# Patient Record
Sex: FEMALE | Race: WHITE | Employment: FULL TIME | URBAN - METROPOLITAN AREA
[De-identification: names, ages, dates, MRNs, and addresses within clinical notes are randomized per-mention and may not be internally consistent; named-entity substitution may affect disease eponyms.]

---

## 2018-04-09 ENCOUNTER — OP HISTORICAL/CONVERTED ENCOUNTER (OUTPATIENT)
Dept: OTHER | Age: 53
End: 2018-04-09

## 2018-06-06 ENCOUNTER — OP HISTORICAL/CONVERTED ENCOUNTER (OUTPATIENT)
Dept: OTHER | Age: 53
End: 2018-06-06

## 2018-06-08 ENCOUNTER — OP HISTORICAL/CONVERTED ENCOUNTER (OUTPATIENT)
Dept: OTHER | Age: 53
End: 2018-06-08

## 2018-07-30 ENCOUNTER — OP HISTORICAL/CONVERTED ENCOUNTER (OUTPATIENT)
Dept: OTHER | Age: 53
End: 2018-07-30

## 2018-07-31 ENCOUNTER — OP HISTORICAL/CONVERTED ENCOUNTER (OUTPATIENT)
Dept: OTHER | Age: 53
End: 2018-07-31

## 2018-10-02 ENCOUNTER — OP HISTORICAL/CONVERTED ENCOUNTER (OUTPATIENT)
Dept: OTHER | Age: 53
End: 2018-10-02

## 2019-05-06 ENCOUNTER — OP HISTORICAL/CONVERTED ENCOUNTER (OUTPATIENT)
Dept: OTHER | Age: 54
End: 2019-05-06

## 2019-05-30 ENCOUNTER — OP HISTORICAL/CONVERTED ENCOUNTER (OUTPATIENT)
Dept: OTHER | Age: 54
End: 2019-05-30

## 2019-08-29 ENCOUNTER — OP HISTORICAL/CONVERTED ENCOUNTER (OUTPATIENT)
Dept: OTHER | Age: 54
End: 2019-08-29

## 2020-07-24 RX ORDER — LEVOTHYROXINE SODIUM 100 UG/1
TABLET ORAL
Qty: 30 TAB | Refills: 0 | Status: SHIPPED | OUTPATIENT
Start: 2020-07-24 | End: 2020-08-19

## 2020-07-28 DIAGNOSIS — N95.1 MENOPAUSAL AND FEMALE CLIMACTERIC STATES: Primary | ICD-10-CM

## 2020-07-28 RX ORDER — TESTOSTERONE 10 MG/.5G
GEL, METERED TOPICAL
Qty: 60 G | Refills: 0 | Status: SHIPPED | OUTPATIENT
Start: 2020-07-28 | End: 2020-12-07

## 2020-09-12 RX ORDER — PROGESTERONE 200 MG/1
CAPSULE ORAL
Qty: 90 CAP | Refills: 2 | Status: SHIPPED | OUTPATIENT
Start: 2020-09-12 | End: 2021-03-11 | Stop reason: ALTCHOICE

## 2020-10-14 ENCOUNTER — TELEPHONE (OUTPATIENT)
Dept: ENT CLINIC | Age: 55
End: 2020-10-14

## 2020-10-15 NOTE — TELEPHONE ENCOUNTER
Called patient to send in allergy recipe through fax in the office. Patient says she might not get to it today but will call when she faxes it over.

## 2020-10-20 VITALS
HEART RATE: 95 BPM | OXYGEN SATURATION: 98 % | BODY MASS INDEX: 31.58 KG/M2 | DIASTOLIC BLOOD PRESSURE: 80 MMHG | HEIGHT: 64 IN | SYSTOLIC BLOOD PRESSURE: 122 MMHG | RESPIRATION RATE: 18 BRPM | WEIGHT: 185 LBS

## 2020-10-20 PROBLEM — J30.9 ALLERGIC RHINITIS: Status: ACTIVE | Noted: 2019-07-29

## 2020-10-20 PROBLEM — J30.1 ALLERGIC RHINITIS DUE TO POLLEN: Status: ACTIVE | Noted: 2019-07-29

## 2020-11-06 ENCOUNTER — TELEPHONE (OUTPATIENT)
Dept: ENT CLINIC | Age: 55
End: 2020-11-06

## 2020-11-06 DIAGNOSIS — N95.1 SYMPTOMATIC MENOPAUSAL OR FEMALE CLIMACTERIC STATES: Primary | ICD-10-CM

## 2020-11-06 RX ORDER — ESTRADIOL 2 MG/1
2 TABLET ORAL DAILY
Qty: 90 TAB | Refills: 0 | OUTPATIENT
Start: 2020-11-06 | End: 2021-03-11 | Stop reason: ALTCHOICE

## 2020-11-06 RX ORDER — ESTRADIOL 1 MG/1
1 TABLET ORAL DAILY
Qty: 90 TAB | Refills: 0 | Status: SHIPPED | OUTPATIENT
Start: 2020-11-06 | End: 2020-11-06 | Stop reason: CLARIF

## 2020-11-17 ENCOUNTER — OFFICE VISIT (OUTPATIENT)
Dept: ENT CLINIC | Age: 55
End: 2020-11-17
Payer: COMMERCIAL

## 2020-11-17 VITALS — TEMPERATURE: 97.3 F | SYSTOLIC BLOOD PRESSURE: 130 MMHG | DIASTOLIC BLOOD PRESSURE: 76 MMHG

## 2020-11-17 VITALS — SYSTOLIC BLOOD PRESSURE: 130 MMHG | TEMPERATURE: 97.3 F | DIASTOLIC BLOOD PRESSURE: 76 MMHG

## 2020-11-17 DIAGNOSIS — J30.1 NON-SEASONAL ALLERGIC RHINITIS DUE TO POLLEN: Primary | ICD-10-CM

## 2020-11-17 DIAGNOSIS — R09.82 PND (POST-NASAL DRIP): ICD-10-CM

## 2020-11-17 DIAGNOSIS — J32.0 CHRONIC MAXILLARY SINUSITIS: ICD-10-CM

## 2020-11-17 DIAGNOSIS — J30.1 ALLERGIC RHINITIS DUE TO POLLEN, UNSPECIFIED SEASONALITY: Primary | ICD-10-CM

## 2020-11-17 PROCEDURE — 95117 IMMUNOTHERAPY INJECTIONS: CPT | Performed by: OTOLARYNGOLOGY

## 2020-11-17 PROCEDURE — 31231 NASAL ENDOSCOPY DX: CPT | Performed by: OTOLARYNGOLOGY

## 2020-11-17 RX ORDER — BUDESONIDE 1 MG/2ML
1000 INHALANT ORAL DAILY
Qty: 60 ML | Refills: 1 | Status: SHIPPED | OUTPATIENT
Start: 2020-11-17 | End: 2022-08-24 | Stop reason: ALTCHOICE

## 2020-11-17 NOTE — LETTER
11/17/20    Patient: Zenia Rashid   YOB: 1965   Date of Visit: 11/17/2020     Choco Gonzales MD  [address] [place] [address]  VIA In Basket    Dear Choco Gonzales MD,      Thank you for referring Ms. Zenia Rashid to [facility] for evaluation. My notes for this consultation are attached. If you have questions, please do not hesitate to call me. I look forward to following your patient along with you.       Sincerely,    Katie Kelly MD

## 2020-11-17 NOTE — PROGRESS NOTES
Subjective:    Thuan Tan   60 y.o.   1965       Location - nose, throat    Quality - PND, allergic rhinitis    Severity -  Moderate/severe    Duration - years    Timing - chronic    Context - followup today, pt is s/p ESS/septo in 2018; has been on/off with her allergy IT, mostly reactive to dust - is back on for past 2-3 mos; today c/o mostly PND, seems more right sided; using flonase and decongestants which only help some; no production of sputum no abn discoloration of drainage    Modifying Features - worse at night    Associated symptoms/signs - nasal congestion      Review of Systems  Review of Systems   Constitutional: Negative for chills and fever. HENT: Positive for congestion. Negative for ear pain, hearing loss, nosebleeds and tinnitus. Eyes: Negative for blurred vision and double vision. Respiratory: Negative for cough, sputum production and shortness of breath. Cardiovascular: Negative for chest pain and palpitations. Gastrointestinal: Negative for heartburn, nausea and vomiting. Musculoskeletal: Negative for joint pain and neck pain. Skin: Negative. Neurological: Negative for dizziness, speech change, weakness and headaches. Endo/Heme/Allergies: Positive for environmental allergies. Does not bruise/bleed easily. Psychiatric/Behavioral: Negative for memory loss. The patient does not have insomnia. Past Medical History:   Diagnosis Date    Allergic rhinitis 7/29/2019    Depression     History of multiple allergies     Muscle ache     Obesity 11/5/2012    Sinus pressure     Sinus problem      Prior to Admission medications    Medication Sig Start Date End Date Taking? Authorizing Provider   budesonide (PULMICORT) 1 mg/2 mL nbsp 2 mL by Nebulization route daily. 11/17/20  Yes Fernando Eaton MD   estradioL (ESTRACE) 2 mg tablet Take 1 Tab by mouth daily.  11/6/20   Kelsey Nunes MD   progesterone (PROMETRIUM) 200 mg capsule TAKE 1 CAPSULE BY MOUTH EVERY DAY 9/12/20   Bela Farmer MD   levothyroxine (SYNTHROID) 100 mcg tablet TAKE 1 TABLET BY MOUTH EVERY DAY IN THE MORNING 8/19/20   Mike Carrillo MD   testosterone 10 mg/0.5 gram /actuation glpm APPLY 2 PUMP(S) EVERY DAY BY TRANSDERMAL ROUTE. 7/28/20   Bela Farmer MD   escitalopram oxalate (LEXAPRO) 20 mg tablet TAKE 1 TABLET BY MOUTH EVERY DAY 8/25/14   Colleen Fournier MD   buPROPion XL (WELLBUTRIN XL) 150 mg tablet TAKE 1 TABLET BY MOUTH DAILY. 8/25/14   Colleen Fournier MD   furosemide (LASIX) 40 mg tablet TAKE 1 TABLET BY MOUTH EVERY DAY 8/13/14   Ewa Méndez MD   desloratadine-pseudoephedrine (CLARINEX-D 24 HOUR) 5-240 mg ER tablet Take 1 Tab by mouth daily. Provider, Historical   acetaminophen (TYLENOL) 325 mg tablet Take  by mouth every four (4) hours as needed. Provider, Historical   LORATADINE (CLARITIN PO) Take  by mouth. Provider, Historical   MULTIVITAMIN PO Take  by mouth. Provider, Historical            Objective:     Visit Vitals  /76 (BP 1 Location: Right arm, BP Patient Position: Sitting)   Temp 97.3 °F (36.3 °C) (Temporal)        Physical Exam  Vitals signs reviewed. Constitutional:       General: She is awake. She is not in acute distress. Appearance: Normal appearance. She is well-groomed. She is obese. HENT:      Head: Normocephalic and atraumatic. Jaw: There is normal jaw occlusion. No trismus, tenderness or malocclusion. Salivary Glands: Right salivary gland is not diffusely enlarged or tender. Left salivary gland is not diffusely enlarged or tender. Right Ear: Hearing, tympanic membrane, ear canal and external ear normal.      Left Ear: Hearing, tympanic membrane, ear canal and external ear normal.      Ears:      Rondon exam findings: does not lateralize. Right Rinne: AC > BC. Left Rinne: AC > BC. Nose: Mucosal edema and rhinorrhea (clear) present. No nasal deformity or septal deviation. Right Nostril: No epistaxis.       Left Nostril: No epistaxis. Right Turbinates: Not enlarged, swollen or pale. Left Turbinates: Not enlarged, swollen or pale. Right Sinus: No maxillary sinus tenderness or frontal sinus tenderness. Left Sinus: No maxillary sinus tenderness or frontal sinus tenderness. Mouth/Throat:      Lips: Pink. No lesions. Mouth: Mucous membranes are moist. No oral lesions. Dentition: Normal dentition. No dental caries. Tongue: No lesions. Palate: No mass and lesions. Pharynx: Oropharynx is clear. Uvula midline. No oropharyngeal exudate or posterior oropharyngeal erythema. Tonsils: No tonsillar exudate. 0 on the right. 0 on the left. Eyes:      General: Vision grossly intact. Extraocular Movements: Extraocular movements intact. Right eye: No nystagmus. Left eye: No nystagmus. Conjunctiva/sclera: Conjunctivae normal.      Pupils: Pupils are equal, round, and reactive to light. Neck:      Musculoskeletal: No edema or erythema. Thyroid: No thyroid mass, thyromegaly or thyroid tenderness. Trachea: Trachea and phonation normal. No tracheal tenderness or tracheal deviation. Cardiovascular:      Rate and Rhythm: Normal rate and regular rhythm. Pulmonary:      Effort: Pulmonary effort is normal. No respiratory distress. Breath sounds: No stridor. Musculoskeletal: Normal range of motion. General: No swelling or tenderness. Lymphadenopathy:      Cervical: No cervical adenopathy. Skin:     General: Skin is warm and dry. Findings: No lesion or rash. Neurological:      General: No focal deficit present. Mental Status: She is alert and oriented to person, place, and time. Mental status is at baseline. Cranial Nerves: Cranial nerves are intact. Coordination: Romberg sign negative. Gait: Gait is intact.       Comments: Negative Hallpike   Psychiatric:         Mood and Affect: Mood normal.         Behavior: Behavior normal. Behavior is cooperative. Procedure: Nasal endoscopy - Nasal passage is anesthetized with a lidocaine/oxymetazoline soaked cottonball. After several minutes the cottonball is removed and flexible scope is used to examine the nasal cavity. Left side - normal inferior turbinate, middle meatus; no polyp, purulence, lesion. Widely patent maxillary antrostomy and no pooling of secretions within sinus  Right side  - normal inferior turbinate, middle meatus; no polyp, purulence, lesion. Widely patent maxillary antrostomy and no pooling of secretions within sinus    There is clear mucoid drainage posterior nasal cavity more on right side. Septum normal.        Assessment/Plan:     Encounter Diagnoses   Name Primary?  Non-seasonal allergic rhinitis due to pollen Yes    PND (post-nasal drip)     Chronic maxillary sinusitis      Scope today showing expected surgical changes and patent ostia, pt reassured  Drainage is mostly allergy/rhinitis related  Will cont to advance on IT and add steroid irrigations  Fu 3 mos    Orders Placed This Encounter    budesonide (PULMICORT) 1 mg/2 mL nbsp     Follow-up and Dispositions    · Return in about 3 months (around 2/17/2021).

## 2020-12-01 DIAGNOSIS — N95.1 MENOPAUSAL AND FEMALE CLIMACTERIC STATES: ICD-10-CM

## 2020-12-07 RX ORDER — TESTOSTERONE 10 MG/.5G
GEL, METERED TOPICAL
Qty: 60 G | Refills: 0 | Status: SHIPPED | OUTPATIENT
Start: 2020-12-07 | End: 2021-06-29 | Stop reason: ALTCHOICE

## 2020-12-15 RX ORDER — ESCITALOPRAM OXALATE 20 MG/1
TABLET ORAL
Qty: 90 TAB | Refills: 2 | Status: SHIPPED | OUTPATIENT
Start: 2020-12-15 | End: 2021-09-14

## 2020-12-15 RX ORDER — FUROSEMIDE 20 MG/1
TABLET ORAL
Qty: 90 TAB | Refills: 1 | Status: SHIPPED | OUTPATIENT
Start: 2020-12-15 | End: 2021-05-25

## 2020-12-18 RX ORDER — TRAZODONE HYDROCHLORIDE 50 MG/1
TABLET ORAL
Qty: 180 TAB | Refills: 1 | Status: SHIPPED | OUTPATIENT
Start: 2020-12-18 | End: 2021-05-21

## 2020-12-21 ENCOUNTER — TELEPHONE (OUTPATIENT)
Dept: ENT CLINIC | Age: 55
End: 2020-12-21

## 2020-12-28 ENCOUNTER — OFFICE VISIT (OUTPATIENT)
Dept: ENT CLINIC | Age: 55
End: 2020-12-28
Payer: COMMERCIAL

## 2020-12-28 VITALS — TEMPERATURE: 97.1 F

## 2020-12-28 DIAGNOSIS — J30.1 ALLERGIC RHINITIS DUE TO POLLEN, UNSPECIFIED SEASONALITY: Primary | ICD-10-CM

## 2020-12-28 PROCEDURE — 95117 IMMUNOTHERAPY INJECTIONS: CPT | Performed by: OTOLARYNGOLOGY

## 2020-12-28 RX ORDER — LEVONORGESTREL AND ETHINYL ESTRADIOL AND ETHINYL ESTRADIOL 0.15MG(84)
KIT ORAL
COMMUNITY
End: 2021-03-11 | Stop reason: ALTCHOICE

## 2020-12-28 RX ORDER — TESTOSTERONE 10 MG/.5G
GEL, METERED TOPICAL
COMMUNITY
End: 2021-06-29 | Stop reason: ALTCHOICE

## 2020-12-28 RX ORDER — LEVOTHYROXINE AND LIOTHYRONINE 38; 9 UG/1; UG/1
TABLET ORAL
COMMUNITY
End: 2021-06-29 | Stop reason: ALTCHOICE

## 2020-12-28 RX ORDER — ESCITALOPRAM OXALATE 10 MG/1
TABLET ORAL
COMMUNITY
End: 2021-06-29 | Stop reason: ALTCHOICE

## 2020-12-28 RX ORDER — LEVOTHYROXINE SODIUM 100 UG/1
TABLET ORAL
COMMUNITY
End: 2021-06-29 | Stop reason: ALTCHOICE

## 2020-12-28 RX ORDER — DOXYCYCLINE HYCLATE 100 MG
TABLET ORAL
COMMUNITY
End: 2021-06-29 | Stop reason: ALTCHOICE

## 2020-12-28 RX ORDER — TRAZODONE HYDROCHLORIDE 50 MG/1
TABLET ORAL
COMMUNITY
End: 2021-05-21 | Stop reason: SDUPTHER

## 2020-12-28 RX ORDER — LORATADINE AND PSEUDOEPHEDRINE SULFATE 5; 120 MG/1; MG/1
TABLET, EXTENDED RELEASE ORAL
Status: ON HOLD | COMMUNITY

## 2020-12-28 RX ORDER — ESTRADIOL 0.5 MG/1
TABLET ORAL
COMMUNITY
End: 2021-03-11 | Stop reason: ALTCHOICE

## 2020-12-28 RX ORDER — FUROSEMIDE 20 MG/1
TABLET ORAL
COMMUNITY
End: 2021-05-25 | Stop reason: SDUPTHER

## 2020-12-28 RX ORDER — TRETINOIN 0.5 MG/G
CREAM TOPICAL
Status: ON HOLD | COMMUNITY

## 2021-01-06 ENCOUNTER — OFFICE VISIT (OUTPATIENT)
Dept: PRIMARY CARE CLINIC | Age: 56
End: 2021-01-06

## 2021-01-06 VITALS — HEART RATE: 100 BPM | OXYGEN SATURATION: 97 % | TEMPERATURE: 98.2 F

## 2021-01-06 DIAGNOSIS — Z13.83 SCREENING FOR CARDIOVASCULAR, RESPIRATORY, AND GENITOURINARY DISEASES: Primary | ICD-10-CM

## 2021-01-06 DIAGNOSIS — J02.8 SORE THROAT (VIRAL): ICD-10-CM

## 2021-01-06 DIAGNOSIS — Z13.6 SCREENING FOR CARDIOVASCULAR, RESPIRATORY, AND GENITOURINARY DISEASES: Primary | ICD-10-CM

## 2021-01-06 DIAGNOSIS — Z13.89 SCREENING FOR CARDIOVASCULAR, RESPIRATORY, AND GENITOURINARY DISEASES: Primary | ICD-10-CM

## 2021-01-06 DIAGNOSIS — B97.89 SORE THROAT (VIRAL): ICD-10-CM

## 2021-01-06 DIAGNOSIS — Z91.89 AT INCREASED RISK OF EXPOSURE TO COVID-19 VIRUS: ICD-10-CM

## 2021-01-06 PROCEDURE — 99213 OFFICE O/P EST LOW 20 MIN: CPT | Performed by: NURSE PRACTITIONER

## 2021-01-06 NOTE — PROGRESS NOTES
Adriano Arriaza is a 60 y.o. female who was seen in clinic today (1/6/2021) for an acute visit. Assessment/Plan:            * COVID-19 sample collected and submitted       * Patient given detailed CDC instructions contained within After Visit Summary    Diagnoses and all orders for this visit:    1. Screening for cardiovascular, respiratory, and genitourinary diseases  -     NOVEL CORONAVIRUS (COVID-19)    2. At increased risk of exposure to COVID-19 virus    3. Sore throat (viral)       known covid exposure. Discussed expected course/resolution/complications of diagnosis in detail with patient. Advised pt on CDC guidance, OTC medications for symptom management, red flags reviewed and should develop to seek emergency medical attn. Reviewed with her that COVID-19 pandemic is an evolving situation with rapidly changing recommendations & guidelines, continue to practice hand hygiene, social distancing, wearing of facial coverings. Regardless of testing results, should still follow CDC guidelines as there is a chance of a false negative, such as a poor sample collection or being too soon to test after exposure. Medical decisions are made based on the the best information available at the time. Recommended she stay tuned for updates published by trusted sources and to advise your PCP of any unexpected changes in clinical condition     Subjective:   Jimbo Tello was seen today for Concern For COVID-19 (Coronavirus) [facility] employee reports + COVID exposure, patient had - COVID result on rapid test on Sunday. ), Fatigue, and Sore Throat  Her  tested positive for covid 19, symptoms started last Thursday. She denies a recent history/current: loss of smell/taste, cough, fever, wheezing, SOB, and PARKINSON. Non user of tob. Travel Screening     Question   Response    In the last month, have you been in contact with someone who was confirmed or suspected to have Coronavirus / COVID-19?   Yes    Have you had a COVID-19 viral test in the last 14 days? Yes - Negative result    Do you have any of the following new or worsening symptoms? Cough; Fatigue; Sore throat    Have you traveled internationally in the last month? No      Travel History   Travel since 12/06/20     No documented travel since 12/06/20          ROS - Pertinent items are noted in HPI    Patient Active Problem List   Diagnosis Code    Depression F32.9    Obesity E66.9    Allergic rhinitis due to pollen J30.1    PND (post-nasal drip) R09.82    Chronic maxillary sinusitis J32.0     Home Medications    Medication Sig Start Date End Date Taking? Authorizing Provider   loratadine-pseudoephedrine (Claritin-D 12 Hour) 5-120 mg per tablet Claritin-D    Provider, Historical   doxycycline (VIBRA-TABS) 100 mg tablet doxycycline hyclate 100 mg tabs    Provider, Historical   escitalopram oxalate (LEXAPRO) 10 mg tablet escitalopram oxalate 20 mg tabs    Provider, Historical   estradioL (ESTRACE) 0.5 mg tablet estradiol 0.5 mg tabs    Provider, Historical   L norgest/e.estradioL-e.estrad 0.15 mg-20 mcg/ 0.15 mg-25 mcg 3MPk estradiol 1 mg tabs    Provider, Historical   furosemide (LASIX) 20 mg tablet furosemide 20 mg tabs    Provider, Historical   levothyroxine (SYNTHROID) 100 mcg tablet levothyroxine sodium 100 mcg tabs    Provider, Historical   thyroid, Pork, (NP Thyroid) 60 mg tablet np thyroid 60 60 mg tabs    Provider, Historical   testosterone 10 mg/0.5 gram /actuation glpm testosterone 10 mg/act (2%) gel    Provider, Historical   traZODone (DESYREL) 50 mg tablet trazodone hydrochloride 50 mg tabs    Provider, Historical   dexAMETHasone sodium phosphate 10 mg/mL kit dexamethasone sodium phosphate 10 mg/mL injection solution   ORAL - Administer 0.6 mg/kg as a one time dose. Not to Exceed 10mg. Take the IV form and give PO.     Provider, Historical   tretinoin (RETIN-A) 0.05 % topical cream tretinoin 0.05 % topical cream    Provider, Historical   traZODone (DESYREL) 50 mg tablet TAKE 1-2 TABLET BY MOUTH AT BEDTIME AS NEEDED FOR SLEEP 12/18/20   Armin Self MD   furosemide (LASIX) 20 mg tablet TAKE 1 TABLET BY MOUTH DAILY AS NEEDED FOR SWELLING 12/15/20   Armin Self MD   escitalopram oxalate (LEXAPRO) 20 mg tablet TAKE 1 TABLET BY MOUTH EVERY DAY 12/15/20   Armin Self MD   testosterone 10 mg/0.5 gram /actuation glpm APPLY 2 PUMP(S) EVERY DAY BY TRANSDERMAL ROUTE. 12/7/20   Dextre Barbosa MD   budesonide (PULMICORT) 1 mg/2 mL nbsp 2 mL by Nebulization route daily. 11/17/20   Cecily Jensen MD   estradioL (ESTRACE) 2 mg tablet Take 1 Tab by mouth daily. 11/6/20   Dexter Barbosa MD   progesterone (PROMETRIUM) 200 mg capsule TAKE 1 CAPSULE BY MOUTH EVERY DAY 9/12/20   Dexter Barbosa MD   levothyroxine (SYNTHROID) 100 mcg tablet TAKE 1 TABLET BY MOUTH EVERY DAY IN THE MORNING 8/19/20   Armin Self MD   desloratadine-pseudoephedrine (CLARINEX-D 24 HOUR) 5-240 mg ER tablet Take 1 Tab by mouth daily. Provider, Historical   acetaminophen (TYLENOL) 325 mg tablet Take  by mouth every four (4) hours as needed. Provider, Historical   LORATADINE (CLARITIN PO) Take  by mouth. Provider, Historical   MULTIVITAMIN PO Take  by mouth. Provider, Historical      Allergies   Allergen Reactions    Droperidol Itching          Objective:   Physical Exam  General:  alert, cooperative, no distress   Ears: Normal external ear canals AU   Sinuses: Normal paranasal sinuses without tenderness   Neck: supple, symmetrical, trachea midline. Heart: normal rate, regular rhythm   Lungs: No dyspneic or audible respiratory distress. Visit Vitals  Pulse 100   Temp 98.2 °F (36.8 °C)   SpO2 97%         Disclaimer:        Medication risks/benefits/costs/interactions/alternatives discussed with patient. She was given an after visit summary which includes diagnoses, current medications, & vitals. She expressed understanding with the diagnosis and plan.       Aspects of this note may have been generated using voice recognition software. Despite editing, there may be some syntax errors.        Maryse Crandall NP

## 2021-01-08 ENCOUNTER — TELEPHONE (OUTPATIENT)
Dept: PRIMARY CARE CLINIC | Age: 56
End: 2021-01-08

## 2021-01-08 LAB — SARS-COV-2, NAA: DETECTED

## 2021-01-08 NOTE — TELEPHONE ENCOUNTER
The patient was called for notification of a POSITIVE test result for COVID-19. The following information was given to the patient:     The COVID-19 test result was positive   Mild and stable symptoms are managed at home     Treatment of coronavirus does not require an antibiotic   Remain isolated for 10 days since symptoms first appeared AND at least 3 days have passed after recovery    o Recovery is defined as resolution of fever without the use of fever-reducing medications with progressive improvement or resolution of other symptoms     Wash hands often with soap and water for at least 20 seconds or alternatively use hand  with at least 60% alcohol content   Cover coughs and sneezes   Wear a mask when around others if possible   Clean all high-touch surfaces every day, such as doorknobs and cellphones   Continually monitor symptoms.  Contact your medical provider if symptoms are worsening, such as difficulty breathing   For more information visit the CDC website: DotProtection.gl

## 2021-01-12 ENCOUNTER — OFFICE VISIT (OUTPATIENT)
Dept: PRIMARY CARE CLINIC | Age: 56
End: 2021-01-12

## 2021-01-12 VITALS — TEMPERATURE: 97.7 F | HEART RATE: 100 BPM | OXYGEN SATURATION: 97 %

## 2021-01-12 DIAGNOSIS — Z76.89 RETURN TO WORK EVALUATION: Primary | ICD-10-CM

## 2021-01-12 PROCEDURE — 99212 OFFICE O/P EST SF 10 MIN: CPT | Performed by: NURSE PRACTITIONER

## 2021-01-12 NOTE — PROGRESS NOTES
Tera Buenrostro is a 60 y.o. female who was seen in clinic today (1/12/2021) for an acute visit. Assessment/Plan:            * Patient given detailed CDC instructions contained within After Visit Summary    Diagnoses and all orders for this visit:    1. Return to work evaluation       known covid-19. Patient has met Centers for Disease Control symptom/time based criteria for no longer being contagious and ending quarantine. Additionally, she feels able to return to work. Based on my exam, I believe patient may return to work safely. Reviewed with her that COVID-19 pandemic is an evolving situation with rapidly changing recommendations & guidelines, continue to practice hand hygiene, social distancing, wearing of facial coverings; re-infections with covid-19 have been reported although risk remains low. Medical decisions are made based on the the best information available at the time. Recommended she stay tuned for updates published by trusted sources such as the CDC/[facility] websites and to advise your PCP of any unexpected changes in clinical condition     Subjective:   Candice Vick was seen today for return to work following lower resp tract infection due to covid-19. Was initially diagnosed with covid 19,  She denies a recent history/current: cough, fever, wheezing, SOB, and PARKINSON. Feeling 99% of baseline normal noting some ongoing fatigue. Travel Screening     Question   Response    In the last month, have you been in contact with someone who was confirmed or suspected to have Coronavirus / COVID-19? No / Unsure    Have you had a COVID-19 viral test in the last 14 days? Do you have any of the following new or worsening symptoms? None of these    Have you traveled internationally in the last month?   No      Travel History   Travel since 12/12/20     No documented travel since 12/12/20          ROS - Pertinent items are noted in HPI    Patient Active Problem List   Diagnosis Code    Depression F32.9    Obesity E66.9    Allergic rhinitis due to pollen J30.1    PND (post-nasal drip) R09.82    Chronic maxillary sinusitis J32.0     Home Medications    Medication Sig Start Date End Date Taking? Authorizing Provider   loratadine-pseudoephedrine (Claritin-D 12 Hour) 5-120 mg per tablet Claritin-D    Provider, Historical   doxycycline (VIBRA-TABS) 100 mg tablet doxycycline hyclate 100 mg tabs    Provider, Historical   escitalopram oxalate (LEXAPRO) 10 mg tablet escitalopram oxalate 20 mg tabs    Provider, Historical   estradioL (ESTRACE) 0.5 mg tablet estradiol 0.5 mg tabs    Provider, Historical   L norgest/e.estradioL-e.estrad 0.15 mg-20 mcg/ 0.15 mg-25 mcg 3MPk estradiol 1 mg tabs    Provider, Historical   furosemide (LASIX) 20 mg tablet furosemide 20 mg tabs    Provider, Historical   levothyroxine (SYNTHROID) 100 mcg tablet levothyroxine sodium 100 mcg tabs    Provider, Historical   thyroid, Pork, (NP Thyroid) 60 mg tablet np thyroid 60 60 mg tabs    Provider, Historical   testosterone 10 mg/0.5 gram /actuation glpm testosterone 10 mg/act (2%) gel    Provider, Historical   traZODone (DESYREL) 50 mg tablet trazodone hydrochloride 50 mg tabs    Provider, Historical   dexAMETHasone sodium phosphate 10 mg/mL kit dexamethasone sodium phosphate 10 mg/mL injection solution   ORAL - Administer 0.6 mg/kg as a one time dose. Not to Exceed 10mg. Take the IV form and give PO.     Provider, Historical   tretinoin (RETIN-A) 0.05 % topical cream tretinoin 0.05 % topical cream    Provider, Historical   traZODone (DESYREL) 50 mg tablet TAKE 1-2 TABLET BY MOUTH AT BEDTIME AS NEEDED FOR SLEEP 12/18/20   Dima Santiago MD   furosemide (LASIX) 20 mg tablet TAKE 1 TABLET BY MOUTH DAILY AS NEEDED FOR SWELLING 12/15/20   Dima Santiago MD   escitalopram oxalate (LEXAPRO) 20 mg tablet TAKE 1 TABLET BY MOUTH EVERY DAY 12/15/20   Dima Santiago MD   testosterone 10 mg/0.5 gram /actuation glpm APPLY 2 PUMP(S) EVERY DAY BY TRANSDERMAL ROUTE. 12/7/20   Gilles Siddiqui MD   budesonide (PULMICORT) 1 mg/2 mL nbsp 2 mL by Nebulization route daily. 11/17/20   Jonn Lawrence MD   estradioL (ESTRACE) 2 mg tablet Take 1 Tab by mouth daily. 11/6/20   Gilles Siddiqui MD   progesterone (PROMETRIUM) 200 mg capsule TAKE 1 CAPSULE BY MOUTH EVERY DAY 9/12/20   Gilles Siddiqui MD   levothyroxine (SYNTHROID) 100 mcg tablet TAKE 1 TABLET BY MOUTH EVERY DAY IN THE MORNING 8/19/20   Marlon Veliz MD   desloratadine-pseudoephedrine (CLARINEX-D 24 HOUR) 5-240 mg ER tablet Take 1 Tab by mouth daily. Provider, Historical   acetaminophen (TYLENOL) 325 mg tablet Take  by mouth every four (4) hours as needed. Provider, Historical   LORATADINE (CLARITIN PO) Take  by mouth. Provider, Historical   MULTIVITAMIN PO Take  by mouth. Provider, Historical      Allergies   Allergen Reactions    Droperidol Itching          Objective:   Physical Exam  General:   alert, cooperative, no distress   Ears: Normal external ear canals AU   Neck: supple, symmetrical, trachea midline. Heart: normal rate, regular rhythm   Lungs: clear to auscultation bilaterally       Visit Vitals  Pulse 100   Temp 97.7 °F (36.5 °C)   SpO2 97%         Disclaimer:        Medication risks/benefits/costs/interactions/alternatives discussed with patient. She was given an after visit summary which includes diagnoses, current medications, & vitals. She expressed understanding with the diagnosis and plan. Aspects of this note may have been generated using voice recognition software. Despite editing, there may be some syntax errors.        Conrado Gagnon NP

## 2021-01-25 ENCOUNTER — TELEPHONE (OUTPATIENT)
Dept: OBGYN CLINIC | Age: 56
End: 2021-01-25

## 2021-01-25 NOTE — TELEPHONE ENCOUNTER
Returned patient's call and she is c/o vaginal bleeding on HRT. States it's not heavy bleeding but it is every few weeks. Patient is wondering if she needs to have her hormone levels checked. Will speak with Dr Addie Thomas and contact her tomorrow.

## 2021-02-01 RX ORDER — ESTRADIOL 1 MG/1
TABLET ORAL
Qty: 180 TAB | Refills: 0 | Status: SHIPPED | OUTPATIENT
Start: 2021-02-01 | End: 2021-03-11 | Stop reason: ALTCHOICE

## 2021-02-04 ENCOUNTER — OFFICE VISIT (OUTPATIENT)
Dept: OBGYN CLINIC | Age: 56
End: 2021-02-04
Payer: COMMERCIAL

## 2021-02-04 VITALS
WEIGHT: 208 LBS | DIASTOLIC BLOOD PRESSURE: 78 MMHG | HEIGHT: 65 IN | SYSTOLIC BLOOD PRESSURE: 120 MMHG | BODY MASS INDEX: 34.66 KG/M2

## 2021-02-04 DIAGNOSIS — Z12.4 ENCOUNTER FOR SCREENING FOR MALIGNANT NEOPLASM OF CERVIX: ICD-10-CM

## 2021-02-04 DIAGNOSIS — N95.1 MENOPAUSAL SYNDROME: ICD-10-CM

## 2021-02-04 DIAGNOSIS — N95.0 POSTMENOPAUSAL BLEEDING: ICD-10-CM

## 2021-02-04 DIAGNOSIS — Z01.419 GYNECOLOGIC EXAM NORMAL: Primary | ICD-10-CM

## 2021-02-04 PROCEDURE — 99396 PREV VISIT EST AGE 40-64: CPT | Performed by: OBSTETRICS & GYNECOLOGY

## 2021-02-04 PROCEDURE — 58100 BIOPSY OF UTERUS LINING: CPT | Performed by: OBSTETRICS & GYNECOLOGY

## 2021-02-04 PROCEDURE — 99214 OFFICE O/P EST MOD 30 MIN: CPT | Performed by: OBSTETRICS & GYNECOLOGY

## 2021-02-04 NOTE — PROGRESS NOTES
Zenon Prince is a 60 y.o. female, , No LMP recorded. (Menstrual status: Menopause). , who presents today for the following:  Chief Complaint   Patient presents with    Annual Exam        Allergies   Allergen Reactions    Droperidol Itching       Current Outpatient Medications   Medication Sig    estradioL (ESTRACE) 1 mg tablet TAKE 2 TABLETS BY MOUTH DAILY    traZODone (DESYREL) 50 mg tablet TAKE 1-2 TABLET BY MOUTH AT BEDTIME AS NEEDED FOR SLEEP    furosemide (LASIX) 20 mg tablet TAKE 1 TABLET BY MOUTH DAILY AS NEEDED FOR SWELLING    escitalopram oxalate (LEXAPRO) 20 mg tablet TAKE 1 TABLET BY MOUTH EVERY DAY    testosterone 10 mg/0.5 gram /actuation glpm APPLY 2 PUMP(S) EVERY DAY BY TRANSDERMAL ROUTE.  budesonide (PULMICORT) 1 mg/2 mL nbsp 2 mL by Nebulization route daily.  progesterone (PROMETRIUM) 200 mg capsule TAKE 1 CAPSULE BY MOUTH EVERY DAY    levothyroxine (SYNTHROID) 100 mcg tablet TAKE 1 TABLET BY MOUTH EVERY DAY IN THE MORNING    desloratadine-pseudoephedrine (CLARINEX-D 24 HOUR) 5-240 mg ER tablet Take 1 Tab by mouth daily.  acetaminophen (TYLENOL) 325 mg tablet Take  by mouth every four (4) hours as needed.  LORATADINE (CLARITIN PO) Take  by mouth.  MULTIVITAMIN PO Take  by mouth.     loratadine-pseudoephedrine (Claritin-D 12 Hour) 5-120 mg per tablet Claritin-D    doxycycline (VIBRA-TABS) 100 mg tablet doxycycline hyclate 100 mg tabs    escitalopram oxalate (LEXAPRO) 10 mg tablet escitalopram oxalate 20 mg tabs    estradioL (ESTRACE) 0.5 mg tablet estradiol 0.5 mg tabs    L norgest/e.estradioL-e.estrad 0.15 mg-20 mcg/ 0.15 mg-25 mcg 3MPk estradiol 1 mg tabs    furosemide (LASIX) 20 mg tablet furosemide 20 mg tabs    levothyroxine (SYNTHROID) 100 mcg tablet levothyroxine sodium 100 mcg tabs    thyroid, Pork, (NP Thyroid) 60 mg tablet np thyroid 60 60 mg tabs    testosterone 10 mg/0.5 gram /actuation glpm testosterone 10 mg/act (2%) gel    traZODone (DESYREL) 50 mg tablet trazodone hydrochloride 50 mg tabs    dexAMETHasone sodium phosphate 10 mg/mL kit dexamethasone sodium phosphate 10 mg/mL injection solution   ORAL - Administer 0.6 mg/kg as a one time dose. Not to Exceed 10mg. Take the IV form and give PO.  tretinoin (RETIN-A) 0.05 % topical cream tretinoin 0.05 % topical cream    estradioL (ESTRACE) 2 mg tablet Take 1 Tab by mouth daily. No current facility-administered medications for this visit.         Past Medical History:   Diagnosis Date    Allergic rhinitis 7/29/2019    Depression     History of multiple allergies     Muscle ache     Obesity 11/5/2012    Sinus pressure     Sinus problem        Past Surgical History:   Procedure Laterality Date    HX ACL RECONSTRUCTION      left     HX CHOLECYSTECTOMY  1998       Family History   Problem Relation Age of Onset    Diabetes Paternal Grandfather        Social History     Socioeconomic History    Marital status:      Spouse name: Not on file    Number of children: Not on file    Years of education: Not on file    Highest education level: Not on file   Occupational History    Not on file   Social Needs    Financial resource strain: Not on file    Food insecurity     Worry: Not on file     Inability: Not on file    Transportation needs     Medical: Not on file     Non-medical: Not on file   Tobacco Use    Smoking status: Never Smoker    Smokeless tobacco: Never Used   Substance and Sexual Activity    Alcohol use: No    Drug use: No    Sexual activity: Yes     Partners: Male     Birth control/protection: None   Lifestyle    Physical activity     Days per week: Not on file     Minutes per session: Not on file    Stress: Not on file   Relationships    Social connections     Talks on phone: Not on file     Gets together: Not on file     Attends Mormonism service: Not on file     Active member of club or organization: Not on file     Attends meetings of clubs or organizations: Not on file     Relationship status: Not on file    Intimate partner violence     Fear of current or ex partner: Not on file     Emotionally abused: Not on file     Physically abused: Not on file     Forced sexual activity: Not on file   Other Topics Concern    Not on file   Social History Narrative    Not on file         HPI  Annual exam  Reports vaginal spotting x 6 months  Denies pain  Bleeding described as light spotting - brownish/bright red  Menopausal    Review of Systems   Constitutional: Negative. Respiratory: Negative. Cardiovascular: Negative. Gastrointestinal: Negative. Genitourinary: Negative. Musculoskeletal: Negative. Skin: Negative. Neurological: Negative. Endo/Heme/Allergies: Negative. Psychiatric/Behavioral: Negative. All other systems reviewed and are negative. /78 (BP 1 Location: Right upper arm, BP Patient Position: Sitting)   Ht 5' 5\" (1.651 m)   Wt 208 lb (94.3 kg)   BMI 34.61 kg/m²    OBGyn Exam   Constitutional:     General Appearance: healthy-appearing, well-nourished, and well-developed; Level of Distress: NAD. Ambulation: ambulating normally. Psychiatric:   Insight: good judgement. Mental Status: normal mood and affect and active and alert. Orientation: to time, place, and person. Memory: recent memory normal and remote memory normal.     Head: Head: normocephalic and atraumatic. Neck:   Neck: supple, FROM, trachea midline, and no masses. Lymph Nodes: no cervical LAD, supraclavicular LAD, axillary LAD, or inguinal LAD. Thyroid: no enlargement or nodules and non-tender. Lungs:   Respiratory effort: no dyspnea. Cardiovascular:     Pulses including femoral / pedal: normal throughout. Breast: Breast: no masses or abnormal secretions and normal appearance. Abdomen: Bowel Sounds: normal. Inspection and Palpation: no tenderness, guarding, masses, rebound tenderness, or CVA tenderness and non-distended.        Female :   External genitalia: no lesions or rash and normal.   Vagina: moist mucosa; .   Cervix: no discharge, inflammation, or cervical motion tenderness   Uterus: midline, smooth, and non-tender; normal size   Adnexae: no adnexal mass or tenderness and size WNL. Bladder and Urethra: normal bladder and urethra (except where noted). Musculoskeletal[de-identified]   Motor Strength and Tone: normal tone and motor strength. Joints, Bones, and Muscles: no contractures, malalignment, tenderness, or bony abnormalities and normal movement of all extremities. Extremities: no cyanosis, edema, varicosities, or palpable cord. Skin:   Inspection and palpation: no rash, lesions, ulcer, induration, nodules, jaundice, or abnormal nevi and good turgor. Nails: normal.         Endometrial Biopsy:     Risks, benefits and indications for endometrial biopsy procedure fully reviewed. Patient questions answered. Informed consent obtained. Speculum placed into vagina. Cervix was prepped with Betadine x 3. Tenaculum applied to anterior lip of cervix. Endometrial pipelle introduced. Suction initiated. A total of 4 passes performed. Endometrial tissue obtained. Tenaculum site hemostatic after application of pressure. Patient tolerated procedure well. 1. Gynecologic exam normal    - PAP IG, APTIMA HPV AND RFX 16/18,45 (100119)    2. Encounter for screening for malignant neoplasm of cervix      3.  Menopausal syndrome    - FSH AND LH  - PROGESTERONE  - ESTRADIOL, SERUM    4. Postmenopausal bleeding    - SURGICAL PATHOLOGY  - BIOPSY OF UTERUS LINING

## 2021-02-08 LAB
CPT CODES, 490044: NORMAL
CPT DISCLAIMER: NORMAL
CYTOLOGIST CVX/VAG CYTO: NORMAL
CYTOLOGY CVX/VAG DOC CYTO: NORMAL
CYTOLOGY CVX/VAG DOC THIN PREP: NORMAL
CYTOLOGY SPEC DOC CYTO: NORMAL
DIAGNOSIS SYNOPSIS:: NORMAL
DX ICD CODE: NORMAL
DX ICD CODE: NORMAL
HPV I/H RISK 4 DNA CVX QL PROBE+SIG AMP: NEGATIVE
Lab: NORMAL
OTHER STN SPEC: NORMAL
PATH REPORT.GROSS SPEC: NORMAL
PATH REPORT.RELEVANT HX SPEC: NORMAL
PATHOLOGIST NAME: NORMAL
PDF IMAGE, 807507: NORMAL
SPECIMEN SOURCE: NORMAL
STAT OF ADQ CVX/VAG CYTO-IMP: NORMAL

## 2021-02-10 ENCOUNTER — TELEPHONE (OUTPATIENT)
Dept: OBGYN CLINIC | Age: 56
End: 2021-02-10

## 2021-02-10 DIAGNOSIS — N95.0 POSTMENOPAUSAL BLEEDING: Primary | ICD-10-CM

## 2021-02-10 LAB
ESTRADIOL SERPL HS-MCNC: 58 PG/ML
FSH SERPL-ACNC: 8.1 MIU/ML
LH SERPL-ACNC: 9.4 MIU/ML
PROGEST SERPL-MCNC: 2 NG/ML

## 2021-02-10 NOTE — TELEPHONE ENCOUNTER
Returned patient's call regarding her lab results and still having some postmenopausal spotting. Patient states her spotting is red in color. She is questioning her progesterone level. Per Dr Rick Ortiz, patient scheduled for an ultrasound and based on those results she will determine the course of treatment. Patient is aware.

## 2021-02-15 ENCOUNTER — TELEPHONE (OUTPATIENT)
Dept: ENT CLINIC | Age: 56
End: 2021-02-15

## 2021-02-23 ENCOUNTER — TELEPHONE (OUTPATIENT)
Dept: OBGYN CLINIC | Age: 56
End: 2021-02-23

## 2021-02-23 NOTE — TELEPHONE ENCOUNTER
Returned patient's call regarding her still spotting and her ultrasound. The patient states it will cost her $2300 out of pocket to have the ultrasound at [facility]. Advised her I would contact [facility] for a price for her. Per Shaggy Carlin in [facility] billing department at [facility], the patient's cash pay price would be $137.01 per each order and that includes the radiologist's fee. Patient made aware and orders faxed to [facility] and patient will schedule her own appt. A copy of her lab results were also faxed to [facility] for her HRT and she is aware.

## 2021-03-11 ENCOUNTER — TELEPHONE (OUTPATIENT)
Dept: OBGYN CLINIC | Age: 56
End: 2021-03-11

## 2021-03-11 NOTE — TELEPHONE ENCOUNTER
Spoke with patient regarding a fax we received from [facility] for HRT. Patient states she discussed with Dr Pau Bernstein at her last visit restarting the compounded HRT as she didn't have any bleeding issues when taking it. Patient will stop the Estradiol and Prometrium previously prescribed. Prescriptions faxed to [phone].

## 2021-04-29 RX ORDER — ESTRADIOL 1 MG/1
TABLET ORAL
Qty: 180 TAB | Refills: 0 | Status: SHIPPED | OUTPATIENT
Start: 2021-04-29 | End: 2021-06-29 | Stop reason: ALTCHOICE

## 2021-05-25 RX ORDER — FUROSEMIDE 20 MG/1
TABLET ORAL
Qty: 90 TABLET | Refills: 1 | Status: SHIPPED | OUTPATIENT
Start: 2021-05-25 | End: 2021-11-22

## 2021-06-24 LAB
CHOLEST SERPL-MCNC: 281 MG/DL
GLUCOSE SERPL-MCNC: 85 MG/DL (ref 65–100)
HDLC SERPL-MCNC: 45 MG/DL
LDLC SERPL CALC-MCNC: 168.6 MG/DL (ref 0–100)
TRIGL SERPL-MCNC: 337 MG/DL (ref ?–150)

## 2021-06-29 ENCOUNTER — OFFICE VISIT (OUTPATIENT)
Dept: FAMILY MEDICINE CLINIC | Age: 56
End: 2021-06-29
Payer: COMMERCIAL

## 2021-06-29 VITALS
BODY MASS INDEX: 36.5 KG/M2 | HEIGHT: 63 IN | SYSTOLIC BLOOD PRESSURE: 122 MMHG | HEART RATE: 75 BPM | DIASTOLIC BLOOD PRESSURE: 78 MMHG | TEMPERATURE: 97.3 F | OXYGEN SATURATION: 94 % | WEIGHT: 206 LBS

## 2021-06-29 DIAGNOSIS — Z12.11 COLON CANCER SCREENING: ICD-10-CM

## 2021-06-29 DIAGNOSIS — Z13.220 SCREENING FOR LIPOID DISORDERS: ICD-10-CM

## 2021-06-29 DIAGNOSIS — Z00.00 WELLNESS EXAMINATION: Primary | ICD-10-CM

## 2021-06-29 DIAGNOSIS — J30.1 SEASONAL ALLERGIC RHINITIS DUE TO POLLEN: ICD-10-CM

## 2021-06-29 DIAGNOSIS — E66.01 CLASS 2 SEVERE OBESITY DUE TO EXCESS CALORIES WITH SERIOUS COMORBIDITY AND BODY MASS INDEX (BMI) OF 36.0 TO 36.9 IN ADULT (HCC): ICD-10-CM

## 2021-06-29 DIAGNOSIS — Z23 ENCOUNTER FOR IMMUNIZATION: ICD-10-CM

## 2021-06-29 DIAGNOSIS — R73.01 IMPAIRED FASTING GLUCOSE: ICD-10-CM

## 2021-06-29 DIAGNOSIS — Z12.31 BREAST CANCER SCREENING BY MAMMOGRAM: ICD-10-CM

## 2021-06-29 DIAGNOSIS — E78.2 MIXED HYPERLIPIDEMIA: ICD-10-CM

## 2021-06-29 DIAGNOSIS — E03.9 ACQUIRED HYPOTHYROIDISM: ICD-10-CM

## 2021-06-29 PROBLEM — F41.1 GENERALIZED ANXIETY DISORDER: Status: ACTIVE | Noted: 2021-06-29

## 2021-06-29 PROBLEM — G47.00 INSOMNIA: Status: ACTIVE | Noted: 2021-06-29

## 2021-06-29 PROBLEM — R60.0 LOWER EXTREMITY EDEMA: Status: ACTIVE | Noted: 2021-06-29

## 2021-06-29 PROBLEM — R00.0 TACHYCARDIA: Status: ACTIVE | Noted: 2021-06-29

## 2021-06-29 PROCEDURE — 99214 OFFICE O/P EST MOD 30 MIN: CPT | Performed by: FAMILY MEDICINE

## 2021-06-29 PROCEDURE — 99396 PREV VISIT EST AGE 40-64: CPT | Performed by: FAMILY MEDICINE

## 2021-06-29 RX ORDER — MONTELUKAST SODIUM 10 MG/1
10 TABLET ORAL
Qty: 30 TABLET | Refills: 2 | Status: SHIPPED | OUTPATIENT
Start: 2021-06-29 | End: 2022-06-03 | Stop reason: ALTCHOICE

## 2021-06-29 RX ORDER — ZOSTER VACCINE RECOMBINANT, ADJUVANTED 50 MCG/0.5
0.5 KIT INTRAMUSCULAR ONCE
Qty: 0.5 ML | Refills: 1 | Status: SHIPPED | OUTPATIENT
Start: 2021-06-29 | End: 2021-06-29

## 2021-06-29 NOTE — PROGRESS NOTES
Chief Complaint   Patient presents with   [address] Other     patient states that her right ear feels full and would like it looked at today    1. Have you been to the ER, urgent care clinic since your last visit? Hospitalized since your last visit? No    2. Have you seen or consulted any other health care providers outside of the [address] [place] [address] since your last visit? Include any pap smears or colon screening.  NO    3 most recent PHQ Screens 6/29/2021   Little interest or pleasure in doing things Not at all   Feeling down, depressed, irritable, or hopeless Not at all   Total Score PHQ 2 0       Patient states that she works for [employer] and just had her bewell done and well send us the labwork that she had done

## 2021-06-29 NOTE — PROGRESS NOTES
Subjective  Chief Complaint   Patient presents with    Annual Wellness Visit    Other     patient states that her right ear feels full and would like it looked at today      HPI:  Isauro Stewart is a 60 y.o. female. Isauro Stewart is on the schedule today for annual wellness exam and is also due for follow-up of chronic issues. she is willing to do both appointments today and realizes that there may be a co-pay for the follow-up portion of the visit. For the wellness:   Flu vaccine- Recommended every fall  COVID vaccine series- complete  Tetanus- Tdap 2014  Shingrix- series not completed  Pneumovax 23-  N/A  Prevnar 13- at age 70  HCV screening- complete  Pap- 2/4/21  Mammo- 2018  Dexa- at age 70  Colon cancer screening- colonoscopy 07/31/18  Smoking status- never  Low dose CT scan- N/A  PHQ2 Score = 0  Exercise- walking 3-4 times per wk    For the acute/chronic issues:  She is also following up on history of hyperlipidemia, prediabetes, and hypothyroidism. She reports taking her thyroid medication daily as directed. She is walking for exercise but notes that she seems to be gaining weight over time in spite of this. While she is here today she would like to have her right ear checked. Right ear has felt full intermittently for the last 6 wks. No pain, drainage, or difficulty hearing. Sinuses always draining but a bit more pressure under eyes for the last few days. Taking claritin D and just restarted flonase about a month ago.      Past Medical History:   Diagnosis Date    Allergic rhinitis 7/29/2019    Depression     History of multiple allergies     Muscle ache     Obesity 11/5/2012    Sinus pressure     Sinus problem      Family History   Problem Relation Age of Onset    Diabetes Paternal Grandfather      Social History     Socioeconomic History    Marital status:      Spouse name: Not on file    Number of children: Not on file    Years of education: Not on file    Highest education level: Not on file   Occupational History    Not on file   Tobacco Use    Smoking status: Never Smoker    Smokeless tobacco: Never Used   Vaping Use    Vaping Use: Never used   Substance and Sexual Activity    Alcohol use: No    Drug use: No    Sexual activity: Yes     Partners: Male     Birth control/protection: None   Other Topics Concern    Not on file   Social History Narrative    Not on file     Social Determinants of Health     Financial Resource Strain:     Difficulty of Paying Living Expenses:    Food Insecurity:     Worried About Running Out of Food in the Last Year:     Ran Out of Food in the Last Year:    Transportation Needs:     Lack of Transportation (Medical):      Lack of Transportation (Non-Medical):    Physical Activity:     Days of Exercise per Week:     Minutes of Exercise per Session:    Stress:     Feeling of Stress :    Social Connections:     Frequency of Communication with Friends and Family:     Frequency of Social Gatherings with Friends and Family:     Attends Taoism Services:     Active Member of Clubs or Organizations:     Attends Club or Organization Meetings:     Marital Status:    Intimate Partner Violence:     Fear of Current or Ex-Partner:     Emotionally Abused:     Physically Abused:     Sexually Abused:      Current Outpatient Medications on File Prior to Visit   Medication Sig Dispense Refill    OTHER,NON-FORMULARY, ESTROGEN(5050)/TESTOSTERONE (HRT) 1.5mg/10mg/ml Cream      furosemide (LASIX) 20 mg tablet TAKE 1 TABLET BY MOUTH DAILY AS NEEDED FOR SWELLING 90 Tablet 1    traZODone (DESYREL) 50 mg tablet TAKE 1-2 TABLET BY MOUTH AT BEDTIME AS NEEDED FOR SLEEP 180 Tablet 0    loratadine-pseudoephedrine (Claritin-D 12 Hour) 5-120 mg per tablet Claritin-D      tretinoin (RETIN-A) 0.05 % topical cream tretinoin 0.05 % topical cream      escitalopram oxalate (LEXAPRO) 20 mg tablet TAKE 1 TABLET BY MOUTH EVERY DAY 90 Tab 2    budesonide (PULMICORT) 1 mg/2 mL nbsp 2 mL by Nebulization route daily. 60 mL 1    levothyroxine (SYNTHROID) 100 mcg tablet TAKE 1 TABLET BY MOUTH EVERY DAY IN THE MORNING 90 Tab 2    acetaminophen (TYLENOL) 325 mg tablet Take  by mouth every four (4) hours as needed.  MULTIVITAMIN PO Take  by mouth.  [DISCONTINUED] estradioL (ESTRACE) 1 mg tablet TAKE 2 TABLETS BY MOUTH EVERY DAY (Patient not taking: Reported on 6/29/2021) 180 Tab 0    [DISCONTINUED] doxycycline (VIBRA-TABS) 100 mg tablet doxycycline hyclate 100 mg tabs      [DISCONTINUED] escitalopram oxalate (LEXAPRO) 10 mg tablet escitalopram oxalate 20 mg tabs (Patient not taking: Reported on 6/29/2021)      [DISCONTINUED] levothyroxine (SYNTHROID) 100 mcg tablet levothyroxine sodium 100 mcg tabs (Patient not taking: Reported on 6/29/2021)      [DISCONTINUED] thyroid, Pork, (NP Thyroid) 60 mg tablet np thyroid 60 60 mg tabs (Patient not taking: Reported on 6/29/2021)      [DISCONTINUED] testosterone 10 mg/0.5 gram /actuation glpm testosterone 10 mg/act (2%) gel (Patient not taking: Reported on 6/29/2021)      [DISCONTINUED] dexAMETHasone sodium phosphate 10 mg/mL kit dexamethasone sodium phosphate 10 mg/mL injection solution   ORAL - Administer 0.6 mg/kg as a one time dose. Not to Exceed 10mg. Take the IV form and give PO. (Patient not taking: Reported on 6/29/2021)      [DISCONTINUED] testosterone 10 mg/0.5 gram /actuation glpm APPLY 2 PUMP(S) EVERY DAY BY TRANSDERMAL ROUTE. (Patient not taking: Reported on 6/29/2021) 60 g 0    [DISCONTINUED] desloratadine-pseudoephedrine (CLARINEX-D 24 HOUR) 5-240 mg ER tablet Take 1 Tab by mouth daily. (Patient not taking: Reported on 6/29/2021)      [DISCONTINUED] LORATADINE (CLARITIN PO) Take  by mouth. (Patient not taking: Reported on 6/29/2021)       No current facility-administered medications on file prior to visit.      Allergies   Allergen Reactions    Droperidol Itching     Review of Systems   Constitutional: Negative for chills, fever and malaise/fatigue. Respiratory: Negative for cough, shortness of breath and wheezing. Cardiovascular: Negative for chest pain, palpitations and leg swelling. Gastrointestinal: Negative for diarrhea and vomiting. Genitourinary: Negative for dysuria. Neurological: Negative for dizziness, tingling and weakness. Psychiatric/Behavioral: Negative for depression. The patient is not nervous/anxious. Objective  Visit Vitals  /78 (BP 1 Location: Right arm, BP Patient Position: At rest, BP Cuff Size: Adult)   Pulse 75   Temp 97.3 °F (36.3 °C) (Temporal)   Ht 5' 3\" (1.6 m)   Wt 206 lb (93.4 kg)   SpO2 94%   BMI 36.49 kg/m²     Physical Exam  Constitutional:       General: She is not in acute distress. Appearance: Normal appearance. She is obese. HENT:      Head: Normocephalic and atraumatic. Right Ear: Tympanic membrane, ear canal and external ear normal. There is no impacted cerumen. Left Ear: Tympanic membrane, ear canal and external ear normal. There is impacted cerumen. Ears:      Comments: Slight amount of increased middle ear fluid bilaterally  Neck:      Thyroid: No thyroid mass or thyromegaly. Cardiovascular:      Rate and Rhythm: Normal rate and regular rhythm. Heart sounds: No murmur heard. Pulmonary:      Effort: Pulmonary effort is normal. No respiratory distress. Breath sounds: Normal breath sounds. No wheezing. Musculoskeletal:      Cervical back: Neck supple. No muscular tenderness. Right lower leg: No edema. Left lower leg: No edema. Lymphadenopathy:      Cervical: No cervical adenopathy. Skin:     General: Skin is warm and dry. Neurological:      Mental Status: She is alert and oriented to person, place, and time. Mental status is at baseline.    Psychiatric:         Attention and Perception: Attention and perception normal.         Mood and Affect: Mood and affect normal.         Speech: Speech normal. Behavior: Behavior normal.          Assessment & Plan    ICD-10-CM ICD-9-CM    1. Wellness examination  Z00.00 V70.0    2. Screening for lipoid disorders  Z13.220 V77.91 LIPID PANEL      METABOLIC PANEL, COMPREHENSIVE      CBC WITH AUTOMATED DIFF   3. Encounter for immunization  Z23 V03.89 varicella-zoster recombinant, PF, (Shingrix, PF,) 50 mcg/0.5 mL susr injection   4. Breast cancer screening by mammogram  Z12.31 V76.12 WHITNEY MAMMO BI SCREENING INCL CAD   5. Colon cancer screening  Z12.11 V76.51    6. Mixed hyperlipidemia  E78.2 272.2 LIPID PANEL      METABOLIC PANEL, COMPREHENSIVE      CBC WITH AUTOMATED DIFF   7. Impaired fasting glucose  R73.01 790.21 HEMOGLOBIN A1C WITH EAG   8. Class 2 severe obesity due to excess calories with serious comorbidity and body mass index (BMI) of 36.0 to 36.9 in adult (HCC)  E66.01 278.01     Z68.36 V85.36    9. Acquired hypothyroidism  E03.9 244.9 THYROID CASCADE PROFILE   10. Seasonal allergic rhinitis due to pollen  J30.1 477.0 montelukast (SINGULAIR) 10 mg tablet     Diagnoses and all orders for this visit:    1. Wellness examination  We are getting patient up to date on recommended preventative measures as noted. 2. Screening for lipoid disorders  -     LIPID PANEL  -     METABOLIC PANEL, COMPREHENSIVE  -     CBC WITH AUTOMATED DIFF    3. Encounter for immunization  -     varicella-zoster recombinant, PF, (Shingrix, PF,) 50 mcg/0.5 mL susr injection; 0.5 mL by IntraMUSCular route once for 1 dose. 0.5ml IM. Repeat in -6 mos. Patient instructed to go to pharmacy for Shingrix series. 4. Breast cancer screening by mammogram  -     WHITNEY MAMMO BI SCREENING INCL CAD; Future    5. Colon cancer screening  Last screening was in July 2018 but patient believes that she was told to repeat in 2 to 3 years. She is going to contact the office that performed her last colonoscopy to inquire about this.     6. Mixed hyperlipidemia  -     LIPID PANEL  -     METABOLIC PANEL, COMPREHENSIVE  -     CBC WITH AUTOMATED DIFF  Checking levels based on history. She is currently lifestyle controlled. 7. Impaired fasting glucose  -     HEMOGLOBIN A1C WITH EAG  Checking A1c with labs due to history of impaired fasting glycemia. Encourage low-carb diet and regular exercise. 8. Class 2 severe obesity due to excess calories with serious comorbidity and body mass index (BMI) of 36.0 to 36.9 in adult [facility])  I encourage increasing activity and striving for a diet rich in vegetables and lean proteins. 9. Acquired hypothyroidism  -     THYROID CASCADE PROFILE  Patient reports taking medication daily as directed. We are checking annual level with labs. 10. Seasonal allergic rhinitis due to pollen  -     montelukast (SINGULAIR) 10 mg tablet; Take 1 Tablet by mouth nightly. I am adding Singulair to see if this helps with some of the fullness sensation in the left ear. Follow-up and Dispositions    · Return in about 1 year (around 6/29/2022) for wellness, fasting f/u.        James Sarmiento MD

## 2021-06-30 LAB
ALBUMIN SERPL-MCNC: 4.4 G/DL (ref 3.8–4.9)
ALBUMIN/GLOB SERPL: 1.8 {RATIO} (ref 1.2–2.2)
ALP SERPL-CCNC: 60 IU/L (ref 48–121)
ALT SERPL-CCNC: 25 IU/L (ref 0–32)
AST SERPL-CCNC: 19 IU/L (ref 0–40)
BASOPHILS # BLD AUTO: 0.1 X10E3/UL (ref 0–0.2)
BASOPHILS NFR BLD AUTO: 1 %
BILIRUB SERPL-MCNC: 0.3 MG/DL (ref 0–1.2)
BUN SERPL-MCNC: 12 MG/DL (ref 6–24)
BUN/CREAT SERPL: 12 (ref 9–23)
CALCIUM SERPL-MCNC: 9.8 MG/DL (ref 8.7–10.2)
CHLORIDE SERPL-SCNC: 102 MMOL/L (ref 96–106)
CHOLEST SERPL-MCNC: 273 MG/DL (ref 100–199)
CO2 SERPL-SCNC: 25 MMOL/L (ref 20–29)
CREAT SERPL-MCNC: 0.99 MG/DL (ref 0.57–1)
EOSINOPHIL # BLD AUTO: 0.3 X10E3/UL (ref 0–0.4)
EOSINOPHIL NFR BLD AUTO: 4 %
ERYTHROCYTE [DISTWIDTH] IN BLOOD BY AUTOMATED COUNT: 13.5 % (ref 11.7–15.4)
EST. AVERAGE GLUCOSE BLD GHB EST-MCNC: 128 MG/DL
GLOBULIN SER CALC-MCNC: 2.5 G/DL (ref 1.5–4.5)
GLUCOSE SERPL-MCNC: 94 MG/DL (ref 65–99)
HBA1C MFR BLD: 6.1 % (ref 4.8–5.6)
HCT VFR BLD AUTO: 42.8 % (ref 34–46.6)
HDLC SERPL-MCNC: 47 MG/DL
HGB BLD-MCNC: 14.3 G/DL (ref 11.1–15.9)
IMM GRANULOCYTES # BLD AUTO: 0 X10E3/UL (ref 0–0.1)
IMM GRANULOCYTES NFR BLD AUTO: 0 %
LDLC SERPL CALC-MCNC: 190 MG/DL (ref 0–99)
LYMPHOCYTES # BLD AUTO: 2.5 X10E3/UL (ref 0.7–3.1)
LYMPHOCYTES NFR BLD AUTO: 36 %
MCH RBC QN AUTO: 30.2 PG (ref 26.6–33)
MCHC RBC AUTO-ENTMCNC: 33.4 G/DL (ref 31.5–35.7)
MCV RBC AUTO: 90 FL (ref 79–97)
MONOCYTES # BLD AUTO: 0.6 X10E3/UL (ref 0.1–0.9)
MONOCYTES NFR BLD AUTO: 8 %
NEUTROPHILS # BLD AUTO: 3.6 X10E3/UL (ref 1.4–7)
NEUTROPHILS NFR BLD AUTO: 51 %
PLATELET # BLD AUTO: 267 X10E3/UL (ref 150–450)
POTASSIUM SERPL-SCNC: 4.6 MMOL/L (ref 3.5–5.2)
PROT SERPL-MCNC: 6.9 G/DL (ref 6–8.5)
RBC # BLD AUTO: 4.74 X10E6/UL (ref 3.77–5.28)
SODIUM SERPL-SCNC: 140 MMOL/L (ref 134–144)
TRIGL SERPL-MCNC: 191 MG/DL (ref 0–149)
TSH SERPL DL<=0.005 MIU/L-ACNC: 0.84 UIU/ML (ref 0.45–4.5)
VLDLC SERPL CALC-MCNC: 36 MG/DL (ref 5–40)
WBC # BLD AUTO: 7 X10E3/UL (ref 3.4–10.8)

## 2021-06-30 RX ORDER — ATORVASTATIN CALCIUM 10 MG/1
10 TABLET, FILM COATED ORAL
Qty: 30 TABLET | Refills: 3 | Status: SHIPPED | OUTPATIENT
Start: 2021-06-30 | End: 2022-08-24

## 2021-07-26 ENCOUNTER — HOSPITAL ENCOUNTER (OUTPATIENT)
Dept: MAMMOGRAPHY | Age: 56
Discharge: HOME | End: 2021-07-26
Payer: COMMERCIAL

## 2021-07-26 DIAGNOSIS — Z12.31 BREAST CANCER SCREENING BY MAMMOGRAM: ICD-10-CM

## 2021-07-26 PROCEDURE — 77063 BREAST TOMOSYNTHESIS BI: CPT

## 2021-08-06 DIAGNOSIS — R92.8 ABNORMAL MAMMOGRAM: Primary | ICD-10-CM

## 2021-08-09 ENCOUNTER — TELEPHONE (OUTPATIENT)
Dept: FAMILY MEDICINE CLINIC | Age: 56
End: 2021-08-09

## 2021-08-09 NOTE — TELEPHONE ENCOUNTER
Patient called to that she would like to have the order for the breast ultrasound faxed over to [facility] so that she can make her appointment.  Fax number: [phone]

## 2021-09-14 RX ORDER — LEVOTHYROXINE SODIUM 100 UG/1
TABLET ORAL
Qty: 90 TABLET | Refills: 2 | Status: SHIPPED | OUTPATIENT
Start: 2021-09-14 | End: 2022-06-09

## 2021-09-14 RX ORDER — ESCITALOPRAM OXALATE 20 MG/1
TABLET ORAL
Qty: 90 TABLET | Refills: 2 | Status: SHIPPED | OUTPATIENT
Start: 2021-09-14 | End: 2022-06-09

## 2021-10-01 DIAGNOSIS — R92.8 ABNORMAL MAMMOGRAM: ICD-10-CM

## 2021-11-22 RX ORDER — FUROSEMIDE 20 MG/1
TABLET ORAL
Qty: 90 TABLET | Refills: 1 | Status: SHIPPED | OUTPATIENT
Start: 2021-11-22 | End: 2022-05-09

## 2021-12-22 RX ORDER — TRAZODONE HYDROCHLORIDE 50 MG/1
TABLET ORAL
Qty: 180 TABLET | Refills: 1 | Status: SHIPPED | OUTPATIENT
Start: 2021-12-22 | End: 2022-05-09

## 2022-02-07 ENCOUNTER — PATIENT MESSAGE (OUTPATIENT)
Dept: FAMILY MEDICINE CLINIC | Age: 57
End: 2022-02-07

## 2022-03-04 ENCOUNTER — OFFICE VISIT (OUTPATIENT)
Dept: OBGYN CLINIC | Age: 57
End: 2022-03-04
Payer: COMMERCIAL

## 2022-03-04 VITALS
OXYGEN SATURATION: 97 % | RESPIRATION RATE: 16 BRPM | DIASTOLIC BLOOD PRESSURE: 72 MMHG | HEIGHT: 63 IN | BODY MASS INDEX: 35.61 KG/M2 | HEART RATE: 101 BPM | WEIGHT: 201 LBS | SYSTOLIC BLOOD PRESSURE: 131 MMHG

## 2022-03-04 DIAGNOSIS — L73.9 FOLLICULITIS: Primary | ICD-10-CM

## 2022-03-04 PROCEDURE — 99213 OFFICE O/P EST LOW 20 MIN: CPT | Performed by: OBSTETRICS & GYNECOLOGY

## 2022-03-13 NOTE — PROGRESS NOTES
Sudha Morris is a 59 y.o. female, , No LMP recorded. (Menstrual status: Menopause). , who presents today for the following:  Chief Complaint   Patient presents with    Check Up     Pt c/o vaginal bumps. Allergies   Allergen Reactions    Droperidol Itching       Current Outpatient Medications   Medication Sig    traZODone (DESYREL) 50 mg tablet TAKE 1-2 TABLET BY MOUTH AT BEDTIME AS NEEDED FOR SLEEP    furosemide (LASIX) 20 mg tablet TAKE 1 TABLET BY MOUTH DAILY AS NEEDED FOR SWELLING    escitalopram oxalate (LEXAPRO) 20 mg tablet TAKE ONE TABLET BY MOUTH DAILY    levothyroxine (SYNTHROID) 100 mcg tablet TAKE ONE TABLET BY MOUTH EVERY MORNING    atorvastatin (LIPITOR) 10 mg tablet Take 1 Tablet by mouth nightly.  OTHER,NON-FORMULARY, ESTROGEN(5050)/TESTOSTERONE (HRT) 1.5mg/10mg/ml Cream    montelukast (SINGULAIR) 10 mg tablet Take 1 Tablet by mouth nightly.  loratadine-pseudoephedrine (Claritin-D 12 Hour) 5-120 mg per tablet Claritin-D    tretinoin (RETIN-A) 0.05 % topical cream tretinoin 0.05 % topical cream    budesonide (PULMICORT) 1 mg/2 mL nbsp 2 mL by Nebulization route daily.  acetaminophen (TYLENOL) 325 mg tablet Take  by mouth every four (4) hours as needed.  MULTIVITAMIN PO Take  by mouth. No current facility-administered medications for this visit.        Past Medical History:   Diagnosis Date    Allergic rhinitis 2019    Depression     History of multiple allergies     Muscle ache     Obesity 2012    Sinus pressure     Sinus problem        Past Surgical History:   Procedure Laterality Date    HX ACL RECONSTRUCTION      left     HX CHOLECYSTECTOMY         Family History   Problem Relation Age of Onset    Diabetes Paternal Grandfather        Social History     Socioeconomic History    Marital status:      Spouse name: Not on file    Number of children: Not on file    Years of education: Not on file    Highest education level: Not on file   Occupational History    Not on file   Tobacco Use    Smoking status: Never Smoker    Smokeless tobacco: Never Used   Vaping Use    Vaping Use: Never used   Substance and Sexual Activity    Alcohol use: No    Drug use: No    Sexual activity: Yes     Partners: Male     Birth control/protection: None   Other Topics Concern    Not on file   Social History Narrative    Not on file     Social Determinants of Health     Financial Resource Strain:     Difficulty of Paying Living Expenses: Not on file   Food Insecurity:     Worried About Running Out of Food in the Last Year: Not on file    Claire of Food in the Last Year: Not on file   Transportation Needs:     Lack of Transportation (Medical): Not on file    Lack of Transportation (Non-Medical): Not on file   Physical Activity:     Days of Exercise per Week: Not on file    Minutes of Exercise per Session: Not on file   Stress:     Feeling of Stress : Not on file   Social Connections:     Frequency of Communication with Friends and Family: Not on file    Frequency of Social Gatherings with Friends and Family: Not on file    Attends Gnosticism Services: Not on file    Active Member of [address] [place] [address] or Organizations: Not on file    Attends Club or Organization Meetings: Not on file    Marital Status: Not on file   Intimate Partner Violence:     Fear of Current or Ex-Partner: Not on file    Emotionally Abused: Not on file    Physically Abused: Not on file    Sexually Abused: Not on file   Housing Stability:     Unable to Pay for Housing in the Last Year: Not on file    Number of [place] in the Last Year: Not on file    Unstable Housing in the Last Year: Not on file         HPI  Patient presents for evaluation  Noticed \"bump\" on vulvar area  Noticed lesion following shaving  No pain no drainage  No abnormal vaginal discharge  Sexually active    Review of Systems   Constitutional: Negative. Respiratory: Negative. Cardiovascular: Negative. Gastrointestinal: Negative. Genitourinary: Negative. Musculoskeletal: Negative. Skin: Negative. Neurological: Negative. Endo/Heme/Allergies: Negative. Psychiatric/Behavioral: Negative. All other systems reviewed and are negative. /72 (BP 1 Location: Right arm, BP Patient Position: Sitting)   Pulse (!) 101   Resp 16   Ht 5' 3\" (1.6 m)   Wt 201 lb (91.2 kg)   SpO2 97%   BMI 35.61 kg/m²    OBGyn Exam   PE:  Constitutional: General Appearance: healthy-appearing, well-nourished, well-developed, and well groomed. Psychiatric: Orientation: to time, place, and person. Mood and Affect: normal mood and affect and appropriate and active and alert. Abdomen: Auscultation/Inspection/Palpation: tenderness and mass and non-distended. Hernia: none palpated. Female Genitalia: Vulva: no masses, atrophy, or lesions. muktiple ingrown hairs    Bladder/Urethra: no urethral discharge or mass and normal meatus and bladder non distended. Vagina no tenderness, erythema, cystocele, rectocele, abnormal vaginal discharge, or vesicle(s) or ulcers. .     1.  Folliculitis  reassurance  Warm compresses to area

## 2022-03-18 PROBLEM — E03.9 ACQUIRED HYPOTHYROIDISM: Status: ACTIVE | Noted: 2021-06-29

## 2022-03-18 PROBLEM — F41.1 GENERALIZED ANXIETY DISORDER: Status: ACTIVE | Noted: 2021-06-29

## 2022-03-19 PROBLEM — R60.0 LOWER EXTREMITY EDEMA: Status: ACTIVE | Noted: 2021-06-29

## 2022-03-19 PROBLEM — R09.82 PND (POST-NASAL DRIP): Status: ACTIVE | Noted: 2020-11-17

## 2022-03-19 PROBLEM — G47.00 INSOMNIA: Status: ACTIVE | Noted: 2021-06-29

## 2022-03-19 PROBLEM — R00.0 TACHYCARDIA: Status: ACTIVE | Noted: 2021-06-29

## 2022-03-19 PROBLEM — J32.0 CHRONIC MAXILLARY SINUSITIS: Status: ACTIVE | Noted: 2020-11-17

## 2022-03-19 PROBLEM — R73.01 IMPAIRED FASTING GLUCOSE: Status: ACTIVE | Noted: 2021-06-29

## 2022-03-20 PROBLEM — J30.1 SEASONAL ALLERGIC RHINITIS DUE TO POLLEN: Status: ACTIVE | Noted: 2019-07-29

## 2022-03-20 PROBLEM — E78.2 MIXED HYPERLIPIDEMIA: Status: ACTIVE | Noted: 2021-06-29

## 2022-06-03 ENCOUNTER — OFFICE VISIT (OUTPATIENT)
Dept: OBGYN CLINIC | Age: 57
End: 2022-06-03
Payer: COMMERCIAL

## 2022-06-03 VITALS
DIASTOLIC BLOOD PRESSURE: 65 MMHG | SYSTOLIC BLOOD PRESSURE: 121 MMHG | HEIGHT: 63 IN | WEIGHT: 206 LBS | BODY MASS INDEX: 36.5 KG/M2 | OXYGEN SATURATION: 98 % | HEART RATE: 73 BPM | RESPIRATION RATE: 16 BRPM

## 2022-06-03 DIAGNOSIS — N95.1 MENOPAUSAL SYNDROME: ICD-10-CM

## 2022-06-03 DIAGNOSIS — Z12.4 ENCOUNTER FOR SCREENING FOR MALIGNANT NEOPLASM OF CERVIX: ICD-10-CM

## 2022-06-03 DIAGNOSIS — Z01.419 GYNECOLOGIC EXAM NORMAL: Primary | ICD-10-CM

## 2022-06-03 DIAGNOSIS — Z12.39 BREAST SCREENING: ICD-10-CM

## 2022-06-03 PROCEDURE — 99396 PREV VISIT EST AGE 40-64: CPT | Performed by: OBSTETRICS & GYNECOLOGY

## 2022-06-03 NOTE — PROGRESS NOTES
Zhang Hernandez is a 59 y.o. female, , No LMP recorded. (Menstrual status: Menopause). , who presents today for the following:  Chief Complaint   Patient presents with    Annual Exam        Allergies   Allergen Reactions    Droperidol Itching       Current Outpatient Medications   Medication Sig    OTHER,NON-FORMULARY, ESTROGEN(5050)/TESTOSTERONE (HRT) 1.5mg/10mg/ml Cream APPLY 1 ML TO SKIN (INNER WRIST/ABDOMEN/THIGHS EVERY DAY. ROTATE SITES    furosemide (LASIX) 20 mg tablet TAKE 1 TABLET BY MOUTH EVERY DAY AS NEEDED FOR SWELLING    traZODone (DESYREL) 50 mg tablet TAKE 1-2 TABLETS BY MOUTH AT BEDTIME AS NEEDED FOR SLEEP    escitalopram oxalate (LEXAPRO) 20 mg tablet TAKE ONE TABLET BY MOUTH DAILY    levothyroxine (SYNTHROID) 100 mcg tablet TAKE ONE TABLET BY MOUTH EVERY MORNING    atorvastatin (LIPITOR) 10 mg tablet Take 1 Tablet by mouth nightly.  loratadine-pseudoephedrine (Claritin-D 12 Hour) 5-120 mg per tablet Claritin-D    tretinoin (RETIN-A) 0.05 % topical cream tretinoin 0.05 % topical cream    budesonide (PULMICORT) 1 mg/2 mL nbsp 2 mL by Nebulization route daily.  acetaminophen (TYLENOL) 325 mg tablet Take  by mouth every four (4) hours as needed.  MULTIVITAMIN PO Take  by mouth. No current facility-administered medications for this visit.        Past Medical History:   Diagnosis Date    Allergic rhinitis 2019    Depression     History of multiple allergies     Muscle ache     Obesity 2012    Sinus pressure     Sinus problem        Past Surgical History:   Procedure Laterality Date    HX ACL RECONSTRUCTION      left     HX CHOLECYSTECTOMY         Family History   Problem Relation Age of Onset    Diabetes Paternal Grandfather        Social History     Socioeconomic History    Marital status:      Spouse name: Not on file    Number of children: Not on file    Years of education: Not on file    Highest education level: Not on file   Occupational History    Not on file   Tobacco Use    Smoking status: Never Smoker    Smokeless tobacco: Never Used   Vaping Use    Vaping Use: Never used   Substance and Sexual Activity    Alcohol use: No    Drug use: No    Sexual activity: Yes     Partners: Male     Birth control/protection: None   Other Topics Concern    Not on file   Social History Narrative    Not on file     Social Determinants of Health     Financial Resource Strain:     Difficulty of Paying Living Expenses: Not on file   Food Insecurity:     Worried About Running Out of Food in the Last Year: Not on file    Claire of Food in the Last Year: Not on file   Transportation Needs:     Lack of Transportation (Medical): Not on file    Lack of Transportation (Non-Medical): Not on file   Physical Activity:     Days of Exercise per Week: Not on file    Minutes of Exercise per Session: Not on file   Stress:     Feeling of Stress : Not on file   Social Connections:     Frequency of Communication with Friends and Family: Not on file    Frequency of Social Gatherings with Friends and Family: Not on file    Attends Shinto Services: Not on file    Active Member of [address] [place] [address] or Organizations: Not on file    Attends Club or Organization Meetings: Not on file    Marital Status: Not on file   Intimate Partner Violence:     Fear of Current or Ex-Partner: Not on file    Emotionally Abused: Not on file    Physically Abused: Not on file    Sexually Abused: Not on file   Housing Stability:     Unable to Pay for Housing in the Last Year: Not on file    Number of [place] in the Last Year: Not on file    Unstable Housing in the Last Year: Not on file         HPI  Annual     Review of Systems   Constitutional: Negative. Respiratory: Negative. Cardiovascular: Negative. Gastrointestinal: Negative. Genitourinary: Negative. Musculoskeletal: Negative. Skin: Negative. Neurological: Negative. Endo/Heme/Allergies: Negative. Psychiatric/Behavioral: Negative. All other systems reviewed and are negative. /65 (BP 1 Location: Right arm, BP Patient Position: Sitting)   Pulse 73   Resp 16   Ht 5' 3\" (1.6 m)   Wt 206 lb (93.4 kg)   SpO2 98%   BMI 36.49 kg/m²    OBGyn Exam   Constitutional:     General Appearance: healthy-appearing, well-nourished, and well-developed; Level of Distress: NAD. Ambulation: ambulating normally. Psychiatric:   Insight: good judgement. Mental Status: normal mood and affect and active and alert. Orientation: to time, place, and person. Memory: recent memory normal and remote memory normal.     Head: Head: normocephalic and atraumatic. Neck:   Neck: supple, FROM, trachea midline, and no masses. Lymph Nodes: no cervical LAD, supraclavicular LAD, axillary LAD, or inguinal LAD. Thyroid: no enlargement or nodules and non-tender. Lungs:   Respiratory effort: no dyspnea. Cardiovascular:     Pulses including femoral / pedal: normal throughout. Breast: Breast: no masses or abnormal secretions and normal appearance. Abdomen:    no tenderness, guarding, masses, rebound tenderness, or CVA tenderness and non-distended. Female :   External genitalia: no lesions or rash and normal.   Vagina: moist mucosa;    Cervix: no discharge, inflammation, or cervical motion tenderness   Uterus: midline, smooth, and non-tender;    Adnexae: no adnexal mass or tenderness   Bladder and Urethra: normal bladder and urethra (except where noted). 1. Gynecologic exam normal    - PAP IG, RFX APTIMA HPV ASCUS ([id number])    2. Encounter for screening for malignant neoplasm of cervix      3. Menopausal syndrome  Rx refill    4. Breast screening    - [facility] MAMMO BI SCREENING INCL CAD;  Future        Follow-up and Dispositions    · Return in about 1 year (around 6/3/2023) for annual.

## 2022-06-03 NOTE — PATIENT INSTRUCTIONS

## 2022-06-08 LAB
CYTOLOGIST CVX/VAG CYTO: NORMAL
CYTOLOGY CVX/VAG DOC CYTO: NORMAL
CYTOLOGY CVX/VAG DOC THIN PREP: NORMAL
DX ICD CODE: NORMAL
LABCORP, 190119: NORMAL
Lab: NORMAL
OTHER STN SPEC: NORMAL
STAT OF ADQ CVX/VAG CYTO-IMP: NORMAL

## 2022-07-13 RX ORDER — TRAZODONE HYDROCHLORIDE 50 MG/1
TABLET ORAL
Qty: 180 TABLET | Refills: 0 | Status: SHIPPED | OUTPATIENT
Start: 2022-07-13 | End: 2022-07-21 | Stop reason: SDUPTHER

## 2022-08-19 ENCOUNTER — TELEPHONE (OUTPATIENT)
Dept: FAMILY MEDICINE CLINIC | Age: 57
End: 2022-08-19

## 2022-08-19 NOTE — TELEPHONE ENCOUNTER
Pharmacy states that Furosemide not covered by insurance but they will cover: Bumetanide or Torsemide

## 2022-08-21 RX ORDER — BUMETANIDE 0.5 MG/1
0.5 TABLET ORAL DAILY
Qty: 60 TABLET | Refills: 1 | Status: SHIPPED | OUTPATIENT
Start: 2022-08-21 | End: 2022-08-24 | Stop reason: ALTCHOICE

## 2022-08-24 ENCOUNTER — OFFICE VISIT (OUTPATIENT)
Dept: FAMILY MEDICINE CLINIC | Age: 57
End: 2022-08-24
Payer: COMMERCIAL

## 2022-08-24 VITALS
DIASTOLIC BLOOD PRESSURE: 62 MMHG | WEIGHT: 206 LBS | RESPIRATION RATE: 16 BRPM | HEART RATE: 81 BPM | SYSTOLIC BLOOD PRESSURE: 114 MMHG | TEMPERATURE: 97.6 F | OXYGEN SATURATION: 97 % | HEIGHT: 63 IN | BODY MASS INDEX: 36.5 KG/M2

## 2022-08-24 DIAGNOSIS — Z00.00 WELLNESS EXAMINATION: Primary | ICD-10-CM

## 2022-08-24 DIAGNOSIS — Z13.31 DEPRESSION SCREENING: ICD-10-CM

## 2022-08-24 DIAGNOSIS — R73.01 IMPAIRED FASTING GLUCOSE: ICD-10-CM

## 2022-08-24 DIAGNOSIS — F51.05 INSOMNIA DUE TO OTHER MENTAL DISORDER: ICD-10-CM

## 2022-08-24 DIAGNOSIS — F99 INSOMNIA DUE TO OTHER MENTAL DISORDER: ICD-10-CM

## 2022-08-24 DIAGNOSIS — Z12.11 COLON CANCER SCREENING: ICD-10-CM

## 2022-08-24 DIAGNOSIS — Z13.220 SCREENING FOR LIPOID DISORDERS: ICD-10-CM

## 2022-08-24 DIAGNOSIS — E03.9 ACQUIRED HYPOTHYROIDISM: ICD-10-CM

## 2022-08-24 DIAGNOSIS — Z23 ENCOUNTER FOR IMMUNIZATION: ICD-10-CM

## 2022-08-24 DIAGNOSIS — E66.01 CLASS 2 SEVERE OBESITY DUE TO EXCESS CALORIES WITH SERIOUS COMORBIDITY AND BODY MASS INDEX (BMI) OF 36.0 TO 36.9 IN ADULT (HCC): ICD-10-CM

## 2022-08-24 DIAGNOSIS — E78.2 MIXED HYPERLIPIDEMIA: ICD-10-CM

## 2022-08-24 DIAGNOSIS — F41.1 GENERALIZED ANXIETY DISORDER: ICD-10-CM

## 2022-08-24 PROCEDURE — 99214 OFFICE O/P EST MOD 30 MIN: CPT | Performed by: FAMILY MEDICINE

## 2022-08-24 PROCEDURE — 99396 PREV VISIT EST AGE 40-64: CPT | Performed by: FAMILY MEDICINE

## 2022-08-24 RX ORDER — TRAZODONE HYDROCHLORIDE 50 MG/1
125 TABLET ORAL
Qty: 225 TABLET | Refills: 1 | Status: ON HOLD | OUTPATIENT
Start: 2022-08-24

## 2022-08-24 RX ORDER — LEVOTHYROXINE SODIUM 100 UG/1
100 TABLET ORAL DAILY
Qty: 90 TABLET | Refills: 3 | Status: SHIPPED
Start: 2022-08-24 | End: 2022-09-02 | Stop reason: DRUGHIGH

## 2022-08-24 NOTE — PATIENT INSTRUCTIONS
Hyperlipidemia: After Your Visit  Your Care Instructions  Hyperlipidemia is too much fat in your blood. The body has several kinds of fat, including cholesterol and triglycerides. Your body needs fat for many things, such as making new cells. But too much fat in your blood increases your chances of having a heart attack or stroke. You may be able to lower your cholesterol and triglycerides with a heart-healthy diet, exercise, and if needed, medicine. Your doctor may want you to try lifestyle changes first to see whether they lower the fat in your blood. You may need to take medicine if lifestyle changes do not lower the fat in your blood enough. Follow-up care is a key part of your treatment and safety. Be sure to make and go to all appointments, and call your doctor if you are having problems. Its also a good idea to know your test results and keep a list of the medicines you take. How can you care for yourself at home? Take your medicines  Take your medicines exactly as prescribed. Call your doctor if you think you are having a problem with your medicine. If you take medicine to lower your cholesterol, go to follow-up visits. You will need to have blood tests. Do not take large doses of niacin, which is a B vitamin, while taking medicine called statins. It may increase the chance of muscle pain and liver problems. Talk to your doctor about avoiding grapefruit juice if you are taking statins. Grapefruit juice can raise the level of this medicine in your blood. This could increase side effects. Eat more fruits, vegetables, and fiber  Fruits and vegetables have lots of nutrients that help protect against heart disease, and they have little--if any--fat. Try to eat at least five servings a day. Dark green, deep orange, or yellow fruits and vegetables are healthy choices. Keep carrots, celery, and other veggies handy for snacks.  Buy fruit that is in season and store it where you can see it so that you will be tempted to eat it. Cook dishes that have a lot of veggies in them, such as stir-fries and soups. Foods high in fiber may reduce your cholesterol and provide important vitamins and minerals. High-fiber foods include whole-grain cereals and breads, oatmeal, beans, brown rice, citrus fruits, and apples. Buy whole-grain breads and cereals instead of white bread and pastries. Limit saturated fat  Read food labels and try to avoid saturated fat and trans fat. They increase your risk of heart disease. Use olive or canola oil when you cook. Try cholesterol-lowering spreads, such as Benecol or Take Control. Bake, broil, grill, or steam foods instead of frying them. Limit the amount of high-fat meats you eat, including hot dogs and sausages. Cut out all visible fat when you prepare meat. Eat fish, skinless poultry, and soy products such as tofu instead of high-fat meats. Soybeans may be especially good for your heart. Eat at least two servings of fish a week. Certain fish, such as salmon, contain omega-3 fatty acids, which may help reduce your risk of heart attack. Choose low-fat or fat-free milk and dairy products. Get exercise, limit alcohol, and quit smoking  Get more exercise. Work with your doctor to set up an exercise program. Even if you can do only a small amount, exercise will help you get stronger, have more energy, and manage your weight and your stress. Walking is an easy way to get exercise. Gradually increase the amount you walk every day. Aim for at least 30 minutes on most days of the week. You also may want to swim, bike, or do other activities. Limit alcohol to no more than 2 drinks a day for men and 1 drink a day for women. Do not smoke. If you need help quitting, talk to your doctor about stop-smoking programs and medicines. These can increase your chances of quitting for good. When should you call for help? Call 911 anytime you think you may need emergency care.  For example, call if:  You have symptoms of a heart attack. These may include:  Chest pain or pressure, or a strange feeling in the chest.  Sweating. Shortness of breath. Nausea or vomiting. Pain, pressure, or a strange feeling in the back, neck, jaw, or upper belly or in one or both shoulders or arms. Lightheadedness or sudden weakness. A fast or irregular heartbeat. After you call 911, the  may tell you to chew 1 adult-strength or 2 to 4 low-dose aspirin. Wait for an ambulance. Do not try to drive yourself. You have signs of a stroke. These may include:  Sudden numbness, paralysis, or weakness in your face, arm, or leg, especially on only one side of your body. New problems with walking or balance. Sudden vision changes. Drooling or slurred speech. New problems speaking or understanding simple statements, or feeling confused. A sudden, severe headache that is different from past headaches. You passed out (lost consciousness). Call your doctor now or seek immediate medical care if:  You have muscle pain or weakness. Watch closely for changes in your health, and be sure to contact your doctor if:  You are very tired. You have an upset stomach, gas, constipation, or belly pain or cramps. Where can you learn more? Go to [employer]  Enter C406 in the search box to learn more about \"Hyperlipidemia: After Your Visit. \"   © 8476-8237 Healthwise, Incorporated. Care instructions adapted under license by [facility] [employer] (which disclaims liability or warranty for this information). This care instruction is for use with your licensed healthcare professional. If you have questions about a medical condition or this instruction, always ask your healthcare professional. [place] [address] any warranty or liability for your use of this information.   Content Version: 6.2.738404; Last Revised: October 13, 2011 Wartpeel Counseling:  I discussed with the patient the risks of Wartpeel including but not limited to erythema, scaling, itching, weeping, crusting, and pain.

## 2022-08-25 ENCOUNTER — TELEPHONE (OUTPATIENT)
Dept: FAMILY MEDICINE CLINIC | Age: 57
End: 2022-08-25

## 2022-08-25 NOTE — TELEPHONE ENCOUNTER
Per pharmacy Bumetanide 0.5 mg tablets is not covered they recommend trying Furosemide 20 mg tab or Torsemide 10 mg .

## 2022-08-25 NOTE — TELEPHONE ENCOUNTER
8/19/22 pt case stated that furosemide was not covered but that bumetanide was. It seems that there is another problem here. These are typically inexpensive medications. Furosemide is even on [employer] $4 list.  Wondering if she wants to switch pharmacies. It may be that it needs to go to [address]. I'm thinking we go back to furosemide which she has been on in the past and either use a different pharmacy with goodrx price or try [facility]. She what pt would like to do. Thanks!

## 2022-08-25 NOTE — TELEPHONE ENCOUNTER
Patient states that she is okay to go back to the furosemide and to let them know she will pay out of pocket with good rx so they will stop sending it to use she states that is what she usually does

## 2022-08-26 RX ORDER — FUROSEMIDE 20 MG/1
20 TABLET ORAL
Qty: 90 TABLET | Refills: 1 | Status: ON HOLD | OUTPATIENT
Start: 2022-08-26

## 2022-08-31 LAB
ALBUMIN SERPL-MCNC: 4.5 G/DL (ref 3.8–4.9)
ALBUMIN/GLOB SERPL: 2 {RATIO} (ref 1.2–2.2)
ALP SERPL-CCNC: 65 IU/L (ref 44–121)
ALT SERPL-CCNC: 24 IU/L (ref 0–32)
AST SERPL-CCNC: 21 IU/L (ref 0–40)
BASOPHILS # BLD AUTO: 0.1 X10E3/UL (ref 0–0.2)
BASOPHILS NFR BLD AUTO: 1 %
BILIRUB SERPL-MCNC: 0.2 MG/DL (ref 0–1.2)
BUN SERPL-MCNC: 13 MG/DL (ref 6–24)
BUN/CREAT SERPL: 15 (ref 9–23)
CALCIUM SERPL-MCNC: 9.6 MG/DL (ref 8.7–10.2)
CHLORIDE SERPL-SCNC: 102 MMOL/L (ref 96–106)
CHOLEST SERPL-MCNC: 203 MG/DL (ref 100–199)
CO2 SERPL-SCNC: 25 MMOL/L (ref 20–29)
CREAT SERPL-MCNC: 0.88 MG/DL (ref 0.57–1)
EGFR: 76 ML/MIN/1.73
EOSINOPHIL # BLD AUTO: 0.2 X10E3/UL (ref 0–0.4)
EOSINOPHIL NFR BLD AUTO: 3 %
ERYTHROCYTE [DISTWIDTH] IN BLOOD BY AUTOMATED COUNT: 13.5 % (ref 11.7–15.4)
EST. AVERAGE GLUCOSE BLD GHB EST-MCNC: 126 MG/DL
GLOBULIN SER CALC-MCNC: 2.2 G/DL (ref 1.5–4.5)
GLUCOSE SERPL-MCNC: 100 MG/DL (ref 65–99)
HBA1C MFR BLD: 6 % (ref 4.8–5.6)
HCT VFR BLD AUTO: 41.8 % (ref 34–46.6)
HDLC SERPL-MCNC: 42 MG/DL
HGB BLD-MCNC: 14.1 G/DL (ref 11.1–15.9)
IMM GRANULOCYTES # BLD AUTO: 0 X10E3/UL (ref 0–0.1)
IMM GRANULOCYTES NFR BLD AUTO: 0 %
INTERPRETIVE COMMENT, 010391: NORMAL
LDLC SERPL CALC-MCNC: 113 MG/DL (ref 0–99)
LYMPHOCYTES # BLD AUTO: 2.1 X10E3/UL (ref 0.7–3.1)
LYMPHOCYTES NFR BLD AUTO: 28 %
MCH RBC QN AUTO: 29.6 PG (ref 26.6–33)
MCHC RBC AUTO-ENTMCNC: 33.7 G/DL (ref 31.5–35.7)
MCV RBC AUTO: 88 FL (ref 79–97)
MONOCYTES # BLD AUTO: 0.5 X10E3/UL (ref 0.1–0.9)
MONOCYTES NFR BLD AUTO: 7 %
NEUTROPHILS # BLD AUTO: 4.6 X10E3/UL (ref 1.4–7)
NEUTROPHILS NFR BLD AUTO: 61 %
PLATELET # BLD AUTO: 279 X10E3/UL (ref 150–450)
POTASSIUM SERPL-SCNC: 4.1 MMOL/L (ref 3.5–5.2)
PROT SERPL-MCNC: 6.7 G/DL (ref 6–8.5)
RBC # BLD AUTO: 4.77 X10E6/UL (ref 3.77–5.28)
SODIUM SERPL-SCNC: 140 MMOL/L (ref 134–144)
T3FREE SERPL-MCNC: 2.7 PG/ML (ref 2–4.4)
T4 FREE SERPL-MCNC: 1.02 NG/DL (ref 0.82–1.77)
TRIGL SERPL-MCNC: 278 MG/DL (ref 0–149)
TSH SERPL DL<=0.005 MIU/L-ACNC: 0.3 UIU/ML (ref 0.45–4.5)
VLDLC SERPL CALC-MCNC: 48 MG/DL (ref 5–40)
WBC # BLD AUTO: 7.5 X10E3/UL (ref 3.4–10.8)

## 2022-09-02 DIAGNOSIS — E03.9 ACQUIRED HYPOTHYROIDISM: Primary | ICD-10-CM

## 2022-09-02 RX ORDER — LEVOTHYROXINE SODIUM 88 UG/1
88 TABLET ORAL
Qty: 30 TABLET | Refills: 1 | Status: SHIPPED | OUTPATIENT
Start: 2022-09-02 | End: 2022-09-29

## 2022-09-27 ENCOUNTER — APPOINTMENT (OUTPATIENT)
Dept: GENERAL RADIOLOGY | Age: 57
DRG: 064 | End: 2022-09-27
Attending: STUDENT IN AN ORGANIZED HEALTH CARE EDUCATION/TRAINING PROGRAM
Payer: COMMERCIAL

## 2022-09-27 ENCOUNTER — APPOINTMENT (OUTPATIENT)
Dept: GENERAL RADIOLOGY | Age: 57
DRG: 064 | End: 2022-09-27
Attending: NURSE PRACTITIONER
Payer: COMMERCIAL

## 2022-09-27 ENCOUNTER — APPOINTMENT (OUTPATIENT)
Dept: VASCULAR SURGERY | Age: 57
DRG: 064 | End: 2022-09-27
Attending: NURSE PRACTITIONER
Payer: COMMERCIAL

## 2022-09-27 ENCOUNTER — APPOINTMENT (OUTPATIENT)
Dept: CT IMAGING | Age: 57
DRG: 064 | End: 2022-09-27
Attending: STUDENT IN AN ORGANIZED HEALTH CARE EDUCATION/TRAINING PROGRAM
Payer: COMMERCIAL

## 2022-09-27 ENCOUNTER — HOSPITAL ENCOUNTER (INPATIENT)
Age: 57
LOS: 23 days | Discharge: NURSING, REHAB OR LONG-TERM CARE | DRG: 064 | End: 2022-10-20
Attending: STUDENT IN AN ORGANIZED HEALTH CARE EDUCATION/TRAINING PROGRAM | Admitting: INTERNAL MEDICINE
Payer: COMMERCIAL

## 2022-09-27 ENCOUNTER — APPOINTMENT (OUTPATIENT)
Dept: INTERVENTIONAL RADIOLOGY/VASCULAR | Age: 57
DRG: 064 | End: 2022-09-27
Attending: INTERNAL MEDICINE
Payer: COMMERCIAL

## 2022-09-27 ENCOUNTER — APPOINTMENT (OUTPATIENT)
Dept: NON INVASIVE DIAGNOSTICS | Age: 57
DRG: 064 | End: 2022-09-27
Attending: NURSE PRACTITIONER
Payer: COMMERCIAL

## 2022-09-27 DIAGNOSIS — R56.9 SEIZURE (HCC): ICD-10-CM

## 2022-09-27 DIAGNOSIS — I42.8 OTHER CARDIOMYOPATHY (HCC): ICD-10-CM

## 2022-09-27 DIAGNOSIS — E78.5 DYSLIPIDEMIA: ICD-10-CM

## 2022-09-27 DIAGNOSIS — G40.901 STATUS EPILEPTICUS (HCC): Primary | ICD-10-CM

## 2022-09-27 DIAGNOSIS — N28.9 ACUTE RENAL INSUFFICIENCY: ICD-10-CM

## 2022-09-27 DIAGNOSIS — I51.81 TAKOTSUBO CARDIOMYOPATHY: ICD-10-CM

## 2022-09-27 DIAGNOSIS — J96.01 ACUTE RESPIRATORY FAILURE WITH HYPOXIA (HCC): ICD-10-CM

## 2022-09-27 DIAGNOSIS — G93.40 ENCEPHALOPATHY: ICD-10-CM

## 2022-09-27 PROBLEM — E87.5 HYPERKALEMIA: Status: ACTIVE | Noted: 2022-09-27

## 2022-09-27 PROBLEM — G93.41 ACUTE METABOLIC ENCEPHALOPATHY: Status: ACTIVE | Noted: 2022-09-27

## 2022-09-27 PROBLEM — N17.9 AKI (ACUTE KIDNEY INJURY) (HCC): Status: ACTIVE | Noted: 2022-09-27

## 2022-09-27 PROBLEM — R65.10 SIRS (SYSTEMIC INFLAMMATORY RESPONSE SYNDROME) (HCC): Status: ACTIVE | Noted: 2022-09-27

## 2022-09-27 PROBLEM — R57.9 SHOCK (HCC): Status: ACTIVE | Noted: 2022-09-27

## 2022-09-27 PROBLEM — E87.20 LACTIC ACIDOSIS: Status: ACTIVE | Noted: 2022-09-27

## 2022-09-27 LAB
ALBUMIN SERPL-MCNC: 3 G/DL (ref 3.5–5)
ALBUMIN SERPL-MCNC: 3.9 G/DL (ref 3.5–5)
ALBUMIN/GLOB SERPL: 1 {RATIO} (ref 1.1–2.2)
ALBUMIN/GLOB SERPL: 1.2 {RATIO} (ref 1.1–2.2)
ALP SERPL-CCNC: 58 U/L (ref 45–117)
ALP SERPL-CCNC: 77 U/L (ref 45–117)
ALT SERPL-CCNC: 41 U/L (ref 12–78)
ALT SERPL-CCNC: 43 U/L (ref 12–78)
AMPHET UR QL SCN: NEGATIVE
ANION GAP SERPL CALC-SCNC: 11 MMOL/L (ref 5–15)
ANION GAP SERPL CALC-SCNC: 11 MMOL/L (ref 5–15)
ANION GAP SERPL CALC-SCNC: 9 MMOL/L (ref 5–15)
APAP SERPL-MCNC: 2 UG/ML (ref 10–30)
APPEARANCE UR: CLEAR
APTT PPP: 22.6 SEC (ref 22.1–31)
APTT PPP: <20 SEC (ref 22.1–31)
ARTERIAL PATENCY WRIST A: POSITIVE
AST SERPL-CCNC: 56 U/L (ref 15–37)
AST SERPL-CCNC: 71 U/L (ref 15–37)
B PERT DNA SPEC QL NAA+PROBE: NOT DETECTED
BACTERIA URNS QL MICRO: ABNORMAL /HPF
BARBITURATES UR QL SCN: NEGATIVE
BASE DEFICIT BLD-SCNC: 2.7 MMOL/L
BASE DEFICIT BLD-SCNC: 3.8 MMOL/L
BASE DEFICIT BLD-SCNC: 4.1 MMOL/L
BASOPHILS # BLD: 0 K/UL (ref 0–0.1)
BASOPHILS # BLD: 0.1 K/UL (ref 0–0.1)
BASOPHILS NFR BLD: 0 % (ref 0–1)
BASOPHILS NFR BLD: 0 % (ref 0–1)
BDY SITE: ABNORMAL
BENZODIAZ UR QL: POSITIVE
BILIRUB SERPL-MCNC: 0.2 MG/DL (ref 0.2–1)
BILIRUB SERPL-MCNC: 0.5 MG/DL (ref 0.2–1)
BILIRUB UR QL: NEGATIVE
BNP SERPL-MCNC: 1288 PG/ML
BODY TEMPERATURE: 98.3
BORDETELLA PARAPERTUSSIS PCR, BORPAR: NOT DETECTED
BUN SERPL-MCNC: 15 MG/DL (ref 6–20)
BUN SERPL-MCNC: 16 MG/DL (ref 6–20)
BUN SERPL-MCNC: 18 MG/DL (ref 6–20)
BUN/CREAT SERPL: 10 (ref 12–20)
BUN/CREAT SERPL: 6 (ref 12–20)
BUN/CREAT SERPL: 9 (ref 12–20)
C PNEUM DNA SPEC QL NAA+PROBE: NOT DETECTED
CA-I BLD-MCNC: 1.15 MMOL/L (ref 1.12–1.32)
CA-I BLD-MCNC: 1.16 MMOL/L (ref 1.12–1.32)
CALCIUM SERPL-MCNC: 8.6 MG/DL (ref 8.5–10.1)
CALCIUM SERPL-MCNC: 8.8 MG/DL (ref 8.5–10.1)
CALCIUM SERPL-MCNC: 9.4 MG/DL (ref 8.5–10.1)
CANNABINOIDS UR QL SCN: NEGATIVE
CHLORIDE BLD-SCNC: 107 MMOL/L (ref 100–108)
CHLORIDE BLD-SCNC: 110 MMOL/L (ref 100–108)
CHLORIDE SERPL-SCNC: 106 MMOL/L (ref 97–108)
CHLORIDE SERPL-SCNC: 108 MMOL/L (ref 97–108)
CHLORIDE SERPL-SCNC: 109 MMOL/L (ref 97–108)
CK SERPL-CCNC: 2730 U/L (ref 26–192)
CO2 BLD-SCNC: 22 MMOL/L (ref 19–24)
CO2 BLD-SCNC: 27 MMOL/L (ref 19–24)
CO2 SERPL-SCNC: 23 MMOL/L (ref 21–32)
CO2 SERPL-SCNC: 24 MMOL/L (ref 21–32)
CO2 SERPL-SCNC: 24 MMOL/L (ref 21–32)
COCAINE UR QL SCN: NEGATIVE
COLOR UR: ABNORMAL
COMMENT, HOLDF: NORMAL
COMMENT, HOLDF: NORMAL
CREAT SERPL-MCNC: 1.55 MG/DL (ref 0.55–1.02)
CREAT SERPL-MCNC: 2.11 MG/DL (ref 0.55–1.02)
CREAT SERPL-MCNC: 2.57 MG/DL (ref 0.55–1.02)
CREAT UR-MCNC: 1.6 MG/DL (ref 0.6–1.3)
CREAT UR-MCNC: 2.4 MG/DL (ref 0.6–1.3)
DATE LAST DOSE: ABNORMAL
DIFFERENTIAL METHOD BLD: ABNORMAL
DIFFERENTIAL METHOD BLD: ABNORMAL
DRUG SCRN COMMENT,DRGCM: ABNORMAL
ECHO AO ASC DIAM: 2.8 CM
ECHO AO ASCENDING AORTA INDEX: 1.32 CM/M2
ECHO AV AREA PEAK VELOCITY: 3.4 CM2
ECHO AV AREA VTI: 2.3 CM2
ECHO AV AREA/BSA PEAK VELOCITY: 1.6 CM2/M2
ECHO AV AREA/BSA VTI: 1.1 CM2/M2
ECHO AV MEAN GRADIENT: 1 MMHG
ECHO AV MEAN VELOCITY: 0.5 M/S
ECHO AV PEAK GRADIENT: 2 MMHG
ECHO AV PEAK VELOCITY: 0.6 M/S
ECHO AV VELOCITY RATIO: 1
ECHO AV VTI: 11.9 CM
ECHO LA DIAMETER INDEX: 1.6 CM/M2
ECHO LA DIAMETER: 3.4 CM
ECHO LA VOL 2C: 30 ML (ref 22–52)
ECHO LA VOL 4C: 36 ML (ref 22–52)
ECHO LA VOL BP: 35 ML (ref 22–52)
ECHO LA VOL/BSA BIPLANE: 17 ML/M2 (ref 16–34)
ECHO LA VOLUME AREA LENGTH: 39 ML
ECHO LA VOLUME INDEX A2C: 14 ML/M2 (ref 16–34)
ECHO LA VOLUME INDEX A4C: 17 ML/M2 (ref 16–34)
ECHO LA VOLUME INDEX AREA LENGTH: 18 ML/M2 (ref 16–34)
ECHO LV E' LATERAL VELOCITY: 4 CM/S
ECHO LV E' SEPTAL VELOCITY: 3 CM/S
ECHO LV EDV A2C: 70 ML
ECHO LV EDV A4C: 80 ML
ECHO LV EDV BP: 79 ML (ref 56–104)
ECHO LV EDV INDEX A4C: 38 ML/M2
ECHO LV EDV INDEX BP: 37 ML/M2
ECHO LV EDV NDEX A2C: 33 ML/M2
ECHO LV EJECTION FRACTION A2C: 28 %
ECHO LV EJECTION FRACTION A4C: 19 %
ECHO LV EJECTION FRACTION BIPLANE: 24 % (ref 55–100)
ECHO LV ESV A2C: 50 ML
ECHO LV ESV A4C: 65 ML
ECHO LV ESV BP: 61 ML (ref 19–49)
ECHO LV ESV INDEX A2C: 24 ML/M2
ECHO LV ESV INDEX A4C: 31 ML/M2
ECHO LV ESV INDEX BP: 29 ML/M2
ECHO LV FRACTIONAL SHORTENING: 13 % (ref 28–44)
ECHO LV INTERNAL DIMENSION DIASTOLE INDEX: 2.12 CM/M2
ECHO LV INTERNAL DIMENSION DIASTOLIC: 4.5 CM (ref 3.9–5.3)
ECHO LV INTERNAL DIMENSION SYSTOLIC INDEX: 1.84 CM/M2
ECHO LV INTERNAL DIMENSION SYSTOLIC: 3.9 CM
ECHO LV IVSD: 1 CM (ref 0.6–0.9)
ECHO LV MASS 2D: 164 G (ref 67–162)
ECHO LV MASS INDEX 2D: 77.3 G/M2 (ref 43–95)
ECHO LV POSTERIOR WALL DIASTOLIC: 1.1 CM (ref 0.6–0.9)
ECHO LV RELATIVE WALL THICKNESS RATIO: 0.49
ECHO LVOT AREA: 3.8 CM2
ECHO LVOT AV VTI INDEX: 0.64
ECHO LVOT DIAM: 2.2 CM
ECHO LVOT MEAN GRADIENT: 1 MMHG
ECHO LVOT PEAK GRADIENT: 1 MMHG
ECHO LVOT PEAK VELOCITY: 0.6 M/S
ECHO LVOT STROKE VOLUME INDEX: 13.6 ML/M2
ECHO LVOT SV: 28.9 ML
ECHO LVOT VTI: 7.6 CM
ECHO MV A VELOCITY: 0.48 M/S
ECHO MV E DECELERATION TIME (DT): 140.8 MS
ECHO MV E VELOCITY: 0.27 M/S
ECHO MV E/A RATIO: 0.56
ECHO MV E/E' LATERAL: 6.75
ECHO MV E/E' RATIO (AVERAGED): 7.88
ECHO MV E/E' SEPTAL: 9
ECHO PV MAX VELOCITY: 0.7 M/S
ECHO PV PEAK GRADIENT: 2 MMHG
ECHO RV INTERNAL DIMENSION: 4 CM
ECHO RV TAPSE: 1.4 CM (ref 1.7–?)
ECHO RVOT PEAK GRADIENT: 1 MMHG
ECHO RVOT PEAK VELOCITY: 0.4 M/S
ECHO TV REGURGITANT MAX VELOCITY: 1.82 M/S
ECHO TV REGURGITANT PEAK GRADIENT: 13 MMHG
EOSINOPHIL # BLD: 0 K/UL (ref 0–0.4)
EOSINOPHIL # BLD: 0 K/UL (ref 0–0.4)
EOSINOPHIL NFR BLD: 0 % (ref 0–7)
EOSINOPHIL NFR BLD: 0 % (ref 0–7)
EPITH CASTS URNS QL MICRO: ABNORMAL /LPF
ERYTHROCYTE [DISTWIDTH] IN BLOOD BY AUTOMATED COUNT: 14 % (ref 11.5–14.5)
ERYTHROCYTE [DISTWIDTH] IN BLOOD BY AUTOMATED COUNT: 14.3 % (ref 11.5–14.5)
ETHANOL SERPL-MCNC: <10 MG/DL
FIBRINOGEN PPP-MCNC: 338 MG/DL (ref 200–475)
FLUAV SUBTYP SPEC NAA+PROBE: NOT DETECTED
FLUBV RNA SPEC QL NAA+PROBE: NOT DETECTED
GAS FLOW.O2 O2 DELIVERY SYS: ABNORMAL L/MIN
GAS FLOW.O2 SETTING OXYMISER: 16 BPM
GLOBULIN SER CALC-MCNC: 2.6 G/DL (ref 2–4)
GLOBULIN SER CALC-MCNC: 3.9 G/DL (ref 2–4)
GLUCOSE BLD STRIP.AUTO-MCNC: 135 MG/DL (ref 74–106)
GLUCOSE BLD STRIP.AUTO-MCNC: 188 MG/DL (ref 74–106)
GLUCOSE SERPL-MCNC: 141 MG/DL (ref 65–100)
GLUCOSE SERPL-MCNC: 194 MG/DL (ref 65–100)
GLUCOSE SERPL-MCNC: 250 MG/DL (ref 65–100)
GLUCOSE UR STRIP.AUTO-MCNC: NEGATIVE MG/DL
HADV DNA SPEC QL NAA+PROBE: NOT DETECTED
HCO3 BLD-SCNC: 23.3 MMOL/L (ref 22–26)
HCO3 BLDA-SCNC: 22 MMOL/L
HCO3 BLDA-SCNC: 26 MMOL/L
HCOV 229E RNA SPEC QL NAA+PROBE: NOT DETECTED
HCOV HKU1 RNA SPEC QL NAA+PROBE: NOT DETECTED
HCOV NL63 RNA SPEC QL NAA+PROBE: NOT DETECTED
HCOV OC43 RNA SPEC QL NAA+PROBE: NOT DETECTED
HCT VFR BLD AUTO: 36.1 % (ref 35–47)
HCT VFR BLD AUTO: 45.5 % (ref 35–47)
HGB BLD-MCNC: 11.5 G/DL (ref 11.5–16)
HGB BLD-MCNC: 14.2 G/DL (ref 11.5–16)
HGB UR QL STRIP: ABNORMAL
HMPV RNA SPEC QL NAA+PROBE: NOT DETECTED
HPIV1 RNA SPEC QL NAA+PROBE: NOT DETECTED
HPIV2 RNA SPEC QL NAA+PROBE: NOT DETECTED
HPIV3 RNA SPEC QL NAA+PROBE: NOT DETECTED
HPIV4 RNA SPEC QL NAA+PROBE: NOT DETECTED
HYALINE CASTS URNS QL MICRO: ABNORMAL /LPF (ref 0–2)
IMM GRANULOCYTES # BLD AUTO: 0 K/UL (ref 0–0.04)
IMM GRANULOCYTES # BLD AUTO: 0.1 K/UL (ref 0–0.04)
IMM GRANULOCYTES NFR BLD AUTO: 0 % (ref 0–0.5)
IMM GRANULOCYTES NFR BLD AUTO: 1 % (ref 0–0.5)
INR PPP: 1.2 (ref 0.9–1.1)
KETONES UR QL STRIP.AUTO: NEGATIVE MG/DL
LACTATE BLD-SCNC: 3.11 MMOL/L (ref 0.4–2)
LACTATE BLD-SCNC: 6.46 MMOL/L (ref 0.4–2)
LACTATE SERPL-SCNC: 1.7 MMOL/L (ref 0.4–2)
LACTATE SERPL-SCNC: 3 MMOL/L (ref 0.4–2)
LEUKOCYTE ESTERASE UR QL STRIP.AUTO: NEGATIVE
LYMPHOCYTES # BLD: 1.5 K/UL (ref 0.8–3.5)
LYMPHOCYTES # BLD: 1.8 K/UL (ref 0.8–3.5)
LYMPHOCYTES NFR BLD: 10 % (ref 12–49)
LYMPHOCYTES NFR BLD: 14 % (ref 12–49)
M PNEUMO DNA SPEC QL NAA+PROBE: NOT DETECTED
MAGNESIUM SERPL-MCNC: 1.8 MG/DL (ref 1.6–2.4)
MCH RBC QN AUTO: 29.2 PG (ref 26–34)
MCH RBC QN AUTO: 29.3 PG (ref 26–34)
MCHC RBC AUTO-ENTMCNC: 31.2 G/DL (ref 30–36.5)
MCHC RBC AUTO-ENTMCNC: 31.9 G/DL (ref 30–36.5)
MCV RBC AUTO: 91.6 FL (ref 80–99)
MCV RBC AUTO: 94 FL (ref 80–99)
METHADONE UR QL: NEGATIVE
MONOCYTES # BLD: 1 K/UL (ref 0–1)
MONOCYTES # BLD: 1.1 K/UL (ref 0–1)
MONOCYTES NFR BLD: 7 % (ref 5–13)
MONOCYTES NFR BLD: 7 % (ref 5–13)
NEUTS SEG # BLD: 10.4 K/UL (ref 1.8–8)
NEUTS SEG # BLD: 13.3 K/UL (ref 1.8–8)
NEUTS SEG NFR BLD: 79 % (ref 32–75)
NEUTS SEG NFR BLD: 82 % (ref 32–75)
NITRITE UR QL STRIP.AUTO: POSITIVE
NRBC # BLD: 0 K/UL (ref 0–0.01)
NRBC # BLD: 0 K/UL (ref 0–0.01)
NRBC BLD-RTO: 0 PER 100 WBC
NRBC BLD-RTO: 0 PER 100 WBC
O2/TOTAL GAS SETTING VFR VENT: 100 %
OPIATES UR QL: NEGATIVE
PCO2 BLD: 44.4 MMHG (ref 35–45)
PCO2 BLDV: 43.5 MMHG (ref 41–51)
PCO2 BLDV: 65.8 MMHG (ref 41–51)
PCP UR QL: NEGATIVE
PEEP RESPIRATORY: 8 CMH2O
PH BLD: 7.33 [PH] (ref 7.35–7.45)
PH BLDV: 7.21 [PH] (ref 7.32–7.42)
PH BLDV: 7.31 [PH] (ref 7.32–7.42)
PH UR STRIP: 5.5 [PH] (ref 5–8)
PHOSPHATE SERPL-MCNC: 2 MG/DL (ref 2.6–4.7)
PLATELET # BLD AUTO: 259 K/UL (ref 150–400)
PLATELET # BLD AUTO: 306 K/UL (ref 150–400)
PMV BLD AUTO: 10.8 FL (ref 8.9–12.9)
PMV BLD AUTO: 11.2 FL (ref 8.9–12.9)
PO2 BLD: 73 MMHG (ref 80–100)
PO2 BLDV: 35 MMHG (ref 25–40)
PO2 BLDV: 49 MMHG (ref 25–40)
POTASSIUM BLD-SCNC: 4 MMOL/L (ref 3.5–5.5)
POTASSIUM BLD-SCNC: 5.7 MMOL/L (ref 3.5–5.5)
POTASSIUM SERPL-SCNC: 4 MMOL/L (ref 3.5–5.1)
POTASSIUM SERPL-SCNC: 4 MMOL/L (ref 3.5–5.1)
POTASSIUM SERPL-SCNC: 5.3 MMOL/L (ref 3.5–5.1)
PROT SERPL-MCNC: 5.6 G/DL (ref 6.4–8.2)
PROT SERPL-MCNC: 7.8 G/DL (ref 6.4–8.2)
PROT UR STRIP-MCNC: NEGATIVE MG/DL
PROTHROMBIN TIME: 12 SEC (ref 9–11.1)
RBC # BLD AUTO: 3.94 M/UL (ref 3.8–5.2)
RBC # BLD AUTO: 4.84 M/UL (ref 3.8–5.2)
RBC #/AREA URNS HPF: ABNORMAL /HPF (ref 0–5)
REPORTED DOSE,DOSE: ABNORMAL UNITS
REPORTED DOSE/TIME,TMG: ABNORMAL
RSV RNA SPEC QL NAA+PROBE: NOT DETECTED
RV+EV RNA SPEC QL NAA+PROBE: NOT DETECTED
SALICYLATES SERPL-MCNC: <1.7 MG/DL (ref 2.8–20)
SAMPLES BEING HELD,HOLD: NORMAL
SAMPLES BEING HELD,HOLD: NORMAL
SAO2 % BLD: 93.2 % (ref 92–97)
SARS-COV-2 PCR, COVPCR: NOT DETECTED
SERVICE CMNT-IMP: ABNORMAL
SERVICE CMNT-IMP: ABNORMAL
SODIUM BLD-SCNC: 142 MMOL/L (ref 136–145)
SODIUM BLD-SCNC: 144 MMOL/L (ref 136–145)
SODIUM SERPL-SCNC: 141 MMOL/L (ref 136–145)
SODIUM SERPL-SCNC: 142 MMOL/L (ref 136–145)
SODIUM SERPL-SCNC: 142 MMOL/L (ref 136–145)
SP GR UR REFRACTOMETRY: 1.01 (ref 1–1.03)
SPECIMEN SITE: ABNORMAL
SPECIMEN SITE: ABNORMAL
SPECIMEN TYPE: ABNORMAL
THERAPEUTIC RANGE,PTTT: ABNORMAL SECS (ref 58–77)
THERAPEUTIC RANGE,PTTT: NORMAL SECS (ref 58–77)
TROPONIN-HIGH SENSITIVITY: 1804 NG/L (ref 0–51)
TROPONIN-HIGH SENSITIVITY: 2998 NG/L (ref 0–51)
UR CULT HOLD, URHOLD: NORMAL
UROBILINOGEN UR QL STRIP.AUTO: 0.2 EU/DL (ref 0.2–1)
VANCOMYCIN TROUGH SERPL-MCNC: <0.8 UG/ML (ref 5–10)
VENTILATION MODE VENT: ABNORMAL
VT SETTING VENT: 400 ML
WBC # BLD AUTO: 13.2 K/UL (ref 3.6–11)
WBC # BLD AUTO: 16.1 K/UL (ref 3.6–11)
WBC URNS QL MICRO: ABNORMAL /HPF (ref 0–4)

## 2022-09-27 PROCEDURE — 93306 TTE W/DOPPLER COMPLETE: CPT

## 2022-09-27 PROCEDURE — 74011000250 HC RX REV CODE- 250: Performed by: INTERNAL MEDICINE

## 2022-09-27 PROCEDURE — 83880 ASSAY OF NATRIURETIC PEPTIDE: CPT

## 2022-09-27 PROCEDURE — 87070 CULTURE OTHR SPECIMN AEROBIC: CPT

## 2022-09-27 PROCEDURE — 94002 VENT MGMT INPAT INIT DAY: CPT

## 2022-09-27 PROCEDURE — 74176 CT ABD & PELVIS W/O CONTRAST: CPT

## 2022-09-27 PROCEDURE — 81001 URINALYSIS AUTO W/SCOPE: CPT

## 2022-09-27 PROCEDURE — 87186 SC STD MICRODIL/AGAR DIL: CPT

## 2022-09-27 PROCEDURE — 65610000006 HC RM INTENSIVE CARE

## 2022-09-27 PROCEDURE — 74011250636 HC RX REV CODE- 250/636

## 2022-09-27 PROCEDURE — 0202U NFCT DS 22 TRGT SARS-COV-2: CPT

## 2022-09-27 PROCEDURE — 71045 X-RAY EXAM CHEST 1 VIEW: CPT

## 2022-09-27 PROCEDURE — 94640 AIRWAY INHALATION TREATMENT: CPT

## 2022-09-27 PROCEDURE — 36600 WITHDRAWAL OF ARTERIAL BLOOD: CPT

## 2022-09-27 PROCEDURE — 80053 COMPREHEN METABOLIC PANEL: CPT

## 2022-09-27 PROCEDURE — 93306 TTE W/DOPPLER COMPLETE: CPT | Performed by: INTERNAL MEDICINE

## 2022-09-27 PROCEDURE — 80202 ASSAY OF VANCOMYCIN: CPT

## 2022-09-27 PROCEDURE — C1752 CATH,HEMODIALYSIS,SHORT-TERM: HCPCS

## 2022-09-27 PROCEDURE — 84100 ASSAY OF PHOSPHORUS: CPT

## 2022-09-27 PROCEDURE — 93005 ELECTROCARDIOGRAM TRACING: CPT

## 2022-09-27 PROCEDURE — 84484 ASSAY OF TROPONIN QUANT: CPT

## 2022-09-27 PROCEDURE — 5A1955Z RESPIRATORY VENTILATION, GREATER THAN 96 CONSECUTIVE HOURS: ICD-10-PCS | Performed by: INTERNAL MEDICINE

## 2022-09-27 PROCEDURE — 0BH17EZ INSERTION OF ENDOTRACHEAL AIRWAY INTO TRACHEA, VIA NATURAL OR ARTIFICIAL OPENING: ICD-10-PCS | Performed by: INTERNAL MEDICINE

## 2022-09-27 PROCEDURE — 4A10X4Z MONITORING OF CENTRAL NERVOUS ELECTRICAL ACTIVITY, EXTERNAL APPROACH: ICD-10-PCS | Performed by: PSYCHIATRY & NEUROLOGY

## 2022-09-27 PROCEDURE — 36556 INSERT NON-TUNNEL CV CATH: CPT

## 2022-09-27 PROCEDURE — 82077 ASSAY SPEC XCP UR&BREATH IA: CPT

## 2022-09-27 PROCEDURE — 82947 ASSAY GLUCOSE BLOOD QUANT: CPT

## 2022-09-27 PROCEDURE — 85610 PROTHROMBIN TIME: CPT

## 2022-09-27 PROCEDURE — 74011000250 HC RX REV CODE- 250: Performed by: STUDENT IN AN ORGANIZED HEALTH CARE EDUCATION/TRAINING PROGRAM

## 2022-09-27 PROCEDURE — 82550 ASSAY OF CK (CPK): CPT

## 2022-09-27 PROCEDURE — 83605 ASSAY OF LACTIC ACID: CPT

## 2022-09-27 PROCEDURE — 74011000250 HC RX REV CODE- 250: Performed by: NURSE PRACTITIONER

## 2022-09-27 PROCEDURE — 74011250636 HC RX REV CODE- 250/636: Performed by: NURSE PRACTITIONER

## 2022-09-27 PROCEDURE — 80179 DRUG ASSAY SALICYLATE: CPT

## 2022-09-27 PROCEDURE — 74011000258 HC RX REV CODE- 258: Performed by: STUDENT IN AN ORGANIZED HEALTH CARE EDUCATION/TRAINING PROGRAM

## 2022-09-27 PROCEDURE — C1751 CATH, INF, PER/CENT/MIDLINE: HCPCS

## 2022-09-27 PROCEDURE — 96374 THER/PROPH/DIAG INJ IV PUSH: CPT

## 2022-09-27 PROCEDURE — C1894 INTRO/SHEATH, NON-LASER: HCPCS

## 2022-09-27 PROCEDURE — 36415 COLL VENOUS BLD VENIPUNCTURE: CPT

## 2022-09-27 PROCEDURE — 80307 DRUG TEST PRSMV CHEM ANLYZR: CPT

## 2022-09-27 PROCEDURE — 83735 ASSAY OF MAGNESIUM: CPT

## 2022-09-27 PROCEDURE — 85730 THROMBOPLASTIN TIME PARTIAL: CPT

## 2022-09-27 PROCEDURE — 74011000258 HC RX REV CODE- 258: Performed by: NURSE PRACTITIONER

## 2022-09-27 PROCEDURE — 87077 CULTURE AEROBIC IDENTIFY: CPT

## 2022-09-27 PROCEDURE — 93970 EXTREMITY STUDY: CPT

## 2022-09-27 PROCEDURE — 96375 TX/PRO/DX INJ NEW DRUG ADDON: CPT

## 2022-09-27 PROCEDURE — 74011636637 HC RX REV CODE- 636/637: Performed by: NURSE PRACTITIONER

## 2022-09-27 PROCEDURE — 74011250636 HC RX REV CODE- 250/636: Performed by: STUDENT IN AN ORGANIZED HEALTH CARE EDUCATION/TRAINING PROGRAM

## 2022-09-27 PROCEDURE — 70450 CT HEAD/BRAIN W/O DYE: CPT

## 2022-09-27 PROCEDURE — 80143 DRUG ASSAY ACETAMINOPHEN: CPT

## 2022-09-27 PROCEDURE — 85025 COMPLETE CBC W/AUTO DIFF WBC: CPT

## 2022-09-27 PROCEDURE — 87086 URINE CULTURE/COLONY COUNT: CPT

## 2022-09-27 PROCEDURE — 74011250636 HC RX REV CODE- 250/636: Performed by: INTERNAL MEDICINE

## 2022-09-27 PROCEDURE — 87899 AGENT NOS ASSAY W/OPTIC: CPT

## 2022-09-27 PROCEDURE — 87040 BLOOD CULTURE FOR BACTERIA: CPT

## 2022-09-27 PROCEDURE — 02HV33Z INSERTION OF INFUSION DEVICE INTO SUPERIOR VENA CAVA, PERCUTANEOUS APPROACH: ICD-10-PCS | Performed by: STUDENT IN AN ORGANIZED HEALTH CARE EDUCATION/TRAINING PROGRAM

## 2022-09-27 PROCEDURE — 31500 INSERT EMERGENCY AIRWAY: CPT

## 2022-09-27 PROCEDURE — 85384 FIBRINOGEN ACTIVITY: CPT

## 2022-09-27 PROCEDURE — 71250 CT THORAX DX C-: CPT

## 2022-09-27 PROCEDURE — 95720 EEG PHY/QHP EA INCR W/VEEG: CPT | Performed by: PSYCHIATRY & NEUROLOGY

## 2022-09-27 PROCEDURE — 99285 EMERGENCY DEPT VISIT HI MDM: CPT

## 2022-09-27 PROCEDURE — 95714 VEEG EA 12-26 HR UNMNTR: CPT | Performed by: NURSE PRACTITIONER

## 2022-09-27 PROCEDURE — 87449 NOS EACH ORGANISM AG IA: CPT

## 2022-09-27 PROCEDURE — 82803 BLOOD GASES ANY COMBINATION: CPT

## 2022-09-27 RX ORDER — METRONIDAZOLE 500 MG/100ML
500 INJECTION, SOLUTION INTRAVENOUS EVERY 12 HOURS
Status: DISCONTINUED | OUTPATIENT
Start: 2022-09-27 | End: 2022-09-30

## 2022-09-27 RX ORDER — ONDANSETRON 2 MG/ML
4 INJECTION INTRAMUSCULAR; INTRAVENOUS
Status: DISCONTINUED | OUTPATIENT
Start: 2022-09-27 | End: 2022-10-20 | Stop reason: HOSPADM

## 2022-09-27 RX ORDER — HEPARIN SODIUM 1000 [USP'U]/ML
1000-5000 INJECTION, SOLUTION INTRAVENOUS; SUBCUTANEOUS ONCE
Status: COMPLETED | OUTPATIENT
Start: 2022-09-27 | End: 2022-09-27

## 2022-09-27 RX ORDER — IPRATROPIUM BROMIDE AND ALBUTEROL SULFATE 2.5; .5 MG/3ML; MG/3ML
3 SOLUTION RESPIRATORY (INHALATION)
Status: COMPLETED | OUTPATIENT
Start: 2022-09-27 | End: 2022-09-27

## 2022-09-27 RX ORDER — HEPARIN SODIUM 10000 [USP'U]/100ML
10-25 INJECTION, SOLUTION INTRAVENOUS
Status: DISCONTINUED | OUTPATIENT
Start: 2022-09-27 | End: 2022-09-30

## 2022-09-27 RX ORDER — SODIUM CHLORIDE 0.9 % (FLUSH) 0.9 %
5-40 SYRINGE (ML) INJECTION AS NEEDED
Status: DISCONTINUED | OUTPATIENT
Start: 2022-09-27 | End: 2022-10-20 | Stop reason: HOSPADM

## 2022-09-27 RX ORDER — SODIUM CHLORIDE 0.9 % (FLUSH) 0.9 %
5-40 SYRINGE (ML) INJECTION EVERY 8 HOURS
Status: DISCONTINUED | OUTPATIENT
Start: 2022-09-27 | End: 2022-10-20 | Stop reason: HOSPADM

## 2022-09-27 RX ORDER — MIDAZOLAM HYDROCHLORIDE 1 MG/ML
INJECTION, SOLUTION INTRAMUSCULAR; INTRAVENOUS
Status: DISCONTINUED
Start: 2022-09-27 | End: 2022-09-27 | Stop reason: WASHOUT

## 2022-09-27 RX ORDER — ACETAMINOPHEN 650 MG/1
650 SUPPOSITORY RECTAL
Status: DISCONTINUED | OUTPATIENT
Start: 2022-09-27 | End: 2022-10-11

## 2022-09-27 RX ORDER — CHLORHEXIDINE GLUCONATE 1.2 MG/ML
15 RINSE ORAL EVERY 12 HOURS
Status: DISCONTINUED | OUTPATIENT
Start: 2022-09-27 | End: 2022-10-13

## 2022-09-27 RX ORDER — LEVETIRACETAM 500 MG/5ML
1000 INJECTION, SOLUTION, CONCENTRATE INTRAVENOUS ONCE
Status: COMPLETED | OUTPATIENT
Start: 2022-09-27 | End: 2022-09-27

## 2022-09-27 RX ORDER — NOREPINEPHRINE BITARTRATE/D5W 8 MG/250ML
.5-3 PLASTIC BAG, INJECTION (ML) INTRAVENOUS
Status: DISCONTINUED | OUTPATIENT
Start: 2022-09-27 | End: 2022-10-07

## 2022-09-27 RX ORDER — LEVETIRACETAM 500 MG/5ML
500 INJECTION, SOLUTION, CONCENTRATE INTRAVENOUS EVERY 12 HOURS
Status: DISCONTINUED | OUTPATIENT
Start: 2022-09-28 | End: 2022-09-29

## 2022-09-27 RX ORDER — SODIUM CHLORIDE 9 MG/ML
100 INJECTION, SOLUTION INTRAVENOUS CONTINUOUS
Status: DISCONTINUED | OUTPATIENT
Start: 2022-09-27 | End: 2022-09-29

## 2022-09-27 RX ORDER — MIDAZOLAM HYDROCHLORIDE 1 MG/ML
2 INJECTION, SOLUTION INTRAMUSCULAR; INTRAVENOUS ONCE
Status: ACTIVE | OUTPATIENT
Start: 2022-09-27 | End: 2022-09-28

## 2022-09-27 RX ORDER — POLYETHYLENE GLYCOL 3350 17 G/17G
17 POWDER, FOR SOLUTION ORAL DAILY PRN
Status: DISCONTINUED | OUTPATIENT
Start: 2022-09-27 | End: 2022-10-20 | Stop reason: HOSPADM

## 2022-09-27 RX ORDER — FENTANYL CITRATE 50 UG/ML
50 INJECTION, SOLUTION INTRAMUSCULAR; INTRAVENOUS
Status: COMPLETED | OUTPATIENT
Start: 2022-09-27 | End: 2022-09-27

## 2022-09-27 RX ORDER — SENNOSIDES 8.6 MG/1
1 TABLET ORAL DAILY PRN
Status: DISCONTINUED | OUTPATIENT
Start: 2022-09-27 | End: 2022-10-03

## 2022-09-27 RX ORDER — HEPARIN SODIUM 1000 [USP'U]/ML
4000 INJECTION, SOLUTION INTRAVENOUS; SUBCUTANEOUS ONCE
Status: COMPLETED | OUTPATIENT
Start: 2022-09-27 | End: 2022-09-27

## 2022-09-27 RX ORDER — LIDOCAINE HYDROCHLORIDE 10 MG/ML
10-30 INJECTION INFILTRATION; PERINEURAL
Status: DISCONTINUED | OUTPATIENT
Start: 2022-09-27 | End: 2022-09-28

## 2022-09-27 RX ORDER — MIDAZOLAM HYDROCHLORIDE 1 MG/ML
2 INJECTION, SOLUTION INTRAMUSCULAR; INTRAVENOUS
Status: COMPLETED | OUTPATIENT
Start: 2022-09-27 | End: 2022-09-27

## 2022-09-27 RX ORDER — HYDROCORTISONE SODIUM SUCCINATE 100 MG/2ML
50 INJECTION, POWDER, FOR SOLUTION INTRAMUSCULAR; INTRAVENOUS EVERY 6 HOURS
Status: DISCONTINUED | OUTPATIENT
Start: 2022-09-27 | End: 2022-09-30

## 2022-09-27 RX ORDER — ACETAMINOPHEN 325 MG/1
650 TABLET ORAL
Status: DISCONTINUED | OUTPATIENT
Start: 2022-09-27 | End: 2022-10-20 | Stop reason: HOSPADM

## 2022-09-27 RX ORDER — CALCIUM GLUCONATE 20 MG/ML
1 INJECTION, SOLUTION INTRAVENOUS ONCE
Status: COMPLETED | OUTPATIENT
Start: 2022-09-27 | End: 2022-09-27

## 2022-09-27 RX ORDER — PROMETHAZINE HYDROCHLORIDE 25 MG/1
12.5 TABLET ORAL
Status: DISCONTINUED | OUTPATIENT
Start: 2022-09-27 | End: 2022-10-05

## 2022-09-27 RX ORDER — PROPOFOL 10 MG/ML
INJECTION, EMULSION INTRAVENOUS
Status: COMPLETED
Start: 2022-09-27 | End: 2022-09-27

## 2022-09-27 RX ORDER — PROPOFOL 10 MG/ML
0-50 VIAL (ML) INTRAVENOUS
Status: DISCONTINUED | OUTPATIENT
Start: 2022-09-27 | End: 2022-10-08

## 2022-09-27 RX ADMIN — DEXTROSE MONOHYDRATE 250 ML: 10 INJECTION, SOLUTION INTRAVENOUS at 13:50

## 2022-09-27 RX ADMIN — VANCOMYCIN HYDROCHLORIDE 2500 MG: 10 INJECTION, POWDER, LYOPHILIZED, FOR SOLUTION INTRAVENOUS at 15:58

## 2022-09-27 RX ADMIN — METRONIDAZOLE 500 MG: 500 INJECTION, SOLUTION INTRAVENOUS at 17:46

## 2022-09-27 RX ADMIN — SODIUM CHLORIDE 1000 ML: 9 INJECTION, SOLUTION INTRAVENOUS at 13:04

## 2022-09-27 RX ADMIN — PROPOFOL 10 MCG/KG/MIN: 10 INJECTION, EMULSION INTRAVENOUS at 11:52

## 2022-09-27 RX ADMIN — PROPOFOL 5 MCG/KG/MIN: 10 INJECTION, EMULSION INTRAVENOUS at 11:42

## 2022-09-27 RX ADMIN — CEFEPIME 2 G: 2 INJECTION, POWDER, FOR SOLUTION INTRAVENOUS at 19:42

## 2022-09-27 RX ADMIN — CHLORHEXIDINE GLUCONATE 15 ML: 1.2 RINSE ORAL at 21:42

## 2022-09-27 RX ADMIN — Medication 5 ML: at 14:00

## 2022-09-27 RX ADMIN — SODIUM CHLORIDE 1000 ML: 9 INJECTION, SOLUTION INTRAVENOUS at 12:26

## 2022-09-27 RX ADMIN — NOREPINEPHRINE BITARTRATE 20 MCG/MIN: 1 SOLUTION INTRAVENOUS at 13:10

## 2022-09-27 RX ADMIN — PROPOFOL 25 MCG/KG/MIN: 10 INJECTION, EMULSION INTRAVENOUS at 18:47

## 2022-09-27 RX ADMIN — SODIUM CHLORIDE 100 ML/HR: 9 INJECTION, SOLUTION INTRAVENOUS at 20:58

## 2022-09-27 RX ADMIN — SODIUM CHLORIDE 100 ML/HR: 9 INJECTION, SOLUTION INTRAVENOUS at 14:20

## 2022-09-27 RX ADMIN — HEPARIN SODIUM 4000 UNITS: 1000 INJECTION INTRAVENOUS; SUBCUTANEOUS at 19:42

## 2022-09-27 RX ADMIN — FENTANYL CITRATE 100 MCG/HR: 50 INJECTION, SOLUTION INTRAMUSCULAR; INTRAVENOUS at 12:49

## 2022-09-27 RX ADMIN — FENTANYL CITRATE 50 MCG: 50 INJECTION INTRAMUSCULAR; INTRAVENOUS at 13:00

## 2022-09-27 RX ADMIN — PROPOFOL 50 MCG/KG/MIN: 10 INJECTION, EMULSION INTRAVENOUS at 12:50

## 2022-09-27 RX ADMIN — LEVETIRACETAM 1000 MG: 100 INJECTION, SOLUTION INTRAVENOUS at 12:25

## 2022-09-27 RX ADMIN — MIDAZOLAM HYDROCHLORIDE 2 MG: 1 INJECTION, SOLUTION INTRAMUSCULAR; INTRAVENOUS at 14:13

## 2022-09-27 RX ADMIN — PROPOFOL 50 MCG/KG/MIN: 10 INJECTION, EMULSION INTRAVENOUS at 15:02

## 2022-09-27 RX ADMIN — SODIUM CHLORIDE, POTASSIUM CHLORIDE, SODIUM LACTATE AND CALCIUM CHLORIDE 1000 ML: 600; 310; 30; 20 INJECTION, SOLUTION INTRAVENOUS at 14:03

## 2022-09-27 RX ADMIN — CALCIUM GLUCONATE 1000 MG: 20 INJECTION, SOLUTION INTRAVENOUS at 13:54

## 2022-09-27 RX ADMIN — HYDROCORTISONE SODIUM SUCCINATE 50 MG: 100 INJECTION, POWDER, FOR SOLUTION INTRAMUSCULAR; INTRAVENOUS at 13:53

## 2022-09-27 RX ADMIN — HYDROCORTISONE SODIUM SUCCINATE 50 MG: 100 INJECTION, POWDER, FOR SOLUTION INTRAMUSCULAR; INTRAVENOUS at 17:46

## 2022-09-27 RX ADMIN — Medication 10 ML: at 22:28

## 2022-09-27 RX ADMIN — NOREPINEPHRINE BITARTRATE 18 MCG/MIN: 1 SOLUTION INTRAVENOUS at 18:50

## 2022-09-27 RX ADMIN — NOREPINEPHRINE BITARTRATE 30 MCG/MIN: 1 SOLUTION INTRAVENOUS at 12:48

## 2022-09-27 RX ADMIN — Medication 10 UNITS: at 13:50

## 2022-09-27 RX ADMIN — IPRATROPIUM BROMIDE AND ALBUTEROL SULFATE 3 ML: .5; 3 SOLUTION RESPIRATORY (INHALATION) at 12:32

## 2022-09-27 RX ADMIN — LIDOCAINE HYDROCHLORIDE 10 ML: 10 INJECTION, SOLUTION INFILTRATION; PERINEURAL at 17:00

## 2022-09-27 RX ADMIN — FENTANYL CITRATE 100 MCG/HR: 50 INJECTION, SOLUTION INTRAMUSCULAR; INTRAVENOUS at 14:21

## 2022-09-27 RX ADMIN — SODIUM CHLORIDE, POTASSIUM CHLORIDE, SODIUM LACTATE AND CALCIUM CHLORIDE 1000 ML: 600; 310; 30; 20 INJECTION, SOLUTION INTRAVENOUS at 17:47

## 2022-09-27 RX ADMIN — MIDAZOLAM HYDROCHLORIDE 2 MG: 2 INJECTION, SOLUTION INTRAMUSCULAR; INTRAVENOUS at 12:29

## 2022-09-27 RX ADMIN — HEPARIN SODIUM 10 UNITS/KG/HR: 10000 INJECTION, SOLUTION INTRAVENOUS at 19:42

## 2022-09-27 RX ADMIN — HEPARIN SODIUM 2600 UNITS: 1000 INJECTION INTRAVENOUS; SUBCUTANEOUS at 17:01

## 2022-09-27 RX ADMIN — PIPERACILLIN AND TAZOBACTAM 4.5 G: 4; .5 INJECTION, POWDER, FOR SOLUTION INTRAVENOUS at 13:03

## 2022-09-27 NOTE — PROGRESS NOTES
TRANSFER - IN REPORT:    Verbal report received from [facility], [address] [place] [address] on Marshal Canton  being received from ICU for ordered procedure      Report consisted of patients Situation, Background, Assessment and   Recommendations(SBAR). Information from the following report(s) SBAR was reviewed with the receiving nurse. Opportunity for questions and clarification was provided. Assessment completed upon patients arrival to unit and care assumed.

## 2022-09-27 NOTE — PROGRESS NOTES
Follow up family care and support for patient, Ms. Aponte Alert now in ICU.  ran into family who needed assistance with members in and out of ICU.  offered assistance and support. Attending RN and other providers are in the room working with Pt as [facility] [address] assists family. They expressed their gratitude as  assures them of continued care and availability, and that all the commotion will settle once staff has her fully situated in room.      Chaplain Seng Macais M.Div.  Homero Vogel ([id number])

## 2022-09-27 NOTE — ED TRIAGE NOTES
Son called pt with no answer notified neighbor who found pt unconscious in bed [facility] sometime yesterday evening. Pupils pinpoint per EMS 4mg total of narcan given with minimal response. Seizure witnessed by EMS 4mg of versed given.       Pt was being bagged by EMS on arrival.

## 2022-09-27 NOTE — PROGRESS NOTES
TRANSFER - OUT REPORT:    Verbal report given to Flex Granados RN on Millie Ward being transferred to ICU for routine progression of care       Report consisted of patient's Situation, Background, Assessment and   Recommendations(SBAR). Information from the following report(s) Procedure Summary was reviewed with the receiving nurse. Opportunity for questions and clarification was provided.       Patient transported with:   Registered Nurse

## 2022-09-27 NOTE — PROCEDURES
[facility]      Procedure Note - Central Venous Access:   Performed by Karlie Cuello NP    Obtained emergent Consent. Immediately prior to the procedure, the patient was reevaluated and found suitable for the planned procedure and any planned medications. Immediately prior to the procedure a time out was called to verify the correct patient, procedure, equipment, staff, and marking as appropriate. Septic shock    Central line Bundle:  Full sterile barrier precautions used. 7-Step Sterility Protocol followed. (cap, mask sterile gown, sterile gloves, large sterile sheet, hand hygiene, 2% chlorhexidine for cutaneous antisepsis)  5 mL 1% Lidocaine placed at insertion site. Patient positioned in Trendelenburg?yes   The site was prepped with ChloraPrep. Using Seldinger technique a Triple Lumen CVC was placed in the Right, Internal Jugular Vein via direct cannulation with 1 number of attempts for Blood Drawing and IV Access. Ultrasound Guidance was utilized. There was good dark, non-pulsatile blood return in all ports. Femoral Site? no. If Yes, reason femoral site was chosen: na  Catheter secured. Biopatch in place? yes. Sterile Bio-occlusive dressing placed. The following complications were encountered: None. A follow-up chest x-ray was ordered post procedure. The procedure was tolerated well.       Karlie Cuello NP  Critical Care Medicine  [facility]

## 2022-09-27 NOTE — PROGRESS NOTES
- Bedside shift change report given to [id number] Shae Falcon (oncoming nurse) by Kacy Keane RN (offgoing nurse). Report included the following information SBAR, ED Summary, Recent Results, and Cardiac Rhythm NSR .       - The patient's mother informed me that the fentanyl patch that was found on the patient most likely belong to the patient's  who had cancer 6 years ago and has since been . The patient's mother also made me aware that the patient has a boyfriend who just returned from [place] about 2 weeks ago. 18 - Son Bandar Rivas) asked Nurse what the expiration date was on a fentanyl patch. The nurse responded that she was not sure - the son Henrietta Calderón replied Leola Huge does anyone know for that reason? \". I asked the son if he had an opportunity to ask questions with the admitting doctor earlier when his mom first arrived and he replied that he and the family got here late. I tried to answer the Son Bandar Rivas) and family's questions accordingly to the best of my knowledge with what I knew about the patient. There's been different pieces of information given by the family with no real confirmation of what may have happened. For now, mom (the patient) is stable and I'm carrying out orders in her MAR.

## 2022-09-27 NOTE — CONSULTS
[facility]  YOB: 1965     Assessment & Plan:     CHRIS  - cr 2.6 mg, no base ce  - likely due to shock  - CT: No hydro  -UA: No protein,trace blood  Hyperkalemia  - due to CHRIS  Lactic acidosis  - Due to shock  Resp failure  - vented, COVID rule out  Shock  PLAN  No urgent HD needed  Volume  ABx  Medical rx for High k  CRRT if worse: TREVON Peterson Epley: SON: HD line, CRRT etc.     [facility]                   Subjective:   CHIEF COMPLAIN:ARF  HPI:  Ms Brian Newman is a 60 yo female whom we are seeing for CHRIS and Hyperkalemia  She is intubated,  She can not provide any history. She has unknown PMH. She was LKW the evening of 9/26. She did not report to work the am of 9/27 and she was found unresponsive at her home on wellness check. Bystander CPR was started, but she did have a pulse. EMS was called. Upon their arrival, SBP was 70s. Pupils were pinpoint, narcan was administered without improvement. She experienced seizure like activity and received 4mg IV versed. Upon arrival to the ED, she exhibited seizure like activity and L sided gaze preference. She required intubation for hypoxic respiratory failure. She was sedated on propofol. She became hypotensive and was started on levophed. LA 6.46. She was admitted to the ICU. She was started on empiric vanc/zosyn. Blood/sputum cultures ordered. CT head unrevealing. CT chest notable for only bibasilar atelectasis, cannot exclude small amount aspiration in lower lobes. CTAP unrevealing. Central line placed at bedside in ICU. She was noted to exhibit profound hypoxia. 7.33/44/73 on 100% FiO2 - not consistent with ARDS due to absence of bilateral infiltrates. Review of Systems  Review of systems not obtained due to patient factors. No past medical history on file. No past surgical history on file.     Social History     Socioeconomic History    Marital status:      Spouse name: Not on file    Number of children: Not on file    Years of education: Not on file    Highest education level: Not on file   Occupational History    Not on file   Tobacco Use    Smoking status: Not on file    Smokeless tobacco: Not on file   Substance and Sexual Activity    Alcohol use: Not on file    Drug use: Not on file    Sexual activity: Not on file   Other Topics Concern    Not on file   Social History Narrative    Not on file     Social Determinants of Health     Financial Resource Strain: Not on file   Food Insecurity: Not on file   Transportation Needs: Not on file   Physical Activity: Not on file   Stress: Not on file   Social Connections: Not on file   Intimate Partner Violence: Not on file   Housing Stability: Not on file      No family history on file. Prior to Admission medications    Not on File     Not on File    Objective:     Vitals:  Blood pressure (!) 115/59, pulse 99, temperature (!) 101.5 °F (38.6 °C), resp. rate 16, height 5' 6\" (1.676 m), weight 104.3 kg (230 lb), SpO2 99 %. Temp (24hrs), Av.4 °F (38.6 °C), Min:101.3 °F (38.5 °C), Max:101.5 °F (38.6 °C)      Intake and Output:   0701 -  1900  In: -   Out: 75 [Urine:75]  No intake/output data recorded. Physical Exam:                Patient is intubated:  yes    Physical Examination:   GENERAL ASSESSMENT: obese        CHEST: vented  HEART: sinus arrhythmia     NEURO: altered      ECG/rhythm[de-identified] Rev:yes  Xray/CT/US/MRI REV:yes  Data Review   Recent Results (from the past 72 hour(s))   SAMPLES BEING HELD    Collection Time: 22 11:27 AM   Result Value Ref Range    SAMPLES BEING HELD 1RED,1BLUE     COMMENT        Add-on orders for these samples will be processed based on acceptable specimen integrity and analyte stability, which may vary by analyte.    CBC WITH AUTOMATED DIFF    Collection Time: 22 11:27 AM   Result Value Ref Range    WBC 16.1 (H) 3.6 - 11.0 K/uL    RBC 4.84 3.80 - 5.20 M/uL    HGB 14.2 11.5 - 16.0 g/dL HCT 45.5 35.0 - 47.0 %    MCV 94.0 80.0 - 99.0 FL    MCH 29.3 26.0 - 34.0 PG    MCHC 31.2 30.0 - 36.5 g/dL    RDW 14.3 11.5 - 14.5 %    PLATELET 091 047 - 491 K/uL    MPV 10.8 8.9 - 12.9 FL    NRBC 0.0 0  WBC    ABSOLUTE NRBC 0.00 0.00 - 0.01 K/uL    NEUTROPHILS 82 (H) 32 - 75 %    LYMPHOCYTES 10 (L) 12 - 49 %    MONOCYTES 7 5 - 13 %    EOSINOPHILS 0 0 - 7 %    BASOPHILS 0 0 - 1 %    IMMATURE GRANULOCYTES 1 (H) 0.0 - 0.5 %    ABS. NEUTROPHILS 13.3 (H) 1.8 - 8.0 K/UL    ABS. LYMPHOCYTES 1.5 0.8 - 3.5 K/UL    ABS. MONOCYTES 1.1 (H) 0.0 - 1.0 K/UL    ABS. EOSINOPHILS 0.0 0.0 - 0.4 K/UL    ABS. BASOPHILS 0.1 0.0 - 0.1 K/UL    ABS. IMM. GRANS. 0.1 (H) 0.00 - 0.04 K/UL    DF AUTOMATED     METABOLIC PANEL, COMPREHENSIVE    Collection Time: 09/27/22 11:27 AM   Result Value Ref Range    Sodium 141 136 - 145 mmol/L    Potassium 5.3 (H) 3.5 - 5.1 mmol/L    Chloride 106 97 - 108 mmol/L    CO2 24 21 - 32 mmol/L    Anion gap 11 5 - 15 mmol/L    Glucose 141 (H) 65 - 100 mg/dL    BUN 16 6 - 20 MG/DL    Creatinine 2.57 (H) 0.55 - 1.02 MG/DL    BUN/Creatinine ratio 6 (L) 12 - 20      GFR est AA 23 (L) >60 ml/min/1.73m2    GFR est non-AA 19 (L) >60 ml/min/1.73m2    Calcium 9.4 8.5 - 10.1 MG/DL    Bilirubin, total 0.2 0.2 - 1.0 MG/DL    ALT (SGPT) 43 12 - 78 U/L    AST (SGOT) 56 (H) 15 - 37 U/L    Alk.  phosphatase 77 45 - 117 U/L    Protein, total 7.8 6.4 - 8.2 g/dL    Albumin 3.9 3.5 - 5.0 g/dL    Globulin 3.9 2.0 - 4.0 g/dL    A-G Ratio 1.0 (L) 1.1 - 2.2     BLOOD GAS,CHEM8,LACTIC ACID POC    Collection Time: 09/27/22 11:32 AM   Result Value Ref Range    Calcium, ionized (POC) 1.15 1.12 - 1.32 mmol/L    BICARBONATE 26 mmol/L    Base deficit (POC) 3.8 mmol/L    Sample source VENOUS BLOOD      CO2, POC 27 (H) 19 - 24 MMOL/L    Sodium,  136 - 145 MMOL/L    Potassium, POC 5.7 (H) 3.5 - 5.5 MMOL/L    Chloride,  100 - 108 MMOL/L    Glucose,  (H) 74 - 106 MG/DL    Creatinine, POC 2.4 (H) 0.6 - 1.3 MG/DL Lactic Acid (POC) 6.46 (HH) 0.40 - 2.00 mmol/L    Critical value read back [id number] [facility]     pH, venous (POC) 7.21 (L) 7.32 - 7.42      pCO2, venous (POC) 65.8 (H) 41 - 51 MMHG    pO2, venous (POC) 49 (H) 25 - 40 mmHg   POC G3 - PUL    Collection Time: 09/27/22 12:43 PM   Result Value Ref Range    FIO2 (POC) 100 %    pH (POC) 7.33 (L) 7.35 - 7.45      pCO2 (POC) 44.4 35.0 - 45.0 MMHG    pO2 (POC) 73 (L) 80 - 100 MMHG    HCO3 (POC) 23.3 22 - 26 MMOL/L    sO2 (POC) 93.2 92 - 97 %    Base deficit (POC) 2.7 mmol/L    Site RIGHT RADIAL      Device: ADULT VENT      Mode ASSIST CONTROL      Tidal volume 400 ml    Set Rate 16 bpm    PEEP/CPAP (POC) 8 cmH2O    Allens test (POC) Positive      Specimen type (POC) ARTERIAL     URINALYSIS W/MICROSCOPIC    Collection Time: 09/27/22 12:51 PM   Result Value Ref Range    Color YELLOW/STRAW      Appearance CLEAR CLEAR      Specific gravity 1.011 1.003 - 1.030      pH (UA) 5.5 5.0 - 8.0      Protein Negative NEG mg/dL    Glucose Negative NEG mg/dL    Ketone Negative NEG mg/dL    Bilirubin Negative NEG      Blood TRACE (A) NEG      Urobilinogen 0.2 0.2 - 1.0 EU/dL    Nitrites Positive (A) NEG      Leukocyte Esterase Negative NEG      WBC 0-4 0 - 4 /hpf    RBC 0-5 0 - 5 /hpf    Epithelial cells FEW FEW /lpf    Bacteria TOO NUMEROUS TO COUNT (A) NEG /hpf    Hyaline cast 0-2 0 - 2 /lpf   URINE CULTURE HOLD SAMPLE    Collection Time: 09/27/22 12:51 PM    Specimen: Serum; Urine   Result Value Ref Range    Urine culture hold        Urine on hold in Microbiology dept for 2 days. If unpreserved urine is submitted, it cannot be used for addtional testing after 24 hours, recollection will be required. LACTIC ACID    Collection Time: 09/27/22  2:31 PM   Result Value Ref Range    Lactic acid 3.0 (HH) 0.4 - 2.0 MMOL/L       Discussed with:    Nurse and Attending/Consulting  Thank you so much to allow us to participate in this patient's care. We will follow.  : Humberto Salgado, MD  9/27/2022      [facility]:  [email or web address]  Thuan Tsai office:  [address], [address] [place] [address],[address]  [address]  Phone: [phone]  Fax :     [phone]    Jassi office:  [address] [facility]  Jassi [facility]  Phone - [phone]  Fax - [phone]

## 2022-09-27 NOTE — PROGRESS NOTES
100mcg fentanyl patch noted on patient's right shoulder (see progress note). I removed this patch and disposed of it in the sharps box. This was witnessed by Jason Diamond RN.     Shanell Reilly NP

## 2022-09-27 NOTE — PROGRESS NOTES
CVEEG reviewed through 1944 hours  No ictus  Infrequent right periodic discharges    Joanna Gonsales MD

## 2022-09-27 NOTE — PROGRESS NOTES
Called by ED Nursing staff to assist with monitoring for suspected ICH. Upon my arrival pt intubated and sedated with tonic clonic movement in all 4 ext. Eyes straight ahead. Amaryllis Coats started after pt started on propofol gtt and given several boluses. However pt still having some tonic/clonic movements at time of application. Will order AMS work up and cEEG. While Amaryllis Coats currently does not demonstrate a seizure burden, I am concerned that seizures may come back once propofol and boluses wear off. Pt will be formally seen by Dr. Davina Bowden in am.        Rapid EEG Monitoring Nursing Documentation    Headband applied. Time headband placed / removed: 1220     Skin check: intact. Highest Seizure Point Comfort in the last hour: Seizure Point Comfort 0-10% - Will continue to monitor and complete 2-hour study.     Agatha Kang NP

## 2022-09-27 NOTE — H&P
[facility] [address]  [phone]    Hospital Medicine History and Physical      NAME:       Funmi Carballo   :       1965   MRN:      [id number]     PCP:      Gage Steele MD     Date of service:   2022     Chief  Complaint:  Found unresponsive for an unknown duration of time     History Of Presenting Illness:       Ms. Jorge Thompson is a 59 y.o. female who is being admitted for AMS with suspected status epilepticus. Ms. Jorge Thompson is currently intubated and sedated and she is not able to give any hx. I have discussed with the ED team and reviewed her chart for collaborative Hx. She was apparently last known well yesterday. She did not report to work today and after her son called and had no response he asked a neighbor to have a wellness check at which point the patient was found unresponsive for an unknown duration. EMS was called and on arrival, they noted pin point pupils and given concern for an overdose, they administered intra-nasal narcan with no response. CPR was started. She was noted to be hypotensive. She had a witnessed seizure and was given 4 mg of Iv Versed and transferred to our Emergency Department where she was intubated for airway protection. She was noted to have a left sided gaze. In the ED, a CT code neuro was neg for acute changes. CT chest showed moderate bibasilar atelectasis but no infiltrates. She was started on sedation and has still been hypotension. Her lactic acid was noted to be 6.46. She was admitted to ICU for further management. Not on File    Prior to Admission medications    Not on File       No past medical history on file. No past surgical history on file.     Social History     Tobacco Use    Smoking status: Not on file    Smokeless tobacco: Not on file   Substance Use Topics Alcohol use: Not on file      Review of Systems: unobtainable from the patient given she is sedated     Examination:    Constitutional:  Visit Vitals  BP (!) 115/59   Pulse (!) 112   Temp (!) 101.5 °F (38.6 °C)   Resp 18   Ht 5' 6\" (1.676 m)   Wt 104.3 kg (230 lb)   SpO2 96%   BMI 37.12 kg/m²         General:  Critically ill patient, currently intubated and sedated   Eyes: Pink conjunctivae with pin point pupils. No discharges   Ear, Nose, Mouth & Throat: No ottorrhea, rhinorrhea, non tender sinuses, dry mucous membranes, orally intubated  Respiratory:  No accessory muscle use, decreased but clear breath sounds without crackles or wheezes  Cardiovascular:  No JVD or murmurs, sinus tachycardia, without thrills, bruits or peripheral edema. Capillary refil+, good distal pulses  GI & :  Soft abdomen, distended, decreased bowel sounds with no palpable organomegaly  Heme:  No cervical or axillary adenopathy. Musculoskeletal:  No cyanosis, clubbing, atrophy or deformities  Skin:  No rashes, bruising or ulcers   Neurological: sedated and intubated. No overt facial droop. Limited exam   ________________________________________________________________________    Data Review:    Labs:    Recent Labs     09/27/22  1127   WBC 16.1*   HGB 14.2   HCT 45.5        Recent Labs     09/27/22  1127      K 5.3*      CO2 24   *   BUN 16   CREA 2.57*   CA 9.4   ALB 3.9   ALT 43     No components found for: [place]  Recent Labs     09/27/22  1132   HCO3 26     No results for input(s): INR, INREXT in the last 72 hours. Imaging Studies:  all reviewed     I have also reviewed available old medical records.      Assessment & Impression:     Ms. Smitha Rothman is a 59 y.o. female being evaluated for:     Principal Problem:    Status epilepticus ([address] (9/27/2022)    Active Problems:    CHRIS (acute kidney injury) ([facility] [address] (9/27/2022)      Hyperkalemia (9/27/2022)      Lactic acidosis (9/27/2022)      Acute respiratory failure with hypoxia ([facility] [address] (9/27/2022)      Shock ([address] (9/27/2022)      Acute metabolic encephalopathy (5/22/7315)         Plan of management:    Acute metabolic encephalopathy (9/14/5754) POA: unclear etiology but very concerning for possible anoxic brain injury given unknown downtime and her seizures vs substance intoxication vs other. Admit to ICU. CT scan brain was neg for any acute changes. Will obtain a toxicology screen. TSH, an EEG and an MRi brain. Consult neurology and follow clinical progress    Status epilepticus ([address] (9/27/2022) POA: suspected given recurrent witnessed seizure activity. No prior hx of seizures. Work up as noted above. Continue sedation as per intensivist. Empiric IV Keppra. Follow cEEG    Shock ([address] (9/27/2022) POA: suspected sepsis although no known focus. DDx. Hypovolemia. Blood cultures have been drawn. Initial lactic acid was high. CT chest, abdomen and pelvis neg for any obvious focus. She may have aspirated. Check troponin. Echocardiogram. Continue IV fluids, IV Levophed and empiric IV Cefepime, Metronidazole and Vancomycin. Monitor clinical progress and cultures. CHRIS (acute kidney injury) ([facility] [address] (9/27/2022) / Hyperkalemia (9/27/2022) POA: likely has ATN from persistent hypotension vs volume loss. IV fluids. Monitor urine output and renal function. Consult nephrology    Lactic acidosis (9/27/2022) POA: severe. I suspect this could be from the seizure activity vs volume depletion vs sepsis of unclear etiology. Continue IV fluids, empiric IV antibiotics as above, IV Vasopressors and monitor lactic acid    Acute respiratory failure with hypoxia ([address] (9/27/2022) POA: intubated for airway protection given her encephalopathy.  Consult intensivist for vent management    Code Status:  Full    Surrogate decision maker: Family    Risk of deterioration: high      Total time spent for the care of the patient: [address] [facility] discussed with: Nursing Staff, ED physician and intensivist    Discussed:  Care Plan and D/C Planning    Prophylaxis:  SCD's    Probable Disposition:   TBD           ___________________________________________________    Attending Physician: Bonny Stevenson MD

## 2022-09-27 NOTE — ED PROVIDER NOTES
Chief Complaint   Patient presents with    Unresponsive     History and review of systems limited as the patient was unresponsive on arrival.    This is a 52-year-old female presenting by EMS after being found unresponsive at home. She works at a [employer] and had not shown up for work this morning. A neighbor was called to check in on her and she was found lying in bed unresponsive and CPR was initiated. On EMS arrival she was hypotensive to the 57V systolic and in respiratory arrest, they administered 4 mg intranasal Narcan without any sustained improvement in her mental status. Shortly afterwards she had reported seizure-like activity and so IV access was established and medics administered 4 mg IV Versed in the field, again without any improvement. Accu-Chek in the field was normal.  On my arrival she had a left sided gaze preference, was being manually ventilated by BVM, and hypertensive to the 987U systolic. Review of Systems   Unable to perform ROS: Mental status change       No past medical history on file. CORRECTION:  Obtained by me, elicited from family members, includes seasonal allergies    No past surgical history on file. CORRECTION:  Obtained by me, elicited from family members, includes previous cholecystectomy      No family history on file.  CORRECTION:  Obtained by me, elicited from family members, social history negative for tobacco or alcohol use    Social History     Socioeconomic History    Marital status:      Spouse name: Not on file    Number of children: Not on file    Years of education: Not on file    Highest education level: Not on file   Occupational History    Not on file   Tobacco Use    Smoking status: Not on file    Smokeless tobacco: Not on file   Substance and Sexual Activity    Alcohol use: Not on file    Drug use: Not on file    Sexual activity: Not on file   Other Topics Concern    Not on file   Social History Narrative    Not on file     Social Determinants of Health     Financial Resource Strain: Not on file   Food Insecurity: Not on file   Transportation Needs: Not on file   Physical Activity: Not on file   Stress: Not on file   Social Connections: Not on file   Intimate Partner Violence: Not on file   Housing Stability: Not on file         ALLERGIES: Patient has no allergy information on record. Vitals:    09/27/22 1119 09/27/22 1158 09/27/22 1233   BP: (!) 158/125     Pulse: (!) 154 (!) 142    Resp: 8 22    SpO2: 100% 98% 94%   Weight: 104.3 kg (230 lb)     Height: 5' 6\" (1.676 m)         Physical exam  General:  Obtunded with poor respiratory effort and sonorous respirations with BVM in place  HEENT:  NC/AT, equal pupils 3 mm and reactive with left-sided gaze deviation, mucous membranes are dry  Neck:   Normal inspection  Cardiac:  +Tachycardic, regular rhythm, no murmurs  Respiratory:  +Scattered rhonchi bilaterally with good aeration, poor effort  Abdomen:  Soft and obese  Extremities: No peripheral edema, there is a 20 gauge PIV in the left [facility]  Neuro:  +Myoclonic jerking in the upper and lower extremities suspicious for ongoing seizure, no purposeful movements prior to this event  Skin:   +There is a small patch of erythema well-circumscribed across the left medial leg      Recent Results (from the past 12 hour(s))   SAMPLES BEING HELD    Collection Time: 09/27/22 11:27 AM   Result Value Ref Range    SAMPLES BEING HELD 1RED,1BLUE     COMMENT        Add-on orders for these samples will be processed based on acceptable specimen integrity and analyte stability, which may vary by analyte.    CBC WITH AUTOMATED DIFF    Collection Time: 09/27/22 11:27 AM   Result Value Ref Range    WBC 16.1 (H) 3.6 - 11.0 K/uL    RBC 4.84 3.80 - 5.20 M/uL    HGB 14.2 11.5 - 16.0 g/dL    HCT 45.5 35.0 - 47.0 %    MCV 94.0 80.0 - 99.0 FL    MCH 29.3 26.0 - 34.0 PG    MCHC 31.2 30.0 - 36.5 g/dL    RDW 14.3 11.5 - 14.5 %    PLATELET 688 315 - 843 K/uL    MPV 10.8 8.9 - 12.9 FL    NRBC 0.0 0  WBC    ABSOLUTE NRBC 0.00 0.00 - 0.01 K/uL    NEUTROPHILS 82 (H) 32 - 75 %    LYMPHOCYTES 10 (L) 12 - 49 %    MONOCYTES 7 5 - 13 %    EOSINOPHILS 0 0 - 7 %    BASOPHILS 0 0 - 1 %    IMMATURE GRANULOCYTES 1 (H) 0.0 - 0.5 %    ABS. NEUTROPHILS 13.3 (H) 1.8 - 8.0 K/UL    ABS. LYMPHOCYTES 1.5 0.8 - 3.5 K/UL    ABS. MONOCYTES 1.1 (H) 0.0 - 1.0 K/UL    ABS. EOSINOPHILS 0.0 0.0 - 0.4 K/UL    ABS. BASOPHILS 0.1 0.0 - 0.1 K/UL    ABS. IMM. GRANS. 0.1 (H) 0.00 - 0.04 K/UL    DF AUTOMATED     METABOLIC PANEL, COMPREHENSIVE    Collection Time: 09/27/22 11:27 AM   Result Value Ref Range    Sodium 141 136 - 145 mmol/L    Potassium 5.3 (H) 3.5 - 5.1 mmol/L    Chloride 106 97 - 108 mmol/L    CO2 24 21 - 32 mmol/L    Anion gap 11 5 - 15 mmol/L    Glucose 141 (H) 65 - 100 mg/dL    BUN 16 6 - 20 MG/DL    Creatinine 2.57 (H) 0.55 - 1.02 MG/DL    BUN/Creatinine ratio 6 (L) 12 - 20      GFR est AA 23 (L) >60 ml/min/1.73m2    GFR est non-AA 19 (L) >60 ml/min/1.73m2    Calcium 9.4 8.5 - 10.1 MG/DL    Bilirubin, total 0.2 0.2 - 1.0 MG/DL    ALT (SGPT) 43 12 - 78 U/L    AST (SGOT) 56 (H) 15 - 37 U/L    Alk.  phosphatase 77 45 - 117 U/L    Protein, total 7.8 6.4 - 8.2 g/dL    Albumin 3.9 3.5 - 5.0 g/dL    Globulin 3.9 2.0 - 4.0 g/dL    A-G Ratio 1.0 (L) 1.1 - 2.2     BLOOD GAS,CHEM8,LACTIC ACID POC    Collection Time: 09/27/22 11:32 AM   Result Value Ref Range    Calcium, ionized (POC) 1.15 1.12 - 1.32 mmol/L    BICARBONATE 26 mmol/L    Base deficit (POC) 3.8 mmol/L    Sample source VENOUS BLOOD      CO2, POC 27 (H) 19 - 24 MMOL/L    Sodium,  136 - 145 MMOL/L    Potassium, POC 5.7 (H) 3.5 - 5.5 MMOL/L    Chloride,  100 - 108 MMOL/L    Glucose,  (H) 74 - 106 MG/DL    Creatinine, POC 2.4 (H) 0.6 - 1.3 MG/DL    Lactic Acid (POC) 6.46 (HH) 0.40 - 2.00 mmol/L    Critical value read back HORN     pH, venous (POC) 7.21 (L) 7.32 - 7.42      pCO2, venous (POC) 65.8 (H) 41 - 51 MMHG    pO2, venous (POC) 49 (H) 25 - 40 mmHg      XR CHEST PORT    Result Date: 9/27/2022  Borderline high position of endotracheal tube High position of the NG tube. Distended stomach. CT CODE NEURO HEAD WO CONTRAST    Result Date: 9/27/2022  No acute intracranial process seen      Critical Care  Performed by: Chao Boyce MD  Authorized by: Chao Boyce MD     Critical care provider statement:     Critical care time (minutes):  45    Critical care time was exclusive of:  Separately billable procedures and treating other patients    Critical care was necessary to treat or prevent imminent or life-threatening deterioration of the following conditions:  Respiratory failure (status epilepticus)    Critical care was time spent personally by me on the following activities:  Discussions with consultants, evaluation of patient's response to treatment, examination of patient, ordering and performing treatments and interventions, ordering and review of laboratory studies, ordering and review of radiographic studies, pulse oximetry, re-evaluation of patient's condition and obtaining history from patient or surrogate  Intubation    Date/Time: 9/27/2022 12:35 PM  Performed by: Chao Boyce MD  Authorized by: Chao Boyce MD     Consent:     Consent obtained:  Emergent situation  Pre-procedure details:     Indication: failure to protect airway      Patient status:  Unresponsive    Pharmacologic strategy: RSI      Induction agents:  Etomidate (20 mg IV)    Paralytics:  Succinylcholine (140 mg IV)  Procedure details:     Preoxygenation:  Bag valve mask    Intubation technique: video assisted      Laryngoscope blade:   Mac 3    Bougie used: yes      Tube size (mm):  7.5    Tube type:  Cuffed    Number of attempts:  2    Ventilation between attempts: yes with mask      Tube visualized through cords: yes    Placement assessment:     ETT at teeth/gumline (cm):  22    Tube secured with:  ETT burgos    Breath sounds:  Equal    Placement verification: chest rise, CXR verification and equal breath sounds      CXR findings:  ETT in proper place  Post-procedure details:     Procedure completion:  Tolerated well, no immediate complications     EKG as interpreted by me: Sinus tachycardia at a rate of 151, normal axis, normal QRS interval, grossly no ST or T wave changes suggesting acute ischemia. ED course: Labs, EKG and imaging reviewed. She was found unresponsive today at home, manually ventilated on arrival.  She had a generalized seizure per EMS which did not resolve after Versed was administered in the field, again had a generalized seizure and left sided gaze preference on arrival necessitating intubation for airway protection. Her arterial gas indicated significant hypercarbia as well. Propofol infusion started with fentanyl for analgesia. I loaded her with 1 g Keppra as well. CT head without evidence of hemorrhage. Given concern for possible aspiration I've ordered IV Zosyn to cover for aspiration pneumonia. I've ordered a CT chest/abdomen/pelvis and consulted the ICU. The ET tube and OG tube were both advanced after I reviewed her films. I spoke with family at the bedside (her sister, mother via phone, and partner) and aside from seasonal allergies they did not know of any significant past medical history. They informed me that she does not take any medications on a daily basis, and yesterday had been in her normal state of health, she had dinner with her boyfriend at home, and had a normal evening. Based on the available information provided and diagnostics so far, it seems that she had a primary respiratory arrest but the etiology is equivocal.  Unclear if there may have been a pharmacologic component. For now she will remain intubated and sedated, would appreciate additional input from our ICU team and our neurology team regarding continued management. I've consulted the hospitalist for admission as well.       Hospitalist Perfect Serve for Admission  12:30 PM    ED Room Number:   ER03/03  Patient Name and age:  Lavern Swift 59 y.o.  female  Working Diagnosis:     1. Status epilepticus ([address]    2. Acute respiratory failure with hypoxia (HCC)    3. Acute renal insufficiency      COVID suspicion:   no  Code Status:    Full Code  Readmission:    no  Isolation Requirements:  no  Recommended Level of Care: ICU  Department:    [address] [place] [address] ED - [phone]  Other:     Found unresponsive today at home, manually ventilated on arrival.  Generalized seizure per EMS which did not resolve after Versed was administered in the field, again had a generalized seizure and left sided gaze preference on arrival necessitating intubation for airway protection. Propofol infusion started with fentanyl for analgesia. I loaded her with 1 g Keppra as well. CT head without evidence of hemorrhage. Given concern for possible aspiration I've ordered IV Zosyn to cover for aspiration pneumonia. I've ordered a CT chest/abdomen/pelvis and consulted the ICU.

## 2022-09-27 NOTE — PROGRESS NOTES
[address] [address] [facility] Pharmacy Progress Note: Antimicrobial Stewardship    Consult for antibiotic dosing of vancomycin by Dr. Sumeet Cardozo, NP  Indication: VAP, possible aspiration PNA  Day of Therapy: 1    Plan:  Vancomycin therapy:  Start with loading dose of vancomycin 2500 mg IV (25 mg/kg, max 2.5 gm) x 1 now  Pharmacy to dose by level d/t SCr 2.57, unknown baseline   Dose calculated to approximate a   Target AUC/GARTH of 400-600  Trough of 15-20 mcg/mL. Plan:  SCr ordered every other day per protocol. Consider level in 24-48 hours (not yet ordered). Consider scheduled dosing if renal function improves. Patient has been entered into [facility]. Of note, unable to obtain patient's allergies at this time as patient is unresponsive and there is no family present currently. Pharmacy to follow daily and will make changes to dose and/or frequency based on clinical status. Other Antimicrobial  (not dosed by pharmacist)   Zosyn 4.5 gm IV x 1 given 9/27 @ 1303 in ED   Cultures     pending   Serum Creatinine     Lab Results   Component Value Date/Time    Creatinine 2.57 (H) 09/27/2022 11:27 AM    Creatinine, POC 2.4 (H) 09/27/2022 11:32 AM       Creatinine Clearance Estimated Creatinine Clearance: 28.8 mL/min (A) (based on SCr of 2.57 mg/dL (H)). Procalcitonin  No results found for: PCT     Temp   (!) 101.5 °F (38.6 °C)    WBC   Lab Results   Component Value Date/Time    WBC 16.1 (H) 09/27/2022 11:27 AM       For Antifungals, Metronidazole and Nafcillin: Lab Results   Component Value Date/Time    ALT (SGPT) 43 09/27/2022 11:27 AM    AST (SGOT) 56 (H) 09/27/2022 11:27 AM    Alk.  phosphatase 77 09/27/2022 11:27 AM    Bilirubin, total 0.2 09/27/2022 11:27 AM         Pharmacist: Signed [address] Hollis Pun

## 2022-09-27 NOTE — PROGRESS NOTES
Follow up visit for ER 3 for support and initial family meeting. Carol's sister Cata Rollins [phone] was present. Cata Rollins shared that Peri Gomez is a  with 3 adult son: [facility]); Luisa [facility]); Klever Prince ([place]) and 2 grandchildren. Both of Carol's parents are living and they are presently driving back from [place]. Family member should be arriving to the hospital by this evening. BENJI Buckley met with Karissa(sister); Lona Ramos (co-worker); and Carol's mom who was on the phone. BENJI Buckley provided a current medical status given. All questions and concerns were addressed. Family informed that Peri Gomez would be taken to ICU. Carol's youngest son Klever Prince and brother Pk Bailey arrived shortly after the meeting. Visited by: Andrew Magana.  Wyatt Barlow, [address] [place] [address] Road paging Service [phone] ([phone])

## 2022-09-27 NOTE — CONSULTS
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 She was LKW the evening of . She did not report to work the am of  and she was found unresponsive at her home on wellness check. Bystander CPR was started, but she did have a pulse. EMS was called. Upon their arrival, SBP was 70s. Pupils were pinpoint, narcan was administered without improvement. She experienced seizure like activity and received 4mg IV versed. Upon arrival to the ED, she exhibited seizure like activity and L sided gaze preference. She required intubation for hypoxic respiratory failure. She was sedated on propofol. She became hypotensive and was started on levophed. LA 6.46. She was admitted to the ICU. She was started on empiric vanc/zosyn. Blood/sputum cultures ordered. CT head unrevealing. CT chest notable for only bibasilar atelectasis, cannot exclude small amount aspiration in lower lobes. CTAP unrevealing. Central line placed at bedside in ICU. She was noted to exhibit profound hypoxia. 7.33/44/73 on 100% FiO2 - not consistent with ARDS due to absence of bilateral infiltrates. Active Problem List:     Problem List  Date Reviewed: 2022            Codes Class    * (Principal) Status epilepticus ([facility] ICD-10-CM: G40.901  ICD-9-CM: 388. 3         CHRIS (acute kidney injury) ([facility] ICD-10-CM: N17.9  ICD-9-CM: 584.9         Hyperkalemia ICD-10-CM: E87.5  ICD-9-CM: 276.7         Lactic acidosis ICD-10-CM: E87.2  ICD-9-CM: 276.2         Acute respiratory failure with hypoxia (HCC) ICD-10-CM: J96.01  ICD-9-CM: 518.81         Shock ([facility] ICD-10-CM: R57.9  ICD-9-CM: 785.50         Acute metabolic encephalopathy DWW-59-LM: G93.41  ICD-9-CM: 348.31            Past Medical History:      has no past medical history on file.    Past Surgical History:      has no past surgical history on file. Home Medications:     Prior to Admission medications    Not on File       Allergies/Social/Family History:     Not on File   Social History     Tobacco Use    Smoking status: Not on file    Smokeless tobacco: Not on file   Substance Use Topics    Alcohol use: Not on file      No family history on file. Review of Systems:     Unable to obtain    Objective:   Vital Signs:  Visit Vitals  BP (!) 115/59   Pulse (!) 112   Temp (!) 101.5 °F (38.6 °C)   Resp 18   Ht 5' 6\" (1.676 m)   Wt 104.3 kg (230 lb)   SpO2 96%   BMI 37.12 kg/m²    O2 Flow Rate (L/min): 60 l/min O2 Device: Ventilator Temp (24hrs), Av.4 °F (38.6 °C), Min:101.3 °F (38.5 °C), Max:101.5 °F (38.6 °C)           Intake/Output:   No intake or output data in the 24 hours ending 22 1507    Physical Exam:    General:  Sedated/intubated  Eye:  PERRLA; eyes midline at the time of exam; intermittent L sided gaze preference noted  Neurologic:  Sedated; intubated; nonfocal exam; withdraws to noxious stimuli; grimaces to noxious stimuli; CEDILLO equally; does not follow commands; + gag, + cough; + blink to threat  Neck: Supple, no JVD  Lungs:  CTAB  Heart:  RRR, no MRG, 2+ pulses, no edema  Abdomen:  Distended, mildly firm, no tenderness, no rebound, no guarding  Skin:  c/d/i    LABS AND  DATA: Personally reviewed  Recent Labs     22  1127   WBC 16.1*   HGB 14.2   HCT 45.5        Recent Labs     22  1127      K 5.3*      CO2 24   BUN 16   CREA 2.57*   *   CA 9.4     Recent Labs     22  1127   AP 77   TP 7.8   ALB 3.9   GLOB 3.9     No results for input(s): INR, PTP, APTT, INREXT in the last 72 hours. Recent Labs     22  1243   PHI 7.33*   PCO2I 44.4   PO2I 73*   FIO2I 100     No results for input(s): CPK, CKMB, TROIQ, BNPP in the last 72 hours.     H  Ventilator Settings:  Mode Rate Tidal Volume Pressure FiO2 PEEP   Assist control 400 ml    100 % 8 cm H20     Peak airway pressure: 22 cm H2O    Minute ventilation: 9.3 l/min      ABG on above 7.33/44/33    MEDS: Reviewed    Chest X-Ray: personally reviewed and report checked    TTE: Pending    Assessment and Plan     Shock c/b lactic acidosis: Febrile, elevated WBC. U/A notable for bacteria and nitrites. Would not be useful to send procal given renal injury. CT chest and abdomen unrevealing. Hypoxia out of proportion to other findings. R heart does not appear enlarged on prelim TTE. LA improved to 3. Hepatic panel unrevealing.  - Vanc/cefepime/flagyl   - Urine/sputum/blood cultures  - Urine strep/legionella pending  - Levo/vaso for MAP >65    Acute hypoxic respiratory failure: Out of proportion to clinical findings. CT chest notable for only small bibasilar atelectasis w possible aspiration.    - RVP pending  - Sputum culture pending  - Broad spectrum antibiotics as above  - TTE ordered to exclude cardiac component  - Trop/BNP ordered    CHRIS c/b hyperkalemia: Likely prerenal +/- ATN  - IVF resuscitation  - HyperK protocol  - Repeat BMP ordered    Acute toxic metabolic encephalopathy: Broad ddx. Concern for seizure activity, though Cerebell unrevealing for seizure. - Continue empiric scheduled keppra  - Continuous EEG  - Neurology following, appreciate recs  - MRI ordered, spoke with neurology and prioritized EEG due to concerned for possible SE  - UDS  - ETOH, tylenol, salicylates  - Fent patch found on patient; this was removed, will ask pharmacy to query  in am; family unaware if this was prescribed    Multidisciplinary Rounds Completed:  N, admitted in afternoon    ABCDEF Bundle/Checklist  Pain Medications: Y, fent  Target RASS: -5  Sedation Medications: Fent/prop  CAM-ICU:  FAVIOLA  Mobility: Poor  PT/OT: Unable  Restraints: Y  Discussed Plan of Care (goals of care):  F  Addressed Code Status: Y    CARDIOVASCULAR  Cardiac Gtts: Levo/vaso  SBP Goal of: > 90 mmHg  MAP Goal of: > 65 mmHg  Transfusion Trigger (Hgb): <7 g/dL    RESPIRATORY  Vent Goals:   Chlorhexidine   DVT Prophylaxis (if no, list reason): Heparin   SPO2 Goal: > 92%  Pulmonary toilet: Duo-Nebs     GI/  Sal Catheter Present: Yes  GI Prophylaxis: Protonix (pantoprazole)   Nutrition: Pending NST consult  IVFs: Y  Bowel Movement: N  Bowel Regimen: Y  Insulin: Y    ANTIBIOTICS  Antibiotics:  Y    T/L/D  Tubes: Y  Lines: Y  Drains: Y    SPECIAL EQUIPMENT  N    DISPOSITION  ICU    CRITICAL CARE CONSULTANT NOTE  I had a face to face encounter with the patient, reviewed and interpreted patient data including clinical events, labs, images, vital signs, I/O's, and examined patient. I have discussed the case and the plan and management of the patient's care with the consulting services, the bedside nurses and the respiratory therapist.      NOTE OF PERSONAL INVOLVEMENT IN CARE   This patient has a high probability of imminent, clinically significant deterioration, which requires the highest level of preparedness to intervene urgently. I participated in the decision-making and personally managed or directed the management of the following life and organ supporting interventions that required my frequent assessment to treat or prevent imminent deterioration. I have spent approximately 55 minutes updating family, discussing plan with family and taking history from family. I personally spent 120 minutes of critical care time. This is time spent at this critically ill patient's bedside actively involved in patient care as well as the coordination of care and discussions with the patient's family. This does not include any procedural time which has been billed separately. Roula Mcwilliams NP  [facility]  9/27/2022        ICU Attending Note:  A 58 yo female admitted with shock, acute resp failure, CHRIS and Encephalopathy  Echo results noted  Plan:Pressors. Check ScVO2,Consider Inotropes. Trend lactic acid. Abx.  Follow Neurology recs. Cardiology and Nephrology consult. Heparin gtt for possible NSTEMI. Tox screen. Prognosis is very guarded    Case Discussed with Jessica PERALTA)

## 2022-09-27 NOTE — PROGRESS NOTES
Patient intubated ETT burgos and suction dated 10/01/2022 per protocol will change once that date approaches. 09/27/22 1158   Patient Observations   Pulse (Heart Rate) (!) 142   Resp Rate 22   O2 Sat (%) 98 %   [REMOVED] Airway - Endotracheal Tube 09/27/22 Oral   Removal Date/Time: 09/27/22 1126  Placement Date/Time: 09/27/22 1124   Number of Attempts: 1  Inserted By: Sarah Lopez MD  Present on Admission/Arrival: No  Location: Oral  Placement Verified: Auscultation;EtCO2  Airway Types: Endotracheal, cuffed  Airway . .. Insertion Depth (cm) 21 cm   Line Yordy Teeth   Side Secured Centered;Device   Cuff Pressure 35 cmH20   Site Assessment Clean, dry, & intact   Airway - Endotracheal Tube 09/27/22 Oral   Placement Date/Time: 09/27/22 1131   Number of Attempts: 2  Inserted By: lesa teran  Present on Admission/Arrival: No  Location: Oral  Airway Types: Endotracheal, cuffed  Airway Tube Size: 7.5 mm   Insertion Depth (cm) 21 cm   Line Yordy Teeth   Side Secured Device; Centered   Cuff Pressure 35 cmH20   Site Assessment Clean, dry, & intact   Respiratory   Respiratory (WDL) X   Patient on Vent Yes - If patient is on vent, add Doc Flowsheet Ventilator ().    Respiratory Pattern Regular   Upper Airway Sounds Other (comment)  (No abnormalites)   Chest/Tracheal Assessment Chest expansion, symmetrical   Breath Sounds Bilateral Clear;Diminished   Breath Sounds Left Clear;Diminished   Breath Sounds Right Clear;Diminished   Cough Cough with suction   Airway Clearance   Suction ET Tube   Sputum Method Obtained Endotracheal   Ventilator Initiate/Discontinue   Ventilator Initiate Yes   Bio-Med ID # [id number]   Vent Settings   FIO2 (%) 100 %   SpO2/FIO2 Ratio 98   CMV Rate Set 16   Back-Up Rate 16   Vt Set (ml) 400 ml   PEEP/VENT (cm H2O) 8 cm H20   I:E Ratio 1:2.4   Insp Flow (l/min) 60 l/min   Flow Trigger 3   Ventilator Measurements   Resp Rate Observed 22   Vt Exhaled (Machine Breath) (ml) 394 ml   Ve Observed (l/min) 9.3 l/min PIP Observed (cm H2O) 22 cm H2O   Plateau Pressure (cm H2O) 19 cm H2O   Driving Pressure 11 WJC8H   MAP (cm H2O) 11.2   I:E Ratio Actual 1:3.1   Auto PEEP Observed (cm H2O) 0 cm H2O   Dynamic Compliance (ml/cm H20) 39 ml/cm H20   Static Compliance (ml/cm H20) 0 ml/cm H20   Raw (cmH2O/L/s) 15 (cmH2O/L/s)   Safety & Alarms   Circuit Temperature 98.4 °F (36.9 °C)   Backup Mode Checked/Apnea Yes   Pressure Max 40 cm H2O   Ve Min 2   Ve Max 22   Vt Min 200 ml   Vt Max 900 ml   RR Min 16   RR Max 40   Ambu Bag Yes   Ambu Mask Yes   Oxygen Therapy   Skin Assessment Clean, dry, & intact   Skin Protection for O2 Device Yes   Orientation Bilateral   Location Cheek   Interventions Skin Barrier   Airway Procedures   $$ Airway Procedures Intubation   Vent Method/Mode   Ventilation Method Conventional   Ventilator Mode Assist control   Ventilator Mode ID [id number]   $$ Ventilator Initial Initial Vent Day

## 2022-09-27 NOTE — PROGRESS NOTES
Spiritual Care Assessment/Progress Note  [address]      NAME: Tayler Ortiz      MRN: [id number]  AGE: 59 y.o. SEX: female  Sabianism Affiliation: Presybeterian   Language: English     9/27/2022     Total Time (in minutes): 24     Spiritual Assessment begun in [facility] EMERGENCY DEPT through conversation with:         []Patient        [x] Family    [] Friend(s)        Reason for Consult: Emergency Department visit, Crisis     Spiritual beliefs: (Please include comment if needed)     [] Identifies with a grecia tradition:         [] Supported by a grecia community:            [] Claims no spiritual orientation:           [] Seeking spiritual identity:                [] Adheres to an individual form of spirituality:           [x] Not able to assess:                           Identified resources for coping:      [] Prayer                               [] Music                  [] Guided Imagery     [x] Family/friends                 [] Pet visits     [] Devotional reading                         [x] Unknown     [] Other:                                               Interventions offered during this visit: (See comments for more details)          Family/Friend(s):  Affirmation of grecia, Affirmation of emotions/emotional suffering, Coping skills reviewed/reinforced, Initial Assessment     Plan of Care:     [x] Support spiritual and/or cultural needs    [] Support AMD and/or advance care planning process      [x] Support grieving process   [] Coordinate Rites and/or Rituals    [] Coordination with community clergy   [] No spiritual needs identified at this time   [] Detailed Plan of Care below (See Comments)  [] Make referral to Music Therapy  [] Make referral to Pet Therapy     [] Make referral to Addiction services  [] Make referral to [facility]  [] Make referral to Spiritual Care Partner  [] No future visits requested        [] Contact Spiritual Care for further referrals     Comments: Pager response for support. Collaborated with nursing supervisor who requested support for Jake Patricia co-worker. Bryce Solomon came to be with Refugio Baker until her family members arrived from numerous location. Explored spiritual, relational, and emotional needs;   facilitated anxiety containment through conversation; provided ministry of presence, empathic listening, hospitality, and cultivated a relationship of compassion, care and support. Visited by: Lars Callejas.  Simon Knapp, [address] [place] [address] paging Service [phone]

## 2022-09-28 ENCOUNTER — APPOINTMENT (OUTPATIENT)
Dept: MRI IMAGING | Age: 57
DRG: 064 | End: 2022-09-28
Attending: NURSE PRACTITIONER
Payer: COMMERCIAL

## 2022-09-28 PROBLEM — E03.9 HYPOTHYROIDISM: Status: ACTIVE | Noted: 2022-09-28

## 2022-09-28 PROBLEM — I21.4 NSTEMI (NON-ST ELEVATED MYOCARDIAL INFARCTION) (HCC): Status: ACTIVE | Noted: 2022-09-28

## 2022-09-28 PROBLEM — I42.9 CARDIOMYOPATHY (HCC): Status: ACTIVE | Noted: 2022-09-28

## 2022-09-28 PROBLEM — F41.1 GAD (GENERALIZED ANXIETY DISORDER): Status: ACTIVE | Noted: 2022-09-28

## 2022-09-28 PROBLEM — E87.5 HYPERKALEMIA: Status: RESOLVED | Noted: 2022-09-27 | Resolved: 2022-09-28

## 2022-09-28 PROBLEM — E78.5 DYSLIPIDEMIA: Status: ACTIVE | Noted: 2022-09-28

## 2022-09-28 LAB
ALBUMIN SERPL-MCNC: 2.9 G/DL (ref 3.5–5)
ALBUMIN/GLOB SERPL: 1 {RATIO} (ref 1.1–2.2)
ALP SERPL-CCNC: 58 U/L (ref 45–117)
ALT SERPL-CCNC: 59 U/L (ref 12–78)
AMMONIA PLAS-SCNC: 19 UMOL/L
ANION GAP SERPL CALC-SCNC: 4 MMOL/L (ref 5–15)
ANION GAP SERPL CALC-SCNC: 9 MMOL/L (ref 5–15)
APTT PPP: 37.6 SEC (ref 22.1–31)
APTT PPP: 41.3 SEC (ref 22.1–31)
APTT PPP: 49.7 SEC (ref 22.1–31)
APTT PPP: 52.2 SEC (ref 22.1–31)
AST SERPL-CCNC: 99 U/L (ref 15–37)
BASE DEFICIT BLD-SCNC: 1 MMOL/L
BASOPHILS # BLD: 0 K/UL (ref 0–0.1)
BASOPHILS NFR BLD: 0 % (ref 0–1)
BILIRUB SERPL-MCNC: 0.4 MG/DL (ref 0.2–1)
BUN SERPL-MCNC: 11 MG/DL (ref 6–20)
BUN SERPL-MCNC: 15 MG/DL (ref 6–20)
BUN/CREAT SERPL: 11 (ref 12–20)
BUN/CREAT SERPL: 11 (ref 12–20)
CALCIUM SERPL-MCNC: 8.1 MG/DL (ref 8.5–10.1)
CALCIUM SERPL-MCNC: 8.7 MG/DL (ref 8.5–10.1)
CHLORIDE SERPL-SCNC: 111 MMOL/L (ref 97–108)
CHLORIDE SERPL-SCNC: 117 MMOL/L (ref 97–108)
CO2 SERPL-SCNC: 23 MMOL/L (ref 21–32)
CO2 SERPL-SCNC: 25 MMOL/L (ref 21–32)
CREAT SERPL-MCNC: 0.98 MG/DL (ref 0.55–1.02)
CREAT SERPL-MCNC: 1.31 MG/DL (ref 0.55–1.02)
DIFFERENTIAL METHOD BLD: ABNORMAL
EOSINOPHIL # BLD: 0 K/UL (ref 0–0.4)
EOSINOPHIL NFR BLD: 0 % (ref 0–7)
ERYTHROCYTE [DISTWIDTH] IN BLOOD BY AUTOMATED COUNT: 14.2 % (ref 11.5–14.5)
FLUID CULTURE, SPNG2: NORMAL
FOLATE SERPL-MCNC: 14.1 NG/ML (ref 5–21)
GAS FLOW.O2 O2 DELIVERY SYS: ABNORMAL L/MIN
GAS FLOW.O2 SETTING OXYMISER: 16 BPM
GLOBULIN SER CALC-MCNC: 2.8 G/DL (ref 2–4)
GLUCOSE BLD STRIP.AUTO-MCNC: 123 MG/DL (ref 65–117)
GLUCOSE BLD STRIP.AUTO-MCNC: 137 MG/DL (ref 65–117)
GLUCOSE SERPL-MCNC: 131 MG/DL (ref 65–100)
GLUCOSE SERPL-MCNC: 182 MG/DL (ref 65–100)
HCO3 BLD-SCNC: 23.1 MMOL/L (ref 22–26)
HCT VFR BLD AUTO: 37.3 % (ref 35–47)
HGB BLD-MCNC: 12 G/DL (ref 11.5–16)
IMM GRANULOCYTES # BLD AUTO: 0 K/UL (ref 0–0.04)
IMM GRANULOCYTES NFR BLD AUTO: 0 % (ref 0–0.5)
L PNEUMO1 AG UR QL IA: NEGATIVE
LACTATE SERPL-SCNC: 1.4 MMOL/L (ref 0.4–2)
LYMPHOCYTES # BLD: 1.9 K/UL (ref 0.8–3.5)
LYMPHOCYTES NFR BLD: 15 % (ref 12–49)
MAGNESIUM SERPL-MCNC: 2.1 MG/DL (ref 1.6–2.4)
MCH RBC QN AUTO: 28.9 PG (ref 26–34)
MCHC RBC AUTO-ENTMCNC: 32.2 G/DL (ref 30–36.5)
MCV RBC AUTO: 89.9 FL (ref 80–99)
MONOCYTES # BLD: 1 K/UL (ref 0–1)
MONOCYTES NFR BLD: 8 % (ref 5–13)
NEUTS SEG # BLD: 9.6 K/UL (ref 1.8–8)
NEUTS SEG NFR BLD: 77 % (ref 32–75)
NRBC # BLD: 0 K/UL (ref 0–0.01)
NRBC BLD-RTO: 0 PER 100 WBC
O2/TOTAL GAS SETTING VFR VENT: 40 %
ORGANISM ID, SPNG3: NORMAL
PCO2 BLD: 35.8 MMHG (ref 35–45)
PEEP RESPIRATORY: 8 CMH2O
PH BLD: 7.42 [PH] (ref 7.35–7.45)
PHOSPHATE SERPL-MCNC: 2.3 MG/DL (ref 2.6–4.7)
PLATELET # BLD AUTO: 280 K/UL (ref 150–400)
PLEASE NOTE, SPNG4: NORMAL
PMV BLD AUTO: 11 FL (ref 8.9–12.9)
PO2 BLD: 96 MMHG (ref 80–100)
POTASSIUM SERPL-SCNC: 4 MMOL/L (ref 3.5–5.1)
POTASSIUM SERPL-SCNC: 4.1 MMOL/L (ref 3.5–5.1)
PROT SERPL-MCNC: 5.7 G/DL (ref 6.4–8.2)
RBC # BLD AUTO: 4.15 M/UL (ref 3.8–5.2)
S PNEUM AG SPEC QL LA: NEGATIVE
SAO2 % BLD: 97.7 % (ref 92–97)
SERVICE CMNT-IMP: ABNORMAL
SERVICE CMNT-IMP: ABNORMAL
SODIUM SERPL-SCNC: 143 MMOL/L (ref 136–145)
SODIUM SERPL-SCNC: 146 MMOL/L (ref 136–145)
SPECIMEN SOURCE: NORMAL
SPECIMEN SOURCE: NORMAL
SPECIMEN TYPE: ABNORMAL
SPECIMEN, SPNG1: NORMAL
T3FREE SERPL-MCNC: 2 PG/ML (ref 2.2–4)
T4 FREE SERPL-MCNC: 1 NG/DL (ref 0.8–1.5)
THERAPEUTIC RANGE,PTTT: ABNORMAL SECS (ref 58–77)
TROPONIN-HIGH SENSITIVITY: 4370 NG/L (ref 0–51)
TROPONIN-HIGH SENSITIVITY: 6747 NG/L (ref 0–51)
TROPONIN-HIGH SENSITIVITY: 7423 NG/L (ref 0–51)
TSH SERPL DL<=0.05 MIU/L-ACNC: 0.23 UIU/ML (ref 0.36–3.74)
VANCOMYCIN SERPL-MCNC: 9.6 UG/ML
VENTILATION MODE VENT: ABNORMAL
VIT B12 SERPL-MCNC: 280 PG/ML (ref 193–986)
VT SETTING VENT: 400 ML
WBC # BLD AUTO: 12.5 K/UL (ref 3.6–11)

## 2022-09-28 PROCEDURE — 65610000006 HC RM INTENSIVE CARE

## 2022-09-28 PROCEDURE — 77030029065 HC DRSG HEMO QCLOT ZMED -B

## 2022-09-28 PROCEDURE — 95717 EEG PHYS/QHP 2-12 HR W/O VID: CPT | Performed by: PSYCHIATRY & NEUROLOGY

## 2022-09-28 PROCEDURE — 85730 THROMBOPLASTIN TIME PARTIAL: CPT

## 2022-09-28 PROCEDURE — 84439 ASSAY OF FREE THYROXINE: CPT

## 2022-09-28 PROCEDURE — 84100 ASSAY OF PHOSPHORUS: CPT

## 2022-09-28 PROCEDURE — 74011250636 HC RX REV CODE- 250/636: Performed by: STUDENT IN AN ORGANIZED HEALTH CARE EDUCATION/TRAINING PROGRAM

## 2022-09-28 PROCEDURE — 84443 ASSAY THYROID STIM HORMONE: CPT

## 2022-09-28 PROCEDURE — 77010033678 HC OXYGEN DAILY

## 2022-09-28 PROCEDURE — 70551 MRI BRAIN STEM W/O DYE: CPT

## 2022-09-28 PROCEDURE — 82962 GLUCOSE BLOOD TEST: CPT

## 2022-09-28 PROCEDURE — 74011250637 HC RX REV CODE- 250/637: Performed by: NURSE PRACTITIONER

## 2022-09-28 PROCEDURE — 94003 VENT MGMT INPAT SUBQ DAY: CPT

## 2022-09-28 PROCEDURE — 36415 COLL VENOUS BLD VENIPUNCTURE: CPT

## 2022-09-28 PROCEDURE — 85025 COMPLETE CBC W/AUTO DIFF WBC: CPT

## 2022-09-28 PROCEDURE — 74011250636 HC RX REV CODE- 250/636: Performed by: NURSE PRACTITIONER

## 2022-09-28 PROCEDURE — 83735 ASSAY OF MAGNESIUM: CPT

## 2022-09-28 PROCEDURE — 84481 FREE ASSAY (FT-3): CPT

## 2022-09-28 PROCEDURE — 82803 BLOOD GASES ANY COMBINATION: CPT

## 2022-09-28 PROCEDURE — 84484 ASSAY OF TROPONIN QUANT: CPT

## 2022-09-28 PROCEDURE — 93005 ELECTROCARDIOGRAM TRACING: CPT

## 2022-09-28 PROCEDURE — 74011000250 HC RX REV CODE- 250: Performed by: NURSE PRACTITIONER

## 2022-09-28 PROCEDURE — 80202 ASSAY OF VANCOMYCIN: CPT

## 2022-09-28 PROCEDURE — 2709999900 HC NON-CHARGEABLE SUPPLY

## 2022-09-28 PROCEDURE — 82607 VITAMIN B-12: CPT

## 2022-09-28 PROCEDURE — 82140 ASSAY OF AMMONIA: CPT

## 2022-09-28 PROCEDURE — 83605 ASSAY OF LACTIC ACID: CPT

## 2022-09-28 PROCEDURE — 99223 1ST HOSP IP/OBS HIGH 75: CPT | Performed by: PSYCHIATRY & NEUROLOGY

## 2022-09-28 PROCEDURE — 74011000250 HC RX REV CODE- 250: Performed by: INTERNAL MEDICINE

## 2022-09-28 PROCEDURE — 80053 COMPREHEN METABOLIC PANEL: CPT

## 2022-09-28 PROCEDURE — 74011000258 HC RX REV CODE- 258: Performed by: NURSE PRACTITIONER

## 2022-09-28 PROCEDURE — 74011250636 HC RX REV CODE- 250/636: Performed by: INTERNAL MEDICINE

## 2022-09-28 PROCEDURE — 82746 ASSAY OF FOLIC ACID SERUM: CPT

## 2022-09-28 RX ORDER — HEPARIN SODIUM 1000 [USP'U]/ML
2000 INJECTION, SOLUTION INTRAVENOUS; SUBCUTANEOUS ONCE
Status: COMPLETED | OUTPATIENT
Start: 2022-09-28 | End: 2022-09-28

## 2022-09-28 RX ORDER — MAGNESIUM SULFATE 100 %
4 CRYSTALS MISCELLANEOUS AS NEEDED
Status: DISCONTINUED | OUTPATIENT
Start: 2022-09-28 | End: 2022-10-05

## 2022-09-28 RX ORDER — GUAIFENESIN 100 MG/5ML
81 LIQUID (ML) ORAL DAILY
Status: DISCONTINUED | OUTPATIENT
Start: 2022-09-28 | End: 2022-09-29

## 2022-09-28 RX ORDER — DEXTROSE MONOHYDRATE 100 MG/ML
0-250 INJECTION, SOLUTION INTRAVENOUS AS NEEDED
Status: DISCONTINUED | OUTPATIENT
Start: 2022-09-28 | End: 2022-10-05

## 2022-09-28 RX ORDER — HEPARIN SODIUM 1000 [USP'U]/ML
4000 INJECTION, SOLUTION INTRAVENOUS; SUBCUTANEOUS ONCE
Status: COMPLETED | OUTPATIENT
Start: 2022-09-29 | End: 2022-09-28

## 2022-09-28 RX ORDER — INSULIN LISPRO 100 [IU]/ML
INJECTION, SOLUTION INTRAVENOUS; SUBCUTANEOUS EVERY 6 HOURS
Status: DISCONTINUED | OUTPATIENT
Start: 2022-09-28 | End: 2022-10-05

## 2022-09-28 RX ADMIN — Medication 10 ML: at 06:00

## 2022-09-28 RX ADMIN — FENTANYL CITRATE 100 MCG/HR: 50 INJECTION, SOLUTION INTRAMUSCULAR; INTRAVENOUS at 04:05

## 2022-09-28 RX ADMIN — PROPOFOL 20 MCG/KG/MIN: 10 INJECTION, EMULSION INTRAVENOUS at 20:10

## 2022-09-28 RX ADMIN — SODIUM CHLORIDE 100 ML/HR: 9 INJECTION, SOLUTION INTRAVENOUS at 08:03

## 2022-09-28 RX ADMIN — LEVETIRACETAM 500 MG: 100 INJECTION INTRAVENOUS at 12:09

## 2022-09-28 RX ADMIN — PROPOFOL 20 MCG/KG/MIN: 10 INJECTION, EMULSION INTRAVENOUS at 16:55

## 2022-09-28 RX ADMIN — HYDROCORTISONE SODIUM SUCCINATE 50 MG: 100 INJECTION, POWDER, FOR SOLUTION INTRAMUSCULAR; INTRAVENOUS at 12:09

## 2022-09-28 RX ADMIN — CEFEPIME 2 G: 2 INJECTION, POWDER, FOR SOLUTION INTRAVENOUS at 17:51

## 2022-09-28 RX ADMIN — HYDROCORTISONE SODIUM SUCCINATE 50 MG: 100 INJECTION, POWDER, FOR SOLUTION INTRAMUSCULAR; INTRAVENOUS at 23:40

## 2022-09-28 RX ADMIN — Medication 10 ML: at 13:08

## 2022-09-28 RX ADMIN — HEPARIN SODIUM 2000 UNITS: 1000 INJECTION INTRAVENOUS; SUBCUTANEOUS at 02:21

## 2022-09-28 RX ADMIN — SODIUM CHLORIDE 100 ML/HR: 9 INJECTION, SOLUTION INTRAVENOUS at 18:45

## 2022-09-28 RX ADMIN — METRONIDAZOLE 500 MG: 500 INJECTION, SOLUTION INTRAVENOUS at 05:08

## 2022-09-28 RX ADMIN — LEVETIRACETAM 500 MG: 100 INJECTION INTRAVENOUS at 23:40

## 2022-09-28 RX ADMIN — HYDROCORTISONE SODIUM SUCCINATE 50 MG: 100 INJECTION, POWDER, FOR SOLUTION INTRAMUSCULAR; INTRAVENOUS at 00:37

## 2022-09-28 RX ADMIN — NOREPINEPHRINE BITARTRATE 8 MCG/MIN: 1 SOLUTION INTRAVENOUS at 05:46

## 2022-09-28 RX ADMIN — PROPOFOL 25 MCG/KG/MIN: 10 INJECTION, EMULSION INTRAVENOUS at 01:21

## 2022-09-28 RX ADMIN — LANSOPRAZOLE 30 MG: KIT at 10:23

## 2022-09-28 RX ADMIN — ASPIRIN 81 MG: 81 TABLET, CHEWABLE ORAL at 12:09

## 2022-09-28 RX ADMIN — SODIUM CHLORIDE, POTASSIUM CHLORIDE, SODIUM LACTATE AND CALCIUM CHLORIDE 1000 ML: 600; 310; 30; 20 INJECTION, SOLUTION INTRAVENOUS at 12:56

## 2022-09-28 RX ADMIN — LEVETIRACETAM 500 MG: 100 INJECTION INTRAVENOUS at 00:37

## 2022-09-28 RX ADMIN — METRONIDAZOLE 500 MG: 500 INJECTION, SOLUTION INTRAVENOUS at 17:51

## 2022-09-28 RX ADMIN — PROPOFOL 25 MCG/KG/MIN: 10 INJECTION, EMULSION INTRAVENOUS at 07:14

## 2022-09-28 RX ADMIN — HEPARIN SODIUM 2000 UNITS: 1000 INJECTION INTRAVENOUS; SUBCUTANEOUS at 14:47

## 2022-09-28 RX ADMIN — Medication 10 ML: at 21:59

## 2022-09-28 RX ADMIN — HEPARIN SODIUM 4000 UNITS: 1000 INJECTION INTRAVENOUS; SUBCUTANEOUS at 23:41

## 2022-09-28 RX ADMIN — HYDROCORTISONE SODIUM SUCCINATE 50 MG: 100 INJECTION, POWDER, FOR SOLUTION INTRAMUSCULAR; INTRAVENOUS at 17:51

## 2022-09-28 RX ADMIN — VANCOMYCIN HYDROCHLORIDE 1000 MG: 1 INJECTION, POWDER, LYOPHILIZED, FOR SOLUTION INTRAVENOUS at 12:24

## 2022-09-28 RX ADMIN — HYDROCORTISONE SODIUM SUCCINATE 50 MG: 100 INJECTION, POWDER, FOR SOLUTION INTRAMUSCULAR; INTRAVENOUS at 05:08

## 2022-09-28 RX ADMIN — HEPARIN SODIUM 15 UNITS/KG/HR: 10000 INJECTION, SOLUTION INTRAVENOUS at 16:15

## 2022-09-28 RX ADMIN — CHLORHEXIDINE GLUCONATE 15 ML: 1.2 RINSE ORAL at 08:03

## 2022-09-28 RX ADMIN — CEFEPIME 2 G: 2 INJECTION, POWDER, FOR SOLUTION INTRAVENOUS at 08:00

## 2022-09-28 RX ADMIN — CHLORHEXIDINE GLUCONATE 15 ML: 1.2 RINSE ORAL at 21:59

## 2022-09-28 NOTE — CONSULTS
[facility] and [address] at [facility] Neurology Clinics at [facility], [address] [facility] [address] [address]  [phone] Office  [phone] Facsimile           Referring: Dr. Cheryl Lester    Chief Complaint   Patient presents with    Eber Wooten     We are asked to see this 57-year-old lady in neurologic consultation regarding an acute change in her neurologic status manifest as unresponsiveness. She was found by a neighbor lying in bed unresponsive when she did not report to work. Per the ED notes, CPR was initiated. EMS arrived and she was hypotensive in the 70s and in respiratory arrest.  She was administered Narcan without any improvement. She then had seizure-like activity and she was given 4 mg of Versed IV. When she arrived to the emergency department she was said to have a left-sided gaze preference and hypertensive to the 180s. She had myoclonic jerking in the upper and lower extremities suspicious for ongoing seizure. Rapid EEG demonstrated no seizure burden. Continuous EEG with simultaneous video monitoring has not demonstrated any ictus. She did have some right hemispheric periodic discharges.   CT of the head without dye unremarkable  Chest film with atelectasis  Chest CT with NG and ET tubes in place  CT of the chest abdomen and pelvis with no remarkable abnormality  Laboratory analysis CBC with a white count of 16 on presentation and hemoglobin of 14  Metabolic panel potassium 5.3 creatinine 2.57 AST 56  Initial blood gas with a pH of 7.2  Lactic acid 6.46  Urinalysis with hematuria nitrites and too numerous to count bacteria blood cultures pending  Urine cultures pending troponin elevated at 1800  Fibrinogen normal  PTT less than 20  Toxicology screen positive for benzodiazepine  Ethyl alcohol normal  Salicylate and acetaminophen normal  Subsequent blood gas with pH 7.31  Subsequent lactic acid 3.11 and that is trended down to 1.7  This morning CBC with a white count of 12.5  This morning's metabolic panel creatinine down to 1.31  TSH depressed at 0.23  Ammonia normal   Additional history is pt was using her husbands (passed away from cancer)Duragesic patches for a back injury in discussion with CC NP. Thought is she became apneic with this in the night and arrested respiratory wise  In discussion with her nurse at the bedside as her sedation has been lowered she became quite agitated. Weaning of sedation is still in process. The patient is unable to give any history as she is intubated and sedated. No past medical history on file. No past surgical history on file.     Current Facility-Administered Medications   Medication Dose Route Frequency Provider Last Rate Last Admin    propofol (DIPRIVAN) 10 mg/mL infusion  0-50 mcg/kg/min IntraVENous TITRATE Mar Soto MD 15.6 mL/hr at 09/28/22 0121 25 mcg/kg/min at 09/28/22 0121    dextrose 10 % infusion 125-250 mL  125-250 mL IntraVENous PRN Traci Peters NP        NOREPINephrine (LEVOPHED) 8 mg in 5% dextrose 250mL (32 mcg/mL) infusion  0.5-30 mcg/min IntraVENous TITRATE Traci Peters NP 15 mL/hr at 09/28/22 0546 8 mcg/min at 09/28/22 0546    sodium chloride (NS) flush 5-40 mL  5-40 mL IntraVENous Q8H Aries Malik MD   10 mL at 09/28/22 0600    sodium chloride (NS) flush 5-40 mL  5-40 mL IntraVENous PRN Aries Malik MD        acetaminophen (TYLENOL) tablet 650 mg  650 mg Oral Q6H PRN Aries Malik MD        Or    acetaminophen (TYLENOL) suppository 650 mg  650 mg Rectal Q6H PRN Aries Malik MD        polyethylene glycol (MIRALAX) packet 17 g  17 g Oral DAILY PRN Aries Malik MD        senna (SENOKOT) tablet 8.6 mg  1 Tablet Oral DAILY PRN Aries Malik MD        promethazine (PHENERGAN) tablet 12.5 mg  12.5 mg Oral Q6H PRN Aries Malik MD        Or    ondansetron [facility] injection 4 mg  4 mg IntraVENous Q6H PRN Hanh Cox MD        fentaNYL (PF) 1,500 mcg/30 mL (50 mcg/mL) infusion  0-200 mcg/hr IntraVENous TITRATE Kaia Roberts NP 2 mL/hr at 09/28/22 0405 100 mcg/hr at 09/28/22 0405    hydrocortisone Sod Succ (PF) (SOLU-CORTEF) injection 50 mg  50 mg IntraVENous Q6H Kaia Roberts NP   50 mg at 09/28/22 0600    0.9% sodium chloride infusion  100 mL/hr IntraVENous CONTINUOUS Hanh Cox  mL/hr at 09/27/22 2058 100 mL/hr at 09/27/22 2058    Vancomycin - Pharmacy to dose by level   Other Rx Dosing/Monitoring Kaia Roberts NP        cefepime (MAXIPIME) 2 g in 0.9% sodium chloride (MBP/ADV) 100 mL MBP  2 g IntraVENous Q12H Kaia Roberts NP 25 mL/hr at 09/27/22 1942 2 g at 09/27/22 1942    levETIRAcetam (KEPPRA) injection 500 mg  500 mg IntraVENous Q12H Kaia Roberts NP   500 mg at 09/28/22 0037    metroNIDAZOLE (FLAGYL) IVPB premix 500 mg  500 mg IntraVENous Q12H Hanh Cox  mL/hr at 09/28/22 0508 500 mg at 09/28/22 0508    vasopressin (VASOSTRICT) 20 Units in 0.9% sodium chloride 100 mL infusion  0.04 Units/min IntraVENous CONTINUOUS Kaia Roberts NP   Held at 09/27/22 1600    lansoprazole compounding kit (PREVACID) 3 mg/mL oral suspension 30 mg  30 mg Oral ACB Kaia Roberts NP        chlorhexidine (PERIDEX) 0.12 % mouthwash 15 mL  15 mL Oral Q12H Kaia Roberts NP   15 mL at 09/27/22 2142    lidocaine (XYLOCAINE) 10 mg/mL (1 %) injection 10-30 mL  10-30 mL SubCUTAneous Multiple Kun Park MD   10 mL at 09/27/22 1700    heparin 25,000 units in D5W 250 ml infusion  10-25 Units/kg/hr IntraVENous TITRATE Kaia Roberts NP 12.5 mL/hr at 09/28/22 0223 12 Units/kg/hr at 09/28/22 0223    Vancomycin RANDOM level @4pm on 9/28/2022   Other Angelica Thomas MD            No Known Allergies         No family history on file. Review of Systems  Unable    Examination  Visit Vitals  /65 (BP 1 Location: Right upper arm, BP Patient Position:  At rest;Supine)   Pulse 65   Temp 98.6 °F (37 °C)   Resp 16   Ht 5' 6\" (1.676 m)   Wt 104.3 kg (230 lb)   SpO2 100%   BMI 37.12 kg/m²   She is lying in bed comfortable appearing. Tubes and lines are in place. She has no spontaneous movement. Eyes are closed. When lids are raised globes are in mid position. Pupils are small but do react. No doll's. Corneals present. Nursing reports weak cough with suctioning and jostling of the ET tube provides no response. She has no withdrawal to noxious cutaneous stimulation. Reflexes globally depressed. Toes are mute        Impression/Plan  57-year-old lady found unresponsive with respiratory arrest and hypotension who had 1 witnessed seizure-like event and then myoclonic type activity and. This point is that she had a respiratory arrest secondary to the use of Duragesic patches    Continuous video EEG fails to demonstrate any ongoing ictus or any ictus for that matter. The periodic discharges that were seen at the beginning of the tracing have dissipated. Discontinue continuous video EEG  MRI of the brain  Will follow-up clinically        Lourdes Christensen MD          This note was created using voice recognition software. Despite editing, there may be syntax errors.

## 2022-09-28 NOTE — PROGRESS NOTES
0700- Bedside shift change report given to Cyndi Peralta (oncoming nurse) by Ivana Tripathi (offgoing nurse). Report included the following information SBAR, Kardex, ED Summary, Intake/Output, MAR, Recent Results, Cardiac Rhythm NSR, and Alarm Parameters . Primary Nurse Erick Wooten RN and Ivana Tripathi RN performed a dual skin assessment on this patient No impairment noted  Jose Enrique score is 11.    0715- Radha Luo NP at bedside at this time. Orders to turn off propofol and complete full neuro exam.     0730- Pt awake and moving in bed. RASS +3-+4. Pt not following commands and has tremors/jerking like movement. All extremities are moving non purposefully. Propofol Restarted at 15. Jann Avila RN.     0800- Shift assessment complete, see doc flowsheets. 65- Orders from Radha Luo NP to turn off fentanyl. 0830- Dr. German Cooley at bedside talking with family. Please see his note. No orders at this time. 3452- Dr. Valentín Mercedes and Saw Ramos with neurology at bedside assessing patient and given update by nurse. Plan is to try to get to MRI today. Erick Wooten RN. 1200- Reassessment complete, see doc flowsheets. 1240- MRI called at this time regarding order for patient. Writer was told that MRI would call back with a better time. 0- MRI called by writer regarding order status. Response was that MRI would call with an updated time when able to.     1550- HD line removed per order from Radha Luo NP. Erick Wooten RN. 1600- Reassessment complete, see doc flowsheets. 1730- MRI called unit. Writer was told that MRI would have to happen after 7:45PM today. 1900- Bedside shift change report given to Alex Butler (oncoming nurse) by Cyndi Peralta (offgoing nurse). Report included the following information SBAR, Kardex, ED Summary, Intake/Output, MAR, Recent Results, Cardiac Rhythm NSR to SB, and Alarm Parameters .

## 2022-09-28 NOTE — PROGRESS NOTES
Reason for Admission:  shock,found down by neighbor doing wellness check ,CPR initiated by EMS,last known well time was evening of 9/26/22                     RUR Score:    10%                 Plan for utilizing home health:          PCP: First and Last name:  Henrry Garland MD     Name of Practice:    Are you a current patient: Yes/No: TBD   Approximate date of last visit:    Can you participate in a virtual visit with your PCP: TAMIE                    Current Advanced Directive/Advance Care Plan: Full Code      Healthcare Decision Maker:            Lawton Hashimoto @ [phone]                    Transition of Care Plan:    Emergently intubated in ED (9/27/22)        Seizure-like activity @ home           Acute respiratory failure with hypoxia  Suspected status eilepticus    Hypotensive  Acute metabolic encephalopathy  CHRIS  Acute systolic heart failure  Shock    Pt lives alone 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 I will continue to follow pt for disposition needs.     Doug Rios Serve

## 2022-09-28 NOTE — PROCEDURES
ELECTROENCEPHALOGRAM REPORT      Brittnee Burton, 1965  Test Date: September 27, 2022    History: Witnessed sz    Medications: not listed    Patient consent: Correct patient identified    Description of procedure: This EEG was obtained using a 10 lead, 8 channel system positioned circumferentially without any parasagittal coverage (rapid EEG). Computer selected EEG is reviewed as well as background features and all clinically significant events. Clarity algorithm utilized and implemented to provide analysis of underlying activity and seizure detection used to facilitate reading. Description of recording: The tracing demonstrates a background of mixed frequency delta activity with overriding beta wave activity. There are infrequently occurring generalized sharp wave discharges occurring simultaneously over both hemispheres    Impression: Abnormal EEG demonstrating a moderate to severe degree of bihemispheric dysfunction as is commonly seen with an encephalopathy which may have contributions from toxic, metabolic, diffuse structural, and or pharmacologic effect and clinical correlation is recommended. The aforementioned generalized sharp wave discharges are representative of cortical irritability      Comment: A normal EEG does not rule out the diagnosis of a seizure disorder; clinical  correlation is advised. If there is still persistent suspicion for continued seizure-like  activity, would advise obtaining an electroencephalogram with the 10-20 international  system for improved spatial resolution and parasagittal coverage.     Brit Adan MD

## 2022-09-28 NOTE — PROGRESS NOTES
[facility]  YOB: 1965          Assessment & Plan:     CHRIS, improving  Shock  Resp failure  Encephalopathy    Rec:  No acute indication HD  Continue pressors and IVF, Abx  Monitor serial labs  D/w son       Subjective:   CC: follow up CHRIS  HPI: Creat improving. UOP good. She is on IVF and pressors with BP in 90s and on vent for resp failure.   ROS: unable to obtain due to pt condition  Current Facility-Administered Medications   Medication Dose Route Frequency    Vancomycin level 9/28 @ 1000   Other ONCE    propofol (DIPRIVAN) 10 mg/mL infusion  0-50 mcg/kg/min IntraVENous TITRATE    dextrose 10 % infusion 125-250 mL  125-250 mL IntraVENous PRN    NOREPINephrine (LEVOPHED) 8 mg in 5% dextrose 250mL (32 mcg/mL) infusion  0.5-30 mcg/min IntraVENous TITRATE    sodium chloride (NS) flush 5-40 mL  5-40 mL IntraVENous Q8H    sodium chloride (NS) flush 5-40 mL  5-40 mL IntraVENous PRN    acetaminophen (TYLENOL) tablet 650 mg  650 mg Oral Q6H PRN    Or    acetaminophen (TYLENOL) suppository 650 mg  650 mg Rectal Q6H PRN    polyethylene glycol (MIRALAX) packet 17 g  17 g Oral DAILY PRN    senna (SENOKOT) tablet 8.6 mg  1 Tablet Oral DAILY PRN    promethazine (PHENERGAN) tablet 12.5 mg  12.5 mg Oral Q6H PRN    Or    ondansetron (ZOFRAN) injection 4 mg  4 mg IntraVENous Q6H PRN    fentaNYL (PF) 1,500 mcg/30 mL (50 mcg/mL) infusion  0-200 mcg/hr IntraVENous TITRATE    hydrocortisone Sod Succ (PF) (SOLU-CORTEF) injection 50 mg  50 mg IntraVENous Q6H    0.9% sodium chloride infusion  100 mL/hr IntraVENous CONTINUOUS    Vancomycin - Pharmacy to dose by level   Other Rx Dosing/Monitoring    cefepime (MAXIPIME) 2 g in 0.9% sodium chloride (MBP/ADV) 100 mL MBP  2 g IntraVENous Q12H    levETIRAcetam (KEPPRA) injection 500 mg  500 mg IntraVENous Q12H    metroNIDAZOLE (FLAGYL) IVPB premix 500 mg  500 mg IntraVENous Q12H    vasopressin (VASOSTRICT) 20 Units in 0.9% sodium chloride 100 mL infusion  0.04 Units/min IntraVENous CONTINUOUS    lansoprazole compounding kit (PREVACID) 3 mg/mL oral suspension 30 mg  30 mg Oral ACB    chlorhexidine (PERIDEX) 0.12 % mouthwash 15 mL  15 mL Oral Q12H    lidocaine (XYLOCAINE) 10 mg/mL (1 %) injection 10-30 mL  10-30 mL SubCUTAneous Multiple    heparin 25,000 units in D5W 250 ml infusion  10-25 Units/kg/hr IntraVENous TITRATE          Objective:     Vitals:  Blood pressure 103/62, pulse 65, temperature 98.3 °F (36.8 °C), resp. rate 16, height 5' 6\" (1.676 m), weight 104.3 kg (230 lb), SpO2 98 %. Temp (24hrs), Av.2 °F (37.9 °C), Min:98.3 °F (36.8 °C), Max:101.5 °F (38.6 °C)      Intake and Output:   07 -  1900  In: 377.3 [I.V.:377.3]  Out: 950 [Urine:950]   190 -  0700  In: 6771.6 [I.V.:6771.6]  Out: 4531 [Urine:1175]    Physical Exam:               GENERAL ASSESSMENT: On vent  HEENT: ETT in place   CHEST: CTA  HEART: reg  ABDOMEN: Soft,NT,  : +Sal +urine   EXTREMITY: no EDEMA          ECG/rhythm:    Data Review      No results for input(s): TNIPOC in the last 72 hours.     No lab exists for component: ITNL   Recent Labs     22  1431   CPK 2,730*     Recent Labs     22  0105 22  2130 22  1846 22  1431 22  1127    142  --  142 141   K 4.1 4.0  --  4.0 5.3*   * 109*  --  108 106   CO2 23 24  --  23 24   BUN 15 15  --  18 16   CREA 1.31* 1.55*  --  2.11* 2.57*   * 194*  --  250* 141*   PHOS  --  2.0*  --   --   --    MG  --  1.8  --   --   --    CA 8.7 8.6  --  8.8 9.4   ALB 2.9*  --   --  3.0* 3.9   WBC 12.5*  --  13.2*  --  16.1*   HGB 12.0  --  11.5  --  14.2   HCT 37.3  --  36.1  --  45.5     --  259  --  306      Recent Labs     22  0647 22  0105 22  1846 22  1431   INR  --   --   --  1.2*   PTP  --   --   --  12.0*   APTT 52.2* 41.3* 22.6 <20.0*     Needs: urine analysis, urine sodium, protein and creatinine  No results found for: ANDREW BOWERS        : Nikolas Cotter MD  9/28/2022        [facility]:  [email or web address]  Osiel Ortega office:  [address] [place] [address],[address]  [address]  Phone: [phone]  Fax :     [phone]    [place] office:  [address] [facility]  [facility]  Phone - [phone]  Fax - [phone]

## 2022-09-28 NOTE — MANAGEMENT PLAN
Notified by RN of increasing trop. Pt already on hep gtt and suspected to have had NSTEMI. No change in treatment plan.

## 2022-09-28 NOTE — PROGRESS NOTES
Comprehensive Nutrition Assessment    Type and Reason for Visit: Initial    Nutrition Recommendations/Plan:   NPO at this time - MRI pending. No plan to begin TF today  Please obtain measured weight      Malnutrition Assessment:  Malnutrition Status:  Insufficient data (09/28/22 1353)    Context:  Acute illness     Findings of the 6 clinical characteristics of malnutrition:   Energy Intake:  Unable to assess  Weight Loss:  Unable to assess     Body Fat Loss:  No significant body fat loss,     Muscle Mass Loss:  No significant muscle mass loss,    Fluid Accumulation:  No significant fluid accumulation,     Strength:  Not performed     Nutrition Assessment:     Pt is a 59year old female admitted with Status epilepticus ([facility] [address] [G40.901]. She  has no past medical history on file. Newly intubated. Discussed during IDRs. Patient with OGT in place, set to suction. Plan to clamp. Patient is awaiting MRI - hoping to r/o anoxic brain injury. Patient with EEF negative but found to have EF depressed and c/f aspiration PNA. Nursing attempted to wean propofol but patient became agitated. No family at bedside during RD encounter. Unknown weight history. Patient does have HD access but per nephrology no acute indication for HD. Checking ABGs. Propofol running at 9.4 mL/hour, providing 250 kcal/day. Wt Readings from Last 10 Encounters:   09/27/22 104.3 kg (230 lb)     Nutrition Related Findings:      Wound Type: None  Abdominal Assessment: Intact, Semi-soft  Bowel Sounds: Hypoactive   Edema:No data recorded    Nutr.  Labs:    Lab Results   Component Value Date/Time    GFR est AA 50 (L) 09/28/2022 01:05 AM    GFR est non-AA 42 (L) 09/28/2022 01:05 AM    Creatinine, POC 1.6 (H) 09/27/2022 06:45 PM    Creatinine 1.31 (H) 09/28/2022 01:05 AM    BUN 15 09/28/2022 01:05 AM    Sodium,  09/27/2022 06:45 PM    Sodium 143 09/28/2022 01:05 AM    Potassium 4.1 09/28/2022 01:05 AM    Potassium, POC 4.0 09/27/2022 06:45 PM Chloride,  (H) 09/27/2022 06:45 PM    Chloride 111 (H) 09/28/2022 01:05 AM    CO2 23 09/28/2022 01:05 AM       Lab Results   Component Value Date/Time    Glucose 182 (H) 09/28/2022 01:05 AM    Glucose,  (H) 09/27/2022 06:45 PM       No results found for: HBA1C, UQY5NAPW, IOZ0KYHS, SVJ7TAPL    Magnesium   Date Value Ref Range Status   09/27/2022 1.8 1.6 - 2.4 mg/dL Final     Lab Results   Component Value Date/Time    Calcium 8.7 09/28/2022 01:05 AM    Phosphorus 2.0 (L) 09/27/2022 09:30 PM       Nutr. Meds:  Peridex, heparin, solu-cortef, keppra, flagyl, levophed*, zofran PRN, miralax PRN, propofol, senna, vancomycin       Current Nutrition Intake & Therapies:  Average Meal Intake: NPO  Average Supplement Intake: NPO  DIET NPO    Anthropometric Measures:  Height: 5' 6\" (167.6 cm)  Ideal Body Weight (IBW): 130 lbs (59 kg)     Current Body Wt:  104.3 kg (230 lb), 176.9 % IBW. Not specified  Current BMI (kg/m2): 37.1        Weight Adjustment: No adjustment                 BMI Category: Obese class 2 (BMI 35.0-39. 9)    Estimated Daily Nutrient Needs:  Energy Requirements Based On: Kcal/kg  Weight Used for Energy Requirements: Current  Energy (kcal/day): 2835-2472 (11-14 kcal/kg)  Weight Used for Protein Requirements: Ideal  Protein (g/day):  (1.5-2.0 g/kg IBW)  Method Used for Fluid Requirements: 1 ml/kcal  Fluid (ml/day): 6645-4325    Nutrition Diagnosis:   Inadequate oral intake related to inadequate protein-energy intake as evidenced by NPO or clear liquid status due to medical condition, intubation      Nutrition Interventions:   Food and/or Nutrient Delivery: Continue NPO  Nutrition Education/Counseling: No recommendations at this time  Coordination of Nutrition Care: Continue to monitor while inpatient, Interdisciplinary rounds  Plan of Care discussed with: IDR team    Goals:     Goals: other (specify)  Specify Other Goals: GOC determine within 2 - 3 days    Nutrition Monitoring and Evaluation: Behavioral-Environmental Outcomes: None identified  Food/Nutrient Intake Outcomes: Enteral nutrition intake/tolerance (vs extubation)  Physical Signs/Symptoms Outcomes: Biochemical data, Fluid status or edema, Weight    Discharge Planning:     Too soon to determine    Yesika Shields MS, RD  Contact: Ext: [phone], or via [employer]

## 2022-09-28 NOTE — PROCEDURES
[facility] [address] [facility]  EEG    Name:  James Ellsworth  MR#:  [id number]  :  1965  ACCOUNT #:  [de-identified]  DATE OF SERVICE:  2022    CONTINUOUS EEG WITH SIMULTANEOUS VIDEO MONITORING    REQUESTING PROVIDER:  Yolande López NP    CLINICAL HISTORY:  Continuous EEG with simultaneous video monitoring is requested in this 76-year-old lady with witnessed seizures to evaluate for epileptiform abnormalities and to evaluate for subclinical ictus. MEDICATIONS:  Not listed. EEG REPORT:  This tracing is obtained while the patient is intubated and sedated. During this state, the background consists of diffuse mixed theta and delta range frequencies. At the beginning of the tracing, we see several periodic discharges over the right hemisphere. As the tracing progresses, these discharges dissipate. No clinical events for review. Review of computerized spike and seizure detection software reveals the aforementioned discharges. INTERPRETATION:  Continuous EEG with simultaneous video monitoring is abnormal secondary to diffuse slowing and disorganization of the background rhythms indicative of a moderate-to-severe degree of bihemispheric dysfunction as is commonly seen with an encephalopathy which may have contributions from toxic, metabolic, diffuse structural, and/or pharmacologic effects and clinical correlation is recommended. The aforementioned right hemispheric periodic discharges are associated with cortical irritability and correlation with imaging is recommended.       Mars Mills MD      [id number]  D:  2022 10:55  T:  2022 14:05  JOB #:  [id number]

## 2022-09-28 NOTE — CONSULTS
Wyatt Moran DO  [facility] [address] [place] [address], [address] [place] [address], [address] [place] [address] N[place]                                       Office [phone],OUN [phone]  Office [phone],DCE [phone]      Date of  Admission: 9/27/2022 11:17 AM  PCP- Nadeen Kinney MD    Augie Mccain is a 59 y.o. female, cardiology asked to manage acute systolic heart failure, elevated troponin    Requested by Mk Varela MD    Assessment/Plan      Encephalopathy, initial etiology remains unclear at this time. Acute systolic heart failure. Likely working etiology is Takotsubo cardiomyopathy, however have to exclude other etiologies. Furthermore elevated troponin also likely related to Takotsubo myopathy. However, significant coronary artery disease needs to be excluded. Troponin elevation is consistent with a level of stress cardiomyopathy. Electrocardiogram does not indicate any acute ischemia or myocardial infarction. Recommend to continue heparin as long as she does not develop any increase evidence of bleeding. Also likely will proceed with cardiac catheterization. Timing pending clinical course. Likely earliest we will proceed with cardiac catheterization would be Friday, or sooner if clinically indicated. We will withhold beta-blocker and goal-directed medical therapy at this time due to ongoing use of low-dose vasopressor therapy. Do not think she would benefit from inotropic support at this time, doubt cardiogenic shock and more likely vasodilatory from use of sedation therapy. Trend troponin until peak. Shock, unlikely cardiac. Suspect vasodilatory from sedation    Acute kidney injury    Low TSH         [x]    High complexity decision making was performed  [x]    Patient is at high-risk of decompensation with multiple organ involvement      I appreciate the opportunity to be involved in Ms. Rosales. See below note for details.  Please do not hesitate to contact us with questions or concerns. Sd Jeffersno, DO      Subjective:  Sebas Muhammad is a 59 y.o. female presented to the ED with altered mental status. History obtained from chart review. Sons were both bedside and report that she has a history of anxiety, depression and appears to be treated for medical weight loss. EMS alerted for wellbeing call when her friends could not get a hold of her. Found altered by EMS. Apparently she had a fentanyl patch on her back. Did not respond to narcotics. There was question if she had seizure activity. Unable to provide any history due to intubation. Medications:  No medications prior to admission.      Current Facility-Administered Medications   Medication Dose Route Frequency    aspirin chewable tablet 81 mg  81 mg Oral DAILY    propofol (DIPRIVAN) 10 mg/mL infusion  0-50 mcg/kg/min IntraVENous TITRATE    dextrose 10 % infusion 125-250 mL  125-250 mL IntraVENous PRN    NOREPINephrine (LEVOPHED) 8 mg in 5% dextrose 250mL (32 mcg/mL) infusion  0.5-30 mcg/min IntraVENous TITRATE    sodium chloride (NS) flush 5-40 mL  5-40 mL IntraVENous Q8H    sodium chloride (NS) flush 5-40 mL  5-40 mL IntraVENous PRN    acetaminophen (TYLENOL) tablet 650 mg  650 mg Oral Q6H PRN    Or    acetaminophen (TYLENOL) suppository 650 mg  650 mg Rectal Q6H PRN    polyethylene glycol (MIRALAX) packet 17 g  17 g Oral DAILY PRN    senna (SENOKOT) tablet 8.6 mg  1 Tablet Oral DAILY PRN    promethazine (PHENERGAN) tablet 12.5 mg  12.5 mg Oral Q6H PRN    Or    ondansetron (ZOFRAN) injection 4 mg  4 mg IntraVENous Q6H PRN    fentaNYL (PF) 1,500 mcg/30 mL (50 mcg/mL) infusion  0-200 mcg/hr IntraVENous TITRATE    hydrocortisone Sod Succ (PF) (SOLU-CORTEF) injection 50 mg  50 mg IntraVENous Q6H    0.9% sodium chloride infusion  100 mL/hr IntraVENous CONTINUOUS    Vancomycin - Pharmacy to dose by level   Other Rx Dosing/Monitoring    cefepime (MAXIPIME) 2 g in 0.9% sodium chloride (MBP/ADV) 100 mL MBP  2 g IntraVENous Q12H    levETIRAcetam (KEPPRA) injection 500 mg  500 mg IntraVENous Q12H    metroNIDAZOLE (FLAGYL) IVPB premix 500 mg  500 mg IntraVENous Q12H    lansoprazole compounding kit (PREVACID) 3 mg/mL oral suspension 30 mg  30 mg Oral ACB    chlorhexidine (PERIDEX) 0.12 % mouthwash 15 mL  15 mL Oral Q12H    lidocaine (XYLOCAINE) 10 mg/mL (1 %) injection 10-30 mL  10-30 mL SubCUTAneous Multiple    heparin 25,000 units in D5W 250 ml infusion  10-25 Units/kg/hr IntraVENous TITRATE         Review of Systems:  Unable to obtain          Physical Exam:  Visit Vitals  /62   Pulse 65   Temp 98.3 °F (36.8 °C)   Resp 16   Ht 5' 6\" (1.676 m)   Wt 230 lb (104.3 kg)   SpO2 98%   BMI 37.12 kg/m²           Gen: Debated and sedated  HEENT:  Pink conjunctivae, hearing intact to voice, moist mucous membranes  Neck: Supple,No JVD, No Carotid Bruit  Resp: No accessory muscle use, Clear breath sounds, No rales or rhonchi  Card: Normal Rate,Regular Rythm,Normal S1, S2, No murmurs, rubs or gallop. No thrills.    Abd:  Soft, non-tender, non-distended, normoactive bowel sounds are present   MSK: No cyanosis or clubbing  Skin: No rashes or ulcers  Neuro:  Cranial nerves are grossly intact, moving all four extremities, no focal deficit, follows commands appropriately  Psych:  Good insight, oriented to person, place and time, alert, Nml Affect  LE: No edema  Vascular:Distal Pulses 2+ and symmetric            LABS:    Lab Results   Component Value Date/Time    WBC 12.5 (H) 09/28/2022 01:05 AM    HGB 12.0 09/28/2022 01:05 AM    HCT 37.3 09/28/2022 01:05 AM    PLATELET 143 53/74/1188 01:05 AM    MCV 89.9 09/28/2022 01:05 AM     Lab Results   Component Value Date/Time    Sodium 143 09/28/2022 01:05 AM    Potassium 4.1 09/28/2022 01:05 AM    Chloride 111 (H) 09/28/2022 01:05 AM    CO2 23 09/28/2022 01:05 AM    Anion gap 9 09/28/2022 01:05 AM    Glucose 182 (H) 09/28/2022 01:05 AM    BUN 15 09/28/2022 01:05 AM Creatinine 1.31 (H) 09/28/2022 01:05 AM    BUN/Creatinine ratio 11 (L) 09/28/2022 01:05 AM    GFR est AA 50 (L) 09/28/2022 01:05 AM    GFR est non-AA 42 (L) 09/28/2022 01:05 AM    Calcium 8.7 09/28/2022 01:05 AM     Lab Results   Component Value Date/Time    CK 2,730 (H) 09/27/2022 02:31 PM     Lab Results   Component Value Date/Time    aPTT 52.2 (H) 09/28/2022 06:47 AM     Lab Results   Component Value Date/Time    INR 1.2 (H) 09/27/2022 02:31 PM    Prothrombin time 12.0 (H) 09/27/2022 02:31 PM           Saulo Diaz DO    ATTENTION:   This medical record was transcribed using an electronic medical records/speech recognition system. Although proofread, it may and can contain electronic, spelling and other errors. Corrections may be executed at a later time. Please feel free to contact us for any clarifications as needed.

## 2022-09-28 NOTE — PROGRESS NOTES
1408- Received critical lab results for primary RN, see flow sheet. Primary RN made aware and to continue trending per lab orders. [address] to [facility]. NP- stop trending troponins. Draw x1 lactic acid to see current level. Remove HD Line as this is not in use/will be needed. Primary RN made aware and to carry out orders.

## 2022-09-28 NOTE — PROGRESS NOTES
[facility] Progress Note: Antimicrobial Stewardship 9/28/2022    Consult for antibiotic dosing of vancomycin by Dr. Daniel Velasco, NP  Indication: Aspiration PNA  Day of Therapy: 2    Plan:  Vancomycin therapy:  Start with loading dose of vancomycin 2500 mg IV (25 mg/kg, max 2.5 gm) x 1 given 9/27 @ 1558   Dose calculated to approximate a   Target AUC/GARTH of 400-600  Trough of N/A   Plan:  Vancomycin level drawn ~18 hrs post 2500 mg loading dose = 9.6 mcg/ml. Creatinine trending down significantly with IV fluids and improvement of hemodynamics. Peak level after load anticipated to be ~34 mcg/ml. Patient is clearing drug appropriately. Anticipate further renal fxn improvement over time. Empiric kinetics are unreliable in patient with rapidly chagning renal fxn. Will conservatively start 1000 mg (~10 mg/kg) Q12 hrs and draw a random level 9/29 afternoon closer to steady state. NKDA confirmed on profile  Pharmacy to follow daily and will make changes to dose and/or frequency based on clinical status. Non-Kinetic Antimicrobial Dosing Regimen:   Current Regimen:  Cefepime 2 grams Q12 hrs  Recommendation: Cefepime dose adjusted to 2 grams Q8 hrs- Anticipate continued renal fxn improvement. CrCl is 56 ml/min. Higher dose preferred given indication PNA and GNR in sputum. Dose administration notes:   Doses given appropriately as scheduled    Other Antimicrobial  (not dosed by pharmacist)   Metronidazole 500 mg Q12 hrs   Cultures     9/27 Sputum: Light GNR; Prelim  9/27 MRSA: Pending  9/27 Urine: UA bacteria \"too numerous to count\"; Prelim   9/27 Blood (Paired): Pending   Serum Creatinine     Lab Results   Component Value Date/Time    Creatinine 1.31 (H) 09/28/2022 01:05 AM    Creatinine, POC 1.6 (H) 09/27/2022 06:45 PM       Creatinine Clearance Estimated Creatinine Clearance: 56.4 mL/min (A) (based on SCr of 1.31 mg/dL (H)).      Procalcitonin  No results found for: PCT     Temp   98.9 °F (37.2 °C)    WBC   Lab Results   Component Value Date/Time    WBC 12.5 (H) 09/28/2022 01:05 AM       For Antifungals, Metronidazole and Nafcillin: Lab Results   Component Value Date/Time    ALT (SGPT) 59 09/28/2022 01:05 AM    AST (SGOT) 99 (H) 09/28/2022 01:05 AM    Alk.  phosphatase 58 09/28/2022 01:05 AM    Bilirubin, total 0.4 09/28/2022 01:05 AM         Thanks,  Pharmacist: Mateo Banrhart, MARIAMD

## 2022-09-28 NOTE — PROGRESS NOTES
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 She was LKW the evening of . She did not report to work the am of  and she was found unresponsive at her home on wellness check. Bystander CPR was started, but she did have a pulse. EMS was called. Upon their arrival, SBP was 70s. Pupils were pinpoint, narcan was administered without improvement. She experienced seizure like activity and received 4mg IV versed. Upon arrival to the ED, she exhibited seizure like activity and L sided gaze preference. She required intubation for hypoxic respiratory failure. She was sedated on propofol. She became hypotensive and was started on levophed. LA 6.46. She was admitted to the ICU. She was started on empiric vanc/zosyn. Blood/sputum cultures ordered. CT head unrevealing. CT chest notable for only bibasilar atelectasis, cannot exclude small amount aspiration in lower lobes. CTAP unrevealing. Central line placed at bedside in ICU. She was noted to exhibit profound hypoxia. 7.33/44/73 on 100% FiO2 - not consistent with ARDS due to absence of bilateral infiltrates. A 100mcg fentanyl patch was found on the patient and this was removed.  did not reveal that the patient was prescribed this medication. Fentanyl does not always show on UDS. After speaking more with family that have arrived, they noted the patient did have access to  fentanyl patches from her  . She had offered one to her mother recently. The patient's mother said the patient hurt her back and may have tried using this for pain. It is plausible that the patient applied this and became hypoxic while asleep. As of  am, the patient has been weaned to 30% Fio2, levophed of 4mcg/min.  When propofol is turned off, there is concern for myoclonic jerking. EEG unrevealing. MRI pending. Active Problem List:     Problem List  Date Reviewed: 2022            Codes Class    NSTEMI (non-ST elevated myocardial infarction) ([place] [address].) ICD-10-CM: I21.4  ICD-9-CM: 410.70         Cardiomyopathy ([facility] ICD-10-CM: I42.9  ICD-9-CM: 425.4         Dyslipidemia ICD-10-CM: E78.5  ICD-9-CM: 272.4         Hypothyroidism ICD-10-CM: E03.9  ICD-9-CM: 244.9         KOBY (generalized anxiety disorder) ICD-10-CM: F41.1  ICD-9-CM: 300.02         * (Principal) Status epilepticus ([place] [address].) ICD-10-CM: G40.901  ICD-9-CM: 345. 3         CHRIS (acute kidney injury) ([facility] ICD-10-CM: N17.9  ICD-9-CM: 584.9         Lactic acidosis ICD-10-CM: E87.2  ICD-9-CM: 276.2         Acute respiratory failure with hypoxia (HCC) ICD-10-CM: J96.01  ICD-9-CM: 518.81         Shock ([place] [address].) ICD-10-CM: R57.9  ICD-9-CM: 785.50         Acute metabolic encephalopathy UYK-97-IB: G93.41  ICD-9-CM: 348.31            Past Medical History:      has no past medical history on file. Past Surgical History:      has no past surgical history on file. Home Medications:     Prior to Admission medications    Not on File       Allergies/Social/Family History:     No Known Allergies   Social History     Tobacco Use    Smoking status: Not on file    Smokeless tobacco: Not on file   Substance Use Topics    Alcohol use: Not on file      No family history on file.     Review of Systems:     Unable to obtain    Objective:   Vital Signs:  Visit Vitals  /62   Pulse 61   Temp 98.9 °F (37.2 °C)   Resp 16   Ht 5' 6\" (1.676 m)   Wt 104.3 kg (230 lb)   SpO2 98%   BMI 37.12 kg/m²    O2 Flow Rate (L/min): 60 l/min O2 Device: Endotracheal tube, Ventilator Temp (24hrs), Av.7 °F (37.6 °C), Min:98.3 °F (36.8 °C), Max:101.5 °F (38.6 °C)           Intake/Output:     Intake/Output Summary (Last 24 hours) at 2022 1255  Last data filed at 2022 1200  Gross per 24 hour   Intake 7672.57 ml   Output 2125 ml   Net 5547.57 ml       Physical Exam:    General:  Sedated/intubated  Eye:  PERRLA; eyes midline at the time of exam   Neurologic:  Sedated; intubated; nonfocal exam; no withdraw  to noxious stimuli at the time of exam, but sedated  Neck: Supple, no JVD  Lungs:  CTAB  Heart:  RRR, no MRG, 2+ pulses, no edema  Abdomen:  Distended, no tenderness, no rebound, no guarding  Skin:  c/d/I  Validated 9/28    LABS AND  DATA: Personally reviewed  Recent Labs     09/28/22 0105 09/27/22  1846   WBC 12.5* 13.2*   HGB 12.0 11.5   HCT 37.3 36.1    259     Recent Labs     09/28/22  0105 09/27/22  2130    142   K 4.1 4.0   * 109*   CO2 23 24   BUN 15 15   CREA 1.31* 1.55*   * 194*   CA 8.7 8.6   MG  --  1.8   PHOS  --  2.0*     Recent Labs     09/28/22  0105 09/27/22  1431   AP 58 58   TP 5.7* 5.6*   ALB 2.9* 3.0*   GLOB 2.8 2.6     Recent Labs     09/28/22  0647 09/28/22  0105 09/27/22  1846 09/27/22  1431   INR  --   --   --  1.2*   PTP  --   --   --  12.0*   APTT 52.2* 41.3*   < > <20.0*    < > = values in this interval not displayed. Recent Labs     09/28/22  1056 09/27/22  1243   PHI 7.42 7.33*   PCO2I 35.8 44.4   PO2I 96 73*   FIO2I 40 100     Recent Labs     09/27/22  1431   CPK 2,730*         Ventilator Settings:  Mode Rate Tidal Volume Pressure FiO2 PEEP   Assist control   400 ml    (S) 30 % (decreased by Denisse Mariee NP) 8 cm H20     Peak airway pressure: 23 cm H2O    Minute ventilation: 6 l/min      ABG on 40% 7.42/35/96    MEDS: Reviewed    Chest X-Ray: personally reviewed and report checked    TTE: Pending    Assessment and Plan     Shock: Septic vasodilatory Febrile, elevated WBC on presentation. U/A notable for bacteria and nitrites. Sputum with GPC/GNR on gram stain, but no focal infiltrate. Would not be useful to send procal given renal injury. CT chest and abdomen unrevealing. New acute systolic HF. LA improved to 3.   Hepatic panel unrevealing.  - Vanc/cefepime/flagyl while awaiting cultures  - Urine/sputum/blood cultures  - Levo for MAP >65    Acute hypoxic respiratory failure: Likely due to aspiration pneumonitis  - RVP negative   - Sputum culture pending  - Broad spectrum antibiotics as above    CHRIS c/b hyperkalemia: Likely prerenal +/- ATN  - Continue IVF resuscitation    Acute systolic HF: Cardiology following, concern for Takotsubo EF 25%  - Cardiology following  - Plan for cardiac cath, likely Friday  - Continue heparin drip    Acute toxic metabolic encephalopathy: Broad ddx. EEG negative for seizure. Cannot yet exclude anoxic brain injury in setting of hypoxia  - MRI ordered  - Hold benzos    Multidisciplinary Rounds Completed:  N, admitted in afternoon    ABCDEF Bundle/Checklist  Pain Medications: Y, fent  Target RASS: -5  Sedation Medications: Fent/prop  CAM-ICU:  FAVIOLA  Mobility: Poor  PT/OT: Unable  Restraints: Y  Discussed Plan of Care (goals of care): F  Addressed Code Status: Y    CARDIOVASCULAR  Cardiac Gtts: Levo/vaso  SBP Goal of: > 90 mmHg  MAP Goal of: > 65 mmHg  Transfusion Trigger (Hgb): <7 g/dL    RESPIRATORY  Vent Goals:   Chlorhexidine   DVT Prophylaxis (if no, list reason): Heparin   SPO2 Goal: > 92%  Pulmonary toilet: Duo-Nebs     GI/  Sal Catheter Present: Yes  GI Prophylaxis: Protonix (pantoprazole)   Nutrition: Pending NST consult  IVFs: Y  Bowel Movement: N  Bowel Regimen: Y  Insulin: Y    ANTIBIOTICS  Antibiotics:  Y    T/L/D  Tubes: Y  Lines: Y  Drains: Y    SPECIAL EQUIPMENT  N    DISPOSITION  ICU    CRITICAL CARE CONSULTANT NOTE  I had a face to face encounter with the patient, reviewed and interpreted patient data including clinical events, labs, images, vital signs, I/O's, and examined patient.   I have discussed the case and the plan and management of the patient's care with the consulting services, the bedside nurses and the respiratory therapist.      NOTE OF PERSONAL INVOLVEMENT IN CARE   This patient has a high probability of imminent, clinically significant deterioration, which requires the highest level of preparedness to intervene urgently. I participated in the decision-making and personally managed or directed the management of the following life and organ supporting interventions that required my frequent assessment to treat or prevent imminent deterioration    I personally spent 60 minutes of critical care time. This is time spent at this critically ill patient's bedside actively involved in patient care as well as the coordination of care and discussions with the patient's family. This does not include any procedural time which has been billed separately.     Deena Solorio NP  [facility]  9/28/2022

## 2022-09-28 NOTE — PROGRESS NOTES
TRANSFER - IN REPORT:    Verbal report received from samantha ng(name) on Moshe Resendiz  being received from ed(unit) for urgent transfer      Report consisted of patients Situation, Background, Assessment and   Recommendations(SBAR). Information from the following report(s) SBAR, ED Summary, MAR, Recent Results, and Cardiac Rhythm nsr  was reviewed with the receiving nurse. Opportunity for questions and clarification was provided. Assessment completed upon patients arrival to unit at about 1345 and care assumed. 24hr eeg started, respiratory cultures, urine cultures and drug screen sent to lab. Triple lumen catheter and milton hd access placed. During placement of central line, noted patient demonstrating pain with furrowed brow and leg/arm movements while np suturing central line in place. Patient received imaging for placement verification. Ogt to low suction, minimal brown output. Ett suction produces brown/black secretions. Family updated by intensivist throughout shift. Witnessed Deena Solorio NP dispose of transdermal fentanyl patch found on patient. See mar/flowsheet for assessments and administered medications. From time of arrival to 1900, patient temperature decreased from 101.5 to 98. 9. patient had echocardiogram and cardiology was consulted. Neurology in to see patient. Bedside and Verbal shift change report given to barry ng (oncoming nurse) by Kirsten Alcaraz (offgoing nurse). Report included the following information SBAR, ED Summary, Intake/Output, MAR, Recent Results, and Cardiac Rhythm nsr .

## 2022-09-28 NOTE — PROGRESS NOTES
[facility] [address]  [address] [facility], [address] [place] [address]  [phone]      Hospitalist  Progress Note      NAME:         Millie Ward   :        1965  MRM:        [id number]    Date of service: 2022      Chief complaint: Unresponsiveness    Interval HPI: Patient remains sedated and intubated. I have discussed with her nurse, son and mother for collaborative hx as well as reviewed her chart. Objective:    Vital Signs:    Visit Vitals  /65 (BP 1 Location: Right upper arm, BP Patient Position: At rest;Supine)   Pulse 65   Temp 98.6 °F (37 °C)   Resp 16   Ht 5' 6\" (1.676 m)   Wt 104.3 kg (230 lb)   SpO2 100%   BMI 37.12 kg/m²        Intake/Output Summary (Last 24 hours) at 2022 0735  Last data filed at 2022 0600  Gross per 24 hour   Intake 6771.62 ml   Output 1175 ml   Net 5596.62 ml        Physical Examination:    General:  she remains critically ill, intubated but not in distress   Eyes:   pink conjunctivae, PERRLA with no discharge. ENT:   no ottorrhea or rhinorrhea with dry mucous membranes  Neck: no masses, thyroid non-tender and trachea central.  Pulm:  no accessory muscle use, decreased breath sounds with scattered crackles. No wheezes   Card:  no JVD or murmurs, has regular and normal S1, S2 without thrills, bruits or peripheral edema  Abd:  Soft, non-tender, non-distended, normoactive bowel sounds with no palpable organomegaly  Musc:  No cyanosis, clubbing, atrophy or deformities. Skin:  No rashes, bruising or ulcers. Neuro: Sedated and intubated. No facial asymmetry.      Current Facility-Administered Medications   Medication Dose Route Frequency    propofol (DIPRIVAN) 10 mg/mL infusion  0-50 mcg/kg/min IntraVENous TITRATE    dextrose 10 % infusion 125-250 mL  125-250 mL IntraVENous PRN    NOREPINephrine (LEVOPHED) 8 mg in 5% dextrose 250mL (32 mcg/mL) infusion  0.5-30 mcg/min IntraVENous TITRATE    sodium chloride (NS) flush 5-40 mL  5-40 mL IntraVENous Q8H    sodium chloride (NS) flush 5-40 mL  5-40 mL IntraVENous PRN    acetaminophen (TYLENOL) tablet 650 mg  650 mg Oral Q6H PRN    Or    acetaminophen (TYLENOL) suppository 650 mg  650 mg Rectal Q6H PRN    polyethylene glycol (MIRALAX) packet 17 g  17 g Oral DAILY PRN    senna (SENOKOT) tablet 8.6 mg  1 Tablet Oral DAILY PRN    promethazine (PHENERGAN) tablet 12.5 mg  12.5 mg Oral Q6H PRN    Or    ondansetron (ZOFRAN) injection 4 mg  4 mg IntraVENous Q6H PRN    fentaNYL (PF) 1,500 mcg/30 mL (50 mcg/mL) infusion  0-200 mcg/hr IntraVENous TITRATE    hydrocortisone Sod Succ (PF) (SOLU-CORTEF) injection 50 mg  50 mg IntraVENous Q6H    0.9% sodium chloride infusion  100 mL/hr IntraVENous CONTINUOUS    Vancomycin - Pharmacy to dose by level   Other Rx Dosing/Monitoring    cefepime (MAXIPIME) 2 g in 0.9% sodium chloride (MBP/ADV) 100 mL MBP  2 g IntraVENous Q12H    levETIRAcetam (KEPPRA) injection 500 mg  500 mg IntraVENous Q12H    metroNIDAZOLE (FLAGYL) IVPB premix 500 mg  500 mg IntraVENous Q12H    vasopressin (VASOSTRICT) 20 Units in 0.9% sodium chloride 100 mL infusion  0.04 Units/min IntraVENous CONTINUOUS    lansoprazole compounding kit (PREVACID) 3 mg/mL oral suspension 30 mg  30 mg Oral ACB    chlorhexidine (PERIDEX) 0.12 % mouthwash 15 mL  15 mL Oral Q12H    lidocaine (XYLOCAINE) 10 mg/mL (1 %) injection 10-30 mL  10-30 mL SubCUTAneous Multiple    heparin 25,000 units in D5W 250 ml infusion  10-25 Units/kg/hr IntraVENous TITRATE    Vancomycin RANDOM level @4pm on 9/28/2022   Other ONCE        Laboratory data and review:    Recent Labs     09/28/22  0105 09/27/22  1846 09/27/22  1127   WBC 12.5* 13.2* 16.1*   HGB 12.0 11.5 14.2   HCT 37.3 36.1 45.5    259 306     Recent Labs     09/28/22  0105 09/27/22  2130 09/27/22  1431 09/27/22  1127    142 142 141   K 4.1 4.0 4.0 5.3*   * 109* 108 106   CO2 23 24 23 24   * 194* 250* 141*   BUN 15 15 18 16   CREA 1.31* 1.55* 2.11* 2.57*   CA 8.7 8.6 8.8 9.4   MG  --  1.8  --   --    PHOS  --  2.0*  --   --    ALB 2.9*  --  3.0* 3.9   ALT 59  --  41 43   INR  --   --  1.2*  --      No components found for: [place]    Diagnostics: Imaging studies have been reviewed    Telemetry reviewed by me:   normal sinus rhythm    Assessment and Plan:    Shock ([facility] [address] (9/27/2022) POA: suspected sepsis although no known focus vs cardiogenic given her cardiomyopathy. Blood cultures neg thus far. Initial lactic acid was high but now normal. CT chest, abdomen and pelvis neg for any obvious focus. She may have aspirated. Sputum cultures isolating some Gram positive cocci in clusters and gram neg rods. Continue IV fluids, IV Levophed and empiric IV Cefepime, Metronidazole and Vancomycin. IV Solucortef and wean as tolerated. NSTEMI (non-ST elevated myocardial infarction) ([facility] [address] (9/28/2022) / Cardiomyopathy ([facility] [address] (9/28/2022) POA: has upward trending troponin concerning for underlying CAD. No prior Hx. Echocardiogram showed EF 25-30% with some diastolic dysfunction. Continue heparin gtt. Cardiology evaluation pending. May need ischemic work up when more hemodynamically stable    Acute metabolic encephalopathy (7/20/5952) POA: unclear etiology but very concerning for possible anoxic brain injury given unknown downtime and her seizures vs substance intoxication vs arrhythmia from ACS vs sepsis. She remains intubated. CT scan brain was neg for any acute changes. Toxicology screen unremarkable other than benzos. Alcohol level <10. TSH low. EEG and MRi brain pending. Continue to treat reversible causes. Follow pending studies. Acute respiratory failure with hypoxia ([facility] [address] (9/27/2022) POA: intubated for airway protection given her encephalopathy on admission. May have aspirated. Intensivist following and managing vent     Seizure activity POA: she had witnessed seizures initially suspected to be from status. No prior hx of seizures. Continue empiric IV Keppra. Follow cEEG. Awaiting a neurology consult      CHRIS (acute kidney injury) ([facility] [address] (9/27/2022) POA: unknown baseline SCr. Has some urine output. Given her LV dysfunction, will need to be cautious with IV fluids. Nephrology following. Lactic acidosis (9/27/2022) POA: severe on admission but has now resolved. I suspect this could be from the seizure activity vs volume depletion with shock vs sepsis of unclear etiology. Continue above measures and follow clinical progress     Dyslipidemia (9/28/2022) POA: will need statin therapy when more stable    Hypothyroidism (9/28/2022) POA: TSH is depressed.  Check a free T4    KOBY (generalized anxiety disorder) (9/28/2022) POA: will need to verify her home medications once more stable    NB: patient has another chart with MR # [id number]    Total time spent for the patient's care: 35 Minutes Critical illness                 Care Plan discussed with: Care Manager, Nursing Staff, and Consultant/Specialist    Discussed:  Care Plan    Prophylaxis:   heparin gtt    Anticipated Disposition:   TBD           ___________________________________________________    Attending Physician:   Diego Workman MD

## 2022-09-29 ENCOUNTER — PATIENT OUTREACH (OUTPATIENT)
Dept: OTHER | Age: 57
End: 2022-09-29

## 2022-09-29 PROBLEM — I63.9 ACUTE CVA (CEREBROVASCULAR ACCIDENT) (HCC): Status: ACTIVE | Noted: 2022-09-29

## 2022-09-29 LAB
ANION GAP SERPL CALC-SCNC: 3 MMOL/L (ref 5–15)
APTT PPP: 123 SEC (ref 22.1–31)
APTT PPP: 41.8 SEC (ref 22.1–31)
APTT PPP: 46.1 SEC (ref 22.1–31)
ATRIAL RATE: 151 BPM
ATRIAL RATE: 63 BPM
BACTERIA SPEC CULT: NORMAL
BACTERIA SPEC CULT: NORMAL
BASOPHILS # BLD: 0.1 K/UL (ref 0–0.1)
BASOPHILS NFR BLD: 0 % (ref 0–1)
BUN SERPL-MCNC: 13 MG/DL (ref 6–20)
BUN/CREAT SERPL: 15 (ref 12–20)
CALCIUM SERPL-MCNC: 8.2 MG/DL (ref 8.5–10.1)
CALCULATED P AXIS, ECG09: 32 DEGREES
CALCULATED P AXIS, ECG09: 48 DEGREES
CALCULATED R AXIS, ECG10: 15 DEGREES
CALCULATED R AXIS, ECG10: 40 DEGREES
CALCULATED T AXIS, ECG11: 27 DEGREES
CALCULATED T AXIS, ECG11: 5 DEGREES
CHLORIDE SERPL-SCNC: 117 MMOL/L (ref 97–108)
CO2 SERPL-SCNC: 26 MMOL/L (ref 21–32)
COMMENT, HOLDF: NORMAL
CREAT SERPL-MCNC: 0.85 MG/DL (ref 0.55–1.02)
DIAGNOSIS, 93000: NORMAL
DIAGNOSIS, 93000: NORMAL
DIFFERENTIAL METHOD BLD: ABNORMAL
EOSINOPHIL # BLD: 0 K/UL (ref 0–0.4)
EOSINOPHIL NFR BLD: 0 % (ref 0–7)
ERYTHROCYTE [DISTWIDTH] IN BLOOD BY AUTOMATED COUNT: 14.7 % (ref 11.5–14.5)
EST. AVERAGE GLUCOSE BLD GHB EST-MCNC: 128 MG/DL
GLUCOSE BLD STRIP.AUTO-MCNC: 111 MG/DL (ref 65–117)
GLUCOSE BLD STRIP.AUTO-MCNC: 117 MG/DL (ref 65–117)
GLUCOSE BLD STRIP.AUTO-MCNC: 138 MG/DL (ref 65–117)
GLUCOSE BLD STRIP.AUTO-MCNC: 149 MG/DL (ref 65–117)
GLUCOSE SERPL-MCNC: 142 MG/DL (ref 65–100)
HBA1C MFR BLD: 6.1 % (ref 4–5.6)
HCT VFR BLD AUTO: 33.5 % (ref 35–47)
HGB BLD-MCNC: 10.7 G/DL (ref 11.5–16)
IMM GRANULOCYTES # BLD AUTO: 0.1 K/UL (ref 0–0.04)
IMM GRANULOCYTES NFR BLD AUTO: 1 % (ref 0–0.5)
LYMPHOCYTES # BLD: 2.1 K/UL (ref 0.8–3.5)
LYMPHOCYTES NFR BLD: 17 % (ref 12–49)
MAGNESIUM SERPL-MCNC: 2.2 MG/DL (ref 1.6–2.4)
MCH RBC QN AUTO: 29.8 PG (ref 26–34)
MCHC RBC AUTO-ENTMCNC: 31.9 G/DL (ref 30–36.5)
MCV RBC AUTO: 93.3 FL (ref 80–99)
MONOCYTES # BLD: 1 K/UL (ref 0–1)
MONOCYTES NFR BLD: 9 % (ref 5–13)
NEUTS SEG # BLD: 8.6 K/UL (ref 1.8–8)
NEUTS SEG NFR BLD: 73 % (ref 32–75)
NRBC # BLD: 0.02 K/UL (ref 0–0.01)
NRBC BLD-RTO: 0.2 PER 100 WBC
P-R INTERVAL, ECG05: 140 MS
P-R INTERVAL, ECG05: 144 MS
PHOSPHATE SERPL-MCNC: 1.6 MG/DL (ref 2.6–4.7)
PLATELET # BLD AUTO: 195 K/UL (ref 150–400)
PMV BLD AUTO: 11.8 FL (ref 8.9–12.9)
POTASSIUM SERPL-SCNC: 3.8 MMOL/L (ref 3.5–5.1)
Q-T INTERVAL, ECG07: 248 MS
Q-T INTERVAL, ECG07: 486 MS
QRS DURATION, ECG06: 72 MS
QRS DURATION, ECG06: 84 MS
QTC CALCULATION (BEZET), ECG08: 393 MS
QTC CALCULATION (BEZET), ECG08: 497 MS
RBC # BLD AUTO: 3.59 M/UL (ref 3.8–5.2)
SAMPLES BEING HELD,HOLD: NORMAL
SERVICE CMNT-IMP: ABNORMAL
SERVICE CMNT-IMP: ABNORMAL
SERVICE CMNT-IMP: NORMAL
SODIUM SERPL-SCNC: 146 MMOL/L (ref 136–145)
THERAPEUTIC RANGE,PTTT: ABNORMAL SECS (ref 58–77)
VENTRICULAR RATE, ECG03: 151 BPM
VENTRICULAR RATE, ECG03: 63 BPM
WBC # BLD AUTO: 11.8 K/UL (ref 3.6–11)

## 2022-09-29 PROCEDURE — 95706 EEG WO VID 2-12HR INTMT MNTR: CPT | Performed by: NURSE PRACTITIONER

## 2022-09-29 PROCEDURE — 74011250636 HC RX REV CODE- 250/636: Performed by: INTERNAL MEDICINE

## 2022-09-29 PROCEDURE — 74011250636 HC RX REV CODE- 250/636: Performed by: STUDENT IN AN ORGANIZED HEALTH CARE EDUCATION/TRAINING PROGRAM

## 2022-09-29 PROCEDURE — 74011250637 HC RX REV CODE- 250/637: Performed by: PSYCHIATRY & NEUROLOGY

## 2022-09-29 PROCEDURE — 80048 BASIC METABOLIC PNL TOTAL CA: CPT

## 2022-09-29 PROCEDURE — 85025 COMPLETE CBC W/AUTO DIFF WBC: CPT

## 2022-09-29 PROCEDURE — 85730 THROMBOPLASTIN TIME PARTIAL: CPT

## 2022-09-29 PROCEDURE — 83735 ASSAY OF MAGNESIUM: CPT

## 2022-09-29 PROCEDURE — 74011000250 HC RX REV CODE- 250: Performed by: INTERNAL MEDICINE

## 2022-09-29 PROCEDURE — 77030018798 HC PMP KT ENTRL FED COVD -A

## 2022-09-29 PROCEDURE — 94003 VENT MGMT INPAT SUBQ DAY: CPT

## 2022-09-29 PROCEDURE — 93005 ELECTROCARDIOGRAM TRACING: CPT

## 2022-09-29 PROCEDURE — 99233 SBSQ HOSP IP/OBS HIGH 50: CPT | Performed by: PSYCHIATRY & NEUROLOGY

## 2022-09-29 PROCEDURE — 2709999900 HC NON-CHARGEABLE SUPPLY

## 2022-09-29 PROCEDURE — 74011250637 HC RX REV CODE- 250/637: Performed by: NURSE PRACTITIONER

## 2022-09-29 PROCEDURE — 84100 ASSAY OF PHOSPHORUS: CPT

## 2022-09-29 PROCEDURE — 74011250636 HC RX REV CODE- 250/636: Performed by: NURSE PRACTITIONER

## 2022-09-29 PROCEDURE — 65610000006 HC RM INTENSIVE CARE

## 2022-09-29 PROCEDURE — 77030013256 HC HEADBAND EEG - F

## 2022-09-29 PROCEDURE — 99233 SBSQ HOSP IP/OBS HIGH 50: CPT | Performed by: STUDENT IN AN ORGANIZED HEALTH CARE EDUCATION/TRAINING PROGRAM

## 2022-09-29 PROCEDURE — 82962 GLUCOSE BLOOD TEST: CPT

## 2022-09-29 PROCEDURE — 74011000258 HC RX REV CODE- 258: Performed by: NURSE PRACTITIONER

## 2022-09-29 PROCEDURE — 83036 HEMOGLOBIN GLYCOSYLATED A1C: CPT

## 2022-09-29 PROCEDURE — 36415 COLL VENOUS BLD VENIPUNCTURE: CPT

## 2022-09-29 RX ORDER — GUAIFENESIN 100 MG/5ML
81 LIQUID (ML) ORAL DAILY
Status: DISCONTINUED | OUTPATIENT
Start: 2022-09-29 | End: 2022-10-20 | Stop reason: HOSPADM

## 2022-09-29 RX ORDER — HEPARIN SODIUM 1000 [USP'U]/ML
2000 INJECTION, SOLUTION INTRAVENOUS; SUBCUTANEOUS ONCE
Status: DISCONTINUED | OUTPATIENT
Start: 2022-09-29 | End: 2022-09-29

## 2022-09-29 RX ORDER — HEPARIN SODIUM 1000 [USP'U]/ML
2000 INJECTION, SOLUTION INTRAVENOUS; SUBCUTANEOUS ONCE
Status: COMPLETED | OUTPATIENT
Start: 2022-09-29 | End: 2022-09-29

## 2022-09-29 RX ORDER — MIDODRINE HYDROCHLORIDE 5 MG/1
10 TABLET ORAL EVERY 8 HOURS
Status: DISCONTINUED | OUTPATIENT
Start: 2022-09-29 | End: 2022-09-30

## 2022-09-29 RX ORDER — ASPIRIN 300 MG/1
300 SUPPOSITORY RECTAL DAILY
Status: DISCONTINUED | OUTPATIENT
Start: 2022-09-29 | End: 2022-09-29

## 2022-09-29 RX ADMIN — Medication 10 ML: at 06:12

## 2022-09-29 RX ADMIN — CHLORHEXIDINE GLUCONATE 15 ML: 1.2 RINSE ORAL at 21:02

## 2022-09-29 RX ADMIN — SODIUM CHLORIDE 100 ML/HR: 9 INJECTION, SOLUTION INTRAVENOUS at 06:44

## 2022-09-29 RX ADMIN — HYDROCORTISONE SODIUM SUCCINATE 50 MG: 100 INJECTION, POWDER, FOR SOLUTION INTRAMUSCULAR; INTRAVENOUS at 23:37

## 2022-09-29 RX ADMIN — CEFEPIME 2 G: 2 INJECTION, POWDER, FOR SOLUTION INTRAVENOUS at 17:51

## 2022-09-29 RX ADMIN — MIDODRINE HYDROCHLORIDE 10 MG: 5 TABLET ORAL at 21:02

## 2022-09-29 RX ADMIN — HYDROCORTISONE SODIUM SUCCINATE 50 MG: 100 INJECTION, POWDER, FOR SOLUTION INTRAMUSCULAR; INTRAVENOUS at 11:39

## 2022-09-29 RX ADMIN — HYDROCORTISONE SODIUM SUCCINATE 50 MG: 100 INJECTION, POWDER, FOR SOLUTION INTRAMUSCULAR; INTRAVENOUS at 17:51

## 2022-09-29 RX ADMIN — HEPARIN SODIUM 2000 UNITS: 1000 INJECTION INTRAVENOUS; SUBCUTANEOUS at 20:54

## 2022-09-29 RX ADMIN — MIDODRINE HYDROCHLORIDE 10 MG: 5 TABLET ORAL at 15:18

## 2022-09-29 RX ADMIN — METRONIDAZOLE 500 MG: 500 INJECTION, SOLUTION INTRAVENOUS at 06:47

## 2022-09-29 RX ADMIN — CHLORHEXIDINE GLUCONATE 15 ML: 1.2 RINSE ORAL at 08:52

## 2022-09-29 RX ADMIN — HEPARIN SODIUM 19 UNITS/KG/HR: 10000 INJECTION, SOLUTION INTRAVENOUS at 06:44

## 2022-09-29 RX ADMIN — HYDROCORTISONE SODIUM SUCCINATE 50 MG: 100 INJECTION, POWDER, FOR SOLUTION INTRAMUSCULAR; INTRAVENOUS at 06:47

## 2022-09-29 RX ADMIN — Medication 50 MCG/HR: at 06:59

## 2022-09-29 RX ADMIN — Medication 10 ML: at 13:26

## 2022-09-29 RX ADMIN — METRONIDAZOLE 500 MG: 500 INJECTION, SOLUTION INTRAVENOUS at 17:51

## 2022-09-29 RX ADMIN — CEFEPIME 2 G: 2 INJECTION, POWDER, FOR SOLUTION INTRAVENOUS at 01:10

## 2022-09-29 RX ADMIN — NOREPINEPHRINE BITARTRATE 2 MCG/MIN: 1 SOLUTION INTRAVENOUS at 12:25

## 2022-09-29 RX ADMIN — CEFEPIME 2 G: 2 INJECTION, POWDER, FOR SOLUTION INTRAVENOUS at 09:06

## 2022-09-29 RX ADMIN — Medication 150 MCG/HR: at 16:50

## 2022-09-29 RX ADMIN — PROPOFOL 20 MCG/KG/MIN: 10 INJECTION, EMULSION INTRAVENOUS at 15:59

## 2022-09-29 RX ADMIN — LANSOPRAZOLE 30 MG: KIT at 09:06

## 2022-09-29 RX ADMIN — VANCOMYCIN HYDROCHLORIDE 1000 MG: 1 INJECTION, POWDER, LYOPHILIZED, FOR SOLUTION INTRAVENOUS at 01:10

## 2022-09-29 RX ADMIN — NOREPINEPHRINE BITARTRATE 2 MCG/MIN: 1 SOLUTION INTRAVENOUS at 15:58

## 2022-09-29 RX ADMIN — Medication 10 ML: at 21:05

## 2022-09-29 RX ADMIN — ASPIRIN 81 MG: 81 TABLET, CHEWABLE ORAL at 09:06

## 2022-09-29 RX ADMIN — PROPOFOL 20 MCG/KG/MIN: 10 INJECTION, EMULSION INTRAVENOUS at 06:44

## 2022-09-29 NOTE — CONSULTS
Brief ICU Nutrition Assessment    Type and Reason for Visit: Reassess    Nutrition Recommendations/Plan:   Brief follow up. Consulted to begin TF. RD obtained measured bedscale weight of 105.1 kg. Propofol running at 9.4 mL/hour, providing 248 kcal/day. Trickle Feeds Vital AF 1.2 kcal @ 20 mL/hour  FWF 40 mL q 3 hours  Provide 5 Prosource/day, flush with 15 mL H2O before and after    Trickle feeds at goal 20 mL/hour provide 576 kcal (+ Propofol, + Prosource, 1024 kcal, 89% needs), 53 g carbs, 36 g protein (+ 5 Prosource, 91 g, 100% needs). TF + FWF provides 734 mL H2O/day.        Lab Results   Component Value Date/Time    GFR est AA >60 09/29/2022 03:27 AM    GFR est non-AA >60 09/29/2022 03:27 AM    Creatinine, POC 1.6 (H) 09/27/2022 06:45 PM    Creatinine 0.85 09/29/2022 03:27 AM    BUN 13 09/29/2022 03:27 AM    Sodium,  09/27/2022 06:45 PM    Sodium 146 (H) 09/29/2022 03:27 AM    Potassium 3.8 09/29/2022 03:27 AM    Potassium, POC 4.0 09/27/2022 06:45 PM    Chloride,  (H) 09/27/2022 06:45 PM    Chloride 117 (H) 09/29/2022 03:27 AM    CO2 26 09/29/2022 03:27 AM     Lab Results   Component Value Date/Time    Glucose 142 (H) 09/29/2022 03:27 AM    Glucose (POC) 138 (H) 09/29/2022 06:10 AM     Magnesium   Date Value Ref Range Status   09/28/2022 2.1 1.6 - 2.4 mg/dL Final   09/27/2022 1.8 1.6 - 2.4 mg/dL Final     Lab Results   Component Value Date/Time    Calcium 8.2 (L) 09/29/2022 03:27 AM    Phosphorus 2.3 (L) 09/28/2022 06:31 PM       Estimated Nutrition Needs:   Energy: 6881-4712 (11-14 kcal/kg)  Wt used: Current  Protein:  (1.5-2.0 g/kg IBW)  Wt used: Ideal   Fluid: 5056-1364     Electronically signed by Frank Decker [address], RD   Contact: [phone] or via [employer]

## 2022-09-29 NOTE — PROGRESS NOTES
0700 Bedside and Verbal shift change report given to [address], RN (oncoming nurse) by Jermaine Camarena RN (offgoing nurse). Report included the following information SBAR, Kardex, ED Summary, OR Summary, Procedure Summary, Intake/Output, MAR, Recent Results, Med Rec Status, Cardiac Rhythm Nsr sinus audie, Alarm Parameters , Quality Measures, and Dual Neuro Assessment. Primary Nurse Alin Case RN and Jermaine Camarena RN performed a dual skin assessment on this patient No impairment noted  Jose Enrique score, see flowsheet.       Jesus Manuel Cain, ICU NP at bedside. Orders to stop prop and increase fent 150. See MAR. Changes made due to bradycardia overnight and tremorous in all 4 extremities. 0732 Pt agitated - lifting head, attempting to get up. No command following. Electa Boxer ICU NP at bedside, Restart Prop at 20 to settle patient - plan to wean and SBT as tolerated. 0745 Pt resting quietly. 0800 heparin held Shu Duran, ICU Np notified of aPTT 123.   0850 Dr. Madeline Crump, neurology at bedside. Pt positive for stroke, although current neurological deficits should not be caused by stroke. NIH completed with admission. Family at bedside - updated by MD and by RN. All questions answered to satisfaction. [address] Electa Boxer, ICU NP of patient moving spontaneously but no spontaneous breathing with SBT. Orders for Rapid EEG. 65 Michelle Lavina placed on patient and monitoring begun. [phone] with patient advocate via phone - will be up to speak with pts mother. 1126 Stroke Education provided to parent and the following topics were discussed    1. Patients personal risk factors for stroke are none and CHF. No documented past medical history, new onset heart failure.      2. Warning signs of Stroke:        * Sudden numbness or weakness of the face, arm or leg, especially on one side of          The body            * Sudden confusion, trouble speaking or understanding        * Sudden trouble seeing in one or both eyes        * Sudden trouble walking, dizziness, loss of balance or coordination        * Sudden severe headache with no known cause      3. Importance of activation Emergency Medical Services ( 9-1-1 ) immediately if experience any warning signs of stroke. 4. Be sure and schedule a follow-up appointment with your primary care doctor or any specialists as instructed. 5. You must take medicine every day to treat your risk factors for stroke. Be sure to take your medicines exactly as your doctor tells you: no more, no less. Know what your medicines are for , what they do. Anti-thrombotics /anticoagulants can help prevent strokes. You are taking the following medicine(s)  Apirin     6. Smoking and second-hand smoke greatly increase your risk of stroke, cardiovascular disease and death. Smoking history never    7. Information provided was [facility] Stroke Education Binder, Stroke Handouts, or Verbal Education    8. Documentation of teaching completed in Patient Education Activity and on Care Plan with teaching response noted?   yes

## 2022-09-29 NOTE — PROGRESS NOTES
[facility]  YOB: 1965          Assessment & Plan:     CHRIS, resolved  Shock  Resp failure  Encephalopathy  Edema    Rec:  No acute indication HD  HD line out  Stop IVF  Probably will need diuresis soon       Subjective:   CC: follow up CHRIS  HPI: Creat normal. HD line removed. Remains on vent, 30% PEEP 5. More edema today. Good uop but fluid balance positive.   ROS: unable to obtain due to pt condition  Current Facility-Administered Medications   Medication Dose Route Frequency    fentaNYL (PF) 1,500 mcg/30 mL (50 mcg/mL) infusion  0-200 mcg/hr IntraVENous TITRATE    aspirin chewable tablet 81 mg  81 mg Oral DAILY    lansoprazole compounding kit (PREVACID) 3 mg/mL oral suspension 30 mg  30 mg Oral DAILY    cefepime (MAXIPIME) 2 g in 0.9% sodium chloride (MBP/ADV) 100 mL MBP  2 g IntraVENous Q8H    insulin lispro (HUMALOG) injection   SubCUTAneous Q6H    glucose chewable tablet 16 g  4 Tablet Oral PRN    glucagon (GLUCAGEN) injection 1 mg  1 mg IntraMUSCular PRN    dextrose 10% infusion 0-250 mL  0-250 mL IntraVENous PRN    propofol (DIPRIVAN) 10 mg/mL infusion  0-50 mcg/kg/min IntraVENous TITRATE    dextrose 10 % infusion 125-250 mL  125-250 mL IntraVENous PRN    NOREPINephrine (LEVOPHED) 8 mg in 5% dextrose 250mL (32 mcg/mL) infusion  0.5-30 mcg/min IntraVENous TITRATE    sodium chloride (NS) flush 5-40 mL  5-40 mL IntraVENous Q8H    sodium chloride (NS) flush 5-40 mL  5-40 mL IntraVENous PRN    acetaminophen (TYLENOL) tablet 650 mg  650 mg Oral Q6H PRN    Or    acetaminophen (TYLENOL) suppository 650 mg  650 mg Rectal Q6H PRN    polyethylene glycol (MIRALAX) packet 17 g  17 g Oral DAILY PRN    senna (SENOKOT) tablet 8.6 mg  1 Tablet Oral DAILY PRN    promethazine (PHENERGAN) tablet 12.5 mg  12.5 mg Oral Q6H PRN    Or    ondansetron (ZOFRAN) injection 4 mg  4 mg IntraVENous Q6H PRN    hydrocortisone Sod Succ (PF) (SOLU-CORTEF) injection 50 mg  50 mg IntraVENous Q6H    metroNIDAZOLE (FLAGYL) IVPB premix 500 mg  500 mg IntraVENous Q12H    chlorhexidine (PERIDEX) 0.12 % mouthwash 15 mL  15 mL Oral Q12H    heparin 25,000 units in D5W 250 ml infusion  10-25 Units/kg/hr IntraVENous TITRATE          Objective:     Vitals:  Blood pressure (!) 93/50, pulse 64, temperature 97.5 °F (36.4 °C), resp. rate 15, height 5' 6\" (1.676 m), weight 104.3 kg (230 lb), SpO2 93 %. Temp (24hrs), Av °F (36.7 °C), Min:97.5 °F (36.4 °C), Max:98.9 °F (37.2 °C)      Intake and Output:   07 - 1900  In: 918.8 [I.V.:918.8]  Out: 245 [Urine:245]  1901 -  0700  In: 6419.2 [I.V.:6419.2]  Out: 6955 [Urine:2975]    Physical Exam:               GENERAL ASSESSMENT: On vent  HEENT: ETT in place   CHEST: CTA  HEART: reg  ABDOMEN: Soft,NT  : +Sal +urine   EXTREMITY: +EDEMA          ECG/rhythm:    Data Review      No results for input(s): TNIPOC in the last 72 hours.     No lab exists for component: ITNL   Recent Labs     22  1431   CPK 2,730*       Recent Labs     22  0327 22  1831 22  0105 22  2130 22  1846 22  1431 22  1127   * 146* 143 142  --  142 141   K 3.8 4.0 4.1 4.0  --  4.0 5.3*   * 117* 111* 109*  --  108 106   CO2 26 25 23 24  --  23 24   BUN 13 11 15 15  --  18 16   CREA 0.85 0.98 1.31* 1.55*  --  2.11* 2.57*   * 131* 182* 194*  --  250* 141*   PHOS  --  2.3*  --  2.0*  --   --   --    MG  --  2.1  --  1.8  --   --   --    CA 8.2* 8.1* 8.7 8.6  --  8.8 9.4   ALB  --   --  2.9*  --   --  3.0* 3.9   WBC 11.8*  --  12.5*  --  13.2*  --  16.1*   HGB 10.7*  --  12.0  --  11.5  --  14.2   HCT 33.5*  --  37.3  --  36.1  --  45.5     --  280  --  259  --  306        Recent Labs     22  0649 22  2204 22  1219 22  1846 22  1431   INR  --   --   --   --  1.2*   PTP  --   --   --   --  12.0*   APTT 123.0* 37.6* 49.7*   < > <20.0*    < > = values in this interval not displayed. Needs: urine analysis, urine sodium, protein and creatinine  No results found for: ANDREW BOWERS        : Lamar Rodriguez MD  9/29/2022        [facility]:  [email or web address]  [place] office:  [address] [address]  [place], [address] [place] [address]  Phone: [phone]  Fax :     [phone]    [place] office:  [address] [facility]  Phone - [phone]  Fax - [phone]

## 2022-09-29 NOTE — PROGRESS NOTES
[facility] and [address] at [facility] at [address] [address] [address]   [phone]              Chief Complaint   Patient presents with    Unresponsive     Current Facility-Administered Medications   Medication Dose Route Frequency Provider Last Rate Last Admin    aspirin chewable tablet 81 mg  81 mg Oral DAILY Doylene Kalpana NP   81 mg at 09/28/22 1209    vancomycin (VANCOCIN) 1,000 mg in 0.9% sodium chloride 250 mL ([id number])  1,000 mg IntraVENous Q12H Doylene Kalpana  mL/hr at 09/29/22 0110 1,000 mg at 09/29/22 0110    Vancomycin level 9/29 prior to 1300 dose   Other Alease Sicard, NP        lansoprazole compounding kit (PREVACID) 3 mg/mL oral suspension 30 mg  30 mg Oral DAILY Doylehannah Acuna NP        cefepime (MAXIPIME) 2 g in 0.9% sodium chloride (MBP/ADV) 100 mL MBP  2 g IntraVENous Q8H Doylehannah Acuna NP 25 mL/hr at 09/29/22 0110 2 g at 09/29/22 0110    insulin lispro (HUMALOG) injection   SubCUTAneous Q6H Sony Acuna NP        glucose chewable tablet 16 g  4 Tablet Oral PRN Doylene Kalpaan NP        glucagon (GLUCAGEN) injection 1 mg  1 mg IntraMUSCular PRN Docolette Acuna NP        dextrose 10% infusion 0-250 mL  0-250 mL IntraVENous PRN Doylehannah Acuna NP        propofol (DIPRIVAN) 10 mg/mL infusion  0-50 mcg/kg/min IntraVENous TITRATE Mari Lester MD 12.5 mL/hr at 09/29/22 0347 20 mcg/kg/min at 09/29/22 0347    dextrose 10 % infusion 125-250 mL  125-250 mL IntraVENous PRN Doylehannah Acuna NP        NOREPINephrine (LEVOPHED) 8 mg in 5% dextrose 250mL (32 mcg/mL) infusion  0.5-30 mcg/min IntraVENous TITRATE Doylene Kalpana NP 1.9 mL/hr at 09/29/22 0135 1 mcg/min at 09/29/22 0135    sodium chloride (NS) flush 5-40 mL  5-40 mL IntraVENous Q8H Luis Angel Christian MD   10 mL at 09/28/22 8563    sodium chloride (NS) flush 5-40 mL 5-40 mL IntraVENous PRN Stephanie Schmidt MD        acetaminophen (TYLENOL) tablet 650 mg  650 mg Oral Q6H PRN Stephanie Schmidt MD        Or    acetaminophen (TYLENOL) suppository 650 mg  650 mg Rectal Q6H PRN Stephanie Schmidt MD        polyethylene glycol (MIRALAX) packet 17 g  17 g Oral DAILY PRN Stephanie Schmidt MD        senna (SENOKOT) tablet 8.6 mg  1 Tablet Oral DAILY PRN Stephanie Schmidt MD        promethazine (PHENERGAN) tablet 12.5 mg  12.5 mg Oral Q6H PRN Stephanie Schmidt MD        Or    ondansetron [facility]) injection 4 mg  4 mg IntraVENous Q6H PRN Stephanie Schmidt MD        hydrocortisone Sod Succ (PF) (SOLU-CORTEF) injection 50 mg  50 mg IntraVENous Q6H Alisha Calcasieu, NP   50 mg at 09/28/22 2340    0.9% sodium chloride infusion  100 mL/hr IntraVENous CONTINUOUS Stephanie Schmidt  mL/hr at 09/28/22 1845 100 mL/hr at 09/28/22 1845    levETIRAcetam (KEPPRA) injection 500 mg  500 mg IntraVENous Q12H Alisha Dent, NP   500 mg at 09/28/22 2340    metroNIDAZOLE (FLAGYL) IVPB premix 500 mg  500 mg IntraVENous Q12H Stephanie Schmidt  mL/hr at 09/28/22 1751 500 mg at 09/28/22 1751    chlorhexidine (PERIDEX) 0.12 % mouthwash 15 mL  15 mL Oral Q12H Alisha Calcasieu, NP   15 mL at 09/28/22 2159    heparin 25,000 units in D5W 250 ml infusion  10-25 Units/kg/hr IntraVENous TITRATE Alisha Dent, NP 19.8 mL/hr at 09/28/22 2341 19 Units/kg/hr at 09/28/22 2341      No Known Allergies     We are following up on this 75-year-old lady you was admitted to the hospital after being found down with respiratory arrest and hypotension with a witnessed seizure activity. She had rapid EEG that was unremarkable and continuous video EEG that was unremarkable as well.   MRI of the brain performed yesterday demonstrates a small acute infarct in the right frontal lobe      CBC this morning with a white count of 11.8 and hemoglobin 13.4  Metabolic panel with sodium 146 glucose 142 otherwise unremarkable    Review of notes finds patient was bradycardic in the 40s overnight. She remains on Keppra as noted above 500 mg twice daily  Echo EF 25-30%  Lipids pnd  Patient seen this morning with her mother at the bedside as well as nursing. Case was discussed at length. Examination  Visit Vitals  /64 (BP 1 Location: Right upper arm, BP Patient Position: At rest)   Pulse (!) 51   Temp 97.7 °F (36.5 °C)   Resp 16   Ht 5' 6\" (1.676 m)   Wt 104.3 kg (230 lb)   SpO2 95%   BMI 37.12 kg/m²   She is on propofol and we held the propofol for the examination. To verbal stimulus no response. To tactile stimulus she will open her eyes briefly but does not have any further response. Noxious following the tactile initial response elicits no further awakening. Pupils are small pinpoint not reactive. No doll's eyes. No corneal.  She does gag. She is breathing over the vent. She has some spontaneous movement in the lower extremities. She does have withdrawal to noxious cutaneous stimulation. Reflexes symmetrical.  Toes are down. Remainder not testable      Impression/Plan  77-year-old lady status post respiratory arrest with witnessed seizure like activity and the etiology of the arrest is thought to be fentanyl patch 100 mcg that was found on the patient. Discussion with her mother this morning also finds that she was taking sleeping pills as well.   We discussed that the stroke is not the reason for her current state that her current state is likely secondary to her respiratory depression due to medications as above  Aspirin was added to her regimen  She is on heparin drip from a cardiac perspective  Lipid panel pending  Therapies to see when able  Stroke teaching for the family and first dose education for the family as the patient is unable to participate  We will stop the [address] as I do not think this stroke although cortically based was the cause of her seizure-like activity it was likely her hypotension and hypoxia although keeping in mind that it is a cortical stroke and she could have seizure. She will declare herself but for now we will get rid of the [address] to wean sedation as possible and wean her from the vent and I discussed with her mother that we will not know what type of hypoxic injury if any she has sustained until she is able to be awake and  At this point we will follow from a distance and we will return if needed    Colleen Collier MD        This note was created using voice recognition software. Despite editing, there may be syntax errors.

## 2022-09-29 NOTE — PROGRESS NOTES
Attempted patient on SBT; no respiratory effort & patient placed back on full support. 1330hrs. Patient awake, eyes open, moving in bed, trying to sit up. ... Israel Muir SBT attempted at this time. No respiratory effort; pt placed back on full support.

## 2022-09-29 NOTE — PROGRESS NOTES
Transition of care note:    RUR 12%(low RUR risk score)    Reason for Admission:  shock,found down by neighbor doing wellness check ,CPR initiated by EMS,last known well time was evening of 9/26/22             Transition of Care Plan:    Emergently intubated in ED (9/27/22)        Seizure-like activity @ home           Acute respiratory failure with hypoxia  Suspected status eilepticus    Hypotensive  Acute metabolic encephalopathy  CHRIS  Acute systolic heart failure  Shock         Probable Takotsubo cardiomyopathy  Small frontal CVA  Ischemic work-up when extubated and stable for cardiac cath      Today:  Pt has [employer] insurance. Mother was asking about disability for pt. When she returns ,I will explain to her about the process for disability.     Noemy Porras

## 2022-09-29 NOTE — PROGRESS NOTES
1900- Bedside and Verbal shift change report given to [facility] (oncoming nurse) by Jarret Torres (offgoing nurse). Report included the following information SBAR, Kardex, Intake/Output, MAR, Recent Results, Cardiac Rhythm sinus audie, Alarm Parameters , and Quality Measures. See MAR for all medication administrations. See flowsheets for all assessments. 2311- notified pt HR sinus audie in low 50-40s. 2330-  at bedside to assess patient via telehealth monitor. Notified MD pt HR still in 45s. MD stated is okay and does not want to change sedation because wants to be able to adequately assess neuro status. Emperatriz-  notified pt HR continuing to drop, starting to maintain in 40s with occasional drops to 30s. Orders for fent gtt.

## 2022-09-29 NOTE — PROGRESS NOTES
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 She was LKW the evening of . She did not report to work the am of  and she was found unresponsive at her home on wellness check. Bystander CPR was started, but she did have a pulse. EMS was called. Upon their arrival, SBP was 70s. Pupils were pinpoint, narcan was administered without improvement. She experienced seizure like activity and received 4mg IV versed. Upon arrival to the ED, she exhibited seizure like activity and L sided gaze preference. She required intubation for hypoxic respiratory failure. She was sedated on propofol. She became hypotensive and was started on levophed. LA 6.46. She was admitted to the ICU. She was started on empiric vanc/zosyn. Blood/sputum cultures ordered. CT head unrevealing. CT chest notable for only bibasilar atelectasis, cannot exclude small amount aspiration in lower lobes. CTAP unrevealing. She was noted to exhibit profound hypoxia. 7.33/44/73 on 100% FiO2 - not consistent with ARDS due to absence of bilateral infiltrates. A 100mcg fentanyl patch was found on the patient and this was removed.  did not reveal that the patient was prescribed this medication. Fentanyl does not always show on UDS. After speaking more with family that have arrived, they noted the patient did have access to  fentanyl patches from her  . She had offered one to her mother recently. The patient's mother said the patient hurt her back and may have tried using this for pain. It is plausible that the patient applied this and became hypoxic while asleep. As of  am, the patient has been weaned to 30% Fio2, levophed of 4mcg/min.  When propofol is turned off, there is intermittent jerking. Developed bradycardia overnight. Propofol weaned. MRI showed There is a punctate focus of acute infarction in the right frontal cortex. Additional probable focus of cortical infarction anteriorly right frontal lobe. Active Problem List:     Problem List  Date Reviewed: 9/27/2022            Codes Class    Acute CVA (cerebrovascular accident) [facility]) ICD-10-CM: I63.9  ICD-9-CM: 434.91         NSTEMI (non-ST elevated myocardial infarction) ([facility] ICD-10-CM: I21.4  ICD-9-CM: 410.70         Cardiomyopathy ([facility] ICD-10-CM: I42.9  ICD-9-CM: 425.4         Dyslipidemia ICD-10-CM: E78.5  ICD-9-CM: 272.4         Hypothyroidism ICD-10-CM: E03.9  ICD-9-CM: 244.9         KOBY (generalized anxiety disorder) ICD-10-CM: F41.1  ICD-9-CM: 300.02         * (Principal) Status epilepticus ([place] [address].) ICD-10-CM: G40.901  ICD-9-CM: 345. 3         CHRIS (acute kidney injury) ([facility] ICD-10-CM: N17.9  ICD-9-CM: 584.9         Lactic acidosis ICD-10-CM: E87.2  ICD-9-CM: 276.2         Acute respiratory failure with hypoxia (HCC) ICD-10-CM: J96.01  ICD-9-CM: 518.81         Shock ([place] [address].) ICD-10-CM: R57.9  ICD-9-CM: 785.50         Acute metabolic encephalopathy AGZ-56-FK: G93.41  ICD-9-CM: 348.31            Past Medical History:      has no past medical history on file. Past Surgical History:      has a past surgical history that includes ir insert non tunl cvc over 5 yrs (9/27/2022). Home Medications:     Prior to Admission medications    Not on File       Allergies/Social/Family History:     No Known Allergies   Social History     Tobacco Use    Smoking status: Not on file    Smokeless tobacco: Not on file   Substance Use Topics    Alcohol use: Not on file      No family history on file.     Review of Systems:     Unable to obtain    Objective:   Vital Signs:  Visit Vitals  BP (!) 93/50 (BP 1 Location: Right upper arm, BP Patient Position: At rest)   Pulse 64   Temp 97.5 °F (36.4 °C)   Resp 15   Ht 5' 6\" (1.676 m)   Wt 104.3 kg (230 lb) SpO2 93%   BMI 37.12 kg/m²    O2 Flow Rate (L/min): 60 l/min O2 Device: Endotracheal tube, Ventilator Temp (24hrs), Av °F (36.7 °C), Min:97.5 °F (36.4 °C), Max:98.9 °F (37.2 °C)           Intake/Output:     Intake/Output Summary (Last 24 hours) at 2022 0846  Last data filed at 2022 0800  Gross per 24 hour   Intake 5091.6 ml   Output 1125 ml   Net 3966.6 ml       Physical Exam:    General:  Sedated/intubated  Eye:  PERRLA; eyes midline at the time of exam   Neurologic:  Sedated; intubated; nonfocal exam; no withdraw  to noxious stimuli at the time of exam, but sedated  Neck: Supple, no JVD  Lungs:  CTAB  Heart:  RRR, no MRG, 2+ pulses, no edema  Abdomen:  Distended, no tenderness, no rebound, no guarding  Skin:  c/d/I  Validated     LABS AND  DATA: Personally reviewed  Recent Labs     22  010   WBC 11.8* 12.5*   HGB 10.7* 12.0   HCT 33.5* 37.3    280     Recent Labs     22  0327 22  1831 22  0105 22  2130   * 146*   < > 142   K 3.8 4.0   < > 4.0   * 117*   < > 109*   CO2 26 25   < > 24   BUN 13 11   < > 15   CREA 0.85 0.98   < > 1.55*   * 131*   < > 194*   CA 8.2* 8.1*   < > 8.6   MG  --  2.1  --  1.8   PHOS  --  2.3*  --  2.0*    < > = values in this interval not displayed. Recent Labs     22  0105 22  1431   AP 58 58   TP 5.7* 5.6*   ALB 2.9* 3.0*   GLOB 2.8 2.6     Recent Labs     22  0649 22  2204 22  1846 22  1431   INR  --   --   --  1.2*   PTP  --   --   --  12.0*   APTT 123.0* 37.6*   < > <20.0*    < > = values in this interval not displayed.       Recent Labs     22  1056 22  1243   PHI 7.42 7.33*   PCO2I 35.8 44.4   PO2I 96 73*   FIO2I 40 100     Recent Labs     22  1431   CPK 2,730*         Ventilator Settings:  Mode Rate Tidal Volume Pressure FiO2 PEEP   Assist control   400 ml    30 % 5 cm H20     Peak airway pressure: 31 cm H2O    Minute ventilation: 7.18 l/min      ABG on 40% 7.42/35/96    MEDS: Reviewed    Chest X-Ray: personally reviewed and report checked    TTE: Severely reduced left ventricular systolic function with a visually estimated EF of 25 - 30%. Left ventricle size is normal. Mildly increased wall thickness. See diagram for wall motion findings. Abnormal diastolic function. Assessment and Plan     Shock: vasodilatory Afebrile last 24h. Cultures NGTD. CT chest and abdomen unrevealing. New acute systolic HF. LA improved to 3. Hepatic panel unrevealing.  - Vanc stopped  - Sputum notable for only normal respiratory josue; MRSA negative  - Will consider continued abx taper  - Intubated only for airway protection at this time  - Urine/sputum/blood cultures, continue to follow  - Levo for MAP >65    Mechanical ventilation: Intubated for airway protection at this point. Likely due to aspiration pneumonitis  - RVP negative   - Sputum culture pending  - Antibiotics as above  - SAT/SBT today    Acute systolic HF: Cardiology following, concern for Takotsubo EF 25%  - Cardiology following  - Plan for cardiac cath, likely Friday  - Continue heparin drip    Acute toxic metabolic encephalopathy: Broad ddx. EEG negative for seizure. Cannot yet exclude anoxic brain injury in setting of hypoxia  - Avoid benzos  - SAT/SBT today  - Cannot use precedex due to bradycardia  - Wean propofol due to bradycardia and preferentially use fent    Ischemic CVA: punctate focus of acute infarction in the right frontal cortex. Additional probable focus of cortical infarction anteriorly right frontal lobe  - Aspirin  - Neurology following, appreciate res    Multidisciplinary Rounds Completed:  Y    ABCDEF Bundle/Checklist  Pain Medications: Y, fent  Target RASS: -5  Sedation Medications: Fent/prop  CAM-ICU:  FAVIOLA  Mobility: Poor  PT/OT: Unable  Restraints: Y  Discussed Plan of Care (goals of care):  F  Addressed Code Status: Y    CARDIOVASCULAR  Cardiac Gtts: Levo/vaso  SBP Goal of: > 90 mmHg  MAP Goal of: > 65 mmHg  Transfusion Trigger (Hgb): <7 g/dL    RESPIRATORY  Vent Goals:   Chlorhexidine   DVT Prophylaxis (if no, list reason): Heparin   SPO2 Goal: > 92%  Pulmonary toilet: Duo-Nebs     GI/  Sal Catheter Present: Yes  GI Prophylaxis: Protonix (pantoprazole)   Nutrition: Pending NST consult  IVFs: Y  Bowel Movement: N  Bowel Regimen: Y  Insulin: Y    ANTIBIOTICS  Antibiotics:  Y    T/L/D  Tubes: Y  Lines: Y  Drains: Y    SPECIAL EQUIPMENT  N    DISPOSITION  ICU    CRITICAL CARE CONSULTANT NOTE  I had a face to face encounter with the patient, reviewed and interpreted patient data including clinical events, labs, images, vital signs, I/O's, and examined patient. I have discussed the case and the plan and management of the patient's care with the consulting services, the bedside nurses and the respiratory therapist.      NOTE OF PERSONAL INVOLVEMENT IN CARE   This patient has a high probability of imminent, clinically significant deterioration, which requires the highest level of preparedness to intervene urgently. I participated in the decision-making and personally managed or directed the management of the following life and organ supporting interventions that required my frequent assessment to treat or prevent imminent deterioration    I personally spent 60 minutes of critical care time. This is time spent at this critically ill patient's bedside actively involved in patient care as well as the coordination of care and discussions with the patient's family. This does not include any procedural time which has been billed separately.     BENJI [facility]  9/29/2022

## 2022-09-29 NOTE — PROGRESS NOTES
Problem: Ventilator Management  Goal: *Adequate oxygenation and ventilation  Outcome: Progressing Towards Goal  Goal: *Patient maintains clear airway/free of aspiration  Outcome: Progressing Towards Goal  Goal: *Absence of infection signs and symptoms  Outcome: Progressing Towards Goal  Goal: *Normal spontaneous ventilation  Outcome: Progressing Towards Goal     Problem: Patient Education: Go to Patient Education Activity  Goal: Patient/Family Education  Outcome: Progressing Towards Goal     Problem: Non-Violent Restraints  Goal: Removal from restraints as soon as assessed to be safe  Outcome: Progressing Towards Goal  Goal: No harm/injury to patient while restraints in use  Outcome: Progressing Towards Goal  Goal: Patient's dignity will be maintained  Outcome: Progressing Towards Goal  Goal: Patient Interventions  Outcome: Progressing Towards Goal     Problem: Delirium Treatment  Goal: *Level of consciousness restored to baseline  Outcome: Progressing Towards Goal  Goal: *Level of environmental perceptions restored to baseline  Outcome: Progressing Towards Goal  Goal: *Sensory perception restored to baseline  Outcome: Progressing Towards Goal  Goal: *Emotional stability restored to baseline  Outcome: Progressing Towards Goal  Goal: *Functional assessment restored to baseline  Outcome: Progressing Towards Goal  Goal: *Absence of falls  Outcome: Progressing Towards Goal  Goal: *Will remain free of delirium, CAM Score negative  Outcome: Progressing Towards Goal  Goal: *Cognitive status will be restored to baseline  Outcome: Progressing Towards Goal  Goal: Interventions  Outcome: Progressing Towards Goal     Problem: Patient Education: Go to Patient Education Activity  Goal: Patient/Family Education  Outcome: Progressing Towards Goal     Problem: Falls - Risk of  Goal: *Absence of Falls  Description: Document Lissy Don Fall Risk and appropriate interventions in the flowsheet.   Outcome: Progressing Towards Goal  Note: Fall Risk Interventions:       Mentation Interventions: Adequate sleep, hydration, pain control, Bed/chair exit alarm, Door open when patient unattended, Evaluate medications/consider consulting pharmacy, Family/sitter at bedside, Reorient patient, More frequent rounding, Increase mobility, Room close to nurse's station    Medication Interventions: Assess postural VS orthostatic hypotension, Bed/chair exit alarm, Evaluate medications/consider consulting pharmacy, Teach patient to arise slowly, Patient to call before getting OOB    Elimination Interventions: Bed/chair exit alarm, Toileting schedule/hourly rounds    History of Falls Interventions: Bed/chair exit alarm, Door open when patient unattended, Evaluate medications/consider consulting pharmacy, Investigate reason for fall, Room close to nurse's station         Problem: Patient Education: Go to Patient Education Activity  Goal: Patient/Family Education  Outcome: Progressing Towards Goal     Problem: Pressure Injury - Risk of  Goal: *Prevention of pressure injury  Description: Document Jose Enrique Scale and appropriate interventions in the flowsheet.   Outcome: Progressing Towards Goal     Problem: Patient Education: Go to Patient Education Activity  Goal: Patient/Family Education  Outcome: Progressing Towards Goal     Problem: Nutrition Deficit  Goal: *Optimize nutritional status  Outcome: Progressing Towards Goal

## 2022-09-29 NOTE — PROGRESS NOTES
Dominguez Hobbs [facility]  [address] [facility], [id number] [place] [address]  [phone]      Hospitalist  Progress Note      NAME:         Severo Davis   :        1965  MRM:        [id number]    Date of service: 2022      Chief complaint: Unresponsiveness, seizures    Interval HPI: Patient remains sedated and intubated. I have discussed with her nurse, mother and intensivist for collaborative hx as well as reviewed her chart. Had episodes of bradycardia. MRi showed acute infarction      Objective:    Vital Signs:    Visit Vitals  BP (!) 103/49   Pulse (!) 59   Temp 97.7 °F (36.5 °C)   Resp 16   Ht 5' 6\" (1.676 m)   Wt 104.3 kg (230 lb)   SpO2 98%   BMI 37.12 kg/m²        Intake/Output Summary (Last 24 hours) at 2022 0759  Last data filed at 2022 0600  Gross per 24 hour   Intake 4550.13 ml   Output 1875 ml   Net 2675.13 ml          Physical Examination:    General:  she remains critically ill, intubated but not in distress   Eyes:   pink conjunctivae, PERRLA with no discharge. ENT:   no ottorrhea or rhinorrhea with dry mucous membranes  Neck: no masses, thyroid non-tender and trachea central.  Pulm:  decreased breath sounds with scattered crackles. No wheezes   Card:  no JVD or murmurs, has regular and normal S1, S2 without thrills, bruits or peripheral edema  Abd:  Soft, non-tender, non-distended, normoactive bowel sounds   Musc:  No cyanosis, clubbing, atrophy or deformities. Skin:  No rashes, bruising or ulcers. Neuro: Sedated and intubated. No facial asymmetry.      Current Facility-Administered Medications   Medication Dose Route Frequency    fentaNYL (PF) 1,500 mcg/30 mL (50 mcg/mL) infusion  0-200 mcg/hr IntraVENous TITRATE    aspirin chewable tablet 81 mg  81 mg Oral DAILY    vancomycin (VANCOCIN) 1,000 mg in 0.9% sodium chloride 250 mL ([id number])  1,000 mg IntraVENous Q12H    Vancomycin level  prior to 1300 dose   Other ONCE    lansoprazole compounding kit (PREVACID) 3 mg/mL oral suspension 30 mg  30 mg Oral DAILY    cefepime (MAXIPIME) 2 g in 0.9% sodium chloride (MBP/ADV) 100 mL MBP  2 g IntraVENous Q8H    insulin lispro (HUMALOG) injection   SubCUTAneous Q6H    glucose chewable tablet 16 g  4 Tablet Oral PRN    glucagon (GLUCAGEN) injection 1 mg  1 mg IntraMUSCular PRN    dextrose 10% infusion 0-250 mL  0-250 mL IntraVENous PRN    propofol (DIPRIVAN) 10 mg/mL infusion  0-50 mcg/kg/min IntraVENous TITRATE    dextrose 10 % infusion 125-250 mL  125-250 mL IntraVENous PRN    NOREPINephrine (LEVOPHED) 8 mg in 5% dextrose 250mL (32 mcg/mL) infusion  0.5-30 mcg/min IntraVENous TITRATE    sodium chloride (NS) flush 5-40 mL  5-40 mL IntraVENous Q8H    sodium chloride (NS) flush 5-40 mL  5-40 mL IntraVENous PRN    acetaminophen (TYLENOL) tablet 650 mg  650 mg Oral Q6H PRN    Or    acetaminophen (TYLENOL) suppository 650 mg  650 mg Rectal Q6H PRN    polyethylene glycol (MIRALAX) packet 17 g  17 g Oral DAILY PRN    senna (SENOKOT) tablet 8.6 mg  1 Tablet Oral DAILY PRN    promethazine (PHENERGAN) tablet 12.5 mg  12.5 mg Oral Q6H PRN    Or    ondansetron (ZOFRAN) injection 4 mg  4 mg IntraVENous Q6H PRN    hydrocortisone Sod Succ (PF) (SOLU-CORTEF) injection 50 mg  50 mg IntraVENous Q6H    0.9% sodium chloride infusion  100 mL/hr IntraVENous CONTINUOUS    levETIRAcetam (KEPPRA) injection 500 mg  500 mg IntraVENous Q12H    metroNIDAZOLE (FLAGYL) IVPB premix 500 mg  500 mg IntraVENous Q12H    chlorhexidine (PERIDEX) 0.12 % mouthwash 15 mL  15 mL Oral Q12H    heparin 25,000 units in D5W 250 ml infusion  10-25 Units/kg/hr IntraVENous TITRATE        Laboratory data and review:    Recent Labs     09/29/22  0327 09/28/22  0105 09/27/22  1846   WBC 11.8* 12.5* 13.2*   HGB 10.7* 12.0 11.5   HCT 33.5* 37.3 36.1    280 259       Recent Labs     09/29/22  0327 09/28/22  1831 09/28/22  0105 09/27/22  2130 09/27/22  1431 09/27/22  1127   * 146* 143 142 142 141   K 3.8 4.0 4.1 4.0 4.0 5.3*   * 117* 111* 109* 108 106   CO2 26 25 23 24 23 24   * 131* 182* 194* 250* 141*   BUN 13 11 15 15 18 16   CREA 0.85 0.98 1.31* 1.55* 2.11* 2.57*   CA 8.2* 8.1* 8.7 8.6 8.8 9.4   MG  --  2.1  --  1.8  --   --    PHOS  --  2.3*  --  2.0*  --   --    ALB  --   --  2.9*  --  3.0* 3.9   ALT  --   --  59  --  41 43   INR  --   --   --   --  1.2*  --        No components found for: [place]    Diagnostics: Imaging studies have been reviewed    Telemetry reviewed by me:   normal sinus rhythm    Assessment and Plan:    Shock ([facility] (9/27/2022) POA: unclear etiology but suspect this was due to sepsis although no known focus as yet (but concerning for aspiration) vs cardiogenic given her cardiomyopathy. Blood cultures remain neg. Initial lactic acid was high but now normal. CT chest, abdomen and pelvis neg for any obvious focus. She may have aspirated. Sputum cultures isolating some Gram positive cocci in clusters and gram neg rods and final cultures pending. Continue IV Levophed and wean as tolerated to keep a MAP >65, empiric IV Cefepime, Metronidazole and Vancomycin. IV Solucortef and wean as tolerated. She remains critically ill. Continue to follow clinical progress      ? NSTEMI (non-ST elevated myocardial infarction) ([facility] (9/28/2022) / Cardiomyopathy ([facility] (9/28/2022) POA: has upward trending troponin concerning for underlying CAD vs stress cardiomyopathy. No prior Hx. Echocardiogram showed EF 25-30% with some diastolic dysfunction. Seen by Cardiology who will guide on timing for a cardiac catheterization. Continue heparin gtt.      Acute metabolic encephalopathy (0/51/9856) POA: maybe likely multifactorial from a suspected anoxic brain injury given unknown downtime and her seizures vs fentany overdose (given there is evidence she had unprescribed fentanyl patches) vs acute CVA (given MRi showing small cortical foci of acute infarction right frontal lobe posteriorly abutting the central sulcus and cortically based anteriorly in the right frontal lobe). She remains intubated. CT scan brain was neg for any acute changes. Toxicology screen unremarkable other than benzos. Alcohol level <10. TSH low. EEG non specific but neg for seizures. Echo showed no shunt.  (done 8/2022). Continue to treat the suspected sepsis. Get an A1c for the purpose of stroke management. On heparin gtt. Start statin when able to take orally. Neurology following. Acute respiratory failure with hypoxia ([facility] (9/27/2022) POA: intubated for airway protection given her encephalopathy on admission. May have aspirated. Intensivist following and managing vent     Seizure activity POA: she had witnessed seizures initially suspected to be from status. No prior hx of seizures. EEG neg for seizure activity. Continue empiric IV Keppra. Neurology following. CHRIS (acute kidney injury) ([facility] (9/27/2022) POA: unknown baseline SCr. Has some urine output. Given her LV dysfunction, caution with IV fluids. Nephrology following. SCr now normal.      Lactic acidosis (9/27/2022) POA: severe on admission but has now resolved. I suspect this could be from the seizure activity vs volume depletion with shock vs sepsis of unclear etiology. Resolved      Dyslipidemia (9/28/2022) POA: will need statin therapy when more stable. Lipid panel done 8/2022 - see her other chart. Start statin when feasible    Hypothyroidism (9/28/2022) POA: TSH is depressed.  Free T4 is normal. Resume Levothyroxine (was on 100 mcg daily as per chart)    KOBY (generalized anxiety disorder) (9/28/2022) POA: will need to verify her home medications once more stable    NB: patient has another chart with MR # [id number]    Total time spent for the patient's care: 35 Minutes Critical illness                 Care Plan discussed with: Care Manager, Nursing Staff, and Consultant/Specialist    Discussed:  Care Plan    Prophylaxis:   heparin gtt    Anticipated Disposition:   TBD           ___________________________________________________    Attending Physician:   Delfina Soliman MD

## 2022-09-29 NOTE — PROGRESS NOTES
Physical Therapy    Consult received, chart reviewed and spoke with RN. Patient remains intubated and not appropriate for therapy intervention. Will complete orders now but please reconsult when patient extubated and able to participate.     Aleksandra Fleming MS, PT

## 2022-09-29 NOTE — PROGRESS NOTES
CARDIOLOGY PROGRESS NOTE        [address], [place], [address] [facility]  Phone [phone]; Fax [phone]          2022 9:28 AM       Admit Date:           2022  Admit Diagnosis:  Status epilepticus ([address] Temi Temple  :          1965   MRN:          [id number]        Assessment/Plan  Acute systolic CHF: EF 04-72%, likely Takotsubo cardiomyopathy. Troponin elevation also likely r/t Takotsubo. Need to rule out other etiologies/ischemic eval when more stable - likely cardiac cath this admission. EKG non ischemic. Cont heparin gtt for now. Hold GDMT since on levo     2. Bradycardia: developed overnight, proprofol weaned. Avoid precedex. Etiology unclear     3. Shock, vasodilatory vs sepsis?: cont levo for now. Possible aspiration      4. Acute CVA: MRI showed acute infarcting in the R frontol cortex. Neuro following     5. Acute metabolic encephalopathy, poss seizure activity: cont EEG on, jerking movements when sedation weaned down     6. Acute hypoxic resp failure: intubated for airway protection    7. CHRIS: Cr improved    8. Hypothyroidism: TSH low, free T4 normal. On synthroid       NP spent 15 minutes in chart review of notes, VS, diagnostics. NP spent 15 minutes in examination of pt and review of plan of care  with pt/family. We discussed the expected course, resolution and complications of the diagnosis(es) in detail. Medication risks, benefits, costs, interactions, and alternatives were discussed as indicated. 701 - 1900  In: 918.8 [I.V.:918.8]  Out: 245 [Urine:245]    Last 3 Recorded Weights in this Encounter    22 1119 22 1548   Weight: 104.3 kg (230 lb) 104.3 kg (230 lb)         1901 -  0700  In: 6419.2 [I.V.:6419.2]  Out: 5538 [Urine:2975]    SUBJECTIVE      59 y.o. female presented to the ED with altered mental status. History obtained from chart review.   Sons were both bedside and report that she has a history of anxiety, depression and appears to be treated for medical weight loss. EMS alerted for wellbeing call when her friends could not get a hold of her. Found altered by EMS. Apparently she had a fentanyl patch on her back. Did not respond to narcotics. There was question if she had seizure activity. Leann Mcarthur is intubated and sedated.        Current Facility-Administered Medications   Medication Dose Route Frequency    fentaNYL (PF) 1,500 mcg/30 mL (50 mcg/mL) infusion  0-200 mcg/hr IntraVENous TITRATE    aspirin chewable tablet 81 mg  81 mg Oral DAILY    lansoprazole compounding kit (PREVACID) 3 mg/mL oral suspension 30 mg  30 mg Oral DAILY    cefepime (MAXIPIME) 2 g in 0.9% sodium chloride (MBP/ADV) 100 mL MBP  2 g IntraVENous Q8H    insulin lispro (HUMALOG) injection   SubCUTAneous Q6H    glucose chewable tablet 16 g  4 Tablet Oral PRN    glucagon (GLUCAGEN) injection 1 mg  1 mg IntraMUSCular PRN    dextrose 10% infusion 0-250 mL  0-250 mL IntraVENous PRN    propofol (DIPRIVAN) 10 mg/mL infusion  0-50 mcg/kg/min IntraVENous TITRATE    dextrose 10 % infusion 125-250 mL  125-250 mL IntraVENous PRN    NOREPINephrine (LEVOPHED) 8 mg in 5% dextrose 250mL (32 mcg/mL) infusion  0.5-30 mcg/min IntraVENous TITRATE    sodium chloride (NS) flush 5-40 mL  5-40 mL IntraVENous Q8H    sodium chloride (NS) flush 5-40 mL  5-40 mL IntraVENous PRN    acetaminophen (TYLENOL) tablet 650 mg  650 mg Oral Q6H PRN    Or    acetaminophen (TYLENOL) suppository 650 mg  650 mg Rectal Q6H PRN    polyethylene glycol (MIRALAX) packet 17 g  17 g Oral DAILY PRN    senna (SENOKOT) tablet 8.6 mg  1 Tablet Oral DAILY PRN    promethazine (PHENERGAN) tablet 12.5 mg  12.5 mg Oral Q6H PRN    Or    ondansetron (ZOFRAN) injection 4 mg  4 mg IntraVENous Q6H PRN    hydrocortisone Sod Succ (PF) (SOLU-CORTEF) injection 50 mg  50 mg IntraVENous Q6H    metroNIDAZOLE (FLAGYL) IVPB premix 500 mg  500 mg IntraVENous Q12H    chlorhexidine (PERIDEX) 0.12 % mouthwash 15 mL  15 mL Oral Q12H    heparin 25,000 units in D5W 250 ml infusion  10-25 Units/kg/hr IntraVENous TITRATE      OBJECTIVE               Intake/Output Summary (Last 24 hours) at 9/29/2022 0928  Last data filed at 9/29/2022 0900  Gross per 24 hour   Intake 5091.6 ml   Output 1170 ml   Net 3921.6 ml       Review of Systems - History obtained from the patient AS PER  HPI        PHYSICAL EXAM        Visit Vitals  BP (!) 93/50 (BP 1 Location: Right upper arm, BP Patient Position: At rest)   Pulse 64   Temp 97.5 °F (36.4 °C)   Resp 15   Ht 5' 6\" (1.676 m)   Wt 104.3 kg (230 lb)   SpO2 93%   BMI 37.12 kg/m²       Gen: Intubated, sedated  HEENT:  Pink conjunctivae. No scleral icterus or conjunctival, moist mucous membranes  Neck: Supple,No JVD, No Carotid Bruit, Thyroid- non tender No cervical lymphadenopathy  Resp: No accessory muscle use, Clear breath sounds, No rales or rhonchi  Card: Regular Rate,Rhythm - SB,Normal S1, S2, No murmurs, rubs or gallop. No thrills. MSK: No cyanosis or clubbing, good capillary refill  Skin: No rashes or ulcers, no bruising  Neuro:  No response on sedation  Psych:  sedated  LE: No edema       DATA REVIEW      No specialty comments available. Cardiac monitor: SB in 40's    ECHO: 09/27/22    ECHO ADULT COMPLETE 09/27/2022 9/27/2022    Interpretation Summary    Left Ventricle: Severely reduced left ventricular systolic function with a visually estimated EF of 25 - 30%. Left ventricle size is normal. Mildly increased wall thickness. See diagram for wall motion findings. Abnormal diastolic function. Right Ventricle: Moderately reduced systolic function.     Signed by: Mikhail eBrman MD on 9/27/2022  5:02 PM      Laboratory and Imaging have been reviewed by me and are notable for  Recent Labs     09/27/22  1431   CPK 2,730*     Recent Labs     09/29/22  0327 09/28/22  1831 09/28/22  0105 09/27/22  2130 09/27/22  1846   * 146* 143 142  --    K 3.8 4.0 4.1 4.0  --    CO2 26 25 23 24  --    BUN 13 11 15 15  --    CREA 0.85 0.98 1.31* 1.55*  --    * 131* 182* 194*  --    PHOS  --  2.3*  --  2.0*  --    MG  --  2.1  --  1.8  --    WBC 11.8*  --  12.5*  --  13.2*   HGB 10.7*  --  12.0  --  11.5   HCT 33.5*  --  37.3  --  36.1     --  280  --  259

## 2022-09-29 NOTE — PROGRESS NOTES
Patient on [facility] daily census. Currently in the hospital, related to Encephalopathy. Chart review complete. Will follow for discharge.

## 2022-09-29 NOTE — MANAGEMENT PLAN
Called by RN earlier in shift for bradycardia in 50's. Question regarding whether we should use alternative sedation. Pt on 30% and 5 of PEEP. Mental status only barrier to extubation and still ongoing concern about whether pt had anoxic brain injury. Opted to leave prop despite bradycardia to facilitate best neuro eval.      Called again 6:20 am.  Now on NE 2 and with HR in low 40's and high 30's. Will change to fent and d/c prop. Will continue to try to avoid benzos. Not clear that sedation is actually etiology of bradycardia however as pt with lower HR despite prop of only 20. BRITT typically requires doses greater than 50 and longer duration of time. Doubt this is BRITT. Needs further work up for audie. Discussed with RN.

## 2022-09-29 NOTE — PROGRESS NOTES
6682- Received critical PTT for primary RN- falls in protocol to be held. Primary RN to carry out protocol order.

## 2022-09-30 LAB
ANION GAP SERPL CALC-SCNC: 5 MMOL/L (ref 5–15)
APTT PPP: 37.4 SEC (ref 22.1–31)
ATRIAL RATE: 56 BPM
BACTERIA SPEC CULT: ABNORMAL
BASOPHILS # BLD: 0.1 K/UL (ref 0–0.1)
BASOPHILS NFR BLD: 0 % (ref 0–1)
BUN SERPL-MCNC: 17 MG/DL (ref 6–20)
BUN/CREAT SERPL: 20 (ref 12–20)
CALCIUM SERPL-MCNC: 8.4 MG/DL (ref 8.5–10.1)
CALCULATED P AXIS, ECG09: 36 DEGREES
CALCULATED R AXIS, ECG10: 26 DEGREES
CALCULATED T AXIS, ECG11: 34 DEGREES
CC UR VC: ABNORMAL
CHLORIDE SERPL-SCNC: 117 MMOL/L (ref 97–108)
CHOLEST SERPL-MCNC: 167 MG/DL
CO2 SERPL-SCNC: 25 MMOL/L (ref 21–32)
CREAT SERPL-MCNC: 0.84 MG/DL (ref 0.55–1.02)
DIAGNOSIS, 93000: NORMAL
DIFFERENTIAL METHOD BLD: ABNORMAL
EOSINOPHIL # BLD: 0 K/UL (ref 0–0.4)
EOSINOPHIL NFR BLD: 0 % (ref 0–7)
ERYTHROCYTE [DISTWIDTH] IN BLOOD BY AUTOMATED COUNT: 15.1 % (ref 11.5–14.5)
GLUCOSE BLD STRIP.AUTO-MCNC: 107 MG/DL (ref 65–117)
GLUCOSE BLD STRIP.AUTO-MCNC: 119 MG/DL (ref 65–117)
GLUCOSE BLD STRIP.AUTO-MCNC: 86 MG/DL (ref 65–117)
GLUCOSE BLD STRIP.AUTO-MCNC: 91 MG/DL (ref 65–117)
GLUCOSE SERPL-MCNC: 128 MG/DL (ref 65–100)
GRAM STN SPEC: ABNORMAL
HCT VFR BLD AUTO: 33.6 % (ref 35–47)
HDLC SERPL-MCNC: 36 MG/DL
HDLC SERPL: 4.6 {RATIO} (ref 0–5)
HGB BLD-MCNC: 10.7 G/DL (ref 11.5–16)
IMM GRANULOCYTES # BLD AUTO: 0.1 K/UL (ref 0–0.04)
IMM GRANULOCYTES NFR BLD AUTO: 1 % (ref 0–0.5)
LDLC SERPL CALC-MCNC: 96.8 MG/DL (ref 0–100)
LYMPHOCYTES # BLD: 1.8 K/UL (ref 0.8–3.5)
LYMPHOCYTES NFR BLD: 15 % (ref 12–49)
MAGNESIUM SERPL-MCNC: 2.4 MG/DL (ref 1.6–2.4)
MCH RBC QN AUTO: 29.9 PG (ref 26–34)
MCHC RBC AUTO-ENTMCNC: 31.8 G/DL (ref 30–36.5)
MCV RBC AUTO: 93.9 FL (ref 80–99)
MONOCYTES # BLD: 0.9 K/UL (ref 0–1)
MONOCYTES NFR BLD: 8 % (ref 5–13)
NEUTS SEG # BLD: 9 K/UL (ref 1.8–8)
NEUTS SEG NFR BLD: 76 % (ref 32–75)
NRBC # BLD: 0.16 K/UL (ref 0–0.01)
NRBC BLD-RTO: 1.3 PER 100 WBC
P-R INTERVAL, ECG05: 142 MS
PHOSPHATE SERPL-MCNC: 1.4 MG/DL (ref 2.6–4.7)
PLATELET # BLD AUTO: 227 K/UL (ref 150–400)
PMV BLD AUTO: 11.5 FL (ref 8.9–12.9)
POTASSIUM SERPL-SCNC: 3.7 MMOL/L (ref 3.5–5.1)
Q-T INTERVAL, ECG07: 494 MS
QRS DURATION, ECG06: 82 MS
QTC CALCULATION (BEZET), ECG08: 476 MS
RBC # BLD AUTO: 3.58 M/UL (ref 3.8–5.2)
SERVICE CMNT-IMP: ABNORMAL
SERVICE CMNT-IMP: NORMAL
SODIUM SERPL-SCNC: 147 MMOL/L (ref 136–145)
THERAPEUTIC RANGE,PTTT: ABNORMAL SECS (ref 58–77)
TRIGL SERPL-MCNC: 171 MG/DL (ref ?–150)
VENTRICULAR RATE, ECG03: 56 BPM
VLDLC SERPL CALC-MCNC: 34.2 MG/DL
WBC # BLD AUTO: 11.9 K/UL (ref 3.6–11)

## 2022-09-30 PROCEDURE — APPSS30 APP SPLIT SHARED TIME 16-30 MINUTES: Performed by: NURSE PRACTITIONER

## 2022-09-30 PROCEDURE — 74011250637 HC RX REV CODE- 250/637: Performed by: PSYCHIATRY & NEUROLOGY

## 2022-09-30 PROCEDURE — 74011250637 HC RX REV CODE- 250/637: Performed by: NURSE PRACTITIONER

## 2022-09-30 PROCEDURE — 82962 GLUCOSE BLOOD TEST: CPT

## 2022-09-30 PROCEDURE — 85730 THROMBOPLASTIN TIME PARTIAL: CPT

## 2022-09-30 PROCEDURE — 83735 ASSAY OF MAGNESIUM: CPT

## 2022-09-30 PROCEDURE — 84100 ASSAY OF PHOSPHORUS: CPT

## 2022-09-30 PROCEDURE — 80061 LIPID PANEL: CPT

## 2022-09-30 PROCEDURE — 74011000250 HC RX REV CODE- 250: Performed by: INTERNAL MEDICINE

## 2022-09-30 PROCEDURE — 74011250636 HC RX REV CODE- 250/636: Performed by: INTERNAL MEDICINE

## 2022-09-30 PROCEDURE — 80048 BASIC METABOLIC PNL TOTAL CA: CPT

## 2022-09-30 PROCEDURE — 99232 SBSQ HOSP IP/OBS MODERATE 35: CPT | Performed by: STUDENT IN AN ORGANIZED HEALTH CARE EDUCATION/TRAINING PROGRAM

## 2022-09-30 PROCEDURE — 36415 COLL VENOUS BLD VENIPUNCTURE: CPT

## 2022-09-30 PROCEDURE — 94003 VENT MGMT INPAT SUBQ DAY: CPT

## 2022-09-30 PROCEDURE — 77030018798 HC PMP KT ENTRL FED COVD -A

## 2022-09-30 PROCEDURE — 85025 COMPLETE CBC W/AUTO DIFF WBC: CPT

## 2022-09-30 PROCEDURE — 74011250636 HC RX REV CODE- 250/636: Performed by: NURSE PRACTITIONER

## 2022-09-30 PROCEDURE — 74011250636 HC RX REV CODE- 250/636: Performed by: STUDENT IN AN ORGANIZED HEALTH CARE EDUCATION/TRAINING PROGRAM

## 2022-09-30 PROCEDURE — 74011000258 HC RX REV CODE- 258: Performed by: NURSE PRACTITIONER

## 2022-09-30 PROCEDURE — 65610000006 HC RM INTENSIVE CARE

## 2022-09-30 PROCEDURE — 74011000250 HC RX REV CODE- 250: Performed by: NURSE PRACTITIONER

## 2022-09-30 RX ORDER — HEPARIN SODIUM 1000 [USP'U]/ML
4000 INJECTION, SOLUTION INTRAVENOUS; SUBCUTANEOUS ONCE
Status: COMPLETED | OUTPATIENT
Start: 2022-09-30 | End: 2022-09-30

## 2022-09-30 RX ORDER — ENOXAPARIN SODIUM 100 MG/ML
30 INJECTION SUBCUTANEOUS EVERY 12 HOURS
Status: DISCONTINUED | OUTPATIENT
Start: 2022-09-30 | End: 2022-10-20 | Stop reason: HOSPADM

## 2022-09-30 RX ORDER — HYDROCORTISONE SODIUM SUCCINATE 100 MG/2ML
50 INJECTION, POWDER, FOR SOLUTION INTRAMUSCULAR; INTRAVENOUS EVERY 12 HOURS
Status: DISCONTINUED | OUTPATIENT
Start: 2022-09-30 | End: 2022-09-30

## 2022-09-30 RX ORDER — ATORVASTATIN CALCIUM 20 MG/1
40 TABLET, FILM COATED ORAL
Status: DISCONTINUED | OUTPATIENT
Start: 2022-09-30 | End: 2022-10-20 | Stop reason: HOSPADM

## 2022-09-30 RX ORDER — MIDODRINE HYDROCHLORIDE 5 MG/1
15 TABLET ORAL EVERY 8 HOURS
Status: DISCONTINUED | OUTPATIENT
Start: 2022-09-30 | End: 2022-10-11

## 2022-09-30 RX ORDER — LIDOCAINE 4 G/100G
1 PATCH TOPICAL EVERY 24 HOURS
Status: DISCONTINUED | OUTPATIENT
Start: 2022-09-30 | End: 2022-10-05

## 2022-09-30 RX ADMIN — HEPARIN SODIUM 22 UNITS/KG/HR: 10000 INJECTION, SOLUTION INTRAVENOUS at 04:41

## 2022-09-30 RX ADMIN — POTASSIUM PHOSPHATE, MONOBASIC AND POTASSIUM PHOSPHATE, DIBASIC: 224; 236 INJECTION, SOLUTION, CONCENTRATE INTRAVENOUS at 08:45

## 2022-09-30 RX ADMIN — ENOXAPARIN SODIUM 30 MG: 100 INJECTION SUBCUTANEOUS at 10:14

## 2022-09-30 RX ADMIN — HYDROCORTISONE SODIUM SUCCINATE 50 MG: 100 INJECTION, POWDER, FOR SOLUTION INTRAMUSCULAR; INTRAVENOUS at 05:12

## 2022-09-30 RX ADMIN — ENOXAPARIN SODIUM 30 MG: 100 INJECTION SUBCUTANEOUS at 21:10

## 2022-09-30 RX ADMIN — Medication 200 MCG/HR: at 23:27

## 2022-09-30 RX ADMIN — HEPARIN SODIUM 22 UNITS/KG/HR: 10000 INJECTION, SOLUTION INTRAVENOUS at 04:38

## 2022-09-30 RX ADMIN — PROPOFOL 15 MCG/KG/MIN: 10 INJECTION, EMULSION INTRAVENOUS at 14:41

## 2022-09-30 RX ADMIN — CHLORHEXIDINE GLUCONATE 15 ML: 1.2 RINSE ORAL at 08:12

## 2022-09-30 RX ADMIN — CEFEPIME 2 G: 2 INJECTION, POWDER, FOR SOLUTION INTRAVENOUS at 01:28

## 2022-09-30 RX ADMIN — ASPIRIN 81 MG: 81 TABLET, CHEWABLE ORAL at 08:12

## 2022-09-30 RX ADMIN — LANSOPRAZOLE 30 MG: KIT at 08:12

## 2022-09-30 RX ADMIN — CHLORHEXIDINE GLUCONATE 15 ML: 1.2 RINSE ORAL at 21:11

## 2022-09-30 RX ADMIN — HEPARIN SODIUM 4000 UNITS: 1000 INJECTION INTRAVENOUS; SUBCUTANEOUS at 04:37

## 2022-09-30 RX ADMIN — MIDODRINE HYDROCHLORIDE 10 MG: 5 TABLET ORAL at 05:12

## 2022-09-30 RX ADMIN — Medication 10 ML: at 21:10

## 2022-09-30 RX ADMIN — PROPOFOL 25 MCG/KG/MIN: 10 INJECTION, EMULSION INTRAVENOUS at 21:09

## 2022-09-30 RX ADMIN — Medication 200 MCG/HR: at 18:29

## 2022-09-30 RX ADMIN — PROPOFOL 10 MCG/KG/MIN: 10 INJECTION, EMULSION INTRAVENOUS at 02:14

## 2022-09-30 RX ADMIN — Medication 175 MCG/HR: at 01:32

## 2022-09-30 RX ADMIN — Medication 175 MCG/HR: at 10:40

## 2022-09-30 RX ADMIN — METRONIDAZOLE 500 MG: 500 INJECTION, SOLUTION INTRAVENOUS at 05:12

## 2022-09-30 RX ADMIN — MIDODRINE HYDROCHLORIDE 15 MG: 5 TABLET ORAL at 21:10

## 2022-09-30 RX ADMIN — CEFTRIAXONE 2 G: 2 INJECTION, POWDER, FOR SOLUTION INTRAMUSCULAR; INTRAVENOUS at 10:14

## 2022-09-30 RX ADMIN — ATORVASTATIN CALCIUM 40 MG: 20 TABLET, FILM COATED ORAL at 21:10

## 2022-09-30 RX ADMIN — NOREPINEPHRINE BITARTRATE 1 MCG/MIN: 1 SOLUTION INTRAVENOUS at 13:32

## 2022-09-30 RX ADMIN — Medication 10 ML: at 05:13

## 2022-09-30 RX ADMIN — Medication 10 ML: at 14:00

## 2022-09-30 RX ADMIN — MIDODRINE HYDROCHLORIDE 15 MG: 5 TABLET ORAL at 13:02

## 2022-09-30 NOTE — PROGRESS NOTES
Dominguez Hobbs [facility]  [address] [facility], [facility], [address] [facility]  [phone]      Hospitalist  Progress Note      NAME:         Marshal Salcido   :        1965  MRM:        [id number]    Date of service: 2022      Chief complaint: Unresponsiveness, seizures    Interval HPI: Patient remains sedated and intubated. I have discussed with her nurse, mother and intensivist for collaborative hx as well as reviewed her chart. She is still on low dose vasopressor support      Objective:    Vital Signs:    Visit Vitals  /61   Pulse (!) 45   Temp 97.8 °F (36.6 °C)   Resp 16   Ht 5' 6\" (1.676 m)   Wt 104.3 kg (230 lb)   SpO2 99%   BMI 37.12 kg/m²        Intake/Output Summary (Last 24 hours) at 2022 2559  Last data filed at 2022 0600  Gross per 24 hour   Intake 1602.94 ml   Output 955 ml   Net 647.94 ml        Physical Examination:    General:  she remains critically ill, intubated but not in distress   Eyes:   pink conjunctivae, PERRLA but pinpoint   ENT:   no ottorrhea or rhinorrhea with dry mucous membranes  Neck: no masses, thyroid non-tender and trachea central.  Pulm:  decreased breath sounds with scattered crackles. No wheezes   Card:  no JVD or murmurs, has regular and normal S1, S2 without thrills, bruits   Abd:  Soft, non-tender, non-distended, normoactive bowel sounds   Musc:  No cyanosis, clubbing, atrophy or deformities. Skin:  No rashes, bruising or ulcers. Neuro: Sedated and intubated. No facial asymmetry.      Current Facility-Administered Medications   Medication Dose Route Frequency    potassium phosphate 30 mmol in 0.9% sodium chloride 250 mL infusion   IntraVENous ONCE    fentaNYL (PF) 1,500 mcg/30 mL (50 mcg/mL) infusion  0-200 mcg/hr IntraVENous TITRATE    aspirin chewable tablet 81 mg  81 mg Oral DAILY    midodrine (PROAMATINE) tablet 10 mg  10 mg Oral Q8H    lansoprazole compounding kit (PREVACID) 3 mg/mL oral suspension 30 mg  30 mg Oral DAILY    cefepime (MAXIPIME) 2 g in 0.9% sodium chloride (MBP/ADV) 100 mL MBP  2 g IntraVENous Q8H    insulin lispro (HUMALOG) injection   SubCUTAneous Q6H    glucose chewable tablet 16 g  4 Tablet Oral PRN    glucagon (GLUCAGEN) injection 1 mg  1 mg IntraMUSCular PRN    dextrose 10% infusion 0-250 mL  0-250 mL IntraVENous PRN    propofol (DIPRIVAN) 10 mg/mL infusion  0-50 mcg/kg/min IntraVENous TITRATE    dextrose 10 % infusion 125-250 mL  125-250 mL IntraVENous PRN    NOREPINephrine (LEVOPHED) 8 mg in 5% dextrose 250mL (32 mcg/mL) infusion  0.5-30 mcg/min IntraVENous TITRATE    sodium chloride (NS) flush 5-40 mL  5-40 mL IntraVENous Q8H    sodium chloride (NS) flush 5-40 mL  5-40 mL IntraVENous PRN    acetaminophen (TYLENOL) tablet 650 mg  650 mg Oral Q6H PRN    Or    acetaminophen (TYLENOL) suppository 650 mg  650 mg Rectal Q6H PRN    polyethylene glycol (MIRALAX) packet 17 g  17 g Oral DAILY PRN    senna (SENOKOT) tablet 8.6 mg  1 Tablet Oral DAILY PRN    promethazine (PHENERGAN) tablet 12.5 mg  12.5 mg Oral Q6H PRN    Or    ondansetron (ZOFRAN) injection 4 mg  4 mg IntraVENous Q6H PRN    metroNIDAZOLE (FLAGYL) IVPB premix 500 mg  500 mg IntraVENous Q12H    chlorhexidine (PERIDEX) 0.12 % mouthwash 15 mL  15 mL Oral Q12H    heparin 25,000 units in D5W 250 ml infusion  10-25 Units/kg/hr IntraVENous TITRATE        Laboratory data and review:    Recent Labs     09/30/22  0252 09/29/22  0327 09/28/22  0105   WBC 11.9* 11.8* 12.5*   HGB 10.7* 10.7* 12.0   HCT 33.6* 33.5* 37.3    195 280       Recent Labs     09/30/22  0252 09/29/22  0327 09/28/22  1831 09/28/22  0105 09/27/22  2130 09/27/22  1431 09/27/22  1127   * 146* 146* 143   < > 142 141   K 3.7 3.8 4.0 4.1   < > 4.0 5.3*   * 117* 117* 111*   < > 108 106   CO2 25 26 25 23   < > 23 24   * 142* 131* 182*   < > 250* 141*   BUN 17 13 11 15   < > 18 16   CREA 0.84 0.85 0.98 1.31*   < > 2.11* 2.57*   CA 8.4* 8.2* 8.1* 8.7   < > 8.8 9.4   MG 2.4 2.2 2.1  --    < >  --   --    PHOS 1.4* 1.6* 2.3*  --    < >  --   --    ALB  --   --   --  2.9*  --  3.0* 3.9   ALT  --   --   --  59  --  41 43   INR  --   --   --   --   --  1.2*  --     < > = values in this interval not displayed. No components found for: Nicole 91: Imaging studies have been reviewed    Telemetry reviewed by me:   normal sinus rhythm    Assessment and Plan:    [facility] ([facility] (9/27/2022) POA: likely from sepsis due to aspiration with a UTi worsened by her cardiomyopathy. Blood cultures remain neg. Initial lactic acid was high but now normal. CT chest, abdomen and pelvis neg for any obvious focus. She may have aspirated given sputum cultures isolated heavy E coli. Urine cultures grew Gram neg rods. Continue IV Levophed and wean as tolerated to keep a MAP >65. Antibiotics changed to IV Ceftriaxone. She remains critically ill. Continue to follow clinical progress      Acute metabolic encephalopathy (0/35/9870) POA: maybe likely multifactorial from a suspected anoxic brain injury given unknown downtime and her seizures vs fentany overdose (given there is evidence she had unprescribed fentanyl patches found at home by her son) vs acute CVA. She remains intubated and has failed SBT thus far. CT scan brain was neg for any acute changes. Toxicology screen unremarkable other than benzos. Alcohol level <10. TSH low. EEG non specific but neg for seizures. Continue to treat the sepsis. Neurology following. Monitor clinical progress and neurological recovery       Acute CVA POA:  MRi showing small cortical foci of acute infarction right frontal lobe posteriorly abutting the central sulcus and cortically based anteriorly in the right frontal lobe). Echo showed no shunt.  (done 8/2022). A1c 6.1. Asprin. Start Lipitor when feasible. Neurology following    ? NSTEMI (non-ST elevated myocardial infarction) ([facility] (9/28/2022) / Cardiomyopathy ([facility] (9/28/2022) POA: has upward trending troponin concerning for underlying CAD vs stress cardiomyopathy. No prior Hx. Echocardiogram showed EF 25-30% with some diastolic dysfunction. Seen by Cardiology who will guide on timing for a cardiac catheterization. DC heparin gtt today. Seizure activity POA: she had witnessed seizures on admission but has since had none other than occasional myoclonic jerks. No prior hx of seizures. EEG neg for seizure activity. IV Keppra discontinued 9/29. Neurology following. Acute respiratory failure with hypoxia ([facility] (9/27/2022) POA: intubated for airway protection given her encephalopathy on admission. Intensivist following and managing vent     CHRIS (acute kidney injury) ([facility] (9/27/2022) POA: unknown baseline SCr. Has some urine output. Given her LV dysfunction, caution with IV fluids. Nephrology following. SCr now normal.      Lactic acidosis (9/27/2022) POA: severe on admission but has now resolved. I suspect this could be from the seizure activity vs volume depletion with shock vs sepsis of unclear etiology. Resolved      Dyslipidemia (9/28/2022) POA: will need statin therapy when more stable. Lipid panel done 8/2022 - see her other chart. Start statin when feasible    Hypothyroidism (9/28/2022) POA: TSH is depressed.  Free T4 is normal. Resume Levothyroxine (was on 100 mcg daily as per chart)    KOBY (generalized anxiety disorder) (9/28/2022) POA: will need to verify her home medications once more stable    NB: patient has another chart with MR # [id number]    Total time spent for the patient's care: 40 Minutes Critical illness                 Care Plan discussed with: Care Manager, Nursing Staff, her mother and Consultant/Specialist    Discussed:  Care Plan    Prophylaxis:   heparin gtt    Anticipated Disposition:   TBD           ___________________________________________________    Attending Physician:   Merlin Braver, MD

## 2022-09-30 NOTE — PROGRESS NOTES
Speech Therapy    Chart reviewed. Note patient remains vented and sedated. Following remotely for assessment, once no longer intubated. Thank you    Desmond Rdz.  Romeo Whitlock M.S., CCC-SLP

## 2022-09-30 NOTE — PROGRESS NOTES
0700 Bedside and Verbal shift change report given to Home Velasquez (oncoming nurse) by Stella Molina RN (offgoing nurse). Report included the following information SBAR, Kardex, ED Summary, Procedure Summary, Intake/Output, MAR, Recent Results, Med Rec Status, Cardiac Rhythm sinus audie, NSR, Alarm Parameters , Quality Measures, and Dual Neuro Assessment. Primary Nurse Umberto Church RN and Stella Molina RN performed a dual skin assessment on this patient No impairment noted  Jose Enrique score, see flowsheet. 0800 Pt assessed, see flowsheet. Intubated and sedated. Pt does not withdrawal to pain, but does move all extremities and lift head off pillow with stimuli(mouth care, suctioning, repositioning) and become agitated RASS +2, +3. Pt settles self with absence of stimuli within a few minutes. No need to increase sedation. Current on prop and 10, fent at 175. No command following. No tracking or focusing with eyes. Sinus audie on monitor, levo infusing at 1 with MAP goal greater than 65. See MAR for all medication administration and titrations. +1 pitting edema on BUE and trace on BLE. BS active, TF infusion, increased water flush, BS active X4, montilla draining clear/yellow urine. 0945 Sedation held for SAT/SBT per Armando Irving, ICU NP.     1000 Pt trashing in bed, not following commands. RT at bedside - placed patient on SBT - Apnic. Pt remained apnic and HR increased to 120. Pt re sedated, notified herman ICU NP.     1050 Per Armando Irving, ICU NP okay to place new order for bilateral soft upper wrist restraints. 1200 Pt reassessed, see flowsheet. No changes. Pt continuous to be intermittently restless/agitated. Pt aching back and trying to hold self up with arms with no attempts to grab ETT. Notified Armando Irving ICU NP.     5446 Attempted chair position - seems to be agitating pt more. 1300 Patient placed supine with HOB flat - stopped trashing and appears more comfortable. TF held.  Per Armando Irving ICU NP, okay to leave flat for few hours to rest. Ordered lidocaine patch for back - SEE MAR.     1332 Levo Restarted. Drop in BP following increase in sedation. BP 88/46 MAP 56 - Notified Paige, Icu NP.     1400 /52 MAP 65    1500 Pt placed in 30 degree reverse trendelenburg to continue TF. Pt remained comfortable in bed in this position. Increased TF per order. 1600 Pt reassessed, see flowsheet. Assessment unchanged. 1900 Bedside and Verbal shift change report given to [address], RN (oncoming nurse) by Davina William RN (offgoing nurse). Report included the following information SBAR, Kardex, ED Summary, Procedure Summary, Intake/Output, MAR, Recent Results, Med Rec Status, Cardiac Rhythm Sinus audie, NSR, Alarm Parameters , Quality Measures, and Dual Neuro Assessment.

## 2022-09-30 NOTE — PROGRESS NOTES
Transition of care note:    Reason for Admission:  shock,found down by neighbor doing wellness check ,CPR initiated by EMS,last known well time was evening of 9/26/22     Transition of Care Plan:    Emergently intubated in ED (9/27/22)        Seizure-like activity @ home           Acute respiratory failure with hypoxia    Hypotensive  Acute metabolic encephalopathy  CHRIS  Acute systolic heart failure  Shock         Probable Takotsubo cardiomyopathy  Small frontal CVA  Ischemic work-up when extubated and stable for cardiac cath     Decision-makers:  Sons:  Leigh Ann Dorantes @ Cathie @ [facility] Rachel Flacon @ [phone]    Toan Ly

## 2022-09-30 NOTE — PROGRESS NOTES
Brief ICU Nutrition Assessment    Type and Reason for Visit: Reassess    Nutrition Recommendations/Plan:   Brief follow up. Discussed during IDRs. Plan for SBT/SAT today. Levo has been shut off. Propofol running at 6.3 mL/hour providing 166 kcal/day. Intensivist modified FWF yesterday 2/2 increasing Na. Okay to advance TF per intensivist NP. Please see new recommendations below. Vital AF @ 45 mL/hour   Advance by 10 mL q 4 hours until goal reached  Provide 2 Prosource/day, flush with 15 mL H2O before and after   mL q 3 hours    TF at goal provides 1200 kcal (+ Propofol + Prosource, 1446 kcal, 100% needs); 110 g carbs; 75 g protein (+ 2 Prosource, 97 g, 100% needs). FWF + TF provide 2011 mL H2O/day.     Lab Results   Component Value Date/Time    GFR est AA >60 09/30/2022 02:52 AM    GFR est non-AA >60 09/30/2022 02:52 AM    Creatinine, POC 1.6 (H) 09/27/2022 06:45 PM    Creatinine 0.84 09/30/2022 02:52 AM    BUN 17 09/30/2022 02:52 AM    Sodium,  09/27/2022 06:45 PM    Sodium 147 (H) 09/30/2022 02:52 AM    Potassium 3.7 09/30/2022 02:52 AM    Potassium, POC 4.0 09/27/2022 06:45 PM    Chloride,  (H) 09/27/2022 06:45 PM    Chloride 117 (H) 09/30/2022 02:52 AM    CO2 25 09/30/2022 02:52 AM     Lab Results   Component Value Date/Time    Glucose 128 (H) 09/30/2022 02:52 AM    Glucose (POC) 119 (H) 09/30/2022 05:11 AM     Magnesium   Date Value Ref Range Status   09/30/2022 2.4 1.6 - 2.4 mg/dL Final   09/29/2022 2.2 1.6 - 2.4 mg/dL Final   09/28/2022 2.1 1.6 - 2.4 mg/dL Final   09/27/2022 1.8 1.6 - 2.4 mg/dL Final     Lab Results   Component Value Date/Time    Calcium 8.4 (L) 09/30/2022 02:52 AM    Phosphorus 1.4 (L) 09/30/2022 02:52 AM       Estimated Nutrition Needs:   Energy: 8046-5227 (11-14 kcal/kg)  Wt used: Current  Protein:  (1.5-2.0 g/kg IBW)  Wt used: Ideal   Fluid: 3032-9753       Electronically signed by Frank De Anda [address], [address]   Contact: [phone] or via SingleFeed

## 2022-09-30 NOTE — PROGRESS NOTES
Occupational Therapy Note:  Orders receievd and appreciated. Chart reviewed and spoke with RN. Pt remains vented and sedated. When weaned off sedation she is agitated with non-purposeful movement and no command following. RN requested to hold. Per ABCDEF protocol, will work with patient when PEEP is 10.0 or less, FIO2 60% or less, and patient is following basic commands. Will follow patient peripherally. Recommend nursing to complete with patient, as able and per protocol, in order to promote cardiopulmonary systems, maintain strength, endurance and independence:   -bed in chair position or maximize full reverse Trendelenburg position to facilitate upright activity with foot board and non-skid footwear on 3x/day ~30-60 mins each   -ROM during bathing B UEs and LEs  -positioning to prevent contractures and edema  RASS -1/0/+1 (during SAT) Active ROM  Sitting (bed in chair position)  Standing (reverse Trendelenburg)  ADLs   RASS -3/2 Passive ROM  Sit (bed in chair position)   RASS -5/-4 Passive ROM       Thank you for your assistance.    Surendra Howell, OTR/L, CBIS

## 2022-09-30 NOTE — PROGRESS NOTES
CARDIOLOGY PROGRESS NOTE        [address], [place], [address] [facility]  Phone [phone]; Fax [phone]          2022 9:28 AM       Admit Date:           2022  Admit Diagnosis:  Status epilepticus ([address] Rama Eason  :          1965   MRN:          [id number]        Assessment/Plan  Acute systolic CHF: EF 14-94%, likely Takotsubo cardiomyopathy. Troponin elevation also likely r/t Takotsubo. Need to rule out other etiologies/ischemic eval when more stable - likely cardiac cath this admission. EKG non ischemic. D/c heparin gtt today. Hold GDMT since on levo, started on midodrine      2. Bradycardia: developed since admission, proprofol weaned. Avoid precedex, BB therapy for now. Etiology unclear     3. Shock, vasodilatory vs sepsis?: cont levo as needed - weaned down to 1 mcg. Possible aspiration      4. Acute CVA: MRI showed acute infarcting in the R frontol cortex. Neuro following     5. Acute metabolic encephalopathy, poss seizure activity: cont EEG on, jerking movements when sedation weaned down     6. Acute hypoxic resp failure: intubated for airway protection    7. CHRIS: Cr improved    8. Hypothyroidism: TSH low, free T4 normal. On synthroid       NP spent 15 minutes in chart review of notes, VS, diagnostics. NP spent 10 minutes in examination of pt and review of plan of care  with pt/family. We discussed the expected course, resolution and complications of the diagnosis(es) in detail. Medication risks, benefits, costs, interactions, and alternatives were discussed as indicated. 701 - 1900  In: 683.1 [I.V.:363.1]  Out: 150 [Urine:150]    Last 3 Recorded Weights in this Encounter    22 1119 22 1548   Weight: 104.3 kg (230 lb) 104.3 kg (230 lb)         1901 - 700  In: 4394.5 [I.V.:4214.5]  Out: 0549 [Urine:1580]    SUBJECTIVE      59 y.o. female presented to the ED with altered mental status.   History obtained from chart review. Sons were both bedside and report that she has a history of anxiety, depression and appears to be treated for medical weight loss. EMS alerted for wellbeing call when her friends could not get a hold of her. Found altered by EMS. Apparently she had a fentanyl patch on her back. Did not respond to narcotics. There was question if she had seizure activity. Dannie Manuel is intubated and sedated.        Current Facility-Administered Medications   Medication Dose Route Frequency    potassium phosphate 30 mmol in 0.9% sodium chloride 250 mL infusion   IntraVENous ONCE    midodrine (PROAMATINE) tablet 15 mg  15 mg Oral Q8H    fentaNYL (PF) 1,500 mcg/30 mL (50 mcg/mL) infusion  0-200 mcg/hr IntraVENous TITRATE    aspirin chewable tablet 81 mg  81 mg Oral DAILY    lansoprazole compounding kit (PREVACID) 3 mg/mL oral suspension 30 mg  30 mg Oral DAILY    cefepime (MAXIPIME) 2 g in 0.9% sodium chloride (MBP/ADV) 100 mL MBP  2 g IntraVENous Q8H    insulin lispro (HUMALOG) injection   SubCUTAneous Q6H    glucose chewable tablet 16 g  4 Tablet Oral PRN    glucagon (GLUCAGEN) injection 1 mg  1 mg IntraMUSCular PRN    dextrose 10% infusion 0-250 mL  0-250 mL IntraVENous PRN    propofol (DIPRIVAN) 10 mg/mL infusion  0-50 mcg/kg/min IntraVENous TITRATE    NOREPINephrine (LEVOPHED) 8 mg in 5% dextrose 250mL (32 mcg/mL) infusion  0.5-30 mcg/min IntraVENous TITRATE    sodium chloride (NS) flush 5-40 mL  5-40 mL IntraVENous Q8H    sodium chloride (NS) flush 5-40 mL  5-40 mL IntraVENous PRN    acetaminophen (TYLENOL) tablet 650 mg  650 mg Oral Q6H PRN    Or    acetaminophen (TYLENOL) suppository 650 mg  650 mg Rectal Q6H PRN    polyethylene glycol (MIRALAX) packet 17 g  17 g Oral DAILY PRN    senna (SENOKOT) tablet 8.6 mg  1 Tablet Oral DAILY PRN    promethazine (PHENERGAN) tablet 12.5 mg  12.5 mg Oral Q6H PRN    Or    ondansetron (ZOFRAN) injection 4 mg  4 mg IntraVENous Q6H PRN    metroNIDAZOLE (FLAGYL) IVPB premix 500 mg  500 mg IntraVENous Q12H    chlorhexidine (PERIDEX) 0.12 % mouthwash 15 mL  15 mL Oral Q12H      OBJECTIVE               Intake/Output Summary (Last 24 hours) at 9/30/2022 0937  Last data filed at 9/30/2022 0851  Gross per 24 hour   Intake 2286.06 ml   Output 1060 ml   Net 1226.06 ml       Review of Systems - History obtained from the patient AS PER  HPI        PHYSICAL EXAM        Visit Vitals  BP (!) 110/54   Pulse (!) 43   Temp 98 °F (36.7 °C)   Resp 16   Ht 5' 6\" (1.676 m)   Wt 104.3 kg (230 lb)   SpO2 96%   BMI 37.12 kg/m²       Gen: Intubated, sedated  HEENT:  Pink conjunctivae. No scleral icterus or conjunctival, moist mucous membranes  Neck: Supple,No JVD, No Carotid Bruit, Thyroid- non tender No cervical lymphadenopathy  Resp: No accessory muscle use, Clear breath sounds, No rales or rhonchi  Card: Regular Rate,Rhythm - SB, Normal S1, S2, No murmurs, rubs or gallop. No thrills. MSK: No cyanosis or clubbing, good capillary refill  Skin: No rashes or ulcers, no bruising  Neuro:  No purposeful movements  Psych:  sedated  LE: Mild generalized edema       DATA REVIEW      No specialty comments available. Cardiac monitor: SB in 40's    ECHO: 09/27/22    ECHO ADULT COMPLETE 09/27/2022 9/27/2022    Interpretation Summary    Left Ventricle: Severely reduced left ventricular systolic function with a visually estimated EF of 25 - 30%. Left ventricle size is normal. Mildly increased wall thickness. See diagram for wall motion findings. Abnormal diastolic function. Right Ventricle: Moderately reduced systolic function.     Signed by: Moira Vela MD on 9/27/2022  5:02 PM      Laboratory and Imaging have been reviewed by me and are notable for  Recent Labs     09/27/22  1431   CPK 2,730*     Recent Labs     09/30/22  0252 09/29/22  0327 09/28/22  1831 09/28/22  0105   * 146* 146* 143   K 3.7 3.8 4.0 4.1   CO2 25 26 25 23   BUN 17 13 11 15   CREA 0.84 0.85 0.98 1.31*   GLU 128* 142* 131* 182*   PHOS 1.4* 1.6* 2.3*  --    MG 2.4 2.2 2.1  --    WBC 11.9* 11.8*  --  12.5*   HGB 10.7* 10.7*  --  12.0   HCT 33.6* 33.5*  --  37.3    195  --  280

## 2022-09-30 NOTE — PROGRESS NOTES
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 She was LKW the evening of . She did not report to work the am of  and she was found unresponsive at her home on wellness check. Bystander CPR was started, but she did have a pulse. EMS was called. Upon their arrival, SBP was 70s. Pupils were pinpoint, narcan was administered without improvement. She experienced seizure like activity and received 4mg IV versed. Upon arrival to the ED, she exhibited seizure like activity and L sided gaze preference. She required intubation for hypoxic respiratory failure. She was sedated on propofol. She became hypotensive and was started on levophed. LA 6.46. She was admitted to the ICU. She was started on empiric vanc/zosyn. Blood/sputum cultures ordered. CT head unrevealing. CT chest notable for only bibasilar atelectasis, cannot exclude small amount aspiration in lower lobes. CTAP unrevealing. She was noted to exhibit profound hypoxia. 7.33/44/73 on 100% FiO2 - not consistent with ARDS due to absence of bilateral infiltrates. A 100mcg fentanyl patch was found on the patient and this was removed.  did not reveal that the patient was prescribed this medication. Fentanyl does not always show on UDS. After speaking more with family that have arrived, they noted the patient did have access to  fentanyl patches from her  . She had offered one to her mother recently. The patient's mother said the patient hurt her back and may have tried using this for pain. It is plausible that the patient applied this and became hypoxic while asleep. As of  am, the patient has been weaned to 30% Fio2, levophed of 4mcg/min. When propofol is turned off, there is intermittent jerking.     MRI showed There is a punctate focus of acute infarction in the right frontal cortex. Additional probable focus of cortical infarction anteriorly right frontal lobe. When sedation weaned off, patient demonstrates agitation, CEDILLO, but does not trigger vent. Active Problem List:     Problem List  Date Reviewed: 9/27/2022            Codes Class    Acute CVA (cerebrovascular accident) [facility]) ICD-10-CM: I63.9  ICD-9-CM: 434.91         NSTEMI (non-ST elevated myocardial infarction) ([facility] ICD-10-CM: I21.4  ICD-9-CM: 410.70         Cardiomyopathy ([facility] ICD-10-CM: I42.9  ICD-9-CM: 425.4         Dyslipidemia ICD-10-CM: E78.5  ICD-9-CM: 272.4         Hypothyroidism ICD-10-CM: E03.9  ICD-9-CM: 244.9         KOBY (generalized anxiety disorder) ICD-10-CM: F41.1  ICD-9-CM: 300.02         * (Principal) Status epilepticus ([facility] ICD-10-CM: G40.901  ICD-9-CM: 345. 3         CHRIS (acute kidney injury) ([facility] ICD-10-CM: N17.9  ICD-9-CM: 584.9         Lactic acidosis ICD-10-CM: E87.2  ICD-9-CM: 276.2         Acute respiratory failure with hypoxia (HCC) ICD-10-CM: J96.01  ICD-9-CM: 518.81         Shock ([facility] ICD-10-CM: R57.9  ICD-9-CM: 785.50         Acute metabolic encephalopathy LZE-71-OI: G93.41  ICD-9-CM: 348.31            Past Medical History:      has no past medical history on file. Past Surgical History:      has a past surgical history that includes ir insert non tunl cvc over 5 yrs (9/27/2022). Home Medications:     Prior to Admission medications    Not on File       Allergies/Social/Family History:     No Known Allergies   Social History     Tobacco Use    Smoking status: Not on file    Smokeless tobacco: Not on file   Substance Use Topics    Alcohol use: Not on file      No family history on file.     Review of Systems:     Unable to obtain    Objective:   Vital Signs:  Visit Vitals  BP (!) 110/54   Pulse (!) 43   Temp 98 °F (36.7 °C)   Resp 16   Ht 5' 6\" (1.676 m)   Wt 104.3 kg (230 lb)   SpO2 96%   BMI 37.12 kg/m²    O2 Flow Rate (L/min): 60 l/min O2 Device: Endotracheal tube, Ventilator Temp (24hrs), Av.1 °F (36.7 °C), Min:97.8 °F (36.6 °C), Max:98.3 °F (36.8 °C)           Intake/Output:     Intake/Output Summary (Last 24 hours) at 2022 0914  Last data filed at 2022 0851  Gross per 24 hour   Intake 2286.06 ml   Output 1060 ml   Net 1226.06 ml       Physical Exam:    General:  Sedated/intubated  Eye:  PERRLA; eyes midline at the time of exam   Neurologic:  Sedated; intubated; nonfocal exam; no withdraw  to noxious stimuli at the time of exam, but sedated  Neck: Supple, no JVD  Lungs:  CTAB  Heart:  RRR, no MRG, 2+ pulses, no edema  Abdomen:  Distended, no tenderness, no rebound, no guarding  Skin:  c/d/I  Validated     LABS AND  DATA: Personally reviewed  Recent Labs     22  0327   WBC 11.9* 11.8*   HGB 10.7* 10.7*   HCT 33.6* 33.5*    195     Recent Labs     22  0252 22  0327   * 146*   K 3.7 3.8   * 117*   CO2 25 26   BUN 17 13   CREA 0.84 0.85   * 142*   CA 8.4* 8.2*   MG 2.4 2.2   PHOS 1.4* 1.6*     Recent Labs     22  0105 22  1431   AP 58 58   TP 5.7* 5.6*   ALB 2.9* 3.0*   GLOB 2.8 2.6     Recent Labs     22  0252 22  1900 22  1846 22  1431   INR  --   --   --  1.2*   PTP  --   --   --  12.0*   APTT 37.4* 46.1*   < > <20.0*    < > = values in this interval not displayed. Recent Labs     22  1056 22  1243   PHI 7.42 7.33*   PCO2I 35.8 44.4   PO2I 96 73*   FIO2I 40 100     Recent Labs     22  1431   CPK 2,730*         Ventilator Settings:  Mode Rate Tidal Volume Pressure FiO2 PEEP   Assist control   400 ml    30 % 5 cm H20     Peak airway pressure: 24 cm H2O    Minute ventilation: 6.8 l/min        MEDS: Reviewed    Chest X-Ray: personally reviewed and report checked    TTE: Severely reduced left ventricular systolic function with a visually estimated EF of 25 - 30%.  Left ventricle size is normal. Mildly increased wall thickness. See diagram for wall motion findings. Abnormal diastolic function. Assessment and Plan     Shock: Likely vasodilatory due to sedation Afebrile last 24h. Urine with GNR on gram stain >100,000 GNR. CT chest and abdomen unrevealing. New acute systolic HF. Hepatic panel unrevealing.  - Vanc stopped  - Sputum notable for only normal respiratory josue; MRSA negative  - Continue 5 days cefepime/flagyl in setting of GNR urine and aspiration PNA  - Intubated only for airway protection at this time  - Urine/sputum/blood cultures, continue to follow  - Levo for MAP >65  - Continue midodrine    Acute toxic metabolic encephalopathy: Broad ddx. EEG negative for seizure. Cannot yet exclude anoxic brain injury in setting of hypoxia although this has not shown up on MRI  - Avoid benzos  - SAT/SBT today  - Cannot use precedex due to bradycardia  - Wean propofol due to bradycardia and preferentially use fent    Mechanical ventilation: Intubated for airway protection at this point. Not triggering breaths on SBTs  - RVP negative   - SAT/SBT to be repeated today    Acute systolic HF: Cardiology following, concern for Takotsubo EF 25%  - Cardiology following  - Plan for cardiac cath  - Continue heparin drip, discuss with cardiology as when to d/c    Ischemic CVA: punctate focus of acute infarction in the right frontal cortex. Additional probable focus of cortical infarction anteriorly right frontal lobe  - Aspirin  - Neurology following, appreciate res    Multidisciplinary Rounds Completed:  Y    ABCDEF Bundle/Checklist  Pain Medications: Y, fent  Target RASS:0  Sedation Medications: Fent/prop  CAM-ICU:  +  Mobility: Poor  PT/OT: Unable  Restraints: Y  Discussed Plan of Care (goals of care):  F  Addressed Code Status: Y    CARDIOVASCULAR  Cardiac Gtts: Levo   SBP Goal of: > 90 mmHg  MAP Goal of: > 65 mmHg  Transfusion Trigger (Hgb): <7 g/dL    RESPIRATORY  Vent Goals:   Chlorhexidine   DVT Prophylaxis (if no, list reason): Heparin   SPO2 Goal: > 92%  Pulmonary toilet: Duo-Nebs     GI/  Sal Catheter Present: Yes  GI Prophylaxis: Protonix (pantoprazole)   Nutrition: Pending NST consult  IVFs: Y  Bowel Movement: N  Bowel Regimen: Y  Insulin: Y    ANTIBIOTICS  Antibiotics:  Y    T/L/D  Tubes: Y  Lines: Y  Drains: Y    SPECIAL EQUIPMENT  N    DISPOSITION  ICU    CRITICAL CARE CONSULTANT NOTE  I had a face to face encounter with the patient, reviewed and interpreted patient data including clinical events, labs, images, vital signs, I/O's, and examined patient. I have discussed the case and the plan and management of the patient's care with the consulting services, the bedside nurses and the respiratory therapist.      NOTE OF PERSONAL INVOLVEMENT IN CARE   This patient has a high probability of imminent, clinically significant deterioration, which requires the highest level of preparedness to intervene urgently. I participated in the decision-making and personally managed or directed the management of the following life and organ supporting interventions that required my frequent assessment to treat or prevent imminent deterioration    I personally spent 60 minutes of critical care time. This is time spent at this critically ill patient's bedside actively involved in patient care as well as the coordination of care and discussions with the patient's family. This does not include any procedural time which has been billed separately.     Dhiraj Callahan NP  [facility]  9/30/2022

## 2022-09-30 NOTE — PROGRESS NOTES
Problem: Ventilator Management  Goal: *Adequate oxygenation and ventilation  Outcome: Progressing Towards Goal  Goal: *Patient maintains clear airway/free of aspiration  Outcome: Progressing Towards Goal  Goal: *Absence of infection signs and symptoms  Outcome: Progressing Towards Goal  Goal: *Normal spontaneous ventilation  Outcome: Progressing Towards Goal     Problem: Patient Education: Go to Patient Education Activity  Goal: Patient/Family Education  Outcome: Progressing Towards Goal     Problem: Non-Violent Restraints  Goal: Removal from restraints as soon as assessed to be safe  Outcome: Progressing Towards Goal  Goal: No harm/injury to patient while restraints in use  Outcome: Progressing Towards Goal  Goal: Patient's dignity will be maintained  Outcome: Progressing Towards Goal  Goal: Patient Interventions  Outcome: Progressing Towards Goal     Problem: Non-Violent Restraints  Goal: Removal from restraints as soon as assessed to be safe  Outcome: Progressing Towards Goal  Goal: No harm/injury to patient while restraints in use  Outcome: Progressing Towards Goal  Goal: Patient's dignity will be maintained  Outcome: Progressing Towards Goal  Goal: Patient Interventions  Outcome: Progressing Towards Goal     Problem: Delirium Treatment  Goal: *Level of consciousness restored to baseline  Outcome: Progressing Towards Goal  Goal: *Level of environmental perceptions restored to baseline  Outcome: Progressing Towards Goal  Goal: *Sensory perception restored to baseline  Outcome: Progressing Towards Goal  Goal: *Emotional stability restored to baseline  Outcome: Progressing Towards Goal  Goal: *Functional assessment restored to baseline  Outcome: Progressing Towards Goal  Goal: *Absence of falls  Outcome: Progressing Towards Goal  Goal: *Will remain free of delirium, CAM Score negative  Outcome: Progressing Towards Goal  Goal: *Cognitive status will be restored to baseline  Outcome: Progressing Towards Goal  Goal: Interventions  Outcome: Progressing Towards Goal     Problem: Falls - Risk of  Goal: *Absence of Falls  Description: Document Rich Blight Fall Risk and appropriate interventions in the flowsheet.   Outcome: Progressing Towards Goal  Note: Fall Risk Interventions:       Mentation Interventions: Adequate sleep, hydration, pain control, Bed/chair exit alarm    Medication Interventions: Evaluate medications/consider consulting pharmacy, Bed/chair exit alarm    Elimination Interventions: Bed/chair exit alarm, Call light in reach, Patient to call for help with toileting needs    History of Falls Interventions: Bed/chair exit alarm

## 2022-09-30 NOTE — PROGRESS NOTES
[facility]  YOB: 1965          Assessment & Plan:     CHRIS, resolved  Shock  Resp failure  Encephalopathy  Edema    Rec:  No acute indication HD  HD line out  Getting K phos  OK for diuresis from renal standpoint  Will s/o, please call if we can be of further assistance       Subjective:   CC: follow up CHRIS  HPI: Creat normal. Getting IV phos. Remains on vent.   ROS: unable to obtain due to pt condition  Current Facility-Administered Medications   Medication Dose Route Frequency    potassium phosphate 30 mmol in 0.9% sodium chloride 250 mL infusion   IntraVENous ONCE    midodrine (PROAMATINE) tablet 15 mg  15 mg Oral Q8H    cefTRIAXone (ROCEPHIN) 2 g in 0.9% sodium chloride 20 mL IV syringe  2 g IntraVENous Q24H    atorvastatin (LIPITOR) tablet 40 mg  40 mg Oral QHS    enoxaparin (LOVENOX) injection 30 mg  30 mg SubCUTAneous Q12H    fentaNYL (PF) 1,500 mcg/30 mL (50 mcg/mL) infusion  0-200 mcg/hr IntraVENous TITRATE    aspirin chewable tablet 81 mg  81 mg Oral DAILY    lansoprazole compounding kit (PREVACID) 3 mg/mL oral suspension 30 mg  30 mg Oral DAILY    insulin lispro (HUMALOG) injection   SubCUTAneous Q6H    glucose chewable tablet 16 g  4 Tablet Oral PRN    glucagon (GLUCAGEN) injection 1 mg  1 mg IntraMUSCular PRN    dextrose 10% infusion 0-250 mL  0-250 mL IntraVENous PRN    propofol (DIPRIVAN) 10 mg/mL infusion  0-50 mcg/kg/min IntraVENous TITRATE    NOREPINephrine (LEVOPHED) 8 mg in 5% dextrose 250mL (32 mcg/mL) infusion  0.5-30 mcg/min IntraVENous TITRATE    sodium chloride (NS) flush 5-40 mL  5-40 mL IntraVENous Q8H    sodium chloride (NS) flush 5-40 mL  5-40 mL IntraVENous PRN    acetaminophen (TYLENOL) tablet 650 mg  650 mg Oral Q6H PRN    Or    acetaminophen (TYLENOL) suppository 650 mg  650 mg Rectal Q6H PRN    polyethylene glycol (MIRALAX) packet 17 g  17 g Oral DAILY PRN    senna (SENOKOT) tablet 8.6 mg  1 Tablet Oral DAILY PRN    promethazine (PHENERGAN) tablet 12.5 mg  12.5 mg Oral Q6H PRN    Or    ondansetron (ZOFRAN) injection 4 mg  4 mg IntraVENous Q6H PRN    chlorhexidine (PERIDEX) 0.12 % mouthwash 15 mL  15 mL Oral Q12H          Objective:     Vitals:  Blood pressure (!) 110/54, pulse (!) 43, temperature 98 °F (36.7 °C), resp. rate 16, height 5' 6\" (1.676 m), weight 104.3 kg (230 lb), SpO2 96 %. Temp (24hrs), Av.1 °F (36.7 °C), Min:97.8 °F (36.6 °C), Max:98.3 °F (36.8 °C)      Intake and Output:   07 - 1900  In: 683.1 [I.V.:363.1]  Out: 150 [Urine:150]  1901 -  0700  In: 4394.5 [I.V.:4214.5]  Out: 4749 [Urine:1580]    Physical Exam:               GENERAL ASSESSMENT: On vent  HEENT: ETT in place   CHEST: CTA  HEART: reg  ABDOMEN: Soft,NT  : +Sal +urine   EXTREMITY: +EDEMA          ECG/rhythm:    Data Review      No results for input(s): TNIPOC in the last 72 hours. No lab exists for component: ITNL   Recent Labs     22  1431   CPK 2,730*       Recent Labs     22  0252 22  0327 22  1831 22  0105 22  1846 22  1431 22  1127   * 146* 146* 143   < > 142 141   K 3.7 3.8 4.0 4.1   < > 4.0 5.3*   * 117* 117* 111*   < > 108 106   CO2 25 26 25 23   < > 23 24   BUN 17 13 11 15   < > 18 16   CREA 0.84 0.85 0.98 1.31*   < > 2.11* 2.57*   * 142* 131* 182*   < > 250* 141*   PHOS 1.4* 1.6* 2.3*  --    < >  --   --    MG 2.4 2.2 2.1  --    < >  --   --    CA 8.4* 8.2* 8.1* 8.7   < > 8.8 9.4   ALB  --   --   --  2.9*  --  3.0* 3.9   WBC 11.9* 11.8*  --  12.5*   < >  --  16.1*   HGB 10.7* 10.7*  --  12.0   < >  --  14.2   HCT 33.6* 33.5*  --  37.3   < >  --  45.5    195  --  280   < >  --  306    < > = values in this interval not displayed.         Recent Labs     22  0252 22  1900 22  1016 22  1846 22  1431   INR  --   --   --   --  1.2*   PTP  --   --   --   --  12.0*   APTT 37.4* 46.1* 41.8*   < > <20.0*    < > = values in this interval not displayed. Needs: urine analysis, urine sodium, protein and creatinine  No results found for: ANDREW BOWERS        : Brant Bills MD  9/30/2022        [facility]:  [email or web address]  [facility] office:  [address] [place] [address], [address] [place] [address],[address]  [address]  Phone: [phone]  Fax :     [phone]    [place] office:  [address]  Phone - [phone]  Fax - [phone]

## 2022-10-01 LAB
ANION GAP SERPL CALC-SCNC: 5 MMOL/L (ref 5–15)
BASOPHILS # BLD: 0.1 K/UL (ref 0–0.1)
BASOPHILS NFR BLD: 1 % (ref 0–1)
BUN SERPL-MCNC: 16 MG/DL (ref 6–20)
BUN/CREAT SERPL: 22 (ref 12–20)
CALCIUM SERPL-MCNC: 8.5 MG/DL (ref 8.5–10.1)
CHLORIDE SERPL-SCNC: 114 MMOL/L (ref 97–108)
CO2 SERPL-SCNC: 28 MMOL/L (ref 21–32)
CREAT SERPL-MCNC: 0.74 MG/DL (ref 0.55–1.02)
DIFFERENTIAL METHOD BLD: ABNORMAL
EOSINOPHIL # BLD: 0.2 K/UL (ref 0–0.4)
EOSINOPHIL NFR BLD: 2 % (ref 0–7)
ERYTHROCYTE [DISTWIDTH] IN BLOOD BY AUTOMATED COUNT: 15.1 % (ref 11.5–14.5)
GLUCOSE BLD STRIP.AUTO-MCNC: 106 MG/DL (ref 65–117)
GLUCOSE BLD STRIP.AUTO-MCNC: 72 MG/DL (ref 65–117)
GLUCOSE BLD STRIP.AUTO-MCNC: 91 MG/DL (ref 65–117)
GLUCOSE SERPL-MCNC: 131 MG/DL (ref 65–100)
HCT VFR BLD AUTO: 34.9 % (ref 35–47)
HGB BLD-MCNC: 11.1 G/DL (ref 11.5–16)
IMM GRANULOCYTES # BLD AUTO: 0.1 K/UL (ref 0–0.04)
IMM GRANULOCYTES NFR BLD AUTO: 1 % (ref 0–0.5)
LYMPHOCYTES # BLD: 2.7 K/UL (ref 0.8–3.5)
LYMPHOCYTES NFR BLD: 25 % (ref 12–49)
MAGNESIUM SERPL-MCNC: 2.4 MG/DL (ref 1.6–2.4)
MCH RBC QN AUTO: 29.5 PG (ref 26–34)
MCHC RBC AUTO-ENTMCNC: 31.8 G/DL (ref 30–36.5)
MCV RBC AUTO: 92.8 FL (ref 80–99)
MONOCYTES # BLD: 1 K/UL (ref 0–1)
MONOCYTES NFR BLD: 9 % (ref 5–13)
NEUTS SEG # BLD: 6.8 K/UL (ref 1.8–8)
NEUTS SEG NFR BLD: 62 % (ref 32–75)
NRBC # BLD: 0.09 K/UL (ref 0–0.01)
NRBC BLD-RTO: 0.8 PER 100 WBC
PHOSPHATE SERPL-MCNC: 2.8 MG/DL (ref 2.6–4.7)
PLATELET # BLD AUTO: 238 K/UL (ref 150–400)
PMV BLD AUTO: 11.2 FL (ref 8.9–12.9)
POTASSIUM SERPL-SCNC: 3 MMOL/L (ref 3.5–5.1)
RBC # BLD AUTO: 3.76 M/UL (ref 3.8–5.2)
SERVICE CMNT-IMP: NORMAL
SODIUM SERPL-SCNC: 147 MMOL/L (ref 136–145)
WBC # BLD AUTO: 10.8 K/UL (ref 3.6–11)

## 2022-10-01 PROCEDURE — 36415 COLL VENOUS BLD VENIPUNCTURE: CPT

## 2022-10-01 PROCEDURE — 74011250636 HC RX REV CODE- 250/636: Performed by: INTERNAL MEDICINE

## 2022-10-01 PROCEDURE — 84100 ASSAY OF PHOSPHORUS: CPT

## 2022-10-01 PROCEDURE — 83735 ASSAY OF MAGNESIUM: CPT

## 2022-10-01 PROCEDURE — 85025 COMPLETE CBC W/AUTO DIFF WBC: CPT

## 2022-10-01 PROCEDURE — 65610000006 HC RM INTENSIVE CARE

## 2022-10-01 PROCEDURE — 74011250636 HC RX REV CODE- 250/636: Performed by: NURSE PRACTITIONER

## 2022-10-01 PROCEDURE — 77030018798 HC PMP KT ENTRL FED COVD -A

## 2022-10-01 PROCEDURE — 2709999900 HC NON-CHARGEABLE SUPPLY

## 2022-10-01 PROCEDURE — 94003 VENT MGMT INPAT SUBQ DAY: CPT

## 2022-10-01 PROCEDURE — 74011000250 HC RX REV CODE- 250: Performed by: INTERNAL MEDICINE

## 2022-10-01 PROCEDURE — 74011250636 HC RX REV CODE- 250/636: Performed by: STUDENT IN AN ORGANIZED HEALTH CARE EDUCATION/TRAINING PROGRAM

## 2022-10-01 PROCEDURE — 94761 N-INVAS EAR/PLS OXIMETRY MLT: CPT

## 2022-10-01 PROCEDURE — 74011250637 HC RX REV CODE- 250/637: Performed by: PSYCHIATRY & NEUROLOGY

## 2022-10-01 PROCEDURE — 82962 GLUCOSE BLOOD TEST: CPT

## 2022-10-01 PROCEDURE — 80048 BASIC METABOLIC PNL TOTAL CA: CPT

## 2022-10-01 PROCEDURE — 74011250637 HC RX REV CODE- 250/637: Performed by: NURSE PRACTITIONER

## 2022-10-01 PROCEDURE — 74011000250 HC RX REV CODE- 250: Performed by: NURSE PRACTITIONER

## 2022-10-01 RX ORDER — POTASSIUM CHLORIDE 7.45 MG/ML
10 INJECTION INTRAVENOUS
Status: COMPLETED | OUTPATIENT
Start: 2022-10-01 | End: 2022-10-01

## 2022-10-01 RX ADMIN — LANSOPRAZOLE 30 MG: KIT at 08:46

## 2022-10-01 RX ADMIN — ENOXAPARIN SODIUM 30 MG: 100 INJECTION SUBCUTANEOUS at 22:45

## 2022-10-01 RX ADMIN — PROPOFOL 40 MCG/KG/MIN: 10 INJECTION, EMULSION INTRAVENOUS at 22:46

## 2022-10-01 RX ADMIN — Medication 10 ML: at 22:46

## 2022-10-01 RX ADMIN — Medication 10 ML: at 05:36

## 2022-10-01 RX ADMIN — Medication 200 MCG/HR: at 13:33

## 2022-10-01 RX ADMIN — POTASSIUM CHLORIDE 10 MEQ: 7.46 INJECTION, SOLUTION INTRAVENOUS at 07:43

## 2022-10-01 RX ADMIN — ASPIRIN 81 MG: 81 TABLET, CHEWABLE ORAL at 08:46

## 2022-10-01 RX ADMIN — CHLORHEXIDINE GLUCONATE 15 ML: 1.2 RINSE ORAL at 20:04

## 2022-10-01 RX ADMIN — POTASSIUM CHLORIDE 10 MEQ: 7.46 INJECTION, SOLUTION INTRAVENOUS at 11:59

## 2022-10-01 RX ADMIN — Medication 10 ML: at 14:28

## 2022-10-01 RX ADMIN — CEFTRIAXONE 2 G: 2 INJECTION, POWDER, FOR SOLUTION INTRAMUSCULAR; INTRAVENOUS at 09:43

## 2022-10-01 RX ADMIN — POTASSIUM CHLORIDE 10 MEQ: 7.46 INJECTION, SOLUTION INTRAVENOUS at 10:59

## 2022-10-01 RX ADMIN — POTASSIUM CHLORIDE 10 MEQ: 7.46 INJECTION, SOLUTION INTRAVENOUS at 08:46

## 2022-10-01 RX ADMIN — CHLORHEXIDINE GLUCONATE 15 ML: 1.2 RINSE ORAL at 08:47

## 2022-10-01 RX ADMIN — PROPOFOL 25 MCG/KG/MIN: 10 INJECTION, EMULSION INTRAVENOUS at 03:09

## 2022-10-01 RX ADMIN — PROPOFOL 35 MCG/KG/MIN: 10 INJECTION, EMULSION INTRAVENOUS at 14:30

## 2022-10-01 RX ADMIN — MIDODRINE HYDROCHLORIDE 15 MG: 5 TABLET ORAL at 22:45

## 2022-10-01 RX ADMIN — Medication 200 MCG/HR: at 21:05

## 2022-10-01 RX ADMIN — ATORVASTATIN CALCIUM 40 MG: 20 TABLET, FILM COATED ORAL at 22:45

## 2022-10-01 RX ADMIN — MIDODRINE HYDROCHLORIDE 15 MG: 5 TABLET ORAL at 14:28

## 2022-10-01 RX ADMIN — PROPOFOL 30 MCG/KG/MIN: 10 INJECTION, EMULSION INTRAVENOUS at 09:44

## 2022-10-01 RX ADMIN — MIDODRINE HYDROCHLORIDE 15 MG: 5 TABLET ORAL at 05:35

## 2022-10-01 RX ADMIN — PROPOFOL 35 MCG/KG/MIN: 10 INJECTION, EMULSION INTRAVENOUS at 18:12

## 2022-10-01 RX ADMIN — ENOXAPARIN SODIUM 30 MG: 100 INJECTION SUBCUTANEOUS at 09:44

## 2022-10-01 RX ADMIN — POTASSIUM CHLORIDE 10 MEQ: 7.46 INJECTION, SOLUTION INTRAVENOUS at 09:43

## 2022-10-01 RX ADMIN — POTASSIUM CHLORIDE 10 MEQ: 7.46 INJECTION, SOLUTION INTRAVENOUS at 12:57

## 2022-10-01 RX ADMIN — Medication 200 MCG/HR: at 05:54

## 2022-10-01 NOTE — PROGRESS NOTES
0700 Bedside shift change report given to Chris Lagunas RN (oncoming nurse) by Nick Desir RN  (offgoing nurse). Report included the following information SBAR, ED Summary, Intake/Output, MAR, Recent Results, Med Rec Status, and Cardiac Rhythm NSR . Primary Nurse Susana Thacker RN and Nick Desir RN performed a dual skin assessment on this patient No impairment noted  Jose Enrique score is see flowsheet. 9412 Potassium chloride hung. Propofol rate change to 25 mcg. Patient assessed, see flowsheet. Patient is unable to verbalize orientation due to intubation. Pupils are round and reactive, eyes to not focus or track, and patient cannot follow commands. Pulses felt in all extremities, non-pitting edema in lower extremities and hands. Heart sounds heard, lung sounds clear but diminished, and bowel sounds active. Patient can move all extremities but not purposeful. Patient is in bilateral soft wrist restraints. Patient turned and repositioned, mouth care provided. 5221 Morning meds given. Potassium chloride hung.    0909 Propofol rate change to 30 mcg.     0943 Rocephin given. Lovenox injection given. Potassium chloride hung. New propofol hung. Patient turned and repositioned, mouth care provided. 1059 Potassium chloride hung. 1101 Levophed rate change to 7 mcg.     1159 Potassium chloride hung. Patient turned and repositioned, mouth care provided. Patient reassessed, no change. See flowsheet. 1204 Lispro held for BG of 91.     1257 Potassium chloride hung. 1333 New fentanyl hung. Levophed rate change to 6 mcg. Propofol rate change to 35 mcg.     1428 New Lidocaine patch applied to back. Midodrine given. New propofol hung. Patient turned and repositioned. [place] sheet and chucks pad fixed. Patient turned and repositioned, mouth care provided. 1434 Propofol rate change to 40 mcg.     1541 Palliative care consulted, per Dr. Boubacar Alejandro to help family with plan of care decisions. 1619 Levophed rate change to 5 mcg. Patient reassessed, no change. Patient turned and repositioned, mouth care provided. 1812 Lispro held for BG of 106. New propofol hung. Levophed rate changed to 6 mcg. Patient turned and repositioned. 1900 Bedside and Verbal shift change report given to Oseas Barbosa RN (oncoming nurse) by Lenin Mathew RN (offgoing nurse). Report included the following information SBAR, ED Summary, Intake/Output, MAR, Recent Results, Med Rec Status, and Cardiac Rhythm NSR .

## 2022-10-01 NOTE — PROGRESS NOTES
Occupational Therapy  Chart reviewed, patient remains vented and sedated. Will follow up on Monday as indicated.    Boubacar Pace, OTR/L

## 2022-10-01 NOTE — PROGRESS NOTES
[facility]    ICU TEAM Progress Note    Name: Millie Ward   : 1965   MRN: [id number]   Date: 10/1/2022           ICU Assessment     Acute respiratory failure with hypoxia  Acute encephalopathy  Possible anoxic encephalopathy  Stress cardiomyopathy  Ischemic CVA         ICU Comprehensive Plan of Care:   -Continue propofol and fentanyl  -rocephin  -lovenox  -neuro checks  -Will consider repeating MRI in AM if no improvement. -levophed to keep MAP>65    Discussed Care Plan with Bedside RN and mother    Documentation of Current Medications    ICU Issues:  D- Delirium assessement CAM-ICU: Assessments ordered  E- Early Mobility/ PT: Will order when appropriate   F- Feeding: TF  Peptic Ulcer Disease Prophylaxis: PPI  DVT Prophylaxis: Lovenox  Glycemic Control (140-180 mg/dL): SSI  Catheter Discontinuation (CVC, arterial, urinary, gastric, rectal): Keep all  Antibiotics: Rocephin  Steroids: None  MAR Review (pain, anxiety, constipation . 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 She was LKW the evening of . She did not report to work the am of  and she was found unresponsive at her home on wellness check. Bystander CPR was started, but she did have a pulse. EMS was called. Upon their arrival, SBP was 70s. Pupils were pinpoint, narcan was administered without improvement. She experienced seizure like activity and received 4mg IV versed. Upon arrival to the ED, she exhibited seizure like activity and L sided gaze preference. She required intubation for hypoxic respiratory failure. She was sedated on propofol. She became hypotensive and was started on levophed. LA 6.46. She was admitted to the ICU. She was started on empiric vanc/zosyn. Blood/sputum cultures ordered. CT head unrevealing.   CT chest notable for only bibasilar atelectasis, cannot exclude small amount aspiration in lower lobes. CTAP unrevealing. Central line placed at bedside in ICU. She was noted to exhibit profound hypoxia. 7.33/44/73 on 100% FiO2 - not consistent with ARDS due to absence of bilateral infiltrates. Overnight Events:   10/1/2022  SAHARA overnight. Patient is currently on propofol and fentanyl. She gets very restless and CEDILLO when touched but doesn't follow commands. Failed SBT yesterday due to apnea. POD:  * No surgery found *    S/P:       Active Problem List:     Problem List  Date Reviewed: 9/27/2022            Codes Class    Acute CVA (cerebrovascular accident) [facility]) ICD-10-CM: I63.9  ICD-9-CM: 434.91         NSTEMI (non-ST elevated myocardial infarction) ([facility] ICD-10-CM: I21.4  ICD-9-CM: 410.70         Cardiomyopathy ([facility] ICD-10-CM: I42.9  ICD-9-CM: 425.4         Dyslipidemia ICD-10-CM: E78.5  ICD-9-CM: 272.4         Hypothyroidism ICD-10-CM: E03.9  ICD-9-CM: 244.9         KOBY (generalized anxiety disorder) ICD-10-CM: F41.1  ICD-9-CM: 300.02         * (Principal) Status epilepticus ([facility] ICD-10-CM: G40.901  ICD-9-CM: 345. 3         CHRIS (acute kidney injury) ([facility] ICD-10-CM: N17.9  ICD-9-CM: 584.9         Lactic acidosis ICD-10-CM: E87.20  ICD-9-CM: 276.2         Acute respiratory failure with hypoxia (HCC) ICD-10-CM: J96.01  ICD-9-CM: 518.81         Shock ([facility] ICD-10-CM: R57.9  ICD-9-CM: 785.50         Acute metabolic encephalopathy SJN-68-VL: G93.41  ICD-9-CM: 348.31            Past Medical History:      has no past medical history on file. Past Surgical History:      has a past surgical history that includes ir insert non tunl cvc over 5 yrs (9/27/2022).     Home Medications:     Prior to Admission medications    Not on File       Allergies/Social/Family History:     No Known Allergies   Social History     Tobacco Use    Smoking status: Not on file    Smokeless tobacco: Not on file   Substance Use Topics    Alcohol use: Not on file      No family history on file. Review of Systems:     ROS is unobtainable as the patient is intubated. Objective:   Vital Signs:  Visit Vitals  BP (!) 124/55 (BP 1 Location: Right upper arm, BP Patient Position: At rest)   Pulse 84   Temp 98.1 °F (36.7 °C)   Resp 14   Ht 5' 6\" (1.676 m)   Wt 104.3 kg (230 lb)   SpO2 96%   BMI 37.12 kg/m²    O2 Flow Rate (L/min): 60 l/min O2 Device: Endotracheal tube, Ventilator Temp (24hrs), Av.2 °F (36.8 °C), Min:97.6 °F (36.4 °C), Max:98.6 °F (37 °C)           Intake/Output:     Intake/Output Summary (Last 24 hours) at 10/1/2022 0949  Last data filed at 10/1/2022 0800  Gross per 24 hour   Intake 2854.65 ml   Output 1110 ml   Net 1744.65 ml       Physical Exam:  Performed via video assessment. Gen: Patient is intubated, sedated, no acute distress  HEENT: ETT and OGT present  Chest: Chest movement is equal bilaterally  Cardiac: Cardiac monitor reveals SR  Extremities: Extremities appear well perfused with no obvious edema  Neuro: Patient is sedated    LABS AND  DATA: Personally reviewed  Recent Labs     10/01/22  0418 09/30/22  025   WBC 10.8 11.9*   HGB 11.1* 10.7*   HCT 34.9* 33.6*    227     Recent Labs     10/01/22  0418 09/30/22  025   * 147*   K 3.0* 3.7   * 117*   CO2 28 25   BUN 16 17   CREA 0.74 0.84   * 128*   CA 8.5 8.4*   MG 2.4 2.4   PHOS 2.8 1.4*     No results for input(s): AP, TBIL, TP, ALB, GLOB, AML, LPSE in the last 72 hours. No lab exists for component: SGOT, GPT, AMYP  Recent Labs     22  0252 22  1900   APTT 37.4* 46.1*      Recent Labs     22  1056   PHI 7.42   PCO2I 35.8   PO2I 96   FIO2I 40     No results for input(s): CPK, CKMB, TROIQ, BNPP in the last 72 hours.     Hemodynamics:   PAP:   CO:     Wedge:   CI:     CVP:    SVR:       PVR:       Ventilator Settings:  Mode Rate Tidal Volume Pressure FiO2 PEEP   Assist control, Volume control   400 ml    30 % 5 cm H20     Peak airway pressure: 29 cm H2O    Minute ventilation: 6.69 l/min        MEDS: Reviewed    Chest X-Ray:  CXR Results  (Last 48 hours)      None            ABCDEF Bundle/Checklist Completed:  Yes    DISPOSITION  Stay in ICU    Critical Care Time  The patient is critically ill with acute respiratory failure. If I do not acutely intervene upon these illnesses, the patient's life is in danger. These illnesses have required me to: (1) perform high complexity decision making for assessment and support; (2) assess the patient via video; (3) personally review the medical record and laboratory and diagnostic imaging results; (4) actively titrate high-alert medications; (5) manage the ventilator and actively titrate oxygen; (6) discuss this patient's case management with other healthcare providers; and (7) apply and interpret advanced monitoring techniques. As a result of this, I personally spent 35 minutes providing critical care services exclusively to this patient. This was in exclusion of the time spent performing procedures or teaching.     Cole Quijano DO, [address] [facility] Critical Care  10/1/2022

## 2022-10-01 NOTE — PROGRESS NOTES
1900-Bedside and Verbal shift change report given to Oseas Barbosa (oncoming nurse) by Lenin Mathew (offgoing nurse). Report included the following information SBAR, Kardex, Intake/Output, MAR, Recent Results, Cardiac Rhythm nsr-sb, Alarm Parameters , and Quality Measures. See flowsheets for all assessments. See MAR for all medication administrations. 0100-Bedside and Verbal shift change report given to [facility] (oncoming nurse) by Oseas Barbosa (offgoing nurse). Report included the following information SBAR, Kardex, Intake/Output, MAR, Recent Results, Cardiac Rhythm nsr-sb, Alarm Parameters , and Quality Measures.

## 2022-10-01 NOTE — PROGRESS NOTES
Problem: Ventilator Management  Goal: *Adequate oxygenation and ventilation  Outcome: Progressing Towards Goal  Goal: *Patient maintains clear airway/free of aspiration  Outcome: Progressing Towards Goal  Goal: *Absence of infection signs and symptoms  Outcome: Progressing Towards Goal  Goal: *Normal spontaneous ventilation  Outcome: Progressing Towards Goal     Problem: Patient Education: Go to Patient Education Activity  Goal: Patient/Family Education  Outcome: Progressing Towards Goal     Problem: Non-Violent Restraints  Goal: Removal from restraints as soon as assessed to be safe  Outcome: Progressing Towards Goal  Goal: No harm/injury to patient while restraints in use  Outcome: Progressing Towards Goal  Goal: Patient's dignity will be maintained  Outcome: Progressing Towards Goal  Goal: Patient Interventions  Outcome: Progressing Towards Goal     Problem: Delirium Treatment  Goal: *Level of consciousness restored to baseline  Outcome: Progressing Towards Goal  Goal: *Level of environmental perceptions restored to baseline  Outcome: Progressing Towards Goal  Goal: *Sensory perception restored to baseline  Outcome: Progressing Towards Goal  Goal: *Emotional stability restored to baseline  Outcome: Progressing Towards Goal  Goal: *Functional assessment restored to baseline  Outcome: Progressing Towards Goal  Goal: *Absence of falls  Outcome: Progressing Towards Goal  Goal: *Will remain free of delirium, CAM Score negative  Outcome: Progressing Towards Goal  Goal: *Cognitive status will be restored to baseline  Outcome: Progressing Towards Goal  Goal: Interventions  Outcome: Progressing Towards Goal     Problem: Patient Education: Go to Patient Education Activity  Goal: Patient/Family Education  Outcome: Progressing Towards Goal     Problem: Falls - Risk of  Goal: *Absence of Falls  Description: Document Mariana Franco Fall Risk and appropriate interventions in the flowsheet.   Outcome: Progressing Towards Goal  Note: Fall Risk Interventions:       Mentation Interventions: Adequate sleep, hydration, pain control, Door open when patient unattended, Evaluate medications/consider consulting pharmacy, More frequent rounding    Medication Interventions: Assess postural VS orthostatic hypotension, Evaluate medications/consider consulting pharmacy, Patient to call before getting OOB    Elimination Interventions: Bed/chair exit alarm, Call light in reach    History of Falls Interventions: Bed/chair exit alarm, Consult care management for discharge planning, Evaluate medications/consider consulting pharmacy         Problem: Patient Education: Go to Patient Education Activity  Goal: Patient/Family Education  Outcome: Progressing Towards Goal     Problem: Pressure Injury - Risk of  Goal: *Prevention of pressure injury  Description: Document Jose Enrique Scale and appropriate interventions in the flowsheet.   Outcome: Progressing Towards Goal  Note: Pressure Injury Interventions:  Sensory Interventions: Assess changes in LOC, Assess need for specialty bed, Avoid rigorous massage over bony prominences, Float heels, Minimize linen layers    Moisture Interventions: Absorbent underpads, Apply protective barrier, creams and emollients, Internal/External urinary devices    Activity Interventions: Assess need for specialty bed, Pressure redistribution bed/mattress(bed type)    Mobility Interventions: Assess need for specialty bed, Float heels, HOB 30 degrees or less, Pressure redistribution bed/mattress (bed type)    Nutrition Interventions: Document food/fluid/supplement intake, Discuss nutritional consult with provider    Friction and Shear Interventions: Apply protective barrier, creams and emollients, Foam dressings/transparent film/skin sealants, HOB 30 degrees or less, Lift sheet                Problem: Patient Education: Go to Patient Education Activity  Goal: Patient/Family Education  Outcome: Progressing Towards Goal     Problem: Nutrition Deficit  Goal: *Optimize nutritional status  Outcome: Progressing Towards Goal

## 2022-10-01 NOTE — PROGRESS NOTES
Dominguez Faby [facility] 79  380 [facility], [address] [place] [address]  [phone]      Hospitalist  Progress Note      NAME:         Halina Doyle   :        1965  MRM:        [id number]    Date of service: 10/1/2022      Chief complaint:  Pt intubated and on propofol     Interval HPI:     Pt remains on low dose levophed (6). Remains on vent. Has been unable to pass SBT due to mentation. Objective:    Vital Signs:    Visit Vitals  BP (!) 104/54   Pulse 61   Temp 98.1 °F (36.7 °C)   Resp 16   Ht 5' 6\" (1.676 m)   Wt 104.3 kg (230 lb)   SpO2 94%   BMI 37.12 kg/m²        Intake/Output Summary (Last 24 hours) at 10/1/2022 1054  Last data filed at 10/1/2022 0800  Gross per 24 hour   Intake 2827.29 ml   Output 1060 ml   Net 1767.29 ml      Physical Examination:    General: intubated. Sedated   Eyes: pupils pinpoint but responsive to light  ENT: ET tube in place  Neck: no masses, thyroid non-tender and trachea central.  Pulm: coarse bilaterally    Card:  no JVD or murmurs, has regular and normal S1, S2 without thrills, bruits   Abd:  Soft, non-tender, non-distended, normoactive bowel sounds   Musc:  No cyanosis, clubbing, atrophy or deformities. Skin:  No rashes, bruising or ulcers.    Neuro: sedated     Current Facility-Administered Medications   Medication Dose Route Frequency    potassium chloride 10 mEq in 100 ml IVPB  10 mEq IntraVENous Q1H    midodrine (PROAMATINE) tablet 15 mg  15 mg Oral Q8H    cefTRIAXone (ROCEPHIN) 2 g in 0.9% sodium chloride 20 mL IV syringe  2 g IntraVENous Q24H    atorvastatin (LIPITOR) tablet 40 mg  40 mg Oral QHS    enoxaparin (LOVENOX) injection 30 mg  30 mg SubCUTAneous Q12H    lidocaine 4 % patch 1 Patch  1 Patch TransDERmal Q24H    fentaNYL (PF) 1,500 mcg/30 mL (50 mcg/mL) infusion  0-200 mcg/hr IntraVENous TITRATE    aspirin chewable tablet 81 mg  81 mg Oral DAILY    lansoprazole compounding kit (PREVACID) 3 mg/mL oral suspension 30 mg  30 mg Oral DAILY    insulin lispro (HUMALOG) injection   SubCUTAneous Q6H    glucose chewable tablet 16 g  4 Tablet Oral PRN    glucagon (GLUCAGEN) injection 1 mg  1 mg IntraMUSCular PRN    dextrose 10% infusion 0-250 mL  0-250 mL IntraVENous PRN    propofol (DIPRIVAN) 10 mg/mL infusion  0-50 mcg/kg/min IntraVENous TITRATE    NOREPINephrine (LEVOPHED) 8 mg in 5% dextrose 250mL (32 mcg/mL) infusion  0.5-30 mcg/min IntraVENous TITRATE    sodium chloride (NS) flush 5-40 mL  5-40 mL IntraVENous Q8H    sodium chloride (NS) flush 5-40 mL  5-40 mL IntraVENous PRN    acetaminophen (TYLENOL) tablet 650 mg  650 mg Oral Q6H PRN    Or    acetaminophen (TYLENOL) suppository 650 mg  650 mg Rectal Q6H PRN    polyethylene glycol (MIRALAX) packet 17 g  17 g Oral DAILY PRN    senna (SENOKOT) tablet 8.6 mg  1 Tablet Oral DAILY PRN    promethazine (PHENERGAN) tablet 12.5 mg  12.5 mg Oral Q6H PRN    Or    ondansetron (ZOFRAN) injection 4 mg  4 mg IntraVENous Q6H PRN    chlorhexidine (PERIDEX) 0.12 % mouthwash 15 mL  15 mL Oral Q12H        Laboratory data and review:    Recent Labs     10/01/22  0418 09/30/22  0252 09/29/22  0327   WBC 10.8 11.9* 11.8*   HGB 11.1* 10.7* 10.7*   HCT 34.9* 33.6* 33.5*    227 195     Recent Labs     10/01/22  0418 09/30/22  0252 09/29/22  0327   * 147* 146*   K 3.0* 3.7 3.8   * 117* 117*   CO2 28 25 26   * 128* 142*   BUN 16 17 13   CREA 0.74 0.84 0.85   CA 8.5 8.4* 8.2*   MG 2.4 2.4 2.2   PHOS 2.8 1.4* 1.6*     No components found for: [place]    MRI =>   Small cortical foci of acute infarction right frontal lobe posteriorly abutting  the central sulcus and cortically based anteriorly in the right frontal lobe. There is no associated hemorrhage, midline shift or mass effect. Assessment and Plan:    Shock ([facility] [address] [facility] (9/27/2022) POA: suspect primarily septic shock. Noted to have E.coli in urine cultures and sputum. Blood cultures negative. Suspect CM also contributing   -remains on low dose pressor support   -on Ceftriaxone   -check cortisol      Acute metabolic encephalopathy (0/50/3015) POA: unclear etiology. ? anoxia ? CVA. Apparently had unknown downtime and fentanyl patches found at home with unclear use/abuse   -MRI did show a small CVA as above   -EEG as per neurology   -ammonia WNL   -UDS with benzos   -repeat MRI in the AM if persists      Acute CVA POA:  MRi showing small cortical foci of acute infarction right frontal lobe posteriorly abutting the central sulcus and cortically based anteriorly in the right frontal lobe). Echo showed no shunt. -LDL NOT at goal; on statin   -on ASA   -likely repeat MRI in near future     NSTEMI (non-ST elevated myocardial infarction) ([facility] [address] (9/28/2022) / Cardiomyopathy ([address] (9/28/2022) POA:   -TTE with reduced EF; s/p heparin gtt. Defer timing of cath to cardiology   -on ASA, statin   -unable to tolerate beta blocker or ACE I due to hypotension     Seizure activity POA: she had witnessed seizures on admission but has since had none other than occasional myoclonic jerks. No prior hx of seizures. EEG neg for seizure activity. IV Keppra discontinued 9/29. Neurology following. Acute respiratory failure with hypoxia ([facility] [address] (9/27/2022) POA: intubated for airway protection given her encephalopathy on admission. Intensivist following and managing vent     CHRIS (acute kidney injury) ([facility] [address] (9/27/2022) POA: unknown baseline Cr. Currently Cr stable. Lactic acidosis (9/27/2022) POA: 3.0 on admission. Likely 2/2 #1.  Resolved     Dyslipidemia (9/28/2022) POA: on statin     Hypothyroidism (9/28/2022) POA: TSH low; free T4 low normal. ?central.     KOBY (generalized anxiety disorder) (9/28/2022) POA: med rec once able to comply     Hypokalemia: repleted     Hypernatremia: increase free water flushes in TF's    NB: patient has another chart with MR # [id number]    Total time spent for the patient's care:  35 minutes Care Plan discussed with: RN     Discussed:  Care Plan    Prophylaxis:  Lovenox     Anticipated Disposition:   TBD           ___________________________________________________    Attending Physician:   Andre Locke MD

## 2022-10-02 ENCOUNTER — APPOINTMENT (OUTPATIENT)
Dept: MRI IMAGING | Age: 57
DRG: 064 | End: 2022-10-02
Attending: INTERNAL MEDICINE
Payer: COMMERCIAL

## 2022-10-02 LAB
ANION GAP SERPL CALC-SCNC: 2 MMOL/L (ref 5–15)
BACTERIA SPEC CULT: NORMAL
BASOPHILS # BLD: 0 K/UL (ref 0–0.1)
BASOPHILS NFR BLD: 0 % (ref 0–1)
BUN SERPL-MCNC: 11 MG/DL (ref 6–20)
BUN/CREAT SERPL: 15 (ref 12–20)
CALCIUM SERPL-MCNC: 8.5 MG/DL (ref 8.5–10.1)
CHLORIDE SERPL-SCNC: 112 MMOL/L (ref 97–108)
CO2 SERPL-SCNC: 31 MMOL/L (ref 21–32)
CORTIS AM PEAK SERPL-MCNC: 15.8 UG/DL (ref 4.3–22.45)
CREAT SERPL-MCNC: 0.74 MG/DL (ref 0.55–1.02)
DIFFERENTIAL METHOD BLD: ABNORMAL
EOSINOPHIL # BLD: 0.2 K/UL (ref 0–0.4)
EOSINOPHIL NFR BLD: 2 % (ref 0–7)
ERYTHROCYTE [DISTWIDTH] IN BLOOD BY AUTOMATED COUNT: 14.9 % (ref 11.5–14.5)
GLUCOSE BLD STRIP.AUTO-MCNC: 100 MG/DL (ref 65–117)
GLUCOSE BLD STRIP.AUTO-MCNC: 102 MG/DL (ref 65–117)
GLUCOSE BLD STRIP.AUTO-MCNC: 124 MG/DL (ref 65–117)
GLUCOSE BLD STRIP.AUTO-MCNC: 128 MG/DL (ref 65–117)
GLUCOSE BLD STRIP.AUTO-MCNC: 99 MG/DL (ref 65–117)
GLUCOSE SERPL-MCNC: 134 MG/DL (ref 65–100)
HCT VFR BLD AUTO: 34.3 % (ref 35–47)
HGB BLD-MCNC: 11.2 G/DL (ref 11.5–16)
IMM GRANULOCYTES # BLD AUTO: 0.1 K/UL (ref 0–0.04)
IMM GRANULOCYTES NFR BLD AUTO: 1 % (ref 0–0.5)
LYMPHOCYTES # BLD: 2.4 K/UL (ref 0.8–3.5)
LYMPHOCYTES NFR BLD: 22 % (ref 12–49)
MAGNESIUM SERPL-MCNC: 2.2 MG/DL (ref 1.6–2.4)
MCH RBC QN AUTO: 30 PG (ref 26–34)
MCHC RBC AUTO-ENTMCNC: 32.7 G/DL (ref 30–36.5)
MCV RBC AUTO: 92 FL (ref 80–99)
MONOCYTES # BLD: 1.1 K/UL (ref 0–1)
MONOCYTES NFR BLD: 10 % (ref 5–13)
NEUTS SEG # BLD: 7.3 K/UL (ref 1.8–8)
NEUTS SEG NFR BLD: 65 % (ref 32–75)
NRBC # BLD: 0.04 K/UL (ref 0–0.01)
NRBC BLD-RTO: 0.4 PER 100 WBC
PHOSPHATE SERPL-MCNC: 3.5 MG/DL (ref 2.6–4.7)
PLATELET # BLD AUTO: 233 K/UL (ref 150–400)
PMV BLD AUTO: 11.1 FL (ref 8.9–12.9)
POTASSIUM SERPL-SCNC: 3.3 MMOL/L (ref 3.5–5.1)
RBC # BLD AUTO: 3.73 M/UL (ref 3.8–5.2)
SERVICE CMNT-IMP: ABNORMAL
SERVICE CMNT-IMP: ABNORMAL
SERVICE CMNT-IMP: NORMAL
SODIUM SERPL-SCNC: 145 MMOL/L (ref 136–145)
WBC # BLD AUTO: 11.1 K/UL (ref 3.6–11)

## 2022-10-02 PROCEDURE — 74011000250 HC RX REV CODE- 250: Performed by: NURSE PRACTITIONER

## 2022-10-02 PROCEDURE — 85025 COMPLETE CBC W/AUTO DIFF WBC: CPT

## 2022-10-02 PROCEDURE — 74011250637 HC RX REV CODE- 250/637: Performed by: PSYCHIATRY & NEUROLOGY

## 2022-10-02 PROCEDURE — 70553 MRI BRAIN STEM W/O & W/DYE: CPT

## 2022-10-02 PROCEDURE — 82962 GLUCOSE BLOOD TEST: CPT

## 2022-10-02 PROCEDURE — 82533 TOTAL CORTISOL: CPT

## 2022-10-02 PROCEDURE — 2709999900 HC NON-CHARGEABLE SUPPLY

## 2022-10-02 PROCEDURE — 74011000258 HC RX REV CODE- 258: Performed by: NURSE PRACTITIONER

## 2022-10-02 PROCEDURE — 94003 VENT MGMT INPAT SUBQ DAY: CPT

## 2022-10-02 PROCEDURE — 65610000006 HC RM INTENSIVE CARE

## 2022-10-02 PROCEDURE — 94761 N-INVAS EAR/PLS OXIMETRY MLT: CPT

## 2022-10-02 PROCEDURE — 74011250636 HC RX REV CODE- 250/636: Performed by: INTERNAL MEDICINE

## 2022-10-02 PROCEDURE — 74011250636 HC RX REV CODE- 250/636: Performed by: RADIOLOGY

## 2022-10-02 PROCEDURE — 84100 ASSAY OF PHOSPHORUS: CPT

## 2022-10-02 PROCEDURE — 80048 BASIC METABOLIC PNL TOTAL CA: CPT

## 2022-10-02 PROCEDURE — 74011250637 HC RX REV CODE- 250/637: Performed by: NURSE PRACTITIONER

## 2022-10-02 PROCEDURE — 83735 ASSAY OF MAGNESIUM: CPT

## 2022-10-02 PROCEDURE — A9576 INJ PROHANCE MULTIPACK: HCPCS | Performed by: RADIOLOGY

## 2022-10-02 PROCEDURE — 74011000250 HC RX REV CODE- 250: Performed by: INTERNAL MEDICINE

## 2022-10-02 PROCEDURE — 74011250636 HC RX REV CODE- 250/636: Performed by: STUDENT IN AN ORGANIZED HEALTH CARE EDUCATION/TRAINING PROGRAM

## 2022-10-02 PROCEDURE — 74011250636 HC RX REV CODE- 250/636: Performed by: NURSE PRACTITIONER

## 2022-10-02 PROCEDURE — 36415 COLL VENOUS BLD VENIPUNCTURE: CPT

## 2022-10-02 RX ORDER — POTASSIUM CHLORIDE 7.45 MG/ML
10 INJECTION INTRAVENOUS
Status: COMPLETED | OUTPATIENT
Start: 2022-10-02 | End: 2022-10-02

## 2022-10-02 RX ORDER — FENTANYL CITRATE 50 UG/ML
100 INJECTION, SOLUTION INTRAMUSCULAR; INTRAVENOUS
Status: DISCONTINUED | OUTPATIENT
Start: 2022-10-02 | End: 2022-10-13

## 2022-10-02 RX ADMIN — NOREPINEPHRINE BITARTRATE 5 MCG/MIN: 1 SOLUTION INTRAVENOUS at 05:51

## 2022-10-02 RX ADMIN — PROPOFOL 35 MCG/KG/MIN: 10 INJECTION, EMULSION INTRAVENOUS at 02:12

## 2022-10-02 RX ADMIN — PROPOFOL 40 MCG/KG/MIN: 10 INJECTION, EMULSION INTRAVENOUS at 21:36

## 2022-10-02 RX ADMIN — PROPOFOL 50 MCG/KG/MIN: 10 INJECTION, EMULSION INTRAVENOUS at 09:29

## 2022-10-02 RX ADMIN — PROPOFOL 50 MCG/KG/MIN: 10 INJECTION, EMULSION INTRAVENOUS at 15:34

## 2022-10-02 RX ADMIN — MIDODRINE HYDROCHLORIDE 15 MG: 5 TABLET ORAL at 14:55

## 2022-10-02 RX ADMIN — POTASSIUM CHLORIDE 10 MEQ: 7.46 INJECTION, SOLUTION INTRAVENOUS at 11:16

## 2022-10-02 RX ADMIN — LANSOPRAZOLE 30 MG: KIT at 08:33

## 2022-10-02 RX ADMIN — PROPOFOL 50 MCG/KG/MIN: 10 INJECTION, EMULSION INTRAVENOUS at 12:57

## 2022-10-02 RX ADMIN — ASPIRIN 81 MG: 81 TABLET, CHEWABLE ORAL at 08:33

## 2022-10-02 RX ADMIN — POTASSIUM CHLORIDE 10 MEQ: 7.46 INJECTION, SOLUTION INTRAVENOUS at 09:29

## 2022-10-02 RX ADMIN — Medication 10 ML: at 05:33

## 2022-10-02 RX ADMIN — Medication 200 MCG/HR: at 12:09

## 2022-10-02 RX ADMIN — Medication 10 ML: at 21:37

## 2022-10-02 RX ADMIN — POTASSIUM CHLORIDE 10 MEQ: 7.46 INJECTION, SOLUTION INTRAVENOUS at 07:41

## 2022-10-02 RX ADMIN — ATORVASTATIN CALCIUM 40 MG: 20 TABLET, FILM COATED ORAL at 21:36

## 2022-10-02 RX ADMIN — MIDODRINE HYDROCHLORIDE 15 MG: 5 TABLET ORAL at 21:36

## 2022-10-02 RX ADMIN — GADOTERIDOL 20 ML: 279.3 INJECTION, SOLUTION INTRAVENOUS at 16:33

## 2022-10-02 RX ADMIN — Medication 200 MCG/HR: at 20:17

## 2022-10-02 RX ADMIN — ENOXAPARIN SODIUM 30 MG: 100 INJECTION SUBCUTANEOUS at 09:29

## 2022-10-02 RX ADMIN — PROPOFOL 45 MCG/KG/MIN: 10 INJECTION, EMULSION INTRAVENOUS at 18:17

## 2022-10-02 RX ADMIN — CHLORHEXIDINE GLUCONATE 15 ML: 1.2 RINSE ORAL at 21:37

## 2022-10-02 RX ADMIN — Medication 10 ML: at 14:56

## 2022-10-02 RX ADMIN — POTASSIUM CHLORIDE 10 MEQ: 7.46 INJECTION, SOLUTION INTRAVENOUS at 08:33

## 2022-10-02 RX ADMIN — Medication 200 MCG/HR: at 04:32

## 2022-10-02 RX ADMIN — FENTANYL CITRATE 100 MCG: 50 INJECTION, SOLUTION INTRAMUSCULAR; INTRAVENOUS at 15:45

## 2022-10-02 RX ADMIN — ENOXAPARIN SODIUM 30 MG: 100 INJECTION SUBCUTANEOUS at 21:36

## 2022-10-02 RX ADMIN — CHLORHEXIDINE GLUCONATE 15 ML: 1.2 RINSE ORAL at 08:33

## 2022-10-02 RX ADMIN — CEFTRIAXONE 2 G: 2 INJECTION, POWDER, FOR SOLUTION INTRAMUSCULAR; INTRAVENOUS at 09:29

## 2022-10-02 RX ADMIN — PROPOFOL 45 MCG/KG/MIN: 10 INJECTION, EMULSION INTRAVENOUS at 06:28

## 2022-10-02 RX ADMIN — MIDODRINE HYDROCHLORIDE 15 MG: 5 TABLET ORAL at 05:33

## 2022-10-02 NOTE — PROGRESS NOTES
[facility]    ICU TEAM Progress Note    Name: Ren Piper   : 1965   MRN: [id number]   Date: 10/2/2022           ICU Assessment     Acute respiratory failure with hypoxia  Acute encephalopathy  Possible anoxic encephalopathy  Stress cardiomyopathy  Ischemic CVA         ICU Comprehensive Plan of Care:   -Continue propofol and fentanyl  -rocephin for e coli in sputum  -lovenox  -neuro checks  -Repeat MRI w/wo contrast today  -levophed to keep MAP>65    Discussed Care Plan with Bedside RN and mother    Documentation of Current Medications    ICU Issues:  D- Delirium assessement CAM-ICU: Assessments ordered  E- Early Mobility/ PT: Will order when appropriate   F- Feeding: TF  Peptic Ulcer Disease Prophylaxis: PPI  DVT Prophylaxis: Lovenox  Glycemic Control (140-180 mg/dL): SSI  Catheter Discontinuation (CVC, arterial, urinary, gastric, rectal): Keep all  Antibiotics: Rocephin  Steroids: None  MAR Review (pain, anxiety, constipation . 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 She was LKW the evening of . She did not report to work the am of  and she was found unresponsive at her home on wellness check. Bystander CPR was started, but she did have a pulse. EMS was called. Upon their arrival, SBP was 70s. Pupils were pinpoint, narcan was administered without improvement. She experienced seizure like activity and received 4mg IV versed. Upon arrival to the ED, she exhibited seizure like activity and L sided gaze preference. She required intubation for hypoxic respiratory failure. She was sedated on propofol. She became hypotensive and was started on levophed. LA 6.46. She was admitted to the ICU. She was started on empiric vanc/zosyn. Blood/sputum cultures ordered. CT head unrevealing.   CT chest notable for only bibasilar atelectasis, cannot exclude small amount aspiration in lower lobes. CTAP unrevealing. Central line placed at bedside in ICU. She was noted to exhibit profound hypoxia. 7.33/44/73 on 100% FiO2 - not consistent with ARDS due to absence of bilateral infiltrates. Overnight Events:   10/2/2022  SAHARA overnight. Patient remains restless on fentanyl and propofol. On minimal levophed likely because of sedation. Not opening eyes to stimulation. Doesn't follow commands. POD:  * No surgery found *    S/P:       Active Problem List:     Problem List  Date Reviewed: 9/27/2022            Codes Class    Acute CVA (cerebrovascular accident) [facility]) ICD-10-CM: I63.9  ICD-9-CM: 434.91         NSTEMI (non-ST elevated myocardial infarction) ([facility] ICD-10-CM: I21.4  ICD-9-CM: 410.70         Cardiomyopathy ([facility] ICD-10-CM: I42.9  ICD-9-CM: 425.4         Dyslipidemia ICD-10-CM: E78.5  ICD-9-CM: 272.4         Hypothyroidism ICD-10-CM: E03.9  ICD-9-CM: 244.9         KOBY (generalized anxiety disorder) ICD-10-CM: F41.1  ICD-9-CM: 300.02         * (Principal) Status epilepticus ([facility] ICD-10-CM: G40.901  ICD-9-CM: 345. 3         CHRIS (acute kidney injury) ([facility] ICD-10-CM: N17.9  ICD-9-CM: 584.9         Lactic acidosis ICD-10-CM: E87.20  ICD-9-CM: 276.2         Acute respiratory failure with hypoxia (HCC) ICD-10-CM: J96.01  ICD-9-CM: 518.81         Shock ([facility] ICD-10-CM: R57.9  ICD-9-CM: 785.50         Acute metabolic encephalopathy BGZ-08-DU: G93.41  ICD-9-CM: 348.31          Past Medical History:      has no past medical history on file. Past Surgical History:      has a past surgical history that includes ir insert non tunl cvc over 5 yrs (9/27/2022).     Home Medications:     Prior to Admission medications    Not on File       Allergies/Social/Family History:     No Known Allergies   Social History     Tobacco Use    Smoking status: Not on file    Smokeless tobacco: Not on file   Substance Use Topics    Alcohol use: Not on file      No family history on file. Review of Systems:     ROS is unobtainable as the patient is intubated. Objective:   Vital Signs:  Visit Vitals  BP (!) 109/53   Pulse (!) 58   Temp 100.2 °F (37.9 °C)   Resp 16   Ht 5' 6\" (1.676 m)   Wt 104.3 kg (230 lb)   SpO2 95%   BMI 37.12 kg/m²    O2 Flow Rate (L/min): 60 l/min O2 Device: Endotracheal tube, Ventilator Temp (24hrs), Av.2 °F (37.3 °C), Min:98.2 °F (36.8 °C), Max:100.2 °F (37.9 °C)           Intake/Output:     Intake/Output Summary (Last 24 hours) at 10/2/2022 1206  Last data filed at 10/2/2022 1003  Gross per 24 hour   Intake 3105.11 ml   Output 3200 ml   Net -94.89 ml         Physical Exam:  Performed via video assessment. Gen: Patient is intubated, sedated, no acute distress  HEENT: ETT and OGT present  Chest: Chest movement is equal bilaterally  Cardiac: Cardiac monitor reveals SR  Extremities: Extremities appear well perfused with no obvious edema  Neuro: Patient is sedated    LABS AND  DATA: Personally reviewed  Recent Labs     10/02/22  0040 10/01/22  0418   WBC 11.1* 10.8   HGB 11.2* 11.1*   HCT 34.3* 34.9*    238       Recent Labs     10/02/22  0040 10/01/22  0418    147*   K 3.3* 3.0*   * 114*   CO2 31 28   BUN 11 16   CREA 0.74 0.74   * 131*   CA 8.5 8.5   MG 2.2 2.4   PHOS 3.5 2.8       No results for input(s): AP, TBIL, TP, ALB, GLOB, AML, LPSE in the last 72 hours. No lab exists for component: SGOT, GPT, AMYP  Recent Labs     22  0252 22  1900   APTT 37.4* 46.1*        No results for input(s): PHI, PCO2I, PO2I, FIO2I in the last 72 hours. No results for input(s): CPK, CKMB, TROIQ, BNPP in the last 72 hours.     Hemodynamics:   PAP:   CO:     Wedge:   CI:     CVP:    SVR:       PVR:       Ventilator Settings:  Mode Rate Tidal Volume Pressure FiO2 PEEP   Assist control   400 ml    30 % 5 cm H20     Peak airway pressure: 20 cm H2O    Minute ventilation: 6.4 l/min        MEDS: Reviewed    Chest X-Ray:  CXR Results  (Last 48 hours)      None            ABCDEF Bundle/Checklist Completed:  Yes    DISPOSITION  Stay in ICU    Critical Care Time  The patient is critically ill with acute respiratory failure. If I do not acutely intervene upon these illnesses, the patient's life is in danger. These illnesses have required me to: (1) perform high complexity decision making for assessment and support; (2) assess the patient via video; (3) personally review the medical record and laboratory and diagnostic imaging results; (4) actively titrate high-alert medications; (5) manage the ventilator and actively titrate oxygen; (6) discuss this patient's case management with other healthcare providers; and (7) apply and interpret advanced monitoring techniques. As a result of this, I personally spent 35 minutes providing critical care services exclusively to this patient. This was in exclusion of the time spent performing procedures or teaching.     Jackson Vega DO, [facility]  10/2/2022

## 2022-10-02 NOTE — PROGRESS NOTES
Dominguez Hobbs [facility] 79  380 [facility], [address] [place] [address]  [phone]      Hospitalist  Progress Note      NAME:         Raven Lopez   :        1965  MRM:        [id number]    Date of service: 10/2/2022      Chief complaint:  Pt intubated and on propofol     Interval HPI:     Pt remains on low dose levophed. Vent settings with fiO2 30 and PEEP 5. Has continued to not pass SBT due to mentation     Objective:    Vital Signs:    Visit Vitals  BP (!) 109/53   Pulse (!) 58   Temp 100.2 °F (37.9 °C)   Resp 16   Ht 5' 6\" (1.676 m)   Wt 104.3 kg (230 lb)   SpO2 95%   BMI 37.12 kg/m²        Intake/Output Summary (Last 24 hours) at 10/2/2022 1153  Last data filed at 10/2/2022 1003  Gross per 24 hour   Intake 3989.6 ml   Output 3400 ml   Net 589.6 ml      Physical Examination:    General: intubated. Sedated   Eyes: pupils pinpoint but responsive to light  ENT: ET tube in place  Neck: no masses, thyroid non-tender and trachea central.  Pulm: good air movement  Card:  no JVD or murmurs, has regular and normal S1, S2 without thrills, bruits   Abd:  Soft, non-tender, non-distended, normoactive bowel sounds   Musc:  No cyanosis, clubbing, atrophy or deformities. Skin:  No rashes, bruising or ulcers.    Neuro: sedated     Current Facility-Administered Medications   Medication Dose Route Frequency    potassium chloride 10 mEq in 100 ml IVPB  10 mEq IntraVENous Q1H    fentaNYL citrate (PF) injection 100 mcg  100 mcg IntraVENous Q4H PRN    midodrine (PROAMATINE) tablet 15 mg  15 mg Oral Q8H    cefTRIAXone (ROCEPHIN) 2 g in 0.9% sodium chloride 20 mL IV syringe  2 g IntraVENous Q24H    atorvastatin (LIPITOR) tablet 40 mg  40 mg Oral QHS    enoxaparin (LOVENOX) injection 30 mg  30 mg SubCUTAneous Q12H    lidocaine 4 % patch 1 Patch  1 Patch TransDERmal Q24H    fentaNYL (PF) 1,500 mcg/30 mL (50 mcg/mL) infusion  0-200 mcg/hr IntraVENous TITRATE    aspirin chewable tablet 81 mg  81 mg Oral DAILY    lansoprazole compounding kit (PREVACID) 3 mg/mL oral suspension 30 mg  30 mg Oral DAILY    insulin lispro (HUMALOG) injection   SubCUTAneous Q6H    glucose chewable tablet 16 g  4 Tablet Oral PRN    glucagon (GLUCAGEN) injection 1 mg  1 mg IntraMUSCular PRN    dextrose 10% infusion 0-250 mL  0-250 mL IntraVENous PRN    propofol (DIPRIVAN) 10 mg/mL infusion  0-50 mcg/kg/min IntraVENous TITRATE    NOREPINephrine (LEVOPHED) 8 mg in 5% dextrose 250mL (32 mcg/mL) infusion  0.5-30 mcg/min IntraVENous TITRATE    sodium chloride (NS) flush 5-40 mL  5-40 mL IntraVENous Q8H    sodium chloride (NS) flush 5-40 mL  5-40 mL IntraVENous PRN    acetaminophen (TYLENOL) tablet 650 mg  650 mg Oral Q6H PRN    Or    acetaminophen (TYLENOL) suppository 650 mg  650 mg Rectal Q6H PRN    polyethylene glycol (MIRALAX) packet 17 g  17 g Oral DAILY PRN    senna (SENOKOT) tablet 8.6 mg  1 Tablet Oral DAILY PRN    promethazine (PHENERGAN) tablet 12.5 mg  12.5 mg Oral Q6H PRN    Or    ondansetron (ZOFRAN) injection 4 mg  4 mg IntraVENous Q6H PRN    chlorhexidine (PERIDEX) 0.12 % mouthwash 15 mL  15 mL Oral Q12H        Laboratory data and review:    Recent Labs     10/02/22  0040 10/01/22  0418 09/30/22  0252   WBC 11.1* 10.8 11.9*   HGB 11.2* 11.1* 10.7*   HCT 34.3* 34.9* 33.6*    238 227     Recent Labs     10/02/22  0040 10/01/22  0418 09/30/22  0252    147* 147*   K 3.3* 3.0* 3.7   * 114* 117*   CO2 31 28 25   * 131* 128*   BUN 11 16 17   CREA 0.74 0.74 0.84   CA 8.5 8.5 8.4*   MG 2.2 2.4 2.4   PHOS 3.5 2.8 1.4*     No components found for: [place]    MRI =>   Small cortical foci of acute infarction right frontal lobe posteriorly abutting  the central sulcus and cortically based anteriorly in the right frontal lobe. There is no associated hemorrhage, midline shift or mass effect.     Assessment and Plan:  Shock ([address] (9/27/2022) POA: suspect primarily septic shock. Noted to have E.coli in urine cultures and sputum. Blood cultures negative. Suspect CM also contributing   -remains on low dose pressor support   -s/p Ceftriaxone   -AM cortisol      Acute metabolic encephalopathy (1/26/6013) POA: unclear etiology. ? anoxia ? CVA. Apparently had unknown downtime and fentanyl patches found at home with unclear use/abuse   -MRI did show a small CVA as above   -EEG as per neurology   -ammonia WNL   -UDS with benzos   -repeat MRI today to eval for anoxic brain injury     Acute CVA POA:  MRi showing small cortical foci of acute infarction right frontal lobe posteriorly abutting the central sulcus and cortically based anteriorly in the right frontal lobe). Echo showed no shunt. -LDL NOT at goal; on statin   -on ASA     NSTEMI (non-ST elevated myocardial infarction) ([facility] [address] (9/28/2022) / Cardiomyopathy ([facility] [facility] (9/28/2022) POA:   -TTE with reduced EF; s/p heparin gtt. Defer timing of cath to cardiology   -on ASA, statin   -unable to tolerate beta blocker or ACE I due to hypotension     Seizure activity POA: she had witnessed seizures on admission but has since had none other than occasional myoclonic jerks. No prior hx of seizures. EEG neg for seizure activity. IV Keppra discontinued 9/29. Neurology following. Acute respiratory failure with hypoxia ([facility] (9/27/2022) POA: intubated for airway protection given her encephalopathy on admission. Intensivist following and managing vent     CHRIS (acute kidney injury) ([facility] [address] (9/27/2022) POA: unknown baseline Cr. Currently Cr stable. Lactic acidosis (9/27/2022) POA: 3.0 on admission. Likely 2/2 #1.  Resolved     Dyslipidemia (9/28/2022) POA: on statin     Hypothyroidism (9/28/2022) POA: TSH low; free T4 low normal. ?central.     KOBY (generalized anxiety disorder) (9/28/2022) POA: med rec once able to comply     Hypokalemia: repleted     Hypernatremia: increase free water flushes in TF's    NB: patient has another chart with MR # [id number]    Total time spent for the patient's care:  35 minutes                  Care Plan discussed with: RN     Discussed:  Care Plan    Prophylaxis:  Lovenox     Anticipated Disposition:   TBD           ___________________________________________________    Attending Physician:   Edgar Gardiner MD

## 2022-10-02 NOTE — PROGRESS NOTES
[id number] Chart accessed to assist primary RN with medication administration, reassessment and restraint charting. Performed oral care and  turned patient. 0100 Assumed care of patient as primary RN. Bedside shift change report given to Mavis Ireland RN (oncoming nurse) by Lona Back RN (offgoing nurse). Report included the following information SBAR, Intake/Output, MAR, Recent Results, Cardiac Rhythm NSR, and Alarm Parameters . 0700 Bedside shift change report given to Rafael Hoyos RN (oncoming nurse) by Mavis Ireland RN (offgoing nurse). Report included the following information SBAR, Intake/Output, MAR, Recent Results, Cardiac Rhythm NSR, and Alarm Parameters .

## 2022-10-02 NOTE — PROGRESS NOTES
Problem: Ventilator Management  Goal: *Adequate oxygenation and ventilation  Outcome: Progressing Towards Goal  Goal: *Patient maintains clear airway/free of aspiration  Outcome: Progressing Towards Goal  Goal: *Absence of infection signs and symptoms  Outcome: Progressing Towards Goal  Goal: *Normal spontaneous ventilation  Outcome: Progressing Towards Goal     Problem: Patient Education: Go to Patient Education Activity  Goal: Patient/Family Education  Outcome: Progressing Towards Goal     Problem: Non-Violent Restraints  Goal: Removal from restraints as soon as assessed to be safe  Outcome: Progressing Towards Goal  Goal: No harm/injury to patient while restraints in use  Outcome: Progressing Towards Goal  Goal: Patient's dignity will be maintained  Outcome: Progressing Towards Goal  Goal: Patient Interventions  Outcome: Progressing Towards Goal     Problem: Delirium Treatment  Goal: *Level of consciousness restored to baseline  Outcome: Progressing Towards Goal  Goal: *Level of environmental perceptions restored to baseline  Outcome: Progressing Towards Goal  Goal: *Sensory perception restored to baseline  Outcome: Progressing Towards Goal  Goal: *Emotional stability restored to baseline  Outcome: Progressing Towards Goal  Goal: *Functional assessment restored to baseline  Outcome: Progressing Towards Goal  Goal: *Absence of falls  Outcome: Progressing Towards Goal  Goal: *Will remain free of delirium, CAM Score negative  Outcome: Progressing Towards Goal  Goal: *Cognitive status will be restored to baseline  Outcome: Progressing Towards Goal  Goal: Interventions  Outcome: Progressing Towards Goal     Problem: Patient Education: Go to Patient Education Activity  Goal: Patient/Family Education  Outcome: Progressing Towards Goal     Problem: Falls - Risk of  Goal: *Absence of Falls  Description: Document Raji Wiliam Fall Risk and appropriate interventions in the flowsheet.   Outcome: Progressing Towards Goal  Note: Fall Risk Interventions:       Mentation Interventions: Adequate sleep, hydration, pain control, Bed/chair exit alarm, Door open when patient unattended, More frequent rounding, Room close to nurse's station, Update white board    Medication Interventions: Bed/chair exit alarm, Evaluate medications/consider consulting pharmacy    Elimination Interventions: Bed/chair exit alarm, Call light in reach, Toileting schedule/hourly rounds    History of Falls Interventions: Bed/chair exit alarm, Room close to nurse's station         Problem: Patient Education: Go to Patient Education Activity  Goal: Patient/Family Education  Outcome: Progressing Towards Goal     Problem: Pressure Injury - Risk of  Goal: *Prevention of pressure injury  Description: Document Jose Enrique Scale and appropriate interventions in the flowsheet. Outcome: Progressing Towards Goal  Note: Pressure Injury Interventions:  Sensory Interventions: Assess changes in LOC, Assess need for specialty bed, Check visual cues for pain, Float heels, Keep linens dry and wrinkle-free, Minimize linen layers, Monitor skin under medical devices, Pressure redistribution bed/mattress (bed type), Turn and reposition approx. every two hours (pillows and wedges if needed)    Moisture Interventions: Absorbent underpads, Assess need for specialty bed, Check for incontinence Q2 hours and as needed, Internal/External urinary devices, Maintain skin hydration (lotion/cream), Limit adult briefs, Minimize layers, Moisture barrier    Activity Interventions: Assess need for specialty bed, Pressure redistribution bed/mattress(bed type)    Mobility Interventions: Assess need for specialty bed, HOB 30 degrees or less, Pressure redistribution bed/mattress (bed type), Turn and reposition approx.  every two hours(pillow and wedges)    Nutrition Interventions: Document food/fluid/supplement intake, Offer support with meals,snacks and hydration    Friction and Shear Interventions: Apply protective barrier, creams and emollients, HOB 30 degrees or less, Lift sheet, Minimize layers                Problem: Patient Education: Go to Patient Education Activity  Goal: Patient/Family Education  Outcome: Progressing Towards Goal     Problem: Nutrition Deficit  Goal: *Optimize nutritional status  Outcome: Progressing Towards Goal

## 2022-10-02 NOTE — PROGRESS NOTES
0700 Bedside and Verbal shift change report given to Aniceto Motley RN (oncoming nurse) by Jennet Hatchet, RN (offgoing nurse). Report included the following information SBAR, ED Summary, Intake/Output, MAR, Recent Results, Med Rec Status, and Cardiac Rhythm NSR . Primary Nurse Vy Pollard RN and Jennet Hatchet, RN performed a dual skin assessment on this patient No impairment noted  Jose Enrique score is see flowsheet. 0889 Potassium chloride hung. Patient assessed, see flowsheet. Patient is unable to verbalize orientation or follow commands, pupils are round and reactive, and patient does not open eyes to voice or pain, occasionally open to stimulus. Patient does not track or follow with eyes, or withdraw from pain. Pulses felt in all extremities. 1+ non-pitting edema in both hands and feet. Heart sounds heard, lung sounds coarse and diminished, and bowel sounds active. Abdomen is non-tender, soft, intact, and obese. Sal is draining and patent. Patient was turned and repositioned, mouth care provided. 0271 Morning meds given per OGT. Potassium chloride hung. 6919 Rocephin given. Lovenox given. Potassium chloride hung. New propofol hung. Patient was turned and repositioned, mouth care provided. 1116 Potassium chloride hung. 1139 Lispro held for BG of 100.     1209 New fent hung. Patient reassessed, no change, see flowsheet. Patient was turned and repositioned, mouth care provided. 1254 Levo rate changed to 5 mcg. New propofol hung. 1400 Patient was turned and repositioned, mouth care provided. 1455 New lidocaine patch applied. Old patch removed. Midodrine given. 1501 Levo rate changed to 6 mcg.     1534 New propofol hung. MRI tubing primed. Patient taken to MRI with primary nurse, charge nurse, RT and PCT. [address] paused for MRI. Fent push given. 1615 Fent restarted. 1630 Arrived back on unit from MRI. Restarted fentanyl. Patient reassessed, no change. See flowsheet.  Patient was turned and repositioned, mouth care provided. 1730 Patient desatted, needed to be lavaged twice. Thick mucous suctioned in in-line catheter. Titrated FiO2 up to 40%. 1800 Patient was turned and repositioned, mouth care provided. 1820 Lispro held for . New propofol hung. Rate change to 45 mcg.     1900 Bedside and Verbal shift change report given to Miriam Zamora RN (oncoming nurse) by Weston Godoy RN (offgoing nurse). Report included the following information SBAR, ED Summary, Intake/Output, MAR, Recent Results, Med Rec Status, and Cardiac Rhythm NSR .

## 2022-10-03 ENCOUNTER — APPOINTMENT (OUTPATIENT)
Dept: GENERAL RADIOLOGY | Age: 57
DRG: 064 | End: 2022-10-03
Attending: INTERNAL MEDICINE
Payer: COMMERCIAL

## 2022-10-03 PROBLEM — Z51.5 PALLIATIVE CARE ENCOUNTER: Status: ACTIVE | Noted: 2022-10-03

## 2022-10-03 PROBLEM — Z71.89 GOALS OF CARE, COUNSELING/DISCUSSION: Status: ACTIVE | Noted: 2022-10-03

## 2022-10-03 LAB
ANION GAP SERPL CALC-SCNC: 2 MMOL/L (ref 5–15)
BASOPHILS # BLD: 0.1 K/UL (ref 0–0.1)
BASOPHILS NFR BLD: 1 % (ref 0–1)
BNP SERPL-MCNC: 2403 PG/ML
BUN SERPL-MCNC: 11 MG/DL (ref 6–20)
BUN/CREAT SERPL: 17 (ref 12–20)
CALCIUM SERPL-MCNC: 8.5 MG/DL (ref 8.5–10.1)
CHLORIDE SERPL-SCNC: 109 MMOL/L (ref 97–108)
CO2 SERPL-SCNC: 32 MMOL/L (ref 21–32)
CORTIS AM PEAK SERPL-MCNC: 10.8 UG/DL (ref 4.3–22.45)
CREAT SERPL-MCNC: 0.65 MG/DL (ref 0.55–1.02)
DIFFERENTIAL METHOD BLD: ABNORMAL
EOSINOPHIL # BLD: 0.3 K/UL (ref 0–0.4)
EOSINOPHIL NFR BLD: 2 % (ref 0–7)
ERYTHROCYTE [DISTWIDTH] IN BLOOD BY AUTOMATED COUNT: 15.3 % (ref 11.5–14.5)
GLUCOSE BLD STRIP.AUTO-MCNC: 104 MG/DL (ref 65–117)
GLUCOSE BLD STRIP.AUTO-MCNC: 117 MG/DL (ref 65–117)
GLUCOSE BLD STRIP.AUTO-MCNC: 127 MG/DL (ref 65–117)
GLUCOSE BLD STRIP.AUTO-MCNC: 87 MG/DL (ref 65–117)
GLUCOSE SERPL-MCNC: 129 MG/DL (ref 65–100)
HCT VFR BLD AUTO: 32.9 % (ref 35–47)
HGB BLD-MCNC: 10.6 G/DL (ref 11.5–16)
IMM GRANULOCYTES # BLD AUTO: 0.1 K/UL (ref 0–0.04)
IMM GRANULOCYTES NFR BLD AUTO: 1 % (ref 0–0.5)
LYMPHOCYTES # BLD: 2 K/UL (ref 0.8–3.5)
LYMPHOCYTES NFR BLD: 18 % (ref 12–49)
MAGNESIUM SERPL-MCNC: 2.2 MG/DL (ref 1.6–2.4)
MCH RBC QN AUTO: 29.3 PG (ref 26–34)
MCHC RBC AUTO-ENTMCNC: 32.2 G/DL (ref 30–36.5)
MCV RBC AUTO: 90.9 FL (ref 80–99)
MONOCYTES # BLD: 1.4 K/UL (ref 0–1)
MONOCYTES NFR BLD: 12 % (ref 5–13)
NEUTS SEG # BLD: 7.6 K/UL (ref 1.8–8)
NEUTS SEG NFR BLD: 66 % (ref 32–75)
NRBC # BLD: 0 K/UL (ref 0–0.01)
NRBC BLD-RTO: 0 PER 100 WBC
PHOSPHATE SERPL-MCNC: 3.5 MG/DL (ref 2.6–4.7)
PLATELET # BLD AUTO: 208 K/UL (ref 150–400)
PMV BLD AUTO: 11.1 FL (ref 8.9–12.9)
POTASSIUM SERPL-SCNC: 3.5 MMOL/L (ref 3.5–5.1)
PROCALCITONIN SERPL-MCNC: 0.14 NG/ML
RBC # BLD AUTO: 3.62 M/UL (ref 3.8–5.2)
SERVICE CMNT-IMP: ABNORMAL
SERVICE CMNT-IMP: NORMAL
SODIUM SERPL-SCNC: 143 MMOL/L (ref 136–145)
WBC # BLD AUTO: 11.4 K/UL (ref 3.6–11)

## 2022-10-03 PROCEDURE — 74011000250 HC RX REV CODE- 250: Performed by: INTERNAL MEDICINE

## 2022-10-03 PROCEDURE — 84100 ASSAY OF PHOSPHORUS: CPT

## 2022-10-03 PROCEDURE — 74011250636 HC RX REV CODE- 250/636: Performed by: STUDENT IN AN ORGANIZED HEALTH CARE EDUCATION/TRAINING PROGRAM

## 2022-10-03 PROCEDURE — 71045 X-RAY EXAM CHEST 1 VIEW: CPT

## 2022-10-03 PROCEDURE — 65610000006 HC RM INTENSIVE CARE

## 2022-10-03 PROCEDURE — 74011250637 HC RX REV CODE- 250/637: Performed by: PSYCHIATRY & NEUROLOGY

## 2022-10-03 PROCEDURE — 74011000250 HC RX REV CODE- 250: Performed by: NURSE PRACTITIONER

## 2022-10-03 PROCEDURE — 74011250637 HC RX REV CODE- 250/637: Performed by: NURSE PRACTITIONER

## 2022-10-03 PROCEDURE — 84145 PROCALCITONIN (PCT): CPT

## 2022-10-03 PROCEDURE — 74011250636 HC RX REV CODE- 250/636: Performed by: NURSE PRACTITIONER

## 2022-10-03 PROCEDURE — 85025 COMPLETE CBC W/AUTO DIFF WBC: CPT

## 2022-10-03 PROCEDURE — 74011000258 HC RX REV CODE- 258: Performed by: NURSE PRACTITIONER

## 2022-10-03 PROCEDURE — 99222 1ST HOSP IP/OBS MODERATE 55: CPT | Performed by: STUDENT IN AN ORGANIZED HEALTH CARE EDUCATION/TRAINING PROGRAM

## 2022-10-03 PROCEDURE — 74011250637 HC RX REV CODE- 250/637: Performed by: INTERNAL MEDICINE

## 2022-10-03 PROCEDURE — 87070 CULTURE OTHR SPECIMN AEROBIC: CPT

## 2022-10-03 PROCEDURE — 83735 ASSAY OF MAGNESIUM: CPT

## 2022-10-03 PROCEDURE — 74011000258 HC RX REV CODE- 258: Performed by: INTERNAL MEDICINE

## 2022-10-03 PROCEDURE — 94003 VENT MGMT INPAT SUBQ DAY: CPT

## 2022-10-03 PROCEDURE — 82533 TOTAL CORTISOL: CPT

## 2022-10-03 PROCEDURE — 74011250636 HC RX REV CODE- 250/636: Performed by: INTERNAL MEDICINE

## 2022-10-03 PROCEDURE — 82962 GLUCOSE BLOOD TEST: CPT

## 2022-10-03 PROCEDURE — 99232 SBSQ HOSP IP/OBS MODERATE 35: CPT | Performed by: SPECIALIST

## 2022-10-03 PROCEDURE — 80048 BASIC METABOLIC PNL TOTAL CA: CPT

## 2022-10-03 PROCEDURE — 36415 COLL VENOUS BLD VENIPUNCTURE: CPT

## 2022-10-03 PROCEDURE — 83880 ASSAY OF NATRIURETIC PEPTIDE: CPT

## 2022-10-03 RX ORDER — LEVOTHYROXINE SODIUM 88 UG/1
88 TABLET ORAL
COMMUNITY

## 2022-10-03 RX ORDER — TRAZODONE HYDROCHLORIDE 50 MG/1
125 TABLET ORAL
COMMUNITY
End: 2022-10-20

## 2022-10-03 RX ORDER — FUROSEMIDE 20 MG/1
20 TABLET ORAL DAILY
COMMUNITY
End: 2022-10-20

## 2022-10-03 RX ORDER — AMOXICILLIN 250 MG
1 CAPSULE ORAL 2 TIMES DAILY
Status: DISCONTINUED | OUTPATIENT
Start: 2022-10-03 | End: 2022-10-20 | Stop reason: HOSPADM

## 2022-10-03 RX ORDER — ATORVASTATIN CALCIUM 10 MG/1
10 TABLET, FILM COATED ORAL DAILY
COMMUNITY
End: 2022-11-22

## 2022-10-03 RX ADMIN — PROPOFOL 35 MCG/KG/MIN: 10 INJECTION, EMULSION INTRAVENOUS at 01:11

## 2022-10-03 RX ADMIN — PROPOFOL 25 MCG/KG/MIN: 10 INJECTION, EMULSION INTRAVENOUS at 22:33

## 2022-10-03 RX ADMIN — ATORVASTATIN CALCIUM 40 MG: 20 TABLET, FILM COATED ORAL at 21:01

## 2022-10-03 RX ADMIN — CHLORHEXIDINE GLUCONATE 15 ML: 1.2 RINSE ORAL at 20:57

## 2022-10-03 RX ADMIN — NOREPINEPHRINE BITARTRATE 6 MCG/MIN: 1 SOLUTION INTRAVENOUS at 07:33

## 2022-10-03 RX ADMIN — ACETAMINOPHEN 650 MG: 650 SUPPOSITORY RECTAL at 12:52

## 2022-10-03 RX ADMIN — CHLORHEXIDINE GLUCONATE 15 ML: 1.2 RINSE ORAL at 09:17

## 2022-10-03 RX ADMIN — Medication 10 ML: at 21:01

## 2022-10-03 RX ADMIN — LANSOPRAZOLE 30 MG: KIT at 09:04

## 2022-10-03 RX ADMIN — ASPIRIN 81 MG: 81 TABLET, CHEWABLE ORAL at 09:45

## 2022-10-03 RX ADMIN — PIPERACILLIN AND TAZOBACTAM 3.38 G: 3; .375 INJECTION, POWDER, LYOPHILIZED, FOR SOLUTION INTRAVENOUS at 23:05

## 2022-10-03 RX ADMIN — SENNOSIDES AND DOCUSATE SODIUM 1 TABLET: 50; 8.6 TABLET ORAL at 21:00

## 2022-10-03 RX ADMIN — MIDODRINE HYDROCHLORIDE 15 MG: 5 TABLET ORAL at 13:05

## 2022-10-03 RX ADMIN — NOREPINEPHRINE BITARTRATE 2 MCG/MIN: 1 SOLUTION INTRAVENOUS at 14:17

## 2022-10-03 RX ADMIN — MIDODRINE HYDROCHLORIDE 15 MG: 5 TABLET ORAL at 21:01

## 2022-10-03 RX ADMIN — PIPERACILLIN AND TAZOBACTAM 4.5 G: 4; .5 INJECTION, POWDER, LYOPHILIZED, FOR SOLUTION INTRAVENOUS at 17:15

## 2022-10-03 RX ADMIN — Medication 200 MCG/HR: at 03:46

## 2022-10-03 RX ADMIN — Medication 10 ML: at 13:13

## 2022-10-03 RX ADMIN — Medication 10 ML: at 05:28

## 2022-10-03 RX ADMIN — PROPOFOL 35 MCG/KG/MIN: 10 INJECTION, EMULSION INTRAVENOUS at 09:06

## 2022-10-03 RX ADMIN — SENNOSIDES AND DOCUSATE SODIUM 1 TABLET: 50; 8.6 TABLET ORAL at 12:52

## 2022-10-03 RX ADMIN — Medication 200 MCG/HR: at 11:16

## 2022-10-03 RX ADMIN — Medication 200 MCG/HR: at 18:39

## 2022-10-03 RX ADMIN — PROPOFOL 40 MCG/KG/MIN: 10 INJECTION, EMULSION INTRAVENOUS at 05:26

## 2022-10-03 RX ADMIN — CEFTRIAXONE 2 G: 2 INJECTION, POWDER, FOR SOLUTION INTRAMUSCULAR; INTRAVENOUS at 09:04

## 2022-10-03 RX ADMIN — MIDODRINE HYDROCHLORIDE 15 MG: 5 TABLET ORAL at 05:28

## 2022-10-03 RX ADMIN — SODIUM CHLORIDE, PRESERVATIVE FREE 10 ML: 5 INJECTION INTRAVENOUS at 14:13

## 2022-10-03 RX ADMIN — PROPOFOL 25 MCG/KG/MIN: 10 INJECTION, EMULSION INTRAVENOUS at 15:51

## 2022-10-03 RX ADMIN — ENOXAPARIN SODIUM 30 MG: 100 INJECTION SUBCUTANEOUS at 21:00

## 2022-10-03 RX ADMIN — ENOXAPARIN SODIUM 30 MG: 100 INJECTION SUBCUTANEOUS at 09:04

## 2022-10-03 NOTE — PROGRESS NOTES
Palliative Medicine      Code Status: Full Code    Advance Care Planning:  Advance Care Planning 10/3/2022   Patient's Healthcare Decision Maker is: Legal Next of Kin: 3 adult sons   Confirm Advance Directive None   Patient Would Like to Complete Advance Directive Unable     Patient / Family Encounter Documentation    Participants (names): Pt, parents, sister [facility], sons Nathan Hairston and Rehana Mike by phone, Palliative Medicine (Dr. Wilber Delcid, Mayuri Velez)    Narrative: Met with family at bedside. Pt is currently intubated, eyes were open but non-tracking, pt did not follow commands. Pt lives alone,  is , pt does have a significant other. Pt has 3 sons:  Nathan Hairston lives in [place], Philomena Shaffer resides in [place], Rehana Mike lives in [place] but is currently working at an office in [place] while pt is hospitalized. All 3 sons were in town over the weekend. Pt is independent at baseline, works full time as Director of Cardiovascular Services at [employer]. Pt spoke with her mother by phone at 9pm the night prior to hospitalization, texted her son at 9:15pm.  Pt was found unresponsive in bed after not showing up for work on . Pt does not have AMD in place, is currently unable to complete. In absence of assigned Medical POA, pt's 3 sons are legal NOK/surrogate decision makers. Pt's parents and sister will be supporting sons in decision making process, as appropriate. Psychosocial Issues Identified/ Resilience Factors:  Coping with pt's current condition and the uncertainty re: chance for recovery. Mother shared that pt's youngest son, Rehana Mike, is 21 yrs old, very attached to pt, will have a difficult time making decisions on pt's behalf. Pt's  reportedly  of cancer, did not clarify when death occurred. No spiritual concerns identified; Chaplains are following for support. Caregiver [place]: N/a, pt was independent prior to hospitalization  Does the caregiver feel confident administering medication? N/a  Does the caregiver need any help connecting with community resources? Not at this time   Does the caregiver feel confident assisting with activities of daily living? N/a    Goals of Care / Plan:  Family is pleased that pt is more alert today, are hopeful that pt is making process. Family is eager to speak with Neurology; NP was notified via [address] [place] [address], reports Neuro to follow up on 10/4, family and RN were made aware. Palliative team will follow for ongoing support and goals of care conversations. Thank you for including Palliative Medicine in the care of Ms. Óscar Duran.      Loy [address] AISHA Varghese, [facility]-  [phone])

## 2022-10-03 NOTE — PROGRESS NOTES
[facility]    ICU TEAM Progress Note    Name: Georgiana Vasquez   : 1965   MRN: [id number]   Date: 10/3/2022           ICU Assessment     Acute respiratory failure with hypoxia  Acute encephalopathy  Possible anoxic encephalopathy  Stress cardiomyopathy  Ischemic CVA         ICU Comprehensive Plan of Care: Wean Fentanyl and Propofol. Follow Neuro exam. Neurology following   Consider SBT if able to wean sedation and neurologic status allows  Wean Levophed as tolerated to Keep MAP>65. Continue Ceftriaxone for total of 10 days (Ecoli in sputum)  TF. Add Bowel regimen    SQ Lovenox    Palliative meeting with family today. 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 She was LKW the evening of . She did not report to work the am of  and she was found unresponsive at her home on wellness check. Bystander CPR was started, but she did have a pulse. EMS was called. Upon their arrival, SBP was 70s. Pupils were pinpoint, narcan was administered without improvement. She experienced seizure like activity and received 4mg IV versed. Upon arrival to the ED, she exhibited seizure like activity and L sided gaze preference. She required intubation for hypoxic respiratory failure. She was sedated on propofol. She became hypotensive and was started on levophed. LA 6.46. She was admitted to the ICU. She was started on empiric vanc/zosyn. Blood/sputum cultures ordered. CT head unrevealing. CT chest notable for only bibasilar atelectasis, cannot exclude small amount aspiration in lower lobes. CTAP unrevealing. Central line placed at bedside in ICU. She was noted to exhibit profound hypoxia. 7.33/44/73 on 100% FiO2 - not consistent with ARDS due to absence of bilateral infiltrates.        Overnight Events:   10/3/2022  Sedated on Fentanyl and Propofol. On Levophed. Noted results of MRI. Temp 100.2 On vent support 16/400/45/5  10/02/22  SAHARA overnight. Patient remains restless on fentanyl and propofol. On minimal levophed likely because of sedation. Not opening eyes to stimulation. Doesn't follow commands. POD:  * No surgery found *    S/P:       Active Problem List:     Problem List  Date Reviewed: 9/27/2022            Codes Class    Acute CVA (cerebrovascular accident) [facility]) ICD-10-CM: I63.9  ICD-9-CM: 434.91         NSTEMI (non-ST elevated myocardial infarction) ([facility] ICD-10-CM: I21.4  ICD-9-CM: 410.70         Cardiomyopathy ([facility] ICD-10-CM: I42.9  ICD-9-CM: 425.4         Dyslipidemia ICD-10-CM: E78.5  ICD-9-CM: 272.4         Hypothyroidism ICD-10-CM: E03.9  ICD-9-CM: 244.9         KOBY (generalized anxiety disorder) ICD-10-CM: F41.1  ICD-9-CM: 300.02         * (Principal) Status epilepticus ([facility] ICD-10-CM: G40.901  ICD-9-CM: 345. 3         CHRIS (acute kidney injury) ([facility] ICD-10-CM: N17.9  ICD-9-CM: 584.9         Lactic acidosis ICD-10-CM: E87.20  ICD-9-CM: 276.2         Acute respiratory failure with hypoxia (HCC) ICD-10-CM: J96.01  ICD-9-CM: 518.81         Shock ([facility] ICD-10-CM: R57.9  ICD-9-CM: 785.50         Acute metabolic encephalopathy DYG-79-YP: G93.41  ICD-9-CM: 348.31          Past Medical History:      has no past medical history on file. Past Surgical History:      has a past surgical history that includes ir insert non tunl cvc over 5 yrs (9/27/2022). Home Medications:     Prior to Admission medications    Not on File       Allergies/Social/Family History:     No Known Allergies   Social History     Tobacco Use    Smoking status: Not on file    Smokeless tobacco: Not on file   Substance Use Topics    Alcohol use: Not on file      No family history on file. Review of Systems:     ROS is unobtainable as the patient is intubated.     Objective:   Vital Signs:  Visit Vitals  BP (!) 126/55   Pulse 89   Temp 99.4 °F (37.4 °C)   Resp 17   Ht 5' 6\" (1.676 m)   Wt 104.3 kg (230 lb)   SpO2 95%   BMI 37.12 kg/m²    O2 Flow Rate (L/min): 60 l/min O2 Device: Endotracheal tube, Ventilator Temp (24hrs), Av.9 °F (37.7 °C), Min:99.4 °F (37.4 °C), Max:100.5 °F (38.1 °C)           Intake/Output:     Intake/Output Summary (Last 24 hours) at 10/3/2022 1150  Last data filed at 10/3/2022 1000  Gross per 24 hour   Intake 3055.32 ml   Output 3900 ml   Net -844.68 ml       Physical Exam:  I examined the patient via telemedicine, with its associated limitations. I beamed in and examined the patient with assistance of house staff     On exam:    Gen: sedated   HEENT:  intubated  Chest: symmetrical chest rise  CV:S1,S2  Abd: soft  Ext: +ve edema  Neuro: No commands     LABS AND  DATA: Personally reviewed  Recent Labs     10/03/22  0113 10/02/22  0040   WBC 11.4* 11.1*   HGB 10.6* 11.2*   HCT 32.9* 34.3*    233     Recent Labs     10/03/22  0113 10/02/22  004    145   K 3.5 3.3*   * 112*   CO2 32 31   BUN 11 11   CREA 0.65 0.74   * 134*   CA 8.5 8.5   MG 2.2 2.2   PHOS 3.5 3.5     No results for input(s): AP, TBIL, TP, ALB, GLOB, AML, LPSE in the last 72 hours. No lab exists for component: SGOT, GPT, AMYP  No results for input(s): INR, PTP, APTT, INREXT, INREXT in the last 72 hours. No results for input(s): PHI, PCO2I, PO2I, FIO2I in the last 72 hours. No results for input(s): CPK, CKMB, TROIQ, BNPP in the last 72 hours.     Hemodynamics:   PAP:   CO:     Wedge:   CI:     CVP:    SVR:       PVR:       Ventilator Settings:  Mode Rate Tidal Volume Pressure FiO2 PEEP   Spontaneous   400 ml  5 cm H2O 45 % 5 cm H20     Peak airway pressure: 21 cm H2O    Minute ventilation: 6.5 l/min        MEDS: Reviewed    Chest X-Ray:  CXR Results  (Last 48 hours)      None            ABCDEF Bundle/Checklist Completed:  Yes    DISPOSITION  Stay in ICU    Critical Care Time  Discussed with bedside RN     I reviewed the electronic medical record, the x-rays, labs, progress notes, previous history and physicals and consultation notes that were available in the electronic medical record. I performed all aspects of the physical examination via Telemedicine associated with two way audio and video communication and with the on-site assistance of  the bedside nurse. I am located in [place] and the patient is located in [place] at [facility].   The patient is critically ill in the ICU. I  personally  reviewed the pertinent medical records, laboratory/ pathology data and radiographic images. The decision making regarding this patient is as documented above, which was generated  following  discussion  with the multidisciplinary ICU team and creation of a treatment plan for  the patient. We discussed the patient's interval history and future coordination of care and  plans. The patient's medications  were reviewed and changes made as stipulated above. Due to  critical illness impairing one or more vital organs of this patient resulting in life threatening clinical situation  I have provided direct, frequent personal  assessment and manipulation in management plan and spent 35 minutes  of  critical care time excluding the time spent on procedures and teaching. Greater than 50% of this time  in patients care was  employed  in counseling and coordination of care and engaged in face to face discussion of case management issues, addressing questions, and outlining a plan of  therapy. Pt at risk of life threatening deterioration from acute resp failure    Pt seen and evaluated via tele encounter. Audio and Video were used for this interaction. ATTENTION:  This medical record was transcribed using an electronic medical records/speech recognition system. Although proofread, it may and can contain electronic, spelling and other errors. Corrections may be executed at a later time. Please feel free to contact us for any clarifications as needed.      Caden Gay MD [address]  10/3/2022

## 2022-10-03 NOTE — ACP (ADVANCE CARE PLANNING)
Primary Decision Maker: Ely Candelario - [phone]    Primary Decision Maker: Rafi York - [phone]    Primary Decision Maker: Galileo Ann Raudel - Dwight [address] 10/3/2022   Patient's [address] is: Legal Next of [facility] Directive None   Patient Would Like to Complete Advance Directive Unable      Pt does not have AMD in place, is currently unable to complete. In absence of assigned Medical POA, pt's 3 sons are legal NOK/surrogate decision makers. Pt's parents and sister will be supporting sons in decision making process, as appropriate.

## 2022-10-03 NOTE — PROGRESS NOTES
Comprehensive Nutrition Assessment    Type and Reason for Visit: Reassess    Nutrition Recommendations/Plan:   Continue TF per below:  Vital AF @ 45 mL/hour   Provide 2 Prosource/day, flush with 15 mL H2O before and after   mL q 3 hours     Malnutrition Assessment:  Malnutrition Status:  Mild malnutrition (10/03/22 1217)    Context:  Acute illness     Findings of the 6 clinical characteristics of malnutrition:   Energy Intake:  No significant decrease in energy intake (TF)  Weight Loss:  Unable to assess     Body Fat Loss:  No significant body fat loss,     Muscle Mass Loss:  No significant muscle mass loss,    Fluid Accumulation:  Moderate to severe, Extremities   Strength:  Not performed     Nutrition Assessment:     10/3: Follow up. Remains intubated. Discussed during IDRs. Patient has not been passing SBT over the last few days 2/2 apnea. Plan to SBT again today/ FWF on TF has been modified to 200 mL q 3 hours by attending TAM Amaya now 143 WDL. Planning to start scheduled bowel regimen as unknown date for last BM. Propofol running at 5.6 mL/hour providing 147 kcal/day. Plan for meeting with palliative today. TF at goal provides 1200 kcal (+ Propofol + Prosource, 1427 kcal, 100% needs); 110 g carbs; 75 g protein (+ 2 Prosource, 97 g, 100% needs). FWF + TF provide 2011 mL H2O/day. Nutrition Related Findings:      Wound Type: None  Last Bowel Movement Date:  (FAVIOLA)  Abdominal Assessment: Intact, Obese, Soft  Bowel Sounds: Active   Edema:Generalized: Non-pitting; 1+ (10/3/2022  8:00 AM)  LLE: Non-pitting; 2+ (10/3/2022  8:00 AM)  LUE: 2+; Non-pitting (edema in hands) (10/3/2022  8:00 AM)  RLE: Non-pitting; 2+ (10/3/2022  8:00 AM)  RUE: 2+; Non-pitting (edema in hands) (10/3/2022  8:00 AM)      Nutr.  Labs:  Lab Results   Component Value Date/Time    GFR est AA >60 10/03/2022 01:13 AM    GFR est non-AA >60 10/03/2022 01:13 AM    Creatinine, POC 1.6 (H) 09/27/2022 06:45 PM    Creatinine 0.65 10/03/2022 01:13 AM    BUN 11 10/03/2022 01:13 AM    Sodium,  09/27/2022 06:45 PM    Sodium 143 10/03/2022 01:13 AM    Potassium 3.5 10/03/2022 01:13 AM    Potassium, POC 4.0 09/27/2022 06:45 PM    Chloride,  (H) 09/27/2022 06:45 PM    Chloride 109 (H) 10/03/2022 01:13 AM    CO2 32 10/03/2022 01:13 AM       Lab Results   Component Value Date/Time    Glucose 129 (H) 10/03/2022 01:13 AM    Glucose (POC) 87 10/03/2022 11:19 AM       Lab Results   Component Value Date/Time    Hemoglobin A1c 6.1 (H) 09/29/2022 03:27 AM     Magnesium   Date Value Ref Range Status   10/03/2022 2.2 1.6 - 2.4 mg/dL Final   10/02/2022 2.2 1.6 - 2.4 mg/dL Final   10/01/2022 2.4 1.6 - 2.4 mg/dL Final   09/30/2022 2.4 1.6 - 2.4 mg/dL Final   09/29/2022 2.2 1.6 - 2.4 mg/dL Final       Lab Results   Component Value Date/Time    Calcium 8.5 10/03/2022 01:13 AM    Phosphorus 3.5 10/03/2022 01:13 AM     Nutr. Meds:  Lipitor, Peridex, Lovenox, humalog, prevacid, midodrine, levophed*, zofran PRN, miralax PRN, propofol, pericolace      Current Nutrition Intake & Therapies:  Average Meal Intake: NPO  Average Supplement Intake: NPO  DIET NPO  ADULT TUBE FEEDING Orogastric; Peptide Based; Delivery Method: Continuous; Continuous Initial Rate (mL/hr): 20; Continuous Advance Tube Feeding: Yes; Advancement Volume (mL/hr): 10; Advancement Frequency: Q 4 hours; Continuous Goal Rate (mL/hr): 4... Anthropometric Measures:  Height: 5' 6\" (167.6 cm)  Ideal Body Weight (IBW): 130 lbs (59 kg)     Current Body Wt:  104.3 kg (230 lb), 176.9 % IBW. Not specified  Current BMI (kg/m2): 37.1        Weight Adjustment: No adjustment                 BMI Category: Obese class 2 (BMI 35.0-39. 9)    Estimated Daily Nutrient Needs:  Energy Requirements Based On: Kcal/kg  Weight Used for Energy Requirements: Current  Energy (kcal/day): 8911-6962 (11-14 kcal/kg)  Weight Used for Protein Requirements: Ideal  Protein (g/day):  (1.5-2.0 g/kg IBW)  Method Used for Fluid Requirements: 1 ml/kcal  Fluid (ml/day): 5317-5698    Nutrition Diagnosis:   Inadequate oral intake related to inadequate protein-energy intake as evidenced by NPO or clear liquid status due to medical condition, intubation    Nutrition Interventions:   Food and/or Nutrient Delivery: Continue NPO, Continue tube feeding  Nutrition Education/Counseling: No recommendations at this time  Coordination of Nutrition Care: Continue to monitor while inpatient, Interdisciplinary rounds  Plan of Care discussed with: IDR team    Goals:  Previous Goal Met: Progressing toward goal(s)  Goals: other (specify)  Specify Other Goals: GOC determined within 2 - 3 days    Nutrition Monitoring and Evaluation:   Behavioral-Environmental Outcomes: None identified  Food/Nutrient Intake Outcomes: Enteral nutrition intake/tolerance  Physical Signs/Symptoms Outcomes: Biochemical data, Hemodynamic status, Weight    Discharge Planning:     Too soon to determine    Yesika Shields MS, RD  Contact: Ext: [phone], or via [id number]

## 2022-10-03 NOTE — PROGRESS NOTES
Transition of care note:    RUR 14%  LOS 5 days,GLOS 4.9    Today:  I met briefly with pt.'s mother   Samuel Ornelas and pt.'s sister are here @ the hospital.  The plan is to do a conference call with pt.'s sons when Palliative meets with family.     Leeann Gordon

## 2022-10-03 NOTE — PROGRESS NOTES
Admission Medication Reconciliation:       Comments/Recommendations:   Please review medication list with caution. The patient is reported to be the only individual knowledgeable about her home medication regimen per family. The patient is intubated and sedated. The list below is based on her refill history, review of the , and review of the progress note from her last PCP visit in August of 2022. The patient uses 3 pharmacies ([facility] [address], [address] [place] [address], and [facility]). Per PCP note the patient was in the process of stopping her Lexapro. Lexapro 20 mg daily was last filled 6/9 for 30 tablets for a 30 day supply. This has not been added to the PTA list due to a lack of recent refill history. Levothyroxine 88 mcg is a recent dose reduction started on 9/2/22.  reviewed. Per the  the only controlled substance filled under this patient name for the last 7 months is  testosterone micronized powder. PTA list has been updated on best available evidence at the time of review. ¹[facility] pharmacy benefit data reflects medications filled and processed through the patient's insurance, however   this data does NOT capture whether the medication was picked up or is currently being taken by the patient. Prior to Admission Medications   Prescriptions Last Dose Informant Taking? OTHER,NON-FORMULARY,  Other Yes   Sig: ESTROGEN(5050)/TESTOSTERONE (HRT) 1.5mg/10mg/ml Cream APPLY 1 ML TO SKIN (INNER WRIST/ABDOMEN/THIGHS EVERY DAY. ROTATE SITES   OTHER,NON-FORMULARY,  Other Yes   Sig: Progesterone (ME4M) 250 mg at bedtime. atorvastatin (LIPITOR) 10 mg tablet  Other Yes   Sig: Take 10 mg by mouth daily. furosemide (LASIX) 20 mg tablet  Other Yes   Sig: Take 20 mg by mouth daily. levothyroxine (SYNTHROID) 88 mcg tablet  Other Yes   Sig: Take 88 mcg by mouth Daily (before breakfast). traZODone (DESYREL) 50 mg tablet  Other Yes   Sig: Take 125 mg by mouth nightly.       Facility-Administered Medications: None         Please contact the main inpatient pharmacy with any questions or concerns at [phone] and we will direct you to the clinical pharmacist covering this patient's care while in-house.    Muna Cat, KashmirD, BCPS

## 2022-10-03 NOTE — PROGRESS NOTES
1900-Bedside and Verbal shift change report given to Jerrod Cota (oncoming nurse) by Ynes Marvin (offgoing nurse). Report included the following information SBAR, Kardex, Intake/Output, MAR, Recent Results, Cardiac Rhythm nsr, Alarm Parameters , and Quality Measures. See flowsheets for all assessments. See MAR for all medication administrations. 0700-Bedside and Verbal shift change report given to Christina Castro (oncoming nurse) by Jerrod Cota (offgoing nurse). Report included the following information SBAR, Kardex, Intake/Output, MAR, Recent Results, Cardiac Rhythm nsr, Alarm Parameters , and Quality Measures.

## 2022-10-03 NOTE — PROGRESS NOTES
Occupational Therapy Note:  Chart reviewed and spoke with nursing. Patient continues to have increased medical complexity. She remains intubated and sedated. Will complete OT order as patient is not medically stable for OT interventions per care team recommendation.    Derik Ivory OTR/L

## 2022-10-03 NOTE — PROGRESS NOTES
..Bedside shift change report given to Miranda Saldana RN (oncoming nurse) by Anders Sneed RN (offgoing nurse). Report included the following information SBAR, Kardex, Intake/Output, MAR, and Cardiac Rhythm NSR/Sinus audie/sinus tachy . Maria G Urrutia .Primary Nurse Brett Maloney RN and Anders Sened RN performed a dual skin assessment on this patient No impairment noted  Jose Enrique score is see flowsheet     1900- Assessment complete--see flowsheet. 2000- Oral care/suctioning completed and pt turned. Restraints assessed. 2200- Oral care/suctioning completed and pt turned. Restraints assessed. 0000- Reassessment complete--see flowsheet. Oral care/suctioning completed and pt turned. Restraints assessed. [facility] updated on pt's status. Tequila Ovalles notified of pt's HR in high 40s/low 50s. Per MD attempt to wean Propofol gtt. Orders also received for PRN Versed 2 mg q 2 hrs as needed. MD notified that per prior notes we are attempting to avoid benzos with this pt. Per MD OK to still give Versed pushes. 0200- Oral care/suctioning completed and pt turned. Restraints assessed. 0221- Pt increasingly restless and coughing over vent. PRN Versed given--see MAR    0400- Reassessment complete--see flowsheet. Oral care/suctioning completed and pt turned. Restraints assessed. 0440- Pt increasingly restless and coughing over vent. PRN Versed given--see MAR    0600- Oral care/suctioning completed and pt turned. Restraints assessed. 4126- Pt restless in bed--PRN Versed given--see MAR    . Maria G Urrutia Bedside shift change report given to Anders Sneed RN (oncoming nurse) by Miranda Saldana RN (offgoing nurse). Report included the following information SBAR, Kardex, Intake/Output, MAR, and Cardiac Rhythm NSR/Sinus audie . Maria G Urrutia .Stroke Education provided to patient and the following topics were discussed    1. Patients personal risk factors for stroke are hyperlipidemia and obesity    2.  Warning signs of Stroke:        * Sudden numbness or weakness of the face, arm or leg, especially on one side of          The body            * Sudden confusion, trouble speaking or understanding        * Sudden trouble seeing in one or both eyes        * Sudden trouble walking, dizziness, loss of balance or coordination        * Sudden severe headache with no known cause      3. Importance of activation Emergency Medical Services ( 9-1-1 ) immediately if experience any warning signs of stroke. 4. Be sure and schedule a follow-up appointment with your primary care doctor or any specialists as instructed. 5. You must take medicine every day to treat your risk factors for stroke. Be sure to take your medicines exactly as your doctor tells you: no more, no less. Know what your medicines are for , what they do. Anti-thrombotics /anticoagulants can help prevent strokes. You are taking the following medicine(s)  Lovenox     6. Smoking and second-hand smoke greatly increase your risk of stroke, cardiovascular disease and death. Smoking history never    7. Information provided was Verbal Education    8. Documentation of teaching completed in Patient Education Activity and on Care Plan with teaching response noted?   yes

## 2022-10-03 NOTE — PROGRESS NOTES
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 Report included the following information SBAR, Kardex, ED Summary, Intake/Output, MAR, Recent Results, Cardiac Rhythm sinus Tangela Quintanilla, and Alarm Parameters   Primary Nurse Nelly Yee and Jonathan Bynum, RN performed a dual skin assessment on this patient Impairment noted- see wound doc flow sheet  Jose Enrique score is 12  Received patient intubated and sedated, patient did move left leg and eyes opening with voice/stimulation/mouth care, no obvious tracking. AC/VC Mode: FiO2 45%, Rate 16, PEEP 5, . ET Tube: 23 @ teeth, OG Tube: @ 73, with tube feed running @ 45 per order. Respirations even easy unlabored. Abdomen soft non-tender non-distended, +BS noted x4 quadrants. Sal catheter in place, below level of bladder, draining clear yellow urine. Right TLC C/D/I with Levophed @ 7, Propofol @ 40, Fentanyl @ 200. Call bell in reach, bed locked in lowest position. 0740- Levophed reduced to 6, MAP Goal >65  0800- Turn to the left side. Suction/VAP completed, thick secretions removed. Restraints continued for intermittent agitation. 0722- Rounding completed with Dr. Rachel Altamirano- plan to wean propofol as tolerated. Mom at bedside as well and able to speak with Dr. Rachel Altamirano and updated on plan of care. 0830- Spoke to [employer]- representative to be arriving today to come to family meeting. 0900- Weaning Propofol to 35, Levophed to 5  0915- Propofol to 30.  0935- Interdisciplinary rounds completed- will continue on 10 day course of antibiotics  as patient had low grade temps x2, continue weaning sedation as tolerated, and will start bowel regimen. 8492- 2 Prosource given; Temp 99.4 axillary. Propofol to 25.  1000- Turn to the right side. Suction/VAP completed. Restraints continued. 1020- Periodic suctioning needed for thick oral secretions.  Patient tried to look over at this writer. 1045- Levophed to 4 and Propofol 20.  1100- Central Line dressing changed. Palliative and [facility] to meet with family this afternoon. 1130- Spoke with Palliative provided with update/plan of care. Spoke to Marden Scheuermann D. RT to start SBT. Propofol to 15- continue to wean as tolerated. 1138- Levophed to 3.  1140- Placed on SBT by RT.  1150- Patient tachy while weaning propofol and completing SBT. 1200- Palliative in room talking with family. Patient noticeably awake, following minimal commands, tachy. Placed supine during SBT. Suction/VAP completed. Restraints continued as patient is periodically agitated, moving left side and in danger of pulling tubes/lines. 1215- SBT continued. Dr. Kenia Roque spoke with family,plan to continue with current plan, no changes. 56- Provided Dr. Mima Fothergill with update on plan of care. 1238- Plan for follow up CXR per MD.  1300- Provided with rectal tylenol for temp, Turned to the left side. Remains on SBT, tolerating well, but minimal command following. Sputum sent per order. 1345- Levophed to 2 and propofol to 10; SBT continues. Tachycardia has gotten better- patient is now HR . Temperature has reduced  1400- RT to place patient back on rate due 89-91%, patient starting to get tired. Suction/VAP completed. Restraints continued due to intermittent agitation. 1440- Patient restless- increased propofol x2 up to 25 to help with agitation. Changed Central line dressing again due to new scant bleeding and coming off as patent had low grade temp and is sweating. 1500- CXR being completed now. PCTs in room cleaning patient and changing pad underneath. Patient turned to the right side. 1600- Suction/VAP completed. Restraints continued. 1650- Levophed to 3- not meeting MAP Goal  1700- BP stable with NP with levophed change. 1730- Antibiotic started per order. Procalcitonin sent per order. 1800- Placed supine. Suction/VAP completed. Restraints continued.  Levophed to 4. 1900- Bedside and Verbal shift change report given to [facility] (oncoming nurse) by Francisco Salgado RN/Jeana HARPER RN (offgoing nurse). Report included the following information SBAR, Kardex, ED Summary, Procedure Summary, Intake/Output, MAR, Recent Results, Cardiac Rhythm NSR/Sinus [place] Tina, and Alarm Parameters .

## 2022-10-03 NOTE — PROGRESS NOTES
CARDIOLOGY PROGRESS NOTE        [address], [place], [address] [facility]  Phone [phone]; Fax [phone]          10/3/2022 9:28 AM       Admit Date:           2022  Admit Diagnosis:  Status epilepticus ([address] Maurice Na  :          1965   MRN:          [id number]        Assessment/Plan  Acute systolic CHF: EF 60-42%, likely Takotsubo cardiomyopathy. Troponin elevation also likely r/t Takotsubo. Need to rule out other etiologies/ischemic eval when more stable - likely cardiac cath this admission pending progress. EKG non ischemic. Hold GDMT since on levo, on midodrine      2. Bradycardia: currently resolved. Avoid precedex, BB therapy for now. Etiology unclear     3. Shock, vasodilatory vs sepsis?: cont levo as needed - back on 6 mcg. Possible aspiration      4. Acute CVA: MRI showed acute infarcting in the R frontol cortex. Neuro following     5. Acute metabolic encephalopathy, poss seizure activity: cont EEG on, jerking movements when sedation weaned down     6. Acute hypoxic resp failure: intubated for airway protection, cont sedation vacations/attempts to wean, per intensivist     7. CHRIS: Cr improved    8. Hypothyroidism: TSH low, free T4 normal. On synthroid       NP spent 10 minutes in chart review of notes, VS, diagnostics. NP spent 8 minutes in examination of pt and review of plan of care  with pt/family. We discussed the expected course, resolution and complications of the diagnosis(es) in detail. Medication risks, benefits, costs, interactions, and alternatives were discussed as indicated. 10/03 0701 - 10/03 1900  In: 183.2 [I.V.:183.2]  Out: 800 [Urine:800]    Last 3 Recorded Weights in this Encounter    22 1119 22 1548   Weight: 104.3 kg (230 lb) 104.3 kg (230 lb)         10/01 1901 - 10/03 0700  In: 4612.7 [I.V.:1882.7]  Out: Sophyelyssa Life [NDNFZ:0420]    SUBJECTIVE      59 y.o. female presented to the ED with altered mental status. History obtained from chart review. Sons were both bedside and report that she has a history of anxiety, depression and appears to be treated for medical weight loss. EMS alerted for wellbeing call when her friends could not get a hold of her. Found altered by EMS. Apparently she had a fentanyl patch on her back. Did not respond to narcotics. There was question if she had seizure activity. Alex Maza is intubated and sedated.        Current Facility-Administered Medications   Medication Dose Route Frequency    fentaNYL citrate (PF) injection 100 mcg  100 mcg IntraVENous Q4H PRN    midodrine (PROAMATINE) tablet 15 mg  15 mg Oral Q8H    atorvastatin (LIPITOR) tablet 40 mg  40 mg Oral QHS    enoxaparin (LOVENOX) injection 30 mg  30 mg SubCUTAneous Q12H    lidocaine 4 % patch 1 Patch  1 Patch TransDERmal Q24H    fentaNYL (PF) 1,500 mcg/30 mL (50 mcg/mL) infusion  0-200 mcg/hr IntraVENous TITRATE    aspirin chewable tablet 81 mg  81 mg Oral DAILY    lansoprazole compounding kit (PREVACID) 3 mg/mL oral suspension 30 mg  30 mg Oral DAILY    insulin lispro (HUMALOG) injection   SubCUTAneous Q6H    glucose chewable tablet 16 g  4 Tablet Oral PRN    glucagon (GLUCAGEN) injection 1 mg  1 mg IntraMUSCular PRN    dextrose 10% infusion 0-250 mL  0-250 mL IntraVENous PRN    propofol (DIPRIVAN) 10 mg/mL infusion  0-50 mcg/kg/min IntraVENous TITRATE    NOREPINephrine (LEVOPHED) 8 mg in 5% dextrose 250mL (32 mcg/mL) infusion  0.5-30 mcg/min IntraVENous TITRATE    sodium chloride (NS) flush 5-40 mL  5-40 mL IntraVENous Q8H    sodium chloride (NS) flush 5-40 mL  5-40 mL IntraVENous PRN    acetaminophen (TYLENOL) tablet 650 mg  650 mg Oral Q6H PRN    Or    acetaminophen (TYLENOL) suppository 650 mg  650 mg Rectal Q6H PRN    polyethylene glycol (MIRALAX) packet 17 g  17 g Oral DAILY PRN    senna (SENOKOT) tablet 8.6 mg  1 Tablet Oral DAILY PRN    promethazine (PHENERGAN) tablet 12.5 mg  12.5 mg Oral Q6H PRN    Or ondansetron (ZOFRAN) injection 4 mg  4 mg IntraVENous Q6H PRN    chlorhexidine (PERIDEX) 0.12 % mouthwash 15 mL  15 mL Oral Q12H      OBJECTIVE               Intake/Output Summary (Last 24 hours) at 10/3/2022 0913  Last data filed at 10/3/2022 0800  Gross per 24 hour   Intake 2271.93 ml   Output 3800 ml   Net -1528.07 ml       Review of Systems - History obtained from the patient AS PER  HPI        PHYSICAL EXAM        Visit Vitals  BP (!) 124/55   Pulse 77   Temp (!) 100.5 °F (38.1 °C)   Resp 16   Ht 5' 6\" (1.676 m)   Wt 104.3 kg (230 lb)   SpO2 97%   BMI 37.12 kg/m²       Gen: Intubated, sedated  HEENT:  Pink conjunctivae. No scleral icterus or conjunctival, moist mucous membranes  Neck: Supple,No JVD, No Carotid Bruit, Thyroid- non tender No cervical lymphadenopathy  Resp: No accessory muscle use, Clear breath sounds, No rales or rhonchi  Card: Regular Rate,Rhythm - SB, Normal S1, S2, No murmurs, rubs or gallop. No thrills. MSK: No cyanosis or clubbing, good capillary refill  Skin: No rashes or ulcers, no bruising  Neuro:  No purposeful movements  Psych:  sedated  LE: Mild generalized edema       DATA REVIEW      No specialty comments available. Cardiac monitor: SR     ECHO: 09/27/22    ECHO ADULT COMPLETE 09/27/2022 9/27/2022    Interpretation Summary    Left Ventricle: Severely reduced left ventricular systolic function with a visually estimated EF of 25 - 30%. Left ventricle size is normal. Mildly increased wall thickness. See diagram for wall motion findings. Abnormal diastolic function. Right Ventricle: Moderately reduced systolic function. Signed by: Jada Dupont MD on 9/27/2022  5:02 PM      Laboratory and Imaging have been reviewed by me and are notable for  No results for input(s): CPK, CKMB, TROIQ in the last 72 hours.     Recent Labs     10/03/22  0113 10/02/22  0040 10/01/22  0418    145 147*   K 3.5 3.3* 3.0*   CO2 32 31 28   BUN 11 11 16   CREA 0.65 0.74 0.74   * 134* 131*   PHOS 3.5 3.5 2.8   MG 2.2 2.2 2.4   WBC 11.4* 11.1* 10.8   HGB 10.6* 11.2* 11.1*   HCT 32.9* 34.3* 34.9*    233 238

## 2022-10-03 NOTE — PROGRESS NOTES
Problem: Ventilator Management  Goal: *Adequate oxygenation and ventilation  Outcome: Progressing Towards Goal  Goal: *Patient maintains clear airway/free of aspiration  Outcome: Progressing Towards Goal  Goal: *Absence of infection signs and symptoms  Outcome: Progressing Towards Goal  Goal: *Normal spontaneous ventilation  Outcome: Progressing Towards Goal     Problem: Patient Education: Go to Patient Education Activity  Goal: Patient/Family Education  Outcome: Progressing Towards Goal     Problem: Non-Violent Restraints  Goal: Removal from restraints as soon as assessed to be safe  Outcome: Progressing Towards Goal  Goal: No harm/injury to patient while restraints in use  Outcome: Progressing Towards Goal  Goal: Patient's dignity will be maintained  Outcome: Progressing Towards Goal  Goal: Patient Interventions  Outcome: Progressing Towards Goal     Problem: Delirium Treatment  Goal: *Level of consciousness restored to baseline  Outcome: Progressing Towards Goal  Goal: *Level of environmental perceptions restored to baseline  Outcome: Progressing Towards Goal  Goal: *Sensory perception restored to baseline  Outcome: Progressing Towards Goal  Goal: *Emotional stability restored to baseline  Outcome: Progressing Towards Goal  Goal: *Functional assessment restored to baseline  Outcome: Progressing Towards Goal  Goal: *Absence of falls  Outcome: Progressing Towards Goal  Goal: *Will remain free of delirium, CAM Score negative  Outcome: Progressing Towards Goal  Goal: *Cognitive status will be restored to baseline  Outcome: Progressing Towards Goal  Goal: Interventions  Outcome: Progressing Towards Goal     Problem: Patient Education: Go to Patient Education Activity  Goal: Patient/Family Education  Outcome: Progressing Towards Goal     Problem: Falls - Risk of  Goal: *Absence of Falls  Description: Document Lillian Ground Fall Risk and appropriate interventions in the flowsheet.   Outcome: Progressing Towards Goal  Note: Fall Risk Interventions:       Mentation Interventions: Adequate sleep, hydration, pain control, Bed/chair exit alarm, Door open when patient unattended, Evaluate medications/consider consulting pharmacy, Increase mobility, More frequent rounding, Reorient patient, Room close to nurse's station, Toileting rounds, Update white board    Medication Interventions: Bed/chair exit alarm, Evaluate medications/consider consulting pharmacy    Elimination Interventions: Bed/chair exit alarm, Call light in reach, Toileting schedule/hourly rounds    History of Falls Interventions: Bed/chair exit alarm, Consult care management for discharge planning, Evaluate medications/consider consulting pharmacy, Door open when patient unattended, Investigate reason for fall, Room close to nurse's station         Problem: Patient Education: Go to Patient Education Activity  Goal: Patient/Family Education  Outcome: Progressing Towards Goal     Problem: Pressure Injury - Risk of  Goal: *Prevention of pressure injury  Description: Document Jose Enrique Scale and appropriate interventions in the flowsheet.   Outcome: Progressing Towards Goal     Problem: Patient Education: Go to Patient Education Activity  Goal: Patient/Family Education  Outcome: Progressing Towards Goal     Problem: Nutrition Deficit  Goal: *Optimize nutritional status  Outcome: Progressing Towards Goal

## 2022-10-03 NOTE — PROGRESS NOTES
Speech pathology  Chart reviewed and note patient remains intubated. SLP will continue to follow along and evaluate once extubated and medically appropriate for swallow evaluation.      Thanks,   Nicolas Olson M.S. CCC-SLP

## 2022-10-03 NOTE — PROGRESS NOTES
Dominguez Castilloelsen [facility] [address]  [address] [facility], [place], [id number] [place] [address]  [phone]      Hospitalist  Progress Note      NAME:         Lb Palafox   :        1965  MRM:        [id number]    Date of service: 10/3/2022      Chief complaint:  Pt on SBT at time of my arrival    Interval HPI:     Pt remains on low dose levophed. On SBT. Family at the bedside. Questions answered     Objective:    Vital Signs:    Visit Vitals  BP (!) 125/56   Pulse (!) 112   Temp 100 °F (37.8 °C)   Resp 12   Ht 5' 6\" (1.676 m)   Wt 104.3 kg (230 lb)   SpO2 91%   BMI 37.12 kg/m²        Intake/Output Summary (Last 24 hours) at 10/3/2022 1411  Last data filed at 10/3/2022 1330  Gross per 24 hour   Intake 2475.64 ml   Output 3275 ml   Net -799.36 ml      Physical Examination:    General: intubated. Not tracking   Eyes: pupils pinpoint but responsive to light  ENT: ET tube in place  Neck: no masses, thyroid non-tender and trachea central.  Pulm: good air movement  Card:  no JVD or murmurs, has regular and normal S1, S2 without thrills, bruits   Abd:  Soft, non-tender, non-distended, normoactive bowel sounds   Musc:  No cyanosis, clubbing, atrophy or deformities. Skin:  No rashes, bruising or ulcers. Neuro: Not following command or tracking.  Is moving her head, coughing and has a gag     Current Facility-Administered Medications   Medication Dose Route Frequency    [START ON 10/4/2022] cefTRIAXone (ROCEPHIN) 2 g in 0.9% sodium chloride 20 mL IV syringe  2 g IntraVENous Q24H    senna-docusate (PERICOLACE) 8.6-50 mg per tablet 1 Tablet  1 Tablet Oral BID    fentaNYL citrate (PF) injection 100 mcg  100 mcg IntraVENous Q4H PRN    midodrine (PROAMATINE) tablet 15 mg  15 mg Oral Q8H    atorvastatin (LIPITOR) tablet 40 mg  40 mg Oral QHS    enoxaparin (LOVENOX) injection 30 mg  30 mg SubCUTAneous Q12H    lidocaine 4 % patch 1 Patch  1 Patch TransDERmal Q24H fentaNYL (PF) 1,500 mcg/30 mL (50 mcg/mL) infusion  0-200 mcg/hr IntraVENous TITRATE    aspirin chewable tablet 81 mg  81 mg Oral DAILY    lansoprazole compounding kit (PREVACID) 3 mg/mL oral suspension 30 mg  30 mg Oral DAILY    insulin lispro (HUMALOG) injection   SubCUTAneous Q6H    glucose chewable tablet 16 g  4 Tablet Oral PRN    glucagon (GLUCAGEN) injection 1 mg  1 mg IntraMUSCular PRN    dextrose 10% infusion 0-250 mL  0-250 mL IntraVENous PRN    propofol (DIPRIVAN) 10 mg/mL infusion  0-50 mcg/kg/min IntraVENous TITRATE    NOREPINephrine (LEVOPHED) 8 mg in 5% dextrose 250mL (32 mcg/mL) infusion  0.5-30 mcg/min IntraVENous TITRATE    sodium chloride (NS) flush 5-40 mL  5-40 mL IntraVENous Q8H    sodium chloride (NS) flush 5-40 mL  5-40 mL IntraVENous PRN    acetaminophen (TYLENOL) tablet 650 mg  650 mg Oral Q6H PRN    Or    acetaminophen (TYLENOL) suppository 650 mg  650 mg Rectal Q6H PRN    polyethylene glycol (MIRALAX) packet 17 g  17 g Oral DAILY PRN    promethazine (PHENERGAN) tablet 12.5 mg  12.5 mg Oral Q6H PRN    Or    ondansetron (ZOFRAN) injection 4 mg  4 mg IntraVENous Q6H PRN    chlorhexidine (PERIDEX) 0.12 % mouthwash 15 mL  15 mL Oral Q12H        Laboratory data and review:    Recent Labs     10/03/22  0113 10/02/22  0040 10/01/22  0418   WBC 11.4* 11.1* 10.8   HGB 10.6* 11.2* 11.1*   HCT 32.9* 34.3* 34.9*    233 238     Recent Labs     10/03/22  0113 10/02/22  0040 10/01/22  0418    145 147*   K 3.5 3.3* 3.0*   * 112* 114*   CO2 32 31 28   * 134* 131*   BUN 11 11 16   CREA 0.65 0.74 0.74   CA 8.5 8.5 8.5   MG 2.2 2.2 2.4   PHOS 3.5 3.5 2.8     No components found for: [place]    MRI =>   2 small foci of diffusion restriction in the right frontal lobe are unchanged  and may represent small foci of acute ischemia. No abnormal enhancement is  identified    Assessment and Plan:  Shock ([address] (9/27/2022) POA: suspect primarily septic shock.  Noted to have E.coli in urine cultures and sputum. Blood cultures negative. Suspect CM also contributing   -remains on low dose pressor support   -on Ceftriaxone   -cortisol WNL      Acute metabolic encephalopathy (2/43/8577) POA: unclear etiology. ? anoxia ? CVA. Apparently had unknown downtime and fentanyl patches found at home with unclear use/abuse   -MRI as above   -on ASA/statin   -EEG as per neurology   -ammonia WNL   -UDS with benzos      Acute CVA POA: see MRI as above   -LDL NOT at goal; on statin   -on ASA     NSTEMI (non-ST elevated myocardial infarction) ([facility] [address] (9/28/2022) / Cardiomyopathy ([facility] (9/28/2022) POA:   -TTE with reduced EF; s/p heparin gtt. Defer timing of cath to cardiology   -on ASA, statin   -unable to tolerate beta blocker or ACE I due to hypotension     Seizure activity POA: she had witnessed seizures on admission but has since had none other than occasional myoclonic jerks. No prior hx of seizures. EEG neg for seizure activity. IV Keppra discontinued 9/29. Neurology following. Acute respiratory failure with hypoxia ([facility] (9/27/2022) POA: intubated for airway protection given her encephalopathy on admission  -on SBT today     CHRIS (acute kidney injury) ([facility] (9/27/2022) POA: unknown baseline Cr. Currently Cr stable. Lactic acidosis (9/27/2022) POA: 3.0 on admission. Likely 2/2 #1.  Resolved     Dyslipidemia (9/28/2022) POA: on statin     Hypothyroidism (9/28/2022) POA: TSH low; free T4 low normal      KOBY (generalized anxiety disorder) (9/28/2022) POA: med rec once able to comply; absence of some of her medications may be contributing     Fever: WBC count stable  -check sputum, CXR     NB: patient has another chart with MR # [id number]    Total time spent for the patient's care:  35 minutes                  Care Plan discussed with: RN     Discussed:  Care Plan    Prophylaxis:  Lovenox     Anticipated Disposition:   TBD           ___________________________________________________    Attending Physician: Leonel Valadez MD

## 2022-10-03 NOTE — ADT AUTH CERT NOTES
Seizure - Care Day 4 (9/30/2022) by Abbie Wilson RN       Review Status Review Entered   Completed 9/30/2022 1533       Created By   Abbie Wilson RN      Criteria Review      Care Day: 4 Care Date: 9/30/2022 Level of Care: ICU    Guideline Day 3    Level Of Care    (X) Neurologic unit or floor to discharge    9/30/2022 15:33:27 EDT by Valorie Oleary      ICu    Clinical Status    (X) * Hemodynamic stability    ( ) * Mental status at baseline    9/30/2022 15:33:27 EDT by Valorie Oleary      unresponsive    ( ) * No evidence of CNS bleeding or infection    ( ) * Hypoglycemia absent    9/30/2022 15:33:27 EDT by Kiana Mora      glucose 128    ( ) * Electrolyte abnormality absent or acceptable for next level of care    ( ) * No new neurologic deficits    9/30/2022 15:33:47 EDT by Kiana Moar      unresponsive    ( ) * Seizures absent or managed    ( ) * Discharge plans and education understood    Activity    ( ) * Ambulatory or acceptable for next level of care    Routes    ( ) * Oral hydration    9/30/2022 15:33:27 EDT by Kiana Mora      NPO    ( ) * Oral medications or regimen acceptable for next level of care    9/30/2022 15:33:27 EDT by Kiana Mora    ( ) * Oral diet or acceptable for next level of care    9/30/2022 15:33:27 EDT by Kiana Mora      NPO    Interventions    (X) Neurologic checks and seizure monitoring    9/30/2022 15:33:27 EDT by Valorie Oleary      Neurochecks seizure precautions    Medications    ( ) * Antiepileptic regimen suitable for next level of care in place, if indicated    * Milestone   Additional Notes   Date of service: 9/30/2022         Chief complaint: Unresponsiveness, seizures       Interval HPI: Patient remains sedated and intubated. I have discussed with her nurse, mother and intensivist for collaborative hx as well as reviewed her chart.  She is still on low dose vasopressor support        Visit Vitals   /61   Pulse (!) 45   Temp 97.8 °F (36.6 °C)   Resp 16   Ht 5' 6\" (1.676 m)   Wt 104.3 kg (230 lb)   SpO2 99%   BMI 37.12 kg/m²   02 sats 99% on Ventilator ETT tube   Physical Examination:       General:  she remains critically ill, intubated but not in distress    Eyes:   pink conjunctivae, PERRLA but pinpoint    ENT:   no ottorrhea or rhinorrhea with dry mucous membranes   Neck: no masses, thyroid non-tender and trachea central.   Pulm:  decreased breath sounds with scattered crackles. No wheezes    Card:  no JVD or murmurs, has regular and normal S1, S2 without thrills, bruits    Abd:  Soft, non-tender, non-distended, normoactive bowel sounds    Musc:  No cyanosis, clubbing, atrophy or deformities. Skin:  No rashes, bruising or ulcers. Neuro: Sedated and intubated. No facial asymmetry. Telemetry reviewed by me:   normal sinus rhythm       Assessment and Plan:       Shock ([address] (9/27/2022) POA: likely from sepsis due to aspiration with a UTi worsened by her cardiomyopathy. Blood cultures remain neg. Initial lactic acid was high but now normal. CT chest, abdomen and pelvis neg for any obvious focus. She may have aspirated given sputum cultures isolated heavy E coli. Urine cultures grew Gram neg rods. Continue IV Levophed and wean as tolerated to keep a MAP >65. Antibiotics changed to IV Ceftriaxone. She remains critically ill. Continue to follow clinical progress        Acute metabolic encephalopathy (1/42/7229) POA: maybe likely multifactorial from a suspected anoxic brain injury given unknown downtime and her seizures vs fentany overdose (given there is evidence she had unprescribed fentanyl patches found at home by her son) vs acute CVA. She remains intubated and has failed SBT thus far. CT scan brain was neg for any acute changes. Toxicology screen unremarkable other than benzos. Alcohol level <10. TSH low. EEG non specific but neg for seizures. Continue to treat the sepsis. Neurology following.  Monitor clinical progress and neurological recovery         Acute CVA POA:  MRi showing small cortical foci of acute infarction right frontal lobe posteriorly abutting the central sulcus and cortically based anteriorly in the right frontal lobe). Echo showed no shunt.  (done 8/2022). A1c 6.1. Asprin. Start Lipitor when feasible. Neurology following       ? NSTEMI (non-ST elevated myocardial infarction) ([facility] [address] (9/28/2022) / Cardiomyopathy ([address] (9/28/2022) POA: has upward trending troponin concerning for underlying CAD vs stress cardiomyopathy. No prior Hx. Echocardiogram showed EF 25-30% with some diastolic dysfunction. Seen by Cardiology who will guide on timing for a cardiac catheterization. DC heparin gtt today. Seizure activity POA: she had witnessed seizures on admission but has since had none other than occasional myoclonic jerks. No prior hx of seizures. EEG neg for seizure activity. IV Keppra discontinued 9/29. Neurology following. Acute respiratory failure with hypoxia ([facility] [address] (9/27/2022) POA: intubated for airway protection given her encephalopathy on admission. Intensivist following and managing vent        CHRIS (acute kidney injury) ([facility] [address] (9/27/2022) POA: unknown baseline SCr. Has some urine output. Given her LV dysfunction, caution with IV fluids. Nephrology following. SCr now normal.        Lactic acidosis (9/27/2022) POA: severe on admission but has now resolved. I suspect this could be from the seizure activity vs volume depletion with shock vs sepsis of unclear etiology. Resolved         Dyslipidemia (9/28/2022) POA: will need statin therapy when more stable. Lipid panel done 8/2022 - see her other chart. Start statin when feasible       Hypothyroidism (9/28/2022) POA: TSH is depressed.  Free T4 is normal. Resume Levothyroxine (was on 100 mcg daily as per chart)       KOBY (generalized anxiety disorder) (9/28/2022) POA: will need to verify her home medications once more stable          Medications   aspirin chewable tablet 81 mg   Dose: 81 mg   Freq: DAILY Route: PO      cefTRIAXone (ROCEPHIN) 2 g in 0.9% sodium chloride 20 mL IV syringe   Dose: 2 g   Freq: EVERY 24 HOURS Route: IV      chlorhexidine (PERIDEX) 0.12 % mouthwash 15 mL   Dose: 15 mL   Freq: EVERY 12 HOURS Route: PO      enoxaparin (LOVENOX) injection 30 mg   Dose: 30 mg   Freq: EVERY 12 HOURS Route: SC      fentaNYL (PF) 1,500 mcg/30 mL (50 mcg/mL) infusion   Rate: 0-4 mL/hr Dose: 0-200 mcg/hr   Freq: TITRATE Route: IV on 200mcg/hr/175mcg/hr      midodrine (PROAMATINE) tablet 15 mg   Dose: 15 mg   Freq: EVERY 8 HOURS Route: PO      NOREPINephrine (LEVOPHED) 8 mg in 5% dextrose 250mL (32 mcg/mL) infusion   Rate: 0.9-56.3 mL/hr Dose: 0.5-30 mcg/min   Freq: TITRATE Route: IV      propofol (DIPRIVAN) 10 mg/mL infusion   Rate: 0-31.3 mL/hr Dose: 0-50 mcg/kg/min   Weight Dosing Info: 104.3 kg   Freq: TITRATE Route: IV      heparin (porcine) 1,000 unit/mL injection 4,000 Units   Dose: 4,000 Units   Freq: ONCE Route: IV      Cardiology consult -Assessment/Plan   1. Acute systolic CHF: EF 81-84%, likely Takotsubo cardiomyopathy. Troponin elevation also likely r/t Takotsubo. Need to rule out other etiologies/ischemic eval when more stable - likely cardiac cath this admission. EKG non ischemic. D/c heparin gtt today. Hold GDMT since on levo, started on midodrine         2. Bradycardia: developed since admission, proprofol weaned. Avoid precedex, BB therapy for now. Etiology unclear        3. Shock, vasodilatory vs sepsis?: cont levo as needed - weaned down to 1 mcg. Possible aspiration         4. Acute CVA: MRI showed acute infarcting in the R frontol cortex. Neuro following        5. Acute metabolic encephalopathy, poss seizure activity: cont EEG on, jerking movements when sedation weaned down        6. Acute hypoxic resp failure: intubated for airway protection       7. CHRIS: Cr improved       8.  Hypothyroidism: TSH low, free T4 normal. On synthroid Nephrology consult -Assessment & Plan:       CHRIS, resolved   Shock   Resp failure   Encephalopathy   Edema       Rec:   No acute indication HD   HD line out   Getting K phos   OK for diuresis from renal standpoint   Will s/o, please call if we can be of further assistance                 Seizure - Care Day 3 (9/29/2022) by Nathan Frias RN       Review Status Review Entered   Completed 9/30/2022 1526       Created By   Nathan Frias RN      Criteria Review      Care Day: 3 Care Date: 9/29/2022 Level of Care: ICU    Guideline Day 3    Level Of Care    (X) Neurologic unit or floor to discharge    9/30/2022 15:26:09 EDT by Granville Schilder      ICU    Clinical Status    (X) * Hemodynamic stability    9/30/2022 15:26:09 EDT by Kiana Hamm      119/62, 48, 97.5, 16    ( ) * Mental status at baseline    9/30/2022 15:26:09 EDT by Kiana Hamm      sedjany    ( ) * No evidence of CNS bleeding or infection    ( ) * Hypoglycemia absent    9/30/2022 15:26:09 EDT by Kiana Hamm      glucose 142    (X) * Electrolyte abnormality absent or acceptable for next level of care    ( ) * No new neurologic deficits    9/30/2022 15:26:09 EDT by Kiana Hamm    ( ) * Seizures absent or managed    9/30/2022 15:26:09 EDT by Kiana Hamm    ( ) * Discharge plans and education understood    Activity    ( ) * Ambulatory or acceptable for next level of care    Routes    ( ) * Oral hydration    9/30/2022 15:26:09 EDT by Kiana Hamm      NPO    ( ) * Oral medications or regimen acceptable for next level of care    9/30/2022 15:26:09 EDT by Kiana Hamm      NPO    ( ) * Oral diet or acceptable for next level of care    9/30/2022 15:26:09 EDT by Kiana Hamm      NPO    Interventions    (X) Neurologic checks and seizure monitoring    9/30/2022 15:26:09 EDT by Granville Schilder      Neurochecks    Medications    ( ) * Antiepileptic regimen suitable for next level of care in place, if indicated    * Milestone   Additional Notes   Progress Note: 2022         Patient who is a healthcare worker with unknown PMH. She was LKW the evening of . She did not report to work the am of  and she was found unresponsive at her home on wellness check. Bystander CPR was started, but she did have a pulse. EMS was called. Upon their arrival, SBP was 70s. Pupils were pinpoint, narcan was administered without improvement. She experienced seizure like activity and received 4mg IV versed. Vital Signs:   Visit Vitals   BP (!) 93/50 (BP 1 Location: Right upper arm, BP Patient Position: At rest)   Pulse 64   Temp 97.5 °F (36.4 °C)   Resp 15   Ht 5' 6\" (1.676 m)   Wt 104.3 kg (230 lb)   SpO2 93%   BMI 37.12 kg/m²    O2 Flow Rate (L/min): 60 l/min O2 Device: Endotracheal tube, Ventilator       Temp (24hrs), Av °F (36.7 °C), Min:97.5 °F (36.4 °C), Max:98.9 °F (37.2 °C)      22 03:27      Abnormal labs   WBC: 11.8 (H)   NRBC: 0.2 (H)   RBC: 3.59 (L)   HGB: 10.7 (L)   HCT: 33.5 (L)       22 03:27      Abnormal labs   Sodium: 146 (H)   Potassium: 3.8   Chloride: 117 (H)   CO2: 26   Anion gap: 3 (L)   Glucose: 142 (H)   BUN: 13   Creatinine: 0.85   BUN/Creatinine ratio: 15   Calcium: 8.2 (L)   Phosphorus: 1.6 (L)   Magnesium: 2.2   GFR est non-AA: >60   GFR est AA: >60            Chest X-Ray: personally reviewed and report checked       TTE: Severely reduced left ventricular systolic function with a visually estimated EF of 25 - 30%. Left ventricle size is normal. Mildly increased wall thickness. See diagram for wall motion findings. Abnormal diastolic function. Assessment and Plan       Shock: vasodilatory Afebrile last 24h. Cultures NGTD. CT chest and abdomen unrevealing. New acute systolic HF. LA improved to 3.   Hepatic panel unrevealing.   - Vanc stopped   - Sputum notable for only normal respiratory josue; MRSA negative   - Will consider continued abx taper   - Intubated only for airway protection at this time   - Urine/sputum/blood cultures, continue to follow   - Levo for MAP >65       Mechanical ventilation: Intubated for airway protection at this point. Likely due to aspiration pneumonitis   - RVP negative    - Sputum culture pending   - Antibiotics as above   - SAT/SBT today       Acute systolic HF: Cardiology following, concern for Takotsubo EF 25%   - Cardiology following   - Plan for cardiac cath, likely Friday   - Continue heparin drip       Acute toxic metabolic encephalopathy: Broad ddx. EEG negative for seizure. Cannot yet exclude anoxic brain injury in setting of hypoxia   - Avoid benzos   - SAT/SBT today   - Cannot use precedex due to bradycardia   - Wean propofol due to bradycardia and preferentially use fent       Ischemic CVA: punctate focus of acute infarction in the right frontal cortex. Additional probable focus of cortical infarction anteriorly right frontal lobe   - Aspirin   - Neurology following, appreciate res       Multidisciplinary Rounds Completed:  Y       ABCDEF Bundle/Checklist   Pain Medications: Y, fent   Target RASS: -5   Sedation Medications: Fent/prop   CAM-ICU:  FAVIOLA   Mobility: Poor   PT/OT: Unable   Restraints: Y   Discussed Plan of Care (goals of care): F   Addressed Code Status: Y       CARDIOVASCULAR   Cardiac Gtts: Levo/vaso   SBP Goal of: > 90 mmHg   MAP Goal of: > 65 mmHg   Transfusion Trigger (Hgb): <7 g/dL       RESPIRATORY   Vent Goals:    Chlorhexidine    DVT Prophylaxis (if no, list reason): Heparin    SPO2 Goal: > 92%   Pulmonary toilet: Duo-Nebs        GI/   Sal Catheter Present: Yes   GI Prophylaxis: Protonix (pantoprazole)    Nutrition: Pending NST consult   IVFs: Y   Bowel Movement: N   Bowel Regimen: Y   Insulin: Y       ANTIBIOTICS   Antibiotics:   Y       T/L/D   Tubes:  Y   Lines: Y   Drains: Y       SPECIAL EQUIPMENT   N       DISPOSITION   ICU      Upon arrival to the ED, she exhibited seizure like activity and L sided gaze preference. She required intubation for hypoxic respiratory failure. She was sedated on propofol. She became hypotensive and was started on levophed. LA 6.46. She was admitted to the ICU. She was started on empiric vanc/zosyn. Blood/sputum cultures ordered. CT head unrevealing. CT chest notable for only bibasilar atelectasis, cannot exclude small amount aspiration in lower lobes. CTAP unrevealing. She was noted to exhibit profound hypoxia. 7.33/44/73 on 100% FiO2 - not consistent with ARDS due to absence of bilateral infiltrates. A 100mcg fentanyl patch was found on the patient and this was removed.  did not reveal that the patient was prescribed this medication. Fentanyl does not always show on UDS. After speaking more with family that have arrived, they noted the patient did have access to  fentanyl patches from her  . She had offered one to her mother recently. The patient's mother said the patient hurt her back and may have tried using this for pain. It is plausible that the patient applied this and became hypoxic while asleep. As of  am, the patient has been weaned to 30% Fio2, levophed of 4mcg/min. When propofol is turned off, there is intermittent jerking. Developed bradycardia overnight. Propofol weaned. MRI showed There is a punctate focus of acute infarction in the right frontal cortex. Additional probable focus of cortical infarction anteriorly right frontal lobe. Cardiology consult Assessment/Plan   1. Acute systolic CHF: EF 20-22%, likely Takotsubo cardiomyopathy. Troponin elevation also likely r/t Takotsubo. Need to rule out other etiologies/ischemic eval when more stable - likely cardiac cath this admission. EKG non ischemic. Cont heparin gtt for now. Hold GDMT since on levo        2. Bradycardia: developed overnight, proprofol weaned. Avoid precedex. Etiology unclear        3. Shock, vasodilatory vs sepsis?: cont levo for now. Possible aspiration         4. Acute CVA: MRI showed acute infarcting in the R frontol cortex. Neuro following        5. Acute metabolic encephalopathy, poss seizure activity: cont EEG on, jerking movements when sedation weaned down        6. Acute hypoxic resp failure: intubated for airway protection       7. CHRIS: Cr improved       8.  Hypothyroidism: TSH low, free T4 normal. On synthroid      Nephrology consult -Assessment & Plan:       CHRIS, resolved   Shock   Resp failure   Encephalopathy   Edema       Rec:   No acute indication HD   HD line out   Stop IVF   Probably will need diuresis soon      Medications   aspirin chewable tablet 81 mg   Dose: 81 mg   Freq: DAILY Route: PO      fentaNYL (PF) 1,500 mcg/30 mL (50 mcg/mL) infusion   Rate: 0-4 mL/hr Dose: 0-200 mcg/hr   Freq: TITRATE Route: IV pm 175mcg/hr/50mcg/hr      lansoprazole compounding kit (PREVACID) 3 mg/mL oral suspension 30 mg   Dose: 30 mg   Freq: DAILY Route: PO      NOREPINephrine (LEVOPHED) 8 mg in 5% dextrose 250mL (32 mcg/mL) infusion   Rate: 0.9-56.3 mL/hr Dose: 0.5-30 mcg/min   Freq: TITRATE Route: IV on 4mcg/min/1mcg/min      propofol (DIPRIVAN) 10 mg/mL infusion   Rate: 0-31.3 mL/hr Dose: 0-50 mcg/kg/min   Weight Dosing Info: 104.3 kg   Freq: TITRATE Route: IV on 20mcg/kg/min/10mcg/kg/min

## 2022-10-04 LAB
ANION GAP SERPL CALC-SCNC: 3 MMOL/L (ref 5–15)
BASOPHILS # BLD: 0 K/UL (ref 0–0.1)
BASOPHILS NFR BLD: 0 % (ref 0–1)
BUN SERPL-MCNC: 14 MG/DL (ref 6–20)
BUN/CREAT SERPL: 19 (ref 12–20)
CALCIUM SERPL-MCNC: 8.5 MG/DL (ref 8.5–10.1)
CHLORIDE SERPL-SCNC: 109 MMOL/L (ref 97–108)
CO2 SERPL-SCNC: 32 MMOL/L (ref 21–32)
COMMENT, HOLDF: NORMAL
CREAT SERPL-MCNC: 0.74 MG/DL (ref 0.55–1.02)
DIFFERENTIAL METHOD BLD: ABNORMAL
EOSINOPHIL # BLD: 0.5 K/UL (ref 0–0.4)
EOSINOPHIL NFR BLD: 4 % (ref 0–7)
ERYTHROCYTE [DISTWIDTH] IN BLOOD BY AUTOMATED COUNT: 15.4 % (ref 11.5–14.5)
GLUCOSE BLD STRIP.AUTO-MCNC: 124 MG/DL (ref 65–117)
GLUCOSE BLD STRIP.AUTO-MCNC: 74 MG/DL (ref 65–117)
GLUCOSE BLD STRIP.AUTO-MCNC: 89 MG/DL (ref 65–117)
GLUCOSE BLD STRIP.AUTO-MCNC: 93 MG/DL (ref 65–117)
GLUCOSE BLD STRIP.AUTO-MCNC: 98 MG/DL (ref 65–117)
GLUCOSE SERPL-MCNC: 129 MG/DL (ref 65–100)
HCT VFR BLD AUTO: 31.2 % (ref 35–47)
HGB BLD-MCNC: 10.1 G/DL (ref 11.5–16)
IMM GRANULOCYTES # BLD AUTO: 0.1 K/UL (ref 0–0.04)
IMM GRANULOCYTES NFR BLD AUTO: 0 % (ref 0–0.5)
LYMPHOCYTES # BLD: 2.3 K/UL (ref 0.8–3.5)
LYMPHOCYTES NFR BLD: 21 % (ref 12–49)
MAGNESIUM SERPL-MCNC: 2.2 MG/DL (ref 1.6–2.4)
MCH RBC QN AUTO: 30.4 PG (ref 26–34)
MCHC RBC AUTO-ENTMCNC: 32.4 G/DL (ref 30–36.5)
MCV RBC AUTO: 94 FL (ref 80–99)
MONOCYTES # BLD: 1.3 K/UL (ref 0–1)
MONOCYTES NFR BLD: 11 % (ref 5–13)
NEUTS SEG # BLD: 7.1 K/UL (ref 1.8–8)
NEUTS SEG NFR BLD: 64 % (ref 32–75)
NRBC # BLD: 0 K/UL (ref 0–0.01)
NRBC BLD-RTO: 0 PER 100 WBC
PHOSPHATE SERPL-MCNC: 3.4 MG/DL (ref 2.6–4.7)
PLATELET # BLD AUTO: 205 K/UL (ref 150–400)
PMV BLD AUTO: 11 FL (ref 8.9–12.9)
POTASSIUM SERPL-SCNC: 3.6 MMOL/L (ref 3.5–5.1)
RBC # BLD AUTO: 3.32 M/UL (ref 3.8–5.2)
SAMPLES BEING HELD,HOLD: NORMAL
SERVICE CMNT-IMP: ABNORMAL
SERVICE CMNT-IMP: NORMAL
SODIUM SERPL-SCNC: 144 MMOL/L (ref 136–145)
WBC # BLD AUTO: 11.2 K/UL (ref 3.6–11)

## 2022-10-04 PROCEDURE — 85025 COMPLETE CBC W/AUTO DIFF WBC: CPT

## 2022-10-04 PROCEDURE — 74011000258 HC RX REV CODE- 258: Performed by: INTERNAL MEDICINE

## 2022-10-04 PROCEDURE — 74011250637 HC RX REV CODE- 250/637: Performed by: PSYCHIATRY & NEUROLOGY

## 2022-10-04 PROCEDURE — 74011250637 HC RX REV CODE- 250/637: Performed by: INTERNAL MEDICINE

## 2022-10-04 PROCEDURE — 74011250636 HC RX REV CODE- 250/636: Performed by: INTERNAL MEDICINE

## 2022-10-04 PROCEDURE — 74011250637 HC RX REV CODE- 250/637: Performed by: NURSE PRACTITIONER

## 2022-10-04 PROCEDURE — 65610000006 HC RM INTENSIVE CARE

## 2022-10-04 PROCEDURE — APPSS30 APP SPLIT SHARED TIME 16-30 MINUTES: Performed by: NURSE PRACTITIONER

## 2022-10-04 PROCEDURE — 80048 BASIC METABOLIC PNL TOTAL CA: CPT

## 2022-10-04 PROCEDURE — 2709999900 HC NON-CHARGEABLE SUPPLY

## 2022-10-04 PROCEDURE — 82962 GLUCOSE BLOOD TEST: CPT

## 2022-10-04 PROCEDURE — 84100 ASSAY OF PHOSPHORUS: CPT

## 2022-10-04 PROCEDURE — 83735 ASSAY OF MAGNESIUM: CPT

## 2022-10-04 PROCEDURE — 36415 COLL VENOUS BLD VENIPUNCTURE: CPT

## 2022-10-04 PROCEDURE — 94003 VENT MGMT INPAT SUBQ DAY: CPT

## 2022-10-04 PROCEDURE — 99232 SBSQ HOSP IP/OBS MODERATE 35: CPT | Performed by: SPECIALIST

## 2022-10-04 PROCEDURE — 74011000250 HC RX REV CODE- 250: Performed by: INTERNAL MEDICINE

## 2022-10-04 PROCEDURE — 74011000250 HC RX REV CODE- 250: Performed by: NURSE PRACTITIONER

## 2022-10-04 PROCEDURE — 74011250636 HC RX REV CODE- 250/636: Performed by: NURSE PRACTITIONER

## 2022-10-04 PROCEDURE — 74011250636 HC RX REV CODE- 250/636: Performed by: STUDENT IN AN ORGANIZED HEALTH CARE EDUCATION/TRAINING PROGRAM

## 2022-10-04 RX ORDER — MIDAZOLAM HYDROCHLORIDE 1 MG/ML
2 INJECTION, SOLUTION INTRAMUSCULAR; INTRAVENOUS
Status: DISCONTINUED | OUTPATIENT
Start: 2022-10-04 | End: 2022-10-05

## 2022-10-04 RX ADMIN — ATORVASTATIN CALCIUM 40 MG: 20 TABLET, FILM COATED ORAL at 21:08

## 2022-10-04 RX ADMIN — MIDAZOLAM 2 MG: 1 INJECTION, SOLUTION INTRAMUSCULAR; INTRAVENOUS at 04:40

## 2022-10-04 RX ADMIN — CHLORHEXIDINE GLUCONATE 15 ML: 1.2 RINSE ORAL at 09:05

## 2022-10-04 RX ADMIN — ENOXAPARIN SODIUM 30 MG: 100 INJECTION SUBCUTANEOUS at 09:03

## 2022-10-04 RX ADMIN — PIPERACILLIN AND TAZOBACTAM 3.38 G: 3; .375 INJECTION, POWDER, LYOPHILIZED, FOR SOLUTION INTRAVENOUS at 23:03

## 2022-10-04 RX ADMIN — Medication 10 ML: at 05:14

## 2022-10-04 RX ADMIN — ACETAMINOPHEN 650 MG: 325 TABLET, FILM COATED ORAL at 21:08

## 2022-10-04 RX ADMIN — MIDAZOLAM 2 MG: 1 INJECTION, SOLUTION INTRAMUSCULAR; INTRAVENOUS at 06:40

## 2022-10-04 RX ADMIN — PROPOFOL 25 MCG/KG/MIN: 10 INJECTION, EMULSION INTRAVENOUS at 16:42

## 2022-10-04 RX ADMIN — PROPOFOL 30 MCG/KG/MIN: 10 INJECTION, EMULSION INTRAVENOUS at 21:58

## 2022-10-04 RX ADMIN — Medication 10 ML: at 21:09

## 2022-10-04 RX ADMIN — MIDODRINE HYDROCHLORIDE 15 MG: 5 TABLET ORAL at 05:14

## 2022-10-04 RX ADMIN — SENNOSIDES AND DOCUSATE SODIUM 1 TABLET: 50; 8.6 TABLET ORAL at 21:09

## 2022-10-04 RX ADMIN — MIDAZOLAM 2 MG: 1 INJECTION, SOLUTION INTRAMUSCULAR; INTRAVENOUS at 02:21

## 2022-10-04 RX ADMIN — LANSOPRAZOLE 30 MG: KIT at 09:03

## 2022-10-04 RX ADMIN — PIPERACILLIN AND TAZOBACTAM 3.38 G: 3; .375 INJECTION, POWDER, LYOPHILIZED, FOR SOLUTION INTRAVENOUS at 15:17

## 2022-10-04 RX ADMIN — Medication 200 MCG/HR: at 18:02

## 2022-10-04 RX ADMIN — Medication 200 MCG/HR: at 02:09

## 2022-10-04 RX ADMIN — MIDODRINE HYDROCHLORIDE 15 MG: 5 TABLET ORAL at 13:14

## 2022-10-04 RX ADMIN — SENNOSIDES AND DOCUSATE SODIUM 1 TABLET: 50; 8.6 TABLET ORAL at 09:03

## 2022-10-04 RX ADMIN — MIDODRINE HYDROCHLORIDE 15 MG: 5 TABLET ORAL at 21:08

## 2022-10-04 RX ADMIN — Medication 175 MCG/HR: at 09:41

## 2022-10-04 RX ADMIN — ASPIRIN 81 MG: 81 TABLET, CHEWABLE ORAL at 09:03

## 2022-10-04 RX ADMIN — PIPERACILLIN AND TAZOBACTAM 3.38 G: 3; .375 INJECTION, POWDER, LYOPHILIZED, FOR SOLUTION INTRAVENOUS at 07:16

## 2022-10-04 RX ADMIN — PROPOFOL 15 MCG/KG/MIN: 10 INJECTION, EMULSION INTRAVENOUS at 04:43

## 2022-10-04 RX ADMIN — ENOXAPARIN SODIUM 30 MG: 100 INJECTION SUBCUTANEOUS at 21:09

## 2022-10-04 RX ADMIN — CHLORHEXIDINE GLUCONATE 15 ML: 1.2 RINSE ORAL at 21:08

## 2022-10-04 RX ADMIN — Medication 10 ML: at 13:15

## 2022-10-04 NOTE — CONSULTS
Brief Palliative Note    Checked in with patient's mother at bedside today. Reports family is hopeful and in good spirits. Waiting on Neurology team to round but heard promising results from ICU team with gradual wean of levophed. Reported that Palliative team will continue to follow along peripherally. She has Palliative contact card and may reach out with any questions or concerns. Will be available should patient's condition deteriorate or fail to improve. Family clear at this time to continue full supportive care and treatment; hopeful for recovery.

## 2022-10-04 NOTE — ADT AUTH CERT NOTES
Respiratory Failure GRG - Care Day 7 (10/3/2022) by Anneliese Whitaker       Review Status Review Entered   Completed 10/4/2022 1450       Created By   Anneliese Whitaker      Criteria Review      Care Day: 7 Care Date: 10/3/2022 Level of Care: ICU    Guideline Day 2    Level Of Care    (X) ICU or ventilator-capable area    10/4/2022 14:50:24 EDT by Anneliese Whitaker      10/03 ICU    Clinical Status    ( ) * Weaning assessment performed    10/4/2022 14:50:24 EDT by Anneliese Whitaker      Consider SBT if able to wean sedation and neurologic status allows    Interventions    (X) Inpatient interventions continue    10/4/2022 14:50:24 EDT by Vania Smith      fentanyl, levophed, propofol drip titrate, IV Zosyn 4.5g given 1x then IV 3.375g q8h, IV rocephin 2g q24h    * Milestone   Additional Notes   10/03 ICU       Pertinent Updates:   Pt remains on low dose levophed. On SBT. Family at the bedside      Vitals:   101.5 °F (38.6 °C)   126 l  125/71 89  19 93 % ET tube ventilator 45% fiO2   MI: 112,130   BP: 74/49   RR: 15 89% ET tube vents      Abnl/Pertinent Labs/Radiology/Diagnostic Studies:   WBC: 11.4 (H)   RBC: 3.62 (L)   HGB: 10.6 (L)   HCT: 32.9 (L)   Chloride: 109 (H)   CO2: 32   Anion gap: 2 (L)   Glucose: 129 (H)   BUN: 11   Creatinine: 0.65   NT pro-BNP: 2,403 (H)   Cortisol, a.m.: 10.8   GLUCOSE,FAST - POC: 127 , 87, 104, 117   Procalcitonin: 0.14      GRAM STAIN: RARE WBCS SEEN (P)    OCCASIONAL EPITHELIAL CELLS SEEN (P)    OCCASIONAL GRAM NEGATIVE RODS (P)    RARE GRAM POSITIVE COCCI IN PAIRS (P)   CULTURE, RESPIRATORY/SPUTUM/BRONCH W GRAM STAIN: Rpt   XR CHEST PORT:    Bilateral perihilar pneumonia versus subsegmental atelectasis. Small left   pleural effusion      Physical Exam:   General: intubated.  Not tracking    Eyes: pupils pinpoint but responsive to light   ENT: ET tube in place   Neck: no masses, thyroid non-tender and trachea central.   Pulm: good air movement   Card:  no JVD or murmurs, has regular and normal S1, S2 without thrills, bruits    Abd:  Soft, non-tender, non-distended, normoactive bowel sounds    Musc:  No cyanosis, clubbing, atrophy or deformities. Skin:  No rashes, bruising or ulcers. Neuro: Not following command or tracking. Is moving her head, coughing and has a gag       Per cardiology   Acute systolic CHF: EF 96-76%, likely Takotsubo cardiomyopathy. Troponin elevation also likely r/t Takotsubo. Need to rule out other etiologies/ischemic eval when more stable - likely cardiac cath this admission pending progress. EKG non ischemic. Hold GDMT since on levo, on midodrine     Shock, vasodilatory vs sepsis?: cont levo as needed - back on 6 mcg   Acute hypoxic resp failure: intubated for airway protection, cont sedation vacations/attempts to wean, per intensivist      Per Critical Care Medicine   Wean Fentanyl and Propofol. Follow Neuro exam. Neurology following    Consider SBT if able to wean sedation and neurologic status allows   Wean Levophed as tolerated to Keep MAP>65. Continue Ceftriaxone for total of 10 days (Ecoli in sputum)   TF. Add Bowel regimen   SQ Lovenox      Per Internal medicine   on Ceftriaxone    -cortisol WNL    -on ASA/statin    on SBT today    KOBY (generalized anxiety disorder) (9/28/2022) POA: med rec once able to comply; absence of some of her medications may be contributing    Fever:   -check sputum, CXR       Palliative team  note:    Family is pleased that pt is more alert today, are hopeful that pt is making process. Family is eager to speak with Neurology; NP was notified via [facility], reports Neuro to follow up on 10/4, family and RN were made aware. Palliative team will follow for ongoing support and goals of care conversations.        Medications:   RE Tylenol 650mg q6h prn given 1x   PO Meds via OGT   PO ASA 81mg daily   PO Lipitor 40mg bedtime   SC lovenox 30mg q12h   PO prevacid 30mg daily   TP lidocaine 4% 1 patch q24h   PO proamatine 15mg q8h   PO Pericolace 8.6-50mg 1 tab 2x daily      Orders:   ADULT TUBE FEEDING Orogastric; Peptide Based;Vital AF @ 45 mL/hour    Advance by 10 mL q 4 hours until goal reached Provide 2 Prosource/day, flush with 15 mL H2O before and after  mL q 3 hours   AM labs, glucose monitoring q6h, SCD, neuro vascular check q2h      Per Ditary   Continue TF per below:   Vital AF @ 45 mL/hour    Provide 2 Prosource/day, flush with 15 mL H2O before and after    mL q 3 hours      OT Note:    Patient continues to have increased medical complexity. She remains intubated and sedated. Will complete OT order as patient is not medically stable for OT interventions per care team recommendation. CM Note:   I met briefly with pt.'s mother    Tien Reyes and pt.'s sister are here @ the hospital.   The plan is to do a conference call with pt.'s sons when Palliative meets with family. Respiratory Failure GRG - Care Day 6 (10/2/2022) by Haim Tiwari       Review Status Review Entered   Completed 10/4/2022 1426       Created By   Haim Tiwari      Criteria Review      Care Day: 6 Care Date: 10/2/2022 Level of Care: ICU    Guideline Day 2    Level Of Care    (X) ICU or ventilator-capable area    10/4/2022 14:26:55 EDT by Haim Tiwari      10/02 ICU    Clinical Status    ( ) * Weaning assessment performed    10/4/2022 14:26:55 EDT by Vania Perez      Vent settings with fiO2 30 and PEEP 5. Has continued to not pass SBT due to mentation    Interventions    (X) Inpatient interventions continue    10/4/2022 14:26:55 EDT by Vania Perez      fentanyl, levophed, propofol drip titrate, IV fentanyl citrate 100mcg q4h prn given 1x, IV rocephin 2g q24h, IV potassium chloride 10 mEq q1h given 4x    * Milestone   Additional Notes   10/02 ICU       Pertinent Updates:   Patient remains restless on fentanyl and propofol. On minimal levophed likely because of sedation.   Not opening eyes to stimulation. Doesn't follow commands   MRI done today      Vitals:   100.2 °F (37.9 °C) 57  124/53  16 93 % ET tube vents 30% fiO2   MD: 57, 104, 59   BP: 82/33, 106/42    RR: 25 88% ET tube ventilator       Abnl/Pertinent Labs/Radiology/Diagnostic Studies:   GLUCOSE,FAST - POC: 128 , 99,100,102,124   WBC: 11.1 (H)   RBC: 3.73 (L)   HGB: 11.2 (L)   HCT: 34.3 (L)   PLATELET: 293   Sodium: 145   Potassium: 3.3 (L)   Chloride: 112 (H)   CO2: 31   Anion gap: 2 (L)   Glucose: 134 (H)   BUN: 11   Creatinine: 0.74   Cortisol, a.m.: 15.8      MRI BRAIN W WO CONT:    2 small foci of diffusion restriction in the right frontal lobe are unchanged   and may represent small foci of acute ischemia. No abnormal enhancement is   identified. Physical Exam:   General: intubated. Sedated    Eyes: pupils pinpoint but responsive to light   ENT: ET tube in place   Neck: no masses, thyroid non-tender and trachea central.   Pulm: good air movement   Card:  no JVD or murmurs, has regular and normal S1, S2 without thrills, bruits    Abd:  Soft, non-tender, non-distended, normoactive bowel sounds    Musc:  No cyanosis, clubbing, atrophy or deformities. Skin:  No rashes, bruising or ulcers.     Neuro: sedated       Per Internal medicine   -s/p Ceftriaxone    -AM cortisol    repeat MRI today to eval for anoxic brain injury      Per Critical Care medicine   rocephin for e coli in sputum   -lovenox   -neuro checks   -Repeat MRI w/wo contrast today      Medications:   PO Meds via OGT   PO ASA 81mg daily   PO Lipitor 40mg bedtime   SC lovenox 30mg q12h   PO prevacid 30mg daily   TP lidocaine 4% 1 patch q24h   PO proamatine 15mg q8h      Orders:   ADULT TUBE FEEDING Orogastric; Peptide Based;Vital AF @ 45 mL/hour    Advance by 10 mL q 4 hours until goal reached Provide 2 Prosource/day, flush with 15 mL H2O before and after  mL q 3 hours   AM labs, glucose monitoring q6h, SCD, neuro vascular check q2h      SLP Contact Note   Chart reviewed for evaluation. Noted patient remains intubated, no command following. Therefore patient not appropriate for SLP evaluation. Will hold and follow up as appropriate. Respiratory Failure GRG - Care Day 5 (10/1/2022) by Quintin Bey       Review Status Review Entered   Completed 10/4/2022 1406       Created By   Quintin Bey      Criteria Review      Care Day: 5 Care Date: 10/1/2022 Level of Care: ICU    Guideline Day 1    Level Of Care    (X) ICU or intermediate care as appropriate    10/4/2022 14:06:27 EDT by Quintin Bey      10/01 ICU    Clinical Status    (X) * Clinical Indications met    10/4/2022 14:06:27 EDT by Quintin Bey      patient remains vented and sedated, ET tube 30% fiO2    Interventions    (X) Inpatient interventions as needed    10/4/2022 14:06:27 EDT by Maegan Sky, Vania      fentanyl, levophed, propofol  drip titrate, IV rocephin 2g q24h, IVIV potassium chloride 10 mEq  q1h given 6x    * Milestone   Additional Notes   10/01 ICU       Pertinent Updates:   patient remains vented and sedated, Has been unable to pass SBT due to mentation. ETT burgos changed, next change due 10/06      Vitals:   99.3 (37.4) 65 133/63 18 95% ET tube vents 30% fiO2      Abnl/Pertinent Labs   RBC: 3.76 (L)   HGB: 11.1 (L)   HCT: 34.9 (L)   PLATELET: 056   Sodium: 147 (H)   Potassium: 3.0 (L)   Chloride: 114 (H)   Glucose: 131 (H)   BUN: 16   Creatinine: 0.74   GLUCOSE,FAST - POC: 72, 91, 106      Physical Exam:   General: intubated. Sedated    Eyes: pupils pinpoint but responsive to light   ENT: ET tube in place   Neck: no masses, thyroid non-tender and trachea central.   Pulm: coarse bilaterally     Card:  no JVD or murmurs, has regular and normal S1, S2 without thrills, bruits    Abd:  Soft, non-tender, non-distended, normoactive bowel sounds    Musc:  No cyanosis, clubbing, atrophy or deformities. Skin:  No rashes, bruising or ulcers.     Neuro: sedated       Per Critical Care medicine   Acute respiratory failure with hypoxia   Acute encephalopathy   Possible anoxic encephalopathy   Stress cardiomyopathy   Ischemic CVA      ICU Comprehensive Plan of Care:   -Continue propofol and fentanyl   -rocephin   -lovenox   -neuro checks   -Will consider repeating MRI in AM if no improvement. -levophed to keep MAP>65      Per Internal medicine   suspect primarily septic shock. Noted to have E.coli in urine cultures and sputum. Blood cultures negative. Suspect CM also contributing    -remains on low dose pressor support    -on Ceftriaxone    -check cortisol    Acute metabolic encephalopathy POA: unclear etiology. ? anoxia ? CVA. Apparently had unknown downtime and fentanyl patches found at home with unclear use/abuse    -MRI did show a small CVA as above    -EEG as per neurology    -ammonia WNL    -UDS with benzos    -repeat MRI in the AM if persists    Acute CVA POA:  MRi showing small cortical foci of acute infarction right frontal lobe posteriorly abutting the central sulcus and cortically based anteriorly in the right frontal lobe). Echo showed no shunt. -LDL NOT at goal; on statin    -on ASA    -likely repeat MRI in near future    NSTEMI (non-ST elevated myocardial infarction) ([facility] (9/28/2022) / Cardiomyopathy ([address] (9/28/2022) POA:    -TTE with reduced EF; s/p heparin gtt.  Defer timing of cath to cardiology    -on ASA, statin    -unable to tolerate beta blocker or ACE I due to hypotension    Dyslipidemia  on statin    Hypothyroidism (9/28/2022) POA: TSH low; free T4 low normal. ?central.    Hypokalemia: repleted    Hypernatremia: increase free water flushes in TF's      Medications:   PO Meds via OGT   PO ASA 81mg daily   PO Lipitor 40mg bedtime   SC lovenox 30mg q12h   PO prevacid 30mg daily   TP lidocaine 4% 1 patch q24h   PO proamatine 15mg q8h      Orders:   ADULT TUBE FEEDING Orogastric; Peptide Based;Vital AF @ 45 mL/hour    Advance by 10 mL q 4 hours until goal reached Provide 2 Prosource/day, flush with 15 mL H2O before and after  mL q 3 hours   AM labs, glucose monitoring q6h, SCD, neuro vascular check q2h              Respiratory Failure GRG - Clinical Indications for Admission to Inpatient Care by Stevie Gonzalez       Review Status Review Entered   Completed 10/4/2022 1358       Created By   Stevie Gonzalez      Criteria Review      Clinical Indications for Admission to Inpatient Care    Most Recent : Stevie Gonzalez Most Recent Date: 10/4/2022 13:58:08 EDT    (X) Hospital admission is needed for appropriate care of the patient because of  1 or more  of    the following  (1) (2) (3) (4) (5) (6) (7) (8) (9) (10):       (X) New (acute) need for mechanical invasive or noninvasive (eg, bilevel positive airway pressure       (BPAP), volume-assured pressure support (VAPS), or volume control) ventilation (11) (12)       (13) (14) (15) (16) (17) (18)       10/4/2022 13:58:08 EDT by Vania Sanchez         Acute respiratory failure with hypoxia - patient remains vented and sedated

## 2022-10-04 NOTE — PROGRESS NOTES
CARDIOLOGY PROGRESS NOTE        [address] [facility], [place], [address] [facility]  Phone [phone]; Fax [phone]          10/4/2022 9:28 AM       Admit Date:           2022  Admit Diagnosis:  Status epilepticus ([address] Sandra Shook  :          1965   MRN:          [id number]        Assessment/Plan  Acute systolic CHF: EF 89-14%, likely Takotsubo cardiomyopathy. Troponin elevation also likely r/t Takotsubo. Need to rule out other etiologies/ischemic eval when more stable - likely cardiac cath this admission pending progress. EKG non ischemic. Hold GDMT since on levo, on midodrine      2. Bradycardia: currently resolved. Avoid precedex, BB therapy for now. Etiology unclear     3. Shock, vasodilatory vs sepsis?: cont levo as needed. 4. Acute CVA: MRI showed acute infarcting in the R frontol cortex. Neuro following     5. Acute metabolic encephalopathy, poss seizure activity: cont EEG on, jerking movements when sedation weaned down     6. Acute hypoxic resp failure: intubated for airway protection. SBT today     7. CHRIS: Cr improved    8. Hypothyroidism: TSH low, free T4 normal. On synthroid       NP spent 15 minutes in chart review of notes, VS, diagnostics. NP spent 8 minutes in examination of pt and review of plan of care  with pt/family. We discussed the expected course, resolution and complications of the diagnosis(es) in detail. Medication risks, benefits, costs, interactions, and alternatives were discussed as indicated. 10/04 0701 - 10/04 1900  In: 53 [I.V.:8]  Out: 200 [Urine:200]    Last 3 Recorded Weights in this Encounter    22 1119 22 1548   Weight: 104.3 kg (230 lb) 104.3 kg (230 lb)         10/02 1901 - 10/04 0700  In: 0033 [I.V.:1335]  Out: Medina Gutierrez [MBHOQ:[id number]]    SUBJECTIVE      59 y.o. female presented to the ED with altered mental status. History obtained from chart review.   Sons were both bedside and report that she has a history of anxiety, depression and appears to be treated for medical weight loss. EMS alerted for wellbeing call when her friends could not get a hold of her. Found altered by EMS. Apparently she had a fentanyl patch on her back. Did not respond to narcotics. There was question if she had seizure activity. Alberto Benjamin is intubated and sedated.        Current Facility-Administered Medications   Medication Dose Route Frequency    midazolam (VERSED) injection 2 mg  2 mg IntraVENous Q2H PRN    senna-docusate (PERICOLACE) 8.6-50 mg per tablet 1 Tablet  1 Tablet Oral BID    piperacillin-tazobactam (ZOSYN) 3.375 g in 0.9% sodium chloride (MBP/ADV) 100 mL MBP  3.375 g IntraVENous Q8H    fentaNYL citrate (PF) injection 100 mcg  100 mcg IntraVENous Q4H PRN    midodrine (PROAMATINE) tablet 15 mg  15 mg Oral Q8H    atorvastatin (LIPITOR) tablet 40 mg  40 mg Oral QHS    enoxaparin (LOVENOX) injection 30 mg  30 mg SubCUTAneous Q12H    lidocaine 4 % patch 1 Patch  1 Patch TransDERmal Q24H    fentaNYL (PF) 1,500 mcg/30 mL (50 mcg/mL) infusion  0-200 mcg/hr IntraVENous TITRATE    aspirin chewable tablet 81 mg  81 mg Oral DAILY    lansoprazole compounding kit (PREVACID) 3 mg/mL oral suspension 30 mg  30 mg Oral DAILY    insulin lispro (HUMALOG) injection   SubCUTAneous Q6H    glucose chewable tablet 16 g  4 Tablet Oral PRN    glucagon (GLUCAGEN) injection 1 mg  1 mg IntraMUSCular PRN    dextrose 10% infusion 0-250 mL  0-250 mL IntraVENous PRN    propofol (DIPRIVAN) 10 mg/mL infusion  0-50 mcg/kg/min IntraVENous TITRATE    NOREPINephrine (LEVOPHED) 8 mg in 5% dextrose 250mL (32 mcg/mL) infusion  0.5-30 mcg/min IntraVENous TITRATE    sodium chloride (NS) flush 5-40 mL  5-40 mL IntraVENous Q8H    sodium chloride (NS) flush 5-40 mL  5-40 mL IntraVENous PRN    acetaminophen (TYLENOL) tablet 650 mg  650 mg Oral Q6H PRN    Or    acetaminophen (TYLENOL) suppository 650 mg  650 mg Rectal Q6H PRN    polyethylene glycol (MIRALAX) packet 17 g  17 g Oral DAILY PRN    promethazine (PHENERGAN) tablet 12.5 mg  12.5 mg Oral Q6H PRN    Or    ondansetron (ZOFRAN) injection 4 mg  4 mg IntraVENous Q6H PRN    chlorhexidine (PERIDEX) 0.12 % mouthwash 15 mL  15 mL Oral Q12H      OBJECTIVE               Intake/Output Summary (Last 24 hours) at 10/4/2022 0853  Last data filed at 10/4/2022 0800  Gross per 24 hour   Intake 2660.96 ml   Output 2090 ml   Net 570.96 ml         Review of Systems - History obtained from the patient AS PER  HPI        PHYSICAL EXAM        Visit Vitals  BP (!) 106/56 (BP 1 Location: Right upper arm, BP Patient Position: At rest)   Pulse (!) 56   Temp 98.6 °F (37 °C)   Resp 16   Ht 5' 6\" (1.676 m)   Wt 104.3 kg (230 lb)   SpO2 93%   BMI 37.12 kg/m²       Gen: Intubated,   HEENT:  Pink conjunctivae. No scleral icterus or conjunctival, moist mucous membranes  Neck: Supple,  No JVD, No Carotid Bruit, Thyroid- non tender   Resp: No accessory muscle use, Clear breath sounds, No rales or rhonchi  Card: Regular Rate, Rhythm - SB, Normal S1, S2, No murmurs, rubs or gallop. MSK: No cyanosis or clubbing, good capillary refill  Skin: No rashes or ulcers, no bruising  Neuro:  eyes open  Psych:  sedated  LE: Mild generalized edema       DATA REVIEW      No specialty comments available. Cardiac monitor: SR     ECHO: 09/27/22    ECHO ADULT COMPLETE 09/27/2022 9/27/2022    Interpretation Summary    Left Ventricle: Severely reduced left ventricular systolic function with a visually estimated EF of 25 - 30%. Left ventricle size is normal. Mildly increased wall thickness. See diagram for wall motion findings. Abnormal diastolic function. Right Ventricle: Moderately reduced systolic function. Signed by: Shekhar Ventura MD on 9/27/2022  5:02 PM      Laboratory and Imaging have been reviewed by me and are notable for  No results for input(s): CPK, CKMB, TROIQ in the last 72 hours.     Recent Labs     10/04/22  0120 10/03/22  0113 10/02/22  0040   NA 144 143 145   K 3.6 3.5 3.3*   CO2 32 32 31   BUN 14 11 11   CREA 0.74 0.65 0.74   * 129* 134*   PHOS 3.4 3.5 3.5   MG 2.2 2.2 2.2   WBC 11.2* 11.4* 11.1*   HGB 10.1* 10.6* 11.2*   HCT 31.2* 32.9* 34.3*    208 233

## 2022-10-04 NOTE — PROGRESS NOTES
0700- Bedside and Verbal shift change report given to Sarah Wilson RN (oncoming nurse) by Gorge Bernstein (offgoing nurse). Report included the following information SBAR, Kardex, ED Summary, Procedure Summary, Intake/Output, MAR, Recent Results, Cardiac Rhythm Sinus Diana Perkins, and Alarm Parameters   Primary Nurse Alberto Wilburn and Freddie Leon, KATHY performed a dual skin assessment on this patient No impairment noted  Jose Enrique score is 12  Received patient intubate and sedated, has been waking to voice/stimuli. AC/VC Mode: FiO2 45%, Rate 16, PEEP 5, ; plan for SBT again today as weaning occurs. ET Tube: 23 @ teeth. OG Tube: 73 @ lip with Vital AF running @ 45 w/ q3 200 ml flushes; Respirations even easy unlabored with ventilator. Abdomen soft non-tender non-distended, +BS noted x4 quadrants. Sal catheter in place, below level of bladder, draining. Right TLC C/D/I- Fentanyl @ 200, Propofol @ 10, and Levophed @ 2. Call bell in reach, bed locked in lowest position. 0800- Turn to the right side. Suction/VAP completed. Restraints continued for intermittent agitation. Kimberly Childers- Rounding completed with Dr. Kindra Sparrow- plan for SBT again, wean fentanyl as tolerated and propofol. Patient still not following commands. Family at bedside and provided with update as well and able to ask MD questions. 0900- Repositioned again. Patient favors right side. Levophed to 1.  0930- Placed on SBT.  8586- Fentanyl syringe changed and reduced to 175. Continued on SBT. IDRs completed. 0945- Spoke to [facility]- plan to sign off case at this time, if decline ins GCS again, please reach out. 1000- Turned to the left side. Suction/VAP completed. Restraints continued due to intermittent agitation during SBT. Levophed paused. Propofol to 5.  1044- Central line dressing changed. Neuro at bedside assessing neuro status of patient while on minimal sedation. 1100- Neuro in to talk to family.   1125- Patient agitated, suctioned well, repositioned and still agitated. Increased propofol to 10 not meeting RASS goal of 0 to -1; Levophed restarted for MAP <65.  1130- Propofol to 15- restless, but still no purposeful interaction, obvious tracking. 1145- Patient more comfortable; At times moving self down in bed despite repositioning and turning. 1200- Placed supine for patient's comfort, had tried turn to the right, but patient \"wiggled\" and pillow had been removed. Suction/VAP completed. Restraints continued for agitation. 1300- Music therapy in with patient, family happy with visit from therapy. 1325- Turned to the right side. Placed Lidocaine patch. Suction completed again. 1342- Rechecked BG as it was in the 70s earlier, Dr. Solo Greenberg was aware, recheck BG was 94.  1400- Suction/VAP completed. Restraints continued. 1500- Patient intermittently resting. 32 61 16- Patient keeps sliding down in bed despite boosting/repositioning. 1540- Still very restless in bed, tried to turn to left but wiggled down in bed. Placed back supine. Propofol to 25.  1545- Fentanyl to 200.  1600- Patient remains supine, trying to keep elevated to avoid aspiration of tube feeds. Suction/VAP completed. Restraints continued for agitation. 1700- Patient doing well on SBT, comfortable on sedation, has remained in position and has not moved down in bed. [id number]- Family at bedside talking to patient and holding her hand. 1740- Levophed held. 1750- Repositioned in bed with HOB elevated to avoid aspiration, patient more calm and comfortable. Placed to the left side. 1800- Suction/VAP completed. Restraints continued. 1820- Placed back on rate on ventilator. 1900- Bedside and Verbal shift change report given to [facility] (oncoming nurse) by Daniel Castaneda (offgoing nurse). Report included the following information SBAR, Kardex, ED Summary, Procedure Summary, Intake/Output, MAR, Recent Results, Cardiac Rhythm NSR, and Alarm Parameters .

## 2022-10-04 NOTE — PROGRESS NOTES
Transition of care note:    RUR 13%(low readmission risk score)    LOS 6 days,GLOS 4.9    Per IDR discussion:    Pt is more awake but not following commands. I discussed pt with pt.'s nurse and read neurology's examination note.     Gregory Espinoza

## 2022-10-04 NOTE — PROGRESS NOTES
Asked to return to bedside to reexamine    When last seen by my service on 9/29/2022, her exam was as follows: To verbal stimulus no response. To tactile stimulus she will open her eyes briefly but does not have any further response. Noxious following the tactile initial response elicits no further awakening. Pupils are small pinpoint not reactive. No doll's eyes. No corneal.  She does gag. She is breathing over the vent. She has some spontaneous movement in the lower extremities. She does have withdrawal to noxious cutaneous stimulation. Reflexes symmetrical.  Toes are down. Remainder not testable    Upon my arrival pt on propofol and fentanyl gtt. Opens eyes spontaneously does not attend.  +cough/gag/corneals. No nystagmus or dolls eyes. Moves all extremities spontaneously but does not withdrawal to pain. MRI w wo contrast on 10/2/2022 without significant change from 9/28/2022. At this point she meets the definition of coma but with unclear eitology as she is without metabolic or structural derangement. While her MRI's do not show evidence of anoxic brain injury, it remains the mostly likely cause of her neurologic state given that everything else has been ruled out. Will ask Dr. Dexter Cruz to see her tomorrow to lend another opinion.

## 2022-10-04 NOTE — PROGRESS NOTES
..Bedside shift change report given to Beata Cardona RN (oncoming nurse) by Brittnee Acevedo RN (offgoing nurse). Report included the following information SBAR, Kardex, Intake/Output, MAR, and Cardiac Rhythm NSR/Sinus audie . Trula Blew .Primary Nurse Damaso Herrera RN and Brittnee Acevedo RN performed a dual skin assessment on this patient No impairment noted  Jose Enrique score is see flowsheet    Barren Revering complete--see flowsheet     - Dr. Janes Quintanilla notified of pt's need for a new restraint order. No new orders received. - Nurse requested Dr. Janes Quintanilla to renew pt's bilateral wrist restraint order. Per MD \"Let me know when it expires and I'll renew. \"     - Oral care/suctioning completed and pt turned. Restraints assessed. - Dr. Janes Quintanilla notified restraint order expires at 25-23-76-22. Per MD \"Let me know when it expires and appears in the system. \" Nurse notified MD that new restraint order must be placed prior to the order expiring. No new orders    - Oral care/suctioning completed and pt turned. Restraints assessed. - Dr. Janes Quintanilla notified that restraint order is expiring soon. Per MD \"I will take care of the restraint order. Thanks. \" No new orders received. - Dr. Wilner Gamble notified that pt's restraint order is about to . No new orders received. 2358- Restraints discontinued    0000- Reassessment complete--see flowsheet. Oral care/suctioning completed and pt turned. Laweraluis daniel Quintanilla notified that pt's restraint order has . Orders received for bilateral wrist restraints. 0100- Pt bathed and gown changed    0200- Oral care/suctioning completed and pt turned. Restraints assessed. 6999- Dr. Gagandeep Islas notified of pt's bradycardia and increased agitation when attempting to wean Propofol. Orders received for Versed 2 mg q1 hr PRN received--see MAR    0400- Reassessment complete--see flowsheet. Oral care/suctioning completed and pt turned. Restraints assessed.      0600- Oral care/suctioning completed and pt turned. Restraints assessed    . Alfredo Dominguez Bedside shift change report given to Noy Gomez RN (oncoming nurse) by Conor Villela RN (offgoing nurse). Report included the following information SBAR, Kardex, Intake/Output, MAR, and Cardiac Rhythm Sinus audie/NSR . Alfredo Dominguez .Stroke Education provided to patient and the following topics were discussed    1. Patients personal risk factors for stroke are hyperlipidemia and obesity    2. Warning signs of Stroke:        * Sudden numbness or weakness of the face, arm or leg, especially on one side of          The body            * Sudden confusion, trouble speaking or understanding        * Sudden trouble seeing in one or both eyes        * Sudden trouble walking, dizziness, loss of balance or coordination        * Sudden severe headache with no known cause      3. Importance of activation Emergency Medical Services ( 9-1-1 ) immediately if experience any warning signs of stroke. 4. Be sure and schedule a follow-up appointment with your primary care doctor or any specialists as instructed. 5. You must take medicine every day to treat your risk factors for stroke. Be sure to take your medicines exactly as your doctor tells you: no more, no less. Know what your medicines are for , what they do. Anti-thrombotics /anticoagulants can help prevent strokes. You are taking the following medicine(s)  Lovenox     6. Smoking and second-hand smoke greatly increase your risk of stroke, cardiovascular disease and death. Smoking history never    7. Information provided was Verbal Education    8. Documentation of teaching completed in Patient Education Activity and on Care Plan with teaching response noted?   yes

## 2022-10-04 NOTE — PROGRESS NOTES
Music Therapy Assessment  [address] [place] [address] [phone]     1965  59 y.o.  female    Patient Telephone Number: [phone] (home)   Gnosticist Affiliation: Orthodoxy   Language: English   Patient Active Problem List    Diagnosis Date Noted    Goals of care, counseling/discussion 10/03/2022    Palliative care encounter 10/03/2022    Acute CVA (cerebrovascular accident) ([address] 09/29/2022    NSTEMI (non-ST elevated myocardial infarction) ([address] 09/28/2022    Cardiomyopathy ([address] 09/28/2022    Dyslipidemia 09/28/2022    Hypothyroidism 09/28/2022    KOBY (generalized anxiety disorder) 09/28/2022    Status epilepticus ([address] 09/27/2022    CHRIS (acute kidney injury) ([address] 09/27/2022    Lactic acidosis 09/27/2022    Acute respiratory failure with hypoxia ([address] 09/27/2022    Shock ([facility] 91/62/7725    Acute metabolic encephalopathy 56/71/5968        Date: 10/4/2022            Total Time (in minutes): 40          SFM 3 INTENSIVE CARE    Mental Status:   [x] Alert [  ] [place] [  ]  Confused  [  ] Minimally responsive  [  ] Sleeping    Communication Status: [  ] Impaired Speech [x] Nonverbal     Physical Status:   [x] Oxygen in use  [  ] Hard of Hearing [  ] Vision Impaired  [  ] Ambulatory  [  ] Ambulatory with assistance [  ] Non-ambulatory     Music Preferences, Background: Pt's parents said the pt likes Country music. Clinical Problem addressed: Support comfort and relaxation for pt, emotional support for pt/family.     Goal(s) met in session:  Physical/Pain management (Scale of 1-10):    Pre-session rating ___________    Post-session rating __________  [  ] Increased relaxation   [  ] Affected breathing patterns  [  ] Decreased muscle tension   [  ] Decreased agitation  [  ] Affected heart rate    [x] Increased alertness     Emotional/Psychological:  [  ] Increased self-expression   [  ] Decreased aggressive behavior   [  ] Decreased feelings of stress  [  ] Discussed healthy coping skills     [ ] Improved mood    [  ] Decreased withdrawn behavior     Social:  [  ] Decreased feelings of isolation/loneliness [x] Positive social interaction   [x] Provided support and/or comfort for family/friends    Spiritual:  [  ] Spiritual support    [  ] Expressed peace  [  ] Expressed grecia    [  ] Discussed beliefs    Techniques Utilized (Check all that apply):   [  ] Procedural support MT [x] Music for relaxation [x] Patient preferred music  [  ] Breonna analysis  [  ] Elviaa Tenisha choice  [  ] Music for validation  [  ] Entrainment  [  ] Movement to music [  ] Guided visualization  [  ] Troy Hiper  [  ] Patient instrument playing [  ] Elviachel Gonsalesah writing  [  ] Alton England along   [  ] Claudell Silk  [  ] Sensory stimulation  [x] Active Listening  [  ] Music for spiritual support [  ] Making of CDs as gifts    Session Observations:  Referral from IVY Segura, Palliative  and Tony Hernandez, [address] [place] [address]. This music therapist (MT) spoke with the patient's (pt's) mother and father in the waiting area to offer them support. Pt's parents spoke briefly about some of their present concerns and MT provided active listening and words of validation. Pt's parents were open to the pt receiving music therapy and shared the pt's music preferences when the MT asked them about these. Patient (pt) was lying in bed with her eyes closed. She opened them when her mother gently hugged her and greeted her. This MT introduced self to pt and shared about speaking with her parents. MT shared with pt what she could expect to hear and then sat at bedside. Pt's mother exited to complete some tasks. MT sang and played with guitar the "[employer], the [employer] and the [employer] song When You Say Nothing At All. Songs were selected that would likely be familiar to the pt and with breonna and melodic content with themes of love and acceptance.  MT intentionally sat in the pt's line of vision and her eyes remained open throughout the session. Pt's eyes appeared to follow MT at times during the session and especially after MT concluded the session and walked toward the door to the pt's room. MT followed up with the pt's parents in the waiting room and shared observations from the session. Pt's parents thanked MT for offering the pt music therapy.     GIUSEPPE GonzalesBC (Music Therapist-Board Certified)  Spiritual Care Department  Referral-based service

## 2022-10-04 NOTE — PROGRESS NOTES
Doming[facility]  [address] [facility], [place], [id number] [place] [address]  [phone]      Hospitalist  Progress Note      NAME:         Halina Doyle   :        1965  MRM:        [id number]    Date of service: 10/4/2022      Chief complaint:  Mentation precluding extubation     Interval HPI:     Pt on low dose levophed. Passing SBT from a spontaneous respiration standpoint but otherwise mentation/lack of command following precluding extubation     Objective:    Vital Signs:    Visit Vitals  BP (!) 103/58   Pulse 92   Temp 99.2 °F (37.3 °C)   Resp 19   Ht 5' 6\" (1.676 m)   Wt 104.3 kg (230 lb)   SpO2 92%   BMI 37.12 kg/m²        Intake/Output Summary (Last 24 hours) at 10/4/2022 1512  Last data filed at 10/4/2022 1400  Gross per 24 hour   Intake 2658.69 ml   Output 2030 ml   Net 628.69 ml      Physical Examination:    General: intubated. Not tracking   Eyes: pupils pinpoint but responsive to light  ENT: ET tube in place  Neck: no masses, thyroid non-tender and trachea central.  Pulm: good air movement  Card:  no JVD or murmurs, has regular and normal S1, S2 without thrills, bruits   Abd:  Soft, non-tender, non-distended, normoactive bowel sounds   Musc:  No cyanosis, clubbing, atrophy or deformities. Skin:  No rashes, bruising or ulcers. Neuro: Not following command or tracking.  Is moving her head, coughing and has a gag     Current Facility-Administered Medications   Medication Dose Route Frequency    midazolam (VERSED) injection 2 mg  2 mg IntraVENous Q2H PRN    senna-docusate (PERICOLACE) 8.6-50 mg per tablet 1 Tablet  1 Tablet Oral BID    piperacillin-tazobactam (ZOSYN) 3.375 g in 0.9% sodium chloride (MBP/ADV) 100 mL MBP  3.375 g IntraVENous Q8H    fentaNYL citrate (PF) injection 100 mcg  100 mcg IntraVENous Q4H PRN    midodrine (PROAMATINE) tablet 15 mg  15 mg Oral Q8H    atorvastatin (LIPITOR) tablet 40 mg  40 mg Oral QHS enoxaparin (LOVENOX) injection 30 mg  30 mg SubCUTAneous Q12H    lidocaine 4 % patch 1 Patch  1 Patch TransDERmal Q24H    fentaNYL (PF) 1,500 mcg/30 mL (50 mcg/mL) infusion  0-200 mcg/hr IntraVENous TITRATE    aspirin chewable tablet 81 mg  81 mg Oral DAILY    lansoprazole compounding kit (PREVACID) 3 mg/mL oral suspension 30 mg  30 mg Oral DAILY    insulin lispro (HUMALOG) injection   SubCUTAneous Q6H    glucose chewable tablet 16 g  4 Tablet Oral PRN    glucagon (GLUCAGEN) injection 1 mg  1 mg IntraMUSCular PRN    dextrose 10% infusion 0-250 mL  0-250 mL IntraVENous PRN    propofol (DIPRIVAN) 10 mg/mL infusion  0-50 mcg/kg/min IntraVENous TITRATE    NOREPINephrine (LEVOPHED) 8 mg in 5% dextrose 250mL (32 mcg/mL) infusion  0.5-30 mcg/min IntraVENous TITRATE    sodium chloride (NS) flush 5-40 mL  5-40 mL IntraVENous Q8H    sodium chloride (NS) flush 5-40 mL  5-40 mL IntraVENous PRN    acetaminophen (TYLENOL) tablet 650 mg  650 mg Oral Q6H PRN    Or    acetaminophen (TYLENOL) suppository 650 mg  650 mg Rectal Q6H PRN    polyethylene glycol (MIRALAX) packet 17 g  17 g Oral DAILY PRN    promethazine (PHENERGAN) tablet 12.5 mg  12.5 mg Oral Q6H PRN    Or    ondansetron (ZOFRAN) injection 4 mg  4 mg IntraVENous Q6H PRN    chlorhexidine (PERIDEX) 0.12 % mouthwash 15 mL  15 mL Oral Q12H        Laboratory data and review:    Recent Labs     10/04/22  0120 10/03/22  0113 10/02/22  0040   WBC 11.2* 11.4* 11.1*   HGB 10.1* 10.6* 11.2*   HCT 31.2* 32.9* 34.3*    208 233     Recent Labs     10/04/22  0120 10/03/22  0113 10/02/22  0040    143 145   K 3.6 3.5 3.3*   * 109* 112*   CO2 32 32 31   * 129* 134*   BUN 14 11 11   CREA 0.74 0.65 0.74   CA 8.5 8.5 8.5   MG 2.2 2.2 2.2   PHOS 3.4 3.5 3.5     No components found for: [place]    MRI =>   2 small foci of diffusion restriction in the right frontal lobe are unchanged  and may represent small foci of acute ischemia.  No abnormal enhancement is  identified    Assessment and Plan:  Shock ([facility] (9/27/2022) POA: suspect primarily septic shock. Noted to have E.coli in urine cultures and sputum. Blood cultures negative. Suspect CM also contributing   -remains on low dose pressor support   -on ]zosyn now   -cortisol WNL      Acute metabolic encephalopathy (7/25/1893) POA: unclear etiology. ? anoxia ? CVA. Apparently had unknown downtime and fentanyl patches found at home with unclear use/abuse   -MRI as above   -on ASA/statin   -EEG as per neurology   -ammonia WNL   -UDS with benzos   -neuro on board      Acute CVA POA: see MRI as above   -LDL NOT at goal; on statin   -on ASA     NSTEMI (non-ST elevated myocardial infarction) ([facility] (9/28/2022) / Cardiomyopathy ([facility] (9/28/2022) POA:   -TTE with reduced EF; s/p heparin gtt. Defer timing of cath to cardiology   -on ASA, statin   -unable to tolerate beta blocker or ACE I due to hypotension     Seizure activity POA: she had witnessed seizures on admission but has since had none other than occasional myoclonic jerks. No prior hx of seizures. EEG neg for seizure activity. IV Keppra discontinued 9/29. Neurology following. Acute respiratory failure with hypoxia ([facility] (9/27/2022) POA: intubated for airway protection given her encephalopathy on admission  -mentation precluding extubation still     CHRIS (acute kidney injury) ([facility] (9/27/2022) POA: unknown baseline Cr. Currently Cr stable. Lactic acidosis (9/27/2022) POA: 3.0 on admission. Likely 2/2 #1. Resolved     Dyslipidemia (9/28/2022) POA: on statin     Hypothyroidism (9/28/2022) POA: TSH low; free T4 low normal      KOBY (generalized anxiety disorder) (9/28/2022) POA: med rec once able to comply; absence of some of her medications may be contributing     Fever:    -CXR on 10/3 with perihilar PNA. On zosyn.  Fu sputum cultures     NB: patient has another chart with MR # [id number]    Total time spent for the patient's care:  35 minutes                  Care Plan discussed with: RN     Discussed:  Care Plan    Prophylaxis:  Lovenox     Anticipated Disposition:   TBD           ___________________________________________________    Attending Physician:   Zenobia Richards MD

## 2022-10-04 NOTE — PROGRESS NOTES
Problem: Ventilator Management  Goal: *Adequate oxygenation and ventilation  Outcome: Progressing Towards Goal  Goal: *Patient maintains clear airway/free of aspiration  Outcome: Progressing Towards Goal  Goal: *Absence of infection signs and symptoms  Outcome: Progressing Towards Goal  Goal: *Normal spontaneous ventilation  Outcome: Progressing Towards Goal     Problem: Patient Education: Go to Patient Education Activity  Goal: Patient/Family Education  Outcome: Progressing Towards Goal     Problem: Non-Violent Restraints  Goal: Removal from restraints as soon as assessed to be safe  Outcome: Progressing Towards Goal  Goal: No harm/injury to patient while restraints in use  Outcome: Progressing Towards Goal  Goal: Patient's dignity will be maintained  Outcome: Progressing Towards Goal  Goal: Patient Interventions  Outcome: Progressing Towards Goal     Problem: Delirium Treatment  Goal: *Level of consciousness restored to baseline  Outcome: Progressing Towards Goal  Goal: *Level of environmental perceptions restored to baseline  Outcome: Progressing Towards Goal  Goal: *Sensory perception restored to baseline  Outcome: Progressing Towards Goal  Goal: *Emotional stability restored to baseline  Outcome: Progressing Towards Goal  Goal: *Functional assessment restored to baseline  Outcome: Progressing Towards Goal  Goal: *Absence of falls  Outcome: Progressing Towards Goal  Goal: *Will remain free of delirium, CAM Score negative  Outcome: Progressing Towards Goal  Goal: *Cognitive status will be restored to baseline  Outcome: Progressing Towards Goal  Goal: Interventions  Outcome: Progressing Towards Goal     Problem: Patient Education: Go to Patient Education Activity  Goal: Patient/Family Education  Outcome: Progressing Towards Goal     Problem: Falls - Risk of  Goal: *Absence of Falls  Description: Document Drea Nielsne Fall Risk and appropriate interventions in the flowsheet.   Outcome: Progressing Towards Goal  Note: Fall Risk Interventions:       Mentation Interventions: Adequate sleep, hydration, pain control, Door open when patient unattended, Evaluate medications/consider consulting pharmacy, More frequent rounding, Room close to nurse's station, Update white board    Medication Interventions: Evaluate medications/consider consulting pharmacy    Elimination Interventions: Toileting schedule/hourly rounds    History of Falls Interventions: Vital signs minimum Q4HRs X 24 hrs (comment for end date), Room close to nurse's station, Evaluate medications/consider consulting pharmacy, Door open when patient unattended         Problem: Patient Education: Go to Patient Education Activity  Goal: Patient/Family Education  Outcome: Progressing Towards Goal     Problem: Pressure Injury - Risk of  Goal: *Prevention of pressure injury  Description: Document Jose Enrique Scale and appropriate interventions in the flowsheet.   Outcome: Progressing Towards Goal     Problem: Patient Education: Go to Patient Education Activity  Goal: Patient/Family Education  Outcome: Progressing Towards Goal     Problem: Nutrition Deficit  Goal: *Optimize nutritional status  Outcome: Progressing Towards Goal

## 2022-10-04 NOTE — PROGRESS NOTES
[facility]    ICU TEAM Progress Note    Name: Tramaine Dougherty   : 1965   MRN: [id number]   Date: 10/4/2022           ICU Assessment     Acute respiratory failure with hypoxia  Acute encephalopathy  Possible anoxic encephalopathy  Stress cardiomyopathy  Ischemic CVA         ICU Comprehensive Plan of Care: Wean Fentanyl and Propofol. Follow Neuro exam. Keep off sedation as able to allow best neurologic exam   Noted Neurology input  SBT again today. Not ready for extubation given neurologic status. Wean Levophed as tolerated to Keep MAP>65. Continue zosyn, follow Temp curve and Cx.   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 She was LKW the evening of . She did not report to work the am of  and she was found unresponsive at her home on wellness check. Bystander CPR was started, but she did have a pulse. EMS was called. Upon their arrival, SBP was 70s. Pupils were pinpoint, narcan was administered without improvement. She experienced seizure like activity and received 4mg IV versed. Upon arrival to the ED, she exhibited seizure like activity and L sided gaze preference. She required intubation for hypoxic respiratory failure. She was sedated on propofol. She became hypotensive and was started on levophed. LA 6.46. She was admitted to the ICU. She was started on empiric vanc/zosyn. Blood/sputum cultures ordered. CT head unrevealing. CT chest notable for only bibasilar atelectasis, cannot exclude small amount aspiration in lower lobes. CTAP unrevealing. Central line placed at bedside in ICU. She was noted to exhibit profound hypoxia. 7. on 100% FiO2 - not consistent with ARDS due to absence of bilateral infiltrates.        Overnight Events:   10/4/2022  Remain on Fentanyl, on lower dose of propofol. On Levophed. Did SBT for 2 hours yesterday. On vent support 16/400/45/5. CXR reviewed. Ceftriaxone stopped and zosyn started. 10/03/22:  Sedated on Fentanyl and Propofol. On Levophed. Noted results of MRI. Temp 100.2 On vent support 16/400/45/5  10/02/22  SAHARA overnight. Patient remains restless on fentanyl and propofol. On minimal levophed likely because of sedation. Not opening eyes to stimulation. Doesn't follow commands. POD:  * No surgery found *    S/P:       Active Problem List:     Problem List  Date Reviewed: 9/27/2022            Codes Class    Goals of care, counseling/discussion ICD-10-CM: Z71.89  ICD-9-CM: V65.49         Palliative care encounter ICD-10-CM: Z51.5  ICD-9-CM: V66.7         Acute CVA (cerebrovascular accident) ([facility] ICD-10-CM: I63.9  ICD-9-CM: 434.91         NSTEMI (non-ST elevated myocardial infarction) ([facility] ICD-10-CM: I21.4  ICD-9-CM: 410.70         Cardiomyopathy ([facility] ICD-10-CM: I42.9  ICD-9-CM: 425.4         Dyslipidemia ICD-10-CM: E78.5  ICD-9-CM: 272.4         Hypothyroidism ICD-10-CM: E03.9  ICD-9-CM: 244.9         KOBY (generalized anxiety disorder) ICD-10-CM: F41.1  ICD-9-CM: 300.02         * (Principal) Status epilepticus ([facility] ICD-10-CM: G40.901  ICD-9-CM: 345. 3         CHRIS (acute kidney injury) ([facility] ICD-10-CM: N17.9  ICD-9-CM: 584.9         Lactic acidosis ICD-10-CM: E87.20  ICD-9-CM: 276.2         Acute respiratory failure with hypoxia (HCC) ICD-10-CM: J96.01  ICD-9-CM: 518.81         Shock ([facility] ICD-10-CM: R57.9  ICD-9-CM: 785.50         Acute metabolic encephalopathy JORDEN-05-RN: G93.41  ICD-9-CM: 348.31          Past Medical History:      has no past medical history on file. Past Surgical History:      has a past surgical history that includes ir insert non tunl cvc over 5 yrs (9/27/2022). Home Medications:     Prior to Admission medications    Medication Sig Start Date End Date Taking?  Authorizing Provider   atorvastatin (LIPITOR) 10 mg tablet Take 10 mg by mouth daily. Yes Provider, Historical   furosemide (LASIX) 20 mg tablet Take 20 mg by mouth daily. Yes Provider, Historical   traZODone (DESYREL) 50 mg tablet Take 125 mg by mouth nightly. Yes Provider, Historical   levothyroxine (SYNTHROID) 88 mcg tablet Take 88 mcg by mouth Daily (before breakfast). Yes Provider, Historical   OTHER,NON-FORMULARY, ESTROGEN(5050)/TESTOSTERONE (HRT) 1.5mg/10mg/ml Cream APPLY 1 ML TO SKIN (INNER WRIST/ABDOMEN/THIGHS EVERY DAY. ROTATE SITES   Yes Provider, Historical   OTHER,NON-FORMULARY, Progesterone (ME4M) 250 mg at bedtime. Yes Provider, Historical       Allergies/Social/Family History:     No Known Allergies   Social History     Tobacco Use    Smoking status: Not on file    Smokeless tobacco: Not on file   Substance Use Topics    Alcohol use: Not on file      No family history on file. Review of Systems:     ROS is unobtainable as the patient is intubated. Objective:   Vital Signs:  Visit Vitals  BP (!) 110/54   Pulse 99   Temp 99.2 °F (37.3 °C)   Resp 21   Ht 5' 6\" (1.676 m)   Wt 104.3 kg (230 lb)   SpO2 93%   BMI 37.12 kg/m²    O2 Flow Rate (L/min): 60 l/min O2 Device: Endotracheal tube, Ventilator Temp (24hrs), Av.2 °F (37.3 °C), Min:98.6 °F (37 °C), Max:100 °F (37.8 °C)           Intake/Output:     Intake/Output Summary (Last 24 hours) at 10/4/2022 1326  Last data filed at 10/4/2022 1300  Gross per 24 hour   Intake 2290.78 ml   Output 2140 ml   Net 150.78 ml       Physical Exam:  I examined the patient via telemedicine, with its associated limitations. I beamed in and examined the patient with assistance of house staff     On exam:    Gen: On Fentanyl and Propofol   HEENT:  intubated  Chest: symmetrical chest rise  CV:S1,S2  Abd: soft  Ext: +ve edema  Neuro: OE spontaneous, Moves spontaneously. No commands following     LABS AND  DATA: Personally reviewed  Recent Labs     10/04/22  0120 10/03/22  0113   WBC 11.2* 11.4*   HGB 10.1* 10.6*   HCT 31.2* 32.9*    208     Recent Labs     10/04/22  0120 10/03/22  0113    143   K 3.6 3.5   * 109*   CO2 32 32   BUN 14 11   CREA 0.74 0.65   * 129*   CA 8.5 8.5   MG 2.2 2.2   PHOS 3.4 3.5     No results for input(s): AP, TBIL, TP, ALB, GLOB, AML, LPSE in the last 72 hours. No lab exists for component: SGOT, GPT, AMYP  No results for input(s): INR, PTP, APTT, INREXT, INREXT in the last 72 hours. No results for input(s): PHI, PCO2I, PO2I, FIO2I in the last 72 hours. No results for input(s): CPK, CKMB, TROIQ, BNPP in the last 72 hours. Hemodynamics:   PAP:   CO:     Wedge:   CI:     CVP:    SVR:       PVR:       Ventilator Settings:  Mode Rate Tidal Volume Pressure FiO2 PEEP   CPAP   400 ml  5 cm H2O 45 % 5 cm H20     Peak airway pressure: 11 cm H2O    Minute ventilation: 7.5 l/min        MEDS: Reviewed    Chest X-Ray:  CXR Results  (Last 48 hours)                 10/03/22 1508  XR CHEST PORT Final result    Impression:  Bilateral perihilar pneumonia versus subsegmental atelectasis. Small left   pleural effusion       Narrative:  EXAM: XR CHEST PORT       INDICATION: fever       COMPARISON: 9/27/2022       FINDINGS: A portable AP radiograph of the chest was obtained at 1502 hours. The   patient is on a cardiac monitor. The NG tube extends below the hemidiaphragm. Endotracheal tube terminates custodial between the thoracic inlet and lópez. Right IJ line terminates at the cavoatrial junction. The cardiac and mediastinal   contours and pulmonary vascularity are stable. Patchy bilateral airspace disease   is noted in both perihilar locations and there is a small left pleural effusion.                    ABCDEF Bundle/Checklist Completed:  Yes    DISPOSITION  Stay in ICU    Critical Care Time  Discussed with bedside RN     I reviewed the electronic medical record, the x-rays, labs, progress notes, previous history and physicals and consultation notes that were available in the electronic medical record. I performed all aspects of the physical examination via Telemedicine associated with two way audio and video communication and with the on-site assistance of  the bedside nurse. I am located in [place] and the patient is located in [place] at [facility].   The patient is critically ill in the ICU. I  personally  reviewed the pertinent medical records, laboratory/ pathology data and radiographic images. The decision making regarding this patient is as documented above, which was generated  following  discussion  with the multidisciplinary ICU team and creation of a treatment plan for  the patient. We discussed the patient's interval history and future coordination of care and  plans. The patient's medications  were reviewed and changes made as stipulated above. Due to  critical illness impairing one or more vital organs of this patient resulting in life threatening clinical situation  I have provided direct, frequent personal  assessment and manipulation in management plan and spent 35 minutes  of  critical care time excluding the time spent on procedures and teaching. Greater than 50% of this time  in patients care was  employed  in counseling and coordination of care and engaged in face to face discussion of case management issues, addressing questions, and outlining a plan of  therapy. Pt at risk of life threatening deterioration from acute resp failure    Pt seen and evaluated via tele encounter. Audio and Video were used for this interaction. ATTENTION:  This medical record was transcribed using an electronic medical records/speech recognition system. Although proofread, it may and can contain electronic, spelling and other errors. Corrections may be executed at a later time. Please feel free to contact us for any clarifications as needed.      [address]  10/4/2022

## 2022-10-04 NOTE — PROGRESS NOTES
Palliative Medicine Social Work Note      Palliative Medicine (Dr. Reynold Gibbs, Prisca Garvin) made follow up visit to pt; mother was at bedside. Pt remains intubated, eyes were open, pt is currently on SBT. Mother was very encouraged, requested that the word \"palliative\" not be mentioned in pt's presence, does not want pt to become discouraged. Mother was able to speak with Intensivist earlier today, is looking forward to speaking with Neurology. Discussed with RN Marley Herrera. Will continue to be available for support. Music Therapy consult placed. Thank you for including Palliative Medicine in the care of Ms. Andreia Pena.      [address] [facility]  Palliative Medicine:  [phone] ([phone])

## 2022-10-05 PROBLEM — Z71.89 GOALS OF CARE, COUNSELING/DISCUSSION: Status: RESOLVED | Noted: 2022-10-03 | Resolved: 2022-10-05

## 2022-10-05 PROBLEM — E66.9 OBESE: Status: ACTIVE | Noted: 2022-10-05

## 2022-10-05 PROBLEM — E87.6 HYPOKALEMIA: Status: ACTIVE | Noted: 2022-10-05

## 2022-10-05 PROBLEM — N39.0 UTI (URINARY TRACT INFECTION): Status: ACTIVE | Noted: 2022-10-05

## 2022-10-05 PROBLEM — I51.81 TAKOTSUBO CARDIOMYOPATHY: Status: ACTIVE | Noted: 2022-09-28

## 2022-10-05 PROBLEM — Z51.5 PALLIATIVE CARE ENCOUNTER: Status: RESOLVED | Noted: 2022-10-03 | Resolved: 2022-10-05

## 2022-10-05 PROBLEM — D64.9 ANEMIA: Status: ACTIVE | Noted: 2022-10-05

## 2022-10-05 PROBLEM — R57.0 SHOCK, CARDIOGENIC (HCC): Status: ACTIVE | Noted: 2022-09-27

## 2022-10-05 LAB
ANION GAP SERPL CALC-SCNC: 3 MMOL/L (ref 5–15)
BACTERIA SPEC CULT: NORMAL
BASOPHILS # BLD: 0 K/UL (ref 0–0.1)
BASOPHILS NFR BLD: 0 % (ref 0–1)
BUN SERPL-MCNC: 15 MG/DL (ref 6–20)
BUN/CREAT SERPL: 22 (ref 12–20)
CALCIUM SERPL-MCNC: 8.7 MG/DL (ref 8.5–10.1)
CHLORIDE SERPL-SCNC: 108 MMOL/L (ref 97–108)
CO2 SERPL-SCNC: 31 MMOL/L (ref 21–32)
COMMENT, HOLDF: NORMAL
COMMENT, HOLDF: NORMAL
CREAT SERPL-MCNC: 0.69 MG/DL (ref 0.55–1.02)
DIFFERENTIAL METHOD BLD: ABNORMAL
EOSINOPHIL # BLD: 0.5 K/UL (ref 0–0.4)
EOSINOPHIL NFR BLD: 6 % (ref 0–7)
ERYTHROCYTE [DISTWIDTH] IN BLOOD BY AUTOMATED COUNT: 15.2 % (ref 11.5–14.5)
GLUCOSE BLD STRIP.AUTO-MCNC: 110 MG/DL (ref 65–117)
GLUCOSE BLD STRIP.AUTO-MCNC: 78 MG/DL (ref 65–117)
GLUCOSE BLD STRIP.AUTO-MCNC: 87 MG/DL (ref 65–117)
GLUCOSE BLD STRIP.AUTO-MCNC: 94 MG/DL (ref 65–117)
GLUCOSE SERPL-MCNC: 103 MG/DL (ref 65–100)
GRAM STN SPEC: NORMAL
HCT VFR BLD AUTO: 28.3 % (ref 35–47)
HGB BLD-MCNC: 9.1 G/DL (ref 11.5–16)
IMM GRANULOCYTES # BLD AUTO: 0 K/UL (ref 0–0.04)
IMM GRANULOCYTES NFR BLD AUTO: 1 % (ref 0–0.5)
LYMPHOCYTES # BLD: 1.9 K/UL (ref 0.8–3.5)
LYMPHOCYTES NFR BLD: 24 % (ref 12–49)
MAGNESIUM SERPL-MCNC: 2.3 MG/DL (ref 1.6–2.4)
MCH RBC QN AUTO: 29.7 PG (ref 26–34)
MCHC RBC AUTO-ENTMCNC: 32.2 G/DL (ref 30–36.5)
MCV RBC AUTO: 92.5 FL (ref 80–99)
MONOCYTES # BLD: 0.9 K/UL (ref 0–1)
MONOCYTES NFR BLD: 12 % (ref 5–13)
NEUTS SEG # BLD: 4.7 K/UL (ref 1.8–8)
NEUTS SEG NFR BLD: 57 % (ref 32–75)
NRBC # BLD: 0 K/UL (ref 0–0.01)
NRBC BLD-RTO: 0 PER 100 WBC
PHOSPHATE SERPL-MCNC: 3.4 MG/DL (ref 2.6–4.7)
PLATELET # BLD AUTO: 233 K/UL (ref 150–400)
PMV BLD AUTO: 10.7 FL (ref 8.9–12.9)
POTASSIUM SERPL-SCNC: 3.4 MMOL/L (ref 3.5–5.1)
RBC # BLD AUTO: 3.06 M/UL (ref 3.8–5.2)
SAMPLES BEING HELD,HOLD: NORMAL
SAMPLES BEING HELD,HOLD: NORMAL
SERVICE CMNT-IMP: NORMAL
SODIUM SERPL-SCNC: 142 MMOL/L (ref 136–145)
WBC # BLD AUTO: 8.1 K/UL (ref 3.6–11)

## 2022-10-05 PROCEDURE — 74011250636 HC RX REV CODE- 250/636

## 2022-10-05 PROCEDURE — 74011250636 HC RX REV CODE- 250/636: Performed by: INTERNAL MEDICINE

## 2022-10-05 PROCEDURE — 94761 N-INVAS EAR/PLS OXIMETRY MLT: CPT

## 2022-10-05 PROCEDURE — 74011250637 HC RX REV CODE- 250/637: Performed by: PSYCHIATRY & NEUROLOGY

## 2022-10-05 PROCEDURE — 74011000250 HC RX REV CODE- 250: Performed by: NURSE PRACTITIONER

## 2022-10-05 PROCEDURE — 83735 ASSAY OF MAGNESIUM: CPT

## 2022-10-05 PROCEDURE — 74011250636 HC RX REV CODE- 250/636: Performed by: STUDENT IN AN ORGANIZED HEALTH CARE EDUCATION/TRAINING PROGRAM

## 2022-10-05 PROCEDURE — 74011250637 HC RX REV CODE- 250/637: Performed by: NURSE PRACTITIONER

## 2022-10-05 PROCEDURE — 74011250636 HC RX REV CODE- 250/636: Performed by: NURSE PRACTITIONER

## 2022-10-05 PROCEDURE — 74011250637 HC RX REV CODE- 250/637: Performed by: INTERNAL MEDICINE

## 2022-10-05 PROCEDURE — 65610000006 HC RM INTENSIVE CARE

## 2022-10-05 PROCEDURE — 85025 COMPLETE CBC W/AUTO DIFF WBC: CPT

## 2022-10-05 PROCEDURE — 80048 BASIC METABOLIC PNL TOTAL CA: CPT

## 2022-10-05 PROCEDURE — 84100 ASSAY OF PHOSPHORUS: CPT

## 2022-10-05 PROCEDURE — 82962 GLUCOSE BLOOD TEST: CPT

## 2022-10-05 PROCEDURE — 36415 COLL VENOUS BLD VENIPUNCTURE: CPT

## 2022-10-05 PROCEDURE — 99233 SBSQ HOSP IP/OBS HIGH 50: CPT | Performed by: PSYCHIATRY & NEUROLOGY

## 2022-10-05 PROCEDURE — 94003 VENT MGMT INPAT SUBQ DAY: CPT

## 2022-10-05 PROCEDURE — APPSS15 APP SPLIT SHARED TIME 0-15 MINUTES: Performed by: NURSE PRACTITIONER

## 2022-10-05 PROCEDURE — 74011000250 HC RX REV CODE- 250: Performed by: INTERNAL MEDICINE

## 2022-10-05 PROCEDURE — 74011000258 HC RX REV CODE- 258: Performed by: INTERNAL MEDICINE

## 2022-10-05 RX ORDER — MIDAZOLAM HYDROCHLORIDE 1 MG/ML
2 INJECTION, SOLUTION INTRAMUSCULAR; INTRAVENOUS
Status: DISCONTINUED | OUTPATIENT
Start: 2022-10-05 | End: 2022-10-10

## 2022-10-05 RX ADMIN — MIDAZOLAM 2 MG: 1 INJECTION, SOLUTION INTRAMUSCULAR; INTRAVENOUS at 03:00

## 2022-10-05 RX ADMIN — ASPIRIN 81 MG: 81 TABLET, CHEWABLE ORAL at 08:02

## 2022-10-05 RX ADMIN — SENNOSIDES AND DOCUSATE SODIUM 1 TABLET: 50; 8.6 TABLET ORAL at 08:02

## 2022-10-05 RX ADMIN — Medication 200 MCG/HR: at 09:04

## 2022-10-05 RX ADMIN — ENOXAPARIN SODIUM 30 MG: 100 INJECTION SUBCUTANEOUS at 10:04

## 2022-10-05 RX ADMIN — PROPOFOL 20 MCG/KG/MIN: 10 INJECTION, EMULSION INTRAVENOUS at 18:48

## 2022-10-05 RX ADMIN — PIPERACILLIN AND TAZOBACTAM 3.38 G: 3; .375 INJECTION, POWDER, LYOPHILIZED, FOR SOLUTION INTRAVENOUS at 23:01

## 2022-10-05 RX ADMIN — Medication 10 ML: at 05:01

## 2022-10-05 RX ADMIN — PROPOFOL 25 MCG/KG/MIN: 10 INJECTION, EMULSION INTRAVENOUS at 10:58

## 2022-10-05 RX ADMIN — MIDODRINE HYDROCHLORIDE 15 MG: 5 TABLET ORAL at 13:00

## 2022-10-05 RX ADMIN — ATORVASTATIN CALCIUM 40 MG: 20 TABLET, FILM COATED ORAL at 21:00

## 2022-10-05 RX ADMIN — PROPOFOL 15 MCG/KG/MIN: 10 INJECTION, EMULSION INTRAVENOUS at 02:43

## 2022-10-05 RX ADMIN — SENNOSIDES AND DOCUSATE SODIUM 1 TABLET: 50; 8.6 TABLET ORAL at 21:00

## 2022-10-05 RX ADMIN — Medication 10 ML: at 21:01

## 2022-10-05 RX ADMIN — MIDAZOLAM 2 MG: 1 INJECTION, SOLUTION INTRAMUSCULAR; INTRAVENOUS at 22:04

## 2022-10-05 RX ADMIN — MIDODRINE HYDROCHLORIDE 15 MG: 5 TABLET ORAL at 05:01

## 2022-10-05 RX ADMIN — LANSOPRAZOLE 30 MG: KIT at 08:02

## 2022-10-05 RX ADMIN — MIDAZOLAM 2 MG: 1 INJECTION, SOLUTION INTRAMUSCULAR; INTRAVENOUS at 19:20

## 2022-10-05 RX ADMIN — PIPERACILLIN AND TAZOBACTAM 3.38 G: 3; .375 INJECTION, POWDER, LYOPHILIZED, FOR SOLUTION INTRAVENOUS at 15:45

## 2022-10-05 RX ADMIN — CHLORHEXIDINE GLUCONATE 15 ML: 1.2 RINSE ORAL at 21:00

## 2022-10-05 RX ADMIN — MIDAZOLAM 2 MG: 1 INJECTION, SOLUTION INTRAMUSCULAR; INTRAVENOUS at 00:57

## 2022-10-05 RX ADMIN — MIDAZOLAM 2 MG: 1 INJECTION, SOLUTION INTRAMUSCULAR; INTRAVENOUS at 12:43

## 2022-10-05 RX ADMIN — NOREPINEPHRINE BITARTRATE 2 MCG/MIN: 1 SOLUTION INTRAVENOUS at 17:51

## 2022-10-05 RX ADMIN — CHLORHEXIDINE GLUCONATE 15 ML: 1.2 RINSE ORAL at 08:02

## 2022-10-05 RX ADMIN — MIDODRINE HYDROCHLORIDE 15 MG: 5 TABLET ORAL at 21:00

## 2022-10-05 RX ADMIN — PROPOFOL 20 MCG/KG/MIN: 10 INJECTION, EMULSION INTRAVENOUS at 13:30

## 2022-10-05 RX ADMIN — ENOXAPARIN SODIUM 30 MG: 100 INJECTION SUBCUTANEOUS at 21:01

## 2022-10-05 RX ADMIN — MIDAZOLAM 2 MG: 1 INJECTION, SOLUTION INTRAMUSCULAR; INTRAVENOUS at 08:44

## 2022-10-05 RX ADMIN — Medication 10 ML: at 13:01

## 2022-10-05 RX ADMIN — MIDAZOLAM 2 MG: 1 INJECTION, SOLUTION INTRAMUSCULAR; INTRAVENOUS at 04:18

## 2022-10-05 RX ADMIN — Medication 200 MCG/HR: at 01:36

## 2022-10-05 RX ADMIN — Medication 200 MCG/HR: at 17:14

## 2022-10-05 RX ADMIN — PIPERACILLIN AND TAZOBACTAM 3.38 G: 3; .375 INJECTION, POWDER, LYOPHILIZED, FOR SOLUTION INTRAVENOUS at 07:54

## 2022-10-05 NOTE — PROGRESS NOTES
Comprehensive Nutrition Assessment    Type and Reason for Visit: Reassess    Nutrition Recommendations/Plan:   Modify TF per below:   Vital HP @ 42 mL hour  Provide 2 Prosource/day, flush with 15 mL H2O before and after  FWF 95 mL q 2 hours   No BM x 7 days     Malnutrition Assessment:  Malnutrition Status:  Mild malnutrition (10/05/22 1258)    Context:  Acute illness     Findings of the 6 clinical characteristics of malnutrition:   Energy Intake:  No significant decrease in energy intake (TF)  Weight Loss:  Unable to assess     Body Fat Loss:  No significant body fat loss,     Muscle Mass Loss:  No significant muscle mass loss,    Fluid Accumulation:  Moderate to severe, Extremities   Strength:  Not performed     Nutrition Assessment:     10/5: Follow up. Remains intubated x 7 days. Plan to change TF formula today. Discussed during IDRs - patient more agitated today, but no purposeful movements. Attempted to wean propofol but was unable to tolerate. No SBT this Am but hopeful to try again later today. Propofol @ 25 mcgs and will be held for neuro assessment. Propofol at this rate provides 411 kcal/day. Palliative care discussions ongoing. New TF at goal 45 mL provides 1000 kcal (+ Propofol, 1411 kcal, 100% needs), 111 g carbs; 87 g protein (+ 2 Prosource, 109 g protein, 100% needs). TF + FWF provides 1976 mL H2O day. Last 3 Recorded Weights in this Encounter    09/27/22 1119 09/27/22 1548   Weight: 104.3 kg (230 lb) 104.3 kg (230 lb)       Nutrition Related Findings:      Wound Type: None  Last Bowel Movement Date:  (unknown)  Abdominal Assessment: Obese, Soft  Bowel Sounds: Active   Edema:Generalized: Non-pitting; 2+ (10/5/2022  8:00 AM)  LLE: Non-pitting; 2+ (10/5/2022  8:00 AM)  LUE: Non-pitting; 2+ (10/5/2022  8:00 AM)  RLE: Non-pitting; 2+ (10/5/2022  8:00 AM)  RUE: Non-pitting; 2+ (10/5/2022  8:00 AM)      Nutr.  Labs:    Lab Results   Component Value Date/Time    GFR est AA >60 10/03/2022 01:13 AM    GFR est non-AA >60 10/03/2022 01:13 AM    Creatinine, POC 1.6 (H) 09/27/2022 06:45 PM    Creatinine 0.69 10/05/2022 01:25 AM    BUN 15 10/05/2022 01:25 AM    Sodium,  09/27/2022 06:45 PM    Sodium 142 10/05/2022 01:25 AM    Potassium 3.4 (L) 10/05/2022 01:25 AM    Potassium, POC 4.0 09/27/2022 06:45 PM    Chloride,  (H) 09/27/2022 06:45 PM    Chloride 108 10/05/2022 01:25 AM    CO2 31 10/05/2022 01:25 AM       Lab Results   Component Value Date/Time    Glucose 103 (H) 10/05/2022 01:25 AM    Glucose (POC) 87 10/05/2022 11:02 AM       Lab Results   Component Value Date/Time    Hemoglobin A1c 6.1 (H) 09/29/2022 03:27 AM     Magnesium   Date Value Ref Range Status   10/05/2022 2.3 1.6 - 2.4 mg/dL Final   10/04/2022 2.2 1.6 - 2.4 mg/dL Final   10/03/2022 2.2 1.6 - 2.4 mg/dL Final   10/02/2022 2.2 1.6 - 2.4 mg/dL Final   10/01/2022 2.4 1.6 - 2.4 mg/dL Final     Lab Results   Component Value Date/Time    Calcium 8.7 10/05/2022 01:25 AM    Phosphorus 3.4 10/05/2022 01:25 AM       Nutr. Meds:  Lipitor, Peridex, Lovenox, humalog, prevacid, versed PRN, midodrine, levophed*, zofran PRN, miralax PRN, propofol, pericolace    Current Nutrition Intake & Therapies:  Average Meal Intake: NPO  Average Supplement Intake: NPO  DIET NPO  ADULT TUBE FEEDING Orogastric; Peptide Based; Delivery Method: Continuous; Continuous Initial Rate (mL/hr): 20; Continuous Advance Tube Feeding: Yes; Advancement Volume (mL/hr): 10; Advancement Frequency: Q 4 hours; Continuous Goal Rate (mL/hr): 4... Anthropometric Measures:  Height: 5' 6\" (167.6 cm)  Ideal Body Weight (IBW): 130 lbs (59 kg)     Current Body Wt:  104.3 kg (230 lb), 176.9 % IBW. Not specified  Current BMI (kg/m2): 37.1        Weight Adjustment: No adjustment                 BMI Category: Obese class 2 (BMI 35.0-39. 9)    Estimated Daily Nutrient Needs:  Energy Requirements Based On: Kcal/kg  Weight Used for Energy Requirements: Current  Energy (kcal/day): 6491-6847 (11-14 kcal/kg)  Weight Used for Protein Requirements: Ideal  Protein (g/day):  (1.5-2.0 g/kg IBW)  Method Used for Fluid Requirements: 1 ml/kcal  Fluid (ml/day): 2273-1574    Nutrition Diagnosis:   Inadequate oral intake related to inadequate protein-energy intake as evidenced by NPO or clear liquid status due to medical condition, intubation    Nutrition Interventions:   Food and/or Nutrient Delivery: Continue NPO, Modify tube feeding  Nutrition Education/Counseling: No recommendations at this time  Coordination of Nutrition Care: Continue to monitor while inpatient, Interdisciplinary rounds  Plan of Care discussed with: IDR team    Goals:  Previous Goal Met: Progressing toward goal(s)  Goals: Tolerate nutrition support at goal rate, within 2 days  Specify Other Goals: GOC determined within 2 - 3 days    Nutrition Monitoring and Evaluation:   Behavioral-Environmental Outcomes: None identified  Food/Nutrient Intake Outcomes: Enteral nutrition intake/tolerance  Physical Signs/Symptoms Outcomes: Biochemical data, Hemodynamic status, Weight    Discharge Planning:     Too soon to determine    Felisha Felder MS, RD  Contact: Ext: [phone], or via [employer]

## 2022-10-05 NOTE — PROGRESS NOTES
0700- Bedside and Verbal shift change report received from Nancy Farrell RN (offgoing nurse) by Monico Dee RN (oncoming nurse). Report included the following information SBAR, Kardex, ED Summary, Procedure Summary, Intake/Output, MAR, and Cardiac Rhythm SR,SB . Primary Nurse Monico Dee RN and Nancy Farrell RN performed a dual skin assessment on this patient No impairment noted. All drips verified. 0800- Patient shift assessment performed. Autumn Bloodgood VAP bundle performed. Patient turned and resting in bed . Patient . Call bell in reach, bed in low and locked position,  Patient board updated and care plan discussed with patient , but pt sedated and intubated . Her mom at bedside and explained the plan of care  and she verbalized understanding. 0930:pt very restless and agitated desaturating . Not following any commands or tracking . Please see the MAR for medication trituration the patient failed SBT  . MD made round /Dr Kavitha Gunter  made aware with patient's agitation and increased the medication and given prn medication too. Family at bedside . Pt suctioned good . RT at bedside . 1000: VAP bundle performed. Patient turned and resting in bed but  restless . 1119:held all sedation for neuro assessment per Dr :Jake Grubbs . 1138: restarted the medication per Dr Jake Grubbs  . Pt very agitated and restless. Not follow any commands . 1145:pt restless and so agitated versed 2 mg IV given . 1200- Reassessment performed. No changes noted. VAP bundle performed. Patient turned and resting in bed . Family at bedside . 1300:per order change the tube feeding rate and flesh and the frequency . 1400- VAP bundle performed. Patient turned and resting comfortably in bed .         1600- Reassessment performed. No changes noted. VAP bundle performed. Patient turned and resting in bed . Increased propofol , pt c/o agitation . Please see the MAR       1800- VAP bundle performed.  Patient turned and resting in bed .    1900- Bedside and Verbal shift change report given to Lyndsey Agee RN(oncoming nurse) by Christian Parnell RN (offgoing nurse). Report included the following information SBAR, Kardex, ED Summary, Procedure Summary, Intake/Output, MAR, Recent Results, Cardiac Rhythm SB,ST ,SR, and Alarm Parameters .

## 2022-10-05 NOTE — PROGRESS NOTES
[facility]  [address]  [phone]    Medical Progress Note      NAME: Halina Doyle   :  1965  MRM:  [id number]    Date/Time of service 10/5/2022  1:27 PM          Assessment and Plan:     Acute metabolic encephalopathy - POA, unclear etiology. CVA note don MRI but unlikely this explains KHADAR. Initial concern for Sz, but EEG bland, keppra stopped. Concern remains she had anoxic brain injury. Will assess after extubation and stopping sedation. Neurology consulted. Stop lidoderm    Takotsubo cardiomyopathy / NSTEMI (non-ST elevated myocardial infarction) - POA, unclear initial driving event. Cardiology consulted. Too unstable for cath. Repeat ECHO per them. Unable to tolerate BB or ACE due to low BP    Cardiogenic shock - Intially presumed septic shock until ECHO showed new EF 30%. Currently still requiring levophed IV gtt as pressor. On midodrine    Acute respiratory failure with hypoxia - POA, persistent. Unclear if aspiration PNA vs central resp depression. Completed Zosyn. Intubated on admit. Intensivist consulted and is managing vent. Requring fentanyl and propofol gtt for sedation. She is failing SBT so far. Acute CVA (cerebrovascular accident) - MRI notes two small lesions. Unclear significance and timing. Neurology to assess after extubation and off sedation. Continue ASA and statin. Anemia - POA and slowly worsening likely due to acute illness. UTI (urinary tract infection) - [place] sensitive E coli on initial contaminated cx. Had E coli in contaminated sputum along with much normal josue. Has completed course of Zosyn. CHRIS (acute kidney injury) / Lactic acidosis / Hypokalemia - POA: unknown baseline Cr. Currently Cr stable. Status epilepticus - Reject Dx.  Karrie Blanc had myoclonic jerking    Dyslipidemia - On atorvastatin    Hypothyroidism - TSH normal.     KOBY (generalized anxiety disorder) - Noted benzo on drug screen on admit, but had Versed for Sz    Obese - Advise weight loss of she survives. Subjective:     Chief Complaint:  intubated    ROS:  (bold if positive, if negative)    Not Tolerating PT  Not Tolerating Diet        Objective:     Last 24hrs VS reviewed since prior progress note.  Most recent are:    Visit Vitals  /73   Pulse (!) 101   Temp 98.9 °F (37.2 °C)   Resp 16   Ht 5' 6\" (1.676 m)   Wt 104.3 kg (230 lb)   SpO2 97%   BMI 37.12 kg/m²     SpO2 Readings from Last 6 Encounters:   10/05/22 97%    O2 Flow Rate (L/min): 60 l/min     Intake/Output Summary (Last 24 hours) at 10/5/2022 1335  Last data filed at 10/5/2022 1300  Gross per 24 hour   Intake 2552.28 ml   Output 3155 ml   Net -602.72 ml        Physical Exam:    Gen:  Obese, in no acute distress  HEENT:  Pink conjunctivae, PERRL, hearing not intact to voice, ET in place  Neck:  Supple, without masses, thyroid non-tender  Resp:  No accessory muscle use, bilateral coarse breath sounds   Card:  No murmurs, tachycardic S1, S2 without thrills, bruits or peripheral edema  Abd:  Soft, non-tender, non-distended, reduced bowel sounds are present, no mass  Lymph:  No cervical or inguinal adenopathy  Musc:  No cyanosis or clubbing  Skin:  No rashes or ulcers, skin turgor is good  Neuro:  Cranial nerves are grossly intact, general motor weakness, dose not follow commands appropriately  Psych:  Sedated    Telemetry reviewed:   normal sinus rhythm  __________________________________________________________________  Medications Reviewed: (see below)  Medications:     Current Facility-Administered Medications   Medication Dose Route Frequency    midazolam (VERSED) injection 2 mg  2 mg IntraVENous Q1H PRN    senna-docusate (PERICOLACE) 8.6-50 mg per tablet 1 Tablet  1 Tablet Oral BID    piperacillin-tazobactam (ZOSYN) 3.375 g in 0.9% sodium chloride (MBP/ADV) 100 mL MBP  3.375 g IntraVENous Q8H    fentaNYL citrate (PF) injection 100 mcg  100 mcg IntraVENous Q4H PRN    midodrine (PROAMATINE) tablet 15 mg  15 mg Oral Q8H    atorvastatin (LIPITOR) tablet 40 mg  40 mg Oral QHS    enoxaparin (LOVENOX) injection 30 mg  30 mg SubCUTAneous Q12H    lidocaine 4 % patch 1 Patch  1 Patch TransDERmal Q24H    fentaNYL (PF) 1,500 mcg/30 mL (50 mcg/mL) infusion  0-200 mcg/hr IntraVENous TITRATE    aspirin chewable tablet 81 mg  81 mg Oral DAILY    lansoprazole compounding kit (PREVACID) 3 mg/mL oral suspension 30 mg  30 mg Oral DAILY    insulin lispro (HUMALOG) injection   SubCUTAneous Q6H    glucose chewable tablet 16 g  4 Tablet Oral PRN    glucagon (GLUCAGEN) injection 1 mg  1 mg IntraMUSCular PRN    dextrose 10% infusion 0-250 mL  0-250 mL IntraVENous PRN    propofol (DIPRIVAN) 10 mg/mL infusion  0-50 mcg/kg/min IntraVENous TITRATE    NOREPINephrine (LEVOPHED) 8 mg in 5% dextrose 250mL (32 mcg/mL) infusion  0.5-30 mcg/min IntraVENous TITRATE    sodium chloride (NS) flush 5-40 mL  5-40 mL IntraVENous Q8H    sodium chloride (NS) flush 5-40 mL  5-40 mL IntraVENous PRN    acetaminophen (TYLENOL) tablet 650 mg  650 mg Oral Q6H PRN    Or    acetaminophen (TYLENOL) suppository 650 mg  650 mg Rectal Q6H PRN    polyethylene glycol (MIRALAX) packet 17 g  17 g Oral DAILY PRN    promethazine (PHENERGAN) tablet 12.5 mg  12.5 mg Oral Q6H PRN    Or    ondansetron (ZOFRAN) injection 4 mg  4 mg IntraVENous Q6H PRN    chlorhexidine (PERIDEX) 0.12 % mouthwash 15 mL  15 mL Oral Q12H        Lab Data Reviewed: (see below)  Lab Review:     Recent Labs     10/05/22  0125 10/04/22  0120 10/03/22  0113   WBC 8.1 11.2* 11.4*   HGB 9.1* 10.1* 10.6*   HCT 28.3* 31.2* 32.9*    205 208     Recent Labs     10/05/22  0125 10/04/22  0120 10/03/22  0113    144 143   K 3.4* 3.6 3.5    109* 109*   CO2 31 32 32   * 129* 129*   BUN 15 14 11   CREA 0.69 0.74 0.65   CA 8.7 8.5 8.5   MG 2.3 2.2 2.2   PHOS 3.4 3.4 3.5     Lab Results   Component Value Date/Time    Glucose (POC) 87 10/05/2022 11:02 AM    Glucose (POC) 78 10/05/2022 05:00 AM    Glucose (POC) 94 10/05/2022 04:57 AM    Glucose (POC) 124 (H) 10/04/2022 11:03 PM    Glucose (POC) 89 10/04/2022 05:06 PM     No results for input(s): PH, PCO2, PO2, HCO3, FIO2 in the last 72 hours. No results for input(s): INR, INREXT, INREXT in the last 72 hours.   All Micro Results       Procedure Component Value Units Date/Time    CULTURE, RESPIRATORY/SPUTUM/BRONCH Thierry Cohen [[id number]] Collected: 10/03/22 1300    Order Status: Completed Specimen: Sputum Updated: 10/05/22 1143     Special Requests: NO SPECIAL REQUESTS        GRAM STAIN RARE WBCS SEEN               OCCASIONAL EPITHELIAL CELLS SEEN                  OCCASIONAL GRAM NEGATIVE RODS                  RARE GRAM POSITIVE COCCI IN PAIRS           Culture result:       HEAVY NORMAL RESPIRATORY CATHIE          CULTURE, BLOOD, PAIRED [[id number]] Collected: 09/27/22 1251    Order Status: Completed Specimen: Blood Updated: 10/02/22 0640     Special Requests: NO SPECIAL REQUESTS        Culture result: NO GROWTH 5 DAYS       CULTURE, URINE [[id number]]  (Abnormal)  (Susceptibility) Collected: 09/27/22 1251    Order Status: Completed Specimen: Cath Urine Updated: 09/30/22 1502     Special Requests: NO SPECIAL REQUESTS        [place] Count --        >100,000  COLONIES/mL       Culture result: ESCHERICHIA COLI       CULTURE, RESPIRATORY/SPUTUM/BRONCH April Finner STAIN [[id number]]  (Abnormal)  (Susceptibility) Collected: 09/27/22 1811    Order Status: Completed Specimen: Sputum Updated: 09/30/22 0930     Special Requests: NO SPECIAL REQUESTS        GRAM STAIN 1+ WBCS SEEN               1+ GRAM POSITIVE COCCI IN CLUSTERS            1+ GRAM VARIABLE RODS        Culture result: HEAVY ESCHERICHIA COLI               HEAVY NORMAL RESPIRATORY CATHIE                  MODERATE YEAST (APPARENT MULTIPLE COLONY TYPES)          CULTURE, MRSA [[id number]] Collected: 09/27/22 1440    Order Status: Completed Specimen: Nasal from Nares Updated: 09/29/22 0820     Special Requests: NO SPECIAL REQUESTS        Culture result: MRSA NOT PRESENT               Screening of patient nares for MRSA is for surveillance purposes and, if positive, to facilitate isolation considerations in high risk settings. It is not intended for automatic decolonization interventions per se as regimens are not sufficiently effective to warrant routine use. SMalorie Vida Hernandez, UR/CSF [[id number]] Collected: 09/27/22 1431    Order Status: Completed Specimen: Miscellaneous sample Updated: 09/28/22 1336     Source URINE        Specimen Urine     Streptococcus pneumoniae Ag Negative        Fluid culture Not indicated. Organism ID Not indicated. Please note Comment        Comment: (NOTE)  College of [employer]ologists standards require a culture to be  performed on CSF specimens submitted for bacterial antigen testing. (CAP [id number]) Urine specimens will not be cultured. Performed At: [facility] & [address] [id number]  Michelle Palencia MD LQ:[id number]         Lana Tyler [[id number]] Collected: 09/27/22 1530    Order Status: Completed Specimen: Urine Updated: 09/28/22 1336     Source URINE        L pneumophila S1 Ag, urine Negative        Comment: (NOTE)  Presumptive negative for L. pneumophila serogroup 1 antigen in urine,  suggesting no recent or current infection. Legionnaires' disease  cannot be ruled out since other serogroups and species may also  cause disease.   Performed At: [facility] & [address] [id number]  Michelle Palencia MD Y:[id number]         RESPIRATORY VIRUS PANEL W/COVID-19, PCR [[id number]] Collected: 09/27/22 1440    Order Status: Completed Specimen: Nasopharyngeal Updated: 09/27/22 2214     Adenovirus Not detected        Coronavirus 229E Not detected        Coronavirus HKU1 Not detected        Coronavirus CVNL63 Not detected        Coronavirus OC43 Not detected        SARS-CoV-2, PCR Not detected        Metapneumovirus Not detected        Rhinovirus and Enterovirus Not detected        Influenza A Not detected        Influenza B Not detected        Parainfluenza 1 Not detected        Parainfluenza 2 Not detected        Parainfluenza 3 Not detected        Parainfluenza virus 4 Not detected        RSV by PCR Not detected        B. parapertussis, PCR Not detected        Bordetella pertussis - PCR Not detected        Chlamydophila pneumoniae DNA, QL, PCR Not detected        Mycoplasma pneumoniae DNA, QL, PCR Not detected       URINE CULTURE HOLD SAMPLE [[id number]] Collected: 09/27/22 1251    Order Status: Completed Specimen: Urine from Serum Updated: 09/27/22 1258     Urine culture hold       Urine on hold in Microbiology dept for 2 days. If unpreserved urine is submitted, it cannot be used for addtional testing after 24 hours, recollection will be required. Other pertinent lab: none    Total time spent with patient: 30 Minutes I personally reviewed chart, notes, data and current medications in the medical record. I have personally examined and treated the patient at bedside during this period. To assist coordination of care and communication with nursing and staff, this note may be preliminary early in the day, but finalized by end of the day.                  Care Plan discussed with: Patient, Care Manager, Nursing Staff, Consultant/Specialist, and >50% of time spent in counseling and coordination of care    Discussed:  Care Plan and D/C Planning    Prophylaxis:  Lovenox and H2B/PPI    Disposition:  SNF/LTC           ___________________________________________________    Attending Physician: Jossie Alvarado MD

## 2022-10-05 NOTE — PROGRESS NOTES
..Bedside shift change report given to Radha Abdalla RN (oncoming nurse) by Rajat Neil RN (offgoing nurse). Report included the following information SBAR, Kardex, Intake/Output, MAR, and Cardiac Rhythm NSR/Sinus audie . Jo Fanning .Primary Nurse Ash Blum RN and Dewey RN performed a dual skin assessment on this patient No impairment noted  Jose Enrique score is see flowsheet     1930- Assessment complete--see flowsheet     2000- Oral care/suctioning completed and pt turned. Restraints assessed. 2200- Oral care/suctioning completed and pt turned. Restraints assessed. 0000- Reassessment complete--see flowsheet. Oral care/suctioning completed and pt turned. Restraints assessed. 0200- Oral care/suctioning completed and pt turned. Restraints assessed. 0400- Reassessment complete--see flowsheet. Oral care/suctioning completed and pt turned. Restraints assessed. 0600- Oral care/suctioning completed and pt turned. Restraints assessed. Jo Fanning .Bedside shift change report given to Kalpesh Williamson RN (oncoming nurse) by Radha Abdalla RN (offgoing nurse). Report included the following information SBAR, Kardex, Intake/Output, MAR, and Cardiac Rhythm NSR/Sinus audie .

## 2022-10-05 NOTE — PROGRESS NOTES
I met with pt.'s family . I discussed pt with neurology NP who has requested I send pt.'s clinicals today to [facility]. Clinicals faxed through [facility]. Pt is still intubated so they will not process clinical data until pt meets criteria for inpatient rehab .     Gregory Espinoza

## 2022-10-05 NOTE — PROGRESS NOTES
Problem: Ventilator Management  Goal: *Adequate oxygenation and ventilation  Outcome: Progressing Towards Goal  Goal: *Patient maintains clear airway/free of aspiration  Outcome: Progressing Towards Goal  Goal: *Absence of infection signs and symptoms  Outcome: Progressing Towards Goal  Goal: *Normal spontaneous ventilation  Outcome: Progressing Towards Goal     Problem: Patient Education: Go to Patient Education Activity  Goal: Patient/Family Education  Outcome: Progressing Towards Goal     Problem: Non-Violent Restraints  Goal: Removal from restraints as soon as assessed to be safe  Outcome: Progressing Towards Goal  Goal: No harm/injury to patient while restraints in use  Outcome: Progressing Towards Goal  Goal: Patient's dignity will be maintained  Outcome: Progressing Towards Goal  Goal: Patient Interventions  Outcome: Progressing Towards Goal     Problem: Delirium Treatment  Goal: *Level of consciousness restored to baseline  Outcome: Progressing Towards Goal  Goal: *Level of environmental perceptions restored to baseline  Outcome: Progressing Towards Goal  Goal: *Sensory perception restored to baseline  Outcome: Progressing Towards Goal  Goal: *Emotional stability restored to baseline  Outcome: Progressing Towards Goal  Goal: *Functional assessment restored to baseline  Outcome: Progressing Towards Goal  Goal: *Absence of falls  Outcome: Progressing Towards Goal  Goal: *Will remain free of delirium, CAM Score negative  Outcome: Progressing Towards Goal  Goal: *Cognitive status will be restored to baseline  Outcome: Progressing Towards Goal  Goal: Interventions  Outcome: Progressing Towards Goal     Problem: Patient Education: Go to Patient Education Activity  Goal: Patient/Family Education  Outcome: Progressing Towards Goal     Problem: Falls - Risk of  Goal: *Absence of Falls  Description: Document Geetha Mcfarland Fall Risk and appropriate interventions in the flowsheet.   Outcome: Progressing Towards Goal  Note: Fall Risk Interventions:       Mentation Interventions: Adequate sleep, hydration, pain control, Door open when patient unattended, Evaluate medications/consider consulting pharmacy, More frequent rounding, Reorient patient, Room close to nurse's station, Toileting rounds, Update white board    Medication Interventions: Evaluate medications/consider consulting pharmacy    Elimination Interventions: Toileting schedule/hourly rounds    History of Falls Interventions: Vital signs minimum Q4HRs X 24 hrs (comment for end date), Room close to nurse's station, Evaluate medications/consider consulting pharmacy         Problem: Patient Education: Go to Patient Education Activity  Goal: Patient/Family Education  Outcome: Progressing Towards Goal     Problem: Pressure Injury - Risk of  Goal: *Prevention of pressure injury  Description: Document Jose Enrique Scale and appropriate interventions in the flowsheet.   Outcome: Progressing Towards Goal     Problem: Patient Education: Go to Patient Education Activity  Goal: Patient/Family Education  Outcome: Progressing Towards Goal     Problem: Nutrition Deficit  Goal: *Optimize nutritional status  Outcome: Progressing Towards Goal

## 2022-10-05 NOTE — PROGRESS NOTES
[facility]    ICU TEAM Progress Note    Name: Sebas Muhammad   : 1965   MRN: [id number]   Date: 10/5/2022           ICU Assessment     Acute respiratory failure with hypoxia  Acute encephalopathy  Possible anoxic encephalopathy  Stress cardiomyopathy  Ischemic CVA         ICU Comprehensive Plan of Care: Wean Fentanyl and Propofol . Follow Neuro exam. Keep off sedation as able to allow best neurologic exam   Noted Neurology input  SBT as tolerated. Not ready for extubation given neurologic status. Monitor Hemodynamics off pressors. Continue zosyn, follow Temp curve and Cx.   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 She was LKW the evening of . She did not report to work the am of  and she was found unresponsive at her home on wellness check. Bystander CPR was started, but she did have a pulse. EMS was called. Upon their arrival, SBP was 70s. Pupils were pinpoint, narcan was administered without improvement. She experienced seizure like activity and received 4mg IV versed. Upon arrival to the ED, she exhibited seizure like activity and L sided gaze preference. She required intubation for hypoxic respiratory failure. She was sedated on propofol. She became hypotensive and was started on levophed. LA 6.46. She was admitted to the ICU. She was started on empiric vanc/zosyn. Blood/sputum cultures ordered. CT head unrevealing. CT chest notable for only bibasilar atelectasis, cannot exclude small amount aspiration in lower lobes. CTAP unrevealing. Central line placed at bedside in ICU. She was noted to exhibit profound hypoxia. 7.33/44/73 on 100% FiO2 - not consistent with ARDS due to absence of bilateral infiltrates.        Overnight Events:   10/5/2022  On Lower dose of propofol,same dose of fentanyl. We attempted to wean sedation, but failed given vent dysnchrony. OFF Levophed this morning. On TF. Not following commands. On vent support: 15/400/45/5. Did Sbt for few hours yesterday. DW mother at bedside   10/04:  Remain on Fentanyl, on lower dose of propofol. On Levophed. Did SBT for 2 hours yesterday. On vent support 16/400/45/5. CXR reviewed. Ceftriaxone stopped and zosyn started. 10/03/22:  Sedated on Fentanyl and Propofol. On Levophed. Noted results of MRI. Temp 100.2 On vent support 16/400/45/5  10/02/22  SAHARA overnight. Patient remains restless on fentanyl and propofol. On minimal levophed likely because of sedation. Not opening eyes to stimulation. Doesn't follow commands. POD:  * No surgery found *    S/P:       Active Problem List:     Problem List  Date Reviewed: 9/27/2022            Codes Class    Goals of care, counseling/discussion ICD-10-CM: Z71.89  ICD-9-CM: V65.49         Palliative care encounter ICD-10-CM: Z51.5  ICD-9-CM: V66.7         Acute CVA (cerebrovascular accident) ([facility] ICD-10-CM: I63.9  ICD-9-CM: 434.91         NSTEMI (non-ST elevated myocardial infarction) ([facility] ICD-10-CM: I21.4  ICD-9-CM: 410.70         Cardiomyopathy ([facility] ICD-10-CM: I42.9  ICD-9-CM: 425.4         Dyslipidemia ICD-10-CM: E78.5  ICD-9-CM: 272.4         Hypothyroidism ICD-10-CM: E03.9  ICD-9-CM: 244.9         KOBY (generalized anxiety disorder) ICD-10-CM: F41.1  ICD-9-CM: 300.02         * (Principal) Status epilepticus ([facility] ICD-10-CM: G40.901  ICD-9-CM: 345. 3         CHRIS (acute kidney injury) ([facility] ICD-10-CM: N17.9  ICD-9-CM: 584.9         Lactic acidosis ICD-10-CM: E87.20  ICD-9-CM: 276.2         Acute respiratory failure with hypoxia (HCC) ICD-10-CM: J96.01  ICD-9-CM: 518.81         Shock ([address] ICD-10-CM: R57.9  ICD-9-CM: 785.50         Acute metabolic encephalopathy BVA-17-TW: G93.41  ICD-9-CM: 348.31          Past Medical History:      has no past medical history on file.     Past Surgical History:      has a past surgical history that includes ir insert non tunl cvc over 5 yrs (2022). Home Medications:     Prior to Admission medications    Medication Sig Start Date End Date Taking? Authorizing Provider   atorvastatin (LIPITOR) 10 mg tablet Take 10 mg by mouth daily. Yes Provider, Historical   furosemide (LASIX) 20 mg tablet Take 20 mg by mouth daily. Yes Provider, Historical   traZODone (DESYREL) 50 mg tablet Take 125 mg by mouth nightly. Yes Provider, Historical   levothyroxine (SYNTHROID) 88 mcg tablet Take 88 mcg by mouth Daily (before breakfast). Yes Provider, Historical   OTHER,NON-FORMULARY, ESTROGEN(5050)/TESTOSTERONE (HRT) 1.5mg/10mg/ml Cream APPLY 1 ML TO SKIN (INNER WRIST/ABDOMEN/THIGHS EVERY DAY. ROTATE SITES   Yes Provider, Historical   OTHER,NON-FORMULARY, Progesterone (ME4M) 250 mg at bedtime. Yes Provider, Historical       Allergies/Social/Family History:     No Known Allergies   Social History     Tobacco Use    Smoking status: Not on file    Smokeless tobacco: Not on file   Substance Use Topics    Alcohol use: Not on file      No family history on file. Review of Systems:     ROS is unobtainable as the patient is intubated. Objective:   Vital Signs:  Visit Vitals  /73   Pulse 83   Temp 98.9 °F (37.2 °C)   Resp 14   Ht 5' 6\" (1.676 m)   Wt 104.3 kg (230 lb)   SpO2 99%   BMI 37.12 kg/m²    O2 Flow Rate (L/min): 60 l/min O2 Device: Endotracheal tube Temp (24hrs), Av.3 °F (37.4 °C), Min:98.6 °F (37 °C), Max:100.6 °F (38.1 °C)           Intake/Output:     Intake/Output Summary (Last 24 hours) at 10/5/2022 1136  Last data filed at 10/5/2022 1000  Gross per 24 hour   Intake 2564.53 ml   Output 2905 ml   Net -340.47 ml       Physical Exam:  I examined the patient via telemedicine, with its associated limitations.   I beamed in and examined the patient with assistance of house staff     On exam:    Gen: On Fentanyl and Propofol   HEENT: intubated  Chest: symmetrical chest rise  CV:S1,S2  Abd: soft  Ext: +ve edema  Neuro: OE spontaneous, Moves spontaneously. No commands following     LABS AND  DATA: Personally reviewed  Recent Labs     10/05/22  0125 10/04/22  0120   WBC 8.1 11.2*   HGB 9.1* 10.1*   HCT 28.3* 31.2*    205     Recent Labs     10/05/22  0125 10/04/22  0120    144   K 3.4* 3.6    109*   CO2 31 32   BUN 15 14   CREA 0.69 0.74   * 129*   CA 8.7 8.5   MG 2.3 2.2   PHOS 3.4 3.4     No results for input(s): AP, TBIL, TP, ALB, GLOB, AML, LPSE in the last 72 hours. No lab exists for component: SGOT, GPT, AMYP  No results for input(s): INR, PTP, APTT, INREXT, INREXT in the last 72 hours. No results for input(s): PHI, PCO2I, PO2I, FIO2I in the last 72 hours. No results for input(s): CPK, CKMB, TROIQ, BNPP in the last 72 hours. Hemodynamics:   PAP:   CO:     Wedge:   CI:     CVP:    SVR:       PVR:       Ventilator Settings:  Mode Rate Tidal Volume Pressure FiO2 PEEP   Assist control   4000 ml  8 cm H2O 100 % 5 cm H20     Peak airway pressure: 23 cm H2O    Minute ventilation: 7.7 l/min        MEDS: Reviewed    Chest X-Ray:  CXR Results  (Last 48 hours)                 10/03/22 1508  XR CHEST PORT Final result    Impression:  Bilateral perihilar pneumonia versus subsegmental atelectasis. Small left   pleural effusion       Narrative:  EXAM: XR CHEST PORT       INDICATION: fever       COMPARISON: 9/27/2022       FINDINGS: A portable AP radiograph of the chest was obtained at 1502 hours. The   patient is on a cardiac monitor. The NG tube extends below the hemidiaphragm. Endotracheal tube terminates half-way between the thoracic inlet and lópez. Right IJ line terminates at the cavoatrial junction. The cardiac and mediastinal   contours and pulmonary vascularity are stable. Patchy bilateral airspace disease   is noted in both perihilar locations and there is a small left pleural effusion. ABCDEF Bundle/Checklist Completed:  Yes    DISPOSITION  Stay in ICU    Critical Care Time  Discussed with bedside RN     I reviewed the electronic medical record, the x-rays, labs, progress notes, previous history and physicals and consultation notes that were available in the electronic medical record. I performed all aspects of the physical examination via Telemedicine associated with two way audio and video communication and with the on-site assistance of  the bedside nurse. I am located in [facility] and the patient is located in [place] at [facility].   The patient is critically ill in the ICU. I  personally  reviewed the pertinent medical records, laboratory/ pathology data and radiographic images. The decision making regarding this patient is as documented above, which was generated  following  discussion  with the multidisciplinary ICU team and creation of a treatment plan for  the patient. We discussed the patient's interval history and future coordination of care and  plans. The patient's medications  were reviewed and changes made as stipulated above. Due to  critical illness impairing one or more vital organs of this patient resulting in life threatening clinical situation  I have provided direct, frequent personal  assessment and manipulation in management plan and spent 35 minutes  of  critical care time excluding the time spent on procedures and teaching. Greater than 50% of this time  in patients care was  employed  in counseling and coordination of care and engaged in face to face discussion of case management issues, addressing questions, and outlining a plan of  therapy. Pt at risk of life threatening deterioration from acute resp failure    Pt seen and evaluated via tele encounter. Audio and Video were used for this interaction. ATTENTION:  This medical record was transcribed using an electronic medical records/speech recognition system.   Although proofread, it may and can contain electronic, spelling and other errors. Corrections may be executed at a later time. Please feel free to contact us for any clarifications as needed.      [address]  10/5/2022

## 2022-10-05 NOTE — PROGRESS NOTES
Patient has not been extubated yet,. SLP has checked chart for several days. Currently no plans to extubate. Please reconsult SLP when extubated, as she will need bedside swallow eval and possibly FEES due to extended intubation with agitation.

## 2022-10-05 NOTE — PROGRESS NOTES
Neurology - Inpatient Progress Note     Name:   Dom Ward  MRN:    [id number]  [facility] Date:   2022    Follow up: AMS    Interval History     Neurology was asked to re-visit/ re-examine patient yesterday. She was seen/ examined by Neurology NP Autumn Espinal, who has asked me to see patient today and re-examine. Reviewed chart for prior Neurology Notes. Seen by my colleague / Dr Max Carroll on  and 2022. His consult note indicates that patient was found by neighbor, lying unresponsive in bed (pt didn't report to work). CPR was initiated by [employer], who called 911. When 911 arrived, she was hypotensive with SBPs in the 70s and describe peng being in respiratory arrest.  Narcan given. Then had seizure-like activity, then given Versed 4 mg IV x 1. When she arrived in ER, had myoclonic jerking of upper and lower extremities and left-side gaze preference. Rapid EEG didn't show any evidence of seizure activity. Continuous video EEG was initiated ( to ) and didn't show any evidence of ongoing seizure activity. CT head at time of admission didn't show any acute changes. Additional History ws that patient had been using Duragesic patches for back pain from her  's supply (passed away from [place]). Brain MRI done on 2022 showed a tiny/ punctate focus of acute infarct in the right frontal lobe and probable focus of cortical infarct in right anterior frontal lobe. No abnormalities to suggest hypoxic brain injury. Brain MRI +/- contrast was repeated on 10-2-2022 for persistent encephalopathy. The impression from that study was: 2 small foci of diffusion restriction in the right frontal lobe are unchanged and may represent small foci of acute ischemia. No abnormal enhancement is identified. No description of hypoxic brain injury. Exam at time of Dr Max Carroll last visit (22): was as follows: She is on propofol and we held the propofol for the examination.   To verbal stimulus no response. To tactile stimulus she will open her eyes briefly but does not have any further response. Noxious following the tactile initial response elicits no further awakening. Pupils are small pinpoint not reactive. No doll's eyes. No corneal.  She does gag. She is breathing over the vent. She has some spontaneous movement in the lower extremities. She does have withdrawal to noxious cutaneous stimulation. Reflexes symmetrical.  Toes are down. Remainder not testable    Pt remains intubated. Is on continuous Propofol, and Fentanyl for sedation, and prn versed IV. Nurse describes patient as easily agitated, moving all extremities forcefully, with agitation and worse when agitation stopped. Sedation was stopped for 10 minutes befoe I examined patient. On Exam: eyes open, left eye sligthly deviated laterally relative to right eye (mother says she had mild strabismus as child), pupils 3 mm/ reactive to light, + corneal reflex bilaterally. Gaze is fixed forward, no eye movements are seen in the horizontal or lateral directions. Pt is spontaneously active, lifting head off bed, turning it side to side, bucking vent at times. Seen to pull against wrist restraints more in right arm than left arm. Do not see as much movement in lower extremities. Slight grimaces to nailbed pressure in both upper extremities. No clear grimace or withdrawal to nailbed pressure in left lower extremity. Mild grimace / mild withdrawal to nailbed pressure in right lower extremity. DTRs: 1+ biceps, trace patellars and absent achilles.   Plantar response is neutral bilateral.        Brief ROS: UTO         No Known Allergies    Current Facility-Administered Medications:     midazolam (VERSED) injection 2 mg, 2 mg, IntraVENous, Q1H PRN, Barbara Jiménez MD, 2 mg at 10/05/22 2204    senna-docusate (PERICOLACE) 8.6-50 mg per tablet 1 Tablet, 1 Tablet, Oral, BID, Sara Benton MD, 1 Tablet at 10/05/22 2100 piperacillin-tazobactam (ZOSYN) 3.375 g in 0.9% sodium chloride (MBP/ADV) 100 mL MBP, 3.375 g, IntraVENous, Q8H, Caden Gay MD, Last Rate: 25 mL/hr at 10/05/22 1545, 3.375 g at 10/05/22 1545    fentaNYL citrate (PF) injection 100 mcg, 100 mcg, IntraVENous, Q4H PRN, Abdoul Gonzalez DO, 100 mcg at 10/02/22 1545    midodrine (PROAMATINE) tablet 15 mg, 15 mg, Oral, Q8H, Jeff Dorman, NP, 15 mg at 10/05/22 2100    atorvastatin (LIPITOR) tablet 40 mg, 40 mg, Oral, QHS, Jeff Dorman, NP, 40 mg at 10/05/22 2100    enoxaparin (LOVENOX) injection 30 mg, 30 mg, SubCUTAneous, Q12H, Jeff Dorman, NP, 30 mg at 10/05/22 2101    fentaNYL (PF) 1,500 mcg/30 mL (50 mcg/mL) infusion, 0-200 mcg/hr, IntraVENous, TITRATE, Nathan Millan DO, Last Rate: 4 mL/hr at 10/05/22 1714, 200 mcg/hr at 10/05/22 1714    aspirin chewable tablet 81 mg, 81 mg, Oral, DAILY, Carolyn Wilkes MD, 81 mg at 10/05/22 0802    lansoprazole compounding kit (PREVACID) 3 mg/mL oral suspension 30 mg, 30 mg, Oral, DAILY, Jeff Dorman NP, 30 mg at 10/05/22 0802    propofol (DIPRIVAN) 10 mg/mL infusion, 0-50 mcg/kg/min, IntraVENous, TITRATE, Ashley Franco MD, Last Rate: 12.5 mL/hr at 10/05/22 1848, 20 mcg/kg/min at 10/05/22 1848    NOREPINephrine (LEVOPHED) 8 mg in 5% dextrose 250mL (32 mcg/mL) infusion, 0.5-30 mcg/min, IntraVENous, TITRATE, Jeff Dorman NP, Last Rate: 3.8 mL/hr at 10/05/22 2201, 2 mcg/min at 10/05/22 2201    sodium chloride (NS) flush 5-40 mL, 5-40 mL, IntraVENous, Q8H, Mamie Harada, MD, 10 mL at 10/05/22 2101    sodium chloride (NS) flush 5-40 mL, 5-40 mL, IntraVENous, PRN, Mamie Harada, MD, 10 mL at 10/03/22 1413    acetaminophen (TYLENOL) tablet 650 mg, 650 mg, Oral, Q6H PRN, 650 mg at 10/04/22 2108 **OR** acetaminophen (TYLENOL) suppository 650 mg, 650 mg, Rectal, Q6H PRN, Mamie Harada, MD, 650 mg at 10/03/22 1252    polyethylene glycol (MIRALAX) packet 17 g, 17 g, Oral, DAILY PRN, Mamie Harada, MD [DISCONTINUED] promethazine (PHENERGAN) tablet 12.5 mg, 12.5 mg, Oral, Q6H PRN **OR** ondansetron (ZOFRAN) injection 4 mg, 4 mg, IntraVENous, Q6H PRN, Leisa Vergara MD    chlorhexidine (PERIDEX) 0.12 % mouthwash 15 mL, 15 mL, Oral, Q12H, Mariusz Kramer NP, 15 mL at 10/05/22 2100      PMHx: has no past medical history on file. PSHx: has a past surgical history that includes ir insert non tunl cvc over 5 yrs (9/27/2022). Impression / Plan       ICD-10-CM ICD-9-CM   1. Status epilepticus (HCC)  G40.901 345.3   2. Acute respiratory failure with hypoxia (HCC)  J96.01 518.81   3. Acute renal insufficiency  N28.9 593.9   4. Seizure ([address] [R56.9 (ICD-10-CM)]  R56.9 780.39   5. Takotsubo cardiomyopathy  I51.81 429.83   6. Other cardiomyopathy ([address]  I42.8 425.4   7. Dyslipidemia  E78.5 272.4   8. Encephalopathy  G93.40 348.30       Discussed with Radiologist regarding sensitivity of Brain MRI in terms of detecting hypoxic brain injury. I was told that Brain MRI typically does show (if present) hypoxic brain injury within 4-6 hours of the initiating event, and it would be rare for Brain MRI to not show it (if present) several days later, unless the hypoxia was transient and had resolved. As there is no evidence of hypoxic brain injury on 2 separate Brain MRIs, I don't believe pt has any hypoxic brain injury as the cause of her persistent encephalopathy. Patient still remains severely encephalopathic since admission and her CN exam is abnormal in that she has restriction of all eye movements, suggesting brainstem dysfunction, which certainly can be seen in severe encephalopathy.      Given the nature of her event, I do not think pt has CNS infection and don't think LP is indicated    The tiny areas of stroke would not cause persistent encephalopathy    Will recheck EEG to ensure no evidence of non-convulsive status epilepticus    Discussed above with Mother/ Sister/ Niece      Signed By: Charol Dakins, MD October 5, 2022

## 2022-10-06 ENCOUNTER — APPOINTMENT (OUTPATIENT)
Dept: GENERAL RADIOLOGY | Age: 57
DRG: 064 | End: 2022-10-06
Attending: INTERNAL MEDICINE
Payer: COMMERCIAL

## 2022-10-06 LAB
ALBUMIN SERPL-MCNC: 2 G/DL (ref 3.5–5)
ALBUMIN/GLOB SERPL: 0.5 {RATIO} (ref 1.1–2.2)
ALP SERPL-CCNC: 78 U/L (ref 45–117)
ALT SERPL-CCNC: 35 U/L (ref 12–78)
ANION GAP SERPL CALC-SCNC: 3 MMOL/L (ref 5–15)
ARTERIAL PATENCY WRIST A: YES
AST SERPL-CCNC: 35 U/L (ref 15–37)
BASE EXCESS BLDA CALC-SCNC: 3.6 MMOL/L
BDY SITE: ABNORMAL
BILIRUB SERPL-MCNC: 0.3 MG/DL (ref 0.2–1)
BUN SERPL-MCNC: 14 MG/DL (ref 6–20)
BUN/CREAT SERPL: 19 (ref 12–20)
CALCIUM SERPL-MCNC: 8.5 MG/DL (ref 8.5–10.1)
CHLORIDE SERPL-SCNC: 108 MMOL/L (ref 97–108)
CO2 SERPL-SCNC: 31 MMOL/L (ref 21–32)
CREAT SERPL-MCNC: 0.72 MG/DL (ref 0.55–1.02)
ERYTHROCYTE [DISTWIDTH] IN BLOOD BY AUTOMATED COUNT: 14.8 % (ref 11.5–14.5)
FERRITIN SERPL-MCNC: 154 NG/ML (ref 26–388)
FIO2 ON VENT: 50 %
FOLATE SERPL-MCNC: 9.8 NG/ML (ref 5–21)
GAS FLOW.O2 SETTING OXYMISER: 16 L/MIN
GLOBULIN SER CALC-MCNC: 4 G/DL (ref 2–4)
GLUCOSE SERPL-MCNC: 101 MG/DL (ref 65–100)
HAPTOGLOB SERPL-MCNC: 290 MG/DL (ref 30–200)
HCO3 BLDA-SCNC: 29 MMOL/L (ref 22–26)
HCT VFR BLD AUTO: 28.9 % (ref 35–47)
HGB BLD-MCNC: 9.3 G/DL (ref 11.5–16)
IRON SATN MFR SERPL: 12 % (ref 20–50)
IRON SERPL-MCNC: 25 UG/DL (ref 35–150)
LDH SERPL L TO P-CCNC: 197 U/L (ref 81–246)
MAGNESIUM SERPL-MCNC: 2.2 MG/DL (ref 1.6–2.4)
MCH RBC QN AUTO: 29.9 PG (ref 26–34)
MCHC RBC AUTO-ENTMCNC: 32.2 G/DL (ref 30–36.5)
MCV RBC AUTO: 92.9 FL (ref 80–99)
NRBC # BLD: 0 K/UL (ref 0–0.01)
NRBC BLD-RTO: 0 PER 100 WBC
PCO2 BLDA: 46 MMHG (ref 35–45)
PEEP RESPIRATORY: 5 CM[H2O]
PH BLDA: 7.42 [PH] (ref 7.35–7.45)
PHOSPHATE SERPL-MCNC: 3.3 MG/DL (ref 2.6–4.7)
PLATELET # BLD AUTO: 260 K/UL (ref 150–400)
PMV BLD AUTO: 10.5 FL (ref 8.9–12.9)
PO2 BLDA: 60 MMHG (ref 80–100)
POTASSIUM SERPL-SCNC: 3.5 MMOL/L (ref 3.5–5.1)
PROCALCITONIN SERPL-MCNC: 0.09 NG/ML
PROT SERPL-MCNC: 6 G/DL (ref 6.4–8.2)
RBC # BLD AUTO: 3.11 M/UL (ref 3.8–5.2)
RETICS # AUTO: 0.1 M/UL (ref 0.02–0.08)
RETICS/RBC NFR AUTO: 3.2 % (ref 0.7–2.1)
SAO2 % BLD: 91 % (ref 92–97)
SAO2% DEVICE SAO2% SENSOR NAME: ABNORMAL
SODIUM SERPL-SCNC: 142 MMOL/L (ref 136–145)
SPECIMEN SITE: ABNORMAL
TIBC SERPL-MCNC: 208 UG/DL (ref 250–450)
VIT B12 SERPL-MCNC: 282 PG/ML (ref 193–986)
VT SETTING VENT: 400 ML
WBC # BLD AUTO: 8 K/UL (ref 3.6–11)

## 2022-10-06 PROCEDURE — 82746 ASSAY OF FOLIC ACID SERUM: CPT

## 2022-10-06 PROCEDURE — 83540 ASSAY OF IRON: CPT

## 2022-10-06 PROCEDURE — 74011000258 HC RX REV CODE- 258: Performed by: INTERNAL MEDICINE

## 2022-10-06 PROCEDURE — 74011250637 HC RX REV CODE- 250/637: Performed by: INTERNAL MEDICINE

## 2022-10-06 PROCEDURE — 83010 ASSAY OF HAPTOGLOBIN QUANT: CPT

## 2022-10-06 PROCEDURE — 74011250636 HC RX REV CODE- 250/636: Performed by: INTERNAL MEDICINE

## 2022-10-06 PROCEDURE — 94003 VENT MGMT INPAT SUBQ DAY: CPT

## 2022-10-06 PROCEDURE — 74011000250 HC RX REV CODE- 250: Performed by: INTERNAL MEDICINE

## 2022-10-06 PROCEDURE — 85027 COMPLETE CBC AUTOMATED: CPT

## 2022-10-06 PROCEDURE — 82607 VITAMIN B-12: CPT

## 2022-10-06 PROCEDURE — 80053 COMPREHEN METABOLIC PANEL: CPT

## 2022-10-06 PROCEDURE — 71045 X-RAY EXAM CHEST 1 VIEW: CPT

## 2022-10-06 PROCEDURE — 65610000006 HC RM INTENSIVE CARE

## 2022-10-06 PROCEDURE — 83615 LACTATE (LD) (LDH) ENZYME: CPT

## 2022-10-06 PROCEDURE — 74011250637 HC RX REV CODE- 250/637: Performed by: PSYCHIATRY & NEUROLOGY

## 2022-10-06 PROCEDURE — 95816 EEG AWAKE AND DROWSY: CPT | Performed by: PSYCHIATRY & NEUROLOGY

## 2022-10-06 PROCEDURE — 84100 ASSAY OF PHOSPHORUS: CPT

## 2022-10-06 PROCEDURE — 82803 BLOOD GASES ANY COMBINATION: CPT

## 2022-10-06 PROCEDURE — 85045 AUTOMATED RETICULOCYTE COUNT: CPT

## 2022-10-06 PROCEDURE — 74011250637 HC RX REV CODE- 250/637: Performed by: NURSE PRACTITIONER

## 2022-10-06 PROCEDURE — 74011250636 HC RX REV CODE- 250/636: Performed by: STUDENT IN AN ORGANIZED HEALTH CARE EDUCATION/TRAINING PROGRAM

## 2022-10-06 PROCEDURE — 36600 WITHDRAWAL OF ARTERIAL BLOOD: CPT

## 2022-10-06 PROCEDURE — 83735 ASSAY OF MAGNESIUM: CPT

## 2022-10-06 PROCEDURE — 94761 N-INVAS EAR/PLS OXIMETRY MLT: CPT

## 2022-10-06 PROCEDURE — 36415 COLL VENOUS BLD VENIPUNCTURE: CPT

## 2022-10-06 PROCEDURE — 74011250636 HC RX REV CODE- 250/636: Performed by: NURSE PRACTITIONER

## 2022-10-06 PROCEDURE — 84145 PROCALCITONIN (PCT): CPT

## 2022-10-06 PROCEDURE — 82728 ASSAY OF FERRITIN: CPT

## 2022-10-06 RX ORDER — LEVOTHYROXINE SODIUM 88 UG/1
88 TABLET ORAL DAILY
Status: DISCONTINUED | OUTPATIENT
Start: 2022-10-06 | End: 2022-10-13

## 2022-10-06 RX ADMIN — CHLORHEXIDINE GLUCONATE 15 ML: 1.2 RINSE ORAL at 21:18

## 2022-10-06 RX ADMIN — Medication 200 MCG/HR: at 08:24

## 2022-10-06 RX ADMIN — CHLORHEXIDINE GLUCONATE 15 ML: 1.2 RINSE ORAL at 08:07

## 2022-10-06 RX ADMIN — MIDAZOLAM 2 MG: 1 INJECTION, SOLUTION INTRAMUSCULAR; INTRAVENOUS at 18:23

## 2022-10-06 RX ADMIN — MIDODRINE HYDROCHLORIDE 15 MG: 5 TABLET ORAL at 21:18

## 2022-10-06 RX ADMIN — LANSOPRAZOLE 30 MG: KIT at 08:45

## 2022-10-06 RX ADMIN — Medication 125 MCG/HR: at 11:49

## 2022-10-06 RX ADMIN — PIPERACILLIN AND TAZOBACTAM 3.38 G: 3; .375 INJECTION, POWDER, LYOPHILIZED, FOR SOLUTION INTRAVENOUS at 15:38

## 2022-10-06 RX ADMIN — ATORVASTATIN CALCIUM 40 MG: 20 TABLET, FILM COATED ORAL at 21:18

## 2022-10-06 RX ADMIN — Medication 10 ML: at 21:18

## 2022-10-06 RX ADMIN — MIDAZOLAM 2 MG: 1 INJECTION, SOLUTION INTRAMUSCULAR; INTRAVENOUS at 11:22

## 2022-10-06 RX ADMIN — SENNOSIDES AND DOCUSATE SODIUM 1 TABLET: 50; 8.6 TABLET ORAL at 21:18

## 2022-10-06 RX ADMIN — ENOXAPARIN SODIUM 30 MG: 100 INJECTION SUBCUTANEOUS at 10:08

## 2022-10-06 RX ADMIN — MIDODRINE HYDROCHLORIDE 15 MG: 5 TABLET ORAL at 05:05

## 2022-10-06 RX ADMIN — PROPOFOL 20 MCG/KG/MIN: 10 INJECTION, EMULSION INTRAVENOUS at 21:43

## 2022-10-06 RX ADMIN — MIDAZOLAM 2 MG: 1 INJECTION, SOLUTION INTRAMUSCULAR; INTRAVENOUS at 08:45

## 2022-10-06 RX ADMIN — ENOXAPARIN SODIUM 30 MG: 100 INJECTION SUBCUTANEOUS at 21:18

## 2022-10-06 RX ADMIN — MIDODRINE HYDROCHLORIDE 15 MG: 5 TABLET ORAL at 13:06

## 2022-10-06 RX ADMIN — Medication 10 ML: at 05:05

## 2022-10-06 RX ADMIN — ASPIRIN 81 MG: 81 TABLET, CHEWABLE ORAL at 08:45

## 2022-10-06 RX ADMIN — PIPERACILLIN AND TAZOBACTAM 3.38 G: 3; .375 INJECTION, POWDER, LYOPHILIZED, FOR SOLUTION INTRAVENOUS at 23:22

## 2022-10-06 RX ADMIN — ACETAMINOPHEN 650 MG: 325 TABLET, FILM COATED ORAL at 12:23

## 2022-10-06 RX ADMIN — MIDAZOLAM 2 MG: 1 INJECTION, SOLUTION INTRAMUSCULAR; INTRAVENOUS at 13:05

## 2022-10-06 RX ADMIN — MIDAZOLAM 2 MG: 1 INJECTION, SOLUTION INTRAMUSCULAR; INTRAVENOUS at 21:13

## 2022-10-06 RX ADMIN — MIDAZOLAM 2 MG: 1 INJECTION, SOLUTION INTRAMUSCULAR; INTRAVENOUS at 00:48

## 2022-10-06 RX ADMIN — Medication 200 MCG/HR: at 00:48

## 2022-10-06 RX ADMIN — LEVOTHYROXINE SODIUM 88 MCG: 0.09 TABLET ORAL at 13:06

## 2022-10-06 RX ADMIN — Medication 150 MCG/HR: at 19:01

## 2022-10-06 RX ADMIN — PROPOFOL 20 MCG/KG/MIN: 10 INJECTION, EMULSION INTRAVENOUS at 02:59

## 2022-10-06 RX ADMIN — MIDAZOLAM 2 MG: 1 INJECTION, SOLUTION INTRAMUSCULAR; INTRAVENOUS at 14:27

## 2022-10-06 RX ADMIN — SENNOSIDES AND DOCUSATE SODIUM 1 TABLET: 50; 8.6 TABLET ORAL at 08:45

## 2022-10-06 RX ADMIN — MIDAZOLAM 2 MG: 1 INJECTION, SOLUTION INTRAMUSCULAR; INTRAVENOUS at 03:18

## 2022-10-06 RX ADMIN — FENTANYL CITRATE 100 MCG: 50 INJECTION, SOLUTION INTRAMUSCULAR; INTRAVENOUS at 11:48

## 2022-10-06 RX ADMIN — PIPERACILLIN AND TAZOBACTAM 3.38 G: 3; .375 INJECTION, POWDER, LYOPHILIZED, FOR SOLUTION INTRAVENOUS at 08:45

## 2022-10-06 NOTE — PROGRESS NOTES
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 Today is day 10 of being intubated .       Eri [place]

## 2022-10-06 NOTE — PROGRESS NOTES
Problem: Ventilator Management  Goal: *Adequate oxygenation and ventilation  Outcome: Progressing Towards Goal  Goal: *Patient maintains clear airway/free of aspiration  Outcome: Progressing Towards Goal  Goal: *Absence of infection signs and symptoms  Outcome: Progressing Towards Goal  Goal: *Normal spontaneous ventilation  Outcome: Progressing Towards Goal     Problem: Patient Education: Go to Patient Education Activity  Goal: Patient/Family Education  Outcome: Progressing Towards Goal     Problem: Non-Violent Restraints  Goal: Removal from restraints as soon as assessed to be safe  Outcome: Progressing Towards Goal  Goal: No harm/injury to patient while restraints in use  Outcome: Progressing Towards Goal  Goal: Patient's dignity will be maintained  Outcome: Progressing Towards Goal  Goal: Patient Interventions  Outcome: Progressing Towards Goal     Problem: Delirium Treatment  Goal: *Level of consciousness restored to baseline  Outcome: Progressing Towards Goal  Goal: *Level of environmental perceptions restored to baseline  Outcome: Progressing Towards Goal  Goal: *Sensory perception restored to baseline  Outcome: Progressing Towards Goal  Goal: *Emotional stability restored to baseline  Outcome: Progressing Towards Goal  Goal: *Functional assessment restored to baseline  Outcome: Progressing Towards Goal  Goal: *Absence of falls  Outcome: Progressing Towards Goal  Goal: *Will remain free of delirium, CAM Score negative  Outcome: Progressing Towards Goal  Goal: *Cognitive status will be restored to baseline  Outcome: Progressing Towards Goal  Goal: Interventions  Outcome: Progressing Towards Goal     Problem: Patient Education: Go to Patient Education Activity  Goal: Patient/Family Education  Outcome: Progressing Towards Goal     Problem: Falls - Risk of  Goal: *Absence of Falls  Description: Document Joanie Liriano Fall Risk and appropriate interventions in the flowsheet.   Outcome: Progressing Towards Goal  Note: Fall Risk Interventions:       Mentation Interventions: Adequate sleep, hydration, pain control, Door open when patient unattended, More frequent rounding, Reorient patient, Room close to nurse's station, Toileting rounds, Update white board    Medication Interventions: Evaluate medications/consider consulting pharmacy    Elimination Interventions: Toileting schedule/hourly rounds    History of Falls Interventions: Vital signs minimum Q4HRs X 24 hrs (comment for end date), Room close to nurse's station, Evaluate medications/consider consulting pharmacy         Problem: Patient Education: Go to Patient Education Activity  Goal: Patient/Family Education  Outcome: Progressing Towards Goal     Problem: Pressure Injury - Risk of  Goal: *Prevention of pressure injury  Description: Document Jose Enrique Scale and appropriate interventions in the flowsheet.   Outcome: Progressing Towards Goal     Problem: Patient Education: Go to Patient Education Activity  Goal: Patient/Family Education  Outcome: Progressing Towards Goal     Problem: Nutrition Deficit  Goal: *Optimize nutritional status  Outcome: Progressing Towards Goal

## 2022-10-06 NOTE — PROGRESS NOTES
Bedside and Verbal shift change report given to Flor Gamble rn (oncoming nurse) by Arnold Garrido (offgoing nurse). Report included the following information SBAR, ED Summary, Intake/Output, MAR, and Cardiac Rhythm sb/nsr/st .     0754 - morning assessment    1009 - sat/sbt, sedation off    1120 - sedation restarted, sbt failed for tachycardia and agitation    1400 - sedation off for eeg    1430 - sedation restarted    1520 - bedbath/sheet change    Bedside and Verbal shift change report given to pa ng (oncoming nurse) by Tony Tellez (offgoing nurse).  Report included the following information SBAR, ED Summary, Intake/Output, and Cardiac Rhythm nsr/st .

## 2022-10-06 NOTE — PROGRESS NOTES
[facility]  [address], [facility]  [phone]    Medical Progress Note      NAME: Megan Perkins   :  1965  MRM:  [id number]    Date/Time of service 10/6/2022  10:20 AM         Assessment and Plan:     Acute metabolic encephalopathy - POA, unclear etiology. \"2 small foci of diffusion restriction in the right frontal lobe \" noted on MRI but unlikely this explains KHADAR. Initial concern for Sz, but EEG bland, keppra stopped. Concern remains she had anoxic brain injury. Will assess after extubation and stopping sedation. Neurology consulted. Stop lidoderm    Takotsubo cardiomyopathy / NSTEMI (non-ST elevated myocardial infarction) - POA, unclear initial driving event. Cardiology consulted. Too unstable for cath. Repeat ECHO per them. Unable to tolerate BB or ACE due to low BP    Cardiogenic shock - Intially presumed septic shock until ECHO showed new EF 30%. Currently still requiring levophed IV gtt as pressor. On midodrine    Acute respiratory failure with hypoxia - POA, persistent. Unclear if aspiration PNA vs central resp depression. Completed Zosyn. Intubated on admit. Intensivist consulted and is managing vent. Still requring fentanyl and propofol gtt for sedation. She is failing SBT so far. Acute CVA (cerebrovascular accident) - MRI notes two small lesions. Unclear significance and timing. Neurology to assess after extubation and off sedation. Continue ASA and statin. Anemia - POA and slowly worsening likely due to acute illness. UTI (urinary tract infection) - Pitt sensitive E coli on initial contaminated cx. Had E coli in contaminated sputum along with much normal josue. Has completed course of Zosyn. CHRIS (acute kidney injury) / Lactic acidosis / Hypokalemia - POA: unknown baseline Cr. Currently Cr stable. Status epilepticus - Reject Dx.  Enid Akers had myoclonic jerking    Dyslipidemia - On atorvastatin    Hypothyroidism - TSH normal.     KOBY (generalized anxiety disorder) - Noted benzo on drug screen on admit, but had Versed for Sz    Obese - Advise weight loss of she survives. Subjective:     Chief Complaint:  intubated, stable, no events    ROS:  (bold if positive, if negative)    Not Tolerating PT  Not Tolerating Diet        Objective:     Last 24hrs VS reviewed since prior progress note.  Most recent are:    Visit Vitals  BP (!) 106/51   Pulse (!) 59   Temp 99 °F (37.2 °C)   Resp 16   Ht 5' 6\" (1.676 m)   Wt 104.3 kg (230 lb)   SpO2 92%   BMI 37.12 kg/m²     SpO2 Readings from Last 6 Encounters:   10/06/22 92%    O2 Flow Rate (L/min): 60 l/min     Intake/Output Summary (Last 24 hours) at 10/6/2022 2356  Last data filed at 10/6/2022 0600  Gross per 24 hour   Intake 2008.72 ml   Output 3550 ml   Net -1541.28 ml          Physical Exam:    Gen:  Obese, in no acute distress  HEENT:  Pink conjunctivae, PERRL, hearing not intact to voice, ET in place  Neck:  Supple, without masses, thyroid non-tender  Resp:  No accessory muscle use, bilateral coarse breath sounds   Card:  No murmurs, tachycardic S1, S2 without thrills, bruits or peripheral edema  Abd:  Soft, non-tender, non-distended, reduced bowel sounds are present, no mass  Lymph:  No cervical or inguinal adenopathy  Musc:  No cyanosis or clubbing  Skin:  No rashes or ulcers, skin turgor is good  Neuro:  Cranial nerves are grossly intact, general motor weakness, dose not follow commands appropriately  Psych:  Sedated    Telemetry reviewed:   normal sinus rhythm  __________________________________________________________________  Medications Reviewed: (see below)  Medications:     Current Facility-Administered Medications   Medication Dose Route Frequency    midazolam (VERSED) injection 2 mg  2 mg IntraVENous Q1H PRN    senna-docusate (PERICOLACE) 8.6-50 mg per tablet 1 Tablet  1 Tablet Oral BID    piperacillin-tazobactam (ZOSYN) 3.375 g in 0.9% sodium chloride (MBP/ADV) 100 mL MBP 3.375 g IntraVENous Q8H    fentaNYL citrate (PF) injection 100 mcg  100 mcg IntraVENous Q4H PRN    midodrine (PROAMATINE) tablet 15 mg  15 mg Oral Q8H    atorvastatin (LIPITOR) tablet 40 mg  40 mg Oral QHS    enoxaparin (LOVENOX) injection 30 mg  30 mg SubCUTAneous Q12H    fentaNYL (PF) 1,500 mcg/30 mL (50 mcg/mL) infusion  0-200 mcg/hr IntraVENous TITRATE    aspirin chewable tablet 81 mg  81 mg Oral DAILY    lansoprazole compounding kit (PREVACID) 3 mg/mL oral suspension 30 mg  30 mg Oral DAILY    propofol (DIPRIVAN) 10 mg/mL infusion  0-50 mcg/kg/min IntraVENous TITRATE    NOREPINephrine (LEVOPHED) 8 mg in 5% dextrose 250mL (32 mcg/mL) infusion  0.5-30 mcg/min IntraVENous TITRATE    sodium chloride (NS) flush 5-40 mL  5-40 mL IntraVENous Q8H    sodium chloride (NS) flush 5-40 mL  5-40 mL IntraVENous PRN    acetaminophen (TYLENOL) tablet 650 mg  650 mg Oral Q6H PRN    Or    acetaminophen (TYLENOL) suppository 650 mg  650 mg Rectal Q6H PRN    polyethylene glycol (MIRALAX) packet 17 g  17 g Oral DAILY PRN    ondansetron (ZOFRAN) injection 4 mg  4 mg IntraVENous Q6H PRN    chlorhexidine (PERIDEX) 0.12 % mouthwash 15 mL  15 mL Oral Q12H        Lab Data Reviewed: (see below)  Lab Review:     Recent Labs     10/06/22  0059 10/05/22  0125 10/04/22  0120   WBC 8.0 8.1 11.2*   HGB 9.3* 9.1* 10.1*   HCT 28.9* 28.3* 31.2*    233 205       Recent Labs     10/06/22  0059 10/05/22  0125 10/04/22  0120    142 144   K 3.5 3.4* 3.6    108 109*   CO2 31 31 32   * 103* 129*   BUN 14 15 14   CREA 0.72 0.69 0.74   CA 8.5 8.7 8.5   MG 2.2 2.3 2.2   PHOS 3.3 3.4 3.4   ALB 2.0*  --   --    TBILI 0.3  --   --    ALT 35  --   --        Lab Results   Component Value Date/Time    Glucose (POC) 110 10/05/2022 04:55 PM    Glucose (POC) 87 10/05/2022 11:02 AM    Glucose (POC) 78 10/05/2022 05:00 AM    Glucose (POC) 94 10/05/2022 04:57 AM    Glucose (POC) 124 (H) 10/04/2022 11:03 PM     No results for input(s): PH, PCO2, PO2, HCO3, FIO2 in the last 72 hours. No results for input(s): INR, INREXT, INREXT in the last 72 hours.   All Micro Results       Procedure Component Value Units Date/Time    CULTURE, RESPIRATORY/SPUTUM/BRONCH Kirsten Georgia [[id number]] Collected: 10/03/22 1300    Order Status: Completed Specimen: Sputum Updated: 10/05/22 1143     Special Requests: NO SPECIAL REQUESTS        GRAM STAIN RARE WBCS SEEN               OCCASIONAL EPITHELIAL CELLS SEEN                  OCCASIONAL GRAM NEGATIVE RODS                  RARE GRAM POSITIVE COCCI IN PAIRS           Culture result:       HEAVY NORMAL RESPIRATORY CATHIE          CULTURE, BLOOD, PAIRED [[id number]] Collected: 09/27/22 1251    Order Status: Completed Specimen: Blood Updated: 10/02/22 0640     Special Requests: NO SPECIAL REQUESTS        Culture result: NO GROWTH 5 DAYS       CULTURE, URINE [[id number]]  (Abnormal)  (Susceptibility) Collected: 09/27/22 1251    Order Status: Completed Specimen: Cath Urine Updated: 09/30/22 1502     Special Requests: NO SPECIAL REQUESTS        [place] Count --        >100,000  COLONIES/mL       Culture result: ESCHERICHIA COLI       CULTURE, RESPIRATORY/SPUTUM/BRONCH Adra Mall STAIN [[id number]]  (Abnormal)  (Susceptibility) Collected: 09/27/22 1811    Order Status: Completed Specimen: Sputum Updated: 09/30/22 0930     Special Requests: NO SPECIAL REQUESTS        GRAM STAIN 1+ WBCS SEEN               1+ GRAM POSITIVE COCCI IN CLUSTERS            1+ GRAM VARIABLE RODS        Culture result: HEAVY ESCHERICHIA COLI               HEAVY NORMAL RESPIRATORY CATHIE                  MODERATE YEAST (APPARENT MULTIPLE COLONY TYPES)          CULTURE, MRSA [[id number]] Collected: 09/27/22 1440    Order Status: Completed Specimen: Nasal from Nares Updated: 09/29/22 0820     Special Requests: NO SPECIAL REQUESTS        Culture result: MRSA NOT PRESENT               Screening of patient nares for MRSA is for surveillance purposes and, if positive, to facilitate isolation considerations in high risk settings. It is not intended for automatic decolonization interventions per se as regimens are not sufficiently effective to warrant routine use. GAETANO Gomezchel Emilie, UR/CSF [[id number]] Collected: 09/27/22 1431    Order Status: Completed Specimen: Miscellaneous sample Updated: 09/28/22 1336     Source URINE        Specimen Urine     Streptococcus pneumoniae Ag Negative        Fluid culture Not indicated. Organism ID Not indicated. Please note Comment        Comment: (NOTE)  College of American Pathologists standards require a culture to be  performed on CSF specimens submitted for bacterial antigen testing. (CAP [id number]) Urine specimens will not be cultured. Performed At: [facility] [place] [facility]  [employer] [address] [place] [address] [id number]  Epi Ramos MD SY:[id number]         Damon Suarez [[id number]] Collected: 09/27/22 1530    Order Status: Completed Specimen: Urine Updated: 09/28/22 1336     Source URINE        L pneumophila S1 Ag, urine Negative        Comment: (NOTE)  Presumptive negative for L. pneumophila serogroup 1 antigen in urine,  suggesting no recent or current infection. Legionnaires' disease  cannot be ruled out since other serogroups and species may also  cause disease.   Performed At: [facility]  [employer] [address] [place] [address] [id number]  Epi Ramos MD VV:[id number]         RESPIRATORY VIRUS PANEL W/COVID-19, PCR [[id number]] Collected: 09/27/22 1440    Order Status: Completed Specimen: Nasopharyngeal Updated: 09/27/22 2214     Adenovirus Not detected        Coronavirus 229E Not detected        Coronavirus HKU1 Not detected        Coronavirus CVNL63 Not detected        Coronavirus OC43 Not detected        SARS-CoV-2, PCR Not detected        Metapneumovirus Not detected        Rhinovirus and Enterovirus Not detected        Influenza A Not detected        Influenza B Not detected        Parainfluenza 1 Not detected        Parainfluenza 2 Not detected        Parainfluenza 3 Not detected        Parainfluenza virus 4 Not detected        RSV by PCR Not detected        B. parapertussis, PCR Not detected        Bordetella pertussis - PCR Not detected        Chlamydophila pneumoniae DNA, QL, PCR Not detected        Mycoplasma pneumoniae DNA, QL, PCR Not detected       URINE CULTURE HOLD SAMPLE [[id number]] Collected: 09/27/22 1251    Order Status: Completed Specimen: Urine from Serum Updated: 09/27/22 1258     Urine culture hold       Urine on hold in Microbiology dept for 2 days. If unpreserved urine is submitted, it cannot be used for addtional testing after 24 hours, recollection will be required. Other pertinent lab: none    Total time spent with patient: 30 Minutes I personally reviewed chart, notes, data and current medications in the medical record. I have personally examined and treated the patient at bedside during this period. To assist coordination of care and communication with nursing and staff, this note may be preliminary early in the day, but finalized by end of the day.                  Care Plan discussed with: Patient, Care Manager, Nursing Staff, Consultant/Specialist, and >50% of time spent in counseling and coordination of care    Discussed:  Care Plan and D/C Planning    Prophylaxis:  Lovenox and H2B/PPI    Disposition:  SNF/LTC           ___________________________________________________    Attending Physician: Anastasia Price MD

## 2022-10-06 NOTE — PROGRESS NOTES
[facility]    ICU TEAM Progress Note    Name: Ren Piper   : 1965   MRN: [id number]   Date: 10/6/2022           ICU Assessment     Acute respiratory failure with hypoxia  Acute encephalopathy  Possible anoxic encephalopathy  Stress cardiomyopathy  Ischemic CVA    Plan:  ICU  Cont vent mgmt, titrate FiO2/PEEP and minute vent, check abg and cxr  SAT/SBT  Titrate sedation gtt  Hemodynamic/EKG monitoring  Titrate pressors to keep MAP>65  Neuro checks  EEG today, follow up neuro recs  Cont abx, trend WBC  Keep I/o's neg to even  SQ Lovenox         Subjective:   Progress Note: 10/6/2022      Reason for ICU Admission: acute respiratory failure     HPI:  58 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 She was LKW the evening of . She did not report to work the am of  and she was found unresponsive at her home on wellness check. Bystander CPR was started, but she did have a pulse. EMS was called. Upon their arrival, SBP was 70s. Pupils were pinpoint, narcan was administered without improvement. She experienced seizure like activity and received 4mg IV versed. Upon arrival to the ED, she exhibited seizure like activity and L sided gaze preference. She required intubation for hypoxic respiratory failure. She was sedated on propofol. She became hypotensive and was started on levophed. LA 6.46. She was admitted to the ICU. She was started on empiric vanc/zosyn. Blood/sputum cultures ordered. CT head unrevealing. CT chest notable for only bibasilar atelectasis, cannot exclude small amount aspiration in lower lobes. CTAP unrevealing. Central line placed at bedside in ICU. She was noted to exhibit profound hypoxia. 7.33/44/73 on 100% FiO2 - not consistent with ARDS due to absence of bilateral infiltrates. Overnight Events:   10/6/2022  No change in Neuro status. On Prop 20/Fent 200 with intermittent agitation and desat to 90s. Currently on VC16/400/50/5 and Levo 2. SBT with apnea. 10/5/2022  On Lower dose of propofol,same dose of fentanyl. We attempted to wean sedation, but failed given vent dysnchrony. OFF Levophed this morning. On TF. Not following commands. On vent support: 15/400/45/5. Did Sbt for few hours yesterday. DW mother at bedside   10/04:  Remain on Fentanyl, on lower dose of propofol. On Levophed. Did SBT for 2 hours yesterday. On vent support 16/400/45/5. CXR reviewed. Ceftriaxone stopped and zosyn started. 10/03/22:  Sedated on Fentanyl and Propofol. On Levophed. Noted results of MRI. Temp 100.2 On vent support 16/400/45/5  10/02/22  SAHARA overnight. Patient remains restless on fentanyl and propofol. On minimal levophed likely because of sedation. Not opening eyes to stimulation. Doesn't follow commands. POD:  * No surgery found *    S/P:       Active Problem List:     Problem List  Date Reviewed: 10/5/2022            Codes Class    Anemia ICD-10-CM: D64.9  ICD-9-CM: 285.9         UTI (urinary tract infection) ICD-10-CM: N39.0  ICD-9-CM: 599.0         Hypokalemia ICD-10-CM: E87.6  ICD-9-CM: 276.8         Obese ICD-10-CM: E66.9  ICD-9-CM: 278.00         Acute CVA (cerebrovascular accident) ([facility] ICD-10-CM: I63.9  ICD-9-CM: 434.91         NSTEMI (non-ST elevated myocardial infarction) ([facility] ICD-10-CM: I21.4  ICD-9-CM: 410.70         Takotsubo cardiomyopathy ICD-10-CM: I51.81  ICD-9-CM: 429.83         Dyslipidemia ICD-10-CM: E78.5  ICD-9-CM: 272.4         Hypothyroidism ICD-10-CM: E03.9  ICD-9-CM: 244.9         KOBY (generalized anxiety disorder) ICD-10-CM: F41.1  ICD-9-CM: 300.02         * (Principal) Status epilepticus ([facility] ICD-10-CM: G40.901  ICD-9-CM: 345. 3         CHRIS (acute kidney injury) ([facility].) ICD-10-CM: N17.9  ICD-9-CM: 584.9         Lactic acidosis ICD-10-CM: E87.20  ICD-9-CM: 276.2         Acute respiratory failure with hypoxia (HCC) ICD-10-CM: J96.01  ICD-9-CM: 518.81         Shock, cardiogenic ([facility].) ICD-10-CM: R57.0  ICD-9-CM: 785.51 Acute metabolic encephalopathy TZW-69-AS: G93.41  ICD-9-CM: 348.31          Past Medical History:      has no past medical history on file. Past Surgical History:      has a past surgical history that includes ir insert non tunl cvc over 5 yrs (2022). Home Medications:     Prior to Admission medications    Medication Sig Start Date End Date Taking? Authorizing Provider   atorvastatin (LIPITOR) 10 mg tablet Take 10 mg by mouth daily. Yes Provider, Historical   furosemide (LASIX) 20 mg tablet Take 20 mg by mouth daily. Yes Provider, Historical   traZODone (DESYREL) 50 mg tablet Take 125 mg by mouth nightly. Yes Provider, Historical   levothyroxine (SYNTHROID) 88 mcg tablet Take 88 mcg by mouth Daily (before breakfast). Yes Provider, Historical   OTHER,NON-FORMULARY, ESTROGEN(5050)/TESTOSTERONE (HRT) 1.5mg/10mg/ml Cream APPLY 1 ML TO SKIN (INNER WRIST/ABDOMEN/THIGHS EVERY DAY. ROTATE SITES   Yes Provider, Historical   OTHER,NON-FORMULARY, Progesterone (ME4M) 250 mg at bedtime. Yes Provider, Historical       Allergies/Social/Family History:     No Known Allergies   Social History     Tobacco Use    Smoking status: Not on file    Smokeless tobacco: Not on file   Substance Use Topics    Alcohol use: Not on file      No family history on file. Review of Systems:     ROS is unobtainable as the patient is intubated.     Objective:   Vital Signs:  Visit Vitals  BP (!) 118/52 (BP 1 Location: Right upper arm, BP Patient Position: At rest;Lying)   Pulse (!) 119   Temp (!) 100.5 °F (38.1 °C)   Resp 17   Ht 5' 6\" (1.676 m)   Wt 104.3 kg (230 lb)   SpO2 93%   BMI 37.12 kg/m²    O2 Flow Rate (L/min): 60 l/min O2 Device: Endotracheal tube, Ventilator Temp (24hrs), Av.5 °F (37.5 °C), Min:98.3 °F (36.8 °C), Max:101.5 °F (38.6 °C)           Intake/Output:     Intake/Output Summary (Last 24 hours) at 10/6/2022 1418  Last data filed at 10/6/2022 1345  Gross per 24 hour   Intake 1381.05 ml   Output 3350 ml   Net -1968.95 ml         PE under tele audio/video assessment:    Gen: On Fentanyl and Propofol   HEENT:  intubated  Chest: symmetrical chest rise at 16/min  CV:Sinus at 97bpm  Abd: soft  Ext: +ve edema  Neuro: opens eyes to voice, nonfocal    LABS AND  DATA: Personally reviewed  Recent Labs     10/06/22  0059 10/05/22  0125   WBC 8.0 8.1   HGB 9.3* 9.1*   HCT 28.9* 28.3*    233       Recent Labs     10/06/22  0059 10/05/22  0125    142   K 3.5 3.4*    108   CO2 31 31   BUN 14 15   CREA 0.72 0.69   * 103*   CA 8.5 8.7   MG 2.2 2.3   PHOS 3.3 3.4       Recent Labs     10/06/22  0059   AP 78   TP 6.0*   ALB 2.0*   GLOB 4.0     No results for input(s): INR, PTP, APTT, INREXT, INREXT in the last 72 hours. No results for input(s): PHI, PCO2I, PO2I, FIO2I in the last 72 hours. No results for input(s): CPK, CKMB, TROIQ, BNPP in the last 72 hours. Hemodynamics:   PAP:   CO:     Wedge:   CI:     CVP:    SVR:       PVR:       Ventilator Settings:  Mode Rate Tidal Volume Pressure FiO2 PEEP   Assist control   400 ml  5 cm H2O 50 % 5 cm H20     Peak airway pressure: 29 cm H2O    Minute ventilation: 11.1 l/min        MEDS: Reviewed    Chest X-Ray:  CXR Results  (Last 48 hours)                 10/06/22 0915  XR CHEST PORT Final result    Impression:      Overall improvement in interstitial and airspace opacities bilaterally with some   residual. Small right pleural effusion and possible small left pleural effusion. Narrative:  EXAM:  XR CHEST PORT       INDICATION: Ventilated patient. Bilateral pneumonia. COMPARISON: 10/3/2022       TECHNIQUE: Semiupright portable chest AP view       FINDINGS: Endotracheal tube is 2.9 cm above the lópez. Other tubes and lines   are stable. Cardiac monitoring leads. Lung volumes remain low. Heart size   enlarged.  Mild interstitial and airspace opacities bilaterally with some   improvement in the interval.        Possible small right pleural effusion which may be new. There may be a small   left pleural effusion as well. The visualized bones and upper abdomen are   age-appropriate.                    ABCDEF Bundle/Checklist Completed:  Yes    DISPOSITION  Stay in ICU    Critical Care Time  43min CCT Discussed with bedside RN and IDR team

## 2022-10-07 ENCOUNTER — PATIENT OUTREACH (OUTPATIENT)
Dept: OTHER | Age: 57
End: 2022-10-07

## 2022-10-07 ENCOUNTER — APPOINTMENT (OUTPATIENT)
Dept: GENERAL RADIOLOGY | Age: 57
DRG: 064 | End: 2022-10-07
Attending: INTERNAL MEDICINE
Payer: COMMERCIAL

## 2022-10-07 LAB
ANION GAP SERPL CALC-SCNC: 4 MMOL/L (ref 5–15)
BUN SERPL-MCNC: 15 MG/DL (ref 6–20)
BUN/CREAT SERPL: 23 (ref 12–20)
CALCIUM SERPL-MCNC: 8.8 MG/DL (ref 8.5–10.1)
CHLORIDE SERPL-SCNC: 108 MMOL/L (ref 97–108)
CO2 SERPL-SCNC: 30 MMOL/L (ref 21–32)
CREAT SERPL-MCNC: 0.65 MG/DL (ref 0.55–1.02)
ERYTHROCYTE [DISTWIDTH] IN BLOOD BY AUTOMATED COUNT: 14.5 % (ref 11.5–14.5)
GLUCOSE SERPL-MCNC: 115 MG/DL (ref 65–100)
HCT VFR BLD AUTO: 28.4 % (ref 35–47)
HGB BLD-MCNC: 9.1 G/DL (ref 11.5–16)
MCH RBC QN AUTO: 29.3 PG (ref 26–34)
MCHC RBC AUTO-ENTMCNC: 32 G/DL (ref 30–36.5)
MCV RBC AUTO: 91.3 FL (ref 80–99)
NRBC # BLD: 0 K/UL (ref 0–0.01)
NRBC BLD-RTO: 0 PER 100 WBC
PLATELET # BLD AUTO: 283 K/UL (ref 150–400)
PMV BLD AUTO: 10.9 FL (ref 8.9–12.9)
POTASSIUM SERPL-SCNC: 3.5 MMOL/L (ref 3.5–5.1)
RBC # BLD AUTO: 3.11 M/UL (ref 3.8–5.2)
SODIUM SERPL-SCNC: 142 MMOL/L (ref 136–145)
TRIGL SERPL-MCNC: 255 MG/DL (ref ?–150)
WBC # BLD AUTO: 8 K/UL (ref 3.6–11)

## 2022-10-07 PROCEDURE — 74011250637 HC RX REV CODE- 250/637: Performed by: INTERNAL MEDICINE

## 2022-10-07 PROCEDURE — 74011250636 HC RX REV CODE- 250/636: Performed by: STUDENT IN AN ORGANIZED HEALTH CARE EDUCATION/TRAINING PROGRAM

## 2022-10-07 PROCEDURE — C1751 CATH, INF, PER/CENT/MIDLINE: HCPCS

## 2022-10-07 PROCEDURE — 74011250637 HC RX REV CODE- 250/637: Performed by: PSYCHIATRY & NEUROLOGY

## 2022-10-07 PROCEDURE — 94761 N-INVAS EAR/PLS OXIMETRY MLT: CPT

## 2022-10-07 PROCEDURE — 36573 INSJ PICC RS&I 5 YR+: CPT | Performed by: INTERNAL MEDICINE

## 2022-10-07 PROCEDURE — 74011000258 HC RX REV CODE- 258: Performed by: INTERNAL MEDICINE

## 2022-10-07 PROCEDURE — 95816 EEG AWAKE AND DROWSY: CPT | Performed by: PSYCHIATRY & NEUROLOGY

## 2022-10-07 PROCEDURE — 77030018786 HC NDL GD F/USND BARD -B

## 2022-10-07 PROCEDURE — 85027 COMPLETE CBC AUTOMATED: CPT

## 2022-10-07 PROCEDURE — 74011000250 HC RX REV CODE- 250: Performed by: INTERNAL MEDICINE

## 2022-10-07 PROCEDURE — 71045 X-RAY EXAM CHEST 1 VIEW: CPT

## 2022-10-07 PROCEDURE — 36415 COLL VENOUS BLD VENIPUNCTURE: CPT

## 2022-10-07 PROCEDURE — 74011250636 HC RX REV CODE- 250/636: Performed by: INTERNAL MEDICINE

## 2022-10-07 PROCEDURE — 74011250636 HC RX REV CODE- 250/636: Performed by: NURSE PRACTITIONER

## 2022-10-07 PROCEDURE — 77030018798 HC PMP KT ENTRL FED COVD -A

## 2022-10-07 PROCEDURE — 65610000006 HC RM INTENSIVE CARE

## 2022-10-07 PROCEDURE — 99233 SBSQ HOSP IP/OBS HIGH 50: CPT | Performed by: PSYCHIATRY & NEUROLOGY

## 2022-10-07 PROCEDURE — 77030029065 HC DRSG HEMO QCLOT ZMED -B

## 2022-10-07 PROCEDURE — 2709999900 HC NON-CHARGEABLE SUPPLY

## 2022-10-07 PROCEDURE — 76937 US GUIDE VASCULAR ACCESS: CPT

## 2022-10-07 PROCEDURE — 84478 ASSAY OF TRIGLYCERIDES: CPT

## 2022-10-07 PROCEDURE — 74011250637 HC RX REV CODE- 250/637: Performed by: NURSE PRACTITIONER

## 2022-10-07 PROCEDURE — 94003 VENT MGMT INPAT SUBQ DAY: CPT

## 2022-10-07 PROCEDURE — 02H633Z INSERTION OF INFUSION DEVICE INTO RIGHT ATRIUM, PERCUTANEOUS APPROACH: ICD-10-PCS | Performed by: RADIOLOGY

## 2022-10-07 PROCEDURE — 80048 BASIC METABOLIC PNL TOTAL CA: CPT

## 2022-10-07 RX ORDER — FUROSEMIDE 10 MG/ML
40 INJECTION INTRAMUSCULAR; INTRAVENOUS ONCE
Status: COMPLETED | OUTPATIENT
Start: 2022-10-07 | End: 2022-10-07

## 2022-10-07 RX ORDER — DEXMEDETOMIDINE HYDROCHLORIDE 4 UG/ML
.1-1.5 INJECTION, SOLUTION INTRAVENOUS
Status: DISCONTINUED | OUTPATIENT
Start: 2022-10-07 | End: 2022-10-10

## 2022-10-07 RX ORDER — NOREPINEPHRINE BITARTRATE/D5W 8 MG/250ML
.5-3 PLASTIC BAG, INJECTION (ML) INTRAVENOUS
Status: DISCONTINUED | OUTPATIENT
Start: 2022-10-07 | End: 2022-10-11

## 2022-10-07 RX ADMIN — LEVOTHYROXINE SODIUM 88 MCG: 0.09 TABLET ORAL at 08:05

## 2022-10-07 RX ADMIN — MIDAZOLAM 2 MG: 1 INJECTION, SOLUTION INTRAMUSCULAR; INTRAVENOUS at 01:42

## 2022-10-07 RX ADMIN — DEXMEDETOMIDINE HYDROCHLORIDE 0.4 MCG/KG/HR: 4 INJECTION, SOLUTION INTRAVENOUS at 11:17

## 2022-10-07 RX ADMIN — Medication 10 ML: at 13:21

## 2022-10-07 RX ADMIN — PIPERACILLIN AND TAZOBACTAM 3.38 G: 3; .375 INJECTION, POWDER, LYOPHILIZED, FOR SOLUTION INTRAVENOUS at 15:26

## 2022-10-07 RX ADMIN — CHLORHEXIDINE GLUCONATE 15 ML: 1.2 RINSE ORAL at 08:14

## 2022-10-07 RX ADMIN — ENOXAPARIN SODIUM 30 MG: 100 INJECTION SUBCUTANEOUS at 22:02

## 2022-10-07 RX ADMIN — MIDODRINE HYDROCHLORIDE 15 MG: 5 TABLET ORAL at 22:02

## 2022-10-07 RX ADMIN — SENNOSIDES AND DOCUSATE SODIUM 1 TABLET: 50; 8.6 TABLET ORAL at 08:05

## 2022-10-07 RX ADMIN — MIDAZOLAM 2 MG: 1 INJECTION, SOLUTION INTRAMUSCULAR; INTRAVENOUS at 20:41

## 2022-10-07 RX ADMIN — Medication 150 MCG/HR: at 05:48

## 2022-10-07 RX ADMIN — ENOXAPARIN SODIUM 30 MG: 100 INJECTION SUBCUTANEOUS at 11:19

## 2022-10-07 RX ADMIN — MIDAZOLAM 2 MG: 1 INJECTION, SOLUTION INTRAMUSCULAR; INTRAVENOUS at 15:36

## 2022-10-07 RX ADMIN — MIDAZOLAM 2 MG: 1 INJECTION, SOLUTION INTRAMUSCULAR; INTRAVENOUS at 17:09

## 2022-10-07 RX ADMIN — MIDAZOLAM 2 MG: 1 INJECTION, SOLUTION INTRAMUSCULAR; INTRAVENOUS at 13:10

## 2022-10-07 RX ADMIN — MIDAZOLAM 2 MG: 1 INJECTION, SOLUTION INTRAMUSCULAR; INTRAVENOUS at 05:35

## 2022-10-07 RX ADMIN — MIDODRINE HYDROCHLORIDE 15 MG: 5 TABLET ORAL at 14:08

## 2022-10-07 RX ADMIN — LANSOPRAZOLE 30 MG: KIT at 08:05

## 2022-10-07 RX ADMIN — MIDAZOLAM 2 MG: 1 INJECTION, SOLUTION INTRAMUSCULAR; INTRAVENOUS at 10:15

## 2022-10-07 RX ADMIN — MIDODRINE HYDROCHLORIDE 15 MG: 5 TABLET ORAL at 06:00

## 2022-10-07 RX ADMIN — CHLORHEXIDINE GLUCONATE 15 ML: 1.2 RINSE ORAL at 22:02

## 2022-10-07 RX ADMIN — ATORVASTATIN CALCIUM 40 MG: 20 TABLET, FILM COATED ORAL at 22:02

## 2022-10-07 RX ADMIN — Medication 10 ML: at 22:03

## 2022-10-07 RX ADMIN — DEXMEDETOMIDINE HYDROCHLORIDE 1.1 MCG/KG/HR: 4 INJECTION, SOLUTION INTRAVENOUS at 15:29

## 2022-10-07 RX ADMIN — MIDAZOLAM 2 MG: 1 INJECTION, SOLUTION INTRAMUSCULAR; INTRAVENOUS at 22:11

## 2022-10-07 RX ADMIN — MIDAZOLAM 2 MG: 1 INJECTION, SOLUTION INTRAMUSCULAR; INTRAVENOUS at 14:12

## 2022-10-07 RX ADMIN — Medication 10 ML: at 05:39

## 2022-10-07 RX ADMIN — SENNOSIDES AND DOCUSATE SODIUM 1 TABLET: 50; 8.6 TABLET ORAL at 22:02

## 2022-10-07 RX ADMIN — PIPERACILLIN AND TAZOBACTAM 3.38 G: 3; .375 INJECTION, POWDER, LYOPHILIZED, FOR SOLUTION INTRAVENOUS at 23:27

## 2022-10-07 RX ADMIN — MIDAZOLAM 2 MG: 1 INJECTION, SOLUTION INTRAMUSCULAR; INTRAVENOUS at 07:54

## 2022-10-07 RX ADMIN — DEXMEDETOMIDINE HYDROCHLORIDE 1 MCG/KG/HR: 4 INJECTION, SOLUTION INTRAVENOUS at 22:02

## 2022-10-07 RX ADMIN — FUROSEMIDE 40 MG: 10 INJECTION, SOLUTION INTRAMUSCULAR; INTRAVENOUS at 11:20

## 2022-10-07 RX ADMIN — DEXMEDETOMIDINE HYDROCHLORIDE 1.1 MCG/KG/HR: 4 INJECTION, SOLUTION INTRAVENOUS at 18:23

## 2022-10-07 RX ADMIN — PROPOFOL 15 MCG/KG/MIN: 10 INJECTION, EMULSION INTRAVENOUS at 06:39

## 2022-10-07 RX ADMIN — ASPIRIN 81 MG: 81 TABLET, CHEWABLE ORAL at 08:05

## 2022-10-07 RX ADMIN — PIPERACILLIN AND TAZOBACTAM 3.38 G: 3; .375 INJECTION, POWDER, LYOPHILIZED, FOR SOLUTION INTRAVENOUS at 08:04

## 2022-10-07 RX ADMIN — Medication 150 MCG/HR: at 16:01

## 2022-10-07 NOTE — PROGRESS NOTES
0700 - Bedside shift change report given to Shanon Stark RN (oncoming nurse) by Darron Carrington, RN (offgoing nurse). Report included the following information SBAR, Kardex, ED Summary, Intake/Output, MAR, Accordion, Recent Results, Med Rec Status, and Cardiac Rhythm NSR .     0830 - Intensivist at bedside via video    0945 - PICC team at bedside. 7668 - IDR rounds completed with intensivist. Plan to start precedex today and wean propofol. Discussed patient's sensitivity of levo with MD and Dr. Francisco Bueno stated we can wean levo with SBP goal of 90 or better. 1900 - Bedside shift change report given to Darron Carrington RN (oncoming nurse) by Shanon Stark RN (offgoing nurse). Report included the following information SBAR, Kardex, ED Summary, Intake/Output, MAR, Accordion, Recent Results, Med Rec Status, and Cardiac Rhythm NSR .       1945 - Provided update to son Stanley Bolivar) via phone.

## 2022-10-07 NOTE — PROGRESS NOTES
[facility]    ICU TEAM Progress Note    Name: Jojo Lake   : 1965   MRN: [id number]   Date: 10/7/2022           ICU Assessment     Acute respiratory failure with hypoxia  Acute encephalopathy  Possible anoxic encephalopathy  Stress cardiomyopathy  Ischemic CVA    Plan:  Neuro  Add precedex to wean down propofol / fentanyl for DIS    Respiratory  Continue managing the mechanical ventilation/BiPAP for respiratory failure to prevent imminent deterioration and further organ dysfunction from hypoxia and hypercarbia. Serial ABGs. SBT once appropriate DIS    CV  Continue titrating intravenous pressors to prevent imminent deterioration and further organ dysfunction from hypotension  Keep MAP >65  Trend lactic acid      GI  N.p.o.  tube feeds per protocol  Stress ulcer prophylaxis Pepcid/PPI    Renal / Endo  Continue evaluation of fluid, electrolyte and acid base derangements to prevent deterioration of renal function, arrhythmias and death. Replace electrolytes per protocol  Avoid nephrotoxins  Accu-Cheks/sliding scale insulin to maintain euglycemia, blood sugars 140-180    Heme/Onc  Tx Hgb < 7, Plt<10k; transfuse as needed    ID   FU on Cx; cont broad abx coverage    DVT prophylaxis- lovenox    I performed all aspects of the physical examination via Telemedicine associated with two way audio and video communication and with the on-site assistance of Nurse. Patient is critically ill in the ICU. I  personally  reviewed the pertinent medical records, laboratory/ pathology data and radiographic images. The decision making regarding this patient is as documented above, which was generated  following  discussion  with the multidisciplinary team and creation of a treatment plan for  the patient. We discussed the patient's interval history and future coordination of care and  plans. The patient's medications  were reviewed and changes made as stipulated above.   Due to  critical illness impairing one or more vital organs of this patient resulting in life threatening clinical situation  I have provided direct, frequent personal  assessment and manipulation in management plan and spent 45  minutes  of  critical care time excluding the time spent on procedures and teaching. Greater than 50% of this time  in patient's care was  employed  in counseling and coordination of care and engaged in face to face discussion of case management issues, addressing questions, and outlining a plan of  therapy. 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 She was LKW the evening of 9/26. She did not report to work the am of 9/27 and she was found unresponsive at her home on wellness check. Bystander CPR was started, but she did have a pulse. EMS was called. Upon their arrival, SBP was 70s. Pupils were pinpoint, narcan was administered without improvement. She experienced seizure like activity and received 4mg IV versed. Upon arrival to the ED, she exhibited seizure like activity and L sided gaze preference. She required intubation for hypoxic respiratory failure. She was sedated on propofol. She became hypotensive and was started on levophed. LA 6.46. She was admitted to the ICU. She was started on empiric vanc/zosyn. Blood/sputum cultures ordered. CT head unrevealing. CT chest notable for only bibasilar atelectasis, cannot exclude small amount aspiration in lower lobes. CTAP unrevealing. Central line placed at bedside in ICU. She was noted to exhibit profound hypoxia. 7.33/44/73 on 100% FiO2 - not consistent with ARDS due to absence of bilateral infiltrates. Overnight Events:   10/7/2022  No change in Neuro status. On Prop 15/Fent 200  Currently on VC16/400/40/5 and Levo 2. Failed SBT yesterday  10/5/2022  On Lower dose of propofol,same dose of fentanyl. We attempted to wean sedation, but failed given vent dysnchrony. OFF Levophed this morning. On TF. Not following commands. On vent support: 15/400/45/5. Did Sbt for few hours yesterday. DW mother at bedside   10/04:  Remain on Fentanyl, on lower dose of propofol. On Levophed. Did SBT for 2 hours yesterday. On vent support 16/400/45/5. CXR reviewed. Ceftriaxone stopped and zosyn started. 10/03/22:  Sedated on Fentanyl and Propofol. On Levophed. Noted results of MRI. Temp 100.2 On vent support 16/400/45/5  10/02/22  SAHARA overnight. Patient remains restless on fentanyl and propofol. On minimal levophed likely because of sedation. Not opening eyes to stimulation. Doesn't follow commands. POD:  * No surgery found *    S/P:       Active Problem List:     Problem List  Date Reviewed: 10/5/2022            Codes Class    Anemia ICD-10-CM: D64.9  ICD-9-CM: 285.9         UTI (urinary tract infection) ICD-10-CM: N39.0  ICD-9-CM: 599.0         Hypokalemia ICD-10-CM: E87.6  ICD-9-CM: 276.8         Obese ICD-10-CM: E66.9  ICD-9-CM: 278.00         Acute CVA (cerebrovascular accident) ([facility] ICD-10-CM: I63.9  ICD-9-CM: 434.91         NSTEMI (non-ST elevated myocardial infarction) ([facility] ICD-10-CM: I21.4  ICD-9-CM: 410.70         Takotsubo cardiomyopathy ICD-10-CM: I51.81  ICD-9-CM: 429.83         Dyslipidemia ICD-10-CM: E78.5  ICD-9-CM: 272.4         Hypothyroidism ICD-10-CM: E03.9  ICD-9-CM: 244.9         KOBY (generalized anxiety disorder) ICD-10-CM: F41.1  ICD-9-CM: 300.02         * (Principal) Status epilepticus ([facility] ICD-10-CM: G40.901  ICD-9-CM: 345. 3         CHRIS (acute kidney injury) ([facility] ICD-10-CM: N17.9  ICD-9-CM: 584.9         Lactic acidosis ICD-10-CM: E87.20  ICD-9-CM: 276.2         Acute respiratory failure with hypoxia (HCC) ICD-10-CM: J96.01  ICD-9-CM: 518.81         Shock, cardiogenic (HCC) ICD-10-CM: R57.0  ICD-9-CM: 785.51         Acute metabolic encephalopathy FEV-66-OL: G93.41  ICD-9-CM: 348.31        Past Medical History: has a past medical history of History of vascular access device (10/07/2022). Past Surgical History:      has a past surgical history that includes ir insert non tunl cvc over 5 yrs (2022). Home Medications:     Prior to Admission medications    Medication Sig Start Date End Date Taking? Authorizing Provider   atorvastatin (LIPITOR) 10 mg tablet Take 10 mg by mouth daily. Yes Provider, Historical   furosemide (LASIX) 20 mg tablet Take 20 mg by mouth daily. Yes Provider, Historical   traZODone (DESYREL) 50 mg tablet Take 125 mg by mouth nightly. Yes Provider, Historical   levothyroxine (SYNTHROID) 88 mcg tablet Take 88 mcg by mouth Daily (before breakfast). Yes Provider, Historical   OTHER,NON-FORMULARY, ESTROGEN(5050)/TESTOSTERONE (HRT) 1.5mg/10mg/ml Cream APPLY 1 ML TO SKIN (INNER WRIST/ABDOMEN/THIGHS EVERY DAY. ROTATE SITES   Yes Provider, Historical   OTHER,NON-FORMULARY, Progesterone (ME4M) 250 mg at bedtime. Yes Provider, Historical       Allergies/Social/Family History:     No Known Allergies   Social History     Tobacco Use    Smoking status: Not on file    Smokeless tobacco: Not on file   Substance Use Topics    Alcohol use: Not on file      No family history on file. Review of Systems:     ROS is unobtainable as the patient is intubated.     Objective:   Vital Signs:  Visit Vitals  /77   Pulse 100   Temp 98.8 °F (37.1 °C)   Resp 16   Ht 5' 6\" (1.676 m)   Wt 104.3 kg (230 lb)   SpO2 95%   BMI 37.12 kg/m²    O2 Flow Rate (L/min): 60 l/min O2 Device: Endotracheal tube, Ventilator Temp (24hrs), Av.6 °F (37.6 °C), Min:98.1 °F (36.7 °C), Max:101.5 °F (38.6 °C)           Intake/Output:     Intake/Output Summary (Last 24 hours) at 10/7/2022 1135  Last data filed at 10/7/2022 0819  Gross per 24 hour   Intake 751.09 ml   Output 2590 ml   Net -1838.91 ml         PE under tele audio/video assessment:    Gen: On Fentanyl and Propofol   HEENT:  intubated  Chest: symmetrical chest rise   CV:Sinus tachy  Abd: soft  Ext: +ve edema  Neuro: opens eyes to voice, nonfocal    LABS AND  DATA: Personally reviewed  Recent Labs     10/07/22  0102 10/06/22  0059   WBC 8.0 8.0   HGB 9.1* 9.3*   HCT 28.4* 28.9*    260       Recent Labs     10/07/22  0102 10/06/22  0059 10/05/22  0125    142 142   K 3.5 3.5 3.4*    108 108   CO2 30 31 31   BUN 15 14 15   CREA 0.65 0.72 0.69   * 101* 103*   CA 8.8 8.5 8.7   MG  --  2.2 2.3   PHOS  --  3.3 3.4       Recent Labs     10/06/22  0059   AP 78   TP 6.0*   ALB 2.0*   GLOB 4.0       No results for input(s): INR, PTP, APTT, INREXT, INREXT in the last 72 hours. No results for input(s): PHI, PCO2I, PO2I, FIO2I in the last 72 hours. No results for input(s): CPK, CKMB, TROIQ, BNPP in the last 72 hours. Hemodynamics:   PAP:   CO:     Wedge:   CI:     CVP:    SVR:       PVR:       Ventilator Settings:  Mode Rate Tidal Volume Pressure FiO2 PEEP   Assist control   400 ml  5 cm H2O 40 % 5 cm H20     Peak airway pressure: 26 cm H2O    Minute ventilation: 6.62 l/min        MEDS: Reviewed    Chest X-Ray:  CXR Results  (Last 48 hours)                 10/07/22 1119  XR CHEST PORT Final result    Impression:  1. Right upper extremity PICC line tip projects over the right atrium. No   visualized pneumothorax. Narrative:  EXAM:  XR CHEST PORT       INDICATION: Verify PICC Tip placement please       COMPARISON: One day prior. TECHNIQUE: Single frontal view of the chest.       FINDINGS: Right upper extremity PICC line with tip projecting over the right   atrium. Existing right IJ catheter unchanged with tip also projecting over the   right atrium. Endotracheal tube terminates 2 cm from the lópez. Enteric tube   terminates below the level the diaphragm outside the field of view study. Slightly improved aeration of the lung bases. Possible trace residual effusions. No visualized pneumothorax. . Lungs are hypoventilatory. 10/06/22 0915  XR CHEST PORT Final result    Impression:      Overall improvement in interstitial and airspace opacities bilaterally with some   residual. Small right pleural effusion and possible small left pleural effusion. Narrative:  EXAM:  XR CHEST PORT       INDICATION: Ventilated patient. Bilateral pneumonia. COMPARISON: 10/3/2022       TECHNIQUE: Semiupright portable chest AP view       FINDINGS: Endotracheal tube is 2.9 cm above the lópez. Other tubes and lines   are stable. Cardiac monitoring leads. Lung volumes remain low. Heart size   enlarged. Mild interstitial and airspace opacities bilaterally with some   improvement in the interval.        Possible small right pleural effusion which may be new. There may be a small   left pleural effusion as well. The visualized bones and upper abdomen are   age-appropriate.                    ABCDEF Bundle/Checklist Completed:  Yes    DISPOSITION  Stay in ICU

## 2022-10-07 NOTE — PROGRESS NOTES
Comprehensive Nutrition Assessment    Type and Reason for Visit: Reassess    Nutrition Recommendations/Plan:   Continue TF at goal    Vital HP @ 42 mL hour  Provide 2 Prosource/day, flush with 15 mL H2O before and after  FWF 95 mL q 2 hours  No BM x 9 days      Malnutrition Assessment:  Malnutrition Status:  Mild malnutrition (10/07/22 1324)    Context:  Acute illness     Findings of the 6 clinical characteristics of malnutrition:   Energy Intake:  No significant decrease in energy intake (TF)  Weight Loss:  Unable to assess     Body Fat Loss:  No significant body fat loss,     Muscle Mass Loss:  No significant muscle mass loss,    Fluid Accumulation:  Moderate to severe, Extremities   Strength:  Not performed     Nutrition Assessment:     10/7: Follow up. Tolerating tube feed at goal. Per IDR discussion, no plan to SBT today. Propofol off. Edema continues. Patient with > 2 L UO over last several days, negative fluid balance. +3 Pitting edema continues in all extremities. Adding 1 time dose of lasix. PICC placement. New TF at goal 45 mL provides 1000 kcal (+ Propofol, 1411 kcal, 100% needs), 111 g carbs; 87 g protein (+ 2 Prosource, 109 g protein, 100% needs). TF + FWF provides 1976 mL H2O day. Last 3 Recorded Weights in this Encounter    09/27/22 1119 09/27/22 1548   Weight: 104.3 kg (230 lb) 104.3 kg (230 lb)       Nutrition Related Findings:      Wound Type: None  Last Bowel Movement Date:  (unknown)  Abdominal Assessment: Obese, Soft  Bowel Sounds: Active   Edema:Generalized: Non-pitting (10/6/2022  7:30 PM)  LLE: 3+ (10/7/2022  7:30 AM)  LUE: 3+ (10/7/2022  7:30 AM)  RLE: 3+ (10/7/2022  7:30 AM)  RUE: 3+ (10/7/2022  7:30 AM)      Nutr.  Labs:  Lab Results   Component Value Date/Time    GFR est AA >60 10/03/2022 01:13 AM    GFR est non-AA >60 10/03/2022 01:13 AM    Creatinine, POC 1.6 (H) 09/27/2022 06:45 PM    Creatinine 0.65 10/07/2022 01:02 AM    BUN 15 10/07/2022 01:02 AM    Sodium,  09/27/2022 06:45 PM    Sodium 142 10/07/2022 01:02 AM    Potassium 3.5 10/07/2022 01:02 AM    Potassium, POC 4.0 09/27/2022 06:45 PM    Chloride,  (H) 09/27/2022 06:45 PM    Chloride 108 10/07/2022 01:02 AM    CO2 30 10/07/2022 01:02 AM       Lab Results   Component Value Date/Time    Glucose 115 (H) 10/07/2022 01:02 AM    Glucose (POC) 110 10/05/2022 04:55 PM       Lab Results   Component Value Date/Time    Hemoglobin A1c 6.1 (H) 09/29/2022 03:27 AM     Magnesium   Date Value Ref Range Status   10/06/2022 2.2 1.6 - 2.4 mg/dL Final   10/05/2022 2.3 1.6 - 2.4 mg/dL Final   10/04/2022 2.2 1.6 - 2.4 mg/dL Final   10/03/2022 2.2 1.6 - 2.4 mg/dL Final   10/02/2022 2.2 1.6 - 2.4 mg/dL Final     Lab Results   Component Value Date/Time    Calcium 8.8 10/07/2022 01:02 AM    Phosphorus 3.3 10/06/2022 12:59 AM       Nutr. Meds:  Lipitor, Peridex, Precedex, Lovenox, prevacid, synthroid, versed PRN, midodrine, levophed*, zofran PRN, zosyn, miralax PRN, propofol, pericolace      Current Nutrition Intake & Therapies:  Average Meal Intake: NPO  Average Supplement Intake: NPO  DIET NPO  ADULT TUBE FEEDING Orogastric; Peptide Based High Protein; Delivery Method: Continuous; Continuous Initial Rate (mL/hr): 42; Continuous Advance Tube Feeding: No; Water Flush Volume (mL): 95; Water Flush Frequency: Other (Specify); Specify Other Wa. .. Anthropometric Measures:  Height: 5' 6\" (167.6 cm)  Ideal Body Weight (IBW): 130 lbs (59 kg)     Current Body Wt:  104.3 kg (230 lb), 176.9 % IBW. Not specified  Current BMI (kg/m2): 37.1        Weight Adjustment: No adjustment                 BMI Category: Obese class 2 (BMI 35.0-39. 9)    Estimated Daily Nutrient Needs:  Energy Requirements Based On: Kcal/kg  Weight Used for Energy Requirements: Current  Energy (kcal/day): 0497-6124 (11-14 kcal/kg)  Weight Used for Protein Requirements: Ideal  Protein (g/day):  (1.5-2.0 g/kg IBW)  Method Used for Fluid Requirements: 1 ml/kcal  Fluid (ml/day): 3597-4606    Nutrition Diagnosis:   Inadequate oral intake related to inadequate protein-energy intake as evidenced by NPO or clear liquid status due to medical condition, intubation  Impaired nutrient utilization related to alerted GI function as evidenced by localized or generalized fluid accumulation     Nutrition Interventions:   Food and/or Nutrient Delivery: Continue tube feeding  Nutrition Education/Counseling: No recommendations at this time  Coordination of Nutrition Care: Continue to monitor while inpatient, Interdisciplinary rounds  Plan of Care discussed with: IDR team    Goals:  Previous Goal Met: Goal(s) achieved  Goals: Tolerate nutrition support at goal rate, by next RD assessment  Specify Other Goals: GOC determined within 2 - 3 days    Nutrition Monitoring and Evaluation:   Behavioral-Environmental Outcomes: None identified  Food/Nutrient Intake Outcomes: Enteral nutrition intake/tolerance  Physical Signs/Symptoms Outcomes: Biochemical data, Hemodynamic status, Weight    Discharge Planning:     Too soon to determine    Ankita Ramesh MS, RD  Contact: Ext: [phone], or via [employer]

## 2022-10-07 NOTE — PROGRESS NOTES
[facility]  [address], [place], [facility]  [phone]    Medical Progress Note      NAME: Ren Piper   :  1965  MRM:  [id number]    Date/Time of service 10/7/2022  8:11 AM         Assessment and Plan:     Acute metabolic encephalopathy - POA, unclear etiology. \"2 small foci of diffusion restriction in the right frontal lobe \" noted on MRI but unlikely this explains KHADAR. Initial concern for Sz, but EEG bland, keppra stopped. Concern remains she had anoxic brain injury. Will assess after extubation and stopping sedation. Neurology consulted. Stop lidoderm    Takotsubo cardiomyopathy / NSTEMI (non-ST elevated myocardial infarction) - POA, unclear initial driving event. Cardiology consulted. Too unstable for cath. Repeat ECHO per cardiology. Unable to tolerate BB or ACE due to low BP    Cardiogenic shock - Intially presumed septic shock until ECHO showed new EF 30%. Currently still requiring levophed IV gtt as pressor. On midodrine    Acute respiratory failure with hypoxia - POA, persistent. Unclear if aspiration PNA vs central resp depression. Completed Zosyn. Intubated on admit. Intensivist consulted and is managing vent. Still requring fentanyl and propofol gtt for sedation. She is failing SBT so far. Acute CVA (cerebrovascular accident) - MRI notes two small lesions. Unclear significance and timing. Neurology to assess after extubation and off sedation. Continue ASA and statin. Anemia - POA and slowly worsening likely due to acute illness. UTI (urinary tract infection) - Pitt sensitive E coli on initial contaminated cx. Had E coli in contaminated sputum along with much normal josue. Has completed course of Zosyn. CHRIS (acute kidney injury) / Lactic acidosis / Hypokalemia - POA: unknown baseline Cr. Currently Cr stable. Status epilepticus - Reject Dx.  Gricelda Montague had myoclonic jerking    Dyslipidemia - On atorvastatin    Hypothyroidism - TSH normal.     KOBY (generalized anxiety disorder) - Noted benzo on drug screen on admit, but had Versed for Sz    Obese - Advise weight loss of she survives. Subjective:     Chief Complaint:  intubated, no change    ROS:  (bold if positive, if negative)    Not Tolerating PT  Not Tolerating Diet        Objective:     Last 24hrs VS reviewed since prior progress note.  Most recent are:    Visit Vitals  /60   Pulse 66   Temp 98.8 °F (37.1 °C)   Resp 16   Ht 5' 6\" (1.676 m)   Wt 104.3 kg (230 lb)   SpO2 93%   BMI 37.12 kg/m²     SpO2 Readings from Last 6 Encounters:   10/07/22 93%    O2 Flow Rate (L/min): 60 l/min     Intake/Output Summary (Last 24 hours) at 10/7/2022 9924  Last data filed at 10/7/2022 0730  Gross per 24 hour   Intake 591.09 ml   Output 2990 ml   Net -2398.91 ml          Physical Exam:    Gen:  Obese, in no acute distress  HEENT:  Pink conjunctivae, PERRL, hearing not intact to voice, ET in place  Neck:  Supple, without masses, thyroid non-tender  Resp:  No accessory muscle use, bilateral coarse breath sounds   Card:  No murmurs, tachycardic S1, S2 without thrills, bruits or peripheral edema  Abd:  Soft, non-tender, non-distended, reduced bowel sounds are present, no mass  Lymph:  No cervical or inguinal adenopathy  Musc:  No cyanosis or clubbing  Skin:  No rashes or ulcers, skin turgor is good  Neuro:  Cranial nerves are grossly intact, general motor weakness, dose not follow commands appropriately  Psych:  Sedated    Telemetry reviewed:   normal sinus rhythm  __________________________________________________________________  Medications Reviewed: (see below)  Medications:     Current Facility-Administered Medications   Medication Dose Route Frequency    levothyroxine (SYNTHROID) tablet 88 mcg  88 mcg Per G Tube DAILY    midazolam (VERSED) injection 2 mg  2 mg IntraVENous Q1H PRN    senna-docusate (PERICOLACE) 8.6-50 mg per tablet 1 Tablet  1 Tablet Oral BID    piperacillin-tazobactam (ZOSYN) 3.375 g in 0.9% sodium chloride (MBP/ADV) 100 mL MBP  3.375 g IntraVENous Q8H    fentaNYL citrate (PF) injection 100 mcg  100 mcg IntraVENous Q4H PRN    midodrine (PROAMATINE) tablet 15 mg  15 mg Oral Q8H    atorvastatin (LIPITOR) tablet 40 mg  40 mg Oral QHS    enoxaparin (LOVENOX) injection 30 mg  30 mg SubCUTAneous Q12H    fentaNYL (PF) 1,500 mcg/30 mL (50 mcg/mL) infusion  0-200 mcg/hr IntraVENous TITRATE    aspirin chewable tablet 81 mg  81 mg Oral DAILY    lansoprazole compounding kit (PREVACID) 3 mg/mL oral suspension 30 mg  30 mg Oral DAILY    propofol (DIPRIVAN) 10 mg/mL infusion  0-50 mcg/kg/min IntraVENous TITRATE    NOREPINephrine (LEVOPHED) 8 mg in 5% dextrose 250mL (32 mcg/mL) infusion  0.5-30 mcg/min IntraVENous TITRATE    sodium chloride (NS) flush 5-40 mL  5-40 mL IntraVENous Q8H    sodium chloride (NS) flush 5-40 mL  5-40 mL IntraVENous PRN    acetaminophen (TYLENOL) tablet 650 mg  650 mg Oral Q6H PRN    Or    acetaminophen (TYLENOL) suppository 650 mg  650 mg Rectal Q6H PRN    polyethylene glycol (MIRALAX) packet 17 g  17 g Oral DAILY PRN    ondansetron (ZOFRAN) injection 4 mg  4 mg IntraVENous Q6H PRN    chlorhexidine (PERIDEX) 0.12 % mouthwash 15 mL  15 mL Oral Q12H        Lab Data Reviewed: (see below)  Lab Review:     Recent Labs     10/07/22  0102 10/06/22  0059 10/05/22  0125   WBC 8.0 8.0 8.1   HGB 9.1* 9.3* 9.1*   HCT 28.4* 28.9* 28.3*    260 233       Recent Labs     10/07/22  0102 10/06/22  0059 10/05/22  0125    142 142   K 3.5 3.5 3.4*    108 108   CO2 30 31 31   * 101* 103*   BUN 15 14 15   CREA 0.65 0.72 0.69   CA 8.8 8.5 8.7   MG  --  2.2 2.3   PHOS  --  3.3 3.4   ALB  --  2.0*  --    TBILI  --  0.3  --    ALT  --  35  --        Lab Results   Component Value Date/Time    Glucose (POC) 110 10/05/2022 04:55 PM    Glucose (POC) 87 10/05/2022 11:02 AM    Glucose (POC) 78 10/05/2022 05:00 AM    Glucose (POC) 94 10/05/2022 04:57 AM    Glucose (POC) 124 (H) 10/04/2022 11:03 PM     Recent Labs     10/06/22  0846   PH 7.42   PCO2 46*   PO2 60*   HCO3 29*   FIO2 50     No results for input(s): INR, INREXT, INREXT in the last 72 hours.   All Micro Results       Procedure Component Value Units Date/Time    CULTURE, RESPIRATORY/SPUTUM/BRONCH Vertis Nadja [[id number]] Collected: 10/03/22 1300    Order Status: Completed Specimen: Sputum Updated: 10/05/22 1143     Special Requests: NO SPECIAL REQUESTS        GRAM STAIN RARE WBCS SEEN               OCCASIONAL EPITHELIAL CELLS SEEN                  OCCASIONAL GRAM NEGATIVE RODS                  RARE GRAM POSITIVE COCCI IN PAIRS           Culture result:       HEAVY NORMAL RESPIRATORY CATHEI          CULTURE, BLOOD, PAIRED [[id number]] Collected: 09/27/22 1251    Order Status: Completed Specimen: Blood Updated: 10/02/22 0640     Special Requests: NO SPECIAL REQUESTS        Culture result: NO GROWTH 5 DAYS       CULTURE, URINE [[id number]]  (Abnormal)  (Susceptibility) Collected: 09/27/22 1251    Order Status: Completed Specimen: Cath Urine Updated: 09/30/22 1502     Special Requests: NO SPECIAL REQUESTS        [place] Count --        >100,000  COLONIES/mL       Culture result: ESCHERICHIA COLI       CULTURE, RESPIRATORY/SPUTUM/BRONCH [place] STAIN [[id number]]  (Abnormal)  (Susceptibility) Collected: 09/27/22 1811    Order Status: Completed Specimen: Sputum Updated: 09/30/22 0930     Special Requests: NO SPECIAL REQUESTS        GRAM STAIN 1+ WBCS SEEN               1+ GRAM POSITIVE COCCI IN CLUSTERS            1+ GRAM VARIABLE RODS        Culture result: HEAVY ESCHERICHIA COLI               HEAVY NORMAL RESPIRATORY CATHIE                  MODERATE YEAST (APPARENT MULTIPLE COLONY TYPES)          CULTURE, MRSA [[id number]] Collected: 09/27/22 1440    Order Status: Completed Specimen: Nasal from Nares Updated: 09/29/22 0820     Special Requests: NO SPECIAL REQUESTS        Culture result: MRSA NOT PRESENT               Screening of patient nares for MRSA is for surveillance purposes and, if positive, to facilitate isolation considerations in high risk settings. It is not intended for automatic decolonization interventions per se as regimens are not sufficiently effective to warrant routine use. GAETANO Arvizu Ana, UR/CSF [[id number]] Collected: 22 1431    Order Status: Completed Specimen: Miscellaneous sample Updated: 22 1336     Source URINE        Specimen Urine     Streptococcus pneumoniae Ag Negative        Fluid culture Not indicated. Organism ID Not indicated. Please note Comment        Comment: (NOTE)  College of American Pathologists standards require a culture to be  performed on CSF specimens submitted for bacterial antigen testing. (CAP [id number]) Urine specimens will not be cultured. Performed At: [facility]  [facility] [employer] [address] [place] [id number]  Julio Cesar Maguire MD D         Misty Harrington [[id number]] Collected: 22 1530    Order Status: Completed Specimen: Urine Updated: 22 1336     Source URINE        L pneumophila S1 Ag, urine Negative        Comment: (NOTE)  Presumptive negative for L. pneumophila serogroup 1 antigen in urine,  suggesting no recent or current infection. Legionnaires' disease  cannot be ruled out since other serogroups and species may also  cause disease.   Performed At: [facility]  [facility] [address] [id number]  Julio Cesar Maguire MD BY:[id number]         RESPIRATORY VIRUS PANEL W/COVID-19, PCR [[id number]] Collected: 22 1440    Order Status: Completed Specimen: Nasopharyngeal Updated: 22 2214     Adenovirus Not detected        Coronavirus 229E Not detected        Coronavirus HKU1 Not detected        Coronavirus CVNL63 Not detected        Coronavirus OC43 Not detected        SARS-CoV-2, PCR Not detected        Metapneumovirus Not detected        Rhinovirus and Enterovirus Not detected        Influenza A Not detected Influenza B Not detected        Parainfluenza 1 Not detected        Parainfluenza 2 Not detected        Parainfluenza 3 Not detected        Parainfluenza virus 4 Not detected        RSV by PCR Not detected        B. parapertussis, PCR Not detected        Bordetella pertussis - PCR Not detected        Chlamydophila pneumoniae DNA, QL, PCR Not detected        Mycoplasma pneumoniae DNA, QL, PCR Not detected       URINE CULTURE HOLD SAMPLE [[id number]] Collected: 09/27/22 1251    Order Status: Completed Specimen: Urine from Serum Updated: 09/27/22 1258     Urine culture hold       Urine on hold in Microbiology dept for 2 days. If unpreserved urine is submitted, it cannot be used for addtional testing after 24 hours, recollection will be required. Other pertinent lab: none    Total time spent with patient: 30 Minutes I personally reviewed chart, notes, data and current medications in the medical record. I have personally examined and treated the patient at bedside during this period. To assist coordination of care and communication with nursing and staff, this note may be preliminary early in the day, but finalized by end of the day.                  Care Plan discussed with: Patient, Care Manager, Nursing Staff, Consultant/Specialist, and >50% of time spent in counseling and coordination of care    Discussed:  Care Plan and D/C Planning    Prophylaxis:  Lovenox and H2B/PPI    Disposition:  SNF/LTC           ___________________________________________________    Attending Physician: Thony Crawford MD

## 2022-10-07 NOTE — PROGRESS NOTES
KODI note:    RUR 14%(low risk score)    Reason for Admission:  shock,found down by neighbor doing wellness check ,CPR initiated by EMS,last known well time was evening of 9/26/22        Transition of Care Plan:    Emergently intubated in ED (9/27/22)        Seizure-like activity @ home           Acute respiratory failure with hypoxia  Suspected status eilepticus  Acute metabolic encephalopathy  CHRIS  Acute systolic heart failure  Shock  Takotsubo CM  Cardiogenic shock  Suspected anoxic injury  Acute CVA  NSTEMI    Day 11 of intubation    [address] [facility]

## 2022-10-07 NOTE — PROGRESS NOTES
Telephonic outreach to the patient to determine current hospital status or complete a discharge assessment. Unable to leave a message at this time, as the mailbox is full. Will outreach next week in an attempt to contact the patient and follow for care management.

## 2022-10-07 NOTE — PROGRESS NOTES
Neurology - Inpatient Progress Note     Name:   Kevin Rosenberg  MRN:    [id number]  Gurmeet Jackson Date:   9/27/2022    Follow up:   AMS (see note from 10-5-2022 for details)    Interval History     EEG 10-6-2022: Severe generalized slowing consistent with an encephalopathic process, not specific as to cause. Recent sedation medication may be a contributing factor. Consider repeating EEG if clinically indicated, once patient is completely off of sedation. No seizure discharges or subclinical seizures were recorded. Clinical correlation is necessary. D/w RN  Continuous sedation has been discontinued  Pt on prn propofol and prn versed     Pt is resting in bed, intubated, has soft-wrist restraints on, moving all extremities, pulling against restraints at times. Opens eyes to voice. Seems to focus on me but not doesn't clearly turn eyes to right side. I move to left side of bed and talk to her again and she does look leftward briefly.         Brief ROS: As noted above         No Known Allergies    Current Facility-Administered Medications:     dexmedeTOMidine in 0.9 % NaCl (PRECEDEX) 400 mcg/100 mL (4 mcg/mL) infusion soln, 0.1-1.5 mcg/kg/hr, IntraVENous, TITRATE, Barbara Jiménez MD, Last Rate: 20.9 mL/hr at 10/07/22 1307, 0.8 mcg/kg/hr at 10/07/22 1307    NOREPINephrine (LEVOPHED) 8 mg in 5% dextrose 250mL (32 mcg/mL) infusion, 0.5-30 mcg/min, IntraVENous, TITRATE, Barbara Jiménez MD, Stopped at 10/07/22 1318    alteplase (CATHFLO) 1 mg in sterile water (preservative free) 1 mL injection, 1 mg, InterCATHeter, PRN, Nelly Fiore MD    levothyroxine (SYNTHROID) tablet 88 mcg, 88 mcg, Per G Tube, DAILY, Josue Busby MD, 88 mcg at 10/07/22 0805    midazolam (VERSED) injection 2 mg, 2 mg, IntraVENous, Q1H PRN, Barbara iJménez MD, 2 mg at 10/07/22 1310    senna-docusate (PERICOLACE) 8.6-50 mg per tablet 1 Tablet, 1 Tablet, Oral, BID, Kavya Tran MD, 1 Tablet at 10/07/22 6004 piperacillin-tazobactam (ZOSYN) 3.375 g in 0.9% sodium chloride (MBP/ADV) 100 mL MBP, 3.375 g, IntraVENous, Q8H, Caden Gay MD, Last Rate: 25 mL/hr at 10/07/22 0804, 3.375 g at 10/07/22 0804    fentaNYL citrate (PF) injection 100 mcg, 100 mcg, IntraVENous, Q4H PRN, Abdoul Gonzalez DO, 100 mcg at 10/06/22 1148    midodrine (PROAMATINE) tablet 15 mg, 15 mg, Oral, Q8H, Doylene Rho, NP, 15 mg at 10/07/22 0600    atorvastatin (LIPITOR) tablet 40 mg, 40 mg, Oral, QHS, Doylene Rho, NP, 40 mg at 10/06/22 2118    enoxaparin (LOVENOX) injection 30 mg, 30 mg, SubCUTAneous, Q12H, Doylene Rho, NP, 30 mg at 10/07/22 1119    fentaNYL (PF) 1,500 mcg/30 mL (50 mcg/mL) infusion, 0-200 mcg/hr, IntraVENous, TITRATE, Nathan Millan DO, Last Rate: 3 mL/hr at 10/07/22 0731, 150 mcg/hr at 10/07/22 0731    aspirin chewable tablet 81 mg, 81 mg, Oral, DAILY, KatrinCarolyn MD, 81 mg at 10/07/22 0805    lansoprazole compounding kit (PREVACID) 3 mg/mL oral suspension 30 mg, 30 mg, Oral, DAILY, Doylene Rho, NP, 30 mg at 10/07/22 0805    propofol (DIPRIVAN) 10 mg/mL infusion, 0-50 mcg/kg/min, IntraVENous, TITRATE, Tita Franco MD, Stopped at 10/07/22 1251    sodium chloride (NS) flush 5-40 mL, 5-40 mL, IntraVENous, Q8H, Luis Angel Christian MD, 10 mL at 10/07/22 1321    sodium chloride (NS) flush 5-40 mL, 5-40 mL, IntraVENous, PRN, Luis Angel Christian MD, 10 mL at 10/03/22 1413    acetaminophen (TYLENOL) tablet 650 mg, 650 mg, Oral, Q6H PRN, 650 mg at 10/06/22 1223 **OR** acetaminophen (TYLENOL) suppository 650 mg, 650 mg, Rectal, Q6H PRN, Luis Angel Christian MD, 650 mg at 10/03/22 1252    polyethylene glycol (MIRALAX) packet 17 g, 17 g, Oral, DAILY PRN, Luis Angel Christian MD    [DISCONTINUED] promethazine (PHENERGAN) tablet 12.5 mg, 12.5 mg, Oral, Q6H PRN **OR** ondansetron (ZOFRAN) injection 4 mg, 4 mg, IntraVENous, Q6H PRN, Luis Angel Christian MD    chlorhexidine (PERIDEX) 0.12 % mouthwash 15 mL, 15 mL, Oral, Q12H, Sony Acuna NP, 15 mL at 10/07/22 8120      PMHx: has a past medical history of History of vascular access device (10/07/2022). PSHx: has a past surgical history that includes ir insert non tunl cvc over 5 yrs (2022). Impression / Plan       ICD-10-CM ICD-9-CM   1. Status epilepticus (HCC)  G40.901 345.3   2. Acute respiratory failure with hypoxia (HCC)  J96.01 518.81   3. Acute renal insufficiency  N28.9 593.9   4. Seizure ([facility] [address] [R56.9 (ICD-10-CM)]  R56.9 780.39   5. Takotsubo cardiomyopathy  I51.81 429.83   6. Other cardiomyopathy ([facility] [address]  I42.8 425.4   7. Dyslipidemia  E78.5 272.4   8.  Encephalopathy  G93.40 348.30       Found down at home on 2022 by neighbor  Unknown how long she was down  Had Fentanyl patch 100 mcg/hour that was from her  husbands left over meds    Brain MRI x 2 (days apart) has not shown any evidence of hypoxic injury  There were 2 tiny foci of acute infarct in the right frontal lobe (don't account for her encephalopathy)  EEG has not demonstrated ongoing seizure    Some slight improvement in neurologic exam compared to yesterday (Eyes not fixed in midline, seems to have some purposeful eye movements)    Impression: metabolic encephalopathy      Continue supportive care  Continue ASA, Statin    Call Neurology back if any further questions        Signed By: Ayala Aguilar MD     2022

## 2022-10-07 NOTE — PROGRESS NOTES
PICC Placement Note    PRE-PROCEDURE VERIFICATION  Correct Procedure: yes  Correct Site:  yes  Temperature: Temp: 98.8 °F (37.1 °C), Temperature Source: Temp Source: Esophageal  Recent Labs     10/07/22  0102   BUN 15   CREA 0.65      WBC 8.0     Allergies: Patient has no known allergies. Education materials for PICC Care given: yes. See Patient Education activity for further details. PICC Booklet placed at bedside: yes    Closed Ended PICC Catheters:  Flush Lumens as Follows:  Intermittent Medication:   Flush before and after each medication with 10 ml NS. Unused Ports:  Flush every 8 hours with 10 ml NS.  TPN Ports:  Flush every 24 hours with 20 ml NS prior to hanging new bag. Blood Draws: Stop infusion, draw off and waste 10 ml of blood. Draw sample with 10cc syringe or greater. DO NOT USE VACUTAINER . Transfer with appropriate device to lab  tubes. Flush with 20 ml NS. Dressing Change:  Every 7 days, and PRN using sterile technique if integrity of dressing is compromised. Initial dressing change for central line 24-48 hours post insertion if gauze is used. Apply new dressing per policy. PROCEDURE DETAIL  Consent was obtained and all questions were answered related to risks and benefits. A triple lumen PICC line was inserted, as a sterile procedure using ultrasound and modified Seldinger technique for TPN and desire for reliable access. The following documentation is in addition to the PICC properties in the lines/airways flowsheet :  Lot #: [id number]  Lidocaine 1% administered intradermally :yes  Internal Catheter Total Length: 40 (cm)  Vein Selection for PICC:right cephalic  Central Line Bundle followed yes  Complication Related to Insertion:no    The placement was verified by EKG, MAX P WAVE @ 40 (cm) 0 (cm) PER EKG PICC TIP @ C/A junction. X-ray: yes.  The x-ray results state the tip location is on the right side and reads \"Right upper extremity PICC line tip projects over the right atrium. No  visualized pneumothorax. \" Discussed with Dr. Keyana Ramos and advised position is acceptable unless any ectopy then could be pulled back 2 CM. This information relayed to primary nurse Laura Dickson and she reports no ectopy noted at this time and verbalizes understanding.    Line is okay to use: yes    Mary Davidson RN

## 2022-10-07 NOTE — PROCEDURES
Name:   Donald Ghosh  Age/ Sex:   59 y.o. female     Procedure:   EEG     Date of Recording: 10-6-2022    Date of Interpretation:    10/07/22    Interpreting Physician: Concepción Estrada MD     Indication: persistent lethargy, found unresponsive at home on 9- by neighbor for unknown period of time, had fentanyl patch on. Had one seizure-like episode when given Narcan by EMS. Earlier EEGs in hospital did not show evidence of seizure activity. This EEG is being done to exclude non-convulsive seizure as a cause of pt's persistent lethargy, poor responsiveness    Current medications: has a current medication list which includes the following prescription(s): atorvastatin, furosemide, trazodone, levothyroxine, OTHER,NON-FORMULARY,, and OTHER,NON-FORMULARY,, and the following Facility-Administered Medications: dexmedetomidine in 0.9 % nacl, norepinephrine, alteplase (CATHFLO) 1 mg in sterile water (preservative free) 1 mL injection, levothyroxine, midazolam, senna-docusate, piperacillin-tazobactam (ZOSYN) 3.375 g in 0.9% sodium chloride (MBP/ADV) 100 mL MBP, fentanyl citrate (pf), midodrine, atorvastatin, enoxaparin, fentanyl (pf), aspirin, lansoprazole compounding kit, propofol, sodium chloride, sodium chloride, acetaminophen **OR** acetaminophen, polyethylene glycol, [DISCONTINUED] promethazine **OR** ondansetron, chlorhexidine. Technical: Digital EEG recorded in 10-20 international placement system, multiple montages    Interpretation:   Pt was on Propofol and Fentanyl drip (to control agitation) at the beginning of this recording. They were turned off 1 minute into the recording. There is muscle and electrode artifact as pt begins moving. For brief periods when pt is resting, the background frequencies are extremely slow, with the PDR being 1 to 2 Hz at most.  No epileptiform discharges are seen. Photic stimulation was not performed due to machine issue.   Nurse had to turn Fentanyl and Propofol back on by 13 minutes into the recording. Single lead EKG was poorly recorded. Impression:  Severe generalized slowing consistent with an encephalopathic process, not specific as to cause. Recent sedation medication may be a contributing factor. Consider repeating EEG if clinically indicated, once patient is completely off of sedation. No seizure discharges or subclinical seizures were recorded. Clinical correlation is necessary.

## 2022-10-08 LAB
ANION GAP SERPL CALC-SCNC: 6 MMOL/L (ref 5–15)
BASOPHILS # BLD: 0.1 K/UL (ref 0–0.1)
BASOPHILS NFR BLD: 1 % (ref 0–1)
BUN SERPL-MCNC: 18 MG/DL (ref 6–20)
BUN/CREAT SERPL: 29 (ref 12–20)
CALCIUM SERPL-MCNC: 9.1 MG/DL (ref 8.5–10.1)
CHLORIDE SERPL-SCNC: 106 MMOL/L (ref 97–108)
CO2 SERPL-SCNC: 27 MMOL/L (ref 21–32)
CREAT SERPL-MCNC: 0.63 MG/DL (ref 0.55–1.02)
DIFFERENTIAL METHOD BLD: ABNORMAL
EOSINOPHIL # BLD: 0.4 K/UL (ref 0–0.4)
EOSINOPHIL NFR BLD: 4 % (ref 0–7)
ERYTHROCYTE [DISTWIDTH] IN BLOOD BY AUTOMATED COUNT: 14.2 % (ref 11.5–14.5)
GLUCOSE SERPL-MCNC: 120 MG/DL (ref 65–100)
HCT VFR BLD AUTO: 32.1 % (ref 35–47)
HGB BLD-MCNC: 10.5 G/DL (ref 11.5–16)
IMM GRANULOCYTES # BLD AUTO: 0 K/UL (ref 0–0.04)
IMM GRANULOCYTES NFR BLD AUTO: 0 % (ref 0–0.5)
LYMPHOCYTES # BLD: 2.8 K/UL (ref 0.8–3.5)
LYMPHOCYTES NFR BLD: 27 % (ref 12–49)
MCH RBC QN AUTO: 30 PG (ref 26–34)
MCHC RBC AUTO-ENTMCNC: 32.7 G/DL (ref 30–36.5)
MCV RBC AUTO: 91.7 FL (ref 80–99)
MONOCYTES # BLD: 1 K/UL (ref 0–1)
MONOCYTES NFR BLD: 9 % (ref 5–13)
NEUTS SEG # BLD: 6.2 K/UL (ref 1.8–8)
NEUTS SEG NFR BLD: 59 % (ref 32–75)
NRBC # BLD: 0 K/UL (ref 0–0.01)
NRBC BLD-RTO: 0 PER 100 WBC
PLATELET # BLD AUTO: 376 K/UL (ref 150–400)
PMV BLD AUTO: 10.4 FL (ref 8.9–12.9)
POTASSIUM SERPL-SCNC: 3.3 MMOL/L (ref 3.5–5.1)
RBC # BLD AUTO: 3.5 M/UL (ref 3.8–5.2)
SODIUM SERPL-SCNC: 139 MMOL/L (ref 136–145)
WBC # BLD AUTO: 10.5 K/UL (ref 3.6–11)

## 2022-10-08 PROCEDURE — 80048 BASIC METABOLIC PNL TOTAL CA: CPT

## 2022-10-08 PROCEDURE — 74011250636 HC RX REV CODE- 250/636: Performed by: NURSE PRACTITIONER

## 2022-10-08 PROCEDURE — 74011250636 HC RX REV CODE- 250/636: Performed by: INTERNAL MEDICINE

## 2022-10-08 PROCEDURE — 74011250637 HC RX REV CODE- 250/637: Performed by: NURSE PRACTITIONER

## 2022-10-08 PROCEDURE — 74011250637 HC RX REV CODE- 250/637: Performed by: INTERNAL MEDICINE

## 2022-10-08 PROCEDURE — 74011000250 HC RX REV CODE- 250: Performed by: INTERNAL MEDICINE

## 2022-10-08 PROCEDURE — 74011250637 HC RX REV CODE- 250/637: Performed by: PSYCHIATRY & NEUROLOGY

## 2022-10-08 PROCEDURE — 85025 COMPLETE CBC W/AUTO DIFF WBC: CPT

## 2022-10-08 PROCEDURE — 36415 COLL VENOUS BLD VENIPUNCTURE: CPT

## 2022-10-08 PROCEDURE — 94761 N-INVAS EAR/PLS OXIMETRY MLT: CPT

## 2022-10-08 PROCEDURE — 74011000258 HC RX REV CODE- 258: Performed by: INTERNAL MEDICINE

## 2022-10-08 PROCEDURE — 65610000006 HC RM INTENSIVE CARE

## 2022-10-08 PROCEDURE — 94003 VENT MGMT INPAT SUBQ DAY: CPT

## 2022-10-08 PROCEDURE — 2709999900 HC NON-CHARGEABLE SUPPLY

## 2022-10-08 RX ADMIN — MIDAZOLAM 2 MG: 1 INJECTION, SOLUTION INTRAMUSCULAR; INTRAVENOUS at 10:40

## 2022-10-08 RX ADMIN — Medication 10 ML: at 21:26

## 2022-10-08 RX ADMIN — PIPERACILLIN AND TAZOBACTAM 3.38 G: 3; .375 INJECTION, POWDER, LYOPHILIZED, FOR SOLUTION INTRAVENOUS at 18:36

## 2022-10-08 RX ADMIN — SENNOSIDES AND DOCUSATE SODIUM 1 TABLET: 50; 8.6 TABLET ORAL at 08:05

## 2022-10-08 RX ADMIN — DEXMEDETOMIDINE HYDROCHLORIDE 0.8 MCG/KG/HR: 4 INJECTION, SOLUTION INTRAVENOUS at 14:11

## 2022-10-08 RX ADMIN — MIDODRINE HYDROCHLORIDE 15 MG: 5 TABLET ORAL at 14:11

## 2022-10-08 RX ADMIN — MIDAZOLAM 2 MG: 1 INJECTION, SOLUTION INTRAMUSCULAR; INTRAVENOUS at 17:28

## 2022-10-08 RX ADMIN — CHLORHEXIDINE GLUCONATE 15 ML: 1.2 RINSE ORAL at 20:35

## 2022-10-08 RX ADMIN — DEXMEDETOMIDINE HYDROCHLORIDE 0.9 MCG/KG/HR: 4 INJECTION, SOLUTION INTRAVENOUS at 23:44

## 2022-10-08 RX ADMIN — ENOXAPARIN SODIUM 30 MG: 100 INJECTION SUBCUTANEOUS at 10:40

## 2022-10-08 RX ADMIN — DEXMEDETOMIDINE HYDROCHLORIDE 0.8 MCG/KG/HR: 4 INJECTION, SOLUTION INTRAVENOUS at 19:25

## 2022-10-08 RX ADMIN — MIDAZOLAM 2 MG: 1 INJECTION, SOLUTION INTRAMUSCULAR; INTRAVENOUS at 23:44

## 2022-10-08 RX ADMIN — Medication 150 MCG/HR: at 01:18

## 2022-10-08 RX ADMIN — LANSOPRAZOLE 30 MG: KIT at 08:06

## 2022-10-08 RX ADMIN — ENOXAPARIN SODIUM 30 MG: 100 INJECTION SUBCUTANEOUS at 21:26

## 2022-10-08 RX ADMIN — MIDODRINE HYDROCHLORIDE 15 MG: 5 TABLET ORAL at 05:36

## 2022-10-08 RX ADMIN — PIPERACILLIN AND TAZOBACTAM 3.38 G: 3; .375 INJECTION, POWDER, LYOPHILIZED, FOR SOLUTION INTRAVENOUS at 07:54

## 2022-10-08 RX ADMIN — PIPERACILLIN AND TAZOBACTAM 3.38 G: 3; .375 INJECTION, POWDER, LYOPHILIZED, FOR SOLUTION INTRAVENOUS at 23:45

## 2022-10-08 RX ADMIN — Medication 10 ML: at 05:36

## 2022-10-08 RX ADMIN — DEXMEDETOMIDINE HYDROCHLORIDE 0.9 MCG/KG/HR: 4 INJECTION, SOLUTION INTRAVENOUS at 22:14

## 2022-10-08 RX ADMIN — MIDAZOLAM 2 MG: 1 INJECTION, SOLUTION INTRAMUSCULAR; INTRAVENOUS at 08:05

## 2022-10-08 RX ADMIN — DEXMEDETOMIDINE HYDROCHLORIDE 0.8 MCG/KG/HR: 4 INJECTION, SOLUTION INTRAVENOUS at 02:49

## 2022-10-08 RX ADMIN — Medication 150 MCG/HR: at 11:30

## 2022-10-08 RX ADMIN — Medication 150 MCG/HR: at 21:25

## 2022-10-08 RX ADMIN — DEXMEDETOMIDINE HYDROCHLORIDE 0.8 MCG/KG/HR: 4 INJECTION, SOLUTION INTRAVENOUS at 08:53

## 2022-10-08 RX ADMIN — MIDAZOLAM 2 MG: 1 INJECTION, SOLUTION INTRAMUSCULAR; INTRAVENOUS at 22:15

## 2022-10-08 RX ADMIN — ATORVASTATIN CALCIUM 40 MG: 20 TABLET, FILM COATED ORAL at 21:26

## 2022-10-08 RX ADMIN — MIDAZOLAM 2 MG: 1 INJECTION, SOLUTION INTRAMUSCULAR; INTRAVENOUS at 19:25

## 2022-10-08 RX ADMIN — MIDAZOLAM 2 MG: 1 INJECTION, SOLUTION INTRAMUSCULAR; INTRAVENOUS at 05:44

## 2022-10-08 RX ADMIN — MIDODRINE HYDROCHLORIDE 15 MG: 5 TABLET ORAL at 21:25

## 2022-10-08 RX ADMIN — LEVOTHYROXINE SODIUM 88 MCG: 0.09 TABLET ORAL at 08:05

## 2022-10-08 RX ADMIN — MIDAZOLAM 2 MG: 1 INJECTION, SOLUTION INTRAMUSCULAR; INTRAVENOUS at 02:49

## 2022-10-08 RX ADMIN — SENNOSIDES AND DOCUSATE SODIUM 1 TABLET: 50; 8.6 TABLET ORAL at 21:25

## 2022-10-08 RX ADMIN — ASPIRIN 81 MG: 81 TABLET, CHEWABLE ORAL at 08:05

## 2022-10-08 NOTE — PROGRESS NOTES
[facility]    ICU TEAM Progress Note    Name: Alex Maza   : 1965   MRN: [id number]   Date: 10/8/2022           ICU Assessment     Acute respiratory failure with hypoxia  Acute encephalopathy  Possible anoxic encephalopathy  Stress cardiomyopathy  Ischemic CVA    Plan:  Neuro  Neurology consult noted, Encephalopathy likely toxic, metabolic  Added precedex to wean down propofol / fentanyl for DIS    Respiratory  Continue managing the mechanical ventilation/BiPAP for respiratory failure to prevent imminent deterioration and further organ dysfunction from hypoxia and hypercarbia. Serial ABGs. SBT once appropriate DIS    CV  Continue titrating intravenous pressors to prevent imminent deterioration and further organ dysfunction from hypotension  Keep MAP >65      GI  N.p.o.  tube feeds per protocol  Stress ulcer prophylaxis Pepcid/PPI    Renal / Endo  Continue evaluation of fluid, electrolyte and acid base derangements to prevent deterioration of renal function, arrhythmias and death. Replace electrolytes per protocol  Avoid nephrotoxins  Accu-Cheks/sliding scale insulin to maintain euglycemia, blood sugars 140-180    Heme/Onc  Tx Hgb < 7, Plt<10k; transfuse as needed    ID   FU on Cx; cont broad abx coverage    DVT prophylaxis- lovenox    I performed all aspects of the physical examination via Telemedicine associated with two way audio and video communication and with the on-site assistance of Nurse. Patient is critically ill in the ICU. I  personally  reviewed the pertinent medical records, laboratory/ pathology data and radiographic images. The decision making regarding this patient is as documented above, which was generated  following  discussion  with the multidisciplinary team and creation of a treatment plan for  the patient. We discussed the patient's interval history and future coordination of care and  plans.   The patient's medications  were reviewed and changes made as stipulated above.  Due to  critical illness impairing one or more vital organs of this patient resulting in life threatening clinical situation  I have provided direct, frequent personal  assessment and manipulation in management plan and spent 45  minutes  of  critical care time excluding the time spent on procedures and teaching. Greater than 50% of this time  in patient's care was  employed  in counseling and coordination of care and engaged in face to face discussion of case management issues, addressing questions, and outlining a plan of  therapy. 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 She was LKW the evening of 9/26. She did not report to work the am of 9/27 and she was found unresponsive at her home on wellness check. Bystander CPR was started, but she did have a pulse. EMS was called. Upon their arrival, SBP was 70s. Pupils were pinpoint, narcan was administered without improvement. She experienced seizure like activity and received 4mg IV versed. Upon arrival to the ED, she exhibited seizure like activity and L sided gaze preference. She required intubation for hypoxic respiratory failure. She was sedated on propofol. She became hypotensive and was started on levophed. LA 6.46. She was admitted to the ICU. She was started on empiric vanc/zosyn. Blood/sputum cultures ordered. CT head unrevealing. CT chest notable for only bibasilar atelectasis, cannot exclude small amount aspiration in lower lobes. CTAP unrevealing. Central line placed at bedside in ICU. She was noted to exhibit profound hypoxia. 7.33/44/73 on 100% FiO2 - not consistent with ARDS due to absence of bilateral infiltrates. Overnight Events:   10/8-  Continues to fail SBT  10/7/2022  No change in Neuro status. On Prop 15/Fent 200  Currently on VC16/400/40/5 and Levo 2.   Failed SBT yesterday  10/5/2022  On Lower dose of propofol,same dose of fentanyl. We attempted to wean sedation, but failed given vent dysnchrony. OFF Levophed this morning. On TF. Not following commands. On vent support: 15/400/45/5. Did Sbt for few hours yesterday. DW mother at bedside   10/04:  Remain on Fentanyl, on lower dose of propofol. On Levophed. Did SBT for 2 hours yesterday. On vent support 16/400/45/5. CXR reviewed. Ceftriaxone stopped and zosyn started. 10/03/22:  Sedated on Fentanyl and Propofol. On Levophed. Noted results of MRI. Temp 100.2 On vent support 16/400/45/5  10/02/22  SAHARA overnight. Patient remains restless on fentanyl and propofol. On minimal levophed likely because of sedation. Not opening eyes to stimulation. Doesn't follow commands. POD:  * No surgery found *    S/P:       Active Problem List:     Problem List  Date Reviewed: 10/5/2022            Codes Class    Anemia ICD-10-CM: D64.9  ICD-9-CM: 285.9         UTI (urinary tract infection) ICD-10-CM: N39.0  ICD-9-CM: 599.0         Hypokalemia ICD-10-CM: E87.6  ICD-9-CM: 276.8         Obese ICD-10-CM: E66.9  ICD-9-CM: 278.00         Acute CVA (cerebrovascular accident) ([facility] ICD-10-CM: I63.9  ICD-9-CM: 434.91         NSTEMI (non-ST elevated myocardial infarction) ([facility] ICD-10-CM: I21.4  ICD-9-CM: 410.70         Takotsubo cardiomyopathy ICD-10-CM: I51.81  ICD-9-CM: 429.83         Dyslipidemia ICD-10-CM: E78.5  ICD-9-CM: 272.4         Hypothyroidism ICD-10-CM: E03.9  ICD-9-CM: 244.9         KOBY (generalized anxiety disorder) ICD-10-CM: F41.1  ICD-9-CM: 300.02         * (Principal) Status epilepticus ([facility] ICD-10-CM: G40.901  ICD-9-CM: 345. 3         CHRIS (acute kidney injury) ([facility] ICD-10-CM: N17.9  ICD-9-CM: 584.9         Lactic acidosis ICD-10-CM: E87.20  ICD-9-CM: 276.2         Acute respiratory failure with hypoxia (HCC) ICD-10-CM: J96.01  ICD-9-CM: 518.81         Shock, cardiogenic ([facility] ICD-10-CM: R57.0  ICD-9-CM: 785.51         Acute metabolic encephalopathy YEJ-47-WY: G93.41  ICD-9-CM: 348.31        Past Medical History:      has a past medical history of History of vascular access device (10/07/2022). Past Surgical History:      has a past surgical history that includes ir insert non tunl cvc over 5 yrs (2022). Home Medications:     Prior to Admission medications    Medication Sig Start Date End Date Taking? Authorizing Provider   atorvastatin (LIPITOR) 10 mg tablet Take 10 mg by mouth daily. Yes Provider, Historical   furosemide (LASIX) 20 mg tablet Take 20 mg by mouth daily. Yes Provider, Historical   traZODone (DESYREL) 50 mg tablet Take 125 mg by mouth nightly. Yes Provider, Historical   levothyroxine (SYNTHROID) 88 mcg tablet Take 88 mcg by mouth Daily (before breakfast). Yes Provider, Historical   OTHER,NON-FORMULARY, ESTROGEN(5050)/TESTOSTERONE (HRT) 1.5mg/10mg/ml Cream APPLY 1 ML TO SKIN (INNER WRIST/ABDOMEN/THIGHS EVERY DAY. ROTATE SITES   Yes Provider, Historical   OTHER,NON-FORMULARY, Progesterone (ME4M) 250 mg at bedtime. Yes Provider, Historical       Allergies/Social/Family History:     No Known Allergies   Social History     Tobacco Use    Smoking status: Not on file    Smokeless tobacco: Not on file   Substance Use Topics    Alcohol use: Not on file      No family history on file. Review of Systems:     ROS is unobtainable as the patient is intubated.     Objective:   Vital Signs:  Visit Vitals  BP (!) 97/46   Pulse (!) 52   Temp 98.2 °F (36.8 °C)   Resp 16   Ht 5' 6\" (1.676 m)   Wt 104.3 kg (230 lb)   SpO2 94%   BMI 37.12 kg/m²    O2 Flow Rate (L/min): 60 l/min O2 Device: Endotracheal tube, Ventilator Temp (24hrs), Av.3 °F (36.8 °C), Min:97.8 °F (36.6 °C), Max:99.1 °F (37.3 °C)           Intake/Output:     Intake/Output Summary (Last 24 hours) at 10/8/2022 0934  Last data filed at 10/8/2022 0800  Gross per 24 hour   Intake 630 ml   Output 4965 ml   Net -4335 ml         PE under tele audio/video assessment:    Gen: On Fentanyl and Propofol   HEENT:  intubated  Chest: symmetrical chest rise  CV:Sinus tachy  Abd: soft  Ext: +ve edema  Neuro: opens eyes to voice, nonfocal    LABS AND  DATA: Personally reviewed  Recent Labs     10/07/22  0102 10/06/22  0059   WBC 8.0 8.0   HGB 9.1* 9.3*   HCT 28.4* 28.9*    260       Recent Labs     10/07/22  0102 10/06/22  0059    142   K 3.5 3.5    108   CO2 30 31   BUN 15 14   CREA 0.65 0.72   * 101*   CA 8.8 8.5   MG  --  2.2   PHOS  --  3.3       Recent Labs     10/06/22  0059   AP 78   TP 6.0*   ALB 2.0*   GLOB 4.0       No results for input(s): INR, PTP, APTT, INREXT, INREXT in the last 72 hours. No results for input(s): PHI, PCO2I, PO2I, FIO2I in the last 72 hours. No results for input(s): CPK, CKMB, TROIQ, BNPP in the last 72 hours. Hemodynamics:   PAP:   CO:     Wedge:   CI:     CVP:    SVR:       PVR:       Ventilator Settings:  Mode Rate Tidal Volume Pressure FiO2 PEEP   Assist control, Volume control   400 ml  5 cm H2O 50 % 8 cm H20     Peak airway pressure: 29 cm H2O    Minute ventilation: 6.68 l/min        MEDS: Reviewed    Chest X-Ray:  CXR Results  (Last 48 hours)                 10/07/22 1119  XR CHEST PORT Final result    Impression:  1. Right upper extremity PICC line tip projects over the right atrium. No   visualized pneumothorax. Narrative:  EXAM:  XR CHEST PORT       INDICATION: Verify PICC Tip placement please       COMPARISON: One day prior. TECHNIQUE: Single frontal view of the chest.       FINDINGS: Right upper extremity PICC line with tip projecting over the right   atrium. Existing right IJ catheter unchanged with tip also projecting over the   right atrium. Endotracheal tube terminates 2 cm from the lópez. Enteric tube   terminates below the level the diaphragm outside the field of view study. Slightly improved aeration of the lung bases. Possible trace residual effusions.    No visualized pneumothorax. . Lungs are hypoventilatory.                    ABCDEF Bundle/Checklist Completed:  Yes    DISPOSITION  Stay in ICU

## 2022-10-08 NOTE — PROGRESS NOTES
[facility]  [address] [address]  [phone]    Medical Progress Note      NAME: Christine Son   :  1965  MRM:  [id number]    Date/Time of service 10/8/2022  9:58 AM         Assessment and Plan:     Acute metabolic encephalopathy - POA, unclear etiology. \"2 small foci of diffusion restriction in the right frontal lobe \" noted on MRI but unlikely this explains KHADAR. Initial concern for Sz, but EEG bland, keppra stopped. Concern remains she had anoxic brain injury. Will assess after extubation and stopping sedation. Neurology consulted. Stopped lidoderm    Takotsubo cardiomyopathy / NSTEMI (non-ST elevated myocardial infarction) / Bradycardia - POA, unclear initial driving event. Cardiology consulted. Too unstable for cath. Repeat ECHO per cardiology. Unable to tolerate BB or ACE due to low BP    Cardiogenic shock - Intially presumed septic shock until ECHO showed new EF 30%. Currently still in shock, though weaned off levophed IV gtt as pressor. On midodrine    Acute respiratory failure with hypoxia - POA, persistent. Unclear if aspiration PNA vs central resp depression. Completed Zosyn. Intubated on admission. Intensivist consulted and is managing vent. Still requring fentanyl and precedex gtt for sedation. Propofol was stopped due to elevated triglycerides. She is failing SBT so far. Likely needs trach now    Acute CVA (cerebrovascular accident) - MRI notes two small lesions. Unclear significance and timing. Neurology to assess after extubation and off sedation. Continue ASA and statin. Anemia - POA and slowly worsening likely due to acute illness. UTI (urinary tract infection) - Pitt sensitive E coli on initial contaminated cx. Had E coli in contaminated sputum along with much normal josue. Has completed course of Zosyn. CHRIS (acute kidney injury) / Lactic acidosis / Hypokalemia - POA: unknown baseline Cr. Currently Cr stable. Status epilepticus - Reject Dx. Dossie Vee had myoclonic jerking    Dyslipidemia - On atorvastatin    Hypothyroidism - TSH normal.     KOBY (generalized anxiety disorder) - Noted benzo on drug screen on admit, but had Versed for Sz    Obese - Advise weight loss of she survives. Subjective:     Chief Complaint:  intubated, no change    ROS:  (bold if positive, if negative)    Not Tolerating PT  Tolerating Diet vis NG tube        Objective:     Last 24hrs VS reviewed since prior progress note.  Most recent are:    Visit Vitals  BP (!) 97/46   Pulse (!) 52   Temp 98 °F (36.7 °C)   Resp 16   Ht 5' 6\" (1.676 m)   Wt 104.3 kg (230 lb)   SpO2 94%   BMI 37.12 kg/m²     SpO2 Readings from Last 6 Encounters:   10/08/22 94%    O2 Flow Rate (L/min): 60 l/min     Intake/Output Summary (Last 24 hours) at 10/8/2022 0654  Last data filed at 10/8/2022 8255  Gross per 24 hour   Intake 885 ml   Output 5150 ml   Net -4265 ml          Physical Exam:    Gen:  Obese, in no acute distress  HEENT:  Pink conjunctivae, PERRL, hearing not intact to voice, ET in place  Neck:  Supple, without masses, thyroid non-tender  Resp:  No accessory muscle use, bilateral coarse breath sounds   Card:  No murmurs, bradycardic S1, S2 without thrills, bruits or peripheral edema  Abd:  Soft, non-tender, non-distended, reduced bowel sounds are present, no mass  Lymph:  No cervical or inguinal adenopathy  Musc:  No cyanosis or clubbing  Skin:  No rashes or ulcers, skin turgor is good  Neuro:  Cranial nerves are grossly intact, general motor weakness, dose not follow commands appropriately  Psych:  Sedated    Telemetry reviewed:   normal sinus rhythm  __________________________________________________________________  Medications Reviewed: (see below)  Medications:     Current Facility-Administered Medications   Medication Dose Route Frequency    dexmedeTOMidine in 0.9 % NaCl (PRECEDEX) 400 mcg/100 mL (4 mcg/mL) infusion soln  0.1-1.5 mcg/kg/hr IntraVENous TITRATE    NOREPINephrine (LEVOPHED) 8 mg in 5% dextrose 250mL (32 mcg/mL) infusion  0.5-30 mcg/min IntraVENous TITRATE    alteplase (CATHFLO) 1 mg in sterile water (preservative free) 1 mL injection  1 mg InterCATHeter PRN    levothyroxine (SYNTHROID) tablet 88 mcg  88 mcg Per G Tube DAILY    midazolam (VERSED) injection 2 mg  2 mg IntraVENous Q1H PRN    senna-docusate (PERICOLACE) 8.6-50 mg per tablet 1 Tablet  1 Tablet Oral BID    piperacillin-tazobactam (ZOSYN) 3.375 g in 0.9% sodium chloride (MBP/ADV) 100 mL MBP  3.375 g IntraVENous Q8H    fentaNYL citrate (PF) injection 100 mcg  100 mcg IntraVENous Q4H PRN    midodrine (PROAMATINE) tablet 15 mg  15 mg Oral Q8H    atorvastatin (LIPITOR) tablet 40 mg  40 mg Oral QHS    enoxaparin (LOVENOX) injection 30 mg  30 mg SubCUTAneous Q12H    fentaNYL (PF) 1,500 mcg/30 mL (50 mcg/mL) infusion  0-200 mcg/hr IntraVENous TITRATE    aspirin chewable tablet 81 mg  81 mg Oral DAILY    lansoprazole compounding kit (PREVACID) 3 mg/mL oral suspension 30 mg  30 mg Oral DAILY    propofol (DIPRIVAN) 10 mg/mL infusion  0-50 mcg/kg/min IntraVENous TITRATE    sodium chloride (NS) flush 5-40 mL  5-40 mL IntraVENous Q8H    sodium chloride (NS) flush 5-40 mL  5-40 mL IntraVENous PRN    acetaminophen (TYLENOL) tablet 650 mg  650 mg Oral Q6H PRN    Or    acetaminophen (TYLENOL) suppository 650 mg  650 mg Rectal Q6H PRN    polyethylene glycol (MIRALAX) packet 17 g  17 g Oral DAILY PRN    ondansetron (ZOFRAN) injection 4 mg  4 mg IntraVENous Q6H PRN    chlorhexidine (PERIDEX) 0.12 % mouthwash 15 mL  15 mL Oral Q12H        Lab Data Reviewed: (see below)  Lab Review:     Recent Labs     10/07/22  0102 10/06/22  0059   WBC 8.0 8.0   HGB 9.1* 9.3*   HCT 28.4* 28.9*    260       Recent Labs     10/07/22  0102 10/06/22  0059    142   K 3.5 3.5    108   CO2 30 31   * 101*   BUN 15 14   CREA 0.65 0.72   CA 8.8 8.5   MG  --  2.2   PHOS  --  3.3   ALB  --  2.0*   TBILI  --  0.3 ALT  --  35       Lab Results   Component Value Date/Time    Glucose (POC) 110 10/05/2022 04:55 PM    Glucose (POC) 87 10/05/2022 11:02 AM    Glucose (POC) 78 10/05/2022 05:00 AM    Glucose (POC) 94 10/05/2022 04:57 AM    Glucose (POC) 124 (H) 10/04/2022 11:03 PM     Recent Labs     10/06/22  0846   PH 7.42   PCO2 46*   PO2 60*   HCO3 29*   FIO2 50       No results for input(s): INR, INREXT, INREXT in the last 72 hours.   All Micro Results       Procedure Component Value Units Date/Time    CULTURE, RESPIRATORY/SPUTUM/BRONCH Ivette Gan [[id number]] Collected: 10/03/22 1300    Order Status: Completed Specimen: Sputum Updated: 10/05/22 1143     Special Requests: NO SPECIAL REQUESTS        GRAM STAIN RARE WBCS SEEN               OCCASIONAL EPITHELIAL CELLS SEEN                  OCCASIONAL GRAM NEGATIVE RODS                  RARE GRAM POSITIVE COCCI IN PAIRS           Culture result:       HEAVY NORMAL RESPIRATORY CATHIE          CULTURE, BLOOD, PAIRED [[id number]] Collected: 09/27/22 1251    Order Status: Completed Specimen: Blood Updated: 10/02/22 0640     Special Requests: NO SPECIAL REQUESTS        Culture result: NO GROWTH 5 DAYS       CULTURE, URINE [[id number]]  (Abnormal)  (Susceptibility) Collected: 09/27/22 1251    Order Status: Completed Specimen: Cath Urine Updated: 09/30/22 1502     Special Requests: NO SPECIAL REQUESTS        [place] Count --        >100,000  COLONIES/mL       Culture result: ESCHERICHIA COLI       CULTURE, RESPIRATORY/SPUTUM/BRONCH Blair Kelp STAIN [[id number]]  (Abnormal)  (Susceptibility) Collected: 09/27/22 1811    Order Status: Completed Specimen: Sputum Updated: 09/30/22 0930     Special Requests: NO SPECIAL REQUESTS        GRAM STAIN 1+ WBCS SEEN               1+ GRAM POSITIVE COCCI IN CLUSTERS            1+ GRAM VARIABLE RODS        Culture result: HEAVY ESCHERICHIA COLI               HEAVY NORMAL RESPIRATORY CATHIE                  MODERATE YEAST (APPARENT MULTIPLE COLONY TYPES) CULTURE, MRSA [[id number]] Collected: 09/27/22 1440    Order Status: Completed Specimen: Nasal from Nares Updated: 09/29/22 0820     Special Requests: NO SPECIAL REQUESTS        Culture result: MRSA NOT PRESENT               Screening of patient nares for MRSA is for surveillance purposes and, if positive, to facilitate isolation considerations in high risk settings. It is not intended for automatic decolonization interventions per se as regimens are not sufficiently effective to warrant routine use. GAETANO Jennings, UR/CSF [[id number]] Collected: 09/27/22 1431    Order Status: Completed Specimen: Miscellaneous sample Updated: 09/28/22 1336     Source URINE        Specimen Urine     Streptococcus pneumoniae Ag Negative        Fluid culture Not indicated. Organism ID Not indicated. Please note Comment        Comment: (NOTE)  College of [employer]ologists standards require a culture to be  performed on CSF specimens submitted for bacterial antigen testing. (CAP [id number]) Urine specimens will not be cultured. Performed At: [facility] & [address] [id number]  Jan Asencio MD [id number]         Zachary Nascimento [[id number]] Collected: 09/27/22 1530    Order Status: Completed Specimen: Urine Updated: 09/28/22 1336     Source URINE        L pneumophila S1 Ag, urine Negative        Comment: (NOTE)  Presumptive negative for L. pneumophila serogroup 1 antigen in urine,  suggesting no recent or current infection. Legionnaires' disease  cannot be ruled out since other serogroups and species may also  cause disease.   Performed At: [facility] & [address] [id number]  Jan Asencio MD FM:[id number]         RESPIRATORY VIRUS PANEL W/COVID-19, PCR [[id number]] Collected: 09/27/22 1440    Order Status: Completed Specimen: Nasopharyngeal Updated: 09/27/22 2214     Adenovirus Not detected        Coronavirus 229E Not detected        Coronavirus HKU1 Not detected        Coronavirus CVNL63 Not detected        Coronavirus OC43 Not detected        SARS-CoV-2, PCR Not detected        Metapneumovirus Not detected        Rhinovirus and Enterovirus Not detected        Influenza A Not detected        Influenza B Not detected        Parainfluenza 1 Not detected        Parainfluenza 2 Not detected        Parainfluenza 3 Not detected        Parainfluenza virus 4 Not detected        RSV by PCR Not detected        B. parapertussis, PCR Not detected        Bordetella pertussis - PCR Not detected        Chlamydophila pneumoniae DNA, QL, PCR Not detected        Mycoplasma pneumoniae DNA, QL, PCR Not detected       URINE CULTURE HOLD SAMPLE [[id number]] Collected: 09/27/22 1251    Order Status: Completed Specimen: Urine from Serum Updated: 09/27/22 1258     Urine culture hold       Urine on hold in Microbiology dept for 2 days. If unpreserved urine is submitted, it cannot be used for addtional testing after 24 hours, recollection will be required. Other pertinent lab: none    Total time spent with patient: 30 Minutes I personally reviewed chart, notes, data and current medications in the medical record. I have personally examined and treated the patient at bedside during this period. To assist coordination of care and communication with nursing and staff, this note may be preliminary early in the day, but finalized by end of the day.                  Care Plan discussed with: Patient, Care Manager, Nursing Staff, Consultant/Specialist, and >50% of time spent in counseling and coordination of care    Discussed:  Care Plan and D/C Planning    Prophylaxis:  Lovenox and H2B/PPI    Disposition:  SNF/LTC           ___________________________________________________    Attending Physician: Jossie Alvarado MD

## 2022-10-09 LAB
ALBUMIN SERPL-MCNC: 2.2 G/DL (ref 3.5–5)
ALBUMIN/GLOB SERPL: 0.5 {RATIO} (ref 1.1–2.2)
ALP SERPL-CCNC: 72 U/L (ref 45–117)
ALT SERPL-CCNC: 35 U/L (ref 12–78)
ANION GAP SERPL CALC-SCNC: 6 MMOL/L (ref 5–15)
AST SERPL-CCNC: 28 U/L (ref 15–37)
BILIRUB SERPL-MCNC: 0.4 MG/DL (ref 0.2–1)
BUN SERPL-MCNC: 18 MG/DL (ref 6–20)
BUN/CREAT SERPL: 29 (ref 12–20)
CALCIUM SERPL-MCNC: 9 MG/DL (ref 8.5–10.1)
CHLORIDE SERPL-SCNC: 107 MMOL/L (ref 97–108)
CO2 SERPL-SCNC: 27 MMOL/L (ref 21–32)
CREAT SERPL-MCNC: 0.63 MG/DL (ref 0.55–1.02)
ERYTHROCYTE [DISTWIDTH] IN BLOOD BY AUTOMATED COUNT: 13.9 % (ref 11.5–14.5)
GLOBULIN SER CALC-MCNC: 4.3 G/DL (ref 2–4)
GLUCOSE SERPL-MCNC: 119 MG/DL (ref 65–100)
HCT VFR BLD AUTO: 32.3 % (ref 35–47)
HGB BLD-MCNC: 10.2 G/DL (ref 11.5–16)
MAGNESIUM SERPL-MCNC: 2 MG/DL (ref 1.6–2.4)
MCH RBC QN AUTO: 28.7 PG (ref 26–34)
MCHC RBC AUTO-ENTMCNC: 31.6 G/DL (ref 30–36.5)
MCV RBC AUTO: 91 FL (ref 80–99)
NRBC # BLD: 0 K/UL (ref 0–0.01)
NRBC BLD-RTO: 0 PER 100 WBC
PHOSPHATE SERPL-MCNC: 3.5 MG/DL (ref 2.6–4.7)
PLATELET # BLD AUTO: 398 K/UL (ref 150–400)
PMV BLD AUTO: 10.6 FL (ref 8.9–12.9)
POTASSIUM SERPL-SCNC: 3.2 MMOL/L (ref 3.5–5.1)
PROT SERPL-MCNC: 6.5 G/DL (ref 6.4–8.2)
RBC # BLD AUTO: 3.55 M/UL (ref 3.8–5.2)
SODIUM SERPL-SCNC: 140 MMOL/L (ref 136–145)
WBC # BLD AUTO: 10.1 K/UL (ref 3.6–11)

## 2022-10-09 PROCEDURE — 36415 COLL VENOUS BLD VENIPUNCTURE: CPT

## 2022-10-09 PROCEDURE — 65610000006 HC RM INTENSIVE CARE

## 2022-10-09 PROCEDURE — 94003 VENT MGMT INPAT SUBQ DAY: CPT

## 2022-10-09 PROCEDURE — 84100 ASSAY OF PHOSPHORUS: CPT

## 2022-10-09 PROCEDURE — 74011250637 HC RX REV CODE- 250/637: Performed by: PSYCHIATRY & NEUROLOGY

## 2022-10-09 PROCEDURE — 85027 COMPLETE CBC AUTOMATED: CPT

## 2022-10-09 PROCEDURE — 2709999900 HC NON-CHARGEABLE SUPPLY

## 2022-10-09 PROCEDURE — 74011000250 HC RX REV CODE- 250: Performed by: INTERNAL MEDICINE

## 2022-10-09 PROCEDURE — 74011250637 HC RX REV CODE- 250/637: Performed by: INTERNAL MEDICINE

## 2022-10-09 PROCEDURE — 74011250636 HC RX REV CODE- 250/636: Performed by: INTERNAL MEDICINE

## 2022-10-09 PROCEDURE — 74011000258 HC RX REV CODE- 258: Performed by: INTERNAL MEDICINE

## 2022-10-09 PROCEDURE — 80053 COMPREHEN METABOLIC PANEL: CPT

## 2022-10-09 PROCEDURE — 74011250637 HC RX REV CODE- 250/637: Performed by: HOSPITALIST

## 2022-10-09 PROCEDURE — 74011250637 HC RX REV CODE- 250/637: Performed by: NURSE PRACTITIONER

## 2022-10-09 PROCEDURE — 83735 ASSAY OF MAGNESIUM: CPT

## 2022-10-09 PROCEDURE — 74011250636 HC RX REV CODE- 250/636: Performed by: NURSE PRACTITIONER

## 2022-10-09 RX ORDER — QUETIAPINE FUMARATE 25 MG/1
25 TABLET, FILM COATED ORAL 2 TIMES DAILY
Status: DISCONTINUED | OUTPATIENT
Start: 2022-10-09 | End: 2022-10-13

## 2022-10-09 RX ADMIN — ATORVASTATIN CALCIUM 40 MG: 20 TABLET, FILM COATED ORAL at 21:49

## 2022-10-09 RX ADMIN — Medication 10 ML: at 14:27

## 2022-10-09 RX ADMIN — MIDAZOLAM 2 MG: 1 INJECTION, SOLUTION INTRAMUSCULAR; INTRAVENOUS at 10:54

## 2022-10-09 RX ADMIN — POTASSIUM BICARBONATE 40 MEQ: 782 TABLET, EFFERVESCENT ORAL at 03:54

## 2022-10-09 RX ADMIN — DEXMEDETOMIDINE HYDROCHLORIDE 1.5 MCG/KG/HR: 4 INJECTION, SOLUTION INTRAVENOUS at 12:51

## 2022-10-09 RX ADMIN — LEVOTHYROXINE SODIUM 88 MCG: 0.09 TABLET ORAL at 09:11

## 2022-10-09 RX ADMIN — PIPERACILLIN AND TAZOBACTAM 3.38 G: 3; .375 INJECTION, POWDER, LYOPHILIZED, FOR SOLUTION INTRAVENOUS at 15:10

## 2022-10-09 RX ADMIN — ASPIRIN 81 MG: 81 TABLET, CHEWABLE ORAL at 09:11

## 2022-10-09 RX ADMIN — SENNOSIDES AND DOCUSATE SODIUM 1 TABLET: 50; 8.6 TABLET ORAL at 21:49

## 2022-10-09 RX ADMIN — SENNOSIDES AND DOCUSATE SODIUM 1 TABLET: 50; 8.6 TABLET ORAL at 09:11

## 2022-10-09 RX ADMIN — DEXMEDETOMIDINE HYDROCHLORIDE 0.9 MCG/KG/HR: 4 INJECTION, SOLUTION INTRAVENOUS at 05:15

## 2022-10-09 RX ADMIN — Medication 10 ML: at 21:50

## 2022-10-09 RX ADMIN — MIDAZOLAM 2 MG: 1 INJECTION, SOLUTION INTRAMUSCULAR; INTRAVENOUS at 06:11

## 2022-10-09 RX ADMIN — QUETIAPINE FUMARATE 25 MG: 25 TABLET ORAL at 17:58

## 2022-10-09 RX ADMIN — MIDODRINE HYDROCHLORIDE 15 MG: 5 TABLET ORAL at 06:13

## 2022-10-09 RX ADMIN — ENOXAPARIN SODIUM 30 MG: 100 INJECTION SUBCUTANEOUS at 09:11

## 2022-10-09 RX ADMIN — NOREPINEPHRINE BITARTRATE 4 MCG/MIN: 1 INJECTION, SOLUTION, CONCENTRATE INTRAVENOUS at 22:51

## 2022-10-09 RX ADMIN — PIPERACILLIN AND TAZOBACTAM 3.38 G: 3; .375 INJECTION, POWDER, LYOPHILIZED, FOR SOLUTION INTRAVENOUS at 23:51

## 2022-10-09 RX ADMIN — MIDAZOLAM 2 MG: 1 INJECTION, SOLUTION INTRAMUSCULAR; INTRAVENOUS at 12:15

## 2022-10-09 RX ADMIN — MIDODRINE HYDROCHLORIDE 15 MG: 5 TABLET ORAL at 21:49

## 2022-10-09 RX ADMIN — DEXMEDETOMIDINE HYDROCHLORIDE 1.4 MCG/KG/HR: 4 INJECTION, SOLUTION INTRAVENOUS at 11:08

## 2022-10-09 RX ADMIN — Medication 175 MCG/HR: at 15:40

## 2022-10-09 RX ADMIN — DEXMEDETOMIDINE HYDROCHLORIDE 1.5 MCG/KG/HR: 4 INJECTION, SOLUTION INTRAVENOUS at 15:53

## 2022-10-09 RX ADMIN — DEXMEDETOMIDINE HYDROCHLORIDE 1.1 MCG/KG/HR: 4 INJECTION, SOLUTION INTRAVENOUS at 09:46

## 2022-10-09 RX ADMIN — MIDAZOLAM 2 MG: 1 INJECTION, SOLUTION INTRAMUSCULAR; INTRAVENOUS at 07:31

## 2022-10-09 RX ADMIN — PIPERACILLIN AND TAZOBACTAM 3.38 G: 3; .375 INJECTION, POWDER, LYOPHILIZED, FOR SOLUTION INTRAVENOUS at 09:11

## 2022-10-09 RX ADMIN — Medication 10 ML: at 06:17

## 2022-10-09 RX ADMIN — ENOXAPARIN SODIUM 30 MG: 100 INJECTION SUBCUTANEOUS at 21:49

## 2022-10-09 RX ADMIN — LANSOPRAZOLE 30 MG: KIT at 09:11

## 2022-10-09 RX ADMIN — DEXMEDETOMIDINE HYDROCHLORIDE 1.5 MCG/KG/HR: 4 INJECTION, SOLUTION INTRAVENOUS at 18:42

## 2022-10-09 RX ADMIN — CHLORHEXIDINE GLUCONATE 15 ML: 1.2 RINSE ORAL at 21:49

## 2022-10-09 RX ADMIN — DEXMEDETOMIDINE HYDROCHLORIDE 1.3 MCG/KG/HR: 4 INJECTION, SOLUTION INTRAVENOUS at 21:49

## 2022-10-09 RX ADMIN — MIDAZOLAM 2 MG: 1 INJECTION, SOLUTION INTRAMUSCULAR; INTRAVENOUS at 14:17

## 2022-10-09 RX ADMIN — Medication 150 MCG/HR: at 06:17

## 2022-10-09 RX ADMIN — MIDAZOLAM 2 MG: 1 INJECTION, SOLUTION INTRAMUSCULAR; INTRAVENOUS at 15:50

## 2022-10-09 RX ADMIN — DEXMEDETOMIDINE HYDROCHLORIDE 1 MCG/KG/HR: 4 INJECTION, SOLUTION INTRAVENOUS at 09:11

## 2022-10-09 RX ADMIN — MIDODRINE HYDROCHLORIDE 15 MG: 5 TABLET ORAL at 14:17

## 2022-10-09 RX ADMIN — MIDAZOLAM 2 MG: 1 INJECTION, SOLUTION INTRAMUSCULAR; INTRAVENOUS at 03:55

## 2022-10-09 NOTE — PROGRESS NOTES
Problem: Non-Violent Restraints  Goal: Removal from restraints as soon as assessed to be safe  10/9/2022 1524 by Megan Morin RN  Outcome: Progressing Towards Goal  Goal: No harm/injury to patient while restraints in use  10/9/2022 1524 by Megan Morin RN  Outcome: Progressing Towards Goal  Goal: Patient's dignity will be maintained  10/9/2022 1524 by Megan Morin RN  Outcome: Progressing Towards Goal  Goal: Patient Interventions  10/9/2022 1524 by Megan Morin RN  Outcome: Progressing Towards Goal

## 2022-10-09 NOTE — PROGRESS NOTES
1900: Bedside shift change report given to Luis A Larose RN (oncoming nurse) by Martin Shaw RN (offgoing nurse). Report included the following information SBAR, Intake/Output, MAR, Recent Results, and Cardiac Rhythm NSR . Primary Nurse Brenda Bae RN and Martin Shaw RN performed a dual skin assessment on this patient Impairment noted- see wound doc flow sheet  Jose Enrique score is 15    2000: Shift  Assessment completed, VAP Bundle Care, montilla care, pt turned, restraints checked    2200: VAP bundle care, pt turned, restraints checked    0000: Reassessment completed. Changes noted, see flow sheet. VAP Bundle Care, restraints checked, pt turned    0200: VAP bundle care, pt turned, restraints checked    0400: Reassessment completed. Changes noted, see flow sheet. VAP bundle care, pt turned, restraints checked    0600: VAP bundle care, pt turned, restraints checked    0700: Bedside shift change report given to Ronnie Dial RN (oncoming nurse) by Luis A Larose RN (offgoing nurse).  Report included the following information SBAR, Kardex, Procedure Summary, Intake/Output, MAR, Recent Results, and Cardiac Rhythm SB .

## 2022-10-09 NOTE — PROGRESS NOTES
Bedside and Verbal shift change report given to Rosa Reddy RN (oncoming nurse) by Sheeba Smith RN (offgoing nurse). Report included the following information SBAR, Kardex, Intake/Output, MAR, Recent Results, Cardiac Rhythm NSR and ST, and Alarm Parameters . Pt gets agitated more often than yesterday. Increased precedex by increments to total of 1.5. Had to increase Fent to 175, and pt still getting agitated easily and requiring versed pushes. Worked with intensivist, going to start BID seroquel tonight. Goal is to be able to decrease sedation while pt stays calm, so we can do SBT. Bedside and Verbal shift change report given to [facility], RN (oncoming nurse) by Rosa Reddy RN (offgoing nurse). Report included the following information SBAR, Kardex, Intake/Output, MAR, Recent Results, Cardiac Rhythm SB, NSR, and ST, and Alarm Parameters .

## 2022-10-09 NOTE — PROGRESS NOTES
[facility]    ICU TEAM Progress Note    Name: Alberto Benjamin   : 1965   MRN: [id number]   Date: 10/9/2022           ICU Assessment     Acute respiratory failure with hypoxia  Acute encephalopathy  Possible anoxic encephalopathy  Stress cardiomyopathy  Ischemic CVA    Plan:  Neuro  Neurology consult noted, Encephalopathy likely toxic, metabolic  Added precedex to wean down propofol / fentanyl for DIS    Respiratory  Continue managing the mechanical ventilation/BiPAP for respiratory failure to prevent imminent deterioration and further organ dysfunction from hypoxia and hypercarbia. Serial ABGs. SBT once appropriate DIS    CV  Continue titrating intravenous pressors to prevent imminent deterioration and further organ dysfunction from hypotension  Keep MAP >65      GI  N.p.o.  tube feeds per protocol  Stress ulcer prophylaxis Pepcid/PPI    Renal / Endo  Continue evaluation of fluid, electrolyte and acid base derangements to prevent deterioration of renal function, arrhythmias and death. Replace electrolytes per protocol  Avoid nephrotoxins  Accu-Cheks/sliding scale insulin to maintain euglycemia, blood sugars 140-180    Heme/Onc  Tx Hgb < 7, Plt<10k; transfuse as needed    ID   FU on Cx; cont broad abx coverage    DVT prophylaxis- lovenox    I performed all aspects of the physical examination via Telemedicine associated with two way audio and video communication and with the on-site assistance of Nurse. Patient is critically ill in the ICU. I  personally  reviewed the pertinent medical records, laboratory/ pathology data and radiographic images. The decision making regarding this patient is as documented above, which was generated  following  discussion  with the multidisciplinary team and creation of a treatment plan for  the patient. We discussed the patient's interval history and future coordination of care and  plans.   The patient's medications  were reviewed and changes made as stipulated above.  Due to  critical illness impairing one or more vital organs of this patient resulting in life threatening clinical situation  I have provided direct, frequent personal  assessment and manipulation in management plan and spent 45  minutes  of  critical care time excluding the time spent on procedures and teaching. Greater than 50% of this time  in patient's care was  employed  in counseling and coordination of care and engaged in face to face discussion of case management issues, addressing questions, and outlining a plan of  therapy. 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 She was LKW the evening of 9/26. She did not report to work the am of 9/27 and she was found unresponsive at her home on wellness check. Bystander CPR was started, but she did have a pulse. EMS was called. Upon their arrival, SBP was 70s. Pupils were pinpoint, narcan was administered without improvement. She experienced seizure like activity and received 4mg IV versed. Upon arrival to the ED, she exhibited seizure like activity and L sided gaze preference. She required intubation for hypoxic respiratory failure. She was sedated on propofol. She became hypotensive and was started on levophed. LA 6.46. She was admitted to the ICU. She was started on empiric vanc/zosyn. Blood/sputum cultures ordered. CT head unrevealing. CT chest notable for only bibasilar atelectasis, cannot exclude small amount aspiration in lower lobes. CTAP unrevealing. Central line placed at bedside in ICU. She was noted to exhibit profound hypoxia. 7.33/44/73 on 100% FiO2 - not consistent with ARDS due to absence of bilateral infiltrates. Overnight Events:   10/9-much improved neurologically, following commands  10/8- Continues to fail SBT  10/7/2022- No change in Neuro status.  On Prop 15/Fent 200  Currently on VC16/400/40/5 and Levo 2. Failed SBT yesterday  10/5/2022-On Lower dose of propofol,same dose of fentanyl. We attempted to wean sedation, but failed given vent dysnchrony. OFF Levophed this morning. On TF. Not following commands. On vent support: 15/400/45/5. Did Sbt for few hours yesterday. DW mother at bedside   10/04-Remain on Fentanyl, on lower dose of propofol. On Levophed. Did SBT for 2 hours yesterday. On vent support 16/400/45/5. CXR reviewed. Ceftriaxone stopped and zosyn started. 10/03/22: Sedated on Fentanyl and Propofol. On Levophed. Noted results of MRI. Temp 100.2 On vent support 16/400/45/5  10/02/22 SAHARA overnight. Patient remains restless on fentanyl and propofol. On minimal levophed likely because of sedation. Not opening eyes to stimulation. Doesn't follow commands. Active Problem List:     Problem List  Date Reviewed: 10/5/2022            Codes Class    Anemia ICD-10-CM: D64.9  ICD-9-CM: 285.9         UTI (urinary tract infection) ICD-10-CM: N39.0  ICD-9-CM: 599.0         Hypokalemia ICD-10-CM: E87.6  ICD-9-CM: 276.8         Obese ICD-10-CM: E66.9  ICD-9-CM: 278.00         Acute CVA (cerebrovascular accident) ([facility] ICD-10-CM: I63.9  ICD-9-CM: 434.91         NSTEMI (non-ST elevated myocardial infarction) ([facility] ICD-10-CM: I21.4  ICD-9-CM: 410.70         Takotsubo cardiomyopathy ICD-10-CM: I51.81  ICD-9-CM: 429.83         Dyslipidemia ICD-10-CM: E78.5  ICD-9-CM: 272.4         Hypothyroidism ICD-10-CM: E03.9  ICD-9-CM: 244.9         KOBY (generalized anxiety disorder) ICD-10-CM: F41.1  ICD-9-CM: 300.02         * (Principal) Status epilepticus ([facility] ICD-10-CM: G40.901  ICD-9-CM: 345. 3         CHRIS (acute kidney injury) ([facility] ICD-10-CM: N17.9  ICD-9-CM: 584.9         Lactic acidosis ICD-10-CM: E87.20  ICD-9-CM: 276.2         Acute respiratory failure with hypoxia (HCC) ICD-10-CM: J96.01  ICD-9-CM: 518.81         Shock, cardiogenic ([facility] ICD-10-CM: R57.0  ICD-9-CM: 785.51         Acute metabolic encephalopathy BKJ-54-LJ: G93.41  ICD-9-CM: 348.31        Past Medical History:      has a past medical history of History of vascular access device (10/07/2022). Past Surgical History:      has a past surgical history that includes ir insert non tunl cvc over 5 yrs (2022). Home Medications:     Prior to Admission medications    Medication Sig Start Date End Date Taking? Authorizing Provider   atorvastatin (LIPITOR) 10 mg tablet Take 10 mg by mouth daily. Yes Provider, Historical   furosemide (LASIX) 20 mg tablet Take 20 mg by mouth daily. Yes Provider, Historical   traZODone (DESYREL) 50 mg tablet Take 125 mg by mouth nightly. Yes Provider, Historical   levothyroxine (SYNTHROID) 88 mcg tablet Take 88 mcg by mouth Daily (before breakfast). Yes Provider, Historical   OTHER,NON-FORMULARY, ESTROGEN(5050)/TESTOSTERONE (HRT) 1.5mg/10mg/ml Cream APPLY 1 ML TO SKIN (INNER WRIST/ABDOMEN/THIGHS EVERY DAY. ROTATE SITES   Yes Provider, Historical   OTHER,NON-FORMULARY, Progesterone (ME4M) 250 mg at bedtime. Yes Provider, Historical       Allergies/Social/Family History:     No Known Allergies   Social History     Tobacco Use    Smoking status: Not on file    Smokeless tobacco: Not on file   Substance Use Topics    Alcohol use: Not on file      No family history on file. Review of Systems:     ROS is unobtainable as the patient is intubated.     Objective:   Vital Signs:  Visit Vitals  BP (!) 97/40   Pulse (!) 57   Temp 98 °F (36.7 °C)   Resp 16   Ht 5' 6\" (1.676 m)   Wt 104.3 kg (230 lb)   SpO2 94%   BMI 37.12 kg/m²    O2 Flow Rate (L/min): 60 l/min O2 Device: Endotracheal tube, Heated, Humidifier, Ventilator Temp (24hrs), Av.4 °F (36.9 °C), Min:98 °F (36.7 °C), Max:99 °F (37.2 °C)           Intake/Output:     Intake/Output Summary (Last 24 hours) at 10/9/2022 0997  Last data filed at 10/9/2022 0617  Gross per 24 hour   Intake 2139.5 ml   Output 2350 ml   Net -210.5 ml         PE under tele audio/video assessment:    Gen: On Fentanyl and Propofol   HEENT:  intubated  Chest: symmetrical chest rise  CV:ST  Abd: soft  Ext: +ve edema  Neuro: opens eyes to voice, nonfocal    LABS AND  DATA: Personally reviewed  Recent Labs     10/09/22  0106 10/08/22  1310   WBC 10.1 10.5   HGB 10.2* 10.5*   HCT 32.3* 32.1*    376       Recent Labs     10/09/22  0106 10/08/22  1310    139   K 3.2* 3.3*    106   CO2 27 27   BUN 18 18   CREA 0.63 0.63   * 120*   CA 9.0 9.1   MG 2.0  --    PHOS 3.5  --        Recent Labs     10/09/22  0106   AP 72   TP 6.5   ALB 2.2*   GLOB 4.3*       No results for input(s): INR, PTP, APTT, INREXT, INREXT in the last 72 hours. No results for input(s): PHI, PCO2I, PO2I, FIO2I in the last 72 hours. No results for input(s): CPK, CKMB, TROIQ, BNPP in the last 72 hours. Hemodynamics:   PAP:   CO:     Wedge:   CI:     CVP:    SVR:       PVR:       Ventilator Settings:  Mode Rate Tidal Volume Pressure FiO2 PEEP   Assist control   400 ml  5 cm H2O 60 % 5 cm H20     Peak airway pressure: 24 cm H2O    Minute ventilation: 8.16 l/min        MEDS: Reviewed    Chest X-Ray:  CXR Results  (Last 48 hours)                 10/07/22 1119  XR CHEST PORT Final result    Impression:  1. Right upper extremity PICC line tip projects over the right atrium. No   visualized pneumothorax. Narrative:  EXAM:  XR CHEST PORT       INDICATION: Verify PICC Tip placement please       COMPARISON: One day prior. TECHNIQUE: Single frontal view of the chest.       FINDINGS: Right upper extremity PICC line with tip projecting over the right   atrium. Existing right IJ catheter unchanged with tip also projecting over the   right atrium. Endotracheal tube terminates 2 cm from the lópez. Enteric tube   terminates below the level the diaphragm outside the field of view study. Slightly improved aeration of the lung bases. Possible trace residual effusions. No visualized pneumothorax. Loren Yap Lungs are hypoventilatory.                    ABCDEF Bundle/Checklist Completed:  Yes    DISPOSITION  Stay in ICU

## 2022-10-09 NOTE — PROGRESS NOTES
Problem: Ventilator Management  Goal: *Adequate oxygenation and ventilation  Outcome: Progressing Towards Goal  Goal: *Patient maintains clear airway/free of aspiration  Outcome: Progressing Towards Goal  Goal: *Absence of infection signs and symptoms  Outcome: Progressing Towards Goal  Goal: *Normal spontaneous ventilation  Outcome: Progressing Towards Goal     Problem: Patient Education: Go to Patient Education Activity  Goal: Patient/Family Education  Outcome: Progressing Towards Goal     Problem: Non-Violent Restraints  Goal: Removal from restraints as soon as assessed to be safe  Outcome: Progressing Towards Goal  Goal: No harm/injury to patient while restraints in use  Outcome: Progressing Towards Goal  Goal: Patient's dignity will be maintained  Outcome: Progressing Towards Goal  Goal: Patient Interventions  Outcome: Progressing Towards Goal     Problem: Delirium Treatment  Goal: *Level of consciousness restored to baseline  Outcome: Progressing Towards Goal  Goal: *Level of environmental perceptions restored to baseline  Outcome: Progressing Towards Goal  Goal: *Sensory perception restored to baseline  Outcome: Progressing Towards Goal  Goal: *Emotional stability restored to baseline  Outcome: Progressing Towards Goal  Goal: *Functional assessment restored to baseline  Outcome: Progressing Towards Goal  Goal: *Absence of falls  Outcome: Progressing Towards Goal  Goal: *Will remain free of delirium, CAM Score negative  Outcome: Progressing Towards Goal  Goal: *Cognitive status will be restored to baseline  Outcome: Progressing Towards Goal  Goal: Interventions  Outcome: Progressing Towards Goal     Problem: Patient Education: Go to Patient Education Activity  Goal: Patient/Family Education  Outcome: Progressing Towards Goal     Problem: Falls - Risk of  Goal: *Absence of Falls  Description: Document Haleigh Brinkter Fall Risk and appropriate interventions in the flowsheet.   Outcome: Progressing Towards Goal  Note: Fall Risk Interventions:       Mentation Interventions: Adequate sleep, hydration, pain control, Bed/chair exit alarm, Reorient patient    Medication Interventions: Bed/chair exit alarm    Elimination Interventions: Bed/chair exit alarm, Call light in reach, Toileting schedule/hourly rounds    History of Falls Interventions: Bed/chair exit alarm         Problem: Patient Education: Go to Patient Education Activity  Goal: Patient/Family Education  Outcome: Progressing Towards Goal     Problem: Pressure Injury - Risk of  Goal: *Prevention of pressure injury  Description: Document Jose Enrique Scale and appropriate interventions in the flowsheet.   Outcome: Progressing Towards Goal  Note: Pressure Injury Interventions:  Sensory Interventions: Assess changes in LOC, Monitor skin under medical devices    Moisture Interventions: Absorbent underpads, Internal/External urinary devices, Check for incontinence Q2 hours and as needed    Activity Interventions: Assess need for specialty bed, PT/OT evaluation    Mobility Interventions: Assess need for specialty bed, PT/OT evaluation    Nutrition Interventions: Document food/fluid/supplement intake    Friction and Shear Interventions: Apply protective barrier, creams and emollients, Lift team/patient mobility team                Problem: Patient Education: Go to Patient Education Activity  Goal: Patient/Family Education  Outcome: Progressing Towards Goal     Problem: Nutrition Deficit  Goal: *Optimize nutritional status  Outcome: Progressing Towards Goal

## 2022-10-09 NOTE — PROGRESS NOTES
[facility]  [address], [facility]  [phone]    Medical Progress Note      NAME: Severo Davis   :  1965  MRM:  [id number]    Date/Time of service 10/9/2022  10:52 AM         Assessment and Plan:     Acute metabolic encephalopathy - POA, unclear etiology. \"2 small foci of diffusion restriction in the right frontal lobe \" noted on MRI but unlikely this explains KHADAR. Initial concern for Sz, but EEG bland, keppra stopped. Concern remains she had anoxic brain injury. Will assess after extubation and stopping sedation. Neurology consulted. Stopped lidoderm    Takotsubo cardiomyopathy / NSTEMI (non-ST elevated myocardial infarction) / Bradycardia - POA, unclear initial driving event. Cardiology consulted. Too unstable for cath. Repeat ECHO per cardiology. Unable to tolerate BB or ACE due to low BP    Cardiogenic shock - Intially presumed septic shock until ECHO showed new EF 30%. Currently still in shock, still on prn levophed IV gtt as pressor. On midodrine    Acute respiratory failure with hypoxia - POA, persistent. Unclear if aspiration PNA vs central resp depression. Completed Zosyn. Intubated on admission. Intensivist consulted and is managing vent. Still requring fentanyl and precedex gtt for sedation. Propofol was stopped due to elevated triglycerides. She is failing SBT so far. Likely needs trach soon if fails    Acute CVA (cerebrovascular accident) - MRI notes two small lesions. Unclear significance and timing. Neurology to assess after extubation and off sedation. Continue ASA and statin. Anemia - POA and slowly worsening likely due to acute illness. UTI (urinary tract infection) - Pitt sensitive E coli on initial contaminated cx. Had E coli in contaminated sputum along with much normal josue. Has completed course of Zosyn. CHRIS (acute kidney injury) / Lactic acidosis / Hypokalemia - POA: unknown baseline Cr.  Currently Cr stable. Status epilepticus - Reject Dx. Ramos Fagan had myoclonic jerking    Dyslipidemia - On atorvastatin    Hypothyroidism - TSH normal.     KOBY (generalized anxiety disorder) - Noted benzo on drug screen on admit, but had Versed for Sz    Obese - Advise weight loss of she survives. Subjective:     Chief Complaint:  intubated, no change    ROS:  (bold if positive, if negative)    Not Tolerating PT  Tolerating Diet vis NG tube        Objective:     Last 24hrs VS reviewed since prior progress note.  Most recent are:    Visit Vitals  BP (!) 97/40   Pulse (!) 57   Temp 98 °F (36.7 °C)   Resp 16   Ht 5' 6\" (1.676 m)   Wt 104.3 kg (230 lb)   SpO2 94%   BMI 37.12 kg/m²     SpO2 Readings from Last 6 Encounters:   10/09/22 94%    O2 Flow Rate (L/min): 60 l/min     Intake/Output Summary (Last 24 hours) at 10/9/2022 3288  Last data filed at 10/9/2022 6852  Gross per 24 hour   Intake 2306.15 ml   Output 2450 ml   Net -143.85 ml          Physical Exam:    Gen:  Obese, in no acute distress  HEENT:  Pink conjunctivae, PERRL, hearing not intact to voice, ET in place  Neck:  Supple, without masses, thyroid non-tender  Resp:  No accessory muscle use, bilateral coarse breath sounds   Card:  No murmurs, bradycardic S1, S2 without thrills, bruits or peripheral edema  Abd:  Soft, non-tender, non-distended, reduced bowel sounds are present, no mass  Lymph:  No cervical or inguinal adenopathy  Musc:  No cyanosis or clubbing  Skin:  No rashes or ulcers, skin turgor is good  Neuro:  Cranial nerves are grossly intact, general motor weakness, dose not follow commands appropriately  Psych:  Sedated    Telemetry reviewed:   normal sinus rhythm  __________________________________________________________________  Medications Reviewed: (see below)  Medications:     Current Facility-Administered Medications   Medication Dose Route Frequency    dexmedeTOMidine in 0.9 % NaCl (PRECEDEX) 400 mcg/100 mL (4 mcg/mL) infusion soln  0.1-1.5 mcg/kg/hr IntraVENous TITRATE    NOREPINephrine (LEVOPHED) 8 mg in 5% dextrose 250mL (32 mcg/mL) infusion  0.5-30 mcg/min IntraVENous TITRATE    alteplase (CATHFLO) 1 mg in sterile water (preservative free) 1 mL injection  1 mg InterCATHeter PRN    levothyroxine (SYNTHROID) tablet 88 mcg  88 mcg Per G Tube DAILY    midazolam (VERSED) injection 2 mg  2 mg IntraVENous Q1H PRN    senna-docusate (PERICOLACE) 8.6-50 mg per tablet 1 Tablet  1 Tablet Oral BID    piperacillin-tazobactam (ZOSYN) 3.375 g in 0.9% sodium chloride (MBP/ADV) 100 mL MBP  3.375 g IntraVENous Q8H    fentaNYL citrate (PF) injection 100 mcg  100 mcg IntraVENous Q4H PRN    midodrine (PROAMATINE) tablet 15 mg  15 mg Oral Q8H    atorvastatin (LIPITOR) tablet 40 mg  40 mg Oral QHS    enoxaparin (LOVENOX) injection 30 mg  30 mg SubCUTAneous Q12H    fentaNYL (PF) 1,500 mcg/30 mL (50 mcg/mL) infusion  0-200 mcg/hr IntraVENous TITRATE    aspirin chewable tablet 81 mg  81 mg Oral DAILY    lansoprazole compounding kit (PREVACID) 3 mg/mL oral suspension 30 mg  30 mg Oral DAILY    sodium chloride (NS) flush 5-40 mL  5-40 mL IntraVENous Q8H    sodium chloride (NS) flush 5-40 mL  5-40 mL IntraVENous PRN    acetaminophen (TYLENOL) tablet 650 mg  650 mg Oral Q6H PRN    Or    acetaminophen (TYLENOL) suppository 650 mg  650 mg Rectal Q6H PRN    polyethylene glycol (MIRALAX) packet 17 g  17 g Oral DAILY PRN    ondansetron (ZOFRAN) injection 4 mg  4 mg IntraVENous Q6H PRN    chlorhexidine (PERIDEX) 0.12 % mouthwash 15 mL  15 mL Oral Q12H        Lab Data Reviewed: (see below)  Lab Review:     Recent Labs     10/09/22  0106 10/08/22  1310 10/07/22  0102   WBC 10.1 10.5 8.0   HGB 10.2* 10.5* 9.1*   HCT 32.3* 32.1* 28.4*    376 283       Recent Labs     10/09/22  0106 10/08/22  1310 10/07/22  0102    139 142   K 3.2* 3.3* 3.5    106 108   CO2 27 27 30   * 120* 115*   BUN 18 18 15   CREA 0.63 0.63 0.65   CA 9.0 9.1 8.8   MG 2.0  --   --    PHOS 3.5  --   -- ALB 2.2*  --   --    TBILI 0.4  --   --    ALT 35  --   --        Lab Results   Component Value Date/Time    Glucose (POC) 110 10/05/2022 04:55 PM    Glucose (POC) 87 10/05/2022 11:02 AM    Glucose (POC) 78 10/05/2022 05:00 AM    Glucose (POC) 94 10/05/2022 04:57 AM    Glucose (POC) 124 (H) 10/04/2022 11:03 PM     Recent Labs     10/06/22  0846   PH 7.42   PCO2 46*   PO2 60*   HCO3 29*   FIO2 50       No results for input(s): INR, INREXT, INREXT in the last 72 hours.   All Micro Results       Procedure Component Value Units Date/Time    CULTURE, RESPIRATORY/SPUTUM/BRONCH Shivrodrigo Heidytremaine [[id number]] Collected: 10/03/22 1300    Order Status: Completed Specimen: Sputum Updated: 10/05/22 1143     Special Requests: NO SPECIAL REQUESTS        GRAM STAIN RARE WBCS SEEN               OCCASIONAL EPITHELIAL CELLS SEEN                  OCCASIONAL GRAM NEGATIVE RODS                  RARE GRAM POSITIVE COCCI IN PAIRS           Culture result:       HEAVY NORMAL RESPIRATORY CATHIE          CULTURE, BLOOD, PAIRED [[id number]] Collected: 09/27/22 1251    Order Status: Completed Specimen: Blood Updated: 10/02/22 0640     Special Requests: NO SPECIAL REQUESTS        Culture result: NO GROWTH 5 DAYS       CULTURE, URINE [[id number]]  (Abnormal)  (Susceptibility) Collected: 09/27/22 1251    Order Status: Completed Specimen: Cath Urine Updated: 09/30/22 1502     Special Requests: NO SPECIAL REQUESTS        [place] Count --        >100,000  COLONIES/mL       Culture result: ESCHERICHIA COLI       CULTURE, RESPIRATORY/SPUTUM/BRONCH Curtistine Number STAIN [[id number]]  (Abnormal)  (Susceptibility) Collected: 09/27/22 1811    Order Status: Completed Specimen: Sputum Updated: 09/30/22 0930     Special Requests: NO SPECIAL REQUESTS        GRAM STAIN 1+ WBCS SEEN               1+ GRAM POSITIVE COCCI IN CLUSTERS            1+ GRAM VARIABLE RODS        Culture result: HEAVY ESCHERICHIA COLI               HEAVY NORMAL RESPIRATORY CATHIE                  MODERATE YEAST (APPARENT MULTIPLE COLONY TYPES)          CULTURE, MRSA [[id number]] Collected: 09/27/22 1440    Order Status: Completed Specimen: Nasal from Nares Updated: 09/29/22 0820     Special Requests: NO SPECIAL REQUESTS        Culture result: MRSA NOT PRESENT               Screening of patient nares for MRSA is for surveillance purposes and, if positive, to facilitate isolation considerations in high risk settings. It is not intended for automatic decolonization interventions per se as regimens are not sufficiently effective to warrant routine use. GAETANO Marcelinona Ana, UR/CSF [[id number]] Collected: 09/27/22 1431    Order Status: Completed Specimen: Miscellaneous sample Updated: 09/28/22 1336     Source URINE        Specimen Urine     Streptococcus pneumoniae Ag Negative        Fluid culture Not indicated. Organism ID Not indicated. Please note Comment        Comment: (NOTE)  College of [employer]ologists standards require a culture to be  performed on CSF specimens submitted for bacterial antigen testing. (CAP [id number]) Urine specimens will not be cultured. Performed At: [facility] & [address] [id number]  Julio Cesar Maguire MD FO:[id number]         Misty Harrington [[id number]] Collected: 09/27/22 1530    Order Status: Completed Specimen: Urine Updated: 09/28/22 1336     Source URINE        L pneumophila S1 Ag, urine Negative        Comment: (NOTE)  Presumptive negative for L. pneumophila serogroup 1 antigen in urine,  suggesting no recent or current infection. Legionnaires' disease  cannot be ruled out since other serogroups and species may also  cause disease.   Performed At: [facility] & [address] [id number]  Julio Cesar Maguire MD AY:[id number]         RESPIRATORY VIRUS PANEL W/COVID-19, PCR [[id number]] Collected: 09/27/22 1440    Order Status: Completed Specimen: Nasopharyngeal Updated: 09/27/22 2214     Adenovirus Not detected Coronavirus 229E Not detected        Coronavirus HKU1 Not detected        Coronavirus CVNL63 Not detected        Coronavirus OC43 Not detected        SARS-CoV-2, PCR Not detected        Metapneumovirus Not detected        Rhinovirus and Enterovirus Not detected        Influenza A Not detected        Influenza B Not detected        Parainfluenza 1 Not detected        Parainfluenza 2 Not detected        Parainfluenza 3 Not detected        Parainfluenza virus 4 Not detected        RSV by PCR Not detected        B. parapertussis, PCR Not detected        Bordetella pertussis - PCR Not detected        Chlamydophila pneumoniae DNA, QL, PCR Not detected        Mycoplasma pneumoniae DNA, QL, PCR Not detected       URINE CULTURE HOLD SAMPLE [[id number]] Collected: 09/27/22 1251    Order Status: Completed Specimen: Urine from Serum Updated: 09/27/22 1258     Urine culture hold       Urine on hold in Microbiology dept for 2 days. If unpreserved urine is submitted, it cannot be used for addtional testing after 24 hours, recollection will be required. Other pertinent lab: none    Total time spent with patient: 30 Minutes I personally reviewed chart, notes, data and current medications in the medical record. I have personally examined and treated the patient at bedside during this period. To assist coordination of care and communication with nursing and staff, this note may be preliminary early in the day, but finalized by end of the day.                  Care Plan discussed with: Patient, Care Manager, Nursing Staff, Consultant/Specialist, and >50% of time spent in counseling and coordination of care    Discussed:  Care Plan and D/C Planning    Prophylaxis:  Lovenox and H2B/PPI    Disposition:  SNF/LTC           ___________________________________________________    Attending Physician: Kellie Nunez MD

## 2022-10-09 NOTE — PROGRESS NOTES
Bedside and Verbal shift change report given to Carson Mixon RN (oncoming nurse) by Josr Chamberlain RN (offgoing nurse). Report included the following information SBAR, Kardex, Procedure Summary, Intake/Output, MAR, Recent Results, Cardiac Rhythm NSR and SB, and Alarm Parameters . Pt was able to begin following commands, with increasing frequency. She did get agitated requiring versed pushes occasionally. May be suitable for SBT tomorrow. Bedside and Verbal shift change report given to KATHY Acuna (oncoming nurse) by Carson Mixon RN (offgoing nurse). Report included the following information SBAR, Kardex, Intake/Output, MAR, Recent Results, Cardiac Rhythm SB and NSR, and Alarm Parameters .

## 2022-10-09 NOTE — PROGRESS NOTES
2000 Bedside and Verbal shift change report given to ran (oncoming nurse) by Aarti Forde (offgoing nurse). Report included the following information ED Summary, Procedure Summary, Intake/Output, MAR, Med Rec Status, and Cardiac Rhythm SB . Primary Nurse Manuelito Nunez RN and Aarti Forde RN performed a dual skin assessment on this patient No impairment noted. Pt vented. easily agitated. Right neck bruising noted. 2200 pt turned. NG suctioned, tan secretions noted. [phone] versed given. Feeding set changed. 2344 pt agitated trying to get out of bed. Versed given. 0000 pt turned. Oral care done. Pro source given via OG tube. 0200 pt turned. Pt fusses easily when being moved or suctioned. 0400 pt turned. Suctioned. Versed given for agitation. 0600 pt pulled up in bed. pt turned. Pro source given. Pt trying to get out of bed. Versed given.

## 2022-10-10 ENCOUNTER — TELEPHONE (OUTPATIENT)
Dept: FAMILY MEDICINE CLINIC | Age: 57
End: 2022-10-10

## 2022-10-10 PROCEDURE — 74011000250 HC RX REV CODE- 250: Performed by: NURSE PRACTITIONER

## 2022-10-10 PROCEDURE — 74011250637 HC RX REV CODE- 250/637: Performed by: INTERNAL MEDICINE

## 2022-10-10 PROCEDURE — 74011250636 HC RX REV CODE- 250/636: Performed by: INTERNAL MEDICINE

## 2022-10-10 PROCEDURE — 74011250637 HC RX REV CODE- 250/637: Performed by: PSYCHIATRY & NEUROLOGY

## 2022-10-10 PROCEDURE — 65610000006 HC RM INTENSIVE CARE

## 2022-10-10 PROCEDURE — 74011000258 HC RX REV CODE- 258: Performed by: INTERNAL MEDICINE

## 2022-10-10 PROCEDURE — 77030018798 HC PMP KT ENTRL FED COVD -A

## 2022-10-10 PROCEDURE — 74011000250 HC RX REV CODE- 250: Performed by: INTERNAL MEDICINE

## 2022-10-10 PROCEDURE — 94003 VENT MGMT INPAT SUBQ DAY: CPT

## 2022-10-10 PROCEDURE — 74011250637 HC RX REV CODE- 250/637: Performed by: NURSE PRACTITIONER

## 2022-10-10 PROCEDURE — 74011250636 HC RX REV CODE- 250/636: Performed by: NURSE PRACTITIONER

## 2022-10-10 RX ORDER — METOPROLOL TARTRATE 5 MG/5ML
1.25 INJECTION INTRAVENOUS EVERY 6 HOURS
Status: DISCONTINUED | OUTPATIENT
Start: 2022-10-10 | End: 2022-10-11

## 2022-10-10 RX ADMIN — ENOXAPARIN SODIUM 30 MG: 100 INJECTION SUBCUTANEOUS at 09:19

## 2022-10-10 RX ADMIN — FENTANYL CITRATE 100 MCG: 50 INJECTION, SOLUTION INTRAMUSCULAR; INTRAVENOUS at 14:55

## 2022-10-10 RX ADMIN — Medication 10 ML: at 06:38

## 2022-10-10 RX ADMIN — MIDODRINE HYDROCHLORIDE 15 MG: 5 TABLET ORAL at 06:38

## 2022-10-10 RX ADMIN — CHLORHEXIDINE GLUCONATE 15 ML: 1.2 RINSE ORAL at 08:43

## 2022-10-10 RX ADMIN — Medication 10 ML: at 22:00

## 2022-10-10 RX ADMIN — QUETIAPINE FUMARATE 25 MG: 25 TABLET ORAL at 09:19

## 2022-10-10 RX ADMIN — DEXMEDETOMIDINE HYDROCHLORIDE 1.3 MCG/KG/HR: 4 INJECTION, SOLUTION INTRAVENOUS at 00:50

## 2022-10-10 RX ADMIN — DEXMEDETOMIDINE HYDROCHLORIDE 1.3 MCG/KG/HR: 4 INJECTION, SOLUTION INTRAVENOUS at 04:14

## 2022-10-10 RX ADMIN — ONDANSETRON 4 MG: 2 INJECTION INTRAMUSCULAR; INTRAVENOUS at 23:41

## 2022-10-10 RX ADMIN — LANSOPRAZOLE 30 MG: KIT at 09:19

## 2022-10-10 RX ADMIN — DEXMEDETOMIDINE HYDROCHLORIDE 1.3 MCG/KG/HR: 4 INJECTION, SOLUTION INTRAVENOUS at 07:42

## 2022-10-10 RX ADMIN — MIDAZOLAM 2 MG: 1 INJECTION, SOLUTION INTRAMUSCULAR; INTRAVENOUS at 07:34

## 2022-10-10 RX ADMIN — METOPROLOL TARTRATE 1.25 MG: 5 INJECTION, SOLUTION INTRAVENOUS at 17:22

## 2022-10-10 RX ADMIN — LEVOTHYROXINE SODIUM 88 MCG: 0.09 TABLET ORAL at 09:19

## 2022-10-10 RX ADMIN — ASPIRIN 81 MG: 81 TABLET, CHEWABLE ORAL at 09:19

## 2022-10-10 RX ADMIN — Medication 150 MCG/HR: at 00:48

## 2022-10-10 RX ADMIN — PIPERACILLIN AND TAZOBACTAM 3.38 G: 3; .375 INJECTION, POWDER, LYOPHILIZED, FOR SOLUTION INTRAVENOUS at 15:52

## 2022-10-10 RX ADMIN — METOPROLOL TARTRATE 1.25 MG: 5 INJECTION, SOLUTION INTRAVENOUS at 14:55

## 2022-10-10 RX ADMIN — PIPERACILLIN AND TAZOBACTAM 3.38 G: 3; .375 INJECTION, POWDER, LYOPHILIZED, FOR SOLUTION INTRAVENOUS at 07:34

## 2022-10-10 RX ADMIN — Medication 10 ML: at 13:25

## 2022-10-10 RX ADMIN — ENOXAPARIN SODIUM 30 MG: 100 INJECTION SUBCUTANEOUS at 21:38

## 2022-10-10 RX ADMIN — ACETAMINOPHEN 650 MG: 650 SUPPOSITORY RECTAL at 20:08

## 2022-10-10 RX ADMIN — SENNOSIDES AND DOCUSATE SODIUM 1 TABLET: 50; 8.6 TABLET ORAL at 09:19

## 2022-10-10 RX ADMIN — METOPROLOL TARTRATE 1.25 MG: 5 INJECTION, SOLUTION INTRAVENOUS at 23:32

## 2022-10-10 NOTE — PROGRESS NOTES
[facility]  [address], [facility]  [phone]    Medical Progress Note      NAME: Lb Palafox   :  1965  MRM:  [id number]    Date/Time of service 10/10/2022  9:55 AM         Assessment and Plan:     Acute metabolic encephalopathy - POA, unclear etiology. \"2 small foci of diffusion restriction in the right frontal lobe \" noted on MRI but unlikely this explains KHADAR. Initial concern for Sz, but EEG bland, keppra stopped. Concern remains she had anoxic brain injury. Will assess after extubation and stopping sedation. Neurology consulted. Stopped lidoderm    Takotsubo cardiomyopathy / NSTEMI (non-ST elevated myocardial infarction) / Bradycardia - POA, unclear initial driving event. Cardiology consulted. Too unstable for cath. Repeat ECHO per cardiology. Unable to tolerate BB or ACE due to low BP    Cardiogenic shock - Intially presumed septic shock until ECHO showed new EF 30%. Currently still in shock, likely due to sedation, and so still on prn levophed IV gtt as pressor. Also on midodrine    Acute respiratory failure with hypoxia - POA, persistent. Unclear if aspiration PNA vs central resp depression. Completed Zosyn. Intubated on admission. Intensivist consulted and is managing vent. Still requring fentanyl and precedex gtt for sedation. Propofol was stopped due to elevated triglycerides. She is failing SBT so far. Likely needs trach soon if fails    Acute CVA (cerebrovascular accident) - MRI notes two small lesions. Unclear significance and timing. Neurology to assess after extubation and off sedation. Continue ASA and statin. Anemia - POA and slowly worsening likely due to acute illness. UTI (urinary tract infection) - Pitt sensitive E coli on initial contaminated cx. Had E coli in contaminated sputum along with much normal josue. Has completed course of Zosyn.     CHRIS (acute kidney injury) / Lactic acidosis / Hypokalemia - POA: unknown baseline Cr. Currently Cr stable. Status epilepticus - Reject Dx. Bre Tesfaye had myoclonic jerking    Dyslipidemia - On atorvastatin    Hypothyroidism - TSH normal.     KOBY (generalized anxiety disorder) - Noted benzo on drug screen on admit, but had Versed for Sz    Obese - Advise weight loss of she survives. Subjective:     Chief Complaint:  intubated, no change    ROS:  (bold if positive, if negative)    Not Tolerating PT  Tolerating Diet vis NG tube        Objective:     Last 24hrs VS reviewed since prior progress note.  Most recent are:    Visit Vitals  BP (!) 94/42   Pulse (!) 54   Temp 97.6 °F (36.4 °C)   Resp 16   Ht 5' 6\" (1.676 m)   Wt 104.3 kg (230 lb)   SpO2 94%   BMI 37.12 kg/m²     SpO2 Readings from Last 6 Encounters:   10/10/22 94%    O2 Flow Rate (L/min): 60 l/min     Intake/Output Summary (Last 24 hours) at 10/10/2022 0710  Last data filed at 10/10/2022 0200  Gross per 24 hour   Intake 2869.96 ml   Output 2420 ml   Net 449.96 ml          Physical Exam:    Gen:  Obese, in no acute distress  HEENT:  Pink conjunctivae, PERRL, hearing not intact to voice, ET in place  Neck:  Supple, without masses, thyroid non-tender  Resp:  No accessory muscle use, bilateral coarse breath sounds   Card:  No murmurs, bradycardic S1, S2 without thrills, bruits or peripheral edema  Abd:  Soft, non-tender, non-distended, reduced bowel sounds are present, no mass  Lymph:  No cervical or inguinal adenopathy  Musc:  No cyanosis or clubbing  Skin:  No rashes or ulcers, skin turgor is good  Neuro:  Cranial nerves are grossly intact, general motor weakness, dose not follow commands appropriately  Psych:  Sedated    Telemetry reviewed:   normal sinus rhythm  __________________________________________________________________  Medications Reviewed: (see below)  Medications:     Current Facility-Administered Medications   Medication Dose Route Frequency    QUEtiapine (SEROquel) tablet 25 mg  25 mg Per NG tube BID dexmedeTOMidine in 0.9 % NaCl (PRECEDEX) 400 mcg/100 mL (4 mcg/mL) infusion soln  0.1-1.5 mcg/kg/hr IntraVENous TITRATE    NOREPINephrine (LEVOPHED) 8 mg in 5% dextrose 250mL (32 mcg/mL) infusion  0.5-30 mcg/min IntraVENous TITRATE    alteplase (CATHFLO) 1 mg in sterile water (preservative free) 1 mL injection  1 mg InterCATHeter PRN    levothyroxine (SYNTHROID) tablet 88 mcg  88 mcg Per G Tube DAILY    midazolam (VERSED) injection 2 mg  2 mg IntraVENous Q1H PRN    senna-docusate (PERICOLACE) 8.6-50 mg per tablet 1 Tablet  1 Tablet Oral BID    piperacillin-tazobactam (ZOSYN) 3.375 g in 0.9% sodium chloride (MBP/ADV) 100 mL MBP  3.375 g IntraVENous Q8H    fentaNYL citrate (PF) injection 100 mcg  100 mcg IntraVENous Q4H PRN    midodrine (PROAMATINE) tablet 15 mg  15 mg Oral Q8H    atorvastatin (LIPITOR) tablet 40 mg  40 mg Oral QHS    enoxaparin (LOVENOX) injection 30 mg  30 mg SubCUTAneous Q12H    fentaNYL (PF) 1,500 mcg/30 mL (50 mcg/mL) infusion  0-200 mcg/hr IntraVENous TITRATE    aspirin chewable tablet 81 mg  81 mg Oral DAILY    lansoprazole compounding kit (PREVACID) 3 mg/mL oral suspension 30 mg  30 mg Oral DAILY    sodium chloride (NS) flush 5-40 mL  5-40 mL IntraVENous Q8H    sodium chloride (NS) flush 5-40 mL  5-40 mL IntraVENous PRN    acetaminophen (TYLENOL) tablet 650 mg  650 mg Oral Q6H PRN    Or    acetaminophen (TYLENOL) suppository 650 mg  650 mg Rectal Q6H PRN    polyethylene glycol (MIRALAX) packet 17 g  17 g Oral DAILY PRN    ondansetron (ZOFRAN) injection 4 mg  4 mg IntraVENous Q6H PRN    chlorhexidine (PERIDEX) 0.12 % mouthwash 15 mL  15 mL Oral Q12H        Lab Data Reviewed: (see below)  Lab Review:     Recent Labs     10/09/22  0106 10/08/22  1310   WBC 10.1 10.5   HGB 10.2* 10.5*   HCT 32.3* 32.1*    376       Recent Labs     10/09/22  0106 10/08/22  1310    139   K 3.2* 3.3*    106   CO2 27 27   * 120*   BUN 18 18   CREA 0.63 0.63   CA 9.0 9.1   MG 2.0  --    PHOS 3.5  --    ALB 2.2*  --    TBILI 0.4  --    ALT 35  --        Lab Results   Component Value Date/Time    Glucose (POC) 110 10/05/2022 04:55 PM    Glucose (POC) 87 10/05/2022 11:02 AM    Glucose (POC) 78 10/05/2022 05:00 AM    Glucose (POC) 94 10/05/2022 04:57 AM    Glucose (POC) 124 (H) 10/04/2022 11:03 PM     No results for input(s): PH, PCO2, PO2, HCO3, FIO2 in the last 72 hours. No results for input(s): INR, INREXT, INREXT in the last 72 hours.   All Micro Results       Procedure Component Value Units Date/Time    CULTURE, RESPIRATORY/SPUTUM/BRONCH Tacy Diane [[id number]] Collected: 10/03/22 1300    Order Status: Completed Specimen: Sputum Updated: 10/05/22 1143     Special Requests: NO SPECIAL REQUESTS        GRAM STAIN RARE WBCS SEEN               OCCASIONAL EPITHELIAL CELLS SEEN                  OCCASIONAL GRAM NEGATIVE RODS                  RARE GRAM POSITIVE COCCI IN PAIRS           Culture result:       HEAVY NORMAL RESPIRATORY CATHIE          CULTURE, BLOOD, PAIRED [[id number]] Collected: 09/27/22 1251    Order Status: Completed Specimen: Blood Updated: 10/02/22 0640     Special Requests: NO SPECIAL REQUESTS        Culture result: NO GROWTH 5 DAYS       CULTURE, URINE [[id number]]  (Abnormal)  (Susceptibility) Collected: 09/27/22 1251    Order Status: Completed Specimen: Cath Urine Updated: 09/30/22 1502     Special Requests: NO SPECIAL REQUESTS        [address] Count --        >100,000  COLONIES/mL       Culture result: ESCHERICHIA COLI       CULTURE, RESPIRATORY/SPUTUM/BRONCH Erby Pooja STAIN [[id number]]  (Abnormal)  (Susceptibility) Collected: 09/27/22 1811    Order Status: Completed Specimen: Sputum Updated: 09/30/22 0930     Special Requests: NO SPECIAL REQUESTS        GRAM STAIN 1+ WBCS SEEN               1+ GRAM POSITIVE COCCI IN CLUSTERS            1+ GRAM VARIABLE RODS        Culture result: HEAVY ESCHERICHIA COLI               HEAVY NORMAL RESPIRATORY CATHIE                  MODERATE YEAST (APPARENT MULTIPLE COLONY TYPES)          CULTURE, MRSA [[id number]] Collected: 09/27/22 1440    Order Status: Completed Specimen: Nasal from Nares Updated: 09/29/22 0820     Special Requests: NO SPECIAL REQUESTS        Culture result: MRSA NOT PRESENT               Screening of patient nares for MRSA is for surveillance purposes and, if positive, to facilitate isolation considerations in high risk settings. It is not intended for automatic decolonization interventions per se as regimens are not sufficiently effective to warrant routine use. GAETANO Munoz Lupillo, UR/CSF [[id number]] Collected: 09/27/22 1431    Order Status: Completed Specimen: Miscellaneous sample Updated: 09/28/22 1336     Source URINE        Specimen Urine     Streptococcus pneumoniae Ag Negative        Fluid culture Not indicated. Organism ID Not indicated. Please note Comment        Comment: (NOTE)  College of [employer]ologists standards require a culture to be  performed on CSF specimens submitted for bacterial antigen testing. (CAP [id number]) Urine specimens will not be cultured. Performed At: [facility] & [address] [id number]  Neil Mohs MD CY:[id number]         Angelo Alcazar [[id number]] Collected: 09/27/22 1530    Order Status: Completed Specimen: Urine Updated: 09/28/22 1336     Source URINE        L pneumophila S1 Ag, urine Negative        Comment: (NOTE)  Presumptive negative for L. pneumophila serogroup 1 antigen in urine,  suggesting no recent or current infection. Legionnaires' disease  cannot be ruled out since other serogroups and species may also  cause disease.   Performed At: [facility] & [address] [id number]  Neil Mohs MD IQ:[id number]         RESPIRATORY VIRUS PANEL W/COVID-19, PCR [[id number]] Collected: 09/27/22 1440    Order Status: Completed Specimen: Nasopharyngeal Updated: 09/27/22 2214     Adenovirus Not detected        Coronavirus 229E Not detected        Coronavirus HKU1 Not detected        Coronavirus CVNL63 Not detected        Coronavirus OC43 Not detected        SARS-CoV-2, PCR Not detected        Metapneumovirus Not detected        Rhinovirus and Enterovirus Not detected        Influenza A Not detected        Influenza B Not detected        Parainfluenza 1 Not detected        Parainfluenza 2 Not detected        Parainfluenza 3 Not detected        Parainfluenza virus 4 Not detected        RSV by PCR Not detected        B. parapertussis, PCR Not detected        Bordetella pertussis - PCR Not detected        Chlamydophila pneumoniae DNA, QL, PCR Not detected        Mycoplasma pneumoniae DNA, QL, PCR Not detected       URINE CULTURE HOLD SAMPLE [[id number]] Collected: 09/27/22 1251    Order Status: Completed Specimen: Urine from Serum Updated: 09/27/22 1258     Urine culture hold       Urine on hold in Microbiology dept for 2 days. If unpreserved urine is submitted, it cannot be used for addtional testing after 24 hours, recollection will be required. Other pertinent lab: none    Total time spent with patient: 30 Minutes I personally reviewed chart, notes, data and current medications in the medical record. I have personally examined and treated the patient at bedside during this period. To assist coordination of care and communication with nursing and staff, this note may be preliminary early in the day, but finalized by end of the day.                  Care Plan discussed with: Patient, Care Manager, Nursing Staff, Consultant/Specialist, and >50% of time spent in counseling and coordination of care    Discussed:  Care Plan and D/C Planning    Prophylaxis:  Lovenox and H2B/PPI    Disposition:  SNF/LTC           ___________________________________________________    Attending Physician: Natalie Bellamy MD

## 2022-10-10 NOTE — PROGRESS NOTES
Bedside and Verbal shift change report given to Mario Rosales (oncoming nurse) by Delphine Verde (offgoing nurse). Report included the following information SBAR, Kardex, Intake/Output, MAR, Recent Results, and Cardiac Rhythm SR . Primary Nurse Scott Martin and Delphine Verde, RN performed a dual skin assessment on this patient No impairment noted  Jose Enrique score is see flow sheet. 0800 Morning Assessment, VS being monitored, Q2 oral care while p/t is intubated, turned Q2, Intensivist at bedside made aware of no labs drawn this am    Neuro: Ox1  Cardiac: ST  Respiratory: 2LT NC  GI: NPO  : Sal  IV: L PICC    0945: MD gave verbal order to d/c fent and precedex and to conduct SBT after rounds - orders recived  1000: Patient turned, respiratory at bedside, oral care provided, p/t extubated - ST MD aware, will continue to monitor, orders recieved  - placed on 6L NC - restraints removed   1045: Levophed stopped  1200: Reassessment, VS being monitored, p/t had 2 BMs back to back - incontinent care provided - family at bedside  1300: Failed bedside cooper swallow test - MD aware    1400: Patient turned, resting quietly, family at bedside  1600: Reassessment, VSS, patient turning self  1700: P/t failed bedside cooper swallow test    1800: Patient resting quietly in bed offered reposition - p/t declined    Bedside and Verbal shift change report given to Sheila Leyva (oncoming nurse) by Mario Rosales (offgoing nurse).  Report included the following information SBAR, Kardex, Intake/Output, MAR, Recent Results, and Cardiac Rhythm ST .

## 2022-10-10 NOTE — CONSULTS
Duplicate, already following pt and will cont cardiac workup once stable enough hemodynamically/neurologically and able to lie flat for cath

## 2022-10-10 NOTE — PROGRESS NOTES
Problem: Ventilator Management  Goal: *Adequate oxygenation and ventilation  Outcome: Progressing Towards Goal  Goal: *Patient maintains clear airway/free of aspiration  Outcome: Progressing Towards Goal  Goal: *Absence of infection signs and symptoms  Outcome: Progressing Towards Goal  Goal: *Normal spontaneous ventilation  Outcome: Progressing Towards Goal     Problem: Patient Education: Go to Patient Education Activity  Goal: Patient/Family Education  Outcome: Progressing Towards Goal     Problem: Non-Violent Restraints  Goal: Removal from restraints as soon as assessed to be safe  Outcome: Progressing Towards Goal  Goal: No harm/injury to patient while restraints in use  Outcome: Progressing Towards Goal  Goal: Patient's dignity will be maintained  Outcome: Progressing Towards Goal  Goal: Patient Interventions  Outcome: Progressing Towards Goal     Problem: Delirium Treatment  Goal: *Level of consciousness restored to baseline  Outcome: Progressing Towards Goal  Goal: *Level of environmental perceptions restored to baseline  Outcome: Progressing Towards Goal  Goal: *Sensory perception restored to baseline  Outcome: Progressing Towards Goal  Goal: *Emotional stability restored to baseline  Outcome: Progressing Towards Goal  Goal: *Functional assessment restored to baseline  Outcome: Progressing Towards Goal  Goal: *Absence of falls  Outcome: Progressing Towards Goal  Goal: *Will remain free of delirium, CAM Score negative  Outcome: Progressing Towards Goal  Goal: *Cognitive status will be restored to baseline  Outcome: Progressing Towards Goal  Goal: Interventions  Outcome: Progressing Towards Goal     Problem: Patient Education: Go to Patient Education Activity  Goal: Patient/Family Education  Outcome: Progressing Towards Goal     Problem: Falls - Risk of  Goal: *Absence of Falls  Description: Document Rosa Zelaya Fall Risk and appropriate interventions in the flowsheet.   Outcome: Progressing Towards Goal  Note: Fall Risk Interventions:       Mentation Interventions: Bed/chair exit alarm, Adequate sleep, hydration, pain control, Door open when patient unattended, More frequent rounding, Reorient patient, Toileting rounds    Medication Interventions: Bed/chair exit alarm, Evaluate medications/consider consulting pharmacy    Elimination Interventions: Bed/chair exit alarm, Call light in reach, Toileting schedule/hourly rounds    History of Falls Interventions: Bed/chair exit alarm, Consult care management for discharge planning, Door open when patient unattended, Evaluate medications/consider consulting pharmacy, Investigate reason for fall, Room close to nurse's station         Problem: Patient Education: Go to Patient Education Activity  Goal: Patient/Family Education  Outcome: Progressing Towards Goal     Problem: Pressure Injury - Risk of  Goal: *Prevention of pressure injury  Description: Document Jose Enrique Scale and appropriate interventions in the flowsheet. Outcome: Progressing Towards Goal  Note: Pressure Injury Interventions:  Sensory Interventions: Assess changes in LOC, Assess need for specialty bed, Float heels, Keep linens dry and wrinkle-free, Minimize linen layers, Pressure redistribution bed/mattress (bed type), Turn and reposition approx. every two hours (pillows and wedges if needed)    Moisture Interventions: Absorbent underpads, Check for incontinence Q2 hours and as needed, Internal/External urinary devices, Minimize layers    Activity Interventions: Assess need for specialty bed, Increase time out of bed, Pressure redistribution bed/mattress(bed type), PT/OT evaluation    Mobility Interventions: Assess need for specialty bed, HOB 30 degrees or less, Float heels, Pressure redistribution bed/mattress (bed type), Turn and reposition approx.  every two hours(pillow and wedges)    Nutrition Interventions: Document food/fluid/supplement intake    Friction and Shear Interventions: HOB 30 degrees or less, Lift sheet, Lift team/patient mobility team, Minimize layers, Transferring/repositioning devices                Problem: Patient Education: Go to Patient Education Activity  Goal: Patient/Family Education  Outcome: Progressing Towards Goal     Problem: Nutrition Deficit  Goal: *Optimize nutritional status  Outcome: Progressing Towards Goal

## 2022-10-10 NOTE — PROGRESS NOTES
KODI care note:    RUR 13%(low risk score)    LOS 12 days,GLOS 4.9    Pt was extubated today. I will follow-up in am with pt.     April Burton

## 2022-10-10 NOTE — PROGRESS NOTES
Problem: Non-Violent Restraints  Goal: Removal from restraints as soon as assessed to be safe  10/10/2022 1052 by Villa Moscoso  Outcome: Resolved/Met  10/10/2022 0715 by Villa Moscoso  Outcome: Progressing Towards Goal  Goal: No harm/injury to patient while restraints in use  10/10/2022 1052 by Villa Moscoso  Outcome: Resolved/Met  10/10/2022 0715 by Villa Moscoso  Outcome: Progressing Towards Goal  Goal: Patient's dignity will be maintained  10/10/2022 1052 by Villa Moscoso  Outcome: Resolved/Met  10/10/2022 0715 by Villa Moscoso  Outcome: Progressing Towards Goal  Goal: Patient Interventions  10/10/2022 1052 by Villa Moscoso  Outcome: Resolved/Met  10/10/2022 0715 by Villa Moscoso  Outcome: Progressing Towards Goal
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
## 2022-10-10 NOTE — TELEPHONE ENCOUNTER
I want to help them in any way that I can, but the hospitalist needs to complete the FMLA forms. I have not see the patient regarding the current condition and would have to reji that on the form. I have no way to answer the questions asked. My apologies for the inconvenience.

## 2022-10-10 NOTE — PROCEDURES
ELECTROENCEPHALOGRAM REPORT      Rosa Olmstead, 1965  Test Date: 9/29/2022  Duration of study: 1:57pm to 3:58pm -- 2 hours, 1 minute  History: Eval for sz; unresponsive    Medications: Not listed    Patient consent: Correct patient identified    Description of procedure: This EEG was obtained using a 10 lead, 8 channel system positioned circumferentially without any parasagittal coverage (rapid EEG). Computer selected EEG is reviewed as well as background features and all clinically significant events. Clarity algorithm utilized and implemented to provide analysis of underlying activity and seizure detection used to facilitate reading. Description of recording: The tracing demonstrates diffuse mixed frequency delta wave activity. Review of seizure detection software reveals no seizure burden. Impression: Abnormal rapid EEG demonstrating diffuse slowing and disorganization of the background indicative of a moderate to severe degree of bihemispheric dysfunction as is commonly seen with an encephalopathy which may have contributions from toxic, metabolic, diffuse structural, and or pharmacologic effect and clinical correlation is recommended. Comment: A normal EEG does not rule out the diagnosis of a seizure disorder; clinical  correlation is advised. If there is still persistent suspicion for continued seizure-like  activity, would advise obtaining an electroencephalogram with the 10-20 international  system for improved spatial resolution and parasagittal coverage.     Mary Suarez MD

## 2022-10-10 NOTE — PROGRESS NOTES
1900 - Bedside shift change report given to [address] (oncoming nurse) by Murali Stacy RN (offgoing nurse). Report included the following information SBAR, ED Summary, Procedure Summary, Recent Results, and Cardiac Rhythm Sinus Tach .     0700 - Bedside shift change report given to Smith Stanford RN (oncoming nurse) by Marisela Huntley RN (offgoing nurse). Report included the following information SBAR, Recent Results, and Cardiac Rhythm Sinus tach .

## 2022-10-10 NOTE — TELEPHONE ENCOUNTER
Reason for call: Pt daughter-in-law was transferred to the office--she is going to fax us a request to fill out an FMLA form for the pt. She is not on the pt HIPAA so I did not release pt info. The pt is in a coma.     Is this a new problem: yes     Date of last appointment:  8/24/2022     Can we respond via [employer]: no    Best call back number: [phone]

## 2022-10-10 NOTE — PROGRESS NOTES
Patient successfully extubated @1010. Patient tolerated extubation well currently on 4lpm NC. Resting comfortably with family at the bedside. 10/10/22 1010   Patient Observations   O2 Sat (%) 96 %   Airway Clearance   Suction ET Tube   Suction Device Inline suction catheter   Sputum Method Obtained Endotracheal   Sputum Amount Scant   Sputum Color/Odor Clear; White   Sputum Consistency Thick   Ventilator Initiate/Discontinue   Ventilator Discontinue Yes   Bio-[employer] ID # [id number]   Vent Settings   FIO2 (%) 36 %   SpO2/FIO2 Ratio 266.67   Pressure Support (cm H2O) 5 cm H2O   PEEP/VENT (cm H2O) 5 cm H20   ABCDEF Bundle   SBT Safety Screen Passed Yes   SBT Trial Passed Yes   Weaning Parameters   Spontaneous Breathing Trial Complete Yes   Resp Rate Observed 26   Ve 9.3      RSBI 43   Age Specific Ventilator Associated Pneumonia Bundle   Patient Age Group Adult   Oxygen Therapy   Skin Assessment Clean, dry, & intact   Skin Protection for O2 Device Yes   Orientation Bilateral   Location Cheek   Interventions Reposition Device   Vent Method/Mode   Ventilation Method Conventional   Ventilator Mode Spontaneous

## 2022-10-11 ENCOUNTER — APPOINTMENT (OUTPATIENT)
Dept: NON INVASIVE DIAGNOSTICS | Age: 57
DRG: 064 | End: 2022-10-11
Attending: NURSE PRACTITIONER
Payer: COMMERCIAL

## 2022-10-11 LAB
ANION GAP SERPL CALC-SCNC: 7 MMOL/L (ref 5–15)
B PERT DNA SPEC QL NAA+PROBE: NOT DETECTED
BORDETELLA PARAPERTUSSIS PCR, BORPAR: NOT DETECTED
BUN SERPL-MCNC: 13 MG/DL (ref 6–20)
BUN/CREAT SERPL: 22 (ref 12–20)
C PNEUM DNA SPEC QL NAA+PROBE: NOT DETECTED
CALCIUM SERPL-MCNC: 9 MG/DL (ref 8.5–10.1)
CHLORIDE SERPL-SCNC: 109 MMOL/L (ref 97–108)
CO2 SERPL-SCNC: 28 MMOL/L (ref 21–32)
CREAT SERPL-MCNC: 0.59 MG/DL (ref 0.55–1.02)
ECHO LV EDV A4C: 60 ML
ECHO LV EDV INDEX A4C: 28 ML/M2
ECHO LV EJECTION FRACTION A4C: 58 %
ECHO LV ESV A4C: 25 ML
ECHO LV ESV INDEX A4C: 12 ML/M2
ECHO LV FRACTIONAL SHORTENING: 38 % (ref 28–44)
ECHO LV INTERNAL DIMENSION DIASTOLE INDEX: 2.26 CM/M2
ECHO LV INTERNAL DIMENSION DIASTOLIC: 4.8 CM (ref 3.9–5.3)
ECHO LV INTERNAL DIMENSION SYSTOLIC INDEX: 1.42 CM/M2
ECHO LV INTERNAL DIMENSION SYSTOLIC: 3 CM
ECHO LV IVSD: 1.3 CM (ref 0.6–0.9)
ECHO LV MASS 2D: 245.7 G (ref 67–162)
ECHO LV MASS INDEX 2D: 115.9 G/M2 (ref 43–95)
ECHO LV POSTERIOR WALL DIASTOLIC: 1.3 CM (ref 0.6–0.9)
ECHO LV RELATIVE WALL THICKNESS RATIO: 0.54
ERYTHROCYTE [DISTWIDTH] IN BLOOD BY AUTOMATED COUNT: 14.4 % (ref 11.5–14.5)
FLUAV SUBTYP SPEC NAA+PROBE: NOT DETECTED
FLUBV RNA SPEC QL NAA+PROBE: NOT DETECTED
GLUCOSE SERPL-MCNC: 100 MG/DL (ref 65–100)
HADV DNA SPEC QL NAA+PROBE: NOT DETECTED
HCOV 229E RNA SPEC QL NAA+PROBE: NOT DETECTED
HCOV HKU1 RNA SPEC QL NAA+PROBE: NOT DETECTED
HCOV NL63 RNA SPEC QL NAA+PROBE: NOT DETECTED
HCOV OC43 RNA SPEC QL NAA+PROBE: NOT DETECTED
HCT VFR BLD AUTO: 32.9 % (ref 35–47)
HGB BLD-MCNC: 10.6 G/DL (ref 11.5–16)
HMPV RNA SPEC QL NAA+PROBE: NOT DETECTED
HPIV1 RNA SPEC QL NAA+PROBE: NOT DETECTED
HPIV2 RNA SPEC QL NAA+PROBE: NOT DETECTED
HPIV3 RNA SPEC QL NAA+PROBE: NOT DETECTED
HPIV4 RNA SPEC QL NAA+PROBE: NOT DETECTED
LACTATE SERPL-SCNC: 0.8 MMOL/L (ref 0.4–2)
M PNEUMO DNA SPEC QL NAA+PROBE: NOT DETECTED
MAGNESIUM SERPL-MCNC: 2.3 MG/DL (ref 1.6–2.4)
MCH RBC QN AUTO: 29.3 PG (ref 26–34)
MCHC RBC AUTO-ENTMCNC: 32.2 G/DL (ref 30–36.5)
MCV RBC AUTO: 90.9 FL (ref 80–99)
NRBC # BLD: 0 K/UL (ref 0–0.01)
NRBC BLD-RTO: 0 PER 100 WBC
PLATELET # BLD AUTO: 517 K/UL (ref 150–400)
PMV BLD AUTO: 10.8 FL (ref 8.9–12.9)
POTASSIUM SERPL-SCNC: 3.9 MMOL/L (ref 3.5–5.1)
PROCALCITONIN SERPL-MCNC: <0.05 NG/ML
RBC # BLD AUTO: 3.62 M/UL (ref 3.8–5.2)
RSV RNA SPEC QL NAA+PROBE: NOT DETECTED
RV+EV RNA SPEC QL NAA+PROBE: NOT DETECTED
SARS-COV-2 PCR, COVPCR: NOT DETECTED
SODIUM SERPL-SCNC: 144 MMOL/L (ref 136–145)
WBC # BLD AUTO: 13.8 K/UL (ref 3.6–11)

## 2022-10-11 PROCEDURE — 97530 THERAPEUTIC ACTIVITIES: CPT

## 2022-10-11 PROCEDURE — 0202U NFCT DS 22 TRGT SARS-COV-2: CPT

## 2022-10-11 PROCEDURE — 65270000046 HC RM TELEMETRY

## 2022-10-11 PROCEDURE — 93308 TTE F-UP OR LMTD: CPT

## 2022-10-11 PROCEDURE — 77030038269 HC DRN EXT URIN PURWCK BARD -A

## 2022-10-11 PROCEDURE — 74011000250 HC RX REV CODE- 250: Performed by: NURSE PRACTITIONER

## 2022-10-11 PROCEDURE — 83735 ASSAY OF MAGNESIUM: CPT

## 2022-10-11 PROCEDURE — 80048 BASIC METABOLIC PNL TOTAL CA: CPT

## 2022-10-11 PROCEDURE — 87040 BLOOD CULTURE FOR BACTERIA: CPT

## 2022-10-11 PROCEDURE — 36415 COLL VENOUS BLD VENIPUNCTURE: CPT

## 2022-10-11 PROCEDURE — 74011250636 HC RX REV CODE- 250/636: Performed by: INTERNAL MEDICINE

## 2022-10-11 PROCEDURE — 77010033678 HC OXYGEN DAILY

## 2022-10-11 PROCEDURE — 51798 US URINE CAPACITY MEASURE: CPT

## 2022-10-11 PROCEDURE — 93308 TTE F-UP OR LMTD: CPT | Performed by: STUDENT IN AN ORGANIZED HEALTH CARE EDUCATION/TRAINING PROGRAM

## 2022-10-11 PROCEDURE — APPSS30 APP SPLIT SHARED TIME 16-30 MINUTES: Performed by: NURSE PRACTITIONER

## 2022-10-11 PROCEDURE — 74011000250 HC RX REV CODE- 250: Performed by: INTERNAL MEDICINE

## 2022-10-11 PROCEDURE — 94761 N-INVAS EAR/PLS OXIMETRY MLT: CPT

## 2022-10-11 PROCEDURE — 84145 PROCALCITONIN (PCT): CPT

## 2022-10-11 PROCEDURE — 97535 SELF CARE MNGMENT TRAINING: CPT

## 2022-10-11 PROCEDURE — 85027 COMPLETE CBC AUTOMATED: CPT

## 2022-10-11 PROCEDURE — 97161 PT EVAL LOW COMPLEX 20 MIN: CPT

## 2022-10-11 PROCEDURE — 99233 SBSQ HOSP IP/OBS HIGH 50: CPT | Performed by: STUDENT IN AN ORGANIZED HEALTH CARE EDUCATION/TRAINING PROGRAM

## 2022-10-11 PROCEDURE — 92612 ENDOSCOPY SWALLOW (FEES) VID: CPT

## 2022-10-11 PROCEDURE — 74011250636 HC RX REV CODE- 250/636: Performed by: NURSE PRACTITIONER

## 2022-10-11 PROCEDURE — 97166 OT EVAL MOD COMPLEX 45 MIN: CPT

## 2022-10-11 PROCEDURE — 83605 ASSAY OF LACTIC ACID: CPT

## 2022-10-11 RX ORDER — METOPROLOL TARTRATE 5 MG/5ML
1.25 INJECTION INTRAVENOUS EVERY 6 HOURS
Status: DISCONTINUED | OUTPATIENT
Start: 2022-10-11 | End: 2022-10-13

## 2022-10-11 RX ORDER — MIDODRINE HYDROCHLORIDE 5 MG/1
10 TABLET ORAL EVERY 8 HOURS
Status: DISCONTINUED | OUTPATIENT
Start: 2022-10-11 | End: 2022-10-13

## 2022-10-11 RX ORDER — PROCHLORPERAZINE EDISYLATE 5 MG/ML
10 INJECTION INTRAMUSCULAR; INTRAVENOUS
Status: DISCONTINUED | OUTPATIENT
Start: 2022-10-11 | End: 2022-10-20 | Stop reason: HOSPADM

## 2022-10-11 RX ORDER — LIDOCAINE HYDROCHLORIDE 20 MG/ML
JELLY TOPICAL AS NEEDED
Status: DISCONTINUED | OUTPATIENT
Start: 2022-10-11 | End: 2022-10-20 | Stop reason: HOSPADM

## 2022-10-11 RX ADMIN — Medication 10 ML: at 06:00

## 2022-10-11 RX ADMIN — ONDANSETRON 4 MG: 2 INJECTION INTRAMUSCULAR; INTRAVENOUS at 09:50

## 2022-10-11 RX ADMIN — Medication 10 ML: at 14:53

## 2022-10-11 RX ADMIN — Medication 10 ML: at 22:00

## 2022-10-11 RX ADMIN — PROCHLORPERAZINE EDISYLATE 10 MG: 5 INJECTION INTRAMUSCULAR; INTRAVENOUS at 14:45

## 2022-10-11 RX ADMIN — METOPROLOL TARTRATE 1.25 MG: 5 INJECTION, SOLUTION INTRAVENOUS at 06:18

## 2022-10-11 RX ADMIN — METOPROLOL TARTRATE 1.25 MG: 5 INJECTION, SOLUTION INTRAVENOUS at 14:45

## 2022-10-11 RX ADMIN — ENOXAPARIN SODIUM 30 MG: 100 INJECTION SUBCUTANEOUS at 21:24

## 2022-10-11 RX ADMIN — METOPROLOL TARTRATE 1.25 MG: 5 INJECTION INTRAVENOUS at 21:24

## 2022-10-11 NOTE — PROGRESS NOTES
[facility]  [address] [facility] [address]  [phone]    Medical Progress Note      NAME: Leann Mcarthur   :  1965  MRM:  [id number]    Date/Time of service 10/11/2022  9:03 AM         Assessment and Plan:     Acute metabolic encephalopathy - POA, unclear etiology. \"2 small foci of diffusion restriction in the right frontal lobe \" noted on MRI but unlikely this explains KHADAR. Initial concern for Sz, but EEG bland, keppra stopped. Concern remains she had some anoxic brain injury. We can now assess after extubation and stopping sedation. Neurology consulted. Stopped lidoderm. Add PT OT speech eval.    SIRS / tachycardia / Fever / leukocytosis - Last night developed SIRS criteria of WBC, fever and tachycardia after extubation. Unclear etiology. Check Blood cx, UA, RVP and procalcitonin. She had just completed Abx. Hold off resuming until we know more. Takotsubo cardiomyopathy / NSTEMI (non-ST elevated myocardial infarction) / Bradycardia - POA, unclear initial driving event. Cardiology consulted. After extubation, we noted HTN and tachycardia. Perhaps still too unstable for cath. Repeat ECHO per cardiology. Unable to tolerate BB or ACE due to low BP    Cardiogenic shock - Intially presumed septic shock until ECHO showed new EF 30%. No longer in shock, recently it was likely due to sedation, no longer on prn levophed IV gtt as pressor. Stopped midodrine    Acute respiratory failure with hypoxia - POA, persistent. Unclear if aspiration PNA vs central resp depression. Completed Zosyn. Intubated on admission. Intensivist consulted and is managing vent. Still requring fentanyl and precedex gtt for sedation. Propofol was stopped due to elevated triglycerides. She is failing SBT so far. Likely needs trach soon if fails    Acute CVA (cerebrovascular accident) - MRI notes two small lesions. Unclear significance and timing.  Neurology to assess after extubation and off sedation. Continue ASA and statin. Anemia - POA and slowly worsening likely due to acute illness. UTI (urinary tract infection) - Pitt sensitive E coli on initial contaminated cx. Had E coli in contaminated sputum along with much normal josue. Had recently completed course of Zosyn. CHRIS (acute kidney injury) / Lactic acidosis / Hypokalemia - POA: unknown baseline Cr. Currently Cr stable. Status epilepticus - Reject Dx. Reeda Forward had myoclonic jerking    Dyslipidemia - On atorvastatin    Hypothyroidism - TSH normal.     KOBY (generalized anxiety disorder) - Noted benzo on drug screen on admit, but had Versed for Sz    Obese - Advise weight loss of she survives. Subjective:     Chief Complaint:  extubated, now on NC, but overnight fever and tachycardia    ROS:  (bold if positive, if negative)    Not Tolerating PT  Tolerating Diet vis NG tube        Objective:     Last 24hrs VS reviewed since prior progress note.  Most recent are:    Visit Vitals  BP (!) 144/71 (BP 1 Location: Left upper arm, BP Patient Position: At rest)   Pulse 100   Temp 99.8 °F (37.7 °C)   Resp 21   Ht 5' 6\" (1.676 m)   Wt 104.3 kg (230 lb)   SpO2 97%   BMI 37.12 kg/m²     SpO2 Readings from Last 6 Encounters:   10/11/22 97%    O2 Flow Rate (L/min): 2 l/min     Intake/Output Summary (Last 24 hours) at 10/11/2022 0705  Last data filed at 10/11/2022 0400  Gross per 24 hour   Intake 599.3 ml   Output 3828 ml   Net -3228.7 ml          Physical Exam:    Gen:  Obese, in no acute distress  HEENT:  Pink conjunctivae, PERRL, hearing not intact to voice, NC on nose  Neck:  Supple, without masses, thyroid non-tender  Resp:  No accessory muscle use, bilateral coarse breath sounds   Card:  No murmurs, bradycardic S1, S2 without thrills, bruits or peripheral edema  Abd:  Soft, non-tender, non-distended, reduced bowel sounds are present, no mass  Lymph:  No cervical or inguinal adenopathy  Musc:  No cyanosis or clubbing  Skin: No rashes or ulcers, skin turgor is good  Neuro:  Cranial nerves are grossly intact, general motor weakness, follows commands vaguely  Psych:  Alert to Person    Telemetry reviewed:   normal sinus rhythm  __________________________________________________________________  Medications Reviewed: (see below)  Medications:     Current Facility-Administered Medications   Medication Dose Route Frequency    metoprolol (LOPRESSOR) injection 1.25 mg  1.25 mg IntraVENous Q6H    QUEtiapine (SEROquel) tablet 25 mg  25 mg Per NG tube BID    NOREPINephrine (LEVOPHED) 8 mg in 5% dextrose 250mL (32 mcg/mL) infusion  0.5-30 mcg/min IntraVENous TITRATE    alteplase (CATHFLO) 1 mg in sterile water (preservative free) 1 mL injection  1 mg InterCATHeter PRN    levothyroxine (SYNTHROID) tablet 88 mcg  88 mcg Per G Tube DAILY    senna-docusate (PERICOLACE) 8.6-50 mg per tablet 1 Tablet  1 Tablet Oral BID    fentaNYL citrate (PF) injection 100 mcg  100 mcg IntraVENous Q4H PRN    midodrine (PROAMATINE) tablet 15 mg  15 mg Oral Q8H    atorvastatin (LIPITOR) tablet 40 mg  40 mg Oral QHS    enoxaparin (LOVENOX) injection 30 mg  30 mg SubCUTAneous Q12H    aspirin chewable tablet 81 mg  81 mg Oral DAILY    lansoprazole compounding kit (PREVACID) 3 mg/mL oral suspension 30 mg  30 mg Oral DAILY    sodium chloride (NS) flush 5-40 mL  5-40 mL IntraVENous Q8H    sodium chloride (NS) flush 5-40 mL  5-40 mL IntraVENous PRN    acetaminophen (TYLENOL) tablet 650 mg  650 mg Oral Q6H PRN    Or    acetaminophen (TYLENOL) suppository 650 mg  650 mg Rectal Q6H PRN    polyethylene glycol (MIRALAX) packet 17 g  17 g Oral DAILY PRN    ondansetron (ZOFRAN) injection 4 mg  4 mg IntraVENous Q6H PRN    chlorhexidine (PERIDEX) 0.12 % mouthwash 15 mL  15 mL Oral Q12H        Lab Data Reviewed: (see below)  Lab Review:     Recent Labs     10/11/22  0123 10/09/22  0106 10/08/22  1310   WBC 13.8* 10.1 10.5   HGB 10.6* 10.2* 10.5*   HCT 32.9* 32.3* 32.1*   * 398 376 Recent Labs     10/11/22  0123 10/09/22  0106 10/08/22  1310    140 139   K 3.9 3.2* 3.3*   * 107 106   CO2 28 27 27    119* 120*   BUN 13 18 18   CREA 0.59 0.63 0.63   CA 9.0 9.0 9.1   MG 2.3 2.0  --    PHOS  --  3.5  --    ALB  --  2.2*  --    TBILI  --  0.4  --    ALT  --  35  --        Lab Results   Component Value Date/Time    Glucose (POC) 110 10/05/2022 04:55 PM    Glucose (POC) 87 10/05/2022 11:02 AM    Glucose (POC) 78 10/05/2022 05:00 AM    Glucose (POC) 94 10/05/2022 04:57 AM    Glucose (POC) 124 (H) 10/04/2022 11:03 PM     No results for input(s): PH, PCO2, PO2, HCO3, FIO2 in the last 72 hours. No results for input(s): INR, INREXT, INREXT in the last 72 hours.   All Micro Results       Procedure Component Value Units Date/Time    CULTURE, BLOOD, PAIRED [[id number]]     Order Status: Sent Specimen: Blood     CULTURE, URINE [[id number]]     Order Status: Sent Specimen: Cath Urine     RESPIRATORY VIRUS PANEL W/COVID-19, PCR [[id number]]     Order Status: Sent Specimen: NASOPHARYNGEAL SWAB     CULTURE, RESPIRATORY/SPUTUM/BRONCH W Ardia Gut [[id number]] Collected: 10/03/22 1300    Order Status: Completed Specimen: Sputum Updated: 10/05/22 1143     Special Requests: NO SPECIAL REQUESTS        GRAM STAIN RARE WBCS SEEN               OCCASIONAL EPITHELIAL CELLS SEEN                  OCCASIONAL GRAM NEGATIVE RODS                  RARE GRAM POSITIVE COCCI IN PAIRS           Culture result:       HEAVY NORMAL RESPIRATORY CATHIE          CULTURE, BLOOD, PAIRED [[id number]] Collected: 09/27/22 1251    Order Status: Completed Specimen: Blood Updated: 10/02/22 0640     Special Requests: NO SPECIAL REQUESTS        Culture result: NO GROWTH 5 DAYS       CULTURE, URINE [[id number]]  (Abnormal)  (Susceptibility) Collected: 09/27/22 1251    Order Status: Completed Specimen: Cath Urine Updated: 09/30/22 1502     Special Requests: NO SPECIAL REQUESTS        [place] Count --        >100,000  COLONIES/mL Culture result: ESCHERICHIA COLI       CULTURE, RESPIRATORY/SPUTUM/BRONCH [facility] STAIN [[id number]]  (Abnormal)  (Susceptibility) Collected: 09/27/22 1811    Order Status: Completed Specimen: Sputum Updated: 09/30/22 0930     Special Requests: NO SPECIAL REQUESTS        GRAM STAIN 1+ WBCS SEEN               1+ GRAM POSITIVE COCCI IN CLUSTERS            1+ GRAM VARIABLE RODS        Culture result: HEAVY ESCHERICHIA COLI               HEAVY NORMAL RESPIRATORY CATHIE                  MODERATE YEAST (APPARENT MULTIPLE COLONY TYPES)          CULTURE, MRSA [[id number]] Collected: 09/27/22 1440    Order Status: Completed Specimen: Nasal from Nares Updated: 09/29/22 0820     Special Requests: NO SPECIAL REQUESTS        Culture result: MRSA NOT PRESENT               Screening of patient nares for MRSA is for surveillance purposes and, if positive, to facilitate isolation considerations in high risk settings. It is not intended for automatic decolonization interventions per se as regimens are not sufficiently effective to warrant routine use. S. Pattricia Norse, UR/CSF [[id number]] Collected: 09/27/22 1431    Order Status: Completed Specimen: Miscellaneous sample Updated: 09/28/22 1336     Source URINE        Specimen Urine     Streptococcus pneumoniae Ag Negative        Fluid culture Not indicated. Organism ID Not indicated. Please note Comment        Comment: (NOTE)  College of American Pathologists standards require a culture to be  performed on CSF specimens submitted for bacterial antigen testing. (CAP [id number]) Urine specimens will not be cultured. Performed At: [facility] & [address] [id number]  Michelle Palencia MD QQ:[id number]         Lana Tyler [[id number]] Collected: 09/27/22 1530    Order Status: Completed Specimen: Urine Updated: 09/28/22 1336     Source URINE        L pneumophila S1 Ag, urine Negative        Comment: (NOTE)  Presumptive negative for L. pneumophila serogroup 1 antigen in urine,  suggesting no recent or current infection. Legionnaires' disease  cannot be ruled out since other serogroups and species may also  cause disease. Performed At: [address] [id number]  Marjorie Manriquez MD VR:[id number]         RESPIRATORY VIRUS PANEL W/COVID-19, PCR [[id number]] Collected: 09/27/22 1440    Order Status: Completed Specimen: Nasopharyngeal Updated: 09/27/22 2214     Adenovirus Not detected        Coronavirus 229E Not detected        Coronavirus HKU1 Not detected        Coronavirus CVNL63 Not detected        Coronavirus OC43 Not detected        SARS-CoV-2, PCR Not detected        Metapneumovirus Not detected        Rhinovirus and Enterovirus Not detected        Influenza A Not detected        Influenza B Not detected        Parainfluenza 1 Not detected        Parainfluenza 2 Not detected        Parainfluenza 3 Not detected        Parainfluenza virus 4 Not detected        RSV by PCR Not detected        B. parapertussis, PCR Not detected        Bordetella pertussis - PCR Not detected        Chlamydophila pneumoniae DNA, QL, PCR Not detected        Mycoplasma pneumoniae DNA, QL, PCR Not detected       URINE CULTURE HOLD SAMPLE [[id number]] Collected: 09/27/22 1251    Order Status: Completed Specimen: Urine from Serum Updated: 09/27/22 1258     Urine culture hold       Urine on hold in Microbiology dept for 2 days. If unpreserved urine is submitted, it cannot be used for addtional testing after 24 hours, recollection will be required. Other pertinent lab: none    Total time spent with patient: 30 Minutes I personally reviewed chart, notes, data and current medications in the medical record. I have personally examined and treated the patient at bedside during this period.  To assist coordination of care and communication with nursing and staff, this note may be preliminary early in the day, but finalized by end of the day.                  Care Plan discussed with: Patient, Care Manager, Nursing Staff, Consultant/Specialist, and >50% of time spent in counseling and coordination of care    Discussed:  Care Plan and D/C Planning    Prophylaxis:  Lovenox and H2B/PPI    Disposition:  SNF/LTC           ___________________________________________________    Attending Physician: Elliot Suggs MD

## 2022-10-11 NOTE — PROGRESS NOTES
Problem: Self Care Deficits Care Plan (Adult)  Goal: *Acute Goals and Plan of Care (Insert Text)  Description: FUNCTIONAL STATUS PRIOR TO ADMISSION: Patient was independent and active without use of DME.     HOME SUPPORT: Patient lived alone     Occupational Therapy Goals  Initiated 10/11/2022  1. Patient will perform lower body dressing with minimal assistance/contact guard assist within 7 day(s). 2.  Patient will perform grooming tasks, seated EOB,  with supervision/set-up within 7 day(s). 3.  Patient will perform toilet transfers with minimal assistance/contact guard assist within 7 day(s). 4.  Patient will perform all aspects of toileting with moderate assistance  within 7 day(s). 5.  Patient will participate in upper extremity therapeutic exercise/ coordination activities with supervision/set-up for 10 minutes within 7 day(s). Outcome: Progressing Towards Goal   OCCUPATIONAL THERAPY EVALUATION  Patient: Halina Doyle (28 y.o. female)  Date: 10/11/2022  Primary Diagnosis: Status epilepticus ([facility] [address] [G40.901]       Precautions: fall       ASSESSMENT  Based on the objective data described below, the patient presents with hospital admission after found unconscious in home. Patient intubated at hospital and required pressor support. Patient extubated yesterday and following commands though she is confused. Patient MRI shows 2 small foci of R frontal lobe. Patient received in bed, agreeable to activity. Patient somewhat impulsive and requires cues to wait for assistance. Patient to EOB with min assist.  Noted R side lean in sitting, requiring assist to maintain midline. Patient with general weakness in UEs, but noted RUE moreso than L. Patient demonstrates ability to bring hand to nose with R hand, bringing head down, however with left hand patient able to touch nose, but dysmetric and unable to find target easily.   Patient with attempt to read therapist badge, but patient reading only portion of badge and additional words not present. Patient with decreased functional mobility, decreased activity tolerance, weakness, decreased coordination, impaired safety and confusion. Patient reports fatigue after 2 stands and returned to bed. O2 sats stable on room air thoughtout session. BP elevated. RN aware. Returned to bed with assist x 2. Alarm set. .    Current Level of Function Impacting Discharge (ADLs/self-care): up to max assist for ADLs     Other factors to consider for discharge:      Patient will benefit from skilled therapy intervention to address the above noted impairments. PLAN :  Recommendations and Planned Interventions: self care training, functional mobility training, therapeutic exercise, balance training, therapeutic activities, cognitive retraining, endurance activities, neuromuscular re-education, patient education, home safety training, and family training/education  Recommend further vision assessment, fugl fraga  Frequency/Duration: Patient will be followed by occupational therapy 5 times a week to address goals. Recommendation for discharge: (in order for the patient to meet his/her long term goals)  To be determined: rehab    This discharge recommendation:  Has been made in collaboration with the attending provider and/or case management    IF patient discharges home will need the following DME: TBD       SUBJECTIVE:   Patient stated Marisa Eason.     OBJECTIVE DATA SUMMARY:   HISTORY:   Past Medical History:   Diagnosis Date    History of vascular access device 10/07/2022    [facility] VAT: PICC placement R Cephalic length 40 cm for reliable access/TPN arm circ 35.5 cm     Past Surgical History:   Procedure Laterality Date    IR INSERT NON TUNL CVC OVER 5 YRS  9/27/2022       Expanded or extensive additional review of patient history:     Home Situation  Home Environment: Private residence  # Steps to Enter: 0  One/Two Story Residence: One story  Living Alone: No  Support Systems: Child(wayne)  Patient Expects to be Discharged to[de-identified] Unable to determine at this time (critically ill,intubated)  Current DME Used/Available at Home: None  Tub or Shower Type: Shower    Hand dominance: Left    EXAMINATION OF PERFORMANCE DEFICITS:  Cognitive/Behavioral Status:  Neurologic State: Alert;Confused  Orientation Level: Oriented to place;Oriented to person (states name and birthday)  Cognition: Follows commands  Perception: Cues to maintain midline in sitting;Cues to maintain midline in standing  Perseveration: No perseveration noted  Safety/Judgement: Decreased awareness of need for assistance;Decreased awareness of need for safety;Decreased insight into deficits    Skin: intact as seen    Hearing:       Vision/Perceptual:    Tracking: Requires cues, head turns, or add eye shifts to track                           Corrective Lenses: Glasses (not present)    Range of Motion:  AROM: Generally decreased, functional                         Strength:  Strength: Generally decreased, functional                Coordination:  Coordination: impaired UEs            Tone & Sensation:  Tone: Normal  Sensation: Intact                      Balance:  Sitting: Impaired;High guard  Sitting - Static: Fair (occasional)  Sitting - Dynamic: Fair (occasional)  Standing: Impaired; With support  Standing - Static: Constant support    Functional Mobility and Transfers for ADLs:  Bed Mobility:  Rolling: Stand-by assistance  Supine to Sit: Minimum assistance  Sit to Supine: Minimum assistance;Assist x2  Scooting: Contact guard assistance    Transfers:  Sit to Stand: Minimum assistance;Assist x2 (RLE weakness in standing)  Stand to Sit: Minimum assistance;Assist x2    ADL Assessment:  Feeding:  (NPO)    Oral Facial Hygiene/Grooming: Moderate assistance    Bathing: Maximum assistance    Type of Bath: Bath Pack    Upper Body Dressing: Moderate assistance    Lower Body Dressing: Maximum assistance    Toileting:  Total assistance ADL Intervention and task modifications:                 Type of Bath: Bath Pack                        Cognitive Retraining  Safety/Judgement: Decreased awareness of need for assistance;Decreased awareness of need for safety;Decreased insight into deficits      Therapeutic Exercise:       Pain Rating:  None reported    Activity Tolerance:   Fair, requires rest breaks, and elevated BP    After treatment patient left in no apparent distress:    Supine in bed, Call bell within reach, Bed / chair alarm activated, and Caregiver / family present    COMMUNICATION/EDUCATION:   The patients plan of care was discussed with: Physical therapist and Registered nurse. Home safety education was provided and the patient/caregiver indicated understanding., Patient/family have participated as able in goal setting and plan of care. , and Patient/family agree to work toward stated goals and plan of care. This patients plan of care is appropriate for delegation to [facility].     Thank you for this referral.  Ashley Ellis OTR/L  Time Calculation: 17 mins

## 2022-10-11 NOTE — PROGRESS NOTES
1900 - Bedside shift change report given to [address] (oncoming nurse) by Dion Wharton RN (offgoing nurse). Report included the following information SBAR, Recent Results, and Cardiac Rhythm Sinus Tach . 2000 - Bladder scanned with residual of >315 - received verbal telephone orders from MD Ellison to straight cath patient 2x before inserting montilla catheter. Actual residual of 500mL upon straight cath.    0700 - Bedside shift change report given to Rocio Quiroga RN (oncoming nurse) by [address] (offgoing nurse). Report included the following information SBAR, ED Summary, Procedure Summary, Recent Results, and Cardiac Rhythm NSR .

## 2022-10-11 NOTE — PROGRESS NOTES
Bedside and Verbal shift change report given to Tasia King RN (oncoming nurse) by [facility], RN (offgoing nurse). Report included the following information SBAR, Kardex, MAR, and Cardiac Rhythm Sinus tach . 0700: Pt without complaints of pain. Resting quietly in bed. Call light in reach. Instructed patient to call for assistance before getting out of bed.   0900: Pt without complaints. Bedside and Verbal shift change report given to [facility], RN (oncoming nurse) by Tasia King RN (offgoing nurse). Report included the following information SBAR, Kardex, MAR, and Cardiac Rhythm sinus tach .

## 2022-10-11 NOTE — PROGRESS NOTES
Problem: Mobility Impaired (Adult and Pediatric)  Goal: *Acute Goals and Plan of Care (Insert Text)  Description: FUNCTIONAL STATUS PRIOR TO ADMISSION: Patient was independent and active without use of DME.    HOME SUPPORT PRIOR TO ADMISSION: The patient lived with family but did not require assist.    Physical Therapy Goals  Initiated 10/11/2022  1. Patient will move from supine to sit and sit to supine , scoot up and down, and roll side to side in bed with independence within 7 day(s). 2.  Patient will transfer from bed to chair and chair to bed with modified independence using the least restrictive device within 7 day(s). 3.  Patient will perform sit to stand with modified independence within 7 day(s). 4.  Patient will ambulate with modified independence for 200 feet with the least restrictive device within 7 day(s). 5.  Patient will ascend/descend 4 stairs with  handrail(s) with modified independence within 7 day(s). Outcome: Progressing Towards Goal   PHYSICAL THERAPY EVALUATION  Patient: Gloria Tomas (20 y.o. female)  Date: 10/11/2022  Primary Diagnosis: Status epilepticus ([facility] [address] [G40.901]       Precautions: fall,       ASSESSMENT  Based on the objective data described below, the patient presents with difficulty with ambulation and generalized weakness. Communicated with nurse cleared for therapy, covid test  came back negative. Rolled on the edge of bed min assist, supine to sit min assist, sit to stand min assist x 2 multiple tines noted patient leaning to the right when sitting and standing. Pleasantly confuse at times attempted to be OOB to chair as tolerated however patient does not want to be on the chair little, not safe to be OOB today assist back to bed min assist x 2. Place bed to modified chair position. Performed some active range of motion. Oxygen saturation 99% on 2 liters, place on room air during therapy and oxygen saturation remain above 95% during and after therapy.  Activated bed alarm and notified nurse who agreed to monitor patient. Current Level of Function Impacting Discharge (mobility/balance): min assist x 2 with transfers    Functional Outcome Measure: The patient scored 25/100 on the barthel index outcome measure. .      Other factors to consider for discharge: fall     Patient will benefit from skilled therapy intervention to address the above noted impairments. PLAN :  Recommendations and Planned Interventions: bed mobility training, transfer training, gait training, therapeutic exercises, neuromuscular re-education, orthotic/prosthetic training, patient and family training/education, and therapeutic activities      Frequency/Duration: Patient will be followed by physical therapy:  5 times a week to address goals.     Recommendation for discharge: (in order for the patient to meet his/her long term goals)  To be determined: pending progress from therapy    This discharge recommendation:  Has been made in collaboration with the attending provider and/or case management    IF patient discharges home will need the following DME: to be determined (TBD)         SUBJECTIVE:   Patient stated ok    OBJECTIVE DATA SUMMARY:   HISTORY:    Past Medical History:   Diagnosis Date    History of vascular access device 10/07/2022    [facility] VAT: PICC placement R Cephalic length 40 cm for reliable access/TPN arm circ 35.5 cm     Past Surgical History:   Procedure Laterality Date    IR INSERT NON TUNL CVC OVER 5 YRS  9/27/2022       Personal factors and/or comorbidities impacting plan of care:     Home Situation  Home Environment: Private residence  # Steps to Enter: 0  One/Two Story Residence: One story  Living Alone: No  Support Systems: Child(wayne)  Patient Expects to be Discharged to[de-identified] Unable to determine at this time (critically ill,intubated)  Current DME Used/Available at Home: None  Tub or Shower Type: Shower    EXAMINATION/PRESENTATION/DECISION MAKING:   Critical Behavior:  Neurologic State: Alert, Confused  Orientation Level: Oriented to place, Oriented to person (states name and birthday)  Cognition: Follows commands  Safety/Judgement: Decreased awareness of need for assistance, Decreased awareness of need for safety, Decreased insight into deficits  Hearing:    Range Of Motion:  AROM: Generally decreased, functional                       Strength:    Strength: Generally decreased, functional                    Tone & Sensation:   Tone: Normal              Sensation: Intact               Coordination:  Coordination: Generally decreased, functional  Vision:   Tracking: Requires cues, head turns, or add eye shifts to track  Corrective Lenses: Glasses (not present)  Functional Mobility:  Bed Mobility:  Rolling: Stand-by assistance  Supine to Sit: Minimum assistance  Sit to Supine: Minimum assistance;Assist x2  Scooting: Contact guard assistance  Transfers:  Sit to Stand: Minimum assistance;Assist x2 (RLE weakness in standing)  Stand to Sit: Minimum assistance;Assist x2                       Balance:   Sitting: Impaired;High guard  Sitting - Static: Fair (occasional)  Sitting - Dynamic: Fair (occasional)  Standing: Impaired; With support  Standing - Static: Constant support  Ambulation/Gait Training:                                         Therapeutic Exercises:   Educate and instructed patient to continue active range of motion exercise on both legs while up on chair or on bed multiple times. Recommend patient to be up on the chair at least 3 times a day every meal times as tolerated. Functional Measure:  Barthel Index:    Bathin  Bladder: 5  Bowels: 5  Groomin  Dressin  Feeding: 10  Mobility: 0  Stairs: 0  Toilet Use: 0  Transfer (Bed to Chair and Back): 0  Total: 25/100       The Barthel ADL Index: Guidelines  1. The index should be used as a record of what a patient does, not as a record of what a patient could do.   2. The main aim is to establish degree of independence from any help, physical or verbal, however minor and for whatever reason. 3. The need for supervision renders the patient not independent. 4. A patient's performance should be established using the best available evidence. Asking the patient, friends/relatives and nurses are the usual sources, but direct observation and common sense are also important. However direct testing is not needed. 5. Usually the patient's performance over the preceding 24-48 hours is important, but occasionally longer periods will be relevant. 6. Middle categories imply that the patient supplies over 50 per cent of the effort. 7. Use of aids to be independent is allowed. Score Interpretation (from [address] [address])    Independent   60-79 Minimally independent   40-59 Partially dependent   20-39 Very dependent   <20 Totally dependent     -Michael Laurent., Barthel, D.W. (1965). Functional evaluation: the Barthel Index. [facility] ([address] (1997). The Barthel activities of daily living index: self-reporting versus actual performance in the old (> or = 75 years). Journal [address] 45(7), [facility], J.JMalorieMMalorieF, Michael Earl., Adrianna YoungMayo Clinic [facility] ([place] (1999). Measuring the change in disability after inpatient rehabilitation; comparison of the responsiveness of the Barthel Index and Functional [place] Measure. Journal of Neurology, Neurosurgery, and Psychiatry, 66(4), 574-326. Alec Jewell NJOHN.A, VALENCIA Moss, & Chauncey Porter M.A. (2004) Assessment of post-stroke quality of life in cost-effectiveness studies: The usefulness of the Barthel Index and the EuroQoL-5D.  Quality of Life Research, 15, 133-97           Physical Therapy Evaluation Charge Determination   History Examination Presentation Decision-Making   MEDIUM  Complexity : 1-2 comorbidities / personal factors will impact the outcome/ POC  MEDIUM Complexity : 3 Standardized tests and measures addressing body structure, function, activity limitation and / or participation in recreation  MEDIUM Complexity : Evolving with changing characteristics  Other outcome measures barthell  MEDIUM      Based on the above components, the patient evaluation is determined to be of the following complexity level: MEDIUM    Pain Ratin/10    Activity Tolerance:   Good    After treatment patient left in no apparent distress:   Supine in bed, Heels elevated for pressure relief, Call bell within reach, Bed / chair alarm activated, and Side rails x 3    COMMUNICATION/EDUCATION:   The patients plan of care was discussed with: Occupational therapist, Registered nurse, and Case management. Fall prevention education was provided and the patient/caregiver indicated understanding. Thank you for this referral.  Cezar Friend, PT,WCC.    Time Calculation: 36 mins

## 2022-10-11 NOTE — PROGRESS NOTES
Transition of care note:    RUR 13%  LOS 13 days,GLOS 4.9    Today: In IDR,pt.'s mother expressed concerns regarding her daughter's \"suspected' suicide attempt. \"She could not say for certain if pt.'s problems were related to a suicide attempt or not only that she suspected it may have been a suicide attempt. For pt.'s safety , until pt can lucidly discuss the events precipitating her hospitalization and until psychiatry has been able to complete a psychiatric evaluation in relationship to pt.'s lethality :(attempt,intentions,ideations) pt has been placed on suicide precautions with a 1:1 sitter .     Paulina Paige  Perfect Serve

## 2022-10-11 NOTE — PROGRESS NOTES
CARDIOLOGY PROGRESS NOTE    [address], [place], [address] [facility]  Phone [phone]; Fax [phone]        10/11/2022 8:57 AM     Patient:   Marshal [place]  :  1965   MRN:  [id number]       Assessment/Plan  Takotsubo cardiomyopathy, however have to exclude other etiologies. Pt was extubated 10/10. She is off pressors. BP is till suboptimal. Midodrine has been added. She is on low dose IV BB for tachycardia. Pt remains NPO. Limited echo shows EF has now normalized. Will add GDMT when she can take po. For now IV BB. No ischemic eval.     2 shock : resolved. 3 COVID rule out     Will see OP. 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 LKW was the evening of . She did not report to work the am of  and she was found unresponsive at her home on wellness check. Bystander CPR was started, but she did have a pulse. EMS was called. Upon their arrival, SBP was 70s. Pupils were pinpoint, narcan was administered without improvement. She experienced seizure like activity and received 4mg IV versed. Upon arrival to the ED, she exhibited seizure like activity and L sided gaze preference. She required intubation for hypoxic respiratory failure. She was sedated on propofol. She became hypotensive and was started on levophed. LA 6.46. She was admitted to the ICU. She was started on empiric vanc/zosyn. Blood/sputum cultures ordered. CT head unrevealing. CT chest notable for only bibasilar atelectasis, cannot exclude small amount aspiration in lower lobes.       Cardiology has been following for LV dysfunction        Medications:     Current Facility-Administered Medications   Medication Dose Route Frequency    midodrine (PROAMATINE) tablet 10 mg  10 mg Oral Q8H    metoprolol (LOPRESSOR) injection 1.25 mg  1.25 mg IntraVENous Q6H    QUEtiapine (SEROquel) tablet 25 mg  25 mg Per NG tube BID    NOREPINephrine (LEVOPHED) 8 mg in 5% dextrose 250mL (32 mcg/mL) infusion  0.5-30 mcg/min IntraVENous TITRATE    alteplase (CATHFLO) 1 mg in sterile water (preservative free) 1 mL injection  1 mg InterCATHeter PRN    levothyroxine (SYNTHROID) tablet 88 mcg  88 mcg Per G Tube DAILY    senna-docusate (PERICOLACE) 8.6-50 mg per tablet 1 Tablet  1 Tablet Oral BID    fentaNYL citrate (PF) injection 100 mcg  100 mcg IntraVENous Q4H PRN    atorvastatin (LIPITOR) tablet 40 mg  40 mg Oral QHS    enoxaparin (LOVENOX) injection 30 mg  30 mg SubCUTAneous Q12H    aspirin chewable tablet 81 mg  81 mg Oral DAILY    lansoprazole compounding kit (PREVACID) 3 mg/mL oral suspension 30 mg  30 mg Oral DAILY    sodium chloride (NS) flush 5-40 mL  5-40 mL IntraVENous Q8H    sodium chloride (NS) flush 5-40 mL  5-40 mL IntraVENous PRN    acetaminophen (TYLENOL) tablet 650 mg  650 mg Oral Q6H PRN    Or    acetaminophen (TYLENOL) suppository 650 mg  650 mg Rectal Q6H PRN    polyethylene glycol (MIRALAX) packet 17 g  17 g Oral DAILY PRN    ondansetron (ZOFRAN) injection 4 mg  4 mg IntraVENous Q6H PRN    chlorhexidine (PERIDEX) 0.12 % mouthwash 15 mL  15 mL Oral Q12H         Review of Systems:     Review of Symptoms:  Constitutional: Negative for fever   HEENT: Negative for vision changes. Respiratory: Negative for productive cough  Cardiovascular: Negative for syncope    Gastrointestinal: Negative for abdominal pain, melena  Genitourinary: Negative for dysuria  Skin: Negative for rash  Heme: No problems bleeding.   Neuro - no focal weakness      Physical Exam:     Vitals:    10/11/22 0300 10/11/22 0400 10/11/22 0701 10/11/22 0827   BP: (!) 144/59 (!) 144/71 (!) 128/59    Pulse: (!) 105 100 98    Resp: 28 21 19    Temp:  99.8 °F (37.7 °C) 98.8 °F (37.1 °C)    SpO2:  97% 95%    Weight:       Height:    5' 6\" (1.676 m)      Last 3 Recorded Weights in this Encounter    09/27/22 1119 09/27/22 0418   Weight: 104.3 kg (230 lb) 104.3 kg (230 lb)       Intake and Output:    Intake/Output Summary (Last 24 hours) at 10/11/2022 0857  Last data filed at 10/11/2022 0400  Gross per 24 hour   Intake 288.96 ml   Output 3728 ml   Net -3439.04 ml         Gen: Well-developed, well-nourished, in no acute distress  Neck: Supple,No JVD  Resp: No accessory muscle use, Clear breath sounds, No rales or rhonchi  Card: Regular Rate,Rythm,Normal S1, S2, No murmurs, rubs or gallop.     Abd:  Soft, non-tender, non-distended, BS+  MSK: No cyanosis  Skin: No rashes    Neuro: moving all four extremities , follows commands appropriately  Psych:  Good insight, oriented to person, place , alert, Nml Affect  LE: No edema        Labs:     Recent Results (from the past 24 hour(s))   CBC W/O DIFF    Collection Time: 10/11/22  1:23 AM   Result Value Ref Range    WBC 13.8 (H) 3.6 - 11.0 K/uL    RBC 3.62 (L) 3.80 - 5.20 M/uL    HGB 10.6 (L) 11.5 - 16.0 g/dL    HCT 32.9 (L) 35.0 - 47.0 %    MCV 90.9 80.0 - 99.0 FL    MCH 29.3 26.0 - 34.0 PG    MCHC 32.2 30.0 - 36.5 g/dL    RDW 14.4 11.5 - 14.5 %    PLATELET 523 (H) 903 - 400 K/uL    MPV 10.8 8.9 - 12.9 FL    NRBC 0.0 0  WBC    ABSOLUTE NRBC 0.00 0.00 - 0.01 K/uL   MAGNESIUM    Collection Time: 10/11/22  1:23 AM   Result Value Ref Range    Magnesium 2.3 1.6 - 2.4 mg/dL   METABOLIC PANEL, BASIC    Collection Time: 10/11/22  1:23 AM   Result Value Ref Range    Sodium 144 136 - 145 mmol/L    Potassium 3.9 3.5 - 5.1 mmol/L    Chloride 109 (H) 97 - 108 mmol/L    CO2 28 21 - 32 mmol/L    Anion gap 7 5 - 15 mmol/L    Glucose 100 65 - 100 mg/dL    BUN 13 6 - 20 MG/DL    Creatinine 0.59 0.55 - 1.02 MG/DL    BUN/Creatinine ratio 22 (H) 12 - 20      eGFR >60 >60 ml/min/1.73m2    Calcium 9.0 8.5 - 10.1 MG/DL             Cardiology Data:       ECHO ADULT COMPLETE 09/27/2022 9/27/2022    Interpretation Summary    Left Ventricle: Severely reduced left ventricular systolic function with a visually estimated EF of 25 - 30%. Left ventricle size is normal. Mildly increased wall thickness. See diagram for wall motion findings. Abnormal diastolic function. Right Ventricle: Moderately reduced systolic function.                   Signed By: Cher Wood NP     October 11, 2022
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
## 2022-10-11 NOTE — PROGRESS NOTES
Problem: Patient Education: Go to Patient Education Activity  Goal: Patient/Family Education  Outcome: Progressing Towards Goal     Problem: Delirium Treatment  Goal: *Level of consciousness restored to baseline  Outcome: Progressing Towards Goal  Goal: *Level of environmental perceptions restored to baseline  Outcome: Progressing Towards Goal  Goal: *Sensory perception restored to baseline  Outcome: Progressing Towards Goal  Goal: *Emotional stability restored to baseline  Outcome: Progressing Towards Goal  Goal: *Functional assessment restored to baseline  Outcome: Progressing Towards Goal  Goal: *Absence of falls  Outcome: Progressing Towards Goal  Goal: *Will remain free of delirium, CAM Score negative  Outcome: Progressing Towards Goal  Goal: *Cognitive status will be restored to baseline  Outcome: Progressing Towards Goal  Goal: Interventions  Outcome: Progressing Towards Goal     Problem: Patient Education: Go to Patient Education Activity  Goal: Patient/Family Education  Outcome: Progressing Towards Goal

## 2022-10-11 NOTE — PROGRESS NOTES
Speech Language Pathology  Flexible Endoscopic Evaluation of Swallowing-FEES  Patient: Carlos Mcgraw (72 y.o. female)  Date: 10/11/2022  Primary Diagnosis: Status epilepticus ([facility] [G40.901]       Precautions: aspiration       ASSESSMENT :  Based on the objective data described below, the patient presents with functional swallow for ice chips and thins. No aspiration or penetration, but she did have vomiting of moderate amounts of green bilious fluids. She had also had vomiting of green fluids this morning. Noted multiple bilateral small white bumps on vocal cords, which may be related to her prolonged intubation. She is ready for PO of clear liquids once cleared by MD for GI issues. Patient will benefit from skilled intervention to address the above impairments. Patient's rehabilitation potential is considered to be Good     Images taken from FEES:    Bilateral protrusions on posterior vocal folds, ?related to prolonged intubation. PLAN :  Recommendations and Planned Interventions:  Hold PO until cleared by MD due to copious amounts of green bilious fluid vomited multiple times today. STart clear liquids when cleared by MD  Frequency/Duration: Patient will be followed by speech-language pathology 2 times a week to address goals. Discharge Recommendations: To Be Determined     SUBJECTIVE:   Patient awake and cooperative. Speech intelligibility mostly poor due to mumbling.     OBJECTIVE:     Past Medical History:   Diagnosis Date    History of vascular access device 10/07/2022    [facility] VAT: PICC placement R Cephalic length 40 cm for reliable access/TPN arm circ 35.5 cm     Past Surgical History:   Procedure Laterality Date    IR INSERT NON TUNL CVC OVER 5 YRS  9/27/2022     Prior Level of Function/Home Situation:   Home Situation  Support Systems: Child(wayne)  Patient Expects to be Discharged to[de-identified] Unable to determine at this time (critically ill,intubated)  Diet prior to admission: regular, thins  Current Diet:  NPO     Cognitive and Communication Status:  Neurologic State: Alert, Confused  Orientation Level: Oriented to place, Oriented to person, Disoriented to situation, Disoriented to time  Cognition: Follows commands             History/indication for procedure: coughing with PO after prolonged intubation. Extubated 24 hours ago  Lidocaine used: No  Nostril used: left  Scope Used: Ambu disposable scope  Feeding Tube Present in Nare: No  Adverse Reaction: Yes-vomiting moderate amount of green bile during FEES; she had already vomited a large amount of green bile this morning  Respiratory status: WNL        Part I:  Anatomy:       General Comments:      Palate:   WFL   Base of tongue:   L   Valleculae:   [facility]   Epiglottis:   [facility]   Arytenoids:   Bilateral mild fullness   False vocal folds:   [facility]   Vocal folds:   Impaired: noted 2 sets of bilateral  small white lumps on posterior 1/3 of vocal cords Pyriform sinus:   WFL         Part II:  Laryngeal Function:    Adduction:   Full   Abduction:   Full   Arytenoid movement:   Symmetric Vocal quality:   Mildly weak; moderate low volume         Part III:  Secretions:    Balwinder Worthy Secretion Rating (0-7): 0     Location:  0 - Nil significant pooled secretions in pyriform fossae or laryngeal vestibule Amount:   0 - Nil significant pooled secretions in pyriform fossae (0-20%) Response*:  0 - Secretions in pyriform fossae or laryngeal vestibule effectively cleared   Comments (e.g., quality/texture/color):      *Normal airway responses in the pharynx or laryngeal vestibule may include spontaneous coughing, throat clearing, and/or swallowing        Part IV:  Swallow Trials:    Subjective comments regarding oral phase of swallow: WNL    Comments regarding esophageal phase of swallow: she vomited moderate amount of green bile in this session.     Consistency: Ice chips  Position of Bolus Pre-Swallow: Presumed base of tongue  [place] Pharyngeal Residue Severity Rating Scale: Valleculae residue: 1 - none; 0%, no residue and Pyriform sinus residue: 1 - none; 0%, no residue  Spontaneously Cleared: n/a  Penetration: No  Aspiration: No  Response: n/a  [place] Penetration-Aspiration Scale: 1 - Material does not enter the airway  Compensatory Strategy: n/a  Effectiveness of Compensatory Strategy: n/a    Consistency: Thin liquid  Position of Bolus Pre-Swallow: Presumed base of tongue  [place] Pharyngeal Residue Severity Rating Scale: Valleculae residue: 1 - none; 0%, no residue and Pyriform sinus residue: 1 - none; 0%, no residue  Spontaneously Cleared: n/a  Penetration: no  Aspiration: no  Response: n/a  [place]osenbek Penetration-Aspiration Scale: 1 - Material does not enter the airway  Compensatory Strategy: n/a  Effectiveness of Compensatory Strategy: n/a    Dysphagia Severity Rating:   Oral phase:  incomplete evaluation due to vomiting-did not give solids. Pharyngeal phase: WFL      NOMS:   The NOMS functional outcome measure was used to quantify this patient's level of swallowing impairment. Based on the NOMS, the patient was determined to be at level 7 for swallow function         NOMS Swallowing Levels:  Level 1 (CN): NPO  Level 2 (CM): NPO but takes consistency in therapy  Level 3 (CL): Takes less than 50% of nutrition p.o. and continues with nonoral feedings; and/or safe with mod cues; and/or max diet restriction  Level 4 (CK): Safe swallow but needs mod cues; and/or mod diet restriction; and/or still requires some nonoral feeding/supplements  Level 5 (CJ): Safe swallow with min diet restriction; and/or needs min cues  Level 6 (CI): Independent with p.o.; rare cues; usually self cues; may need to avoid some foods or needs extra time  Level 7 ([address] [place] [address]): Independent for all p.o.  LIZETH. (2003). National Outcomes Measurement System (NOMS): Adult Speech-Language Pathology User's Guide.      Pain:  Pain Scale 1: Numeric (0 - 10)  Pain Intensity 1: 0       COMMUNICATION/EDUCATION:   Patient was educated regarding Her deficit(s) of aspiration risk as this relates to Her diagnosis of intubation for 2 weeks s/p sz and possible hypoxia. She demonstrated fair understanding as evidenced by AMS. The patient's plan of care including findings from FEES, recommendations, planned interventions, and recommended diet changes were discussed with: Registered nurse and Physician. Patient/family have participated as able in goal setting and plan of care. Patient/family agree to work toward stated goals and plan of care. Thank you for this referral.  Cydney Hudson, SLP  Time Calculation: 15 mins  Was present with SLP for the entirety of FEES in supervisory capacity. Agree with assessment and recommendations as stated above.     Thank you,  JOHN Stein.Ed, [id number] [facility]  Speech-Language Pathologist

## 2022-10-11 NOTE — PROGRESS NOTES
Comprehensive Nutrition Assessment    Type and Reason for Visit: Reassess    Nutrition Recommendations/Plan:   NPO or diet per SLP (day 1 NPO)     Malnutrition Assessment:  Malnutrition Status:  Mild malnutrition (10/11/22 1021)    Context:  Acute illness     Findings of the 6 clinical characteristics of malnutrition:   Energy Intake:  No significant decrease in energy intake (day 1 w/o nutr)  Weight Loss:  No significant weight loss     Body Fat Loss:  No significant body fat loss,     Muscle Mass Loss:  No significant muscle mass loss,    Fluid Accumulation:  Moderate to severe, Extremities   Strength:  Not performed     Nutrition Assessment:     10/11: Follow up. Patient extubated yesterday. Discussed during IDRs - remains confused but alert to self/place. Not on any pressors/O2. Had emesis this morning. Failed YSS and SLP consult already placed. Edema has somewhat improved - unable to obtain new bedscale weight at this time 2/2 bed not charged. Per IDR discussion, placing patient on suicidal precautions. Questioning whether patient will have cath this admission or not. Nutrition Related Findings:      Wound Type: None  Last Bowel Movement Date: 10/10/22  Stool Appearance:  (smear)  Abdominal Assessment: Intact, Soft  Bowel Sounds: Active   Edema:Generalized: Trace (10/10/2022  8:00 PM)  LLE: 1+; Pitting (10/10/2022  8:00 PM)  LUE: 2+; Pitting (10/10/2022  8:00 PM)  RLE: 1+; Pitting (10/10/2022  8:00 PM)  RUE: 2+; Pitting (10/10/2022  8:00 PM)      Nutr.  Labs:    Lab Results   Component Value Date/Time    GFR est AA >60 10/03/2022 01:13 AM    GFR est non-AA >60 10/03/2022 01:13 AM    Creatinine, POC 1.6 (H) 09/27/2022 06:45 PM    Creatinine 0.59 10/11/2022 01:23 AM    BUN 13 10/11/2022 01:23 AM    Sodium,  09/27/2022 06:45 PM    Sodium 144 10/11/2022 01:23 AM    Potassium 3.9 10/11/2022 01:23 AM    Potassium, POC 4.0 09/27/2022 06:45 PM    Chloride,  (H) 09/27/2022 06:45 PM    Chloride 109 (H) 10/11/2022 01:23 AM    CO2 28 10/11/2022 01:23 AM       Lab Results   Component Value Date/Time    Glucose 100 10/11/2022 01:23 AM    Glucose (POC) 110 10/05/2022 04:55 PM       Lab Results   Component Value Date/Time    Hemoglobin A1c 6.1 (H) 09/29/2022 03:27 AM     Magnesium   Date Value Ref Range Status   10/11/2022 2.3 1.6 - 2.4 mg/dL Final   10/09/2022 2.0 1.6 - 2.4 mg/dL Final   10/06/2022 2.2 1.6 - 2.4 mg/dL Final   10/05/2022 2.3 1.6 - 2.4 mg/dL Final   10/04/2022 2.2 1.6 - 2.4 mg/dL Final       Lab Results   Component Value Date/Time    Calcium 9.0 10/11/2022 01:23 AM    Phosphorus 3.5 10/09/2022 01:06 AM         Nutr. Meds:  Lipitor, Peridex, Lovenox, prevacid, synthroid, lopressor, zofran PRN, miralax PRN, pericolace      Current Nutrition Intake & Therapies:  Average Meal Intake: NPO  Average Supplement Intake: NPO  DIET NPO    Anthropometric Measures:  Height: 5' 6\" (167.6 cm)  Ideal Body Weight (IBW): 130 lbs (59 kg)     Current Body Wt:  104.3 kg (229 lb 15 oz), 176.9 % IBW. Not specified  Current BMI (kg/m2): 37.1        Weight Adjustment: No adjustment                 BMI Category: Obese class 2 (BMI 35.0-39. 9)    Estimated Daily Nutrient Needs:  Energy Requirements Based On: Formula  Weight Used for Energy Requirements: Current  Energy (kcal/day): 1963 (MSJ x 1.2)  Weight Used for Protein Requirements: Current  Protein (g/day):  (0.8-1.0 g/kg)  Method Used for Fluid Requirements: 1 ml/kcal  Fluid (ml/day): 1963    Nutrition Diagnosis:   Inadequate protein intake related to impaired nutrient utilization as evidenced by localized or generalized fluid accumulation, Criteria as identified in malnutrition assessment  Inadequate oral intake related to cognitive or neurological impairment as evidenced by NPO or clear liquid status due to medical condition  Inadequate oral intake related to inadequate protein-energy intake as evidenced by NPO or clear liquid status due to medical condition, intubation - RESOLVED    Nutrition Interventions:   Food and/or Nutrient Delivery: Discontinue tube feeding, Continue NPO  Nutrition Education/Counseling: No recommendations at this time  Coordination of Nutrition Care: Continue to monitor while inpatient, Interdisciplinary rounds  Plan of Care discussed with: IDR team    Goals:  Previous Goal Met: Goal(s) achieved  Goals: Tolerate nutrition support at goal rate, by next RD assessment  Specify Other Goals: Provide oral diet within 2 - 3 days    Nutrition Monitoring and Evaluation:   Behavioral-Environmental Outcomes: None identified  Food/Nutrient Intake Outcomes: Diet advancement/tolerance  Physical Signs/Symptoms Outcomes: Biochemical data, Hemodynamic status, Weight, Nausea/vomiting, Fluid status or edema    Discharge Planning:     Too soon to determine    Danish Leach MS, RD  Contact: Ext: [phone], or via [employer]

## 2022-10-11 NOTE — PROGRESS NOTES
Problem: Falls - Risk of  Goal: *Absence of Falls  Description: Document Lizeth Marking Fall Risk and appropriate interventions in the flowsheet. Outcome: Progressing Towards Goal  Note: Fall Risk Interventions:       Mentation Interventions: Bed/chair exit alarm, Reorient patient    Medication Interventions: Bed/chair exit alarm, Patient to call before getting OOB    Elimination Interventions: Bed/chair exit alarm    History of Falls Interventions: Room close to nurse's station, Bed/chair exit alarm         Problem: Patient Education: Go to Patient Education Activity  Goal: Patient/Family Education  Outcome: Progressing Towards Goal     Problem: Pressure Injury - Risk of  Goal: *Prevention of pressure injury  Description: Document Jose Enrique Scale and appropriate interventions in the flowsheet. Outcome: Progressing Towards Goal  Note: Pressure Injury Interventions:  Sensory Interventions: Assess changes in LOC, Turn and reposition approx. every two hours (pillows and wedges if needed)    Moisture Interventions: Absorbent underpads, Internal/External urinary devices    Activity Interventions: PT/OT evaluation    Mobility Interventions: Assess need for specialty bed, Turn and reposition approx.  every two hours(pillow and wedges)    Nutrition Interventions: Document food/fluid/supplement intake    Friction and Shear Interventions: Apply protective barrier, creams and emollients                Problem: Patient Education: Go to Patient Education Activity  Goal: Patient/Family Education  Outcome: Progressing Towards Goal     Problem: Nutrition Deficit  Goal: *Optimize nutritional status  Outcome: Progressing Towards Goal     Problem: Aspiration - Risk of  Goal: *Absence of aspiration  Outcome: Progressing Towards Goal     Problem: Patient Education: Go to Patient Education Activity  Goal: Patient/Family Education  Outcome: Progressing Towards Goal     Problem: Patient Education: Go to Patient Education Activity  Goal: Patient/Family Education  Outcome: Progressing Towards Goal

## 2022-10-12 ENCOUNTER — APPOINTMENT (OUTPATIENT)
Dept: GENERAL RADIOLOGY | Age: 57
DRG: 064 | End: 2022-10-12
Attending: INTERNAL MEDICINE
Payer: COMMERCIAL

## 2022-10-12 LAB
ALBUMIN SERPL-MCNC: 3 G/DL (ref 3.5–5)
ALBUMIN/GLOB SERPL: 0.8 {RATIO} (ref 1.1–2.2)
ALP SERPL-CCNC: 67 U/L (ref 45–117)
ALT SERPL-CCNC: 31 U/L (ref 12–78)
ANION GAP SERPL CALC-SCNC: 9 MMOL/L (ref 5–15)
AST SERPL-CCNC: 29 U/L (ref 15–37)
BILIRUB SERPL-MCNC: 0.6 MG/DL (ref 0.2–1)
BUN SERPL-MCNC: 20 MG/DL (ref 6–20)
BUN/CREAT SERPL: 29 (ref 12–20)
CALCIUM SERPL-MCNC: 9.3 MG/DL (ref 8.5–10.1)
CHLORIDE SERPL-SCNC: 110 MMOL/L (ref 97–108)
CO2 SERPL-SCNC: 27 MMOL/L (ref 21–32)
CREAT SERPL-MCNC: 0.68 MG/DL (ref 0.55–1.02)
ERYTHROCYTE [DISTWIDTH] IN BLOOD BY AUTOMATED COUNT: 14.6 % (ref 11.5–14.5)
GLOBULIN SER CALC-MCNC: 3.8 G/DL (ref 2–4)
GLUCOSE SERPL-MCNC: 105 MG/DL (ref 65–100)
HCT VFR BLD AUTO: 34.6 % (ref 35–47)
HGB BLD-MCNC: 11 G/DL (ref 11.5–16)
MAGNESIUM SERPL-MCNC: 2.5 MG/DL (ref 1.6–2.4)
MCH RBC QN AUTO: 29.5 PG (ref 26–34)
MCHC RBC AUTO-ENTMCNC: 31.8 G/DL (ref 30–36.5)
MCV RBC AUTO: 92.8 FL (ref 80–99)
NRBC # BLD: 0 K/UL (ref 0–0.01)
NRBC BLD-RTO: 0 PER 100 WBC
PHOSPHATE SERPL-MCNC: 3.2 MG/DL (ref 2.6–4.7)
PLATELET # BLD AUTO: 599 K/UL (ref 150–400)
PMV BLD AUTO: 10.4 FL (ref 8.9–12.9)
POTASSIUM SERPL-SCNC: 4 MMOL/L (ref 3.5–5.1)
PROT SERPL-MCNC: 6.8 G/DL (ref 6.4–8.2)
RBC # BLD AUTO: 3.73 M/UL (ref 3.8–5.2)
SODIUM SERPL-SCNC: 146 MMOL/L (ref 136–145)
WBC # BLD AUTO: 11.5 K/UL (ref 3.6–11)

## 2022-10-12 PROCEDURE — 74018 RADEX ABDOMEN 1 VIEW: CPT

## 2022-10-12 PROCEDURE — 74011250637 HC RX REV CODE- 250/637: Performed by: STUDENT IN AN ORGANIZED HEALTH CARE EDUCATION/TRAINING PROGRAM

## 2022-10-12 PROCEDURE — 80053 COMPREHEN METABOLIC PANEL: CPT

## 2022-10-12 PROCEDURE — 84100 ASSAY OF PHOSPHORUS: CPT

## 2022-10-12 PROCEDURE — 65270000029 HC RM PRIVATE

## 2022-10-12 PROCEDURE — 51798 US URINE CAPACITY MEASURE: CPT

## 2022-10-12 PROCEDURE — 74011250637 HC RX REV CODE- 250/637: Performed by: INTERNAL MEDICINE

## 2022-10-12 PROCEDURE — 74011250636 HC RX REV CODE- 250/636: Performed by: NURSE PRACTITIONER

## 2022-10-12 PROCEDURE — 74011000250 HC RX REV CODE- 250: Performed by: INTERNAL MEDICINE

## 2022-10-12 PROCEDURE — 97530 THERAPEUTIC ACTIVITIES: CPT

## 2022-10-12 PROCEDURE — APPNB15 APP NON BILLABLE TIME 0-15 MINS: Performed by: NURSE PRACTITIONER

## 2022-10-12 PROCEDURE — 74011250637 HC RX REV CODE- 250/637: Performed by: NURSE PRACTITIONER

## 2022-10-12 PROCEDURE — 94761 N-INVAS EAR/PLS OXIMETRY MLT: CPT

## 2022-10-12 PROCEDURE — 85027 COMPLETE CBC AUTOMATED: CPT

## 2022-10-12 PROCEDURE — 36415 COLL VENOUS BLD VENIPUNCTURE: CPT

## 2022-10-12 PROCEDURE — 92526 ORAL FUNCTION THERAPY: CPT

## 2022-10-12 PROCEDURE — 2709999900 HC NON-CHARGEABLE SUPPLY

## 2022-10-12 PROCEDURE — 83735 ASSAY OF MAGNESIUM: CPT

## 2022-10-12 RX ADMIN — CHLORHEXIDINE GLUCONATE 15 ML: 1.2 RINSE ORAL at 22:42

## 2022-10-12 RX ADMIN — QUETIAPINE FUMARATE 25 MG: 25 TABLET ORAL at 18:11

## 2022-10-12 RX ADMIN — ENOXAPARIN SODIUM 30 MG: 100 INJECTION SUBCUTANEOUS at 09:18

## 2022-10-12 RX ADMIN — MIDODRINE HYDROCHLORIDE 10 MG: 5 TABLET ORAL at 13:28

## 2022-10-12 RX ADMIN — Medication 10 ML: at 22:43

## 2022-10-12 RX ADMIN — CHLORHEXIDINE GLUCONATE 15 ML: 1.2 RINSE ORAL at 08:03

## 2022-10-12 RX ADMIN — SENNOSIDES AND DOCUSATE SODIUM 1 TABLET: 50; 8.6 TABLET ORAL at 22:41

## 2022-10-12 RX ADMIN — Medication 10 ML: at 06:00

## 2022-10-12 RX ADMIN — ATORVASTATIN CALCIUM 40 MG: 20 TABLET, FILM COATED ORAL at 22:41

## 2022-10-12 RX ADMIN — ENOXAPARIN SODIUM 30 MG: 100 INJECTION SUBCUTANEOUS at 22:41

## 2022-10-12 RX ADMIN — Medication 10 ML: at 13:28

## 2022-10-12 NOTE — PROGRESS NOTES
10/12/2022  3:57 PM  TRANSFER - IN REPORT:    Verbal report received from [facility] on Elizabeth Manuel being transferred to 4th floor for routine progression of care   Report consisted of patients Situation, Background, Assessment and   Recommendations(SBAR). Transition of Care Plan    RUR 13% low   Is This a Readmission NO  Is this a Bundle NO    Awaiting medical clearance and DC order. PT/OT treating. Cardiology following. Psych consulted. 2.  IPR - Referrals pending for [facility] (preference), and [facility]. 3.  Outpatient follow-up as recommended at DC. 4.  Transport need TBD.

## 2022-10-12 NOTE — PROGRESS NOTES
Chart reviwed  Visit Vitals  BP (!) 112/54   Pulse 90   Temp 99.2 °F (37.3 °C)   Resp 19   Ht 5' 6\" (1.676 m)   Wt 104.3 kg (230 lb)   SpO2 93%   BMI 37.12 kg/m²     Remains NPO, N/V KUB today  Cont IV BB.

## 2022-10-12 NOTE — PROGRESS NOTES
[facility]  [address], [facility]  [phone]    Medical Progress Note      NAME: Alex Maza   :  1965  MRM:  [id number]    Date/Time of service 10/12/2022  9:14 AM         Assessment and Plan:     Acute metabolic encephalopathy - POA, unclear etiology. \"2 small foci of diffusion restriction in the right frontal lobe \" noted on MRI but unlikely this explains KHADAR. Initial concern for Sz, but EEG bland, keppra stopped. Concern remains she had some anoxic brain injury. We can now assess after extubation and stopping sedation. Neurology consulted. Stopped lidoderm. Appreciate PT OT speech eval.    SIRS / tachycardia / Fever / leukocytosis - 10/10 developed SIRS criteria of WBC, fever and tachycardia after extubation. Unclear etiology. Checking Blood cx, UA, RVP and procalcitonin. She had just completed a course of Abx. Hold off resuming until we know more. Shock - Intially presumed septic shock until ECHO showed new EF 30%. Thereafter shock was likely due to sedation. Now no longer on prn levophed IV gtt as pressor. Resumed midodrine    Acute respiratory failure with hypoxia - POA, persistent. Unclear if aspiration PNA vs central resp depression. Completed Zosyn. Intubated on admission. Intensivist consulted and is managing vent. Still requring fentanyl and precedex gtt for sedation. Propofol was stopped due to elevated triglycerides. She is failing SBT so far. Likely needs trach soon if fails    Nausea and vomiting - Noted after extubation. Better today. Appreciate speech consult. Check KUB    Takotsubo cardiomyopathy / NSTEMI (non-ST elevated myocardial infarction) / Bradycardia - POA, unclear initial driving event. Cardiology consulted. After extubation, we noted HTN and tachycardia. Repeat ECHO improved, so for now no plan for cath.   Unable to tolerate BB or ACE due to low BP    Acute CVA (cerebrovascular accident) - MRI notes two small lesions. Unclear significance and timing. Neurology to assess after extubation and off sedation. Continue ASA and statin. Anemia - POA and slowly worsening likely due to acute illness. UTI (urinary tract infection) - Pitt sensitive E coli on initial contaminated cx. Had E coli in contaminated sputum along with much normal josue. Had recently completed course of Zosyn. CHRIS (acute kidney injury) / Lactic acidosis / Hypokalemia - POA: unknown baseline Cr. Currently Cr stable. Status epilepticus - Reject Dx. Kaylee Salazar had myoclonic jerking    Dyslipidemia - On atorvastatin    Hypothyroidism - TSH normal.     KOBY (generalized anxiety disorder) - Noted benzo on drug screen on admit, but had had Versed for Sz. Psych consulted to assess for any suicidal inclination. Obese - Advise weight loss. .         Subjective:     Chief Complaint:  extubated, now on NC, but vomiting    ROS:  (bold if positive, if negative)    Tolerating some PT  Tolerating some diet        Objective:     Last 24hrs VS reviewed since prior progress note.  Most recent are:    Visit Vitals  BP (!) 112/54 (BP 1 Location: Left upper arm, BP Patient Position: At rest)   Pulse 85   Temp 99.2 °F (37.3 °C)   Resp 19   Ht 5' 6\" (1.676 m)   Wt 104.3 kg (230 lb)   SpO2 95%   BMI 37.12 kg/m²     SpO2 Readings from Last 6 Encounters:   10/12/22 95%    O2 Flow Rate (L/min): 2 l/min     Intake/Output Summary (Last 24 hours) at 10/12/2022 0803  Last data filed at 10/11/2022 2000  Gross per 24 hour   Intake 0 ml   Output 2200 ml   Net -2200 ml          Physical Exam:    Gen:  Obese, in no acute distress  HEENT:  Pink conjunctivae, PERRL, hearing not intact to voice, NC on nose  Neck:  Supple, without masses, thyroid non-tender  Resp:  No accessory muscle use, bilateral coarse breath sounds   Card:  No murmurs, bradycardic S1, S2 without thrills, bruits or peripheral edema  Abd:  Soft, non-tender, non-distended, reduced bowel sounds are present, no mass  Lymph:  No cervical or inguinal adenopathy  Musc:  No cyanosis or clubbing  Skin:  No rashes or ulcers, skin turgor is good  Neuro:  Cranial nerves are grossly intact, general motor weakness, follows commands vaguely  Psych:  Alert to Person    Telemetry reviewed:   normal sinus rhythm  __________________________________________________________________  Medications Reviewed: (see below)  Medications:     Current Facility-Administered Medications   Medication Dose Route Frequency    midodrine (PROAMATINE) tablet 10 mg  10 mg Oral Q8H    lidocaine (XYLOCAINE) 2 % jelly   Mouth/Throat PRN    prochlorperazine (COMPAZINE) injection 10 mg  10 mg IntraVENous Q6H PRN    metoprolol (LOPRESSOR) injection 1.25 mg  1.25 mg IntraVENous Q6H    alteplase (CATHFLO) 2 mg in sterile water (preservative free) 2 mL injection  2 mg InterCATHeter PRN    QUEtiapine (SEROquel) tablet 25 mg  25 mg Per NG tube BID    levothyroxine (SYNTHROID) tablet 88 mcg  88 mcg Per G Tube DAILY    senna-docusate (PERICOLACE) 8.6-50 mg per tablet 1 Tablet  1 Tablet Oral BID    fentaNYL citrate (PF) injection 100 mcg  100 mcg IntraVENous Q4H PRN    atorvastatin (LIPITOR) tablet 40 mg  40 mg Oral QHS    enoxaparin (LOVENOX) injection 30 mg  30 mg SubCUTAneous Q12H    aspirin chewable tablet 81 mg  81 mg Oral DAILY    lansoprazole compounding kit (PREVACID) 3 mg/mL oral suspension 30 mg  30 mg Oral DAILY    sodium chloride (NS) flush 5-40 mL  5-40 mL IntraVENous Q8H    sodium chloride (NS) flush 5-40 mL  5-40 mL IntraVENous PRN    acetaminophen (TYLENOL) tablet 650 mg  650 mg Oral Q6H PRN    polyethylene glycol (MIRALAX) packet 17 g  17 g Oral DAILY PRN    ondansetron (ZOFRAN) injection 4 mg  4 mg IntraVENous Q6H PRN    chlorhexidine (PERIDEX) 0.12 % mouthwash 15 mL  15 mL Oral Q12H        Lab Data Reviewed: (see below)  Lab Review:     Recent Labs     10/11/22  0123   WBC 13.8*   HGB 10.6*   HCT 32.9*   *       Recent Labs     10/11/22  0123    K 3.9   *   CO2 28      BUN 13   CREA 0.59   CA 9.0   MG 2.3       Lab Results   Component Value Date/Time    Glucose (POC) 110 10/05/2022 04:55 PM    Glucose (POC) 87 10/05/2022 11:02 AM    Glucose (POC) 78 10/05/2022 05:00 AM    Glucose (POC) 94 10/05/2022 04:57 AM    Glucose (POC) 124 (H) 10/04/2022 11:03 PM     No results for input(s): PH, PCO2, PO2, HCO3, FIO2 in the last 72 hours. No results for input(s): INR, INREXT, INREXT in the last 72 hours.   All Micro Results       Procedure Component Value Units Date/Time    CULTURE, BLOOD, PAIRED [[id number]] Collected: 10/11/22 1214    Order Status: Completed Specimen: Blood Updated: 10/12/22 0701     Special Requests: NO SPECIAL REQUESTS        Culture result: NO GROWTH AFTER 17 HOURS       RESPIRATORY VIRUS PANEL W/COVID-19, PCR [[id number]] Collected: 10/11/22 0802    Order Status: Completed Specimen: Nasopharyngeal Updated: 10/11/22 1422     Adenovirus Not detected        Coronavirus 229E Not detected        Coronavirus HKU1 Not detected        Coronavirus CVNL63 Not detected        Coronavirus OC43 Not detected        SARS-CoV-2, PCR Not detected        Metapneumovirus Not detected        Rhinovirus and Enterovirus Not detected        Influenza A Not detected        Influenza B Not detected        Parainfluenza 1 Not detected        Parainfluenza 2 Not detected        Parainfluenza 3 Not detected        Parainfluenza virus 4 Not detected        RSV by PCR Not detected        B. parapertussis, PCR Not detected        Bordetella pertussis - PCR Not detected        Chlamydophila pneumoniae DNA, QL, PCR Not detected        Mycoplasma pneumoniae DNA, QL, PCR Not detected       CULTURE, URINE [[id number]]     Order Status: Sent Specimen: Cath Urine     CULTURE, RESPIRATORY/SPUTUM/BRONCH Hollsopple Abigail STAIN [[id number]] Collected: 10/03/22 1300    Order Status: Completed Specimen: Sputum Updated: 10/05/22 1143     Special Requests: NO SPECIAL REQUESTS GRAM STAIN RARE WBCS SEEN               OCCASIONAL EPITHELIAL CELLS SEEN                  OCCASIONAL GRAM NEGATIVE RODS                  RARE GRAM POSITIVE COCCI IN PAIRS           Culture result:       HEAVY NORMAL RESPIRATORY CATHIE          CULTURE, BLOOD, PAIRED [[id number]] Collected: 09/27/22 1251    Order Status: Completed Specimen: Blood Updated: 10/02/22 0640     Special Requests: NO SPECIAL REQUESTS        Culture result: NO GROWTH 5 DAYS       CULTURE, URINE [[id number]]  (Abnormal)  (Susceptibility) Collected: 09/27/22 1251    Order Status: Completed Specimen: Cath Urine Updated: 09/30/22 1502     Special Requests: NO SPECIAL REQUESTS        Shelbiana Count --        >100,000  COLONIES/mL       Culture result: ESCHERICHIA COLI       CULTURE, RESPIRATORY/SPUTUM/BRONCH Jeison Banter STAIN [[id number]]  (Abnormal)  (Susceptibility) Collected: 09/27/22 1811    Order Status: Completed Specimen: Sputum Updated: 09/30/22 0930     Special Requests: NO SPECIAL REQUESTS        GRAM STAIN 1+ WBCS SEEN               1+ GRAM POSITIVE COCCI IN CLUSTERS            1+ GRAM VARIABLE RODS        Culture result: HEAVY ESCHERICHIA COLI               HEAVY NORMAL RESPIRATORY CATHIE                  MODERATE YEAST (APPARENT MULTIPLE COLONY TYPES)          CULTURE, MRSA [[id number]] Collected: 09/27/22 1440    Order Status: Completed Specimen: Nasal from Nares Updated: 09/29/22 0820     Special Requests: NO SPECIAL REQUESTS        Culture result: MRSA NOT PRESENT               Screening of patient nares for MRSA is for surveillance purposes and, if positive, to facilitate isolation considerations in high risk settings. It is not intended for automatic decolonization interventions per se as regimens are not sufficiently effective to warrant routine use. GAETANO Constantino, UR/CSF [[id number]] Collected: 09/27/22 1431    Order Status: Completed Specimen: Miscellaneous sample Updated: 09/28/22 1336     Source URINE        Specimen Urine Streptococcus pneumoniae Ag Negative        Fluid culture Not indicated. Organism ID Not indicated. Please note Comment        Comment: (NOTE)  College of [employer]ologists standards require a culture to be  performed on CSF specimens submitted for bacterial antigen testing. (CAP [id number]) Urine specimens will not be cultured. Performed At: [facility] & [address] [id number]  Srinivas Jackson MD :[id number]         Estrella Medrano [[id number]] Collected: 09/27/22 1530    Order Status: Completed Specimen: Urine Updated: 09/28/22 1336     Source URINE        L pneumophila S1 Ag, urine Negative        Comment: (NOTE)  Presumptive negative for L. pneumophila serogroup 1 antigen in urine,  suggesting no recent or current infection. Legionnaires' disease  cannot be ruled out since other serogroups and species may also  cause disease.   Performed At: [facility]  [address] [id number]  Srinivas Jackson MD SC:[id number]         RESPIRATORY VIRUS PANEL W/COVID-19, PCR [[id number]] Collected: 09/27/22 1440    Order Status: Completed Specimen: Nasopharyngeal Updated: 09/27/22 2214     Adenovirus Not detected        Coronavirus 229E Not detected        Coronavirus HKU1 Not detected        Coronavirus CVNL63 Not detected        Coronavirus OC43 Not detected        SARS-CoV-2, PCR Not detected        Metapneumovirus Not detected        Rhinovirus and Enterovirus Not detected        Influenza A Not detected        Influenza B Not detected        Parainfluenza 1 Not detected        Parainfluenza 2 Not detected        Parainfluenza 3 Not detected        Parainfluenza virus 4 Not detected        RSV by PCR Not detected        B. parapertussis, PCR Not detected        Bordetella pertussis - PCR Not detected        Chlamydophila pneumoniae DNA, QL, PCR Not detected        Mycoplasma pneumoniae DNA, QL, PCR Not detected       URINE CULTURE HOLD SAMPLE [[id number]] Collected: 09/27/22 1251    Order Status: Completed Specimen: Urine from Serum Updated: 09/27/22 1258     Urine culture hold       Urine on hold in Microbiology dept for 2 days. If unpreserved urine is submitted, it cannot be used for addtional testing after 24 hours, recollection will be required. Other pertinent lab: none    Total time spent with patient: 30 Minutes I personally reviewed chart, notes, data and current medications in the medical record. I have personally examined and treated the patient at bedside during this period. To assist coordination of care and communication with nursing and staff, this note may be preliminary early in the day, but finalized by end of the day.                  Care Plan discussed with: Patient, Care Manager, Nursing Staff, Consultant/Specialist, and >50% of time spent in counseling and coordination of care    Discussed:  Care Plan and D/C Planning    Prophylaxis:  Lovenox and H2B/PPI    Disposition:  SNF/LTC           ___________________________________________________    Attending Physician: Ghazala Nguyen MD

## 2022-10-12 NOTE — PROGRESS NOTES
RRT RN accessed chart due to increased deteriation index of 70. DI is increased r/t increased respiratory rate.

## 2022-10-12 NOTE — PROGRESS NOTES
Transition of care note:     RUR 13%  LOS 15 days,GLOS 4.9       I met with pt and her mother as mother had concerns about pt going to rehab as grandson had received a phone call from [facility]. A referral had been sent there previously. I updated mother regarding that referral generated vby neurology team.    I talked further with pt and her mother and they want to be considered for [address] as their primary choice . Freedom of choice provided. Another problem factoring into disposition is pt is on suicide precautions per mother's discussion with intensivist yesterday in which mother said she \"suspected a suicide attempt\" but did not know for sure. Psychiatry will need to evaluate pt prior to pt going to inpatient rehab. TRANSFER - OUT REPORT:    Verbal report given to Orquidea Espana(name) on Maddy Colvin (patient name)  being transferred to [facility](unit) for routine progression of care   Report consisted of patients Situation, Background, Assessment and   Recommendations(SBAR).            Romeo Rios Serve

## 2022-10-12 NOTE — CONSULTS
PSYCHIATRY CONSULT NOTE    REASON FOR CONSULT: suicidal ideation. HISTORY OF PRESENTING COMPLAINT:  Sophia Hamilton is a 59 y.o. WHITE/NON- female who is currently admitted to the medical floor at [facility]. Patient presented to the ED via EMS after being found unresponsive in her in bed and CPR was initiated. Patient is being treated for acute metabolic encephalopathy. On assessment today, patient appeared is awake and alert. She appears to be confused and is not able to recall the circumstances surrounding her admission to the hospital. Her speech is very soft making it hard to follow and understand. She reports her location as [facility] and current date is unknown. She denies current depression, anxiety, suicidal/homicidal thoughts and VA hallucinations. She denies taking an overdose or trying to end her life. She reports having suicidal thoughts in the past and thought she was dying but denied any plans or intent. She denies having any stress or trauma. Her mother who is at bedside states that patient lives alone and has been having a lot of financial and work stress. Mom also reports that patient wants to sell her house. She reports recent sleep issues but denied appetite concerns. PAST PSYCHIATRIC HISTORY: Patient denies prior inpatient psychiatric hospitalizations and suicide attempts. SUBSTANCE ABUSE HISTORY: Patient reports she drinks alcohol occasionally and denies any other substance abuse. Her UDS is positive for benzos. PAST MEDICAL HISTORY:    Please see H&P for details. Past Medical History:   Diagnosis Date    History of vascular access device 10/07/2022    [facility] VAT: PICC placement R Cephalic length 40 cm for reliable access/TPN arm circ 35.5 cm     Prior to Admission medications    Medication Sig Start Date End Date Taking? Authorizing Provider   atorvastatin (LIPITOR) 10 mg tablet Take 10 mg by mouth daily.    Yes Provider, Historical furosemide (LASIX) 20 mg tablet Take 20 mg by mouth daily. Yes Provider, Historical   traZODone (DESYREL) 50 mg tablet Take 125 mg by mouth nightly. Yes Provider, Historical   levothyroxine (SYNTHROID) 88 mcg tablet Take 88 mcg by mouth Daily (before breakfast). Yes Provider, Historical   OTHER,NON-FORMULARY, ESTROGEN(5050)/TESTOSTERONE (HRT) 1.5mg/10mg/ml Cream APPLY 1 ML TO SKIN (INNER WRIST/ABDOMEN/THIGHS EVERY DAY. ROTATE SITES   Yes Provider, Historical   OTHER,NON-FORMULARY, Progesterone (ME4M) 250 mg at bedtime. Yes Provider, Historical     Vitals:    10/12/22 0846 10/12/22 0900 10/12/22 1000 10/12/22 1058   BP: 108/72 (!) 126/58 (!) 90/43 (!) 101/46   Pulse: 94 95 98 96   Resp: 17 (!) 32 14 (!) 32   Temp:    99.9 °F (37.7 °C)   SpO2: 93% 93% 93% 91%   Weight:       Height:         Lab Results   Component Value Date/Time    WBC 11.5 (H) 10/12/2022 09:20 AM    HGB 11.0 (L) 10/12/2022 09:20 AM    HCT 34.6 (L) 10/12/2022 09:20 AM    PLATELET 272 (H) 15/06/2112 09:20 AM    MCV 92.8 10/12/2022 09:20 AM     Lab Results   Component Value Date/Time    Sodium 146 (H) 10/12/2022 09:20 AM    Potassium 4.0 10/12/2022 09:20 AM    Chloride 110 (H) 10/12/2022 09:20 AM    CO2 27 10/12/2022 09:20 AM    Anion gap 9 10/12/2022 09:20 AM    Glucose 105 (H) 10/12/2022 09:20 AM    BUN 20 10/12/2022 09:20 AM    Creatinine 0.68 10/12/2022 09:20 AM    BUN/Creatinine ratio 29 (H) 10/12/2022 09:20 AM    GFR est AA >60 10/03/2022 01:13 AM    GFR est non-AA >60 10/03/2022 01:13 AM    Calcium 9.3 10/12/2022 09:20 AM    Bilirubin, total 0.6 10/12/2022 09:20 AM    Alk.  phosphatase 67 10/12/2022 09:20 AM    Protein, total 6.8 10/12/2022 09:20 AM    Albumin 3.0 (L) 10/12/2022 09:20 AM    Globulin 3.8 10/12/2022 09:20 AM    A-G Ratio 0.8 (L) 10/12/2022 09:20 AM    ALT (SGPT) 31 10/12/2022 09:20 AM    AST (SGOT) 29 10/12/2022 09:20 AM     No results found for: VALF2, VALAC, VALP, VALPR, DS6, CRBAM, CRBAMP, CARB2, XCRBAM  No results found for: [facility]  RADIOLOGY REPORTS:(reviewed/updated 10/12/2022)  MRI BRAIN WO CONT    Result Date: 9/28/2022  CLINICAL HISTORY: AMS of unknown etiology. INDICATION: AMS of unknown etiology. COMPARISON: 9/27/2022 TECHNIQUE: MR examination of the brain includes axial and sagittal T1, coronal T2, axial T2, axial FLAIR, axial gradient echo, axial DWI. CONTRAST: None FINDINGS: IACs are symmetric in size. There is a punctate focus of acute infarction in the right frontal cortex. Additional probable focus of cortical infarction anteriorly right frontal lobe. The brain architecture is normal. There is no evidence of midline shift or mass-effect. There are no extra-axial fluid collections. There is no Chiari or syrinx. The pituitary and infundibulum are grossly unremarkable. There is no skull base mass. Cerebellopontine angles are grossly unremarkable. The major intracranial vascular flow-voids are unremarkable. The cavernous sinuses are symmetric. Optic chiasm and infundibulum grossly unremarkable. Orbits are grossly symmetric. Dural venous sinuses are grossly patent. The mastoid air cells are well pneumatized and clear. Small cortical foci of acute infarction right frontal lobe posteriorly abutting the central sulcus and cortically based anteriorly in the right frontal lobe. There is no associated hemorrhage, midline shift or mass effect. No additional acute intracranial process. MRI BRAIN W WO CONT    Result Date: 10/2/2022  EXAM:  MRI BRAIN W WO CONT INDICATION:    acute encephalopathy COMPARISON:  9/20/2022. CONTRAST: 20 ml of ProHance. TECHNIQUE:  Multiplanar multisequence acquisition without and with contrast of the brain. FINDINGS: The ventricles are normal in size and position. 2 small foci of cortical diffusion restriction in the right frontal lobe without significant change. No new areas of ischemia present. There is associated FLAIR hyperintensity. There is no cerebellar tonsillar herniation.  Expected arterial flow-voids are present. No evidence of abnormal enhancement. Small amount of fluid in the bilateral mastoid air cells. Intubated. Air-fluid level in the right maxillary sinus with fluid in the nasopharynx. The orbital contents are within normal limits. No significant osseous or scalp lesions are identified. 2 small foci of diffusion restriction in the right frontal lobe are unchanged and may represent small foci of acute ischemia. No abnormal enhancement is identified. CT CHEST WO CONT    Result Date: 9/27/2022  EXAM: CT CHEST WO CONT, CT ABD PELV WO CONT INDICATION: Found unresponsive, respiratory arrest, unclear etiology, family hx aneurysm COMPARISON: None IV CONTRAST: None ORAL CONTRAST: None TECHNIQUE: Thin axial images were obtained through the chest, abdomen and pelvis. Coronal and sagittal reformats were generated. CT dose reduction was achieved through use of a standardized protocol tailored for this examination and automatic exposure control for dose modulation. FINDINGS: An endotracheal tube is in appropriate position. A nasogastric tube is seen looped within the stomach. CHEST WALL: No mass or axillary lymphadenopathy. THYROID: No nodule. MEDIASTINUM: No mass or lymphadenopathy. ELBA: No mass or lymphadenopathy. THORACIC AORTA: No dissection or aneurysm. MAIN PULMONARY ARTERY: Normal in caliber. TRACHEA/BRONCHI: Patent. ESOPHAGUS: No wall thickening or dilatation. HEART: Normal in size. PLEURA: No effusion or pneumothorax. LUNGS: There is moderate bibasilar atelectasis. LIVER: No mass. BILIARY TREE: Status post cholecystectomy. CBD is not dilated. SPLEEN: within normal limits. PANCREAS: No mass or ductal dilatation. ADRENALS: Unremarkable. KIDNEYS: No mass, calculus, or hydronephrosis. STOMACH: Unremarkable. SMALL BOWEL: No dilatation or wall thickening. COLON: No dilatation or wall thickening. APPENDIX: Unremarkable PERITONEUM: No ascites or pneumoperitoneum.  RETROPERITONEUM: No lymphadenopathy or aortic aneurysm. REPRODUCTIVE ORGANS: Unremarkable. URINARY BLADDER: Decompressed by Sal catheter. BONES: No destructive bone lesion. ABDOMINAL WALL: No mass or hernia. ADDITIONAL COMMENTS: N/A     1. Moderate bibasilar atelectasis. 2. Status post cholecystectomy. 3. Endotracheal and nasogastric tubes in place. Sal catheter decompresses the bladder. CT ABD PELV WO CONT    Result Date: 9/27/2022  EXAM: CT CHEST WO CONT, CT ABD PELV WO CONT INDICATION: Found unresponsive, respiratory arrest, unclear etiology, family hx aneurysm COMPARISON: None IV CONTRAST: None ORAL CONTRAST: None TECHNIQUE: Thin axial images were obtained through the chest, abdomen and pelvis. Coronal and sagittal reformats were generated. CT dose reduction was achieved through use of a standardized protocol tailored for this examination and automatic exposure control for dose modulation. FINDINGS: An endotracheal tube is in appropriate position. A nasogastric tube is seen looped within the stomach. CHEST WALL: No mass or axillary lymphadenopathy. THYROID: No nodule. MEDIASTINUM: No mass or lymphadenopathy. ELBA: No mass or lymphadenopathy. THORACIC AORTA: No dissection or aneurysm. MAIN PULMONARY ARTERY: Normal in caliber. TRACHEA/BRONCHI: Patent. ESOPHAGUS: No wall thickening or dilatation. HEART: Normal in size. PLEURA: No effusion or pneumothorax. LUNGS: There is moderate bibasilar atelectasis. LIVER: No mass. BILIARY TREE: Status post cholecystectomy. CBD is not dilated. SPLEEN: within normal limits. PANCREAS: No mass or ductal dilatation. ADRENALS: Unremarkable. KIDNEYS: No mass, calculus, or hydronephrosis. STOMACH: Unremarkable. SMALL BOWEL: No dilatation or wall thickening. COLON: No dilatation or wall thickening. APPENDIX: Unremarkable PERITONEUM: No ascites or pneumoperitoneum. RETROPERITONEUM: No lymphadenopathy or aortic aneurysm. REPRODUCTIVE ORGANS: Unremarkable.  URINARY BLADDER: Decompressed by Sal catheter. BONES: No destructive bone lesion. ABDOMINAL WALL: No mass or hernia. ADDITIONAL COMMENTS: N/A     1. Moderate bibasilar atelectasis. 2. Status post cholecystectomy. 3. Endotracheal and nasogastric tubes in place. Sal catheter decompresses the bladder. XR CHEST PORT    Result Date: 10/7/2022  EXAM:  XR CHEST PORT INDICATION: Verify PICC Tip placement please COMPARISON: One day prior. TECHNIQUE: Single frontal view of the chest. FINDINGS: Right upper extremity PICC line with tip projecting over the right atrium. Existing right IJ catheter unchanged with tip also projecting over the right atrium. Endotracheal tube terminates 2 cm from the lópez. Enteric tube terminates below the level the diaphragm outside the field of view study. Slightly improved aeration of the lung bases. Possible trace residual effusions. No visualized pneumothorax. . Lungs are hypoventilatory. 1.  Right upper extremity PICC line tip projects over the right atrium. No visualized pneumothorax. XR CHEST PORT    Result Date: 10/6/2022  EXAM:  XR CHEST PORT INDICATION: Ventilated patient. Bilateral pneumonia. COMPARISON: 10/3/2022 TECHNIQUE: Semiupright portable chest AP view FINDINGS: Endotracheal tube is 2.9 cm above the lópez. Other tubes and lines are stable. Cardiac monitoring leads. Lung volumes remain low. Heart size enlarged. Mild interstitial and airspace opacities bilaterally with some improvement in the interval. Possible small right pleural effusion which may be new. There may be a small left pleural effusion as well. The visualized bones and upper abdomen are age-appropriate. Overall improvement in interstitial and airspace opacities bilaterally with some residual. Small right pleural effusion and possible small left pleural effusion.      XR CHEST PORT    Result Date: 10/3/2022  EXAM: XR CHEST PORT INDICATION: fever COMPARISON: 9/27/2022 FINDINGS: A portable AP radiograph of the chest was obtained at 1502 hours. The patient is on a cardiac monitor. The NG tube extends below the hemidiaphragm. Endotracheal tube terminates prison between the thoracic inlet and lópez. Right IJ line terminates at the cavoatrial junction. The cardiac and mediastinal contours and pulmonary vascularity are stable. Patchy bilateral airspace disease is noted in both perihilar locations and there is a small left pleural effusion. Bilateral perihilar pneumonia versus subsegmental atelectasis. Small left pleural effusion    XR CHEST PORT    Result Date: 9/27/2022  Indication: Line placement. Comparison to earlier the same day. Portable exam obtained at 7447 demonstrates placement of a right jugular catheter with the tip projecting over the right atrium. There is no pneumothorax. Right jugular catheter satisfactory position without pneumothorax. XR CHEST PORT    Result Date: 9/27/2022  EXAM: XR CHEST PORT INDICATION: PLACEMENT OF CENTRAL LINE ij COMPARISON: 9/27/2022 FINDINGS: A portable AP radiograph of the chest was obtained at 1442 hours. New right IJ central venous catheter with the tip in the distal SVC. No pneumothorax. Endotracheal tube is stable. Nasogastric tube is coiled in the stomach. . The lungs are clear. The cardiac and mediastinal contours and pulmonary vascularity are normal.  The bones and soft tissues are grossly within normal limits. Status post right IJ central venous catheter placement without evidence of pneumothorax. XR CHEST PORT    Result Date: 9/27/2022  INDICATION:  repositioned ETT EXAM: Chest single view. COMPARISON: 1151 hours. FINDINGS: A single frontal view of the chest at 1231 hours shows ET tube now appearing with its tip 2.8 cm above the lópez. Lung volumes are moderate with bibasilar atelectasis stable, left greater than right. .  The heart, mediastinum and pulmonary vasculature are upper normal. .  The bony thorax is unremarkable for age.  . A radiopaque tube projecting over the mediastinum on the prior study has been removed. ET tube as described. Gastric tube removed. Bibasilar atelectasis and low lung volumes. .  . XR CHEST PORT    Result Date: 9/27/2022  EXAM: XR CHEST PORT INDICATION: Intubation COMPARISON: None. FINDINGS: A portable AP radiograph of the chest was obtained at 1151 hours. The patient is on a cardiac monitor. The lungs are clear. The cardiac and mediastinal contours and pulmonary vascularity are normal. The endotracheal tube terminates at the thoracic inlet. The NG tube tip is at the level of the midesophagus. The bones and soft tissues are grossly within normal limits. Stomach is distended. Borderline high position of endotracheal tube High position of the NG tube. Distended stomach. XR ABD PORT  1 V    Result Date: 10/12/2022  INDICATION: vomiting EXAMINATION:  ABDOMEN KUB COMPARISON: CT September 27, 2022 FINDINGS: AP radiograph of the abdomen demonstrates a nonobstructive bowel gas pattern. Gaseous distention of the stomach. Moderate amount of colonic stool. No abnormal calcifications are identified. The osseous structures are normal.     Moderate gaseous distention of the stomach and moderate amount of colonic stool, with no evidence of bowel obstruction. IR INSERT NON TUNL CVC OVER 5 YRS    Result Date: 9/28/2022  Clinical Data: A 59year-old patient presents for double-lumen non-tunneled hemodialysis catheter placement at bedside. Date: 9/27/2022 : Susana Bernstein M.D. Estimated Blood Loss: Minimal. Specimens Removed: None. Complications: None. Technique: Informed verbal and written consent was obtained following discussion of risks, benefits and alternatives to this procedure. The patient was positioned supine on the bed. The patient's right neck and chest were then prepped and draped in normal sterile fashion. A timeout was performed to ensure proper patient, procedure and site. Ultrasound was used to image the right neck.  The right IJ was shown to be patent. A small amount of 1% lidocaine was injected subcutaneously at the site of vascular access. Using real-time ultrasound guidance, the needle was advanced into the vessel. A hard copy image was stored on PACS for documentation. A wire was advanced into the needle, a short incision was made at the puncture site, and serial dilatation was performed. A 14 Maltese 20 cm non-tunneled hemodialysis catheter was advanced into the central veins. The catheter flushed and aspirated appropriately. The lumens were blocked with heparin. The catheter was secured at the exit site with silk suture and a sterile dressing. The patient tolerated the procedure well. There were no immediate complications. Post procedure chest x-ray shows the catheter is positioned in the right atrium. Successful placement of a double-lumen non-tunneled hemodialysis catheter. XR US GUIDED VASCULAR ACCESS    Result Date: 10/7/2022  INDICATION:   NO VENOUS ACCESS  EXAMINATION:  US GUIDE VAS ACCESS FINDINGS: Ultrasound was provided for PICC line placement. Ultrasound guidance for PICC line  placement. GBP    CT CODE NEURO HEAD WO CONTRAST    Result Date: 9/27/2022  EXAM: CT CODE NEURO HEAD WO CONTRAST INDICATION: Left gaze COMPARISON: None. CONTRAST: None. TECHNIQUE: Unenhanced CT of the head was performed using 5 mm images. Brain and bone windows were generated. Coronal and sagittal reformats. CT dose reduction was achieved through use of a standardized protocol tailored for this examination and automatic exposure control for dose modulation. FINDINGS: The ventricles and sulci are normal in size, shape and configuration. . There is no significant white matter disease. There is no intracranial hemorrhage, extra-axial collection, or mass effect. The basilar cisterns are open. No CT evidence of acute infarct. The bone windows demonstrate no abnormalities. The visualized portions of the paranasal sinuses and mastoid air cells are clear.      No acute intracranial process seen     ECHO ADULT COMPLETE    Result Date: 9/27/2022    Left Ventricle: Severely reduced left ventricular systolic function with a visually estimated EF of 25 - 30%. Left ventricle size is normal. Mildly increased wall thickness. See diagram for wall motion findings. Abnormal diastolic function. Right Ventricle: Moderately reduced systolic function. ECHO ADULT FOLLOW-UP OR LIMITED    Result Date: 10/11/2022    Left Ventricle: Normal left ventricular systolic function with a visually estimated EF of 55 - 60%. Left ventricle size is normal. Mildly increased wall thickness. Unable to assess wall motion. Right Ventricle: Not well visualized. Right ventricle size is normal. Normal wall thickness. 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    MENTAL STATUS EXAM:    General appearance:  moderately  groomed, psychomotor activity is   Eye contact: good eye contact  Speech: soft  Affect : Depressed, decreased range  Mood: \"great \"  Thought Process: confused  Perception: Denies AH or VH. Thought Content: denies SI or Plan  Insight: Poor  Judgement: poor  Cognition: Intact grossly. ASSESSMENT AND PLAN:  Christine Son meets criteria for a diagnosis of depression unspecified, anxiety disorder. Not sure if patient had an intentional or unintentional overdose. She is still confused and not able to provide any reliable history. Would recommend to re consult when she is less confused and lucid. Thank your your consult. Please feel free to consult us again as needed.
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
## 2022-10-12 NOTE — PROGRESS NOTES
Problem: Delirium Treatment  Goal: *Level of consciousness restored to baseline  Outcome: Progressing Towards Goal  Goal: *Level of environmental perceptions restored to baseline  Outcome: Progressing Towards Goal  Goal: *Sensory perception restored to baseline  Outcome: Progressing Towards Goal  Goal: *Emotional stability restored to baseline  Outcome: Progressing Towards Goal  Goal: *Functional assessment restored to baseline  Outcome: Progressing Towards Goal  Goal: *Absence of falls  Outcome: Progressing Towards Goal  Goal: *Will remain free of delirium, CAM Score negative  Outcome: Progressing Towards Goal  Goal: *Cognitive status will be restored to baseline  Outcome: Progressing Towards Goal  Goal: Interventions  Outcome: Progressing Towards Goal     Problem: Falls - Risk of  Goal: *Absence of Falls  Description: Document Chyna Fall Risk and appropriate interventions in the flowsheet.   Outcome: Progressing Towards Goal  Note: Fall Risk Interventions:       Mentation Interventions: Bed/chair exit alarm, Adequate sleep, hydration, pain control, Door open when patient unattended, Family/sitter at bedside, More frequent rounding, Reorient patient, Room close to nurse's station    Medication Interventions: Assess postural VS orthostatic hypotension, Bed/chair exit alarm    Elimination Interventions: Bed/chair exit alarm, Call light in reach    History of Falls Interventions: Bed/chair exit alarm, Room close to nurse's station         Problem: Patient Education: Go to Patient Education Activity  Goal: Patient/Family Education  Outcome: Progressing Towards Goal

## 2022-10-12 NOTE — PROGRESS NOTES
Problem: Self Care Deficits Care Plan (Adult)  Goal: *Acute Goals and Plan of Care (Insert Text)  Description: FUNCTIONAL STATUS PRIOR TO ADMISSION: Patient was independent and active without use of DME.     HOME SUPPORT: Patient lived alone     Occupational Therapy Goals  Initiated 10/11/2022  1. Patient will perform lower body dressing with minimal assistance/contact guard assist within 7 day(s). 2.  Patient will perform grooming tasks, seated EOB,  with supervision/set-up within 7 day(s). 3.  Patient will perform toilet transfers with minimal assistance/contact guard assist within 7 day(s). 4.  Patient will perform all aspects of toileting with moderate assistance  within 7 day(s). 5.  Patient will participate in upper extremity therapeutic exercise/ coordination activities with supervision/set-up for 10 minutes within 7 day(s). Outcome: Progressing Towards Goal   OCCUPATIONAL THERAPY TREATMENT  Patient: Sudha Morris (69 y.o. female)  Date: 10/12/2022  Diagnosis: Status epilepticus ([facility] [address] [G40.901] Status epilepticus ([facility] [address]      Precautions:  fall  Chart, occupational therapy assessment, plan of care, and goals were reviewed. ASSESSMENT  Patient continues with skilled OT services and is progressing towards goals. Ms. Jennifer Thomas was received in bed agreeable to activity. Patient was alert and oriented to person only. She remains limited cognitively; Specifically presents with poor insight, impaired sequencing, delayed processing with slowed verbal and motor responses, impaired initiation, and inability to multi task. Patient required max A to don socks from long sitting position in bed; She was able to achieve modified position but unable to sustain reach to distal LE to contribute to task. Patient performed bed mobility with x2 person assist and cues for improved technique; She did initiate movement towards EOB but motor planning otherwise impaired.   Seated activity focused on midline orientation, facilitated B UE WB through propped sitting, and improving attention and command following. Patient performed x2 sit to stand transfers in preparation for ADL transfers and returned to bed. Bed placed in modified chair position to encourage upright posture and she tolerated position well. Reviewed UE exercises and patient demonstrated good understanding; She was agreeable to complete frequently as tolerated. Patient would benefit from continued skilled OT to progress towards goals and improve overall independence. Current Level of Function Impacting Discharge (ADLs): Patient required min to mod Ax2 for bed mobility. Patient required max A for LB dressing. PLAN :  Patient continues to benefit from skilled intervention to address the above impairments. Continue treatment per established plan of care to address goals. Recommend with staff:   Recommend patient be placed in modified chair position frequently throughout the day. For toileting needs, recommend bed level at this time. Encourage patient involvement in personal care as able. Encourage exercises frequently throughout the day. Recommend next OT session: Continue towards set OT goals. Recommendation for discharge: (in order for the patient to meet his/her long term goals)  To be determined: therapy 5 days/week in rehab settting    This discharge recommendation:  Has been made in collaboration with the attending provider and/or case management    IF patient discharges home will need the following DME: none       SUBJECTIVE:   Patient stated I feel stronger.     OBJECTIVE DATA SUMMARY:   Cognitive/Behavioral Status:  Neurologic State: Alert  Orientation Level: Oriented to person;Disoriented to situation;Disoriented to time;Disoriented to place  Cognition: Decreased command following;Decreased attention/concentration; Impaired decision making;Poor safety awareness  Perception: Cues to maintain midline in sitting;Cues to maintain midline in standing  Perseveration: No perseveration noted  Safety/Judgement: Decreased awareness of environment;Decreased awareness of need for safety    Functional Mobility and Transfers for ADLs:  Bed Mobility:  Supine to Sit: Moderate assistance;Assist x2; Additional time  Sit to Supine: Moderate assistance;Assist x2  Scooting: Maximum assistance    Transfers:  Sit to Stand: Minimum assistance;Assist x2; Additional time (delayed hip/knee extension)    Balance:  Sitting: Impaired  Sitting - Static: Fair (occasional)  Sitting - Dynamic: Fair (occasional)  Standing: Impaired  Standing - Static: Poor  Standing - Dynamic : Poor    ADL Intervention:  Lower Body Dressing Assistance  Dressing Assistance: Maximum assistance  Socks: Maximum assistance  Leg Crossed Method Used: Yes  Position Performed: Long sitting on bed  Cues: Verbal cues provided     Cognitive Retraining  Safety/Judgement: Decreased awareness of environment;Decreased awareness of need for safety    Activity Tolerance:   Good    After treatment patient left in no apparent distress:   Supine in bed, Call bell within reach, and Bed / chair alarm activated    COMMUNICATION/COLLABORATION:   The patients plan of care was discussed with: Physical therapist, Registered nurse, and patient .      Dalton Shafer OTR/L  Time Calculation: 28 mins

## 2022-10-12 NOTE — PROGRESS NOTES
Problem: Mobility Impaired (Adult and Pediatric)  Goal: *Acute Goals and Plan of Care (Insert Text)  Description: FUNCTIONAL STATUS PRIOR TO ADMISSION: Patient was independent and active without use of DME.    HOME SUPPORT PRIOR TO ADMISSION: The patient lived with family but did not require assist.    Physical Therapy Goals  Initiated 10/11/2022  1. Patient will move from supine to sit and sit to supine , scoot up and down, and roll side to side in bed with independence within 7 day(s). 2.  Patient will transfer from bed to chair and chair to bed with modified independence using the least restrictive device within 7 day(s). 3.  Patient will perform sit to stand with modified independence within 7 day(s). 4.  Patient will ambulate with modified independence for 200 feet with the least restrictive device within 7 day(s). 5.  Patient will ascend/descend 4 stairs with  handrail(s) with modified independence within 7 day(s). Outcome: Progressing Towards Goal   PHYSICAL THERAPY TREATMENT  Patient: Edilma Feliz (69 y.o. female)  Date: 10/12/2022  Diagnosis: Status epilepticus ([facility] [address] [G40.901] Status epilepticus ([address]      Precautions:    Chart, physical therapy assessment, plan of care and goals were reviewed. ASSESSMENT  Patient continues with skilled PT services and is progressing slowly towards goals. Patient alert and agreeable to activity this date. Limited by impaired initiation and sequencing of functional tasks. She was able to initiate attempt to reposition socks in supine but unsuccessful and required assist to complete. Also initiated supine to sit but demonstrated increasing difficulty after ~75% d/t poor initial sitting balance and difficulty scooting to EOB. Maximum assist ultimately required to scoot EOB and  to maintain initial sitting balance. Completed a standing trial x2 requiring minimal assist x2 and increased time d/t delayed postural extension.  She was able to maintain static stance with bilateral HHA and min assist x2. Unable to initiate side stepping to [facility] despite maximum multimodal cues and visual demonstration  between trials. Sitting balance improved markedly following standing and attribute this to repositioning on EOB. Able to side step ~1' with maximum assist x2 for weight shifting and to advance each LE manually to [facility]. HR increased from ~105 in supine to max of 136 BPM observed in standing. BP remained stable. The patient was independent and active prior to admission now far below her baseline. Recommend discharge to a rehab setting when medically ready. Current Level of Function Impacting Discharge (mobility/balance): moderate x2 supine to sit, maximum assist x2 to side step; poor initiation             PLAN :  Patient continues to benefit from skilled intervention to address the above impairments. Continue treatment per established plan of care. to address goals. Recommendation for discharge: (in order for the patient to meet his/her long term goals)  Rehab    This discharge recommendation:  Has been made in collaboration with the attending provider and/or case management    IF patient discharges home will need the following DME: to be determined (TBD)       SUBJECTIVE:   Patient stated Ok.     OBJECTIVE DATA SUMMARY:   Critical Behavior:  Neurologic State:  (still mumbling. Able to answer  few ?'s about wants and needs)  Orientation Level: Oriented to person, Disoriented to situation, Disoriented to time, Disoriented to place  Cognition:  (memory loss)  Safety/Judgement: Decreased insight into deficits  Functional Mobility Training:  Bed Mobility:     Supine to Sit: Moderate assistance;Assist x2; Additional time  Sit to Supine: Moderate assistance;Assist x2  Scooting: Maximum assistance        Transfers:  Sit to Stand: Minimum assistance;Assist x2; Additional time (delayed hip/knee extension)  Stand to Sit: Minimum assistance;Assist x2 Balance:  Sitting: Impaired;High guard  Sitting - Static: Fair (occasional)  Sitting - Dynamic: Fair (occasional)  Standing: Impaired; With support  Standing - Static: Constant support  Ambulation/Gait Training:  Distance (ft): 1 Feet (ft)  Assistive Device:  (B HHA)  Ambulation - Level of Assistance: Assist x2; Moderate assistance        Gait Abnormalities: Decreased step clearance        Base of Support: Narrowed     Speed/Lurdes: Slow             Therapeutic Exercises:     Pain Rating:      Activity Tolerance:   Fair    After treatment patient left in no apparent distress:   Supine in bed and Call bell within reach    COMMUNICATION/COLLABORATION:   The patients plan of care was discussed with: Registered nurse.      Betzy Mcdaniel, PT, DPT   Time Calculation: 26 mins

## 2022-10-12 NOTE — PROGRESS NOTES
Problem: Dysphagia (Adult)  Goal: *Acute Goals and Plan of Care (Insert Text)  Description: Swallowing goals initiated 10-12-22:  1) tolerate clears without s/s aspiration or vomiting by 10-13-22  2) re-eval for solids once goals #1 met  Outcome: Progressing Towards Goal   SPEECH LANGUAGE PATHOLOGY DYSPHAGIA TREATMENT  Patient: Megan Perkins (1 y.o. female)  Date: 10/12/2022  Diagnosis: Status epilepticus ([address] [G40.901] Status epilepticus ([address]      Precautions: aspiration      ASSESSMENT:  Patient with no documented vomiting overnight. Swallow again appeared functional for clear liquids this am with no immediate vomiting. Vocal volume still low. Answers a few questions accurately, but poor insight into deficits. PLAN:  Recommendations and Planned Interventions:  Start clear liquid diet. SLP will re-eval for solids. Patient continues to benefit from skilled intervention to address the above impairments. Continue treatment per established plan of care. Discharge Recommendations:  TBD-will most likely need full integrated language evaluation at rehab     SUBJECTIVE:   Patient stated water.-when asked what she liked to drink at home. Mom said she drinks mostly diet coke and asked for a latte this morning. OBJECTIVE:   Cognitive and Communication Status:  Neurologic State:  (still mumbling. Able to answer  few ?'s about wants and needs)  Orientation Level: Oriented to person, Disoriented to situation, Disoriented to time, Disoriented to place  Cognition:  (memory loss)  Perception: Appears intact  Perseveration: No perseveration noted  Safety/Judgement: Decreased insight into deficits  Dysphagia Treatment:  Oral Assessment:     P.O. Trials:  Patient Position: upright in bed  Vocal quality prior to P.O.: Low volume  Consistency Presented:  Thin liquid  How Presented: SLP-fed/presented;Straw;Successive swallows (helped her hold cup due to weak UEs)   ORAL PHASE:   Bolus Acceptance: No impairment  Bolus Formation/Control: No impairment     Propulsion: No impairment  Oral Residue: None  PHARYNGEAL PHASE:   Initiation of Swallow: No impairment  Laryngeal Elevation: Functional  Aspiration Signs/Symptoms: None         SPEECH : low volume, still affecting speech intelligibility             Exercises:  Laryngeal Exercises:                                                                                                                                   Pain:  Pain Scale 1: Numeric (0 - 10)  Pain Intensity 1: 0       After treatment:   Patient left in no apparent distress in bed and Nursing notified    COMMUNICATION/EDUCATION:   Patient was educated regarding her deficit(s) of potential dysphagia  as this relates to her diagnosis of extended intubation. She demonstrated Guarded understanding as evidenced by lack of insight. The patient's plan of care including recommendations, planned interventions, and recommended diet changes were discussed with: Registered nurse and Physician.      Rosenda Valles SLP  Time Calculation: 10 mins

## 2022-10-12 NOTE — BSMART NOTE
Initial BSMART Liaison Assessment Form     Section I - Integrated Summary    Chief Complaint is possible SI and OD. LOS:  15     Presenting problem/Summary:  Pt came to ED 9/27/22  via EMS after being found unresponsive at home. Pt admitted medically and IP Psychiatric consulted for possible SI. Pt was seen virtually by MSW and Psychiatric NP. Mother was at bedside per Pt's consent. Pt was lethargic, disoriented, calm and cooperative. She reported feeling \"great\" and denied any anxiety, depression, SI, HI or AVH. Her speech was soft and difficult to understand. 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 She demonstrated memory impairment and could not recall events leading to her hospitalization and thinks she is at the hospital because of her dog. When MSW asked about fentanyl pain patch Pt reported she \"assumed\" it was prescribed to her but denied any pain and \"want to know why I had the patch on\" as she does not remember dealing with any physical pain recently. Pt's mother reported she is in charge of the cardiac unit as [facility] and has been under significant work stress and financial stress. Precipitant Factors are work, home, finances. The information is given by the patient, parent, and past medical records. Current Psychiatrist and/or  is unknown. Previous Hospitalizations/Treatment: no    Lethality Assessment:  The potential for suicide noted by the following: not noted - however ongoing assessment is needed as Pt becomes more lucid. The potential for homicide is not noted. The patient has not been a perpetrator of sexual or physical abuse. There are not pending charges. The patient is felt to be at risk for self-harm or harm to others.       Section II - Psychosocial  The patient's overall mood and attitude is calm but confused. Feelings of helplessness and hopelessness are not observed. Generalized anxiety is not observed. Panic is not observed. Phobias are not observed. Obsessive compulsive tendencies are not observed. Section III - Mental Status Exam  The patient's appearance is unkempt. The patient's behavior shows retardation and shows poor eye contact. The patient is only aware of  person. The patient's speech is slowed and is soft. The patient's mood calm. The range of affect is constricted. The patient's thought content demonstrates delusions. The thought process is blocking. The patient's perception shows no evidence of impairment. The patient's memory is impaired. The patient's appetite shows no evidence of impairment. The patient's sleep has evidence of insomnia. The patient shows no insight. The patient's judgement is psychologically impaired. Section IV - Substance Abuse  The patient is using substances. The patient is using alcohol \"a few times a month\" The patient has experienced the following withdrawal symptoms: N/A. UDS result: + for benzo BAL: 0    Section V - Medical  Past Medical History:   Diagnosis Date    History of vascular access device 10/07/2022    [facility] VAT: PICC placement R Cephalic length 40 cm for reliable access/TPN arm circ 35.5 cm       Section VI - Living Arrangements  The patient is . The patient lives alone. The patient has 3 adult son. The patient does plan to return home upon discharge. The patient's source of income comes from employment. The patient's greatest support comes from family and this person will be involved with the treatment. The patient has not been in an event described as horrible or outside the realm of ordinary life experience either currently or in the past. The patient has not been a victim of sexual/physical abuse.     Section VII - Other Areas of Clinical Concern    The highest grade achieved is college with the overall quality of school experience being described as NA. The patient is currently employed and speaks [place] as a primary language. The patient has no communication impairments affecting communication. The patient's preference for learning can be described as: can read and write adequately.   The patient's hearing is normal.  The patient's vision is normal.      Shawnee Pagan

## 2022-10-12 NOTE — PROGRESS NOTES
0700- Bedside and Verbal shift change report received from Loc Das [facility] (offgoing nurse) by Leonardo Vazquez RN (oncoming nurse). Report included the following information SBAR, Kardex, ED Summary, Procedure Summary, Intake/Output, Med Rec Status, and Cardiac Rhythm SR,ST . Primary Nurse Leonardo Vazquez RN and Loc Das RN performed a dual skin assessment on this patient Impairment noted- see wound doc flow sheet. 0800- Patient shift assessment performed. VAP bundle performed. Patient turned and resting comfortably. Patient educated on call bell and patient safety measures. Call bell in reach, bed in low and locked position, and reminded patient to use call bell. Patient board updated and care plan discussed with patient . 0830:held PO medication and pt and his mother verbalized understanding . 0930:send labs per MD order . 1000: VAP bundle performed. Patient turned and resting  in bed. 1030:Pt not voided yet since last bladder scan . Bladder scan done . 322 ml . Per pt she not getting the urge to void . Denies any distress Dr Brandy Bragg notified . per him check bladder scan in 8 hours and  place montilla if grater than 500 ml. Pt notified with the plan . 1200- Reassessment performed. No changes noted. VAP bundle performed. Patient turned and resting comfortably. Pt worked with PT/OT . 1400- VAP bundle performed. Patient turned and resting in bed     1523:TRANSFER - OUT REPORT:    Verbal report given to  George Rhodes (name) on [facility]  being transferred to [facility] (unit) for routine progression of care       Report consisted of patients Situation, Background, Assessment and   Recommendations(SBAR). Information from the following report(s) SBAR, Kardex, ED Summary, Procedure Summary, Intake/Output, MAR, Recent Results, Med Rec Status, and Cardiac Rhythm SR,ST  was reviewed with the receiving nurse.     Lines:   PICC Triple Lumen 88/34/11 Right;Cephalic (Active)   Central Line Being Utilized Yes 10/12/22 1200   Criteria for Appropriate Use Hemodynamically unstable, requiring monitoring lines, vasopressors, or volume resuscitation 10/12/22 0800   Site Assessment Clean, dry, & intact 10/12/22 0800   Phlebitis Assessment 0 10/12/22 0800   Infiltration Assessment 0 10/12/22 0800   Arm Circumference (cm) 35.5 cm 10/07/22 1100   Date of Last Dressing Change 10/07/22 10/12/22 0800   Dressing Status Clean, dry, & intact 10/12/22 1200   External Catheter Length (cm) 0 centimeters 10/12/22 0400   Dressing Type Disk with Chlorhexadine gluconate (CHG) 10/12/22 0800   Action Taken Open ports on tubing capped 10/12/22 0800   Hub Color/Line Status Red;Capped;Flushed;Patent 10/12/22 0800   Positive Blood Return (Site #1) Yes 10/12/22 0800   Hub Color/Line Status Gray;Cap end changed; Flushed;Patent 10/12/22 0800   Positive Blood Return (Site #2) Yes 10/12/22 0800   Hub Color/Line Status White;Flushed;Patent;Cap end changed 10/12/22 0800   Positive Blood Return (Site #3) Yes 10/12/22 0800   Alcohol Cap Used Yes 10/12/22 0800        Opportunity for questions and clarification was provided.       Patient transported with:   Monitor  O2 @ RA liters  Registered Nurse

## 2022-10-13 ENCOUNTER — APPOINTMENT (OUTPATIENT)
Dept: CT IMAGING | Age: 57
DRG: 064 | End: 2022-10-13
Attending: INTERNAL MEDICINE
Payer: COMMERCIAL

## 2022-10-13 LAB
ANION GAP SERPL CALC-SCNC: 5 MMOL/L (ref 5–15)
BUN SERPL-MCNC: 20 MG/DL (ref 6–20)
BUN/CREAT SERPL: 29 (ref 12–20)
CALCIUM SERPL-MCNC: 9 MG/DL (ref 8.5–10.1)
CHLORIDE SERPL-SCNC: 110 MMOL/L (ref 97–108)
CO2 SERPL-SCNC: 29 MMOL/L (ref 21–32)
CREAT SERPL-MCNC: 0.69 MG/DL (ref 0.55–1.02)
ERYTHROCYTE [DISTWIDTH] IN BLOOD BY AUTOMATED COUNT: 14.6 % (ref 11.5–14.5)
GLUCOSE SERPL-MCNC: 104 MG/DL (ref 65–100)
HCT VFR BLD AUTO: 33.7 % (ref 35–47)
HGB BLD-MCNC: 10.8 G/DL (ref 11.5–16)
MAGNESIUM SERPL-MCNC: 2.4 MG/DL (ref 1.6–2.4)
MCH RBC QN AUTO: 29.7 PG (ref 26–34)
MCHC RBC AUTO-ENTMCNC: 32 G/DL (ref 30–36.5)
MCV RBC AUTO: 92.6 FL (ref 80–99)
NRBC # BLD: 0 K/UL (ref 0–0.01)
NRBC BLD-RTO: 0 PER 100 WBC
PHOSPHATE SERPL-MCNC: 3 MG/DL (ref 2.6–4.7)
PLATELET # BLD AUTO: 457 K/UL (ref 150–400)
PMV BLD AUTO: 10.3 FL (ref 8.9–12.9)
POTASSIUM SERPL-SCNC: 3.1 MMOL/L (ref 3.5–5.1)
RBC # BLD AUTO: 3.64 M/UL (ref 3.8–5.2)
SODIUM SERPL-SCNC: 144 MMOL/L (ref 136–145)
WBC # BLD AUTO: 11 K/UL (ref 3.6–11)

## 2022-10-13 PROCEDURE — 80048 BASIC METABOLIC PNL TOTAL CA: CPT

## 2022-10-13 PROCEDURE — 92610 EVALUATE SWALLOWING FUNCTION: CPT

## 2022-10-13 PROCEDURE — 74011000250 HC RX REV CODE- 250: Performed by: INTERNAL MEDICINE

## 2022-10-13 PROCEDURE — 65270000029 HC RM PRIVATE

## 2022-10-13 PROCEDURE — 74011250637 HC RX REV CODE- 250/637: Performed by: INTERNAL MEDICINE

## 2022-10-13 PROCEDURE — 97535 SELF CARE MNGMENT TRAINING: CPT

## 2022-10-13 PROCEDURE — 36415 COLL VENOUS BLD VENIPUNCTURE: CPT

## 2022-10-13 PROCEDURE — 74011250637 HC RX REV CODE- 250/637: Performed by: PSYCHIATRY & NEUROLOGY

## 2022-10-13 PROCEDURE — 74011250637 HC RX REV CODE- 250/637: Performed by: NURSE PRACTITIONER

## 2022-10-13 PROCEDURE — 94761 N-INVAS EAR/PLS OXIMETRY MLT: CPT

## 2022-10-13 PROCEDURE — 97530 THERAPEUTIC ACTIVITIES: CPT

## 2022-10-13 PROCEDURE — 84100 ASSAY OF PHOSPHORUS: CPT

## 2022-10-13 PROCEDURE — 74011250637 HC RX REV CODE- 250/637: Performed by: STUDENT IN AN ORGANIZED HEALTH CARE EDUCATION/TRAINING PROGRAM

## 2022-10-13 PROCEDURE — 74011250636 HC RX REV CODE- 250/636: Performed by: NURSE PRACTITIONER

## 2022-10-13 PROCEDURE — 70450 CT HEAD/BRAIN W/O DYE: CPT

## 2022-10-13 PROCEDURE — 74011000250 HC RX REV CODE- 250: Performed by: NURSE PRACTITIONER

## 2022-10-13 PROCEDURE — 97116 GAIT TRAINING THERAPY: CPT

## 2022-10-13 PROCEDURE — 85027 COMPLETE CBC AUTOMATED: CPT

## 2022-10-13 PROCEDURE — 83735 ASSAY OF MAGNESIUM: CPT

## 2022-10-13 RX ORDER — QUETIAPINE FUMARATE 25 MG/1
25 TABLET, FILM COATED ORAL 2 TIMES DAILY
Status: DISCONTINUED | OUTPATIENT
Start: 2022-10-13 | End: 2022-10-14

## 2022-10-13 RX ORDER — MIDODRINE HYDROCHLORIDE 5 MG/1
5 TABLET ORAL EVERY 8 HOURS
Status: DISCONTINUED | OUTPATIENT
Start: 2022-10-13 | End: 2022-10-14

## 2022-10-13 RX ORDER — LEVOTHYROXINE SODIUM 88 UG/1
88 TABLET ORAL DAILY
Status: DISCONTINUED | OUTPATIENT
Start: 2022-10-14 | End: 2022-10-20 | Stop reason: HOSPADM

## 2022-10-13 RX ORDER — METOPROLOL TARTRATE 25 MG/1
12.5 TABLET, FILM COATED ORAL EVERY 12 HOURS
Status: DISCONTINUED | OUTPATIENT
Start: 2022-10-13 | End: 2022-10-17

## 2022-10-13 RX ADMIN — ENOXAPARIN SODIUM 30 MG: 100 INJECTION SUBCUTANEOUS at 11:05

## 2022-10-13 RX ADMIN — POTASSIUM BICARBONATE 20 MEQ: 391 TABLET, EFFERVESCENT ORAL at 18:29

## 2022-10-13 RX ADMIN — ASPIRIN 81 MG: 81 TABLET, CHEWABLE ORAL at 11:04

## 2022-10-13 RX ADMIN — CHLORHEXIDINE GLUCONATE 15 ML: 1.2 RINSE ORAL at 11:08

## 2022-10-13 RX ADMIN — QUETIAPINE FUMARATE 25 MG: 25 TABLET ORAL at 18:29

## 2022-10-13 RX ADMIN — POTASSIUM BICARBONATE 20 MEQ: 391 TABLET, EFFERVESCENT ORAL at 11:06

## 2022-10-13 RX ADMIN — MIDODRINE HYDROCHLORIDE 5 MG: 5 TABLET ORAL at 21:57

## 2022-10-13 RX ADMIN — SENNOSIDES AND DOCUSATE SODIUM 1 TABLET: 50; 8.6 TABLET ORAL at 11:04

## 2022-10-13 RX ADMIN — QUETIAPINE FUMARATE 25 MG: 25 TABLET ORAL at 11:09

## 2022-10-13 RX ADMIN — LEVOTHYROXINE SODIUM 88 MCG: 0.09 TABLET ORAL at 11:09

## 2022-10-13 RX ADMIN — Medication 10 ML: at 22:00

## 2022-10-13 RX ADMIN — METOPROLOL TARTRATE 1.25 MG: 5 INJECTION INTRAVENOUS at 11:06

## 2022-10-13 RX ADMIN — METOPROLOL TARTRATE 12.5 MG: 25 TABLET, FILM COATED ORAL at 21:57

## 2022-10-13 RX ADMIN — LANSOPRAZOLE 30 MG: KIT at 13:15

## 2022-10-13 RX ADMIN — Medication 10 ML: at 13:18

## 2022-10-13 RX ADMIN — ENOXAPARIN SODIUM 30 MG: 100 INJECTION SUBCUTANEOUS at 21:59

## 2022-10-13 RX ADMIN — ATORVASTATIN CALCIUM 40 MG: 20 TABLET, FILM COATED ORAL at 21:57

## 2022-10-13 NOTE — PROGRESS NOTES
Problem: Ventilator Management  Goal: *Adequate oxygenation and ventilation  Outcome: Progressing Towards Goal  Goal: *Patient maintains clear airway/free of aspiration  Outcome: Progressing Towards Goal  Goal: *Absence of infection signs and symptoms  Outcome: Progressing Towards Goal  Goal: *Normal spontaneous ventilation  Outcome: Progressing Towards Goal     Problem: Patient Education: Go to Patient Education Activity  Goal: Patient/Family Education  Outcome: Progressing Towards Goal     Problem: Delirium Treatment  Goal: *Level of consciousness restored to baseline  Outcome: Progressing Towards Goal  Goal: *Level of environmental perceptions restored to baseline  Outcome: Progressing Towards Goal  Goal: *Sensory perception restored to baseline  Outcome: Progressing Towards Goal  Goal: *Emotional stability restored to baseline  Outcome: Progressing Towards Goal  Goal: *Functional assessment restored to baseline  Outcome: Progressing Towards Goal  Goal: *Absence of falls  Outcome: Progressing Towards Goal  Goal: *Will remain free of delirium, CAM Score negative  Outcome: Progressing Towards Goal  Goal: *Cognitive status will be restored to baseline  Outcome: Progressing Towards Goal  Goal: Interventions  Outcome: Progressing Towards Goal     Problem: Patient Education: Go to Patient Education Activity  Goal: Patient/Family Education  Outcome: Progressing Towards Goal     Problem: Falls - Risk of  Goal: *Absence of Falls  Description: Document Clearence Skates Fall Risk and appropriate interventions in the flowsheet.   Outcome: Progressing Towards Goal  Note: Fall Risk Interventions:       Mentation Interventions: Adequate sleep, hydration, pain control, Bed/chair exit alarm, Evaluate medications/consider consulting pharmacy    Medication Interventions: Bed/chair exit alarm, Patient to call before getting OOB    Elimination Interventions: Bed/chair exit alarm, Call light in reach, Stay With Me (per policy)    History of Falls Interventions: Room close to nurse's station, Bed/chair exit alarm, Consult care management for discharge planning         Problem: Patient Education: Go to Patient Education Activity  Goal: Patient/Family Education  Outcome: Progressing Towards Goal     Problem: Pressure Injury - Risk of  Goal: *Prevention of pressure injury  Description: Document Jose Enrique Scale and appropriate interventions in the flowsheet.   Outcome: Progressing Towards Goal  Note: Pressure Injury Interventions:  Sensory Interventions: Assess changes in LOC, Assess need for specialty bed, Discuss PT/OT consult with provider, Keep linens dry and wrinkle-free, Maintain/enhance activity level    Moisture Interventions: Absorbent underpads, Check for incontinence Q2 hours and as needed, Limit adult briefs, Maintain skin hydration (lotion/cream)    Activity Interventions: Assess need for specialty bed, Chair cushion, Pressure redistribution bed/mattress(bed type), PT/OT evaluation    Mobility Interventions: Assess need for specialty bed, HOB 30 degrees or less, PT/OT evaluation, Suspension boots    Nutrition Interventions: Document food/fluid/supplement intake    Friction and Shear Interventions: Apply protective barrier, creams and emollients, Lift team/patient mobility team, Minimize layers, Foam dressings/transparent film/skin sealants                Problem: Patient Education: Go to Patient Education Activity  Goal: Patient/Family Education  Outcome: Progressing Towards Goal     Problem: Nutrition Deficit  Goal: *Optimize nutritional status  Outcome: Progressing Towards Goal     Problem: Aspiration - Risk of  Goal: *Absence of aspiration  Outcome: Progressing Towards Goal     Problem: Patient Education: Go to Patient Education Activity  Goal: Patient/Family Education  Outcome: Progressing Towards Goal     Problem: Patient Education: Go to Patient Education Activity  Goal: Patient/Family Education  Outcome: Progressing Towards Goal     Problem: Patient Education: Go to Patient Education Activity  Goal: Patient/Family Education  Outcome: Progressing Towards Goal     Problem: Patient Education: Go to Patient Education Activity  Goal: Patient/Family Education  Outcome: Progressing Towards Goal

## 2022-10-13 NOTE — PROGRESS NOTES
Informed that 15min. Checks on pt. Were not necessary - not on suicidal protocol precautions per manager.

## 2022-10-13 NOTE — PROGRESS NOTES
Problem: Mobility Impaired (Adult and Pediatric)  Goal: *Acute Goals and Plan of Care (Insert Text)  Description: FUNCTIONAL STATUS PRIOR TO ADMISSION: Patient was independent and active without use of DME.    HOME SUPPORT PRIOR TO ADMISSION: The patient lived with family but did not require assist.    Physical Therapy Goals  Initiated 10/11/2022  1. Patient will move from supine to sit and sit to supine , scoot up and down, and roll side to side in bed with independence within 7 day(s). 2.  Patient will transfer from bed to chair and chair to bed with modified independence using the least restrictive device within 7 day(s). 3.  Patient will perform sit to stand with modified independence within 7 day(s). 4.  Patient will ambulate with modified independence for 200 feet with the least restrictive device within 7 day(s). 5.  Patient will ascend/descend 4 stairs with  handrail(s) with modified independence within 7 day(s). Outcome: Progressing Towards Goal   PHYSICAL THERAPY TREATMENT  Patient: Moy Hendrickson (58 y.o. female)  Date: 10/13/2022  Diagnosis: Status epilepticus ([facility] [address] [G40.901] Status epilepticus ([address]      Precautions:    Chart, physical therapy assessment, plan of care and goals were reviewed. ASSESSMENT  Patient continues with skilled PT services and is progressing towards goals. Patient demonstrates significant improvement today in bed mobility independence as well as execution of other mobility tasks. Still exhibits motor processing and initiation delay with difficulty sequencing but improved today. Still unable to coordinate sidesteps at EOB but was able to initiate forward gait and then backward gait with MOD A x 2 to allow transfer into chair. Worked on coordination with using call bell but demos poor  strength as well as decreased processing with finding correct buttons, requested paddle alert bell from RN.   Cont to recommend IPR as patient well below her baseline but making steady progress, more alert and participatory with increased activity tolerance. Current Level of Function Impacting Discharge (mobility/balance): MIN A x 2 bed mob, MOD A x 2 transfers and gait x 3'    Other factors to consider for discharge: well below baseline, fall risk         PLAN :  Patient continues to benefit from skilled intervention to address the above impairments. Continue treatment per established plan of care. to address goals. Recommendation for discharge: (in order for the patient to meet his/her long term goals)  Therapy 3 hours per day 5-7 days per week    This discharge recommendation:  Has been made in collaboration with the attending provider and/or case management    IF patient discharges home will need the following DME: to be determined (TBD)       SUBJECTIVE:   Patient stated I don't know who braided my hair.     OBJECTIVE DATA SUMMARY:   Critical Behavior:  Neurologic State: Alert  Orientation Level: Oriented to person (oriented to year and being in a hospital (stating february and [facility] for hospital))  Cognition: Impaired decision making, Follows commands, Memory loss  Safety/Judgement: Decreased awareness of environment, Decreased awareness of need for safety  Functional Mobility Training:  Bed Mobility:  Rolling: Minimum assistance; Additional time  Supine to Sit: Minimum assistance;Assist x2; Additional time     Scooting: Moderate assistance; Additional time        Transfers:  Sit to Stand: Moderate assistance;Assist x2; Additional time  Stand to Sit: Moderate assistance;Assist x2; Additional time        Bed to Chair: Moderate assistance;Assist x2; Additional time                    Balance:  Sitting: Impaired; Without support  Sitting - Static: Good (unsupported); Fair (occasional)  Sitting - Dynamic: Fair (occasional)  Standing: Impaired; With support  Standing - Static: Constant support; Fair  Standing - Dynamic : Constant support; Fair  Ambulation/Gait Training:  Distance (ft): 3 Feet (ft) (forward and back)  Assistive Device: Gait belt;Walker, rolling  Ambulation - Level of Assistance: Moderate assistance;Assist x2        Gait Abnormalities: Decreased step clearance;Shuffling gait;Trunk sway increased        Base of Support: Center of gravity altered     Speed/Lurdes: Pace decreased (<100 feet/min)  Step Length: Right shortened;Left shortened                    Pain Rating:  None reported    Activity Tolerance:   Fair and requires rest breaks    After treatment patient left in no apparent distress:   Sitting in chair, Call bell within reach, and Bed / chair alarm activated    COMMUNICATION/COLLABORATION:   The patients plan of care was discussed with: Occupational therapist and Registered nurse.      Stefan Dave, PT   Time Calculation: 24 mins

## 2022-10-13 NOTE — PROGRESS NOTES
Spoke to Dr Alma Grover regarding if patient is suicidal or in need of just a sitter. New Order to remove suicide sitter and place PRN sitter at this time.

## 2022-10-13 NOTE — PROGRESS NOTES
[facility] [address]  [address] [place] [address], [address] [place] [address]  [phone]      Hospitalist Progress Note      NAME: Tramaine Dougherty   :  1965  MRM:  [id number]    Date/Time of service: 10/13/2022  10:21 AM       Subjective:     Chief Complaint:  Patient was personally seen and examined by me during this time period. Chart reviewed. Still confused, occasional delusion thoughts. Spoke to parents at bedside        Objective:       Vitals:       Last 24hrs VS reviewed since prior progress note. Most recent are:    Visit Vitals  BP (!) 140/61 (BP 1 Location: Left upper arm, BP Patient Position: At rest)   Pulse 78   Temp 98.7 °F (37.1 °C)   Resp 16   Ht 5' 6\" (1.676 m)   Wt 104.3 kg (230 lb)   SpO2 92%   BMI 37.12 kg/m²     SpO2 Readings from Last 6 Encounters:   10/13/22 92%    O2 Flow Rate (L/min): 2 l/min     Intake/Output Summary (Last 24 hours) at 10/13/2022 1021  Last data filed at 10/13/2022 0747  Gross per 24 hour   Intake 50 ml   Output 1051 ml   Net -1001 ml        Exam:     Physical Exam:    Gen:  Well-developed, well-nourished, obese, in no acute distress  HEENT:  Pink conjunctivae, PERRL, hearing intact to voice, moist mucous membranes  Neck:  Supple, without masses, thyroid non-tender  Resp:  No accessory muscle use, scattered rhochi   Card:  No murmurs, normal S1, S2 without thrills, bruits or peripheral edema  Abd:  Soft, non-tender, non-distended, normoactive bowel sounds are present  Musc:  No cyanosis or clubbing  Skin:  No rashes   Neuro:  Cranial nerves 3-12 are grossly intact, follows commands appropriately  Psych:  poor insight, oriented to person.   No SI/HI     Medications Reviewed: (see below)    Lab Data Reviewed: (see below)    ______________________________________________________________________    Medications:     Current Facility-Administered Medications   Medication Dose Route Frequency    potassium bicarb-citric acid (EFFER-K) tablet 20 mEq  20 mEq Oral BID    midodrine (PROAMATINE) tablet 10 mg  10 mg Oral Q8H    lidocaine (XYLOCAINE) 2 % jelly   Mouth/Throat PRN    prochlorperazine (COMPAZINE) injection 10 mg  10 mg IntraVENous Q6H PRN    metoprolol (LOPRESSOR) injection 1.25 mg  1.25 mg IntraVENous Q6H    alteplase (CATHFLO) 2 mg in sterile water (preservative free) 2 mL injection  2 mg InterCATHeter PRN    QUEtiapine (SEROquel) tablet 25 mg  25 mg Per NG tube BID    levothyroxine (SYNTHROID) tablet 88 mcg  88 mcg Per G Tube DAILY    senna-docusate (PERICOLACE) 8.6-50 mg per tablet 1 Tablet  1 Tablet Oral BID    fentaNYL citrate (PF) injection 100 mcg  100 mcg IntraVENous Q4H PRN    atorvastatin (LIPITOR) tablet 40 mg  40 mg Oral QHS    enoxaparin (LOVENOX) injection 30 mg  30 mg SubCUTAneous Q12H    aspirin chewable tablet 81 mg  81 mg Oral DAILY    lansoprazole compounding kit (PREVACID) 3 mg/mL oral suspension 30 mg  30 mg Oral DAILY    sodium chloride (NS) flush 5-40 mL  5-40 mL IntraVENous Q8H    sodium chloride (NS) flush 5-40 mL  5-40 mL IntraVENous PRN    acetaminophen (TYLENOL) tablet 650 mg  650 mg Oral Q6H PRN    polyethylene glycol (MIRALAX) packet 17 g  17 g Oral DAILY PRN    ondansetron (ZOFRAN) injection 4 mg  4 mg IntraVENous Q6H PRN    chlorhexidine (PERIDEX) 0.12 % mouthwash 15 mL  15 mL Oral Q12H          Lab Review:     Recent Labs     10/13/22  0324 10/12/22  0920 10/11/22  0123   WBC 11.0 11.5* 13.8*   HGB 10.8* 11.0* 10.6*   HCT 33.7* 34.6* 32.9*   * 599* 517*     Recent Labs     10/13/22  0324 10/12/22  0920 10/11/22  0123    146* 144   K 3.1* 4.0 3.9   * 110* 109*   CO2 29 27 28   * 105* 100   BUN 20 20 13   CREA 0.69 0.68 0.59   CA 9.0 9.3 9.0   MG 2.4 2.5* 2.3   PHOS 3.0 3.2  --    ALB  --  3.0*  --    TBILI  --  0.6  --    ALT  --  31  --      Lab Results   Component Value Date/Time    Glucose (POC) 110 10/05/2022 04:55 PM    Glucose (POC) 87 10/05/2022 11:02 AM    Glucose (POC) 78 10/05/2022 05:00 AM Glucose (POC) 94 10/05/2022 04:57 AM    Glucose (POC) 124 (H) 10/04/2022 11:03 PM          Assessment / Plan:     60 yo hx of hypothyroid, depression, presented w/ AMS, resp failure, shock, Takotsubo CM EF 25-30%. Patient was intubated on admission and extubated 10/10. Hospital course complicated by persistent confusion    1) Acute met encephalopathy/delirium: persistent, unclear etiology. Likely new dementia vs underlying psych disorder. Head MRI with small diffusion restriction in right frontal lobe of unclear significant. EEG neg for seizure. No further workup by Neurology. Cont empiric ASA and statin for possible CVA. Cont Seroquel. Will need psych eval    2) Takotsubo CM : initial echo with EF 25-30%, but repeat with EF 55%. Unable to tolerate ACEi due to low BP. Cont metoprolol, midodrine. Cards was following    3) Pneumonia/UTI/septic shock: resp Cx and urine Cx grew E. Coli. S/p course of IV Zosyn    4) Acute resp failure: now resolved. Due to pneumonia, confusion. Was intubated on admission and extubated 10/10    5) Debility: cont PT/OT.   Awaiting Rehab once cleared by psych     Total time spent with patient care: 35 min  **I personally saw and examined the patient during this time period**                 Care Plan discussed with: Patient, nursing, parents     Discussed:  Care Plan    Prophylaxis:  Lovenox    Disposition:  [facility]/Rehab           ___________________________________________________    Attending Physician: Cassandra Cat MD

## 2022-10-13 NOTE — PROGRESS NOTES
10/13/2022  11:17 AM  Care Management Progress Note      ICD-10-CM ICD-9-CM    1. Status epilepticus (HCC)  G40.901 345.3       2. Acute respiratory failure with hypoxia (HCC)  J96.01 518.81       3. Acute renal insufficiency  N28.9 593.9       4. Seizure ([facility] [address] [R56.9 (ICD-10-CM)]  R56.9 780.39       5. Takotsubo cardiomyopathy  I51.81 429.83       6. Other cardiomyopathy ([facility] [address]  I42.8 425.4       7. Dyslipidemia  E78.5 272.4       8. Encephalopathy  G93.40 348.30 EEG      EEG          RUR:  14%  Risk Level: [x]Low []Moderate []High  Value-based purchasing: [] Yes [x] No  Bundle patient: [] Yes [x] No   Specify:     Transition of care plan:  Awaiting medical clearance and DC order. Cardiology following. PT/OT/Speech following. Psych saw pt yesterday, but unable to make a determination if pt is suicidal per note. Attending aware. Pt under suicide precautions until deemed unnecessary. TBD - referrals pending for [facility] and [facility]. Outpatient follow-up. Transport need TBD.

## 2022-10-13 NOTE — PROGRESS NOTES
Bedside and Verbal shift change report given to Pravin Torres (oncoming nurse) by Sharron Butts (offgoing nurse). Report included the following information SBAR, Intake/Output, MAR, and Recent Results.

## 2022-10-13 NOTE — PROGRESS NOTES
Upon shift report pt had been IC x2 during previous shift at 18:30 had a bladder scan over 400 per day shift nurse. Day shift nurse reached out to MD to put in a montilla order, MD did not respond, per protocol no order needed to place montilla. Montilla placed with dayshift RN and nightshift RN.

## 2022-10-13 NOTE — CONSULTS
New Urology Consult Note    Patient: Gisela Garrett MRN: [id number]  SSN: [id number]    YOB: 1965  Age: 59 y.o. Sex: female            Assessment:Plan:     AFVSS  No leukocytosis   Cr 0.69  Good UO    Plan:  AUR  -Multifactorial: sp intubation & sedation, narcotics, immobility, CVA, persistent AMS, UTI (completed tx). -Montilla placed 9/27 after intubation, extubated with catheter removal 10/10. Required straight cath during VT, montilla catheter ultimately replaced 10/12 for 800ml urine. Continue montilla catheter while IP, will arrange formal OP VT with [facility]. Flomax as able    Discussed plan with pt. Attempted to call [facility]. decision maker on file, no answer. Discussed plan with mother who is at bedside. Thank you for this consult. Please contact [facility] with any further questions/concerns. History of Present Illness:     Chief Complaint:  unable to obtain, Jennie Castleman is seen in consultation for reasons noted above at the request of Do, Vickye Hammans, MD.    This is a 59 y.o. female hx of hypothyroid, depression, presented after being found unresponsive for unknown duration of time. She was intubated on admission 9/27 and extubated 10/10. She is being followed by neurology for acute metabolic encephalopathy/delirium: persistent, MRI concerning for CVA. Cardiology following for takotsubo CM and NSTEMI. She was noted to have PNA and UTI, both resp and urine cultures growing E. Coli. She has complete course of Zosyn. CT abd/pelv images personally reviewed from 9/27 without obvious urologic abnormalities found. Urology consulted today for failed VT and urinary retention. Pt with notable confusion after extubation, VT held after extubation 10/10. She required straight cath 10/11 for 500ml urine. Montilla catheter ultimately replaced 10/12 for 800ml urine.      Not a known pt to VU     Subjective     Past Medical History  Past Medical History:   Diagnosis Date History of vascular access device 10/07/2022    [facility] VAT: PICC placement R Cephalic length 40 cm for reliable access/TPN arm circ 35.5 cm       Past Surgical History:   Past Surgical History:   Procedure Laterality Date    IR INSERT NON TUNL CVC OVER 5 YRS  9/27/2022       Medication:  Current Facility-Administered Medications   Medication Dose Route Frequency Provider Last Rate Last Admin    potassium bicarb-citric acid (EFFER-K) tablet 20 mEq  20 mEq Oral BID Clark Duvall MD   20 mEq at 10/13/22 1106    QUEtiapine (SEROquel) tablet 25 mg  25 mg Oral BID Clark Duvall MD        [START ON 10/14/2022] levothyroxine (SYNTHROID) tablet 88 mcg  88 mcg Oral DAILY Clark Duvall MD        midodrine (PROAMATINE) tablet 5 mg  5 mg Oral Q8H Keaton Cazares DO        lidocaine (XYLOCAINE) 2 % jelly   Mouth/Throat PRN Zachariah Patterson MD        prochlorperazine (COMPAZINE) injection 10 mg  10 mg IntraVENous Q6H PRN Barb Kiran MD   10 mg at 10/11/22 1445    metoprolol (LOPRESSOR) injection 1.25 mg  1.25 mg IntraVENous Q6H Genevieve MURRELL NP   1.25 mg at 10/13/22 1106    alteplase (CATHFLO) 2 mg in sterile water (preservative free) 2 mL injection  2 mg InterCATHeter PRN Fatmata Ellison MD        senna-docusate (PERICOLACE) 8.6-50 mg per tablet 1 Tablet  1 Tablet Oral BID Frederick Dong MD   1 Tablet at 10/13/22 1104    fentaNYL citrate (PF) injection 100 mcg  100 mcg IntraVENous Q4H PRN Abdoul Gonzalez DO   100 mcg at 10/10/22 1455    atorvastatin (LIPITOR) tablet 40 mg  40 mg Oral QHS Angel Arora NP   40 mg at 10/12/22 2241    enoxaparin (LOVENOX) injection 30 mg  30 mg SubCUTAneous Q12H Angel Arora NP   30 mg at 10/13/22 1105    aspirin chewable tablet 81 mg  81 mg Oral DAILY Carolyn Wilkes MD   81 mg at 10/13/22 1104    lansoprazole compounding kit (PREVACID) 3 mg/mL oral suspension 30 mg  30 mg Oral DAILY Angel Arora NP   30 mg at 10/10/22 0919    sodium chloride (NS) flush 5-40 mL  5-40 mL IntraVENous Q8H Lesley Spain MD   10 mL at 10/12/22 2243    sodium chloride (NS) flush 5-40 mL  5-40 mL IntraVENous PRN Lesley Spain MD   10 mL at 10/03/22 1413    acetaminophen (TYLENOL) tablet 650 mg  650 mg Oral Q6H PRN Lesley Spain MD   650 mg at 10/06/22 1223    polyethylene glycol (MIRALAX) packet 17 g  17 g Oral DAILY PRN Lesley Spain MD        ondansetron [facility]) injection 4 mg  4 mg IntraVENous Q6H PRN Lesley Spain MD   4 mg at 10/11/22 0950    chlorhexidine (PERIDEX) 0.12 % mouthwash 15 mL  15 mL Oral Q12H Nicole Briseno NP   15 mL at 10/13/22 1108       Allergies:  No Known Allergies    Social History:       Family History  No family history on file. Review of Systems  Unable to obtain due to AMS    Physical Exam   General: NAD  Skin: warm, dry   Respiratory: no distress, room air  Abdomen: soft, no distention  : montilla catheter in placed, secured to statlock, clear light johnathan urine in tubing   Neuro: oriented to self, time, setting   Mood: flat affect, pleasant        Objective:     Vital signs in last 24 hours:  Visit Vitals  BP (!) 140/61 (BP 1 Location: Left upper arm, BP Patient Position: At rest)   Pulse 78   Temp 98.7 °F (37.1 °C)   Resp 16   Ht 5' 6\" (1.676 m)   Wt 104.3 kg (230 lb)   SpO2 92%   BMI 37.12 kg/m²       Intake/Output last 3 shifts:  Date 10/12/22 0700 - 10/13/22 0659 10/13/22 0700 - 10/14/22 0659   Shift 9136-5050 6350-0310 24 Hour Total 6175-8495 7027-9247 24 Hour Total   INTAKE   P.O. 280  280 50  50     P. O. 280  280 50  50   Shift Total(mL/kg) 280(2.7)  280(2.7) 50(0.5)  50(0.5)   OUTPUT   Urine(mL/kg/hr)  1050(0.8) 1050(0.4) 200  200     Urine Output (mL) (Urinary Catheter 10/12/22)  1050 1050 200  200   Stool 1  1        Stool 1  1      Shift Total(mL/kg) 1(0) 1050(10.1) 1051(10.1) 200(1.9)  200(1.9)    -1050 -771 -150  -150   Weight (kg) 104.3 104.3 104.3 104.3 104.3 104.3       Lab/Imaging Review:       Most Recent Labs:  Lab Results   Component Value Date/Time    WBC 11.0 10/13/2022 03:24 AM    HGB 10.8 (L) 10/13/2022 03:24 AM    HCT 33.7 (L) 10/13/2022 03:24 AM    PLATELET 547 (H) 04/10/3854 03:24 AM    MCV 92.6 10/13/2022 03:24 AM        Lab Results   Component Value Date/Time    Sodium 144 10/13/2022 03:24 AM    Potassium 3.1 (L) 10/13/2022 03:24 AM    Chloride 110 (H) 10/13/2022 03:24 AM    CO2 29 10/13/2022 03:24 AM    Anion gap 5 10/13/2022 03:24 AM    Glucose 104 (H) 10/13/2022 03:24 AM    BUN 20 10/13/2022 03:24 AM    Creatinine 0.69 10/13/2022 03:24 AM    BUN/Creatinine ratio 29 (H) 10/13/2022 03:24 AM    GFR est AA >60 10/03/2022 01:13 AM    GFR est non-AA >60 10/03/2022 01:13 AM    Calcium 9.0 10/13/2022 03:24 AM    Bilirubin, total 0.6 10/12/2022 09:20 AM    Alk.  phosphatase 67 10/12/2022 09:20 AM    Protein, total 6.8 10/12/2022 09:20 AM    Albumin 3.0 (L) 10/12/2022 09:20 AM    Globulin 3.8 10/12/2022 09:20 AM    A-G Ratio 0.8 (L) 10/12/2022 09:20 AM    ALT (SGPT) 31 10/12/2022 09:20 AM        No results found for: PSA, PSA2, PSAR1, Christinia Otter, PSAR3, [id number], [id number], 73614, PSAEXT     COAGS:  No results found for: APTT, PTP, INR, INREXT    Lab Results   Component Value Date/Time    Hemoglobin A1c 6.1 (H) 09/29/2022 03:27 AM        Lab Results   Component Value Date/Time    CK 2,730 (H) 09/27/2022 02:31 PM          Urine/Blood Cultures:  Results       Procedure Component Value Units Date/Time    CULTURE, BLOOD, PAIRED [[id number]] Collected: 10/11/22 1214    Order Status: Completed Specimen: Blood Updated: 10/13/22 0632     Special Requests: NO SPECIAL REQUESTS        Culture result: NO GROWTH 2 DAYS       RESPIRATORY VIRUS PANEL W/COVID-19, PCR [[id number]] Collected: 10/11/22 0802    Order Status: Completed Specimen: Nasopharyngeal Updated: 10/11/22 1422     Adenovirus Not detected        Coronavirus 229E Not detected        Coronavirus HKU1 Not detected        Coronavirus CVNL63 Not detected        Coronavirus OC43 Not detected SARS-CoV-2, PCR Not detected        Metapneumovirus Not detected        Rhinovirus and Enterovirus Not detected        Influenza A Not detected        Influenza B Not detected        Parainfluenza 1 Not detected        Parainfluenza 2 Not detected        Parainfluenza 3 Not detected        Parainfluenza virus 4 Not detected        RSV by PCR Not detected        B. parapertussis, PCR Not detected        Bordetella pertussis - PCR Not detected        Chlamydophila pneumoniae DNA, QL, PCR Not detected        Mycoplasma pneumoniae DNA, QL, PCR Not detected       CULTURE, URINE [[id number]]     Order Status: Sent Specimen: Cath Urine     CULTURE, RESPIRATORY/SPUTUM/BRONCH Moralez Mech STAIN [[id number]] Collected: 10/03/22 1300    Order Status: Completed Specimen: Sputum Updated: 10/05/22 1143     Special Requests: NO SPECIAL REQUESTS        GRAM STAIN RARE WBCS SEEN               OCCASIONAL EPITHELIAL CELLS SEEN                  OCCASIONAL GRAM NEGATIVE RODS                  RARE GRAM POSITIVE COCCI IN PAIRS           Culture result:       HEAVY NORMAL RESPIRATORY CATHIE                     IMAGING:  No results found.         Signed By: Karthik Sofia NP  - October 13, 2022

## 2022-10-13 NOTE — BH NOTES
Attempted to consult with patient at [phone], and could not get an answer on the unit. Another attempt made again at [phone], the nurse reported that patient is being transferred to another bed and asked to call back. Will try again later.

## 2022-10-13 NOTE — PROGRESS NOTES
Post Fall Documentation      Sudha Morris witnessed/unwitnessed fall occurred on 10/13/22 (Date) at [phone] (Time). The answers to the following questions summarize the fall: In the patient's own words,:  What were you attempting to do when you fell? \"I don't know\"  Do you know why you fell? \"I don't know\"  Do you have any pain/discomfort or any other complaints? \"On my bottom, nowhere else\"  Which part of your body made contact with the floor or other object? \"My bottom\"    Nurse:  Was this an assisted fall? no  Was fall witnessed? No  If witnessed, what part of the body made contact with the floor or other object? N/A  Patients mental status after the fall/when found: Confused (no change from baseline)  Any apparent injury:  No apparent injury  Immediate interventions for injury/suspected injury? No interventions needed  Patient assisted back to bed? Mobility team and Other Assist x4  Name of provider notified and time, any comments? Dr. Barriga [facility], [phone]       Name of family member notified and time: Opal Ordonez, [phone]      Immediate VS and physical assessment documented in flow sheets. Neuro assessment every hour x 4 (for potential head injury or unwitnessed fall) documented in flow sheets.       Chadd Bravo RN

## 2022-10-13 NOTE — PROGRESS NOTES
Problem: Self Care Deficits Care Plan (Adult)  Goal: *Acute Goals and Plan of Care (Insert Text)  Description: FUNCTIONAL STATUS PRIOR TO ADMISSION: Patient was independent and active without use of DME.     HOME SUPPORT: Patient lived alone     Occupational Therapy Goals  Initiated 10/11/2022  1. Patient will perform lower body dressing with minimal assistance/contact guard assist within 7 day(s). 2.  Patient will perform grooming tasks, seated EOB,  with supervision/set-up within 7 day(s). 3.  Patient will perform toilet transfers with minimal assistance/contact guard assist within 7 day(s). 4.  Patient will perform all aspects of toileting with moderate assistance  within 7 day(s). 5.  Patient will participate in upper extremity therapeutic exercise/ coordination activities with supervision/set-up for 10 minutes within 7 day(s). Outcome: Progressing Towards Goal     OCCUPATIONAL THERAPY TREATMENT  Patient: Benito Powers (65 y.o. female)  Date: 10/13/2022  Diagnosis: Status epilepticus ([address] [G40.901] Status epilepticus ([address]      Precautions:    Chart, occupational therapy assessment, plan of care, and goals were reviewed. ASSESSMENT  Patient received semi supine in bed A&O to self, type of place and year (pt stating [facility] for hospital name and February for month thus reoriented) and agreeable for OT/PT tx. Patient continues with skilled OT services and is progressing towards goals. Patient requiring min Ax2 sup->sit and mod A scooting EOB. Once seated EOB pt requiring CGA to SBA to maintain balance with verbal cueing to lean forward and place saul LE on floor to assist with balance. At EOB, pt requiring CGA for RUE and SBA LUE for saul UE HEP for 1 set of 10 reps for shoulder flex/ext with verbal/tactile/visual cueing to continue tasks.  Patient requiring mod Ax2 sit<->stand and bed to chair with RW and gait belt and pt unable to sequence side steps however able to take steps forward and back with verbal cueing and walker management assist. While seated in recliner pt requiring min A for oral/facial/hand hygiene with cueing for sequencing task and continuation of task (requiring task to be broken down into one step commands). Patient educated on saul UE digit flex/ext and completed with SBA for 1 set of 10 reps. Patient would benefit from continued skilled OT services while at [facility] in order to increase safety an independence with self care and functional transfers/mobility. Recommend discharge to therapy 5 days a week in a rehab setting. Other factors to consider for discharge: time since onset, severity of deficits, PLOF         PLAN :  Patient continues to benefit from skilled intervention to address the above impairments. Continue treatment per established plan of care to address goals. Recommend with staff: assist with self care tasks, up to chair for meals    Recommend next OT session: progress towards OT goals    Recommendation for discharge: (in order for the patient to meet his/her long term goals)  Therapy 5 day a week in a rehab setting    This discharge recommendation:  Has been made in collaboration with the attending provider and/or case management    IF patient discharges home will need the following DME: TBD       SUBJECTIVE:   Patient stated this is frustrating.  when trying to take steps back to sit in recliner    OBJECTIVE DATA SUMMARY:   Cognitive/Behavioral Status:  Neurologic State: Alert  Orientation Level: Oriented to person (oriented to year and being in a hospital (stating february and [address] [facility] for hospital))  Cognition: Impaired decision making; Follows commands;Memory loss  Perception: Appears intact     Functional Mobility and Transfers for ADLs:  Bed Mobility:  Rolling: Minimum assistance; Additional time  Supine to Sit: Minimum assistance;Assist x2; Additional time  Scooting: Moderate assistance; Additional time    Transfers:  Sit to Stand: Moderate assistance;Assist x2; Additional time     Bed to Chair: Moderate assistance;Assist x2; Additional time    Balance:  Sitting: Impaired; Without support  Sitting - Static: Good (unsupported); Fair (occasional)  Sitting - Dynamic: Fair (occasional)  Standing: Impaired; With support  Standing - Static: Constant support; Fair  Standing - Dynamic : Constant support; Fair    ADL Intervention:     Grooming  Grooming Assistance: Minimum assistance  Position Performed: Seated in chair  Washing Face: Minimum assistance  Washing Hands: Minimum assistance  Brushing Teeth: Minimum assistance  Cues: Verbal cues provided; Tactile cues provided;Visual cues provided    Therapeutic Exercises:   CGA RUE and SBA LUE HEP for 1 set of 10 reps for shoulder flex/ext and digit flex/extension (verbal/visual and tactile cueing required to initiate/continue task)    Pain:  No pain reported    Activity Tolerance:   Fair and requires rest breaks    After treatment patient left in no apparent distress:   Sitting in chair, Call bell within reach, and Bed / chair alarm activated    COMMUNICATION/COLLABORATION:   The patients plan of care was discussed with: Physical therapist and Registered nurse. PT/OT sessions occurred together for increased safety of pt and clinician.       Jessica Amaya  Time Calculation: 24 mins

## 2022-10-13 NOTE — PROGRESS NOTES
Takotsubo cardiomyopathy- lv function has normalized    Ween midodrine    Cont bb, change to po when stable

## 2022-10-13 NOTE — PROGRESS NOTES
Comprehensive Nutrition Assessment    Type and Reason for Visit: Reassess    Nutrition Recommendations/Plan:   Continue regular diet, or per SLP     Malnutrition Assessment:  Malnutrition Status:  Mild malnutrition (10/13/22 1419)    Context:  Acute illness     Findings of the 6 clinical characteristics of malnutrition:   Energy Intake:  No significant decrease in energy intake  Weight Loss:  No significant weight loss     Body Fat Loss:  No significant body fat loss,     Muscle Mass Loss:  No significant muscle mass loss,    Fluid Accumulation:  Mild, Extremities   Strength:  Not performed     Nutrition Assessment:     10/13: Follow up. Patient moved out of ICU. Diet advanced to regular by SLP today. Continues with intermittent confusion per chart review. Edema continues. No nausea/vomiting/diarrhea noted. No updated weight since transfer out of ICU. If PO intake averaging less than 50% of meals within 3 - 5 days, may trial ONS. Noted potential for pysch eval.     Nutrition Related Findings:      Wound Type: None  Last Bowel Movement Date: 10/12/22  Stool Appearance: Formed  Abdominal Assessment: Soft, Intact  Bowel Sounds: Active   Edema:Generalized: Trace (10/12/2022  4:00 PM)  LLE: 1+ (10/13/2022  8:45 AM)  LUE: 1+ (10/13/2022  8:45 AM)  RLE: 1+ (10/13/2022  8:45 AM)  RUE: 1+ (10/13/2022  8:45 AM)      Nutr.  Labs:  Lab Results   Component Value Date/Time    GFR est AA >60 10/03/2022 01:13 AM    GFR est non-AA >60 10/03/2022 01:13 AM    Creatinine, POC 1.6 (H) 09/27/2022 06:45 PM    Creatinine 0.69 10/13/2022 03:24 AM    BUN 20 10/13/2022 03:24 AM    Sodium,  09/27/2022 06:45 PM    Sodium 144 10/13/2022 03:24 AM    Potassium 3.1 (L) 10/13/2022 03:24 AM    Potassium, POC 4.0 09/27/2022 06:45 PM    Chloride,  (H) 09/27/2022 06:45 PM    Chloride 110 (H) 10/13/2022 03:24 AM    CO2 29 10/13/2022 03:24 AM       Lab Results   Component Value Date/Time    Glucose 104 (H) 10/13/2022 03:24 AM    Glucose (POC) 110 10/05/2022 04:55 PM       Lab Results   Component Value Date/Time    Hemoglobin A1c 6.1 (H) 09/29/2022 03:27 AM       Nutr. Meds:  Lipitor, Peridex, Lovenox, prevacid, lopressor, midodrine, zofran PRN, miralax PRN, effer-K, compazine, pericolace      Current Nutrition Intake & Therapies:  Average Meal Intake: Unable to assess  Average Supplement Intake: None ordered  ADULT DIET Regular    Anthropometric Measures:  Height: 5' 6\" (167.6 cm)  Ideal Body Weight (IBW): 130 lbs (59 kg)     Current Body Wt:  104.3 kg (229 lb 15 oz), 176.9 % IBW. Not specified  Current BMI (kg/m2): 37.1        Weight Adjustment: No adjustment                 BMI Category: Obese class 2 (BMI 35.0-39. 9)    Estimated Daily Nutrient Needs:  Energy Requirements Based On: Formula  Weight Used for Energy Requirements: Admission  Energy (kcal/day): 1965 (MSJ x 1.2)  Weight Used for Protein Requirements: Admission  Protein (g/day):  (0.8-1.0 g/kg)  Method Used for Fluid Requirements: 1 ml/kcal  Fluid (ml/day): 1965    Nutrition Diagnosis:   Inadequate protein intake related to impaired nutrient utilization as evidenced by localized or generalized fluid accumulation, Criteria as identified in malnutrition assessment  Inadequate oral intake related to cognitive or neurological impairment as evidenced by NPO or clear liquid status due to medical condition - RESOLVED    Nutrition Interventions:   Food and/or Nutrient Delivery: Continue current diet  Nutrition Education/Counseling: No recommendations at this time  Coordination of Nutrition Care: Continue to monitor while inpatient, Interdisciplinary rounds  Plan of Care discussed with: IDR team    Goals:  Previous Goal Met: Goal(s) achieved  Goals: PO intake 50% or greater, by next RD assessment  Specify Other Goals: Provide oral diet within 2 - 3 days    Nutrition Monitoring and Evaluation:   Behavioral-Environmental Outcomes: None identified  Food/Nutrient Intake Outcomes: Food and nutrient intake  Physical Signs/Symptoms Outcomes: Biochemical data, Hemodynamic status, Meal time behavior, Weight, Fluid status or edema    Discharge Planning:    Continue current diet    Omar Sawyer MS, RD  Contact: Ext: [phone], or via gantto

## 2022-10-13 NOTE — PROGRESS NOTES
Problem: Dysphagia (Adult)  Goal: *Acute Goals and Plan of Care (Insert Text)  Description: Swallowing goals initiated 10-12-22:  1) tolerate clears without s/s aspiration or vomiting by 10-13-22-met  2) re-eval for solids once goals #1 met   3) tolerate regular diet, thin liquids without s/s aspiration by 10-19-22  Outcome: Progressing Towards Goal   SPEECH LANGUAGE PATHOLOGY DYSPHAGIA TREATMENT  Patient: Alex Maza (22 y.o. female)  Date: 10/13/2022  Diagnosis: Status epilepticus ([address] [G40.901] Status epilepticus ([address]      Precautions: aspiration      ASSESSMENT:  Patient with mild pharyngeal dysphagia suspected. She has tolerated clears for 24 hours. Ready for diet advancement. Voice remained moderately hoarse. Significant cognitive issues noted, including reduced initiation,  verbal responses, poor insight into deficits. PLAN:  Recommendations and Planned Interventions:  Advance diet to regular, thin liquids. Patient continues to benefit from skilled intervention to address the above impairments. Continue treatment per established plan of care. Discharge Recommendations:  None     SUBJECTIVE:   Patient stated a cracker! . OBJECTIVE:   Cognitive and Communication Status:  Neurologic State: Alert  Orientation Level: Oriented to person  Cognition: Impaired decision making, Follows commands, Memory loss  Perception: Appears intact  Perseveration: No perseveration noted  Safety/Judgement: Decreased awareness of environment, Decreased awareness of need for safety  Dysphagia Treatment:  Oral Assessment:     P.O. Trials:  Patient Position: upright in bed  Vocal quality prior to P.O.:  (moderately hoarse. see FEES results)  Consistency Presented: Thin liquid; Solid;Puree  How Presented: Self-fed/presented;SLP-fed/presented;Spoon;Straw;Successive swallows (she had weakness in L UE for feeding.   counseled family that she will need help with utensils and cup holding)   ORAL PHASE:   Bolus Acceptance: No impairment  Bolus Formation/Control: No impairment     Propulsion: No impairment  Oral Residue: None  PHARYNGEAL PHASE:   Initiation of Swallow: No impairment  Laryngeal Elevation: Functional  Aspiration Signs/Symptoms:  (occasional coughing when she alternated solids and liquids. Recommended small straw with sips)                      Exercises:  Laryngeal Exercises:                                                                                                                                   Pain:             After treatment:   Patient left in no apparent distress in bed    COMMUNICATION/EDUCATION:   Patient was educated regarding her deficit(s) of dysphagia as this relates to her diagnosis of prolonged intubation. .  She demonstrated Guarded understanding as evidenced by poor insight. The patient's plan of care including recommendations, planned interventions, and recommended diet changes were discussed with: Physician.      Diego Garcia SLP  Time Calculation: 15 mins

## 2022-10-13 NOTE — PROGRESS NOTES
Problem: Ventilator Management  Goal: *Adequate oxygenation and ventilation  Outcome: Progressing Towards Goal  Goal: *Patient maintains clear airway/free of aspiration  Outcome: Progressing Towards Goal  Goal: *Absence of infection signs and symptoms  Outcome: Progressing Towards Goal  Goal: *Normal spontaneous ventilation  Outcome: Progressing Towards Goal     Problem: Patient Education: Go to Patient Education Activity  Goal: Patient/Family Education  Outcome: Progressing Towards Goal     Problem: Delirium Treatment  Goal: *Level of consciousness restored to baseline  Outcome: Progressing Towards Goal  Goal: *Level of environmental perceptions restored to baseline  Outcome: Progressing Towards Goal  Goal: *Sensory perception restored to baseline  Outcome: Progressing Towards Goal  Goal: *Emotional stability restored to baseline  Outcome: Progressing Towards Goal  Goal: *Functional assessment restored to baseline  Outcome: Progressing Towards Goal  Goal: *Absence of falls  Outcome: Progressing Towards Goal  Goal: *Will remain free of delirium, CAM Score negative  Outcome: Progressing Towards Goal  Goal: *Cognitive status will be restored to baseline  Outcome: Progressing Towards Goal  Goal: Interventions  Outcome: Progressing Towards Goal     Problem: Patient Education: Go to Patient Education Activity  Goal: Patient/Family Education  Outcome: Progressing Towards Goal     Problem: Falls - Risk of  Goal: *Absence of Falls  Description: Document Nella Patel Fall Risk and appropriate interventions in the flowsheet.   Outcome: Progressing Towards Goal  Note: Fall Risk Interventions:  Mobility Interventions: PT Consult for mobility concerns, PT Consult for assist device competence, Patient to call before getting OOB, Bed/chair exit alarm    Mentation Interventions: Bed/chair exit alarm, Door open when patient unattended, Family/sitter at bedside, Gait belt with transfers/ambulation, Increase mobility, Room close to nurse's station    Medication Interventions: Bed/chair exit alarm, Patient to call before getting OOB, Teach patient to arise slowly, Utilize gait belt for transfers/ambulation    Elimination Interventions: Bed/chair exit alarm, Call light in reach    History of Falls Interventions: Bed/chair exit alarm, Door open when patient unattended, Room close to nurse's station, Utilize gait belt for transfer/ambulation         Problem: Patient Education: Go to Patient Education Activity  Goal: Patient/Family Education  Outcome: Progressing Towards Goal     Problem: Pressure Injury - Risk of  Goal: *Prevention of pressure injury  Description: Document Jose Enrique Scale and appropriate interventions in the flowsheet.   Outcome: Progressing Towards Goal  Note: Pressure Injury Interventions:  Sensory Interventions: Assess need for specialty bed, Assess changes in LOC, Maintain/enhance activity level, Minimize linen layers    Moisture Interventions: Absorbent underpads, Internal/External urinary devices, Maintain skin hydration (lotion/cream), Minimize layers    Activity Interventions: PT/OT evaluation, Increase time out of bed    Mobility Interventions: PT/OT evaluation    Nutrition Interventions: Document food/fluid/supplement intake    Friction and Shear Interventions: Apply protective barrier, creams and emollients, Feet elevated on foot rest, Foam dressings/transparent film/skin sealants, HOB 30 degrees or less                Problem: Patient Education: Go to Patient Education Activity  Goal: Patient/Family Education  Outcome: Progressing Towards Goal     Problem: Nutrition Deficit  Goal: *Optimize nutritional status  Outcome: Progressing Towards Goal     Problem: Aspiration - Risk of  Goal: *Absence of aspiration  Outcome: Progressing Towards Goal     Problem: Patient Education: Go to Patient Education Activity  Goal: Patient/Family Education  Outcome: Progressing Towards Goal     Problem: Patient Education: Go to Patient Education Activity  Goal: Patient/Family Education  Outcome: Progressing Towards Goal     Problem: Patient Education: Go to Patient Education Activity  Goal: Patient/Family Education  Outcome: Progressing Towards Goal     Problem: Patient Education: Go to Patient Education Activity  Goal: Patient/Family Education  Outcome: Progressing Towards Goal

## 2022-10-14 ENCOUNTER — APPOINTMENT (OUTPATIENT)
Dept: GENERAL RADIOLOGY | Age: 57
DRG: 064 | End: 2022-10-14
Attending: INTERNAL MEDICINE
Payer: COMMERCIAL

## 2022-10-14 LAB
APPEARANCE UR: CLEAR
BACTERIA URNS QL MICRO: NEGATIVE /HPF
BILIRUB UR QL: NEGATIVE
COLOR UR: ABNORMAL
EPITH CASTS URNS QL MICRO: ABNORMAL /LPF
GLUCOSE UR STRIP.AUTO-MCNC: NEGATIVE MG/DL
HGB UR QL STRIP: ABNORMAL
HYALINE CASTS URNS QL MICRO: ABNORMAL /LPF (ref 0–2)
KETONES UR QL STRIP.AUTO: ABNORMAL MG/DL
LEUKOCYTE ESTERASE UR QL STRIP.AUTO: NEGATIVE
NITRITE UR QL STRIP.AUTO: NEGATIVE
PH UR STRIP: 6 [PH] (ref 5–8)
PROT UR STRIP-MCNC: 30 MG/DL
RBC #/AREA URNS HPF: ABNORMAL /HPF (ref 0–5)
SP GR UR REFRACTOMETRY: >1.03 (ref 1–1.03)
UA: UC IF INDICATED,UAUC: ABNORMAL
UROBILINOGEN UR QL STRIP.AUTO: 0.2 EU/DL (ref 0.2–1)
WBC URNS QL MICRO: ABNORMAL /HPF (ref 0–4)

## 2022-10-14 PROCEDURE — 65270000029 HC RM PRIVATE

## 2022-10-14 PROCEDURE — 74011250637 HC RX REV CODE- 250/637: Performed by: PSYCHIATRY & NEUROLOGY

## 2022-10-14 PROCEDURE — 81001 URINALYSIS AUTO W/SCOPE: CPT

## 2022-10-14 PROCEDURE — 92526 ORAL FUNCTION THERAPY: CPT

## 2022-10-14 PROCEDURE — 74011250637 HC RX REV CODE- 250/637: Performed by: INTERNAL MEDICINE

## 2022-10-14 PROCEDURE — 73502 X-RAY EXAM HIP UNI 2-3 VIEWS: CPT

## 2022-10-14 PROCEDURE — 74011250636 HC RX REV CODE- 250/636: Performed by: NURSE PRACTITIONER

## 2022-10-14 PROCEDURE — 74011250637 HC RX REV CODE- 250/637: Performed by: STUDENT IN AN ORGANIZED HEALTH CARE EDUCATION/TRAINING PROGRAM

## 2022-10-14 PROCEDURE — 74011250637 HC RX REV CODE- 250/637: Performed by: NURSE PRACTITIONER

## 2022-10-14 PROCEDURE — 74011250636 HC RX REV CODE- 250/636: Performed by: STUDENT IN AN ORGANIZED HEALTH CARE EDUCATION/TRAINING PROGRAM

## 2022-10-14 PROCEDURE — 94761 N-INVAS EAR/PLS OXIMETRY MLT: CPT

## 2022-10-14 PROCEDURE — 97530 THERAPEUTIC ACTIVITIES: CPT

## 2022-10-14 PROCEDURE — 74011000250 HC RX REV CODE- 250: Performed by: STUDENT IN AN ORGANIZED HEALTH CARE EDUCATION/TRAINING PROGRAM

## 2022-10-14 PROCEDURE — 74011000250 HC RX REV CODE- 250: Performed by: INTERNAL MEDICINE

## 2022-10-14 RX ORDER — TRAMADOL HYDROCHLORIDE 50 MG/1
50 TABLET ORAL
Status: DISCONTINUED | OUTPATIENT
Start: 2022-10-14 | End: 2022-10-18

## 2022-10-14 RX ORDER — PANTOPRAZOLE SODIUM 40 MG/1
40 TABLET, DELAYED RELEASE ORAL
Status: DISCONTINUED | OUTPATIENT
Start: 2022-10-15 | End: 2022-10-20 | Stop reason: HOSPADM

## 2022-10-14 RX ORDER — ROPINIROLE 0.25 MG/1
0.5 TABLET, FILM COATED ORAL
Status: DISCONTINUED | OUTPATIENT
Start: 2022-10-14 | End: 2022-10-18

## 2022-10-14 RX ORDER — RISPERIDONE 0.25 MG/1
0.5 TABLET, FILM COATED ORAL
Status: DISCONTINUED | OUTPATIENT
Start: 2022-10-14 | End: 2022-10-15

## 2022-10-14 RX ADMIN — SENNOSIDES AND DOCUSATE SODIUM 1 TABLET: 50; 8.6 TABLET ORAL at 10:26

## 2022-10-14 RX ADMIN — Medication 10 ML: at 05:32

## 2022-10-14 RX ADMIN — RISPERIDONE 0.5 MG: 0.25 TABLET ORAL at 22:04

## 2022-10-14 RX ADMIN — POTASSIUM BICARBONATE 20 MEQ: 391 TABLET, EFFERVESCENT ORAL at 18:23

## 2022-10-14 RX ADMIN — Medication 10 ML: at 12:29

## 2022-10-14 RX ADMIN — TRAMADOL HYDROCHLORIDE 50 MG: 50 TABLET ORAL at 22:05

## 2022-10-14 RX ADMIN — POTASSIUM BICARBONATE 20 MEQ: 391 TABLET, EFFERVESCENT ORAL at 10:26

## 2022-10-14 RX ADMIN — METOPROLOL TARTRATE 12.5 MG: 25 TABLET, FILM COATED ORAL at 10:25

## 2022-10-14 RX ADMIN — ATORVASTATIN CALCIUM 40 MG: 20 TABLET, FILM COATED ORAL at 22:04

## 2022-10-14 RX ADMIN — LEVOTHYROXINE SODIUM 88 MCG: 0.09 TABLET ORAL at 05:22

## 2022-10-14 RX ADMIN — ROPINIROLE HYDROCHLORIDE 0.5 MG: 0.25 TABLET, FILM COATED ORAL at 17:10

## 2022-10-14 RX ADMIN — LANSOPRAZOLE 30 MG: KIT at 10:37

## 2022-10-14 RX ADMIN — MIDODRINE HYDROCHLORIDE 5 MG: 5 TABLET ORAL at 05:27

## 2022-10-14 RX ADMIN — ACETAMINOPHEN 650 MG: 325 TABLET, FILM COATED ORAL at 05:22

## 2022-10-14 RX ADMIN — ASPIRIN 81 MG: 81 TABLET, CHEWABLE ORAL at 10:25

## 2022-10-14 RX ADMIN — ENOXAPARIN SODIUM 30 MG: 100 INJECTION SUBCUTANEOUS at 22:05

## 2022-10-14 RX ADMIN — ALTEPLASE 2 MG: 2.2 INJECTION, POWDER, LYOPHILIZED, FOR SOLUTION INTRAVENOUS at 02:29

## 2022-10-14 RX ADMIN — ENOXAPARIN SODIUM 30 MG: 100 INJECTION SUBCUTANEOUS at 10:27

## 2022-10-14 RX ADMIN — POTASSIUM BICARBONATE 20 MEQ: 391 TABLET, EFFERVESCENT ORAL at 10:25

## 2022-10-14 RX ADMIN — Medication 10 ML: at 22:05

## 2022-10-14 RX ADMIN — QUETIAPINE FUMARATE 25 MG: 25 TABLET ORAL at 10:25

## 2022-10-14 RX ADMIN — METOPROLOL TARTRATE 12.5 MG: 25 TABLET, FILM COATED ORAL at 22:05

## 2022-10-14 NOTE — BSMART NOTE
[facility] Liaison Team Note     LOS:  17     Patient goal(s) for today: communicate needs to staff  [facility] Liaison team focus/goals: assess needs, provide education and support, request TDO Prescreen    Progress note: Pt was seen virtually. She was observed resting in bed, alert, disoriented and restless at times. She was confused and demonstrated impaired memory, insight and judgement. Her thought process demonstrated flight of ideas and content was delusional at times. She was unable to answer orientation questions or questions about mood. Her answers at times made no sense and other times sounded like she was talking about the sale of her home. She thought she was in a shopping male in [place], [place]"; was unable to answer any questions about time; and reported her  as 2012. When asked about SI, HI and AVH she was unable to answer meaningfully. When asked about fentanyl patch and what brought her to the hospital she has no memory at all. RN and CM reported Pt is medically stable for discharge. Psychiatric NP and this writer agree Pt should not be discharged due to safety concerns and will initiated TDO prescreen from [facility]. MSW called [facility] [phone]  Spoke with Surya Hernandez and faxed clinicals to [phone]    Barriers to Discharge: AMS  Guns in the home: no     Outpatient provider(s):  unknown  Insurance info/prescription coverage:  [facility]    Diagnosis: diagnosis of depression unspecified, anxiety disorder, Delirium unspecified    Plan:  Patient is requesting to leave. Due to safety concerns, would recommend to contact [facility] for TDO assessment. Follow up Psych Consult placed? yes. Psychiatrist updated?  yes       Participating treatment team members: Shawnee Motta MSW; Brissa Gtz, MSW Student; Adonis Buenrostro NP

## 2022-10-14 NOTE — BSMART NOTE
Information Faxed to [address] [place] [address] with Clinical Notes    Patient Demographics    Patient Name   Nitin Quarles Legal Sex   Female    1965 [facility]    Address   [address],  [address] [place] [address] Phone   [phone] (Home) *Preferred*   [phone] [facility] Account    Name Acct ID Class Status Primary Coverage   Nitin Quarles [id number] INPATIENT [facility] [address]            Guarantor Account (for Hospital Account [de-identified])    Name Relation to Pt Service Area Active? Acct Type   Nitni Quarles Self [facility] Yes Personal/Family   Address Phone     [address] [facility], [address] [place] [address] [phone]()              Coverage Information (for Hospital Account [de-identified])    F/O Payor/Plan Subscriber  Subscriber Sex Precert #   BLUE ELINA/BSI [facility] 61 F    Subscriber Subscriber #   Nitin Quarles [id number]   Grp # Group Name   [id number]    Address Phone   [address]    Policy Number Status Effective Date Benefits Phone   [id number] -  -   Auth/Cert   [id number]            Diagnosis     Codes Comments   Status epilepticus ([facility] [address]  ICD-10-CM: G40.901   ICD-9-CM: 074. 3             Admission Information    Arrival Date/Time: 2022 11:16 AM Admit Date/Time: 2022 11:17 AM IP Adm.  Date/Time: 2022 12:50 PM   Admission Type: Emergency Point of Origin: Non-health Care Facility/self Admit Category:    Means of Arrival: Ambulance Primary Service: Medicine Secondary Service: N/A   Transfer Source:  Service Area: [facility] Unit: [facility] 4M POST SURG ORT 1   Admit Provider: Kiet Mauro MD Attending Provider: Doreen Solomon MD Referring Provider:      Admission Information    Attending Provider Admission Dx Admitted on   Thony Valencia MD Status epilepticus ([facility] [address] 22   Service Isolation Code Status   Medicine -- Full Code   Allergies Advance Care Planning No Known Allergies Jump to the Activity         Admission Information    Unit/Bed: [facility] 4M POST SURG ORT 1/01 Service: Medicine   Admitting provider: Mk Varela MD Phone: [phone]   Attending provider: Brett Andrade MD Phone: [phone]   PCP: Nadeen Kinney MD Phone: [phone]   Admission dx:  Patient class: I   Admission type: [address], URINE David Flood (Order [id number])  Lab  Date: 9/27/2022 Department: [facility] 4m Post Surg Ort 1 Released By: Alondra Monson RN (auto-released) Authorizing: Mk Varela MD     DRUG SCREEN, URINE: Patient Communication    Released Not seen      Contains abnormal data DRUG SCREEN, URINE  Order: [id number]  Collected 9/27/2022  3:30 PM    Status: Final result    Next appt: 10/25/2022 at 03:00 PM in Cardiology [facility] KendramaDO)    0 Result Notes  Component Ref Range & Units 9/27/22  3:30 PM    AMPHETAMINES NEG   Negative    BARBITURATES NEG   Negative    BENZODIAZEPINES NEG   Positive Abnormal     COCAINE NEG   Negative    METHADONE NEG   Negative    OPIATES NEG   Negative    PCP(PHENCYCLIDINE) NEG   Negative    THC (TH-CANNABINOL) NEG   Negative    Drug screen comment  (NOTE)    Comment: This test is a screen for drugs of abuse in a medical setting only   (i.e., they are unconfirmed results and as such must not be used for   non-medical purposes e.g., employment testing, legal testing). Due to   its inherent nature, false positive (FP) and false negative (FN)   results may be obtained. Therefore, if necessary for medical care,   recommend confirmation of positive findings by GC/MS.  The cutoff   values (i.e., the level at which this screening test becomes positive   for a given drug group) are:     Amphetamine/Methamphetamine: 300 ng/mL   Barbiturates:                200 ng/mL   Benzodiazepines:             200 ng/mL   Cocaine:                     150 ng/mL   Methadone:                   300 ng/mL Opiates:                     300 ng/mL   Phencyclidine, PCP:           25 ng/mL   Marijuana, THC:               50 ng/mL     This screening test can identify the presence of the following drugs   when above the cutoff value; see list posted on the intranet. It can   be viewed by selecting in sequence the following from the [address] home   page: Justa; [address], Resources; [facility]   Laboratory, Physician Resources Q to Z; \"UDS (Urine Drug Screen   Automated) List of Detectable Drugs. \"     Or use web address:   http://[facility]/[place]/Pending sale to [facility]/Physician%20Resources/   UDS%20List%20of%20Detectable%20Drugs. pdf    [facility]              Specimen Collected: 09/27/22  3:30 PM Last Resulted: 09/27/22  4:35 PM      Order Details    Lab and Collection Details    Routing    Result History     View Encounter Conversation           Result Care Coordination    Patient Communication    Released Not seen Back to Top           Collection Information    Specimen ID: [id number]    Urine    Urine, random       Collected: 9/27/2022  3:30 PM    [id number]    Resulting Agency[facility]  [address] [place] [address]. Dr. Lemuel Billings. [facility] Director   [address] [place] [address]        Final Report Date/time    Reported date Reported time   Sep 27, 2022 16:35 EDT            Provider Information    Ordering User Ordering Provider Authorizing Provider   Shawnee Caceres, MD Mk Ansari MD   Attending Provider(s) Admitting Provider PCP   Deni Plasencia MD; Mk Varela MD; Lu Jennings MD; Raudel Longo MD; MD Mk Hauser MD Burnard Buffy, MD           Lab Inquiry View [facility] Lab Information           How to build your own [address] Jac[address] Olmos (Order #[id number]) on 9/27/22       Order Report    Order Details      Tracking Links    Cosign Tracking Order Transmittal Tracking

## 2022-10-14 NOTE — PROGRESS NOTES
Patient family asked to speak with me so I did some leader rounding on the patient. Patient has been having cramping/muscle spasms. Visually you could tell that the patient was uncomfortable and she was able to verbalize that it was in her legs. Family also voiced concerns about her lack of eating and drinking. Vicky Saldana Bars regarding these requests and he said that he would put something in.

## 2022-10-14 NOTE — PROGRESS NOTES
[facility]  [address] [facility] [place], [address] [place] [address]  [phone]      Hospitalist Progress Note      NAME: Alex Maza   :  1965  MRM:  [id number]    Date/Time of service: 10/14/2022  10:12 AM       Subjective:     Chief Complaint:  Patient was personally seen and examined by me during this time period. Chart reviewed. Had a fall yesterday. No injuries. Spoke to mom at bedside        Objective:       Vitals:       Last 24hrs VS reviewed since prior progress note. Most recent are:    Visit Vitals  /71 (BP 1 Location: Left upper arm, BP Patient Position: At rest)   Pulse 95   Temp 98.6 °F (37 °C)   Resp 16   Ht 5' 6\" (1.676 m)   Wt 104.3 kg (230 lb)   SpO2 93%   BMI 37.12 kg/m²     SpO2 Readings from Last 6 Encounters:   10/14/22 93%    O2 Flow Rate (L/min): 2 l/min     Intake/Output Summary (Last 24 hours) at 10/14/2022 1012  Last data filed at 10/14/2022 0600  Gross per 24 hour   Intake --   Output 900 ml   Net -900 ml          Exam:     Physical Exam:    Gen:  Well-developed, well-nourished, obese, in no acute distress  HEENT:  Pink conjunctivae, PERRL, hearing intact to voice, moist mucous membranes  Neck:  Supple, without masses, thyroid non-tender  Resp:  No accessory muscle use, scattered rhochi   Card:  No murmurs, normal S1, S2 without thrills, bruits or peripheral edema  Abd:  Soft, non-tender, non-distended, normoactive bowel sounds are present  Musc:  No cyanosis or clubbing  Skin:  No rashes   Neuro:  Cranial nerves 3-12 are grossly intact, follows commands appropriately  Psych:  poor insight, oriented to person.   No SI/HI     Medications Reviewed: (see below)    Lab Data Reviewed: (see below)    ______________________________________________________________________    Medications:     Current Facility-Administered Medications   Medication Dose Route Frequency    potassium bicarb-citric acid (EFFER-K) tablet 20 mEq  20 mEq Oral NOW    potassium bicarb-citric acid (EFFER-K) tablet 20 mEq  20 mEq Oral BID    QUEtiapine (SEROquel) tablet 25 mg  25 mg Oral BID    levothyroxine (SYNTHROID) tablet 88 mcg  88 mcg Oral DAILY    metoprolol tartrate (LOPRESSOR) tablet 12.5 mg  12.5 mg Oral Q12H    lidocaine (XYLOCAINE) 2 % jelly   Mouth/Throat PRN    prochlorperazine (COMPAZINE) injection 10 mg  10 mg IntraVENous Q6H PRN    alteplase (CATHFLO) 2 mg in sterile water (preservative free) 2 mL injection  2 mg InterCATHeter PRN    senna-docusate (PERICOLACE) 8.6-50 mg per tablet 1 Tablet  1 Tablet Oral BID    atorvastatin (LIPITOR) tablet 40 mg  40 mg Oral QHS    enoxaparin (LOVENOX) injection 30 mg  30 mg SubCUTAneous Q12H    aspirin chewable tablet 81 mg  81 mg Oral DAILY    lansoprazole compounding kit (PREVACID) 3 mg/mL oral suspension 30 mg  30 mg Oral DAILY    sodium chloride (NS) flush 5-40 mL  5-40 mL IntraVENous Q8H    sodium chloride (NS) flush 5-40 mL  5-40 mL IntraVENous PRN    acetaminophen (TYLENOL) tablet 650 mg  650 mg Oral Q6H PRN    polyethylene glycol (MIRALAX) packet 17 g  17 g Oral DAILY PRN    ondansetron (ZOFRAN) injection 4 mg  4 mg IntraVENous Q6H PRN          Lab Review:     Recent Labs     10/13/22  0324 10/12/22  0920   WBC 11.0 11.5*   HGB 10.8* 11.0*   HCT 33.7* 34.6*   * 599*       Recent Labs     10/13/22  0324 10/12/22  0920    146*   K 3.1* 4.0   * 110*   CO2 29 27   * 105*   BUN 20 20   CREA 0.69 0.68   CA 9.0 9.3   MG 2.4 2.5*   PHOS 3.0 3.2   ALB  --  3.0*   TBILI  --  0.6   ALT  --  31       Lab Results   Component Value Date/Time    Glucose (POC) 110 10/05/2022 04:55 PM    Glucose (POC) 87 10/05/2022 11:02 AM    Glucose (POC) 78 10/05/2022 05:00 AM    Glucose (POC) 94 10/05/2022 04:57 AM    Glucose (POC) 124 (H) 10/04/2022 11:03 PM          Assessment / Plan:     60 yo hx of hypothyroid, depression, presented w/ AMS, resp failure, shock, Takotsubo CM EF 25-30%.   Patient was intubated on admission and extubated 10/10. Hospital course complicated by persistent confusion    1) Acute met encephalopathy/delirium: persistent, unclear etiology. Likely new dementia vs underlying psych disorder. Head MRI with small diffusion restriction in right frontal lobe of unclear significant. EEG neg for seizure. No further workup by Neurology. Cont empiric ASA and statin for possible CVA. Cont Seroquel. Awaiting psych eval to clear patient for rehab     2) Takotsubo CM : initial echo with EF 25-30%, but repeat with EF 55%. Unable to tolerate ACEi due to low BP. Cont metoprolol. Cards was following    3) Pneumonia/UTI/septic shock: resp Cx and urine Cx grew E. Coli. S/p course of IV Zosyn    4) Acute resp failure: now resolved. Due to pneumonia, confusion. Was intubated on admission and extubated 10/10    5) Acute urine retention: due to prolonged hospitalization, AMS. Will cont montilla. Urology was following    6) Debility: cont PT/OT.   Awaiting Rehab once cleared by psych     Total time spent with patient care: 35 min  **I personally saw and examined the patient during this time period**                 Care Plan discussed with: Patient, nursing, mother, CM     Discussed:  Care Plan    Prophylaxis:  Lovenox    Disposition:  [facility]/Rehab           ___________________________________________________    Attending Physician: Alexandre Marshall MD

## 2022-10-14 NOTE — BSMART NOTE
Chart accessed to assist psych NP and [facility] liaison with treatment planning and recommendations. See psych NP and [facility] note for further details.     Heather Hernandez 93  [facility] Liaison

## 2022-10-14 NOTE — PROGRESS NOTES
Pt complain of new pain to left hip. Noted swelling. No redness present. Dr Anton Child message via perfect serve. Await return message for now. Will continue to monitor.

## 2022-10-14 NOTE — PROGRESS NOTES
Spoke to patient's son, Racquel Segura, who is one of the decision maker. He will speak with his other 2 brothers and make decisions together. He does not want to relinquish decision making capacity to his grandmother for now. Agreeable to inpatient psych and TDO if necessary. He also asked about rechecking for urinary tract infection. I explained to him that the patient's AMS could be a permanent disability and will require long term therapy and care.      --------  4:00 PM    Spoke with , [facility], patient's parents and son, Bigg. Patient is medically stable. Repeat U/A negative for UTI. Will also send COVID screening.   [facility] working on psych TDO

## 2022-10-14 NOTE — PROGRESS NOTES
SPEECH LANGUAGE PATHOLOGY DYSPHAGIA TREATMENT  Patient: Wily Snell (1 y.o. female)  Date: 10/14/2022  Diagnosis: Status epilepticus ([facility] [address] [G40.901] Status epilepticus ([facility] [address]      Precautions: aspiration      ASSESSMENT:  Patient tolerating diet with occasional coughing, but taking min PO due to integrated language issues. Will start supplement. PLAN:  Recommendations and Planned Interventions:  Ok for regular diet, thin liuqids. Supplement to start  Patient continues to benefit from skilled intervention to address the above impairments. Continue treatment per established plan of care. Discharge Recommendations:  Inpatient Rehab     SUBJECTIVE:   SLP: \" your mom told me you have 2 sons\"  Patient stated oh, I do? Huh, I guess I do. OBJECTIVE:   Cognitive and Communication Status:  Neurologic State: Alert  Orientation Level: Oriented to person  Cognition: Impaired decision making  Perception: Appears intact  Perseveration: No perseveration noted  Safety/Judgement: Decreased awareness of environment, Decreased awareness of need for safety  Dysphagia Treatment:  Oral Assessment:     P.O. Trials:     Vocal quality prior to P.O.:        Improving    Patient taking min PO. She commented to mother earlier that she was [place] grown woman and she will eat when and what she wants to eat and nobody could tell her what to do\". Mother is concerned that patient has eaten very little. Educated mother that due to patient's brain injury, she appears to have little insight into her deficits, poor initiation to start tasks, complications in her brain of neglect of her R hand plus inability to move may confuse the eating process as well. Ate a few bites with her L hand, but it was unnatural and stopped her from eating more as well. Patient with dry cough on breakfast after solids. Speech:   Speech clear. No aphasia. Mother was concerned that her daughter did not recall that she had 2 sons. Educated mother that the patient has memory, initiation, problem solving and reasoning issues. They will be addressed extensively on rehab when she is stable. REcommended she continue to remind her daughter about her family and other things she does not recall. Pain:             After treatment:   Patient left in no apparent distress in bed, Nursing notified, and Caregiver / family present    COMMUNICATION/EDUCATION:   Patient was educated regarding her deficit(s) of memory loss, dysphagia as this relates to her diagnosis of sz. She demonstrated Guarded understanding as evidenced by poor insight.     The patient's plan of care including recommendations, planned interventions, and recommended diet changes were discussed with: Physical therapist.     MARCIA Piña  Time Calculation: 20 mins

## 2022-10-14 NOTE — PROGRESS NOTES
Bedside shift change report given to Geoff Lang RN (oncoming nurse) by Rakesh Lam RN (offgoing nurse). Report included the following information SBAR, Kardex, MAR, and Alarm Parameters .

## 2022-10-14 NOTE — PROGRESS NOTES
Problem: Mobility Impaired (Adult and Pediatric)  Goal: *Acute Goals and Plan of Care (Insert Text)  Description: FUNCTIONAL STATUS PRIOR TO ADMISSION: Patient was independent and active without use of DME.    HOME SUPPORT PRIOR TO ADMISSION: The patient lived with family but did not require assist.    Physical Therapy Goals  Initiated 10/11/2022  1. Patient will move from supine to sit and sit to supine , scoot up and down, and roll side to side in bed with independence within 7 day(s). 2.  Patient will transfer from bed to chair and chair to bed with modified independence using the least restrictive device within 7 day(s). 3.  Patient will perform sit to stand with modified independence within 7 day(s). 4.  Patient will ambulate with modified independence for 200 feet with the least restrictive device within 7 day(s). 5.  Patient will ascend/descend 4 stairs with  handrail(s) with modified independence within 7 day(s). Outcome: Progressing Towards Goal  Note:   PHYSICAL THERAPY TREATMENT  Patient: Francesco Navarro (25 y.o. female)  Date: 10/14/2022  Diagnosis: Status epilepticus ([address] [G40.901] Status epilepticus ([address]      Precautions:    Chart, physical therapy assessment, plan of care and goals were reviewed. ASSESSMENT  Patient continues with skilled PT services and progressing towards goals. confused. Requires additional time with assist x 2 for mobility tasks. Demonstrates posterior lean in sitting and supported standing Performed 5 x sit>stand with min A x 1. Mod A x 2 to manage RW and perform side stepping toward HOB. Incontinent of bowel, assisted BTB for hygiene with Mod A for rolling bed mobility     Current Level of Function Impacting Discharge (mobility/balance): assist x 2    Other factors to consider for discharge:          PLAN :  Patient continues to benefit from skilled intervention to address the above impairments. Continue treatment per established plan of care.   to address goals. Recommendation for discharge: (in order for the patient to meet his/her long term goals)  Therapy 3 hours per day 5-7 days per week    This discharge recommendation:  Has been made in collaboration with the attending provider and/or case management    IF patient discharges home will need the following DME: to be determined (TBD)       SUBJECTIVE:   Patient stated I like your truck.     OBJECTIVE DATA SUMMARY:   Critical Behavior:  Neurologic State: Alert  Orientation Level: Oriented to person  Cognition: Impaired decision making  Safety/Judgement: Decreased awareness of environment, Decreased awareness of need for safety  Functional Mobility Training:  Bed Mobility:  Rolling: Moderate assistance; Additional time  Supine to Sit: Minimum assistance  Sit to Supine: Minimum assistance           Transfers:  Sit to Stand: Moderate assistance;Minimum assistance  Stand to Sit: Minimum assistance; Additional time                             Balance:  Sitting: Impaired; Without support  Sitting - Static: Fair (occasional)  Sitting - Dynamic: Fair (occasional)  Standing: Impaired; With support  Standing - Static: Constant support; Fair  Standing - Dynamic : Poor;Constant support  Ambulation/Gait Training:  Distance (ft): 2 Feet (ft)  Assistive Device: Walker, rolling;Gait belt  Ambulation - Level of Assistance: Moderate assistance;Assist x2; Additional time        Gait Abnormalities: Decreased step clearance        Base of Support: Narrowed                             Stairs: Therapeutic Exercises:     Pain Rating:      Activity Tolerance:   Good    After treatment patient left in no apparent distress:   Supine in bed, Call bell within reach, and Bed / chair alarm activated    COMMUNICATION/COLLABORATION:   The patients plan of care was discussed with: Registered nurseMalorie Antunez Escort   Time Calculation: 25 mins

## 2022-10-14 NOTE — CONSULTS
PSYCHIATRY CONSULT NOTE    REASON FOR CONSULT: suicidal ideation. INTERVAL HISTORY  10/14/2022: Patient is seen by the psychiatric team. She remains disoriented and confused. Her speech is incoherent and she is not able to engage meaningfully. She is not able to answer questions correctly. When asked where she is, she refuses to answer and states that it is her business. She is fixated on refinancing her house. 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 She denies suicidal thoughts and did not answer if she has homicidal thoughts or AV hallucinations. She reports she sleeps well and eats well but she is not able to state what she had for breakfast.      HISTORY OF PRESENTING COMPLAINT:  Edilma Feliz is a 59 y.o. WHITE/NON- female who is currently admitted to the medical floor at [facility]. Patient presented to the ED via EMS after being found unresponsive in her in bed and CPR was initiated. Patient is being treated for acute metabolic encephalopathy. On assessment today, patient appeared is awake and alert. She appears to be confused and is not able to recall the circumstances surrounding her admission to the hospital. Her speech is very soft making it hard to follow and understand. She reports her location as [facility] and current date is unknown. She denies current depression, anxiety, suicidal/homicidal thoughts and VA hallucinations. She denies taking an overdose or trying to end her life. She reports having suicidal thoughts in the past and thought she was dying but denied any plans or intent. She denies having any stress or trauma. Her mother who is at bedside states that patient lives alone and has been having a lot of financial and work stress. Mom also reports that patient wants to sell her house. She reports recent sleep issues but denied appetite concerns.       PAST PSYCHIATRIC HISTORY: Patient denies prior inpatient psychiatric hospitalizations and suicide attempts. SUBSTANCE ABUSE HISTORY: Patient reports she drinks alcohol occasionally and denies any other substance abuse. Her UDS is positive for benzos. PAST MEDICAL HISTORY:    Please see H&P for details. Past Medical History:   Diagnosis Date    History of vascular access device 10/07/2022    [facility] [place] VAT: PICC placement R Cephalic length 40 cm for reliable access/TPN arm circ 35.5 cm     Prior to Admission medications    Medication Sig Start Date End Date Taking? Authorizing Provider   atorvastatin (LIPITOR) 10 mg tablet Take 10 mg by mouth daily. Yes Provider, Historical   furosemide (LASIX) 20 mg tablet Take 20 mg by mouth daily. Yes Provider, Historical   traZODone (DESYREL) 50 mg tablet Take 125 mg by mouth nightly. Yes Provider, Historical   levothyroxine (SYNTHROID) 88 mcg tablet Take 88 mcg by mouth Daily (before breakfast). Yes Provider, Historical   OTHER,NON-FORMULARY, ESTROGEN(5050)/TESTOSTERONE (HRT) 1.5mg/10mg/ml Cream APPLY 1 ML TO SKIN (INNER WRIST/ABDOMEN/THIGHS EVERY DAY. ROTATE SITES   Yes Provider, Historical   OTHER,NON-FORMULARY, Progesterone (ME4M) 250 mg at bedtime.    Yes Provider, Historical     Vitals:    10/13/22 1525 10/13/22 1953 10/14/22 0140 10/14/22 0525   BP: (!) 135/57 (!) 147/82 124/73 135/71   Pulse: 94 97 (!) 103 95   Resp: 16 16 16 16   Temp: 98.2 °F (36.8 °C) 98.5 °F (36.9 °C) 99.5 °F (37.5 °C) 98.6 °F (37 °C)   SpO2: 96% 97% 92% 93%   Weight:       Height:         Lab Results   Component Value Date/Time    WBC 11.0 10/13/2022 03:24 AM    HGB 10.8 (L) 10/13/2022 03:24 AM    HCT 33.7 (L) 10/13/2022 03:24 AM    PLATELET 222 (H) 94/89/3113 03:24 AM    MCV 92.6 10/13/2022 03:24 AM     Lab Results   Component Value Date/Time    Sodium 144 10/13/2022 03:24 AM    Potassium 3.1 (L) 10/13/2022 03:24 AM    Chloride 110 (H) 10/13/2022 03:24 AM    CO2 29 10/13/2022 03:24 AM    Anion gap 5 10/13/2022 03:24 AM    Glucose 104 (H) 10/13/2022 03:24 AM    BUN 20 10/13/2022 03:24 AM    Creatinine 0.69 10/13/2022 03:24 AM    BUN/Creatinine ratio 29 (H) 10/13/2022 03:24 AM    GFR est AA >60 10/03/2022 01:13 AM    GFR est non-AA >60 10/03/2022 01:13 AM    Calcium 9.0 10/13/2022 03:24 AM    Bilirubin, total 0.6 10/12/2022 09:20 AM    Alk. phosphatase 67 10/12/2022 09:20 AM    Protein, total 6.8 10/12/2022 09:20 AM    Albumin 3.0 (L) 10/12/2022 09:20 AM    Globulin 3.8 10/12/2022 09:20 AM    A-G Ratio 0.8 (L) 10/12/2022 09:20 AM    ALT (SGPT) 31 10/12/2022 09:20 AM    AST (SGOT) 29 10/12/2022 09:20 AM     No results found for: VALF2, VALAC, VALP, VALPR, DS6, CRBAM, CRBAMP, CARB2, XCRBAM  No results found for: LITHM  RADIOLOGY REPORTS:(reviewed/updated 10/14/2022)  MRI BRAIN WO CONT    Result Date: 9/28/2022  CLINICAL HISTORY: AMS of unknown etiology. INDICATION: AMS of unknown etiology. COMPARISON: 9/27/2022 TECHNIQUE: MR examination of the brain includes axial and sagittal T1, coronal T2, axial T2, axial FLAIR, axial gradient echo, axial DWI. CONTRAST: None FINDINGS: IACs are symmetric in size. There is a punctate focus of acute infarction in the right frontal cortex. Additional probable focus of cortical infarction anteriorly right frontal lobe. The brain architecture is normal. There is no evidence of midline shift or mass-effect. There are no extra-axial fluid collections. There is no Chiari or syrinx. The pituitary and infundibulum are grossly unremarkable. There is no skull base mass. Cerebellopontine angles are grossly unremarkable. The major intracranial vascular flow-voids are unremarkable. The cavernous sinuses are symmetric. Optic chiasm and infundibulum grossly unremarkable. Orbits are grossly symmetric. Dural venous sinuses are grossly patent. The mastoid air cells are well pneumatized and clear.     Small cortical foci of acute infarction right frontal lobe posteriorly abutting the central sulcus and cortically based anteriorly in the right frontal lobe. There is no associated hemorrhage, midline shift or mass effect. No additional acute intracranial process. MRI BRAIN W WO CONT    Result Date: 10/2/2022  EXAM:  MRI BRAIN W WO CONT INDICATION:    acute encephalopathy COMPARISON:  9/20/2022. CONTRAST: 20 ml of ProHance. TECHNIQUE:  Multiplanar multisequence acquisition without and with contrast of the brain. FINDINGS: The ventricles are normal in size and position. 2 small foci of cortical diffusion restriction in the right frontal lobe without significant change. No new areas of ischemia present. There is associated FLAIR hyperintensity. There is no cerebellar tonsillar herniation. Expected arterial flow-voids are present. No evidence of abnormal enhancement. Small amount of fluid in the bilateral mastoid air cells. Intubated. Air-fluid level in the right maxillary sinus with fluid in the nasopharynx. The orbital contents are within normal limits. No significant osseous or scalp lesions are identified. 2 small foci of diffusion restriction in the right frontal lobe are unchanged and may represent small foci of acute ischemia. No abnormal enhancement is identified. CT CHEST WO CONT    Result Date: 9/27/2022  EXAM: CT CHEST WO CONT, CT ABD PELV WO CONT INDICATION: Found unresponsive, respiratory arrest, unclear etiology, family hx aneurysm COMPARISON: None IV CONTRAST: None ORAL CONTRAST: None TECHNIQUE: Thin axial images were obtained through the chest, abdomen and pelvis. Coronal and sagittal reformats were generated. CT dose reduction was achieved through use of a standardized protocol tailored for this examination and automatic exposure control for dose modulation. FINDINGS: An endotracheal tube is in appropriate position. A nasogastric tube is seen looped within the stomach. CHEST WALL: No mass or axillary lymphadenopathy. THYROID: No nodule. MEDIASTINUM: No mass or lymphadenopathy. ELBA: No mass or lymphadenopathy.  THORACIC AORTA: No dissection or aneurysm. MAIN PULMONARY ARTERY: Normal in caliber. TRACHEA/BRONCHI: Patent. ESOPHAGUS: No wall thickening or dilatation. HEART: Normal in size. PLEURA: No effusion or pneumothorax. LUNGS: There is moderate bibasilar atelectasis. LIVER: No mass. BILIARY TREE: Status post cholecystectomy. CBD is not dilated. SPLEEN: within normal limits. PANCREAS: No mass or ductal dilatation. ADRENALS: Unremarkable. KIDNEYS: No mass, calculus, or hydronephrosis. STOMACH: Unremarkable. SMALL BOWEL: No dilatation or wall thickening. COLON: No dilatation or wall thickening. APPENDIX: Unremarkable PERITONEUM: No ascites or pneumoperitoneum. RETROPERITONEUM: No lymphadenopathy or aortic aneurysm. REPRODUCTIVE ORGANS: Unremarkable. URINARY BLADDER: Decompressed by Sal catheter. BONES: No destructive bone lesion. ABDOMINAL WALL: No mass or hernia. ADDITIONAL COMMENTS: N/A     1. Moderate bibasilar atelectasis. 2. Status post cholecystectomy. 3. Endotracheal and nasogastric tubes in place. Sal catheter decompresses the bladder. CT ABD PELV WO CONT    Result Date: 9/27/2022  EXAM: CT CHEST WO CONT, CT ABD PELV WO CONT INDICATION: Found unresponsive, respiratory arrest, unclear etiology, family hx aneurysm COMPARISON: None IV CONTRAST: None ORAL CONTRAST: None TECHNIQUE: Thin axial images were obtained through the chest, abdomen and pelvis. Coronal and sagittal reformats were generated. CT dose reduction was achieved through use of a standardized protocol tailored for this examination and automatic exposure control for dose modulation. FINDINGS: An endotracheal tube is in appropriate position. A nasogastric tube is seen looped within the stomach. CHEST WALL: No mass or axillary lymphadenopathy. THYROID: No nodule. MEDIASTINUM: No mass or lymphadenopathy. ELBA: No mass or lymphadenopathy. THORACIC AORTA: No dissection or aneurysm. MAIN PULMONARY ARTERY: Normal in caliber. TRACHEA/BRONCHI: Patent.  ESOPHAGUS: No wall thickening or dilatation. HEART: Normal in size. PLEURA: No effusion or pneumothorax. LUNGS: There is moderate bibasilar atelectasis. LIVER: No mass. BILIARY TREE: Status post cholecystectomy. CBD is not dilated. SPLEEN: within normal limits. PANCREAS: No mass or ductal dilatation. ADRENALS: Unremarkable. KIDNEYS: No mass, calculus, or hydronephrosis. STOMACH: Unremarkable. SMALL BOWEL: No dilatation or wall thickening. COLON: No dilatation or wall thickening. APPENDIX: Unremarkable PERITONEUM: No ascites or pneumoperitoneum. RETROPERITONEUM: No lymphadenopathy or aortic aneurysm. REPRODUCTIVE ORGANS: Unremarkable. URINARY BLADDER: Decompressed by Sal catheter. BONES: No destructive bone lesion. ABDOMINAL WALL: No mass or hernia. ADDITIONAL COMMENTS: N/A     1. Moderate bibasilar atelectasis. 2. Status post cholecystectomy. 3. Endotracheal and nasogastric tubes in place. Sal catheter decompresses the bladder. XR CHEST PORT    Result Date: 10/7/2022  EXAM:  XR CHEST PORT INDICATION: Verify PICC Tip placement please COMPARISON: One day prior. TECHNIQUE: Single frontal view of the chest. FINDINGS: Right upper extremity PICC line with tip projecting over the right atrium. Existing right IJ catheter unchanged with tip also projecting over the right atrium. Endotracheal tube terminates 2 cm from the lópez. Enteric tube terminates below the level the diaphragm outside the field of view study. Slightly improved aeration of the lung bases. Possible trace residual effusions. No visualized pneumothorax. . Lungs are hypoventilatory. 1.  Right upper extremity PICC line tip projects over the right atrium. No visualized pneumothorax. XR CHEST PORT    Result Date: 10/6/2022  EXAM:  XR CHEST PORT INDICATION: Ventilated patient. Bilateral pneumonia. COMPARISON: 10/3/2022 TECHNIQUE: Semiupright portable chest AP view FINDINGS: Endotracheal tube is 2.9 cm above the lópez. Other tubes and lines are stable.  Cardiac monitoring leads. Lung volumes remain low. Heart size enlarged. Mild interstitial and airspace opacities bilaterally with some improvement in the interval. Possible small right pleural effusion which may be new. There may be a small left pleural effusion as well. The visualized bones and upper abdomen are age-appropriate. Overall improvement in interstitial and airspace opacities bilaterally with some residual. Small right pleural effusion and possible small left pleural effusion. XR CHEST PORT    Result Date: 10/3/2022  EXAM: XR CHEST PORT INDICATION: fever COMPARISON: 9/27/2022 FINDINGS: A portable AP radiograph of the chest was obtained at 1502 hours. The patient is on a cardiac monitor. The NG tube extends below the hemidiaphragm. Endotracheal tube terminates FPC between the thoracic inlet and lópez. Right IJ line terminates at the cavoatrial junction. The cardiac and mediastinal contours and pulmonary vascularity are stable. Patchy bilateral airspace disease is noted in both perihilar locations and there is a small left pleural effusion. Bilateral perihilar pneumonia versus subsegmental atelectasis. Small left pleural effusion    XR CHEST PORT    Result Date: 9/27/2022  Indication: Line placement. Comparison to earlier the same day. Portable exam obtained at 1223 demonstrates placement of a right jugular catheter with the tip projecting over the right atrium. There is no pneumothorax. Right jugular catheter satisfactory position without pneumothorax. XR CHEST PORT    Result Date: 9/27/2022  EXAM: XR CHEST PORT INDICATION: PLACEMENT OF CENTRAL LINE ij COMPARISON: 9/27/2022 FINDINGS: A portable AP radiograph of the chest was obtained at 1442 hours. New right IJ central venous catheter with the tip in the distal SVC. No pneumothorax. Endotracheal tube is stable. Nasogastric tube is coiled in the stomach. . The lungs are clear.  The cardiac and mediastinal contours and pulmonary vascularity are normal.  The bones and soft tissues are grossly within normal limits. Status post right IJ central venous catheter placement without evidence of pneumothorax. XR CHEST PORT    Result Date: 9/27/2022  INDICATION:  repositioned ETT EXAM: Chest single view. COMPARISON: 1151 hours. FINDINGS: A single frontal view of the chest at 1231 hours shows ET tube now appearing with its tip 2.8 cm above the lópez. Lung volumes are moderate with bibasilar atelectasis stable, left greater than right. .  The heart, mediastinum and pulmonary vasculature are upper normal. .  The bony thorax is unremarkable for age. . A radiopaque tube projecting over the mediastinum on the prior study has been removed. ET tube as described. Gastric tube removed. Bibasilar atelectasis and low lung volumes. .  . XR CHEST PORT    Result Date: 9/27/2022  EXAM: XR CHEST PORT INDICATION: Intubation COMPARISON: None. FINDINGS: A portable AP radiograph of the chest was obtained at 1151 hours. The patient is on a cardiac monitor. The lungs are clear. The cardiac and mediastinal contours and pulmonary vascularity are normal. The endotracheal tube terminates at the thoracic inlet. The NG tube tip is at the level of the midesophagus. The bones and soft tissues are grossly within normal limits. Stomach is distended. Borderline high position of endotracheal tube High position of the NG tube. Distended stomach. XR ABD PORT  1 V    Result Date: 10/12/2022  INDICATION: vomiting EXAMINATION:  ABDOMEN KUB COMPARISON: CT September 27, 2022 FINDINGS: AP radiograph of the abdomen demonstrates a nonobstructive bowel gas pattern. Gaseous distention of the stomach. Moderate amount of colonic stool. No abnormal calcifications are identified. The osseous structures are normal.     Moderate gaseous distention of the stomach and moderate amount of colonic stool, with no evidence of bowel obstruction.      IR INSERT NON TUNL CVC OVER 5 YRS    Result Date: 9/28/2022  Clinical Data: A 59year-old patient presents for double-lumen non-tunneled hemodialysis catheter placement at bedside. Date: 9/27/2022 : Thony Crawford M.D. Estimated Blood Loss: Minimal. Specimens Removed: None. Complications: None. Technique: Informed verbal and written consent was obtained following discussion of risks, benefits and alternatives to this procedure. The patient was positioned supine on the bed. The patient's right neck and chest were then prepped and draped in normal sterile fashion. A timeout was performed to ensure proper patient, procedure and site. Ultrasound was used to image the right neck. The right IJ was shown to be patent. A small amount of 1% lidocaine was injected subcutaneously at the site of vascular access. Using real-time ultrasound guidance, the needle was advanced into the vessel. A hard copy image was stored on PACS for documentation. A wire was advanced into the needle, a short incision was made at the puncture site, and serial dilatation was performed. A 14 [place] 20 cm non-tunneled hemodialysis catheter was advanced into the central veins. The catheter flushed and aspirated appropriately. The lumens were blocked with heparin. The catheter was secured at the exit site with silk suture and a sterile dressing. The patient tolerated the procedure well. There were no immediate complications. Post procedure chest x-ray shows the catheter is positioned in the right atrium. Successful placement of a double-lumen non-tunneled hemodialysis catheter. XR US GUIDED VASCULAR ACCESS    Result Date: 10/7/2022  INDICATION:   NO VENOUS ACCESS  EXAMINATION:  US GUIDE VAS ACCESS FINDINGS: Ultrasound was provided for PICC line placement. Ultrasound guidance for PICC line  placement. GBP    CT CODE NEURO HEAD WO CONTRAST    Result Date: 9/27/2022  EXAM: CT CODE NEURO HEAD WO CONTRAST INDICATION: Left gaze COMPARISON: None. CONTRAST: None.  TECHNIQUE: Unenhanced CT of the head was performed using 5 mm images. Brain and bone windows were generated. Coronal and sagittal reformats. CT dose reduction was achieved through use of a standardized protocol tailored for this examination and automatic exposure control for dose modulation. FINDINGS: The ventricles and sulci are normal in size, shape and configuration. . There is no significant white matter disease. There is no intracranial hemorrhage, extra-axial collection, or mass effect. The basilar cisterns are open. No CT evidence of acute infarct. The bone windows demonstrate no abnormalities. The visualized portions of the paranasal sinuses and mastoid air cells are clear. No acute intracranial process seen     ECHO ADULT COMPLETE    Result Date: 9/27/2022    Left Ventricle: Severely reduced left ventricular systolic function with a visually estimated EF of 25 - 30%. Left ventricle size is normal. Mildly increased wall thickness. See diagram for wall motion findings. Abnormal diastolic function. Right Ventricle: Moderately reduced systolic function. ECHO ADULT FOLLOW-UP OR LIMITED    Result Date: 10/11/2022    Left Ventricle: Normal left ventricular systolic function with a visually estimated EF of 55 - 60%. Left ventricle size is normal. Mildly increased wall thickness. Unable to assess wall motion. Right Ventricle: Not well visualized. Right ventricle size is normal. Normal wall thickness.      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    MENTAL STATUS EXAM:    General appearance:  moderately  groomed, psychomotor activity is   Eye contact: good eye contact  Speech: soft  Affect : restless  Mood: \"great \"  Thought Process: confused  Perception: patient did not respond  Thought Content: denies SI or Plan  Insight: Poor  Judgement: poor  Cognition: Impaired    ASSESSMENT AND PLAN:  Sebas Muhammad meets criteria for a diagnosis of depression unspecified, anxiety disorder, Delirium unspecified  Based on my assessment, patient is still very confused and disoriented at this time. Her delirious is probably multifactorial. Patient is requesting to leave. Due to safety concerns, would recommend to contact [facility] for TDO assessment. Thank your your consult. Please feel free to consult us again as needed.

## 2022-10-14 NOTE — PROGRESS NOTES
Problem: Delirium Treatment  Goal: *Level of consciousness restored to baseline  Outcome: Progressing Towards Goal  Goal: *Level of environmental perceptions restored to baseline  Outcome: Progressing Towards Goal  Goal: *Sensory perception restored to baseline  Outcome: Progressing Towards Goal  Goal: *Emotional stability restored to baseline  Outcome: Progressing Towards Goal  Goal: *Functional assessment restored to baseline  Outcome: Progressing Towards Goal  Goal: *Absence of falls  Outcome: Progressing Towards Goal  Goal: *Will remain free of delirium, CAM Score negative  Outcome: Progressing Towards Goal  Goal: *Cognitive status will be restored to baseline  Outcome: Progressing Towards Goal  Goal: Interventions  Outcome: Progressing Towards Goal     Problem: Falls - Risk of  Goal: *Absence of Falls  Description: Document Chyna Fall Risk and appropriate interventions in the flowsheet. Outcome: Progressing Towards Goal  Note: Fall Risk Interventions:  Mobility Interventions: Bed/chair exit alarm    Mentation Interventions: Bed/chair exit alarm, Adequate sleep, hydration, pain control    Medication Interventions: Bed/chair exit alarm    Elimination Interventions: Bed/chair exit alarm    History of Falls Interventions: Bed/chair exit alarm         Problem: Pressure Injury - Risk of  Goal: *Prevention of pressure injury  Description: Document Jose Enrique Scale and appropriate interventions in the flowsheet.   Outcome: Progressing Towards Goal  Note: Pressure Injury Interventions:  Sensory Interventions: Assess need for specialty bed    Moisture Interventions: Absorbent underpads    Activity Interventions: PT/OT evaluation    Mobility Interventions: HOB 30 degrees or less    Nutrition Interventions: Document food/fluid/supplement intake    Friction and Shear Interventions: Apply protective barrier, creams and emollients, Feet elevated on foot rest, Foam dressings/transparent film/skin sealants, HOB 30 degrees or less

## 2022-10-14 NOTE — PROGRESS NOTES
KODI:   1) Placement  2) Pt will need insurance auth- will need updated PT/OT notes until obtained   3) Pt will need [facility] transportation at time of discharge   4) Risk of readmission- 13% MODERATE     Hospital Day 16:  5:45 PM- Pt's Mom approached CM inquiring if the sons had changed their mind- CM advised her that they wish to continue to make medical decisions and act as LNOK. Pt's Mom very visibly upset- stating she is leaving and not answering their phone call for the family meeting tonight (she got an invite on the phone). CM attempted to provide active listening and support (with RN Lead GAETANO Garcia nearby for reassurance) but she continued to walk away. She is aware that CM is not here this weekend but will follow up on Monday and that fellow CMs are available for urgent matters as pt will be here through the weekend. 5:21 PM- CM had lengthy conversation with Tobi Hernandez and Karly Ellis (supervisor with [facility])- basically per conversation they can complete a full evaluation but believe from the clinicals pt is not appropriate for a TDO for the following reasons:   -This was likely a failed suicide attempt with lasting anoxic brain injury. Pt is in need of TBI rehab and once able to gain back more mentation would benefit from updated psych evaluation (which rehab can consult) and likely outpatient follow up. -They suggest an updated Neuro Consult to support this as well as ongoing Psych Consult with the knowledge this is pt's new baseline. CM updated attending and RN. Pt's family aware pt will be here through the weekend. CM will continue to follow and assist as needed. 3:32 PM- Spoke with Parth Wise with [facility] and Christine with [facility]- they both are not able to accept pt at this time due to the recommendation for inpatient psych.     CM had extensive conversation with son Bigg and Pelon Frank- other son was not available (who contacted CM) AT THIS TIME THEY DO NOT WISH TO DEFER DECISION MAKING CAPABILITIES. Together they firmly believe pt's Mom is in denial that this was mental health and is more concerned about perception from others- pt's sons all on the same page (they spoke with their youngest brother) and \"want to do what is right for Mom and get her the help she needs\" Pt's sons understand this is likely her new baseline. They are going to have a family meeting tonight to discuss long term plans and goals of care moving forward. CM provided active listening and support and will continue to follow and assist as needed. 3:12 PM- Spoke with [place] again- she is updating her supervisor to assist but does not feel as though they could get a full assessment as pt is not oriented. Attending spoke with pt's son Isidro Nielsen- he wishes to keep decision making capabilities and will consult his brothers to confirm. CM and attending notified pt's Mom and pt's Dad- both visably upset. CM reassured both of them that they can continue to be involved in her care and that this just means that they make big and ultimate decisions- that we can still keep them included. 2:50 PM- CM and CM leadership GAETANO Ricci and ERNST Melissa met with pt's Mom and pt's Dad in the ying- had an extensive conversation regarding psych consult, guardianship and rehab. Attending notified that per pt's Mom- pt's sons wish to defer [facility] decision making- attending is reaching out to the sons to confirm- once all 3 sons defer then pt's parents can make the decisions on her behalf. 1:34 PM- CM noted Psych Consult and rec for TDO. GERMAN SINGLETON initiated [facility] to do evaluation. HANK working with Liliana with [facility] to complete an assessment. 11:30 PM- CM had a lengthy conversation and collaboration with GERMAN Pagan. Plan is for them to re-evaluate patient for a TDO. 10:02 AM- Pt remains on Ortho- per attending pt is medically stable- just awaiting psych determination.  CM reached out to psych NP- left a HIPPA compliant VM, waiting on a return phone call. CM also reached out to [facility] for updates. Pt's Mom approached CM in the hallway about completing POA paperwork- CM advised pt's Mom that Psych would have to make the decision on if she is competent to make those decisions. Mom voiced understanding- states no further questions or concerns at this time.     Rex Walsh, MSW, [address]

## 2022-10-15 PROCEDURE — 51798 US URINE CAPACITY MEASURE: CPT

## 2022-10-15 PROCEDURE — 74011250637 HC RX REV CODE- 250/637: Performed by: INTERNAL MEDICINE

## 2022-10-15 PROCEDURE — 74011250637 HC RX REV CODE- 250/637: Performed by: PSYCHIATRY & NEUROLOGY

## 2022-10-15 PROCEDURE — 65270000029 HC RM PRIVATE

## 2022-10-15 PROCEDURE — 74011250636 HC RX REV CODE- 250/636: Performed by: NURSE PRACTITIONER

## 2022-10-15 PROCEDURE — 74011000250 HC RX REV CODE- 250: Performed by: INTERNAL MEDICINE

## 2022-10-15 PROCEDURE — 74011250637 HC RX REV CODE- 250/637: Performed by: NURSE PRACTITIONER

## 2022-10-15 RX ORDER — RISPERIDONE 0.25 MG/1
1 TABLET, FILM COATED ORAL
Status: DISCONTINUED | OUTPATIENT
Start: 2022-10-15 | End: 2022-10-19

## 2022-10-15 RX ADMIN — PANTOPRAZOLE SODIUM 40 MG: 40 TABLET, DELAYED RELEASE ORAL at 06:46

## 2022-10-15 RX ADMIN — POTASSIUM BICARBONATE 20 MEQ: 391 TABLET, EFFERVESCENT ORAL at 10:26

## 2022-10-15 RX ADMIN — SENNOSIDES AND DOCUSATE SODIUM 1 TABLET: 50; 8.6 TABLET ORAL at 10:26

## 2022-10-15 RX ADMIN — RISPERIDONE 1 MG: 0.25 TABLET ORAL at 22:08

## 2022-10-15 RX ADMIN — ENOXAPARIN SODIUM 30 MG: 100 INJECTION SUBCUTANEOUS at 10:27

## 2022-10-15 RX ADMIN — Medication 10 ML: at 22:09

## 2022-10-15 RX ADMIN — LEVOTHYROXINE SODIUM 88 MCG: 0.09 TABLET ORAL at 06:46

## 2022-10-15 RX ADMIN — ASPIRIN 81 MG: 81 TABLET, CHEWABLE ORAL at 10:26

## 2022-10-15 RX ADMIN — Medication 10 ML: at 14:00

## 2022-10-15 RX ADMIN — METOPROLOL TARTRATE 12.5 MG: 25 TABLET, FILM COATED ORAL at 22:08

## 2022-10-15 RX ADMIN — METOPROLOL TARTRATE 12.5 MG: 25 TABLET, FILM COATED ORAL at 10:26

## 2022-10-15 RX ADMIN — Medication 5 ML: at 06:00

## 2022-10-15 RX ADMIN — POTASSIUM BICARBONATE 20 MEQ: 391 TABLET, EFFERVESCENT ORAL at 18:48

## 2022-10-15 RX ADMIN — ATORVASTATIN CALCIUM 40 MG: 20 TABLET, FILM COATED ORAL at 22:09

## 2022-10-15 RX ADMIN — ROPINIROLE HYDROCHLORIDE 0.5 MG: 0.25 TABLET, FILM COATED ORAL at 00:26

## 2022-10-15 RX ADMIN — ENOXAPARIN SODIUM 30 MG: 100 INJECTION SUBCUTANEOUS at 22:07

## 2022-10-15 NOTE — PROGRESS NOTES
Bedside and Verbal shift change report given to [facility] CLEMENT DEL CASTILLO RN (oncoming nurse) by Mckenna Aldridge RN (offgoing nurse). Report included the following information SBAR, Kardex, ED Summary, MAR, Recent Results, Alarm Parameters , and Quality Measures.

## 2022-10-15 NOTE — PROGRESS NOTES
Bedside shift change report given to Judith (oncoming nurse) by Corazon Ortega (offgoing nurse). Report included the following information SBAR, Kardex, Procedure Summary, Intake/Output, MAR, and Recent Results.

## 2022-10-15 NOTE — PROGRESS NOTES
Dominguez Hobbs [facility] [address]  [address] [facility], [address] [place] [address]  [phone]      Hospitalist Progress Note      NAME: Gloria Tomas   :  1965  MRM:  [id number]    Date/Time of service: 10/15/2022  11:27 AM       Subjective:     Chief Complaint:  Patient was personally seen and examined by me during this time period. Chart reviewed. Remains confused, but not agitated. Attempted to answer questions       Objective:       Vitals:       Last 24hrs VS reviewed since prior progress note. Most recent are:    Visit Vitals  /74 (BP 1 Location: Left upper arm, BP Patient Position: At rest)   Pulse (!) 122   Temp 98.2 °F (36.8 °C)   Resp 14   Ht 5' 6\" (1.676 m)   Wt 104.3 kg (230 lb)   SpO2 93%   BMI 37.12 kg/m²     SpO2 Readings from Last 6 Encounters:   10/15/22 93%    O2 Flow Rate (L/min): 2 l/min     Intake/Output Summary (Last 24 hours) at 10/15/2022 1127  Last data filed at 10/15/2022 0902  Gross per 24 hour   Intake 118 ml   Output 1400 ml   Net -1282 ml          Exam:     Physical Exam:    Gen:  Well-developed, well-nourished, obese, in no acute distress  HEENT:  Pink conjunctivae, PERRL, hearing intact to voice, moist mucous membranes  Neck:  Supple, without masses, thyroid non-tender  Resp:  No accessory muscle use, scattered rhochi   Card:  No murmurs, normal S1, S2 without thrills, bruits or peripheral edema  Abd:  Soft, non-tender, non-distended, normoactive bowel sounds are present  Musc:  No cyanosis or clubbing  Skin:  No rashes   Neuro:  Cranial nerves 3-12 are grossly intact, follows commands appropriately  Psych:  poor insight, oriented to person.   No SI/HI     Medications Reviewed: (see below)    Lab Data Reviewed: (see below)    ______________________________________________________________________    Medications:     Current Facility-Administered Medications   Medication Dose Route Frequency    pantoprazole (PROTONIX) tablet 40 mg  40 mg Oral ACB    rOPINIRole (REQUIP) tablet 0.5 mg  0.5 mg Oral TID PRN    traMADoL (ULTRAM) tablet 50 mg  50 mg Oral Q6H PRN    risperiDONE (RisperDAL) tablet 0.5 mg  0.5 mg Oral QHS    potassium bicarb-citric acid (EFFER-K) tablet 20 mEq  20 mEq Oral BID    levothyroxine (SYNTHROID) tablet 88 mcg  88 mcg Oral DAILY    metoprolol tartrate (LOPRESSOR) tablet 12.5 mg  12.5 mg Oral Q12H    lidocaine (XYLOCAINE) 2 % jelly   Mouth/Throat PRN    prochlorperazine (COMPAZINE) injection 10 mg  10 mg IntraVENous Q6H PRN    alteplase (CATHFLO) 2 mg in sterile water (preservative free) 2 mL injection  2 mg InterCATHeter PRN    senna-docusate (PERICOLACE) 8.6-50 mg per tablet 1 Tablet  1 Tablet Oral BID    atorvastatin (LIPITOR) tablet 40 mg  40 mg Oral QHS    enoxaparin (LOVENOX) injection 30 mg  30 mg SubCUTAneous Q12H    aspirin chewable tablet 81 mg  81 mg Oral DAILY    sodium chloride (NS) flush 5-40 mL  5-40 mL IntraVENous Q8H    sodium chloride (NS) flush 5-40 mL  5-40 mL IntraVENous PRN    acetaminophen (TYLENOL) tablet 650 mg  650 mg Oral Q6H PRN    polyethylene glycol (MIRALAX) packet 17 g  17 g Oral DAILY PRN    ondansetron (ZOFRAN) injection 4 mg  4 mg IntraVENous Q6H PRN          Lab Review:     Recent Labs     10/13/22  0324   WBC 11.0   HGB 10.8*   HCT 33.7*   *       Recent Labs     10/13/22  0324      K 3.1*   *   CO2 29   *   BUN 20   CREA 0.69   CA 9.0   MG 2.4   PHOS 3.0       Lab Results   Component Value Date/Time    Glucose (POC) 110 10/05/2022 04:55 PM    Glucose (POC) 87 10/05/2022 11:02 AM    Glucose (POC) 78 10/05/2022 05:00 AM    Glucose (POC) 94 10/05/2022 04:57 AM    Glucose (POC) 124 (H) 10/04/2022 11:03 PM          Assessment / Plan:     60 yo hx of hypothyroid, depression, presented w/ AMS, resp failure, shock, Takotsubo CM EF 25-30%. Patient was intubated on admission and extubated 10/10.   Hospital course complicated by persistent confusion    1) Acute met encephalopathy/delirium: persistent, not improving, unclear etiology. Likely new dementia vs underlying psych disorder. Head MRI with small diffusion restriction in right frontal lobe of unclear significant. EEG neg for seizure. No further workup by Neurology. Cont empiric ASA and statin for possible CVA. Will change seroquel to night time risperdal.  Needs inpatient psych, but complicated by debility and poor ADLs    2) Takotsubo CM : initial echo with EF 25-30%, but repeat with EF 55%. Unable to tolerate ACEi due to low BP. Cont metoprolol. Cards was following    3) Pneumonia/UTI/septic shock: resp Cx and urine Cx grew E. Coli. S/p course of IV Zosyn. Repeat U/A on 10/14 negative     4) Acute resp failure: now resolved. Due to pneumonia, confusion. Was intubated on admission and extubated 10/10    5) Acute urine retention: due to prolonged hospitalization, AMS. Will cont montilla. Urology was following    6) Debility: cont PT/OT. Awaiting Rehab vs inpatient psych    7) Social: patient's 3 sons are POAs. Her parents live here in [place].   Sons do not want to relinquish POA status at this time    Total time spent with patient care: 45 min  **I personally saw and examined the patient during this time period**                 Care Plan discussed with: Patient, nursing, mother, son, CM     Discussed:  Care Plan    Prophylaxis:  Lovenox    Disposition:  [facility] vs inpatient psych            ___________________________________________________    Attending Physician: Ray Fritz MD

## 2022-10-16 LAB
ANION GAP SERPL CALC-SCNC: 7 MMOL/L (ref 5–15)
BACTERIA SPEC CULT: NORMAL
BUN SERPL-MCNC: 13 MG/DL (ref 6–20)
BUN/CREAT SERPL: 17 (ref 12–20)
CALCIUM SERPL-MCNC: 9.3 MG/DL (ref 8.5–10.1)
CHLORIDE SERPL-SCNC: 105 MMOL/L (ref 97–108)
CO2 SERPL-SCNC: 28 MMOL/L (ref 21–32)
CREAT SERPL-MCNC: 0.77 MG/DL (ref 0.55–1.02)
ERYTHROCYTE [DISTWIDTH] IN BLOOD BY AUTOMATED COUNT: 14.5 % (ref 11.5–14.5)
GLUCOSE SERPL-MCNC: 129 MG/DL (ref 65–100)
HCT VFR BLD AUTO: 35.3 % (ref 35–47)
HGB BLD-MCNC: 11.3 G/DL (ref 11.5–16)
MAGNESIUM SERPL-MCNC: 2.3 MG/DL (ref 1.6–2.4)
MCH RBC QN AUTO: 29 PG (ref 26–34)
MCHC RBC AUTO-ENTMCNC: 32 G/DL (ref 30–36.5)
MCV RBC AUTO: 90.5 FL (ref 80–99)
NRBC # BLD: 0 K/UL (ref 0–0.01)
NRBC BLD-RTO: 0 PER 100 WBC
PHOSPHATE SERPL-MCNC: 2.8 MG/DL (ref 2.6–4.7)
PLATELET # BLD AUTO: 578 K/UL (ref 150–400)
PMV BLD AUTO: 11.3 FL (ref 8.9–12.9)
POTASSIUM SERPL-SCNC: 3.3 MMOL/L (ref 3.5–5.1)
RBC # BLD AUTO: 3.9 M/UL (ref 3.8–5.2)
SERVICE CMNT-IMP: NORMAL
SODIUM SERPL-SCNC: 140 MMOL/L (ref 136–145)
WBC # BLD AUTO: 9 K/UL (ref 3.6–11)

## 2022-10-16 PROCEDURE — 65270000029 HC RM PRIVATE

## 2022-10-16 PROCEDURE — 74011000250 HC RX REV CODE- 250: Performed by: INTERNAL MEDICINE

## 2022-10-16 PROCEDURE — 80048 BASIC METABOLIC PNL TOTAL CA: CPT

## 2022-10-16 PROCEDURE — 74011250637 HC RX REV CODE- 250/637: Performed by: PSYCHIATRY & NEUROLOGY

## 2022-10-16 PROCEDURE — 74011250637 HC RX REV CODE- 250/637: Performed by: INTERNAL MEDICINE

## 2022-10-16 PROCEDURE — 74011250637 HC RX REV CODE- 250/637: Performed by: NURSE PRACTITIONER

## 2022-10-16 PROCEDURE — 83735 ASSAY OF MAGNESIUM: CPT

## 2022-10-16 PROCEDURE — 85027 COMPLETE CBC AUTOMATED: CPT

## 2022-10-16 PROCEDURE — 84100 ASSAY OF PHOSPHORUS: CPT

## 2022-10-16 PROCEDURE — 36415 COLL VENOUS BLD VENIPUNCTURE: CPT

## 2022-10-16 PROCEDURE — 74011250636 HC RX REV CODE- 250/636: Performed by: NURSE PRACTITIONER

## 2022-10-16 RX ADMIN — ATORVASTATIN CALCIUM 40 MG: 20 TABLET, FILM COATED ORAL at 21:20

## 2022-10-16 RX ADMIN — Medication 1 CAPSULE: at 09:05

## 2022-10-16 RX ADMIN — SENNOSIDES AND DOCUSATE SODIUM 1 TABLET: 50; 8.6 TABLET ORAL at 21:20

## 2022-10-16 RX ADMIN — Medication 10 ML: at 05:19

## 2022-10-16 RX ADMIN — ACETAMINOPHEN 650 MG: 325 TABLET, FILM COATED ORAL at 15:50

## 2022-10-16 RX ADMIN — METOPROLOL TARTRATE 12.5 MG: 25 TABLET, FILM COATED ORAL at 09:04

## 2022-10-16 RX ADMIN — LEVOTHYROXINE SODIUM 88 MCG: 0.09 TABLET ORAL at 05:19

## 2022-10-16 RX ADMIN — Medication 10 ML: at 21:29

## 2022-10-16 RX ADMIN — Medication 10 ML: at 12:10

## 2022-10-16 RX ADMIN — ASPIRIN 81 MG: 81 TABLET, CHEWABLE ORAL at 09:04

## 2022-10-16 RX ADMIN — ENOXAPARIN SODIUM 30 MG: 100 INJECTION SUBCUTANEOUS at 09:04

## 2022-10-16 RX ADMIN — RISPERIDONE 1 MG: 0.25 TABLET ORAL at 21:20

## 2022-10-16 RX ADMIN — POTASSIUM BICARBONATE 20 MEQ: 391 TABLET, EFFERVESCENT ORAL at 09:04

## 2022-10-16 RX ADMIN — POTASSIUM BICARBONATE 20 MEQ: 391 TABLET, EFFERVESCENT ORAL at 17:20

## 2022-10-16 RX ADMIN — ENOXAPARIN SODIUM 30 MG: 100 INJECTION SUBCUTANEOUS at 21:20

## 2022-10-16 RX ADMIN — METOPROLOL TARTRATE 12.5 MG: 25 TABLET, FILM COATED ORAL at 21:20

## 2022-10-16 RX ADMIN — PANTOPRAZOLE SODIUM 40 MG: 40 TABLET, DELAYED RELEASE ORAL at 09:05

## 2022-10-16 NOTE — PROGRESS NOTES
Dominguez Hobbs [facility] [address]  [address] [facility], [address] [place] [address]  [phone]      Hospitalist Progress Note      NAME: Severo Davis   :  1965  MRM:  [id number]    Date/Time of service: 10/16/2022  12:36 PM       Subjective:     Chief Complaint:  Patient was personally seen and examined by me during this time period. Chart reviewed. No issues overnight. Patient's neighbor was at bedside. Spoke to mom        Objective:       Vitals:       Last 24hrs VS reviewed since prior progress note. Most recent are:    Visit Vitals  /66 (BP 1 Location: Left upper arm, BP Patient Position: At rest;Lying right side)   Pulse 98   Temp 98.3 °F (36.8 °C)   Resp 16   Ht 5' 6\" (1.676 m)   Wt 104.3 kg (230 lb)   SpO2 95%   BMI 37.12 kg/m²     SpO2 Readings from Last 6 Encounters:   10/16/22 95%    O2 Flow Rate (L/min): 2 l/min     Intake/Output Summary (Last 24 hours) at 10/16/2022 1236  Last data filed at 10/16/2022 0335  Gross per 24 hour   Intake 100 ml   Output 400 ml   Net -300 ml          Exam:     Physical Exam:    Gen:  Well-developed, well-nourished, obese, in no acute distress  HEENT:  Pink conjunctivae, PERRL, hearing intact to voice, moist mucous membranes  Neck:  Supple, without masses, thyroid non-tender  Resp:  No accessory muscle use, scattered rhochi   Card:  No murmurs, normal S1, S2 without thrills, bruits or peripheral edema  Abd:  Soft, non-tender, non-distended, normoactive bowel sounds are present  Musc:  No cyanosis or clubbing  Skin:  No rashes   Neuro:  Cranial nerves 3-12 are grossly intact, follows commands appropriately  Psych:  poor insight, oriented to person.   No SI/HI     Medications Reviewed: (see below)    Lab Data Reviewed: (see below)    ______________________________________________________________________    Medications:     Current Facility-Administered Medications   Medication Dose Route Frequency    L.acidophilus-paracasei-S.thermophil-bifidobacter (RISAQUAD) 8 billion cell capsule  1 Capsule Oral DAILY    risperiDONE (RisperDAL) tablet 1 mg  1 mg Oral QHS    pantoprazole (PROTONIX) tablet 40 mg  40 mg Oral ACB    rOPINIRole (REQUIP) tablet 0.5 mg  0.5 mg Oral TID PRN    traMADoL (ULTRAM) tablet 50 mg  50 mg Oral Q6H PRN    potassium bicarb-citric acid (EFFER-K) tablet 20 mEq  20 mEq Oral BID    levothyroxine (SYNTHROID) tablet 88 mcg  88 mcg Oral DAILY    metoprolol tartrate (LOPRESSOR) tablet 12.5 mg  12.5 mg Oral Q12H    lidocaine (XYLOCAINE) 2 % jelly   Mouth/Throat PRN    prochlorperazine (COMPAZINE) injection 10 mg  10 mg IntraVENous Q6H PRN    alteplase (CATHFLO) 2 mg in sterile water (preservative free) 2 mL injection  2 mg InterCATHeter PRN    senna-docusate (PERICOLACE) 8.6-50 mg per tablet 1 Tablet  1 Tablet Oral BID    atorvastatin (LIPITOR) tablet 40 mg  40 mg Oral QHS    enoxaparin (LOVENOX) injection 30 mg  30 mg SubCUTAneous Q12H    aspirin chewable tablet 81 mg  81 mg Oral DAILY    sodium chloride (NS) flush 5-40 mL  5-40 mL IntraVENous Q8H    sodium chloride (NS) flush 5-40 mL  5-40 mL IntraVENous PRN    acetaminophen (TYLENOL) tablet 650 mg  650 mg Oral Q6H PRN    polyethylene glycol (MIRALAX) packet 17 g  17 g Oral DAILY PRN    ondansetron (ZOFRAN) injection 4 mg  4 mg IntraVENous Q6H PRN          Lab Review:     Recent Labs     10/16/22  0328   WBC 9.0   HGB 11.3*   HCT 35.3   *       Recent Labs     10/16/22  0328      K 3.3*      CO2 28   *   BUN 13   CREA 0.77   CA 9.3   MG 2.3   PHOS 2.8       Lab Results   Component Value Date/Time    Glucose (POC) 110 10/05/2022 04:55 PM    Glucose (POC) 87 10/05/2022 11:02 AM    Glucose (POC) 78 10/05/2022 05:00 AM    Glucose (POC) 94 10/05/2022 04:57 AM    Glucose (POC) 124 (H) 10/04/2022 11:03 PM          Assessment / Plan:     60 yo hx of hypothyroid, depression, presented w/ AMS, resp failure, shock, Takotsubo CM EF 25-30%.   Patient was intubated on admission and extubated 10/10. Hospital course complicated by persistent confusion    1) Acute met encephalopathy/delirium: persistent, remains the same. Unclear etiology. Likely new dementia vs underlying psych disorder. Head MRI with small diffusion restriction in right frontal lobe of unclear significant. EEG neg for seizure. No further workup by Neurology. Cont empiric ASA and statin for possible CVA. Will cont night time risperdal.  Needs inpatient psych, but complicated by debility and poor ADLs. Might benefit from rehab first     2) Takotsubo CM : initial echo with EF 25-30%, but repeat with EF 55%. Unable to tolerate ACEi due to low BP. Cont metoprolol. Cards was following    3) Pneumonia/UTI/septic shock: resp Cx and urine Cx grew E. Coli. S/p course of IV Zosyn. Repeat U/A on 10/14 negative     4) Acute resp failure: now resolved. Due to pneumonia, confusion. Was intubated on admission and extubated 10/10    5) Acute urine retention: due to prolonged hospitalization, AMS. Will cont montilla, will need voiding trial in 1-2 weeks. Urology was following    6) Debility: cont PT/OT. Likely rehab first, then psych     7) Social: patient's 3 sons are POAs. Her parents live here in [place].   Sons do not want to relinquish POA status at this time    Total time spent with patient care: 30 min  **I personally saw and examined the patient during this time period**                 Care Plan discussed with: Patient, nursing, mother    Discussed:  Care Plan    Prophylaxis:  Lovenox    Disposition:  [facility] vs inpatient psych            ___________________________________________________    Attending Physician: Miguel Manuel MD

## 2022-10-16 NOTE — PROGRESS NOTES
Problem: Falls - Risk of  Goal: *Absence of Falls  Description: Document Mariana Franco Fall Risk and appropriate interventions in the flowsheet. Outcome: Progressing Towards Goal  Note: Fall Risk Interventions:  Mobility Interventions: Bed/chair exit alarm, OT consult for ADLs, Patient to call before getting OOB, PT Consult for mobility concerns, PT Consult for assist device competence, Utilize walker, cane, or other assistive device    Mentation Interventions: Adequate sleep, hydration, pain control, Bed/chair exit alarm, Family/sitter at bedside, Evaluate medications/consider consulting pharmacy, Door open when patient unattended, More frequent rounding, Room close to nurse's station, Reorient patient    Medication Interventions: Bed/chair exit alarm, Evaluate medications/consider consulting pharmacy, Utilize gait belt for transfers/ambulation    Elimination Interventions: Bed/chair exit alarm, Call light in reach, Patient to call for help with toileting needs, Toileting schedule/hourly rounds    History of Falls Interventions: Bed/chair exit alarm, Door open when patient unattended, Evaluate medications/consider consulting pharmacy, Room close to nurse's station, Utilize gait belt for transfer/ambulation         Problem: Pressure Injury - Risk of  Goal: *Prevention of pressure injury  Description: Document Jose Enrique Scale and appropriate interventions in the flowsheet.   Outcome: Progressing Towards Goal  Note: Pressure Injury Interventions:  Sensory Interventions: Assess changes in LOC, Float heels, Minimize linen layers, Maintain/enhance activity level, Keep linens dry and wrinkle-free, Monitor skin under medical devices    Moisture Interventions: Absorbent underpads, Apply protective barrier, creams and emollients, Contain wound drainage, Internal/External urinary devices, Maintain skin hydration (lotion/cream)    Activity Interventions: Increase time out of bed, PT/OT evaluation    Mobility Interventions: HOB 30 degrees or less, PT/OT evaluation, Turn and reposition approx.  every two hours(pillow and wedges)    Nutrition Interventions: Document food/fluid/supplement intake    Friction and Shear Interventions: Apply protective barrier, creams and emollients, Foam dressings/transparent film/skin sealants, Minimize layers, Lift sheet, Lift team/patient mobility team                Problem: Nutrition Deficit  Goal: *Optimize nutritional status  Outcome: Progressing Towards Goal     Problem: Aspiration - Risk of  Goal: *Absence of aspiration  Outcome: Progressing Towards Goal

## 2022-10-16 NOTE — PROGRESS NOTES
0700: Bedside shift change report given to Garrick Chang (oncoming nurse) by Jv Shine (offgoing nurse). Report included the following information SBAR, Kardex, Intake/Output, MAR, and Recent Results. 1100: Bedside shift change report given to Bronwyn Tomas (oncoming nurse) by Garrick Chang (offgoing nurse). Report included the following information SBAR, Kardex, Procedure Summary, Intake/Output, MAR, and Recent Results.

## 2022-10-17 ENCOUNTER — PATIENT OUTREACH (OUTPATIENT)
Dept: OTHER | Age: 57
End: 2022-10-17

## 2022-10-17 PROCEDURE — 94761 N-INVAS EAR/PLS OXIMETRY MLT: CPT

## 2022-10-17 PROCEDURE — 74011250637 HC RX REV CODE- 250/637: Performed by: NURSE PRACTITIONER

## 2022-10-17 PROCEDURE — U0005 INFEC AGEN DETEC AMPLI PROBE: HCPCS

## 2022-10-17 PROCEDURE — 2709999900 HC NON-CHARGEABLE SUPPLY

## 2022-10-17 PROCEDURE — 97535 SELF CARE MNGMENT TRAINING: CPT

## 2022-10-17 PROCEDURE — 74011250637 HC RX REV CODE- 250/637: Performed by: INTERNAL MEDICINE

## 2022-10-17 PROCEDURE — 74011250637 HC RX REV CODE- 250/637: Performed by: STUDENT IN AN ORGANIZED HEALTH CARE EDUCATION/TRAINING PROGRAM

## 2022-10-17 PROCEDURE — 77030020847 HC STATLOK BARD -A

## 2022-10-17 PROCEDURE — 65270000029 HC RM PRIVATE

## 2022-10-17 PROCEDURE — 74011250636 HC RX REV CODE- 250/636: Performed by: NURSE PRACTITIONER

## 2022-10-17 PROCEDURE — 74011250637 HC RX REV CODE- 250/637: Performed by: PSYCHIATRY & NEUROLOGY

## 2022-10-17 PROCEDURE — 74011000250 HC RX REV CODE- 250: Performed by: INTERNAL MEDICINE

## 2022-10-17 PROCEDURE — 97530 THERAPEUTIC ACTIVITIES: CPT

## 2022-10-17 RX ORDER — METOPROLOL TARTRATE 25 MG/1
25 TABLET, FILM COATED ORAL EVERY 12 HOURS
Status: DISCONTINUED | OUTPATIENT
Start: 2022-10-17 | End: 2022-10-20 | Stop reason: HOSPADM

## 2022-10-17 RX ADMIN — LEVOTHYROXINE SODIUM 88 MCG: 0.09 TABLET ORAL at 05:48

## 2022-10-17 RX ADMIN — ENOXAPARIN SODIUM 30 MG: 100 INJECTION SUBCUTANEOUS at 22:10

## 2022-10-17 RX ADMIN — ENOXAPARIN SODIUM 30 MG: 100 INJECTION SUBCUTANEOUS at 09:47

## 2022-10-17 RX ADMIN — Medication 10 ML: at 06:03

## 2022-10-17 RX ADMIN — METOPROLOL TARTRATE 25 MG: 25 TABLET, FILM COATED ORAL at 22:10

## 2022-10-17 RX ADMIN — ATORVASTATIN CALCIUM 40 MG: 20 TABLET, FILM COATED ORAL at 22:10

## 2022-10-17 RX ADMIN — SENNOSIDES AND DOCUSATE SODIUM 1 TABLET: 50; 8.6 TABLET ORAL at 22:10

## 2022-10-17 RX ADMIN — Medication 10 ML: at 22:12

## 2022-10-17 RX ADMIN — RISPERIDONE 1 MG: 0.25 TABLET ORAL at 22:10

## 2022-10-17 NOTE — PROGRESS NOTES
Problem: Mobility Impaired (Adult and Pediatric)  Goal: *Acute Goals and Plan of Care (Insert Text)  Description: FUNCTIONAL STATUS PRIOR TO ADMISSION: Patient was independent and active without use of DME.    HOME SUPPORT PRIOR TO ADMISSION: The patient lived with family but did not require assist.    Physical Therapy Goals  Initiated 10/11/2022  1. Patient will move from supine to sit and sit to supine , scoot up and down, and roll side to side in bed with independence within 7 day(s). 2.  Patient will transfer from bed to chair and chair to bed with modified independence using the least restrictive device within 7 day(s). 3.  Patient will perform sit to stand with modified independence within 7 day(s). 4.  Patient will ambulate with modified independence for 200 feet with the least restrictive device within 7 day(s). 5.  Patient will ascend/descend 4 stairs with  handrail(s) with modified independence within 7 day(s). Outcome: Not Met   PHYSICAL THERAPY TREATMENT  Patient: Pati Cantu (52 y.o. female)  Date: 10/17/2022  Diagnosis: Status epilepticus ([facility] [address] [G40.901] Status epilepticus ([facility] [address]      Precautions: Fall  Chart, physical therapy assessment, plan of care and goals were reviewed. ASSESSMENT  Patient continues with skilled PT services and is progressing towards goals slowly. She requires additional time and multiple approaches to initiate mobility despite verbalizing agreement. She continues to demonstrate impaired motor planning, delayed initiation and processing, and poor command following. Pt eventually mobilizes to edge of bed, sits without support x 5 mins with good balance, transfers to RW, and ambulates briefly using RW with multi-modal cueing.  immediately after activity and decreases to 113 with seated rest x 1 min.       Current Level of Function Impacting Discharge (mobility/balance): mod A x 2; max cueing    Other factors to consider for discharge:sitter at bedside PLAN :  Patient continues to benefit from skilled intervention to address the above impairments. Continue treatment per established plan of care. to address goals. Recommendation for discharge: (in order for the patient to meet his/her long term goals)  Therapy 3 hours per day 5-7 days per week    This discharge recommendation:  Has been made in collaboration with the attending provider and/or case management    IF patient discharges home will need the following DME: to be determined (TBD)       SUBJECTIVE:   Patient stated Just 5 minutes. \" Pt repeating statement offered by staff. She frequently laughs at inappropriate times, offers minimal statements. Pt received prone, agreeable to PT and cleared by RN. OBJECTIVE DATA SUMMARY:   Critical Behavior:  Neurologic State: Alert, Confused  Orientation Level: Oriented to own name but not birthday, Disoriented to place, Disoriented to situation, Disoriented to time  Cognition: decreased command following  Safety/Judgement: Decreased awareness of environment  Functional Mobility Training:  Bed Mobility:  Rolling: Moderate assistance;Assist x2; Additional time  Supine to Sit: Assist x2; Additional time;Maximum assistance     Scooting: Moderate assistance; Additional time        Transfers:  Sit to Stand: Assist x2; Additional time; Moderate assistance; manual assist to initiate  Stand to Sit: Assist x2; Moderate assistance; Additional time                             Balance:  Sitting: Without support  Sitting - Static: Good (unsupported)  Sitting - Dynamic: Good (unsupported)  Standing: With support  Standing - Static: Fair  Standing - Dynamic : Fair  Ambulation/Gait Training:  Distance (ft): 3 Feet (ft) (pivot steps to chair)  Assistive Device: Walker, rolling;Gait belt  Ambulation - Level of Assistance:  Moderate assistance;Assist x2        Gait Abnormalities: Decreased step clearance        Base of Support: Widened     Speed/Lurdes: Pace decreased (<100 feet/min)  Step Length: Right shortened;Left shortened                  Manual assist for sequencing, initiation, RW management, weight shift. Pain Rating:  No pain complaints    Activity Tolerance:   requires rest breaks    After treatment patient left in no apparent distress:   Sitting in chair, Call bell within reach, and sitter at bedside    COMMUNICATION/COLLABORATION:   The patients plan of care was discussed with: Occupational therapist, Registered nurse, and 1:1 sitter .      Sheila Bragg, PT, DPT   Time Calculation: 27 mins

## 2022-10-17 NOTE — PROGRESS NOTES
[facility]  [address]  [phone]      Hospitalist Progress Note      NAME: Christine Son   :  1965  MRM:  [id number]    Date/Time of service: 10/17/2022  11:40 AM       Subjective:     Chief Complaint:  Patient was personally seen and examined by me during this time period. Chart reviewed. More confused this AM.  Did not get sleep overnight. Spoke to patient's mom        Objective:       Vitals:       Last 24hrs VS reviewed since prior progress note. Most recent are:    Visit Vitals  BP (!) 155/62   Pulse 95   Temp 99.2 °F (37.3 °C)   Resp 26   Ht 5' 6\" (1.676 m)   Wt 104.3 kg (230 lb)   SpO2 93%   BMI 37.12 kg/m²     SpO2 Readings from Last 6 Encounters:   10/17/22 93%    O2 Flow Rate (L/min): 2 l/min     Intake/Output Summary (Last 24 hours) at 10/17/2022 1140  Last data filed at 10/16/2022 1908  Gross per 24 hour   Intake --   Output 450 ml   Net -450 ml          Exam:     Physical Exam:    Gen:  Well-developed, well-nourished, obese, in no acute distress  HEENT:  Pink conjunctivae, PERRL, hearing intact to voice, moist mucous membranes  Neck:  Supple, without masses, thyroid non-tender  Resp:  No accessory muscle use, scattered rhochi   Card:  No murmurs, normal S1, S2 without thrills, bruits or peripheral edema  Abd:  Soft, non-tender, non-distended, normoactive bowel sounds are present  Musc:  No cyanosis or clubbing  Skin:  No rashes   Neuro:  Cranial nerves 3-12 are grossly intact, follows commands appropriately  Psych:  poor insight, oriented to person.   No SI/HI, confused     Medications Reviewed: (see below)    Lab Data Reviewed: (see below)    ______________________________________________________________________    Medications:     Current Facility-Administered Medications   Medication Dose Route Frequency    metoprolol tartrate (LOPRESSOR) tablet 25 mg  25 mg Oral Q12H    L.acidophilus-paracasei-S.thermophil-bifidobacter (RISAQUAD) 8 billion cell capsule  1 Capsule Oral DAILY    risperiDONE (RisperDAL) tablet 1 mg  1 mg Oral QHS    pantoprazole (PROTONIX) tablet 40 mg  40 mg Oral ACB    rOPINIRole (REQUIP) tablet 0.5 mg  0.5 mg Oral TID PRN    traMADoL (ULTRAM) tablet 50 mg  50 mg Oral Q6H PRN    potassium bicarb-citric acid (EFFER-K) tablet 20 mEq  20 mEq Oral BID    levothyroxine (SYNTHROID) tablet 88 mcg  88 mcg Oral DAILY    lidocaine (XYLOCAINE) 2 % jelly   Mouth/Throat PRN    prochlorperazine (COMPAZINE) injection 10 mg  10 mg IntraVENous Q6H PRN    alteplase (CATHFLO) 2 mg in sterile water (preservative free) 2 mL injection  2 mg InterCATHeter PRN    senna-docusate (PERICOLACE) 8.6-50 mg per tablet 1 Tablet  1 Tablet Oral BID    atorvastatin (LIPITOR) tablet 40 mg  40 mg Oral QHS    enoxaparin (LOVENOX) injection 30 mg  30 mg SubCUTAneous Q12H    aspirin chewable tablet 81 mg  81 mg Oral DAILY    sodium chloride (NS) flush 5-40 mL  5-40 mL IntraVENous Q8H    sodium chloride (NS) flush 5-40 mL  5-40 mL IntraVENous PRN    acetaminophen (TYLENOL) tablet 650 mg  650 mg Oral Q6H PRN    polyethylene glycol (MIRALAX) packet 17 g  17 g Oral DAILY PRN    ondansetron (ZOFRAN) injection 4 mg  4 mg IntraVENous Q6H PRN          Lab Review:     Recent Labs     10/16/22  0328   WBC 9.0   HGB 11.3*   HCT 35.3   *       Recent Labs     10/16/22  0328      K 3.3*      CO2 28   *   BUN 13   CREA 0.77   CA 9.3   MG 2.3   PHOS 2.8       Lab Results   Component Value Date/Time    Glucose (POC) 110 10/05/2022 04:55 PM    Glucose (POC) 87 10/05/2022 11:02 AM    Glucose (POC) 78 10/05/2022 05:00 AM    Glucose (POC) 94 10/05/2022 04:57 AM    Glucose (POC) 124 (H) 10/04/2022 11:03 PM          Assessment / Plan:     58 yo hx of hypothyroid, depression, presented w/ AMS, resp failure, shock, Takotsubo CM EF 25-30%. Patient was intubated on admission and extubated 10/10.   Hospital course complicated by persistent confusion    1) Acute met encephalopathy/delirium: persistent, remains the same. Unclear etiology. Likely new dementia vs underlying psych disorder. Head MRI with small diffusion restriction in right frontal lobe of unclear significant. EEG neg for seizure. No further workup by Neurology. Cont empiric ASA and statin for possible CVA. Will cont night time risperdal.  Needs inpatient psych, but complicated by debility and poor ADLs. Will benefit from rehab first     2) Takotsubo CM : initial echo with EF 25-30%, but repeat with EF 55%. Unable to tolerate ACEi due to low BP. Cont metoprolol. Cards was following    3) Pneumonia/UTI/septic shock: resp Cx and urine Cx grew E. Coli. S/p course of IV Zosyn. Repeat U/A on 10/14 negative     4) Acute resp failure: now resolved. Due to pneumonia, confusion. Was intubated on admission and extubated 10/10    5) Acute urine retention: due to prolonged hospitalization, AMS. Will cont montilla, will need voiding trial in 1-2 weeks. Urology was following    6) Debility: cont PT/OT. Likely rehab first, then psych     7) Social: patient's 3 sons are POAs. Her parents live here in [facility].   Sons will relinquish POA status to Parents    Total time spent with patient care: 35 min  **I personally saw and examined the patient during this time period**                 Care Plan discussed with: Patient, nursing, mother, CM     Discussed:  Care Plan    Prophylaxis:  Lovenox    Disposition:  [facility]/Rehab first, then psych            ___________________________________________________    Attending Physician: Karla Lara MD

## 2022-10-17 NOTE — PROGRESS NOTES
KODI:   1) Rehab- [facility] following closely   2) Consults pending   3) Pt will need insurance auth- could take 1-2 days will need updated PT and OT notes while pending   4) Pt will need COVID-19 PCR prior to d/c- will do once auth is started   5) Pt will need [facility] transportation at time of discharge   6) Risk of readmission-     Hospital Day 19:   5:01 PM- CM has been working with RN throughout the day regarding updated Psych Consult- CM spoke with [facility]- NP is aware and doing the consult this evening as she was pulled to another hospital. CM updated [address] regarding updated notes and sitter free status as of 3:00 PM.      HANK reached out to sons regarding Blanka Taveras ([phone])- no answer, HIPPA compliant VM left   Rc Patel ([phone])- \" I want us all to sit down together- I want to make sure we are all on the same page before we finalize this\"     CM attempted again to contact Caroline Sanchez via three way with [employer]- no answer. HANK requested Rambo Ventura inform his brothers that we will have a meeting at 9:00 AM tomorrow a 3 way call- I will call them in the morning to finalize as this is crucial moving forward. CM will continue to follow and assist as needed. 11:56 AM- [facility] in need of the following before they can formally accept and start insurance auth (which will take 1-2 days)-   -Updated Psych note  -No sitter  -Updated PT and OT notes     Attending, RN and RN Lead updated. Pt's Mom at bedside and also updated. 11:00 AM- HANK spoke with [place] with [employer]- she is working with the NP to get updated notes. 9:42 AM- HANK reached out to Jagdish Quach with [address]- no answer- HIPPA compliant VM left.     9:21 AM- Pt remains on Ortho- medically stable and cleared to d/c- CM actively working on placement.  HANK spoke with pt's son Noa Frances ([phone])- he wishes to have his Grandmother Vera Boyce ([phone])- be appointed Yaneth Hubbard- states him and his brothers have all talked this weekend and that she is \"very passionate and worked up\" about not being the [employer] "- he states he hopes she makes decisions with their thoughts and feelings involved. CM notified attending who will reach out to all sons and confirm.      Patricia Malagon, MSW, [address]

## 2022-10-17 NOTE — PROGRESS NOTES
Problem: Self Care Deficits Care Plan (Adult)  Goal: *Acute Goals and Plan of Care (Insert Text)  Description: FUNCTIONAL STATUS PRIOR TO ADMISSION: Patient was independent and active without use of DME.     HOME SUPPORT: Patient lived alone     Occupational Therapy Goals  Initiated 10/11/2022  1. Patient will perform lower body dressing with minimal assistance/contact guard assist within 7 day(s). 2.  Patient will perform grooming tasks, seated EOB,  with supervision/set-up within 7 day(s). 3.  Patient will perform toilet transfers with minimal assistance/contact guard assist within 7 day(s). 4.  Patient will perform all aspects of toileting with moderate assistance  within 7 day(s). 5.  Patient will participate in upper extremity therapeutic exercise/ coordination activities with supervision/set-up for 10 minutes within 7 day(s). 10/17/2022 1459 by Frantz Milner OT  Outcome: Progressing Towards Goal   OCCUPATIONAL THERAPY TREATMENT  Patient: Sophia Hamilton (83 y.o. female)  Date: 10/17/2022  Diagnosis: Status epilepticus ([facility] [address] [G40.901] Status epilepticus ([address]      Precautions: Fall  Chart, occupational therapy assessment, plan of care, and goals were reviewed. ASSESSMENT  Patient continues with skilled OT services and is progressing towards goals. Patient continues to be impacted by decreased attention to task, slow cognitive processing, impaired decision making. Improving ADL participation noted with low stimulation environment,  simple commands (1 step), multi modal cues, and repetition. Patient requires intensive therapy services though will not thrive in a busy environment for rehab. She will be best served in a dedicated brain injury/neuro unit that provides lower stimulation for those in need. Current Level of Function Impacting Discharge (ADLs): mod to max assist for basic ADL tasks.  Increased and multi modal cues and additional time required     Other factors to consider for discharge:          PLAN :  Patient continues to benefit from skilled intervention to address the above impairments. Continue treatment per established plan of care to address goals. Recommend with staff: low stimulation for attention to tasks. encourage simple ADL tasks - self feeding, grooming tasks (hand over hand assist and cues)    Recommend next OT session: simple ADL, 1 step commands, toileting/BSC transfer    Recommendation for discharge: (in order for the patient to meet his/her long term goals)  Therapy 3 hours per day 5-7 days per week    This discharge recommendation:  Has been made in collaboration with the attending provider and/or case management    IF patient discharges home will need the following DME: TBD       SUBJECTIVE:   Patient pleasant and cooperative     OBJECTIVE DATA SUMMARY:   Cognitive/Behavioral Status:  Neurologic State: Alert;Confused  Orientation Level: Oriented to person  Cognition: Impaired decision making;Decreased attention/concentration;Decreased command following        Safety/Judgement: Decreased awareness of environment;Decreased insight into deficits    Functional Mobility and Transfers for ADLs:  Bed Mobility:  Rolling: Moderate assistance;Assist x2; Additional time  Supine to Sit: Assist x2; Additional time;Maximum assistance  Scooting: Moderate assistance; Additional time    Transfers:  Sit to Stand: Assist x2; Additional time; Moderate assistance     Bed to Chair: Minimum assistance;Assist x1;Additional time; Adaptive equipment    Balance:  Sitting: Intact; Without support  Sitting - Static: Good (unsupported)  Sitting - Dynamic: Good (unsupported)  Standing: Impaired; Without support  Standing - Static: Good; Unsupported  Standing - Dynamic : Fair;Constant support    ADL Intervention:  Feeding  Feeding Assistance: Moderate assistance  Cues: Physical assistance; Tactile cues provided;Verbal cues provided (hand over hand assistance). Additional time.  Increased cues required to initiate tasks. Cognitive Retraining  Safety/Judgement: Decreased awareness of environment;Decreased insight into deficits      Pain:  R shoulder pain indicated when reaching. Activity Tolerance:   Fair and requires rest breaks    After treatment patient left in no apparent distress:   Sitting in chair, Call bell within reach, Bed / chair alarm activated, and Caregiver / family present    COMMUNICATION/COLLABORATION:   The patients plan of care was discussed with: Physical therapist and Registered nurse.      Rigo Tesfaye OT  Time Calculation: 32 mins

## 2022-10-17 NOTE — PROGRESS NOTES
Ultrasound IV by  Susy Esteban RN :  Procedure Note    Patient meets criteria for US IV insertion. Ultrasound IV education provided to patient. Opportunities for questions given. Ultrasound used for PIV placement:  20 gauge 20 cm Arrow Endurance Extended Dwell(may stay in place for 29 days)  Left basilic location. 2 X Attempt(s). Flushed with ease; vigorous blood return. Procedure tolerated well. Primary RN aware of IV placement and added to LDA.       Omari Samaniego RN

## 2022-10-17 NOTE — PROGRESS NOTES
Resolving current episode for case management due to patient unable to reach. Patient has not been reached after repeated calls and letters. Letter sent to patient notifying completion of services due to unable to reach. Patient noted to be in a coma at this time.

## 2022-10-17 NOTE — PROGRESS NOTES
Follow up visit on 4 Post Surg. Miss Jeronimo Coburn and a sitter were in the room. Miss Jeronimo Coburn shared that she has belief in God. She indicated she is in prayer and connection with God and that is a comfort for her. She indicated she was trying to release some of her stuff (burdens) to Yoni. She was very thoughtful and interactive as she shared her connection with God. After a bit she indicated she would like to get some sleep. Provided assurance of prayers.   Yamilka Stewart., MS., [facility]  Page a  [phone]- Kerri Mohr ([phone])

## 2022-10-18 DIAGNOSIS — E03.9 ACQUIRED HYPOTHYROIDISM: ICD-10-CM

## 2022-10-18 LAB
ANION GAP SERPL CALC-SCNC: 7 MMOL/L (ref 5–15)
BUN SERPL-MCNC: 20 MG/DL (ref 6–20)
BUN/CREAT SERPL: 27 (ref 12–20)
CALCIUM SERPL-MCNC: 9.5 MG/DL (ref 8.5–10.1)
CHLORIDE SERPL-SCNC: 107 MMOL/L (ref 97–108)
CO2 SERPL-SCNC: 28 MMOL/L (ref 21–32)
CREAT SERPL-MCNC: 0.74 MG/DL (ref 0.55–1.02)
ERYTHROCYTE [DISTWIDTH] IN BLOOD BY AUTOMATED COUNT: 14.2 % (ref 11.5–14.5)
GLUCOSE SERPL-MCNC: 112 MG/DL (ref 65–100)
HCT VFR BLD AUTO: 36.9 % (ref 35–47)
HGB BLD-MCNC: 11.8 G/DL (ref 11.5–16)
MAGNESIUM SERPL-MCNC: 2.3 MG/DL (ref 1.6–2.4)
MCH RBC QN AUTO: 29 PG (ref 26–34)
MCHC RBC AUTO-ENTMCNC: 32 G/DL (ref 30–36.5)
MCV RBC AUTO: 90.7 FL (ref 80–99)
NRBC # BLD: 0 K/UL (ref 0–0.01)
NRBC BLD-RTO: 0 PER 100 WBC
PHOSPHATE SERPL-MCNC: 4.3 MG/DL (ref 2.6–4.7)
PLATELET # BLD AUTO: 653 K/UL (ref 150–400)
PMV BLD AUTO: 11.5 FL (ref 8.9–12.9)
POTASSIUM SERPL-SCNC: 4 MMOL/L (ref 3.5–5.1)
PROCALCITONIN SERPL-MCNC: <0.05 NG/ML
RBC # BLD AUTO: 4.07 M/UL (ref 3.8–5.2)
SODIUM SERPL-SCNC: 142 MMOL/L (ref 136–145)
WBC # BLD AUTO: 7.2 K/UL (ref 3.6–11)

## 2022-10-18 PROCEDURE — 74011250637 HC RX REV CODE- 250/637: Performed by: PSYCHIATRY & NEUROLOGY

## 2022-10-18 PROCEDURE — 80048 BASIC METABOLIC PNL TOTAL CA: CPT

## 2022-10-18 PROCEDURE — 36415 COLL VENOUS BLD VENIPUNCTURE: CPT

## 2022-10-18 PROCEDURE — 74011250636 HC RX REV CODE- 250/636: Performed by: INTERNAL MEDICINE

## 2022-10-18 PROCEDURE — 74011250637 HC RX REV CODE- 250/637: Performed by: INTERNAL MEDICINE

## 2022-10-18 PROCEDURE — 74011000250 HC RX REV CODE- 250: Performed by: INTERNAL MEDICINE

## 2022-10-18 PROCEDURE — 65270000029 HC RM PRIVATE

## 2022-10-18 PROCEDURE — 74011250637 HC RX REV CODE- 250/637: Performed by: NURSE PRACTITIONER

## 2022-10-18 PROCEDURE — 83735 ASSAY OF MAGNESIUM: CPT

## 2022-10-18 PROCEDURE — 74011250636 HC RX REV CODE- 250/636: Performed by: NURSE PRACTITIONER

## 2022-10-18 PROCEDURE — 74011250637 HC RX REV CODE- 250/637: Performed by: STUDENT IN AN ORGANIZED HEALTH CARE EDUCATION/TRAINING PROGRAM

## 2022-10-18 PROCEDURE — 84145 PROCALCITONIN (PCT): CPT

## 2022-10-18 PROCEDURE — 85027 COMPLETE CBC AUTOMATED: CPT

## 2022-10-18 PROCEDURE — 84100 ASSAY OF PHOSPHORUS: CPT

## 2022-10-18 RX ORDER — RISPERIDONE 0.25 MG/1
1 TABLET, FILM COATED ORAL DAILY
Status: DISCONTINUED | OUTPATIENT
Start: 2022-10-18 | End: 2022-10-19

## 2022-10-18 RX ORDER — CLONIDINE 0.2 MG/24H
1 PATCH, EXTENDED RELEASE TRANSDERMAL
Status: DISCONTINUED | OUTPATIENT
Start: 2022-10-18 | End: 2022-10-20 | Stop reason: HOSPADM

## 2022-10-18 RX ORDER — LEVOTHYROXINE SODIUM 88 UG/1
88 TABLET ORAL
Qty: 90 TABLET | Refills: 0 | Status: ON HOLD | OUTPATIENT
Start: 2022-10-18

## 2022-10-18 RX ORDER — LORAZEPAM 2 MG/ML
1 INJECTION INTRAMUSCULAR ONCE
Status: DISPENSED | OUTPATIENT
Start: 2022-10-18 | End: 2022-10-19

## 2022-10-18 RX ADMIN — METOPROLOL TARTRATE 25 MG: 25 TABLET, FILM COATED ORAL at 10:02

## 2022-10-18 RX ADMIN — RISPERIDONE 1 MG: 0.25 TABLET ORAL at 15:02

## 2022-10-18 RX ADMIN — POTASSIUM BICARBONATE 20 MEQ: 391 TABLET, EFFERVESCENT ORAL at 10:02

## 2022-10-18 RX ADMIN — ENOXAPARIN SODIUM 30 MG: 100 INJECTION SUBCUTANEOUS at 10:43

## 2022-10-18 RX ADMIN — Medication 1 CAPSULE: at 10:02

## 2022-10-18 RX ADMIN — Medication 10 ML: at 21:41

## 2022-10-18 RX ADMIN — PANTOPRAZOLE SODIUM 40 MG: 40 TABLET, DELAYED RELEASE ORAL at 10:02

## 2022-10-18 RX ADMIN — Medication 20 ML: at 13:33

## 2022-10-18 RX ADMIN — SENNOSIDES AND DOCUSATE SODIUM 1 TABLET: 50; 8.6 TABLET ORAL at 10:02

## 2022-10-18 RX ADMIN — ZIPRASIDONE MESYLATE 10 MG: 20 INJECTION, POWDER, LYOPHILIZED, FOR SOLUTION INTRAMUSCULAR at 21:56

## 2022-10-18 RX ADMIN — Medication 10 ML: at 06:12

## 2022-10-18 RX ADMIN — ASPIRIN 81 MG: 81 TABLET, CHEWABLE ORAL at 10:02

## 2022-10-18 NOTE — PROGRESS NOTES
Patient's mother called to stop Risperidone because she suspected it may be contributing to her altered behavior experienced today.

## 2022-10-18 NOTE — PROGRESS NOTES
RRT RN rounded on pt for increased MEWS score. Pt reports feeling anxious and stating \"I am just so tired of all of this\". Pt is laying on side and crying. Pt reassured using therapeutic techniques. Pt denies any complaints. Pt denies assistance. Family at bedside reports that pt is \"much more anxious\" at this time. Primary RN has reached out to MD to request medication for anxiety. Pt in no acute distress at this time.

## 2022-10-18 NOTE — CONSULTS
PSYCHIATRY CONSULT NOTE    REASON FOR CONSULT: suicidal ideation. INTERVAL HISTORY  10/16/22: Patient is seen resting in bed. She is calm and cooperative, she remains confused and does not answer questions appropriately. She states that she is at [facility]. She reports today as Monday, date, month and year is unknown. She denies feeling depressed and anxious. She denies suicidal/homicidal thoughts and AV hallucinations. She denies concerns with sleep and appetite. No paranoia or delusions observed. 10/14/2022: Patient is seen by the psychiatric team. She remains disoriented and confused. Her speech is incoherent and she is not able to engage meaningfully. She is not able to answer questions correctly. When asked where she is, she refuses to answer and states that it is her business. She is fixated on refinancing her house. 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 She denies suicidal thoughts and did not answer if she has homicidal thoughts or AV hallucinations. She reports she sleeps well and eats well but she is not able to state what she had for breakfast.      HISTORY OF PRESENTING COMPLAINT:  Dannie Manuel is a 59 y.o. WHITE/NON- female who is currently admitted to the medical floor at [facility]. Patient presented to the ED via EMS after being found unresponsive in her in bed and CPR was initiated. Patient is being treated for acute metabolic encephalopathy. On assessment today, patient appeared is awake and alert. She appears to be confused and is not able to recall the circumstances surrounding her admission to the hospital. Her speech is very soft making it hard to follow and understand. She reports her location as [facility] and current date is unknown. She denies current depression, anxiety, suicidal/homicidal thoughts and VA hallucinations. She denies taking an overdose or trying to end her life.  She reports having suicidal thoughts in the past and thought she was dying but denied any plans or intent. She denies having any stress or trauma. Her mother who is at bedside states that patient lives alone and has been having a lot of financial and work stress. Mom also reports that patient wants to sell her house. She reports recent sleep issues but denied appetite concerns. PAST PSYCHIATRIC HISTORY: Patient denies prior inpatient psychiatric hospitalizations and suicide attempts. SUBSTANCE ABUSE HISTORY: Patient reports she drinks alcohol occasionally and denies any other substance abuse. Her UDS is positive for benzos. PAST MEDICAL HISTORY:    Please see H&P for details. Past Medical History:   Diagnosis Date    History of vascular access device 10/07/2022    [facility] VAT: PICC placement R Cephalic length 40 cm for reliable access/TPN arm circ 35.5 cm     Prior to Admission medications    Medication Sig Start Date End Date Taking? Authorizing Provider   atorvastatin (LIPITOR) 10 mg tablet Take 10 mg by mouth daily. Yes Provider, Historical   furosemide (LASIX) 20 mg tablet Take 20 mg by mouth daily. Yes Provider, Historical   traZODone (DESYREL) 50 mg tablet Take 125 mg by mouth nightly. Yes Provider, Historical   levothyroxine (SYNTHROID) 88 mcg tablet Take 88 mcg by mouth Daily (before breakfast). Yes Provider, Historical   OTHER,NON-FORMULARY, ESTROGEN(5050)/TESTOSTERONE (HRT) 1.5mg/10mg/ml Cream APPLY 1 ML TO SKIN (INNER WRIST/ABDOMEN/THIGHS EVERY DAY. ROTATE SITES   Yes Provider, Historical   OTHER,NON-FORMULARY, Progesterone (ME4M) 250 mg at bedtime.    Yes Provider, Historical     Vitals:    10/17/22 1246 10/17/22 1248 10/17/22 1632 10/17/22 1948   BP: (!) 140/79  (!) 143/71 (!) 157/88   Pulse: (!) 123 (!) 113 99 100   Resp:   20 20   Temp:   99.2 °F (37.3 °C) 98.7 °F (37.1 °C)   SpO2: 99%  94% 97%   Weight:       Height:         Lab Results   Component Value Date/Time    WBC 9.0 10/16/2022 03:28 AM    HGB 11.3 (L) 10/16/2022 03:28 AM    HCT 35.3 10/16/2022 03:28 AM    PLATELET 640 (H) 90/45/6813 03:28 AM    MCV 90.5 10/16/2022 03:28 AM     Lab Results   Component Value Date/Time    Sodium 140 10/16/2022 03:28 AM    Potassium 3.3 (L) 10/16/2022 03:28 AM    Chloride 105 10/16/2022 03:28 AM    CO2 28 10/16/2022 03:28 AM    Anion gap 7 10/16/2022 03:28 AM    Glucose 129 (H) 10/16/2022 03:28 AM    BUN 13 10/16/2022 03:28 AM    Creatinine 0.77 10/16/2022 03:28 AM    BUN/Creatinine ratio 17 10/16/2022 03:28 AM    GFR est AA >60 10/03/2022 01:13 AM    GFR est non-AA >60 10/03/2022 01:13 AM    Calcium 9.3 10/16/2022 03:28 AM    Bilirubin, total 0.6 10/12/2022 09:20 AM    Alk. phosphatase 67 10/12/2022 09:20 AM    Protein, total 6.8 10/12/2022 09:20 AM    Albumin 3.0 (L) 10/12/2022 09:20 AM    Globulin 3.8 10/12/2022 09:20 AM    A-G Ratio 0.8 (L) 10/12/2022 09:20 AM    ALT (SGPT) 31 10/12/2022 09:20 AM    AST (SGOT) 29 10/12/2022 09:20 AM     No results found for: VALF2, VALAC, VALP, VALPR, DS6, CRBAM, CRBAMP, CARB2, XCRBAM  No results found for: LITHM  RADIOLOGY REPORTS:(reviewed/updated 10/17/2022)  MRI BRAIN WO CONT    Result Date: 9/28/2022  CLINICAL HISTORY: AMS of unknown etiology. INDICATION: AMS of unknown etiology. COMPARISON: 9/27/2022 TECHNIQUE: MR examination of the brain includes axial and sagittal T1, coronal T2, axial T2, axial FLAIR, axial gradient echo, axial DWI. CONTRAST: None FINDINGS: IACs are symmetric in size. There is a punctate focus of acute infarction in the right frontal cortex. Additional probable focus of cortical infarction anteriorly right frontal lobe. The brain architecture is normal. There is no evidence of midline shift or mass-effect. There are no extra-axial fluid collections. There is no Chiari or syrinx. The pituitary and infundibulum are grossly unremarkable. There is no skull base mass. Cerebellopontine angles are grossly unremarkable.  The major intracranial vascular flow-voids are unremarkable. The cavernous sinuses are symmetric. Optic chiasm and infundibulum grossly unremarkable. Orbits are grossly symmetric. Dural venous sinuses are grossly patent. The mastoid air cells are well pneumatized and clear. Small cortical foci of acute infarction right frontal lobe posteriorly abutting the central sulcus and cortically based anteriorly in the right frontal lobe. There is no associated hemorrhage, midline shift or mass effect. No additional acute intracranial process. MRI BRAIN W WO CONT    Result Date: 10/2/2022  EXAM:  MRI BRAIN W WO CONT INDICATION:    acute encephalopathy COMPARISON:  9/20/2022. CONTRAST: 20 ml of ProHance. TECHNIQUE:  Multiplanar multisequence acquisition without and with contrast of the brain. FINDINGS: The ventricles are normal in size and position. 2 small foci of cortical diffusion restriction in the right frontal lobe without significant change. No new areas of ischemia present. There is associated FLAIR hyperintensity. There is no cerebellar tonsillar herniation. Expected arterial flow-voids are present. No evidence of abnormal enhancement. Small amount of fluid in the bilateral mastoid air cells. Intubated. Air-fluid level in the right maxillary sinus with fluid in the nasopharynx. The orbital contents are within normal limits. No significant osseous or scalp lesions are identified. 2 small foci of diffusion restriction in the right frontal lobe are unchanged and may represent small foci of acute ischemia. No abnormal enhancement is identified. CT CHEST WO CONT    Result Date: 9/27/2022  EXAM: CT CHEST WO CONT, CT ABD PELV WO CONT INDICATION: Found unresponsive, respiratory arrest, unclear etiology, family hx aneurysm COMPARISON: None IV CONTRAST: None ORAL CONTRAST: None TECHNIQUE: Thin axial images were obtained through the chest, abdomen and pelvis. Coronal and sagittal reformats were generated.  CT dose reduction was achieved through use of a standardized protocol tailored for this examination and automatic exposure control for dose modulation. FINDINGS: An endotracheal tube is in appropriate position. A nasogastric tube is seen looped within the stomach. CHEST WALL: No mass or axillary lymphadenopathy. THYROID: No nodule. MEDIASTINUM: No mass or lymphadenopathy. ELBA: No mass or lymphadenopathy. THORACIC AORTA: No dissection or aneurysm. MAIN PULMONARY ARTERY: Normal in caliber. TRACHEA/BRONCHI: Patent. ESOPHAGUS: No wall thickening or dilatation. HEART: Normal in size. PLEURA: No effusion or pneumothorax. LUNGS: There is moderate bibasilar atelectasis. LIVER: No mass. BILIARY TREE: Status post cholecystectomy. CBD is not dilated. SPLEEN: within normal limits. PANCREAS: No mass or ductal dilatation. ADRENALS: Unremarkable. KIDNEYS: No mass, calculus, or hydronephrosis. STOMACH: Unremarkable. SMALL BOWEL: No dilatation or wall thickening. COLON: No dilatation or wall thickening. APPENDIX: Unremarkable PERITONEUM: No ascites or pneumoperitoneum. RETROPERITONEUM: No lymphadenopathy or aortic aneurysm. REPRODUCTIVE ORGANS: Unremarkable. URINARY BLADDER: Decompressed by Sal catheter. BONES: No destructive bone lesion. ABDOMINAL WALL: No mass or hernia. ADDITIONAL COMMENTS: N/A     1. Moderate bibasilar atelectasis. 2. Status post cholecystectomy. 3. Endotracheal and nasogastric tubes in place. Sal catheter decompresses the bladder. CT ABD PELV WO CONT    Result Date: 9/27/2022  EXAM: CT CHEST WO CONT, CT ABD PELV WO CONT INDICATION: Found unresponsive, respiratory arrest, unclear etiology, family hx aneurysm COMPARISON: None IV CONTRAST: None ORAL CONTRAST: None TECHNIQUE: Thin axial images were obtained through the chest, abdomen and pelvis. Coronal and sagittal reformats were generated.  CT dose reduction was achieved through use of a standardized protocol tailored for this examination and automatic exposure control for dose modulation. FINDINGS: An endotracheal tube is in appropriate position. A nasogastric tube is seen looped within the stomach. CHEST WALL: No mass or axillary lymphadenopathy. THYROID: No nodule. MEDIASTINUM: No mass or lymphadenopathy. ELBA: No mass or lymphadenopathy. THORACIC AORTA: No dissection or aneurysm. MAIN PULMONARY ARTERY: Normal in caliber. TRACHEA/BRONCHI: Patent. ESOPHAGUS: No wall thickening or dilatation. HEART: Normal in size. PLEURA: No effusion or pneumothorax. LUNGS: There is moderate bibasilar atelectasis. LIVER: No mass. BILIARY TREE: Status post cholecystectomy. CBD is not dilated. SPLEEN: within normal limits. PANCREAS: No mass or ductal dilatation. ADRENALS: Unremarkable. KIDNEYS: No mass, calculus, or hydronephrosis. STOMACH: Unremarkable. SMALL BOWEL: No dilatation or wall thickening. COLON: No dilatation or wall thickening. APPENDIX: Unremarkable PERITONEUM: No ascites or pneumoperitoneum. RETROPERITONEUM: No lymphadenopathy or aortic aneurysm. REPRODUCTIVE ORGANS: Unremarkable. URINARY BLADDER: Decompressed by Sal catheter. BONES: No destructive bone lesion. ABDOMINAL WALL: No mass or hernia. ADDITIONAL COMMENTS: N/A     1. Moderate bibasilar atelectasis. 2. Status post cholecystectomy. 3. Endotracheal and nasogastric tubes in place. Sal catheter decompresses the bladder. XR CHEST PORT    Result Date: 10/7/2022  EXAM:  XR CHEST PORT INDICATION: Verify PICC Tip placement please COMPARISON: One day prior. TECHNIQUE: Single frontal view of the chest. FINDINGS: Right upper extremity PICC line with tip projecting over the right atrium. Existing right IJ catheter unchanged with tip also projecting over the right atrium. Endotracheal tube terminates 2 cm from the lópez. Enteric tube terminates below the level the diaphragm outside the field of view study. Slightly improved aeration of the lung bases. Possible trace residual effusions. No visualized pneumothorax. . Lungs are hypoventilatory. 1.  Right upper extremity PICC line tip projects over the right atrium. No visualized pneumothorax. XR CHEST PORT    Result Date: 10/6/2022  EXAM:  XR CHEST PORT INDICATION: Ventilated patient. Bilateral pneumonia. COMPARISON: 10/3/2022 TECHNIQUE: Semiupright portable chest AP view FINDINGS: Endotracheal tube is 2.9 cm above the lópez. Other tubes and lines are stable. Cardiac monitoring leads. Lung volumes remain low. Heart size enlarged. Mild interstitial and airspace opacities bilaterally with some improvement in the interval. Possible small right pleural effusion which may be new. There may be a small left pleural effusion as well. The visualized bones and upper abdomen are age-appropriate. Overall improvement in interstitial and airspace opacities bilaterally with some residual. Small right pleural effusion and possible small left pleural effusion. XR CHEST PORT    Result Date: 10/3/2022  EXAM: XR CHEST PORT INDICATION: fever COMPARISON: 9/27/2022 FINDINGS: A portable AP radiograph of the chest was obtained at 1502 hours. The patient is on a cardiac monitor. The NG tube extends below the hemidiaphragm. Endotracheal tube terminates FCI between the thoracic inlet and lópez. Right IJ line terminates at the cavoatrial junction. The cardiac and mediastinal contours and pulmonary vascularity are stable. Patchy bilateral airspace disease is noted in both perihilar locations and there is a small left pleural effusion. Bilateral perihilar pneumonia versus subsegmental atelectasis. Small left pleural effusion    XR CHEST PORT    Result Date: 9/27/2022  Indication: Line placement. Comparison to earlier the same day. Portable exam obtained at 3799 demonstrates placement of a right jugular catheter with the tip projecting over the right atrium. There is no pneumothorax. Right jugular catheter satisfactory position without pneumothorax.      XR CHEST PORT    Result Date: 9/27/2022  EXAM: XR CHEST PORT INDICATION: PLACEMENT OF CENTRAL LINE ij COMPARISON: 9/27/2022 FINDINGS: A portable AP radiograph of the chest was obtained at 1442 hours. New right IJ central venous catheter with the tip in the distal SVC. No pneumothorax. Endotracheal tube is stable. Nasogastric tube is coiled in the stomach. . The lungs are clear. The cardiac and mediastinal contours and pulmonary vascularity are normal.  The bones and soft tissues are grossly within normal limits. Status post right IJ central venous catheter placement without evidence of pneumothorax. XR CHEST PORT    Result Date: 9/27/2022  INDICATION:  repositioned ETT EXAM: Chest single view. COMPARISON: 1151 hours. FINDINGS: A single frontal view of the chest at 1231 hours shows ET tube now appearing with its tip 2.8 cm above the lópez. Lung volumes are moderate with bibasilar atelectasis stable, left greater than right. .  The heart, mediastinum and pulmonary vasculature are upper normal. .  The bony thorax is unremarkable for age. . A radiopaque tube projecting over the mediastinum on the prior study has been removed. ET tube as described. Gastric tube removed. Bibasilar atelectasis and low lung volumes. .  . XR CHEST PORT    Result Date: 9/27/2022  EXAM: XR CHEST PORT INDICATION: Intubation COMPARISON: None. FINDINGS: A portable AP radiograph of the chest was obtained at 1151 hours. The patient is on a cardiac monitor. The lungs are clear. The cardiac and mediastinal contours and pulmonary vascularity are normal. The endotracheal tube terminates at the thoracic inlet. The NG tube tip is at the level of the midesophagus. The bones and soft tissues are grossly within normal limits. Stomach is distended. Borderline high position of endotracheal tube High position of the NG tube. Distended stomach.     XR ABD PORT  1 V    Result Date: 10/12/2022  INDICATION: vomiting EXAMINATION:  ABDOMEN KUB COMPARISON: CT September 27, 2022 FINDINGS: AP radiograph of the abdomen demonstrates a nonobstructive bowel gas pattern. Gaseous distention of the stomach. Moderate amount of colonic stool. No abnormal calcifications are identified. The osseous structures are normal.     Moderate gaseous distention of the stomach and moderate amount of colonic stool, with no evidence of bowel obstruction. IR INSERT NON TUNL CVC OVER 5 YRS    Result Date: 9/28/2022  Clinical Data: A 59year-old patient presents for double-lumen non-tunneled hemodialysis catheter placement at bedside. Date: 9/27/2022 : Deepika Ralph M.D. Estimated Blood Loss: Minimal. Specimens Removed: None. Complications: None. Technique: Informed verbal and written consent was obtained following discussion of risks, benefits and alternatives to this procedure. The patient was positioned supine on the bed. The patient's right neck and chest were then prepped and draped in normal sterile fashion. A timeout was performed to ensure proper patient, procedure and site. Ultrasound was used to image the right neck. The right IJ was shown to be patent. A small amount of 1% lidocaine was injected subcutaneously at the site of vascular access. Using real-time ultrasound guidance, the needle was advanced into the vessel. A hard copy image was stored on PACS for documentation. A wire was advanced into the needle, a short incision was made at the puncture site, and serial dilatation was performed. A 14 Amharic 20 cm non-tunneled hemodialysis catheter was advanced into the central veins. The catheter flushed and aspirated appropriately. The lumens were blocked with heparin. The catheter was secured at the exit site with silk suture and a sterile dressing. The patient tolerated the procedure well. There were no immediate complications. Post procedure chest x-ray shows the catheter is positioned in the right atrium. Successful placement of a double-lumen non-tunneled hemodialysis catheter.     XR US GUIDED VASCULAR ACCESS    Result Date: 10/7/2022  INDICATION:   NO VENOUS ACCESS  EXAMINATION:  US GUIDE VAS ACCESS FINDINGS: Ultrasound was provided for PICC line placement. Ultrasound guidance for PICC line  placement. GBP    CT CODE NEURO HEAD WO CONTRAST    Result Date: 9/27/2022  EXAM: CT CODE NEURO HEAD WO CONTRAST INDICATION: Left gaze COMPARISON: None. CONTRAST: None. TECHNIQUE: Unenhanced CT of the head was performed using 5 mm images. Brain and bone windows were generated. Coronal and sagittal reformats. CT dose reduction was achieved through use of a standardized protocol tailored for this examination and automatic exposure control for dose modulation. FINDINGS: The ventricles and sulci are normal in size, shape and configuration. . There is no significant white matter disease. There is no intracranial hemorrhage, extra-axial collection, or mass effect. The basilar cisterns are open. No CT evidence of acute infarct. The bone windows demonstrate no abnormalities. The visualized portions of the paranasal sinuses and mastoid air cells are clear. No acute intracranial process seen     ECHO ADULT COMPLETE    Result Date: 9/27/2022    Left Ventricle: Severely reduced left ventricular systolic function with a visually estimated EF of 25 - 30%. Left ventricle size is normal. Mildly increased wall thickness. See diagram for wall motion findings. Abnormal diastolic function. Right Ventricle: Moderately reduced systolic function. ECHO ADULT FOLLOW-UP OR LIMITED    Result Date: 10/11/2022    Left Ventricle: Normal left ventricular systolic function with a visually estimated EF of 55 - 60%. Left ventricle size is normal. Mildly increased wall thickness. Unable to assess wall motion. Right Ventricle: Not well visualized. Right ventricle size is normal. Normal wall thickness.      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    MENTAL STATUS EXAM:    General appearance:  moderately  groomed, psychomotor activity is   Eye contact: good eye contact  Speech: soft  Affect : mood congruent  Mood: \"good \"  Thought Process: disorganize  Perception: denies AVH  Thought Content: denies SI/HI or Plan  Insight: Poor  Judgement: poor  Cognition: Impaired    ASSESSMENT AND PLAN:  Halina Doyle meets criteria for a diagnosis of depression unspecified, anxiety disorder, Delirium unspecified  Patient remains confused and per chart, this appears to be patient's new baseline. According to patient's chart, she was not assessed by [facility] as recommended. After reviewing patient's clinicals, they reported that it is likely a failed suicide attempt with lasting anoxic brain injury and recommended a TBI rehab. Patient denies current SI/HI/AVH and there is no concern for safety related to suicide at this time. She does not meet criteria for inpatient psychiatric admission. No further  psychiatric intervention is recommended. Thank your your consult. Please feel free to consult us again as needed.

## 2022-10-18 NOTE — PROGRESS NOTES
PT reassessment attempted. However, per nsg, pt not appropriate for PT treatment at this time as pt is agitated and yelling at staff sec to increased confusion. PT will continue to monitor and attempt for PT treatment at a later time. Thanks.

## 2022-10-18 NOTE — PROGRESS NOTES
RN attempted to get patient's vitals while doing lab draw but patient became hysterical and would not relax to get an accurate blood pressure. Will attempt again at a later time.

## 2022-10-18 NOTE — PROGRESS NOTES
Md notified that patient is yelling and cursing at staff. Verbally only. Pt is will not assist with turning for incontinent care, noted urine and stool on present. MD order given and noted he will be in to see the patient.

## 2022-10-18 NOTE — BSMART NOTE
BSMART Liaison Team Note     LOS:  21 Days     Patient goal(s) for today: Take medications as prescribed, communicate needs to staff appropriately  Onel Small Liaison team focus/goals: Assess needs, provide education and support    Progress note: Clinician met w/ pt face to face w/ 1:1 sitter present at beside. Pt was received laying in bed on her stomach; however, pt is currently awake and agreed to speak w/ this clinician. When asked how pt is feeling today she responds \"pretty good\". Pt denies SI/HI/VH/AH. Pt denies any issues w/ sleep or appetite. Pt presents as disheveled. Attitude is cooperative. Motor activity is restless. Speech is soft and mumbled. Eye-contact is poor. Affect and mood are labile. Thought process is disorganized and at times nonsensical. Pt is oriented to self, but not date, month, year, location or situation. When asked for the date, month or year pt responds after a prolonged period of silence \"I don't know\". When asked where pt is currently located she states \"Here's the deal. He works with probably found. .. I don't get out in this field\". Per pt's 1:1 sitter, pt has been agitated and restless today. Please defer to psychiatric NP note for recommendations. Barriers to Discharge: Medical Clearance  Guns in the home: No    Outpatient provider(s):  Unknown  Insurance info/prescription coverage:  [facility] [facility]/[employer] EM[employer]YE[employer]    Diagnosis: Per psychiatric NP, Brigitte Canogogo Vazquez meets criteria for a diagnosis of depression unspecified, anxiety disorder, Delirium unspecified\". Plan:  Please defer to psychiatric NP note   Follow up Psych Consult placed? No  Psychiatrist updated?  No      Participating treatment team members: Taco Balbuena LMSW

## 2022-10-18 NOTE — PROGRESS NOTES
KODI:   1) Rehab- [facility] accepted and auth is started   2) Pt will need updated PT and OT notes until obtained   3) Pt will need COVID-19 PCR prior to d/c  4) Pt will need [facility] at time of discharge   5) Risk of readmission- 14% [address] [facility] [address] Day 20:   1:34 PM- HANK spoke with Wrentiti Guzman with [facility]- [facility] has started insurance auth- she will provide me with the phone number and the reference number. 9:30 AM- Pt remains on Ortho- pending placement. HANK spoke with pt's sons/LNOK:   Sundeepia Norbert- [phone]   Clifford Margarito- [phone]   Igor Janene- [phone]     They collectively wish to relinquish LNOK pal to pt's Mother- their Valentina Kahn and pt's father- their Grandfather Terry Jacobo. Pt's sons understand this is strictly for pt's medical care. Pt's parents plan to work closely with her sons, sister and brother regarding her care in the absence of her being unable to do so herself- all family members above are aware and agreeable to this. HANK spoke with Jorge Guzman with [facility]- provided updates regarding updated [address] [facility] states pt is able to have a sitter if it is related to the brain injury as they have the ability to have sitters on their brain injury unit. HANK notified RN and RN leadership. HANK also requested COVID-19 test PCR placed yesterday be completed.      Xochilt Palencia, MSW, [address]

## 2022-10-18 NOTE — PROGRESS NOTES
[address] 19  [phone]    Medical Progress Note      NAME: Augie Mccain   :  1965  MRM:  [id number]    Date/Time of service 10/18/2022  9:14 AM         Assessment and Plan:     Acute metabolic encephalopathy / Delirium / Psychosis / KOBY (generalized anxiety disorder) - POA, persistent, perhaps more aggressive today, unclear etiology. \"2 small foci of diffusion restriction in the right frontal lobe \" noted on MRI but unlikely this explains KHADAR. Initial concern for Sz, but EEG bland, keppra stopped. Concern remains she had some anoxic brain injury. Psychiatry and Neurology consulted. Camden Garcia can explain her persistent encephalopathy. Prn ativan if severe aggression. Increase risperidone empirically. Debilitated patient - Noted after extubation, due to prolonged ICU stay. Appreciate PT OT speech eval.  She needs IPR. Takotsubo cardiomyopathy / NSTEMI (non-ST elevated myocardial infarction) / Bradycardia - POA, unclear initial driving event. Cardiology consulted. After extubation, we noted HTN and tachycardia. Repeat ECHO improved, so for now no plan for cath. Unable to tolerate BB or ACE due to low BP    Acute CVA (cerebrovascular accident) - MRI notes two small lesions. Unclear significance and timing. Continue ASA, BB and statin. Anemia - POA and slowly worsening likely due to acute illness. SIRS / tachycardia / Fever / leukocytosis - 1 week ago developed SIRS criteria of WBC, fever and tachycardia after extubation. Unclear etiology. Unremarkable Blood cx, UA, RVP and procalcitonin. She had just completed a course of Abx. HTN / Sinus tachycardia - Started metoprolol. I will added clonidine patch. Shock - Intially presumed septic shock until ECHO showed new EF 30%. Low EF has resolved. Thereafter shock was likely due to sedation. Now no longer on prn levophed IV gtt as pressor.  Resumed midodrine    Acute respiratory failure with hypoxia - POA, persistent. Unclear if aspiration PNA vs central resp depression. Completed Zosyn. Intubated on admission. Intensivist consulted and is managing vent. Still requring fentanyl and precedex gtt for sedation. Propofol was stopped due to elevated triglycerides. She is failing SBT so far. Likely needs trach soon if fails    Nausea and vomiting - Noted after extubation. Better today. Appreciate speech consult. Check KUB    UTI (urinary tract infection) - Pitt sensitive E coli on initial contaminated cx. Had E coli in contaminated sputum along with much normal josue. Had recently completed course of Zosyn. CHRIS (acute kidney injury) / Lactic acidosis / Hypokalemia - POA: unknown baseline Cr. Currently Cr stable. Status epilepticus - Reject Dx. Wicho Fan had myoclonic jerking    Dyslipidemia - On atorvastatin    Hypothyroidism - TSH normal.  Continue synthroid    Obese - Advise weight loss. Subjective:     Chief Complaint:  hallucinations and aggression towards staff today    ROS:  (bold if positive, if negative)    Tolerating some PT  Tolerating some diet        Objective:     Last 24hrs VS reviewed since prior progress note.  Most recent are:    Visit Vitals  BP (!) 147/76 (BP 1 Location: Left leg, BP Patient Position: Lying)   Pulse (!) 103   Temp 98.3 °F (36.8 °C)   Resp 18   Ht 5' 6\" (1.676 m)   Wt 104.3 kg (230 lb)   SpO2 93%   BMI 37.12 kg/m²     SpO2 Readings from Last 6 Encounters:   10/18/22 93%    O2 Flow Rate (L/min): 2 l/min     Intake/Output Summary (Last 24 hours) at 10/18/2022 1336  Last data filed at 10/18/2022 0400  Gross per 24 hour   Intake 118 ml   Output 825 ml   Net -707 ml          Physical Exam:    Gen:  Obese, in no acute distress  HEENT:  Pink conjunctivae, PERRL, hearing not intact to voice, NC on nose  Neck:  Supple, without masses, thyroid non-tender  Resp:  No accessory muscle use, bilateral coarse breath sounds Card:  No murmurs, bradycardic S1, S2 without thrills, bruits or peripheral edema  Abd:  Soft, non-tender, non-distended, reduced bowel sounds are present, no mass  Lymph:  No cervical or inguinal adenopathy  Musc:  No cyanosis or clubbing  Skin:  No rashes or ulcers, skin turgor is good  Neuro:  Cranial nerves are grossly intact, general motor weakness, follows commands vaguely  Psych:  Alert to Person, hallucinations    Telemetry reviewed:   normal sinus rhythm  __________________________________________________________________  Medications Reviewed: (see below)  Medications:     Current Facility-Administered Medications   Medication Dose Route Frequency    LORazepam (ATIVAN) injection 1 mg  1 mg IntraVENous ONCE    metoprolol tartrate (LOPRESSOR) tablet 25 mg  25 mg Oral Q12H    L.acidophilus-paracasei-S.thermophil-bifidobacter (RISAQUAD) 8 billion cell capsule  1 Capsule Oral DAILY    risperiDONE (RisperDAL) tablet 1 mg  1 mg Oral QHS    pantoprazole (PROTONIX) tablet 40 mg  40 mg Oral ACB    rOPINIRole (REQUIP) tablet 0.5 mg  0.5 mg Oral TID PRN    traMADoL (ULTRAM) tablet 50 mg  50 mg Oral Q6H PRN    potassium bicarb-citric acid (EFFER-K) tablet 20 mEq  20 mEq Oral BID    levothyroxine (SYNTHROID) tablet 88 mcg  88 mcg Oral DAILY    lidocaine (XYLOCAINE) 2 % jelly   Mouth/Throat PRN    prochlorperazine (COMPAZINE) injection 10 mg  10 mg IntraVENous Q6H PRN    alteplase (CATHFLO) 2 mg in sterile water (preservative free) 2 mL injection  2 mg InterCATHeter PRN    senna-docusate (PERICOLACE) 8.6-50 mg per tablet 1 Tablet  1 Tablet Oral BID    atorvastatin (LIPITOR) tablet 40 mg  40 mg Oral QHS    enoxaparin (LOVENOX) injection 30 mg  30 mg SubCUTAneous Q12H    aspirin chewable tablet 81 mg  81 mg Oral DAILY    sodium chloride (NS) flush 5-40 mL  5-40 mL IntraVENous Q8H    sodium chloride (NS) flush 5-40 mL  5-40 mL IntraVENous PRN    acetaminophen (TYLENOL) tablet 650 mg  650 mg Oral Q6H PRN    polyethylene glycol (MIRALAX) packet 17 g  17 g Oral DAILY PRN    ondansetron (ZOFRAN) injection 4 mg  4 mg IntraVENous Q6H PRN        Lab Data Reviewed: (see below)  Lab Review:     Recent Labs     10/18/22  0455 10/16/22  0328   WBC 7.2 9.0   HGB 11.8 11.3*   HCT 36.9 35.3   * 578*       Recent Labs     10/18/22  0455 10/16/22  0328    140   K 4.0 3.3*    105   CO2 28 28   * 129*   BUN 20 13   CREA 0.74 0.77   CA 9.5 9.3   MG 2.3 2.3   PHOS 4.3 2.8       Lab Results   Component Value Date/Time    Glucose (POC) 110 10/05/2022 04:55 PM    Glucose (POC) 87 10/05/2022 11:02 AM    Glucose (POC) 78 10/05/2022 05:00 AM    Glucose (POC) 94 10/05/2022 04:57 AM    Glucose (POC) 124 (H) 10/04/2022 11:03 PM     No results for input(s): PH, PCO2, PO2, HCO3, FIO2 in the last 72 hours. No results for input(s): INR, INREXT, INREXT in the last 72 hours.   All Micro Results       Procedure Component Value Units Date/Time    CULTURE, BLOOD, PAIRED [[id number]] Collected: 10/11/22 1214    Order Status: Completed Specimen: Blood Updated: 10/16/22 0540     Special Requests: NO SPECIAL REQUESTS        Culture result: NO GROWTH 5 DAYS       RESPIRATORY VIRUS PANEL W/COVID-19, PCR [[id number]] Collected: 10/11/22 0802    Order Status: Completed Specimen: Nasopharyngeal Updated: 10/11/22 1422     Adenovirus Not detected        Coronavirus 229E Not detected        Coronavirus HKU1 Not detected        Coronavirus CVNL63 Not detected        Coronavirus OC43 Not detected        SARS-CoV-2, PCR Not detected        Metapneumovirus Not detected        Rhinovirus and Enterovirus Not detected        Influenza A Not detected        Influenza B Not detected        Parainfluenza 1 Not detected        Parainfluenza 2 Not detected        Parainfluenza 3 Not detected        Parainfluenza virus 4 Not detected        RSV by PCR Not detected        B. parapertussis, PCR Not detected        Bordetella pertussis - PCR Not detected Chlamydophila pneumoniae DNA, QL, PCR Not detected        Mycoplasma pneumoniae DNA, QL, PCR Not detected       CULTURE, URINE [[id number]] Collected: 10/11/22 0715    Order Status: Canceled Specimen: Cath Urine     CULTURE, RESPIRATORY/SPUTUM/BRONCH Tere Hacking STAIN [[id number]] Collected: 10/03/22 1300    Order Status: Completed Specimen: Sputum Updated: 10/05/22 1143     Special Requests: NO SPECIAL REQUESTS        GRAM STAIN RARE WBCS SEEN               OCCASIONAL EPITHELIAL CELLS SEEN                  OCCASIONAL GRAM NEGATIVE RODS                  RARE GRAM POSITIVE COCCI IN PAIRS           Culture result:       HEAVY NORMAL RESPIRATORY CATHIE          CULTURE, BLOOD, PAIRED [[id number]] Collected: 09/27/22 1251    Order Status: Completed Specimen: Blood Updated: 10/02/22 0640     Special Requests: NO SPECIAL REQUESTS        Culture result: NO GROWTH 5 DAYS       CULTURE, URINE [[id number]]  (Abnormal)  (Susceptibility) Collected: 09/27/22 1251    Order Status: Completed Specimen: Cath Urine Updated: 09/30/22 1502     Special Requests: NO SPECIAL REQUESTS        [place] Count --        >100,000  COLONIES/mL       Culture result: ESCHERICHIA COLI       CULTURE, RESPIRATORY/SPUTUM/BRONCH Tere Hacking STAIN [[id number]]  (Abnormal)  (Susceptibility) Collected: 09/27/22 1811    Order Status: Completed Specimen: Sputum Updated: 09/30/22 0930     Special Requests: NO SPECIAL REQUESTS        GRAM STAIN 1+ WBCS SEEN               1+ GRAM POSITIVE COCCI IN CLUSTERS            1+ GRAM VARIABLE RODS        Culture result: HEAVY ESCHERICHIA COLI               HEAVY NORMAL RESPIRATORY CATHIE                  MODERATE YEAST (APPARENT MULTIPLE COLONY TYPES)          CULTURE, MRSA [[id number]] Collected: 09/27/22 1440    Order Status: Completed Specimen: Nasal from Nares Updated: 09/29/22 0820     Special Requests: NO SPECIAL REQUESTS        Culture result: MRSA NOT PRESENT               Screening of patient nares for MRSA is for surveillance purposes and, if positive, to facilitate isolation considerations in high risk settings. It is not intended for automatic decolonization interventions per se as regimens are not sufficiently effective to warrant routine use. S. Cecille Geoff, UR/CSF [[id number]] Collected: 09/27/22 1431    Order Status: Completed Specimen: Miscellaneous sample Updated: 09/28/22 1336     Source URINE        Specimen Urine     Streptococcus pneumoniae Ag Negative        Fluid culture Not indicated. Organism ID Not indicated. Please note Comment        Comment: (NOTE)  College of [employer]ologists standards require a culture to be  performed on CSF specimens submitted for bacterial antigen testing. (CAP [id number]) Urine specimens will not be cultured. Performed At: [facility] [address] [place] [id number]  Estefany Puentes MD GR:[id number]         Scott Brian [[id number]] Collected: 09/27/22 1530    Order Status: Completed Specimen: Urine Updated: 09/28/22 1336     Source URINE        L pneumophila S1 Ag, urine Negative        Comment: (NOTE)  Presumptive negative for L. pneumophila serogroup 1 antigen in urine,  suggesting no recent or current infection. Legionnaires' disease  cannot be ruled out since other serogroups and species may also  cause disease.   Performed At: [facility] [address] [place] [id number]  Estefany Puentes MD ZZ:[id number]         RESPIRATORY VIRUS PANEL W/COVID-19, PCR [[id number]] Collected: 09/27/22 1440    Order Status: Completed Specimen: Nasopharyngeal Updated: 09/27/22 2214     Adenovirus Not detected        Coronavirus 229E Not detected        Coronavirus HKU1 Not detected        Coronavirus CVNL63 Not detected        Coronavirus OC43 Not detected        SARS-CoV-2, PCR Not detected        Metapneumovirus Not detected        Rhinovirus and Enterovirus Not detected        Influenza A Not detected        Influenza B Not detected Parainfluenza 1 Not detected        Parainfluenza 2 Not detected        Parainfluenza 3 Not detected        Parainfluenza virus 4 Not detected        RSV by PCR Not detected        B. parapertussis, PCR Not detected        Bordetella pertussis - PCR Not detected        Chlamydophila pneumoniae DNA, QL, PCR Not detected        Mycoplasma pneumoniae DNA, QL, PCR Not detected       URINE CULTURE HOLD SAMPLE [[id number]] Collected: 09/27/22 1251    Order Status: Completed Specimen: Urine from Serum Updated: 09/27/22 1258     Urine culture hold       Urine on hold in Microbiology dept for 2 days. If unpreserved urine is submitted, it cannot be used for addtional testing after 24 hours, recollection will be required. Other pertinent lab: none    Total time spent with patient: 30 Minutes I personally reviewed chart, notes, data and current medications in the medical record. I have personally examined and treated the patient at bedside during this period. To assist coordination of care and communication with nursing and staff, this note may be preliminary early in the day, but finalized by end of the day.                  Care Plan discussed with: Patient, Care Manager, Nursing Staff, Consultant/Specialist, and >50% of time spent in counseling and coordination of care    Discussed:  Care Plan and D/C Planning    Prophylaxis:  Lovenox and H2B/PPI    Disposition:  SNF/LTC           ___________________________________________________    Attending Physician: Niharika Naik MD

## 2022-10-18 NOTE — BH NOTES
CM spoke with pt's sons/LNOK:   Carrie Mary Ellen- [phone]   Bre Rockyky- [phone]   Aidan Beyer- [phone]      They collectively wish to relinquish LNOK pal to pt's Mother- their Fawn Shaper and pt's father- their Grandfather Lamar Rock. Pt's sons understand this is strictly for pt's medical care. Pt's parents plan to work closely with her sons, sister and brother regarding her care in the absence of her being unable to do so herself- all family members above are aware and agreeable to this.      Mukund Monson, MSW, [address]

## 2022-10-18 NOTE — PROGRESS NOTES
Comprehensive Nutrition Assessment    Type and Reason for Visit: Reassess    Nutrition Recommendations/Plan:   Continue regular diet as tolerated   - Provide meal assistance as able   Provide Ensure Compact BID to aid in meeting kcal needs (440 kcal, 64 g carbs,18 g protein)  Obtain measured weight as able     Malnutrition Assessment:  Malnutrition Status:  Mild malnutrition (10/18/22 1357)    Context:  Acute illness     Findings of the 6 clinical characteristics of malnutrition:   Energy Intake:  75% or less of est energy req for 7 or more days  Weight Loss:  No significant weight loss     Body Fat Loss:  No significant body fat loss,     Muscle Mass Loss:  No significant muscle mass loss,    Fluid Accumulation:  No significant fluid accumulation, Extremities   Strength:  Not performed     Nutrition Assessment:     10/18: Follow up. Patient more agitated today, yelling at staff. Was advised to not visit patient at this time. PO intake remains poor 2/2 increased confusion. Patient requiring meal assistance - cues to eat, reminders to stay on task, assistance with utensils. SLP modified ONS order to 4 oz BID; only single intake of ONS documented. No measured weight yet since leaving ICU; edema has improved. Expect updated weight to be lower than admission wt. No BM x 3 days. Patient continues with montilla. Has been seen by psych - no recommendations for inpatient psych. K has been repleted - WDL today. Per CM note, plan for eventual [facility] placement.      Documented Meal intake:  Patient Vitals for the past 168 hrs:   % Diet Eaten   10/17/22 1842 1 - 25%   10/15/22 0902 1 - 25%   10/12/22 1003 26 - 50%     Documentation of supplement intake:  Patient Vitals for the past 168 hrs:   Supplement intake %   10/17/22 1842 76 - 100%   10/15/22 0902 0%       Nutrition Related Findings:      Wound Type: None  Last Bowel Movement Date: 10/15/22  Stool Appearance: Loose  Abdominal Assessment: Intact, Soft  Appetite: Fair  Bowel Sounds: Active   Edema:LUE: Trace (10/17/2022  8:30 AM)  RLE: Trace (10/17/2022  8:30 AM)      Nutr. Labs:  Lab Results   Component Value Date/Time    GFR est AA >60 10/03/2022 01:13 AM    GFR est non-AA >60 10/03/2022 01:13 AM    Creatinine, POC 1.6 (H) 09/27/2022 06:45 PM    Creatinine 0.74 10/18/2022 04:55 AM    BUN 20 10/18/2022 04:55 AM    Sodium,  09/27/2022 06:45 PM    Sodium 142 10/18/2022 04:55 AM    Potassium 4.0 10/18/2022 04:55 AM    Potassium, POC 4.0 09/27/2022 06:45 PM    Chloride,  (H) 09/27/2022 06:45 PM    Chloride 107 10/18/2022 04:55 AM    CO2 28 10/18/2022 04:55 AM       Lab Results   Component Value Date/Time    Glucose 112 (H) 10/18/2022 04:55 AM    Glucose (POC) 110 10/05/2022 04:55 PM       Lab Results   Component Value Date/Time    Hemoglobin A1c 6.1 (H) 09/29/2022 03:27 AM     Magnesium   Date Value Ref Range Status   10/18/2022 2.3 1.6 - 2.4 mg/dL Final   10/16/2022 2.3 1.6 - 2.4 mg/dL Final   10/13/2022 2.4 1.6 - 2.4 mg/dL Final   10/12/2022 2.5 (H) 1.6 - 2.4 mg/dL Final   10/11/2022 2.3 1.6 - 2.4 mg/dL Final     Lab Results   Component Value Date/Time    Calcium 9.5 10/18/2022 04:55 AM    Phosphorus 4.3 10/18/2022 04:55 AM       Nutr. Meds:  Lipitor, catapres, Lovenox, risaquad, synthroid, lopressor, zofran PRN, Protonix, miralax PRN, effer-K, compazine PRN, pericolace    Current Nutrition Intake & Therapies:  Average Meal Intake: 1-25%  Average Supplement Intake: % (of single documentation)  ADULT DIET Regular  ADULT ORAL NUTRITION SUPPLEMENT Breakfast, Dinner; Standard 4 oz    Anthropometric Measures:  Height: 5' 6\" (167.6 cm)  Ideal Body Weight (IBW): 130 lbs (59 kg)  Admission Body Weight: 230 lb  Current Body Wt:  104.3 kg (230 lb), 176.9 % IBW. Not specified  Current BMI (kg/m2): 37.1        Weight Adjustment: No adjustment                 BMI Category: Obese class 2 (BMI 35.0-39. 9)    Estimated Daily Nutrient Needs:  Energy Requirements Based On: Formula  Weight Used for Energy Requirements: Admission  Energy (kcal/day): 1965 (MSJ x 1.2)  Weight Used for Protein Requirements: Admission  Protein (g/day):  (0.8-1.0 g/kg)  Method Used for Fluid Requirements: 1 ml/kcal  Fluid (ml/day): 1965    Nutrition Diagnosis:   Inadequate oral intake related to cognitive or neurological impairment, inadequate protein-energy intake as evidenced by intake 26-50% (patient mentation)  Inadequate oral intake related to cognitive or neurological impairment as evidenced by NPO or clear liquid status due to medical condition - RESOLVED  Inadequate protein intake related to impaired nutrient utilization as evidenced by localized or generalized fluid accumulation, Criteria as identified in malnutrition assessment - RESOLVED    Nutrition Interventions:   Food and/or Nutrient Delivery: Continue current diet, Continue oral nutrition supplement  Nutrition Education/Counseling: No recommendations at this time  Coordination of Nutrition Care: Continue to monitor while inpatient  Plan of Care discussed with: IDR team    Goals:  Previous Goal Met: Progressing toward goal(s)  Goals: PO intake 50% or greater, by next RD assessment  Specify Other Goals: Provide oral diet within 2 - 3 days    Nutrition Monitoring and Evaluation:   Behavioral-Environmental Outcomes: None identified  Food/Nutrient Intake Outcomes: Food and nutrient intake, Supplement intake  Physical Signs/Symptoms Outcomes: Biochemical data, Hemodynamic status, Meal time behavior, Weight, GI status    Discharge Planning:    Continue oral nutrition supplement, Continue current diet    Charley Baeza MS, RD  Contact: Ext: [phone], or via [employer]

## 2022-10-19 LAB
SARS-COV-2, XPLCVT: NOT DETECTED
SOURCE, COVRS: NORMAL

## 2022-10-19 PROCEDURE — 74011250637 HC RX REV CODE- 250/637: Performed by: NURSE PRACTITIONER

## 2022-10-19 PROCEDURE — 74011250636 HC RX REV CODE- 250/636: Performed by: NURSE PRACTITIONER

## 2022-10-19 PROCEDURE — 97530 THERAPEUTIC ACTIVITIES: CPT

## 2022-10-19 PROCEDURE — 74011000250 HC RX REV CODE- 250: Performed by: INTERNAL MEDICINE

## 2022-10-19 PROCEDURE — 74011250637 HC RX REV CODE- 250/637: Performed by: INTERNAL MEDICINE

## 2022-10-19 PROCEDURE — 74011250637 HC RX REV CODE- 250/637: Performed by: PSYCHIATRY & NEUROLOGY

## 2022-10-19 PROCEDURE — 94761 N-INVAS EAR/PLS OXIMETRY MLT: CPT

## 2022-10-19 PROCEDURE — 74011250637 HC RX REV CODE- 250/637: Performed by: STUDENT IN AN ORGANIZED HEALTH CARE EDUCATION/TRAINING PROGRAM

## 2022-10-19 PROCEDURE — 65270000046 HC RM TELEMETRY

## 2022-10-19 RX ORDER — QUETIAPINE FUMARATE 25 MG/1
25 TABLET, FILM COATED ORAL
Status: DISCONTINUED | OUTPATIENT
Start: 2022-10-19 | End: 2022-10-20

## 2022-10-19 RX ADMIN — ENOXAPARIN SODIUM 30 MG: 100 INJECTION SUBCUTANEOUS at 22:53

## 2022-10-19 RX ADMIN — METOPROLOL TARTRATE 25 MG: 25 TABLET, FILM COATED ORAL at 22:53

## 2022-10-19 RX ADMIN — SENNOSIDES AND DOCUSATE SODIUM 1 TABLET: 50; 8.6 TABLET ORAL at 09:00

## 2022-10-19 RX ADMIN — QUETIAPINE FUMARATE 25 MG: 25 TABLET ORAL at 22:53

## 2022-10-19 RX ADMIN — METOPROLOL TARTRATE 25 MG: 25 TABLET, FILM COATED ORAL at 09:00

## 2022-10-19 RX ADMIN — Medication 10 ML: at 05:27

## 2022-10-19 RX ADMIN — SENNOSIDES AND DOCUSATE SODIUM 1 TABLET: 50; 8.6 TABLET ORAL at 22:53

## 2022-10-19 RX ADMIN — ATORVASTATIN CALCIUM 40 MG: 20 TABLET, FILM COATED ORAL at 22:53

## 2022-10-19 RX ADMIN — ENOXAPARIN SODIUM 30 MG: 100 INJECTION SUBCUTANEOUS at 10:00

## 2022-10-19 RX ADMIN — ASPIRIN 81 MG: 81 TABLET, CHEWABLE ORAL at 09:00

## 2022-10-19 NOTE — ADT AUTH CERT NOTES
Delirium - Care Day 23 (10/19/2022) by Gerald Sanches       Review Status Review Entered   Completed 10/19/2022 1021       Created By   Gerald Sanches      Criteria Review      Care Day: 23 Care Date: 10/19/2022 Level of Care: Telemetry    Guideline Day 3    Clinical Status    ( ) * Delirium resolved or manageable    10/19/2022 10:21:53 EDT by Gerald Sanches      perhaps more aggressive today,    (X) * Thoughts of suicide or Harm absent or manageable/treatable at lower level of care    10/19/2022 10:21:53 EDT by Gerald Sanches      there is no concern for safety related to suicide at this time    ( ) * Supports understand follow-up treatment and crisis plan    10/19/2022 10:21:53 EDT by Gerald Sanches      hallucinations    ( ) * Provider and supports sufficiently available at lower level of care    10/19/2022 10:21:53 EDT by Gerald Sanches      Second attempt with mother present still no success    ( ) * Patient can participate (eg, verify absence of plan for harm) in needed monitoring    10/19/2022 10:21:53 EDT by Vania Dietz      hallucinations and aggression towards staff again    ( ) * Functional status impairment absent or adequate self-care support planned at lower level of care    10/19/2022 10:21:53 EDT by Gerald Sanches      Debilitated patient    ( ) * Medical comorbidities, adverse medication events, and substance use absent or manageable/treatable at available lower level of care    10/19/2022 10:21:53 EDT by Lynne Civatte, Abegail Takotsubo     Medications    (X) Medication review, adjustment    10/19/2022 10:21:53 EDT by Gerald Sanches      Increased risperidone empirically, but family asked to stop it. I will consult psychiatry again    * Milestone   Additional Notes   10/19  Remote Telemetry      Pertinent Updates:   hallucinations and aggression towards staff again   Patient refused all morning meds.  Second attempt with mother present still no success      Vitals:   97.3 °F (36.3 °C) 126 169/79  18 95 % ra      Physical Exam:   Gen:  Obese, in no acute distress   HEENT:  Pink conjunctivae, PERRL, hearing not intact to voice, NC on nose   Neck:  Supple, without masses, thyroid non-tender   Resp:  No accessory muscle use, bilateral coarse breath sounds    Card:  No murmurs, bradycardic S1, S2 without thrills, bruits or peripheral edema   Abd:  Soft, non-tender, non-distended, reduced bowel sounds are present, no mass   Lymph:  No cervical or inguinal adenopathy   Musc:  No cyanosis or clubbing   Skin:  No rashes or ulcers, skin turgor is good   Neuro:  Cranial nerves are grossly intact, general motor weakness, follows commands vaguely   Psych:  Alert to Person, hallucinations      Per Internal Medicine   Acute metabolic encephalopathy / Delirium / Psychosis / KOBY (generalized anxiety disorder) - POA, persistent, perhaps more aggressive today, unclear etiology   Prn ativan if severe aggression. Increased risperidone empirically, but family asked to stop it.   I will consult psychiatry again with specific request to recommend medications      Medications:   PO lipitor 40mg bedtime   SC lovenox 30mg q12h   PO risperidal 1g bedtime       Orders:   regular adult diet, AM labs, SCD, incentive spirometry         Delirium - Care Day 22 (10/18/2022) by Gwynne Schwab       Review Status Review Entered   Completed 10/19/2022 1006       Created By   Gwynne Schwab      Criteria Review      Care Day: 22 Care Date: 10/18/2022 Level of Care: Telemetry    Guideline Day 3    Clinical Status    ( ) * Delirium resolved or manageable    10/19/2022 10:06:20 EDT by Vania Szymanski      persistent, perhaps more aggressive today    (X) * Thoughts of suicide or Harm absent or manageable/treatable at lower level of care    10/19/2022 10:06:20 EDT by Gwynne Schwab      there is no concern for safety related to suicide at this time    ( ) * Supports understand follow-up treatment and crisis plan    10/19/2022 10:06:20 EDT by Vania Smith      Pt reports feeling anxious and stating \"I am just so tired of all of this    ( ) * Provider and supports sufficiently available at lower level of care    10/19/2022 10:06:20 EDT by Anneliese Whitaker      Rehab- [facility] accepted and [address] [facility] [address] [facility] started    ( ) * Patient can participate (eg, verify absence of plan for harm) in needed monitoring    10/19/2022 10:06:20 EDT by Anneliese Whitaker      Patient becomes combative when anyone touches her    ( ) * Functional status impairment absent or adequate self-care support planned at lower level of care    10/19/2022 10:06:20 EDT by Anneliese Whitaker      Debilitated patient    ( ) * Medical comorbidities, adverse medication events, and substance use absent or manageable/treatable at available lower level of care    10/19/2022 10:06:20 EDT by Vivienne Hays Takotsubo CM    * Milestone   Additional Notes   10/18  Remote Telemetry      Pertinent Updates:   RN attempted to get patient's vitals while doing lab draw but patient became hysterical and would not relax to get an accurate blood pressure. RRT RN rounded on pt for increased MEWS score. Pt reports feeling anxious and stating \"I am just so tired of all of this\". Pt is laying on side and crying. Pt reassured using therapeutic techniques. Pt denies any complaints. Pt denies assistance.  Family at bedside reports that pt is \"much more anxious\" at this time   Patient becomes combative when anyone touches her      Vitals:   97.9 °F (36.6 °C) 129 Abnormal  175/76  18 96 % ra      Abnl/Pertinent Labs   WBC: 7.2   HGB: 11.8   HCT: 36.9   PLATELET: 939 (H)   Glucose: 112 (H)   BUN: 20   Creatinine: 0.74      Physical Exam:   Gen:  Obese, in no acute distress   HEENT:  Pink conjunctivae, PERRL, hearing not intact to voice, NC on nose   Neck:  Supple, without masses, thyroid non-tender   Resp:  No accessory muscle use, bilateral coarse breath sounds    Card:  No murmurs, bradycardic S1, S2 without thrills, bruits or peripheral edema   Abd:  Soft, non-tender, non-distended, reduced bowel sounds are present, no mass   Lymph:  No cervical or inguinal adenopathy   Musc:  No cyanosis or clubbing   Skin:  No rashes or ulcers, skin turgor is good   Neuro:  Cranial nerves are grossly intact, general motor weakness, follows commands vaguely   Psych:  Alert to Person, hallucinations      Per attending   Acute metabolic encephalopathy / Delirium / Psychosis / KOBY (generalized anxiety disorder) - POA, persistent, perhaps more aggressive today, unclear etiology. Psychiatry and Neurology consulted. Fran Garcia can explain her persistent encephalopathy. Prn ativan if severe aggression. Increase risperidone empirically. Debilitated patient - Noted after extubation, due to prolonged ICU stay. Appreciate PT OT speech eval.  She needs IPR. SIRS / tachycardia / Fever / leukocytosis - 1 week ago developed SIRS criteria of WBC, fever and tachycardia after extubation. Unclear etiology. Unremarkable Blood cx, UA, RVP and procalcitonin. She had just completed a course of Abx. HTN / Sinus tachycardia - Started metoprolol. I will added clonidine patch.    Rehab- [facility] accepted and auth is started       Medications:   PO ASA 81mg daily   TD catapres 0.2mg/24hr 1 patch q7d   SC lovenox 30mg q12h   PO Risaquad 1 cap daily   PO Lopressor 25mg q12h   PO protonix 40mg daily   PO Effer K 20mEq 2x daily   PO risperidal 1g daily   PO Pericolace 8.6-50mg 1 tab 2x daily   IM [id number] given 1x      Orders:   regular adult diet, AM labs, SCD, incentive spirometry       Per Dietary   Continue regular diet as tolerated    - Provide meal assistance as able    Provide Ensure Compact BID to aid in meeting kcal needs (440 kcal, 64 g carbs,18 g protein)   Obtain measured weight as able      [facility] Liaison Team Note    Progress note: Clinician met w/ pt face to face w/ 1:1 sitter present at beside. Pt was received laying in bed on her stomach; however, pt is currently awake and agreed to speak w/ this clinician. When asked how pt is feeling today she responds \"pretty good\". Pt denies SI/HI/VH/AH. Pt denies any issues w/ sleep or appetite. Pt presents as disheveled. Attitude is cooperative. Motor activity is restless. Speech is soft and mumbled. Eye-contact is poor. Affect and mood are labile. Thought process is disorganized and at times nonsensical. Pt is oriented to self, but not date, month, year, location or situation. When asked for the date, month or year pt responds after a prolonged period of silence \"I don't know\". When asked where pt is currently located she states \"Here's the deal. He works with probably found. .. I don't get out in this field\". Per pt's 1:1 sitter, pt has been agitated and restless today. Please defer to psychiatric NP note for recommendations. Barriers to Discharge: Medical Clearance   Diagnosis: Per psychiatric NP, Brigitte Mckeon Delfino Sims meets criteria for a diagnosis of depression unspecified, anxiety disorder, Delirium unspecified\". CM Note:   HANK spoke with Caitie Sand with [facility]- provided updates regarding updated [address] states pt is able to have a sitter if it is related to the brain injury as they have the ability to have sitters on their brain injury unit. HANK notified RN and RN leadership. CM also requested COVID-19 test PCR placed yesterday be completed. HANK spoke with Caitie Casanova with [facility]- [facility] has started insurance auth- she will provide me with the phone number and the reference number. They collectively wish to relinquish LNOK pal to pt's Mother- their Luis Vines and pt's father- their Grandfather Christina Alba. Pt's sons understand this is strictly for pt's medical care.  Pt's parents plan to work closely with her sons, sister and brother regarding her care in the absence of her being unable to do so herself- all family members above are aware and agreeable to this

## 2022-10-19 NOTE — PROGRESS NOTES
1228: RRT RN rounded on patient for a MEWS of 4, HR 130s. Patient lying prone on bed, HR palpated at 120. Patient in no acute distress. Primary RN sitting with patient in room who had already notified Dr Nithya Mackey of [facility]. Dr Nithya Mackey to see patient. No further intervention needed by RRT RN at this time. Encouraged RN to call with further changes/concerns. 1610: Re rounded on patient. HR now 140s-150s. Patient continues to lay prone, propped up on forearms. Not in any distress but yells out when touched. moved. Perfect serve message sent to Dr Nithya Mackey- primary RN notified of assessment. 1615: Orders to place patient on remote tele per Dr Nithya Mackey     1630: Patient repositioned on back from prone position. Bilateral elbows red but blanchable. Patient placed on tele box 90.

## 2022-10-19 NOTE — PROGRESS NOTES
Problem: Patient Education: Go to Patient Education Activity  Goal: Patient/Family Education  Outcome: Progressing Towards Goal     Problem: Falls - Risk of  Goal: *Absence of Falls  Description: Document Rich Blight Fall Risk and appropriate interventions in the flowsheet. Outcome: Progressing Towards Goal  Note: Fall Risk Interventions:  Mobility Interventions: PT Consult for mobility concerns, Utilize gait belt for transfers/ambulation, Utilize walker, cane, or other assistive device, PT Consult for assist device competence, Bed/chair exit alarm    Mentation Interventions: Adequate sleep, hydration, pain control, Bed/chair exit alarm, Door open when patient unattended, Family/sitter at bedside, More frequent rounding, Room close to nurse's station, Toileting rounds, Update white board    Medication Interventions: Patient to call before getting OOB, Teach patient to arise slowly, Utilize gait belt for transfers/ambulation    Elimination Interventions: Bed/chair exit alarm, Call light in reach, Toileting schedule/hourly rounds    History of Falls Interventions: Bed/chair exit alarm, Door open when patient unattended, Room close to nurse's station, Utilize gait belt for transfer/ambulation         Problem: Patient Education: Go to Patient Education Activity  Goal: Patient/Family Education  Outcome: Progressing Towards Goal     Problem: Pressure Injury - Risk of  Goal: *Prevention of pressure injury  Description: Document Jose Enrique Scale and appropriate interventions in the flowsheet. Outcome: Progressing Towards Goal  Note: Pressure Injury Interventions:  Sensory Interventions: Assess changes in LOC, Assess need for specialty bed, Discuss PT/OT consult with provider, Float heels, Keep linens dry and wrinkle-free, Minimize linen layers, Monitor skin under medical devices, Turn and reposition approx.  every two hours (pillows and wedges if needed)    Moisture Interventions: Absorbent underpads, Assess need for specialty bed, Check for incontinence Q2 hours and as needed, Internal/External urinary devices, Offer toileting Q_hr    Activity Interventions: Increase time out of bed, PT/OT evaluation, Assess need for specialty bed    Mobility Interventions: Assess need for specialty bed, PT/OT evaluation, Turn and reposition approx.  every two hours(pillow and wedges), Float heels    Nutrition Interventions: Document food/fluid/supplement intake, Offer support with meals,snacks and hydration    Friction and Shear Interventions: HOB 30 degrees or less, Minimize layers, Apply protective barrier, creams and emollients                Problem: Patient Education: Go to Patient Education Activity  Goal: Patient/Family Education  Outcome: Progressing Towards Goal     Problem: Nutrition Deficit  Goal: *Optimize nutritional status  Outcome: Progressing Towards Goal     Problem: Aspiration - Risk of  Goal: *Absence of aspiration  Outcome: Progressing Towards Goal     Problem: Patient Education: Go to Patient Education Activity  Goal: Patient/Family Education  Outcome: Progressing Towards Goal

## 2022-10-19 NOTE — PROGRESS NOTES
Problem: Self Care Deficits Care Plan (Adult)  Goal: *Acute Goals and Plan of Care (Insert Text)  Description: FUNCTIONAL STATUS PRIOR TO ADMISSION: Patient was independent and active without use of DME.     HOME SUPPORT: Patient lived alone     Occupational Therapy Goals  Goals updated for weekly reassessment 10/19/22  1. Patient will perform lower body dressing with moderate assistance within 7 day(s). 2.  Patient will perform grooming tasks, in supported sit, with supervision/set-up within 7 day(s). 3.  Patient will perform toilet transfers with moderate assistance within 7 day(s). 4.  Patient will perform all aspects of toileting with moderate assistance  within 7 day(s). 5.  Patient will participate in upper extremity therapeutic exercise/ coordination activities with supervision/set-up for 10 minutes within 7 day(s). Initiated 10/11/2022  1. Patient will perform lower body dressing with minimal assistance/contact guard assist within 7 day(s). 2.  Patient will perform grooming tasks, seated EOB,  with supervision/set-up within 7 day(s). 3.  Patient will perform toilet transfers with minimal assistance/contact guard assist within 7 day(s). 4.  Patient will perform all aspects of toileting with moderate assistance  within 7 day(s). 5.  Patient will participate in upper extremity therapeutic exercise/ coordination activities with supervision/set-up for 10 minutes within 7 day(s). Outcome: Not Met  OCCUPATIONAL THERAPY TREATMENT/WEEKLY RE-EVALUATION  Patient: Funmi Carballo (23 y.o. female)  Date: 10/19/2022  Diagnosis: Status epilepticus ([facility] [address] [G40.901] Status epilepticus ([address]      Precautions: Fall  Chart, occupational therapy assessment, plan of care, and goals were reviewed. ASSESSMENT  Based on the objective data described below, pt is functioning well below her baseline due to impaired cognition, generalized weakness, impaired functional mobility, and elevated HR.  She was received prone in bed, propped on elbows. Pt intermittently nodding or providing yes/no answers to some questions. Vitals assessed and  bpm. Educated pt on how therapist would assist her in repositioning to sidelying for comfort and to rest, pt nodded. Repositioned pt to sidelying with total A x2, pt yelling out. Once in supported sidelying pt nodded yes to this position being more comfortable. Additional activity deferred and pt remained in bed at end of session. Continue to recommend rehab at discharge. Current Level of Function Impacting Discharge (ADLs): Total A    Other factors to consider for discharge: independent baseline         PLAN :  Goals have been updated based on progression since last assessment. Patient continues to benefit from skilled intervention to address the above impairments. Continue to follow patient 5 times a week to address goals. Recommendation for discharge: (in order for the patient to meet his/her long term goals)  Therapy 3 hours per day 5-7 days per week    This discharge recommendation:  Has been made in collaboration with the attending provider and/or case management    Equipment recommendations for successful discharge (if) home: TBD       SUBJECTIVE:   Patient stated Yes.     OBJECTIVE DATA SUMMARY:   Cognitive/Behavioral Status:  Neurologic State: Agitated;Confused; Eyes open spontaneously;Irritable  Orientation Level: Oriented to person;Disoriented to time;Disoriented to situation;Disoriented to place  Cognition: Decreased attention/concentration;Decreased command following; Impulsive;Poor safety awareness             Functional Mobility and Transfers for ADLs:  Bed Mobility:  Rolling: Total assistance;Assist x2    ADL Intervention:  Grooming:  Washing face:  Total A       Pain:  Pt yelling out with repositioning but did not respond to questions about being in pain    Activity Tolerance:   Poor    After treatment patient left in no apparent distress:   Supine in bed, Call bell within reach, Bed / chair alarm activated, Caregiver / family present, and Side rails x 3    COMMUNICATION/COLLABORATION:   The patients plan of care was discussed with: Physical Therapist and Registered Nurse    Duwayne Collet, OT  Time Calculation: 13 mins

## 2022-10-19 NOTE — PROGRESS NOTES
[facility]  [address], [facility] [place] [facility]  [phone]    Medical Progress Note      NAME: Alberto Benjamin   :  1965  MRM:  [id number]    Date/Time of service 10/19/2022  9:14 AM         Assessment and Plan:     Acute metabolic encephalopathy / Delirium / Psychosis / KOBY (generalized anxiety disorder) - POA, persistent, perhaps more aggressive today, unclear etiology. \"2 small foci of diffusion restriction in the right frontal lobe \" noted on MRI but unlikely this explains KHADAR. Initial concern for Sz, but EEG bland, keppra stopped. Concern remains she had some anoxic brain injury. Psychiatry and Neurology consulted. Sudha Squires can explain her persistent encephalopathy. Prn ativan if severe aggression. Increased risperidone empirically, but family asked to stop it. I will consult psychiatry again with specific request to recommend medications. Debilitated patient - Noted after extubation, due to prolonged ICU stay. Appreciate PT OT speech eval.  She needs IPR. Takotsubo cardiomyopathy / NSTEMI (non-ST elevated myocardial infarction) / Bradycardia - POA, unclear initial driving event. Cardiology consulted. After extubation, we noted HTN and tachycardia. Repeat ECHO improved, so for now no plan for cath. Unable to tolerate BB or ACE due to low BP    Acute CVA (cerebrovascular accident) - MRI notes two small lesions. Unclear significance and timing. Continue ASA, BB and statin. Anemia - POA and slowly worsening likely due to acute illness. SIRS / tachycardia / Fever / leukocytosis - 1 week ago developed SIRS criteria of WBC, fever and tachycardia after extubation. Unclear etiology. Unremarkable Blood cx, UA, RVP and procalcitonin. She had just completed a course of Abx. HTN / Sinus tachycardia - Started metoprolol. I will added clonidine patch. Shock - Intially presumed septic shock until ECHO showed new EF 30%. Low EF has resolved. Thereafter shock was likely due to sedation. Now no longer on prn levophed IV gtt as pressor. Resumed midodrine    Acute respiratory failure with hypoxia - POA, persistent. Unclear if aspiration PNA vs central resp depression. Completed Zosyn. Intubated on admission. Intensivist consulted and is managing vent. Still requring fentanyl and precedex gtt for sedation. Propofol was stopped due to elevated triglycerides. She is failing SBT so far. Likely needs trach soon if fails    Nausea and vomiting - Noted after extubation. Better today. Appreciate speech consult. Check KUB    UTI (urinary tract infection) - Pitt sensitive E coli on initial contaminated cx. Had E coli in contaminated sputum along with much normal josue. Had recently completed course of Zosyn. CHRIS (acute kidney injury) / Lactic acidosis / Hypokalemia - POA: unknown baseline Cr. Currently Cr stable. Status epilepticus - Reject Dx. Galdino Allen had myoclonic jerking    Dyslipidemia - On atorvastatin    Hypothyroidism - TSH normal.  Continue synthroid    Obese - Advise weight loss. Subjective:     Chief Complaint:  hallucinations and aggression towards staff again    ROS:  (bold if positive, if negative)    Tolerating some PT  Tolerating some diet        Objective:     Last 24hrs VS reviewed since prior progress note. Most recent are:    Visit Vitals  /71 (BP 1 Location: Left leg, BP Patient Position: Lying; Other (Comment))   Pulse 78   Temp 98.2 °F (36.8 °C)   Resp 18   Ht 5' 6\" (1.676 m)   Wt 104.3 kg (230 lb)   SpO2 93%   BMI 37.12 kg/m²     SpO2 Readings from Last 6 Encounters:   10/19/22 93%    O2 Flow Rate (L/min): 2 l/min     Intake/Output Summary (Last 24 hours) at 10/19/2022 0854  Last data filed at 10/19/2022 0427  Gross per 24 hour   Intake --   Output 400 ml   Net -400 ml          Physical Exam:    Gen:  Obese, in no acute distress  HEENT:  Pink conjunctivae, PERRL, hearing not intact to voice, NC on nose  Neck: Supple, without masses, thyroid non-tender  Resp:  No accessory muscle use, bilateral coarse breath sounds   Card:  No murmurs, bradycardic S1, S2 without thrills, bruits or peripheral edema  Abd:  Soft, non-tender, non-distended, reduced bowel sounds are present, no mass  Lymph:  No cervical or inguinal adenopathy  Musc:  No cyanosis or clubbing  Skin:  No rashes or ulcers, skin turgor is good  Neuro:  Cranial nerves are grossly intact, general motor weakness, follows commands vaguely  Psych:  Alert to Person, hallucinations    Telemetry reviewed:   normal sinus rhythm  __________________________________________________________________  Medications Reviewed: (see below)  Medications:     Current Facility-Administered Medications   Medication Dose Route Frequency    risperiDONE (RisperDAL) tablet 1 mg  1 mg Oral DAILY    cloNIDine (CATAPRES) 0.2 mg/24 hr patch 1 Patch  1 Patch TransDERmal Q7D    metoprolol tartrate (LOPRESSOR) tablet 25 mg  25 mg Oral Q12H    L.acidophilus-paracasei-S.thermophil-bifidobacter (RISAQUAD) 8 billion cell capsule  1 Capsule Oral DAILY    risperiDONE (RisperDAL) tablet 1 mg  1 mg Oral QHS    pantoprazole (PROTONIX) tablet 40 mg  40 mg Oral ACB    potassium bicarb-citric acid (EFFER-K) tablet 20 mEq  20 mEq Oral BID    levothyroxine (SYNTHROID) tablet 88 mcg  88 mcg Oral DAILY    lidocaine (XYLOCAINE) 2 % jelly   Mouth/Throat PRN    prochlorperazine (COMPAZINE) injection 10 mg  10 mg IntraVENous Q6H PRN    alteplase (CATHFLO) 2 mg in sterile water (preservative free) 2 mL injection  2 mg InterCATHeter PRN    senna-docusate (PERICOLACE) 8.6-50 mg per tablet 1 Tablet  1 Tablet Oral BID    atorvastatin (LIPITOR) tablet 40 mg  40 mg Oral QHS    enoxaparin (LOVENOX) injection 30 mg  30 mg SubCUTAneous Q12H    aspirin chewable tablet 81 mg  81 mg Oral DAILY    sodium chloride (NS) flush 5-40 mL  5-40 mL IntraVENous Q8H    sodium chloride (NS) flush 5-40 mL  5-40 mL IntraVENous PRN acetaminophen (TYLENOL) tablet 650 mg  650 mg Oral Q6H PRN    polyethylene glycol (MIRALAX) packet 17 g  17 g Oral DAILY PRN    ondansetron (ZOFRAN) injection 4 mg  4 mg IntraVENous Q6H PRN        Lab Data Reviewed: (see below)  Lab Review:     Recent Labs     10/18/22  0455   WBC 7.2   HGB 11.8   HCT 36.9   *       Recent Labs     10/18/22  0455      K 4.0      CO2 28   *   BUN 20   CREA 0.74   CA 9.5   MG 2.3   PHOS 4.3       Lab Results   Component Value Date/Time    Glucose (POC) 110 10/05/2022 04:55 PM    Glucose (POC) 87 10/05/2022 11:02 AM    Glucose (POC) 78 10/05/2022 05:00 AM    Glucose (POC) 94 10/05/2022 04:57 AM    Glucose (POC) 124 (H) 10/04/2022 11:03 PM     No results for input(s): PH, PCO2, PO2, HCO3, FIO2 in the last 72 hours. No results for input(s): INR, INREXT, INREXT in the last 72 hours.   All Micro Results       Procedure Component Value Units Date/Time    CULTURE, BLOOD, PAIRED [[id number]] Collected: 10/11/22 1214    Order Status: Completed Specimen: Blood Updated: 10/16/22 0540     Special Requests: NO SPECIAL REQUESTS        Culture result: NO GROWTH 5 DAYS       RESPIRATORY VIRUS PANEL W/COVID-19, PCR [[id number]] Collected: 10/11/22 0802    Order Status: Completed Specimen: Nasopharyngeal Updated: 10/11/22 1422     Adenovirus Not detected        Coronavirus 229E Not detected        Coronavirus HKU1 Not detected        Coronavirus CVNL63 Not detected        Coronavirus OC43 Not detected        SARS-CoV-2, PCR Not detected        Metapneumovirus Not detected        Rhinovirus and Enterovirus Not detected        Influenza A Not detected        Influenza B Not detected        Parainfluenza 1 Not detected        Parainfluenza 2 Not detected        Parainfluenza 3 Not detected        Parainfluenza virus 4 Not detected        RSV by PCR Not detected        B. parapertussis, PCR Not detected        Bordetella pertussis - PCR Not detected        Chlamydophila pneumoniae DNA, QL, PCR Not detected        Mycoplasma pneumoniae DNA, QL, PCR Not detected       CULTURE, URINE [[id number]] Collected: 10/11/22 0715    Order Status: Canceled Specimen: Cath Urine     CULTURE, RESPIRATORY/SPUTUM/BRONCH Elihue Oscar STAIN [[id number]] Collected: 10/03/22 1300    Order Status: Completed Specimen: Sputum Updated: 10/05/22 1143     Special Requests: NO SPECIAL REQUESTS        GRAM STAIN RARE WBCS SEEN               OCCASIONAL EPITHELIAL CELLS SEEN                  OCCASIONAL GRAM NEGATIVE RODS                  RARE GRAM POSITIVE COCCI IN PAIRS           Culture result:       HEAVY NORMAL RESPIRATORY CATHIE          CULTURE, BLOOD, PAIRED [[id number]] Collected: 09/27/22 1251    Order Status: Completed Specimen: Blood Updated: 10/02/22 0640     Special Requests: NO SPECIAL REQUESTS        Culture result: NO GROWTH 5 DAYS       CULTURE, URINE [[id number]]  (Abnormal)  (Susceptibility) Collected: 09/27/22 1251    Order Status: Completed Specimen: Cath Urine Updated: 09/30/22 1502     Special Requests: NO SPECIAL REQUESTS        [place] Count --        >100,000  COLONIES/mL       Culture result: ESCHERICHIA COLI       CULTURE, RESPIRATORY/SPUTUM/BRONCH Elihue Oscar STAIN [[id number]]  (Abnormal)  (Susceptibility) Collected: 09/27/22 1811    Order Status: Completed Specimen: Sputum Updated: 09/30/22 0930     Special Requests: NO SPECIAL REQUESTS        GRAM STAIN 1+ WBCS SEEN               1+ GRAM POSITIVE COCCI IN CLUSTERS            1+ GRAM VARIABLE RODS        Culture result: HEAVY ESCHERICHIA COLI               HEAVY NORMAL RESPIRATORY CATHIE                  MODERATE YEAST (APPARENT MULTIPLE COLONY TYPES)          CULTURE, MRSA [[id number]] Collected: 09/27/22 1440    Order Status: Completed Specimen: Nasal from Nares Updated: 09/29/22 0820     Special Requests: NO SPECIAL REQUESTS        Culture result: MRSA NOT PRESENT               Screening of patient nares for MRSA is for surveillance purposes and, if positive, to facilitate isolation considerations in high risk settings. It is not intended for automatic decolonization interventions per se as regimens are not sufficiently effective to warrant routine use. S. Rodrigo Sarmiento, UR/CSF [[id number]] Collected: 09/27/22 1431    Order Status: Completed Specimen: Miscellaneous sample Updated: 09/28/22 1336     Source URINE        Specimen Urine     Streptococcus pneumoniae Ag Negative        Fluid culture Not indicated. Organism ID Not indicated. Please note Comment        Comment: (NOTE)  College of American Pathologists standards require a culture to be  performed on CSF specimens submitted for bacterial antigen testing. (CAP [id number]) Urine specimens will not be cultured. Performed At: [facility] [place] [id number]  Vincenzo Nunez MD YY:[id number]         Claribel Koenig [[id number]] Collected: 09/27/22 1530    Order Status: Completed Specimen: Urine Updated: 09/28/22 1336     Source URINE        L pneumophila S1 Ag, urine Negative        Comment: (NOTE)  Presumptive negative for L. pneumophila serogroup 1 antigen in urine,  suggesting no recent or current infection. Legionnaires' disease  cannot be ruled out since other serogroups and species may also  cause disease.   Performed At: [facility], [place] [id number]  Vincenzo Nunez MD WR:[id number]         RESPIRATORY VIRUS PANEL W/COVID-19, PCR [[id number]] Collected: 09/27/22 1440    Order Status: Completed Specimen: Nasopharyngeal Updated: 09/27/22 2214     Adenovirus Not detected        Coronavirus 229E Not detected        Coronavirus HKU1 Not detected        Coronavirus CVNL63 Not detected        Coronavirus OC43 Not detected        SARS-CoV-2, PCR Not detected        Metapneumovirus Not detected        Rhinovirus and Enterovirus Not detected        Influenza A Not detected        Influenza B Not detected        Parainfluenza 1 Not detected        Parainfluenza 2 Not detected        Parainfluenza 3 Not detected        Parainfluenza virus 4 Not detected        RSV by PCR Not detected        B. parapertussis, PCR Not detected        Bordetella pertussis - PCR Not detected        Chlamydophila pneumoniae DNA, QL, PCR Not detected        Mycoplasma pneumoniae DNA, QL, PCR Not detected       URINE CULTURE HOLD SAMPLE [[id number]] Collected: 09/27/22 1251    Order Status: Completed Specimen: Urine from Serum Updated: 09/27/22 1258     Urine culture hold       Urine on hold in Microbiology dept for 2 days. If unpreserved urine is submitted, it cannot be used for addtional testing after 24 hours, recollection will be required. Other pertinent lab: none    Total time spent with patient: 30 Minutes I personally reviewed chart, notes, data and current medications in the medical record. I have personally examined and treated the patient at bedside during this period. To assist coordination of care and communication with nursing and staff, this note may be preliminary early in the day, but finalized by end of the day.                  Care Plan discussed with: Patient, Care Manager, Nursing Staff, Consultant/Specialist, and >50% of time spent in counseling and coordination of care    Discussed:  Care Plan and D/C Planning    Prophylaxis:  Lovenox and H2B/PPI    Disposition:  SNF/LTC           ___________________________________________________    Attending Physician: Josefina Walden MD

## 2022-10-19 NOTE — PROGRESS NOTES
Notified Anabela Oleary, DO:   Attempting to take patient's vitals. Patient is worked up, yelling, cursing at staff. She doesn't remain still while vitals are being taken. BP is 175/76, pulse is 129, resp 18, 96% on RA. Patient becomes combative when anyone touches her. Thanks.

## 2022-10-19 NOTE — PROGRESS NOTES
Notified Kip Castellanos DO:  Patient would not let us take her midnight vitals. Her 0400 vitals were 97.3; 126 (HR); 169/79 (BP); 18 (resp); 95% RA. Her MEWS is 3. Patient gets agitated when vitals are being taken and doesn't lay still. The geodon seems to have worked last night. Thanks.

## 2022-10-19 NOTE — PROGRESS NOTES
Problem: Mobility Impaired (Adult and Pediatric)  Goal: *Acute Goals and Plan of Care (Insert Text)  Description: FUNCTIONAL STATUS PRIOR TO ADMISSION: Patient was independent and active without use of DME.    HOME SUPPORT PRIOR TO ADMISSION: The patient lived with family but did not require assist.    Physical Therapy Goals  Updated 10/19/2022  1. Patient will move from supine to sit and sit to supine  in bed with moderate assistance  within 7 day(s). 2.  Patient will transfer from bed to chair and chair to bed with moderate assistance  using the least restrictive device within 7 day(s). 3.  Patient will perform sit to stand with moderate assistance  within 7 day(s). 4.  Patient will ambulate with moderate asssistance for 10 feet with the least restrictive device within 7 day(s). Initiated 10/11/2022  1. Patient will move from supine to sit and sit to supine , scoot up and down, and roll side to side in bed with independence within 7 day(s). 2.  Patient will transfer from bed to chair and chair to bed with modified independence using the least restrictive device within 7 day(s). 3.  Patient will perform sit to stand with modified independence within 7 day(s). 4.  Patient will ambulate with modified independence for 200 feet with the least restrictive device within 7 day(s). 5.  Patient will ascend/descend 4 stairs with  handrail(s) with modified independence within 7 day(s). Outcome: Not Met   PHYSICAL THERAPY TREATMENT: WEEKLY REASSESSMENT  Patient: Sudha Morris (63 y.o. female)  Date: 10/19/2022  Primary Diagnosis: [facility] [G40.901]       Precautions: Universal  Fall      ASSESSMENT  Patient continues with skilled PT services and is not progressing towards goals. Patient with a HR of 136 at rest in the bed. Attempted Physical therapy this morning but patient not participating. This afternoon she does not the answer to some questions.   Patient is prone in bed propped on her elbows. Sitter states she has been in this position for > one hour. Described to patient how we would assist to help her get off of her elbows, patient nods yes but calls out in pain when assisted to semi left sidelying. Once in prone with left leg supported on a pillow she settles and nods yes to if position is more comfortable. Very limited participation in therapy today. Patient's progression toward goals since last assessment: less responsive and less participation today    Current Level of Function Impacting Discharge (mobility/balance): total assist for all mobility and cares    Functional Outcome Measure: The patient scored 0 out of 100 on the Barthel outcome measure . Other factors to consider for discharge: was indep prior to admit         PLAN :  Goals have been updated based on progression since last assessment. Patient continues to benefit from skilled intervention to address the above impairments. Recommendations and Planned Interventions: bed mobility training, transfer training, gait training, and therapeutic exercises      Frequency/Duration: Patient will be followed by physical therapy:  5 times a week to address goals.     Recommendation for discharge: (in order for the patient to meet his/her long term goals)  Therapy 3 hours per day 5-7 days per week    This discharge recommendation:  Has been made in collaboration with the attending provider and/or case management    IF patient discharges home will need the following DME: to be determined (TBD)         SUBJECTIVE:   Patient did not say anything    OBJECTIVE DATA SUMMARY:   HISTORY:    Past Medical History:   Diagnosis Date    History of vascular access device 10/07/2022    [facility] VAT: PICC placement R Cephalic length 40 cm for reliable access/TPN arm circ 35.5 cm     Past Surgical History:   Procedure Laterality Date    IR INSERT NON TUNL CVC OVER 5 YRS  9/27/2022       Personal factors and/or comorbidities impacting plan of care:     Home Situation  Home Environment: Private residence  # Steps to Enter: 0  One/Two Story Residence: One story  Living Alone: No  Support Systems: Child(wayne), Other Family Member(s)  Patient Expects to be Discharged to[de-identified] Rehab hospital/unit acute  Current DME Used/Available at Home: None  Tub or Shower Type: Shower    EXAMINATION/PRESENTATION/DECISION MAKING:   Critical Behavior:  Neurologic State: Agitated, Confused, Eyes open spontaneously, Irritable  Orientation Level: Oriented to person, Disoriented to time, Disoriented to situation, Disoriented to place  Cognition: Decreased attention/concentration, Decreased command following, Impulsive, Poor safety awareness  Safety/Judgement: Decreased awareness of environment, Decreased insight into deficits  Hearing: Auditory  Auditory Impairment: None    Range Of Motion:         Unable to assess due to decreased patient participation                 Strength:              Unable to assess due to decreased patient participation           Tone & Sensation:                Unable to assess due to decreased patient participation                  Coordination:Unable to assess due to decreased patient participation     Vision: Unable to assess due to decreased patient participation     Functional Mobility:  Bed Mobility:  Rolling: Total assistance;Assist x2           Transfers:               Unable to assess due to decreased patient participation              Balance:  Unable to assess due to decreased patient participation     Ambulation/Gait Training:               Unable to assess due to decreased patient participation                  Functional Measure:  Barthel Index:    Bathin  Bladder: 0  Bowels: 0  Groomin  Dressin  Feedin  Mobility: 0  Stairs: 0  Toilet Use: 0  Transfer (Bed to Chair and Back): 0  Total: 0/100       The Barthel ADL Index: Guidelines  1.  The index should be used as a record of what a patient does, not as a record of what a patient could do. 2. The main aim is to establish degree of independence from any help, physical or verbal, however minor and for whatever reason. 3. The need for supervision renders the patient not independent. 4. A patient's performance should be established using the best available evidence. Asking the patient, friends/relatives and nurses are the usual sources, but direct observation and common sense are also important. However direct testing is not needed. 5. Usually the patient's performance over the preceding 24-48 hours is important, but occasionally longer periods will be relevant. 6. Middle categories imply that the patient supplies over 50 per cent of the effort. 7. Use of aids to be independent is allowed. Score Interpretation (from [address] [facility])    Independent   60-79 Minimally independent   40-59 Partially dependent   20-39 Very dependent   <20 Totally dependent     -Michael Laurent., Barthel, DMalorieW. (1965). Functional evaluation: the Barthel Index. [address] [facility] ([address] [place] [address] (1997). The Barthel activities of daily living index: self-reporting versus actual performance in the old (> or = 75 years). Journal [address] 45(7), [address] [facility], .MalorieMalorie, Julian Curry., [place]. (1999). Measuring the change in disability after inpatient rehabilitation; comparison of the responsiveness of the Barthel Index and Functional [place] Measure. Journal of Neurology, Neurosurgery, and Psychiatry, 66(4), 434-334. Gopi Rapp, N.J.A, Zeyad Landers,  W.J.M, & Domi Dwyer MSTEPHANIE. (2004) Assessment of post-stroke quality of life in cost-effectiveness studies: The usefulness of the Barthel Index and the EuroQoL-5D. Quality of Life Research, 13, 427-43          Pain Rating:  Cries out when attempting to help her move  Does not answer question \"do you have pain? \"    Activity Tolerance:   Poor    After treatment patient left in no apparent distress:   Semi supine in bed    COMMUNICATION/EDUCATION:   The patients plan of care was discussed with: Occupational therapist, Registered nurse, and Case management. Fall prevention education was provided and the patient/caregiver indicated understanding., Patient/family have participated as able in goal setting and plan of care. , and Patient/family agree to work toward stated goals and plan of care.     Thank you for this referral.  Jaison Hannon, PT   Time Calculation: 14 mins

## 2022-10-19 NOTE — PROGRESS NOTES
Reviewed chart and spoke with nurse. Attempted to see patient, she had her eyes open briefly but not answering questions or following commands. Her mom is present also and reports this is behavior is different than usual.  Will continue to attempt Physical Therapy at a later time.

## 2022-10-19 NOTE — PROGRESS NOTES
10/19/2022  4:18 PM  Care Management Progress Note      ICD-10-CM ICD-9-CM    1. Status epilepticus (HCC)  G40.901 345.3       2. Acute respiratory failure with hypoxia (HCC)  J96.01 518.81       3. Acute renal insufficiency  N28.9 593.9       4. Seizure ([facility] [R56.9 (ICD-10-CM)]  R56.9 780.39       5. Takotsubo cardiomyopathy  I51.81 429.83       6. Other cardiomyopathy ([facility]  I42.8 425.4       7. Dyslipidemia  E78.5 272.4       8. Encephalopathy  G93.40 348.30 EEG      EEG          RUR:  14%  Risk Level: [x]Low []Moderate []High  Value-based purchasing: [] Yes [x] No  Bundle patient: [] Yes [x] No   Specify:     Transition of care plan:  Discharge pending placement. PT/OT treating. Pt with sitter. IPR - [facility] reported they have received auth, and will be able to admit pt tomorrow, 10/20/22. Pt's mother, Austin Given, updated via p/c. Outpatient follow-up. [facility] ambulance arranged for 2:00 PM on 10/20/22.

## 2022-10-19 NOTE — CONSULTS
PSYCHIATRY CONSULT NOTE    REASON FOR CONSULT: suicidal ideation. INTERVAL HISTORY  10/19/22: Patient is observed lying down in the prone position. The nurse and mother were at bedside. She is not engaging, eye contact is poor and her speech is mumbled which is difficult to understand. Nurse reports that patient has been verbally aggressive, refusing care and medications. Patient is on risperidone and per chart family requested for the medication to be stopped. The mother expressed concerns about patient's new behaviors of aggression and care refusal after she was started on the medication. 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 She denies that patient has not reported hearing or seeing things and has also not mentioned about wanting to hurt herself or other people. The nurse reported that patient has not been sleeping and started sleeping at 4 am last night. 10/16/22: Patient is seen resting in bed. She is calm and cooperative, she remains confused and does not answer questions appropriately. She states that she is at [facility]. She reports today as Monday, date, month and year is unknown. She denies feeling depressed and anxious. She denies suicidal/homicidal thoughts and AV hallucinations. She denies concerns with sleep and appetite. No paranoia or delusions observed. 10/14/2022: Patient is seen by the psychiatric team. She remains disoriented and confused. Her speech is incoherent and she is not able to engage meaningfully. She is not able to answer questions correctly. When asked where she is, she refuses to answer and states that it is her business. She is fixated on refinancing her house.  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 She denies suicidal thoughts and did not answer if she has homicidal thoughts or AV hallucinations. She reports she sleeps well and eats well but she is not able to state what she had for breakfast.      HISTORY OF PRESENTING COMPLAINT:  Rosa Olmstead is a 59 y.o. WHITE/NON- female who is currently admitted to the medical floor at [facility]. Patient presented to the ED via EMS after being found unresponsive in her in bed and CPR was initiated. Patient is being treated for acute metabolic encephalopathy. On assessment today, patient appeared is awake and alert. She appears to be confused and is not able to recall the circumstances surrounding her admission to the hospital. Her speech is very soft making it hard to follow and understand. She reports her location as [facility] and current date is unknown. She denies current depression, anxiety, suicidal/homicidal thoughts and VA hallucinations. She denies taking an overdose or trying to end her life. She reports having suicidal thoughts in the past and thought she was dying but denied any plans or intent. She denies having any stress or trauma. Her mother who is at bedside states that patient lives alone and has been having a lot of financial and work stress. Mom also reports that patient wants to sell her house. She reports recent sleep issues but denied appetite concerns. PAST PSYCHIATRIC HISTORY: Patient denies prior inpatient psychiatric hospitalizations and suicide attempts. SUBSTANCE ABUSE HISTORY: Patient reports she drinks alcohol occasionally and denies any other substance abuse. Her UDS is positive for benzos. PAST MEDICAL HISTORY:    Please see H&P for details. Past Medical History:   Diagnosis Date    History of vascular access device 10/07/2022    [facility] VAT: PICC placement R Cephalic length 40 cm for reliable access/TPN arm circ 35.5 cm     Prior to Admission medications    Medication Sig Start Date End Date Taking?  Authorizing Provider   atorvastatin (LIPITOR) 10 mg tablet Take 10 mg by mouth daily. Yes Provider, Historical   furosemide (LASIX) 20 mg tablet Take 20 mg by mouth daily. Yes Provider, Historical   traZODone (DESYREL) 50 mg tablet Take 125 mg by mouth nightly. Yes Provider, Historical   levothyroxine (SYNTHROID) 88 mcg tablet Take 88 mcg by mouth Daily (before breakfast). Yes Provider, Historical   OTHER,NON-FORMULARY, ESTROGEN(5050)/TESTOSTERONE (HRT) 1.5mg/10mg/ml Cream APPLY 1 ML TO SKIN (INNER WRIST/ABDOMEN/THIGHS EVERY DAY. ROTATE SITES   Yes Provider, Historical   OTHER,NON-FORMULARY, Progesterone (ME4M) 250 mg at bedtime. Yes Provider, Historical     Vitals:    10/18/22 2038 10/19/22 0427 10/19/22 0747 10/19/22 1113   BP: (!) 175/76 (!) 169/79 118/71 (!) 167/68   Pulse: (!) 129 (!) 126 78 (!) 130   Resp: 18 18 18 18   Temp: 97.9 °F (36.6 °C) 97.3 °F (36.3 °C) 98.2 °F (36.8 °C) 98.9 °F (37.2 °C)   SpO2: 96% 95% 93% 93%   Weight:       Height:         Lab Results   Component Value Date/Time    WBC 7.2 10/18/2022 04:55 AM    HGB 11.8 10/18/2022 04:55 AM    HCT 36.9 10/18/2022 04:55 AM    PLATELET 742 (H) 89/19/1927 04:55 AM    MCV 90.7 10/18/2022 04:55 AM     Lab Results   Component Value Date/Time    Sodium 142 10/18/2022 04:55 AM    Potassium 4.0 10/18/2022 04:55 AM    Chloride 107 10/18/2022 04:55 AM    CO2 28 10/18/2022 04:55 AM    Anion gap 7 10/18/2022 04:55 AM    Glucose 112 (H) 10/18/2022 04:55 AM    BUN 20 10/18/2022 04:55 AM    Creatinine 0.74 10/18/2022 04:55 AM    BUN/Creatinine ratio 27 (H) 10/18/2022 04:55 AM    GFR est AA >60 10/03/2022 01:13 AM    GFR est non-AA >60 10/03/2022 01:13 AM    Calcium 9.5 10/18/2022 04:55 AM    Bilirubin, total 0.6 10/12/2022 09:20 AM    Alk.  phosphatase 67 10/12/2022 09:20 AM    Protein, total 6.8 10/12/2022 09:20 AM    Albumin 3.0 (L) 10/12/2022 09:20 AM    Globulin 3.8 10/12/2022 09:20 AM    A-G Ratio 0.8 (L) 10/12/2022 09:20 AM    ALT (SGPT) 31 10/12/2022 09:20 AM    AST (SGOT) 29 10/12/2022 09:20 AM     No results found for: VALF2, VALAC, VALP, VALPR, DS6, CRBAM, CRBAMP, CARB2, XCRBAM  No results found for: LITHM  RADIOLOGY REPORTS:(reviewed/updated 10/19/2022)  MRI BRAIN WO CONT    Result Date: 9/28/2022  CLINICAL HISTORY: AMS of unknown etiology. INDICATION: AMS of unknown etiology. COMPARISON: 9/27/2022 TECHNIQUE: MR examination of the brain includes axial and sagittal T1, coronal T2, axial T2, axial FLAIR, axial gradient echo, axial DWI. CONTRAST: None FINDINGS: IACs are symmetric in size. There is a punctate focus of acute infarction in the right frontal cortex. Additional probable focus of cortical infarction anteriorly right frontal lobe. The brain architecture is normal. There is no evidence of midline shift or mass-effect. There are no extra-axial fluid collections. There is no Chiari or syrinx. The pituitary and infundibulum are grossly unremarkable. There is no skull base mass. Cerebellopontine angles are grossly unremarkable. The major intracranial vascular flow-voids are unremarkable. The cavernous sinuses are symmetric. Optic chiasm and infundibulum grossly unremarkable. Orbits are grossly symmetric. Dural venous sinuses are grossly patent. The mastoid air cells are well pneumatized and clear. Small cortical foci of acute infarction right frontal lobe posteriorly abutting the central sulcus and cortically based anteriorly in the right frontal lobe. There is no associated hemorrhage, midline shift or mass effect. No additional acute intracranial process. MRI BRAIN W WO CONT    Result Date: 10/2/2022  EXAM:  MRI BRAIN W WO CONT INDICATION:    acute encephalopathy COMPARISON:  9/20/2022. CONTRAST: 20 ml of ProHance. TECHNIQUE:  Multiplanar multisequence acquisition without and with contrast of the brain. FINDINGS: The ventricles are normal in size and position. 2 small foci of cortical diffusion restriction in the right frontal lobe without significant change.  No new areas of ischemia present. There is associated FLAIR hyperintensity. There is no cerebellar tonsillar herniation. Expected arterial flow-voids are present. No evidence of abnormal enhancement. Small amount of fluid in the bilateral mastoid air cells. Intubated. Air-fluid level in the right maxillary sinus with fluid in the nasopharynx. The orbital contents are within normal limits. No significant osseous or scalp lesions are identified. 2 small foci of diffusion restriction in the right frontal lobe are unchanged and may represent small foci of acute ischemia. No abnormal enhancement is identified. CT CHEST WO CONT    Result Date: 9/27/2022  EXAM: CT CHEST WO CONT, CT ABD PELV WO CONT INDICATION: Found unresponsive, respiratory arrest, unclear etiology, family hx aneurysm COMPARISON: None IV CONTRAST: None ORAL CONTRAST: None TECHNIQUE: Thin axial images were obtained through the chest, abdomen and pelvis. Coronal and sagittal reformats were generated. CT dose reduction was achieved through use of a standardized protocol tailored for this examination and automatic exposure control for dose modulation. FINDINGS: An endotracheal tube is in appropriate position. A nasogastric tube is seen looped within the stomach. CHEST WALL: No mass or axillary lymphadenopathy. THYROID: No nodule. MEDIASTINUM: No mass or lymphadenopathy. ELBA: No mass or lymphadenopathy. THORACIC AORTA: No dissection or aneurysm. MAIN PULMONARY ARTERY: Normal in caliber. TRACHEA/BRONCHI: Patent. ESOPHAGUS: No wall thickening or dilatation. HEART: Normal in size. PLEURA: No effusion or pneumothorax. LUNGS: There is moderate bibasilar atelectasis. LIVER: No mass. BILIARY TREE: Status post cholecystectomy. CBD is not dilated. SPLEEN: within normal limits. PANCREAS: No mass or ductal dilatation. ADRENALS: Unremarkable. KIDNEYS: No mass, calculus, or hydronephrosis. STOMACH: Unremarkable. SMALL BOWEL: No dilatation or wall thickening.  COLON: No dilatation or wall thickening. APPENDIX: Unremarkable PERITONEUM: No ascites or pneumoperitoneum. RETROPERITONEUM: No lymphadenopathy or aortic aneurysm. REPRODUCTIVE ORGANS: Unremarkable. URINARY BLADDER: Decompressed by Sal catheter. BONES: No destructive bone lesion. ABDOMINAL WALL: No mass or hernia. ADDITIONAL COMMENTS: N/A     1. Moderate bibasilar atelectasis. 2. Status post cholecystectomy. 3. Endotracheal and nasogastric tubes in place. Sal catheter decompresses the bladder. CT ABD PELV WO CONT    Result Date: 9/27/2022  EXAM: CT CHEST WO CONT, CT ABD PELV WO CONT INDICATION: Found unresponsive, respiratory arrest, unclear etiology, family hx aneurysm COMPARISON: None IV CONTRAST: None ORAL CONTRAST: None TECHNIQUE: Thin axial images were obtained through the chest, abdomen and pelvis. Coronal and sagittal reformats were generated. CT dose reduction was achieved through use of a standardized protocol tailored for this examination and automatic exposure control for dose modulation. FINDINGS: An endotracheal tube is in appropriate position. A nasogastric tube is seen looped within the stomach. CHEST WALL: No mass or axillary lymphadenopathy. THYROID: No nodule. MEDIASTINUM: No mass or lymphadenopathy. ELBA: No mass or lymphadenopathy. THORACIC AORTA: No dissection or aneurysm. MAIN PULMONARY ARTERY: Normal in caliber. TRACHEA/BRONCHI: Patent. ESOPHAGUS: No wall thickening or dilatation. HEART: Normal in size. PLEURA: No effusion or pneumothorax. LUNGS: There is moderate bibasilar atelectasis. LIVER: No mass. BILIARY TREE: Status post cholecystectomy. CBD is not dilated. SPLEEN: within normal limits. PANCREAS: No mass or ductal dilatation. ADRENALS: Unremarkable. KIDNEYS: No mass, calculus, or hydronephrosis. STOMACH: Unremarkable. SMALL BOWEL: No dilatation or wall thickening. COLON: No dilatation or wall thickening. APPENDIX: Unremarkable PERITONEUM: No ascites or pneumoperitoneum.  RETROPERITONEUM: No lymphadenopathy or aortic aneurysm. REPRODUCTIVE ORGANS: Unremarkable. URINARY BLADDER: Decompressed by Sal catheter. BONES: No destructive bone lesion. ABDOMINAL WALL: No mass or hernia. ADDITIONAL COMMENTS: N/A     1. Moderate bibasilar atelectasis. 2. Status post cholecystectomy. 3. Endotracheal and nasogastric tubes in place. Sal catheter decompresses the bladder. XR CHEST PORT    Result Date: 10/7/2022  EXAM:  XR CHEST PORT INDICATION: Verify PICC Tip placement please COMPARISON: One day prior. TECHNIQUE: Single frontal view of the chest. FINDINGS: Right upper extremity PICC line with tip projecting over the right atrium. Existing right IJ catheter unchanged with tip also projecting over the right atrium. Endotracheal tube terminates 2 cm from the lópez. Enteric tube terminates below the level the diaphragm outside the field of view study. Slightly improved aeration of the lung bases. Possible trace residual effusions. No visualized pneumothorax. . Lungs are hypoventilatory. 1.  Right upper extremity PICC line tip projects over the right atrium. No visualized pneumothorax. XR CHEST PORT    Result Date: 10/6/2022  EXAM:  XR CHEST PORT INDICATION: Ventilated patient. Bilateral pneumonia. COMPARISON: 10/3/2022 TECHNIQUE: Semiupright portable chest AP view FINDINGS: Endotracheal tube is 2.9 cm above the lópez. Other tubes and lines are stable. Cardiac monitoring leads. Lung volumes remain low. Heart size enlarged. Mild interstitial and airspace opacities bilaterally with some improvement in the interval. Possible small right pleural effusion which may be new. There may be a small left pleural effusion as well. The visualized bones and upper abdomen are age-appropriate. Overall improvement in interstitial and airspace opacities bilaterally with some residual. Small right pleural effusion and possible small left pleural effusion.      XR CHEST PORT    Result Date: 10/3/2022  EXAM: XR CHEST PORT INDICATION: fever COMPARISON: 9/27/2022 FINDINGS: A portable AP radiograph of the chest was obtained at 1502 hours. The patient is on a cardiac monitor. The NG tube extends below the hemidiaphragm. Endotracheal tube terminates penitentiary between the thoracic inlet and lópez. Right IJ line terminates at the cavoatrial junction. The cardiac and mediastinal contours and pulmonary vascularity are stable. Patchy bilateral airspace disease is noted in both perihilar locations and there is a small left pleural effusion. Bilateral perihilar pneumonia versus subsegmental atelectasis. Small left pleural effusion    XR CHEST PORT    Result Date: 9/27/2022  Indication: Line placement. Comparison to earlier the same day. Portable exam obtained at 8972 demonstrates placement of a right jugular catheter with the tip projecting over the right atrium. There is no pneumothorax. Right jugular catheter satisfactory position without pneumothorax. XR CHEST PORT    Result Date: 9/27/2022  EXAM: XR CHEST PORT INDICATION: PLACEMENT OF CENTRAL LINE ij COMPARISON: 9/27/2022 FINDINGS: A portable AP radiograph of the chest was obtained at 1442 hours. New right IJ central venous catheter with the tip in the distal SVC. No pneumothorax. Endotracheal tube is stable. Nasogastric tube is coiled in the stomach. . The lungs are clear. The cardiac and mediastinal contours and pulmonary vascularity are normal.  The bones and soft tissues are grossly within normal limits. Status post right IJ central venous catheter placement without evidence of pneumothorax. XR CHEST PORT    Result Date: 9/27/2022  INDICATION:  repositioned ETT EXAM: Chest single view. COMPARISON: 1151 hours. FINDINGS: A single frontal view of the chest at 1231 hours shows ET tube now appearing with its tip 2.8 cm above the lópez. Lung volumes are moderate with bibasilar atelectasis stable, left greater than right. .  The heart, mediastinum and pulmonary vasculature are upper normal. . The bony thorax is unremarkable for age. . A radiopaque tube projecting over the mediastinum on the prior study has been removed. ET tube as described. Gastric tube removed. Bibasilar atelectasis and low lung volumes. .  . XR CHEST PORT    Result Date: 9/27/2022  EXAM: XR CHEST PORT INDICATION: Intubation COMPARISON: None. FINDINGS: A portable AP radiograph of the chest was obtained at 1151 hours. The patient is on a cardiac monitor. The lungs are clear. The cardiac and mediastinal contours and pulmonary vascularity are normal. The endotracheal tube terminates at the thoracic inlet. The NG tube tip is at the level of the midesophagus. The bones and soft tissues are grossly within normal limits. Stomach is distended. Borderline high position of endotracheal tube High position of the NG tube. Distended stomach. XR ABD PORT  1 V    Result Date: 10/12/2022  INDICATION: vomiting EXAMINATION:  ABDOMEN KUB COMPARISON: CT September 27, 2022 FINDINGS: AP radiograph of the abdomen demonstrates a nonobstructive bowel gas pattern. Gaseous distention of the stomach. Moderate amount of colonic stool. No abnormal calcifications are identified. The osseous structures are normal.     Moderate gaseous distention of the stomach and moderate amount of colonic stool, with no evidence of bowel obstruction. IR INSERT NON TUNL CVC OVER 5 YRS    Result Date: 9/28/2022  Clinical Data: A 59year-old patient presents for double-lumen non-tunneled hemodialysis catheter placement at bedside. Date: 9/27/2022 : Thony Crawford M.D. Estimated Blood Loss: Minimal. Specimens Removed: None. Complications: None. Technique: Informed verbal and written consent was obtained following discussion of risks, benefits and alternatives to this procedure. The patient was positioned supine on the bed. The patient's right neck and chest were then prepped and draped in normal sterile fashion.   A timeout was performed to ensure proper patient, procedure and site. Ultrasound was used to image the right neck. The right IJ was shown to be patent. A small amount of 1% lidocaine was injected subcutaneously at the site of vascular access. Using real-time ultrasound guidance, the needle was advanced into the vessel. A hard copy image was stored on PACS for documentation. A wire was advanced into the needle, a short incision was made at the puncture site, and serial dilatation was performed. A 14 [place] citizen 20 cm non-tunneled hemodialysis catheter was advanced into the central veins. The catheter flushed and aspirated appropriately. The lumens were blocked with heparin. The catheter was secured at the exit site with silk suture and a sterile dressing. The patient tolerated the procedure well. There were no immediate complications. Post procedure chest x-ray shows the catheter is positioned in the right atrium. Successful placement of a double-lumen non-tunneled hemodialysis catheter. XR US GUIDED VASCULAR ACCESS    Result Date: 10/7/2022  INDICATION:   NO VENOUS ACCESS  EXAMINATION:  US GUIDE VAS ACCESS FINDINGS: Ultrasound was provided for PICC line placement. Ultrasound guidance for PICC line  placement. GBP    CT CODE NEURO HEAD WO CONTRAST    Result Date: 9/27/2022  EXAM: CT CODE NEURO HEAD WO CONTRAST INDICATION: Left gaze COMPARISON: None. CONTRAST: None. TECHNIQUE: Unenhanced CT of the head was performed using 5 mm images. Brain and bone windows were generated. Coronal and sagittal reformats. CT dose reduction was achieved through use of a standardized protocol tailored for this examination and automatic exposure control for dose modulation. FINDINGS: The ventricles and sulci are normal in size, shape and configuration. . There is no significant white matter disease. There is no intracranial hemorrhage, extra-axial collection, or mass effect. The basilar cisterns are open. No CT evidence of acute infarct.  The bone windows demonstrate no abnormalities. The visualized portions of the paranasal sinuses and mastoid air cells are clear. No acute intracranial process seen     ECHO ADULT COMPLETE    Result Date: 9/27/2022    Left Ventricle: Severely reduced left ventricular systolic function with a visually estimated EF of 25 - 30%. Left ventricle size is normal. Mildly increased wall thickness. See diagram for wall motion findings. Abnormal diastolic function. Right Ventricle: Moderately reduced systolic function. ECHO ADULT FOLLOW-UP OR LIMITED    Result Date: 10/11/2022    Left Ventricle: Normal left ventricular systolic function with a visually estimated EF of 55 - 60%. Left ventricle size is normal. Mildly increased wall thickness. Unable to assess wall motion. Right Ventricle: Not well visualized. Right ventricle size is normal. Normal wall thickness. 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    MENTAL STATUS EXAM:    General appearance:  disheveled psychomotor activity is decreased  Eye contact: poor eye contact  Speech: mumbled  Affect : flat  Mood: unable to assess  Thought Process: confused  Perception: unable to assess  Thought Content: unable to assess  Insight: Poor  Judgement: poor  Cognition: Impaired    ASSESSMENT AND PLAN:  Gisela Garrett meets criteria for a diagnosis of depression unspecified, anxiety disorder, Delirium unspecified  Patient remains confused. At the time of this assessment, given her current mental status, patient did not appear to demonstrate capacity.  Spoke with mom who is agreeable to try a different medication to help with her mood and sleep. Consider starting seroquel 25mg at bed time as an alternative to the risperidone. It can further be increased if well tolerated. Monitor EKG as seroquel has the tendency to prolong qtcs. Psych admission is not recommended at this time. Thank your your consult. Please feel free to consult us again as needed.

## 2022-10-20 VITALS
RESPIRATION RATE: 17 BRPM | TEMPERATURE: 97.6 F | WEIGHT: 230 LBS | HEART RATE: 90 BPM | DIASTOLIC BLOOD PRESSURE: 69 MMHG | SYSTOLIC BLOOD PRESSURE: 110 MMHG | HEIGHT: 66 IN | BODY MASS INDEX: 36.96 KG/M2 | OXYGEN SATURATION: 98 %

## 2022-10-20 PROCEDURE — 74011250637 HC RX REV CODE- 250/637: Performed by: INTERNAL MEDICINE

## 2022-10-20 PROCEDURE — 74011250637 HC RX REV CODE- 250/637: Performed by: PSYCHIATRY & NEUROLOGY

## 2022-10-20 PROCEDURE — 74011250636 HC RX REV CODE- 250/636: Performed by: NURSE PRACTITIONER

## 2022-10-20 PROCEDURE — 74011250637 HC RX REV CODE- 250/637: Performed by: STUDENT IN AN ORGANIZED HEALTH CARE EDUCATION/TRAINING PROGRAM

## 2022-10-20 RX ORDER — QUETIAPINE FUMARATE 25 MG/1
25 TABLET, FILM COATED ORAL
Qty: 30 TABLET | Refills: 0 | Status: SHIPPED | OUTPATIENT
Start: 2022-10-20 | End: 2022-10-20

## 2022-10-20 RX ORDER — METOPROLOL TARTRATE 25 MG/1
25 TABLET, FILM COATED ORAL EVERY 12 HOURS
Qty: 60 TABLET | Refills: 0 | Status: ON HOLD | OUTPATIENT
Start: 2022-10-20 | End: 2022-11-22

## 2022-10-20 RX ORDER — PANTOPRAZOLE SODIUM 40 MG/1
40 TABLET, DELAYED RELEASE ORAL
Qty: 30 TABLET | Refills: 0 | Status: ON HOLD | OUTPATIENT
Start: 2022-10-21 | End: 2022-11-22

## 2022-10-20 RX ORDER — AMOXICILLIN 250 MG
1 CAPSULE ORAL 2 TIMES DAILY
Qty: 60 TABLET | Refills: 0 | Status: ON HOLD | OUTPATIENT
Start: 2022-10-20 | End: 2022-11-22

## 2022-10-20 RX ORDER — GUAIFENESIN 100 MG/5ML
81 LIQUID (ML) ORAL DAILY
Qty: 30 TABLET | Refills: 0 | Status: SHIPPED | OUTPATIENT
Start: 2022-10-20 | End: 2022-11-22

## 2022-10-20 RX ORDER — CLONIDINE 0.2 MG/24H
1 PATCH, EXTENDED RELEASE TRANSDERMAL
Qty: 4 PATCH | Refills: 0 | Status: SHIPPED | OUTPATIENT
Start: 2022-10-25 | End: 2022-11-22

## 2022-10-20 RX ADMIN — ENOXAPARIN SODIUM 30 MG: 100 INJECTION SUBCUTANEOUS at 09:42

## 2022-10-20 RX ADMIN — SENNOSIDES AND DOCUSATE SODIUM 1 TABLET: 50; 8.6 TABLET ORAL at 09:42

## 2022-10-20 RX ADMIN — PANTOPRAZOLE SODIUM 40 MG: 40 TABLET, DELAYED RELEASE ORAL at 06:45

## 2022-10-20 RX ADMIN — METOPROLOL TARTRATE 25 MG: 25 TABLET, FILM COATED ORAL at 09:42

## 2022-10-20 RX ADMIN — ASPIRIN 81 MG: 81 TABLET, CHEWABLE ORAL at 09:00

## 2022-10-20 RX ADMIN — LEVOTHYROXINE SODIUM 88 MCG: 0.09 TABLET ORAL at 06:46

## 2022-10-20 NOTE — PROGRESS NOTES
Report given to KATHY Camacho at [facility] report. Patient being transported with [address] transport. Patients mother is at the bedside and discharge instructions were reviewed with her.

## 2022-10-20 NOTE — PROGRESS NOTES
10/20/2022  10:38 AM  Care Management Progress Note      ICD-10-CM ICD-9-CM    1. Status epilepticus (HCC)  G40.901 345.3       2. Acute respiratory failure with hypoxia (HCC)  J96.01 518.81       3. Acute renal insufficiency  N28.9 593.9       4. Seizure ([facility] [address] [R56.9 (ICD-10-CM)]  R56.9 780.39       5. Takotsubo cardiomyopathy  I51.81 429.83       6. Other cardiomyopathy ([facility] [address]  I42.8 425.4       7. Dyslipidemia  E78.5 272.4       8. Encephalopathy  G93.40 348.30 EEG      EEG          RUR:  14%  Risk Level: [x]Low []Moderate []High  Value-based purchasing: [] Yes [x] No  Bundle patient: [] Yes [x] No   Specify:     Transition of care plan:  Discharge order submitted. Pt has medically cleared for DC to [facility]. [facility] has accepted pt, has authorization, and can admit pt today. Pt's mother notified and in agreement with discharge plan. Nursing, please call report to [phone], [facility]. Outpatient follow-up. [facility] ambulance transport scheduled for 2:00 PM. Packet on chart, and PCS attached in AllScripts. Patrick Mcknight MA    Care Management Interventions  PCP Verified by CM:  Yes  Mode of Transport at Discharge: BLS  Transition of Care Consult (CM Consult): Discharge Planning, Acute Rehab  MyChart Signup: No  Discharge Durable Medical Equipment: No  Health Maintenance Reviewed: Yes  Physical Therapy Consult: Yes  Speech Therapy Consult: Yes  Support Systems: Child(wayne), Other Family Member(s)  Confirm Follow Up Transport: Family  The Plan for Transition of Care is Related to the Following Treatment Goals : discharge  The Patient and/or Patient Representative was Provided with a Choice of Provider and Agrees with the Discharge Plan?: Yes  Name of the Patient Representative Who was Provided with a Choice of Provider and Agrees with the Discharge Plan: Spoke with pt's Mom, Dad and sons  Freedom of Choice List was Provided with Basic Dialogue that Supports the Patient's Individualized Plan of Care/Goals, Treatment Preferences and Shares the Quality Data Associated with the Providers?: Yes  Discharge Location  Patient Expects to be Discharged to[de-identified] Rehab hospital/unit acute

## 2022-10-20 NOTE — PROGRESS NOTES
[facility]  [address], [facility] [address]  [phone]    Medical Progress Note      NAME: Alex Maza   :  1965  MRM:  [id number]    Date/Time of service 10/20/2022  8:23 AM         Assessment and Plan:     Acute metabolic encephalopathy / Delirium / Psychosis / KOBY (generalized anxiety disorder) - POA, persistent, unclear etiology. \"2 small foci of diffusion restriction in the right frontal lobe \" noted on MRI but unlikely this explains KHADAR. Initial concern for Sz, but EEG bland, keppra stopped. Concern remains she had some anoxic brain injury. Psychiatry and Neurology consulted. Deb Sidhu can explain her persistent encephalopathy. Prn ativan if severe aggression. Did not improve on risperidone empirically. Consulted psychiatry again and they recommende seroquel. I gave one dose, and the patient was calm and flat. Her mother, the MPOHANDY, did not like that outcome and asked me to stop the seroquel. Debilitated patient - Noted after extubation, due to prolonged ICU stay. Appreciate PT OT speech eval.  She needs IPR, they have accepted her. Can go today    Takotsubo cardiomyopathy / NSTEMI (non-ST elevated myocardial infarction) / Bradycardia - POA, unclear initial driving event. Cardiology consulted. After extubation, we noted HTN and tachycardia. Repeat ECHO improved, so for now no plan for cath. Unable to tolerate BB or ACE due to low BP. Outpatient follow up    Acute CVA (cerebrovascular accident) - MRI notes two small lesions. Unclear significance and timing. Continue ASA, BB and statin. Anemia - POA and slowly worsening likely due to acute illness. SIRS / tachycardia / Fever / leukocytosis - 1 week ago developed SIRS criteria of WBC, fever and tachycardia after extubation. Unclear etiology. Unremarkable Blood cx, UA, RVP and procalcitonin. She had just completed a course of Abx. HTN / Sinus tachycardia - Started metoprolol.   I will added clonidine patch. Shock - Intially presumed septic shock until ECHO showed new EF 30%. Low EF has resolved. Thereafter shock was likely due to sedation. Now no longer on prn levophed IV gtt as pressor. Resumed midodrine    Acute respiratory failure with hypoxia - POA, persistent. Unclear if aspiration PNA vs central resp depression. Completed Zosyn. Intubated on admission. Intensivist consulted and is managing vent. Still requring fentanyl and precedex gtt for sedation. Propofol was stopped due to elevated triglycerides. She is failing SBT so far. Likely needs trach soon if fails    Nausea and vomiting - Noted after extubation. Better today. Appreciate speech consult. Check KUB    UTI (urinary tract infection) - Pitt sensitive E coli on initial contaminated cx. Had E coli in contaminated sputum along with much normal josue. Had recently completed course of Zosyn. CHRIS (acute kidney injury) / Lactic acidosis / Hypokalemia - POA: unknown baseline Cr. Currently Cr stable. Status epilepticus - Reject Dx. [place] had myoclonic jerking    Dyslipidemia - On atorvastatin    Hypothyroidism - TSH normal.  Continue synthroid    Obese - Advise weight loss. Subjective:     Chief Complaint:  Reluctant to interview or participate    ROS:  (bold if positive, if negative)    Tolerating some PT  Tolerating some diet        Objective:     Last 24hrs VS reviewed since prior progress note.  Most recent are:    Visit Vitals  /60 (BP 1 Location: Left leg, BP Patient Position: At rest)   Pulse 86   Temp 97.6 °F (36.4 °C)   Resp 17   Ht 5' 6\" (1.676 m)   Wt 104.3 kg (230 lb)   SpO2 96%   BMI 37.12 kg/m²     SpO2 Readings from Last 6 Encounters:   10/20/22 96%    O2 Flow Rate (L/min): 2 l/min     Intake/Output Summary (Last 24 hours) at 10/20/2022 0823  Last data filed at 10/20/2022 0438  Gross per 24 hour   Intake 100 ml   Output 450 ml   Net -350 ml          Physical Exam:    Gen:  Obese, in no acute distress  HEENT:  Pink conjunctivae, PERRL, hearing not intact to voice, NC on nose  Neck:  Supple, without masses, thyroid non-tender  Resp:  No accessory muscle use, bilateral coarse breath sounds   Card:  No murmurs, bradycardic S1, S2 without thrills, bruits or peripheral edema  Abd:  Soft, non-tender, non-distended, reduced bowel sounds are present, no mass  Lymph:  No cervical or inguinal adenopathy  Musc:  No cyanosis or clubbing  Skin:  No rashes or ulcers, skin turgor is good  Neuro:  Cranial nerves are grossly intact, general motor weakness, follows commands vaguely  Psych:  Alert to Person, flat, hallucinations at times    Telemetry reviewed:   normal sinus rhythm  __________________________________________________________________  Medications Reviewed: (see below)  Medications:     Current Facility-Administered Medications   Medication Dose Route Frequency    QUEtiapine (SEROquel) tablet 25 mg  25 mg Oral QHS    cloNIDine (CATAPRES) 0.2 mg/24 hr patch 1 Patch  1 Patch TransDERmal Q7D    metoprolol tartrate (LOPRESSOR) tablet 25 mg  25 mg Oral Q12H    pantoprazole (PROTONIX) tablet 40 mg  40 mg Oral ACB    levothyroxine (SYNTHROID) tablet 88 mcg  88 mcg Oral DAILY    lidocaine (XYLOCAINE) 2 % jelly   Mouth/Throat PRN    prochlorperazine (COMPAZINE) injection 10 mg  10 mg IntraVENous Q6H PRN    alteplase (CATHFLO) 2 mg in sterile water (preservative free) 2 mL injection  2 mg InterCATHeter PRN    senna-docusate (PERICOLACE) 8.6-50 mg per tablet 1 Tablet  1 Tablet Oral BID    atorvastatin (LIPITOR) tablet 40 mg  40 mg Oral QHS    enoxaparin (LOVENOX) injection 30 mg  30 mg SubCUTAneous Q12H    aspirin chewable tablet 81 mg  81 mg Oral DAILY    sodium chloride (NS) flush 5-40 mL  5-40 mL IntraVENous Q8H    sodium chloride (NS) flush 5-40 mL  5-40 mL IntraVENous PRN    acetaminophen (TYLENOL) tablet 650 mg  650 mg Oral Q6H PRN    polyethylene glycol (MIRALAX) packet 17 g  17 g Oral DAILY PRN    ondansetron [facility] injection 4 mg  4 mg IntraVENous Q6H PRN        Lab Data Reviewed: (see below)  Lab Review:     Recent Labs     10/18/22  0455   WBC 7.2   HGB 11.8   HCT 36.9   *       Recent Labs     10/18/22  0455      K 4.0      CO2 28   *   BUN 20   CREA 0.74   CA 9.5   MG 2.3   PHOS 4.3       Lab Results   Component Value Date/Time    Glucose (POC) 110 10/05/2022 04:55 PM    Glucose (POC) 87 10/05/2022 11:02 AM    Glucose (POC) 78 10/05/2022 05:00 AM    Glucose (POC) 94 10/05/2022 04:57 AM    Glucose (POC) 124 (H) 10/04/2022 11:03 PM     No results for input(s): PH, PCO2, PO2, HCO3, FIO2 in the last 72 hours. No results for input(s): INR, INREXT, INREXT in the last 72 hours.   All Micro Results       Procedure Component Value Units Date/Time    CULTURE, BLOOD, PAIRED [[id number]] Collected: 10/11/22 1214    Order Status: Completed Specimen: Blood Updated: 10/16/22 0540     Special Requests: NO SPECIAL REQUESTS        Culture result: NO GROWTH 5 DAYS       RESPIRATORY VIRUS PANEL W/COVID-19, PCR [[id number]] Collected: 10/11/22 0802    Order Status: Completed Specimen: Nasopharyngeal Updated: 10/11/22 1422     Adenovirus Not detected        Coronavirus 229E Not detected        Coronavirus HKU1 Not detected        Coronavirus CVNL63 Not detected        Coronavirus OC43 Not detected        SARS-CoV-2, PCR Not detected        Metapneumovirus Not detected        Rhinovirus and Enterovirus Not detected        Influenza A Not detected        Influenza B Not detected        Parainfluenza 1 Not detected        Parainfluenza 2 Not detected        Parainfluenza 3 Not detected        Parainfluenza virus 4 Not detected        RSV by PCR Not detected        B. parapertussis, PCR Not detected        Bordetella pertussis - PCR Not detected        Chlamydophila pneumoniae DNA, QL, PCR Not detected        Mycoplasma pneumoniae DNA, QL, PCR Not detected       CULTURE, URINE [[id number]] Collected: 10/11/22 4387 Order Status: Canceled Specimen: Cath Urine     CULTURE, RESPIRATORY/SPUTUM/BRONCH [place]riett [place] STAIN [[id number]] Collected: 10/03/22 1300    Order Status: Completed Specimen: Sputum Updated: 10/05/22 1143     Special Requests: NO SPECIAL REQUESTS        GRAM STAIN RARE WBCS SEEN               OCCASIONAL EPITHELIAL CELLS SEEN                  OCCASIONAL GRAM NEGATIVE RODS                  RARE GRAM POSITIVE COCCI IN PAIRS           Culture result:       HEAVY NORMAL RESPIRATORY CATHIE          CULTURE, BLOOD, PAIRED [[id number]] Collected: 09/27/22 1251    Order Status: Completed Specimen: Blood Updated: 10/02/22 0640     Special Requests: NO SPECIAL REQUESTS        Culture result: NO GROWTH 5 DAYS       CULTURE, URINE [[id number]]  (Abnormal)  (Susceptibility) Collected: 09/27/22 1251    Order Status: Completed Specimen: Cath Urine Updated: 09/30/22 1502     Special Requests: NO SPECIAL REQUESTS        [place] Count --        >100,000  COLONIES/mL       Culture result: ESCHERICHIA COLI       CULTURE, RESPIRATORY/SPUTUM/BRONCH Harriett [place] STAIN [[id number]]  (Abnormal)  (Susceptibility) Collected: 09/27/22 1811    Order Status: Completed Specimen: Sputum Updated: 09/30/22 0930     Special Requests: NO SPECIAL REQUESTS        GRAM STAIN 1+ WBCS SEEN               1+ GRAM POSITIVE COCCI IN CLUSTERS            1+ GRAM VARIABLE RODS        Culture result: HEAVY ESCHERICHIA COLI               HEAVY NORMAL RESPIRATORY CATHIE                  MODERATE YEAST (APPARENT MULTIPLE COLONY TYPES)          CULTURE, MRSA [[id number]] Collected: 09/27/22 1440    Order Status: Completed Specimen: Nasal from Nares Updated: 09/29/22 0820     Special Requests: NO SPECIAL REQUESTS        Culture result: MRSA NOT PRESENT               Screening of patient nares for MRSA is for surveillance purposes and, if positive, to facilitate isolation considerations in high risk settings.  It is not intended for automatic decolonization interventions per se as regimens are not sufficiently effective to warrant routine use. GAETANO Ghosh, UR/CSF [[id number]] Collected: 09/27/22 1431    Order Status: Completed Specimen: Miscellaneous sample Updated: 09/28/22 1336     Source URINE        Specimen Urine     Streptococcus pneumoniae Ag Negative        Fluid culture Not indicated. Organism ID Not indicated. Please note Comment        Comment: (NOTE)  College of American Pathologists standards require a culture to be  performed on CSF specimens submitted for bacterial antigen testing. (CAP [id number]) Urine specimens will not be cultured. Performed At: [facility]  [facility] [address] [place] [address] [id number]  Deborah Mariano MD :[id number]         Maryann Mass [[id number]] Collected: 09/27/22 1530    Order Status: Completed Specimen: Urine Updated: 09/28/22 1336     Source URINE        L pneumophila S1 Ag, urine Negative        Comment: (NOTE)  Presumptive negative for L. pneumophila serogroup 1 antigen in urine,  suggesting no recent or current infection. Legionnaires' disease  cannot be ruled out since other serogroups and species may also  cause disease.   Performed At: [facility] [place]  [facility] [address] [place] [address] [id number]  Deborah Mariano MD UC:[id number]         RESPIRATORY VIRUS PANEL W/COVID-19, PCR [[id number]] Collected: 09/27/22 1440    Order Status: Completed Specimen: Nasopharyngeal Updated: 09/27/22 2214     Adenovirus Not detected        Coronavirus 229E Not detected        Coronavirus HKU1 Not detected        Coronavirus CVNL63 Not detected        Coronavirus OC43 Not detected        SARS-CoV-2, PCR Not detected        Metapneumovirus Not detected        Rhinovirus and Enterovirus Not detected        Influenza A Not detected        Influenza B Not detected        Parainfluenza 1 Not detected        Parainfluenza 2 Not detected        Parainfluenza 3 Not detected        Parainfluenza virus 4 Not detected        RSV by PCR Not detected        B. parapertussis, PCR Not detected        Bordetella pertussis - PCR Not detected        Chlamydophila pneumoniae DNA, QL, PCR Not detected        Mycoplasma pneumoniae DNA, QL, PCR Not detected       URINE CULTURE HOLD SAMPLE [[id number]] Collected: 09/27/22 1251    Order Status: Completed Specimen: Urine from Serum Updated: 09/27/22 1258     Urine culture hold       Urine on hold in Microbiology dept for 2 days. If unpreserved urine is submitted, it cannot be used for addtional testing after 24 hours, recollection will be required. Other pertinent lab: none    Total time spent with patient: 30 Minutes I personally reviewed chart, notes, data and current medications in the medical record. I have personally examined and treated the patient at bedside during this period. To assist coordination of care and communication with nursing and staff, this note may be preliminary early in the day, but finalized by end of the day.                  Care Plan discussed with: Patient, Care Manager, Nursing Staff, Consultant/Specialist, and >50% of time spent in counseling and coordination of care    Discussed:  Care Plan and D/C Planning    Prophylaxis:  Lovenox and H2B/PPI    Disposition:  SNF/LTC           ___________________________________________________    Attending Physician: Vero Norris MD

## 2022-10-20 NOTE — PROGRESS NOTES
Reviewed discharge instructions with patients mother. Patient to be transferred to [address] [place] [address] at 1400.

## 2022-10-20 NOTE — DISCHARGE INSTRUCTIONS
Patient Discharge Instructions    Sebas Muhammad / [id number] : 1965    Admitted 2022 Discharged: 10/20/2022     Primary Diagnoses  @Rprob@    Take Home Medications     It is important that you take the medication exactly as they are prescribed. Keep your medication in the bottles provided by the pharmacist and keep a list of the medication names, dosages, and times to be taken in your wallet. Do not take other medications without consulting your doctor. What to do at Home    Recommended diet: Regular Diet    Recommended activity: Activity as tolerated and PT/OT Eval and Treat    If you experience worse symptoms, please follow up with your PCP. Follow-up with your PCP in a few week    [unfilled]     Information obtained by :  I understand that if any problems occur once I am at home I am to contact my physician. I understand and acknowledge receipt of the instructions indicated above.                                                                                                                                            Physician's or R.N.'s Signature                                                                  Date/Time                                                                                                                                              Patient or Representative Signature                                                          Date/Time

## 2022-10-20 NOTE — DISCHARGE SUMMARY
Physician Discharge Summary     Patient ID:  Moy Hendrickson  [id number]  59 y.o.  1965    Admit date: 9/27/2022    Discharge date of service and time: 10/20/2022  Greater than 30 minutes were spent providing discharge related services for this patient    Admission Diagnoses: Status epilepticus ([facility] [address] [G40.901]    Discharge Diagnoses:    Principal Diagnosis   Status epilepticus [facility])                                             Hospital Course and other diagnoses  Acute metabolic encephalopathy / Delirium / Psychosis / KOBY (generalized anxiety disorder) - POA, persistent, unclear etiology. \"2 small foci of diffusion restriction in the right frontal lobe \" noted on MRI but unlikely this explains KHADAR. Initial concern for Sz, but EEG bland, keppra stopped. Concern remains she had some anoxic brain injury. Psychiatry and Neurology consulted. [place] can explain her persistent encephalopathy. Prn ativan if severe aggression. Did not improve on risperidone empirically. Consulted psychiatry again and they recommende seroquel. I gave one dose, and the patient was calm and flat. Her mother, the WILMER, did not like that outcome and asked me to stop the seroquel. Debilitated patient - Noted after extubation, due to prolonged ICU stay. Appreciate PT OT speech eval.  She needs IPR, they have accepted her. Can go today     Takotsubo cardiomyopathy / NSTEMI (non-ST elevated myocardial infarction) / Bradycardia - POA, unclear initial driving event. Cardiology consulted. After extubation, we noted HTN and tachycardia. Repeat ECHO improved, so for now no plan for cath. Unable to tolerate BB or ACE due to low BP. Outpatient follow up     Acute CVA (cerebrovascular accident) - MRI notes two small lesions. Unclear significance and timing. Continue ASA, BB and statin. Anemia - POA and slowly worsening likely due to acute illness.      SIRS / tachycardia / Fever / leukocytosis - 1 week ago developed SIRS criteria of WBC, fever and tachycardia after extubation. Unclear etiology. Unremarkable Blood cx, UA, RVP and procalcitonin. She had just completed a course of Abx. HTN / Sinus tachycardia - Started metoprolol. I will added clonidine patch. Shock - Intially presumed septic shock until ECHO showed new EF 30%. Low EF has resolved. Thereafter shock was likely due to sedation. Now no longer on prn levophed IV gtt as pressor. Resumed midodrine     Acute respiratory failure with hypoxia - POA, persistent. Unclear if aspiration PNA vs central resp depression. Completed Zosyn. Intubated on admission. Intensivist consulted and is managing vent. Still requring fentanyl and precedex gtt for sedation. Propofol was stopped due to elevated triglycerides. She is failing SBT so far. Likely needs trach soon if fails     Nausea and vomiting - Noted after extubation. Better today. Appreciate speech consult. Check KUB     UTI (urinary tract infection) - Pitt sensitive E coli on initial contaminated cx. Had E coli in contaminated sputum along with much normal josue. Had recently completed course of Zosyn. CHRIS (acute kidney injury) / Lactic acidosis / Hypokalemia - POA: unknown baseline Cr. Currently Cr stable. Status epilepticus - Reject Dx. [place] had myoclonic jerking     Dyslipidemia - On atorvastatin     Hypothyroidism - TSH normal.  Continue synthroid     Obese - Advise weight loss. PCP: Panchito Saxena MD    Consults: Cardiology, Pulmonary/Intensive care, ID, Neurology, Rehabilitation Medicine, and Psychiatry    Significant Diagnostic Studies: See Hospital Course    Discharged to [facility] in improved condition.     Discharge Exam:  /60 (BP 1 Location: Left leg, BP Patient Position: At rest)   Pulse 86   Temp 97.6 °F (36.4 °C)   Resp 17   Ht 5' 6\" (1.676 m)   Wt 104.3 kg (230 lb)   SpO2 96%   BMI 37.12 kg/m²      Gen:  Obese, in no acute distress  HEENT:  Pink conjunctivae, PERRL, hearing not intact to voice, NC on nose  Neck:  Supple, without masses, thyroid non-tender  Resp:  No accessory muscle use, bilateral coarse breath sounds   Card:  No murmurs, bradycardic S1, S2 without thrills, bruits or peripheral edema  Abd:  Soft, non-tender, non-distended, reduced bowel sounds are present, no mass  Lymph:  No cervical or inguinal adenopathy  Musc:  No cyanosis or clubbing  Skin:  No rashes or ulcers, skin turgor is good  Neuro:  Cranial nerves are grossly intact, general motor weakness, follows commands vaguely  Psych:  Alert to Person, hallucinations at times    Patient Instructions:   Current Discharge Medication List        START taking these medications    Details   aspirin 81 mg chewable tablet Take 1 Tablet by mouth daily for 30 days. Qty: 30 Tablet, Refills: 0      cloNIDine (CATAPRES) 0.2 mg/24 hr ptwk 1 Patch by TransDERmal route every seven (7) days for 30 days. Qty: 4 Patch, Refills: 0      metoprolol tartrate (LOPRESSOR) 25 mg tablet Take 1 Tablet by mouth every twelve (12) hours for 30 days. Qty: 60 Tablet, Refills: 0      pantoprazole (PROTONIX) 40 mg tablet Take 1 Tablet by mouth Daily (before breakfast) for 30 days. Qty: 30 Tablet, Refills: 0      senna-docusate (PERICOLACE) 8.6-50 mg per tablet Take 1 Tablet by mouth two (2) times a day for 30 days. Qty: 60 Tablet, Refills: 0           CONTINUE these medications which have NOT CHANGED    Details   atorvastatin (LIPITOR) 10 mg tablet Take 10 mg by mouth daily. levothyroxine (SYNTHROID) 88 mcg tablet Take 88 mcg by mouth Daily (before breakfast).            STOP taking these medications       furosemide (LASIX) 20 mg tablet Comments:   Reason for Stopping:         traZODone (DESYREL) 50 mg tablet Comments:   Reason for Stopping:         OTHER,NON-FORMULARY, Comments:   Reason for Stopping:         OTHER,NON-FORMULARY, Comments:   Reason for Stopping:             Activity: Activity as tolerated and PT/OT Eval and Treat  Diet: Regular Diet  Wound Care: None needed    Follow-up with your PCP in a few weeks.   Follow-up tests/labs - none    Signed:  Abigail Nance MD  10/20/2022  8:28 AM

## 2022-10-21 ENCOUNTER — TELEPHONE (OUTPATIENT)
Dept: FAMILY MEDICINE CLINIC | Age: 57
End: 2022-10-21

## 2022-10-21 NOTE — TELEPHONE ENCOUNTER
Reason for call: Pt daughter-in-law Paris Patel calling--I have the correct number to call. She is calling back asking if we have gotten FMLA forms yet. I informed her what Dr. Daniel Freeman said, \"I want to help them in any way that I can, but the hospitalist needs to complete the FMLA forms. I have not see the patient regarding the current condition and would have to reji that on the form. I have no way to answer the questions asked. My apologies for the inconvenience. \" I did not give out any pt info as she is not listed on her PHI.      Is this a new problem: yes     Date of last appointment:  8/24/2022     Can we respond via Berg: no    Best call back number: [phone]

## 2022-10-24 NOTE — PROGRESS NOTES
Physician Progress Note      PATIENT:               Neli Rogers  CSN #:                  [id number]  :                       1965  ADMIT DATE:       2022 11:17 AM  DISCH DATE:        10/20/2022 2:12 PM  RESPONDING  PROVIDER #:        Arlette Rawls MD          QUERY Amita Galchandler Afternoon    This patient admitted on 2022-10/20/2022 with Acute resp. failure admitted post respiratory arrest.    If possible, please document in progress notes and discharge summary if you are also evaluating and/or treating any of the following: The medical record reflects the following:  Risk Factors: Depression,  passed away 6 years ao, and mother reported daughter had access to old fentanyl patches in the home. Clinical Indicators: Pt presented after s/p resp. arrest, , PN Metabolic Enceph likely multifactorial from suspected anoxic brain injury given unknown downtime and her seizures vs. Fentanyl overdose. Given there is evidence she had unprescribed fentanyl patches. Toxicology screen negative other than benzos. Per  ICU notes, Mother reports she does have access to fentayl patch that have  from her  has he  of cancer 6 years ago. It is possible she put this patch on and was hypoxic in her sleep. Treatment: After admission bedside nurse documented pt had Fentanyl patch, and she removed this, ICU level care, Neuro consulted, CT brain, MRI of brain, Toxicology screen    Thank you  Atilano Dancer, BSN,RN, CPHQ, CCDS, SMART  Options provided:  -- Accidental overdose of Fentanyl Patch  -- Intentional overdose of Fentanyl Patch  -- Adverse effect of  Fentanyl Patch properly administered  -- Other - I will add my own diagnosis  -- Disagree - Not applicable / Not valid  -- Disagree - Clinically unable to determine / Unknown  -- Refer to Clinical Documentation Reviewer    PROVIDER RESPONSE TEXT:    Provider disagreed with this query.   not known    Query created by: Chadwick Check, Jannie Lazaro on 10/24/2022 10:31 AM      Electronically signed by:  Edin Snyder MD 10/24/2022 2:25 PM

## 2022-10-25 ENCOUNTER — APPOINTMENT (OUTPATIENT)
Dept: CT IMAGING | Age: 57
DRG: 070 | End: 2022-10-25
Attending: EMERGENCY MEDICINE
Payer: COMMERCIAL

## 2022-10-25 ENCOUNTER — APPOINTMENT (OUTPATIENT)
Dept: GENERAL RADIOLOGY | Age: 57
DRG: 070 | End: 2022-10-25
Attending: EMERGENCY MEDICINE
Payer: COMMERCIAL

## 2022-10-25 ENCOUNTER — HOSPITAL ENCOUNTER (INPATIENT)
Age: 57
LOS: 26 days | Discharge: NURSING, REHAB OR LONG-TERM CARE | DRG: 070 | End: 2022-11-22
Attending: STUDENT IN AN ORGANIZED HEALTH CARE EDUCATION/TRAINING PROGRAM | Admitting: HOSPITALIST
Payer: COMMERCIAL

## 2022-10-25 DIAGNOSIS — R47.01 APHASIA: ICD-10-CM

## 2022-10-25 DIAGNOSIS — G25.79 SEROTONIN SYNDROME: ICD-10-CM

## 2022-10-25 DIAGNOSIS — G93.49 LEUKOENCEPHALOPATHY: ICD-10-CM

## 2022-10-25 DIAGNOSIS — R41.82 ALTERED MENTAL STATUS, UNSPECIFIED ALTERED MENTAL STATUS TYPE: ICD-10-CM

## 2022-10-25 DIAGNOSIS — Z51.5 PALLIATIVE CARE ENCOUNTER: ICD-10-CM

## 2022-10-25 DIAGNOSIS — R29.2 HYPERREFLEXIA: ICD-10-CM

## 2022-10-25 DIAGNOSIS — R29.898 MUSCLE RIGIDITY: ICD-10-CM

## 2022-10-25 DIAGNOSIS — R63.30 FEEDING DIFFICULTIES: ICD-10-CM

## 2022-10-25 DIAGNOSIS — R00.0 TACHYCARDIA: ICD-10-CM

## 2022-10-25 DIAGNOSIS — G93.40 ENCEPHALOPATHY: Primary | ICD-10-CM

## 2022-10-25 DIAGNOSIS — R53.2 FUNCTIONAL QUADRIPLEGIA (HCC): ICD-10-CM

## 2022-10-25 PROBLEM — R19.7 DIARRHEA: Status: ACTIVE | Noted: 2022-10-25

## 2022-10-25 LAB
ALBUMIN SERPL-MCNC: 3.3 G/DL (ref 3.5–5)
ALBUMIN/GLOB SERPL: 0.7 {RATIO} (ref 1.1–2.2)
ALP SERPL-CCNC: 62 U/L (ref 45–117)
ALT SERPL-CCNC: 31 U/L (ref 12–78)
ANION GAP SERPL CALC-SCNC: 7 MMOL/L (ref 5–15)
APPEARANCE UR: CLEAR
AST SERPL-CCNC: 18 U/L (ref 15–37)
ATRIAL RATE: 103 BPM
BACTERIA URNS QL MICRO: NEGATIVE /HPF
BASOPHILS # BLD: 0.1 K/UL (ref 0–0.1)
BASOPHILS NFR BLD: 1 % (ref 0–1)
BILIRUB SERPL-MCNC: 0.3 MG/DL (ref 0.2–1)
BILIRUB UR QL: NEGATIVE
BUN SERPL-MCNC: 23 MG/DL (ref 6–20)
BUN/CREAT SERPL: 29 (ref 12–20)
CALCIUM SERPL-MCNC: 9.3 MG/DL (ref 8.5–10.1)
CALCULATED P AXIS, ECG09: 24 DEGREES
CALCULATED R AXIS, ECG10: 13 DEGREES
CALCULATED T AXIS, ECG11: -22 DEGREES
CHLORIDE SERPL-SCNC: 108 MMOL/L (ref 97–108)
CO2 SERPL-SCNC: 24 MMOL/L (ref 21–32)
COLOR UR: ABNORMAL
COMMENT, HOLDF: NORMAL
COVID-19 RAPID TEST, COVR: NOT DETECTED
CREAT SERPL-MCNC: 0.79 MG/DL (ref 0.55–1.02)
DIAGNOSIS, 93000: NORMAL
DIFFERENTIAL METHOD BLD: ABNORMAL
EOSINOPHIL # BLD: 0.1 K/UL (ref 0–0.4)
EOSINOPHIL NFR BLD: 1 % (ref 0–7)
EPITH CASTS URNS QL MICRO: ABNORMAL /LPF
ERYTHROCYTE [DISTWIDTH] IN BLOOD BY AUTOMATED COUNT: 13.8 % (ref 11.5–14.5)
FLUAV AG NPH QL IA: NEGATIVE
FLUBV AG NOSE QL IA: NEGATIVE
GLOBULIN SER CALC-MCNC: 4.5 G/DL (ref 2–4)
GLUCOSE SERPL-MCNC: 108 MG/DL (ref 65–100)
GLUCOSE UR STRIP.AUTO-MCNC: NEGATIVE MG/DL
HCT VFR BLD AUTO: 39 % (ref 35–47)
HGB BLD-MCNC: 12.8 G/DL (ref 11.5–16)
HGB UR QL STRIP: NEGATIVE
IMM GRANULOCYTES # BLD AUTO: 0.1 K/UL (ref 0–0.04)
IMM GRANULOCYTES NFR BLD AUTO: 1 % (ref 0–0.5)
KETONES UR QL STRIP.AUTO: ABNORMAL MG/DL
LACTATE BLD-SCNC: 1.07 MMOL/L (ref 0.4–2)
LEUKOCYTE ESTERASE UR QL STRIP.AUTO: NEGATIVE
LYMPHOCYTES # BLD: 2.4 K/UL (ref 0.8–3.5)
LYMPHOCYTES NFR BLD: 19 % (ref 12–49)
MCH RBC QN AUTO: 29.6 PG (ref 26–34)
MCHC RBC AUTO-ENTMCNC: 32.8 G/DL (ref 30–36.5)
MCV RBC AUTO: 90.3 FL (ref 80–99)
MONOCYTES # BLD: 1 K/UL (ref 0–1)
MONOCYTES NFR BLD: 8 % (ref 5–13)
NEUTS SEG # BLD: 8.9 K/UL (ref 1.8–8)
NEUTS SEG NFR BLD: 70 % (ref 32–75)
NITRITE UR QL STRIP.AUTO: NEGATIVE
NRBC # BLD: 0 K/UL (ref 0–0.01)
NRBC BLD-RTO: 0 PER 100 WBC
P-R INTERVAL, ECG05: 138 MS
PH UR STRIP: 5.5 [PH] (ref 5–8)
PLATELET # BLD AUTO: 436 K/UL (ref 150–400)
PMV BLD AUTO: 11.2 FL (ref 8.9–12.9)
POTASSIUM SERPL-SCNC: 3.6 MMOL/L (ref 3.5–5.1)
PROT SERPL-MCNC: 7.8 G/DL (ref 6.4–8.2)
PROT UR STRIP-MCNC: ABNORMAL MG/DL
Q-T INTERVAL, ECG07: 336 MS
QRS DURATION, ECG06: 72 MS
QTC CALCULATION (BEZET), ECG08: 440 MS
RBC # BLD AUTO: 4.32 M/UL (ref 3.8–5.2)
RBC #/AREA URNS HPF: ABNORMAL /HPF (ref 0–5)
SAMPLES BEING HELD,HOLD: NORMAL
SODIUM SERPL-SCNC: 139 MMOL/L (ref 136–145)
SOURCE, COVRS: NORMAL
SP GR UR REFRACTOMETRY: >1.03 (ref 1–1.03)
UR CULT HOLD, URHOLD: NORMAL
UROBILINOGEN UR QL STRIP.AUTO: 1 EU/DL (ref 0.2–1)
VENTRICULAR RATE, ECG03: 103 BPM
WBC # BLD AUTO: 12.5 K/UL (ref 3.6–11)
WBC URNS QL MICRO: ABNORMAL /HPF (ref 0–4)

## 2022-10-25 PROCEDURE — 93005 ELECTROCARDIOGRAM TRACING: CPT

## 2022-10-25 PROCEDURE — 80053 COMPREHEN METABOLIC PANEL: CPT

## 2022-10-25 PROCEDURE — 96360 HYDRATION IV INFUSION INIT: CPT

## 2022-10-25 PROCEDURE — 87040 BLOOD CULTURE FOR BACTERIA: CPT

## 2022-10-25 PROCEDURE — 81001 URINALYSIS AUTO W/SCOPE: CPT

## 2022-10-25 PROCEDURE — 74011250636 HC RX REV CODE- 250/636: Performed by: EMERGENCY MEDICINE

## 2022-10-25 PROCEDURE — G0378 HOSPITAL OBSERVATION PER HR: HCPCS

## 2022-10-25 PROCEDURE — 71045 X-RAY EXAM CHEST 1 VIEW: CPT

## 2022-10-25 PROCEDURE — 83605 ASSAY OF LACTIC ACID: CPT

## 2022-10-25 PROCEDURE — 85025 COMPLETE CBC W/AUTO DIFF WBC: CPT

## 2022-10-25 PROCEDURE — 36415 COLL VENOUS BLD VENIPUNCTURE: CPT

## 2022-10-25 PROCEDURE — 87635 SARS-COV-2 COVID-19 AMP PRB: CPT

## 2022-10-25 PROCEDURE — 74011250637 HC RX REV CODE- 250/637: Performed by: EMERGENCY MEDICINE

## 2022-10-25 PROCEDURE — 99285 EMERGENCY DEPT VISIT HI MDM: CPT

## 2022-10-25 PROCEDURE — 70450 CT HEAD/BRAIN W/O DYE: CPT

## 2022-10-25 PROCEDURE — 87804 INFLUENZA ASSAY W/OPTIC: CPT

## 2022-10-25 RX ORDER — SODIUM CHLORIDE 0.9 % (FLUSH) 0.9 %
5-10 SYRINGE (ML) INJECTION AS NEEDED
Status: DISCONTINUED | OUTPATIENT
Start: 2022-10-25 | End: 2022-11-22 | Stop reason: HOSPADM

## 2022-10-25 RX ORDER — ACETAMINOPHEN 650 MG/1
650 SUPPOSITORY RECTAL ONCE
Status: ACTIVE | OUTPATIENT
Start: 2022-10-25 | End: 2022-10-26

## 2022-10-25 RX ORDER — ACETAMINOPHEN 650 MG/1
650 SUPPOSITORY RECTAL
Status: DISCONTINUED | OUTPATIENT
Start: 2022-10-25 | End: 2022-11-22 | Stop reason: HOSPADM

## 2022-10-25 RX ADMIN — SODIUM CHLORIDE 500 ML: 9 INJECTION, SOLUTION INTRAVENOUS at 16:52

## 2022-10-25 RX ADMIN — ACETAMINOPHEN 650 MG: 650 SUPPOSITORY RECTAL at 21:38

## 2022-10-25 NOTE — Clinical Note
Patient Class[de-identified] OBSERVATION [104]   Type of Bed: Neuro/Stroke [9]   Reason for Observation: AMS/ diarrhea   Admitting Diagnosis: AMS (altered mental status) [4927597]   Admitting Diagnosis: Diarrhea [787.91. ICD-9-CM]   Admitting Physician: Guille Pelletier [address]   Attending Physician: Alisha Guallpa

## 2022-10-25 NOTE — ED TRIAGE NOTES
Patient arrives via EMS from [facility] for altered mental status. EMS reports patient was seen at [facility] last week for anoxic brain injury, transferred to [address] [place] [address] on Thursday. Staff reports since Friday patient has been aphasic and not following commands.

## 2022-10-25 NOTE — ED PROVIDER NOTES
Patient is a 55-year-old woman with a history of anoxic brain injury was sent in to the emergency department for worsening mental status. The patient was previously on interactive but has stopped communicating in the last 2 days. There are also reports of fevers. The history is provided by the EMS personnel.    Altered Mental Status   Functional status baseline:  [EPIC#1537^NOTE}      Past Medical History:   Diagnosis Date    Allergic rhinitis 7/29/2019    Depression     History of multiple allergies     History of vascular access device 10/07/2022    [facility] VAT: PICC placement R Cephalic length 40 cm for reliable access/TPN arm circ 35.5 cm    Muscle ache     Obesity 11/5/2012    Sinus pressure     Sinus problem        Past Surgical History:   Procedure Laterality Date    HX ACL RECONSTRUCTION      left     HX CHOLECYSTECTOMY  1998    IR INSERT NON TUNL CVC OVER 5 YRS  9/27/2022         Family History:   Problem Relation Age of Onset    Diabetes Paternal Grandfather        Social History     Socioeconomic History    Marital status:      Spouse name: Not on file    Number of children: Not on file    Years of education: Not on file    Highest education level: Not on file   Occupational History    Not on file   Tobacco Use    Smoking status: Not on file    Smokeless tobacco: Never   Vaping Use    Vaping Use: Never used   Substance and Sexual Activity    Alcohol use: No    Drug use: No    Sexual activity: Yes     Partners: Male     Birth control/protection: None   Other Topics Concern    Not on file   Social History Narrative    ** Merged History Encounter **          Social Determinants of Health     Financial Resource Strain: Low Risk     Difficulty of Paying Living Expenses: Not hard at all   Food Insecurity: No Food Insecurity    Worried About Running Out of Food in the Last Year: Never true    Ran Out of Food in the Last Year: Never true   Transportation Needs: Not on file   Physical Activity: Not on file Stress: Not on file   Social Connections: Not on file   Intimate Partner Violence: Not on file   Housing Stability: Not on file         ALLERGIES: Droperidol    Review of Systems   Unable to perform ROS: Patient nonverbal   Constitutional:  Positive for fever. All other systems reviewed and are negative. Vitals:    10/25/22 1622 10/25/22 1652 10/25/22 1722   BP: 139/65 (!) 146/125 (!) 146/73   Pulse: (!) 103 (!) 101 (!) 110   Resp: 30 27 23   Temp: 98.8 °F (37.1 °C)     SpO2: 93% 94% 95%   Weight: 104.3 kg (230 lb)     Height: 5' 6\" (1.676 m)              Physical Exam  Vitals and nursing note reviewed. Constitutional:       Appearance: She is well-developed. HENT:      Head: Normocephalic and atraumatic. Eyes:      Pupils: Pupils are equal, round, and reactive to light. Cardiovascular:      Rate and Rhythm: Regular rhythm. Tachycardia present. Pulmonary:      Effort: Pulmonary effort is normal. Tachypnea present. Abdominal:      General: There is no distension. Palpations: Abdomen is soft. Tenderness: no abdominal tenderness   Musculoskeletal:      Cervical back: Normal range of motion and neck supple. Skin:     General: Skin is warm and dry. Capillary Refill: Capillary refill takes less than 2 seconds. Neurological:      Mental Status: She is lethargic. GCS: GCS eye subscore is 2. GCS verbal subscore is 1. GCS motor subscore is 4. Psychiatric:         Speech: She is noncommunicative.         MDM  Number of Diagnoses or Management Options    ED Course as of 10/25/22 1941   Tue Oct 25, 2022   1637 EKG at 4:26 PM shows sinus tachycardia, 103 bpm, normal axis, inferior T wave flattening, no STEMI, no ectopy [IO]      ED Course User Index  [IO] Armida Freeman MD       Procedures      MEDICAL DECISION MAKIN y.o. female presents with Altered mental status    Differential diagnosis includes but not limited to:  seizure, meningitis, stroke, sepsis, subarachnoid hemorrhage, intracranial bleeding, encephalitis      LABORATORY TESTS:  Labs Reviewed   CBC WITH AUTOMATED DIFF - Abnormal; Notable for the following components:       Result Value    WBC 12.5 (*)     PLATELET 293 (*)     IMMATURE GRANULOCYTES 1 (*)     ABS. NEUTROPHILS 8.9 (*)     ABS. IMM. GRANS. 0.1 (*)     All other components within normal limits   METABOLIC PANEL, COMPREHENSIVE - Abnormal; Notable for the following components:    Glucose 108 (*)     BUN 23 (*)     BUN/Creatinine ratio 29 (*)     Albumin 3.3 (*)     Globulin 4.5 (*)     A-G Ratio 0.7 (*)     All other components within normal limits   URINALYSIS W/MICROSCOPIC - Abnormal; Notable for the following components:    Specific gravity >1.030 (*)     Protein TRACE (*)     Ketone TRACE (*)     All other components within normal limits   URINE CULTURE HOLD SAMPLE   CULTURE, BLOOD, PAIRED   SAMPLES BEING HELD   POC LACTIC ACID       IMAGING RESULTS:  CT HEAD WO CONT   Final Result      No acute intracranial abnormality on this noncontrast head CT. No change given   difference in technique. XR CHEST PORT   Final Result      No acute process on limited portable chest view. Improved lung aeration since   earlier this month. MEDICATIONS GIVEN:  Medications   sodium chloride (NS) flush 5-10 mL (has no administration in time range)   acetaminophen (TYLENOL) suppository 650 mg (0 mg Rectal Held 10/25/22 1638)   sodium chloride 0.9 % bolus infusion 500 mL (0 mL IntraVENous IV Completed 10/25/22 1801)         CONSULTS:  Hospitalist Consult: [facility] for Admission  7:20 PM    ED Room Number: GH72/44  Patient Name and age:  Halina Doyle 59 y.o.  female  Working Diagnosis:   1. Encephalopathy    2.  Tachycardia        COVID-19 Suspicion:  yes  Sepsis present:  no  Reassessment needed: no  Code Status:  Full Code  Readmission: yes  Isolation Requirements:  no  Recommended Level of Care:  telemetry  Department:[facility] Adult ED - [phone]          Marlon Botello MD      Please note that this dictation was completed with [employer], the computer voice recognition software. Quite often unanticipated grammatical, syntax, homophones, and other interpretive errors are inadvertently transcribed by the computer software. Please disregard these errors. Please excuse any errors that have escaped final proofreading.

## 2022-10-26 LAB
ALBUMIN SERPL-MCNC: 3.1 G/DL (ref 3.5–5)
ALBUMIN/GLOB SERPL: 0.8 {RATIO} (ref 1.1–2.2)
ALP SERPL-CCNC: 57 U/L (ref 45–117)
ALT SERPL-CCNC: 26 U/L (ref 12–78)
AMMONIA PLAS-SCNC: 26 UMOL/L
ANION GAP SERPL CALC-SCNC: 8 MMOL/L (ref 5–15)
APTT PPP: 23.9 SEC (ref 22.1–31)
AST SERPL-CCNC: 19 U/L (ref 15–37)
BASOPHILS # BLD: 0.1 K/UL (ref 0–0.1)
BASOPHILS NFR BLD: 1 % (ref 0–1)
BILIRUB SERPL-MCNC: 0.4 MG/DL (ref 0.2–1)
BUN SERPL-MCNC: 16 MG/DL (ref 6–20)
BUN/CREAT SERPL: 25 (ref 12–20)
CALCIUM SERPL-MCNC: 9.1 MG/DL (ref 8.5–10.1)
CHLORIDE SERPL-SCNC: 110 MMOL/L (ref 97–108)
CHOLEST SERPL-MCNC: 145 MG/DL
CO2 SERPL-SCNC: 23 MMOL/L (ref 21–32)
CREAT SERPL-MCNC: 0.63 MG/DL (ref 0.55–1.02)
DIFFERENTIAL METHOD BLD: NORMAL
EOSINOPHIL # BLD: 0.1 K/UL (ref 0–0.4)
EOSINOPHIL NFR BLD: 1 % (ref 0–7)
ERYTHROCYTE [DISTWIDTH] IN BLOOD BY AUTOMATED COUNT: 13.7 % (ref 11.5–14.5)
ETHANOL SERPL-MCNC: <10 MG/DL
FOLATE SERPL-MCNC: 9.9 NG/ML (ref 5–21)
GLOBULIN SER CALC-MCNC: 3.8 G/DL (ref 2–4)
GLUCOSE SERPL-MCNC: 115 MG/DL (ref 65–100)
HCT VFR BLD AUTO: 35.2 % (ref 35–47)
HDLC SERPL-MCNC: 30 MG/DL
HDLC SERPL: 4.8 {RATIO} (ref 0–5)
HGB BLD-MCNC: 11.5 G/DL (ref 11.5–16)
IMM GRANULOCYTES # BLD AUTO: 0 K/UL (ref 0–0.04)
IMM GRANULOCYTES NFR BLD AUTO: 0 % (ref 0–0.5)
INR PPP: 1.1 (ref 0.9–1.1)
LDLC SERPL CALC-MCNC: 91.4 MG/DL (ref 0–100)
LYMPHOCYTES # BLD: 2.1 K/UL (ref 0.8–3.5)
LYMPHOCYTES NFR BLD: 22 % (ref 12–49)
MAGNESIUM SERPL-MCNC: 2.3 MG/DL (ref 1.6–2.4)
MCH RBC QN AUTO: 29.4 PG (ref 26–34)
MCHC RBC AUTO-ENTMCNC: 32.7 G/DL (ref 30–36.5)
MCV RBC AUTO: 90 FL (ref 80–99)
MONOCYTES # BLD: 0.7 K/UL (ref 0–1)
MONOCYTES NFR BLD: 7 % (ref 5–13)
NEUTS SEG # BLD: 6.8 K/UL (ref 1.8–8)
NEUTS SEG NFR BLD: 69 % (ref 32–75)
NRBC # BLD: 0 K/UL (ref 0–0.01)
NRBC BLD-RTO: 0 PER 100 WBC
PLATELET # BLD AUTO: 355 K/UL (ref 150–400)
PMV BLD AUTO: 12.2 FL (ref 8.9–12.9)
POTASSIUM SERPL-SCNC: 3.6 MMOL/L (ref 3.5–5.1)
PROT SERPL-MCNC: 6.9 G/DL (ref 6.4–8.2)
PROTHROMBIN TIME: 11.6 SEC (ref 9–11.1)
RBC # BLD AUTO: 3.91 M/UL (ref 3.8–5.2)
SALICYLATES SERPL-MCNC: <1.7 MG/DL (ref 2.8–20)
SODIUM SERPL-SCNC: 141 MMOL/L (ref 136–145)
THERAPEUTIC RANGE,PTTT: NORMAL SECS (ref 58–77)
TRIGL SERPL-MCNC: 118 MG/DL (ref ?–150)
TSH SERPL DL<=0.05 MIU/L-ACNC: 0.61 UIU/ML (ref 0.36–3.74)
VIT B12 SERPL-MCNC: 424 PG/ML (ref 193–986)
VLDLC SERPL CALC-MCNC: 23.6 MG/DL
WBC # BLD AUTO: 9.9 K/UL (ref 3.6–11)

## 2022-10-26 PROCEDURE — 74011000250 HC RX REV CODE- 250: Performed by: FAMILY MEDICINE

## 2022-10-26 PROCEDURE — 80061 LIPID PANEL: CPT

## 2022-10-26 PROCEDURE — 77030038269 HC DRN EXT URIN PURWCK BARD -A

## 2022-10-26 PROCEDURE — 95706 EEG WO VID 2-12HR INTMT MNTR: CPT | Performed by: NURSE PRACTITIONER

## 2022-10-26 PROCEDURE — 99223 1ST HOSP IP/OBS HIGH 75: CPT | Performed by: PSYCHIATRY & NEUROLOGY

## 2022-10-26 PROCEDURE — 92610 EVALUATE SWALLOWING FUNCTION: CPT | Performed by: SPEECH-LANGUAGE PATHOLOGIST

## 2022-10-26 PROCEDURE — 82077 ASSAY SPEC XCP UR&BREATH IA: CPT

## 2022-10-26 PROCEDURE — 84443 ASSAY THYROID STIM HORMONE: CPT

## 2022-10-26 PROCEDURE — 80179 DRUG ASSAY SALICYLATE: CPT

## 2022-10-26 PROCEDURE — 74011250637 HC RX REV CODE- 250/637: Performed by: EMERGENCY MEDICINE

## 2022-10-26 PROCEDURE — 85025 COMPLETE CBC W/AUTO DIFF WBC: CPT

## 2022-10-26 PROCEDURE — 74011250636 HC RX REV CODE- 250/636: Performed by: STUDENT IN AN ORGANIZED HEALTH CARE EDUCATION/TRAINING PROGRAM

## 2022-10-26 PROCEDURE — 97530 THERAPEUTIC ACTIVITIES: CPT | Performed by: OCCUPATIONAL THERAPIST

## 2022-10-26 PROCEDURE — 95706 EEG WO VID 2-12HR INTMT MNTR: CPT | Performed by: PHYSICIAN ASSISTANT

## 2022-10-26 PROCEDURE — 85730 THROMBOPLASTIN TIME PARTIAL: CPT

## 2022-10-26 PROCEDURE — 97162 PT EVAL MOD COMPLEX 30 MIN: CPT

## 2022-10-26 PROCEDURE — 82140 ASSAY OF AMMONIA: CPT

## 2022-10-26 PROCEDURE — 74011250637 HC RX REV CODE- 250/637: Performed by: STUDENT IN AN ORGANIZED HEALTH CARE EDUCATION/TRAINING PROGRAM

## 2022-10-26 PROCEDURE — 85610 PROTHROMBIN TIME: CPT

## 2022-10-26 PROCEDURE — 83735 ASSAY OF MAGNESIUM: CPT

## 2022-10-26 PROCEDURE — G0378 HOSPITAL OBSERVATION PER HR: HCPCS

## 2022-10-26 PROCEDURE — 97165 OT EVAL LOW COMPLEX 30 MIN: CPT | Performed by: OCCUPATIONAL THERAPIST

## 2022-10-26 PROCEDURE — 77010033678 HC OXYGEN DAILY

## 2022-10-26 PROCEDURE — 82746 ASSAY OF FOLIC ACID SERUM: CPT

## 2022-10-26 PROCEDURE — 80053 COMPREHEN METABOLIC PANEL: CPT

## 2022-10-26 PROCEDURE — 97530 THERAPEUTIC ACTIVITIES: CPT

## 2022-10-26 PROCEDURE — 36415 COLL VENOUS BLD VENIPUNCTURE: CPT

## 2022-10-26 PROCEDURE — 82607 VITAMIN B-12: CPT

## 2022-10-26 PROCEDURE — 74011250636 HC RX REV CODE- 250/636: Performed by: PHYSICIAN ASSISTANT

## 2022-10-26 RX ORDER — METOPROLOL TARTRATE 25 MG/1
25 TABLET, FILM COATED ORAL EVERY 12 HOURS
Status: DISCONTINUED | OUTPATIENT
Start: 2022-10-26 | End: 2022-10-29

## 2022-10-26 RX ORDER — CYPROHEPTADINE HYDROCHLORIDE 2 MG/5ML
2 SOLUTION ORAL EVERY 4 HOURS
Status: DISCONTINUED | OUTPATIENT
Start: 2022-10-26 | End: 2022-10-26

## 2022-10-26 RX ORDER — ATORVASTATIN CALCIUM 10 MG/1
10 TABLET, FILM COATED ORAL
Status: DISCONTINUED | OUTPATIENT
Start: 2022-10-26 | End: 2022-11-22 | Stop reason: HOSPADM

## 2022-10-26 RX ORDER — BENZTROPINE MESYLATE 1 MG/ML
2 INJECTION INTRAMUSCULAR; INTRAVENOUS EVERY 12 HOURS
Status: DISCONTINUED | OUTPATIENT
Start: 2022-10-27 | End: 2022-10-27

## 2022-10-26 RX ORDER — ESCITALOPRAM OXALATE 10 MG/1
20 TABLET ORAL DAILY
Status: DISCONTINUED | OUTPATIENT
Start: 2022-10-27 | End: 2022-10-26

## 2022-10-26 RX ORDER — SODIUM CHLORIDE 9 MG/ML
75 INJECTION, SOLUTION INTRAVENOUS CONTINUOUS
Status: DISCONTINUED | OUTPATIENT
Start: 2022-10-26 | End: 2022-10-26

## 2022-10-26 RX ORDER — SODIUM CHLORIDE 0.9 % (FLUSH) 0.9 %
5-40 SYRINGE (ML) INJECTION EVERY 8 HOURS
Status: DISCONTINUED | OUTPATIENT
Start: 2022-10-26 | End: 2022-11-22 | Stop reason: HOSPADM

## 2022-10-26 RX ORDER — SODIUM CHLORIDE, SODIUM LACTATE, POTASSIUM CHLORIDE, CALCIUM CHLORIDE 600; 310; 30; 20 MG/100ML; MG/100ML; MG/100ML; MG/100ML
50 INJECTION, SOLUTION INTRAVENOUS CONTINUOUS
Status: DISCONTINUED | OUTPATIENT
Start: 2022-10-26 | End: 2022-10-31

## 2022-10-26 RX ORDER — LEVOTHYROXINE SODIUM 88 UG/1
88 TABLET ORAL
Status: DISCONTINUED | OUTPATIENT
Start: 2022-10-27 | End: 2022-11-22 | Stop reason: HOSPADM

## 2022-10-26 RX ORDER — SODIUM CHLORIDE 0.9 % (FLUSH) 0.9 %
5-40 SYRINGE (ML) INJECTION AS NEEDED
Status: DISCONTINUED | OUTPATIENT
Start: 2022-10-26 | End: 2022-11-22 | Stop reason: HOSPADM

## 2022-10-26 RX ORDER — PANTOPRAZOLE SODIUM 40 MG/1
40 TABLET, DELAYED RELEASE ORAL
Status: DISCONTINUED | OUTPATIENT
Start: 2022-10-27 | End: 2022-11-22 | Stop reason: HOSPADM

## 2022-10-26 RX ORDER — CYPROHEPTADINE HYDROCHLORIDE 2 MG/5ML
12 SOLUTION ORAL ONCE
Status: COMPLETED | OUTPATIENT
Start: 2022-10-26 | End: 2022-10-26

## 2022-10-26 RX ORDER — TRAZODONE HYDROCHLORIDE 50 MG/1
125 TABLET ORAL
Status: DISCONTINUED | OUTPATIENT
Start: 2022-10-26 | End: 2022-10-26

## 2022-10-26 RX ADMIN — SODIUM CHLORIDE, PRESERVATIVE FREE 10 ML: 5 INJECTION INTRAVENOUS at 07:00

## 2022-10-26 RX ADMIN — SODIUM CHLORIDE, POTASSIUM CHLORIDE, SODIUM LACTATE AND CALCIUM CHLORIDE 75 ML/HR: 600; 310; 30; 20 INJECTION, SOLUTION INTRAVENOUS at 22:53

## 2022-10-26 RX ADMIN — CYPROHEPTADINE HYDROCHLORIDE 12 MG: 2 SYRUP ORAL at 18:35

## 2022-10-26 RX ADMIN — ACETAMINOPHEN 650 MG: 650 SUPPOSITORY RECTAL at 12:56

## 2022-10-26 RX ADMIN — SODIUM CHLORIDE, PRESERVATIVE FREE 10 ML: 5 INJECTION INTRAVENOUS at 14:27

## 2022-10-26 RX ADMIN — SODIUM CHLORIDE, POTASSIUM CHLORIDE, SODIUM LACTATE AND CALCIUM CHLORIDE 1000 ML: 600; 310; 30; 20 INJECTION, SOLUTION INTRAVENOUS at 18:35

## 2022-10-26 RX ADMIN — SODIUM CHLORIDE, POTASSIUM CHLORIDE, SODIUM LACTATE AND CALCIUM CHLORIDE 75 ML/HR: 600; 310; 30; 20 INJECTION, SOLUTION INTRAVENOUS at 10:29

## 2022-10-26 NOTE — PROGRESS NOTES
[facility]  Hospitalist Group                                                                                          Hospitalist Progress Note  Elis Alarcon [place]  Answering service: [phone] from in house phone        Date of Service:  10/26/2022  NAME:  Benito Powers  :  1965  MRN:  [id number]      Admission Summary:   Benito Powers is a 59 y.o. female with past medical history of anoxic brain injury, anxiety, depression, hypertension, hyperlipidemia, hypothyroidism [facility] the emergency department [facility] with reported altered mental status (AMS). The patient was previously interactive but has stopped communicating in the last 2 days. Interval history / Subjective:   Saw the patient on rounds this morning. Pt was not able to interact with the examiner, history was obtained by her mother who was present at the bedside. Assessment & Plan:     Acute encephalopathy  - ISO Hx of anoxic brain injury, status epilepticus ruled out on previous admission, but unknown if hx of seizures, less likely metabolic encephalopathy  - Place on neurochecks and fall precautions  - TSH, Folate, B12 ordered  - CT head: No acute intracranial abnormality on this noncontrast head CT.  - Neurology consulted  - Pt found to have horizontal nystagmus on exam this morning, will follow up with the neurology team.     Aphasia  - Speech therapy consulted  - NPO until SLP evaluation  - IVF while NPO  - Per pt's mother staff at [facility] were considering PEG placement due to poor PO intake. Hypothyroidism  - Check TSH  - home levothyroxine     Tachycardia  - Pt with hx of Takotsubo cardiomyopathy.  Most recent echo (10/11) With LVEF: 55-60%  - Continue telemetry monitoring  - Will resume home metoprolol when pt can tolerate PO intake    Essential hypertension  - Clonidine patch in place  - Monitor BP     Hyperlipidemia  - Check lipid panel;   - Continue home statin      Anxiety  - Home escitalopram    Insomnia  -- Home trazodone      Leukocytosis: resolved  - WBC  12.5 on admission, now 9.9  - Bcx: No growth at 11 hours  - UA not infectious appearing, will hold off on Cx for now    Dehydration  - BUN 23.    - IVF LR at 75ml/hr  - Follow AM labs     Generalized weakness  - Physical and Occupational Therapy consulted    Code status: Full  Prophylaxis: SCDs  Care Plan discussed with:   Anticipated Disposition: TBD likely SNF      Hospital Problems  Date Reviewed: 10/5/2022            Codes Class Noted POA    Diarrhea ICD-10-CM: R19.7  ICD-9-CM: 787.91  10/25/2022 Unknown        AMS (altered mental status) ICD-10-CM: R41.82  ICD-9-CM: 780.97  10/25/2022 Unknown             Review of Systems:   A comprehensive review of systems was negative except for that written in the HPI. Vital Signs:    Last 24hrs VS reviewed since prior progress note. Most recent are:  Visit Vitals  BP (!) 118/98 (BP 1 Location: Left upper arm, BP Patient Position: At rest;Supine)   Pulse (!) 118   Temp 99.4 °F (37.4 °C)   Resp (!) 32   Ht 5' 6\" (1.676 m)   Wt 104.3 kg (230 lb)   SpO2 96%   BMI 37.12 kg/m²         Intake/Output Summary (Last 24 hours) at 10/26/2022 1459  Last data filed at 10/25/2022 1801  Gross per 24 hour   Intake 500 ml   Output --   Net 500 ml        Physical Examination:     I had a face to face encounter with this patient and independently examined them on 10/26/2022 as outlined below:          Constitutional:  No acute distress, Pt is not able to communicate with the examiner    ENT:  Oral mucosa moist, oropharynx benign. Resp:  CTA bilaterally. No wheezing/rhonchi/rales. No accessory muscle use. CV:  Regular rhythm, normal rate, no murmurs, gallops, rubs    GI:  Soft, non distended, non tender.  normoactive bowel sounds, no hepatosplenomegaly     Musculoskeletal:  Right hand swollen, no erythema, warm, 2+ radial pulses present bilaterally    Neurologic: Horizontal nystagmus present, Withdraws from pain (sternal rub, trap pinch), non-verbal            Data Review:    Review and/or order of clinical lab test  Review and/or order of tests in the radiology section of CPT  Review and/or order of tests in the medicine section of CPT      Labs:     Recent Labs     10/25/22  1633   WBC 12.5*   HGB 12.8   HCT 39.0   *     Recent Labs     10/26/22  0646 10/25/22  1633    139   K 3.6 3.6   * 108   CO2 23 24   BUN 16 23*   CREA 0.63 0.79   * 108*   CA 9.1 9.3   MG 2.3  --      Recent Labs     10/26/22  0646 10/25/22  1633   ALT 26 31   AP 57 62   TBILI 0.4 0.3   TP 6.9 7.8   ALB 3.1* 3.3*   GLOB 3.8 4.5*     Recent Labs     10/26/22  0646   INR 1.1   PTP 11.6*   APTT 23.9      No results for input(s): FE, TIBC, PSAT, FERR in the last 72 hours. Lab Results   Component Value Date/Time    Folate 9.8 10/06/2022 12:59 AM      No results for input(s): PH, PCO2, PO2 in the last 72 hours. No results for input(s): CPK, CKNDX, TROIQ in the last 72 hours.     No lab exists for component: CPKMB  Lab Results   Component Value Date/Time    Cholesterol, total 145 10/26/2022 06:46 AM    HDL Cholesterol 30 10/26/2022 06:46 AM    LDL, calculated 91.4 10/26/2022 06:46 AM    Triglyceride 118 10/26/2022 06:46 AM    CHOL/HDL Ratio 4.8 10/26/2022 06:46 AM     Lab Results   Component Value Date/Time    Glucose (POC) 110 10/05/2022 04:55 PM    Glucose (POC) 87 10/05/2022 11:02 AM    Glucose (POC) 78 10/05/2022 05:00 AM    Glucose (POC) 94 10/05/2022 04:57 AM    Glucose (POC) 124 (H) 10/04/2022 11:03 PM     Lab Results   Component Value Date/Time    Color YELLOW/STRAW 10/25/2022 04:54 PM    Appearance CLEAR 10/25/2022 04:54 PM    Specific gravity >1.030 (H) 10/25/2022 04:54 PM    Specific gravity 1.011 09/27/2022 12:51 PM    pH (UA) 5.5 10/25/2022 04:54 PM    Protein TRACE (A) 10/25/2022 04:54 PM    Glucose Negative 10/25/2022 04:54 PM    Ketone TRACE (A) 10/25/2022 04:54 PM    Bilirubin Negative 10/25/2022 04:54 PM    Urobilinogen 1.0 10/25/2022 04:54 PM    Nitrites Negative 10/25/2022 04:54 PM    Leukocyte Esterase Negative 10/25/2022 04:54 PM    Epithelial cells FEW 10/25/2022 04:54 PM    Bacteria Negative 10/25/2022 04:54 PM    WBC 0-4 10/25/2022 04:54 PM    RBC 0-5 10/25/2022 04:54 PM         Medications Reviewed:     Current Facility-Administered Medications   Medication Dose Route Frequency    sodium chloride (NS) flush 5-40 mL  5-40 mL IntraVENous Q8H    sodium chloride (NS) flush 5-40 mL  5-40 mL IntraVENous PRN    0.9% sodium chloride infusion  75 mL/hr IntraVENous CONTINUOUS    sodium chloride (NS) flush 5-10 mL  5-10 mL IntraVENous PRN    acetaminophen (TYLENOL) suppository 650 mg  650 mg Rectal Q6H PRN     ______________________________________________________________________  EXPECTED LENGTH OF STAY: - - -  ACTUAL LENGTH OF STAY:          0                 Despina Dean Milligram, PA-C

## 2022-10-26 NOTE — PROGRESS NOTES
Mary Bob provided to patient/representative with verbal explanation of the notice. Time allotted for questions regarding the notice. Patient /representative provided a completed copy of the VOON notice. Spoke to mother Copy placed on bedside chart.   Mancil , Care Management Assistant

## 2022-10-26 NOTE — CONSULTS
Neurology Consult Note     NAME: Alexus Davis   :  1965   MRN:  [id number]   DATE:  10/26/2022       HPI:  Pt is a 63yo RH female admitted 10/25/22 from [address] [place] [address] for AMS. Per ED note, pt was \"aphasic and not following commands\" since 10/21/22 and febrile. /125, , Tm today at 1240 is 100.5. [facility] 10/25/22 is neg. LDL 91.4. WBC 12.5, Plt 436, UA neg. UCx and BCx negative. COVID-19 and Influenza neg. Ammonia 26. Patient is seen with her mother at the bedside who provides all of the history. Patient is nonverbal.  Her mom has a video of her while she was still at [facility] during which she is singing and laughing and doing little dancing in her bed with her best friend at the bedside. Mom notes they gave her Risperdal to help her sleep only a dose and she became agitated so then they gave her Seroquel and she became calm but that was stopped at mother's request due to the change in her mental status. Ever since she received those 2 doses she has just been declining. She was on trazodone and Lexapro at [facility]. Recent admission to [facility]  22 - 10/20/22 for Takotsubo CM/NSTEMI/Bradycardia with intubation and prolonged ICU stay after found unresponsive in bed by a neighbor after she did not report to work, CPR initiated and EMS found her to be hypotensive with SBP in 70's and in respiratory arrest, possibly due to use of fentanyl patches for back pain and sleeping pills. EF was 25-30%. Had seizure like activity in ED with myoclonic jerking seen. She was seen by Darnell Dougherty and Cora Snyder for AMS with initial concern for status epilepticus, but Rapid and Continuous EEG were negative. MRI brain 22 revealed two small infarcts in right frontal lobe. Repeat MRI Brain 10/2/22 was stable without explanation for AMS.  Noted on exam by Dr. Cora Snyder 10/5/22 to have left eye slightly deviated laterally relative to right eye, noting strabismus as a child. EEG 10/7/22 with severe generalized slowing, likely due to recent sedating meds. Repeat rapid EEG 10/10/22 again neg. Empiric Keppra was stopped. No clear etiology for AMS without MRI findings of ischemic injury. She was d/c to inpt rehab on 10/20/22 on ASA 81mg daily and a statin. PMH:  Mild strabismus as child  Depression  Left ACL reconstruction  S/p imelda    ROS:  Unable to be obtained secondary to patient factors    MEDS:  Home:  Prior to Admission Medications   Prescriptions Last Dose Informant Patient Reported? Taking? MULTIVITAMIN PO   Yes No   Sig: Take  by mouth. OTHER,NON-FORMULARY,   No No   Sig: ESTROGEN(5050)/TESTOSTERONE (HRT) 1.5mg/10mg/ml Cream APPLY 1 ML TO SKIN (INNER WRIST/ABDOMEN/THIGHS EVERY DAY. ROTATE SITES   acetaminophen (TYLENOL) 325 mg tablet   Yes No   Sig: Take  by mouth every four (4) hours as needed. aspirin 81 mg chewable tablet   No No   Sig: Take 1 Tablet by mouth daily for 30 days. atorvastatin (LIPITOR) 10 mg tablet  Other Yes No   Sig: Take 10 mg by mouth daily. cloNIDine (CATAPRES) 0.2 mg/24 hr ptwk   No No   Si Patch by TransDERmal route every seven (7) days for 30 days. escitalopram oxalate (LEXAPRO) 20 mg tablet   No No   Sig: TAKE ONE TABLET BY MOUTH DAILY   furosemide (LASIX) 20 mg tablet   No No   Sig: Take 1 Tablet by mouth daily as needed (swelling). levothyroxine (SYNTHROID) 88 mcg tablet   No No   Sig: Take 1 Tablet by mouth Daily (before breakfast). loratadine-pseudoephedrine (Claritin-D 12 Hour) 5-120 mg per tablet   Yes No   Sig: Claritin-D   metoprolol tartrate (LOPRESSOR) 25 mg tablet   No No   Sig: Take 1 Tablet by mouth every twelve (12) hours for 30 days. pantoprazole (PROTONIX) 40 mg tablet   No No   Sig: Take 1 Tablet by mouth Daily (before breakfast) for 30 days.    senna-docusate (PERICOLACE) 8.6-50 mg per tablet   No No Sig: Take 1 Tablet by mouth two (2) times a day for 30 days. traZODone (DESYREL) 50 mg tablet   No No   Sig: Take 2.5 Tablets by mouth nightly as needed for Sleep.   tretinoin (RETIN-A) 0.05 % topical cream   Yes No   Sig: tretinoin 0.05 % topical cream      Facility-Administered Medications: None         Current Facility-Administered Medications:     sodium chloride (NS) flush 5-40 mL, 5-40 mL, IntraVENous, Q8H, GarrickJj motta MD, 10 mL at 10/26/22 1427    sodium chloride (NS) flush 5-40 mL, 5-40 mL, IntraVENous, PRN, Brooklynn Mccauley MD    lactated Ringers infusion, 75 mL/hr, IntraVENous, CONTINUOUS, Milligram, [facility], Last Rate: 75 mL/hr at 10/26/22 1029, 75 mL/hr at 10/26/22 1029    atorvastatin (LIPITOR) tablet 10 mg, 10 mg, Oral, QHS, Milligram, Rex LOPEZ PA-C    [START ON 10/27/2022] escitalopram oxalate (LEXAPRO) tablet 20 mg, 20 mg, Oral, DAILY, Milligram, [facility]    [START ON 10/27/2022] levothyroxine (SYNTHROID) tablet 88 mcg, 88 mcg, Oral, ACB, Milligram, [facility], PA-C    metoprolol tartrate (LOPRESSOR) tablet 25 mg, 25 mg, Oral, Q12H, Milligram, Rex LOPEZ PA-C    [START ON 10/27/2022] pantoprazole (PROTONIX) tablet 40 mg, 40 mg, Oral, ACB, Milligram, [facility] PA-C    traZODone (DESYREL) tablet 125 mg, 125 mg, Oral, QHS PRN, Milligram, [facility] lopez    sodium chloride (NS) flush 5-10 mL, 5-10 mL, IntraVENous, PRN, OrgillNissa MD    acetaminophen (TYLENOL) suppository 650 mg, 650 mg, Rectal, Q6H PRN, OrgillNissa MD, 650 mg at 10/26/22 1256      Allergies   Allergen Reactions    Droperidol Itching         SH:   No T/D  Rare alcohol  Her significant other had just returned from [place], but he has not been ill.     FH:  No neurological issues in the family      PHYSICAL EXAM:    Visit Vitals  /74 (BP 1 Location: Left upper arm, BP Patient Position: At rest)   Pulse 97   Temp 99.4 °F (37.4 °C)   Resp 24   Ht 5' 6\" (1.676 m)   Wt 229 lb 4.5 oz (104 kg)   SpO2 96%   BMI 37.01 kg/m²     Temp (24hrs), Av.3 °F (37.4 °C), Min:98.8 °F (37.1 °C), Max:100.5 °F (38.1 °C)        General: Well developed well nourished patient in no apparent distress. Tremulous, diaphoretic. Cardiac: Tachycardic,  no murmurs. Carotids: 2+ symmetric, no bruits  Extremities: 2+ Radial pulses, no cyanosis, bilateral dependent hand edema. Neurological Exam:  Mental Status: Eyes open, does not follow commands, does not track examiner. Cranial Nerves:   PERRL, Left gaze pref, but does look to midline, no nystagmus, no ptosis. Facial movement is symmetric. Motor:  Increased tone throughout. Tremulous. Reflexes:   Deep tendon reflexes 3+ and symmetric. Toes downgoing. Sensory:   Unable to assess due to patient factors   Gait:  Unable to assess due to patient factors   Cerebellar:  Unable to assess due to patient factors         STUDIES AND REPORTS:  Recent Results (from the past 24 hour(s))   EKG, 12 LEAD, INITIAL    Collection Time: 10/25/22  4:26 PM   Result Value Ref Range    Ventricular Rate 103 BPM    Atrial Rate 103 BPM    P-R Interval 138 ms    QRS Duration 72 ms    Q-T Interval 336 ms    QTC Calculation (Bezet) 440 ms    Calculated P Axis 24 degrees    Calculated R Axis 13 degrees    Calculated T Axis -22 degrees    Diagnosis       ** Poor data quality, interpretation may be adversely affected Sinus   tachycardia  Cannot rule out Anterior infarct , age undetermined  T wave abnormality, consider inferior ischemia  Abnormal ECG  When compared with ECG of 29-SEP-2022 03:44,  Vent.  rate has increased BY  47 BPM  T wave inversion now evident in Inferior leads  T wave amplitude has decreased in Lateral leads  Confirmed by Nkechi Collier M.D., [facility] Began ([id number]) on 10/25/2022 5:58:46 PM     CBC WITH AUTOMATED DIFF    Collection Time: 10/25/22  4:33 PM   Result Value Ref Range    WBC 12.5 (H) 3.6 - 11.0 K/uL    RBC 4.32 3.80 - 5.20 M/uL    HGB 12.8 11.5 - 16.0 g/dL    HCT 39.0 35.0 - 47.0 %    MCV 90.3 80.0 - 99.0 FL    MCH 29.6 26.0 - 34.0 PG    MCHC 32.8 30.0 - 36.5 g/dL    RDW 13.8 11.5 - 14.5 %    PLATELET 106 (H) 987 - 400 K/uL    MPV 11.2 8.9 - 12.9 FL    NRBC 0.0 0  WBC    ABSOLUTE NRBC 0.00 0.00 - 0.01 K/uL    NEUTROPHILS 70 32 - 75 %    LYMPHOCYTES 19 12 - 49 %    MONOCYTES 8 5 - 13 %    EOSINOPHILS 1 0 - 7 %    BASOPHILS 1 0 - 1 %    IMMATURE GRANULOCYTES 1 (H) 0.0 - 0.5 %    ABS. NEUTROPHILS 8.9 (H) 1.8 - 8.0 K/UL    ABS. LYMPHOCYTES 2.4 0.8 - 3.5 K/UL    ABS. MONOCYTES 1.0 0.0 - 1.0 K/UL    ABS. EOSINOPHILS 0.1 0.0 - 0.4 K/UL    ABS. BASOPHILS 0.1 0.0 - 0.1 K/UL    ABS. IMM. GRANS. 0.1 (H) 0.00 - 0.04 K/UL    DF AUTOMATED     METABOLIC PANEL, COMPREHENSIVE    Collection Time: 10/25/22  4:33 PM   Result Value Ref Range    Sodium 139 136 - 145 mmol/L    Potassium 3.6 3.5 - 5.1 mmol/L    Chloride 108 97 - 108 mmol/L    CO2 24 21 - 32 mmol/L    Anion gap 7 5 - 15 mmol/L    Glucose 108 (H) 65 - 100 mg/dL    BUN 23 (H) 6 - 20 MG/DL    Creatinine 0.79 0.55 - 1.02 MG/DL    BUN/Creatinine ratio 29 (H) 12 - 20      eGFR >60 >60 ml/min/1.73m2    Calcium 9.3 8.5 - 10.1 MG/DL    Bilirubin, total 0.3 0.2 - 1.0 MG/DL    ALT (SGPT) 31 12 - 78 U/L    AST (SGOT) 18 15 - 37 U/L    Alk. phosphatase 62 45 - 117 U/L    Protein, total 7.8 6.4 - 8.2 g/dL    Albumin 3.3 (L) 3.5 - 5.0 g/dL    Globulin 4.5 (H) 2.0 - 4.0 g/dL    A-G Ratio 0.7 (L) 1.1 - 2.2     SAMPLES BEING HELD    Collection Time: 10/25/22  4:33 PM   Result Value Ref Range    SAMPLES BEING HELD 1RED,1BLU     COMMENT        Add-on orders for these samples will be processed based on acceptable specimen integrity and analyte stability, which may vary by analyte.    POC LACTIC ACID    Collection Time: 10/25/22  4:41 PM   Result Value Ref Range    Lactic Acid (POC) 1.07 0.40 - 2.00 mmol/L   URINALYSIS W/MICROSCOPIC    Collection Time: 10/25/22  4:54 PM   Result Value Ref Range    Color YELLOW/STRAW      Appearance CLEAR CLEAR      Specific gravity >1.030 (H) 1.003 - 1.030    pH (UA) 5.5 5.0 - 8.0      Protein TRACE (A) NEG mg/dL    Glucose Negative NEG mg/dL    Ketone TRACE (A) NEG mg/dL    Bilirubin Negative NEG      Blood Negative NEG      Urobilinogen 1.0 0.2 - 1.0 EU/dL    Nitrites Negative NEG      Leukocyte Esterase Negative NEG      WBC 0-4 0 - 4 /hpf    RBC 0-5 0 - 5 /hpf    Epithelial cells FEW FEW /lpf    Bacteria Negative NEG /hpf   URINE CULTURE HOLD SAMPLE    Collection Time: 10/25/22  4:54 PM    Specimen: Serum; Urine   Result Value Ref Range    Urine culture hold        Urine on hold in Microbiology dept for 2 days. If unpreserved urine is submitted, it cannot be used for addtional testing after 24 hours, recollection will be required.    CULTURE, BLOOD, PAIRED    Collection Time: 10/25/22  4:54 PM    Specimen: Blood   Result Value Ref Range    Special Requests: NO SPECIAL REQUESTS      Culture result: NO GROWTH AFTER 11 HOURS     INFLUENZA A+B VIRAL AGS    Collection Time: 10/25/22  7:57 PM   Result Value Ref Range    Influenza A Antigen Negative NEG      Influenza B Antigen Negative NEG     COVID-19 RAPID TEST    Collection Time: 10/25/22  7:58 PM   Result Value Ref Range    Specimen source Nasopharyngeal      COVID-19 rapid test Not detected NOTD     LIPID PANEL    Collection Time: 10/26/22  6:46 AM   Result Value Ref Range    Cholesterol, total 145 <200 MG/DL    Triglyceride 118 <150 MG/DL    HDL Cholesterol 30 MG/DL    LDL, calculated 91.4 0 - 100 MG/DL    VLDL, calculated 23.6 MG/DL    CHOL/HDL Ratio 4.8 0.0 - 5.0     ETHYL ALCOHOL    Collection Time: 10/26/22  6:46 AM   Result Value Ref Range    ALCOHOL(ETHYL),SERUM <68 <25 MG/DL   SALICYLATE    Collection Time: 10/26/22  6:46 AM   Result Value Ref Range    Salicylate level <6.4 (L) 2.8 - 20.0 MG/DL   CBC WITH AUTOMATED DIFF    Collection Time: 10/26/22  6:46 AM   Result Value Ref Range    WBC 9.9 3.6 - 11.0 K/uL    RBC 3.91 3.80 - 5.20 M/uL    HGB 11.5 11.5 - 16.0 g/dL    HCT 35.2 35.0 - 47.0 % MCV 90.0 80.0 - 99.0 FL    MCH 29.4 26.0 - 34.0 PG    MCHC 32.7 30.0 - 36.5 g/dL    RDW 13.7 11.5 - 14.5 %    PLATELET 002 995 - 608 K/uL    MPV 12.2 8.9 - 12.9 FL    NRBC 0.0 0  WBC    ABSOLUTE NRBC 0.00 0.00 - 0.01 K/uL    NEUTROPHILS 69 32 - 75 %    LYMPHOCYTES 22 12 - 49 %    MONOCYTES 7 5 - 13 %    EOSINOPHILS 1 0 - 7 %    BASOPHILS 1 0 - 1 %    IMMATURE GRANULOCYTES 0 0.0 - 0.5 %    ABS. NEUTROPHILS 6.8 1.8 - 8.0 K/UL    ABS. LYMPHOCYTES 2.1 0.8 - 3.5 K/UL    ABS. MONOCYTES 0.7 0.0 - 1.0 K/UL    ABS. EOSINOPHILS 0.1 0.0 - 0.4 K/UL    ABS. BASOPHILS 0.1 0.0 - 0.1 K/UL    ABS. IMM. GRANS. 0.0 0.00 - 0.04 K/UL    DF AUTOMATED     AMMONIA    Collection Time: 10/26/22  6:46 AM   Result Value Ref Range    Ammonia, plasma 26 <01 UMOL/L   METABOLIC PANEL, COMPREHENSIVE    Collection Time: 10/26/22  6:46 AM   Result Value Ref Range    Sodium 141 136 - 145 mmol/L    Potassium 3.6 3.5 - 5.1 mmol/L    Chloride 110 (H) 97 - 108 mmol/L    CO2 23 21 - 32 mmol/L    Anion gap 8 5 - 15 mmol/L    Glucose 115 (H) 65 - 100 mg/dL    BUN 16 6 - 20 MG/DL    Creatinine 0.63 0.55 - 1.02 MG/DL    BUN/Creatinine ratio 25 (H) 12 - 20      eGFR >60 >60 ml/min/1.73m2    Calcium 9.1 8.5 - 10.1 MG/DL    Bilirubin, total 0.4 0.2 - 1.0 MG/DL    ALT (SGPT) 26 12 - 78 U/L    AST (SGOT) 19 15 - 37 U/L    Alk.  phosphatase 57 45 - 117 U/L    Protein, total 6.9 6.4 - 8.2 g/dL    Albumin 3.1 (L) 3.5 - 5.0 g/dL    Globulin 3.8 2.0 - 4.0 g/dL    A-G Ratio 0.8 (L) 1.1 - 2.2     PROTHROMBIN TIME + INR    Collection Time: 10/26/22  6:46 AM   Result Value Ref Range    INR 1.1 0.9 - 1.1      Prothrombin time 11.6 (H) 9.0 - 11.1 sec   PTT    Collection Time: 10/26/22  6:46 AM   Result Value Ref Range    aPTT 23.9 22.1 - 31.0 sec    aPTT, therapeutic range     58.0 - 77.0 SECS   MAGNESIUM    Collection Time: 10/26/22  6:46 AM   Result Value Ref Range    Magnesium 2.3 1.6 - 2.4 mg/dL   VITAMIN B12    Collection Time: 10/26/22 11:47 AM   Result Value Ref Range    Vitamin B12 424 193 - 986 pg/mL   FOLATE    Collection Time: 10/26/22 11:47 AM   Result Value Ref Range    Folate 9.9 5.0 - 21.0 ng/mL   TSH 3RD GENERATION    Collection Time: 10/26/22 11:47 AM   Result Value Ref Range    TSH 0.61 0.36 - 3.74 uIU/mL     MRI Results (most recent):  Results from [place] encounter on 09/27/22    MRI BRAIN W WO CONT    Narrative  EXAM:  MRI BRAIN W WO CONT    INDICATION:    acute encephalopathy    COMPARISON:  9/20/2022. CONTRAST: 20 ml of ProHance. TECHNIQUE:  Multiplanar multisequence acquisition without and with contrast of the brain. FINDINGS:  The ventricles are normal in size and position. 2 small foci of cortical  diffusion restriction in the right frontal lobe without significant change. No  new areas of ischemia present. There is associated FLAIR hyperintensity. There  is no cerebellar tonsillar herniation. Expected arterial flow-voids are present. No evidence of abnormal enhancement. Small amount of fluid in the bilateral mastoid air cells. Intubated. Air-fluid  level in the right maxillary sinus with fluid in the nasopharynx. The orbital  contents are within normal limits. No significant osseous or scalp lesions are  identified. Impression  2 small foci of diffusion restriction in the right frontal lobe are unchanged  and may represent small foci of acute ischemia. No abnormal enhancement is  identified. CT Results (most recent):  Results from Hospital Encounter encounter on 10/25/22    CT HEAD WO CONT    Narrative  EXAM: CT HEAD WO CONT    INDICATION: Confusion. Electronic medical record is not available at the time of  this interpretation. COMPARISON: CT head on 10/13/2022. MRI brain on 10/2/2022. TECHNIQUE: Noncontrast head CT. Coronal and sagittal reformats.   CT dose  reduction was achieved through the use of a standardized protocol tailored for  this examination and automatic exposure control for dose modulation. FINDINGS: The ventricles and sulci are age-appropriate without hydrocephalus. There is no mass effect or midline shift. There is no intracranial hemorrhage or  extra-axial fluid collection. There is no abnormal area of decreased density to  suggest infarct. The calvarium is intact. The visualized paranasal sinuses and mastoid air cells  are clear. Impression  No acute intracranial abnormality on this noncontrast head CT. No change given  difference in technique. Assessment and Plan:   Pt is a 63yo RH female with recent prolonged hospitalization after respiratory arrest presumed secondary to sedating meds, but also found to have Takotsubo CM, who has had declining mental status and function since receiving risperdal, then seroquel, only one dose each, then trazodone and Lexapro at rehab, who has been febrile, tachycardic, has severe muscle rigidity, hyperreflexia, diaphoresis, labile BPs. Infectious work up has been negative. Leading suspicion is serotonin syndrome. Discontinued Trazodone and SSRI. Discussed with Dr. Byron Orlando, recommend ICU care. Signed: Briseyda Mcfarladn MD

## 2022-10-26 NOTE — PROGRESS NOTES
Speech Therapy    Chart reviewed, spoke with RN. Clinical swallow evaluation completed. Initiating puree diet with thin liquids. Full note to follow. Of note, acceptance is limited and has been recently, per patient's mother. May need to consider supplemental alternative nutrition if unable to meet intake goals. Deisi Barrientos M.S., CCC-SLP

## 2022-10-26 NOTE — H&P
CRITICAL CARE NOTE      Name: Rex Anthony   : 1965   MRN: [id number]   Date: 10/26/2022      REASON FOR ICU ADMISSION:  Rigidity, Fever, AMS    PRINCIPAL ICU DIAGNOSIS   Rigidity, Fever, Encephalopathy - Serotonin Syndrome vs NMS vs ?metabolic      HPI   47M  from [place] presented with encephalopathy, has recent hospitalization at [facility] for Takutosubo CMP, Respiratory arrest requiring MV secondary to sleeping pills and fentanyl patches, ? Seizure with negative EEG. Today called for evaluation by Neurology, patient has declining mental status - now nonverbal, tachycardic, diaphoretic, rigid extremities, no nystagmus. Concern for serotonin syndrome - on Trazodone and Lexapro. Dose of Risperdal/Seroquel at [facility]. Moving to ICU for continued management. ASSESSMENT & PLAN     Neuro  Unclear etiology of encephalopathy. Baseline appears to be verbal, appropriately verbalizes, follows commands, and moves all extremities. Ddx includes NMS vs Serotonin syndrome vs seizure  - [facility] contacted. Low concern for SS, unlikely NMS. Recommend BZD trial and drug washout. Cyproheptadine not recommended, can have antihistamine effects and further confuse clinical picture.   - hold all SSRI, sedatives  - 1L bolus, keep LR @ 75cc/hr  - trial bzd to see if rigidity improves      Cardiac  Telemetry  Repeat EKG, Last Qtc 440  Goal MAP>65    Pulmonary  Head of Bed >30  IS   Goal SpO2 >92%, I am actively titrating oxygen to goal  Nebs PRN  CXR    GI  NPO  Place NGT    Renal  No montilla    Endocrine  SSI   Goal glucose 140-180  synthroid    ID  No active issues    Heme  SCD    Lines  Montilla - no  DVT- scd  SUP - no  Diet - ADULT DIET Dysphagia - Pureed  Mobility - bed  Pt Contact - Family at bedside  Dispo - ICU    Code Status - FULL      OBJECTIVE     Labs and Data: Reviewed 10/26/22  Medications: Reviewed 10/26/22  Imaging: Reviewed 10/26/22    Visit Vitals  /74 (BP 1 Location: Left upper arm, BP Patient Position: At rest)   Pulse (!) 113   Temp 99.4 °F (37.4 °C)   Resp 24   Ht 5' 6\" (1.676 m)   Wt 104 kg (229 lb 4.5 oz)   SpO2 96%   BMI 37.01 kg/m²    O2 Flow Rate (L/min): 3 l/min O2 Device: Nasal cannula Temp (24hrs), Av.4 °F (37.4 °C), Min:98.8 °F (37.1 °C), Max:100.5 °F (38.1 °C)             Intake/Output:     Intake/Output Summary (Last 24 hours) at 10/26/2022 1800  Last data filed at 10/25/2022 1801  Gross per 24 hour   Intake 500 ml   Output --   Net 500 ml         General - in bed, nocommunicative  HEENT- pupils equal,  anicteric  Neuro - Pupils reactive, dilated. No nystagmus. Rigid UE and LE, able to bend with force. Diaphorectic, no inducible clonus. Grimace to pain without withdrawing  CV - sinus tachycardia  Resp - CTAB  Abd - soft, nontender, no guarding   - no montilla  MSK- intact, no sacral ulcer    All Micro Results       Procedure Component Value Units Date/Time    CULTURE, BLOOD, PAIRED [[id number]] Collected: 10/25/22 1654    Order Status: Completed Specimen: Blood Updated: 10/26/22 0531     Special Requests: NO SPECIAL REQUESTS        Culture result: NO GROWTH AFTER 11 HOURS       COVID-19 RAPID TEST [[id number]] Collected: 10/25/22 1958    Order Status: Completed Specimen: Nasopharyngeal Updated: 10/25/22 2024     Specimen source Nasopharyngeal        COVID-19 rapid test Not detected        Comment: Rapid Abbott ID Now       Rapid NAAT:  The specimen is NEGATIVE for SARS-CoV-2, the novel coronavirus associated with COVID-19. Negative results should be treated as presumptive and, if inconsistent with clinical signs and symptoms or necessary for patient management, should be tested with an alternative molecular assay. Negative results do not preclude SARS-CoV-2 infection and should not be used as the sole basis for patient management decisions. This test has been authorized by the FDA under an Emergency Use Authorization (EUA) for use by authorized laboratories.    Fact sheet for Healthcare Providers:  http://www.janette.jami/  Fact sheet for Patients: http://www.janette.jami/       Methodology: Isothermal Nucleic Acid Amplification         URINE CULTURE HOLD SAMPLE [[id number]] Collected: 10/25/22 1654    Order Status: Completed Specimen: Urine from Serum Updated: 10/25/22 1702     Urine culture hold       Urine on hold in Microbiology dept for 2 days. If unpreserved urine is submitted, it cannot be used for addtional testing after 24 hours, recollection will be required. Imaging  Key imaging reviewed        CRITICAL CARE DOCUMENTATION  I had a face to face encounter with the patient, reviewed and interpreted patient data including clinical events, labs, images, vital signs, I/O's, and examined patient. I have discussed the case and the plan and management of the patient's care with the consulting services, the bedside nurses and the respiratory therapist.      NOTE OF PERSONAL INVOLVEMENT IN CARE   This patient has a high probability of imminent, clinically significant deterioration, which requires the highest level of preparedness to intervene urgently. I participated in the decision-making and personally managed or directed the management of the following life and organ supporting interventions that required my frequent assessment to treat or prevent imminent deterioration. I personally spent 60 minutes of critical care time. This is time spent at this critically ill patient's bedside actively involved in patient care as well as the coordination of care. This does not include any procedural time which has been billed separately.     [facility]  10/26/2022

## 2022-10-26 NOTE — PROGRESS NOTES
Problem: Mobility Impaired (Adult and Pediatric)  Goal: *Acute Goals and Plan of Care (Insert Text)  Description:   FUNCTIONAL STATUS PRIOR TO ADMISSION: Pt unable to provide prior level of function or home set up at time of this evaluation. Per chart review, pt was admitted to [facility] 9/27-10/20 after being found down and was discharged to [address] [place] [address] brain injury program and was admitted to [facility] on 10/25 for AMS. Per mother's report, pt was able to stand while at rehab with assistance and was getting into the wheelchair. Prior to initial admission, pt was independent, working as nurse. HOME SUPPORT PRIOR TO ADMISSION: Lived alone prior to admission. Pt has supportive son and mother    Physical Therapy Goals  Initiated 10/26/2022  1. Patient will move from supine to sit and sit to supine  and roll side to side in bed with maximal assistance within 7 day(s). 2.  Patient will transfer from bed to chair and chair to bed with maximal assistance using the least restrictive device within 7 day(s). 3.  Patient will perform sit to stand with maximal assistance within 7 day(s). 4.  Patient will ambulate with maximal assistance for 5 feet with the least restrictive device within 7 day(s). 5.  Patient will tolerate seated EOB x 10 minutes with mod A within 7 days. 6.  Patient will improve Landon Balance score by 7 points within 7 days.       Outcome: Not Progressing Towards Goal   PHYSICAL THERAPY EVALUATION- NEURO POPULATION  Patient: Tramaine Dougherty (25 y.o. female)  Date: 10/26/2022  Primary Diagnosis: AMS (altered mental status) [R41.82]  Diarrhea [R19.7]       Precautions:   Fall, Skin, Seizure      ASSESSMENT  Based on the objective data described below, the patient presents with no command following, no verbalizations, only grimacing and withdrawing to noxious stim to all 4 extremities, grossly decreased strength and ROM, increased tone vs resistance LLE, decreased functional mobility, decreased tolerance to activity s/p admission to [facility] from [facility] brain injury program on 10/25 for AMS. Pt opened eyes briefly during session, but not making direct eye contact with therapist. Pt with ? Extensor tone vs resistance LLE and unable to passively flex L knee. Unable to appreciate any clonus in either ankle. Pt required total A x 2 rolling L and R for pericare and linen changing. Pt returned to supine position and placed in upright positioning with lights on, blinds open to improve arousal and alertness. Pt will continue to benefit from PT to progress mobility as tolerated and reach highest of level of independence. Recommend pt return to rehab with a brain injury focus. .    Current Level of Function Impacting Discharge (mobility/balance): total A x 2    Functional Outcome Measure: The patient scored Total: 0/56 on the Landon Balance Assessment which is indicative of high fall risk. Other factors to consider for discharge: previously independent, lived alone     Patient will benefit from skilled therapy intervention to address the above noted impairments. PLAN :  Recommendations and Planned Interventions: bed mobility training, transfer training, gait training, therapeutic exercises, neuromuscular re-education, patient and family training/education, and therapeutic activities      Frequency/Duration: Patient will be followed by physical therapy:  5 times a week to address goals.     Recommendation for discharge: (in order for the patient to meet his/her long term goals)  Therapy 3 hours per day 5-7 days per week, brain injury program    This discharge recommendation:  Has been made in collaboration with the attending provider and/or case management    IF patient discharges home will need the following DME: to be determined (TBD)         SUBJECTIVE:   Patient made no verbalizations during session    OBJECTIVE DATA SUMMARY:   HISTORY:    Past Medical History:   Diagnosis Date    Allergic rhinitis 7/29/2019    Depression History of multiple allergies     History of vascular access device 10/07/2022    [facility] VAT: PICC placement R Cephalic length 40 cm for reliable access/TPN arm circ 35.5 cm    Muscle ache     Obesity 11/5/2012    Sinus pressure     Sinus problem      Past Surgical History:   Procedure Laterality Date    HX ACL RECONSTRUCTION      left     HX CHOLECYSTECTOMY  1998    IR INSERT NON TUNL CVC OVER 5 YRS  9/27/2022       Personal factors and/or comorbidities impacting plan of care:     Home Situation  Home Environment: Rehabilitation facility ([facility] inpatient rehab, Brain Injury program)  # Steps to Enter: 2  One/Two Story Residence: Two story (bedroom on the 2nd floor)  # of Interior Steps: 13  Living Alone: Yes  Support Systems: Child(wayne), Parent(s) (mother lives locally, 3 sons live out of the state)  Current DME Used/Available at Home: None  Tub or Shower Type: Tub/Shower combination    EXAMINATION/PRESENTATION/DECISION MAKING:   Critical Behavior:  Neurologic State: Alert  Orientation Level: Unable to verbalize  Cognition: No command following  Safety/Judgement: Decreased awareness of environment, Decreased awareness of need for assistance, Decreased awareness of need for safety, Lack of insight into deficits  Hearing: Auditory  Auditory Impairment:  (unknown)  Skin:    Edema:   Range Of Motion:  AROM: Grossly decreased, non-functional           PROM: Grossly decreased, non-functional (LLE increased extensor tone vs resistance, unable to passively flex L knee)           Strength:    Strength: Grossly decreased, non-functional                    Tone & Sensation:   Tone: Abnormal (? increased tone vs resistance LLE, LUE)              Sensation:  (unable to formally assess due to poor command following)               Coordination:  Coordination: Grossly decreased, non-functional  Vision:   Acuity:  (unable to assess)  Functional Mobility:  Bed Mobility:  Rolling:  Total assistance;Assist x2        Scooting: Total assistance;Assist x2  Transfers:                             Balance:   Sitting:  (unsafe to attempt)  Standing:  (unsafe to attempt)  Ambulation/Gait Training:            Therapeutic Exercises:   PROM to LEs, unable to flex L knee. Increased tone in bilaterally hip adductors when stretched     Functional Measure  Landon Balance Test:    Sitting to Standin  Standing Unsupported: 0  Sitting with Back Unsupported: 0  Standing to Sittin  Transfers: 0  Standing Unsupported with Eyes Closed: 0  Standing Unsupported with Feet Together: 0  Reach Forward with Outstretched Arm: 0   Object: 0  Turn to Look Over Shoulders: 0  Turn 360 Degrees: 0  Alternate Foot on Step/Stool: 0  Standing Unsupported One Foot in Front: 0  Stand on One Le  Total: 0/56         56=Maximum possible score;   0-20=High fall risk  21-40=Moderate fall risk   41-56=Low fall risk        Based on the above components, the patient evaluation is determined to be of the following complexity level: MEDIUM    Pain Rating:  Pt grimaced to any mobility    Activity Tolerance:   Good and Fair      After treatment patient left in no apparent distress:   Supine in bed, Call bell within reach, Bed / chair alarm activated, Caregiver / family present, and Side rails x 3    COMMUNICATION/EDUCATION:   The patients plan of care was discussed with: Occupational therapist and Registered nurse. Fall prevention education was provided and the patient/caregiver indicated understanding., Patient/family have participated as able in goal setting and plan of care. , and Patient/family agree to work toward stated goals and plan of care.     Thank you for this referral.  Saumya Hickman, PT, DPT   Time Calculation: 29 mins

## 2022-10-26 NOTE — H&P
[address] [facility] Adult  Hospitalist Group  History and Physical    Date of Service:  10/26/2022  Primary Care Provider: Gage Steele MD  Source of information: Chart review    Chief Complaint: Patient does not provide      History of Presenting Illness:   Funmi Carballo is a 59 y.o. female with past medical history of anoxic brain injury, anxiety, depression, hypertension, hyperlipidemia, hypothyroidism presented to the emergency department via EMS from [address] [place] [address] with reported altered mental status (AMS). Patient is aphasic/nonverbal and not answering any questions. Majority of history was obtained per review of chart records. Per reports patient recently was hospitalized at [facility] from 9/27/2022-10/20/2022 with diagnosis of status epilepticus, acute metabolic encephalopathy, delirium, psychosis, generalized anxiety disorder, debility, Takotsubo cardiomyopathy, NSTEMI, bradycardia, SIRS, tachycardia, fever, leukocytosis, and CVA. Patient had work-up including MRI and also EEG. There was concern the patient may have some anoxic brain injury. She has been followed by psychiatry and neurology services with persistent encephalopathy and intermittent severe aggression. Patient has had aphasia and altered mental status not following commands per staff report since last week. Symptoms remain constant, severe, without specific exacerbating relieving factors. About emergency department today, initial recorded vital signs temperature 98.8 °F, /65, heart rate 103, respirate 30, O2 saturation 93% on room air. Abnormal labs included WC 12.5, platelets 214, BUN 23. CT head without IV contrast showed no acute intracranial abnormalities. ED request admission to the hospitalist service. REVIEW OF SYSTEMS:  Unable to obtain review of system as patient has altered mental status.     Past Medical History:   Diagnosis Date    Allergic rhinitis 7/29/2019    Depression     History of multiple allergies     History of vascular access device 10/07/2022    [facility] VAT: PICC placement R Cephalic length 40 cm for reliable access/TPN arm circ 35.5 cm    Muscle ache     Obesity 11/5/2012    Sinus pressure     Sinus problem       Past Surgical History:   Procedure Laterality Date    HX ACL RECONSTRUCTION      left     HX CHOLECYSTECTOMY  1998    IR INSERT NON TUNL CVC OVER 5 YRS  9/27/2022     Medications:  Prior to Admission medications    Medication Sig Start Date End Date Taking? Authorizing Provider   aspirin 81 mg chewable tablet Take 1 Tablet by mouth daily for 30 days. 10/20/22 11/19/22  Shaka Martin MD   cloNIDine (CATAPRES) 0.2 mg/24 hr ptwk 1 Patch by TransDERmal route every seven (7) days for 30 days. 10/25/22 11/24/22  Shaka Martin MD   metoprolol tartrate (LOPRESSOR) 25 mg tablet Take 1 Tablet by mouth every twelve (12) hours for 30 days. 10/20/22 11/19/22  Shaka Martin MD   pantoprazole (PROTONIX) 40 mg tablet Take 1 Tablet by mouth Daily (before breakfast) for 30 days. 10/21/22 11/20/22  Shaka Martin MD   senna-docusate (PERICOLACE) 8.6-50 mg per tablet Take 1 Tablet by mouth two (2) times a day for 30 days. 10/20/22 11/19/22  Shaka Martin MD   levothyroxine (SYNTHROID) 88 mcg tablet Take 1 Tablet by mouth Daily (before breakfast). 10/18/22   Hannah Tavarez MD   atorvastatin (LIPITOR) 10 mg tablet Take 10 mg by mouth daily. Provider, Historical   levothyroxine (SYNTHROID) 88 mcg tablet Take 88 mcg by mouth Daily (before breakfast). Provider, Historical   furosemide (LASIX) 20 mg tablet Take 1 Tablet by mouth daily as needed (swelling).  8/26/22   Hannah Tavarez MD   atorvastatin (LIPITOR) 10 mg tablet TAKE ONE TABLET BY MOUTH ONCE NIGHTLY 8/24/22   Hannah Tavarez MD   traZODone (DESYREL) 50 mg tablet Take 2.5 Tablets by mouth nightly as needed for Sleep. 8/24/22   Hannah Tavarez MD   escitalopram oxalate (LEXAPRO) 20 mg tablet TAKE ONE TABLET BY MOUTH DAILY 6/9/22   Tammi Marc MD   OTHER,NON-FORMULARY, ESTROGEN(5050)/TESTOSTERONE (HRT) 1.5mg/10mg/ml Cream APPLY 1 ML TO SKIN (INNER WRIST/ABDOMEN/THIGHS EVERY DAY. ROTATE SITES 6/3/22   Jennifer Hagen MD   loratadine-pseudoephedrine (Claritin-D 12 Hour) 5-120 mg per tablet Claritin-D    Provider, Historical   tretinoin (RETIN-A) 0.05 % topical cream tretinoin 0.05 % topical cream    Provider, Historical   acetaminophen (TYLENOL) 325 mg tablet Take  by mouth every four (4) hours as needed. Provider, Historical   MULTIVITAMIN PO Take  by mouth. Provider, Historical     Allergies   Allergen Reactions    Droperidol Itching      Family history:  Family History   Problem Relation Age of Onset    Diabetes Paternal Grandfather       Social History:  reports that she does not have a smoking history on file. She has never used smokeless tobacco. She reports that she does not drink alcohol and does not use drugs. Objective:   Visit Vitals  BP (!) 162/82   Pulse (!) 126   Temp 99.4 °F (37.4 °C)   Resp 28   Ht 5' 6\" (1.676 m)   Wt 104.3 kg (230 lb)   SpO2 92%   BMI 37.12 kg/m²      O2 Device: None (Room air)    PHYSICAL EXAM:   General:  Patient in no acute respiratory distress. Head:  Normocephalic, without obvious abnormality, atraumatic   Eyes:  Conjunctivae/corneas clear. Pupils 2 mm reactive bilateral.   E/N/M/T: Nares normal. Septum midline.  No nasal drainage or sinus tenderness  Tongue midline/ non-edematous  Clear oropharynx   Neck: Normal appearance and movements, symmetrical, trachea midline  No palpable adenopathy  No thyroid enlargement, tenderness or nodules  No carotid bruit   No JVD  Trachea midline   Lungs:   Symmetrical chest expansion and respiratory effort  Clear to auscultation bilaterally   Chest wall:  No tenderness or deformity   Heart:  Regular rate and rhythm   Normal S1 and S2; no murmur, click, rub or gallop   Abdomen: Soft, no tenderness  No rebound, guarding, or rigidity  Non-distended   Bowel sounds normal  No masses or hepatosplenomegaly  No hernias present   Back: No costovertebral angle tenderness  No step-off deformity   Extremities: Flexion contracture of left upper extremity. No acute trauma status no palpable acute bony or soft tissue deformity. No cyanosis or edema     Vascular/  Pulses: 2+ radial/ 1+ DP bilateral pulses   Integument/  Skin: No rashes or ulcers  Warm and dry   Musculo-      skeletal: Gait not tested  No calf tenderness   Neuro: GCS 9 (E4 V1 M4). Moves all extremities x 4. Aphasic. No facial droop. Sensation grossly intact as withdraws to tactile stimuli. Psych: Awake, but nonverbal and not answering questions. Geniturinary: SpectraLinear external urinary collection device in place        Data Review: All diagnostic labs and studies have been reviewed. Abnormal Labs Reviewed   CBC WITH AUTOMATED DIFF - Abnormal; Notable for the following components:       Result Value    WBC 12.5 (*)     PLATELET 423 (*)     IMMATURE GRANULOCYTES 1 (*)     ABS. NEUTROPHILS 8.9 (*)     ABS. IMM.  GRANS. 0.1 (*)     All other components within normal limits   METABOLIC PANEL, COMPREHENSIVE - Abnormal; Notable for the following components:    Glucose 108 (*)     BUN 23 (*)     BUN/Creatinine ratio 29 (*)     Albumin 3.3 (*)     Globulin 4.5 (*)     A-G Ratio 0.7 (*)     All other components within normal limits   URINALYSIS W/MICROSCOPIC - Abnormal; Notable for the following components:    Specific gravity >1.030 (*)     Protein TRACE (*)     Ketone TRACE (*)     All other components within normal limits       All Micro Results       Procedure Component Value Units Date/Time    CULTURE, BLOOD, PAIRED [[id number]] Collected: 10/25/22 0423    Order Status: Completed Specimen: Blood Updated: 10/26/22 3331     Special Requests: NO SPECIAL REQUESTS        Culture result: NO GROWTH AFTER 11 HOURS       COVID-19 RAPID TEST [[id number]] Collected: 10/25/22 1958    Order Status: Completed Specimen: Nasopharyngeal Updated: 10/25/22 2024     Specimen source Nasopharyngeal        COVID-19 rapid test Not detected        Comment: Rapid Abbott ID Now       Rapid NAAT:  The specimen is NEGATIVE for SARS-CoV-2, the novel coronavirus associated with COVID-19. Negative results should be treated as presumptive and, if inconsistent with clinical signs and symptoms or necessary for patient management, should be tested with an alternative molecular assay. Negative results do not preclude SARS-CoV-2 infection and should not be used as the sole basis for patient management decisions. This test has been authorized by the FDA under an Emergency Use Authorization (EUA) for use by authorized laboratories. Fact sheet for Healthcare Providers:  http://www.janette.jami/  Fact sheet for Patients: http://www.janette.jami/       Methodology: Isothermal Nucleic Acid Amplification         URINE CULTURE HOLD SAMPLE [[id number]] Collected: 10/25/22 1654    Order Status: Completed Specimen: Urine from Serum Updated: 10/25/22 1702     Urine culture hold       Urine on hold in Microbiology dept for 2 days. If unpreserved urine is submitted, it cannot be used for addtional testing after 24 hours, recollection will be required. IMAGING:   CT HEAD WO CONT   Final Result      No acute intracranial abnormality on this noncontrast head CT. No change given   difference in technique. XR CHEST PORT   Final Result      No acute process on limited portable chest view. Improved lung aeration since   earlier this month.               ECG/ECHO:    Results for orders placed or performed during the hospital encounter of 10/25/22   EKG, 12 LEAD, INITIAL   Result Value Ref Range    Ventricular Rate 103 BPM    Atrial Rate 103 BPM    P-R Interval 138 ms    QRS Duration 72 ms    Q-T Interval 336 ms    QTC Calculation (Bezet) 440 ms    Calculated P Axis 24 degrees    Calculated R Axis 13 degrees    Calculated T Axis -22 degrees    Diagnosis       ** Poor data quality, interpretation may be adversely affected Sinus   tachycardia  Cannot rule out Anterior infarct , age undetermined  T wave abnormality, consider inferior ischemia  Abnormal ECG  When compared with ECG of 29-SEP-2022 03:44,  Vent. rate has increased BY  47 BPM  T wave inversion now evident in Inferior leads  T wave amplitude has decreased in Lateral leads  Confirmed by Sharron Martin M.D., [facility] ([id number]) on 10/25/2022 5:58:46 PM          Assessment / Plan:     Given the patient's current clinical presentation, there is a high level of concern for decompensation if discharged from the emergency department. Complex decision making was performed, which includes reviewing the patient's available past medical records, laboratory results, and imaging studies. Active Problems:    AMS (altered mental status)  -Acute metabolic encephalopathy, likely with some mild dehydration. Prior  past consideration for anoxic brain injury. no other etiology identified        -Place on neurochecks and fall precautions-consult neurologist         2. Aphasia       -Consult speech therapy       -N.p.o. until passes either nursing dysphagia screen or speech therapy evaluation       -Placed on IV fluids while n.p.o.    3.  Tachycardia       -Continue telemetry monitoring       -Order add-on test for high-sensitivity troponin with noted abnormal EKG    4.  Leukocytosis      -WBC  12.5; repeat WBC in a.m.    5.  Dehydration       -BUN 23. Order IV fluids for hydration       -Repeat renal panel    6. Generalized weakness        -Consult physical and Occupational Therapy          7. Essential hypertension        -Monitor blood pressure closely and provide antihypertensive medications as needed    8. Hyperlipidemia        -Check lipid panel; continue statin therapy    9.    Anxiety -Resume home medications once able to tolerate oral intake and pass dysphagia screens    DIET: DIET NPO   ISOLATION PRECAUTIONS: There are currently no Active Isolations  CODE STATUS: Full Code   DVT PROPHYLAXIS: SCDs  FUNCTIONAL STATUS PRIOR TO HOSPITALIZATION: Completely disabled. Cannot carry on any self-care. Totally confined to bed or chair. Ambulatory status/function: Ambulates with assistance:  Unable to ambulate   EARLY MOBILITY ASSESSMENT: Recommend an assessment from physical therapy and/or occupational therapy  ANTICIPATED DISCHARGE: Greater than 48 hours. ANTICIPATED DISPOSITION: Inpatient / SNF Hospice  EMERGENCY CONTACT: semaj Oh     CRITICAL CARE WAS PERFORMED FOR THIS ENCOUNTER: NO.      Signed By: Skye Colvin MD     October 26, 2022         Please note that this dictation may have been completed with Dragon, the computer voice recognition software. Quite often unanticipated grammatical, syntax, homophones, and other interpretive errors are inadvertently transcribed by the computer software. Please disregard these errors. Please excuse any errors that have escaped final proofreading.

## 2022-10-26 NOTE — PROGRESS NOTES
Problem: Self Care Deficits Care Plan (Adult)  Goal: *Acute Goals and Plan of Care (Insert Text)  Description: FUNCTIONAL STATUS PRIOR TO ADMISSION: Patient was independent and active without use of DME until Sept. 97, 2022 when pt hospitalized for status epilepticus and now with anoxic brain injury. Pt has been at [facility] since 10/20/22 when pt discharged from [address] [place] [address] [place]: The patient lived alone with limited local support. Occupational Therapy Goals  Initiated 10/26/2022  1. Patient will perform face washing with maximal assistance, hand over hand, within 7 day(s). 2.  Patient will follow 5% 1 step, simple commands with max cues within 7 day(s). 3.  Patient will perform toilet transfers to [facility] with total assistance within 7 day(s). 4.  Patient will participate in upper extremity therapeutic exercise/activities with maximal assistance for 10 minutes within 7 day(s). Outcome: Progressing Towards Goal     OCCUPATIONAL THERAPY EVALUATION  Patient: Sebas Muhammad (72 y.o. female)  Date: 10/26/2022  Primary Diagnosis: AMS (altered mental status) [R41.82]  Diarrhea [R19.7]       Precautions: Fall, Skin, Seizure    ASSESSMENT  Based on the objective data described below, the patient presents with eyes open, no command following, high tone, facial grimacing indicating pain with touch and mobility to RUE and BLEs and requiring total A for all ADLs and functional mobility following admission from [facility] for AMS. Pt hospitalized at [facility] from 9/27 - 10/20/22 found down by her neighbor and pt noted with status epilepticus. Pt now with anoxic brain injury and at [facility]. Per chart review pt with inconsistent command following, verbalizations and active participation. Based on above recommend return to [facility] brain injury rehab. Current Level of Function Impacting Discharge (ADLs/self-care): total A    Functional Outcome Measure: The patient scored Total: 0/100 on the Barthel Index outcome measure.       Other factors to consider for discharge: see above     Patient will benefit from skilled therapy intervention to address the above noted impairments. PLAN :  Recommendations and Planned Interventions: self care training, functional mobility training, therapeutic exercise, balance training, visual/perceptual training, therapeutic activities, cognitive retraining, endurance activities, neuromuscular re-education, patient education, home safety training, and family training/education    Frequency/Duration: Patient will be followed by occupational therapy 5 times a week to address goals.     Recommendation for discharge: (in order for the patient to meet his/her long term goals)  Therapy 3 hours per day 5-7 days per week    This discharge recommendation:  Has been made in collaboration with the attending provider and/or case management    IF patient discharges home will need the following DME: TBD       SUBJECTIVE:   Patient with no verbalizations    OBJECTIVE DATA SUMMARY:   HISTORY:   Past Medical History:   Diagnosis Date    Allergic rhinitis 7/29/2019    Depression     History of multiple allergies     History of vascular access device 10/07/2022    [facility] VAT: PICC placement R Cephalic length 40 cm for reliable access/TPN arm circ 35.5 cm    Muscle ache     Obesity 11/5/2012    Sinus pressure     Sinus problem      Past Surgical History:   Procedure Laterality Date    HX ACL RECONSTRUCTION      left     HX CHOLECYSTECTOMY  1998    IR INSERT NON TUNL CVC OVER 5 YRS  9/27/2022       Expanded or extensive additional review of patient history:     Home Situation  Home Environment: Rehabilitation facility ([facility] inpatient rehab, Brain Injury program)  # Steps to Enter: 2  One/Two Story Residence: Two story (bedroom on the 2nd floor)  # of Interior Steps: 13  Living Alone: Yes  Support Systems: Child(wayne), Parent(s) (mother lives locally, 3 sons live out of the state)  Patient Expects to be Discharged to[de-identified] Rehab hospital/unit acute  Current DME Used/Available at Home: None  Tub or Shower Type: Tub/Shower combination    Hand dominance: Right    EXAMINATION OF PERFORMANCE DEFICITS:  Cognitive/Behavioral Status:  Neurologic State: Unresponsive;Eyes open spontaneously  Orientation Level: Unable to verbalize  Cognition: No command following  Perception: Tactile;Verbal;Visual  Perseveration: No perseveration noted  Safety/Judgement: Decreased awareness of environment;Decreased awareness of need for assistance;Decreased awareness of need for safety; Lack of insight into deficits    Hearing: Auditory  Auditory Impairment:  (unknown)    Vision/Perceptual:    Acuity:  (unable to assess)         Range of Motion:  AROM: Grossly decreased, non-functional  PROM: Grossly decreased, non-functional (LLE increased extensor tone vs resistance, unable to passively flex L knee)                      Strength:  Strength: Grossly decreased, non-functional                Coordination:  Coordination: Grossly decreased, non-functional  Fine Motor Skills-Upper: Left Impaired;Right Impaired    Gross Motor Skills-Upper: Left Impaired;Right Impaired    Tone & Sensation:  Tone: Abnormal (? increased tone vs resistance LLE, LUE)  Sensation:  (unable to formally assess due to poor command following)                      Balance:  Sitting:  (unsafe to attempt)  Standing:  (unsafe to attempt)    Functional Mobility and Transfers for ADLs:  Bed Mobility:  Rolling: Total assistance;Assist x2  Scooting: Total assistance;Assist x2    Transfers: Toilet Transfer : Total assistance; Additional time;Assist x2 (bedpan)    ADL Assessment:  Feeding: Total assistance    Oral Facial Hygiene/Grooming: Total assistance    Bathing: Total assistance    Type of Bath: Partial;Bath Pack    Upper Body Dressing: Total assistance    Lower Body Dressing: Total assistance    Toileting:  Total assistance                ADL Intervention and task modifications:  Pt soiled with urine and bowel incontinence requiring partial bath and bed linen change with total A. Type of Bath: Partial;Bath Pack    Toileting  Toileting Assistance: Total assistance(dependent)  Bladder Hygiene: Total assistance (dependent)  Bowel Hygiene: Total assistance (dependent)  Clothing Management: Total assistance (dependent)    Cognitive Retraining  Safety/Judgement: Decreased awareness of environment;Decreased awareness of need for assistance;Decreased awareness of need for safety; Lack of insight into deficits      Functional Measure:    Barthel Index:  Bathin  Bladder: 0  Bowels: 0  Groomin  Dressin  Feedin  Mobility: 0  Stairs: 0  Toilet Use: 0  Transfer (Bed to Chair and Back): 0  Total: 0/100      The Barthel ADL Index: Guidelines  1. The index should be used as a record of what a patient does, not as a record of what a patient could do. 2. The main aim is to establish degree of independence from any help, physical or verbal, however minor and for whatever reason. 3. The need for supervision renders the patient not independent. 4. A patient's performance should be established using the best available evidence. Asking the patient, friends/relatives and nurses are the usual sources, but direct observation and common sense are also important. However direct testing is not needed. 5. Usually the patient's performance over the preceding 24-48 hours is important, but occasionally longer periods will be relevant. 6. Middle categories imply that the patient supplies over 50 per cent of the effort. 7. Use of aids to be independent is allowed. Score Interpretation (from [address] [address])    Independent   60-79 Minimally independent   40-59 Partially dependent   20-39 Very dependent   <20 Totally dependent     -Simona Laurent, Barthel, D.W. (1965). Functional evaluation: the Barthel Index. [facility] ([address] [facility] [address] (1997).  The Barthel activities of daily living index: self-reporting versus actual performance in the old (> or = 75 years). Journal of [facility]4 [facility] 45(2), 14 [facility], .CARISAF, Shira Candelaria, Lis Campbell (1999). Measuring the change in disability after inpatient rehabilitation; comparison of the responsiveness of the Barthel Index and Functional Potter Measure. Journal of Neurology, Neurosurgery, and Psychiatry, 66(4), 999-490. VIANEY Sorto, VALENCIA Moss, & Luis Miguel Peter M.A. (2004) Assessment of post-stroke quality of life in cost-effectiveness studies: The usefulness of the Barthel Index and the EuroQoL-5D. Quality of Life Research, 15, 396-30     Occupational Therapy Evaluation Charge Determination   History Examination Decision-Making   LOW Complexity : Brief history review  HIGH Complexity : 5 or more performance deficits relating to physical, cognitive , or psychosocial skils that result in activity limitations and / or participation restrictions HIGH Complexity : Patient presents with comorbidities that affect occupational performance. Signifigant modification of tasks or assistance (eg, physical or verbal) with assessment (s) is necessary to enable patient to complete evaluation       Based on the above components, the patient evaluation is determined to be of the following complexity level: LOW   Pain Rating:  facial grimacing indicating pain with touch and mobility to RUE and BLEs    Activity Tolerance:   Poor    After treatment patient left in no apparent distress:    Supine in bed, Call bell within reach, Bed / chair alarm activated, and Caregiver / family present    COMMUNICATION/EDUCATION:   The patients plan of care was discussed with: Physical therapist, Registered nurse, and Case management. Patient is unable to participate in goal setting and plan of care. This patients plan of care is appropriate for delegation to [facility].     Thank you for this referral.  Jeni Cardenas OT  Time Calculation: 30 mins

## 2022-10-26 NOTE — PROGRESS NOTES
Problem: Dysphagia (Adult)  Goal: *Acute Goals and Plan of Care (Insert Text)  Description: Speech Therapy Goals  Initiated 10/26/22    1. Patient will tolerate pureed diet with thin liquids with no adverse effects within 7 days. Outcome: Progressing Towards Goal     SPEECH LANGUAGE PATHOLOGY BEDSIDE SWALLOW EVALUATION  Patient: Carlos Mcgraw (65 y.o. female)  Date: 10/26/2022  Primary Diagnosis: AMS (altered mental status) [R41.82]  Diarrhea [R19.7]       Precautions:  Fall, Skin, Seizure    ASSESSMENT :  Based on the objective data described below, the patient presents with moderate oral dysphagia and presumed functional pharyngeal stage of the swallow. Per pt's chart, pt with previous FEES at [address] [place] [address] that recommended regular diet and thin liquids. Pt's mother stated a decrease in PO intake occurred at [address] [place] [address] where she reports pt was being fed via syringe. Pt's mother reports ROGER began discussions about alternate means of nutrition. Pt with intermittent poor bolus acceptance of thin liquids and poor awareness of bolus, repeatedly biting the straw. Once straw was repositioned in the pt's mouth between the lips in front of the teeth, pt with good bolus acceptance of thin liquids with no overt difficulty or s/s of aspiration appreciated at bedside. Pt with poor bolus acceptance and recognition of puree via spoon. Pt did not open mouth for spoon and sucked small amounts of puree off the spoon. Despite limited PO trials of puree, no overt difficulty or s/s aspiration noted. Based on these findings, recommend pureed diet with thin liquids when pt is accepting. Given better acceptance of thin liquids, could consider liquid nutrition supplements (pt likes chocolate Ensures). Suspect bolus recognition and acceptance is patient's largest barrier to PO at this time. May consider alternate means of nutrition/hydration/medication if patient is unable to maintain adequate oral intake.  SLP will continue to follow for diet tolerance. Patient will benefit from skilled intervention to address the above impairments. Patients rehabilitation potential is considered to be Fair     PLAN :  Recommendations and Planned Interventions:  --Pureed diet with thin liquids  --General aspiration precautions  --Good oral care    Frequency/Duration: Patient will be followed by speech-language pathology 3 times a week to address goals. Discharge Recommendations: To Be Determined     SUBJECTIVE:   Patient did not verbalize during session.     OBJECTIVE:     Past Medical History:   Diagnosis Date    Allergic rhinitis 7/29/2019    Depression     History of multiple allergies     History of vascular access device 10/07/2022    [facility] VAT: PICC placement R Cephalic length 40 cm for reliable access/TPN arm circ 35.5 cm    Muscle ache     Obesity 11/5/2012    Sinus pressure     Sinus problem      Past Surgical History:   Procedure Laterality Date    HX ACL RECONSTRUCTION      left     HX CHOLECYSTECTOMY  1998    IR INSERT NON TUNL CVC OVER 5 YRS  9/27/2022     Prior Level of Function/Home Situation:   Home Situation  Home Environment: Rehabilitation facility ([facility], Brain Injury program)  # Steps to Enter: 2  One/Two Story Residence: Two story (bedroom on the 2nd floor)  # of Interior Steps: 13  Living Alone: Yes  Support Systems: Child(wayne), Parent(s) (mother lives locally, 3 sons live out of the state)  Patient Expects to be Discharged to[de-identified] Rehab hospital/unit acute  Current DME Used/Available at Home: None  Tub or Shower Type: Tub/Shower combination  Diet prior to admission: Presumed regular diet, thin liquids  Current Diet: NPO   Cognitive and Communication Status:  Neurologic State: Unresponsive, Eyes open spontaneously  Orientation Level: Unable to verbalize  Cognition: No command following  Perception: Tactile, Verbal, Visual  Perseveration: No perseveration noted  Safety/Judgement: Decreased awareness of environment, Decreased awareness of need for assistance, Decreased awareness of need for safety, Lack of insight into deficits  Oral Assessment:  Oral Assessment  Labial: No impairment  Dentition: Natural  Oral Hygiene: oral mucosa moist  Lingual: Other (comment) (unable to assess)  Velum: Unable to visualize  Mandible: Other (comment) (unable to assess, limited opening for PO trials)  P.O. Trials:  Patient Position: upright in bed  Vocal quality prior to P.O.: Other (comment) (patient did not vocalize or verbalize)  Consistency Presented: Thin liquid;Puree  How Presented: SLP-fed/presented;Spoon;Straw     Bolus Acceptance: Impaired  Bolus Formation/Control: No impairment     Propulsion:  (no impairment with thins, unclear with puree)  Oral Residue: None  Initiation of Swallow: No impairment  Laryngeal Elevation: Functional  Aspiration Signs/Symptoms: None  Pharyngeal Phase Characteristics: Other (comment) (unclear due to limited PO trials)  Effective Modifications: Straw     NOMS:   The NOMS functional outcome measure was used to quantify this patient's level of swallowing impairment. Based on the NOMS, the patient was determined to be at level 4 for swallow function     NOMS Swallowing Levels:  Level 1 (CN): NPO  Level 2 (CM): NPO but takes consistency in therapy  Level 3 (CL): Takes less than 50% of nutrition p.o. and continues with nonoral feedings; and/or safe with mod cues; and/or max diet restriction  Level 4 (CK): Safe swallow but needs mod cues; and/or mod diet restriction; and/or still requires some nonoral feeding/supplements  Level 5 (CJ): Safe swallow with min diet restriction; and/or needs min cues  Level 6 (CI): Independent with p.o.; rare cues; usually self cues; may need to avoid some foods or needs extra time  Level 7 ([address] [place] [address]): Independent for all p.o.  LIZETH. (2003). National Outcomes Measurement System (NOMS): Adult Speech-Language Pathology User's Guide.      Pain:  Pain Scale 1: Adult Nonverbal Pain Scale        After treatment:   Patient left in no apparent distress in bed, Call bell within reach, Nursing notified, Caregiver / family present, and Bed / chair alarm activated    COMMUNICATION/EDUCATION:   Patient's mother was educated regarding her daughter's risk of aspiration as this relates to her diagnosis of AMS. She demonstrated Good understanding as evidenced by teach-back. The patient's plan of care including recommendations, planned interventions, and recommended diet changes were discussed with: Registered nurse. Patient/family have participated as able in goal setting and plan of care. Patient/family agree to work toward stated goals and plan of care. Thank you for this referral.  Elver Olmedo  Time Calculation: 18 mins    Was present with student for the entirety of session in a supervisory capacity. Agree with assessment and recommendations as written above.     Aaron Keller MS, [address] [facility]  Speech-Language Pathologist

## 2022-10-26 NOTE — PROGRESS NOTES
NOTE:    Admission order was delayed as patient had a lock on her medical chart and I had to obtain computer authorization to access her records to place orders.

## 2022-10-26 NOTE — PROGRESS NOTES
Transition of Care Plan: TBD-IPR (ROGER-referral pending) vs SNF (will provide SNF list)  *Therapy recommending IPR and mother would like her to return to [facility]. RUR: n/a    PCP F/U: Gage Steele MD    Disposition: TBD-IPR vs SNF    Transportation: [facility]    Main Contact: Rosa Maria Arredondo @ [phone]  CHRISSIE-CUZW SAMIR @ [phone]  Edward Murdock @ [phone]  Mother: Mia Montenegro    [phone]: Spoke to patient's mother, Randy Bhatti via telephone call. Introduced self and role of CM. Confirmed demographics. States patient was independent and lived alone in her [place] home prior to previous hospitalization. Patient was hospitalized at [facility] from 09/27-10/20. Discharged to [facility]. ROGER sent patient back to the hospital via EMS for AMS. Staff reports since Friday patient has been aphasic and not following commands. Therapy recommending IPR and patient's mother would like to have patient return back to [facility]. States that they are holding her bed. However, ROGER liaison states that they are not and she will need auth. Referral sent to [facility], but anticipate patient will likely need SNF. Spoke to PA who also anticipates likely SNF given patient's current function. Will provide SNF options. Eric Ambriz RN, CRM    Residency:  [facility]  Lives With: alone  Prior functioning:  independent before previous hospitalization  Prior DME used: none  Rehab history: none  Pharmacy: [facility]     Reason for Readmission:   diarrhea, AMS           RUR Score/Risk Level:  N/A       PCP: First and Last name:  Gage Steele MD   Name of Practice:    Are you a current patient: Yes/No: yes   Approximate date of last visit: two months ago   Can you participate in a virtual visit with your PCP:     Is a Care Conference indicated:  No    Did you attend your follow up appointment (s):   If not, why not:   Patient discharged to [facility]       Resources/supports as identified by patient/family: family support         Top Challenges facing patient (as identified by patient/family and CM):  IPR vs SNF. Andra Brand Patient is a total A x2       Finances/Medication cost? No issues obtaining medication reported      Transportation  BLS      Support system or lack thereof?  mother     Living arrangements? Was living in own home prior to hospitalization         Self-care/ADLs/Cognition? Was independent prior to previous hospitalization            Current Advanced Directive/Advance Care Plan: Full Code            Plan for utilizing home health:  therapy recommending IPR              Transition of Care Plan:    Based on readmission, the patient's previous Plan of Care   has been evaluated and/or modified. The current Transition of Care Plan is:  TBD-IPR (ROGER-referral pending) vs SNF (will provide SNF list)       Care Management Interventions  PCP Verified by CM: Yes  Mode of Transport at Discharge:  Other (see comment) (BLS)  Physical Therapy Consult: Yes  Occupational Therapy Consult: Yes  Speech Therapy Consult: Yes  Support Systems: Parent(s), Child(wayne)  Confirm Follow Up Transport: Other (see comment) (BLS)  Discharge Location  Patient Expects to be Discharged to[de-identified] Rehab hospital/unit acute  Readmission Assessment  Number of days since last admission?: 8-30 days  Previous disposition: Acute Rehab  Who is being interviewed?: Caregiver  What was the patient's/caregiver's perception as to why they think they needed to return back to the hospital?: Other (Comment) (IPR sent back to the hospital)  Did you visit your Primary Care Physician after you left the hospital, before you returned this time?: No  Why weren't you able to visit your PCP?: Other (Comment) (d/c to IPR)  Did you see a specialist, such as Cardiac, Pulmonary, Orthopedic Physician, etc. after you left the hospital?: No  Who advised the patient to return to the hospital?: Acute Rehab  Does the patient report anything that got in the way of taking their medications?: No  In our efforts to provide the best possible care to you and others like you, can you think of anything that we could have done to help you after you left the hospital the first time, so that you might not have needed to return so soon?: Other (Comment) (IPR sent to hospital)      Transition of Care Plan:     The Plan for Transition of Care is related to the following treatment goals: IPR-ROGER    The Patient and/or patient representative-mother was provided with a choice of provider and agrees  with the discharge plan. Yes [x] No []    A Freedom of choice list was provided with basic dialogue that supports the patient's individualized plan of care/goals and shares the quality data associated with the providers.        Yes [x] No []

## 2022-10-26 NOTE — ROUTINE PROCESS
TRANSFER - OUT REPORT:    Verbal report given to David Curran on Caitlin Roman  being transferred to [facility] for routine progression of care       Report consisted of patients Situation, Background, Assessment and   Recommendations(SBAR). Information from the following report(s) SBAR, Kardex, ED Summary, MAR, Recent Results, and Med Rec Status was reviewed with the receiving nurse. Lines:   Peripheral IV 10/25/22 (Active)   Site Assessment Clean, dry, & intact 10/25/22 1631   Phlebitis Assessment 0 10/25/22 1631   Infiltration Assessment 0 10/25/22 1631   Dressing Status Clean, dry, & intact 10/25/22 1631   Hub Color/Line Status Pink 10/25/22 1631   Action Taken Blood drawn 10/25/22 1631       Peripheral IV 10/25/22 Left Hand (Active)   Site Assessment Clean, dry, & intact 10/25/22 1635   Phlebitis Assessment 0 10/25/22 1635   Infiltration Assessment 0 10/25/22 1635   Dressing Status Clean, dry, & intact 10/25/22 1635   Hub Color/Line Status Blue 10/25/22 1635        Opportunity for questions and clarification was provided.       Patient transported with:   Monitor  Registered Nurse

## 2022-10-26 NOTE — PROGRESS NOTES
Problem: Pressure Injury - Risk of  Goal: *Prevention of pressure injury  Description: Document Jose Enrique Scale and appropriate interventions in the flowsheet. Outcome: Progressing Towards Goal  Note: Pressure Injury Interventions:  Sensory Interventions: Assess changes in LOC, Keep linens dry and wrinkle-free, Maintain/enhance activity level, Minimize linen layers, Monitor skin under medical devices, Pressure redistribution bed/mattress (bed type), Turn and reposition approx. every two hours (pillows and wedges if needed)    Moisture Interventions: Absorbent underpads, Internal/External urinary devices, Minimize layers    Activity Interventions: PT/OT evaluation    Mobility Interventions: Float heels, Pressure redistribution bed/mattress (bed type), PT/OT evaluation, Turn and reposition approx. every two hours(pillow and wedges)    Nutrition Interventions: Document food/fluid/supplement intake    Friction and Shear Interventions: Apply protective barrier, creams and emollients, Minimize layers                Problem: Patient Education: Go to Patient Education Activity  Goal: Patient/Family Education  Outcome: Progressing Towards Goal     Problem: Patient Education: Go to Patient Education Activity  Goal: Patient/Family Education  Outcome: Progressing Towards Goal     Problem: Patient Education: Go to Patient Education Activity  Goal: Patient/Family Education  Outcome: Progressing Towards Goal     Problem: Patient Education: Go to Patient Education Activity  Goal: Patient/Family Education  Outcome: Progressing Towards Goal     Problem: Falls - Risk of  Goal: *Absence of Falls  Description: Document Chyna Fall Risk and appropriate interventions in the flowsheet.   Outcome: Progressing Towards Goal  Note: Fall Risk Interventions:       Mentation Interventions: Bed/chair exit alarm, Door open when patient unattended         Elimination Interventions: Call light in reach, Toileting schedule/hourly rounds    History of Falls Interventions: Door open when patient unattended, Investigate reason for fall, Room close to nurse's station         Problem: Patient Education: Go to Patient Education Activity  Goal: Patient/Family Education  Outcome: Progressing Towards Goal     Problem: Discharge Planning  Goal: *Discharge to safe environment  Outcome: Progressing Towards Goal  Goal: *Knowledge of medication management  Outcome: Progressing Towards Goal  Goal: *Knowledge of discharge instructions  Outcome: Progressing Towards Goal     Problem: Patient Education: Go to Patient Education Activity  Goal: Patient/Family Education  Outcome: Progressing Towards Goal

## 2022-10-27 ENCOUNTER — APPOINTMENT (OUTPATIENT)
Dept: GENERAL RADIOLOGY | Age: 57
DRG: 070 | End: 2022-10-27
Attending: NURSE PRACTITIONER
Payer: COMMERCIAL

## 2022-10-27 PROBLEM — R41.82 ALTERED MENTAL STATUS: Status: ACTIVE | Noted: 2022-10-27

## 2022-10-27 LAB
ALBUMIN SERPL-MCNC: 2.6 G/DL (ref 3.5–5)
ALBUMIN/GLOB SERPL: 0.7 {RATIO} (ref 1.1–2.2)
ALP SERPL-CCNC: 50 U/L (ref 45–117)
ALT SERPL-CCNC: 27 U/L (ref 12–78)
ANION GAP SERPL CALC-SCNC: 5 MMOL/L (ref 5–15)
AST SERPL-CCNC: 26 U/L (ref 15–37)
BASOPHILS # BLD: 0.1 K/UL (ref 0–0.1)
BASOPHILS NFR BLD: 1 % (ref 0–1)
BILIRUB SERPL-MCNC: 0.6 MG/DL (ref 0.2–1)
BUN SERPL-MCNC: 12 MG/DL (ref 6–20)
BUN/CREAT SERPL: 24 (ref 12–20)
CALCIUM SERPL-MCNC: 8.6 MG/DL (ref 8.5–10.1)
CHLORIDE SERPL-SCNC: 107 MMOL/L (ref 97–108)
CK SERPL-CCNC: 74 U/L (ref 26–192)
CO2 SERPL-SCNC: 26 MMOL/L (ref 21–32)
CREAT SERPL-MCNC: 0.5 MG/DL (ref 0.55–1.02)
DIFFERENTIAL METHOD BLD: ABNORMAL
EOSINOPHIL # BLD: 0.2 K/UL (ref 0–0.4)
EOSINOPHIL NFR BLD: 2 % (ref 0–7)
ERYTHROCYTE [DISTWIDTH] IN BLOOD BY AUTOMATED COUNT: 13.7 % (ref 11.5–14.5)
GLOBULIN SER CALC-MCNC: 3.6 G/DL (ref 2–4)
GLUCOSE SERPL-MCNC: 104 MG/DL (ref 65–100)
HCT VFR BLD AUTO: 34.2 % (ref 35–47)
HGB BLD-MCNC: 11.1 G/DL (ref 11.5–16)
IMM GRANULOCYTES # BLD AUTO: 0 K/UL (ref 0–0.04)
IMM GRANULOCYTES NFR BLD AUTO: 0 % (ref 0–0.5)
LACTATE SERPL-SCNC: 1.3 MMOL/L (ref 0.4–2)
LYMPHOCYTES # BLD: 2.2 K/UL (ref 0.8–3.5)
LYMPHOCYTES NFR BLD: 22 % (ref 12–49)
MAGNESIUM SERPL-MCNC: 2 MG/DL (ref 1.6–2.4)
MCH RBC QN AUTO: 29.9 PG (ref 26–34)
MCHC RBC AUTO-ENTMCNC: 32.5 G/DL (ref 30–36.5)
MCV RBC AUTO: 92.2 FL (ref 80–99)
MONOCYTES # BLD: 0.9 K/UL (ref 0–1)
MONOCYTES NFR BLD: 9 % (ref 5–13)
NEUTS SEG # BLD: 6.6 K/UL (ref 1.8–8)
NEUTS SEG NFR BLD: 66 % (ref 32–75)
NRBC # BLD: 0 K/UL (ref 0–0.01)
NRBC BLD-RTO: 0 PER 100 WBC
PHOSPHATE SERPL-MCNC: 3 MG/DL (ref 2.6–4.7)
PLATELET # BLD AUTO: 341 K/UL (ref 150–400)
PMV BLD AUTO: 11.4 FL (ref 8.9–12.9)
POTASSIUM SERPL-SCNC: 3.5 MMOL/L (ref 3.5–5.1)
PROT SERPL-MCNC: 6.2 G/DL (ref 6.4–8.2)
RBC # BLD AUTO: 3.71 M/UL (ref 3.8–5.2)
SODIUM SERPL-SCNC: 138 MMOL/L (ref 136–145)
WBC # BLD AUTO: 9.9 K/UL (ref 3.6–11)

## 2022-10-27 PROCEDURE — 96374 THER/PROPH/DIAG INJ IV PUSH: CPT

## 2022-10-27 PROCEDURE — 83735 ASSAY OF MAGNESIUM: CPT

## 2022-10-27 PROCEDURE — 74011250636 HC RX REV CODE- 250/636: Performed by: PHYSICIAN ASSISTANT

## 2022-10-27 PROCEDURE — 74011250637 HC RX REV CODE- 250/637: Performed by: PHYSICIAN ASSISTANT

## 2022-10-27 PROCEDURE — 95816 EEG AWAKE AND DROWSY: CPT | Performed by: NURSE PRACTITIONER

## 2022-10-27 PROCEDURE — 84100 ASSAY OF PHOSPHORUS: CPT

## 2022-10-27 PROCEDURE — 80053 COMPREHEN METABOLIC PANEL: CPT

## 2022-10-27 PROCEDURE — 74011250637 HC RX REV CODE- 250/637: Performed by: NURSE PRACTITIONER

## 2022-10-27 PROCEDURE — 36415 COLL VENOUS BLD VENIPUNCTURE: CPT

## 2022-10-27 PROCEDURE — 85025 COMPLETE CBC W/AUTO DIFF WBC: CPT

## 2022-10-27 PROCEDURE — 99233 SBSQ HOSP IP/OBS HIGH 50: CPT | Performed by: PSYCHIATRY & NEUROLOGY

## 2022-10-27 PROCEDURE — G0378 HOSPITAL OBSERVATION PER HR: HCPCS

## 2022-10-27 PROCEDURE — 74011250636 HC RX REV CODE- 250/636: Performed by: NURSE PRACTITIONER

## 2022-10-27 PROCEDURE — 74018 RADEX ABDOMEN 1 VIEW: CPT

## 2022-10-27 PROCEDURE — 77010033678 HC OXYGEN DAILY

## 2022-10-27 PROCEDURE — 65610000006 HC RM INTENSIVE CARE

## 2022-10-27 PROCEDURE — 82550 ASSAY OF CK (CPK): CPT

## 2022-10-27 PROCEDURE — APPNB45 APP NON BILLABLE 31-45 MINUTES: Performed by: NURSE PRACTITIONER

## 2022-10-27 PROCEDURE — 96376 TX/PRO/DX INJ SAME DRUG ADON: CPT

## 2022-10-27 PROCEDURE — 95816 EEG AWAKE AND DROWSY: CPT | Performed by: PSYCHIATRY & NEUROLOGY

## 2022-10-27 PROCEDURE — 74011000250 HC RX REV CODE- 250: Performed by: FAMILY MEDICINE

## 2022-10-27 PROCEDURE — 83605 ASSAY OF LACTIC ACID: CPT

## 2022-10-27 RX ORDER — DIAZEPAM 10 MG/2ML
2.5 INJECTION INTRAMUSCULAR EVERY 4 HOURS
Status: DISCONTINUED | OUTPATIENT
Start: 2022-10-27 | End: 2022-10-31

## 2022-10-27 RX ORDER — THERA TABS 400 MCG
1 TAB ORAL DAILY
Status: DISCONTINUED | OUTPATIENT
Start: 2022-10-27 | End: 2022-11-22 | Stop reason: HOSPADM

## 2022-10-27 RX ORDER — HEPARIN SODIUM 5000 [USP'U]/ML
5000 INJECTION, SOLUTION INTRAVENOUS; SUBCUTANEOUS EVERY 8 HOURS
Status: DISCONTINUED | OUTPATIENT
Start: 2022-10-27 | End: 2022-11-01

## 2022-10-27 RX ORDER — POLYETHYLENE GLYCOL 3350 17 G/17G
17 POWDER, FOR SOLUTION ORAL DAILY PRN
Status: DISCONTINUED | OUTPATIENT
Start: 2022-10-27 | End: 2022-11-22 | Stop reason: HOSPADM

## 2022-10-27 RX ORDER — LANOLIN ALCOHOL/MO/W.PET/CERES
100 CREAM (GRAM) TOPICAL DAILY
Status: DISCONTINUED | OUTPATIENT
Start: 2022-10-27 | End: 2022-11-22 | Stop reason: HOSPADM

## 2022-10-27 RX ADMIN — ACETAMINOPHEN 650 MG: 650 SOLUTION ORAL at 16:51

## 2022-10-27 RX ADMIN — SODIUM CHLORIDE, PRESERVATIVE FREE 10 ML: 5 INJECTION INTRAVENOUS at 14:00

## 2022-10-27 RX ADMIN — METOPROLOL 25 MG: 25 TABLET ORAL at 02:21

## 2022-10-27 RX ADMIN — SODIUM CHLORIDE, PRESERVATIVE FREE 10 ML: 5 INJECTION INTRAVENOUS at 02:21

## 2022-10-27 RX ADMIN — BENZTROPINE MESYLATE 2 MG: 1 INJECTION INTRAMUSCULAR; INTRAVENOUS at 09:00

## 2022-10-27 RX ADMIN — ATORVASTATIN CALCIUM 10 MG: 40 TABLET, FILM COATED ORAL at 02:21

## 2022-10-27 RX ADMIN — HEPARIN SODIUM 5000 UNITS: 5000 INJECTION INTRAVENOUS; SUBCUTANEOUS at 12:00

## 2022-10-27 RX ADMIN — THERA TABS 1 TABLET: TAB at 16:51

## 2022-10-27 RX ADMIN — DIAZEPAM 2.5 MG: 5 INJECTION, SOLUTION INTRAMUSCULAR; INTRAVENOUS at 22:43

## 2022-10-27 RX ADMIN — METOPROLOL 25 MG: 25 TABLET ORAL at 21:27

## 2022-10-27 RX ADMIN — LEVOTHYROXINE SODIUM 88 MCG: 0.09 TABLET ORAL at 06:32

## 2022-10-27 RX ADMIN — SODIUM CHLORIDE, PRESERVATIVE FREE 10 ML: 5 INJECTION INTRAVENOUS at 06:32

## 2022-10-27 RX ADMIN — SODIUM CHLORIDE, PRESERVATIVE FREE 40 ML: 5 INJECTION INTRAVENOUS at 21:27

## 2022-10-27 RX ADMIN — BENZTROPINE MESYLATE 2 MG: 1 INJECTION INTRAMUSCULAR; INTRAVENOUS at 02:08

## 2022-10-27 RX ADMIN — SODIUM CHLORIDE, POTASSIUM CHLORIDE, SODIUM LACTATE AND CALCIUM CHLORIDE 75 ML/HR: 600; 310; 30; 20 INJECTION, SOLUTION INTRAVENOUS at 10:00

## 2022-10-27 RX ADMIN — ATORVASTATIN CALCIUM 10 MG: 40 TABLET, FILM COATED ORAL at 21:27

## 2022-10-27 RX ADMIN — DIAZEPAM 2.5 MG: 5 INJECTION, SOLUTION INTRAMUSCULAR; INTRAVENOUS at 20:11

## 2022-10-27 RX ADMIN — METOPROLOL 25 MG: 25 TABLET ORAL at 09:00

## 2022-10-27 RX ADMIN — HEPARIN SODIUM 5000 UNITS: 5000 INJECTION INTRAVENOUS; SUBCUTANEOUS at 20:11

## 2022-10-27 RX ADMIN — PANTOPRAZOLE SODIUM 40 MG: 40 TABLET, DELAYED RELEASE ORAL at 06:32

## 2022-10-27 RX ADMIN — Medication 100 MG: at 16:51

## 2022-10-27 NOTE — PROGRESS NOTES
Physical therapy:    Chart reviewed and spoke with RN. Pt being set up for continuous EEG x 24 hours. Will defer and continue to follow. Thank you.     Rafi [facility], PT, DPT

## 2022-10-27 NOTE — PROGRESS NOTES
Comprehensive Nutrition Assessment    Type and Reason for Visit: Initial, Consult    Nutrition Recommendations/Plan:     -Start trophic tube feeding and slowly advance to goal: Osmolite 1.2 @ 70 ml/hr x 21 hr with 100 ml water flush q 4 hr.     -Due to refeeding risk supplement with 100 mg B1 x 7 days and a MVI daily       Monitor lytes daily and replace PRN         Malnutrition Assessment:  Malnutrition Status: Moderate malnutrition (10/27/22 1553)    Context:  Acute illness     Findings of the 6 clinical characteristics of malnutrition:   Energy Intake:  Unable to assess  Weight Loss:  Greater than 5% over 1 month     Body Fat Loss:  No significant body fat loss,     Muscle Mass Loss:  No significant muscle mass loss,    Fluid Accumulation:  Mild, Extremities   Strength:  Not performed        Nutrition Assessment:    Pt admitted with AMS. PMHx: Recent admission to [facility] for NSTEMI, Takotsubo CM, Bradycardia and prolonged intubation after found unresponsive at home by neighbor. Possible anoxic brain injury. Transferred to [address] [place] [address] 10/20. Acute encephalopathy-possible Serotonin syndrome, NMS. Not appropriate for oral intake. Noted potential need for PEG tube placement d/t poor PO intake at rehab. If weights accurate from last admission until today-pt has lost about 34#. Difference in scales probably account for some difference. Based needs on bed scale weight obtained today. Lytes WNL but potassium is low-normal. At risk for refeeding therefore recommend supplementing with B1 and a MVI and continue to monitor lytes daily. Suggest slow advancement of tube feeding to goal: Osmolite 1.2 @ 70 ml/hr x 21 hr with 100 ml water flush q 4 hr. This will provide 1470 ml, 1765 calories, 82 gm protein and 1800 ml free water (tube feeding/flush) per day to meet estimated needs.       Nutritionally Significant Medications:  Lipitor, Synthroid, Protonix, LR @ 75 ml/hr      Estimated Daily Nutrient Needs:  Energy Requirements Based On: Formula  Weight Used for Energy Requirements: Current  Energy (kcal/day): 1780 (MSJ x 1.2 x 1.0)  Weight Used for Protein Requirements: Current  Protein (g/day): 89 (1g/kg)  Method Used for Fluid Requirements: 1 ml/kcal  Fluid (ml/day):      Nutrition Related Findings:   Edema: 2+ BUE  Last BM: 10/27/22, Loose, Soft    Wounds: None      Current Nutrition Therapies:  Diet: Pureed-changed to NPO  Meal intake: No data found. Supplement intake: No data found. Nutrition Support: None      Anthropometric Measures:  Height: 5' 6\" (167.6 cm)  Ideal Body Weight (IBW): 130 lbs (59 kg)  Admission Body Weight: 230 lb (admission @ [facility] 9/27)  Current Body Wt:  87.5 kg (192 lb 14.4 oz), 148.4 % IBW. Bed scale  Current BMI (kg/m2): 31.2                          BMI Category: Obese class 1 (BMI 30.0-34. 9)    Wt Readings from Last 10 Encounters:   10/27/22 89.3 kg (196 lb 13.9 oz)   10/11/22 104.3 kg (230 lb)   08/24/22 93.4 kg (206 lb)   06/03/22 93.4 kg (206 lb)   03/04/22 91.2 kg (201 lb)   06/29/21 93.4 kg (206 lb)   02/04/21 94.3 kg (208 lb)   07/29/19 83.9 kg (185 lb)   02/25/14 94.5 kg (208 lb 6 oz)   05/20/13 93 kg (205 lb)           Nutrition Diagnosis:   Inadequate oral intake related to cognitive or neurological impairment as evidenced by NPO or clear liquid status due to medical condition. Nutrition Interventions:   Food and/or Nutrient Delivery: Start tube feeding  Nutrition Education/Counseling: No recommendations at this time  Coordination of Nutrition Care: Continue to monitor while inpatient       Goals:     Goals: Tolerate nutrition support at goal rate (in next 3-4 days.)       Nutrition Monitoring and Evaluation:   Behavioral-Environmental Outcomes: None identified  Food/Nutrient Intake Outcomes: Enteral nutrition intake/tolerance  Physical Signs/Symptoms Outcomes: Biochemical data, Weight, Fluid status or edema    Discharge Planning:     Too soon to determine    Richar Liz RD CNSC  Available via [employer]

## 2022-10-27 NOTE — PROGRESS NOTES
Occupational Therapy  10/27/22    Chart reviewed. Attempted to see for OT treatment however patient currently on continuous EEG. Will defer and follow up as able/appropriate.      Thank you,   Leana Castano, OTD, OTR/L

## 2022-10-27 NOTE — ADT AUTH CERT NOTES
IP ADMISSION NOTIFICATION     UPGRADED FROM OBS TO INPATIENT     IP ADMISSION DATE : 10/25/22  THIS PATIENT IS STILL IN HOUSE     UR CONTACT FOR PATIENT - EDNA ESCOBAR   PLEASE FAX AUTH REFERENCE NUMBER, STATUS AND CLINICAL NEEDS TO ME [phone]    THE [facility] SO MUCH- CLINICAL TO [address] [place] [address] NPI : [id number]  FACILITY TAX ID : [id number]     [facility]  [address] [facility] [id number] [phone]            Patient Name :Caitlin Roman   : 1965 (57 yrs)  MRN : [id number]     Patient Mailing Address [address] [place] [address] [] , [id number]                                                               . Insurance Plan Payor: BLUE CROSS    Primary Coverage Subscriber ID :      Current Patient Class : INPATIENT  Admit Date : 10/25/2022     REQUESTED LEVEL OF CARE: INPATIENT [101]                                                           Diagnosis : Altered mental status                     AMS (altered mental status)     ICD10 Code : AMS (altered mental status) [R41.82]  Diarrhea [R19.7]  Altered mental status [R41.82]          Admitting and Attending Info:  Admitting Provider : Zoey Martínez MD     NPI: [id number]  Admitting Provider Phone.  [phone]  Admitting Provider Address:  [address] [address]       Comments  Comment          Patient Demographics    Patient Name   Fina Christian   [id number] Legal Sex   Female    1965 Address   [address] [facility]   [address] [address] Phone   [phone] (Home) *Preferred*   [phone] (Work)   [phone] (Mobile)     Patient Demographics    Patient Name   Fina Christian   [id number] Legal Sex   Female    1965 Address   [address] [facility]   [address] [address] Phone   [phone] (Home) *Preferred*   [phone] (Work) [phone] (Mobile)     CSN:   [id number]     Admit Date: Admit Time Room Bed   Oct 25, 2022  4:14 PM 7107 [[id number]] 01 [[id number]]     Attending Providers    Provider Pager From To   Chikis Varma MD  10/25/22 10/25/22   Tyler Huang MD  10/25/22 10/25/22   Stella Acosta MD  10/25/22 10/26/22   Jose Smallwood MD  10/26/22 10/26/22   Waqar Maloney MD  10/26/22      Patient Contacts    Name Relation Home Work Mobile   Mireya York [phone]   Renay Spence [phone]   [facility] [phone]   RFYTSGRAMÍREZ KTN   [phone]   Franklin Parent   [phone]   Jeremy Au El Gemma Real     Utilization Reviews       Neurology [address] [place] [address] - [facility] Day 3 (10/27/2022) by Tena Severin       Review Status Review Entered   Completed 10/27/2022 1414       Created By   Tena Severin      Criteria Review      Care Day: 3 Care Date: 10/27/2022 Level of Care:    Guideline Day 3    Level Of Care    ( ) * Activity level acceptable    Clinical Status    ( ) * Mental status at baseline or stable and appropriate for next level of care    ( ) * Neurologic deficits absent or stable and appropriate for next level of care    ( ) * Motor deficits absent or acceptable for next level of care    (X) * Seizures absent or managed    10/27/2022 14:14:40 EDT by Tena Severin      absent    ( ) * No infection, or status acceptable    (X) * Isolation not needed, or status acceptable    10/27/2022 14:14:40 EDT by Tena Severin      not needed    ( ) * Respiratory status acceptable    (X) * Pain and nausea absent or adequately managed    10/27/2022 14:14:40 EDT by Tena Severin      absent    ( ) * General Discharge Criteria met    Interventions    (X) * Intake acceptable    10/27/2022 14:14:40 EDT by Tena Severin      acceptable    ( ) * No inpatient interventions needed    * Milestone        PA REC IP by Tena Severin       Review Status Review Entered   In Primary 10/27/2022 0855       Created By   Flaco Noble      Criteria Review     We recommend that the following pt's hospitalization under OBSERVATION [104] status is upgraded to INPATIENT If you agree, please place a new ADMIT order in [address] [facility] as recommended. Name: Donald Ghosh   : 1965   PEE# : [id number]  Insurance: [employer]     Clinical summary patient with recent admission to the hospital with seizure-like activity, presents with altered mental status  Vitals tachycardic  Labs and Imaging baseline  MCG criteria applies YES  Comments patient with altered mental status, unclear etiology for encephalopathy, different seizure versus stroke versus other acute also concern for NMS versus serotonin syndrome, work-up in progress, Poison control on board, on IV fluids, needing medical optimization, high risk for decompensation      This chart was reviewed at 6:17 AM 10/27/2022    Benjamin Sandhu MD   Physician [address]   Cell [phone]   Additional Notes   10/27/22   LOC IP ICU       Low grade fever overnight. Still with rigidity and some shivering, ST low 100's, normotensive for the most part. /70 (BP 1 Location: Left upper arm, BP Patient Position: At rest)   Pulse 96   Temp 99.5 °F (37.5 °C)   Resp 25   Ht 5' 6\" (1.676 m)   Wt (P) 89.1 kg (196 lb 6.9 oz)   SpO2 95%   BMI (P) 31.70 kg/m²    O2 Flow Rate (L/min): (P) 3 l/min O2 Device: (P) Nasal cannula    Vitals:      Physical Exam:   GENERAL: Calm,  NAD, febrile 99.9 (temp montilla). On Ceribell rapid EEG, no seizure burden noted at present. SKIN: Warm, dry, color appropriate for ethnicity. .       Neurologic Exam:   Mental Status:             Opened eyes to voice, no command following. Language:                   Nonverbal.        Cranial Nerves:           Pupils 3 mm, equal, round and reactive to light. No notable facial droop. Roving eye movements. No blink to threat. Motor:                                                              Rigid tone x 4 extremities. No spontaneous or involuntary movements noted on my exam. Shivering. Sensation:                   Withdraws from noxious stimuli in RUE and BL LE. Grimaces to noxious stimuli in LUE. Reflexes:                     Negative lower extremity clonus. Negative Babinskis          IM Assessment:   Active Problems:     Diarrhea (10/25/2022)         AMS (altered mental status) (10/25/2022)           Plan:       1.) Altered mental status/Encephalopathy of unclear etiology. Concern for serotonin syndrome. -CT head 10/25 shows no acute intracranial abnormality. -MRI on 10/02/22 shows 2 small foci of diffusion restriction in the right frontal lobe which may represent small foci of acute ischemia and  were unchanged from previous MRI on 09/28/22               -EEG's rapid and continuous at [facility] were negative for seizure. -Repeat EEG negative for seizure burden, awaiting official read by Neurology               -Hold SSRI's.                -Poison Control following   -CK 74, ALT 26, AST 19, Alk phos 57, Ca 9.1, Mg2.3, phos 4.3, ammonia 26, WBC 9.9               -Frequent neuro checks per unit protocol. SLP Contact Note       Chart reviewed in preparation for treatment and attempted to see patient. Patient getting hooked up to EEG at SLP arrival. Therefore will defer and follow up for treatment as able and patient appropriate to participate. Physical therapy:       Chart reviewed and spoke with RN. Pt being set up for continuous EEG x 24 hours. Will defer and continue to follow. OT 10/27/22       Chart reviewed.  Attempted to see for OT treatment however patient currently on continuous EEG      PROCEDURE:            ROUTINE INPATIENT EEG DATE OF SERVICE:  10/27/2022 INTERPRETATION: Normal awake EEG       CLINICAL CORRELATION: A normal EEG does not definitively exclude a diagnosis of epilepsy if clinical suspicion is high consider more prolonged monitoring. Meds:       Lipitor 10 mg po bedtime    Cogentin 3mg iv q12   Heparin 5000 units sc q8    Synthroid 88mcg po daily    Lopressor 25 mg po q12   LR IVF at 75 ml/hr    Protonix 40 mg po daily       Labs:       10/27/22 00:26   Lactic acid: 1.3   CK: 74      10/27/22 05:09      RBC: 3.71 (L)   HGB: 11.1 (L)   HCT: 34.2 (L)      Glucose: 104 (H)   Creatinine: 0.50 (L)   BUN/Creatinine ratio: 24 (H)   Protein, total: 6.2 (L)   Albumin: 2.6 (L)   A-G Ratio: 0.7 (L)      Xr kub     IMPRESSION   NG tube in place. Nonspecific bowel gas pattern. Decompressed   stomach.          Orders:    Telemetry    I&O    Ng tube placement     Fall precautions   Neuro checks q4   Scd   Nutrition services consult               Neurology [address] Day 2 (10/26/2022) by Neto Alvarado       Review Status Review Entered   Completed 10/26/2022 1333       Created By   Neto Alvarado      Criteria Review      Care Day: 2 Care Date: 10/26/2022 Level of Care:    Guideline Day 2    Level Of Care    (X) Floor    10/26/2022 13:33:21 EDT by Neto Alvarado      neuro/stroke    Clinical Status    (X) * No ICU or intermediate care needs    10/26/2022 13:33:21 EDT by Neto Alvarado      none noted    Interventions    (X) Inpatient interventions continue    10/26/2022 13:33:21 EDT by Neto Alvarado      orders: neurololgy consult, fall porcautions, neuro checks q4, telemetry , I&O , scd  meds: ivf at 75 ml/hr    (X) Transition to oral routes    10/26/2022 13:33:21 EDT by Neto Alvarado      tylenol 650 mg po q6 prn x1    * Milestone   Additional Notes   Care day 2   10/26/22   LOC Neuro/stroke       Patient made no verbalizations during session      BP (!) 162/82   Pulse (!) 126   Temp 99.4 °F (37.4 °C)   Resp 28   Ht 5' 6\" (1.676 m)   Wt 104.3 kg (230 lb)   SpO2 92%   BMI 37.12 kg/m²      O2 Device: None (Room air)       PHYSICAL EXAM:    General: Patient in no acute respiratory distress. Head: Normocephalic, without obvious abnormality, atraumatic   Eyes: Conjunctivae/corneas clear. Pupils 2 mm reactive bilateral.   E/N/M/T: Nares normal. Septum midline. No nasal drainage or sinus tenderness   Tongue midline/ non-edematous   Clear oropharynx   Neck: Normal appearance and movements, symmetrical, trachea midline   No palpable adenopathy   No thyroid enlargement, tenderness or nodules   No carotid bruit    No JVD   Trachea midline   Lungs:   Symmetrical chest expansion and respiratory effort   Clear to auscultation bilaterally   Chest wall: No tenderness or deformity   Heart: Regular rate and rhythm    Normal S1 and S2; no murmur, click, rub or gallop   Abdomen:   Soft, no tenderness   No rebound, guarding, or rigidity   Non-distended    Bowel sounds normal   No masses or hepatosplenomegaly   No hernias present   Back: No costovertebral angle tenderness   No step-off deformity   Extremities: Flexion contracture of left upper extremity. No acute trauma status no palpable acute bony or soft tissue deformity. No cyanosis or edema       Vascular/   Pulses: 2+ radial/ 1+ DP bilateral pulses   Integument/   Skin: No rashes or ulcers   Warm and dry   Musculo-       skeletal: Gait not tested   No calf tenderness   Neuro: GCS 9 (E4 V1 M4). Moves all extremities x 4. Aphasic. No facial droop. Sensation grossly intact as withdraws to tactile stimuli. Psych: Awake, but nonverbal and not answering questions. Geniturinary: Purewick external urinary collection device in place          IM     Given the patient's current clinical presentation, there is a high level of concern for decompensation if discharged from the emergency department.  Complex decision making was performed, which includes reviewing the patient's available past medical records, laboratory results, and imaging studies. Active Problems:       AMS (altered mental status)   -Acute metabolic encephalopathy, likely with some mild dehydration. Prior  past consideration for anoxic brain injury. no other etiology identified         -Place on neurochecks and fall precautions-consult neurologist           2. Aphasia        -Consult speech therapy        -N.p.o. until passes either nursing dysphagia screen or speech therapy evaluation        -Placed on IV fluids while n.p.o.       3.  Tachycardia        -Continue telemetry monitoring        -Order add-on test for high-sensitivity troponin with noted abnormal EKG      4.  Leukocytosis       -WBC  12.5; repeat WBC in a.m.       5.  Dehydration        -BUN 23. Order IV fluids for hydration        -Repeat renal panel       6. Generalized weakness         -Consult physical and Occupational Therapy            7.   Essential hypertension         -Monitor blood pressure closely and provide antihypertensive medications as needed       8. Hyperlipidemia         -Check lipid panel; continue statin therapy       9. Anxiety         -Resume home medications once able to tolerate oral intake and pass dysphagia screens       DIET: DIET NPO    ISOLATION PRECAUTIONS: There are currently no Active Isolations   CODE STATUS: Full Code    DVT PROPHYLAXIS: SCDs   FUNCTIONAL STATUS PRIOR TO HOSPITALIZATION: Completely disabled. Cannot carry on any self-care. Totally confined to bed or chair.    Ambulatory status/function: Ambulates with assistance:  Unable to ambulate       PT ASSESSMENT   Based on the objective data described below, the patient presents with no command following, no verbalizations, only grimacing and withdrawing to noxious stim to all 4 extremities, grossly decreased strength and ROM, increased tone vs resistance LLE, decreased functional mobility, decreased tolerance to activity s/p admission to [address] [place] [address] from [facility] brain injury program on 10/25 for AMS. Pt opened eyes briefly during session, but not making direct eye contact with therapist. Pt with ? Extensor tone vs resistance LLE and unable to passively flex L knee. Unable to appreciate any clonus in either ankle. Pt required total A x 2 rolling L and R for pericare and linen changing. Pt returned to supine position and placed in upright positioning with lights on, blinds open to improve arousal and alertness. Pt will continue to benefit from PT to progress mobility as tolerated and reach highest of level of independence. Recommend pt return to rehab with a brain injury focus. .       Current Level of Function Impacting Discharge (mobility/balance): total A x 2       Functional Outcome Measure: The patient scored Total: 0/56 on the Landon Balance Assessment which is indicative of high fall risk. Other factors to consider for discharge: previously independent, lived alone       Patient will benefit from skilled therapy intervention to address the above noted impairments. PLAN :   Recommendations and Planned Interventions: bed mobility training, transfer training, gait training, therapeutic exercises, neuromuscular re-education, patient and family training/education, and therapeutic activities         Frequency/Duration: Patient will be followed by physical therapy:  5 times a week to address goals.        Recommendation for discharge: (in order for the patient to meet his/her long term goals)   Therapy 3 hours per day 5-7 days per week, brain injury program       This discharge recommendation:   Has been made in collaboration with the attending provider and/or case management       IF patient discharges home will need the following DME: to be determined (TBD)        OT ASSESSMENT   Based on the objective data described below, the patient presents with eyes open, no command following, high tone, facial grimacing indicating pain with touch and mobility to RUE and BLEs and requiring total A for all ADLs and functional mobility following admission from [facility] for AMS. Pt hospitalized at [facility] from 9/27 - 10/20/22 found down by her neighbor and pt noted with status epilepticus. Pt now with anoxic brain injury and at [facility]. Per chart review pt with inconsistent command following, verbalizations and active participation. Based on above recommend return to [facility] brain injury rehab. Current Level of Function Impacting Discharge (ADLs/self-care): total A       Functional Outcome Measure: The patient scored Total: 0/100 on the Barthel Index outcome measure. Other factors to consider for discharge: see above       Patient will benefit from skilled therapy intervention to address the above noted impairments. PLAN :   Recommendations and Planned Interventions: self care training, functional mobility training, therapeutic exercise, balance training, visual/perceptual training, therapeutic activities, cognitive retraining, endurance activities, neuromuscular re-education, patient education, home safety training, and family training/education       Frequency/Duration: Patient will be followed by occupational therapy 5 times a week to address goals. Recommendation for discharge: (in order for the patient to meet his/her long term goals)   Therapy 3 hours per day 5-7 days per week       This discharge recommendation:   Has been made in collaboration with the attending provider and/or case management       IF patient discharges home will need the following DME: TBD         SLP ASSESSMENT :   Based on the objective data described below, the patient presents with moderate oral dysphagia and presumed functional pharyngeal stage of the swallow. Per pt's chart, pt with previous FEES at [facility] that recommended regular diet and thin liquids. Pt's mother stated a decrease in PO intake occurred at [address] [place] [address] where she reports pt was being fed via syringe.  Pt's mother reports ROGER began discussions about alternate means of nutrition. Pt with intermittent poor bolus acceptance of thin liquids and poor awareness of bolus, repeatedly biting the straw. Once straw was repositioned in the pt's mouth between the lips in front of the teeth, pt with good bolus acceptance of thin liquids with no overt difficulty or s/s of aspiration appreciated at bedside. Pt with poor bolus acceptance and recognition of puree via spoon. Pt did not open mouth for spoon and sucked small amounts of puree off the spoon. Despite limited PO trials of puree, no overt difficulty or s/s aspiration noted. Based on these findings, recommend pureed diet with thin liquids when pt is accepting. Given better acceptance of thin liquids, could consider liquid nutrition supplements (pt likes chocolate Ensures). Suspect bolus recognition and acceptance is patient's largest barrier to PO at this time. May consider alternate means of nutrition/hydration/medication if patient is unable to maintain adequate oral intake. SLP will continue to follow for diet tolerance. Patient will benefit from skilled intervention to address the above impairments. Patient's rehabilitation potential is considered to be Fair       PLAN :   Recommendations and Planned Interventions:   --Pureed diet with thin liquids   --General aspiration precautions   --Good oral care       Frequency/Duration: Patient will be followed by speech-language pathology 3 times a week to address goals. Discharge Recommendations:  To Be Determined      Meds and orders: See above       Labs:       10/26/22 06:46   INR: 1.1   Prothrombin time: 11.6 (H)      Chloride: 110 (H)   Glucose: 115 (H)   BUN/Creatinine ratio: 25 (H)   Albumin: 3.1 (L)   A-G Ratio: 0.8 (L)   Salicylate level: <5.9 (L)                 Neurology [facility] - Care Day -1 (10/25/2022) by Madelaine Savage       Review Status Review Entered   Completed 10/26/2022 0918       Created By   Madelaine Savage      Criteria Review Care Day: -1 Care Date: 10/25/2022 Level of Care:    Guideline Day 1    Level Of Care    ( ) ICU or intermediate care    10/26/2022 09:18:12 EDT by Dorene Youssef      neurp/stroke    Clinical Status    ( ) * Clinical Indications met    10/26/2022 09:18:12 EDT by Dorene Youssef      history of anoxic brain injury was sent in to the emergency department for worsening mental status. The patient was previously on interactive but has stopped communicating in the last 2 days    Interventions    (X) Inpatient interventions as needed    10/26/2022 09:18:12 EDT by Dorene Youssef      orders: neurology consutl, neuro checksq4, npo, tele, I&o, scd   meds ined: 500 cc iv bolus x1  floor: tylenol 650 mg po q6 prn x1    * Milestone   Additional Notes   Care day 1   10/25/22   LOC Stroke/neuro       history of anoxic brain injury was sent in to the emergency department for worsening mental status. The patient was previously on interactive but has stopped communicating in the last 2 days. There are also reports of fevers. Past Medical History:   Diagnosis   Date   ·   Allergic rhinitis   7/29/2019   ·   Depression       ·   History of multiple allergies      BP: 139/65 (!) 146/125 (!) 146/73   Pulse: (!) 103 (!) 101 (!) 110   Resp: 30 27 23   Temp: 98.8 °F (37.1 °C)    SpO2: 93% 94% 95% ra   Weight: 104.3 kg (230 lb)    Height: 5' 6\" (1.676 m)                Physical Exam   Vitals and nursing note reviewed. Constitutional:        Appearance: She is well-developed. HENT:       Head: Normocephalic and atraumatic. Eyes:       Pupils: Pupils are equal, round, and reactive to light. Cardiovascular:       Rate and Rhythm: Regular rhythm. Tachycardia present. Pulmonary:       Effort: Pulmonary effort is normal. Tachypnea present. Abdominal:       General: There is no distension. Palpations: Abdomen is soft.        Tenderness: no abdominal tenderness    Musculoskeletal:       Cervical back: Normal range of motion and neck supple. Skin:      General: Skin is warm and dry. Capillary Refill: Capillary refill takes less than 2 seconds. Neurological:       Mental Status: She is lethargic. GCS: GCS eye subscore is 2. GCS verbal subscore is 1. GCS motor subscore is 4. Psychiatric:          Speech: She is noncommunicative. MDM   Number of Diagnoses or Management Options       ED Course as of 10/25/22 1941   Tue Oct 25, 2022   1637 EKG at 4:26 PM shows sinus tachycardia, 103 bpm, normal axis, inferior T wave flattening, no STEMI, no ectopy [IO]       presents with Altered mental status       Differential diagnosis includes but not limited to:  seizure, meningitis, stroke, sepsis, subarachnoid hemorrhage, intracranial bleeding, encephalitis         CBC WITH AUTOMATED DIFF - Abnormal; Notable for the following components:       Result   Value       WBC   12.5 (*)           PLATELET   737 (*)           IMMATURE GRANULOCYTES   1 (*)           ABS. NEUTROPHILS   8.9 (*)           ABS. IMM. GRANS.   0.1 (*)           All other components within normal limits   METABOLIC PANEL, COMPREHENSIVE - Abnormal; Notable for the following components:       Glucose   108 (*)           BUN   23 (*)           BUN/Creatinine ratio   29 (*)           Albumin   3.3 (*)           Globulin   4.5 (*)           A-G Ratio   0.7 (*)           All other components within normal limits   URINALYSIS W/MICROSCOPIC - Abnormal; Notable for the following components:       Specific gravity   >1.030 (*)           Protein   TRACE (*)           Ketone   TRACE (*)      CT HEAD WO CONT   Final Result       No acute intracranial abnormality on this noncontrast head CT. No change given   difference in technique. XR CHEST PORT   Final Result       No acute process on limited portable chest view. Improved lung aeration since   earlier this month. Working Diagnosis:    1. Encephalopathy    2.  Tachycardia       Meds and orders: See above                                 Neurology [address] - Clinical Indications for Admission to Inpatient Care by Flaco Noble       Review Status Review Entered   Completed 10/26/2022 0915       Created By   Flaco Noble      Criteria Review      Clinical Indications for Admission to Inpatient Care    Most Recent : Flaco Noble Most Recent Date: 10/26/2022 09:15:32 EDT     (X) Other Indication: AMS      Entered 10/26/2022 09:15:32 EDT by Flaco Noble     history of anoxic brain injury was sent in to the emergency department for worsening mental status.   The patient was previously on interactive but has stopped communicating in the last 2 days

## 2022-10-27 NOTE — PROCEDURES
PROCEDURE: ROUTINE INPATIENT EEG  NAME:   Sudha Morris  ACCOUNT NUMBER : [de-identified]  MRN:   [id number]  DATE OF SERVICE: 10/27/2022     HISTORY/INDICATION: Pt is non-verbal, not following commands. EEG is performed to assess for evidence of seizure activity. MEDICATIONS:   Current Facility-Administered Medications   Medication Dose Route Frequency Provider Last Rate Last Admin    heparin (porcine) injection 5,000 Units  5,000 Units SubCUTAneous Q8H Jovany Tellez, ACNP   5,000 Units at 10/27/22 1200    sodium chloride (NS) flush 5-40 mL  5-40 mL IntraVENous Q8H Lizzie Mccauley MD   10 mL at 10/27/22 1802    sodium chloride (NS) flush 5-40 mL  5-40 mL IntraVENous PRN Ambrosio Alexander MD        lactated Ringers infusion  75 mL/hr IntraVENous CONTINUOUS Fernanda Alpha, PA-C 75 mL/hr at 10/27/22 1000 75 mL/hr at 10/27/22 1000    atorvastatin (LIPITOR) tablet 10 mg  10 mg Oral QHS Milligram, Gerianne Grady, PA-C   10 mg at 10/27/22 0221    levothyroxine (SYNTHROID) tablet 88 mcg  88 mcg Oral ACB Fernanda Alpha, PA-C   88 mcg at 10/27/22 8604    metoprolol tartrate (LOPRESSOR) tablet 25 mg  25 mg Oral Q12H Milligram, Gerianne Grady, PA-C   25 mg at 10/27/22 0900    pantoprazole (PROTONIX) tablet 40 mg  40 mg Oral ACB Milligram, Gerianne Grady, PA-C   40 mg at 10/27/22 1530    benztropine (COGENTIN) injection 2 mg  2 mg IntraVENous Q12H Michael Guerrero NP   2 mg at 10/27/22 0900    sodium chloride (NS) flush 5-10 mL  5-10 mL IntraVENous PRN Tc MURRELL MD        acetaminophen (TYLENOL) suppository 650 mg  650 mg Rectal Q6H PRN Tc MURRELL MD   650 mg at 10/26/22 1256       CONDITIONS OF RECORDING: This is a routine 21-channel EEG recording performed in accordance with the international 10-20 system with one channel devoted to limited EKG. This study was done during states of wakefulness. No activating procedures were performed.        DESCRIPTION:   Upon maximal arousal the posterior dominant rhythm has a frequency of 9Hz with an amplitude of 20uV. This activity is symmetric over the bilateral posterior derivations and attenuates with eye opening. No sleep is seen. There are no focal abnormalities, epileptiform discharges, or electrographic seizures seen. INTERPRETATION: Normal awake EEG    CLINICAL CORRELATION: A normal EEG does not definitively exclude a diagnosis of epilepsy if clinical suspicion is high consider more prolonged monitoring.     Mardene Halsted, MD

## 2022-10-27 NOTE — PROGRESS NOTES
Neurology Progress Note  Raz Olivera NP    Admit Date: 10/25/2022   LOS: 0 days      Daily Progress Note: 10/26/2022    HPI: Jojo Lake is a 59 y.o. F with a PMH of depression, obesity, lap imelda, allergic rhinitis, mild strabismus as a child, and left ACL reconstrution who presented to the ER with AMS on 10/25/22 from [address] [place] [address] where she was in rehab after being discharged from  [facility] on 10/20/22 with discharge diagnoses of Takotsubo cardiomyopathy/NSTEMI, acute CVA, acute respiratory failure with acute respiratory arrest, encephalopathy/possible anoxic brain injury, seizure-like activity  but EEG negative for seizure, septic shock,  UTI, and CHRIS. It was noted in ER record that patient had been interactive at [address] [place] [address] and at [facility] before discharged but had become febrile and less responsive on about 10/23. She was no longer following commands or speaking. She was admitted to the ER with concern for serotonin syndrome versus NMS  Subjective:   Low grade fever overnight. Still with rigidity and some shivering, ST low 100's, normotensive for the most part. Allergies   Allergen Reactions    Droperidol Itching       Review of Systems:  Review of systems not obtained due to patient factors. AMS    Past Medical History:   Diagnosis Date    Allergic rhinitis 7/29/2019    Depression     History of multiple allergies     History of vascular access device 10/07/2022    [facility] VAT: PICC placement R Cephalic length 40 cm for reliable access/TPN arm circ 35.5 cm    Muscle ache     Obesity 11/5/2012    Sinus pressure     Sinus problem      Family History   Problem Relation Age of Onset    Diabetes Paternal Grandfather      Social History     Tobacco Use    Smoking status: Not on file    Smokeless tobacco: Never   Substance Use Topics    Alcohol use: No      Prior to Admission Medications   Prescriptions Last Dose Informant Patient Reported? Taking? MULTIVITAMIN PO   Yes No   Sig: Take  by mouth. OTHER,NON-FORMULARY,   No No   Sig: ESTROGEN(5050)/TESTOSTERONE (HRT) 1.5mg/10mg/ml Cream APPLY 1 ML TO SKIN (INNER WRIST/ABDOMEN/THIGHS EVERY DAY. ROTATE SITES   acetaminophen (TYLENOL) 325 mg tablet   Yes No   Sig: Take  by mouth every four (4) hours as needed. aspirin 81 mg chewable tablet   No No   Sig: Take 1 Tablet by mouth daily for 30 days. atorvastatin (LIPITOR) 10 mg tablet   No No   Sig: TAKE ONE TABLET BY MOUTH ONCE NIGHTLY   atorvastatin (LIPITOR) 10 mg tablet  Other Yes No   Sig: Take 10 mg by mouth daily. cloNIDine (CATAPRES) 0.2 mg/24 hr ptwk   No No   Si Patch by TransDERmal route every seven (7) days for 30 days. escitalopram oxalate (LEXAPRO) 20 mg tablet   No No   Sig: TAKE ONE TABLET BY MOUTH DAILY   furosemide (LASIX) 20 mg tablet   No No   Sig: Take 1 Tablet by mouth daily as needed (swelling). levothyroxine (SYNTHROID) 88 mcg tablet  Other Yes No   Sig: Take 88 mcg by mouth Daily (before breakfast). levothyroxine (SYNTHROID) 88 mcg tablet   No No   Sig: Take 1 Tablet by mouth Daily (before breakfast). loratadine-pseudoephedrine (Claritin-D 12 Hour) 5-120 mg per tablet   Yes No   Sig: Claritin-D   metoprolol tartrate (LOPRESSOR) 25 mg tablet   No No   Sig: Take 1 Tablet by mouth every twelve (12) hours for 30 days. pantoprazole (PROTONIX) 40 mg tablet   No No   Sig: Take 1 Tablet by mouth Daily (before breakfast) for 30 days. senna-docusate (PERICOLACE) 8.6-50 mg per tablet   No No   Sig: Take 1 Tablet by mouth two (2) times a day for 30 days. traZODone (DESYREL) 50 mg tablet   No No   Sig: Take 2.5 Tablets by mouth nightly as needed for Sleep.   tretinoin (RETIN-A) 0.05 % topical cream   Yes No   Sig: tretinoin 0.05 % topical cream      Facility-Administered Medications: None       Objective:   Vital signs  Temp (24hrs), Av.5 °F (37.5 °C), Min:99.1 °F (37.3 °C), Max:100.5 °F (38.1 °C)   No intake/output data recorded.   10/25 0701 - 10/26 1900  In: 500 [I.V.:500]  Out: -   Visit Vitals  /70 (BP 1 Location: Left upper arm, BP Patient Position: At rest)   Pulse 96   Temp 99.5 °F (37.5 °C)   Resp 25   Ht 5' 6\" (1.676 m)   Wt (P) 89.1 kg (196 lb 6.9 oz)   SpO2 95%   BMI (P) 31.70 kg/m²    O2 Flow Rate (L/min): (P) 3 l/min O2 Device: (P) Nasal cannula   Vitals:    10/26/22 1800 10/26/22 2000 10/26/22 2200 10/26/22 2225   BP:    135/70   Pulse: (!) 101 (!) 105 (!) 110 96   Resp:    25   Temp:    99.5 °F (37.5 °C)   SpO2:    95%   Weight:    (P) 89.1 kg (196 lb 6.9 oz)   Height:            Physical Exam:  GENERAL: Calm,  NAD, febrile 99.9 (temp montilla). On Ceribell rapid EEG, no seizure burden noted at present. SKIN: Warm, dry, color appropriate for ethnicity. .    Neurologic Exam:  Mental Status:  Opened eyes to voice, no command following. Language:    Nonverbal.     Cranial Nerves:   Pupils 3 mm, equal, round and reactive to light. No notable facial droop. Roving eye movements. No blink to threat. Motor:       Rigid tone x 4 extremities. No spontaneous or involuntary movements noted on my exam. Shivering. Sensation:    Withdraws from noxious stimuli in RUE and BL LE. Grimaces to noxious stimuli in LUE. Reflexes:    Negative lower extremity clonus.  Negative Babinskis          Labs:  Lab Results   Component Value Date/Time    WBC 9.9 10/26/2022 06:46 AM    HGB 11.5 10/26/2022 06:46 AM    HCT 35.2 10/26/2022 06:46 AM    PLATELET 227 34/49/5830 06:46 AM    MCV 90.0 10/26/2022 06:46 AM     Lab Results   Component Value Date/Time    Sodium 141 10/26/2022 06:46 AM    Potassium 3.6 10/26/2022 06:46 AM    Chloride 110 (H) 10/26/2022 06:46 AM    CO2 23 10/26/2022 06:46 AM    Anion gap 8 10/26/2022 06:46 AM    Glucose 115 (H) 10/26/2022 06:46 AM    BUN 16 10/26/2022 06:46 AM    Creatinine 0.63 10/26/2022 06:46 AM    BUN/Creatinine ratio 25 (H) 10/26/2022 06:46 AM    GFR est AA >60 10/03/2022 01:13 AM    GFR est non-AA >60 10/03/2022 01:13 AM    Calcium 9.1 10/26/2022 06:46 AM     Imaging:  CT Results (maximum last 3): Results from [place] encounter on 10/25/22    CT HEAD WO CONT    Narrative  EXAM: CT HEAD WO CONT    INDICATION: Confusion. Electronic medical record is not available at the time of  this interpretation. COMPARISON: CT head on 10/13/2022. MRI brain on 10/2/2022. TECHNIQUE: Noncontrast head CT. Coronal and sagittal reformats. CT dose  reduction was achieved through the use of a standardized protocol tailored for  this examination and automatic exposure control for dose modulation. FINDINGS: The ventricles and sulci are age-appropriate without hydrocephalus. There is no mass effect or midline shift. There is no intracranial hemorrhage or  extra-axial fluid collection. There is no abnormal area of decreased density to  suggest infarct. The calvarium is intact. The visualized paranasal sinuses and mastoid air cells  are clear. Impression  No acute intracranial abnormality on this noncontrast head CT. No change given  difference in technique. Results from [place] encounter on 09/27/22    CT HEAD WO CONT    Narrative  CLINICAL HISTORY: post fall  INDICATION: post fall  COMPARISON: 10/2/2022. 9/27/2022  CT dose reduction was achieved through use of a standardized protocol tailored  for this examination and automatic exposure control for dose modulation. TECHNIQUE: Serial axial images with a collimation of 5 mm were obtained from the  skull base through the vertex  FINDINGS:  The sulci and ventricles are within normal limits for patient age. There is no  evidence of an acute infarction, hemorrhage, or mass-effect. There is no  evidence of midline shift or hydrocephalus. Posterior fossa structures are  unremarkable. No extra-axial collections are seen. Trace mastoid fluid on the left and minimal mastoid effusion on the right.  There  is no evidence of depressed skull fractures of soft tissue swelling. Impression  No acute intracranial process. CT ABD PELV WO CONT    Narrative  EXAM: CT CHEST WO CONT, CT ABD PELV WO CONT    INDICATION: Found unresponsive, respiratory arrest, unclear etiology, family hx  aneurysm    COMPARISON: None    IV CONTRAST: None    ORAL CONTRAST: None    TECHNIQUE:  Thin axial images were obtained through the chest, abdomen and pelvis. Coronal  and sagittal reformats were generated. CT dose reduction was achieved through  use of a standardized protocol tailored for this examination and automatic  exposure control for dose modulation. FINDINGS:  An endotracheal tube is in appropriate position. A nasogastric tube is seen  looped within the stomach. CHEST WALL: No mass or axillary lymphadenopathy. THYROID: No nodule. MEDIASTINUM: No mass or lymphadenopathy. ELBA: No mass or lymphadenopathy. THORACIC AORTA: No dissection or aneurysm. MAIN PULMONARY ARTERY: Normal in caliber. TRACHEA/BRONCHI: Patent. ESOPHAGUS: No wall thickening or dilatation. HEART: Normal in size. PLEURA: No effusion or pneumothorax. LUNGS: There is moderate bibasilar atelectasis. LIVER: No mass. BILIARY TREE: Status post cholecystectomy. CBD is not dilated. SPLEEN: within normal limits. PANCREAS: No mass or ductal dilatation. ADRENALS: Unremarkable. KIDNEYS: No mass, calculus, or hydronephrosis. STOMACH: Unremarkable. SMALL BOWEL: No dilatation or wall thickening. COLON: No dilatation or wall thickening. APPENDIX: Unremarkable  PERITONEUM: No ascites or pneumoperitoneum. RETROPERITONEUM: No lymphadenopathy or aortic aneurysm. REPRODUCTIVE ORGANS: Unremarkable. URINARY BLADDER: Decompressed by Sal catheter. BONES: No destructive bone lesion. ABDOMINAL WALL: No mass or hernia. ADDITIONAL COMMENTS: N/A    Impression  1. Moderate bibasilar atelectasis. 2. Status post cholecystectomy. 3. Endotracheal and nasogastric tubes in place.  Sal catheter decompresses the  bladder. Assessment:   Active Problems:    Diarrhea (10/25/2022)      AMS (altered mental status) (10/25/2022)      Plan:     1.) Altered mental status/Encephalopathy of unclear etiology. Concern for serotonin syndrome. -CT head 10/25 shows no acute intracranial abnormality. -MRI on 10/02/22 shows 2 small foci of diffusion restriction in the right frontal lobe which may represent small foci of acute ischemia and  were unchanged from previous MRI on 09/28/22   -EEG's rapid and continuous at [facility] were negative for seizure. -Repeat EEG negative for seizure burden, awaiting official read by Neurology   -Hold SSRI's.    -Poison Control following  -CK 74, ALT 26, AST 19, Alk phos 57, Ca 9.1, Mg2.3, phos 4.3, ammonia 26, WBC 9.9   -Frequent neuro checks per unit protocol. Further recommendations to follow from Dr. Danish Mari. Lauren Santillan NP  Neurocritical Care Nurse Practitioner     Neurology Staff Addendum:  I have personally seen and examined the patient. I have personally reviewed the chart and images. Elements of my examination included history of present illness, review of systems, review of past medical and surgical history, review of medications, and physical and neurological examination. I have personally reviewed the findings and impressions with the nurse practitioner and am in agreement with their note with changes below. Pt is a 63yo RH female with recent prolonged hospitalization after respiratory arrest presumed secondary to sedating meds, but also found to have Takotsubo CM, who has had declining mental status and function since receiving risperdal, then seroquel, only one dose each prior to discharge on 10/20/22, then trazodone and Lexapro at rehab, who has been febrile, tachycardic, has severe muscle rigidity, hyperreflexia, diaphoresis, labile BPs. Infectious work up has been negative. Leading suspicion is serotonin syndrome. Discontinued Trazodone and SSRI yesterday. She was transferred to the ICU last night. Seen with Mom at the bedside. Visit Vitals  /61   Pulse 89   Temp (!) 100.7 °F (38.2 °C)   Resp 26   Ht 5' 6\" (1.676 m)   Wt 196 lb 13.9 oz (89.3 kg)   SpO2 97%   BMI 31.78 kg/m²     Physical Exam:  General: Well developed well nourished patient lying bed in no obvious distress   Cardiac: Regular rhythm with no murmurs. Extremities: 2+ Radial pulses, no cyanosis or edema    Neurological Exam:  Mental Status: Eyes open with roving horizontal eye movements, not tracking examiner, not following commands or making purposeful movements   Cranial Nerves:   PERRL, no ptosis. Facial movement is symmetric. Motor: Tone is normal, no tremors   Reflexes:   Deep tendon reflexes 3+ and symmetric. Toes upgoing bilaterally. Sensory:   Unable to assess due to patient factors   Gait:  Unable to assess due to patient factors   Cerebellar:  Unable to assess due to patient factors     A/P: Pt is a 63yo RH female with signs of serotonin syndrome including altered mental status, muscle rigidity, hyperreflexia, upgoing toes, horizontal roving eye movements, low grade fevers, labile BPs on admission, and diaphoresis. DDx includes NMS, but these pts typically have hyporeflexia. She did receive benzos overnight, but is currently on Cogentin for unclear reasons. Exam is improved, but pt still not interacting. Recommend starting benzos  Discussed with Intensivist caring for the pt. The duration of this visit was 45 minutes spent on interview, examination, review of records/labs/imaging, counseling, explanation of diagnosis, planning of further management, documentation and coordination of care.     Dariel Chatman MD  [facility] Neurology

## 2022-10-27 NOTE — PROGRESS NOTES
2225- TRANSFER - IN REPORT:    Verbal report received from [facility]) on Augie Mccain  being received from [facility] (unit) for change in patient condition(AMS )      Report consisted of patients Situation, Background, Assessment and   Recommendations(SBAR). Information from the following report(s) SBAR, Kardex, ED Summary, Intake/Output, MAR, Med Rec Status, Cardiac Rhythm Sinus Tach, and Alarm Parameters  was reviewed with the receiving nurse. Opportunity for questions and clarification was provided. Assessment completed upon patients arrival to unit and care assumed.      Primary Nurse Leobardo Soriano RN and Marva Scott RN performed a dual skin assessment on this patient Impairment noted- see wound doc flow sheet  Jose Enrique score is 12      0130- Cerebell started  0330- Cerebell Stopped, no noted spikes or seizure like activity

## 2022-10-27 NOTE — PROGRESS NOTES
Bedside and Verbal shift change report given to ICU, RN (oncoming nurse) by Bernard Klein LPN (offgoing nurse). Report included the following information SBAR, Kardex, ED Summary, MAR, and Recent Results.

## 2022-10-27 NOTE — PROGRESS NOTES
SLP Contact Note    Chart reviewed in preparation for treatment and attempted to see patient. Patient getting hooked up to EEG at SLP arrival. Therefore will defer and follow up for treatment as able and patient appropriate to participate.      Thank you,   Perico Lloyd M.S. Vicki Couch Pathologist

## 2022-10-27 NOTE — PROGRESS NOTES
[facility]    ICU TEAM Progress Note    Name: Donald Ghosh   : 1965   MRN: [id number]   Date: 10/27/2022        Subjective:     Reason for ICU Admission:   57F  from [facility] presented with encephalopathy, has recent hospitalization at [facility] for Takutosubo CMP, Respiratory arrest requiring MV secondary to sleeping pills and fentanyl patches, ? Seizure with negative EEG. 10/26 called for evaluation by Neurology, patient has declining mental status - now nonverbal, tachycardic, diaphoretic, rigid extremities, no nystagmus. Concern for serotonin syndrome - on Trazodone and Lexapro. Dose of Risperdal/Seroquel at [facility]. Moving to ICU for continued management. Overnight Events: No acute concerns overnight, patient alternates between eyes open and not following commands or verbalizing to eyes closed w/ horizontal roving nystagmus and minimally responsive. Vitals stable, protecting airway at present. Objective:   Vital Signs:  Visit Vitals  /60   Pulse 72   Temp 100 °F (37.8 °C)   Resp 23   Ht 5' 6\" (1.676 m)   Wt 87.5 kg (192 lb 14.4 oz)   SpO2 95%   BMI 31.14 kg/m²    O2 Flow Rate (L/min): 1 l/min O2 Device: Nasal cannula Temp (24hrs), Av °F (37.8 °C), Min:99.5 °F (37.5 °C), Max:100.6 °F (38.1 °C)         Intake/Output:     Intake/Output Summary (Last 24 hours) at 10/27/2022 1435  Last data filed at 10/27/2022 1200  Gross per 24 hour   Intake 2953.75 ml   Output 1825 ml   Net 1128.75 ml       Physical Exam:    General:  Alert, poorly responsive, well noursished, well developed, appears stated age   Eyes:  Sclera anicteric. Pupils equally round and reactive to light. Horizontal roving nystagmus when at rest   Mouth/Throat: Mucous membranes normal, oral pharynx clear   Neck: Supple   Lungs:   Clear to auscultation bilaterally, good effort, + protecting airway with clearing of throat noted.    CV:  Regular rate and rhythm,no murmur, click, rub or gallop   Abdomen:   Soft, non-tender. bowel sounds normal. non-distended   Extremities: No cyanosis or edema   Skin: Skin color, texture, turgor normal. no acute rash or lesions   Lymph nodes: Cervical and supraclavicular normal   Musculoskeletal: No swelling or deformity, mild rigidity, able to manually extend arms   Lines/Devices:  Intact, no erythema, drainage or tenderness   Psych: Alert and not following commands, not verbalizing     LABS AND  DATA: Personally reviewed  Recent Labs     10/27/22  0509 10/26/22  0646   WBC 9.9 9.9   HGB 11.1* 11.5   HCT 34.2* 35.2    355     Recent Labs     10/27/22  0509 10/26/22  0646    141   K 3.5 3.6    110*   CO2 26 23   BUN 12 16   CREA 0.50* 0.63   * 115*   CA 8.6 9.1   MG  --  2.3     Recent Labs     10/27/22  0509 10/26/22  0646   AP 50 57   TP 6.2* 6.9   ALB 2.6* 3.1*   GLOB 3.6 3.8     Recent Labs     10/26/22  0646   INR 1.1   PTP 11.6*   APTT 23.9      No results for input(s): PHI, PCO2I, PO2I, FIO2I in the last 72 hours. Recent Labs     10/27/22  0026   CPK 74       MEDS: Reviewed    Chest X-Ray:  CXR Results  (Last 48 hours)                 10/25/22 1711  XR CHEST PORT Final result    Impression:      No acute process on limited portable chest view. Improved lung aeration since   earlier this month. Narrative:  EXAM: XR CHEST PORT       INDICATION: Pulmonary edema and respiratory distress earlier this month. COMPARISON: Portable chest on 10/7/2022 and 10/3/2022. TECHNIQUE: 4 images of portable chest AP view       FINDINGS: Cardiac monitoring wires overlie the thorax. Borderline cardiac size   is unchanged. Aortic arch is not enlarged. Endotracheal tube has been removed. The pulmonary vasculature is within normal limits. The lungs and pleural spaces are clear. There are low lung volumes. Bones are   osteopenic.                  CT Results  (Last 48 hours)                 10/25/22 1914  CT HEAD WO CONT Final result    Impression: No acute intracranial abnormality on this noncontrast head CT. No change given   difference in technique. Narrative:  EXAM: CT HEAD WO CONT       INDICATION: Confusion. Electronic medical record is not available at the time of   this interpretation. COMPARISON: CT head on 10/13/2022. MRI brain on 10/2/2022. TECHNIQUE: Noncontrast head CT. Coronal and sagittal reformats. CT dose   reduction was achieved through the use of a standardized protocol tailored for   this examination and automatic exposure control for dose modulation. FINDINGS: The ventricles and sulci are age-appropriate without hydrocephalus. There is no mass effect or midline shift. There is no intracranial hemorrhage or   extra-axial fluid collection. There is no abnormal area of decreased density to   suggest infarct. The calvarium is intact. The visualized paranasal sinuses and mastoid air cells   are clear. ECHO:  10/11/2022  Result status: Final result       Left Ventricle: Normal left ventricular systolic function with a visually estimated EF of 55 - 60%. Left ventricle size is normal. Mildly increased wall thickness. Unable to assess wall motion. Right Ventricle: Not well visualized. Right ventricle size is normal. Normal wall thickness. Multidisciplinary Rounds Completed:   Yes    ABCDEF Bundle/Checklist Completed:  YES-See Plan    Active Problem List:     Problem List  Date Reviewed: 10/5/2022            Codes Class    Altered mental status ICD-10-CM: R41.82  ICD-9-CM: 780.97         Diarrhea ICD-10-CM: R19.7  ICD-9-CM: 787.91         AMS (altered mental status) ICD-10-CM: R41.82  ICD-9-CM: 780.97         Anemia ICD-10-CM: D64.9  ICD-9-CM: 285.9         UTI (urinary tract infection) ICD-10-CM: N39.0  ICD-9-CM: 599.0         Hypokalemia ICD-10-CM: E87.6  ICD-9-CM: 276.8         Obese ICD-10-CM: E66.9  ICD-9-CM: 278.00         Acute CVA (cerebrovascular accident) ([facility] [address] ICD-10-CM: I63.9  ICD-9-CM: 434.91         NSTEMI (non-ST elevated myocardial infarction) (HCC) ICD-10-CM: I21.4  ICD-9-CM: 410.70         Takotsubo cardiomyopathy ICD-10-CM: I51.81  ICD-9-CM: 429.83         Dyslipidemia ICD-10-CM: E78.5  ICD-9-CM: 272.4         Hypothyroidism ICD-10-CM: E03.9  ICD-9-CM: 244.9         KOBY (generalized anxiety disorder) ICD-10-CM: F41.1  ICD-9-CM: 300.02         Status epilepticus ([facility] ICD-10-CM: G40.901  ICD-9-CM: 345. 3         CHRIS (acute kidney injury) ([facility] ICD-10-CM: N17.9  ICD-9-CM: 584.9         Lactic acidosis ICD-10-CM: E87.20  ICD-9-CM: 276.2         Acute respiratory failure with hypoxia (HCC) ICD-10-CM: J96.01  ICD-9-CM: 518.81         Shock, cardiogenic (HCC) ICD-10-CM: R57.0  ICD-9-CM: 785.51         Acute metabolic encephalopathy YPO-16-SJ: G93.41  ICD-9-CM: 348.31         Acquired hypothyroidism ICD-10-CM: E03.9  ICD-9-CM: 244.9         Mixed hyperlipidemia ICD-10-CM: E78.2  ICD-9-CM: 272.2         Impaired fasting glucose ICD-10-CM: R73.01  ICD-9-CM: 790.21         Insomnia ICD-10-CM: G47.00  ICD-9-CM: 780.52         Lower extremity edema ICD-10-CM: R60.0  ICD-9-CM: 782.3         Generalized anxiety disorder ICD-10-CM: F41.1  ICD-9-CM: 300.02         Tachycardia ICD-10-CM: R00.0  ICD-9-CM: 785.0         PND (post-nasal drip) ICD-10-CM: R09.82  ICD-9-CM: 784.91         Chronic maxillary sinusitis ICD-10-CM: J32.0  ICD-9-CM: 473.0         Seasonal allergic rhinitis due to pollen ICD-10-CM: J30.1  ICD-9-CM: 477.0         Class 2 severe obesity due to excess calories with serious comorbidity and body mass index (BMI) of 36.0 to 36.9 in adult [facility]) ICD-10-CM: E66.01, Z68.36  ICD-9-CM: 278.01, V85.36         Depression ICD-10-CM: F32. A  ICD-9-CM: 099          ICU Assessment/ Comprehensive Plan of Care:     NEUROLOGIC  Toxic metabolic encephalopathy, rule out for Serotonin Syndrome, NMS    -Avoid sedatives, antipsychotic meds, PRN benzos for rigidity only   -Check EEG   -Poison control contacted, Low concern for SS, unlikely NMS. -Started on cogentin overnight    PULMONARY  No acute concerns   -Supplemental oxygen for sats > 92%   -HOB > 30 degrees    CARDIOVASCULAR  No acute concerns  Telemetry  Repeat EKG, Last Qtc 440  Goal MAP>65     RENAL  No acute concerns, preserved RF          Goal K>=4, Mg>=2, Phos >3  Trend BMP, Mg, P  Replace lytes per protocol  LR@ 75 ml/hr    GASTROINTESTINAL  No acute concerns  Nutrition: NPO  Start TF today via NGT  IP consult ot nutritionist  Bowel regimen   Miralax PRN  GI Px  PPI daily    HEMATOLOGIC/ONC  No acute concerns  VTE Px   Goal Hb>=7  Trend CBC,PTT/INR  Heparin 5,000 units Q8hr AC/AP    SCDs    INFECTIOUS  No acute concerns   Trend CBC, fevers     ENDOCRINE   No acute concerns  Hx Hypothyroidism  SSI   Goal glucose 140-180  synthroid    F - Feeding:  NPO, TF to start  A - Analgesia: NA  S - Sedation: GOAL RASS 0  T - DVT Prophylaxis: SCD's or Sequential Compression Device , heparin  H - Head of Bed: > 30 Degrees  U - Ulcer Prophylaxis: PPI  G - Glycemic Control: Y  S - Spontaneous Breathing Trial: NA  B - Bowel Regimen: Miralax PRN  I - Indwelling Catheter:              T/L/D  Tubes: Nasogastric Tube  Lines: Peripheral IV  Drains: Sal Catheter  D - De-escalation of Antibiotics:  NA    DISPOSITION/COMMUNICATION  Discussed Plan of Care/Code Status: Full Code  Transfer to non-ICU bed if bed needed or in am    CRITICAL CARE CONSULTANT NOTE  I had a face to face encounter with the patient, reviewed and interpreted patient data including clinical events, labs, images, vital signs, I/O's, and examined patient.   I have discussed the case and the plan and management of the patient's care with the consulting services, the bedside nurses and the respiratory therapist.      NOTE OF PERSONAL INVOLVEMENT IN CARE    I participated in the decision-making and personally managed or directed the management of the following life and organ supporting interventions that required my frequent assessment to treat or prevent imminent deterioration. I personally spent 40 minutes of critical care time. This is time spent at this critically ill patient's bedside actively involved in patient care as well as the coordination of care and discussions with the patient's family. This does not include any procedural time which has been billed separately.     MARY Parks-BC  Intensivist Nurse Practitioner  [facility]  10/27/2022

## 2022-10-27 NOTE — ADT AUTH CERT NOTES
IP ADMISSION NOTIFICATION     UPGRADED FROM OBS TO INPATIENT     IP ADMISSION DATE : 10/25/22  THIS PATIENT IS STILL IN HOUSE     UR CONTACT FOR PATIENT - EDNA ESCOBAR   PLEASE FAX AUTH REFERENCE NUMBER, STATUS AND CLINICAL NEEDS TO ME [phone]    THE [facility] SO MUCH- CLINICAL TO FOLLOW      Patient has active [address] (Primary Decision Maker): [phone] (Mobile)  important  Patient has active [address] (Primary Decision Maker): [phone] (Mobile)     Comments  Comment          Patient Demographics    Patient Name   Brigid Riedel   [id number] Legal Sex   Female    1965 Address   [address] [facility] [address] [place] [address] [address] Phone   [phone] (Home) *Preferred*   [phone] (Work)   [phone] (Mobile)     Patient Demographics    Patient Name   Brigid Riedel   [id number] Legal Sex   Female    1965 Address   [address] [facility] [address] Phone   [phone] (Home) *Preferred*   [phone] (Work)   [phone] (Mobile)     CSN:   [id number]     Admit Date: Admit Time Room Bed   Oct 25, 2022  4:14 PM 7107 [77623] 01 [50930]     Attending Providers    Provider Pager From To   Jatinder Barclay MD  10/25/22 10/25/22   Ema Huynh MD  10/25/22 10/25/22   Clement Marshall MD  10/25/22 10/26/22   Daria Bettencourt MD  10/26/22 10/26/22   Alina Villeda MD  10/26/22      Patient Contacts    Name Relation Home Work Dior York [phone]   Elisabeth Mojica [phone]   [facility] Sister [phone]   Kamaljit Kowalski   [address] [facility]   [phone]   [place] LONI Wheeler 1    [phone]     Utilization Reviews       Neurology [address] - Care Day 1 (10/27/2022) by Esther Mcclelland       Review Status Review Entered   Completed 10/27/2022 1414       Created By   Esther Mcclelland      Criteria Review      Care Day: 1 Care Date: 10/27/2022 Level of Care:    Guideline Day 3    Level Of Care    ( ) * Activity level acceptable    Clinical Status    ( ) * Mental status at baseline or stable and appropriate for next level of care    ( ) * Neurologic deficits absent or stable and appropriate for next level of care    ( ) * Motor deficits absent or acceptable for next level of care    (X) * Seizures absent or managed    10/27/2022 14:14:40 EDT by Adan Sidhu      absent    ( ) * No infection, or status acceptable    (X) * Isolation not needed, or status acceptable    10/27/2022 14:14:40 EDT by Adan Sidhu      not needed    ( ) * Respiratory status acceptable    (X) * Pain and nausea absent or adequately managed    10/27/2022 14:14:40 EDT by Adan Sidhu      absent    ( ) * General Discharge Criteria met    Interventions    (X) * Intake acceptable    10/27/2022 14:14:40 EDT by Adan Sidhu      acceptable    ( ) * No inpatient interventions needed    * Milestone        PA REC IP by Adan Sidhu       Review Status Review Entered   In Primary 10/27/2022 0855       Created By   Adan Sidhu      Criteria Review     We recommend that the following pt's hospitalization under OBSERVATION [104] status is upgraded to INPATIENT If you agree, please place a new ADMIT order in [facility] as recommended.       Name: Sudha Morris   : 1965   [facility]# : [id number]  Insurance: Gio Henriquez     Clinical summary patient with recent admission to the hospital with seizure-like activity, presents with altered mental status  Vitals tachycardic  Labs and Imaging baseline  MCG criteria applies YES  Comments patient with altered mental status, unclear etiology for encephalopathy, different seizure versus stroke versus other acute also concern for NMS versus serotonin syndrome, work-up in progress, Poison control on board, on IV fluids, needing medical optimization, high risk for decompensation      This chart was reviewed at 6:17 AM 10/27/2022    Yaneli Rivera MD   Physician [address]   Cell [phone]   Additional Notes   10/27/22   LOC IP ICU       Low grade fever overnight. Still with rigidity and some shivering, ST low 100's, normotensive for the most part. /70 (BP 1 Location: Left upper arm, BP Patient Position: At rest)   Pulse 96   Temp 99.5 °F (37.5 °C)   Resp 25   Ht 5' 6\" (1.676 m)   Wt (P) 89.1 kg (196 lb 6.9 oz)   SpO2 95%   BMI (P) 31.70 kg/m²    O2 Flow Rate (L/min): (P) 3 l/min O2 Device: (P) Nasal cannula    Vitals:      Physical Exam:   GENERAL: Calm,  NAD, febrile 99.9 (temp montilla). On Ceribell rapid EEG, no seizure burden noted at present. SKIN: Warm, dry, color appropriate for ethnicity. .       Neurologic Exam:   Mental Status:             Opened eyes to voice, no command following. Language:                   Nonverbal.        Cranial Nerves:           Pupils 3 mm, equal, round and reactive to light. No notable facial droop. Roving eye movements. No blink to threat. Motor:                                                              Rigid tone x 4 extremities. No spontaneous or involuntary movements noted on my exam. Shivering. Sensation:                   Withdraws from noxious stimuli in RUE and BL LE. Grimaces to noxious stimuli in LUE. Reflexes:                     Negative lower extremity clonus. Negative Babinskis          IM Assessment:   Active Problems:     Diarrhea (10/25/2022)         AMS (altered mental status) (10/25/2022)           Plan:       1.) Altered mental status/Encephalopathy of unclear etiology. Concern for serotonin syndrome. -CT head 10/25 shows no acute intracranial abnormality.     -MRI on 10/02/22 shows 2 small foci of diffusion restriction in the right frontal lobe which may represent small foci of acute ischemia and  were unchanged from previous MRI on 09/28/22               -EEG's rapid and continuous at [facility] were negative for seizure. -Repeat EEG negative for seizure burden, awaiting official read by Neurology               -Hold SSRI's.                -Poison Control following   -CK 74, ALT 26, AST 19, Alk phos 57, Ca 9.1, Mg2.3, phos 4.3, ammonia 26, WBC 9.9               -Frequent neuro checks per unit protocol. SLP Contact Note       Chart reviewed in preparation for treatment and attempted to see patient. Patient getting hooked up to EEG at SLP arrival. Therefore will defer and follow up for treatment as able and patient appropriate to participate. Physical therapy:       Chart reviewed and spoke with RN. Pt being set up for continuous EEG x 24 hours. Will defer and continue to follow. OT 10/27/22       Chart reviewed. Attempted to see for OT treatment however patient currently on continuous EEG      PROCEDURE:            ROUTINE INPATIENT EEG      DATE OF SERVICE:  10/27/2022 INTERPRETATION: Normal awake EEG       CLINICAL CORRELATION: A normal EEG does not definitively exclude a diagnosis of epilepsy if clinical suspicion is high consider more prolonged monitoring. Meds:       Lipitor 10 mg po bedtime    Cogentin 3mg iv q12   Heparin 5000 units sc q8    Synthroid 88mcg po daily    Lopressor 25 mg po q12   LR IVF at 75 ml/hr    Protonix 40 mg po daily       Labs:       10/27/22 00:26   Lactic acid: 1.3   CK: 74      10/27/22 05:09      RBC: 3.71 (L)   HGB: 11.1 (L)   HCT: 34.2 (L)      Glucose: 104 (H)   Creatinine: 0.50 (L)   BUN/Creatinine ratio: 24 (H)   Protein, total: 6.2 (L)   Albumin: 2.6 (L)   A-G Ratio: 0.7 (L)      Xr kub     IMPRESSION   NG tube in place. Nonspecific bowel gas pattern. Decompressed   stomach.          Orders:    Telemetry    I&O    Ng tube placement     Fall precautions   Neuro checks q4 Scd   Nutrition services consult               Neurology [address] Day 0 (10/26/2022) by Rod Ann       Review Status Review Entered   Completed 10/26/2022 1333       Created By   Rod Ann      Criteria Review      Care Day: 0 Care Date: 10/26/2022 Level of Care:    Guideline Day 2    Level Of Care    (X) Floor    10/26/2022 13:33:21 EDT by Rod Ann      neuro/stroke    Clinical Status    (X) * No ICU or intermediate care needs    10/26/2022 13:33:21 EDT by Rod Ann      none noted    Interventions    (X) Inpatient interventions continue    10/26/2022 13:33:21 EDT by Rod Ann      orders: neurololgy consult, fall porcautions, neuro checks q4, telemetry , I&O , scd  meds: ivf at 75 ml/hr    (X) Transition to oral routes    10/26/2022 13:33:21 EDT by Rod Ann      tylenol 650 mg po q6 prn x1    * Milestone   Additional Notes   Care day 2   10/26/22   LOC Neuro/stroke       Patient made no verbalizations during session      BP (!) 162/82   Pulse (!) 126   Temp 99.4 °F (37.4 °C)   Resp 28   Ht 5' 6\" (1.676 m)   Wt 104.3 kg (230 lb)   SpO2 92%   BMI 37.12 kg/m²      O2 Device: None (Room air)       PHYSICAL EXAM:    General: Patient in no acute respiratory distress. Head: Normocephalic, without obvious abnormality, atraumatic   Eyes: Conjunctivae/corneas clear. Pupils 2 mm reactive bilateral.   E/N/M/T: Nares normal. Septum midline.  No nasal drainage or sinus tenderness   Tongue midline/ non-edematous   Clear oropharynx   Neck: Normal appearance and movements, symmetrical, trachea midline   No palpable adenopathy   No thyroid enlargement, tenderness or nodules   No carotid bruit    No JVD   Trachea midline   Lungs:   Symmetrical chest expansion and respiratory effort   Clear to auscultation bilaterally   Chest wall: No tenderness or deformity   Heart: Regular rate and rhythm    Normal S1 and S2; no murmur, click, rub or gallop   Abdomen:   Soft, no tenderness   No rebound, guarding, or rigidity   Non-distended    Bowel sounds normal   No masses or hepatosplenomegaly   No hernias present   Back: No costovertebral angle tenderness   No step-off deformity   Extremities: Flexion contracture of left upper extremity. No acute trauma status no palpable acute bony or soft tissue deformity. No cyanosis or edema       Vascular/   Pulses: 2+ radial/ 1+ DP bilateral pulses   Integument/   Skin: No rashes or ulcers   Warm and dry   Musculo-       skeletal: Gait not tested   No calf tenderness   Neuro: GCS 9 (E4 V1 M4). Moves all extremities x 4. Aphasic. No facial droop. Sensation grossly intact as withdraws to tactile stimuli. Psych: Awake, but nonverbal and not answering questions. Geniturinary: Purewick external urinary collection device in place          IM     Given the patient's current clinical presentation, there is a high level of concern for decompensation if discharged from the emergency department. Complex decision making was performed, which includes reviewing the patient's available past medical records, laboratory results, and imaging studies. Active Problems:       AMS (altered mental status)   -Acute metabolic encephalopathy, likely with some mild dehydration. Prior  past consideration for anoxic brain injury. no other etiology identified         -Place on neurochecks and fall precautions-consult neurologist           2. Aphasia        -Consult speech therapy        -N.p.o. until passes either nursing dysphagia screen or speech therapy evaluation        -Placed on IV fluids while n.p.o.       3.  Tachycardia        -Continue telemetry monitoring        -Order add-on test for high-sensitivity troponin with noted abnormal EKG      4.  Leukocytosis       -WBC  12.5; repeat WBC in a.m.       5.  Dehydration        -BUN 23. Order IV fluids for hydration        -Repeat renal panel       6.    Generalized weakness         -Consult physical and Occupational Therapy 7.   Essential hypertension         -Monitor blood pressure closely and provide antihypertensive medications as needed       8. Hyperlipidemia         -Check lipid panel; continue statin therapy       9. Anxiety         -Resume home medications once able to tolerate oral intake and pass dysphagia screens       DIET: DIET NPO    ISOLATION PRECAUTIONS: There are currently no Active Isolations   CODE STATUS: Full Code    DVT PROPHYLAXIS: SCDs   FUNCTIONAL STATUS PRIOR TO HOSPITALIZATION: Completely disabled. Cannot carry on any self-care. Totally confined to bed or chair. Ambulatory status/function: Ambulates with assistance:  Unable to ambulate       PT ASSESSMENT   Based on the objective data described below, the patient presents with no command following, no verbalizations, only grimacing and withdrawing to noxious stim to all 4 extremities, grossly decreased strength and ROM, increased tone vs resistance LLE, decreased functional mobility, decreased tolerance to activity s/p admission to [facility] from [facility] brain injury program on 10/25 for AMS. Pt opened eyes briefly during session, but not making direct eye contact with therapist. Pt with ? Extensor tone vs resistance LLE and unable to passively flex L knee. Unable to appreciate any clonus in either ankle. Pt required total A x 2 rolling L and R for pericare and linen changing. Pt returned to supine position and placed in upright positioning with lights on, blinds open to improve arousal and alertness. Pt will continue to benefit from PT to progress mobility as tolerated and reach highest of level of independence. Recommend pt return to rehab with a brain injury focus. .       Current Level of Function Impacting Discharge (mobility/balance): total A x 2       Functional Outcome Measure: The patient scored Total: 0/56 on the Landon Balance Assessment which is indicative of high fall risk.        Other factors to consider for discharge: previously independent, lived alone       Patient will benefit from skilled therapy intervention to address the above noted impairments. PLAN :   Recommendations and Planned Interventions: bed mobility training, transfer training, gait training, therapeutic exercises, neuromuscular re-education, patient and family training/education, and therapeutic activities         Frequency/Duration: Patient will be followed by physical therapy:  5 times a week to address goals. Recommendation for discharge: (in order for the patient to meet his/her long term goals)   Therapy 3 hours per day 5-7 days per week, brain injury program       This discharge recommendation:   Has been made in collaboration with the attending provider and/or case management       IF patient discharges home will need the following DME: to be determined (TBD)        OT ASSESSMENT   Based on the objective data described below, the patient presents with eyes open, no command following, high tone, facial grimacing indicating pain with touch and mobility to RUE and BLEs and requiring total A for all ADLs and functional mobility following admission from [place] for AMS. Pt hospitalized at [facility] from 9/27 - 10/20/22 found down by her neighbor and pt noted with status epilepticus. Pt now with anoxic brain injury and at [facility]. Per chart review pt with inconsistent command following, verbalizations and active participation. Based on above recommend return to [place] brain injury rehab. Current Level of Function Impacting Discharge (ADLs/self-care): total A       Functional Outcome Measure: The patient scored Total: 0/100 on the Barthel Index outcome measure. Other factors to consider for discharge: see above       Patient will benefit from skilled therapy intervention to address the above noted impairments.          PLAN :   Recommendations and Planned Interventions: self care training, functional mobility training, therapeutic exercise, balance training, visual/perceptual training, therapeutic activities, cognitive retraining, endurance activities, neuromuscular re-education, patient education, home safety training, and family training/education       Frequency/Duration: Patient will be followed by occupational therapy 5 times a week to address goals. Recommendation for discharge: (in order for the patient to meet his/her long term goals)   Therapy 3 hours per day 5-7 days per week       This discharge recommendation:   Has been made in collaboration with the attending provider and/or case management       IF patient discharges home will need the following DME: TBD         SLP ASSESSMENT :   Based on the objective data described below, the patient presents with moderate oral dysphagia and presumed functional pharyngeal stage of the swallow. Per pt's chart, pt with previous FEES at [facility] that recommended regular diet and thin liquids. Pt's mother stated a decrease in PO intake occurred at [facility] where she reports pt was being fed via syringe. Pt's mother reports ROGER began discussions about alternate means of nutrition. Pt with intermittent poor bolus acceptance of thin liquids and poor awareness of bolus, repeatedly biting the straw. Once straw was repositioned in the pt's mouth between the lips in front of the teeth, pt with good bolus acceptance of thin liquids with no overt difficulty or s/s of aspiration appreciated at bedside. Pt with poor bolus acceptance and recognition of puree via spoon. Pt did not open mouth for spoon and sucked small amounts of puree off the spoon. Despite limited PO trials of puree, no overt difficulty or s/s aspiration noted. Based on these findings, recommend pureed diet with thin liquids when pt is accepting. Given better acceptance of thin liquids, could consider liquid nutrition supplements (pt likes chocolate Ensures). Suspect bolus recognition and acceptance is patient's largest barrier to PO at this time.  May consider alternate means of nutrition/hydration/medication if patient is unable to maintain adequate oral intake. SLP will continue to follow for diet tolerance. Patient will benefit from skilled intervention to address the above impairments. Patient's rehabilitation potential is considered to be Fair       PLAN :   Recommendations and Planned Interventions:   --Pureed diet with thin liquids   --General aspiration precautions   --Good oral care       Frequency/Duration: Patient will be followed by speech-language pathology 3 times a week to address goals. Discharge Recommendations: To Be Determined      Meds and orders: See above       Labs:       10/26/22 06:46   INR: 1.1   Prothrombin time: 11.6 (H)      Chloride: 110 (H)   Glucose: 115 (H)   BUN/Creatinine ratio: 25 (H)   Albumin: 3.1 (L)   A-G Ratio: 0.8 (L)   Salicylate level: <1.7 (L)                 Neurology [facility] - Care Day -1 (10/25/2022) by Mandy Del Real       Review Status Review Entered   Completed 10/26/2022 0918       Created By   Mandy Del Real      Criteria Review      Care Day: -1 Care Date: 10/25/2022 Level of Care:    Guideline Day 1    Level Of Care    ( ) ICU or intermediate care    10/26/2022 09:18:12 EDT by Mandy Del Real      neurp/stroke    Clinical Status    ( ) * Clinical Indications met    10/26/2022 09:18:12 EDT by Mandy Del Real      history of anoxic brain injury was sent in to the emergency department for worsening mental status.   The patient was previously on interactive but has stopped communicating in the last 2 days    Interventions    (X) Inpatient interventions as needed    10/26/2022 09:18:12 EDT by Mandy Duty      orders: neurology consutl, neuro checksq4, npo, tele, I&o, scd   meds ined: 500 cc iv bolus x1  floor: tylenol 650 mg po q6 prn x1    * Milestone   Additional Notes   Care day 1   10/25/22   LOC Stroke/neuro       history of anoxic brain injury was sent in to the emergency department for worsening mental status. The patient was previously on interactive but has stopped communicating in the last 2 days. There are also reports of fevers. Past Medical History:   Diagnosis   Date   ·   Allergic rhinitis   7/29/2019   ·   Depression       ·   History of multiple allergies      BP: 139/65 (!) 146/125 (!) 146/73   Pulse: (!) 103 (!) 101 (!) 110   Resp: 30 27 23   Temp: 98.8 °F (37.1 °C)    SpO2: 93% 94% 95% ra   Weight: 104.3 kg (230 lb)    Height: 5' 6\" (1.676 m)                Physical Exam   Vitals and nursing note reviewed. Constitutional:        Appearance: She is well-developed. HENT:       Head: Normocephalic and atraumatic. Eyes:       Pupils: Pupils are equal, round, and reactive to light. Cardiovascular:       Rate and Rhythm: Regular rhythm. Tachycardia present. Pulmonary:       Effort: Pulmonary effort is normal. Tachypnea present. Abdominal:       General: There is no distension. Palpations: Abdomen is soft. Tenderness: no abdominal tenderness    Musculoskeletal:       Cervical back: Normal range of motion and neck supple. Skin:      General: Skin is warm and dry. Capillary Refill: Capillary refill takes less than 2 seconds. Neurological:       Mental Status: She is lethargic. GCS: GCS eye subscore is 2. GCS verbal subscore is 1. GCS motor subscore is 4. Psychiatric:          Speech: She is noncommunicative.        MDM   Number of Diagnoses or Management Options       ED Course as of 10/25/22 1941   Tue Oct 25, 2022   1637 EKG at 4:26 PM shows sinus tachycardia, 103 bpm, normal axis, inferior T wave flattening, no STEMI, no ectopy [IO]       presents with Altered mental status       Differential diagnosis includes but not limited to:  seizure, meningitis, stroke, sepsis, subarachnoid hemorrhage, intracranial bleeding, encephalitis         CBC WITH AUTOMATED DIFF - Abnormal; Notable for the following components:       Result   Value       WBC   12.5 (*) PLATELET   144 (*)           IMMATURE GRANULOCYTES   1 (*)           ABS. NEUTROPHILS   8.9 (*)           ABS. IMM. GRANS.   0.1 (*)           All other components within normal limits   METABOLIC PANEL, COMPREHENSIVE - Abnormal; Notable for the following components:       Glucose   108 (*)           BUN   23 (*)           BUN/Creatinine ratio   29 (*)           Albumin   3.3 (*)           Globulin   4.5 (*)           A-G Ratio   0.7 (*)           All other components within normal limits   URINALYSIS W/MICROSCOPIC - Abnormal; Notable for the following components:       Specific gravity   >1.030 (*)           Protein   TRACE (*)           Ketone   TRACE (*)      CT HEAD WO CONT   Final Result       No acute intracranial abnormality on this noncontrast head CT. No change given   difference in technique. XR CHEST PORT   Final Result       No acute process on limited portable chest view. Improved lung aeration since   earlier this month. Working Diagnosis:    1. Encephalopathy    2. Tachycardia       Meds and orders: See above                                 Neurology [address] [place] [address] - Clinical Indications for Admission to Inpatient Care by Edmundo Wilson       Review Status Review Entered   Completed 10/26/2022 0915       Created By   Edmundo Wilson      Criteria Review      Clinical Indications for Admission to Inpatient Care    Most Recent : Edmundo Wilson Most Recent Date: 10/26/2022 09:15:32 EDT     (X) Other Indication: AMS      Entered 10/26/2022 09:15:32 EDT by Edmundo Wilson     history of anoxic brain injury was sent in to the emergency department for worsening mental status.   The patient was previously on interactive but has stopped communicating in the last 2 days

## 2022-10-28 ENCOUNTER — APPOINTMENT (OUTPATIENT)
Dept: VASCULAR SURGERY | Age: 57
DRG: 070 | End: 2022-10-28
Attending: NURSE PRACTITIONER
Payer: COMMERCIAL

## 2022-10-28 LAB
ALBUMIN SERPL-MCNC: 2.6 G/DL (ref 3.5–5)
ALBUMIN/GLOB SERPL: 0.7 {RATIO} (ref 1.1–2.2)
ALP SERPL-CCNC: 53 U/L (ref 45–117)
ALT SERPL-CCNC: 28 U/L (ref 12–78)
ANION GAP SERPL CALC-SCNC: 7 MMOL/L (ref 5–15)
AST SERPL-CCNC: 21 U/L (ref 15–37)
BASOPHILS # BLD: 0.1 K/UL (ref 0–0.1)
BASOPHILS NFR BLD: 0 % (ref 0–1)
BILIRUB SERPL-MCNC: 0.7 MG/DL (ref 0.2–1)
BUN SERPL-MCNC: 14 MG/DL (ref 6–20)
BUN/CREAT SERPL: 25 (ref 12–20)
CALCIUM SERPL-MCNC: 8.7 MG/DL (ref 8.5–10.1)
CHLORIDE SERPL-SCNC: 107 MMOL/L (ref 97–108)
CO2 SERPL-SCNC: 25 MMOL/L (ref 21–32)
CREAT SERPL-MCNC: 0.55 MG/DL (ref 0.55–1.02)
DIFFERENTIAL METHOD BLD: ABNORMAL
EOSINOPHIL # BLD: 0.1 K/UL (ref 0–0.4)
EOSINOPHIL NFR BLD: 1 % (ref 0–7)
ERYTHROCYTE [DISTWIDTH] IN BLOOD BY AUTOMATED COUNT: 13.6 % (ref 11.5–14.5)
ERYTHROCYTE [DISTWIDTH] IN BLOOD BY AUTOMATED COUNT: 13.7 % (ref 11.5–14.5)
GLOBULIN SER CALC-MCNC: 3.5 G/DL (ref 2–4)
GLUCOSE SERPL-MCNC: 102 MG/DL (ref 65–100)
HCT VFR BLD AUTO: 33 % (ref 35–47)
HCT VFR BLD AUTO: 34.1 % (ref 35–47)
HGB BLD-MCNC: 10.8 G/DL (ref 11.5–16)
HGB BLD-MCNC: 11.1 G/DL (ref 11.5–16)
IMM GRANULOCYTES # BLD AUTO: 0 K/UL (ref 0–0.04)
IMM GRANULOCYTES NFR BLD AUTO: 0 % (ref 0–0.5)
LACTATE SERPL-SCNC: 1.4 MMOL/L (ref 0.4–2)
LYMPHOCYTES # BLD: 2.4 K/UL (ref 0.8–3.5)
LYMPHOCYTES NFR BLD: 21 % (ref 12–49)
MAGNESIUM SERPL-MCNC: 1.9 MG/DL (ref 1.6–2.4)
MCH RBC QN AUTO: 29.2 PG (ref 26–34)
MCH RBC QN AUTO: 29.6 PG (ref 26–34)
MCHC RBC AUTO-ENTMCNC: 32.6 G/DL (ref 30–36.5)
MCHC RBC AUTO-ENTMCNC: 32.7 G/DL (ref 30–36.5)
MCV RBC AUTO: 89.7 FL (ref 80–99)
MCV RBC AUTO: 90.4 FL (ref 80–99)
MONOCYTES # BLD: 0.9 K/UL (ref 0–1)
MONOCYTES NFR BLD: 8 % (ref 5–13)
NEUTS SEG # BLD: 8.1 K/UL (ref 1.8–8)
NEUTS SEG NFR BLD: 70 % (ref 32–75)
NRBC # BLD: 0 K/UL (ref 0–0.01)
NRBC # BLD: 0 K/UL (ref 0–0.01)
NRBC BLD-RTO: 0 PER 100 WBC
NRBC BLD-RTO: 0 PER 100 WBC
PHOSPHATE SERPL-MCNC: 3.5 MG/DL (ref 2.6–4.7)
PLATELET # BLD AUTO: 245 K/UL (ref 150–400)
PLATELET # BLD AUTO: 317 K/UL (ref 150–400)
PMV BLD AUTO: 11.3 FL (ref 8.9–12.9)
PMV BLD AUTO: 11.7 FL (ref 8.9–12.9)
POTASSIUM SERPL-SCNC: 3.4 MMOL/L (ref 3.5–5.1)
PROCALCITONIN SERPL-MCNC: <0.05 NG/ML
PROT SERPL-MCNC: 6.1 G/DL (ref 6.4–8.2)
RBC # BLD AUTO: 3.65 M/UL (ref 3.8–5.2)
RBC # BLD AUTO: 3.8 M/UL (ref 3.8–5.2)
SODIUM SERPL-SCNC: 139 MMOL/L (ref 136–145)
WBC # BLD AUTO: 10.8 K/UL (ref 3.6–11)
WBC # BLD AUTO: 11.6 K/UL (ref 3.6–11)

## 2022-10-28 PROCEDURE — 74011250636 HC RX REV CODE- 250/636: Performed by: NURSE PRACTITIONER

## 2022-10-28 PROCEDURE — 74011250637 HC RX REV CODE- 250/637: Performed by: NURSE PRACTITIONER

## 2022-10-28 PROCEDURE — 83605 ASSAY OF LACTIC ACID: CPT

## 2022-10-28 PROCEDURE — 80053 COMPREHEN METABOLIC PANEL: CPT

## 2022-10-28 PROCEDURE — 65610000006 HC RM INTENSIVE CARE

## 2022-10-28 PROCEDURE — 99233 SBSQ HOSP IP/OBS HIGH 50: CPT | Performed by: PSYCHIATRY & NEUROLOGY

## 2022-10-28 PROCEDURE — 36415 COLL VENOUS BLD VENIPUNCTURE: CPT

## 2022-10-28 PROCEDURE — 84100 ASSAY OF PHOSPHORUS: CPT

## 2022-10-28 PROCEDURE — 84145 PROCALCITONIN (PCT): CPT

## 2022-10-28 PROCEDURE — 92526 ORAL FUNCTION THERAPY: CPT | Performed by: SPEECH-LANGUAGE PATHOLOGIST

## 2022-10-28 PROCEDURE — 85027 COMPLETE CBC AUTOMATED: CPT

## 2022-10-28 PROCEDURE — 74011250636 HC RX REV CODE- 250/636: Performed by: PHYSICIAN ASSISTANT

## 2022-10-28 PROCEDURE — 93971 EXTREMITY STUDY: CPT

## 2022-10-28 PROCEDURE — 74011000250 HC RX REV CODE- 250: Performed by: FAMILY MEDICINE

## 2022-10-28 PROCEDURE — 74011250637 HC RX REV CODE- 250/637: Performed by: PHYSICIAN ASSISTANT

## 2022-10-28 PROCEDURE — 83735 ASSAY OF MAGNESIUM: CPT

## 2022-10-28 PROCEDURE — 85025 COMPLETE CBC W/AUTO DIFF WBC: CPT

## 2022-10-28 RX ORDER — POTASSIUM CHLORIDE 750 MG/1
40 TABLET, FILM COATED, EXTENDED RELEASE ORAL
Status: COMPLETED | OUTPATIENT
Start: 2022-10-28 | End: 2022-10-28

## 2022-10-28 RX ADMIN — ACETAMINOPHEN 650 MG: 650 SOLUTION ORAL at 08:26

## 2022-10-28 RX ADMIN — HEPARIN SODIUM 5000 UNITS: 5000 INJECTION INTRAVENOUS; SUBCUTANEOUS at 11:59

## 2022-10-28 RX ADMIN — METOPROLOL 25 MG: 25 TABLET ORAL at 21:17

## 2022-10-28 RX ADMIN — DIAZEPAM 2.5 MG: 5 INJECTION, SOLUTION INTRAMUSCULAR; INTRAVENOUS at 22:13

## 2022-10-28 RX ADMIN — SODIUM CHLORIDE, POTASSIUM CHLORIDE, SODIUM LACTATE AND CALCIUM CHLORIDE 1000 ML: 600; 310; 30; 20 INJECTION, SOLUTION INTRAVENOUS at 01:25

## 2022-10-28 RX ADMIN — HEPARIN SODIUM 5000 UNITS: 5000 INJECTION INTRAVENOUS; SUBCUTANEOUS at 21:00

## 2022-10-28 RX ADMIN — SODIUM CHLORIDE, PRESERVATIVE FREE 10 ML: 5 INJECTION INTRAVENOUS at 14:30

## 2022-10-28 RX ADMIN — METOPROLOL 25 MG: 25 TABLET ORAL at 08:24

## 2022-10-28 RX ADMIN — SODIUM CHLORIDE, POTASSIUM CHLORIDE, SODIUM LACTATE AND CALCIUM CHLORIDE 75 ML/HR: 600; 310; 30; 20 INJECTION, SOLUTION INTRAVENOUS at 08:24

## 2022-10-28 RX ADMIN — HEPARIN SODIUM 5000 UNITS: 5000 INJECTION INTRAVENOUS; SUBCUTANEOUS at 03:09

## 2022-10-28 RX ADMIN — DIAZEPAM 2.5 MG: 5 INJECTION, SOLUTION INTRAMUSCULAR; INTRAVENOUS at 03:09

## 2022-10-28 RX ADMIN — Medication 100 MG: at 08:23

## 2022-10-28 RX ADMIN — POTASSIUM CHLORIDE 40 MEQ: 750 TABLET, FILM COATED, EXTENDED RELEASE ORAL at 06:39

## 2022-10-28 RX ADMIN — DIAZEPAM 2.5 MG: 5 INJECTION, SOLUTION INTRAMUSCULAR; INTRAVENOUS at 14:30

## 2022-10-28 RX ADMIN — PANTOPRAZOLE SODIUM 40 MG: 40 TABLET, DELAYED RELEASE ORAL at 06:39

## 2022-10-28 RX ADMIN — THERA TABS 1 TABLET: TAB at 08:24

## 2022-10-28 RX ADMIN — DIAZEPAM 2.5 MG: 5 INJECTION, SOLUTION INTRAMUSCULAR; INTRAVENOUS at 18:43

## 2022-10-28 RX ADMIN — DIAZEPAM 2.5 MG: 5 INJECTION, SOLUTION INTRAMUSCULAR; INTRAVENOUS at 12:00

## 2022-10-28 RX ADMIN — DIAZEPAM 2.5 MG: 5 INJECTION, SOLUTION INTRAMUSCULAR; INTRAVENOUS at 06:39

## 2022-10-28 RX ADMIN — LEVOTHYROXINE SODIUM 88 MCG: 0.09 TABLET ORAL at 06:39

## 2022-10-28 RX ADMIN — SODIUM CHLORIDE, PRESERVATIVE FREE 40 ML: 5 INJECTION INTRAVENOUS at 21:18

## 2022-10-28 RX ADMIN — SODIUM CHLORIDE, PRESERVATIVE FREE 40 ML: 5 INJECTION INTRAVENOUS at 06:40

## 2022-10-28 RX ADMIN — ATORVASTATIN CALCIUM 10 MG: 40 TABLET, FILM COATED ORAL at 21:17

## 2022-10-28 RX ADMIN — SODIUM CHLORIDE, POTASSIUM CHLORIDE, SODIUM LACTATE AND CALCIUM CHLORIDE 75 ML/HR: 600; 310; 30; 20 INJECTION, SOLUTION INTRAVENOUS at 23:52

## 2022-10-28 NOTE — PROGRESS NOTES
Spiritual Care Assessment/Progress Note  [facility]      NAME: Severo Davis      MRN: [id number]  AGE: 59 y.o. SEX: female  Hindu Affiliation: Buddhist   Language: English     10/28/2022     Total Time (in minutes): 9     Spiritual Assessment begun in [address] [facility] through conversation with:         []Patient        [] Family    [] Friend(s)        Reason for Consult: Initial/Spiritual assessment, critical care     Spiritual beliefs: (Please include comment if needed)     [] Identifies with a grecia tradition:         [] Supported by a grecia community:            [] Claims no spiritual orientation:           [] Seeking spiritual identity:                [] Adheres to an individual form of spirituality:           [x] Not able to assess:                           Identified resources for coping:      [] Prayer                               [] Music                  [] Guided Imagery     [] Family/friends                 [] Pet visits     [] Devotional reading                         [] Unknown     [] Other:                                               Interventions offered during this visit: (See comments for more details)    Patient Interventions: Initial visit           Plan of Care:     [] Support spiritual and/or cultural needs    [] Support AMD and/or advance care planning process      [] Support grieving process   [] Coordinate Rites and/or Rituals    [] Coordination with community clergy   [] No spiritual needs identified at this time   [] Detailed Plan of Care below (See Comments)  [] Make referral to Music Therapy  [] Make referral to Pet Therapy     [] Make referral to Addiction services  [] Make referral to [facility]  [] Make referral to Spiritual Care Partner  [] No future visits requested        [] Contact Spiritual Care for further referrals     Comments: Visited Ms Ruma Vegas in ICU-07 for initial spiritual assessment; reviewed patient's chart prior to visit.  Ms Ruma Vegas was lying quietly in bed with eyes closed; she did not respond when name was called multiple times. No family was present. Unable to do spiritual assessment at that time. : Rev. Tory Issa.  Dipti Covarrubias; [facility], to contact [phone] [address] call: [phone]

## 2022-10-28 NOTE — PROGRESS NOTES
[facility]    ICU TEAM Progress Note    Name: Christine Son   : 1965   MRN: [id number]   Date: 10/28/2022        Subjective:     Reason for ICU Admission:   57F  from [address] [place] [address] presented with encephalopathy, has recent hospitalization at [facility] for Takutosubo CMP, Respiratory arrest requiring MV secondary to sleeping pills and fentanyl patches, ? Seizure with negative EEG. 10/26 called for evaluation by Neurology, patient has declining mental status - now nonverbal, tachycardic, diaphoretic, rigid extremities, no nystagmus. Concern for serotonin syndrome - on Trazodone and Lexapro. Dose of Risperdal/Seroquel at [facility]. Moving to ICU for continued management. Overnight Events: 10/28: Hypotensive overnight, rec'd 1 liter IVF. K replaced. Remains on diazepam for rigidity. Appears more alert today, still not responsive, rigidity much improved in BUE, Remains rigid in BLE. Ongoing low grade temps. 10/27:No acute concerns overnight, patient alternates between eyes open and not following commands or verbalizing to eyes closed w/ horizontal roving nystagmus and minimally responsive. Vitals stable, protecting airway at present. Objective:   Vital Signs:  Visit Vitals  BP (!) 109/46   Pulse 75   Temp 100 °F (37.8 °C)   Resp 25   Ht 5' 6\" (1.676 m)   Wt 89.3 kg (196 lb 13.9 oz)   SpO2 94%   BMI 31.78 kg/m²    O2 Flow Rate (L/min): 1 l/min O2 Device: None (Room air) Temp (24hrs), Av.3 °F (37.9 °C), Min:100 °F (37.8 °C), Max:100.7 °F (38.2 °C)         Intake/Output:     Intake/Output Summary (Last 24 hours) at 10/28/2022 0815  Last data filed at 10/28/2022 0700  Gross per 24 hour   Intake 2975 ml   Output 1455 ml   Net 1520 ml         Physical Exam:    General:  Alert, poorly responsive, well noursished, well developed, appears stated age   Eyes:  Sclera anicteric. Pupils equally round and reactive to light.  Horizontal roving nystagmus when at rest   Mouth/Throat: Mucous membranes normal, oral pharynx clear   Neck: Supple   Lungs:   Clear to auscultation bilaterally, good effort, + protecting airway with clearing of throat noted. CV:  Regular rate and rhythm,no murmur, click, rub or gallop   Abdomen:   Soft, non-tender. bowel sounds normal. non-distended   Extremities: No cyanosis or edema   Skin: Skin color, texture, turgor normal. no acute rash or lesions   Lymph nodes: Cervical and supraclavicular normal   Musculoskeletal: No swelling or deformity, mild rigidity BUE, able to manually extend arms and hold, BLE unable to flex at knee L worse than Right   Lines/Devices:  Intact, no erythema, drainage or tenderness   Psych: Alert and not following commands, not verbalizing     LABS AND  DATA: Personally reviewed  Recent Labs     10/28/22  0303 10/27/22  0509   WBC 11.6* 9.9   HGB 11.1* 11.1*   HCT 34.1* 34.2*    341       Recent Labs     10/28/22  0303 10/27/22  0509 10/26/22  0646    138 141   K 3.4* 3.5 3.6    107 110*   CO2 25 26 23   BUN 14 12 16   CREA 0.55 0.50* 0.63   * 104* 115*   CA 8.7 8.6 9.1   MG  --  2.0 2.3   PHOS  --  3.0  --        Recent Labs     10/28/22  0303 10/27/22  0509   AP 53 50   TP 6.1* 6.2*   ALB 2.6* 2.6*   GLOB 3.5 3.6       Recent Labs     10/26/22  0646   INR 1.1   PTP 11.6*   APTT 23.9        No results for input(s): PHI, PCO2I, PO2I, FIO2I in the last 72 hours. Recent Labs     10/27/22  0026   CPK 74         MEDS: Reviewed    Chest X-Ray:  CXR Results  (Last 48 hours)      None          CT Results  (Last 48 hours)      None           ECHO:  10/11/2022  Result status: Final result       Left Ventricle: Normal left ventricular systolic function with a visually estimated EF of 55 - 60%. Left ventricle size is normal. Mildly increased wall thickness. Unable to assess wall motion. Right Ventricle: Not well visualized. Right ventricle size is normal. Normal wall thickness. Multidisciplinary Rounds Completed:   Yes    ABCDEF Bundle/Checklist Completed:  YES-See Plan    Active Problem List:     Problem List  Date Reviewed: 10/5/2022            Codes Class    Altered mental status ICD-10-CM: R41.82  ICD-9-CM: 780.97         Diarrhea ICD-10-CM: R19.7  ICD-9-CM: 787.91         AMS (altered mental status) ICD-10-CM: R41.82  ICD-9-CM: 780.97         Anemia ICD-10-CM: D64.9  ICD-9-CM: 285.9         UTI (urinary tract infection) ICD-10-CM: N39.0  ICD-9-CM: 599.0         Hypokalemia ICD-10-CM: E87.6  ICD-9-CM: 276.8         Obese ICD-10-CM: E66.9  ICD-9-CM: 278.00         Acute CVA (cerebrovascular accident) ([facility] ICD-10-CM: I63.9  ICD-9-CM: 434.91         NSTEMI (non-ST elevated myocardial infarction) ([facility] ICD-10-CM: I21.4  ICD-9-CM: 410.70         Takotsubo cardiomyopathy ICD-10-CM: I51.81  ICD-9-CM: 429.83         Dyslipidemia ICD-10-CM: E78.5  ICD-9-CM: 272.4         Hypothyroidism ICD-10-CM: E03.9  ICD-9-CM: 244.9         KOBY (generalized anxiety disorder) ICD-10-CM: F41.1  ICD-9-CM: 300.02         Status epilepticus ([facility] ICD-10-CM: G40.901  ICD-9-CM: 345. 3         CHRIS (acute kidney injury) ([facility] ICD-10-CM: N17.9  ICD-9-CM: 584.9         Lactic acidosis ICD-10-CM: E87.20  ICD-9-CM: 276.2         Acute respiratory failure with hypoxia (HCC) ICD-10-CM: J96.01  ICD-9-CM: 518.81         Shock, cardiogenic (HCC) ICD-10-CM: R57.0  ICD-9-CM: 785.51         Acute metabolic encephalopathy IQA-33-WR: G93.41  ICD-9-CM: 348.31         Acquired hypothyroidism ICD-10-CM: E03.9  ICD-9-CM: 244.9         Mixed hyperlipidemia ICD-10-CM: E78.2  ICD-9-CM: 272.2         Impaired fasting glucose ICD-10-CM: R73.01  ICD-9-CM: 790.21         Insomnia ICD-10-CM: G47.00  ICD-9-CM: 780.52         Lower extremity edema ICD-10-CM: R60.0  ICD-9-CM: 782.3         Generalized anxiety disorder ICD-10-CM: F41.1  ICD-9-CM: 300.02         Tachycardia ICD-10-CM: R00.0  ICD-9-CM: 785.0         PND (post-nasal drip) ICD-10-CM: R09.82  ICD-9-CM: 784.91         Chronic maxillary sinusitis ICD-10-CM: J32.0  ICD-9-CM: 473.0         Seasonal allergic rhinitis due to pollen ICD-10-CM: J30.1  ICD-9-CM: 477.0         Class 2 severe obesity due to excess calories with serious comorbidity and body mass index (BMI) of 36.0 to 36.9 in adult [facility]) ICD-10-CM: E66.01, Z68.36  ICD-9-CM: 278.01, V85.36         Depression ICD-10-CM: F32. A  ICD-9-CM: 581          ICU Assessment/ Comprehensive Plan of Care:     NEUROLOGIC  Toxic metabolic encephalopathy, rule out for Serotonin Syndrome, NMS    -Avoid sedatives, antipsychotic meds, PRN benzos for rigidity only   -Check EEG-NEG for seizure               -CT Head 1/25: No acute processes   -Poison control contacted, Low concern for SS, unlikely NMS.    -Neurology following   -Diazepam 2.5mg IV Q4hr    PULMONARY  No acute concerns   -Supplemental oxygen for sats > 92%   -HOB > 30 degrees    CARDIOVASCULAR  No acute concerns  Telemetry  Repeat EKG, Last Qtc 440  Goal MAP>65     RENAL  No acute concerns, preserved RF          Goal K>=4, Mg>=2, Phos >3  Trend BMP, Mg, P  Replace lytes per protocol  LR@ 75 ml/hr    GASTROINTESTINAL  No acute concerns  Nutrition: NPO  TF via NGT  IP consult ot nutritionist  Bowel regimen   Miralax PRN  GI Px  PPI daily    HEMATOLOGIC/ONC  No acute concerns  VTE Px   Goal Hb>=7  Trend CBC,PTT/INR  Heparin 5,000 units Q8hr AC/AP    SCDs    INFECTIOUS  No acute concerns   Trend CBC, fevers     ENDOCRINE   No acute concerns  Hx Hypothyroidism  SSI   Goal glucose 140-180  Continue synthroid    F - Feeding:  NPO, TF  A - Analgesia: NA  S - Sedation: GOAL RASS 0  T - DVT Prophylaxis: SCD's or Sequential Compression Device , heparin  H - Head of Bed: > 30 Degrees  U - Ulcer Prophylaxis: PPI  G - Glycemic Control: Y  S - Spontaneous Breathing Trial: NA  B - Bowel Regimen: Miralax PRN  I - Indwelling Catheter:              T/L/D  Tubes: Nasogastric Tube  Lines: Peripheral IV  Drains: Sal Catheter  D - De-escalation of Antibiotics: NA    DISPOSITION/COMMUNICATION  Discussed Plan of Care/Code Status: Full Code  Stay in ICU    CRITICAL CARE CONSULTANT NOTE  I had a face to face encounter with the patient, reviewed and interpreted patient data including clinical events, labs, images, vital signs, I/O's, and examined patient. I have discussed the case and the plan and management of the patient's care with the consulting services, the bedside nurses and the respiratory therapist.      NOTE OF PERSONAL INVOLVEMENT IN CARE    I participated in the decision-making and personally managed or directed the management of the following life and organ supporting interventions that required my frequent assessment to treat or prevent imminent deterioration. I personally spent 40 minutes of critical care time. This is time spent at this critically ill patient's bedside actively involved in patient care as well as the coordination of care and discussions with the patient's family. This does not include any procedural time which has been billed separately.     Nitish Wiggins AGACNP-BC  Intensivist Nurse Practitioner  [facility]  10/28/2022

## 2022-10-28 NOTE — PROGRESS NOTES
Spiritual Care Assessment/Progress Note  [facility]      NAME: Tayler Ortiz      MRN: [id number]  AGE: 59 y.o. SEX: female  Catholic Affiliation: Scientologist   Language: English     10/28/2022     Total Time (in minutes): 16     Spiritual Assessment begun in [address] [facility] through conversation with:         []Patient        [] Family    [] Friend(s)        Reason for Consult: Initial/Spiritual assessment, critical care     Spiritual beliefs: (Please include comment if needed)     [x] Identifies with a grecia tradition:   Scientologist      [x] Supported by a grecia community:            [] Claims no spiritual orientation:           [] Seeking spiritual identity:                [] Adheres to an individual form of spirituality:           [] Not able to assess:                           Identified resources for coping:      [x] Prayer                               [] Music                  [] Guided Imagery     [x] Family/friends                 [] Pet visits     [] Devotional reading                         [] Unknown     [] Other:                                               Interventions offered during this visit: (See comments for more details)    Patient Interventions: Initial/Spiritual assessment, Critical care, Prayer (actual), Prayer (assurance of), Iconic (affirming the presence of God/Higher Power)     Family/Friend(s):  Affirmation of emotions/emotional suffering, Catharsis/review of pertinent events in supportive environment, Initial Assessment, Prayer (actual), Prayer (assurance of)     Plan of Care:     [x] Support spiritual and/or cultural needs    [] Support AMD and/or advance care planning process      [] Support grieving process   [] Coordinate Rites and/or Rituals    [] Coordination with community clergy   [] No spiritual needs identified at this time   [] Detailed Plan of Care below (See Comments)  [] Make referral to Music Therapy  [] Make referral to Pet Therapy     [] Make referral to Addiction services  [] Make referral to [facility]  [] Make referral to Spiritual Care Partner  [] No future visits requested        [x] Contact Spiritual Care for further referrals     Comments: The mother of Ms Choudhary Sender arrived; she appeared calm and in pretty good spirits. Provided spiritual presence and active listening as mother shared about the events that led up to patient's hospitalizations; shared that this was the third hospital patient had been in since the initial problem. Mother expressed her concerns about possible cognitive damage related to anoxia. Acknowledged her concerns and offered words of support. Patient's mother shared that Ms Mark Rodriguez was a member of a [employer] and that a close friend of the patient, from that [employer], visited frequently and prayed for patient. 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 Mother requested prayer for healing on behalf of patient; had prayer as requested and assured her of ongoing  availability for support. : Rev. Flor Michael.  Clint Mendosa; [facility], to contact [phone] [address] call: [phone]

## 2022-10-28 NOTE — PROGRESS NOTES
1930: Bedside and Verbal shift change report given to [facility] (oncoming nurse) by Annelise Jefferson RN (offgoing nurse). Report included the following information SBAR, Kardex, Procedure Summary, Intake/Output, MAR, Accordion, Recent Results, Cardiac Rhythm NSR, Alarm Parameters , and Dual Neuro Assessment. 0100: Patient BP 87/54. NP notified. . Orders received for 1L bolus LR.     0715: Tube feeds restarted at 20ml/hr with q4 100 ml flush. 0800: Bedside and Verbal shift change report given to [address] [place] [address] Mihir Martinez (oncoming nurse) by Orin Rocha RN (offgoing nurse). Report included the following information SBAR, Kardex, Procedure Summary, Intake/Output, MAR, Accordion, Recent Results, Cardiac Rhythm NSR, Alarm Parameters , and Dual Neuro Assessment.

## 2022-10-28 NOTE — PROGRESS NOTES
Occupational Therapy   10/28/22    Chart reviewed, conferred with RN & PT who report patient not following commands, not visually tracking. Not appropriate for OT re-evaluation at this time. Will defer and follow up as able/appropriate.     Thank you,  Reza Anaya, OTCESIA, OTR/L

## 2022-10-28 NOTE — PROGRESS NOTES
Physical Therapy 10/28/22    Chart reviewed, conferred with RN. Entered room, patient not following commands, not tracking. Not appropriate for PT at this time. Will defer and follow up as able/appropriate.     Thank you for your consideration,    Mayra Erickson, PT, DPT

## 2022-10-28 NOTE — PROGRESS NOTES
Problem: Dysphagia (Adult)  Goal: *Acute Goals and Plan of Care (Insert Text)  Description: Speech Therapy Goals  Initiated 10/28/2022   1. Patient will participate in re-evaluation of swallow function within 7 days   Initiated 10/26/22    1. Patient will tolerate pureed diet with thin liquids with no adverse effects within 7 days. Discontinue 10/28/2022   Outcome: Not Progressing Towards Goal     SPEECH LANGUAGE PATHOLOGY DYSPHAGIA TREATMENT  Patient: Alex Maza (26 y.o. female)  Date: 10/28/2022  Diagnosis: AMS (altered mental status) [R41.82]  Diarrhea [R19.7]  Altered mental status [R41.82] <principal problem not specified>      Precautions: aspiration Fall, Skin, Seizure    ASSESSMENT:  Patient with no recognition of PO items with no response when ice chip rubbed on lips and no response to melted water running down her chin. No meaningful responses, no command following and no interactions. Patient no longer appropriate for PO diet and would not recommend anything by mouth at this time. Mother present and educated on this as she reported wanting to try to feed her when the tray came yesterday and being told to hold. Reviewed aspiration risks as they relate to current mental status. Mother provided additional history regarding swallowing function PTA. Reports patient was intubated for about 2 1/2 weeks for heart issues and her intake was minimal after extubation before she went to [facility]. However, reports no difficulty swallowing. Reports further gradual decrease in intake at [facility] and that she would not open her mouth to accept any food or drink, however, if it was put in her mouth she would chew it up and swallow it. Reports she was being syringe fed due to her inability to open her mouth to accept food. Mother verbalized understanding of current risks of aspiration and importance of NPO with NG for sole nutrition at this time.       PLAN:  Recommendations and Planned Interventions:  --recommend NPO with NG tube for sole nutritional support  --will follow for re-assessment of swallow function as mental status improves   Patient continues to benefit from skilled intervention to address the above impairments. Continue treatment per established plan of care. Discharge Recommendations: To Be Determined     SUBJECTIVE:   Patient without meaningful responses. Mother present     OBJECTIVE:   Cognitive and Communication Status:  Neurologic State: Eyes open spontaneously  Orientation Level: Unable to verbalize  Cognition: No command following  Perception: Tactile, Verbal, Visual  Perseveration: No perseveration noted  Safety/Judgement: Decreased awareness of environment, Decreased awareness of need for assistance, Decreased awareness of need for safety, Lack of insight into deficits  Dysphagia Treatment:  Oral Assessment:     P.O. Trials:  Patient Position: upright in bed  Vocal quality prior to P.O.:  (nonverbal)  Consistency Presented: Ice chips        Bolus Acceptance: Absent  Bolus Formation/Control:  (no response to ice chips)                                  Oral Phase Severity: Severe  Pharyngeal Phase Severity :  (unable to assess due to absent recognition)  Exercises:  Laryngeal Exercises:                                                                                                                                   Pain:  Pain Scale 1: Adult Nonverbal Pain Scale  Pain Intensity 1: 0       After treatment:   Patient left in no apparent distress in bed, Call bell within reach, Nursing notified, and Caregiver / family present    COMMUNICATION/EDUCATION:     The patient's plan of care including recommendations, planned interventions, and recommended diet changes were discussed with: Registered nurse and Physician. Briana Louise M.CD.  CCC-SLP   Time Calculation: 12 mins

## 2022-10-28 NOTE — PROGRESS NOTES
1930: Bedside and Verbal shift change report given to [facility] (oncoming nurse) by Zandra Chacko RN (offgoing nurse). Report included the following information SBAR, Kardex, Procedure Summary, Intake/Output, MAR, Accordion, Recent Results, Cardiac Rhythm NSR, Alarm Parameters , and Dual Neuro Assessment. 0500: Patient BP 82/44. NP notified. Orders received for 1L bolus of LR.    0800: Bedside and Verbal shift change report given to Gricel Nagel (oncoming nurse) by Caesar Galicia RN (offgoing nurse). Report included the following information SBAR, Kardex, Procedure Summary, Intake/Output, MAR, Accordion, Recent Results, Cardiac Rhythm NSR, Alarm Parameters , and Dual Neuro Assessment.

## 2022-10-28 NOTE — PROGRESS NOTES
Neurology Progress Note  Jad Gaffney NP    Admit Date: 10/25/2022   LOS: 1 day      Daily Progress Note: 10/28/2022    HPI: Alexus Davis is a 59 y.o. F with a PMH of depression, obesity, lap imelda, allergic rhinitis, mild strabismus as a child, and left ACL reconstrution who presented to the ER with AMS on 10/25/22 from [address] [place] [address]. Reportedly, patient had recent prolonged hospitalization after respiratory arrest presumed secondary to be sedating medication related, but also found to have Takotsubo CM, who has had declining mental status and function since receiving risperdal, then seroquel, only one dose each prior to discharge on 10/20/22, then trazodone and Lexapro at rehab. Patient has been febrile, tachycardic, has severe muscle rigidity, hyperreflexia, diaphoresis, and labile BPs. Infectious work up has been negative. Presumed etiology was suspicious for serotonin syndrome thus Trazodone and SSRI was discontinued on 10/26/22. The patient was transferred to the ICU 10/26/22 for higher level of care. Subjective:   Continues to be febrile overnight. Still with increased rigidity and intermittent shivering. Received 1L bolus IVF for hypotension of 80/54. On 2.5mg valium for rigidity (suspected serotonin syndrome.)     Allergies   Allergen Reactions    Droperidol Itching       Review of Systems:  Review of systems not obtained due to patient factors. AMS    Past Medical History:   Diagnosis Date    Allergic rhinitis 7/29/2019    Depression     History of multiple allergies     History of vascular access device 10/07/2022    [facility] VAT: PICC placement R Cephalic length 40 cm for reliable access/TPN arm circ 35.5 cm    Muscle ache     Obesity 11/5/2012    Sinus pressure     Sinus problem      Family History   Problem Relation Age of Onset    Diabetes Paternal Grandfather      Social History     Tobacco Use    Smoking status: Not on file    Smokeless tobacco: Never   Substance Use Topics    Alcohol use:  No Prior to Admission Medications   Prescriptions Last Dose Informant Patient Reported? Taking? MULTIVITAMIN PO   Yes No   Sig: Take  by mouth. OTHER,NON-FORMULARY,   No No   Sig: ESTROGEN(5050)/TESTOSTERONE (HRT) 1.5mg/10mg/ml Cream APPLY 1 ML TO SKIN (INNER WRIST/ABDOMEN/THIGHS EVERY DAY. ROTATE SITES   acetaminophen (TYLENOL) 325 mg tablet   Yes No   Sig: Take  by mouth every four (4) hours as needed. aspirin 81 mg chewable tablet   No No   Sig: Take 1 Tablet by mouth daily for 30 days. atorvastatin (LIPITOR) 10 mg tablet   No No   Sig: TAKE ONE TABLET BY MOUTH ONCE NIGHTLY   atorvastatin (LIPITOR) 10 mg tablet  Other Yes No   Sig: Take 10 mg by mouth daily. cloNIDine (CATAPRES) 0.2 mg/24 hr ptwk   No No   Si Patch by TransDERmal route every seven (7) days for 30 days. escitalopram oxalate (LEXAPRO) 20 mg tablet   No No   Sig: TAKE ONE TABLET BY MOUTH DAILY   furosemide (LASIX) 20 mg tablet   No No   Sig: Take 1 Tablet by mouth daily as needed (swelling). levothyroxine (SYNTHROID) 88 mcg tablet  Other Yes No   Sig: Take 88 mcg by mouth Daily (before breakfast). levothyroxine (SYNTHROID) 88 mcg tablet   No No   Sig: Take 1 Tablet by mouth Daily (before breakfast). loratadine-pseudoephedrine (Claritin-D 12 Hour) 5-120 mg per tablet   Yes No   Sig: Claritin-D   metoprolol tartrate (LOPRESSOR) 25 mg tablet   No No   Sig: Take 1 Tablet by mouth every twelve (12) hours for 30 days. pantoprazole (PROTONIX) 40 mg tablet   No No   Sig: Take 1 Tablet by mouth Daily (before breakfast) for 30 days. senna-docusate (PERICOLACE) 8.6-50 mg per tablet   No No   Sig: Take 1 Tablet by mouth two (2) times a day for 30 days.    traZODone (DESYREL) 50 mg tablet   No No   Sig: Take 2.5 Tablets by mouth nightly as needed for Sleep.   tretinoin (RETIN-A) 0.05 % topical cream   Yes No   Sig: tretinoin 0.05 % topical cream      Facility-Administered Medications: None       Objective:   Vital signs  Temp (24hrs), Av.2 °F (37.9 °C), Min:99.8 °F (37.7 °C), Max:100.7 °F (38.2 °C)   10/27 1901 - 10/28 0700  In: 375 [I.V.:375]  Out: 165 [Urine:165]  10/26 07 - 10/27 1900  In: 3478.8 [I.V.:3438.8]  Out: 2175 [Urine:2175]  Visit Vitals  BP (!) 95/50 (BP 1 Location: Right upper arm, BP Patient Position: At rest)   Pulse 84   Temp 100.1 °F (37.8 °C)   Resp 25   Ht 5' 6\" (1.676 m)   Wt 89.3 kg (196 lb 13.9 oz)   SpO2 100%   BMI 31.78 kg/m²    O2 Flow Rate (L/min): 1 l/min O2 Device: None (Room air)   Vitals:    10/27/22 2100 10/27/22 2200 10/27/22 2300 10/28/22 0000   BP: (!) 123/57 113/68 (!) 95/55 (!) 95/50   Pulse: 87 83 78 84   Resp: 26 29 29 25   Temp:    100.1 °F (37.8 °C)   SpO2: 97% 98% 95% 100%   Weight:       Height:            Physical Exam:  GENERAL: Calm,  NAD,  ill-appearing female   SKIN: Warm, dry, color appropriate for ethnicity. Edematous ext x4 . Neurologic Exam:  Mental Status:  Opened eyes to voice, no command following, no tracking    Language:    Nonverbal.     Cranial Nerves:   Pupils 5 mm which reduced to 3 mm with light, equal, round and reactive to light. No notable facial droop. No roving eye movements noted on exams. + corneal.           Motor:       Increased tone x 4 extremities. No spontaneous or involuntary movements noted    Sensation:    Withdraws from noxious stimuli in RUE and BL LE. Grimaces to noxious stimuli in LUE but no withdrawal     Reflexes:    Negative lower extremity clonus.  Up-going L toes and down-going R toes       Labs:  Lab Results   Component Value Date/Time    WBC 9.9 10/27/2022 05:09 AM    HGB 11.1 (L) 10/27/2022 05:09 AM    HCT 34.2 (L) 10/27/2022 05:09 AM    PLATELET 879  05:09 AM    MCV 92.2 10/27/2022 05:09 AM     Lab Results   Component Value Date/Time    Sodium 138 10/27/2022 05:09 AM    Potassium 3.5 10/27/2022 05:09 AM    Chloride 107 10/27/2022 05:09 AM    CO2 26 10/27/2022 05:09 AM    Anion gap 5 10/27/2022 05:09 AM    Glucose 104 (H) 10/27/2022 05:09 AM    BUN 12 10/27/2022 05:09 AM    Creatinine 0.50 (L) 10/27/2022 05:09 AM    BUN/Creatinine ratio 24 (H) 10/27/2022 05:09 AM    GFR est AA >60 10/03/2022 01:13 AM    GFR est non-AA >60 10/03/2022 01:13 AM    Calcium 8.6 10/27/2022 05:09 AM     Imaging:  CT Results (maximum last 3): Results from [place] encounter on 10/25/22    CT HEAD WO CONT    Narrative  EXAM: CT HEAD WO CONT    INDICATION: Confusion. Electronic medical record is not available at the time of  this interpretation. COMPARISON: CT head on 10/13/2022. MRI brain on 10/2/2022. TECHNIQUE: Noncontrast head CT. Coronal and sagittal reformats. CT dose  reduction was achieved through the use of a standardized protocol tailored for  this examination and automatic exposure control for dose modulation. FINDINGS: The ventricles and sulci are age-appropriate without hydrocephalus. There is no mass effect or midline shift. There is no intracranial hemorrhage or  extra-axial fluid collection. There is no abnormal area of decreased density to  suggest infarct. The calvarium is intact. The visualized paranasal sinuses and mastoid air cells  are clear. Impression  No acute intracranial abnormality on this noncontrast head CT. No change given  difference in technique. Results from [place] encounter on 09/27/22    CT HEAD WO CONT    Narrative  CLINICAL HISTORY: post fall  INDICATION: post fall  COMPARISON: 10/2/2022. 9/27/2022  CT dose reduction was achieved through use of a standardized protocol tailored  for this examination and automatic exposure control for dose modulation. TECHNIQUE: Serial axial images with a collimation of 5 mm were obtained from the  skull base through the vertex  FINDINGS:  The sulci and ventricles are within normal limits for patient age. There is no  evidence of an acute infarction, hemorrhage, or mass-effect. There is no  evidence of midline shift or hydrocephalus.  Posterior fossa structures are  unremarkable. No extra-axial collections are seen. Trace mastoid fluid on the left and minimal mastoid effusion on the right. There  is no evidence of depressed skull fractures of soft tissue swelling. Impression  No acute intracranial process. CT ABD PELV WO CONT    Narrative  EXAM: CT CHEST WO CONT, CT ABD PELV WO CONT    INDICATION: Found unresponsive, respiratory arrest, unclear etiology, family hx  aneurysm    COMPARISON: None    IV CONTRAST: None    ORAL CONTRAST: None    TECHNIQUE:  Thin axial images were obtained through the chest, abdomen and pelvis. Coronal  and sagittal reformats were generated. CT dose reduction was achieved through  use of a standardized protocol tailored for this examination and automatic  exposure control for dose modulation. FINDINGS:  An endotracheal tube is in appropriate position. A nasogastric tube is seen  looped within the stomach. CHEST WALL: No mass or axillary lymphadenopathy. THYROID: No nodule. MEDIASTINUM: No mass or lymphadenopathy. ELBA: No mass or lymphadenopathy. THORACIC AORTA: No dissection or aneurysm. MAIN PULMONARY ARTERY: Normal in caliber. TRACHEA/BRONCHI: Patent. ESOPHAGUS: No wall thickening or dilatation. HEART: Normal in size. PLEURA: No effusion or pneumothorax. LUNGS: There is moderate bibasilar atelectasis. LIVER: No mass. BILIARY TREE: Status post cholecystectomy. CBD is not dilated. SPLEEN: within normal limits. PANCREAS: No mass or ductal dilatation. ADRENALS: Unremarkable. KIDNEYS: No mass, calculus, or hydronephrosis. STOMACH: Unremarkable. SMALL BOWEL: No dilatation or wall thickening. COLON: No dilatation or wall thickening. APPENDIX: Unremarkable  PERITONEUM: No ascites or pneumoperitoneum. RETROPERITONEUM: No lymphadenopathy or aortic aneurysm. REPRODUCTIVE ORGANS: Unremarkable. URINARY BLADDER: Decompressed by Sal catheter. BONES: No destructive bone lesion.   ABDOMINAL WALL: No mass or hernia. ADDITIONAL COMMENTS: N/A    Impression  1. Moderate bibasilar atelectasis. 2. Status post cholecystectomy. 3. Endotracheal and nasogastric tubes in place. Sal catheter decompresses the  bladder. Assessment:   Active Problems:    Diarrhea (10/25/2022)      AMS (altered mental status) (10/25/2022)      Altered mental status (10/27/2022)    Plan:     CT head 10/25 shows no acute intracranial abnormality. MRI on 10/02/22 shows 2 small foci of diffusion restriction in the right frontal lobe which may represent small foci of acute ischemia and  were unchanged from previous MRI on 09/28/22  EEG's rapid and continuous at [facility] were negative for seizure. Routine EEG 10/27/22 negative for electrographic seizure or epileptiform discharges  SSRI's on-hold. Q1hr neuro checks  Supportive care   Continue with valium 2.5mg Q4hr (caution with hypotension)     Further recommendations to follow from Dr. Edilma Chung. Yoanna Hi NP  Neurocritical Care Nurse Practitioner    Neurology Staff Addendum:  I have personally seen and examined the patient. I have personally reviewed the chart and images. Elements of my examination included history of present illness, review of systems, review of past medical and surgical history, review of medications, and physical and neurological examination. I have personally reviewed the findings and impressions with the nurse practitioner and am in agreement with their note with changes below. Pt is a 61yo RH female with recent prolonged hospitalization after respiratory arrest, presumed secondary to sedating meds including fentanyl, with seizure witnessed by EMS, but also found to have Takotsubo CM, who has had declining mental status and function since receiving risperdal, then seroquel, only one dose each prior to discharge on 10/20/22 from [facility], then trazodone and Lexapro at rehab.  At presentation, she had been febrile, tachycardic, had severe muscle rigidity, hyperreflexia, diaphoresis, labile BPs. Infectious work up has been negative. Leading suspicion is serotonin syndrome. Discontinued Trazodone and SSRI 10/26/22 (she had been on these at home prior to hospitalization at [facility] Currently on diazepam 2.5mg IV every 4 hours. Seen with Mom and DIL at the bedside with son on speaker phone. Visit Vitals  Visit Vitals  BP (!) 114/53 (BP 1 Location: Right upper arm, BP Patient Position: At rest)   Pulse 72   Temp (!) 100.5 °F (38.1 °C)   Resp 28   Ht 5' 6\" (1.676 m)   Wt 196 lb 13.9 oz (89.3 kg)   SpO2 96%   BMI 31.78 kg/m²        Physical Exam:  General: Well developed well nourished patient lying bed in no obvious distress   Cardiac: Regular rhythm with no murmurs. Extremities: 2+ Radial pulses, no cyanosis or edema     Neurological Exam:  Mental Status: Eyes open, no roving horizontal eye movements seen today, not tracking examiner, not following commands. Seems to attempt to raise her left arm to her face. Cranial Nerves:   PERRL, no ptosis. Facial movement is symmetric. Motor: Tone is much better compared to admission, but seem to have more tone in UE today compared to yesterday. Able to elicit clonus in UE. Tremulous. Reflexes:   Deep tendon reflexes 2+ and symmetric. Toes downgoing bilaterally. Sensory:   Unable to assess due to patient factors   Gait:  Unable to assess due to patient factors   Cerebellar:  Unable to assess due to patient factors      A/P: Pt is a 63yo RH female with signs of serotonin syndrome including altered mental status, muscle rigidity, hyperreflexia, upgoing toes, clonus, horizontal roving eye movements, low grade fevers, labile BPs on admission, and diaphoresis. Exam is improved, but pt still not interacting. Continue Diazepam  Continue supportive care  Severe cases can still have a good prognosis and she does seem to have slight clinical improvement each day  Discussed with family and answered their questions to the best of my ability. Will have Dr. Isabella Altman see her tomorrow. The duration of this visit was 45 minutes spent on interview, examination, review of records/labs/imaging, counseling, explanation of diagnosis, planning of further management, documentation and coordination of care.      Rosa Giles MD  [facility]

## 2022-10-29 LAB
ALBUMIN SERPL-MCNC: 2.3 G/DL (ref 3.5–5)
ALBUMIN/GLOB SERPL: 0.6 {RATIO} (ref 1.1–2.2)
ALP SERPL-CCNC: 53 U/L (ref 45–117)
ALT SERPL-CCNC: 22 U/L (ref 12–78)
ANION GAP SERPL CALC-SCNC: 5 MMOL/L (ref 5–15)
AST SERPL-CCNC: 26 U/L (ref 15–37)
BASOPHILS # BLD: 0.1 K/UL (ref 0–0.1)
BASOPHILS NFR BLD: 1 % (ref 0–1)
BILIRUB SERPL-MCNC: 0.5 MG/DL (ref 0.2–1)
BUN SERPL-MCNC: 12 MG/DL (ref 6–20)
BUN/CREAT SERPL: 19 (ref 12–20)
CALCIUM SERPL-MCNC: 8.6 MG/DL (ref 8.5–10.1)
CHLORIDE SERPL-SCNC: 107 MMOL/L (ref 97–108)
CK SERPL-CCNC: 265 U/L (ref 26–192)
CO2 SERPL-SCNC: 26 MMOL/L (ref 21–32)
CREAT SERPL-MCNC: 0.62 MG/DL (ref 0.55–1.02)
DIFFERENTIAL METHOD BLD: ABNORMAL
EOSINOPHIL # BLD: 0.1 K/UL (ref 0–0.4)
EOSINOPHIL NFR BLD: 1 % (ref 0–7)
ERYTHROCYTE [DISTWIDTH] IN BLOOD BY AUTOMATED COUNT: 13.6 % (ref 11.5–14.5)
GLOBULIN SER CALC-MCNC: 3.7 G/DL (ref 2–4)
GLUCOSE SERPL-MCNC: 132 MG/DL (ref 65–100)
HCT VFR BLD AUTO: 33.1 % (ref 35–47)
HGB BLD-MCNC: 10.9 G/DL (ref 11.5–16)
IMM GRANULOCYTES # BLD AUTO: 0 K/UL (ref 0–0.04)
IMM GRANULOCYTES NFR BLD AUTO: 0 % (ref 0–0.5)
LYMPHOCYTES # BLD: 2 K/UL (ref 0.8–3.5)
LYMPHOCYTES NFR BLD: 18 % (ref 12–49)
MAGNESIUM SERPL-MCNC: 1.9 MG/DL (ref 1.6–2.4)
MCH RBC QN AUTO: 29.6 PG (ref 26–34)
MCHC RBC AUTO-ENTMCNC: 32.9 G/DL (ref 30–36.5)
MCV RBC AUTO: 89.9 FL (ref 80–99)
MONOCYTES # BLD: 0.9 K/UL (ref 0–1)
MONOCYTES NFR BLD: 8 % (ref 5–13)
NEUTS SEG # BLD: 7.9 K/UL (ref 1.8–8)
NEUTS SEG NFR BLD: 72 % (ref 32–75)
NRBC # BLD: 0 K/UL (ref 0–0.01)
NRBC BLD-RTO: 0 PER 100 WBC
PHOSPHATE SERPL-MCNC: 2.9 MG/DL (ref 2.6–4.7)
PLATELET # BLD AUTO: 265 K/UL (ref 150–400)
PMV BLD AUTO: 11.4 FL (ref 8.9–12.9)
POTASSIUM SERPL-SCNC: 3.7 MMOL/L (ref 3.5–5.1)
PROT SERPL-MCNC: 6 G/DL (ref 6.4–8.2)
RBC # BLD AUTO: 3.68 M/UL (ref 3.8–5.2)
SODIUM SERPL-SCNC: 138 MMOL/L (ref 136–145)
WBC # BLD AUTO: 11 K/UL (ref 3.6–11)

## 2022-10-29 PROCEDURE — 99231 SBSQ HOSP IP/OBS SF/LOW 25: CPT | Performed by: PSYCHIATRY & NEUROLOGY

## 2022-10-29 PROCEDURE — 83735 ASSAY OF MAGNESIUM: CPT

## 2022-10-29 PROCEDURE — 85025 COMPLETE CBC W/AUTO DIFF WBC: CPT

## 2022-10-29 PROCEDURE — 77030038269 HC DRN EXT URIN PURWCK BARD -A

## 2022-10-29 PROCEDURE — 36415 COLL VENOUS BLD VENIPUNCTURE: CPT

## 2022-10-29 PROCEDURE — 82550 ASSAY OF CK (CPK): CPT

## 2022-10-29 PROCEDURE — 74011250637 HC RX REV CODE- 250/637: Performed by: PHYSICIAN ASSISTANT

## 2022-10-29 PROCEDURE — 84100 ASSAY OF PHOSPHORUS: CPT

## 2022-10-29 PROCEDURE — 74011250637 HC RX REV CODE- 250/637: Performed by: NURSE PRACTITIONER

## 2022-10-29 PROCEDURE — 65660000001 HC RM ICU INTERMED STEPDOWN

## 2022-10-29 PROCEDURE — 74011250636 HC RX REV CODE- 250/636: Performed by: NURSE PRACTITIONER

## 2022-10-29 PROCEDURE — 74011000250 HC RX REV CODE- 250: Performed by: FAMILY MEDICINE

## 2022-10-29 PROCEDURE — 80053 COMPREHEN METABOLIC PANEL: CPT

## 2022-10-29 RX ORDER — METOPROLOL TARTRATE 25 MG/1
12.5 TABLET, FILM COATED ORAL EVERY 12 HOURS
Status: DISCONTINUED | OUTPATIENT
Start: 2022-10-29 | End: 2022-11-22 | Stop reason: HOSPADM

## 2022-10-29 RX ORDER — MAGNESIUM SULFATE 1 G/100ML
1 INJECTION INTRAVENOUS ONCE
Status: COMPLETED | OUTPATIENT
Start: 2022-10-29 | End: 2022-10-29

## 2022-10-29 RX ORDER — MIDODRINE HYDROCHLORIDE 5 MG/1
5 TABLET ORAL EVERY 8 HOURS
Status: DISCONTINUED | OUTPATIENT
Start: 2022-10-29 | End: 2022-10-30 | Stop reason: SDUPTHER

## 2022-10-29 RX ORDER — AMOXICILLIN 250 MG
2 CAPSULE ORAL DAILY
Status: DISCONTINUED | OUTPATIENT
Start: 2022-10-29 | End: 2022-11-22 | Stop reason: HOSPADM

## 2022-10-29 RX ADMIN — DIAZEPAM 2.5 MG: 5 INJECTION, SOLUTION INTRAMUSCULAR; INTRAVENOUS at 10:35

## 2022-10-29 RX ADMIN — DIAZEPAM 2.5 MG: 5 INJECTION, SOLUTION INTRAMUSCULAR; INTRAVENOUS at 22:01

## 2022-10-29 RX ADMIN — HEPARIN SODIUM 5000 UNITS: 5000 INJECTION INTRAVENOUS; SUBCUTANEOUS at 03:02

## 2022-10-29 RX ADMIN — SODIUM CHLORIDE, PRESERVATIVE FREE 10 ML: 5 INJECTION INTRAVENOUS at 13:30

## 2022-10-29 RX ADMIN — SENNOSIDES AND DOCUSATE SODIUM 2 TABLET: 50; 8.6 TABLET ORAL at 09:15

## 2022-10-29 RX ADMIN — DIAZEPAM 2.5 MG: 5 INJECTION, SOLUTION INTRAMUSCULAR; INTRAVENOUS at 02:59

## 2022-10-29 RX ADMIN — POTASSIUM BICARBONATE 40 MEQ: 782 TABLET, EFFERVESCENT ORAL at 09:10

## 2022-10-29 RX ADMIN — SODIUM CHLORIDE, PRESERVATIVE FREE 40 ML: 5 INJECTION INTRAVENOUS at 05:19

## 2022-10-29 RX ADMIN — MAGNESIUM SULFATE 1 G: 1 INJECTION INTRAVENOUS at 09:16

## 2022-10-29 RX ADMIN — PANTOPRAZOLE SODIUM 40 MG: 40 TABLET, DELAYED RELEASE ORAL at 06:36

## 2022-10-29 RX ADMIN — MIDODRINE HYDROCHLORIDE 5 MG: 5 TABLET ORAL at 22:02

## 2022-10-29 RX ADMIN — ACETAMINOPHEN 650 MG: 650 SOLUTION ORAL at 09:17

## 2022-10-29 RX ADMIN — SODIUM CHLORIDE, PRESERVATIVE FREE 10 ML: 5 INJECTION INTRAVENOUS at 22:03

## 2022-10-29 RX ADMIN — HEPARIN SODIUM 5000 UNITS: 5000 INJECTION INTRAVENOUS; SUBCUTANEOUS at 22:01

## 2022-10-29 RX ADMIN — SODIUM CHLORIDE, POTASSIUM CHLORIDE, SODIUM LACTATE AND CALCIUM CHLORIDE 1000 ML: 600; 310; 30; 20 INJECTION, SOLUTION INTRAVENOUS at 05:18

## 2022-10-29 RX ADMIN — DIAZEPAM 2.5 MG: 5 INJECTION, SOLUTION INTRAMUSCULAR; INTRAVENOUS at 06:36

## 2022-10-29 RX ADMIN — HEPARIN SODIUM 5000 UNITS: 5000 INJECTION INTRAVENOUS; SUBCUTANEOUS at 11:39

## 2022-10-29 RX ADMIN — LEVOTHYROXINE SODIUM 88 MCG: 0.09 TABLET ORAL at 06:36

## 2022-10-29 RX ADMIN — DIAZEPAM 2.5 MG: 5 INJECTION, SOLUTION INTRAMUSCULAR; INTRAVENOUS at 15:57

## 2022-10-29 RX ADMIN — MIDODRINE HYDROCHLORIDE 5 MG: 5 TABLET ORAL at 13:30

## 2022-10-29 RX ADMIN — Medication 100 MG: at 09:16

## 2022-10-29 RX ADMIN — THERA TABS 1 TABLET: TAB at 09:21

## 2022-10-29 NOTE — PROGRESS NOTES
0740: Bedside shift change report given to Ronna Hudson RN (oncoming nurse) by Kaitlynn Palma RN (offgoing nurse). Report included the following information SBAR, Intake/Output, Cardiac Rhythm NSR to ST, and Alarm Parameters . 1720: TRANSFER - OUT REPORT:    Verbal report given to Shanon Sosa RN (name) on Christine Son  being transferred to NSTU (unit) for routine progression of care       Report consisted of patients Situation, Background, Assessment and   Recommendations(SBAR). Information from the following report(s) SBAR, Intake/Output, Cardiac Rhythm NSR to ST, Alarm Parameters , and Dual Neuro Assessment was reviewed with the receiving nurse. Lines:   Peripheral IV 10/25/22 Right Antecubital (Active)   Site Assessment Clean, dry, & intact 10/29/22 1530   Phlebitis Assessment 0 10/29/22 1530   Infiltration Assessment 0 10/29/22 1530   Dressing Status Clean, dry, & intact 10/29/22 1530   Dressing Type Transparent;Tape 10/29/22 1530   Hub Color/Line Status Pink;Capped 10/29/22 1530   Action Taken Open ports on tubing capped 10/29/22 1530   Alcohol Cap Used Yes 10/29/22 1530       Peripheral IV 10/27/22 Left Antecubital (Active)   Site Assessment Clean, dry, & intact 10/29/22 1530   Phlebitis Assessment 0 10/29/22 1530   Infiltration Assessment 0 10/29/22 1530   Dressing Status Clean, dry, & intact 10/29/22 1530   Dressing Type Transparent;Tape 10/29/22 1530   Hub Color/Line Status Green 10/29/22 1530   Action Taken Open ports on tubing capped 10/29/22 1530   Alcohol Cap Used Yes 10/29/22 1530        Opportunity for questions and clarification was provided. Patient's mother notified of patient's transfer.      1820: Patient transported to NSTU  with:   Monitor  Registered Nurse

## 2022-10-29 NOTE — PROGRESS NOTES
[facility]    ICU TEAM Progress Note    Name: Orquidea Ely   : 1965   MRN: [id number]   Date: 10/29/2022        Subjective:     Reason for ICU Admission:   57F  from [address] [place] [address] presented with encephalopathy, has recent hospitalization at [facility] for Takutosubo CMP, Respiratory arrest requiring MV secondary to sleeping pills and fentanyl patches, ? Seizure with negative EEG. 10/26 called for evaluation by Neurology, patient has declining mental status - now nonverbal, tachycardic, diaphoretic, rigid extremities, no nystagmus. Concern for serotonin syndrome - on Trazodone and Lexapro. Dose of Risperdal/Seroquel at [facility]. Moving to ICU for continued management. Overnight Events: 10/29: No acute events overnight, US NEG for DVT LUE. CKD up today 265, suspect iatrotrogenic from bedrest. Less rigidity today, opens eyes briskly to name but does not follow commands. Has remained stable and improving during ICU stay x 3 days, will transfer to [facility].    10/28: Hypotensive overnight, rec'd 1 liter IVF. K replaced. Remains on diazepam for rigidity. Appears more alert today, still not responsive, rigidity much improved in BUE, Remains rigid in BLE. Ongoing low grade temps. 10/27:No acute concerns overnight, patient alternates between eyes open and not following commands or verbalizing to eyes closed w/ horizontal roving nystagmus and minimally responsive. Vitals stable, protecting airway at present.     Objective:   Vital Signs:  Visit Vitals  BP (!) 91/44   Pulse 76   Temp 99.8 °F (37.7 °C)   Resp 21   Ht 5' 6\" (1.676 m)   Wt 91.4 kg (201 lb 8 oz)   SpO2 93%   BMI 32.52 kg/m²    O2 Flow Rate (L/min): 1 l/min O2 Device: None (Room air) Temp (24hrs), Av.5 °F (37.5 °C), Min:97.8 °F (36.6 °C), Max:100.6 °F (38.1 °C)         Intake/Output:     Intake/Output Summary (Last 24 hours) at 10/29/2022 0757  Last data filed at 10/29/2022 0700  Gross per 24 hour   Intake 3880 ml   Output 1740 ml   Net 2140 ml         Physical Exam:    General:  Alert, poorly responsive, well noursished, well developed, appears stated age   Eyes:  Sclera anicteric. Pupils equally round and reactive to light. Horizontal roving nystagmus when at rest   Mouth/Throat: Mucous membranes normal, oral pharynx clear   Neck: Supple   Lungs:   Clear to auscultation bilaterally, good effort, + protecting airway with clearing of throat noted. CV:  Regular rate and rhythm,no murmur, click, rub or gallop   Abdomen:   Soft, non-tender. bowel sounds normal. non-distended   Extremities: No cyanosis or edema   Skin: Skin color, texture, turgor normal. no acute rash or lesions   Lymph nodes: Cervical and supraclavicular normal   Musculoskeletal: No swelling or deformity, mild rigidity BUE, able to manually extend arms and hold, BLE unable to flex at knee L worse than Right   Lines/Devices:  Intact, no erythema, drainage or tenderness   Psych: Alert and not following commands, not verbalizing     LABS AND  DATA: Personally reviewed  Recent Labs     10/29/22  0322 10/28/22  1533   WBC 11.0 10.8   HGB 10.9* 10.8*   HCT 33.1* 33.0*    245       Recent Labs     10/29/22  0322 10/28/22  0303    139   K 3.7 3.4*    107   CO2 26 25   BUN 12 14   CREA 0.62 0.55   * 102*   CA 8.6 8.7   MG 1.9 1.9   PHOS 2.9 3.5       Recent Labs     10/29/22  0322 10/28/22  0303   AP 53 53   TP 6.0* 6.1*   ALB 2.3* 2.6*   GLOB 3.7 3.5       No results for input(s): INR, PTP, APTT, INREXT, INREXT in the last 72 hours. No results for input(s): PHI, PCO2I, PO2I, FIO2I in the last 72 hours. Recent Labs     10/29/22  0322 10/27/22  0026   * 74         MEDS: Reviewed    Chest X-Ray:  CXR Results  (Last 48 hours)      None          CT Results  (Last 48 hours)      None           ECHO:  10/11/2022  Result status: Final result       Left Ventricle: Normal left ventricular systolic function with a visually estimated EF of 55 - 60%.  Left ventricle size is normal. Mildly increased wall thickness. Unable to assess wall motion. Right Ventricle: Not well visualized. Right ventricle size is normal. Normal wall thickness. Multidisciplinary Rounds Completed: Yes    ABCDEF Bundle/Checklist Completed:  YES-See Plan    Active Problem List:     Problem List  Date Reviewed: 10/5/2022            Codes Class    Altered mental status ICD-10-CM: R41.82  ICD-9-CM: 780.97         Diarrhea ICD-10-CM: R19.7  ICD-9-CM: 787.91         AMS (altered mental status) ICD-10-CM: R41.82  ICD-9-CM: 780.97         Anemia ICD-10-CM: D64.9  ICD-9-CM: 285.9         UTI (urinary tract infection) ICD-10-CM: N39.0  ICD-9-CM: 599.0         Hypokalemia ICD-10-CM: E87.6  ICD-9-CM: 276.8         Obese ICD-10-CM: E66.9  ICD-9-CM: 278.00         Acute CVA (cerebrovascular accident) ([facility] ICD-10-CM: I63.9  ICD-9-CM: 434.91         NSTEMI (non-ST elevated myocardial infarction) ([facility] ICD-10-CM: I21.4  ICD-9-CM: 410.70         Takotsubo cardiomyopathy ICD-10-CM: I51.81  ICD-9-CM: 429.83         Dyslipidemia ICD-10-CM: E78.5  ICD-9-CM: 272.4         Hypothyroidism ICD-10-CM: E03.9  ICD-9-CM: 244.9         KOBY (generalized anxiety disorder) ICD-10-CM: F41.1  ICD-9-CM: 300.02         Status epilepticus ([facility] ICD-10-CM: G40.901  ICD-9-CM: 345. 3         CHRIS (acute kidney injury) ([facility] ICD-10-CM: N17.9  ICD-9-CM: 584.9         Lactic acidosis ICD-10-CM: E87.20  ICD-9-CM: 276.2         Acute respiratory failure with hypoxia (HCC) ICD-10-CM: J96.01  ICD-9-CM: 518.81         Shock, cardiogenic (HCC) ICD-10-CM: R57.0  ICD-9-CM: 785.51         Acute metabolic encephalopathy PIB-63-ALKA: G93.41  ICD-9-CM: 348.31         Acquired hypothyroidism ICD-10-CM: E03.9  ICD-9-CM: 244.9         Mixed hyperlipidemia ICD-10-CM: E78.2  ICD-9-CM: 272.2         Impaired fasting glucose ICD-10-CM: R73.01  ICD-9-CM: 790.21         Insomnia ICD-10-CM: G47.00  ICD-9-CM: 780.52         Lower extremity edema ICD-10-CM: R60.0  ICD-9-CM: 782.3         Generalized anxiety disorder ICD-10-CM: F41.1  ICD-9-CM: 300.02         Tachycardia ICD-10-CM: R00.0  ICD-9-CM: 785.0         PND (post-nasal drip) ICD-10-CM: R09.82  ICD-9-CM: 784.91         Chronic maxillary sinusitis ICD-10-CM: J32.0  ICD-9-CM: 473.0         Seasonal allergic rhinitis due to pollen ICD-10-CM: J30.1  ICD-9-CM: 477.0         Class 2 severe obesity due to excess calories with serious comorbidity and body mass index (BMI) of 36.0 to 36.9 in adult [facility]) ICD-10-CM: E66.01, Z68.36  ICD-9-CM: 278.01, V85.36         Depression ICD-10-CM: F32. A  ICD-9-CM: 766        ICU Assessment/ Comprehensive Plan of Care:     NEUROLOGIC  Toxic metabolic encephalopathy, rule out for Serotonin Syndrome, NMS    -Avoid sedatives, antipsychotic meds, PRN benzos for rigidity only   -Check EEG-NEG for seizure               -CT Head 1/25: No acute processes   -Poison control contacted, Low concern for SS, unlikely NMS.    -Neurology following   -Diazepam 2.5mg IV Q4hr    PULMONARY  No acute concerns   -Supplemental oxygen for sats > 92%   -HOB > 30 degrees    CARDIOVASCULAR  No acute concerns  Telemetry  Repeat EKG, Last Qtc 440  Goal MAP>65     RENAL  No acute concerns, preserved RF          Goal K>=4, Mg>=2, Phos >3  Trend BMP, Mg, P  Replace lytes per protocol  LR@ 75 ml/hr    GASTROINTESTINAL  No acute concerns  Nutrition: NPO  TF via NGT  IP consult ot nutritionist  Bowel regimen   Pericolace BID   Miralax PRN  GI Px  PPI daily    HEMATOLOGIC/ONC  No acute concerns  VTE Px   Goal Hb>=7  Trend CBC,PTT/INR  Heparin 5,000 units Q8hr AC/AP    SCDs    INFECTIOUS  No acute concerns   Trend CBC, fevers     ENDOCRINE   No acute concerns  Hx Hypothyroidism  SSI   Goal glucose 140-180  Continue synthroid    F - Feeding:  NPO, TF  A - Analgesia: NA  S - Sedation: GOAL RASS 0  T - DVT Prophylaxis: SCD's or Sequential Compression Device , heparin  H - Head of Bed: > 30 Degrees  U - Ulcer Prophylaxis: PPI  G - Glycemic Control: Y  S - Spontaneous Breathing Trial: NA  B - Bowel Regimen: Miralax PRN  I - Indwelling Catheter:              T/L/D  Tubes: Nasogastric Tube  Lines: Peripheral IV  Drains: Sal Catheter  D - De-escalation of Antibiotics:  NA    DISPOSITION/COMMUNICATION  Discussed Plan of Care/Code Status: Full Code  Stay in ICU    CRITICAL CARE CONSULTANT NOTE  I had a face to face encounter with the patient, reviewed and interpreted patient data including clinical events, labs, images, vital signs, I/O's, and examined patient. I have discussed the case and the plan and management of the patient's care with the consulting services, the bedside nurses and the respiratory therapist.      NOTE OF PERSONAL INVOLVEMENT IN CARE    I participated in the decision-making and personally managed or directed the management of the following life and organ supporting interventions that required my frequent assessment to treat or prevent imminent deterioration. I personally spent 45 minutes of critical care time. This is time spent at this critically ill patient's bedside actively involved in patient care as well as the coordination of care and discussions with the patient's family. This does not include any procedural time which has been billed separately.     ADRIEL DavisCNP-BC  Intensivist Nurse Practitioner  [facility]  10/29/2022

## 2022-10-29 NOTE — PROGRESS NOTES
[facility]  Hospitalist Group     ICU Transfer/Accept Summary     [place] [address] [place]  DATE OF TRANSFER: 10/29/2022       PATIENT ID: Benito Powers  MRN: [id number]   YOB: 1965    PRIMARY CARE PROVIDER: Horace Adorno MD   DATE OF ADMISSION: 10/25/2022  4:14 PM    ATTENDING PHYSICIAN: Maritza Jimenez MD  CONSULTATIONS:   IP CONSULT TO NEUROLOGY    PROCEDURES/SURGERIES:   * No surgery found *    REASON FOR ADMISSION: <principal problem not specified>     HOSPITAL PROBLEM LIST:  Patient Active Problem List   Diagnosis Code    Status epilepticus ([facility] G40.901    CHRIS (acute kidney injury) ([facility] N17.9    Lactic acidosis E87.20    Acute respiratory failure with hypoxia ([facility]) J96.01    Shock, cardiogenic ([facility] R13.5    Acute metabolic encephalopathy L20.50    NSTEMI (non-ST elevated myocardial infarction) ([facility] I21.4    Takotsubo cardiomyopathy I51.81    Dyslipidemia E78.5    Hypothyroidism E03.9    KOBY (generalized anxiety disorder) F41.1    Acute CVA (cerebrovascular accident) ([facility] I63.9    Anemia D64.9    UTI (urinary tract infection) N39.0    Hypokalemia E87.6    Obese E66.9    Depression F32. A    Class 2 severe obesity due to excess calories with serious comorbidity and body mass index (BMI) of 36.0 to 36.9 in adult ([facility]) E66.01, Z68.36    Seasonal allergic rhinitis due to pollen J30.1    PND (post-nasal drip) R09.82    Chronic maxillary sinusitis J32.0    Acquired hypothyroidism E03.9    Mixed hyperlipidemia E78.2    Impaired fasting glucose R73.01    Insomnia G47.00    Lower extremity edema R60.0    Generalized anxiety disorder F41.1    Tachycardia R00.0    Diarrhea R19.7    AMS (altered mental status) R41.82    Altered mental status R41.82         Brief HPI and Hospital Course:      Bneito Powers is a 59 y.o. female with past medical history of anoxic brain injury, anxiety, depression, hypertension, hyperlipidemia, hypothyroidism presented to the emergency department via EMS from [address] [place] [address] with reported altered mental status (AMS). Patient is aphasic/nonverbal and not answering any questions. Majority of history was obtained per review of chart records. Per reports patient recently was hospitalized at [place] [address] from 9/27/2022-10/20/2022 with diagnosis of status epilepticus, acute metabolic encephalopathy, delirium, psychosis, generalized anxiety disorder, debility, Takotsubo cardiomyopathy, NSTEMI, bradycardia, SIRS, tachycardia, fever, leukocytosis, and CVA. Patient had work-up including MRI and also EEG. There was concern the patient may have some anoxic brain injury. She has been followed by psychiatry and neurology services with persistent encephalopathy and intermittent severe aggression. Patient has had aphasia and altered mental status not following commands per staff report since last week. Symptoms remain constant, severe, without specific exacerbating relieving factors. About emergency department today, initial recorded vital signs temperature 98.8 °F, /65, heart rate 103, respirate 30, O2 saturation 93% on room air. Abnormal labs included WC 12.5, platelets 900, BUN 23. CT head without IV contrast showed no acute intracranial abnormalities. ED request admission to the hospitalist service.     No acute event overnight, less rigidity, stable and transferred out of ICU on 10/29       Assessment and Plan:    AMS concerning for serotonin syndrome   -trazodone and lexapro has stopped  -Reported receiving a dose of Risperdal then Seroquel  -she has intermittent fever, rigidity improving, BP normal, HR 84-18 bpm  -continue on diazepam, IVF  -prn tylenol  -continue neuro check and supportive care   -PT/OT  -Neurologist on board    Acute encephalopathy   Aphasic since after patient was found unresponsive 9/27  Hx of prolong hospitalization after she was found unresponsive at her home on  9/26/[facility] at [facility]  Discharged to [facility] arm on 10/20/22:  Suspected anoxic brain injury  Seizure   Acute CVA  -CT head on 10/25/2022 no acute intracranial abnormality on noncontrast head CT  -conscious and alert, aphasic, doesn't follow command or moves extremities  -continue nuero check and supportive care  -complex medical problem with prolonged hospitalization  -high risk for decompensation     Hx of Takotsubo cardiomyopathy  -has peripheral edema  -BP low normal, if it improves will consider her home metoprolol and lasix   -monitor I/O and daily weight     Hypotensive overnight, hx of HTN  -received bolus normal saline , and IVF,   -BP low normal, cut back normal saline at 50 ml/hr, midodrine added, monitor BP  -home clonidine, on hold    Hyperlipidemia   -statin on hold due to elevated CK    Hypothyroidism   -continue synthroid     Generalized anxiety disorder  -home trazodone and lexapro on hold     Hx of respiratory arrest possible due to sedating medication  She was found unresponsive on 9/27  Seizure  -conscious and alert, aphasic and doesn't follow comman  -SpO2 91-96% on RA  -continue prn duo neb and monitor pulse ox     Obesity   -BMI 32.52 kg/m2  -need weight loss program    Full Code: high risk for decompensation    Sal catheter in place, to discontinue 10/26  NGT in place, on tube feeding       Code status: Full Code  DVT prophylaxis: Heparin   Care Plan discussed with: Patient and CM  Anticipated Disposition: TBD > 48 hours         PHYSICAL EXAMINATION:  Visit Vitals  BP (!) 119/59   Pulse 92   Temp (!) 100.6 °F (38.1 °C)   Resp 27   Ht 5' 6\" (1.676 m)   Wt 91.4 kg (201 lb 8 oz)   SpO2 93%   BMI 32.52 kg/m²       General:          Conscious and alert, aphasic, not in acute cardiorespiratory distress  HEENT:           Atraumatic, MMM            Neck:               Supple, symmetrical,  thyroid: non tender  Lungs:             Clear to auscultation bilaterally. No Wheezing or Rhonchi. No rales.   Heart: Regular  rhythm,  No  murmur   No edema  Abdomen:       Soft, non-tender. Not distended. Bowel sounds normal  Extremities:     Bilateral pretibial, pedal and hand edema   Skin:                Not pale. Not Jaundiced  No rashes   Psych:             Not anxious or agitated. Neurologic:      Conscious and alert, aphasic, doesn't follow command or moves all extremities, withdraw to pain her right arm    Labs:     Recent Labs     10/29/22  0322 10/28/22  1533   WBC 11.0 10.8   HGB 10.9* 10.8*   HCT 33.1* 33.0*    245     Recent Labs     10/29/22  0322 10/28/22  0303 10/27/22  0509    139 138   K 3.7 3.4* 3.5    107 107   CO2 26 25 26   BUN 12 14 12   CREA 0.62 0.55 0.50*   * 102* 104*   CA 8.6 8.7 8.6   MG 1.9 1.9 2.0   PHOS 2.9 3.5 3.0     Recent Labs     10/29/22  0322 10/28/22  0303 10/27/22  0509   ALT 22 28 27   AP 53 53 50   TBILI 0.5 0.7 0.6   TP 6.0* 6.1* 6.2*   ALB 2.3* 2.6* 2.6*   GLOB 3.7 3.5 3.6     No results for input(s): INR, PTP, APTT, INREXT in the last 72 hours. No results for input(s): FE, TIBC, PSAT, FERR in the last 72 hours. Lab Results   Component Value Date/Time    Folate 9.9 10/26/2022 11:47 AM      No results for input(s): PH, PCO2, PO2 in the last 72 hours.   Recent Labs     10/29/22  0322 10/27/22  0026   * 74     Lab Results   Component Value Date/Time    Cholesterol, total 145 10/26/2022 06:46 AM    HDL Cholesterol 30 10/26/2022 06:46 AM    LDL, calculated 91.4 10/26/2022 06:46 AM    Triglyceride 118 10/26/2022 06:46 AM    CHOL/HDL Ratio 4.8 10/26/2022 06:46 AM     Lab Results   Component Value Date/Time    Glucose (POC) 110 10/05/2022 04:55 PM    Glucose (POC) 87 10/05/2022 11:02 AM    Glucose (POC) 78 10/05/2022 05:00 AM    Glucose (POC) 94 10/05/2022 04:57 AM    Glucose (POC) 124 (H) 10/04/2022 11:03 PM     Lab Results   Component Value Date/Time    Color YELLOW/STRAW 10/25/2022 04:54 PM    Appearance CLEAR 10/25/2022 04:54 PM    Specific gravity >1.030 (H) 10/25/2022 04:54 PM    Specific gravity 1.011 09/27/2022 12:51 PM    pH (UA) 5.5 10/25/2022 04:54 PM    Protein TRACE (A) 10/25/2022 04:54 PM    Glucose Negative 10/25/2022 04:54 PM    Ketone TRACE (A) 10/25/2022 04:54 PM    Bilirubin Negative 10/25/2022 04:54 PM    Urobilinogen 1.0 10/25/2022 04:54 PM    Nitrites Negative 10/25/2022 04:54 PM    Leukocyte Esterase Negative 10/25/2022 04:54 PM    Epithelial cells FEW 10/25/2022 04:54 PM    Bacteria Negative 10/25/2022 04:54 PM    WBC 0-4 10/25/2022 04:54 PM    RBC 0-5 10/25/2022 04:54 PM         CODE STATUS:  x Full Code    DNR    Partial    Comfort Care       Signed:   Colonel João MD  Date of Service:  10/29/2022  9:18 AM

## 2022-10-29 NOTE — PROGRESS NOTES
Neurology Attending Addendum:    Overview, interval history and physical as documented by Ms. Santiago and I agree with the contents except were noted. On review of the chart briefly current working diagnosis is serotonin syndrome. Patient is reported make slight gains and was reported to previously have clonus and brisk reflexes which have dissipated. MRI is stable from a month ago approximately, patient's vitals are overall stable with borderline fever noted. At this juncture we will continue to treat as presumptive serotonin if patient does not continue to make gains could consider reevaluating this hypothesis in the next 24 to 48 hours. On review of the ICU chart Poison control felt that serotonin syndrome was unlikely not clear what this is based on. Neurology will continue to follow please call question concerns        Neurology Progress Note  Valarie Polanco NP    Admit Date: 10/25/2022   LOS: 2 days      Daily Progress Note: 10/29/2022    HPI: Donald Ghosh is a 59 y.o. F with a PMH of depression, obesity, lap imelda, allergic rhinitis, mild strabismus as a child, and left ACL reconstrution who presented to the ER with AMS on 10/25/22 from [address] [place] [address]. Reportedly, patient had recent prolonged hospitalization after respiratory arrest presumed secondary to be sedating medication related, but also found to have Takotsubo CM, who has had declining mental status and function since receiving risperdal, then seroquel, only one dose each prior to discharge on 10/20/22, then trazodone and Lexapro at rehab. Patient has been febrile, tachycardic, has severe muscle rigidity, hyperreflexia, diaphoresis, and labile BPs. Infectious work up has been negative. Presumed etiology was suspicious for serotonin syndrome thus Trazodone and SSRI was discontinued on 10/26/22. The patient was transferred to the ICU 10/26/22 for higher level of care. Subjective:   No acute events noted overnight. Afebrile past 10hr.  Slight improvement with rigidity and shivering. On 2.5mg valium for rigidity (suspected serotonin syndrome). Patient grimaces and tensed up when touched. Unsure if exhibiting possible myalgia. Pending repeat CK levels. Allergies   Allergen Reactions    Droperidol Itching       Review of Systems:  Review of systems not obtained due to patient factors. AMS    Past Medical History:   Diagnosis Date    Allergic rhinitis 2019    Depression     History of multiple allergies     History of vascular access device 10/07/2022    [facility] VAT: PICC placement R Cephalic length 40 cm for reliable access/TPN arm circ 35.5 cm    Muscle ache     Obesity 2012    Sinus pressure     Sinus problem      Family History   Problem Relation Age of Onset    Diabetes Paternal Grandfather      Social History     Tobacco Use    Smoking status: Not on file    Smokeless tobacco: Never   Substance Use Topics    Alcohol use: No      Prior to Admission Medications   Prescriptions Last Dose Informant Patient Reported? Taking? MULTIVITAMIN PO   Yes No   Sig: Take  by mouth. OTHER,NON-FORMULARY,   No No   Sig: ESTROGEN(5050)/TESTOSTERONE (HRT) 1.5mg/10mg/ml Cream APPLY 1 ML TO SKIN (INNER WRIST/ABDOMEN/THIGHS EVERY DAY. ROTATE SITES   acetaminophen (TYLENOL) 325 mg tablet   Yes No   Sig: Take  by mouth every four (4) hours as needed. aspirin 81 mg chewable tablet   No No   Sig: Take 1 Tablet by mouth daily for 30 days. atorvastatin (LIPITOR) 10 mg tablet   No No   Sig: TAKE ONE TABLET BY MOUTH ONCE NIGHTLY   atorvastatin (LIPITOR) 10 mg tablet  Other Yes No   Sig: Take 10 mg by mouth daily. cloNIDine (CATAPRES) 0.2 mg/24 hr ptwk   No No   Si Patch by TransDERmal route every seven (7) days for 30 days. escitalopram oxalate (LEXAPRO) 20 mg tablet   No No   Sig: TAKE ONE TABLET BY MOUTH DAILY   furosemide (LASIX) 20 mg tablet   No No   Sig: Take 1 Tablet by mouth daily as needed (swelling).    levothyroxine (SYNTHROID) 88 mcg tablet Other Yes No   Sig: Take 88 mcg by mouth Daily (before breakfast). levothyroxine (SYNTHROID) 88 mcg tablet   No No   Sig: Take 1 Tablet by mouth Daily (before breakfast). loratadine-pseudoephedrine (Claritin-D 12 Hour) 5-120 mg per tablet   Yes No   Sig: Claritin-D   metoprolol tartrate (LOPRESSOR) 25 mg tablet   No No   Sig: Take 1 Tablet by mouth every twelve (12) hours for 30 days. pantoprazole (PROTONIX) 40 mg tablet   No No   Sig: Take 1 Tablet by mouth Daily (before breakfast) for 30 days. senna-docusate (PERICOLACE) 8.6-50 mg per tablet   No No   Sig: Take 1 Tablet by mouth two (2) times a day for 30 days. traZODone (DESYREL) 50 mg tablet   No No   Sig: Take 2.5 Tablets by mouth nightly as needed for Sleep.   tretinoin (RETIN-A) 0.05 % topical cream   Yes No   Sig: tretinoin 0.05 % topical cream      Facility-Administered Medications: None       Objective:   Vital signs  Temp (24hrs), Av.6 °F (37.6 °C), Min:97.8 °F (36.6 °C), Max:100.6 °F (38.1 °C)   10/28 1901 - 10/29 0700  In: 785 [I.V.:375]  Out: 285 [Urine:285]  10/27 0701 - 10/28 190  In: 4280 [I.V.:3700]  Out: 2155 [Urine:2155]  Visit Vitals  BP (!) 122/57 (BP 1 Location: Right upper arm, BP Patient Position: At rest)   Pulse 96   Temp 97.8 °F (36.6 °C)   Resp 27   Ht 5' 6\" (1.676 m)   Wt 89.3 kg (196 lb 13.9 oz)   SpO2 95%   BMI 31.78 kg/m²    O2 Flow Rate (L/min): 1 l/min O2 Device: None (Room air)   Vitals:    10/28/22 2100 10/28/22 2200 10/28/22 2300 10/29/22 0000   BP: 124/67 101/63 (!) 109/51 (!) 122/57   Pulse: (!) 110 86 88 96   Resp: (!) 32 23 24 27   Temp:    97.8 °F (36.6 °C)   SpO2: 95% 96% 96% 95%   Weight:       Height:            Physical Exam:  GENERAL: Calm,  NAD,  ill-appearing female, not interacting   SKIN: Warm, dry, color appropriate for ethnicity. Edematous ext x4 .     Neurologic Exam:  Mental Status:  Opened eyes to voice, no command following, no tracking    Language:    Nonverbal.     Cranial Nerves:   PERRL 4 mm, No notable facial droop. No roving eye movements noted on exams toady. No threat, notable corneal.           Motor:       Increased tone x 4 extremities. Seems to improve less rigidity except for LLE        No spontaneous or involuntary movements noted    Sensation:    Withdraws from noxious stimuli in all 4 ext today which is improved from yesterday. Grimaces to noxious stimuli     Reflexes:    Negative lower extremity clonus. Up-going L toes and down-going R toes       Labs:  Lab Results   Component Value Date/Time    WBC 10.8 10/28/2022 03:33 PM    HGB 10.8 (L) 10/28/2022 03:33 PM    HCT 33.0 (L) 10/28/2022 03:33 PM    PLATELET 311 20/26/6569 03:33 PM    MCV 90.4 10/28/2022 03:33 PM     Lab Results   Component Value Date/Time    Sodium 139 10/28/2022 03:03 AM    Potassium 3.4 (L) 10/28/2022 03:03 AM    Chloride 107 10/28/2022 03:03 AM    CO2 25 10/28/2022 03:03 AM    Anion gap 7 10/28/2022 03:03 AM    Glucose 102 (H) 10/28/2022 03:03 AM    BUN 14 10/28/2022 03:03 AM    Creatinine 0.55 10/28/2022 03:03 AM    BUN/Creatinine ratio 25 (H) 10/28/2022 03:03 AM    GFR est AA >60 10/03/2022 01:13 AM    GFR est non-AA >60 10/03/2022 01:13 AM    Calcium 8.7 10/28/2022 03:03 AM     Imaging:  CT Results (maximum last 3): Results from [place] encounter on 10/25/22    CT HEAD WO CONT    Narrative  EXAM: CT HEAD WO CONT    INDICATION: Confusion. Electronic medical record is not available at the time of  this interpretation. COMPARISON: CT head on 10/13/2022. MRI brain on 10/2/2022. TECHNIQUE: Noncontrast head CT. Coronal and sagittal reformats. CT dose  reduction was achieved through the use of a standardized protocol tailored for  this examination and automatic exposure control for dose modulation. FINDINGS: The ventricles and sulci are age-appropriate without hydrocephalus. There is no mass effect or midline shift. There is no intracranial hemorrhage or  extra-axial fluid collection.  There is no abnormal area of decreased density to  suggest infarct. The calvarium is intact. The visualized paranasal sinuses and mastoid air cells  are clear. Impression  No acute intracranial abnormality on this noncontrast head CT. No change given  difference in technique. Results from [place] encounter on 09/27/22    CT HEAD WO CONT    Narrative  CLINICAL HISTORY: post fall  INDICATION: post fall  COMPARISON: 10/2/2022. 9/27/2022  CT dose reduction was achieved through use of a standardized protocol tailored  for this examination and automatic exposure control for dose modulation. TECHNIQUE: Serial axial images with a collimation of 5 mm were obtained from the  skull base through the vertex  FINDINGS:  The sulci and ventricles are within normal limits for patient age. There is no  evidence of an acute infarction, hemorrhage, or mass-effect. There is no  evidence of midline shift or hydrocephalus. Posterior fossa structures are  unremarkable. No extra-axial collections are seen. Trace mastoid fluid on the left and minimal mastoid effusion on the right. There  is no evidence of depressed skull fractures of soft tissue swelling. Impression  No acute intracranial process. CT ABD PELV WO CONT    Narrative  EXAM: CT CHEST WO CONT, CT ABD PELV WO CONT    INDICATION: Found unresponsive, respiratory arrest, unclear etiology, family hx  aneurysm    COMPARISON: None    IV CONTRAST: None    ORAL CONTRAST: None    TECHNIQUE:  Thin axial images were obtained through the chest, abdomen and pelvis. Coronal  and sagittal reformats were generated. CT dose reduction was achieved through  use of a standardized protocol tailored for this examination and automatic  exposure control for dose modulation. FINDINGS:  An endotracheal tube is in appropriate position. A nasogastric tube is seen  looped within the stomach. CHEST WALL: No mass or axillary lymphadenopathy. THYROID: No nodule.   MEDIASTINUM: No mass or lymphadenopathy. ELBA: No mass or lymphadenopathy. THORACIC AORTA: No dissection or aneurysm. MAIN PULMONARY ARTERY: Normal in caliber. TRACHEA/BRONCHI: Patent. ESOPHAGUS: No wall thickening or dilatation. HEART: Normal in size. PLEURA: No effusion or pneumothorax. LUNGS: There is moderate bibasilar atelectasis. LIVER: No mass. BILIARY TREE: Status post cholecystectomy. CBD is not dilated. SPLEEN: within normal limits. PANCREAS: No mass or ductal dilatation. ADRENALS: Unremarkable. KIDNEYS: No mass, calculus, or hydronephrosis. STOMACH: Unremarkable. SMALL BOWEL: No dilatation or wall thickening. COLON: No dilatation or wall thickening. APPENDIX: Unremarkable  PERITONEUM: No ascites or pneumoperitoneum. RETROPERITONEUM: No lymphadenopathy or aortic aneurysm. REPRODUCTIVE ORGANS: Unremarkable. URINARY BLADDER: Decompressed by Sal catheter. BONES: No destructive bone lesion. ABDOMINAL WALL: No mass or hernia. ADDITIONAL COMMENTS: N/A    Impression  1. Moderate bibasilar atelectasis. 2. Status post cholecystectomy. 3. Endotracheal and nasogastric tubes in place. Sal catheter decompresses the  bladder. Assessment:   Active Problems:    Diarrhea (10/25/2022)      AMS (altered mental status) (10/25/2022)      Altered mental status (10/27/2022)    Plan:     CT head 10/25 shows no acute intracranial abnormality. MRI on 10/02/22 shows 2 small foci of diffusion restriction in the right frontal lobe which may represent small foci of acute ischemia and  were unchanged from previous MRI on 09/28/22  EE[facility]'s [facility] and continuous at [facility] were negative for seizure. Routine EEG 10/27/22 negative for electrographic seizure or epileptiform discharges  SSRI's on-hold.   Consider holding statin if repeat CK level is elevated; patient tender to touch   Q1hr neuro checks  Supportive care   Patient with mild improvement~continue with valium 2.5mg Q4hr (caution with hypotension)     Further recommendations to follow from Dr. Shayna Dutton, NP  Neurocritical Care Nurse Practitioner

## 2022-10-30 LAB
ANION GAP SERPL CALC-SCNC: 4 MMOL/L (ref 5–15)
BUN SERPL-MCNC: 13 MG/DL (ref 6–20)
BUN/CREAT SERPL: 26 (ref 12–20)
CALCIUM SERPL-MCNC: 8.4 MG/DL (ref 8.5–10.1)
CHLORIDE SERPL-SCNC: 109 MMOL/L (ref 97–108)
CK SERPL-CCNC: 97 U/L (ref 26–192)
CO2 SERPL-SCNC: 28 MMOL/L (ref 21–32)
CREAT SERPL-MCNC: 0.5 MG/DL (ref 0.55–1.02)
ERYTHROCYTE [DISTWIDTH] IN BLOOD BY AUTOMATED COUNT: 14 % (ref 11.5–14.5)
GLUCOSE SERPL-MCNC: 130 MG/DL (ref 65–100)
HCT VFR BLD AUTO: 33.4 % (ref 35–47)
HGB BLD-MCNC: 10.7 G/DL (ref 11.5–16)
MAGNESIUM SERPL-MCNC: 2.1 MG/DL (ref 1.6–2.4)
MCH RBC QN AUTO: 29.2 PG (ref 26–34)
MCHC RBC AUTO-ENTMCNC: 32 G/DL (ref 30–36.5)
MCV RBC AUTO: 91.3 FL (ref 80–99)
NRBC # BLD: 0 K/UL (ref 0–0.01)
NRBC BLD-RTO: 0 PER 100 WBC
PHOSPHATE SERPL-MCNC: 3.2 MG/DL (ref 2.6–4.7)
PLATELET # BLD AUTO: 255 K/UL (ref 150–400)
PMV BLD AUTO: 11.7 FL (ref 8.9–12.9)
POTASSIUM SERPL-SCNC: 4.3 MMOL/L (ref 3.5–5.1)
RBC # BLD AUTO: 3.66 M/UL (ref 3.8–5.2)
SODIUM SERPL-SCNC: 141 MMOL/L (ref 136–145)
WBC # BLD AUTO: 9.9 K/UL (ref 3.6–11)

## 2022-10-30 PROCEDURE — 99231 SBSQ HOSP IP/OBS SF/LOW 25: CPT | Performed by: PSYCHIATRY & NEUROLOGY

## 2022-10-30 PROCEDURE — 65660000001 HC RM ICU INTERMED STEPDOWN

## 2022-10-30 PROCEDURE — 74011250636 HC RX REV CODE- 250/636: Performed by: HOSPITALIST

## 2022-10-30 PROCEDURE — 74011250636 HC RX REV CODE- 250/636: Performed by: NURSE PRACTITIONER

## 2022-10-30 PROCEDURE — 74011000250 HC RX REV CODE- 250: Performed by: FAMILY MEDICINE

## 2022-10-30 PROCEDURE — 80048 BASIC METABOLIC PNL TOTAL CA: CPT

## 2022-10-30 PROCEDURE — 84100 ASSAY OF PHOSPHORUS: CPT

## 2022-10-30 PROCEDURE — 74011250637 HC RX REV CODE- 250/637: Performed by: PHYSICIAN ASSISTANT

## 2022-10-30 PROCEDURE — 83735 ASSAY OF MAGNESIUM: CPT

## 2022-10-30 PROCEDURE — 82550 ASSAY OF CK (CPK): CPT

## 2022-10-30 PROCEDURE — 85027 COMPLETE CBC AUTOMATED: CPT

## 2022-10-30 PROCEDURE — 36415 COLL VENOUS BLD VENIPUNCTURE: CPT

## 2022-10-30 PROCEDURE — 74011250637 HC RX REV CODE- 250/637: Performed by: NURSE PRACTITIONER

## 2022-10-30 RX ORDER — MIDODRINE HYDROCHLORIDE 5 MG/1
5 TABLET ORAL
Status: DISCONTINUED | OUTPATIENT
Start: 2022-10-30 | End: 2022-10-30

## 2022-10-30 RX ADMIN — Medication 100 MG: at 09:02

## 2022-10-30 RX ADMIN — DIAZEPAM 2.5 MG: 5 INJECTION, SOLUTION INTRAMUSCULAR; INTRAVENOUS at 18:36

## 2022-10-30 RX ADMIN — DIAZEPAM 2.5 MG: 5 INJECTION, SOLUTION INTRAMUSCULAR; INTRAVENOUS at 04:09

## 2022-10-30 RX ADMIN — DIAZEPAM 2.5 MG: 5 INJECTION, SOLUTION INTRAMUSCULAR; INTRAVENOUS at 12:39

## 2022-10-30 RX ADMIN — MIDODRINE HYDROCHLORIDE 5 MG: 5 TABLET ORAL at 06:32

## 2022-10-30 RX ADMIN — DIAZEPAM 2.5 MG: 5 INJECTION, SOLUTION INTRAMUSCULAR; INTRAVENOUS at 06:32

## 2022-10-30 RX ADMIN — HEPARIN SODIUM 5000 UNITS: 5000 INJECTION INTRAVENOUS; SUBCUTANEOUS at 12:39

## 2022-10-30 RX ADMIN — THERA TABS 1 TABLET: TAB at 09:02

## 2022-10-30 RX ADMIN — LEVOTHYROXINE SODIUM 88 MCG: 0.09 TABLET ORAL at 06:32

## 2022-10-30 RX ADMIN — HEPARIN SODIUM 5000 UNITS: 5000 INJECTION INTRAVENOUS; SUBCUTANEOUS at 21:01

## 2022-10-30 RX ADMIN — HEPARIN SODIUM 5000 UNITS: 5000 INJECTION INTRAVENOUS; SUBCUTANEOUS at 04:09

## 2022-10-30 RX ADMIN — DIAZEPAM 2.5 MG: 5 INJECTION, SOLUTION INTRAMUSCULAR; INTRAVENOUS at 22:03

## 2022-10-30 RX ADMIN — SODIUM CHLORIDE, PRESERVATIVE FREE 10 ML: 5 INJECTION INTRAVENOUS at 15:28

## 2022-10-30 RX ADMIN — PANTOPRAZOLE SODIUM 40 MG: 40 TABLET, DELAYED RELEASE ORAL at 06:32

## 2022-10-30 RX ADMIN — DIAZEPAM 2.5 MG: 5 INJECTION, SOLUTION INTRAMUSCULAR; INTRAVENOUS at 15:32

## 2022-10-30 RX ADMIN — SODIUM CHLORIDE, PRESERVATIVE FREE 10 ML: 5 INJECTION INTRAVENOUS at 22:06

## 2022-10-30 RX ADMIN — SODIUM CHLORIDE, POTASSIUM CHLORIDE, SODIUM LACTATE AND CALCIUM CHLORIDE 50 ML/HR: 600; 310; 30; 20 INJECTION, SOLUTION INTRAVENOUS at 15:27

## 2022-10-30 NOTE — PROGRESS NOTES
Neurology Progress Note    Patient ID:  Marshal Salcido  [id number]  59 y.o.  1965    Subjective:      Nursing events noted over the past 24 hours.   Patient has made minimal progress at best.  Vitals have remained stable no low-grade fevers noted    Current Facility-Administered Medications   Medication Dose Route Frequency    senna-docusate (PERICOLACE) 8.6-50 mg per tablet 2 Tablet  2 Tablet Oral DAILY    [Held by provider] metoprolol tartrate (LOPRESSOR) tablet 12.5 mg  12.5 mg Oral Q12H    heparin (porcine) injection 5,000 Units  5,000 Units SubCUTAneous Q8H    polyethylene glycol (MIRALAX) packet 17 g  17 g Oral DAILY PRN    thiamine HCL (B-1) tablet 100 mg  100 mg Oral DAILY    therapeutic multivitamin (THERAGRAN) tablet 1 Tablet  1 Tablet Oral DAILY    acetaminophen (TYLENOL) solution 650 mg  650 mg Per NG tube Q4H PRN    diazePAM (VALIUM) injection 2.5 mg  2.5 mg IntraVENous Q4H    sodium chloride (NS) flush 5-40 mL  5-40 mL IntraVENous Q8H    sodium chloride (NS) flush 5-40 mL  5-40 mL IntraVENous PRN    lactated Ringers infusion  50 mL/hr IntraVENous CONTINUOUS    [Held by provider] atorvastatin (LIPITOR) tablet 10 mg  10 mg Oral QHS    levothyroxine (SYNTHROID) tablet 88 mcg  88 mcg Oral ACB    pantoprazole (PROTONIX) tablet 40 mg  40 mg Oral ACB    sodium chloride (NS) flush 5-10 mL  5-10 mL IntraVENous PRN    acetaminophen (TYLENOL) suppository 650 mg  650 mg Rectal Q6H PRN          Objective:     Patient Vitals for the past 8 hrs:   BP Temp Pulse Resp SpO2   10/30/22 1045 120/64 100.3 °F (37.9 °C) 92 11 95 %   10/30/22 0900 (!) 128/95 -- 98 -- --   10/30/22 0700 117/71 -- 93 (!) 36 94 %   10/30/22 0600 119/74 -- (!) 111 (!) 37 95 %   10/30/22 0555 -- -- (!) 110 -- --   10/30/22 0500 103/63 -- 96 27 95 %       10/30 0701 - 10/30 1900  In: 100   Out: -   10/28 1901 - 10/30 0700  In: 5401.3 [I.V.:3191.3]  Out: 1410 [Urine:1410]    Lab Review   Recent Results (from the past 24 hour(s))   CBC W/O DIFF Collection Time: 10/30/22  6:21 AM   Result Value Ref Range    WBC 9.9 3.6 - 11.0 K/uL    RBC 3.66 (L) 3.80 - 5.20 M/uL    HGB 10.7 (L) 11.5 - 16.0 g/dL    HCT 33.4 (L) 35.0 - 47.0 %    MCV 91.3 80.0 - 99.0 FL    MCH 29.2 26.0 - 34.0 PG    MCHC 32.0 30.0 - 36.5 g/dL    RDW 14.0 11.5 - 14.5 %    PLATELET 950 673 - 648 K/uL    MPV 11.7 8.9 - 12.9 FL    NRBC 0.0 0  WBC    ABSOLUTE NRBC 0.00 0.00 - 5.26 K/uL   METABOLIC PANEL, BASIC    Collection Time: 10/30/22  6:21 AM   Result Value Ref Range    Sodium 141 136 - 145 mmol/L    Potassium 4.3 3.5 - 5.1 mmol/L    Chloride 109 (H) 97 - 108 mmol/L    CO2 28 21 - 32 mmol/L    Anion gap 4 (L) 5 - 15 mmol/L    Glucose 130 (H) 65 - 100 mg/dL    BUN 13 6 - 20 MG/DL    Creatinine 0.50 (L) 0.55 - 1.02 MG/DL    BUN/Creatinine ratio 26 (H) 12 - 20      eGFR >60 >60 ml/min/1.73m2    Calcium 8.4 (L) 8.5 - 10.1 MG/DL   MAGNESIUM    Collection Time: 10/30/22  6:21 AM   Result Value Ref Range    Magnesium 2.1 1.6 - 2.4 mg/dL   PHOSPHORUS    Collection Time: 10/30/22  6:21 AM   Result Value Ref Range    Phosphorus 3.2 2.6 - 4.7 MG/DL   CK    Collection Time: 10/30/22  6:21 AM   Result Value Ref Range    CK 97 26 - 192 U/L     Middle-age female appearing stated age lying in bed with eyes closed. Will keep eyes open when open manually does not interact or clearly track. HEENT appears grossly intact observation limited in scope. Neck appears supple. Cardiopulmonary exams appear grossly intact observation. Motorically patient has increased tone in upper and lower extremities though there appears to be some element of guarding along with facial wincing. Noxious stimuli is deferred. Cranial nerves II through XII are grossly intact observation once again limited in their scope of evaluation.   No verbalization or command following noted    Assessment:     Venita Ceron is a 75-year-old right-hand-dominant female history of depression with recent complicated history including Takotsubo's cardiomyopathy admitted for altered sensorium with constellation of symptoms raise concern for serotonin syndrome    Plan:    Altered sensorium, rigidity:  Working diagnosis remains serotonin syndrome based on pharmacologic exposure and clinical presentation  Patient is reported to make slight improvement day by day do not notice significant change in mild to valuations approximately 24 hours apart  Will check antigad antibody to assess for any evidence of stiff person syndrome  Discussed with family that of course does not continue to improve will need to consider additional possibilities, possibly consider repeat MRI as her 2 MRIs were 4 days apart several weeks ago  At this juncture we will continue with working diagnosis, hemodynamics are stable no reason to reintroduce aprepitant at this juncture  As long as respiratory status/hemodynamics are not a concern would recommending increasing Valium to 5 mg every 6 hours from 2.5 due to ongoing rigidity  Neurological continue to follow please call question concerns      Signed:  Dior Grande MD  10/30/2022  12:23 PM

## 2022-10-30 NOTE — PROGRESS NOTES
[address] [facility] Adult  Hospitalist Group                                                                                          Hospitalist Progress Note  Emeterio Leger MD  Answering service: [phone] from in house phone        Date of Service:  10/30/2022  NAME:  Edilma Feliz  :  1965  MRN:  [id number]      Admission Summary:     Edilma Feliz is a 59 y.o. female with past medical history of anoxic brain injury, anxiety, depression, hypertension, hyperlipidemia, hypothyroidism presented to the emergency department via EMS from [facility] with reported altered mental status (AMS). Patient is aphasic/nonverbal and not answering any questions. Majority of history was obtained per review of chart records. Per reports patient recently was hospitalized at [facility] from 2022-10/20/2022 with diagnosis of status epilepticus, acute metabolic encephalopathy, delirium, psychosis, generalized anxiety disorder, debility, Takotsubo cardiomyopathy, NSTEMI, bradycardia, SIRS, tachycardia, fever, leukocytosis, and CVA. Patient had work-up including MRI and also EEG. There was concern the patient may have some anoxic brain injury. She has been followed by psychiatry and neurology services with persistent encephalopathy and intermittent severe aggression. Patient has had aphasia and altered mental status not following commands per staff report since last week. Symptoms remain constant, severe, without specific exacerbating relieving factors. About emergency department today, initial recorded vital signs temperature 98.8 °F, /65, heart rate 103, respirate 30, O2 saturation 93% on room air. Abnormal labs included WC 12.5, platelets 168, BUN 23. CT head without IV contrast showed no acute intracranial abnormalities. ED request admission to the hospitalist service.      No acute event overnight, less rigidity, stable and transferred out of ICU on 10/29       Interval history / Subjective:     Patient is conscious and alert but aphasic, tried to squeeze examiner hand with her right hand,     Her son and her mother were in the room, her mother states that patient looks more alert this morning,   I updated clinical finding, and care plan, answered their questions.       Assessment & Plan:     AMS possible due to serotonin syndrome   -trazodone and lexapro has stopped  -Reported receiving a dose of Risperdal then Seroquel  -fever and rigidity improving, BP low normal increased to 75 ml/hr, add midodrine 5 mg tid, HR 84-110bpm  -patient is at risk for fluid overload   -continue on diazepam, IVF  -prn tylenol  -continue neuro check and supportive care   -PT/OT  -Neurologist on board     Acute encephalopathy   Aphasic since after patient was found unresponsive on 9/27  Hx of prolong hospitalization at [facility] after she was found unresponsive, respiratory arrest at her home on  9/26/22, suspected due to sedating medication, seizure, managed, stable and discharge to [facility] arm on 10/20/22  Suspected anoxic brain injury  Seizure   Acute CVA  -CT head on 10/25/2022 no acute intracranial abnormality on noncontrast head CT  -conscious and alert, aphasic, doesn't follow command or moves extremities  -continue nuero check and supportive care  -complex medical problem with prolonged hospitalization  -SpO2 93-98% on RA  -high risk for decompensation      Hx of Takotsubo cardiomyopathy  -has peripheral edema  -BP low normal, if it improves will consider her home metoprolol and lasix   -monitor I/O and daily weight      HTN  -received bolus normal saline , and IVF,   -BP low normal, on normal saline at 50 ml/hr, midodrine added, monitor BP  -home clonidine, on hold     Hyperlipidemia   -statin on hold due to elevated CK     Hypothyroidism   -continue synthroid      Generalized anxiety disorder  -home trazodone and lexapro on hold      Obesity   -BMI 32.52 kg/m2  -need weight loss program     Full Code: high risk for decompensation     Sal catheter in place, to discontinue 10/26  NGT in place, on tube feeding       Full code: High risk for decompensation, consult palliative care team          Code status: Full code  Prophylaxis: SCD  Care Plan discussed with:her son, mother, and Nurse  Anticipated Disposition: [facility] [id number] [employer] Problems  Date Reviewed: 10/5/2022            Codes Class Noted POA    Altered mental status ICD-10-CM: R41.82  ICD-9-CM: 780.97  10/27/2022 Unknown        Diarrhea ICD-10-CM: R19.7  ICD-9-CM: 787.91  10/25/2022 Unknown        AMS (altered mental status) ICD-10-CM: R41.82  ICD-9-CM: 780.97  10/25/2022 Unknown           Vital Signs:    Last 24hrs VS reviewed since prior progress note. Most recent are:  Visit Vitals  BP (!) 93/49   Pulse (!) 110   Temp 99.8 °F (37.7 °C)   Resp 25   Ht 5' 6\" (1.676 m)   Wt 91.4 kg (201 lb 8 oz)   SpO2 93%   BMI 32.52 kg/m²         Intake/Output Summary (Last 24 hours) at 10/30/2022 0802  Last data filed at 10/30/2022 0000  Gross per 24 hour   Intake 1561.25 ml   Output 370 ml   Net 1191.25 ml        Physical Examination:     I had a face to face encounter with this patient and independently examined them on 10/30/2022 as outlined below:          Constitutional: Conscious and alert, aphasic, no acute distres   ENT: NGT in place, oral mucosa moist, oropharynx benign. Resp:  CTA bilaterally. No wheezing/rhonchi/rales. No accessory muscle use. CV:  Regular rhythm, normal rate, no murmurs, gallops, rubs    GI:  Soft, non distended, non tender.  normoactive bowel sounds, no hepatosplenomegaly     Musculoskeletal: Bilateral pretibial, pedal and hand edema    Neurologic: Conscious and alert, aphasic, does not follow command, movement of her right fingers            Data Review:    Review and/or order of clinical lab test  Review and/or order of tests in the radiology section of CPT  Review and/or order of tests in the medicine section of [facility]      Labs:     Recent Labs     10/30/22  0621 10/29/22  0322   WBC 9.9 11.0   HGB 10.7* 10.9*   HCT 33.4* 33.1*    265     Recent Labs     10/30/22  0621 10/29/22  0322 10/28/22  0303    138 139   K 4.3 3.7 3.4*   * 107 107   CO2 28 26 25   BUN 13 12 14   CREA 0.50* 0.62 0.55   * 132* 102*   CA 8.4* 8.6 8.7   MG 2.1 1.9 1.9   PHOS 3.2 2.9 3.5     Recent Labs     10/29/22  0322 10/28/22  0303   ALT 22 28   AP 53 53   TBILI 0.5 0.7   TP 6.0* 6.1*   ALB 2.3* 2.6*   GLOB 3.7 3.5     No results for input(s): INR, PTP, APTT, INREXT in the last 72 hours. No results for input(s): FE, TIBC, PSAT, FERR in the last 72 hours. Lab Results   Component Value Date/Time    Folate 9.9 10/26/2022 11:47 AM      No results for input(s): PH, PCO2, PO2 in the last 72 hours.   Recent Labs     10/29/22  0322   *     Lab Results   Component Value Date/Time    Cholesterol, total 145 10/26/2022 06:46 AM    HDL Cholesterol 30 10/26/2022 06:46 AM    LDL, calculated 91.4 10/26/2022 06:46 AM    Triglyceride 118 10/26/2022 06:46 AM    CHOL/HDL Ratio 4.8 10/26/2022 06:46 AM     Lab Results   Component Value Date/Time    Glucose (POC) 110 10/05/2022 04:55 PM    Glucose (POC) 87 10/05/2022 11:02 AM    Glucose (POC) 78 10/05/2022 05:00 AM    Glucose (POC) 94 10/05/2022 04:57 AM    Glucose (POC) 124 (H) 10/04/2022 11:03 PM     Lab Results   Component Value Date/Time    Color YELLOW/STRAW 10/25/2022 04:54 PM    Appearance CLEAR 10/25/2022 04:54 PM    Specific gravity >1.030 (H) 10/25/2022 04:54 PM    Specific gravity 1.011 09/27/2022 12:51 PM    pH (UA) 5.5 10/25/2022 04:54 PM    Protein TRACE (A) 10/25/2022 04:54 PM    Glucose Negative 10/25/2022 04:54 PM    Ketone TRACE (A) 10/25/2022 04:54 PM    Bilirubin Negative 10/25/2022 04:54 PM    Urobilinogen 1.0 10/25/2022 04:54 PM    Nitrites Negative 10/25/2022 04:54 PM    Leukocyte Esterase Negative 10/25/2022 04:54 PM    Epithelial cells FEW 10/25/2022 04:54 PM    Bacteria Negative 10/25/2022 04:54 PM    WBC 0-4 10/25/2022 04:54 PM    RBC 0-5 10/25/2022 04:54 PM         Medications Reviewed:     Current Facility-Administered Medications   Medication Dose Route Frequency    senna-docusate (PERICOLACE) 8.6-50 mg per tablet 2 Tablet  2 Tablet Oral DAILY    [Held by provider] metoprolol tartrate (LOPRESSOR) tablet 12.5 mg  12.5 mg Oral Q12H    midodrine (PROAMATINE) tablet 5 mg  5 mg Oral Q8H    heparin (porcine) injection 5,000 Units  5,000 Units SubCUTAneous Q8H    polyethylene glycol (MIRALAX) packet 17 g  17 g Oral DAILY PRN    thiamine HCL (B-1) tablet 100 mg  100 mg Oral DAILY    therapeutic multivitamin (THERAGRAN) tablet 1 Tablet  1 Tablet Oral DAILY    acetaminophen (TYLENOL) solution 650 mg  650 mg Per NG tube Q4H PRN    diazePAM (VALIUM) injection 2.5 mg  2.5 mg IntraVENous Q4H    sodium chloride (NS) flush 5-40 mL  5-40 mL IntraVENous Q8H    sodium chloride (NS) flush 5-40 mL  5-40 mL IntraVENous PRN    lactated Ringers infusion  50 mL/hr IntraVENous CONTINUOUS    [Held by provider] atorvastatin (LIPITOR) tablet 10 mg  10 mg Oral QHS    levothyroxine (SYNTHROID) tablet 88 mcg  88 mcg Oral ACB    pantoprazole (PROTONIX) tablet 40 mg  40 mg Oral ACB    sodium chloride (NS) flush 5-10 mL  5-10 mL IntraVENous PRN    acetaminophen (TYLENOL) suppository 650 mg  650 mg Rectal Q6H PRN     ______________________________________________________________________  EXPECTED LENGTH OF STAY: 2d 7h  ACTUAL LENGTH OF STAY:          3                 Saurabh Roblero MD

## 2022-10-30 NOTE — PROGRESS NOTES
Bedside and Verbal shift change report given to Cat RN (oncoming nurse) by Debbie Busch RN (offgoing nurse). Report included the following information SBAR, Kardex, and Recent Results.

## 2022-10-30 NOTE — PROGRESS NOTES
Problem: Pressure Injury - Risk of  Goal: *Prevention of pressure injury  Description: Document Jose Enrique Scale and appropriate interventions in the flowsheet. Outcome: Progressing Towards Goal  Note: Pressure Injury Interventions:  Sensory Interventions: Assess changes in LOC, Discuss PT/OT consult with provider, Float heels, Check visual cues for pain, Keep linens dry and wrinkle-free    Moisture Interventions: Internal/External urinary devices    Activity Interventions: Pressure redistribution bed/mattress(bed type), PT/OT evaluation, Assess need for specialty bed    Mobility Interventions: Float heels, HOB 30 degrees or less, Pressure redistribution bed/mattress (bed type)    Nutrition Interventions: Document food/fluid/supplement intake, Discuss nutritional consult with provider    Friction and Shear Interventions: HOB 30 degrees or less, Lift sheet                Problem: Falls - Risk of  Goal: *Absence of Falls  Description: Document Chyna Fall Risk and appropriate interventions in the flowsheet. Outcome: Progressing Towards Goal  Note: Fall Risk Interventions:       Mentation Interventions: Bed/chair exit alarm, Door open when patient unattended, Evaluate medications/consider consulting pharmacy    Medication Interventions: Evaluate medications/consider consulting pharmacy    Elimination Interventions:  Toileting schedule/hourly rounds, Call light in reach    History of Falls Interventions: Consult care management for discharge planning, Door open when patient unattended, Evaluate medications/consider consulting pharmacy         Problem: Nutrition Deficit  Goal: *Optimize nutritional status  Outcome: Progressing Towards Goal

## 2022-10-31 ENCOUNTER — PATIENT OUTREACH (OUTPATIENT)
Dept: OTHER | Age: 57
End: 2022-10-31

## 2022-10-31 LAB
ANION GAP SERPL CALC-SCNC: 6 MMOL/L (ref 5–15)
BACTERIA SPEC CULT: NORMAL
BUN SERPL-MCNC: 11 MG/DL (ref 6–20)
BUN/CREAT SERPL: 20 (ref 12–20)
CALCIUM SERPL-MCNC: 9.2 MG/DL (ref 8.5–10.1)
CHLORIDE SERPL-SCNC: 106 MMOL/L (ref 97–108)
CK SERPL-CCNC: 64 U/L (ref 26–192)
CO2 SERPL-SCNC: 28 MMOL/L (ref 21–32)
CREAT SERPL-MCNC: 0.55 MG/DL (ref 0.55–1.02)
ERYTHROCYTE [DISTWIDTH] IN BLOOD BY AUTOMATED COUNT: 13.8 % (ref 11.5–14.5)
GLUCOSE SERPL-MCNC: 133 MG/DL (ref 65–100)
HCT VFR BLD AUTO: 34.4 % (ref 35–47)
HGB BLD-MCNC: 11.2 G/DL (ref 11.5–16)
MAGNESIUM SERPL-MCNC: 2.1 MG/DL (ref 1.6–2.4)
MCH RBC QN AUTO: 29.1 PG (ref 26–34)
MCHC RBC AUTO-ENTMCNC: 32.6 G/DL (ref 30–36.5)
MCV RBC AUTO: 89.4 FL (ref 80–99)
NRBC # BLD: 0 K/UL (ref 0–0.01)
NRBC BLD-RTO: 0 PER 100 WBC
PHOSPHATE SERPL-MCNC: 3.3 MG/DL (ref 2.6–4.7)
PLATELET # BLD AUTO: 273 K/UL (ref 150–400)
PMV BLD AUTO: 12.1 FL (ref 8.9–12.9)
POTASSIUM SERPL-SCNC: 4.2 MMOL/L (ref 3.5–5.1)
RBC # BLD AUTO: 3.85 M/UL (ref 3.8–5.2)
SERVICE CMNT-IMP: NORMAL
SODIUM SERPL-SCNC: 140 MMOL/L (ref 136–145)
WBC # BLD AUTO: 10 K/UL (ref 3.6–11)

## 2022-10-31 PROCEDURE — 84100 ASSAY OF PHOSPHORUS: CPT

## 2022-10-31 PROCEDURE — 82550 ASSAY OF CK (CPK): CPT

## 2022-10-31 PROCEDURE — 99223 1ST HOSP IP/OBS HIGH 75: CPT | Performed by: NURSE PRACTITIONER

## 2022-10-31 PROCEDURE — 83735 ASSAY OF MAGNESIUM: CPT

## 2022-10-31 PROCEDURE — 80048 BASIC METABOLIC PNL TOTAL CA: CPT

## 2022-10-31 PROCEDURE — 74011000250 HC RX REV CODE- 250: Performed by: FAMILY MEDICINE

## 2022-10-31 PROCEDURE — 86341 ISLET CELL ANTIBODY: CPT

## 2022-10-31 PROCEDURE — 85027 COMPLETE CBC AUTOMATED: CPT

## 2022-10-31 PROCEDURE — 36415 COLL VENOUS BLD VENIPUNCTURE: CPT

## 2022-10-31 PROCEDURE — 77010033678 HC OXYGEN DAILY

## 2022-10-31 PROCEDURE — 74011000250 HC RX REV CODE- 250: Performed by: EMERGENCY MEDICINE

## 2022-10-31 PROCEDURE — 74011250636 HC RX REV CODE- 250/636: Performed by: NURSE PRACTITIONER

## 2022-10-31 PROCEDURE — 74011250637 HC RX REV CODE- 250/637: Performed by: PHYSICIAN ASSISTANT

## 2022-10-31 PROCEDURE — 74011250636 HC RX REV CODE- 250/636: Performed by: PSYCHIATRY & NEUROLOGY

## 2022-10-31 PROCEDURE — 65660000001 HC RM ICU INTERMED STEPDOWN

## 2022-10-31 PROCEDURE — 74011250636 HC RX REV CODE- 250/636: Performed by: HOSPITALIST

## 2022-10-31 PROCEDURE — 74011250637 HC RX REV CODE- 250/637: Performed by: NURSE PRACTITIONER

## 2022-10-31 RX ORDER — DIAZEPAM 10 MG/2ML
5 INJECTION INTRAMUSCULAR EVERY 4 HOURS
Status: DISCONTINUED | OUTPATIENT
Start: 2022-10-31 | End: 2022-11-02

## 2022-10-31 RX ADMIN — Medication 100 MG: at 09:10

## 2022-10-31 RX ADMIN — DIAZEPAM 5 MG: 5 INJECTION, SOLUTION INTRAMUSCULAR; INTRAVENOUS at 22:37

## 2022-10-31 RX ADMIN — DIAZEPAM 5 MG: 5 INJECTION, SOLUTION INTRAMUSCULAR; INTRAVENOUS at 19:32

## 2022-10-31 RX ADMIN — HEPARIN SODIUM 5000 UNITS: 5000 INJECTION INTRAVENOUS; SUBCUTANEOUS at 11:56

## 2022-10-31 RX ADMIN — DIAZEPAM 5 MG: 5 INJECTION, SOLUTION INTRAMUSCULAR; INTRAVENOUS at 16:03

## 2022-10-31 RX ADMIN — HEPARIN SODIUM 5000 UNITS: 5000 INJECTION INTRAVENOUS; SUBCUTANEOUS at 04:02

## 2022-10-31 RX ADMIN — SENNOSIDES AND DOCUSATE SODIUM 2 TABLET: 50; 8.6 TABLET ORAL at 09:10

## 2022-10-31 RX ADMIN — SODIUM CHLORIDE, PRESERVATIVE FREE 10 ML: 5 INJECTION INTRAVENOUS at 14:36

## 2022-10-31 RX ADMIN — LEVOTHYROXINE SODIUM 88 MCG: 0.09 TABLET ORAL at 09:10

## 2022-10-31 RX ADMIN — HEPARIN SODIUM 5000 UNITS: 5000 INJECTION INTRAVENOUS; SUBCUTANEOUS at 19:32

## 2022-10-31 RX ADMIN — SODIUM CHLORIDE, PRESERVATIVE FREE 10 ML: 5 INJECTION INTRAVENOUS at 09:26

## 2022-10-31 RX ADMIN — SODIUM CHLORIDE, PRESERVATIVE FREE 10 ML: 5 INJECTION INTRAVENOUS at 22:37

## 2022-10-31 RX ADMIN — SODIUM CHLORIDE, PRESERVATIVE FREE 10 ML: 5 INJECTION INTRAVENOUS at 22:38

## 2022-10-31 RX ADMIN — THERA TABS 1 TABLET: TAB at 09:10

## 2022-10-31 RX ADMIN — DIAZEPAM 2.5 MG: 5 INJECTION, SOLUTION INTRAMUSCULAR; INTRAVENOUS at 11:56

## 2022-10-31 RX ADMIN — PANTOPRAZOLE SODIUM 40 MG: 40 TABLET, DELAYED RELEASE ORAL at 09:10

## 2022-10-31 RX ADMIN — DIAZEPAM 2.5 MG: 5 INJECTION, SOLUTION INTRAMUSCULAR; INTRAVENOUS at 09:22

## 2022-10-31 RX ADMIN — SODIUM CHLORIDE, POTASSIUM CHLORIDE, SODIUM LACTATE AND CALCIUM CHLORIDE 50 ML/HR: 600; 310; 30; 20 INJECTION, SOLUTION INTRAVENOUS at 14:38

## 2022-10-31 RX ADMIN — DIAZEPAM 2.5 MG: 5 INJECTION, SOLUTION INTRAMUSCULAR; INTRAVENOUS at 04:02

## 2022-10-31 NOTE — PROGRESS NOTES
Transition of Care Plan  RUR- Med 19%  DISPOSITION: SNF vs LTC - pending choice, pending medical progression  F/U with PCP/Specialist    Transport: [facility]    Emergency contact: MotherJj [phone]    HNAK barriers to discharge: SNF choice, auth    CM met with patient's mother, Wily Carranza at bedside to introduce self and discuss discharge plan. Patient's mom is aware patient is not following commands, would not be able to participate in IPR at this time. SNF choice list was provided, patient's mom plans to provide 3 choices. Patient has NG tube, SLP not able to assess swallow at this time. > 48 hours from discharge. CM will follow. RICHARD Kearney.

## 2022-10-31 NOTE — PROGRESS NOTES
Occupational Therapy  10/31/22     Patient currently on OT caseload. Chart reviewed and discussed with PT, per nursing staff patient is awake but not following any commands. Patient is not appropriate for OT. Will continue to follow patient and attempt OT one more day, if she remains inappropriate for participation will sign off at that time.      Thank you,  Ree Herrmann, OTR/L

## 2022-10-31 NOTE — PROGRESS NOTES
Patient is currently admitted to the hospital due to altered mental status and symptoms of diarrhea. Will continue to follow the patient and outreach up on discharge. Chart review completed.

## 2022-10-31 NOTE — PROGRESS NOTES
[facility] [facility] Adult  Hospitalist Group                                                                                          Hospitalist Progress Note  Sakina Chaudhary MD  Answering service: [phone] from in house phone        Date of Service:  10/31/2022  NAME:  Tayler Ortiz  :  1965  MRN:  [id number]      Admission Summary:     Tayler Ortiz is a 59 y.o. female with past medical history of anoxic brain injury, anxiety, depression, hypertension, hyperlipidemia, hypothyroidism presented to the emergency department via EMS from [address] [place] [address] with reported altered mental status (AMS). Patient is aphasic/nonverbal and not answering any questions. Majority of history was obtained per review of chart records. Per reports patient recently was hospitalized at [facility] from 2022-10/20/2022 with diagnosis of status epilepticus, acute metabolic encephalopathy, delirium, psychosis, generalized anxiety disorder, debility, Takotsubo cardiomyopathy, NSTEMI, bradycardia, SIRS, tachycardia, fever, leukocytosis, and CVA. Patient had work-up including MRI and also EEG. There was concern the patient may have some anoxic brain injury. She has been followed by psychiatry and neurology services with persistent encephalopathy and intermittent severe aggression. Patient has had aphasia and altered mental status not following commands per staff report since last week. Symptoms remain constant, severe, without specific exacerbating relieving factors. About emergency department today, initial recorded vital signs temperature 98.8 °F, /65, heart rate 103, respirate 30, O2 saturation 93% on room air. Abnormal labs included WC 12.5, platelets 988, BUN 23. CT head without IV contrast showed no acute intracranial abnormalities. ED request admission to the hospitalist service.      No acute event overnight, less rigidity, stable and transferred out of ICU on 10/29       Interval history / Subjective:     Patient is conscious and alert but aphasic, tried to squeeze examiner hand with her right hand,     Acute event overnight, no significant change in her mental status     Assessment & Plan:     AMS possible due to serotonin syndrome   -trazodone and lexapro has stopped  -Reported receiving a dose of Risperdal then Seroquel  -fever and rigidity improving, BP low normal increased to 75 ml/hr, continue midodrine 5 mg tid, HR  bpm  -patient is at risk for fluid overload   -continue on diazepam, IVF  -prn tylenol  -continue neuro check and supportive care   -PT/OT  -Neurologist on board     Acute encephalopathy   Aphasic since after patient was found unresponsive on 9/27  Hx of prolong hospitalization at [facility] after she was found unresponsive, respiratory arrest at her home on  9/26/22, suspected due to sedating medication, seizure, managed, stable and [facility]  Suspected anoxic brain injury  Seizure   Hx of CVA  -CT head on 10/25/2022 no acute intracranial abnormality on noncontrast head CT  -conscious and alert, aphasic, doesn't follow command or moves extremities  -continue nuero check and supportive care  -complex medical problem with prolonged hospitalization  -SpO2 93-98% on RA  -high risk for decompensation      Hx of Takotsubo cardiomyopathy  -has peripheral edema  -BP low normal, if it improves will consider her home metoprolol and lasix   -monitor I/O and daily weight      HTN  -received bolus normal saline , and IVF,   -BP soft and normal, on midodrine midodrine added, monitor BP  -home clonidine, on hold     Hyperlipidemia   -statin on hold due to elevated CK     Hypothyroidism   -continue synthroid      Generalized anxiety disorder  -home trazodone and lexapro on hold     Severe disability with immobility   -continue supportive care      Obesity   -BMI 32.52 kg/m2  -need weight loss program     Full Code: high risk for decompensation     Sal catheter in place, to discontinue 10/26  NGT in place, on tube feeding       Full code: High risk for decompensation, consult palliative care team          Code status: Full code  Prophylaxis: SCD  Care Plan discussed with:her son, mother, and Nurse  Anticipated Disposition: IPR [id number] Problems  Date Reviewed: 10/5/2022            Codes Class Noted POA    Altered mental status ICD-10-CM: R41.82  ICD-9-CM: 780.97  10/27/2022 Unknown        Diarrhea ICD-10-CM: R19.7  ICD-9-CM: 787.91  10/25/2022 Unknown        AMS (altered mental status) ICD-10-CM: R41.82  ICD-9-CM: 780.97  10/25/2022 Unknown         Vital Signs:    Last 24hrs VS reviewed since prior progress note. Most recent are:  Visit Vitals  /72   Pulse 97   Temp 97.7 °F (36.5 °C)   Resp 30   Ht 5' 6\" (1.676 m)   Wt 91.4 kg (201 lb 8 oz)   SpO2 98%   BMI 32.52 kg/m²         Intake/Output Summary (Last 24 hours) at 10/31/2022 1014  Last data filed at 10/30/2022 2000  Gross per 24 hour   Intake 170 ml   Output --   Net 170 ml          Physical Examination:     I had a face to face encounter with this patient and independently examined them on 10/31/2022 as outlined below:          Constitutional: Conscious and alert, aphasic, no acute distres   ENT: NGT in place, oral mucosa moist, oropharynx benign. Resp:  CTA bilaterally. No wheezing/rhonchi/rales. No accessory muscle use. CV:  Regular rhythm, normal rate, no murmurs, gallops, rubs    GI:  Soft, non distended, non tender.  normoactive bowel sounds, no hepatosplenomegaly     Musculoskeletal: Bilateral pretibial, pedal and hand edema    Neurologic: Conscious and alert, aphasic, does not follow command, movement of her right fingers            Data Review:    Review and/or order of clinical lab test  Review and/or order of tests in the radiology section of CPT  Review and/or order of tests in the medicine section of CPT      Labs:     Recent Labs 10/30/22  0621 10/29/22  0322   WBC 9.9 11.0   HGB 10.7* 10.9*   HCT 33.4* 33.1*    265       Recent Labs     10/30/22  0621 10/29/22  0322    138   K 4.3 3.7   * 107   CO2 28 26   BUN 13 12   CREA 0.50* 0.62   * 132*   CA 8.4* 8.6   MG 2.1 1.9   PHOS 3.2 2.9       Recent Labs     10/29/22  0322   ALT 22   AP 53   TBILI 0.5   TP 6.0*   ALB 2.3*   GLOB 3.7       No results for input(s): INR, PTP, APTT, INREXT, INREXT in the last 72 hours. No results for input(s): FE, TIBC, PSAT, FERR in the last 72 hours. Lab Results   Component Value Date/Time    Folate 9.9 10/26/2022 11:47 AM        No results for input(s): PH, PCO2, PO2 in the last 72 hours.   Recent Labs     10/30/22  0621 10/29/22  0322   CPK 97 265*       Lab Results   Component Value Date/Time    Cholesterol, total 145 10/26/2022 06:46 AM    HDL Cholesterol 30 10/26/2022 06:46 AM    LDL, calculated 91.4 10/26/2022 06:46 AM    Triglyceride 118 10/26/2022 06:46 AM    CHOL/HDL Ratio 4.8 10/26/2022 06:46 AM     Lab Results   Component Value Date/Time    Glucose (POC) 110 10/05/2022 04:55 PM    Glucose (POC) 87 10/05/2022 11:02 AM    Glucose (POC) 78 10/05/2022 05:00 AM    Glucose (POC) 94 10/05/2022 04:57 AM    Glucose (POC) 124 (H) 10/04/2022 11:03 PM     Lab Results   Component Value Date/Time    Color YELLOW/STRAW 10/25/2022 04:54 PM    Appearance CLEAR 10/25/2022 04:54 PM    Specific gravity >1.030 (H) 10/25/2022 04:54 PM    Specific gravity 1.011 09/27/2022 12:51 PM    pH (UA) 5.5 10/25/2022 04:54 PM    Protein TRACE (A) 10/25/2022 04:54 PM    Glucose Negative 10/25/2022 04:54 PM    Ketone TRACE (A) 10/25/2022 04:54 PM    Bilirubin Negative 10/25/2022 04:54 PM    Urobilinogen 1.0 10/25/2022 04:54 PM    Nitrites Negative 10/25/2022 04:54 PM    Leukocyte Esterase Negative 10/25/2022 04:54 PM    Epithelial cells FEW 10/25/2022 04:54 PM    Bacteria Negative 10/25/2022 04:54 PM    WBC 0-4 10/25/2022 04:54 PM    RBC 0-5 10/25/2022 04:54 PM         Medications Reviewed:     Current Facility-Administered Medications   Medication Dose Route Frequency    senna-docusate (PERICOLACE) 8.6-50 mg per tablet 2 Tablet  2 Tablet Oral DAILY    [Held by provider] metoprolol tartrate (LOPRESSOR) tablet 12.5 mg  12.5 mg Oral Q12H    heparin (porcine) injection 5,000 Units  5,000 Units SubCUTAneous Q8H    polyethylene glycol (MIRALAX) packet 17 g  17 g Oral DAILY PRN    thiamine HCL (B-1) tablet 100 mg  100 mg Oral DAILY    therapeutic multivitamin (THERAGRAN) tablet 1 Tablet  1 Tablet Oral DAILY    acetaminophen (TYLENOL) solution 650 mg  650 mg Per NG tube Q4H PRN    diazePAM (VALIUM) injection 2.5 mg  2.5 mg IntraVENous Q4H    sodium chloride (NS) flush 5-40 mL  5-40 mL IntraVENous Q8H    sodium chloride (NS) flush 5-40 mL  5-40 mL IntraVENous PRN    lactated Ringers infusion  50 mL/hr IntraVENous CONTINUOUS    [Held by provider] atorvastatin (LIPITOR) tablet 10 mg  10 mg Oral QHS    levothyroxine (SYNTHROID) tablet 88 mcg  88 mcg Oral ACB    pantoprazole (PROTONIX) tablet 40 mg  40 mg Oral ACB    sodium chloride (NS) flush 5-10 mL  5-10 mL IntraVENous PRN    acetaminophen (TYLENOL) suppository 650 mg  650 mg Rectal Q6H PRN     ______________________________________________________________________  EXPECTED LENGTH OF STAY: 2d 7h  ACTUAL LENGTH OF STAY:          4                 Chikis Richardson MD

## 2022-10-31 NOTE — CONSULTS
Palliative Medicine Consult  Moralez: [phone] ([phone])    Patient Name: Funmi Carballo  YOB: 1965    Date of Initial Consult: October 31, 2022  Reason for Consult: Care Decisions  Requesting Provider: Dr. Janes Rodriguez  Primary Care Physician: Gage Steele MD     SUMMARY:   Funmi Carballo is a 59 y.o. admitted on 10/25/2022 from [address] [place] [address] with a diagnosis of encephalopathy with unclear etiol differential serotonin syndrome, leukocytosis, dehydration,      PMH:[place]osubo CMP, respiratory arrest 2/2 sleeping pills and fentanyl patches, recent admission SF for status epilepticus, seizures, anoxic brain injury, anxiety, depression, HTN, HLD, hypothyroidism, allergic rhinitis, obesity,     Current medical issues leading to Palliative Medicine involvement include: pt acutely ill with high risk of decompensation . Social: pt is a  with 3 adult sons who all live out of state. 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 Rosa Maria Arredondo @ [phone]  ZUF-QPIV HBYMDL @ [phone]  Edward Murdock @ [phone]  Mother: Randy Yanhaw-[phone]~ spokesperson for family   PALLIATIVE DIAGNOSES:   AMS  Aphasia  Feeding difficulties  Hypoalbuminemia   Palliative care encounter     PLAN:   ACP:pt does not have a MPOA. She has 3 sons who are her legal next of kin. Her mother Vivek Nice  has been designated the spokesperson for the family given all sons live out of state and work full time limiting their accessibility. If there are end of life decisions to be made, we would need the majority of the children involved in decisions. Meeting with mother today sand services introduced. Mother has high medical literacy and able to provide an accurate understanding of the patient's current medical state. We discussed where to from here.   I explained that the pt has been her 6 days with minimal clinical gains. Unable to work with PT due to mentation. Explained that decisions regarding artifical nutrition will need to be made unless she improves. Mother familiar with peg tube as her mother had one. Explained it is the only option if the goal is to prolong the patient's life and transition to skilled care. Mother realizes that Tessa Spice is not a realistic option at this time  Code status discussed and she shared that the patient's sons elected full code  Mother emotional as the pt was so much better 3 weeks ago and everything is happening so fast.  Family has done a lot of research on the financial aspect to try and address the patient's affairs. I will continue to follow along and support as helpful. Goals clear at this time. Goals: continue current care. Skilled care upon discharge. Family will need to consent to peg unless pt able to pass SLP  Initial consult note routed to primary continuity provider and/or primary health care team members  Communicated plan of care with: Palliative Eladio BANKS [facility] Team     GOALS OF CARE / TREATMENT PREFERENCES:     GOALS OF CARE:  Patient/Health Care Proxy Stated Goals: Prolong life    TREATMENT PREFERENCES:   Code Status: Full Code    Patient and family's personal goals include: improved mentation     Advance Care Planning:  [] The [facility] Interdisciplinary Team has updated the ACP Navigator with [address] and Patient Capacity      Primary Decision Maker (Active): Hussein Morales - [phone]  Advance Care Planning 10/31/2022   Patient's Healthcare Decision Maker is: -   Confirm Advance Directive None   Patient Would Like to Complete Advance Directive Unable       Medical Interventions: Full interventions       Other:    As far as possible, the palliative care team has discussed with patient / health care proxy about goals of care / treatment preferences for patient.      HISTORY:     History obtained from: chart, team, family    CHIEF COMPLAINT: Pt admitted with aforementioned history and issues    HPI/SUBJECTIVE:    The patient is:   [] Verbal and participatory  [x] Non-participatory due to: medical condition        Clinical Pain Assessment (nonverbal scale for severity on nonverbal patients):   Clinical Pain Assessment  Severity: 0     Activity (Movement): Lying quietly, normal position    Duration: for how long has pt been experiencing pain (e.g., 2 days, 1 month, years)  Frequency: how often pain is an issue (e.g., several times per day, once every few days, constant)     FUNCTIONAL ASSESSMENT:     Palliative Performance Scale (PPS):  PPS: 20       PSYCHOSOCIAL/SPIRITUAL SCREENING:     Palliative IDT has assessed this patient for cultural preferences / practices and a referral made as appropriate to needs (Cultural Services, Patient Advocacy, Ethics, etc.)    Any spiritual / Faith concerns:  [] Yes /  [x] No  [] Unable to obtain this information  If \"Yes\" to discuss with pastoral care during IDT     Does caregiver feel burdened by caring for their loved one:   [] Yes /  [x] No /  [] No Caregiver Present/Available [] No Caregiver [] Pt Lives at [place] [address]  If \"Yes\" to discuss with social work during IDT    Anticipatory grief assessment:   [x] Normal  / [] Maladaptive   [] Unable to obtain this information  [] n/a  If \"Maladaptive\" to discuss with social work during IDT    ESAS Anxiety: Anxiety: 0    ESAS Depression:          REVIEW OF SYSTEMS:     Positive and pertinent negative findings in ROS are noted above in HPI. The following systems were [] reviewed / [x] unable to be reviewed as noted in HPI  Other findings are noted below. Systems: constitutional, ears/nose/mouth/throat, respiratory, gastrointestinal, genitourinary, musculoskeletal, integumentary, neurologic, psychiatric, endocrine. Positive findings noted below.   Modified ESAS Completed by: provider   Fatigue: 10 Drowsiness: 10     Pain: 0 Anxiety: 0     Anorexia: 10 Dyspnea: 0           Stool Occurrence(s): 1        PHYSICAL EXAM:     From RN flowsheet:  Wt Readings from Last 3 Encounters:   10/29/22 201 lb 8 oz (91.4 kg)   10/11/22 230 lb (104.3 kg)   08/24/22 206 lb (93.4 kg)     Blood pressure 123/72, pulse 97, temperature 97.7 °F (36.5 °C), resp. rate 30, height 5' 6\" (1.676 m), weight 201 lb 8 oz (91.4 kg), SpO2 98 %. Pain Scale 1: Adult Nonverbal Pain Scale  Pain Intensity 1: 0              Pain Intervention(s) 1: Repositioned  Last bowel movement, if known:     Constitutional: unresponsive  Eyes: closed  ENMT: no nasal discharge, moist mucous membranes  Cardiovascular:   Respiratory: breathing not labored, symmetric  Gastrointestinal:   Musculoskeletal: no deformity, no tenderness to palpation  Skin: warm, dry  Neurologic: obtunded  Psychiatric: unable to assess  Other:       HISTORY:     Active Problems:    Diarrhea (10/25/2022)      AMS (altered mental status) (10/25/2022)      Altered mental status (10/27/2022)    Past Medical History:   Diagnosis Date    Allergic rhinitis 7/29/2019    Depression     History of multiple allergies     History of vascular access device 10/07/2022    [facility] VAT: PICC placement R Cephalic length 40 cm for reliable access/TPN arm circ 35.5 cm    Muscle ache     Obesity 11/5/2012    Sinus pressure     Sinus problem       Past Surgical History:   Procedure Laterality Date    HX ACL RECONSTRUCTION      left     HX CHOLECYSTECTOMY  1998    IR INSERT NON TUNL CVC OVER 5 YRS  9/27/2022      Family History   Problem Relation Age of Onset    Diabetes Paternal Grandfather       History reviewed, no pertinent family history.   Social History     Tobacco Use    Smoking status: Not on file    Smokeless tobacco: Never   Substance Use Topics    Alcohol use: No     Allergies   Allergen Reactions    Droperidol Itching      Current Facility-Administered Medications   Medication Dose Route Frequency    senna-docusate (PERICOLACE) 8.6-50 mg per tablet 2 Tablet  2 Tablet Oral DAILY    [Held by provider] metoprolol tartrate (LOPRESSOR) tablet 12.5 mg  12.5 mg Oral Q12H    heparin (porcine) injection 5,000 Units  5,000 Units SubCUTAneous Q8H    polyethylene glycol (MIRALAX) packet 17 g  17 g Oral DAILY PRN    thiamine HCL (B-1) tablet 100 mg  100 mg Oral DAILY    therapeutic multivitamin (THERAGRAN) tablet 1 Tablet  1 Tablet Oral DAILY    acetaminophen (TYLENOL) solution 650 mg  650 mg Per NG tube Q4H PRN    diazePAM (VALIUM) injection 2.5 mg  2.5 mg IntraVENous Q4H    sodium chloride (NS) flush 5-40 mL  5-40 mL IntraVENous Q8H    sodium chloride (NS) flush 5-40 mL  5-40 mL IntraVENous PRN    lactated Ringers infusion  50 mL/hr IntraVENous CONTINUOUS    [Held by provider] atorvastatin (LIPITOR) tablet 10 mg  10 mg Oral QHS    levothyroxine (SYNTHROID) tablet 88 mcg  88 mcg Oral ACB    pantoprazole (PROTONIX) tablet 40 mg  40 mg Oral ACB    sodium chloride (NS) flush 5-10 mL  5-10 mL IntraVENous PRN    acetaminophen (TYLENOL) suppository 650 mg  650 mg Rectal Q6H PRN          LAB AND IMAGING FINDINGS:     Lab Results   Component Value Date/Time    WBC 9.9 10/30/2022 06:21 AM    HGB 10.7 (L) 10/30/2022 06:21 AM    PLATELET 698 96/80/6297 06:21 AM     Lab Results   Component Value Date/Time    Sodium 141 10/30/2022 06:21 AM    Potassium 4.3 10/30/2022 06:21 AM    Chloride 109 (H) 10/30/2022 06:21 AM    CO2 28 10/30/2022 06:21 AM    BUN 13 10/30/2022 06:21 AM    Creatinine 0.50 (L) 10/30/2022 06:21 AM    Calcium 8.4 (L) 10/30/2022 06:21 AM    Magnesium 2.1 10/30/2022 06:21 AM    Phosphorus 3.2 10/30/2022 06:21 AM      Lab Results   Component Value Date/Time    Alk.  phosphatase 53 10/29/2022 03:22 AM    Protein, total 6.0 (L) 10/29/2022 03:22 AM    Albumin 2.3 (L) 10/29/2022 03:22 AM    Globulin 3.7 10/29/2022 03:22 AM     Lab Results   Component Value Date/Time    INR 1.1 10/26/2022 06:46 AM    Prothrombin time 11.6 (H) 10/26/2022 06:46 AM    aPTT 23.9 10/26/2022 06:46 AM      Lab Results   Component Value Date/Time    Iron 25 (L) 10/06/2022 12:59 AM    TIBC 208 (L) 10/06/2022 12:59 AM    Iron % saturation 12 (L) 10/06/2022 12:59 AM    Ferritin 154 10/06/2022 12:59 AM      Lab Results   Component Value Date/Time    pH 7.42 10/06/2022 08:46 AM    PCO2 46 (H) 10/06/2022 08:46 AM    PO2 60 (L) 10/06/2022 08:46 AM     No components found for: [place]   Lab Results   Component Value Date/Time    CK 97 10/30/2022 06:21 AM                Total time: 70 minutes  Counseling / coordination time, spent as noted above:  60 minutes  > 50% counseling / coordination?: yes    Prolonged service was provided for  []30 min   []75 min in face to face time in the presence of the patient, spent as noted above. Time Start:   Time End:   Note: this can only be billed with 34029 (initial) or 39029 (follow up). If multiple start / stop times, list each separately.

## 2022-10-31 NOTE — PROGRESS NOTES
Physical Therapy 10/31/22    Chart reviewed, conferred with RN. Per RN, patient is awake but not following any commands. Patient is not appropriate for PT. Will defer today and follow up one more day before signing off if she remains inappropriate.     Thank you for your consideration,    Flor Rucker, PT, DPT

## 2022-10-31 NOTE — PROGRESS NOTES
Problem: Pressure Injury - Risk of  Goal: *Prevention of pressure injury  Description: Document Jose Enrique Scale and appropriate interventions in the flowsheet. Outcome: Progressing Towards Goal  Note: Pressure Injury Interventions:  Sensory Interventions: Assess changes in LOC, Assess need for specialty bed, Float heels, Turn and reposition approx. every two hours (pillows and wedges if needed), Pad between skin to skin, Keep linens dry and wrinkle-free    Moisture Interventions: Internal/External urinary devices    Activity Interventions: Pressure redistribution bed/mattress(bed type), Assess need for specialty bed    Mobility Interventions: Assess need for specialty bed, Float heels, HOB 30 degrees or less, Pressure redistribution bed/mattress (bed type), Turn and reposition approx. every two hours(pillow and wedges)    Nutrition Interventions: Document food/fluid/supplement intake, Offer support with meals,snacks and hydration    Friction and Shear Interventions: HOB 30 degrees or less, Lift sheet                Problem: Patient Education: Go to Patient Education Activity  Goal: Patient/Family Education  Outcome: Progressing Towards Goal     Problem: Patient Education: Go to Patient Education Activity  Goal: Patient/Family Education  Outcome: Progressing Towards Goal     Problem: Patient Education: Go to Patient Education Activity  Goal: Patient/Family Education  Outcome: Progressing Towards Goal     Problem: Patient Education: Go to Patient Education Activity  Goal: Patient/Family Education  Outcome: Progressing Towards Goal     Problem: Falls - Risk of  Goal: *Absence of Falls  Description: Document Chyna Fall Risk and appropriate interventions in the flowsheet.   Outcome: Progressing Towards Goal  Note: Fall Risk Interventions:       Mentation Interventions: Bed/chair exit alarm, Door open when patient unattended, Evaluate medications/consider consulting pharmacy    Medication Interventions: Bed/chair exit alarm, Evaluate medications/consider consulting pharmacy    Elimination Interventions: Bed/chair exit alarm, Toileting schedule/hourly rounds, Call light in reach    History of Falls Interventions: Consult care management for discharge planning, Door open when patient unattended, Evaluate medications/consider consulting pharmacy         Problem: Patient Education: Go to Patient Education Activity  Goal: Patient/Family Education  Outcome: Progressing Towards Goal     Problem: Discharge Planning  Goal: *Discharge to safe environment  Outcome: Progressing Towards Goal  Goal: *Knowledge of medication management  Outcome: Progressing Towards Goal  Goal: *Knowledge of discharge instructions  Outcome: Progressing Towards Goal     Problem: Patient Education: Go to Patient Education Activity  Goal: Patient/Family Education  Outcome: Progressing Towards Goal     Problem: Nutrition Deficit  Goal: *Optimize nutritional status  Outcome: Progressing Towards Goal

## 2022-10-31 NOTE — PROGRESS NOTES
Neurology Progress Note    Patient ID:  Edilma Feliz  [id number]  59 y.o.  1965    Subjective:        Events noted over the past 24 hours. There is been slight improvement in her clinical status though minimal overall. Mother is upset by a visitation from palliative discussed that palliation and really is not just related to hospice care but involved in comfort and emotional aspect while we focus on the mental aspect and that early enrollment is typically recommended. Did discuss that if she does not make gains she will need a feeding tube temporarily but at this juncture there are no plans to give up on her medical care. Mother mentions that she did show some slight gains though nothing dramatic.     Current Facility-Administered Medications   Medication Dose Route Frequency    diazePAM (VALIUM) injection 5 mg  5 mg IntraVENous Q4H    senna-docusate (PERICOLACE) 8.6-50 mg per tablet 2 Tablet  2 Tablet Oral DAILY    [Held by provider] metoprolol tartrate (LOPRESSOR) tablet 12.5 mg  12.5 mg Oral Q12H    heparin (porcine) injection 5,000 Units  5,000 Units SubCUTAneous Q8H    polyethylene glycol (MIRALAX) packet 17 g  17 g Oral DAILY PRN    thiamine HCL (B-1) tablet 100 mg  100 mg Oral DAILY    therapeutic multivitamin (THERAGRAN) tablet 1 Tablet  1 Tablet Oral DAILY    acetaminophen (TYLENOL) solution 650 mg  650 mg Per NG tube Q4H PRN    sodium chloride (NS) flush 5-40 mL  5-40 mL IntraVENous Q8H    sodium chloride (NS) flush 5-40 mL  5-40 mL IntraVENous PRN    lactated Ringers infusion  50 mL/hr IntraVENous CONTINUOUS    [Held by provider] atorvastatin (LIPITOR) tablet 10 mg  10 mg Oral QHS    levothyroxine (SYNTHROID) tablet 88 mcg  88 mcg Oral ACB    pantoprazole (PROTONIX) tablet 40 mg  40 mg Oral ACB    sodium chloride (NS) flush 5-10 mL  5-10 mL IntraVENous PRN    acetaminophen (TYLENOL) suppository 650 mg  650 mg Rectal Q6H PRN          Objective:     Patient Vitals for the past 8 hrs:   BP Temp Pulse Resp SpO2   10/31/22 1201 -- -- 100 -- --   10/31/22 1200 (!) 113/52 -- (!) 109 27 97 %   10/31/22 1000 (!) 97/37 97.7 °F (36.5 °C) 82 27 100 %   10/31/22 0926 -- 97.7 °F (36.5 °C) 99 27 99 %   10/31/22 0600 -- -- 80 -- --     Middle-age female appearing stated age resting in bed appearing encephalopathic. HEENT is grossly unrevealing to observation neck appears slightly stiff. Cardiopulmonary exams are unremarkable. Abdomen is nondistended. Extremities are warm/dry. Neurologically, patient appears lethargic, will open eyes but not interact. No speech, no command following. Motorically patient has increased tone in right arm and legs more than left arm. Appears to have intact sensation by wincing. Remainder of examination is deferred no clear clonus, Babinski appears present on right equivocal on the left.   Assessment:     Alice Méndez is a 68-year-old female history of depression who presented to [facility] altered sensorium with working diagnosis of serotonin syndrome    Plan:   Serotonin syndrome:  Remains working diagnosis, I will increase Valium to 5 mg as patient remains with ongoing rigidity unclear how much is volitional versus true rigidity  No further fevers vitals are stable no need for cyproheptadine  Additional considerations include at this juncture catatonia given reports of severe depression as well as stiff person syndrome had attempted to send in [address] yesterday was not collected we reorder  Will increase Valium to 5 mg as this was not done which would address any of the above conditions honestly but mostly to further address rigidity from serotonin syndrome and/or is a partial benzo challenge for catatonia  Will continue to follow please call question concerns      Signed:  Katherine Madden MD  10/31/2022  1:25 PM

## 2022-10-31 NOTE — PROGRESS NOTES
Speech Pathology Contact Note:    Chart reviewed and spoke with RN. Pt is awake but not following any commands at this time. Pt continues to be inappropriate for PO trials at this time. Speech is following to re-evaluate her swallow function as her mental status improves. Recommend continuing with NG tube for sole nutritional support.       Thanks,   Dheeraj Randall

## 2022-10-31 NOTE — PROGRESS NOTES
Physician Progress Note      Rosangela Magana  CSN #:                  [id number]  :                       1965  ADMIT DATE:       10/25/2022 4:14 PM  [address] [place] DATE:  RESPONDING  PROVIDER #:        Moni Todd MD          QUERY TEXT:    Pt. noted to have AMS, with documentation of Serotonin Syndrome. Pt is requiring total care from nursing staff for ADLS. If possible, please document in the progress notes and/or discharge summary if you are treating and/or evaluating any of the following: The medical record reflects the following:  Risk Factors: recent CVA, admitted with Serotonin syndrome  Clinical Indicators: admitted with AMS, decreased interaction and not communicating. Anoxic brain injury is suspected. Has severe muscle rigidity and is requiring complete care from Nsg staff for ADL's and turning/ repositioning. GCS 7-9. Treatment: Nsg staff providing personal care and ADL's, turning/ repositioning q 2 hrs, incontinence care. PT/OT/ST. Discussing possible need for feeding tube if no improvement. Functional quadriplegia (or quadriparesis) is defined as the complete inability to move due to severe disability or frailty caused by another medical condition without physical injury or damage to the brain or spinal cord. Source: https://acphospitalist.org    Thank you,  Justin Vazquez RN  Clinical Documentation  [phone], or via Perfect [employer]  Options provided:  -- Functional quadriplegia  -- Complete immobility due to severe physical disability or frailty  -- Severe debility/impaired mobility  -- Other - I will add my own diagnosis  -- Disagree - Not applicable / Not valid  -- Disagree - Clinically unable to determine / Unknown  -- Refer to Clinical Documentation Reviewer    PROVIDER RESPONSE TEXT:    This patient has severe debility/ impaired mobility.     Query created by: Keturah Klinefelter on 10/31/2022 4:26 PM      Electronically signed by:  Promise Cool Frederick Hernandez MD 10/31/2022 5:18 PM

## 2022-10-31 NOTE — ACP (ADVANCE CARE PLANNING)
PALLIATIVE MEDICINE            Pt does not have a MPOA. She has 3 sons who are her legal next of kin. Her mother Alicia Diggs  has been designated the spokesperson for the family given all sons live out of state and work full time limiting their accessibility. Mother communicates with them and provides daily updates. If there are end of life decisions to be made, we would need the majority of the children involved in decisions. Karla Chapin.  [address] MSN, FNP-BC, [facility]

## 2022-11-01 LAB
ALBUMIN SERPL-MCNC: 2.2 G/DL (ref 3.5–5)
ALBUMIN/GLOB SERPL: 0.5 {RATIO} (ref 1.1–2.2)
ALP SERPL-CCNC: 61 U/L (ref 45–117)
ALT SERPL-CCNC: 18 U/L (ref 12–78)
ANION GAP SERPL CALC-SCNC: 5 MMOL/L (ref 5–15)
AST SERPL-CCNC: 10 U/L (ref 15–37)
BILIRUB SERPL-MCNC: 0.2 MG/DL (ref 0.2–1)
BUN SERPL-MCNC: 12 MG/DL (ref 6–20)
BUN/CREAT SERPL: 22 (ref 12–20)
CALCIUM SERPL-MCNC: 9.1 MG/DL (ref 8.5–10.1)
CHLORIDE SERPL-SCNC: 106 MMOL/L (ref 97–108)
CO2 SERPL-SCNC: 29 MMOL/L (ref 21–32)
CREAT SERPL-MCNC: 0.54 MG/DL (ref 0.55–1.02)
ERYTHROCYTE [DISTWIDTH] IN BLOOD BY AUTOMATED COUNT: 13.9 % (ref 11.5–14.5)
GAD65 AB SER-ACNC: <5 U/ML (ref 0–5)
GLOBULIN SER CALC-MCNC: 4.1 G/DL (ref 2–4)
GLUCOSE SERPL-MCNC: 123 MG/DL (ref 65–100)
HCT VFR BLD AUTO: 34.8 % (ref 35–47)
HGB BLD-MCNC: 11.2 G/DL (ref 11.5–16)
MCH RBC QN AUTO: 28.7 PG (ref 26–34)
MCHC RBC AUTO-ENTMCNC: 32.2 G/DL (ref 30–36.5)
MCV RBC AUTO: 89.2 FL (ref 80–99)
NRBC # BLD: 0 K/UL (ref 0–0.01)
NRBC BLD-RTO: 0 PER 100 WBC
PLATELET # BLD AUTO: 267 K/UL (ref 150–400)
PMV BLD AUTO: 11 FL (ref 8.9–12.9)
POTASSIUM SERPL-SCNC: 4.2 MMOL/L (ref 3.5–5.1)
PROT SERPL-MCNC: 6.3 G/DL (ref 6.4–8.2)
RBC # BLD AUTO: 3.9 M/UL (ref 3.8–5.2)
SODIUM SERPL-SCNC: 140 MMOL/L (ref 136–145)
WBC # BLD AUTO: 8.8 K/UL (ref 3.6–11)

## 2022-11-01 PROCEDURE — 74011000250 HC RX REV CODE- 250: Performed by: FAMILY MEDICINE

## 2022-11-01 PROCEDURE — 97535 SELF CARE MNGMENT TRAINING: CPT

## 2022-11-01 PROCEDURE — 97530 THERAPEUTIC ACTIVITIES: CPT

## 2022-11-01 PROCEDURE — 97112 NEUROMUSCULAR REEDUCATION: CPT

## 2022-11-01 PROCEDURE — 74011250636 HC RX REV CODE- 250/636: Performed by: NURSE PRACTITIONER

## 2022-11-01 PROCEDURE — 92526 ORAL FUNCTION THERAPY: CPT | Performed by: SPEECH-LANGUAGE PATHOLOGIST

## 2022-11-01 PROCEDURE — 65660000001 HC RM ICU INTERMED STEPDOWN

## 2022-11-01 PROCEDURE — 51798 US URINE CAPACITY MEASURE: CPT

## 2022-11-01 PROCEDURE — 36415 COLL VENOUS BLD VENIPUNCTURE: CPT

## 2022-11-01 PROCEDURE — 86037 ANCA TITER EACH ANTIBODY: CPT

## 2022-11-01 PROCEDURE — 74011250637 HC RX REV CODE- 250/637: Performed by: NURSE PRACTITIONER

## 2022-11-01 PROCEDURE — 74011250636 HC RX REV CODE- 250/636: Performed by: PSYCHIATRY & NEUROLOGY

## 2022-11-01 PROCEDURE — 85027 COMPLETE CBC AUTOMATED: CPT

## 2022-11-01 PROCEDURE — 77030038269 HC DRN EXT URIN PURWCK BARD -A

## 2022-11-01 PROCEDURE — 74011250637 HC RX REV CODE- 250/637: Performed by: PHYSICIAN ASSISTANT

## 2022-11-01 PROCEDURE — 80053 COMPREHEN METABOLIC PANEL: CPT

## 2022-11-01 RX ORDER — HEPARIN SODIUM 5000 [USP'U]/ML
5000 INJECTION, SOLUTION INTRAVENOUS; SUBCUTANEOUS EVERY 8 HOURS
Status: COMPLETED | OUTPATIENT
Start: 2022-11-01 | End: 2022-11-01

## 2022-11-01 RX ADMIN — DIAZEPAM 5 MG: 5 INJECTION, SOLUTION INTRAMUSCULAR; INTRAVENOUS at 19:00

## 2022-11-01 RX ADMIN — DIAZEPAM 5 MG: 5 INJECTION, SOLUTION INTRAMUSCULAR; INTRAVENOUS at 22:17

## 2022-11-01 RX ADMIN — LEVOTHYROXINE SODIUM 88 MCG: 0.09 TABLET ORAL at 09:45

## 2022-11-01 RX ADMIN — PANTOPRAZOLE SODIUM 40 MG: 40 TABLET, DELAYED RELEASE ORAL at 09:45

## 2022-11-01 RX ADMIN — DIAZEPAM 5 MG: 5 INJECTION, SOLUTION INTRAMUSCULAR; INTRAVENOUS at 03:15

## 2022-11-01 RX ADMIN — Medication 100 MG: at 09:45

## 2022-11-01 RX ADMIN — SODIUM CHLORIDE, PRESERVATIVE FREE 10 ML: 5 INJECTION INTRAVENOUS at 19:04

## 2022-11-01 RX ADMIN — DIAZEPAM 5 MG: 5 INJECTION, SOLUTION INTRAMUSCULAR; INTRAVENOUS at 15:35

## 2022-11-01 RX ADMIN — THERA TABS 1 TABLET: TAB at 09:45

## 2022-11-01 RX ADMIN — SODIUM CHLORIDE, PRESERVATIVE FREE 10 ML: 5 INJECTION INTRAVENOUS at 06:40

## 2022-11-01 RX ADMIN — DIAZEPAM 5 MG: 5 INJECTION, SOLUTION INTRAMUSCULAR; INTRAVENOUS at 11:33

## 2022-11-01 RX ADMIN — SODIUM CHLORIDE, PRESERVATIVE FREE 10 ML: 5 INJECTION INTRAVENOUS at 22:17

## 2022-11-01 RX ADMIN — SENNOSIDES AND DOCUSATE SODIUM 2 TABLET: 50; 8.6 TABLET ORAL at 09:45

## 2022-11-01 RX ADMIN — HEPARIN SODIUM 5000 UNITS: 5000 INJECTION INTRAVENOUS; SUBCUTANEOUS at 11:34

## 2022-11-01 RX ADMIN — HEPARIN SODIUM 5000 UNITS: 5000 INJECTION INTRAVENOUS; SUBCUTANEOUS at 03:15

## 2022-11-01 RX ADMIN — HEPARIN SODIUM 5000 UNITS: 5000 INJECTION INTRAVENOUS; SUBCUTANEOUS at 20:51

## 2022-11-01 RX ADMIN — DIAZEPAM 5 MG: 5 INJECTION, SOLUTION INTRAMUSCULAR; INTRAVENOUS at 06:39

## 2022-11-01 NOTE — PROGRESS NOTES
Problem: Dysphagia (Adult)  Goal: *Acute Goals and Plan of Care (Insert Text)  Description: Speech Therapy Goals  Initiated 10/28/2022   1. Patient will participate in re-evaluation of swallow function within 7 days   Initiated 10/26/22    1. Patient will tolerate pureed diet with thin liquids with no adverse effects within 7 days. Discontinue 10/28/2022   Outcome: Progressing Towards Goal     SPEECH LANGUAGE PATHOLOGY DYSPHAGIA TREATMENT  Patient: Georgiana Vasquez (89 y.o. female)  Date: 11/1/2022  Diagnosis: AMS (altered mental status) [R41.82]  Diarrhea [R19.7]  Altered mental status [R41.82] <principal problem not specified>      Precautions: aspiration Fall, Skin, Seizure    ASSESSMENT:  Patient seen on edge of the bed with PT/OT and showing improved active responses to PO offerings. Was able to actively purse lips and make sucking motion to attempt to extract ice from spoon x2. Required increased time and assistance to pull ice fully into her mouth and then demonstrated slow manipulation and propulsion but full oral clearance. Patient then showing fatigue with drooling, inability to close lips around spoon to accept additional trials and no further active responses to ice chips, so trials ended. Education provided to mother regarding how to safely provide ice chips and importance of only offering when patient is actively alert, actively attempting to pull ice from spoon, and holding if patient not actively accepting, showing fatigue, or with s/s of aspiration. Reviewed aspiration risks and holding of additional trials at this time until strength further improves. She verbalized understanding. PLAN:  Recommendations and Planned Interventions:  --recommend continued NPO with NG feeds with occasional ice chips only when patient is actively accepting. Do not put ice into her mouth if she is not attempting to actively take it.    --will continue to follow for trials of ice chips to improve acceptance as well as re-assessment of swallow function as mental status continues to improve  Patient continues to benefit from skilled intervention to address the above impairments. Continue treatment per established plan of care. Discharge Recommendations: To Be Determined     SUBJECTIVE:   Patient alert, still no meaningful verbalizations or interactions, though mom reports she did say \"yes\" once to her dad this weekend. OBJECTIVE:   Cognitive and Communication Status:  Neurologic State: Alert  Orientation Level: Unable to verbalize  Cognition: Decreased attention/concentration, No command following  Perception: Cues to maintain midline in sitting  Perseveration: No perseveration noted  Safety/Judgement: Decreased awareness of environment, Decreased awareness of need for assistance, Decreased awareness of need for safety, Lack of insight into deficits  Dysphagia Treatment:  Oral Assessment:     P.O. Trials:  Patient Position: upright on side of bed with PT/OT  Vocal quality prior to P.O.:  (nonverbal)  Consistency Presented: Ice chips  How Presented: SLP-fed/presented;Spoon     Bolus Acceptance: Impaired  Bolus Formation/Control: Impaired  Type of Impairment: Other (comment) (difficulty opening mouth to accept but actively pursing lips and attempting x2 trials. rapid fatigue)  Propulsion: Delayed (# of seconds)  Oral Residue: None  Initiation of Swallow:  Other (comment) (suspect timely)  Laryngeal Elevation: Functional (suspected functional)  Aspiration Signs/Symptoms: None  Pharyngeal Phase Characteristics:  (limited trials due to mentation and strength to accept trials from spoon)             Oral Phase Severity: Severe  Pharyngeal Phase Severity : Other (comment) (limited assessment due to oral deficits)                                                                                                                 Pain:  Pain Scale 1: Behavioral Pain Scale (BPS)  Pain Intensity 1: 0       After treatment:   Patient left in no apparent distress in bed, Call bell within reach, Nursing notified, and Caregiver / family present    COMMUNICATION/EDUCATION:   The patient's plan of care including recommendations, planned interventions, and recommended diet changes were discussed with: Physical therapist, Occupational therapist, and Registered nurse. Cheli Gan M.CD.  CCC-SLP   Time Calculation: 13 mins

## 2022-11-01 NOTE — PROGRESS NOTES
Transition of Care Plan  RUR- Med 19%  DISPOSITION: Hospital transfer vs rehab, pending progress and further neuro work up  F/U with PCP/Specialist    Transport: [facility]    Emergency contact: Mother, Rachel Reyna [phone] NARDA ARCHIBALD are 3 sons, but mother has been deemed spokesperson)    CM barriers to discharge: SNF choice, auth    CM met with patient's mother, Salinas Deluca regarding discharge plan and potential transfer to another hospital. Salinas Deluca discussed possibility of SNF discharge with patient's sons, they do not want patient in nursing home. Patient's family is interested in having patient transferred to another facility, like [facility]. CM discussed with neurology, they plan to continue work up for now and will update CM when transfer would be appropriate. Patient has NG tube, this would be barrier to discharge planning. > 48 hours from discharge. CM will follow. JOHN Lima.S.W.

## 2022-11-01 NOTE — PROGRESS NOTES
Problem: Pressure Injury - Risk of  Goal: *Prevention of pressure injury  Description: Document Jose Enrique Scale and appropriate interventions in the flowsheet. Outcome: Progressing Towards Goal  Note: Pressure Injury Interventions:  Sensory Interventions: Discuss PT/OT consult with provider    Moisture Interventions: Check for incontinence Q2 hours and as needed    Activity Interventions: Increase time out of bed    Mobility Interventions: Float heels, Turn and reposition approx. every two hours(pillow and wedges)    Nutrition Interventions: Discuss nutritional consult with provider    Friction and Shear Interventions: HOB 30 degrees or less                Problem: Patient Education: Go to Patient Education Activity  Goal: Patient/Family Education  Outcome: Progressing Towards Goal     Problem: Patient Education: Go to Patient Education Activity  Goal: Patient/Family Education  Outcome: Progressing Towards Goal     Problem: Patient Education: Go to Patient Education Activity  Goal: Patient/Family Education  Outcome: Progressing Towards Goal     Problem: Patient Education: Go to Patient Education Activity  Goal: Patient/Family Education  Outcome: Progressing Towards Goal     Problem: Falls - Risk of  Goal: *Absence of Falls  Description: Document [place] Omid Fall Risk and appropriate interventions in the flowsheet.   Outcome: Progressing Towards Goal  Note: Fall Risk Interventions:       Mentation Interventions: Bed/chair exit alarm    Medication Interventions: Bed/chair exit alarm    Elimination Interventions: Bed/chair exit alarm    History of Falls Interventions: Bed/chair exit alarm         Problem: Patient Education: Go to Patient Education Activity  Goal: Patient/Family Education  Outcome: Progressing Towards Goal     Problem: Discharge Planning  Goal: *Discharge to safe environment  Outcome: Progressing Towards Goal  Goal: *Knowledge of medication management  Outcome: Progressing Towards Goal  Goal: *Knowledge of discharge instructions  Outcome: Progressing Towards Goal

## 2022-11-01 NOTE — PROGRESS NOTES
Bedside shift change report given to castro (oncoming nurse) by Ai Gregory (offgoing nurse). Report included the following information SBAR.

## 2022-11-01 NOTE — PROGRESS NOTES
Bedside and Verbal shift change report [facility]andreea Allen (oncoming nurse) by Ana Canas RN (offgoing nurse). Report included the following information SBAR, Kardex, ED Summary, Recent Results, Cardiac Rhythm ST, Alarm Parameters , and Dual Neuro Assessment.

## 2022-11-01 NOTE — PROGRESS NOTES
Problem: Pressure Injury - Risk of  Goal: *Prevention of pressure injury  Description: Document Jose Enrique Scale and appropriate interventions in the flowsheet. Outcome: Progressing Towards Goal  Note: Pressure Injury Interventions:  Sensory Interventions: Discuss PT/OT consult with provider, Float heels, Maintain/enhance activity level, Turn and reposition approx. every two hours (pillows and wedges if needed), Minimize linen layers    Moisture Interventions: Check for incontinence Q2 hours and as needed, Internal/External urinary devices, Moisture barrier, Minimize layers, Apply protective barrier, creams and emollients    Activity Interventions: Increase time out of bed, PT/OT evaluation    Mobility Interventions: Float heels, HOB 30 degrees or less, PT/OT evaluation, Turn and reposition approx. every two hours(pillow and wedges)    Nutrition Interventions: Discuss nutritional consult with provider, Document food/fluid/supplement intake    Friction and Shear Interventions: HOB 30 degrees or less, Lift team/patient mobility team, Transferring/repositioning devices                Problem: Falls - Risk of  Goal: *Absence of Falls  Description: Document Chyna Fall Risk and appropriate interventions in the flowsheet.   Outcome: Progressing Towards Goal  Note: Fall Risk Interventions:       Mentation Interventions: Bed/chair exit alarm, Room close to nurse's station    Medication Interventions: Bed/chair exit alarm, Patient to call before getting OOB, Teach patient to arise slowly    Elimination Interventions: Bed/chair exit alarm, Call light in reach, Toileting schedule/hourly rounds, Toilet paper/wipes in reach    History of Falls Interventions: Bed/chair exit alarm, Utilize gait belt for transfer/ambulation, Vital signs minimum Q4HRs X 24 hrs (comment for end date)

## 2022-11-01 NOTE — PROGRESS NOTES
Neurology Progress Note     NAME: Donald Ghosh   :  1965   MRN:  [id number]   DATE:  2022    Assessment:     Active Problems:    Diarrhea (10/25/2022)      AMS (altered mental status) (10/25/2022)      Altered mental status (10/27/2022)  Patient is a 59year-old female with history significant for hypothyroid, depression, obesity, and lap imelda who had recent prolonged hospitalization after respiratory arrest with witnessed seizure requiring intubation thought due to receiving a Fentanyl patch 100 mcg that was found on the patient who was found to have Takotsubos cardiomyopathy EF 25-30%. Brenda uDnlap was taken off 22 and several EEGs since that time have been negative for seizures. Last MRI 10/2/22 showed small diffusion restriction in R frontal lobe and she was started on ASA and statin for stroke. She was transferred to [address] [place] [address] and was eventually more awake and talking but remained confused. She was admitted to [address] [place] [address] on 10/25 from rehab for worsening encephalopathy. She was noted to be febrile and tachycardic. She was started on trazodone and Lexapro at rehab and received a dose each of Risperidal and Seroquel. Per her mother, since that time her mental status has continued to decline. She was also found to be rigid, hyperreflexic with clonus and there continues to be concern for serotonin syndrome. Her SSRI and trazodone were discontinued. Mother at bedside states that they do not want her to go to a nursing home and they want to continue to pursue all studies to determine her diagnosis and would want her be transferred to an [facility] if needed. Exam today: no spontaneous movements, rigidity improved  Plan:   1.) Concern for Serotonin Syndrome/Encephalopathy  - Valium dose increased yesterday with improvement today.  Continue 5 mg q4h x additional 24h at minimum then will consider tapering.   - Repeat MRI brain w/ w/o contrast today. Last MRI 10/2. - If no clear source of found, will pursue LP after MRI  - ANCA panel and glutamic acid decarb sent and pending. Send ADIN. - Discussed with mother that it is reasonable to consider transfer to another academic facility if no improvement and above work-up complete.    **I ordered heparin x 1 more dose this evening for possible LP tomorrow    Subjective:   Patient is comatose, mother is at bedside     Objective:   Chart reviewed since last seen    Current Facility-Administered Medications   Medication Dose Route Frequency    heparin (porcine) injection 5,000 Units  5,000 Units SubCUTAneous Q8H    diazePAM (VALIUM) injection 5 mg  5 mg IntraVENous Q4H    senna-docusate (PERICOLACE) 8.6-50 mg per tablet 2 Tablet  2 Tablet Oral DAILY    [Held by provider] metoprolol tartrate (LOPRESSOR) tablet 12.5 mg  12.5 mg Oral Q12H    polyethylene glycol (MIRALAX) packet 17 g  17 g Oral DAILY PRN    thiamine HCL (B-1) tablet 100 mg  100 mg Oral DAILY    therapeutic multivitamin (THERAGRAN) tablet 1 Tablet  1 Tablet Oral DAILY    acetaminophen (TYLENOL) solution 650 mg  650 mg Per NG tube Q4H PRN    sodium chloride (NS) flush 5-40 mL  5-40 mL IntraVENous Q8H    sodium chloride (NS) flush 5-40 mL  5-40 mL IntraVENous PRN    [Held by provider] atorvastatin (LIPITOR) tablet 10 mg  10 mg Oral QHS    levothyroxine (SYNTHROID) tablet 88 mcg  88 mcg Oral ACB    pantoprazole (PROTONIX) tablet 40 mg  40 mg Oral ACB    sodium chloride (NS) flush 5-10 mL  5-10 mL IntraVENous PRN    acetaminophen (TYLENOL) suppository 650 mg  650 mg Rectal Q6H PRN       Visit Vitals  /76   Pulse (!) 116   Temp 98.9 °F (37.2 °C)   Resp 23   Ht 5' 6\" (1.676 m)   Wt 91.4 kg (201 lb 8 oz)   SpO2 95%   BMI 32.52 kg/m²     Temp (24hrs), Av.1 °F (36.7 °C), Min:97.3 °F (36.3 °C), Max:98.9 °F (37.2 °C)      No intake/output data recorded. 10/30 1901 - 11/01 0700  In: 18   Out: Fahad De Guzman [id number]      Physical Exam:  General: Well developed well nourished patient, obese  Cardiac: Regular rate and rhythm with no murmurs. Extremities: 2+ Radial pulses, no cyanosis or edema, pale    Neurological Exam:  Mental Status: FAVIOLA, Eyes minimally open to noxious   Cranial Nerves:   OS>OD 4mm, 3mm but equally react to 1 mm no nystagmus, no ptosis. Conjugate, midline gaze and the looks to left nonpurposefully. Face seems symmetric. Motor:  Weakly WD 2/5 x4 nailbed pressure   Reflexes:   No clonus, Toes downgoing. Sensory:   FAVIOLA   Gait:  FAVIOLA   Cerebellar:  FAVIOLA         Lab Review   Recent Results (from the past 24 hour(s))   CBC W/O DIFF    Collection Time: 11/01/22 12:30 AM   Result Value Ref Range    WBC 8.8 3.6 - 11.0 K/uL    RBC 3.90 3.80 - 5.20 M/uL    HGB 11.2 (L) 11.5 - 16.0 g/dL    HCT 34.8 (L) 35.0 - 47.0 %    MCV 89.2 80.0 - 99.0 FL    MCH 28.7 26.0 - 34.0 PG    MCHC 32.2 30.0 - 36.5 g/dL    RDW 13.9 11.5 - 14.5 %    PLATELET 405 764 - 808 K/uL    MPV 11.0 8.9 - 12.9 FL    NRBC 0.0 0  WBC    ABSOLUTE NRBC 0.00 0.00 - 6.07 K/uL   METABOLIC PANEL, COMPREHENSIVE    Collection Time: 11/01/22 12:30 AM   Result Value Ref Range    Sodium 140 136 - 145 mmol/L    Potassium 4.2 3.5 - 5.1 mmol/L    Chloride 106 97 - 108 mmol/L    CO2 29 21 - 32 mmol/L    Anion gap 5 5 - 15 mmol/L    Glucose 123 (H) 65 - 100 mg/dL    BUN 12 6 - 20 MG/DL    Creatinine 0.54 (L) 0.55 - 1.02 MG/DL    BUN/Creatinine ratio 22 (H) 12 - 20      eGFR >60 >60 ml/min/1.73m2    Calcium 9.1 8.5 - 10.1 MG/DL    Bilirubin, total 0.2 0.2 - 1.0 MG/DL    ALT (SGPT) 18 12 - 78 U/L    AST (SGOT) 10 (L) 15 - 37 U/L    Alk. phosphatase 61 45 - 117 U/L    Protein, total 6.3 (L) 6.4 - 8.2 g/dL    Albumin 2.2 (L) 3.5 - 5.0 g/dL    Globulin 4.1 (H) 2.0 - 4.0 g/dL    A-G Ratio 0.5 (L) 1.1 - 2.2         Additional comments:  I have reviewed the patient's new clinical lab test results.   I have personally reviewed the patient's radiographs. MRI Results (most recent):  Results from [place] encounter on 09/27/22    MRI BRAIN W WO CONT    Narrative  EXAM:  MRI BRAIN W WO CONT    INDICATION:    acute encephalopathy    COMPARISON:  9/20/2022. CONTRAST: 20 ml of ProHance. TECHNIQUE:  Multiplanar multisequence acquisition without and with contrast of the brain. FINDINGS:  The ventricles are normal in size and position. 2 small foci of cortical  diffusion restriction in the right frontal lobe without significant change. No  new areas of ischemia present. There is associated FLAIR hyperintensity. There  is no cerebellar tonsillar herniation. Expected arterial flow-voids are present. No evidence of abnormal enhancement. Small amount of fluid in the bilateral mastoid air cells. Intubated. Air-fluid  level in the right maxillary sinus with fluid in the nasopharynx. The orbital  contents are within normal limits. No significant osseous or scalp lesions are  identified. Impression  2 small foci of diffusion restriction in the right frontal lobe are unchanged  and may represent small foci of acute ischemia. No abnormal enhancement is  identified. CT Results (most recent):  Results from Hospital Encounter encounter on 10/25/22    CT HEAD WO CONT    Narrative  EXAM: CT HEAD WO CONT    INDICATION: Confusion. Electronic medical record is not available at the time of  this interpretation. COMPARISON: CT head on 10/13/2022. MRI brain on 10/2/2022. TECHNIQUE: Noncontrast head CT. Coronal and sagittal reformats. CT dose  reduction was achieved through the use of a standardized protocol tailored for  this examination and automatic exposure control for dose modulation. FINDINGS: The ventricles and sulci are age-appropriate without hydrocephalus. There is no mass effect or midline shift. There is no intracranial hemorrhage or  extra-axial fluid collection.  There is no abnormal area of decreased density to  suggest infarct. The calvarium is intact. The visualized paranasal sinuses and mastoid air cells  are clear. Impression  No acute intracranial abnormality on this noncontrast head CT. No change given  difference in technique. Care Plan discussed with:  Patient x   Family    RN    Care Manager    Consultant/Specialist:  SEN Baltazar    The patient was discussed with Dr. Willi Flowers.     A total of 50 mins was spent talking with the patient's mother about the exam and discussing plans for treatment moving forward, examining the patient, reviewing her imaging and medical records.

## 2022-11-01 NOTE — ROUTINE PROCESS
Bedside and Verbal shift change report given to Joshua Gan RN (oncoming nurse) by Yuri Yepez RN (offgoing nurse). Report included the following information SBAR, Kardex, MAR, Cardiac Rhythm NSR, and Dual Neuro Assessment.

## 2022-11-01 NOTE — PROGRESS NOTES
Urinary retention:     BladderScan 0400: 921ml, straight cath with 750ml output.     BladderScan 0440: 241ml

## 2022-11-01 NOTE — PROGRESS NOTES
Problem: Self Care Deficits Care Plan (Adult)  Goal: *Acute Goals and Plan of Care (Insert Text)  Description: FUNCTIONAL STATUS PRIOR TO ADMISSION: Patient was independent and active without use of DME until Sept. 97, 2022 when pt hospitalized for status epilepticus and now with anoxic brain injury. Pt has been at [facility] since 10/20/22 when pt discharged from [address] [place] [address] [place]: The patient lived alone with limited local support. Occupational Therapy Goals  Weekly Re-Assessment 11/1/22, goals modified/continued below  Initiated 10/26/2022  1. Patient will perform face washing with maximal assistance, hand over hand, within 7 day(s). CONTINUE  2. Patient will follow 5% 1 step, simple commands with max cues within 7 day(s). CONTINUE  3. Patient will perform toilet transfers to [facility] with total assistance within 7 day(s). CONTINUE  4.  Patient will participate in upper extremity therapeutic exercise/activities with maximal assistance for 10 minutes within 7 day(s). CONTINUE  5. Patient will perform EOB activity with no more than maximal assistance for sitting balance in prep for seated ADLs within 7 day(s). ADDED 11/1  6. Patient will perform visual scanning in all visual fields with maximal cueing within 7 day(s). ADDED 11/1  Outcome: Progressing Towards Goal     OCCUPATIONAL THERAPY TREATMENT/WEEKLY RE-ASSESSMENT  Patient: Dannie Manuel (14 y.o. female)  Date: 11/1/2022  Diagnosis: AMS (altered mental status) [R41.82]  Diarrhea [R19.7]  Altered mental status [R41.82] <principal problem not specified>      Precautions: Fall, Skin, Seizure  Chart, occupational therapy assessment, plan of care, and goals were reviewed.     ASSESSMENT  Patient continues with skilled OT services and is slowly progressing towards goals, remains limited by global debility, inattention to the R, as well as decreased sitting balance, cervical AROM & visual targeting/scanning (mert to the R), AROM, strength, coordination, cognition (command following, initiation, attention, arousal), and tone (global, seemingly moreso L>R), goals modified above. Improved arousal with eyes open >75% of session, no verbalization or initiation to movement, however responsive/reactive to varying stimuli (tactile, olfactory, auditory, noxious, temperature, vestibular). At rest, vision seemingly fixed in L lateral/middle quadrant, however with EOB transition with Total A x2, tracking in congruence with lateral cervical rotation. Max-Total A largely required to maintain EOB sitting balance with heavy posterior & L lateral lean requiring constant facilitation to return midline. Anterior cervical flexion noted throughout requiring cranial facilitation for extension, increased with fatigue and Total A feeding trial with SLP present, PT facilitating BLE positioning to prevent extensor tone (L>R). Slow improvements noted, given her presentation, feel she would benefit from an inpatient brain injury rehab center to maximize functional independence. Current Level of Function Impacting Discharge (ADLs): Total A x1-2    Other factors to consider for discharge: PMH, PLOF, neurological status/dx         PLAN :  Goals have been updated based on progression since last assessment. Patient continues to benefit from skilled intervention to address the above impairments. Continue to follow patient 3 times a week to address goals. Recommend with staff: Recommend with nursing, ADLs with assist, modified bed in chair position 3x/day, and toileting via bedpan. Thank you for completing as able in order to maintain patient strength, endurance and independence. Recommendation for discharge: (in order for the patient to meet his/her long term goals)  Inpatient brain injury rehab     This discharge recommendation:  Has been made in collaboration with the attending provider and/or case management    IF patient discharges home will need the following DME:  To be determined (TBD) SUBJECTIVE:   Patient with no verbalizations, only intermittent grimacing    OBJECTIVE DATA SUMMARY:   Cognitive/Behavioral Status:  Neurologic State: Alert  Orientation Level: Unable to verbalize  Cognition: Decreased attention/concentration; No command following  Perception: Cues to maintain midline in sitting  Perseveration: No perseveration noted  Safety/Judgement: Decreased awareness of environment    Functional Mobility and Transfers for ADLs:  Bed Mobility:  Rolling: Total assistance  Supine to Sit: Total assistance;Assist x2  Sit to Supine: Total assistance;Assist x2  Scooting: Total assistance      Balance:  Sitting: Impaired  Sitting - Static: Poor (constant support)  Sitting - Dynamic: Poor (constant support)  Standing:  (NT)    ADL Intervention:  Feeding  Feeding Assistance: Total assistance (dependent)  Food to Mouth: Total assistance (dependent)  Drink to Mouth: Total assistance (dependent)  Cues: Physical assistance; Tactile cues provided;Visual cues provided;Verbal cues provided    Upper Body Bathing  Bathing Assistance: Total assistance(dependent)  Position Performed: Long sitting on bed;Seated edge of bed  Cues: Verbal cues provided;Visual cues provided; Tactile cues provided;Physical assistance;Visual/perceptual training/retraining    Toileting  Toileting Assistance:  Total assistance(dependent)    Cognitive Retraining  Orientation Retraining: Awareness of environment  Problem Solving: Awareness of environment  Executive Functions: Executing cognitive plans  Organizing/Sequencing: Breaking task down  Attention to Task: Single task  Safety/Judgement: Decreased awareness of environment  Cues: Tactile cues provided;Visual cues provided;Verbal cues provided    Neuro:   - PROM of BUE/BLE with facilitation of deep pressure for BUE tremors with all activity  - Varied stimuli provided to maximize arousal (olfactory, temperature [hot then cold cloths for washing BUE], noxious [deep nailbed pressures, light/sharp pressure from comb], tactile with all PROM/facilitation), mild response noted to all on L side moreso than R, BLE>BUE  - Visual scanning and cervical rotation EOB to promote tracking, Total A (see PT note for details)  - Lateral weight bearing L & R with Total A for upper & lower body positioning      Pain:  Grimacing with home PROM of BUE/BLEs (R>L), no verbalizations    Activity Tolerance:   Fair    After treatment patient left in no apparent distress:   Supine in bed, Call bell within reach, Bed / chair alarm activated, Caregiver / family present, and Side rails x 3    COMMUNICATION/COLLABORATION:   The patients plan of care was discussed with: Physical therapist, Speech therapist, Registered nurse, and Case management.      DEEPTI Salazar, OTR/L  Time Calculation: 33 mins

## 2022-11-01 NOTE — PROGRESS NOTES
Problem: Pressure Injury - Risk of  Goal: *Prevention of pressure injury  Description: Document Jose Enrique Scale and appropriate interventions in the flowsheet. Outcome: Progressing Towards Goal  Note: Pressure Injury Interventions:  Sensory Interventions: Discuss PT/OT consult with provider    Moisture Interventions: Check for incontinence Q2 hours and as needed    Activity Interventions: Increase time out of bed    Mobility Interventions: Float heels, Turn and reposition approx. every two hours(pillow and wedges)    Nutrition Interventions: Discuss nutritional consult with provider    Friction and Shear Interventions: HOB 30 degrees or less                Problem: Patient Education: Go to Patient Education Activity  Goal: Patient/Family Education  Outcome: Progressing Towards Goal     Problem: Patient Education: Go to Patient Education Activity  Goal: Patient/Family Education  Outcome: Progressing Towards Goal     Problem: Patient Education: Go to Patient Education Activity  Goal: Patient/Family Education  Outcome: Progressing Towards Goal     Problem: Patient Education: Go to Patient Education Activity  Goal: Patient/Family Education  Outcome: Progressing Towards Goal     Problem: Falls - Risk of  Goal: *Absence of Falls  Description: Document Darlen Rogers Fall Risk and appropriate interventions in the flowsheet.   Outcome: Progressing Towards Goal  Note: Fall Risk Interventions:       Mentation Interventions: Bed/chair exit alarm    Medication Interventions: Bed/chair exit alarm    Elimination Interventions: Bed/chair exit alarm    History of Falls Interventions: Bed/chair exit alarm         Problem: Patient Education: Go to Patient Education Activity  Goal: Patient/Family Education  Outcome: Progressing Towards Goal     Problem: Discharge Planning  Goal: *Discharge to safe environment  Outcome: Progressing Towards Goal  Goal: *Knowledge of medication management  Outcome: Progressing Towards Goal  Goal: *Knowledge of discharge instructions  Outcome: Progressing Towards Goal     Problem: Patient Education: Go to Patient Education Activity  Goal: Patient/Family Education  Outcome: Progressing Towards Goal     Problem: Nutrition Deficit  Goal: *Optimize nutritional status  Outcome: Progressing Towards Goal

## 2022-11-01 NOTE — PROGRESS NOTES
Problem: Mobility Impaired (Adult and Pediatric)  Goal: *Acute Goals and Plan of Care (Insert Text)  Description:   FUNCTIONAL STATUS PRIOR TO ADMISSION: Pt unable to provide prior level of function or home set up at time of this evaluation. Per chart review, pt was admitted to [facility] 9/27-10/20 after being found down and was discharged to [address] [place] [address] brain injury program and was admitted to [facility] on 10/25 for AMS. Per mother's report, pt was able to stand while at rehab with assistance and was getting into the wheelchair. Prior to initial admission, pt was independent, working as nurse. HOME SUPPORT PRIOR TO ADMISSION: Lived alone. Pt has supportive mother that lives locally and 3 sons that live out of state    Physical Therapy Goals  Reassessed 11/1/22  1. Patient will move from supine to sit and sit to supine  and roll side to side in bed with maximal assistance within 7 day(s). 2.  Patient will transfer from bed to chair and chair to bed with maximal assistance using the least restrictive device within 7 day(s). 3.  Patient will perform sit to stand with maximal assistance within 7 day(s). 4.  Patient will tolerate seated EOB x 10 minutes with mod A within 7 days. 5.  Patient will improve Landon Balance score by 7 points within 7 days. Initiated 10/26/2022  1. Patient will move from supine to sit and sit to supine  and roll side to side in bed with maximal assistance within 7 day(s). 2.  Patient will transfer from bed to chair and chair to bed with maximal assistance using the least restrictive device within 7 day(s). 3.  Patient will perform sit to stand with maximal assistance within 7 day(s). 4.  Patient will ambulate with maximal assistance for 5 feet with the least restrictive device within 7 day(s). 5.  Patient will tolerate seated EOB x 10 minutes with mod A within 7 days. 6.  Patient will improve Landon Balance score by 7 points within 7 days.       Outcome: Progressing Towards Goal PHYSICAL THERAPY TREATMENT: WEEKLY REASSESSMENT  Patient: Gisela Garrett (16 y.o. female)  Date: 11/1/2022  Primary Diagnosis: AMS (altered mental status) [R41.82]  Diarrhea [R19.7]  Altered mental status [R41.82]       Precautions:   Fall, Skin, Seizure      ASSESSMENT  Patient continues with skilled PT services and is progressing towards goals. Pt received supine in bed with eyes open, mother at bedside. Pt tolerated session fairly well. Pt demonstrated improvement in alertness and arousal, tolerance to tactile stimulation and tolerance to progressive activity. Pt continues to be limited by generalized weakness, increased muscle tone/rigidity in extremities, impaired seated balance and righting reactions, decreased tolerance to activity, impaired speech, and impaired visual tracking. While in supine position, provided PROM to bilateral LEs as well as joint compression and weight bearing in hook lying position. Therapist was able to flex LLE in full flexion position this session. Pt assisted to seated EOB with total A and required max-total A for seated EOB balance. Practiced down on elbow in each direction with improved control down to the L versus the R with facilitation cues at trunk and UE (provided by OT). Provided vestibular stim by moving pt's upper body/trunk forward/backwards/side to side with total A. Initially, pt with L cervical rotation and L gaze preference, which improved while seated EOB with manual facilitation of head rotation stimulating VOR. Pt remained supine position with all needs met. Pt will continue to benefit from PT to progress mobility as tolerated, continue to provide arousal and brain stimulation techniques. Recommend intensive brain injury program upon discharge.     Patient's progression toward goals since last assessment: improved arousal and alertness    Current Level of Function Impacting Discharge (mobility/balance): total A supine<>sit; improved arousal and alertness    Functional Outcome Measure: The patient scored 0/56 on the irwin balance outcome measure which is indicative of high risk for falls. Other factors to consider for discharge: previously independent         PLAN :  Goals have been updated based on progression since last assessment. Patient continues to benefit from skilled intervention to address the above impairments. Recommendations and Planned Interventions: bed mobility training, transfer training, gait training, therapeutic exercises, neuromuscular re-education, patient and family training/education, and therapeutic activities      Frequency/Duration: Patient will be followed by physical therapy:  3 times a week to address goals.     Recommendation for discharge: (in order for the patient to meet his/her long term goals)  Therapy 3 hours per day 5-7 days per week--brain injury program    This discharge recommendation:  Has been made in collaboration with the attending provider and/or case management    IF patient discharges home will need the following DME: to be determined (TBD)         SUBJECTIVE:   Patient nonverbal    OBJECTIVE DATA SUMMARY:   HISTORY:    Past Medical History:   Diagnosis Date    Allergic rhinitis 7/29/2019    Depression     History of multiple allergies     History of vascular access device 10/07/2022    [facility] VAT: PICC placement R Cephalic length 40 cm for reliable access/TPN arm circ 35.5 cm    Muscle ache     Obesity 11/5/2012    Sinus pressure     Sinus problem      Past Surgical History:   Procedure Laterality Date    HX ACL RECONSTRUCTION      left     HX CHOLECYSTECTOMY  01/01/1998    HX GI      colonoscopy-polyps    IR INSERT NON TUNL CVC OVER 5 YRS  09/27/2022       Personal factors and/or comorbidities impacting plan of care:     Home Situation  Home Environment: Rehabilitation facility ([facility] inpatient rehab, Brain Injury program)  # Steps to Enter: 2  One/Two Story Residence: Two story (bedroom on the 2nd floor)  # of Interior Steps: 13  Living Alone: Yes  Support Systems: Parent(s), Child(wayne)  Patient Expects to be Discharged to[de-identified] Skilled nursing facility  Current DME Used/Available at Home: None  Tub or Shower Type: Tub/Shower combination    EXAMINATION/PRESENTATION/DECISION MAKING:   Critical Behavior:  Neurologic State: Alert  Orientation Level: Unable to verbalize  Cognition: Decreased attention/concentration, No command following  Safety/Judgement: Decreased awareness of environment, Decreased awareness of need for assistance, Decreased awareness of need for safety, Lack of insight into deficits  Hearing: Auditory  Auditory Impairment:  (unknown)    Range Of Motion:  AROM: Grossly decreased, non-functional                       Strength:    Strength: Grossly decreased, non-functional                    Tone & Sensation:   Tone: Abnormal              Sensation:  (withdraws to noxious to to L toes, grimaces to noxious stim R+L fingers and R toes)               Coordination:  Coordination: Grossly decreased, non-functional  Vision:      Functional Mobility:  Bed Mobility:  Rolling: Total assistance  Supine to Sit: Total assistance;Assist x2  Sit to Supine: Total assistance;Assist x2  Scooting:  Total assistance  Transfers:              Balance:   Sitting: Impaired  Sitting - Static: Poor (constant support)  Sitting - Dynamic: Poor (constant support)  Standing:  (NT)      Therapeutic Exercises:   PROM, manual joint compression and weight bearing in hook lying position and while seated EOB; VOR stimulation while seated EOB    Functional Measure:  Landon Balance Test:    Sitting to Standin  Standing Unsupported: 0  Sitting with Back Unsupported: 0  Standing to Sittin  Transfers: 0  Standing Unsupported with Eyes Closed: 0  Standing Unsupported with Feet Together: 0  Reach Forward with Outstretched Arm: 0   Object: 0  Turn to Look Over Shoulders: 0  Turn 360 Degrees: 0  Alternate Foot on Step/Stool: 0  Standing Unsupported One Foot in Front: 0  Stand on One Le  Total: 0/56         56=Maximum possible score;   0-20=High fall risk  21-40=Moderate fall risk   41-56=Low fall risk         Pain Rating:  Pt grimacing with noxious stim to all extremities    Activity Tolerance:   Good and Fair    After treatment patient left in no apparent distress:   Supine in bed, Call bell within reach, Caregiver / family present, and Side rails x 3    COMMUNICATION/EDUCATION:   The patients plan of care was discussed with: Occupational therapist, Registered nurse, and Case management. Fall prevention education was provided and the patient/caregiver indicated understanding., Patient/family have participated as able in goal setting and plan of care., Patient/family agree to work toward stated goals and plan of care. , and Patient is unable to participate in goal setting and plan of care.     Thank you for this referral.  Denise Mcconnell, PT, DPT   Time Calculation: 33 mins

## 2022-11-02 ENCOUNTER — APPOINTMENT (OUTPATIENT)
Dept: MRI IMAGING | Age: 57
DRG: 070 | End: 2022-11-02
Attending: NURSE PRACTITIONER
Payer: COMMERCIAL

## 2022-11-02 ENCOUNTER — APPOINTMENT (OUTPATIENT)
Dept: VASCULAR SURGERY | Age: 57
DRG: 070 | End: 2022-11-02
Attending: HOSPITALIST
Payer: COMMERCIAL

## 2022-11-02 LAB
ALBUMIN SERPL-MCNC: 2.4 G/DL (ref 3.5–5)
ALBUMIN/GLOB SERPL: 0.7 {RATIO} (ref 1.1–2.2)
ALP SERPL-CCNC: 65 U/L (ref 45–117)
ALT SERPL-CCNC: 20 U/L (ref 12–78)
ANION GAP SERPL CALC-SCNC: 6 MMOL/L (ref 5–15)
APPEARANCE CSF: CLEAR
AST SERPL-CCNC: 14 U/L (ref 15–37)
BILIRUB SERPL-MCNC: 0.2 MG/DL (ref 0.2–1)
BUN SERPL-MCNC: 17 MG/DL (ref 6–20)
BUN/CREAT SERPL: 30 (ref 12–20)
C-ANCA TITR SER IF: NORMAL TITER
CALCIUM SERPL-MCNC: 9 MG/DL (ref 8.5–10.1)
CHLORIDE SERPL-SCNC: 106 MMOL/L (ref 97–108)
CO2 SERPL-SCNC: 29 MMOL/L (ref 21–32)
COLOR CSF: ABNORMAL
COLOR SPUN CSF: COLORLESS
CREAT SERPL-MCNC: 0.56 MG/DL (ref 0.55–1.02)
ERYTHROCYTE [DISTWIDTH] IN BLOOD BY AUTOMATED COUNT: 13.9 % (ref 11.5–14.5)
GLOBULIN SER CALC-MCNC: 3.4 G/DL (ref 2–4)
GLUCOSE CSF-MCNC: 62 MG/DL (ref 40–70)
GLUCOSE SERPL-MCNC: 103 MG/DL (ref 65–100)
HCT VFR BLD AUTO: 33.2 % (ref 35–47)
HGB BLD-MCNC: 11.2 G/DL (ref 11.5–16)
MCH RBC QN AUTO: 29.9 PG (ref 26–34)
MCHC RBC AUTO-ENTMCNC: 33.7 G/DL (ref 30–36.5)
MCV RBC AUTO: 88.8 FL (ref 80–99)
MYELOPEROXIDASE AB SER IA-ACNC: <0.2 UNITS (ref 0–0.9)
NRBC # BLD: 0 K/UL (ref 0–0.01)
NRBC BLD-RTO: 0 PER 100 WBC
P-ANCA ATYPICAL TITR SER IF: NORMAL TITER
P-ANCA TITR SER IF: NORMAL TITER
PLATELET # BLD AUTO: 291 K/UL (ref 150–400)
PMV BLD AUTO: 11.4 FL (ref 8.9–12.9)
POTASSIUM SERPL-SCNC: 4.6 MMOL/L (ref 3.5–5.1)
PROT CSF-MCNC: 40 MG/DL (ref 15–45)
PROT SERPL-MCNC: 5.8 G/DL (ref 6.4–8.2)
PROTEINASE3 AB SER IA-ACNC: <0.2 UNITS (ref 0–0.9)
RBC # BLD AUTO: 3.74 M/UL (ref 3.8–5.2)
RBC # CSF: 1090 /CU MM
SODIUM SERPL-SCNC: 141 MMOL/L (ref 136–145)
TUBE # CSF: 1
WBC # BLD AUTO: 9.1 K/UL (ref 3.6–11)
WBC # CSF: 1 /CU MM (ref 0–5)

## 2022-11-02 PROCEDURE — 77030014143 HC TY PUNC LUMBR BD -A

## 2022-11-02 PROCEDURE — 86695 HERPES SIMPLEX TYPE 1 TEST: CPT

## 2022-11-02 PROCEDURE — 80053 COMPREHEN METABOLIC PANEL: CPT

## 2022-11-02 PROCEDURE — 99233 SBSQ HOSP IP/OBS HIGH 50: CPT | Performed by: NURSE PRACTITIONER

## 2022-11-02 PROCEDURE — 87015 SPECIMEN INFECT AGNT CONCNTJ: CPT

## 2022-11-02 PROCEDURE — 93970 EXTREMITY STUDY: CPT

## 2022-11-02 PROCEDURE — 74011250636 HC RX REV CODE- 250/636: Performed by: PSYCHIATRY & NEUROLOGY

## 2022-11-02 PROCEDURE — 84157 ASSAY OF PROTEIN OTHER: CPT

## 2022-11-02 PROCEDURE — 82945 GLUCOSE OTHER FLUID: CPT

## 2022-11-02 PROCEDURE — A9576 INJ PROHANCE MULTIPACK: HCPCS

## 2022-11-02 PROCEDURE — 87205 SMEAR GRAM STAIN: CPT

## 2022-11-02 PROCEDURE — 65660000001 HC RM ICU INTERMED STEPDOWN

## 2022-11-02 PROCEDURE — 74011000250 HC RX REV CODE- 250: Performed by: HOSPITALIST

## 2022-11-02 PROCEDURE — 74011250637 HC RX REV CODE- 250/637: Performed by: PHYSICIAN ASSISTANT

## 2022-11-02 PROCEDURE — 70553 MRI BRAIN STEM W/O & W/DYE: CPT

## 2022-11-02 PROCEDURE — 85027 COMPLETE CBC AUTOMATED: CPT

## 2022-11-02 PROCEDURE — 74011250637 HC RX REV CODE- 250/637: Performed by: NURSE PRACTITIONER

## 2022-11-02 PROCEDURE — 83873 ASSAY OF CSF PROTEIN: CPT

## 2022-11-02 PROCEDURE — 009U3ZX DRAINAGE OF SPINAL CANAL, PERCUTANEOUS APPROACH, DIAGNOSTIC: ICD-10-PCS | Performed by: NURSE PRACTITIONER

## 2022-11-02 PROCEDURE — 74011250636 HC RX REV CODE- 250/636

## 2022-11-02 PROCEDURE — 74011000250 HC RX REV CODE- 250: Performed by: FAMILY MEDICINE

## 2022-11-02 PROCEDURE — 36415 COLL VENOUS BLD VENIPUNCTURE: CPT

## 2022-11-02 PROCEDURE — 74011250636 HC RX REV CODE- 250/636: Performed by: NURSE PRACTITIONER

## 2022-11-02 PROCEDURE — 77010033678 HC OXYGEN DAILY

## 2022-11-02 PROCEDURE — 89050 BODY FLUID CELL COUNT: CPT

## 2022-11-02 RX ORDER — LIDOCAINE HYDROCHLORIDE 10 MG/ML
5 INJECTION INFILTRATION; PERINEURAL ONCE
Status: DISPENSED | OUTPATIENT
Start: 2022-11-02 | End: 2022-11-03

## 2022-11-02 RX ORDER — DIAZEPAM 10 MG/2ML
2 INJECTION INTRAMUSCULAR EVERY 4 HOURS
Status: DISCONTINUED | OUTPATIENT
Start: 2022-11-02 | End: 2022-11-03

## 2022-11-02 RX ORDER — LIDOCAINE HYDROCHLORIDE 10 MG/ML
5 INJECTION INFILTRATION; PERINEURAL ONCE
Status: DISCONTINUED | OUTPATIENT
Start: 2022-11-02 | End: 2022-11-02

## 2022-11-02 RX ORDER — SODIUM CHLORIDE 0.9 % (FLUSH) 0.9 %
10 SYRINGE (ML) INJECTION
Status: COMPLETED | OUTPATIENT
Start: 2022-11-02 | End: 2022-11-02

## 2022-11-02 RX ADMIN — Medication 2 MG: at 17:06

## 2022-11-02 RX ADMIN — LEVOTHYROXINE SODIUM 88 MCG: 0.09 TABLET ORAL at 06:44

## 2022-11-02 RX ADMIN — GADOTERIDOL 20 ML: 279.3 INJECTION, SOLUTION INTRAVENOUS at 04:49

## 2022-11-02 RX ADMIN — THERA TABS 1 TABLET: TAB at 09:13

## 2022-11-02 RX ADMIN — SODIUM CHLORIDE, PRESERVATIVE FREE 10 ML: 5 INJECTION INTRAVENOUS at 04:50

## 2022-11-02 RX ADMIN — Medication 2 MG: at 22:27

## 2022-11-02 RX ADMIN — Medication 100 MG: at 09:13

## 2022-11-02 RX ADMIN — DIAZEPAM 5 MG: 5 INJECTION, SOLUTION INTRAMUSCULAR; INTRAVENOUS at 11:18

## 2022-11-02 RX ADMIN — PANTOPRAZOLE SODIUM 40 MG: 40 TABLET, DELAYED RELEASE ORAL at 06:44

## 2022-11-02 RX ADMIN — SODIUM CHLORIDE, PRESERVATIVE FREE 10 ML: 5 INJECTION INTRAVENOUS at 06:44

## 2022-11-02 RX ADMIN — SODIUM CHLORIDE, PRESERVATIVE FREE 10 ML: 5 INJECTION INTRAVENOUS at 22:27

## 2022-11-02 RX ADMIN — SENNOSIDES AND DOCUSATE SODIUM 2 TABLET: 50; 8.6 TABLET ORAL at 09:14

## 2022-11-02 RX ADMIN — DIAZEPAM 5 MG: 5 INJECTION, SOLUTION INTRAMUSCULAR; INTRAVENOUS at 04:00

## 2022-11-02 RX ADMIN — DIAZEPAM 5 MG: 5 INJECTION, SOLUTION INTRAMUSCULAR; INTRAVENOUS at 06:43

## 2022-11-02 NOTE — PROGRESS NOTES
Neurology Progress Note     NAME: Moshe Resendiz   :  1965   MRN:  [id number]   DATE:  2022    Assessment:     Active Problems:    Diarrhea (10/25/2022)      AMS (altered mental status) (10/25/2022)      Altered mental status (10/27/2022)  Patient is a 59year-old female with history significant for hypothyroid, depression, obesity, and lap imelda who had recent prolonged hospitalization after respiratory arrest with witnessed seizure requiring intubation thought due to receiving a Fentanyl patch 100 mcg that was found on the patient who was found to have Takotsubos cardiomyopathy EF 25-30%. [address] was taken off 22 and several EEGs since that time have been negative for seizures. Last MRI 10/2/22 showed small diffusion restriction in R frontal lobe and she was started on ASA and statin for stroke. She was transferred to [address] [place] [address] and was eventually more awake and talking but remained confused. She was admitted to [facility] on 10/25 from rehab for worsening encephalopathy. She was noted to be febrile and tachycardic. She was started on trazodone and Lexapro at rehab and received a dose each of Risperidal and Seroquel. Per her mother, since that time her mental status has continued to decline. She was also found to be rigid, hyperreflexic with clonus and there was concern for serotonin syndrome. Her SSRI and trazodone were discontinued. Her muscle rigidity has improved with Valium q4h. Exam today: no spontaneous movements, rigidity improved  MRI w/ contrast (22) shows diffuse bilateral T2 signal white matter hyperintensities   Plan:   1.) Delayed Hypoxic Leukoencephalopathy:  - Etiology likely given clinical presentation and MRI findings   - ANCA panel pending. Glutamic acid decarb negative <5.0. Send ADIN.    - LP done at bedside with OP 11 and clear CSF. Send for protein, glucose, culture, cell count, HSV, and myelin basic protein. - Wean Valium to 2 mg IV q4h. If patient starts to have more rigidity, then will start Baclofen. - Supportive care. Patient will need PEG tube and SNF placement. Family updated at the bedside by Dr. Marixa López of diagnosis and MRI results. Subjective:   Patient is comatose, parents are at bedside. Objective:   Chart reviewed since last seen    Current Facility-Administered Medications   Medication Dose Route Frequency    diazePAM (VALIUM) injection 5 mg  5 mg IntraVENous Q4H    senna-docusate (PERICOLACE) 8.6-50 mg per tablet 2 Tablet  2 Tablet Oral DAILY    [Held by provider] metoprolol tartrate (LOPRESSOR) tablet 12.5 mg  12.5 mg Oral Q12H    polyethylene glycol (MIRALAX) packet 17 g  17 g Oral DAILY PRN    thiamine HCL (B-1) tablet 100 mg  100 mg Oral DAILY    therapeutic multivitamin (THERAGRAN) tablet 1 Tablet  1 Tablet Oral DAILY    acetaminophen (TYLENOL) solution 650 mg  650 mg Per NG tube Q4H PRN    sodium chloride (NS) flush 5-40 mL  5-40 mL IntraVENous Q8H    sodium chloride (NS) flush 5-40 mL  5-40 mL IntraVENous PRN    [Held by provider] atorvastatin (LIPITOR) tablet 10 mg  10 mg Oral QHS    levothyroxine (SYNTHROID) tablet 88 mcg  88 mcg Oral ACB    pantoprazole (PROTONIX) tablet 40 mg  40 mg Oral ACB    sodium chloride (NS) flush 5-10 mL  5-10 mL IntraVENous PRN    acetaminophen (TYLENOL) suppository 650 mg  650 mg Rectal Q6H PRN       Visit Vitals  BP (!) 123/51   Pulse 84   Temp 99.7 °F (37.6 °C)   Resp (!) 34   Ht 5' 6\" (1.676 m)   Wt 89.9 kg (198 lb 3.1 oz)   SpO2 95%   BMI 31.99 kg/m²     Temp (24hrs), Av °F (37.2 °C), Min:98.4 °F (36.9 °C), Max:99.7 °F (37.6 °C)      701 -  1900  In: 150   Out: -   10/31 1901 -  0700  In: 300   Out: 752 [Urine:751]      Physical Exam:  General: Well developed well nourished patient, obese  Cardiac: Regular rate and rhythm with no murmurs. Extremities: 2+ Radial pulses, no cyanosis or edema, pale    Neurological Exam:  Mental Status: Eyes open to noxious   Cranial Nerves:   OS>OD 4mm, 3mm but equally react to 1 mm no nystagmus, no ptosis. Conjugate, gaze. Does not participate with EOMs. Motor:  Weakly WD 2/5 x4 (recently received Valium). Reflexes:   No clonus, Toes downgoing. Sensory:   FAVIOLA   Gait:  FAVIOLA   Cerebellar:  FAVIOLA         Lab Review   Recent Results (from the past 24 hour(s))   CBC W/O DIFF    Collection Time: 11/02/22  5:27 AM   Result Value Ref Range    WBC 9.1 3.6 - 11.0 K/uL    RBC 3.74 (L) 3.80 - 5.20 M/uL    HGB 11.2 (L) 11.5 - 16.0 g/dL    HCT 33.2 (L) 35.0 - 47.0 %    MCV 88.8 80.0 - 99.0 FL    MCH 29.9 26.0 - 34.0 PG    MCHC 33.7 30.0 - 36.5 g/dL    RDW 13.9 11.5 - 14.5 %    PLATELET 953 685 - 774 K/uL    MPV 11.4 8.9 - 12.9 FL    NRBC 0.0 0  WBC    ABSOLUTE NRBC 0.00 0.00 - 9.00 K/uL   METABOLIC PANEL, COMPREHENSIVE    Collection Time: 11/02/22  5:27 AM   Result Value Ref Range    Sodium 141 136 - 145 mmol/L    Potassium 4.6 3.5 - 5.1 mmol/L    Chloride 106 97 - 108 mmol/L    CO2 29 21 - 32 mmol/L    Anion gap 6 5 - 15 mmol/L    Glucose 103 (H) 65 - 100 mg/dL    BUN 17 6 - 20 MG/DL    Creatinine 0.56 0.55 - 1.02 MG/DL    BUN/Creatinine ratio 30 (H) 12 - 20      eGFR >60 >60 ml/min/1.73m2    Calcium 9.0 8.5 - 10.1 MG/DL    Bilirubin, total 0.2 0.2 - 1.0 MG/DL    ALT (SGPT) 20 12 - 78 U/L    AST (SGOT) 14 (L) 15 - 37 U/L    Alk. phosphatase 65 45 - 117 U/L    Protein, total 5.8 (L) 6.4 - 8.2 g/dL    Albumin 2.4 (L) 3.5 - 5.0 g/dL    Globulin 3.4 2.0 - 4.0 g/dL    A-G Ratio 0.7 (L) 1.1 - 2.2         Additional comments:  I have reviewed the patient's new clinical lab test results. I have personally reviewed the patient's radiographs.   MRI Results (most recent):  Results from [place] encounter on 10/25/22    MRI BRAIN W WO CONT      FINAL REPORT:    INDICATION:   encephalopathy, f/u MRI compare to previous 10/2/22    EXAMINATION:  MRI BRAIN WO CONTRAST    COMPARISON:  MRI October 2, 2022, CT October 25, 2022    TECHNIQUE:  Multiplanar multisequence acquisition without contrast of the brain. FINDINGS:    Ventricles:  Midline, no hydrocephalus. Brain Parenchyma/Brainstem:  Interval development of extensive, diffuse FLAIR/T2  hyperintense signal and associated diffusion restriction throughout the  supratentorial white matter. Involvement of the splenium of corpus callosum. There is cortical sparing. The deep gray matter, brainstem and cerebellum are  also unaffected. Intracranial Hemorrhage:  None. Basal Cisterns:  Normal.  Flow Voids:  Normal.  Additional Comments:  N/A. Impression  Interval development of diffusely abnormal supratentorial white matter, with  extensive FLAIR/T2 hyperintense signal and diffusion restriction as described  above. No gray matter, brainstem or cerebellar involvement. No associated  enhancement, hemorrhage, or mass effect/hydrocephalus. Differential diagnosis  favors an acute toxic/metabolic process, with other etiologies including an  infectious/inflammatory encephalitis (viral) less likely. Sparing of the gray  matter structures makes an anoxic injury less likely. CT Results (most recent):  Results from Hospital Encounter encounter on 10/25/22    CT HEAD WO CONT    Narrative  EXAM: CT HEAD WO CONT    INDICATION: Confusion. Electronic medical record is not available at the time of  this interpretation. COMPARISON: CT head on 10/13/2022. MRI brain on 10/2/2022. TECHNIQUE: Noncontrast head CT. Coronal and sagittal reformats. CT dose  reduction was achieved through the use of a standardized protocol tailored for  this examination and automatic exposure control for dose modulation. FINDINGS: The ventricles and sulci are age-appropriate without hydrocephalus. There is no mass effect or midline shift. There is no intracranial hemorrhage or  extra-axial fluid collection. There is no abnormal area of decreased density to  suggest infarct. The calvarium is intact. The visualized paranasal sinuses and mastoid air cells  are clear. Impression  No acute intracranial abnormality on this noncontrast head CT. No change given  difference in technique. Care Plan discussed with:  Patient x   Family x   RN x   Care Manager    Consultant/Specialist:  x     Signed:SEN Humphreys    The patient was discussed at length with Dr. Danish Mari. A total time of 35 minutes greater than 25 minutes was spent answering questions from family and going over the plan of care with family as documented above.

## 2022-11-02 NOTE — PROGRESS NOTES
[facility] Adult  Hospitalist Group                                                                                          Hospitalist Progress Note  Maritza Jimenez MD  Answering service: [phone] from in house phone        Date of Service:  2022  NAME:  Benito Powers  :  1965  MRN:  [id number]      Admission Summary:     Benito Powers is a 59 y.o. female with past medical history of anoxic brain injury, anxiety, depression, hypertension, hyperlipidemia, hypothyroidism presented to the emergency department via EMS from [address] [place] [address] with reported altered mental status (AMS). Patient is aphasic/nonverbal and not answering any questions. Majority of history was obtained per review of chart records. Per reports patient recently was hospitalized at [facility] from 2022-10/20/2022 with diagnosis of status epilepticus, acute metabolic encephalopathy, delirium, psychosis, generalized anxiety disorder, debility, Takotsubo cardiomyopathy, NSTEMI, bradycardia, SIRS, tachycardia, fever, leukocytosis, and CVA. Patient had work-up including MRI and also EEG. There was concern the patient may have some anoxic brain injury. She has been followed by psychiatry and neurology services with persistent encephalopathy and intermittent severe aggression. Patient has had aphasia and altered mental status not following commands per staff report since last week. Symptoms remain constant, severe, without specific exacerbating relieving factors. About emergency department today, initial recorded vital signs temperature 98.8 °F, /65, heart rate 103, respirate 30, O2 saturation 93% on room air. Abnormal labs included WC 12.5, platelets 741, BUN 23. CT head without IV contrast showed no acute intracranial abnormalities. ED request admission to the hospitalist service.      No acute event overnight, less rigidity, stable and transferred out of ICU on 10/29       Interval history / Subjective:     Patient is conscious and alert but aphasic, tried to squeeze examiner hand with her right hand,     Acute event overnight, no significant change in her mental status     Assessment & Plan:     AMS possible due to serotonin syndrome   -trazodone and lexapro has stopped  -Reported receiving a dose of Risperdal then Seroquel  -fever and rigidity improving, BP low normal increased to 75 ml/hr, continue midodrine 5 mg tid, HR  bpm  -patient is at risk for fluid overload   -continue on diazepam, IVF  -prn tylenol  -continue neuro check and supportive care   -PT/OT  -Neurologist on board     Acute encephalopathy   Aphasic since after patient was found unresponsive on 9/27  Hx of prolong hospitalization at [facility] after she was found unresponsive, respiratory arrest at her home on  9/26/22, suspected due to sedating medication, seizure, managed, [facility]  Suspected anoxic brain injury  Seizure   Hx of CVA  -CT head on 10/25/2022 no acute intracranial abnormality on noncontrast head CT  -conscious and alert, aphasic, doesn't follow command or moves extremities  -continue nuero check and supportive care  -complex medical problem with prolonged hospitalization  -SpO2 93-98% on RA  -high risk for decompensation      Hx of Takotsubo cardiomyopathy  -has peripheral edema  -BP low normal, if it improves will consider her home metoprolol and lasix   -monitor I/O and daily weight      HTN  -received bolus normal saline , and IVF,   -BP soft and normal, on midodrine midodrine added, monitor BP  -home clonidine, on hold     Hyperlipidemia   -statin on hold due to elevated CK     Hypothyroidism   -continue synthroid      Generalized anxiety disorder  -home trazodone and lexapro on hold     Severe disability with immobility   -continue supportive care      Obesity   -BMI 32.52 kg/m2  -need weight loss program     Full Code: high risk for decompensation     Sal catheter in place, to discontinue 10/26  NGT in place, on tube feeding       Full code: High risk for decompensation, consult palliative care team          Code status: Full code  Prophylaxis: SCD  Care Plan discussed with:her son, mother, and Nurse  Anticipated Disposition: [facility] 24[address] Problems  Date Reviewed: 10/5/2022            Codes Class Noted POA    Altered mental status ICD-10-CM: R41.82  ICD-9-CM: 780.97  10/27/2022 Unknown        Diarrhea ICD-10-CM: R19.7  ICD-9-CM: 787.91  10/25/2022 Unknown        AMS (altered mental status) ICD-10-CM: R41.82  ICD-9-CM: 780.97  10/25/2022 Unknown         Vital Signs:    Last 24hrs VS reviewed since prior progress note. Most recent are:  Visit Vitals  /70 (BP 1 Location: Right upper arm, BP Patient Position: At rest)   Pulse (!) 110   Temp 98.8 °F (37.1 °C)   Resp (!) 34   Ht 5' 6\" (1.676 m)   Wt 89.9 kg (198 lb 3.1 oz)   SpO2 97%   BMI 31.99 kg/m²         Intake/Output Summary (Last 24 hours) at 11/2/2022 0800  Last data filed at 11/1/2022 1932  Gross per 24 hour   Intake --   Output 2 ml   Net -2 ml          Physical Examination:     I had a face to face encounter with this patient and independently examined them on 11/2/2022 as outlined below:          Constitutional: Conscious and alert, aphasic, no acute distres   ENT: NGT in place, oral mucosa moist, oropharynx benign. Resp:  CTA bilaterally. No wheezing/rhonchi/rales. No accessory muscle use. CV:  Regular rhythm, normal rate, no murmurs, gallops, rubs    GI:  Soft, non distended, non tender.  normoactive bowel sounds, no hepatosplenomegaly     Musculoskeletal: Bilateral pretibial, pedal and hand edema    Neurologic: Conscious and alert, aphasic, does not follow command, movement of her right fingers            Data Review:    Review and/or order of clinical lab test  Review and/or order of tests in the radiology section of C[facility]  Review and/or order of tests in the medicine section of [facility]      Labs:     Recent Labs     11/02/22 0527 11/01/22  0030   WBC 9.1 8.8   HGB 11.2* 11.2*   HCT 33.2* 34.8*    267       Recent Labs     11/02/22  0527 11/01/22  0030 10/31/22  0400    140 140   K 4.6 4.2 4.2    106 106   CO2 29 29 28   BUN 17 12 11   CREA 0.56 0.54* 0.55   * 123* 133*   CA 9.0 9.1 9.2   MG  --   --  2.1   PHOS  --   --  3.3       Recent Labs     11/02/22 0527 11/01/22  0030   ALT 20 18   AP 65 61   TBILI 0.2 0.2   TP 5.8* 6.3*   ALB 2.4* 2.2*   GLOB 3.4 4.1*       No results for input(s): INR, PTP, APTT, INREXT, INREXT in the last 72 hours. No results for input(s): FE, TIBC, PSAT, FERR in the last 72 hours. Lab Results   Component Value Date/Time    Folate 9.9 10/26/2022 11:47 AM        No results for input(s): PH, PCO2, PO2 in the last 72 hours.   Recent Labs     10/31/22  1245   CPK 64       Lab Results   Component Value Date/Time    Cholesterol, total 145 10/26/2022 06:46 AM    HDL Cholesterol 30 10/26/2022 06:46 AM    LDL, calculated 91.4 10/26/2022 06:46 AM    Triglyceride 118 10/26/2022 06:46 AM    CHOL/HDL Ratio 4.8 10/26/2022 06:46 AM     Lab Results   Component Value Date/Time    Glucose (POC) 110 10/05/2022 04:55 PM    Glucose (POC) 87 10/05/2022 11:02 AM    Glucose (POC) 78 10/05/2022 05:00 AM    Glucose (POC) 94 10/05/2022 04:57 AM    Glucose (POC) 124 (H) 10/04/2022 11:03 PM     Lab Results   Component Value Date/Time    Color YELLOW/STRAW 10/25/2022 04:54 PM    Appearance CLEAR 10/25/2022 04:54 PM    Specific gravity >1.030 (H) 10/25/2022 04:54 PM    Specific gravity 1.011 09/27/2022 12:51 PM    pH (UA) 5.5 10/25/2022 04:54 PM    Protein TRACE (A) 10/25/2022 04:54 PM    Glucose Negative 10/25/2022 04:54 PM    Ketone TRACE (A) 10/25/2022 04:54 PM    Bilirubin Negative 10/25/2022 04:54 PM    Urobilinogen 1.0 10/25/2022 04:54 PM    Nitrites Negative 10/25/2022 04:54 PM    Leukocyte Esterase Negative 10/25/2022 04:54 PM Epithelial cells FEW 10/25/2022 04:54 PM    Bacteria Negative 10/25/2022 04:54 PM    WBC 0-4 10/25/2022 04:54 PM    RBC 0-5 10/25/2022 04:54 PM         Medications Reviewed:     Current Facility-Administered Medications   Medication Dose Route Frequency    diazePAM (VALIUM) injection 5 mg  5 mg IntraVENous Q4H    senna-docusate (PERICOLACE) 8.6-50 mg per tablet 2 Tablet  2 Tablet Oral DAILY    [Held by provider] metoprolol tartrate (LOPRESSOR) tablet 12.5 mg  12.5 mg Oral Q12H    polyethylene glycol (MIRALAX) packet 17 g  17 g Oral DAILY PRN    thiamine HCL (B-1) tablet 100 mg  100 mg Oral DAILY    therapeutic multivitamin (THERAGRAN) tablet 1 Tablet  1 Tablet Oral DAILY    acetaminophen (TYLENOL) solution 650 mg  650 mg Per NG tube Q4H PRN    sodium chloride (NS) flush 5-40 mL  5-40 mL IntraVENous Q8H    sodium chloride (NS) flush 5-40 mL  5-40 mL IntraVENous PRN    [Held by provider] atorvastatin (LIPITOR) tablet 10 mg  10 mg Oral QHS    levothyroxine (SYNTHROID) tablet 88 mcg  88 mcg Oral ACB    pantoprazole (PROTONIX) tablet 40 mg  40 mg Oral ACB    sodium chloride (NS) flush 5-10 mL  5-10 mL IntraVENous PRN    acetaminophen (TYLENOL) suppository 650 mg  650 mg Rectal Q6H PRN     ______________________________________________________________________  EXPECTED LENGTH OF STAY: 2d 7h  ACTUAL LENGTH OF STAY:          6                 Abdoulaye Vang MD

## 2022-11-02 NOTE — PROGRESS NOTES
Bedside shift change report given to Ale Doyle (oncoming nurse) by Rodney Maldonado (offgoing nurse). Report included the following information SBAR, Recent Results, and Med Rec Status.

## 2022-11-02 NOTE — PROGRESS NOTES
[facility]                                                                                          Hospitalist Progress Note  Kajal Cisse MD  Answering service: [phone] from in house phone        Date of Service:  2022  NAME:  Jojo Lake  :  1965  MRN:  [id number]      Admission Summary:     Jojo Lake is a 59 y.o. female with past medical history of anoxic brain injury, anxiety, depression, hypertension, hyperlipidemia, hypothyroidism presented to the emergency department via EMS from [address] [place] [address] with reported altered mental status (AMS). Patient is aphasic/nonverbal and not answering any questions. Majority of history was obtained per review of chart records. Per reports patient recently was hospitalized at [facility] from 2022-10/20/2022 with diagnosis of status epilepticus, acute metabolic encephalopathy, delirium, psychosis, generalized anxiety disorder, debility, Takotsubo cardiomyopathy, NSTEMI, bradycardia, SIRS, tachycardia, fever, leukocytosis, and CVA. Patient had work-up including MRI and also EEG. There was concern the patient may have some anoxic brain injury. She has been followed by psychiatry and neurology services with persistent encephalopathy and intermittent severe aggression. Patient has had aphasia and altered mental status not following commands per staff report since last week. Symptoms remain constant, severe, without specific exacerbating relieving factors. About emergency department today, initial recorded vital signs temperature 98.8 °F, /65, heart rate 103, respirate 30, O2 saturation 93% on room air. Abnormal labs included WC 12.5, platelets 279, BUN 23. CT head without IV contrast showed no acute intracranial abnormalities. ED request admission to the hospitalist service.      No acute event overnight, less rigidity, stable and transferred out of ICU on 10/29       Interval history / Subjective:     Patient is lethargic, wakeful to touch, aphasic, mental status wax and wane. Her mother and brother in the room, requested to transfer to [facility].    Patient is going to have LP today, follow up on preliminary report and to initiate transfer to [facility]      Assessment & Plan:     AMS possible due to serotonin syndrome   -trazodone and lexapro has stopped  -Reported receiving a dose of Risperdal then Seroquel  -fever and rigidity improved,  BP normal,   -IVF has discontinued because of risk for fluid overload,   -BP normal, midodrine discontinued  -weaning down iv diazepam,    -continue prn tylenol  -continue neuro check and supportive care   -LP on 11/1  -PT/OT  -Neurologist on board     Acute encephalopathy   Aphasic since after patient was found unresponsive on 9/27  Hx of prolong hospitalization at [facility] after she was found unresponsive, respiratory arrest at her home on  9/26/22, suspected due to sedating medication, seizure, managed, stable and discharge to [facility] on 10/20/22  Suspected anoxic brain injury  Suspected Seizure   Hx of CVA  -CT head on 10/25/2022 no acute intracranial abnormality on noncontrast head CT  -mental status is declining, lethargic, open her eyes to touch, aphasic, doesn't follow command or moves her extremities   -continue nuero check and supportive care  -complex medical problem with hx of previous prolonged hospitalization  -SpO2 93-98% on RA  -high risk for decompensation      Hx of Takotsubo cardiomyopathy  -has peripheral edema  -last Echo was on 10/12 EF 55-60%   -BP low normal, if it improves will consider her home metoprolol and lasix   -monitor I/O and daily weight      HTN  -BP normal, monitor BP  -home clonidine, on hold     Hyperlipidemia   -statin on hold due to elevated CK     Hypothyroidism   -continue synthroid      Generalized anxiety disorder  -home trazodone and lexapro on hold     Severe disability with immobility   -continue supportive care      Obesity   -BMI 32.52 kg/m2  -need weight loss program       Sal catheter in place,  NGT in place, on tube feeding       Full code: High risk for decompensation, palliative care team on board    Family requesting transfer to [facility] after LP and preliminary result          Code status: Full code  Prophylaxis: SCD  Care Plan discussed with:her son, mother, and Nurse  Anticipated Disposition: [facility] [email or web address] Problems  Date Reviewed: 10/5/2022            Codes Class Noted POA    Altered mental status ICD-10-CM: R41.82  ICD-9-CM: 780.97  10/27/2022 Unknown        Diarrhea ICD-10-CM: R19.7  ICD-9-CM: 787.91  10/25/2022 Unknown        AMS (altered mental status) ICD-10-CM: R41.82  ICD-9-CM: 780.97  10/25/2022 Unknown         Vital Signs:    Last 24hrs VS reviewed since prior progress note. Most recent are:  Visit Vitals  /70 (BP 1 Location: Right upper arm, BP Patient Position: At rest)   Pulse (!) 110   Temp 98.8 °F (37.1 °C)   Resp (!) 34   Ht 5' 6\" (1.676 m)   Wt 89.9 kg (198 lb 3.1 oz)   SpO2 97%   BMI 31.99 kg/m²         Intake/Output Summary (Last 24 hours) at 11/2/2022 0801  Last data filed at 11/1/2022 1932  Gross per 24 hour   Intake --   Output 2 ml   Net -2 ml          Physical Examination:     I had a face to face encounter with this patient and independently examined them on 11/2/2022 as outlined below:          Constitutional: Conscious and alert, aphasic, no acute distres   ENT: NGT in place, oral mucosa moist, oropharynx benign. Resp:  CTA bilaterally. No wheezing/rhonchi/rales. No accessory muscle use. CV:  Regular rhythm, normal rate, no murmurs, gallops, rubs    GI:  Soft, non distended, non tender.  normoactive bowel sounds, no hepatosplenomegaly     Musculoskeletal: Bilateral pretibial, pedal and hand edema    Neurologic: Lethargic, open here eyes to touch, aphasic, does not follow command or moves her extremities             Data Review: Review and/or order of clinical lab test  Review and/or order of tests in the radiology section of CPT  Review and/or order of tests in the medicine section of CPT      Labs:     Recent Labs     11/02/22 0527 11/01/22  0030   WBC 9.1 8.8   HGB 11.2* 11.2*   HCT 33.2* 34.8*    267       Recent Labs     11/02/22 0527 11/01/22  0030 10/31/22  0400    140 140   K 4.6 4.2 4.2    106 106   CO2 29 29 28   BUN 17 12 11   CREA 0.56 0.54* 0.55   * 123* 133*   CA 9.0 9.1 9.2   MG  --   --  2.1   PHOS  --   --  3.3       Recent Labs     11/02/22 0527 11/01/22 0030   ALT 20 18   AP 65 61   TBILI 0.2 0.2   TP 5.8* 6.3*   ALB 2.4* 2.2*   GLOB 3.4 4.1*       No results for input(s): INR, PTP, APTT, INREXT, INREXT in the last 72 hours. No results for input(s): FE, TIBC, PSAT, FERR in the last 72 hours. Lab Results   Component Value Date/Time    Folate 9.9 10/26/2022 11:47 AM        No results for input(s): PH, PCO2, PO2 in the last 72 hours.   Recent Labs     10/31/22  1245   CPK 64       Lab Results   Component Value Date/Time    Cholesterol, total 145 10/26/2022 06:46 AM    HDL Cholesterol 30 10/26/2022 06:46 AM    LDL, calculated 91.4 10/26/2022 06:46 AM    Triglyceride 118 10/26/2022 06:46 AM    CHOL/HDL Ratio 4.8 10/26/2022 06:46 AM     Lab Results   Component Value Date/Time    Glucose (POC) 110 10/05/2022 04:55 PM    Glucose (POC) 87 10/05/2022 11:02 AM    Glucose (POC) 78 10/05/2022 05:00 AM    Glucose (POC) 94 10/05/2022 04:57 AM    Glucose (POC) 124 (H) 10/04/2022 11:03 PM     Lab Results   Component Value Date/Time    Color YELLOW/STRAW 10/25/2022 04:54 PM    Appearance CLEAR 10/25/2022 04:54 PM    Specific gravity >1.030 (H) 10/25/2022 04:54 PM    Specific gravity 1.011 09/27/2022 12:51 PM    pH (UA) 5.5 10/25/2022 04:54 PM    Protein TRACE (A) 10/25/2022 04:54 PM    Glucose Negative 10/25/2022 04:54 PM    Ketone TRACE (A) 10/25/2022 04:54 PM    Bilirubin Negative 10/25/2022 04:54 PM Urobilinogen 1.0 10/25/2022 04:54 PM    Nitrites Negative 10/25/2022 04:54 PM    Leukocyte Esterase Negative 10/25/2022 04:54 PM    Epithelial cells FEW 10/25/2022 04:54 PM    Bacteria Negative 10/25/2022 04:54 PM    WBC 0-4 10/25/2022 04:54 PM    RBC 0-5 10/25/2022 04:54 PM         Medications Reviewed:     Current Facility-Administered Medications   Medication Dose Route Frequency    diazePAM (VALIUM) injection 5 mg  5 mg IntraVENous Q4H    senna-docusate (PERICOLACE) 8.6-50 mg per tablet 2 Tablet  2 Tablet Oral DAILY    [Held by provider] metoprolol tartrate (LOPRESSOR) tablet 12.5 mg  12.5 mg Oral Q12H    polyethylene glycol (MIRALAX) packet 17 g  17 g Oral DAILY PRN    thiamine HCL (B-1) tablet 100 mg  100 mg Oral DAILY    therapeutic multivitamin (THERAGRAN) tablet 1 Tablet  1 Tablet Oral DAILY    acetaminophen (TYLENOL) solution 650 mg  650 mg Per NG tube Q4H PRN    sodium chloride (NS) flush 5-40 mL  5-40 mL IntraVENous Q8H    sodium chloride (NS) flush 5-40 mL  5-40 mL IntraVENous PRN    [Held by provider] atorvastatin (LIPITOR) tablet 10 mg  10 mg Oral QHS    levothyroxine (SYNTHROID) tablet 88 mcg  88 mcg Oral ACB    pantoprazole (PROTONIX) tablet 40 mg  40 mg Oral ACB    sodium chloride (NS) flush 5-10 mL  5-10 mL IntraVENous PRN    acetaminophen (TYLENOL) suppository 650 mg  650 mg Rectal Q6H PRN     ______________________________________________________________________  EXPECTED LENGTH OF STAY: 2d 7h  ACTUAL LENGTH OF STAY:          6                 Kajal Cisse MD

## 2022-11-02 NOTE — PROCEDURES
LUMBAR PUNCTURE PROCEDURE NOTE    NAME:     Moshe Resendiz   :       1965   MRN:       [id number]   DATE:      2022    After the procedure was explained, risks reviewed including bleeding, infection, post-LP headache, and nerve root damage, and questions answered, consent signed and placed on chart. Pt was placed in the left lateral decubitus position. The patient was prepped and draped in the usual sterile fashion. 2 cc of 1% lidocaine was used for local anesthesia. The L3-L4 interspace was identified and the spinal needle inserted with immediate return of clear spinal fluid. Opening pressure was 11 mmHg. Approximately 16cc of CSF was collected. The spinal needle was removed. The pt was left in the supine position in stable condition. There was minimal if any blood loss. There were no immediate complications at the time of the procedure. The patient tolerated the procedure well.     Tests sent: CSF protein, glucose, gram stain/culture, cell count, myelin basic protein, HSV      SEN Diez

## 2022-11-02 NOTE — PROGRESS NOTES
Problem: Pressure Injury - Risk of  Goal: *Prevention of pressure injury  Description: Document Jose Enrique Scale and appropriate interventions in the flowsheet. Outcome: Progressing Towards Goal  Note: Pressure Injury Interventions:  Sensory Interventions: Assess need for specialty bed, Assess changes in LOC, Minimize linen layers    Moisture Interventions: Absorbent underpads    Activity Interventions: PT/OT evaluation, Assess need for specialty bed    Mobility Interventions: PT/OT evaluation    Nutrition Interventions: Document food/fluid/supplement intake, Offer support with meals,snacks and hydration    Friction and Shear Interventions: Apply protective barrier, creams and emollients, Lift sheet                Problem: Patient Education: Go to Patient Education Activity  Goal: Patient/Family Education  Outcome: Progressing Towards Goal     Problem: Falls - Risk of  Goal: *Absence of Falls  Description: Document Chyna Fall Risk and appropriate interventions in the flowsheet. Outcome: Progressing Towards Goal  Note: Fall Risk Interventions:       Mentation Interventions: Door open when patient unattended    Medication Interventions: Evaluate medications/consider consulting pharmacy    Elimination Interventions:  Toileting schedule/hourly rounds    History of Falls Interventions: Door open when patient unattended         Problem: Patient Education: Go to Patient Education Activity  Goal: Patient/Family Education  Outcome: Progressing Towards Goal

## 2022-11-02 NOTE — PROGRESS NOTES
Transition of Care Plan  RUR- Med 18%  DISPOSITION: Hospital transfer vs rehab, pending progress and further neuro work up  F/U with PCP/Specialist    Transport: [facility]    Emergency contact: Mother, Sita Res [phone] NARDA ARCHIBALD are 3 sons, but mother has been deemed spokesperson)    CM barriers to discharge: SNF choice, auth    CM met with patient's mother, Bao Ayala regarding discharge plan. Family is adamantly refusing of SNF. Per PT/OT, patient would benefit from brain injury rehab. Some of these facilities are out of state. In discussion with family, they are open to having patient go to [place] or [facility] for brain injury rehab program if that is best for patient. They are also open to programs in the [facility] area. Family is open to CM sending patient's clinicals to several facilities to weigh options for patient. Patient has NG tube, this would be barrier to discharge planning. Neurology to continue work up before considering transfer to another hospital. > 48 hours from discharge. CM will follow. 1:46pm: CM faxed clinicals to [address] [place] [address] in [place] F#[phone] and left message for admissions #[phone] regarding referral.     [facility], Mary Alice Smith, they are still following along with patient's progress. They are open to taking her back if she begins to follow some commands and has dx that warrants IPR. RICHARD Du.

## 2022-11-02 NOTE — BH NOTES
Attempted to consult with patient but she is currently undergoing a procedure. Will try again later.

## 2022-11-03 ENCOUNTER — PATIENT OUTREACH (OUTPATIENT)
Dept: OTHER | Age: 57
End: 2022-11-03

## 2022-11-03 LAB
ALBUMIN SERPL-MCNC: 2.4 G/DL (ref 3.5–5)
ALBUMIN/GLOB SERPL: 0.6 {RATIO} (ref 1.1–2.2)
ALP SERPL-CCNC: 67 U/L (ref 45–117)
ALT SERPL-CCNC: 24 U/L (ref 12–78)
ANION GAP SERPL CALC-SCNC: 5 MMOL/L (ref 5–15)
AST SERPL-CCNC: 19 U/L (ref 15–37)
BILIRUB SERPL-MCNC: 0.3 MG/DL (ref 0.2–1)
BUN SERPL-MCNC: 19 MG/DL (ref 6–20)
BUN/CREAT SERPL: 39 (ref 12–20)
CALCIUM SERPL-MCNC: 8.7 MG/DL (ref 8.5–10.1)
CHLORIDE SERPL-SCNC: 105 MMOL/L (ref 97–108)
CO2 SERPL-SCNC: 26 MMOL/L (ref 21–32)
CREAT SERPL-MCNC: 0.49 MG/DL (ref 0.55–1.02)
ERYTHROCYTE [DISTWIDTH] IN BLOOD BY AUTOMATED COUNT: 14.1 % (ref 11.5–14.5)
GLOBULIN SER CALC-MCNC: 3.8 G/DL (ref 2–4)
GLUCOSE SERPL-MCNC: 136 MG/DL (ref 65–100)
HCT VFR BLD AUTO: 35.7 % (ref 35–47)
HERPES SIMPLEX VIRUS, CSF, UHSPT: NORMAL
HGB BLD-MCNC: 11.7 G/DL (ref 11.5–16)
MAGNESIUM SERPL-MCNC: 2.2 MG/DL (ref 1.6–2.4)
MCH RBC QN AUTO: 29.9 PG (ref 26–34)
MCHC RBC AUTO-ENTMCNC: 32.8 G/DL (ref 30–36.5)
MCV RBC AUTO: 91.3 FL (ref 80–99)
NRBC # BLD: 0 K/UL (ref 0–0.01)
NRBC BLD-RTO: 0 PER 100 WBC
PHOSPHATE SERPL-MCNC: 3.8 MG/DL (ref 2.6–4.7)
PLATELET # BLD AUTO: 284 K/UL (ref 150–400)
PMV BLD AUTO: 11.1 FL (ref 8.9–12.9)
POTASSIUM SERPL-SCNC: 4.5 MMOL/L (ref 3.5–5.1)
PROT SERPL-MCNC: 6.2 G/DL (ref 6.4–8.2)
RBC # BLD AUTO: 3.91 M/UL (ref 3.8–5.2)
SODIUM SERPL-SCNC: 136 MMOL/L (ref 136–145)
WBC # BLD AUTO: 9.1 K/UL (ref 3.6–11)

## 2022-11-03 PROCEDURE — 85027 COMPLETE CBC AUTOMATED: CPT

## 2022-11-03 PROCEDURE — 99233 SBSQ HOSP IP/OBS HIGH 50: CPT | Performed by: NURSE PRACTITIONER

## 2022-11-03 PROCEDURE — 74011000250 HC RX REV CODE- 250: Performed by: FAMILY MEDICINE

## 2022-11-03 PROCEDURE — 65660000001 HC RM ICU INTERMED STEPDOWN

## 2022-11-03 PROCEDURE — 97535 SELF CARE MNGMENT TRAINING: CPT

## 2022-11-03 PROCEDURE — 83735 ASSAY OF MAGNESIUM: CPT

## 2022-11-03 PROCEDURE — 77010033678 HC OXYGEN DAILY

## 2022-11-03 PROCEDURE — 84100 ASSAY OF PHOSPHORUS: CPT

## 2022-11-03 PROCEDURE — 36415 COLL VENOUS BLD VENIPUNCTURE: CPT

## 2022-11-03 PROCEDURE — 97112 NEUROMUSCULAR REEDUCATION: CPT

## 2022-11-03 PROCEDURE — 74011250637 HC RX REV CODE- 250/637: Performed by: NURSE PRACTITIONER

## 2022-11-03 PROCEDURE — 62270 DX LMBR SPI PNXR: CPT | Performed by: NURSE PRACTITIONER

## 2022-11-03 PROCEDURE — 74011250637 HC RX REV CODE- 250/637: Performed by: PHYSICIAN ASSISTANT

## 2022-11-03 PROCEDURE — 92526 ORAL FUNCTION THERAPY: CPT | Performed by: SPEECH-LANGUAGE PATHOLOGIST

## 2022-11-03 PROCEDURE — 92523 SPEECH SOUND LANG COMPREHEN: CPT | Performed by: SPEECH-LANGUAGE PATHOLOGIST

## 2022-11-03 PROCEDURE — 97530 THERAPEUTIC ACTIVITIES: CPT

## 2022-11-03 PROCEDURE — 80053 COMPREHEN METABOLIC PANEL: CPT

## 2022-11-03 PROCEDURE — 94760 N-INVAS EAR/PLS OXIMETRY 1: CPT

## 2022-11-03 PROCEDURE — 74011250636 HC RX REV CODE- 250/636: Performed by: NURSE PRACTITIONER

## 2022-11-03 RX ORDER — ENOXAPARIN SODIUM 100 MG/ML
40 INJECTION SUBCUTANEOUS EVERY 24 HOURS
Status: DISCONTINUED | OUTPATIENT
Start: 2022-11-03 | End: 2022-11-05

## 2022-11-03 RX ORDER — DIAZEPAM 10 MG/2ML
2 INJECTION INTRAMUSCULAR EVERY 8 HOURS
Status: COMPLETED | OUTPATIENT
Start: 2022-11-03 | End: 2022-11-04

## 2022-11-03 RX ADMIN — SODIUM CHLORIDE, PRESERVATIVE FREE 10 ML: 5 INJECTION INTRAVENOUS at 06:29

## 2022-11-03 RX ADMIN — Medication 2 MG: at 14:00

## 2022-11-03 RX ADMIN — LEVOTHYROXINE SODIUM 88 MCG: 0.09 TABLET ORAL at 06:28

## 2022-11-03 RX ADMIN — Medication 100 MG: at 09:09

## 2022-11-03 RX ADMIN — SODIUM CHLORIDE, PRESERVATIVE FREE 10 ML: 5 INJECTION INTRAVENOUS at 21:20

## 2022-11-03 RX ADMIN — DIAZEPAM 2 MG: 5 INJECTION, SOLUTION INTRAMUSCULAR; INTRAVENOUS at 21:19

## 2022-11-03 RX ADMIN — ENOXAPARIN SODIUM 40 MG: 100 INJECTION SUBCUTANEOUS at 11:37

## 2022-11-03 RX ADMIN — THERA TABS 1 TABLET: TAB at 09:09

## 2022-11-03 RX ADMIN — PANTOPRAZOLE SODIUM 40 MG: 40 TABLET, DELAYED RELEASE ORAL at 06:28

## 2022-11-03 RX ADMIN — SENNOSIDES AND DOCUSATE SODIUM 2 TABLET: 50; 8.6 TABLET ORAL at 09:09

## 2022-11-03 RX ADMIN — Medication 2 MG: at 09:08

## 2022-11-03 RX ADMIN — Medication 2 MG: at 06:28

## 2022-11-03 RX ADMIN — Medication 2 MG: at 02:11

## 2022-11-03 NOTE — PROGRESS NOTES
Problem: Pressure Injury - Risk of  Goal: *Prevention of pressure injury  Description: Document Jose Enrique Scale and appropriate interventions in the flowsheet. Outcome: Progressing Towards Goal  Note: Pressure Injury Interventions:  Sensory Interventions: Assess changes in LOC, Assess need for specialty bed, Avoid rigorous massage over bony prominences, Check visual cues for pain, Float heels, Keep linens dry and wrinkle-free, Monitor skin under medical devices, Pad between skin to skin, Pressure redistribution bed/mattress (bed type), Turn and reposition approx. every two hours (pillows and wedges if needed)    Moisture Interventions: Absorbent underpads, Apply protective barrier, creams and emollients, Check for incontinence Q2 hours and as needed, Internal/External urinary devices, Limit adult briefs, Offer toileting Q_hr    Activity Interventions: Increase time out of bed, Pressure redistribution bed/mattress(bed type), PT/OT evaluation    Mobility Interventions: Assess need for specialty bed, Float heels, HOB 30 degrees or less, Pressure redistribution bed/mattress (bed type), PT/OT evaluation, Turn and reposition approx.  every two hours(pillow and wedges)    Nutrition Interventions: Document food/fluid/supplement intake, Discuss nutritional consult with provider, Offer support with meals,snacks and hydration    Friction and Shear Interventions: Apply protective barrier, creams and emollients, Foam dressings/transparent film/skin sealants, Lift sheet, Lift team/patient mobility team, Transferring/repositioning devices                Problem: Patient Education: Go to Patient Education Activity  Goal: Patient/Family Education  Outcome: Progressing Towards Goal     Problem: Patient Education: Go to Patient Education Activity  Goal: Patient/Family Education  Outcome: Progressing Towards Goal     Problem: Patient Education: Go to Patient Education Activity  Goal: Patient/Family Education  Outcome: Progressing Towards Goal     Problem: Patient Education: Go to Patient Education Activity  Goal: Patient/Family Education  Outcome: Progressing Towards Goal     Problem: Falls - Risk of  Goal: *Absence of Falls  Description: Document Lizeth Marking Fall Risk and appropriate interventions in the flowsheet.   Outcome: Progressing Towards Goal  Note: Fall Risk Interventions:       Mentation Interventions: Adequate sleep, hydration, pain control, Door open when patient unattended, Evaluate medications/consider consulting pharmacy    Medication Interventions: Evaluate medications/consider consulting pharmacy    Elimination Interventions: Call light in reach, Toileting schedule/hourly rounds    History of Falls Interventions: Door open when patient unattended         Problem: Patient Education: Go to Patient Education Activity  Goal: Patient/Family Education  Outcome: Progressing Towards Goal     Problem: Discharge Planning  Goal: *Discharge to safe environment  Outcome: Progressing Towards Goal  Goal: *Knowledge of medication management  Outcome: Progressing Towards Goal  Goal: *Knowledge of discharge instructions  Outcome: Progressing Towards Goal     Problem: Patient Education: Go to Patient Education Activity  Goal: Patient/Family Education  Outcome: Progressing Towards Goal     Problem: Nutrition Deficit  Goal: *Optimize nutritional status  Outcome: Progressing Towards Goal

## 2022-11-03 NOTE — CONSULTS
PSYCHIATRY CONSULT NOTE    REASON FOR CONSULT:Evaluate underlying depression      HISTORY OF PRESENTING COMPLAINT:  Gloria Tomas is a 59 y.o. WHITE/NON- female who is currently admitted to the medical floor at [facility]. [facility] status. She has a hx of hypothyroidism, depression, obesity. Patient is well known to this writer. She recently had a prolong hospitalization at [address] [place] [address] after she was found unconscious at her home. While at [address] [place] [address], she was diagnose with acute metabolic encephalopathy, psychosis, status epilepticus and CVA. There were also concerns that patient may have some anoxic brain injury. She was discharged from [facility] and transferred to [address] [place] [address] for rehab services. While admitted here, patient has been aphasic and non verbal. Psychiatric is consulted for the evaluation of underlying depression. Patient is received in bed sleeping with eyes close. She is not responding or arousable. She would not also open her eyes with name call or touch. Further assessment is not possible. Nurse denies any acute behaviors. PAST PSYCHIATRIC HISTORY:Per chart, patient does not have a hx of psychiatric hospitalizations and suicide attempt. SUBSTANCE ABUSE HISTORY: unable to assess    PAST MEDICAL HISTORY:    Please see H&P for details. Past Medical History:   Diagnosis Date    Allergic rhinitis 7/29/2019    Depression     History of multiple allergies     History of vascular access device 10/07/2022    [facility] VAT: PICC placement R Cephalic length 40 cm for reliable access/TPN arm circ 35.5 cm    Muscle ache     Obesity 11/5/2012    Sinus pressure     Sinus problem      Prior to Admission medications    Medication Sig Start Date End Date Taking? Authorizing Provider   aspirin 81 mg chewable tablet Take 1 Tablet by mouth daily for 30 days.  10/20/22 11/19/22  Neto Sanchez MD   cloNIDine (CATAPRES) 0.2 mg/24 hr ptwk 1 Patch by TransDERmal route every seven (7) days for 30 days. 10/25/22 11/24/22  Neto Sanchez MD   metoprolol tartrate (LOPRESSOR) 25 mg tablet Take 1 Tablet by mouth every twelve (12) hours for 30 days. 10/20/22 11/19/22  Neto Sanchez MD   pantoprazole (PROTONIX) 40 mg tablet Take 1 Tablet by mouth Daily (before breakfast) for 30 days. 10/21/22 11/20/22  Neto Sanchez MD   senna-docusate (PERICOLACE) 8.6-50 mg per tablet Take 1 Tablet by mouth two (2) times a day for 30 days. 10/20/22 11/19/22  Neto Sanchez MD   levothyroxine (SYNTHROID) 88 mcg tablet Take 1 Tablet by mouth Daily (before breakfast). 10/18/22   Rhianna Reyes MD   atorvastatin (LIPITOR) 10 mg tablet Take 10 mg by mouth daily. Provider, Historical   levothyroxine (SYNTHROID) 88 mcg tablet Take 88 mcg by mouth Daily (before breakfast). Provider, Historical   furosemide (LASIX) 20 mg tablet Take 1 Tablet by mouth daily as needed (swelling). 8/26/22   Rhianna Reyes MD   atorvastatin (LIPITOR) 10 mg tablet TAKE ONE TABLET BY MOUTH ONCE NIGHTLY 8/24/22   Rhianna Reyes MD   traZODone (DESYREL) 50 mg tablet Take 2.5 Tablets by mouth nightly as needed for Sleep. 8/24/22   Rhianna Reyes MD   escitalopram oxalate (LEXAPRO) 20 mg tablet TAKE ONE TABLET BY MOUTH DAILY 6/9/22   Rhianna Reyes MD   OTHER,NON-FORMULARY, ESTROGEN(5050)/TESTOSTERONE (HRT) 1.5mg/10mg/ml Cream APPLY 1 ML TO SKIN (INNER WRIST/ABDOMEN/THIGHS EVERY DAY. ROTATE SITES 6/3/22   Rachel Reyna MD   loratadine-pseudoephedrine (Claritin-D 12 Hour) 5-120 mg per tablet Claritin-D    Provider, Historical   tretinoin (RETIN-A) 0.05 % topical cream tretinoin 0.05 % topical cream    Provider, Historical   acetaminophen (TYLENOL) 325 mg tablet Take  by mouth every four (4) hours as needed. Provider, Historical   MULTIVITAMIN PO Take  by mouth.     Provider, Historical     Vitals:    11/03/22 0554 11/03/22 0800 11/03/22 1000 11/03/22 1200   BP:  110/68 117/66    Pulse: 96 (!) 101 (!) 102 95   Resp:  28 (!) 31    Temp:  99.1 °F (37.3 °C) 99.1 °F (37.3 °C)    SpO2:  95% 96%    Weight:       Height:         Lab Results   Component Value Date/Time    WBC 9.1 11/03/2022 04:40 AM    HGB 11.7 11/03/2022 04:40 AM    HCT 35.7 11/03/2022 04:40 AM    PLATELET 913 28/97/8657 04:40 AM    MCV 91.3 11/03/2022 04:40 AM     Lab Results   Component Value Date/Time    Sodium 136 11/03/2022 04:40 AM    Potassium 4.5 11/03/2022 04:40 AM    Chloride 105 11/03/2022 04:40 AM    CO2 26 11/03/2022 04:40 AM    Anion gap 5 11/03/2022 04:40 AM    Glucose 136 (H) 11/03/2022 04:40 AM    BUN 19 11/03/2022 04:40 AM    Creatinine 0.49 (L) 11/03/2022 04:40 AM    BUN/Creatinine ratio 39 (H) 11/03/2022 04:40 AM    GFR est AA >60 10/03/2022 01:13 AM    GFR est non-AA >60 10/03/2022 01:13 AM    Calcium 8.7 11/03/2022 04:40 AM    Bilirubin, total 0.3 11/03/2022 04:40 AM    Alk. phosphatase 67 11/03/2022 04:40 AM    Protein, total 6.2 (L) 11/03/2022 04:40 AM    Albumin 2.4 (L) 11/03/2022 04:40 AM    Globulin 3.8 11/03/2022 04:40 AM    A-G Ratio 0.6 (L) 11/03/2022 04:40 AM    ALT (SGPT) 24 11/03/2022 04:40 AM    AST (SGOT) 19 11/03/2022 04:40 AM     No results found for: VALF2, VALAC, VALP, VALPR, DS6, CRBAM, CRBAMP, CARB2, XCRBAM  No results found for: LITHM  RADIOLOGY REPORTS:(reviewed/updated 11/3/2022)  XR HIP LT W OR WO PELV 2-3 VWS    Result Date: 10/14/2022  EXAM: XR HIP LT W OR WO PELV 2-3 VWS INDICATION: fall. COMPARISON: Abdominal radiograph 10/12/2022, CT chest abdomen pelvis 9/27/2022. FINDINGS: AP view of the pelvis and a frogleg lateral view of the left hip demonstrate no fracture, dislocation or other acute abnormality. Sal catheter. No acute abnormality. XR ABD (KUB)    Result Date: 10/27/2022  EXAM:  XR ABD (KUB) INDICATION: NG tube placement COMPARISON: 10/12/2022. TECHNIQUE: Supine frontal abdomen (KUB). FINDINGS: No dilated small bowel.  Stool and gas are present in the colon. No pathologic calcification. Osseous structures are age appropriate. The NG tube is coiled in the stomach. NG tube in place. Nonspecific bowel gas pattern. Decompressed stomach. MRI BRAIN WO CONT    Result Date: 9/28/2022  CLINICAL HISTORY: AMS of unknown etiology. INDICATION: AMS of unknown etiology. COMPARISON: 9/27/2022 TECHNIQUE: MR examination of the brain includes axial and sagittal T1, coronal T2, axial T2, axial FLAIR, axial gradient echo, axial DWI. CONTRAST: None FINDINGS: IACs are symmetric in size. There is a punctate focus of acute infarction in the right frontal cortex. Additional probable focus of cortical infarction anteriorly right frontal lobe. The brain architecture is normal. There is no evidence of midline shift or mass-effect. There are no extra-axial fluid collections. There is no Chiari or syrinx. The pituitary and infundibulum are grossly unremarkable. There is no skull base mass. Cerebellopontine angles are grossly unremarkable. The major intracranial vascular flow-voids are unremarkable. The cavernous sinuses are symmetric. Optic chiasm and infundibulum grossly unremarkable. Orbits are grossly symmetric. Dural venous sinuses are grossly patent. The mastoid air cells are well pneumatized and clear. Small cortical foci of acute infarction right frontal lobe posteriorly abutting the central sulcus and cortically based anteriorly in the right frontal lobe. There is no associated hemorrhage, midline shift or mass effect. No additional acute intracranial process. MRI BRAIN W WO CONT    Addendum Date: 11/2/2022    Addendum: Case was discussed with Dr. Marixa López. Given clinical history, delayed post-hypoxic leukoencephalopathy is a differential consideration for this imaging appearance, particularly if acute toxic/metabolic etiologies are able to be excluded.      Result Date: 11/2/2022  PRELIMINARY REPORT  Interval development of diffuse T2 signal elevation within white matter throughout both hemispheres with associated high diffusion signal and low signal on ADC consistent with history of anoxic brain injury and severe acute encephalopathy. Preliminary report was provided by Dr. Katarina Gordon, the on-call radiologist, at 06:19 Final report to follow. END PRELIMINARY REPORT  FINAL REPORT: INDICATION:   encephalopathy, f/u MRI compare to previous 10/2/22 EXAMINATION:  MRI BRAIN WO CONTRAST COMPARISON:  MRI October 2, 2022, CT October 25, 2022 TECHNIQUE:  Multiplanar multisequence acquisition without contrast of the brain. FINDINGS:  Ventricles:  Midline, no hydrocephalus. Brain Parenchyma/Brainstem:  Interval development of extensive, diffuse FLAIR/T2 hyperintense signal and associated diffusion restriction throughout the supratentorial white matter. Involvement of the splenium of corpus callosum. There is cortical sparing. The deep gray matter, brainstem and cerebellum are also unaffected. Intracranial Hemorrhage:  None. Basal Cisterns:  Normal. Flow Voids:  Normal. Additional Comments:  N/A. Interval development of diffusely abnormal supratentorial white matter, with extensive FLAIR/T2 hyperintense signal and diffusion restriction as described above. No gray matter, brainstem or cerebellar involvement. No associated enhancement, hemorrhage, or mass effect/hydrocephalus. Differential diagnosis favors an acute toxic/metabolic process, with other etiologies including an infectious/inflammatory encephalitis (viral) less likely. Sparing of the gray matter structures makes an anoxic injury less likely. MRI BRAIN W WO CONT    Result Date: 10/2/2022  EXAM:  MRI BRAIN W WO CONT INDICATION:    acute encephalopathy COMPARISON:  9/20/2022. CONTRAST: 20 ml of ProHance. TECHNIQUE:  Multiplanar multisequence acquisition without and with contrast of the brain. FINDINGS: The ventricles are normal in size and position.  2 small foci of cortical diffusion restriction in the right frontal lobe without significant change. No new areas of ischemia present. There is associated FLAIR hyperintensity. There is no cerebellar tonsillar herniation. Expected arterial flow-voids are present. No evidence of abnormal enhancement. Small amount of fluid in the bilateral mastoid air cells. Intubated. Air-fluid level in the right maxillary sinus with fluid in the nasopharynx. The orbital contents are within normal limits. No significant osseous or scalp lesions are identified. 2 small foci of diffusion restriction in the right frontal lobe are unchanged and may represent small foci of acute ischemia. No abnormal enhancement is identified. CT HEAD WO CONT    Result Date: 10/25/2022  EXAM: CT HEAD WO CONT INDICATION: Confusion. Electronic medical record is not available at the time of this interpretation. COMPARISON: CT head on 10/13/2022. MRI brain on 10/2/2022. TECHNIQUE: Noncontrast head CT. Coronal and sagittal reformats. CT dose reduction was achieved through the use of a standardized protocol tailored for this examination and automatic exposure control for dose modulation. FINDINGS: The ventricles and sulci are age-appropriate without hydrocephalus. There is no mass effect or midline shift. There is no intracranial hemorrhage or extra-axial fluid collection. There is no abnormal area of decreased density to suggest infarct. The calvarium is intact. The visualized paranasal sinuses and mastoid air cells are clear. No acute intracranial abnormality on this noncontrast head CT. No change given difference in technique. CT HEAD WO CONT    Result Date: 10/13/2022  CLINICAL HISTORY: post fall INDICATION: post fall COMPARISON: 10/2/2022. 9/27/2022 CT dose reduction was achieved through use of a standardized protocol tailored for this examination and automatic exposure control for dose modulation.  TECHNIQUE: Serial axial images with a collimation of 5 mm were obtained from the skull base through the vertex  FINDINGS: The sulci and ventricles are within normal limits for patient age. There is no evidence of an acute infarction, hemorrhage, or mass-effect. There is no evidence of midline shift or hydrocephalus. Posterior fossa structures are unremarkable. No extra-axial collections are seen. Trace mastoid fluid on the left and minimal mastoid effusion on the right. There is no evidence of depressed skull fractures of soft tissue swelling. No acute intracranial process. CT CHEST WO CONT    Result Date: 9/27/2022  EXAM: CT CHEST WO CONT, CT ABD PELV WO CONT INDICATION: Found unresponsive, respiratory arrest, unclear etiology, family hx aneurysm COMPARISON: None IV CONTRAST: None ORAL CONTRAST: None TECHNIQUE: Thin axial images were obtained through the chest, abdomen and pelvis. Coronal and sagittal reformats were generated. CT dose reduction was achieved through use of a standardized protocol tailored for this examination and automatic exposure control for dose modulation. FINDINGS: An endotracheal tube is in appropriate position. A nasogastric tube is seen looped within the stomach. CHEST WALL: No mass or axillary lymphadenopathy. THYROID: No nodule. MEDIASTINUM: No mass or lymphadenopathy. ELBA: No mass or lymphadenopathy. THORACIC AORTA: No dissection or aneurysm. MAIN PULMONARY ARTERY: Normal in caliber. TRACHEA/BRONCHI: Patent. ESOPHAGUS: No wall thickening or dilatation. HEART: Normal in size. PLEURA: No effusion or pneumothorax. LUNGS: There is moderate bibasilar atelectasis. LIVER: No mass. BILIARY TREE: Status post cholecystectomy. CBD is not dilated. SPLEEN: within normal limits. PANCREAS: No mass or ductal dilatation. ADRENALS: Unremarkable. KIDNEYS: No mass, calculus, or hydronephrosis. STOMACH: Unremarkable. SMALL BOWEL: No dilatation or wall thickening. COLON: No dilatation or wall thickening. APPENDIX: Unremarkable PERITONEUM: No ascites or pneumoperitoneum.  RETROPERITONEUM: No lymphadenopathy or aortic aneurysm. REPRODUCTIVE ORGANS: Unremarkable. URINARY BLADDER: Decompressed by Sal catheter. BONES: No destructive bone lesion. ABDOMINAL WALL: No mass or hernia. ADDITIONAL COMMENTS: N/A     1. Moderate bibasilar atelectasis. 2. Status post cholecystectomy. 3. Endotracheal and nasogastric tubes in place. Sal catheter decompresses the bladder. CT ABD PELV WO CONT    Result Date: 9/27/2022  EXAM: CT CHEST WO CONT, CT ABD PELV WO CONT INDICATION: Found unresponsive, respiratory arrest, unclear etiology, family hx aneurysm COMPARISON: None IV CONTRAST: None ORAL CONTRAST: None TECHNIQUE: Thin axial images were obtained through the chest, abdomen and pelvis. Coronal and sagittal reformats were generated. CT dose reduction was achieved through use of a standardized protocol tailored for this examination and automatic exposure control for dose modulation. FINDINGS: An endotracheal tube is in appropriate position. A nasogastric tube is seen looped within the stomach. CHEST WALL: No mass or axillary lymphadenopathy. THYROID: No nodule. MEDIASTINUM: No mass or lymphadenopathy. ELBA: No mass or lymphadenopathy. THORACIC AORTA: No dissection or aneurysm. MAIN PULMONARY ARTERY: Normal in caliber. TRACHEA/BRONCHI: Patent. ESOPHAGUS: No wall thickening or dilatation. HEART: Normal in size. PLEURA: No effusion or pneumothorax. LUNGS: There is moderate bibasilar atelectasis. LIVER: No mass. BILIARY TREE: Status post cholecystectomy. CBD is not dilated. SPLEEN: within normal limits. PANCREAS: No mass or ductal dilatation. ADRENALS: Unremarkable. KIDNEYS: No mass, calculus, or hydronephrosis. STOMACH: Unremarkable. SMALL BOWEL: No dilatation or wall thickening. COLON: No dilatation or wall thickening. APPENDIX: Unremarkable PERITONEUM: No ascites or pneumoperitoneum. RETROPERITONEUM: No lymphadenopathy or aortic aneurysm. REPRODUCTIVE ORGANS: Unremarkable.  URINARY BLADDER: Decompressed by Sal catheter. BONES: No destructive bone lesion. ABDOMINAL WALL: No mass or hernia. ADDITIONAL COMMENTS: N/A     1. Moderate bibasilar atelectasis. 2. Status post cholecystectomy. 3. Endotracheal and nasogastric tubes in place. Sal catheter decompresses the bladder. XR CHEST PORT    Result Date: 10/25/2022  EXAM: XR CHEST PORT INDICATION: Pulmonary edema and respiratory distress earlier this month. COMPARISON: Portable chest on 10/7/2022 and 10/3/2022. TECHNIQUE: 4 images of portable chest AP view FINDINGS: Cardiac monitoring wires overlie the thorax. Borderline cardiac size is unchanged. Aortic arch is not enlarged. Endotracheal tube has been removed. The pulmonary vasculature is within normal limits. The lungs and pleural spaces are clear. There are low lung volumes. Bones are osteopenic. No acute process on limited portable chest view. Improved lung aeration since earlier this month. XR CHEST PORT    Result Date: 10/7/2022  EXAM:  XR CHEST PORT INDICATION: Verify PICC Tip placement please COMPARISON: One day prior. TECHNIQUE: Single frontal view of the chest. FINDINGS: Right upper extremity PICC line with tip projecting over the right atrium. Existing right IJ catheter unchanged with tip also projecting over the right atrium. Endotracheal tube terminates 2 cm from the lópez. Enteric tube terminates below the level the diaphragm outside the field of view study. Slightly improved aeration of the lung bases. Possible trace residual effusions. No visualized pneumothorax. . Lungs are hypoventilatory. 1.  Right upper extremity PICC line tip projects over the right atrium. No visualized pneumothorax. XR CHEST PORT    Result Date: 10/6/2022  EXAM:  XR CHEST PORT INDICATION: Ventilated patient. Bilateral pneumonia. COMPARISON: 10/3/2022 TECHNIQUE: Semiupright portable chest AP view FINDINGS: Endotracheal tube is 2.9 cm above the lópez. Other tubes and lines are stable. Cardiac monitoring leads.  Lung volumes remain low. Heart size enlarged. Mild interstitial and airspace opacities bilaterally with some improvement in the interval. Possible small right pleural effusion which may be new. There may be a small left pleural effusion as well. The visualized bones and upper abdomen are age-appropriate. Overall improvement in interstitial and airspace opacities bilaterally with some residual. Small right pleural effusion and possible small left pleural effusion. XR CHEST PORT    Result Date: 10/3/2022  EXAM: XR CHEST PORT INDICATION: fever COMPARISON: 9/27/2022 FINDINGS: A portable AP radiograph of the chest was obtained at 1502 hours. The patient is on a cardiac monitor. The NG tube extends below the hemidiaphragm. Endotracheal tube terminates snf between the thoracic inlet and lópez. Right IJ line terminates at the cavoatrial junction. The cardiac and mediastinal contours and pulmonary vascularity are stable. Patchy bilateral airspace disease is noted in both perihilar locations and there is a small left pleural effusion. Bilateral perihilar pneumonia versus subsegmental atelectasis. Small left pleural effusion    XR CHEST PORT    Result Date: 9/27/2022  Indication: Line placement. Comparison to earlier the same day. Portable exam obtained at 2780 demonstrates placement of a right jugular catheter with the tip projecting over the right atrium. There is no pneumothorax. Right jugular catheter satisfactory position without pneumothorax. XR CHEST PORT    Result Date: 9/27/2022  EXAM: XR CHEST PORT INDICATION: PLACEMENT OF CENTRAL LINE ij COMPARISON: 9/27/2022 FINDINGS: A portable AP radiograph of the chest was obtained at 1442 hours. New right IJ central venous catheter with the tip in the distal SVC. No pneumothorax. Endotracheal tube is stable. Nasogastric tube is coiled in the stomach. . The lungs are clear.  The cardiac and mediastinal contours and pulmonary vascularity are normal.  The bones and soft tissues are grossly within normal limits. Status post right IJ central venous catheter placement without evidence of pneumothorax. XR CHEST PORT    Result Date: 9/27/2022  INDICATION:  repositioned ETT EXAM: Chest single view. COMPARISON: 1151 hours. FINDINGS: A single frontal view of the chest at 1231 hours shows ET tube now appearing with its tip 2.8 cm above the lópez. Lung volumes are moderate with bibasilar atelectasis stable, left greater than right. .  The heart, mediastinum and pulmonary vasculature are upper normal. .  The bony thorax is unremarkable for age. . A radiopaque tube projecting over the mediastinum on the prior study has been removed. ET tube as described. Gastric tube removed. Bibasilar atelectasis and low lung volumes. .  . XR CHEST PORT    Result Date: 9/27/2022  EXAM: XR CHEST PORT INDICATION: Intubation COMPARISON: None. FINDINGS: A portable AP radiograph of the chest was obtained at 1151 hours. The patient is on a cardiac monitor. The lungs are clear. The cardiac and mediastinal contours and pulmonary vascularity are normal. The endotracheal tube terminates at the thoracic inlet. The NG tube tip is at the level of the midesophagus. The bones and soft tissues are grossly within normal limits. Stomach is distended. Borderline high position of endotracheal tube High position of the NG tube. Distended stomach. XR ABD PORT  1 V    Result Date: 10/12/2022  INDICATION: vomiting EXAMINATION:  ABDOMEN KUB COMPARISON: CT September 27, 2022 FINDINGS: AP radiograph of the abdomen demonstrates a nonobstructive bowel gas pattern. Gaseous distention of the stomach. Moderate amount of colonic stool. No abnormal calcifications are identified. The osseous structures are normal.     Moderate gaseous distention of the stomach and moderate amount of colonic stool, with no evidence of bowel obstruction.      IR INSERT NON TUNL CVC OVER 5 YRS    Result Date: 9/28/2022  Clinical Data: A 59year-old patient presents for double-lumen non-tunneled hemodialysis catheter placement at bedside. Date: 9/27/2022 : Josefina Walden M.D. Estimated Blood Loss: Minimal. Specimens Removed: None. Complications: None. Technique: Informed verbal and written consent was obtained following discussion of risks, benefits and alternatives to this procedure. The patient was positioned supine on the bed. The patient's right neck and chest were then prepped and draped in normal sterile fashion. A timeout was performed to ensure proper patient, procedure and site. Ultrasound was used to image the right neck. The right IJ was shown to be patent. A small amount of 1% lidocaine was injected subcutaneously at the site of vascular access. Using real-time ultrasound guidance, the needle was advanced into the vessel. A hard copy image was stored on PACS for documentation. A wire was advanced into the needle, a short incision was made at the puncture site, and serial dilatation was performed. A 14 Bulgarian 20 cm non-tunneled hemodialysis catheter was advanced into the central veins. The catheter flushed and aspirated appropriately. The lumens were blocked with heparin. The catheter was secured at the exit site with silk suture and a sterile dressing. The patient tolerated the procedure well. There were no immediate complications. Post procedure chest x-ray shows the catheter is positioned in the right atrium. Successful placement of a double-lumen non-tunneled hemodialysis catheter. XR US GUIDED VASCULAR ACCESS    Result Date: 10/7/2022  INDICATION:   NO VENOUS ACCESS  EXAMINATION:  US GUIDE VAS ACCESS FINDINGS: Ultrasound was provided for PICC line placement. Ultrasound guidance for PICC line  placement. GBP    CT CODE NEURO HEAD WO CONTRAST    Result Date: 9/27/2022  EXAM: CT CODE NEURO HEAD WO CONTRAST INDICATION: Left gaze COMPARISON: None. CONTRAST: None.  TECHNIQUE: Unenhanced CT of the head was performed using 5 mm images. Brain and bone windows were generated. Coronal and sagittal reformats. CT dose reduction was achieved through use of a standardized protocol tailored for this examination and automatic exposure control for dose modulation. FINDINGS: The ventricles and sulci are normal in size, shape and configuration. . There is no significant white matter disease. There is no intracranial hemorrhage, extra-axial collection, or mass effect. The basilar cisterns are open. No CT evidence of acute infarct. The bone windows demonstrate no abnormalities. The visualized portions of the paranasal sinuses and mastoid air cells are clear. No acute intracranial process seen     ECHO ADULT COMPLETE    Result Date: 9/27/2022    Left Ventricle: Severely reduced left ventricular systolic function with a visually estimated EF of 25 - 30%. Left ventricle size is normal. Mildly increased wall thickness. See diagram for wall motion findings. Abnormal diastolic function. Right Ventricle: Moderately reduced systolic function. ECHO ADULT FOLLOW-UP OR LIMITED    Result Date: 10/11/2022    Left Ventricle: Normal left ventricular systolic function with a visually estimated EF of 55 - 60%. Left ventricle size is normal. Mildly increased wall thickness. Unable to assess wall motion. Right Ventricle: Not well visualized. Right ventricle size is normal. Normal wall thickness. DUPLEX UPPER EXT VENOUS LEFT    Result Date: 10/30/2022  · No evidence of left upper extremity deep vein thrombosis in well visualized segments. · The left basilic vein was not well visualized secondary to small vessel size.       DUPLEX LOWER EXT VENOUS BILAT    Result Date: 9/28/2022  · No evidence of dep vein thrombosis in the lower extremities      Lab Results   Component Value Date/Time    Pregnancy test,urine (POC) NEGATIVE 02/26/2013 09:46 AM       PSYCHOSOCIAL HISTORY:Patient has 2 sons    MENTAL STATUS EXAM:  General appearance:  moderately groomed, psychomotor activity is decreased  Eye contact: Avoids eye contact  Speech: Unable to assess  Affect : unable to assess  Mood: Unable to assess  Thought Process: unable to assess  Perception: unable to assess  Thought Content: unable to assess  Insight: unable to assess  Judgement: unable to assess  Cognition: unable to assess    ASSESSMENT AND PLAN:  Ren Piper meets criteria for a diagnosis of  altered mental status  I collaborated with Dr Valentin Silva and we would recommend switching valium to ativan 2mg M6ffoth to help with possible catatonia. We would also recommend to  get a most recent CK, UA, B12 and ammonia level. Continue to hold on trazodone and lexapro. No further psychiatric intervention at this time. Will continue to follow along. Thank your your consult. Please feel free to consult us again as needed.

## 2022-11-03 NOTE — PROGRESS NOTES
Telephonic outreach to the patient. She remains in the hospital at this time. [facility]. Patient is scheduled for a colonoscopy on 11/4. Unable to leave a message as voice mail is full. Will continue to follow for discharge.

## 2022-11-03 NOTE — PROGRESS NOTES
Bedside shift change report given to [address] [place] [address] (oncoming nurse) by Carolynn Clay RN (offgoing nurse). Report included the following information SBAR, Kardex, ED Summary, Procedure Summary, Intake/Output, MAR, Accordion, Recent Results, Med Rec Status, Cardiac Rhythm sinus tach, and Alarm Parameters .

## 2022-11-03 NOTE — PROGRESS NOTES
[facility] Adult  Hospitalist Group                                                                                          Hospitalist Progress Note  Nikky Dey MD  Answering service: [phone] OR [phone] from in house phone        Date of Service:  11/3/2022  NAME:  Millie Ward  :  1965  MRN:  [id number]      Admission Summary:     Millie Ward is a 59 y.o. female with past medical history of anoxic brain injury, anxiety, depression, hypertension, hyperlipidemia, hypothyroidism presented to the emergency department via EMS from [address] [place] [address] with reported altered mental status (AMS). Patient is aphasic/nonverbal and not answering any questions. Majority of history was obtained per review of chart records. Per reports patient recently was hospitalized at [facility] from 2022-10/20/2022 with diagnosis of status epilepticus, acute metabolic encephalopathy, delirium, psychosis, generalized anxiety disorder, debility, Takotsubo cardiomyopathy, NSTEMI, bradycardia, SIRS, tachycardia, fever, leukocytosis, and CVA. Patient had work-up including MRI and also EEG. There was concern the patient may have some anoxic brain injury. She has been followed by psychiatry and neurology services with persistent encephalopathy and intermittent severe aggression. Patient has had aphasia and altered mental status not following commands per staff report since last week. Symptoms remain constant, severe, without specific exacerbating relieving factors. About emergency department today, initial recorded vital signs temperature 98.8 °F, /65, heart rate 103, respirate 30, O2 saturation 93% on room air. Abnormal labs included WC 12.5, platelets 156, BUN 23. CT head without IV contrast showed no acute intracranial abnormalities. ED request admission to the hospitalist service.      No acute event overnight, less rigidity, stable and transferred out of ICU on 10/29       Interval history / Subjective:     Patient is lethargic, wakeful to touch, aphasic, mental status wax and wane.      I discussed with the mother at bedside she consented for PEG tube also discussed about placement to a rehab she is concerned that she wants her daughter to be in the place where they will do speech therapy and physical therapy as well I communicated with  and we will try to find a place for her     Assessment & Plan:     AMS possible due to serotonin syndrome   -trazodone and lexapro has stopped  -Reported receiving a dose of Risperdal then Seroquel  -fever and rigidity improved,  BP normal,   -IVF has discontinued because of risk for fluid overload,   -BP normal, midodrine discontinued  -weaning down iv diazepam possible catatonia seen by psychiatry and switch to Ativan instead  -continue prn tylenol  -continue neuro check and supportive care   -LP on 11/1  -PT/OT  -Neurologist on board     Acute encephalopathy   Aphasic since after patient was found unresponsive on 9/27  Hx of prolong hospitalization at [facility] after she was found unresponsive, respiratory arrest at her home on  9/26/22, suspected due to sedating medication, seizure, managed, stable [facility]    Suspected anoxic brain injury  Suspected Seizure   Hx of CVA  -CT head on 10/25/2022 no acute intracranial abnormality on noncontrast head CT  -mental status is declining, lethargic, open her eyes to touch, aphasic, doesn't follow command or moves her extremities   -continue nuero check and supportive care  -complex medical problem with hx of previous prolonged hospitalization  -SpO2 93-98% on RA  -high risk for decompensation      Hx of Takotsubo cardiomyopathy  -has peripheral edema  -last Echo was on 10/12 EF 55-60%   -BP low normal, if it improves will consider her home metoprolol and lasix   -monitor I/O and daily weight      HTN  -BP normal, monitor BP  -home clonidine, on hold     Hyperlipidemia   -statin on hold due to elevated CK     Hypothyroidism   -continue synthroid      Generalized anxiety disorder  -home trazodone and lexapro on hold     Severe disability with immobility   -continue supportive care      Obesity   -BMI 32.52 kg/m2  -need weight loss program       Sal catheter in place,  NGT in place, on tube feeding     Full code: High risk for decompensation, palliative care team on board consulted GI for PEG tube       Code status: Full code  Prophylaxis: SCD  Care Plan discussed with:her son, mother, and Nurse  Anticipated Disposition: SNF versus other     Hospital Problems  Date Reviewed: 10/5/2022            Codes Class Noted POA    Altered mental status ICD-10-CM: R41.82  ICD-9-CM: 780.97  10/27/2022 Unknown        Diarrhea ICD-10-CM: R19.7  ICD-9-CM: 787.91  10/25/2022 Unknown        AMS (altered mental status) ICD-10-CM: R41.82  ICD-9-CM: 780.97  10/25/2022 Unknown       Vital Signs:    Last 24hrs VS reviewed since prior progress note. Most recent are:  Visit Vitals  /66 (BP 1 Location: Right upper arm, BP Patient Position: At rest)   Pulse (!) 105   Temp 99.1 °F (37.3 °C)   Resp (!) 31   Ht 5' 6\" (1.676 m)   Wt 90 kg (198 lb 6.6 oz)   SpO2 96%   BMI 32.02 kg/m²         Intake/Output Summary (Last 24 hours) at 11/3/2022 1507  Last data filed at 11/3/2022 1000  Gross per 24 hour   Intake 540 ml   Output 1100 ml   Net -560 ml          Physical Examination:     I had a face to face encounter with this patient and independently examined them on 11/3/2022 as outlined below:          Constitutional: Conscious and alert, aphasic, no acute distres   ENT: NGT in place, oral mucosa moist, oropharynx benign. Resp:  CTA bilaterally. No wheezing/rhonchi/rales. No accessory muscle use. CV:  Regular rhythm, normal rate, no murmurs, gallops, rubs    GI:  Soft, non distended, non tender.  normoactive bowel sounds, no hepatosplenomegaly     Musculoskeletal: Bilateral pretibial, pedal and hand edema    Neurologic: Lethargic, open here eyes to touch, aphasic, does not follow command or moves her extremities             Data Review:    Review and/or order of clinical lab test  Review and/or order of tests in the radiology section of [facility]  Review and/or order of tests in the medicine section of [facility]      Labs:     Recent Labs     11/03/22 0440 11/02/22 0527   WBC 9.1 9.1   HGB 11.7 11.2*   HCT 35.7 33.2*    291       Recent Labs     11/03/22 0440 11/02/22 0527 11/01/22  0030    141 140   K 4.5 4.6 4.2    106 106   CO2 26 29 29   BUN 19 17 12   CREA 0.49* 0.56 0.54*   * 103* 123*   CA 8.7 9.0 9.1   MG 2.2  --   --    PHOS 3.8  --   --        Recent Labs     11/03/22 0440 11/02/22 0527 11/01/22 0030   ALT 24 20 18   AP 67 65 61   TBILI 0.3 0.2 0.2   TP 6.2* 5.8* 6.3*   ALB 2.4* 2.4* 2.2*   GLOB 3.8 3.4 4.1*       No results for input(s): INR, PTP, APTT, INREXT, INREXT in the last 72 hours. No results for input(s): FE, TIBC, PSAT, FERR in the last 72 hours. Lab Results   Component Value Date/Time    Folate 9.9 10/26/2022 11:47 AM        No results for input(s): PH, PCO2, PO2 in the last 72 hours. No results for input(s): CPK, CKNDX, TROIQ in the last 72 hours.     No lab exists for component: CPKMB    Lab Results   Component Value Date/Time    Cholesterol, total 145 10/26/2022 06:46 AM    HDL Cholesterol 30 10/26/2022 06:46 AM    LDL, calculated 91.4 10/26/2022 06:46 AM    Triglyceride 118 10/26/2022 06:46 AM    CHOL/HDL Ratio 4.8 10/26/2022 06:46 AM     Lab Results   Component Value Date/Time    Glucose (POC) 110 10/05/2022 04:55 PM    Glucose (POC) 87 10/05/2022 11:02 AM    Glucose (POC) 78 10/05/2022 05:00 AM    Glucose (POC) 94 10/05/2022 04:57 AM    Glucose (POC) 124 (H) 10/04/2022 11:03 PM     Lab Results   Component Value Date/Time    Color YELLOW/STRAW 10/25/2022 04:54 PM    Appearance CLEAR 10/25/2022 04:54 PM    Specific gravity >1.030 (H) 10/25/2022 04:54 PM    Specific gravity 1.011 09/27/2022 12:51 PM    pH (UA) 5.5 10/25/2022 04:54 PM    Protein TRACE (A) 10/25/2022 04:54 PM    Glucose Negative 10/25/2022 04:54 PM    Ketone TRACE (A) 10/25/2022 04:54 PM    Bilirubin Negative 10/25/2022 04:54 PM    Urobilinogen 1.0 10/25/2022 04:54 PM    Nitrites Negative 10/25/2022 04:54 PM    Leukocyte Esterase Negative 10/25/2022 04:54 PM    Epithelial cells FEW 10/25/2022 04:54 PM    Bacteria Negative 10/25/2022 04:54 PM    WBC 0-4 10/25/2022 04:54 PM    RBC 0-5 10/25/2022 04:54 PM         Medications Reviewed:     Current Facility-Administered Medications   Medication Dose Route Frequency    enoxaparin (LOVENOX) injection 40 mg  40 mg SubCUTAneous Q24H    diazePAM (VALIUM) injection 2 mg  2 mg IntraVENous Q4H    senna-docusate (PERICOLACE) 8.6-50 mg per tablet 2 Tablet  2 Tablet Oral DAILY    [Held by provider] metoprolol tartrate (LOPRESSOR) tablet 12.5 mg  12.5 mg Oral Q12H    polyethylene glycol (MIRALAX) packet 17 g  17 g Oral DAILY PRN    thiamine HCL (B-1) tablet 100 mg  100 mg Oral DAILY    therapeutic multivitamin (THERAGRAN) tablet 1 Tablet  1 Tablet Oral DAILY    acetaminophen (TYLENOL) solution 650 mg  650 mg Per NG tube Q4H PRN    sodium chloride (NS) flush 5-40 mL  5-40 mL IntraVENous Q8H    sodium chloride (NS) flush 5-40 mL  5-40 mL IntraVENous PRN    [Held by provider] atorvastatin (LIPITOR) tablet 10 mg  10 mg Oral QHS    levothyroxine (SYNTHROID) tablet 88 mcg  88 mcg Oral ACB    pantoprazole (PROTONIX) tablet 40 mg  40 mg Oral ACB    sodium chloride (NS) flush 5-10 mL  5-10 mL IntraVENous PRN    acetaminophen (TYLENOL) suppository 650 mg  650 mg Rectal Q6H PRN     ______________________________________________________________________  EXPECTED LENGTH OF STAY: 2d 7h  ACTUAL LENGTH OF STAY:          7                 Pedro Luis Rodriguez MD

## 2022-11-03 NOTE — PROGRESS NOTES
Comprehensive Nutrition Assessment    Type and Reason for Visit: Reassess    Nutrition Recommendations/Plan:   Continue tube feedings as ordered. Hold 1 hr before and 2 hours after Synthroid administration. Consider PEG placement prior to facility transfer. Malnutrition Assessment:  Malnutrition Status: Moderate malnutrition (10/27/22 1553)    Context:  Acute illness     Findings of the 6 clinical characteristics of malnutrition:   Energy Intake:  Unable to assess  Weight Loss:  Greater than 5% over 1 month     Body Fat Loss:  No significant body fat loss,     Muscle Mass Loss:  No significant muscle mass loss,    Fluid Accumulation:  Mild, Extremities   Strength:  Not performed        Nutrition Assessment:    Pt admitted with AMS. PMHx: Recent admission to [facility] [place] for NSTEMI, Takotsubo CM, Bradycardia and prolonged intubation after found unresponsive at home by neighbor. Possible anoxic brain injury. Transferred to [address] [place] [address] 10/20.       11/3: Follow-up. Pt remains unsafe for PO with severe oral dysphagia. Discussed in IDRs, exploring [facility] for possible [facility] vs SNF. Pt DHT placed 10/27, seems likely pt would benefit from transition to PEG tube. Osmolite 1.2 @ 70 ml/hr x 21 hr with 100 ml water flush q 4 hr providing 1470 ml, 1765 calories, 82 gm protein and 1800 ml free water (tube feeding/flush) per day to meet estimated needs. Estimated needs not adjusted d/t 2+ generalized edema. Pt tolerating feedings at goal without issue, nutrition pertinent labs WNL. 10/27: Acute encephalopathy-possible Serotonin syndrome, NMS. Not appropriate for oral intake. Noted potential need for PEG tube placement d/t poor PO intake at rehab. If weights accurate from last admission until today-pt has lost about 34#. Difference in scales probably account for some difference. Based needs on bed scale weight obtained today.   At risk for refeeding therefore recommend supplementing with B1 and a MVI and continue to monitor lytes daily. Nutritionally Significant Medications:  Synthroid, Protonix, Pericolace, MVI, thiamine      Estimated Daily Nutrient Needs:  Energy Requirements Based On: Formula  Weight Used for Energy Requirements: Current  Energy (kcal/day): 1780 (MSJ x 1.2 x 1.0)  Weight Used for Protein Requirements: Current  Protein (g/day): 89 (1g/kg)  Method Used for Fluid Requirements: 1 ml/kcal  Fluid (ml/day):      Nutrition Related Findings:   Edema: Generalized: 2+ (11/3/2022 10:00 AM)  LLE: 1+; Non-pitting (11/3/2022 10:00 AM)  LUE: Non-pitting; 2+ (11/3/2022 10:00 AM)  RLE: 1+; Non-pitting (11/3/2022 10:00 AM)  RUE: 2+; Non-pitting (11/3/2022 10:00 AM)    Last BM: 10/31/22, Loose  Recent Labs     11/03/22  0440 11/02/22  0527 11/01/22  0030   * 103* 123*   BUN 19 17 12   CREA 0.49* 0.56 0.54*    141 140   K 4.5 4.6 4.2    106 106   CO2 26 29 29   CA 8.7 9.0 9.1   PHOS 3.8  --   --    MG 2.2  --   --          Wounds: None      Current Nutrition Therapies:  Diet: NPO  Supplements: N/A  Meal intake: No data found. Supplement intake: No data found. Nutrition Support: Osmolite 1.2 @ 70 ml/hr x 21 hrs with 100 ml free water Q 4 hrs      Anthropometric Measures:  Height: 5' 6\" (167.6 cm)  Ideal Body Weight (IBW): 130 lbs (59 kg)  Admission Body Weight: 230 lb (admission @ [facility] 9/27)  Current Body Wt:  87.5 kg (192 lb 14.4 oz), 148.4 % IBW. Bed scale  Current BMI (kg/m2): 31.2                          BMI Category: Obese class 1 (BMI 30.0-34. 9)    Wt Readings from Last 10 Encounters:   11/01/22 89.9 kg (198 lb 3.1 oz)   10/11/22 104.3 kg (230 lb)   08/24/22 93.4 kg (206 lb)   06/03/22 93.4 kg (206 lb)   03/04/22 91.2 kg (201 lb)   06/29/21 93.4 kg (206 lb)   02/04/21 94.3 kg (208 lb)   07/29/19 83.9 kg (185 lb)   02/25/14 94.5 kg (208 lb 6 oz)   05/20/13 93 kg (205 lb)           Nutrition Diagnosis:   Inadequate oral intake related to cognitive or neurological impairment as evidenced by NPO or clear liquid status due to medical condition    Nutrition Interventions:   Food and/or Nutrient Delivery: Continue oral nutrition supplement  Nutrition Education/Counseling: Education not indicated  Coordination of Nutrition Care: Continue to monitor while inpatient       Goals:  Previous Goal Met: Progressing toward goal(s)  Goals:  (TF tolerance with feedings to provide at leat 90% kcal/protein needs.)       Nutrition Monitoring and Evaluation:   Behavioral-Environmental Outcomes: None identified  Food/Nutrient Intake Outcomes: Enteral nutrition intake/tolerance  Physical Signs/Symptoms Outcomes: Biochemical data, GI status, Fluid status or edema    Discharge Planning:    Enteral nutrition    Molly Escobar RD  Available via [employer] or ext [phone]

## 2022-11-03 NOTE — PROGRESS NOTES
0800: Bedside and Verbal shift change report given to [facility] RN (oncoming nurse) by [facility] (offgoing nurse). Report included the following information SBAR, ED Summary, and Cardiac Rhythm NSR-ST .     3986: Psychiatry at bedside. 1430: Hair care performed. 1545: Bedside and Verbal shift change report given to Kike Juarez RN (oncoming nurse) by [facility] (offgoing nurse). Report included the following information SBAR, MAR, and Cardiac Rhythm NSR .

## 2022-11-03 NOTE — PROGRESS NOTES
Transition of Care Plan  R- Med 19%  DISPOSITION: SNF/LTC ( Referrals sent to [facility], [facility], [facility])  F/U with PCP/Specialist    Transport: [facility]    Emergency contact: Mother, Rachel Reyna [phone] NARDA ARCHIBALD are 3 sons, but mother has been deemed spokesperson)    CM barriers to discharge: SNF choice, auth    CM notified by patient's RN 11/2 that family would like patient to transfer to [facility]. CM has explained to family that work up would need to be completed here prior to transfer request.     Patient's family has been clear that they do not want SNF placement. However, patient not command following at this time. Family requested CM to look into several programs for brain injury rehab. [facility] have declined patient. [facility] is following for continued progress. Referral was faxed to [facility], plan to follow up on referral today #[phone]. Message left for Admissions Coordinator with [address], Simona Meigs [phone]. Patient may need PEG tube. CM will follow. 11:14: CM met with patient's mother at bedside to discuss discharge plan following dx from neurology. Patient's family is requesting a transfer to [facility]. (Case discussed with Neuro, they will not pursue accepting physician, CM will go through [facility] access center). Patient's mother had questions about emergency guardianship, as family does not have any insight into patient's financials - CM informed her that in this case hospital would not pursue guardianship, this would need to be done by family. Medical records # given to patient's mother for request of chart for guardianship purposes. LTC discussed with patient's mother, she is not receptive of this at this time. PEG would qualify patient for SNF. Given patient's age and likelihood of LTC placement, broad options for SNF choice will be necessary. Transfer request to [facility] initiated through [employer]. 2:05pm: [facility] team requested clinicals - they have been faxed to N#[phone].    3:05pm: CM spoke with Karla Telles with Admissions at [facility] program. Per Toy Johnson, this would be out of pocket cost to family - at least $30,000/month and would require a dispo for after [facility] up front. Patient's mother provided CM with a list of contacts/hospitals of interest for transfer, will provide to Dr. Daphne Alston to coordinate with Neurology for potential of transfer. Transition of Care Plan: CM met with patient's mother, Randy Bhatti, to provide her with [facility] information. In addition, CM let her know patient would qualify for SNF with PEG but it would depend on facility choice to provide therapy to patient. She is open to having referrals sent to [facility], [facility] and [address] [place] [address],[address] of [address] [place] [address]. Referrals have been sent in [facility]. The Plan for Transition of Care is related to the following treatment goals: SNF    The Patient and/or patient representative Tamir Fam was provided with a choice of provider and agrees  with the discharge plan. Yes [x] No []    A [facility] of choice list was provided with basic dialogue that supports the patient's individualized plan of care/goals and shares the quality data associated with the providers. Yes [x] No []    4:04pm: CM notified by [facility] than [facility] has declined transfer of patient. RICHARD Desouza.

## 2022-11-03 NOTE — PROGRESS NOTES
Problem: Self Care Deficits Care Plan (Adult)  Goal: *Acute Goals and Plan of Care (Insert Text)  Description: FUNCTIONAL STATUS PRIOR TO ADMISSION: Patient was independent and active without use of DME until Sept. 97, 2022 when pt hospitalized for status epilepticus and now with anoxic brain injury. Pt has been at [facility] since 10/20/22 when pt discharged from [address] [place] [address] [place]: The patient lived alone with limited local support. Occupational Therapy Goals  Weekly Re-Assessment 11/1/22, goals modified/continued below  Initiated 10/26/2022  1. Patient will perform face washing with maximal assistance, hand over hand, within 7 day(s). CONTINUE  2. Patient will follow 5% 1 step, simple commands with max cues within 7 day(s). CONTINUE  3. Patient will perform toilet transfers to [facility] with total assistance within 7 day(s). CONTINUE  4.  Patient will participate in upper extremity therapeutic exercise/activities with maximal assistance for 10 minutes within 7 day(s). CONTINUE  5. Patient will perform EOB activity with no more than maximal assistance for sitting balance in prep for seated ADLs within 7 day(s). ADDED 11/1  6. Patient will perform visual scanning in all visual fields with maximal cueing within 7 day(s). ADDED 11/1  Outcome: Not Progressing Towards Goal     OCCUPATIONAL THERAPY TREATMENT  Patient: Rosa Olmstead (67 y.o. female)  Date: 11/3/2022  Diagnosis: AMS (altered mental status) [R41.82]  Diarrhea [R19.7]  Altered mental status [R41.82] <principal problem not specified>      Precautions: Fall, Skin, Seizure  Chart, occupational therapy assessment, plan of care, and goals were reviewed. ASSESSMENT  Patient continues with skilled OT services and is not progressing towards goals, remains with no command following, limited arousal with no visual scanning & tracking, decreased response to multimodal stimuli, and decreased ROM/strength and BUE/BLE tone (LUE>RUE, RLE<LLE).  Patient received with eyes open however no tracking even with facilitation of cervical AROM in modified bed in chair position. Found to be soiled, requiring Total A for bed mobility, perineal hygiene, and brief management, falling asleep with bed level activity with limited arousal. Increased time for eyes opening with vestibular input of transition from supine->chair position, no visual/tactile/verbal/sensory response. Noted to have increased BUE edema this session, pillow prop support provided. Given her new working diagnosis per neurology (Delayed Hypoxic Leukoencephalopathy), continue to recommend d/c to brain injury rehab program, however POC decreased d/t limited activity tolerance at this time. Current Level of Function Impacting Discharge (ADLs): Total A x1-2 for ADLs and bed mobility    Other factors to consider for discharge: fall risk, PMH, PLOF, neurologic status         PLAN :  Patient continues to benefit from skilled intervention to address the above impairments. Continue treatment per established plan of care to address goals. Recommend with staff: Recommend with nursing, ADLs with assist, modified bed in chair position 3x/day, and toileting via  rolling in supine x2 . Thank you for completing as able in order to maintain patient strength, endurance and independence. Recommendation for discharge: (in order for the patient to meet his/her long term goals)  Brain injury rehab program    This discharge recommendation:  Has been made in collaboration with the attending provider and/or case management    IF patient discharges home will need the following DME:  To be determined (TBD)         SUBJECTIVE:   Patient nonverbal    OBJECTIVE DATA SUMMARY:   Cognitive/Behavioral Status:  Neurologic State: Eyes open spontaneously  Orientation Level: Unable to verbalize  Cognition: No command following;Decreased attention/concentration  Perception: Cues to maintain midline in sitting     Safety/Judgement: Decreased awareness of environment;Decreased awareness of need for assistance;Decreased awareness of need for safety; Lack of insight into deficits    Functional Mobility and Transfers for ADLs:  Bed Mobility:  Rolling: Total assistance;Assist x2  Supine to Sit: Total assistance;Bed Modified (no response to repositioning)  Sit to Supine: Total assistance;Bed Modified  Scooting: Total assistance;Assist x2    Transfers:             Balance:  Sitting: Impaired; With support  Sitting - Static: Poor (constant support)    ADL Intervention:  Feeding  Feeding Assistance: Total assistance (dependent) (NG)                             Lower Body Dressing Assistance  Dressing Assistance: Total assistance(dependent)  Protective Undergarmet: Total assistance (dependent)  Leg Crossed Method Used: No  Position Performed: Supine  Cues: Physical assistance; Tactile cues provided;Verbal cues provided;Visual cues provided    Toileting  Toileting Assistance: Total assistance(dependent)  Bladder Hygiene: Total assistance (dependent)  Bowel Hygiene: Total assistance (dependent)  Clothing Management: Total assistance (dependent)  Cues: Tactile cues provided;Verbal cues provided;Visual cues provided  Adaptive Equipment:  (bed rails)    Cognitive Retraining  Orientation Retraining: Awareness of environment  Problem Solving: Awareness of environment  Executive Functions: Executing cognitive plans  Organizing/Sequencing: Breaking task down  Attention to Task: Single task  Following Commands: Awareness of environment (no visual tracking/scanning)  Safety/Judgement: Decreased awareness of environment;Decreased awareness of need for assistance;Decreased awareness of need for safety; Lack of insight into deficits  Cues: Tactile cues provided;Verbal cues provided;Visual cues provided    Pain:  Unable to verbalize, only grimacing with RLE & RUE PROM    Activity Tolerance:   Poor    After treatment patient left in no apparent distress:   Supine in bed, Call bell within reach, Bed / chair alarm activated, and Side rails x 3    COMMUNICATION/COLLABORATION:   The patients plan of care was discussed with: Physical therapist, Registered nurse, and Case management.      DEEPTI Carvajal, OTR/L  Time Calculation: 25 mins

## 2022-11-03 NOTE — PROGRESS NOTES
Neurology Progress Note     NAME: Edilma Feliz   :  1965   MRN:  [id number]   DATE:  11/3/2022    Assessment:     Active Problems:    Diarrhea (10/25/2022)      AMS (altered mental status) (10/25/2022)      Altered mental status (10/27/2022)  Patient is a 59year-old female with history significant for hypothyroid, depression, obesity, and lap imelda who had recent prolonged hospitalization after respiratory arrest with witnessed seizure requiring intubation thought due to receiving a Fentanyl patch 100 mcg that was found on the patient who was found to have Takotsubos cardiomyopathy EF 25-30%. Devaughn Levo was taken off 22 and several EEGs since that time have been negative for seizures. Last MRI 10/2/22 showed small diffusion restriction in R frontal lobe and she was started on ASA and statin for stroke. She was transferred to [address] [place] [address] and was eventually more awake and talking but remained confused. She was admitted to [facility] on 10/25 from rehab for worsening encephalopathy. She was noted to be febrile and tachycardic. She was started on trazodone and Lexapro at rehab and received a dose each of Risperidal and Seroquel. Per her mother, since that time her mental status has continued to decline. She was also found to be rigid, hyperreflexic with clonus and there was concern for serotonin syndrome. Her SSRI and trazodone were discontinued. Exam today: no spontaneous movements, rigidity improved  MRI w/ contrast (22) shows diffuse bilateral T2 signal white matter hyperintensities   No change to neuro exam. Muscle rigidity greatly improved. Plan:   1.) Delayed Hypoxic Leukoencephalopathy:  - Etiology likely given clinical presentation and MRI findings   - ANCA negative. Glutamic acid decarb negative <5.0.  ADIN not sent  - CSF culture no growth, normal protein, HSV negative, myelin basic protein pending  - Wean Valium to 2 mg IV q8h with plan to discontinue tomorrow as rigidity improved. - Supportive care. PEG consult placed. - SNF placement       Neurology has no further recommendations but we are available to answer any questions. We are available for family support as needed. Subjective:   Patient is comatose, parents are at bedside.       Objective:   Chart reviewed since last seen    Current Facility-Administered Medications   Medication Dose Route Frequency    enoxaparin (LOVENOX) injection 40 mg  40 mg SubCUTAneous Q24H    diazePAM (VALIUM) injection 2 mg  2 mg IntraVENous Q4H    senna-docusate (PERICOLACE) 8.6-50 mg per tablet 2 Tablet  2 Tablet Oral DAILY    [Held by provider] metoprolol tartrate (LOPRESSOR) tablet 12.5 mg  12.5 mg Oral Q12H    polyethylene glycol (MIRALAX) packet 17 g  17 g Oral DAILY PRN    thiamine HCL (B-1) tablet 100 mg  100 mg Oral DAILY    therapeutic multivitamin (THERAGRAN) tablet 1 Tablet  1 Tablet Oral DAILY    acetaminophen (TYLENOL) solution 650 mg  650 mg Per NG tube Q4H PRN    sodium chloride (NS) flush 5-40 mL  5-40 mL IntraVENous Q8H    sodium chloride (NS) flush 5-40 mL  5-40 mL IntraVENous PRN    [Held by provider] atorvastatin (LIPITOR) tablet 10 mg  10 mg Oral QHS    levothyroxine (SYNTHROID) tablet 88 mcg  88 mcg Oral ACB    pantoprazole (PROTONIX) tablet 40 mg  40 mg Oral ACB    sodium chloride (NS) flush 5-10 mL  5-10 mL IntraVENous PRN    acetaminophen (TYLENOL) suppository 650 mg  650 mg Rectal Q6H PRN       Visit Vitals  /69 (BP 1 Location: Right upper arm, BP Patient Position: At rest)   Pulse (!) 105   Temp 99 °F (37.2 °C)   Resp (!) 36   Ht 5' 6\" (1.676 m)   Wt 90 kg (198 lb 6.6 oz)   SpO2 95%   BMI 32.02 kg/m²     Temp (24hrs), Av.9 °F (37.2 °C), Min:98 °F (36.7 °C), Max:99.2 °F (37.3 °C)       07 -  1900  In: 540   Out: 800 [Urine:800]  1901 -  0700  In: 250   Out: 1102 [[id number]]      Physical Exam:  General: Well developed well nourished patient, obese  Cardiac: Regular rate and rhythm with no murmurs. Extremities: 2+ Radial pulses, no cyanosis or edema, pale    Neurological Exam:  Mental Status: Eyes open to noxious   Cranial Nerves:   OS>OD 4mm, 3mm but equally react to 1 mm no nystagmus, no ptosis. Conjugate, gaze. Does not participate with EOMs. Motor:  Weakly WD 2/5 x4 (recently received Valium). Reflexes:   No clonus, Toes downgoing. Sensory:   FAVIOLA   Gait:  FAVIOLA   Cerebellar:  FAVIOLA         Lab Review   Recent Results (from the past 24 hour(s))   CELL COUNT, CSF    Collection Time: 11/02/22  5:00 PM   Result Value Ref Range    CSF TUBE NO. 1      CSF COLOR PINK (A) COL      SPUN COLOR COLORLESS COL      CSF APPEARANCE CLEAR CLEAR      CSF RBCs 1,090 (H) 0 /cu mm    CSF WBCs 1 0 - 5 /cu mm   GLUCOSE, CSF    Collection Time: 11/02/22  5:00 PM   Result Value Ref Range    Tube No. 1      Glucose,CSF 62 40 - 70 MG/DL   PROTEIN, CSF    Collection Time: 11/02/22  5:00 PM   Result Value Ref Range    Tube No. 1      Protein,CSF 40 15 - 45 MG/DL   CULTURE, CSF W GRAM STAIN    Collection Time: 11/02/22  5:00 PM    Specimen: Cerebrospinal Fluid   Result Value Ref Range    Special Requests: NO SPECIAL REQUESTS      GRAM STAIN FEW WBCS SEEN      GRAM STAIN NO ORGANISMS SEEN      Culture result: NO GROWTH THUS FAR HOLDING 7 DAYS     METABOLIC PANEL, COMPREHENSIVE    Collection Time: 11/03/22  4:40 AM   Result Value Ref Range    Sodium 136 136 - 145 mmol/L    Potassium 4.5 3.5 - 5.1 mmol/L    Chloride 105 97 - 108 mmol/L    CO2 26 21 - 32 mmol/L    Anion gap 5 5 - 15 mmol/L    Glucose 136 (H) 65 - 100 mg/dL    BUN 19 6 - 20 MG/DL    Creatinine 0.49 (L) 0.55 - 1.02 MG/DL    BUN/Creatinine ratio 39 (H) 12 - 20      eGFR >60 >60 ml/min/1.73m2    Calcium 8.7 8.5 - 10.1 MG/DL    Bilirubin, total 0.3 0.2 - 1.0 MG/DL    ALT (SGPT) 24 12 - 78 U/L    AST (SGOT) 19 15 - 37 U/L    Alk. phosphatase 67 45 - 117 U/L    Protein, total 6.2 (L) 6.4 - 8.2 g/dL    Albumin 2.4 (L) 3.5 - 5.0 g/dL    Globulin 3.8 2.0 - 4.0 g/dL    A-G Ratio 0.6 (L) 1.1 - 2.2     CBC W/O DIFF    Collection Time: 11/03/22  4:40 AM   Result Value Ref Range    WBC 9.1 3.6 - 11.0 K/uL    RBC 3.91 3.80 - 5.20 M/uL    HGB 11.7 11.5 - 16.0 g/dL    HCT 35.7 35.0 - 47.0 %    MCV 91.3 80.0 - 99.0 FL    MCH 29.9 26.0 - 34.0 PG    MCHC 32.8 30.0 - 36.5 g/dL    RDW 14.1 11.5 - 14.5 %    PLATELET 972 564 - 730 K/uL    MPV 11.1 8.9 - 12.9 FL    NRBC 0.0 0  WBC    ABSOLUTE NRBC 0.00 0.00 - 0.01 K/uL   MAGNESIUM    Collection Time: 11/03/22  4:40 AM   Result Value Ref Range    Magnesium 2.2 1.6 - 2.4 mg/dL   PHOSPHORUS    Collection Time: 11/03/22  4:40 AM   Result Value Ref Range    Phosphorus 3.8 2.6 - 4.7 MG/DL       Additional comments:  I have reviewed the patient's new clinical lab test results. I have personally reviewed the patient's radiographs. MRI Results (most recent):  Results from [place] encounter on 10/25/22    MRI BRAIN W WO CONT      FINAL REPORT:    INDICATION:   encephalopathy, f/u MRI compare to previous 10/2/22    EXAMINATION:  MRI BRAIN WO CONTRAST    COMPARISON:  MRI October 2, 2022, CT October 25, 2022    TECHNIQUE:  Multiplanar multisequence acquisition without contrast of the brain. FINDINGS:    Ventricles:  Midline, no hydrocephalus. Brain Parenchyma/Brainstem:  Interval development of extensive, diffuse FLAIR/T2  hyperintense signal and associated diffusion restriction throughout the  supratentorial white matter. Involvement of the splenium of corpus callosum. There is cortical sparing. The deep gray matter, brainstem and cerebellum are  also unaffected. Intracranial Hemorrhage:  None. Basal Cisterns:  Normal.  Flow Voids:  Normal.  Additional Comments:  N/A.     Impression  Interval development of diffusely abnormal supratentorial white matter, with  extensive FLAIR/T2 hyperintense signal and diffusion restriction as described  above. No gray matter, brainstem or cerebellar involvement. No associated  enhancement, hemorrhage, or mass effect/hydrocephalus. Differential diagnosis  favors an acute toxic/metabolic process, with other etiologies including an  infectious/inflammatory encephalitis (viral) less likely. Sparing of the gray  matter structures makes an anoxic injury less likely. CT Results (most recent):  Results from Hospital Encounter encounter on 10/25/22    CT HEAD WO CONT    Narrative  EXAM: CT HEAD WO CONT    INDICATION: Confusion. Electronic medical record is not available at the time of  this interpretation. COMPARISON: CT head on 10/13/2022. MRI brain on 10/2/2022. TECHNIQUE: Noncontrast head CT. Coronal and sagittal reformats. CT dose  reduction was achieved through the use of a standardized protocol tailored for  this examination and automatic exposure control for dose modulation. FINDINGS: The ventricles and sulci are age-appropriate without hydrocephalus. There is no mass effect or midline shift. There is no intracranial hemorrhage or  extra-axial fluid collection. There is no abnormal area of decreased density to  suggest infarct. The calvarium is intact. The visualized paranasal sinuses and mastoid air cells  are clear. Impression  No acute intracranial abnormality on this noncontrast head CT. No change given  difference in technique. Care Plan discussed with:  Patient x   Family x   RN x   Care Manager x   Consultant/Specialist:  SEN Burden    The patient was discussed with Dr. Yoanna Zimmerman.    I spent >50% of a 35-minute visit counseling/coordination of care discussing outcomes of diagnosis and supportive care with the parents.

## 2022-11-03 NOTE — PROGRESS NOTES
Problem: Dysphagia (Adult)  Goal: *Acute Goals and Plan of Care (Insert Text)  Description: Speech Therapy Goals  Initiated 10/28/2022; continue 11/3/2022   1. Patient will participate in re-evaluation of swallow function within 7 days   Initiated 10/26/22    1. Patient will tolerate pureed diet with thin liquids with no adverse effects within 7 days. Discontinue 10/28/2022   Outcome: Not Progressing Towards Goal     Problem: Communication Impaired (Adult)  Goal: *Acute Goals and Plan of Care (Insert Text)  Description: Speech therapy goals  Initiated 11/3/2022   1. Patient will follow 1-step commands with 10% accuracy with max cues within 7 days   2. Patient will answer simple y/n questions with 10% accuracy with max cues within 7 days   3. Patient will complete automatic speech tasks with 10% accuracy with max cues within 7 days   Outcome: Not Progressing Towards Goal     SPEECH LANGUAGE PATHOLOGY BEDSIDE SWALLOW RE-EVALUATION AND SPEECH EVALUATION  Patient: Caitlin Roman (57 y.o. female)  Date: 11/3/2022  Primary Diagnosis: AMS (altered mental status) [R41.82]  Diarrhea [R19.7]  Altered mental status [R41.82]       Precautions: aspiration  Fall, Skin, Seizure    ASSESSMENT :  Based on the objective data described below, the patient presents with severe oral dysphagia characterized by absent recognition and acceptance of ice chip despite max tactile and verbal cues. Unable to assess pharyngeal swallow due to severity of oral deficits. Note results of brain MRI, therefore language evaluation completed. Patient presents with severe expressive and receptive language deficits with no attempts at verbalizations, no meaningful communication attempts, no command following, and no responses to y/n questions. Patient is unable to make basic wants and needs known at this time. Patient will benefit from skilled intervention to address the above impairments.   Patients rehabilitation potential is considered to be Guarded     PLAN :  Recommendations and Planned Interventions:  --Recommend continued NPO. Ok to offer a few pieces of ice only if patient is actively accepting.   --will follow for dysphagia treatment as well as language treatment to address functional communication   Frequency/Duration: Patient will be followed by speech-language pathology 3 times a week to address goals. Discharge Recommendations:  To Be Determined     SUBJECTIVE:   Patient opened her eyes spontaneously but no meaningful interactions     OBJECTIVE:     Past Medical History:   Diagnosis Date    Allergic rhinitis 7/29/2019    Depression     History of multiple allergies     History of vascular access device 10/07/2022    [facility] VAT: PICC placement R Cephalic length 40 cm for reliable access/TPN arm circ 35.5 cm    Muscle ache     Obesity 11/5/2012    Sinus pressure     Sinus problem      Past Surgical History:   Procedure Laterality Date    HX ACL RECONSTRUCTION      left     HX CHOLECYSTECTOMY  01/01/1998    HX GI      colonoscopy-polyps    IR INSERT NON TUNL CVC OVER 5 YRS  09/27/2022     Prior Level of Function/Home Situation:   Home Situation  Home Environment: Rehabilitation facility ([facility] inpatient rehab, Brain Injury program)  # Steps to Enter: 2  One/Two Story Residence: Two story (bedroom on the 2nd floor)  # of Interior Steps: 13  Living Alone: Yes  Support Systems: Parent(s), Child(wayne)  Patient Expects to be Discharged to[de-identified] Skilled nursing facility  Current DME Used/Available at Home: None  Tub or Shower Type: Tub/Shower combination  Diet prior to admission: regular  Current Diet:  NPO with NG tube    Cognitive and Communication Status:  Neurologic State: Eyes open spontaneously  Orientation Level: Unable to verbalize  Cognition: No command following  Perception: Cues to maintain midline in sitting  Perseveration: No perseveration noted  Safety/Judgement: Decreased awareness of environment  Swallowing Evaluation:   Oral Assessment:     P.O. Trials:  Patient Position: upright in bed  Vocal quality prior to P.O.:  (nonverbal)  Consistency Presented: Ice chips  How Presented: SLP-fed/presented;Spoon     Bolus Acceptance: Absent                                     Oral Phase Severity: Severe  Pharyngeal Phase Severity :  (unable to assess due to absent recognition/acceptance of ice chips)    NOMS:   The NOMS functional outcome measure was used to quantify this patient's level of swallowing impairment. Based on the NOMS, the patient was determined to be at level 1 for swallow function       NOMS Swallowing Levels:  Level 1 (CN): NPO  Level 2 (CM): NPO but takes consistency in therapy  Level 3 (CL): Takes less than 50% of nutrition p.o. and continues with nonoral feedings; and/or safe with mod cues; and/or max diet restriction  Level 4 (CK): Safe swallow but needs mod cues; and/or mod diet restriction; and/or still requires some nonoral feeding/supplements  Level 5 (CJ): Safe swallow with min diet restriction; and/or needs min cues  Level 6 (CI): Independent with p.o.; rare cues; usually self cues; may need to avoid some foods or needs extra time  Level 7 ([address] [place] [address]): Independent for all p.o.  LIZETH. (2003). National Outcomes Measurement System (NOMS): Adult Speech-Language Pathology User's Guide.      Speech/Language Evaluation  Motor Speech:     Language Comprehension and Expression:  Auditory Comprehension  Auditory Impairment: Yes  Hearing Aid: None  Response to Basic Yes/No Questions (%): 0 %  One-Step Basic Commands (%): 0 %  Verbal Expression  Primary Mode of Expression: Other (comment) (nonverbal, no communication attempts)  Automatic Speech Task: Impaired (comment) (no response)  Repetition: Impaired  Word Repetition (%): 0 %  Naming: Impaired  Confrontation (%): 0 %  Sentence Completion: Impaired  Word level (%): 0 %  Overall Impairment: Severe        Neuro-Linguistics:                                                  Pragmatics: Voice:                 Vocal Quality:  (nonverbal)                                 NOMS:   The NOMS functional outcome measure was used to quantify this patient's level of expressive language impairment. Based on the NOMS, the patient was determined to be at level 1 for spoken language expression. NOMS Spoken Language Expression:  Level 1 (CN): Verbalizations not meaningful to anyone. Level 2 (CM): Few attempts accurate/appropriate. Max cues to elicit automatic/imitative words/phrases. Level 3 (CL): Communication partner responsible for communication; Mod cues for words/phrases meaningful in context  Level 4 (CK): Initiate during simple, routine activities w/familiar partner. Mod cues to produce simple sentences  Level 5 (Jaye Skates): Initiates communication with familiar and unfamiliar partners. Min cues for complex sentences. Level 6 (CI): Communicates for most activities. Some limitations still present for vocational/social activities. Rarely cued for complex info  Level 7 [facility]): Independent communication. LIZETH. (2003). National Outcomes Measurement System (NOMS): Adult Speech-Language Pathology User's Guide. Pain:  Pain Scale 1: Adult Nonverbal Pain Scale  Pain Intensity 1: 0       After treatment:   Patient left in no apparent distress in bed, Call bell within reach, and Nursing notified    COMMUNICATION/EDUCATION:     The patient's plan of care including recommendations, planned interventions, and recommended diet changes were discussed with: Registered nurse, CRM    Patient is unable to participate in goal setting and plan of care. Thank you for this referral.  Nimisha Stephenson M.CD.  CCC-SLP   Time Calculation: 23 mins

## 2022-11-03 NOTE — BSMART NOTE
BSMART Liaison Team Note     MSW attempted to meet with Pt. Pt was received resting in bed. She was not responsive or arousable. She reponded to painful stimuli (strenum rub, pressure to fingers) administered by RN, but only with brief facial grimaces. She would not open here eyes, follow commands or communicate with RN or MSW. She was free from any signs or symptoms of pain or distress. MSW will attempt to assess again at a later date.

## 2022-11-03 NOTE — PROGRESS NOTES
Problem: Mobility Impaired (Adult and Pediatric)  Goal: *Acute Goals and Plan of Care (Insert Text)  Description:   FUNCTIONAL STATUS PRIOR TO ADMISSION: Pt unable to provide prior level of function or home set up at time of this evaluation. Per chart review, pt was admitted to [facility] 9/27-10/20 after being found down and was discharged to [address] [place] [address] brain injury program and was admitted to [facility] on 10/25 for AMS. Per mother's report, pt was able to stand while at rehab with assistance and was getting into the wheelchair. Prior to initial admission, pt was independent, working as nurse. HOME SUPPORT PRIOR TO ADMISSION: Lived alone. Pt has supportive mother that lives locally and 3 sons that live out of state    Physical Therapy Goals  Reassessed 11/1/22  1. Patient will move from supine to sit and sit to supine  and roll side to side in bed with maximal assistance within 7 day(s). 2.  Patient will transfer from bed to chair and chair to bed with maximal assistance using the least restrictive device within 7 day(s). 3.  Patient will perform sit to stand with maximal assistance within 7 day(s). 4.  Patient will tolerate seated EOB x 10 minutes with mod A within 7 days. 5.  Patient will improve Landon Balance score by 7 points within 7 days. Initiated 10/26/2022  1. Patient will move from supine to sit and sit to supine  and roll side to side in bed with maximal assistance within 7 day(s). 2.  Patient will transfer from bed to chair and chair to bed with maximal assistance using the least restrictive device within 7 day(s). 3.  Patient will perform sit to stand with maximal assistance within 7 day(s). 4.  Patient will ambulate with maximal assistance for 5 feet with the least restrictive device within 7 day(s). 5.  Patient will tolerate seated EOB x 10 minutes with mod A within 7 days. 6.  Patient will improve Landon Balance score by 7 points within 7 days.     Outcome: Not Progressing Towards Goal PHYSICAL THERAPY TREATMENT  Patient: Severo Davis (30 y.o. female)  Date: 11/3/2022  Diagnosis: AMS (altered mental status) [R41.82]  Diarrhea [R19.7]  Altered mental status [R41.82] <principal problem not specified>      Precautions: Fall, Skin, Seizure  Chart, physical therapy assessment, plan of care and goals were reviewed. ASSESSMENT  Patient continues with skilled PT services and is not progressing towards goals. Demonstrating slight decline in participation this date. Unable to track with eyes even with manual facilitation of cervical rotation to left and to R. Minimal inconsistent responses to noxious stimuli with grimacing noted x 1 and some mild HR elevation with stimulation. Performed PROM to B UEs and B LEs and assisted patient with rolling for bowel movement clean up with no participation or command following noted. Appeared to fatigue with session and eyes open initially but kept eyes closed majority of session and unsafe to progress to edge of bed this date. Did decrease frequency to 2 times per week for now given limited progress. Acute PT will continue to follow and continues to recommend     Current Level of Function Impacting Discharge (mobility/balance): total assist    Other factors to consider for discharge: previously independent         PLAN :  Patient continues to benefit from skilled intervention to address the above impairments. Continue treatment per established plan of care. to address goals.     Recommendation for discharge: (in order for the patient to meet his/her long term goals)  Therapy 3 hours per day 5-7 days per week- brain injury program    This discharge recommendation:  Has been made in collaboration with the attending provider and/or case management    IF patient discharges home will need the following DME: to be determined (TBD)     SUBJECTIVE:   Patient non-verbal entire session    OBJECTIVE DATA SUMMARY:   Critical Behavior:  Neurologic State: Eyes open spontaneously  Orientation Level: Unable to verbalize  Cognition: No command following  Safety/Judgement: Decreased awareness of environment  Functional Mobility Training:  Bed Mobility:  Rolling: Total assistance  Scooting: Total assistance    Therapeutic Exercises:   B UE/LE PROM  Pain Rating:  Grimacing noted x 1 with R LE PROM    Activity Tolerance:   Fair and Poor    After treatment patient left in no apparent distress:   Supine in bed, Call bell within reach, Bed / chair alarm activated, and Side rails x 3    COMMUNICATION/COLLABORATION:   The patients plan of care was discussed with: Occupational therapist and Registered nurse.      Ilya Cuellar, PT   Time Calculation: 25 mins

## 2022-11-03 NOTE — PROGRESS NOTES
[address], [address] [place] [address] Ave  [phone]        Thank you for requesting this consultation but this consult was placed for [facility].   We will inform them of this request.    Sincerely,  Reddy Rodriguez NP

## 2022-11-04 PROBLEM — N39.0 UTI (URINARY TRACT INFECTION): Status: RESOLVED | Noted: 2022-10-05 | Resolved: 2022-11-04

## 2022-11-04 PROCEDURE — 77010033678 HC OXYGEN DAILY

## 2022-11-04 PROCEDURE — 74011000250 HC RX REV CODE- 250: Performed by: FAMILY MEDICINE

## 2022-11-04 PROCEDURE — 92526 ORAL FUNCTION THERAPY: CPT | Performed by: SPEECH-LANGUAGE PATHOLOGIST

## 2022-11-04 PROCEDURE — 74011250636 HC RX REV CODE- 250/636: Performed by: NURSE PRACTITIONER

## 2022-11-04 PROCEDURE — 94760 N-INVAS EAR/PLS OXIMETRY 1: CPT

## 2022-11-04 PROCEDURE — 74011250637 HC RX REV CODE- 250/637: Performed by: PHYSICIAN ASSISTANT

## 2022-11-04 PROCEDURE — 65660000001 HC RM ICU INTERMED STEPDOWN

## 2022-11-04 PROCEDURE — 92507 TX SP LANG VOICE COMM INDIV: CPT | Performed by: SPEECH-LANGUAGE PATHOLOGIST

## 2022-11-04 PROCEDURE — 74011250637 HC RX REV CODE- 250/637: Performed by: NURSE PRACTITIONER

## 2022-11-04 PROCEDURE — 99232 SBSQ HOSP IP/OBS MODERATE 35: CPT | Performed by: NURSE PRACTITIONER

## 2022-11-04 RX ADMIN — PANTOPRAZOLE SODIUM 40 MG: 40 TABLET, DELAYED RELEASE ORAL at 06:00

## 2022-11-04 RX ADMIN — SODIUM CHLORIDE, PRESERVATIVE FREE 10 ML: 5 INJECTION INTRAVENOUS at 21:02

## 2022-11-04 RX ADMIN — Medication 100 MG: at 10:02

## 2022-11-04 RX ADMIN — SODIUM CHLORIDE, PRESERVATIVE FREE 10 ML: 5 INJECTION INTRAVENOUS at 05:59

## 2022-11-04 RX ADMIN — ENOXAPARIN SODIUM 40 MG: 100 INJECTION SUBCUTANEOUS at 10:02

## 2022-11-04 RX ADMIN — LEVOTHYROXINE SODIUM 88 MCG: 0.09 TABLET ORAL at 06:00

## 2022-11-04 RX ADMIN — SENNOSIDES AND DOCUSATE SODIUM 2 TABLET: 50; 8.6 TABLET ORAL at 09:00

## 2022-11-04 RX ADMIN — THERA TABS 1 TABLET: TAB at 10:02

## 2022-11-04 RX ADMIN — DIAZEPAM 2 MG: 5 INJECTION, SOLUTION INTRAMUSCULAR; INTRAVENOUS at 15:26

## 2022-11-04 RX ADMIN — SODIUM CHLORIDE, PRESERVATIVE FREE 10 ML: 5 INJECTION INTRAVENOUS at 15:27

## 2022-11-04 RX ADMIN — DIAZEPAM 2 MG: 5 INJECTION, SOLUTION INTRAMUSCULAR; INTRAVENOUS at 05:59

## 2022-11-04 NOTE — PROGRESS NOTES
Problem: Pressure Injury - Risk of  Goal: *Prevention of pressure injury  Description: Document Jose Enrique Scale and appropriate interventions in the flowsheet. Outcome: Progressing Towards Goal  Note: Pressure Injury Interventions:  Sensory Interventions: Assess changes in LOC, Avoid rigorous massage over bony prominences, Float heels, Keep linens dry and wrinkle-free, Check visual cues for pain, Minimize linen layers, Pad between skin to skin, Turn and reposition approx. every two hours (pillows and wedges if needed)    Moisture Interventions: Absorbent underpads, Internal/External urinary devices, Limit adult briefs, Maintain skin hydration (lotion/cream)    Activity Interventions: PT/OT evaluation, Pressure redistribution bed/mattress(bed type), Increase time out of bed    Mobility Interventions: Float heels, PT/OT evaluation, Pressure redistribution bed/mattress (bed type), Turn and reposition approx.  every two hours(pillow and wedges)    Nutrition Interventions: Document food/fluid/supplement intake, Offer support with meals,snacks and hydration    Friction and Shear Interventions: Apply protective barrier, creams and emollients, Foam dressings/transparent film/skin sealants, Lift sheet, Minimize layers

## 2022-11-04 NOTE — PROGRESS NOTES
Transition of Care Plan  RUR- Med 19%  DISPOSITION: SNF/LTC ( Referrals pending with [facility] and [facility])  F/U with PCP/Specialist    Transport: [facility]    Emergency contact: MotherCatherine [phone] NARDA ARCHIBALD are 3 sons, but mother has been deemed spokesperson)    CM barriers to discharge: Accepting SNF, [facility] declined patient. [facility],  and [facility] are pending. CM met with patient's mother, Samson Callahan at bedside to provide update of [facility] transfer denial. CM provided emotional support. Patient's mother is hopeful patient will be able to receive rehab of some level at SNF. CM will follow up on SNF referrals today. 3:05pm: [facility] not able to accept. Will need further SNF choices given patient's complexity of care, may need mass referral. Plan to discuss with patient's mother. GI consulted for PEG placement. Patient here through the weekend. Conor Adames, M.S.W.

## 2022-11-04 NOTE — CONSULTS
[address]  [address], [address]  [phone]                     GI CONSULTATION NOTE      NAME:  Alex Maza   :   1965   MRN:   [id number]     Consult Date: 2022     Chief Complaint: PEG/severe dysphagia     History of Present Illness:  Patient is a 59 y.o. who is seen in consultation at the request of Dr. Clovis Acuña for PEG placement. Patient is nonverbal and aphasic, history is obtained via chart review. Ms. Beth Hunter has a past medical history significant for cholecystomy, anoxic brain injury, anxiety, depression, HTN, HLD, hypothyroidism presented to [facility] via EMS from [address] [place], [place] [address]ab facility with reported AMS. Patient was previously hospitalized at [facility] from 2022-10/20/2022, with prolonged intubation, witnessed seizure, acute metabolic encephalopathy, Takotsubo cardiomyopathy, NSTEMI, bradycardia and CVA  During this hospitalization she has acute encephalopathy with possible serotonin syndrome. Per neuro notes; Delayed Hypoxic Leukoencephalopathy given clinical presentation and MRI findings. MRI w/ contrast (22) shows diffuse bilateral T2 signal white matter hyperintensities     Followed by SLP for severe oral dysphagia, with recommendations to keep patient NPO. DHT was placed on 10/27/22, she has tolerated TF.  GI has been consulted for evaluation of PEG placement.               PMH:  Past Medical History:   Diagnosis Date    Allergic rhinitis 2019    Depression     History of multiple allergies     History of vascular access device 10/07/2022    [facility]: PICC placement R Cephalic length 40 cm for reliable access/TPN arm circ 35.5 cm    Muscle ache     Obesity 2012    Sinus pressure     Sinus problem        PSH:  Past Surgical History:   Procedure Laterality Date    HX ACL RECONSTRUCTION      left     HX CHOLECYSTECTOMY  1998    HX GI      colonoscopy-polyps    IR INSERT NON TUNL CVC OVER 5 YRS  2022 Allergies: Allergies   Allergen Reactions    Droperidol Itching       Home Medications:  Prior to Admission Medications   Prescriptions Last Dose Informant Patient Reported? Taking? MULTIVITAMIN PO   Yes No   Sig: Take  by mouth. OTHER,NON-FORMULARY,   No No   Sig: ESTROGEN(5050)/TESTOSTERONE (HRT) 1.5mg/10mg/ml Cream APPLY 1 ML TO SKIN (INNER WRIST/ABDOMEN/THIGHS EVERY DAY. ROTATE SITES   acetaminophen (TYLENOL) 325 mg tablet   Yes No   Sig: Take  by mouth every four (4) hours as needed. aspirin 81 mg chewable tablet   No No   Sig: Take 1 Tablet by mouth daily for 30 days. atorvastatin (LIPITOR) 10 mg tablet   No No   Sig: TAKE ONE TABLET BY MOUTH ONCE NIGHTLY   atorvastatin (LIPITOR) 10 mg tablet  Other Yes No   Sig: Take 10 mg by mouth daily. cloNIDine (CATAPRES) 0.2 mg/24 hr ptwk   No No   Si Patch by TransDERmal route every seven (7) days for 30 days. escitalopram oxalate (LEXAPRO) 20 mg tablet   No No   Sig: TAKE ONE TABLET BY MOUTH DAILY   furosemide (LASIX) 20 mg tablet   No No   Sig: Take 1 Tablet by mouth daily as needed (swelling). levothyroxine (SYNTHROID) 88 mcg tablet  Other Yes No   Sig: Take 88 mcg by mouth Daily (before breakfast). levothyroxine (SYNTHROID) 88 mcg tablet   No No   Sig: Take 1 Tablet by mouth Daily (before breakfast). loratadine-pseudoephedrine (Claritin-D 12 Hour) 5-120 mg per tablet   Yes No   Sig: Claritin-D   metoprolol tartrate (LOPRESSOR) 25 mg tablet   No No   Sig: Take 1 Tablet by mouth every twelve (12) hours for 30 days. pantoprazole (PROTONIX) 40 mg tablet   No No   Sig: Take 1 Tablet by mouth Daily (before breakfast) for 30 days. senna-docusate (PERICOLACE) 8.6-50 mg per tablet   No No   Sig: Take 1 Tablet by mouth two (2) times a day for 30 days.    traZODone (DESYREL) 50 mg tablet   No No   Sig: Take 2.5 Tablets by mouth nightly as needed for Sleep.   tretinoin (RETIN-A) 0.05 % topical cream   Yes No   Sig: tretinoin 0.05 % topical cream Facility-Administered Medications: None       Hospital Medications:  Current Facility-Administered Medications   Medication Dose Route Frequency    enoxaparin (LOVENOX) injection 40 mg  40 mg SubCUTAneous Q24H    diazePAM (VALIUM) injection 2 mg  2 mg IntraVENous Q8H    senna-docusate (PERICOLACE) 8.6-50 mg per tablet 2 Tablet  2 Tablet Oral DAILY    [Held by provider] metoprolol tartrate (LOPRESSOR) tablet 12.5 mg  12.5 mg Oral Q12H    polyethylene glycol (MIRALAX) packet 17 g  17 g Oral DAILY PRN    thiamine HCL (B-1) tablet 100 mg  100 mg Oral DAILY    therapeutic multivitamin (THERAGRAN) tablet 1 Tablet  1 Tablet Oral DAILY    acetaminophen (TYLENOL) solution 650 mg  650 mg Per NG tube Q4H PRN    sodium chloride (NS) flush 5-40 mL  5-40 mL IntraVENous Q8H    sodium chloride (NS) flush 5-40 mL  5-40 mL IntraVENous PRN    [Held by provider] atorvastatin (LIPITOR) tablet 10 mg  10 mg Oral QHS    levothyroxine (SYNTHROID) tablet 88 mcg  88 mcg Oral ACB    pantoprazole (PROTONIX) tablet 40 mg  40 mg Oral ACB    sodium chloride (NS) flush 5-10 mL  5-10 mL IntraVENous PRN    acetaminophen (TYLENOL) suppository 650 mg  650 mg Rectal Q6H PRN       Social History:  Social History     Tobacco Use    Smoking status: Not on file    Smokeless tobacco: Never   Substance Use Topics    Alcohol use: No       Family History:  Family History   Problem Relation Age of Onset    Diabetes Paternal Grandfather        Review of Systems:FAVIOLA          Objective:   Patient Vitals for the past 8 hrs:   BP Temp Pulse Resp SpO2   11/04/22 1431 -- -- (!) 102 -- --   11/04/22 1405 (!) 107/52 98.2 °F (36.8 °C) 92 24 98 %   11/04/22 1219 -- -- (!) 102 -- --   11/04/22 1026 -- -- (!) 111 -- --   11/04/22 0949 (!) 114/55 99 °F (37.2 °C) 99 19 94 %   11/04/22 0940 -- -- (!) 102 -- --     No intake/output data recorded.   11/02 1901 - 11/04 0700  In: 1110   Out: 2400 [Urine:2400]      PHYSICAL EXAM:  General appearance:  comatose  female  Skin: Extremities and face reveal no rashes, pallor  HEENT: Sclerae anicteric. Cardiovascular: Tachycardiac No murmurs, gallops, or rubs. Respiratory: Comfortable breathing with no accessory muscle use. Clear breath sounds with no wheezes, rales, or rhonchi. GI: Abdomen nondistended, soft, and nontender. Normal active bowel sounds. NGT with TF   Rectal: Deferred   Musculoskeletal: No pitting edema of the lower legs. Neurological: comatose      Data Review     Recent Labs     11/03/22 0440 11/02/22 0527   WBC 9.1 9.1   HGB 11.7 11.2*   HCT 35.7 33.2*    291     Recent Labs     11/03/22 0440 11/02/22 0527    141   K 4.5 4.6    106   CO2 26 29   BUN 19 17   CREA 0.49* 0.56   * 103*   PHOS 3.8  --    CA 8.7 9.0     Recent Labs     11/03/22 0440 11/02/22 0527   AP 67 65   TP 6.2* 5.8*   ALB 2.4* 2.4*   GLOB 3.8 3.4     No results for input(s): INR, PTP, APTT, INREXT in the last 72 hours. Imaging studies reviewed    Assessment / Plan :     60 yo female with h/o cholecystectomy, hypothyroid, depression, seizures, Takotsubos cardiomyopathy, and CVA. Presents from rehab with AMS and worsening encephalopathy. GI has been consulted for PEG evaluation prior to SNF placement. Currently tolerating NGT with TF at goal.       - Plan for PEG placement on Monday -11/7/22   - Hold TF at midnight -11/7/22  - Hold Lovenox starting Sunday -11/6/22  - discussed PEG placement with patients BAKARI, mother Ridge Patterson        Patient C/Christel Martinez 1106 Problem List:   Active Problems:    Diarrhea (10/25/2022)      AMS (altered mental status) (10/25/2022)      Altered mental status (10/27/2022)     Pat Curtis NP    I have personally reviewed the history and independently examined the patient. I have reviewed the chart and agree with the documentation recorded by the Mid Level Provider, including the assessment, treatment plan, and disposition.     ASSESSMENT AND PLAN:  59 yr old lady has presented with AMS, anoxic brain injury and inability to eat. She in unresponsive to questions and commands and PEG is a reasonable option for nutritional support. Continue tube feedings  Stop tube feeds Sunday at midnight  EGD/PEG on Monday  Hold Lovenox after Sunday AM dose. Thank you for consultation.     eRy Brady MD

## 2022-11-04 NOTE — PROGRESS NOTES
Neurology Progress Note     NAME: Halina Doyle   :  1965   MRN:  [id number]   DATE:  2022    Assessment:     Active Problems:    Diarrhea (10/25/2022)      AMS (altered mental status) (10/25/2022)      Altered mental status (10/27/2022)  Patient is a 59year-old female with history significant for hypothyroid, depression, obesity, and lap imelda who had recent prolonged hospitalization after respiratory arrest with witnessed seizure requiring intubation thought due to receiving a Fentanyl patch 100 mcg that was found on the patient who was found to have Takotsubos cardiomyopathy EF 25-30%. Remona Bachelor was taken off 22 and several EEGs since that time have been negative for seizures. Last MRI 10/2/22 showed small diffusion restriction in R frontal lobe and she was started on ASA and statin for stroke. She was transferred to [address] [place] [address] and was eventually more awake and talking but remained confused. She was admitted to [facility] on 10/25 from rehab for worsening encephalopathy. She was noted to be febrile and tachycardic. She was started on trazodone and Lexapro at rehab and received a dose each of Risperidal and Seroquel. Per her mother, since that time her mental status has continued to decline. She was also found to be rigid, hyperreflexic with clonus and there was concern for serotonin syndrome. Her SSRI and trazodone were discontinued. Patient has since had MRI findings below and unrevealing LP. Exam today: Eyes open to noxious, no spontaneous movements, rigidity improved  MRI w/ contrast (22) shows diffuse bilateral T2 signal white matter hyperintensities   LP (22) OP 11, fluid clear   No change to neuro exam.  Plan:   1.) Delayed Hypoxic Leukoencephalopathy:  - Etiology likely given clinical presentation and MRI findings   - ANCA negative. Glutamic acid decarb negative <5.0. ADIN not sent  - CSF culture no growth, normal protein, HSV negative, myelin basic protein pending  - Discontinued Valium  - Supportive care. PEG consult placed. - SNF placement       Neurology has no further recommendations but we are available to answer any questions. We are available for family support as needed. The family was not at the bedside today when I rounded. Subjective:   Patient is comatose      Objective:   Chart reviewed since last seen    Current Facility-Administered Medications   Medication Dose Route Frequency    enoxaparin (LOVENOX) injection 40 mg  40 mg SubCUTAneous Q24H    diazePAM (VALIUM) injection 2 mg  2 mg IntraVENous Q8H    senna-docusate (PERICOLACE) 8.6-50 mg per tablet 2 Tablet  2 Tablet Oral DAILY    [Held by provider] metoprolol tartrate (LOPRESSOR) tablet 12.5 mg  12.5 mg Oral Q12H    polyethylene glycol (MIRALAX) packet 17 g  17 g Oral DAILY PRN    thiamine HCL (B-1) tablet 100 mg  100 mg Oral DAILY    therapeutic multivitamin (THERAGRAN) tablet 1 Tablet  1 Tablet Oral DAILY    acetaminophen (TYLENOL) solution 650 mg  650 mg Per NG tube Q4H PRN    sodium chloride (NS) flush 5-40 mL  5-40 mL IntraVENous Q8H    sodium chloride (NS) flush 5-40 mL  5-40 mL IntraVENous PRN    [Held by provider] atorvastatin (LIPITOR) tablet 10 mg  10 mg Oral QHS    levothyroxine (SYNTHROID) tablet 88 mcg  88 mcg Oral ACB    pantoprazole (PROTONIX) tablet 40 mg  40 mg Oral ACB    sodium chloride (NS) flush 5-10 mL  5-10 mL IntraVENous PRN    acetaminophen (TYLENOL) suppository 650 mg  650 mg Rectal Q6H PRN       Visit Vitals  BP (!) 114/55 (BP 1 Location: Right upper arm, BP Patient Position: At rest)   Pulse (!) 102   Temp 99 °F (37.2 °C)   Resp 19   Ht 5' 6\" (1.676 m)   Wt 90 kg (198 lb 6.6 oz)   SpO2 94%   BMI 32.02 kg/m²     Temp (24hrs), Av °F (37.2 °C), Min:98.4 °F (36.9 °C), Max:99.4 °F (37.4 °C)      No intake/output data recorded.   1901 -  0700  In: 1110   Out: 2400 [Urine:2400]      Physical Exam:  General: Well developed well nourished patient, obese  Cardiac: Regular rate and rhythm with no murmurs. Extremities: 2+ Radial pulses, no cyanosis or edema, pale    Neurological Exam:  Mental Status: Eyes open to noxious   Cranial Nerves:   PERRL 4mm ,no nystagmus, no ptosis. Conjugate, gaze. Does not participate with EOMs. Motor:  Weakly WD 2/5    Reflexes:   No clonus, Toes downgoing. Sensory:   FAVIOLA   Gait:  FAVIOLA   Cerebellar:  FAVIOLA         Lab Review   No results found for this or any previous visit (from the past 24 hour(s)). Additional comments:  I have reviewed the patient's new clinical lab test results. There are no new head images to review. MRI Results (most recent):  Results from [place] encounter on 10/25/22    MRI BRAIN W WO CONT      FINAL REPORT:    INDICATION:   encephalopathy, f/u MRI compare to previous 10/2/22    EXAMINATION:  MRI BRAIN WO CONTRAST    COMPARISON:  MRI October 2, 2022, CT October 25, 2022    TECHNIQUE:  Multiplanar multisequence acquisition without contrast of the brain. FINDINGS:    Ventricles:  Midline, no hydrocephalus. Brain Parenchyma/Brainstem:  Interval development of extensive, diffuse FLAIR/T2  hyperintense signal and associated diffusion restriction throughout the  supratentorial white matter. Involvement of the splenium of corpus callosum. There is cortical sparing. The deep gray matter, brainstem and cerebellum are  also unaffected. Intracranial Hemorrhage:  None. Basal Cisterns:  Normal.  Flow Voids:  Normal.  Additional Comments:  N/A. Impression  Interval development of diffusely abnormal supratentorial white matter, with  extensive FLAIR/T2 hyperintense signal and diffusion restriction as described  above. No gray matter, brainstem or cerebellar involvement. No associated  enhancement, hemorrhage, or mass effect/hydrocephalus.  Differential diagnosis  favors an acute toxic/metabolic process, with other etiologies including an  infectious/inflammatory encephalitis (viral) less likely. Sparing of the gray  matter structures makes an anoxic injury less likely. CT Results (most recent):  Results from Hospital Encounter encounter on 10/25/22    CT HEAD WO CONT    Narrative  EXAM: CT HEAD WO CONT    INDICATION: Confusion. Electronic medical record is not available at the time of  this interpretation. COMPARISON: CT head on 10/13/2022. MRI brain on 10/2/2022. TECHNIQUE: Noncontrast head CT. Coronal and sagittal reformats. CT dose  reduction was achieved through the use of a standardized protocol tailored for  this examination and automatic exposure control for dose modulation. FINDINGS: The ventricles and sulci are age-appropriate without hydrocephalus. There is no mass effect or midline shift. There is no intracranial hemorrhage or  extra-axial fluid collection. There is no abnormal area of decreased density to  suggest infarct. The calvarium is intact. The visualized paranasal sinuses and mastoid air cells  are clear. Impression  No acute intracranial abnormality on this noncontrast head CT. No change given  difference in technique.       Care Plan discussed with:  Patient    Family    RN    Care Manager x   Consultant/Specialist:  SEN Will    The patient was discussed with Dr. Princess Maurice.

## 2022-11-04 NOTE — PROGRESS NOTES
Bedside and Verbal shift change report given to Ashok Cruz (oncoming nurse) by Zahira Schmitt RN (offgoing nurse). Report included the following information SBAR, Kardex, ED Summary, Intake/Output, Recent Results, Cardiac Rhythm ST, Alarm Parameters , and Dual Neuro Assessment.

## 2022-11-04 NOTE — ROUTINE PROCESS
Bedside and Verbal shift change report given to Jos Miller (oncoming nurse) by Susan Manuel (offgoing nurse). Report included the following information SBAR, Kardex, Intake/Output, Recent Results, Cardiac Rhythm sr/st, and Quality Measures.

## 2022-11-04 NOTE — PROGRESS NOTES
Problem: Dysphagia (Adult)  Goal: *Acute Goals and Plan of Care (Insert Text)  Description: Speech Therapy Goals  Initiated 10/28/2022; continue 11/3/2022   1. Patient will participate in re-evaluation of swallow function within 7 days   Initiated 10/26/22    1. Patient will tolerate pureed diet with thin liquids with no adverse effects within 7 days. Discontinue 10/28/2022   Outcome: Not Progressing Towards Goal     Problem: Communication Impaired (Adult)  Goal: *Acute Goals and Plan of Care (Insert Text)  Description: Speech therapy goals  Initiated 11/3/2022   1. Patient will follow 1-step commands with 10% accuracy with max cues within 7 days   2. Patient will answer simple y/n questions with 10% accuracy with max cues within 7 days   3. Patient will complete automatic speech tasks with 10% accuracy with max cues within 7 days   Outcome: Not Progressing Towards Goal    SPEECH LANGUAGE PATHOLOGY DYSPHAGIA AND SPEECH TREATMENT  Patient: Alexus Davis (17 y.o. female)  Date: 11/4/2022  Diagnosis: AMS (altered mental status) [R41.82]  Diarrhea [R19.7]  Altered mental status [R41.82] <principal problem not specified>      Precautions: Fall, Skin, Seizure    ASSESSMENT:  A swallowing treatment was conducted today given pt's NPO status due to severe oral dysphagia and absent/poor acceptance of PO. Per pt's mother's request, oral care was completed prior to PO trials. Pt with coughing and increased work of breathing after oral care was completed. Pt with some facial movements in response to tactile cuing of ice chip on pt's lip. Despite this, pt with absent bolus acceptance and recognition of ice chip despite maximal verbal and tactile cuing. PO trials were discontinued at that time. Given absent bolus acceptance and recognition, recommend pt continue NPO with alternate means of nutrition, hydration, and medication. SLP will continue to follow for swallowing treatment.      A language treatment was also conducted today given severe expressive and receptive language deficits. Pt with 0% accuracy on receptive language tasks of basic yes/no questions (\"is your name Paris Carlin? \") and simple commands (\"raise your hand\") despite maximal multi-modality cuing. Pt was unable to participate in automatic expressive language task of melodic intonation with a familiar song (happy birthday, row row row your boat) with maximal multi-modality cuing. Pt continues to present with severe expressive and receptive language deficits on this date with no intentional eye movements or tracking observed during session. At this time, pt is unable to communicate medical wants, needs, and opinions. SLP will continue to follow for language treatment. PLAN:  Recommendations and Planned Interventions:  --Continue NPO with alternate means of nutrition, hydration, and medication   --OK to offer a few ice chips after oral care if actively accepting  --SLP will continue to follow for language and dysphagia intervention    Patient continues to benefit from skilled intervention to address the above impairments. Continue treatment per established plan of care. Discharge Recommendations: To Be Determined     SUBJECTIVE:   Patient opened her eyes spontaneously. OBJECTIVE:   Cognitive and Communication Status:  Neurologic State: Eyes open to stimulus  Orientation Level: Unable to verbalize  Cognition: No command following  Perception: Cues to maintain midline in sitting  Perseveration: No perseveration noted  Safety/Judgement: Decreased awareness of environment, Decreased awareness of need for assistance, Decreased awareness of need for safety, Lack of insight into deficits    Dysphagia Treatment and Interventions:  Oral Assessment:     P.O.  Trials:  Patient Position: upright in bed  Vocal quality prior to P.O.:  (pt did not verbalize)  Consistency Presented: Ice chips  How Presented: SLP-fed/presented     Bolus Acceptance: Absent     Speech Treatment and Interventions:  Language Comprehension and Expression:  Auditory Comprehension   Response to Basic Yes/No Questions (%): 0 %  One-Step Basic Commands (%): 0 %  Verbal Expression  Verbal Expression  Primary Mode of Expression: Other (comment) (nonverbal with no communication attempts)  Automatic Speech Task: Impaired (comment) (no response)  Automatic speech task cueing type: Multi modality  Automatic speech task cueing amount: Maximum    Pain:  Pain Scale 1: Adult Nonverbal Pain Scale  Pain Intensity 1: 0       After treatment:   Patient left in no apparent distress in bed, Call bell within reach, Nursing notified, Caregiver / family present, and Bed / chair alarm activated    COMMUNICATION/EDUCATION:   Patient was unable to participate in patient education at this time. The patient's plan of care including recommendations, planned interventions, and recommended diet changes were discussed with: Registered nurse. Hannah Smith, SLP Student   Time Calculation: 40 mins     Regarding student involvement in patient care:  A student participated in this treatment session. Per CMS Medicare statements and LIZETH guidelines I certify that the following was true:  1. I was present and directly observed the entire session. 2. I made all skilled judgments and clinical decisions regarding care. 3. I am the practitioner responsible for assessment, treatment, and documentation.

## 2022-11-04 NOTE — PROGRESS NOTES
[facility] Adult  Hospitalist Group                                                                                          Hospitalist Progress Note  Rich Schmidt MD  Answering service: [phone] from in house phone        Date of Service:  2022  NAME:  Funmi Carballo  :  1965  MRN:  [id number]      Admission Summary:     Funmi Carballo is a 59 y.o. female with past medical history of anoxic brain injury, anxiety, depression, hypertension, hyperlipidemia, hypothyroidism presented to the emergency department via EMS from [address] [place] [address] with reported altered mental status (AMS). Patient is aphasic/nonverbal and not answering any questions. Majority of history was obtained per review of chart records. Per reports patient recently was hospitalized at [facility] from 2022-10/20/2022 with diagnosis of status epilepticus, acute metabolic encephalopathy, delirium, psychosis, generalized anxiety disorder, debility, Takotsubo cardiomyopathy, NSTEMI, bradycardia, SIRS, tachycardia, fever, leukocytosis, and CVA. Patient had work-up including MRI and also EEG. There was concern the patient may have some anoxic brain injury. She has been followed by psychiatry and neurology services with persistent encephalopathy and intermittent severe aggression. Patient has had aphasia and altered mental status not following commands per staff report since last week. Symptoms remain constant, severe, without specific exacerbating relieving factors. About emergency department today, initial recorded vital signs temperature 98.8 °F, /65, heart rate 103, respirate 30, O2 saturation 93% on room air. Abnormal labs included WC 12.5, platelets 069, BUN 23. CT head without IV contrast showed no acute intracranial abnormalities. ED request admission to the hospitalist service.      No acute event overnight, less rigidity, stable and transferred out of ICU on 10/29       Interval history / Subjective:     Patient is lethargic, wakeful to touch, aphasic, mental status wax and wane. Cussed with mother yesterday and consented for PEG tube  We communicated to [facility] for a transfer and [facility] refused to take the patient  We also called GI to place the PEG tube awaiting timing and placement of the tube eventually patient will need SNF  working on it     [address] [place] [address] [place]:     AMS at admission was thought to be due to serotonin syndrome  As per neurology diagnosis is--Delayed Hypoxic Leukoencephalopathy:  - Etiology likely given clinical presentation and MRI findings   - ANCA negative. Glutamic acid decarb negative <5.0.  ADIN not sent  - CSF culture no growth, normal protein, HSV negative, myelin basic protein pending  - Wean Valium to 2 mg IV q8h     Initially serotonin syndrome was considered because  -Reported receiving a dose of Risperdal then Seroquel  -fever and rigidity   -IVF has discontinued because of risk for fluid overload,   -BP normal, midodrine discontinued  -Neurologist on board     Acute encephalopathy   Aphasic since after patient was found unresponsive on 9/27  Hx of prolong hospitalization at [facility] after she was found unresponsive, respiratory arrest at her home on  9/26/22, suspected due to sedating medication, seizure, managed, [facility]    Suspected anoxic brain injury  Suspected Seizure   Hx of CVA- CT head on 10/25/2022 no acute intracranial abnormality on noncontrast head CT  -mental status is declining, lethargic, open her eyes to touch, aphasic, doesn't follow command or moves her extremities   -continue nuero check and supportive care  -complex medical problem with hx of previous prolonged hospitalization  -SpO2 93-98% on RA  -high risk for decompensation      Hx of Takotsubo cardiomyopathy  -has peripheral edema  -last Echo was on 10/12 EF 55-60%   -BP low normal, if it improves will consider her home metoprolol and lasix   -monitor I/O and daily weight      HTN  -BP normal, monitor BP  -home clonidine, on hold     Hyperlipidemia   -statin on hold due to elevated CK     Hypothyroidism   -continue synthroid      Generalized anxiety disorder  -home trazodone and lexapro on hold     Severe disability with immobility   -continue supportive care      Obesity   -BMI 32.52 kg/m2  -need weight loss program    For placement now patient will need a PEG tube discussed with family  Will need to go to a SNF/rehab       Code status: Full code  Prophylaxis: SCD  Care Plan discussed with:her son, mother, and Nurse  Anticipated Disposition: SNF versus other     Hospital Problems  Date Reviewed: 10/5/2022            Codes Class Noted POA    Altered mental status ICD-10-CM: R41.82  ICD-9-CM: 780.97  10/27/2022 Unknown        Diarrhea ICD-10-CM: R19.7  ICD-9-CM: 787.91  10/25/2022 Unknown        AMS (altered mental status) ICD-10-CM: R41.82  ICD-9-CM: 780.97  10/25/2022 Unknown       Vital Signs:    Last 24hrs VS reviewed since prior progress note. Most recent are:  Visit Vitals  BP (!) 114/55 (BP 1 Location: Right upper arm, BP Patient Position: At rest)   Pulse (!) 111   Temp 99 °F (37.2 °C)   Resp 19   Ht 5' 6\" (1.676 m)   Wt 90 kg (198 lb 6.6 oz)   SpO2 94%   BMI 32.02 kg/m²         Intake/Output Summary (Last 24 hours) at 11/4/2022 1105  Last data filed at 11/4/2022 0400  Gross per 24 hour   Intake 570 ml   Output 1300 ml   Net -730 ml          Physical Examination:     I had a face to face encounter with this patient and independently examined them on 11/4/2022 as outlined below:          Constitutional: Conscious and alert, aphasic, no acute distres   ENT: NGT in place, oral mucosa moist, oropharynx benign. Resp:  CTA bilaterally. No wheezing/rhonchi/rales. No accessory muscle use. CV:  Regular rhythm, normal rate, no murmurs, gallops, rubs    GI:  Soft, non distended, non tender.  normoactive bowel sounds, no hepatosplenomegaly     Musculoskeletal: Bilateral pretibial, pedal and hand edema    Neurologic: Lethargic, open here eyes to touch, aphasic, does not follow command or moves her extremities             Data Review:    Review and/or order of clinical lab test  Review and/or order of tests in the radiology section of CPT  Review and/or order of tests in the medicine section of [facility]      Labs:     Recent Labs     11/03/22 0440 11/02/22 0527   WBC 9.1 9.1   HGB 11.7 11.2*   HCT 35.7 33.2*    291       Recent Labs     11/03/22 0440 11/02/22 0527    141   K 4.5 4.6    106   CO2 26 29   BUN 19 17   CREA 0.49* 0.56   * 103*   CA 8.7 9.0   MG 2.2  --    PHOS 3.8  --        Recent Labs     11/03/22 0440 11/02/22 0527   ALT 24 20   AP 67 65   TBILI 0.3 0.2   TP 6.2* 5.8*   ALB 2.4* 2.4*   GLOB 3.8 3.4       No results for input(s): INR, PTP, APTT, INREXT, INREXT in the last 72 hours. No results for input(s): FE, TIBC, PSAT, FERR in the last 72 hours. Lab Results   Component Value Date/Time    Folate 9.9 10/26/2022 11:47 AM        No results for input(s): PH, PCO2, PO2 in the last 72 hours. No results for input(s): CPK, CKNDX, TROIQ in the last 72 hours.     No lab exists for component: CPKMB    Lab Results   Component Value Date/Time    Cholesterol, total 145 10/26/2022 06:46 AM    HDL Cholesterol 30 10/26/2022 06:46 AM    LDL, calculated 91.4 10/26/2022 06:46 AM    Triglyceride 118 10/26/2022 06:46 AM    CHOL/HDL Ratio 4.8 10/26/2022 06:46 AM     Lab Results   Component Value Date/Time    Glucose (POC) 110 10/05/2022 04:55 PM    Glucose (POC) 87 10/05/2022 11:02 AM    Glucose (POC) 78 10/05/2022 05:00 AM    Glucose (POC) 94 10/05/2022 04:57 AM    Glucose (POC) 124 (H) 10/04/2022 11:03 PM     Lab Results   Component Value Date/Time    Color YELLOW/STRAW 10/25/2022 04:54 PM    Appearance CLEAR 10/25/2022 04:54 PM    Specific gravity >1.030 (H) 10/25/2022 04:54 PM    Specific gravity 1.011 09/27/2022 12:51 PM    pH (UA) 5.5 10/25/2022 04:54 PM    Protein TRACE (A) 10/25/2022 04:54 PM    Glucose Negative 10/25/2022 04:54 PM    Ketone TRACE (A) 10/25/2022 04:54 PM    Bilirubin Negative 10/25/2022 04:54 PM    Urobilinogen 1.0 10/25/2022 04:54 PM    Nitrites Negative 10/25/2022 04:54 PM    Leukocyte Esterase Negative 10/25/2022 04:54 PM    Epithelial cells FEW 10/25/2022 04:54 PM    Bacteria Negative 10/25/2022 04:54 PM    WBC 0-4 10/25/2022 04:54 PM    RBC 0-5 10/25/2022 04:54 PM         Medications Reviewed:     Current Facility-Administered Medications   Medication Dose Route Frequency    enoxaparin (LOVENOX) injection 40 mg  40 mg SubCUTAneous Q24H    diazePAM (VALIUM) injection 2 mg  2 mg IntraVENous Q8H    senna-docusate (PERICOLACE) 8.6-50 mg per tablet 2 Tablet  2 Tablet Oral DAILY    [Held by provider] metoprolol tartrate (LOPRESSOR) tablet 12.5 mg  12.5 mg Oral Q12H    polyethylene glycol (MIRALAX) packet 17 g  17 g Oral DAILY PRN    thiamine HCL (B-1) tablet 100 mg  100 mg Oral DAILY    therapeutic multivitamin (THERAGRAN) tablet 1 Tablet  1 Tablet Oral DAILY    acetaminophen (TYLENOL) solution 650 mg  650 mg Per NG tube Q4H PRN    sodium chloride (NS) flush 5-40 mL  5-40 mL IntraVENous Q8H    sodium chloride (NS) flush 5-40 mL  5-40 mL IntraVENous PRN    [Held by provider] atorvastatin (LIPITOR) tablet 10 mg  10 mg Oral QHS    levothyroxine (SYNTHROID) tablet 88 mcg  88 mcg Oral ACB    pantoprazole (PROTONIX) tablet 40 mg  40 mg Oral ACB    sodium chloride (NS) flush 5-10 mL  5-10 mL IntraVENous PRN    acetaminophen (TYLENOL) suppository 650 mg  650 mg Rectal Q6H PRN     ______________________________________________________________________  EXPECTED LENGTH OF STAY: 2d 7h  ACTUAL LENGTH OF STAY:          8                 Rafi Stark MD

## 2022-11-04 NOTE — PROGRESS NOTES
Problem: Pressure Injury - Risk of  Goal: *Prevention of pressure injury  Description: Document Jose Enrique Scale and appropriate interventions in the flowsheet. Outcome: Progressing Towards Goal  Note: Pressure Injury Interventions:  Sensory Interventions: Use 30-degree side-lying position    Moisture Interventions: Absorbent underpads    Activity Interventions: PT/OT evaluation    Mobility Interventions: Float heels, PT/OT evaluation    Nutrition Interventions: Document food/fluid/supplement intake    Friction and Shear Interventions: Apply protective barrier, creams and emollients                Problem: Patient Education: Go to Patient Education Activity  Goal: Patient/Family Education  Outcome: Progressing Towards Goal     Problem: Patient Education: Go to Patient Education Activity  Goal: Patient/Family Education  Outcome: Progressing Towards Goal     Problem: Patient Education: Go to Patient Education Activity  Goal: Patient/Family Education  Outcome: Progressing Towards Goal     Problem: Patient Education: Go to Patient Education Activity  Goal: Patient/Family Education  Outcome: Progressing Towards Goal     Problem: Falls - Risk of  Goal: *Absence of Falls  Description: Document Chyna Fall Risk and appropriate interventions in the flowsheet.   Outcome: Progressing Towards Goal  Note: Fall Risk Interventions:  Mobility Interventions: Bed/chair exit alarm    Mentation Interventions: Bed/chair exit alarm    Medication Interventions: Bed/chair exit alarm    Elimination Interventions: Bed/chair exit alarm    History of Falls Interventions: Bed/chair exit alarm         Problem: Patient Education: Go to Patient Education Activity  Goal: Patient/Family Education  Outcome: Progressing Towards Goal     Problem: Discharge Planning  Goal: *Discharge to safe environment  Outcome: Progressing Towards Goal  Goal: *Knowledge of medication management  Outcome: Progressing Towards Goal  Goal: *Knowledge of discharge instructions  Outcome: Progressing Towards Goal

## 2022-11-05 PROCEDURE — 74011250637 HC RX REV CODE- 250/637: Performed by: PHYSICIAN ASSISTANT

## 2022-11-05 PROCEDURE — 65660000001 HC RM ICU INTERMED STEPDOWN

## 2022-11-05 PROCEDURE — 74011250637 HC RX REV CODE- 250/637: Performed by: NURSE PRACTITIONER

## 2022-11-05 PROCEDURE — 74011250636 HC RX REV CODE- 250/636: Performed by: STUDENT IN AN ORGANIZED HEALTH CARE EDUCATION/TRAINING PROGRAM

## 2022-11-05 PROCEDURE — 77010033678 HC OXYGEN DAILY

## 2022-11-05 PROCEDURE — 74011000250 HC RX REV CODE- 250: Performed by: FAMILY MEDICINE

## 2022-11-05 RX ORDER — ENOXAPARIN SODIUM 100 MG/ML
40 INJECTION SUBCUTANEOUS EVERY 24 HOURS
Status: ACTIVE | OUTPATIENT
Start: 2022-11-05 | End: 2022-11-07

## 2022-11-05 RX ADMIN — SODIUM CHLORIDE, PRESERVATIVE FREE 10 ML: 5 INJECTION INTRAVENOUS at 13:45

## 2022-11-05 RX ADMIN — THERA TABS 1 TABLET: TAB at 09:39

## 2022-11-05 RX ADMIN — Medication 100 MG: at 09:39

## 2022-11-05 RX ADMIN — SODIUM CHLORIDE, PRESERVATIVE FREE 10 ML: 5 INJECTION INTRAVENOUS at 07:05

## 2022-11-05 RX ADMIN — ENOXAPARIN SODIUM 40 MG: 100 INJECTION SUBCUTANEOUS at 10:31

## 2022-11-05 RX ADMIN — PANTOPRAZOLE SODIUM 40 MG: 40 TABLET, DELAYED RELEASE ORAL at 07:12

## 2022-11-05 RX ADMIN — SENNOSIDES AND DOCUSATE SODIUM 2 TABLET: 50; 8.6 TABLET ORAL at 09:39

## 2022-11-05 RX ADMIN — LEVOTHYROXINE SODIUM 88 MCG: 0.09 TABLET ORAL at 07:12

## 2022-11-05 NOTE — PROGRESS NOTES
Problem: Pressure Injury - Risk of  Goal: *Prevention of pressure injury  Description: Document Jose Enrique Scale and appropriate interventions in the flowsheet. Outcome: Progressing Towards Goal  Note: Pressure Injury Interventions:  Sensory Interventions: Keep linens dry and wrinkle-free, Minimize linen layers, Pressure redistribution bed/mattress (bed type)    Moisture Interventions: Absorbent underpads, Check for incontinence Q2 hours and as needed, Internal/External urinary devices, Minimize layers    Activity Interventions: Increase time out of bed, Pressure redistribution bed/mattress(bed type), PT/OT evaluation    Mobility Interventions: HOB 30 degrees or less, Pressure redistribution bed/mattress (bed type), PT/OT evaluation    Nutrition Interventions: Document food/fluid/supplement intake    Friction and Shear Interventions: HOB 30 degrees or less, Minimize layers                Problem: Patient Education: Go to Patient Education Activity  Goal: Patient/Family Education  Outcome: Progressing Towards Goal     Problem: Patient Education: Go to Patient Education Activity  Goal: Patient/Family Education  Outcome: Progressing Towards Goal     Problem: Patient Education: Go to Patient Education Activity  Goal: Patient/Family Education  Outcome: Progressing Towards Goal     Problem: Patient Education: Go to Patient Education Activity  Goal: Patient/Family Education  Outcome: Progressing Towards Goal     Problem: Falls - Risk of  Goal: *Absence of Falls  Description: Document Chyna Fall Risk and appropriate interventions in the flowsheet.   Outcome: Progressing Towards Goal  Note: Fall Risk Interventions:  Mobility Interventions: Bed/chair exit alarm    Mentation Interventions: Update white board, Bed/chair exit alarm    Medication Interventions: Evaluate medications/consider consulting pharmacy, Bed/chair exit alarm    Elimination Interventions: Bed/chair exit alarm, Call light in reach    History of Falls Interventions: Consult care management for discharge planning, Bed/chair exit alarm, Door open when patient unattended         Problem: Patient Education: Go to Patient Education Activity  Goal: Patient/Family Education  Outcome: Progressing Towards Goal     Problem: Discharge Planning  Goal: *Discharge to safe environment  Outcome: Progressing Towards Goal  Goal: *Knowledge of medication management  Outcome: Progressing Towards Goal  Goal: *Knowledge of discharge instructions  Outcome: Progressing Towards Goal     Problem: Patient Education: Go to Patient Education Activity  Goal: Patient/Family Education  Outcome: Progressing Towards Goal     Problem: Nutrition Deficit  Goal: *Optimize nutritional status  Outcome: Progressing Towards Goal     Problem: Patient Education: Go to Patient Education Activity  Goal: Patient/Family Education  Outcome: Progressing Towards Goal

## 2022-11-05 NOTE — PROGRESS NOTES
[facility] Adult  Hospitalist Group                                                                                          Hospitalist Progress Note  Sarah Self MD  Answering service: [phone] OR [phone] from in house phone        Date of Service:  2022  NAME:  Alberto Benjamin  :  1965  MRN:  [id number]      Admission Summary:     Alberto Benjamin is a 59 y.o. female with past medical history of anoxic brain injury, anxiety, depression, hypertension, hyperlipidemia, hypothyroidism presented to the emergency department via EMS from [address] [place] [address] with reported altered mental status (AMS). Patient is aphasic/nonverbal and not answering any questions. Majority of history was obtained per review of chart records. Per reports patient recently was hospitalized at [facility] from 2022-10/20/2022 with diagnosis of status epilepticus, acute metabolic encephalopathy, delirium, psychosis, generalized anxiety disorder, debility, Takotsubo cardiomyopathy, NSTEMI, bradycardia, SIRS, tachycardia, fever, leukocytosis, and CVA. Patient had work-up including MRI and also EEG. There was concern the patient may have some anoxic brain injury. She has been followed by psychiatry and neurology services with persistent encephalopathy and intermittent severe aggression. Patient has had aphasia and altered mental status not following commands per staff report since last week. Symptoms remain constant, severe, without specific exacerbating relieving factors. About emergency department today, initial recorded vital signs temperature 98.8 °F, /65, heart rate 103, respirate 30, O2 saturation 93% on room air. Abnormal labs included WC 12.5, platelets 383, BUN 23. CT head without IV contrast showed no acute intracranial abnormalities. ED request admission to the hospitalist service.      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 Differential include acute or toxic encephalopathy.  - ANCA negative. Glutamic acid decarb negative <5.0.  ADIN not sent  - CSF culture no growth, normal protein, HSV negative, myelin basic protein pending.     -Neurology on board appreciate help  -Continue thiamine  -PT OT and speech  -Continue tube feeds for now  -Guarded prognosis  -GI consulted for possible PEG tube on Monday  -Hold Lovenox on Sunday for possible procedure on Monday  -N.p.o. after midnight on Sunday       Suspected anoxic brain injury  Suspected Seizure   Hx of CVA- CT head on 10/25/2022 no acute intracranial abnormality on noncontrast head CT  -mental status is declining, lethargic, open her eyes to touch, aphasic, doesn't follow command or moves her extremities   -continue nuero check and supportive care  -complex medical problem with hx of previous prolonged hospitalization  -SpO2 93-98% on RA  -high risk for decompensation      Hx of Takotsubo cardiomyopathy  -has peripheral edema  -last Echo was on 10/12 EF 55-60%   -BP low normal, if it improves will consider her home metoprolol and lasix   -monitor I/O and daily weight      HTN  -BP normal, monitor BP  -home clonidine, on hold     Hyperlipidemia   -statin on hold due to elevated CK     Hypothyroidism -continue synthroid      Generalized anxiety disorder  -home trazodone and lexapro on hold     Severe disability with immobility   -continue supportive care      Obesity   -BMI 32.52 kg/m2  -need weight loss program    For placement now patient will need a PEG tube discussed with family  Will need to go to a SNF/rehab       Code status: Full code  Prophylaxis: lovenox   Care Plan discussed with: Nurse  Anticipated Disposition: SNF versus other     Hospital Problems  Date Reviewed: 10/5/2022            Codes Class Noted POA    Altered mental status ICD-10-CM: R41.82  ICD-9-CM: 780.97  10/27/2022 Unknown        Diarrhea ICD-10-CM: R19.7  ICD-9-CM: 787.91  10/25/2022 Unknown        AMS (altered mental status) ICD-10-CM: R41.82  ICD-9-CM: 780.97  10/25/2022 Unknown       Vital Signs:    Last 24hrs VS reviewed since prior progress note. Most recent are:  Visit Vitals  /66 (BP 1 Location: Right upper arm)   Pulse 96   Temp 98 °F (36.7 °C)   Resp 24   Ht 5' 6\" (1.676 m)   Wt 90 kg (198 lb 6.6 oz)   SpO2 98%   BMI 32.02 kg/m²         Intake/Output Summary (Last 24 hours) at 11/5/2022 0941  Last data filed at 11/4/2022 2000  Gross per 24 hour   Intake 250 ml   Output 650 ml   Net -400 ml        Physical Examination:     I had a face to face encounter with this patient and independently examined them on 11/5/2022 as outlined below:          Constitutional:   aphasic, no acute distres   ENT: NGT in place, oral mucosa moist, oropharynx benign. Resp:  CTA bilaterally. No wheezing/rhonchi/rales. No accessory muscle use. CV:  Regular rhythm, normal rate, no murmurs, gallops, rubs    GI:  Soft, non distended, non tender.  normoactive bowel sounds, no hepatosplenomegaly     Musculoskeletal: Bilateral pretibial, pedal and hand edema    Neurologic: Lethargic, open here eyes to touch, aphasic, does not follow command or moves her extremities             Data Review:    Review and/or order of clinical lab test  Review and/or order of tests in the radiology section of CPT  Review and/or order of tests in the medicine section of CPT      Labs:     Recent Labs     11/03/22 0440   WBC 9.1   HGB 11.7   HCT 35.7        Recent Labs     11/03/22 0440      K 4.5      CO2 26   BUN 19   CREA 0.49*   *   CA 8.7   MG 2.2   PHOS 3.8     Recent Labs     11/03/22 0440   ALT 24   AP 67   TBILI 0.3   TP 6.2*   ALB 2.4*   GLOB 3.8     No results for input(s): INR, PTP, APTT, INREXT, INREXT in the last 72 hours. No results for input(s): FE, TIBC, PSAT, FERR in the last 72 hours. Lab Results   Component Value Date/Time    Folate 9.9 10/26/2022 11:47 AM        No results for input(s): PH, PCO2, PO2 in the last 72 hours. No results for input(s): CPK, CKNDX, TROIQ in the last 72 hours.     No lab exists for component: CPKMB    Lab Results   Component Value Date/Time    Cholesterol, total 145 10/26/2022 06:46 AM    HDL Cholesterol 30 10/26/2022 06:46 AM    LDL, calculated 91.4 10/26/2022 06:46 AM    Triglyceride 118 10/26/2022 06:46 AM    CHOL/HDL Ratio 4.8 10/26/2022 06:46 AM     Lab Results   Component Value Date/Time    Glucose (POC) 110 10/05/2022 04:55 PM    Glucose (POC) 87 10/05/2022 11:02 AM    Glucose (POC) 78 10/05/2022 05:00 AM    Glucose (POC) 94 10/05/2022 04:57 AM    Glucose (POC) 124 (H) 10/04/2022 11:03 PM     Lab Results   Component Value Date/Time    Color YELLOW/STRAW 10/25/2022 04:54 PM    Appearance CLEAR 10/25/2022 04:54 PM    Specific gravity >1.030 (H) 10/25/2022 04:54 PM    Specific gravity 1.011 09/27/2022 12:51 PM    pH (UA) 5.5 10/25/2022 04:54 PM    Protein TRACE (A) 10/25/2022 04:54 PM    Glucose Negative 10/25/2022 04:54 PM    Ketone TRACE (A) 10/25/2022 04:54 PM    Bilirubin Negative 10/25/2022 04:54 PM    Urobilinogen 1.0 10/25/2022 04:54 PM    Nitrites Negative 10/25/2022 04:54 PM    Leukocyte Esterase Negative 10/25/2022 04:54 PM    Epithelial cells FEW 10/25/2022 04:54 PM    Bacteria Negative 10/25/2022 04:54 PM    WBC 0-4 10/25/2022 04:54 PM    RBC 0-5 10/25/2022 04:54 PM         Medications Reviewed:     Current Facility-Administered Medications   Medication Dose Route Frequency    enoxaparin (LOVENOX) injection 40 mg  40 mg SubCUTAneous Q24H    senna-docusate (PERICOLACE) 8.6-50 mg per tablet 2 Tablet  2 Tablet Oral DAILY    [Held by provider] metoprolol tartrate (LOPRESSOR) tablet 12.5 mg  12.5 mg Oral Q12H    polyethylene glycol (MIRALAX) packet 17 g  17 g Oral DAILY PRN    thiamine HCL (B-1) tablet 100 mg  100 mg Oral DAILY    therapeutic multivitamin (THERAGRAN) tablet 1 Tablet  1 Tablet Oral DAILY    acetaminophen (TYLENOL) solution 650 mg  650 mg Per NG tube Q4H PRN    sodium chloride (NS) flush 5-40 mL  5-40 mL IntraVENous Q8H    sodium chloride (NS) flush 5-40 mL  5-40 mL IntraVENous PRN    [Held by provider] atorvastatin (LIPITOR) tablet 10 mg  10 mg Oral QHS    levothyroxine (SYNTHROID) tablet 88 mcg  88 mcg Oral ACB    pantoprazole (PROTONIX) tablet 40 mg  40 mg Oral ACB    sodium chloride (NS) flush 5-10 mL  5-10 mL IntraVENous PRN    acetaminophen (TYLENOL) suppository 650 mg  650 mg Rectal Q6H PRN     ______________________________________________________________________  EXPECTED LENGTH OF STAY: 2d 7h  ACTUAL LENGTH OF STAY:          9                 Fadi Smallwood MD

## 2022-11-05 NOTE — PROGRESS NOTES
Bedside and Verbal shift change report given to [facility] (oncoming nurse) by Ana Canas RN (offgoing nurse). Report included the following information SBAR, Kardex, ED Summary, Procedure Summary, MAR, Cardiac Rhythm ST, Alarm Parameters , and Dual Neuro Assessment.

## 2022-11-06 PROCEDURE — 74011250637 HC RX REV CODE- 250/637: Performed by: NURSE PRACTITIONER

## 2022-11-06 PROCEDURE — 74011000250 HC RX REV CODE- 250: Performed by: FAMILY MEDICINE

## 2022-11-06 PROCEDURE — 65660000001 HC RM ICU INTERMED STEPDOWN

## 2022-11-06 PROCEDURE — 74011250637 HC RX REV CODE- 250/637: Performed by: PHYSICIAN ASSISTANT

## 2022-11-06 RX ADMIN — LEVOTHYROXINE SODIUM 88 MCG: 0.09 TABLET ORAL at 06:08

## 2022-11-06 RX ADMIN — ACETAMINOPHEN 650 MG: 650 SOLUTION ORAL at 06:09

## 2022-11-06 RX ADMIN — SODIUM CHLORIDE, PRESERVATIVE FREE 10 ML: 5 INJECTION INTRAVENOUS at 09:34

## 2022-11-06 RX ADMIN — THERA TABS 1 TABLET: TAB at 09:33

## 2022-11-06 RX ADMIN — PANTOPRAZOLE SODIUM 40 MG: 40 TABLET, DELAYED RELEASE ORAL at 06:08

## 2022-11-06 RX ADMIN — SENNOSIDES AND DOCUSATE SODIUM 2 TABLET: 50; 8.6 TABLET ORAL at 09:33

## 2022-11-06 RX ADMIN — SODIUM CHLORIDE, PRESERVATIVE FREE 10 ML: 5 INJECTION INTRAVENOUS at 21:26

## 2022-11-06 RX ADMIN — Medication 100 MG: at 09:33

## 2022-11-06 NOTE — PROGRESS NOTES
[facility] [facility] Adult  Hospitalist Group                                                                                          Hospitalist Progress Note  Maegan Melo MD  Answering service: [phone] from in house phone        Date of Service:  2022  NAME:  Lavern Swift  :  1965  MRN:  [id number]      Admission Summary:     Lavern Swift is a 59 y.o. female with past medical history of anoxic brain injury, anxiety, depression, hypertension, hyperlipidemia, hypothyroidism presented to the emergency department via EMS from [address] [place] [address] with reported altered mental status (AMS). Patient is aphasic/nonverbal and not answering any questions. Majority of history was obtained per review of chart records. Per reports patient recently was hospitalized at [facility] from 2022-10/20/2022 with diagnosis of status epilepticus, acute metabolic encephalopathy, delirium, psychosis, generalized anxiety disorder, debility, Takotsubo cardiomyopathy, NSTEMI, bradycardia, SIRS, tachycardia, fever, leukocytosis, and CVA. Patient had work-up including MRI and also EEG. There was concern the patient may have some anoxic brain injury. She has been followed by psychiatry and neurology services with persistent encephalopathy and intermittent severe aggression. Patient has had aphasia and altered mental status not following commands per staff report since last week. Symptoms remain constant, severe, without specific exacerbating relieving factors. About emergency department today, initial recorded vital signs temperature 98.8 °F, /65, heart rate 103, respirate 30, O2 saturation 93% on room air. Abnormal labs included WC 12.5, platelets 294, BUN 23. CT head without IV contrast showed no acute intracranial abnormalities. ED request admission to the hospitalist service.      No acute event overnight, less rigidity, stable and transferred out of ICU on 10/29           Interval history / Subjective:     Patient seen and examined earlier this morning by me for follow-up of delayed hypoxic leukoencephalopathy. No acute changes overnight. Patient continues minimally responsive and nonverbal.     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 Differential include acute or toxic encephalopathy.  - ANCA negative. Glutamic acid decarb negative <5.0. ADIN not sent  - CSF culture no growth, normal protein, HSV negative, myelin basic protein pending.     -Neurology on board appreciate help  -Continue thiamine  -PT OT and speech  -Continue tube feeds for now  -Guarded prognosis  -GI consulted for possible PEG tube on Monday  -Hold Lovenox on Sunday for possible procedure on Monday  -N.p.o. after midnight on Sunday 11/6-PEG tube placement for tomorrow  N.p.o. midnight       Suspected anoxic brain injury/delayed hypoxic leukoencephalopathy  Suspected Seizure   Hx of CVA- CT head on 10/25/2022 no acute intracranial abnormality on noncontrast head CT  -mental status is declining, lethargic, open her eyes to touch, aphasic, doesn't follow command or moves her extremities   -continue nuero check and supportive care  -complex medical problem with hx of previous prolonged hospitalization  -SpO2 93-98% on RA  -high risk for decompensation   11/6-[facility] rejected transfer. Continue current management. Hx of Takotsubo cardiomyopathy  -has peripheral edema  -last Echo was on 10/12 EF 55-60%   -BP low normal, if it improves will consider her home metoprolol and lasix   -monitor I/O and daily weight      HTN  -BP normal, monitor BP  -home clonidine, on hold     Hyperlipidemia   -statin on hold due to elevated CK     Hypothyroidism   -continue synthroid      Generalized anxiety disorder  -home trazodone and lexapro on hold     Severe disability with immobility   -continue supportive care      Obesity   -BMI 32.52 kg/m2  -need weight loss program    SNF/rehab     Code status: Full code  Prophylaxis: lovenox   Care Plan discussed with: Nurse  Anticipated Disposition: SNF versus other     Hospital Problems  Date Reviewed: 10/5/2022            Codes Class Noted POA    Altered mental status ICD-10-CM: R41.82  ICD-9-CM: 780.97  10/27/2022 Unknown        Diarrhea ICD-10-CM: R19.7  ICD-9-CM: 787.91  10/25/2022 Unknown        AMS (altered mental status) ICD-10-CM: R41.82  ICD-9-CM: 780.97  10/25/2022 Unknown       Vital Signs:    Last 24hrs VS reviewed since prior progress note. Most recent are:  Visit Vitals  /65 (BP 1 Location: Right upper arm, BP Patient Position: At rest)   Pulse (!) 101   Temp 99.8 °F (37.7 °C)   Resp 28   Ht 5' 6\" (1.676 m)   Wt 90 kg (198 lb 6.6 oz)   SpO2 96%   BMI 32.02 kg/m²         Intake/Output Summary (Last 24 hours) at 11/6/2022 1423  Last data filed at 11/6/2022 0800  Gross per 24 hour   Intake 650 ml   Output 1100 ml   Net -450 ml          Physical Examination:     I had a face to face encounter with this patient and independently examined them on 11/6/2022 as outlined below:          Constitutional:   aphasic, no acute distres   ENT: NGT in place, oral mucosa moist, oropharynx benign. Resp:  CTA bilaterally. No wheezing/rhonchi/rales. No accessory muscle use. CV:  Regular rhythm, normal rate, no murmurs, gallops, rubs    GI:  Soft, non distended, non tender.  normoactive bowel sounds, no hepatosplenomegaly     Musculoskeletal: Bilateral pretibial, pedal and hand edema    Neurologic: Lethargic, open here eyes to touch, aphasic, does not follow command or moves her extremities             Data Review:    Review and/or order of clinical lab test  Review and/or order of tests in the radiology section of CPT  Review and/or order of tests in the medicine section of CPT      Labs:     No results for input(s): WBC, HGB, HCT, PLT, HGBEXT, HCTEXT, PLTEXT, HGBEXT, HCTEXT, PLTEXT in the last 72 hours. No results for input(s): NA, K, CL, CO2, BUN, CREA, GLU, CA, MG, PHOS, URICA in the last 72 hours. No results for input(s): ALT, AP, TBIL, TBILI, TP, ALB, GLOB, GGT, AML, LPSE in the last 72 hours. No lab exists for component: SGOT, GPT, AMYP, HLPSE    No results for input(s): INR, PTP, APTT, INREXT, INREXT in the last 72 hours. No results for input(s): FE, TIBC, PSAT, FERR in the last 72 hours. Lab Results   Component Value Date/Time    Folate 9.9 10/26/2022 11:47 AM        No results for input(s): PH, PCO2, PO2 in the last 72 hours. No results for input(s): CPK, CKNDX, TROIQ in the last 72 hours.     No lab exists for component: CPKMB    Lab Results   Component Value Date/Time    Cholesterol, total 145 10/26/2022 06:46 AM    HDL Cholesterol 30 10/26/2022 06:46 AM    LDL, calculated 91.4 10/26/2022 06:46 AM    Triglyceride 118 10/26/2022 06:46 AM    CHOL/HDL Ratio 4.8 10/26/2022 06:46 AM     Lab Results   Component Value Date/Time    Glucose (POC) 110 10/05/2022 04:55 PM    Glucose (POC) 87 10/05/2022 11:02 AM    Glucose (POC) 78 10/05/2022 05:00 AM    Glucose (POC) 94 10/05/2022 04:57 AM    Glucose (POC) 124 (H) 10/04/2022 11:03 PM     Lab Results   Component Value Date/Time    Color YELLOW/STRAW 10/25/2022 04:54 PM    Appearance CLEAR 10/25/2022 04:54 PM    Specific gravity >1.030 (H) 10/25/2022 04:54 PM    Specific gravity 1.011 09/27/2022 12:51 PM    pH (UA) 5.5 10/25/2022 04:54 PM Protein TRACE (A) 10/25/2022 04:54 PM    Glucose Negative 10/25/2022 04:54 PM    Ketone TRACE (A) 10/25/2022 04:54 PM    Bilirubin Negative 10/25/2022 04:54 PM    Urobilinogen 1.0 10/25/2022 04:54 PM    Nitrites Negative 10/25/2022 04:54 PM    Leukocyte Esterase Negative 10/25/2022 04:54 PM    Epithelial cells FEW 10/25/2022 04:54 PM    Bacteria Negative 10/25/2022 04:54 PM    WBC 0-4 10/25/2022 04:54 PM    RBC 0-5 10/25/2022 04:54 PM         Medications Reviewed:     Current Facility-Administered Medications   Medication Dose Route Frequency    [Held by provider] enoxaparin (LOVENOX) injection 40 mg  40 mg SubCUTAneous Q24H    senna-docusate (PERICOLACE) 8.6-50 mg per tablet 2 Tablet  2 Tablet Oral DAILY    [Held by provider] metoprolol tartrate (LOPRESSOR) tablet 12.5 mg  12.5 mg Oral Q12H    polyethylene glycol (MIRALAX) packet 17 g  17 g Oral DAILY PRN    thiamine HCL (B-1) tablet 100 mg  100 mg Oral DAILY    therapeutic multivitamin (THERAGRAN) tablet 1 Tablet  1 Tablet Oral DAILY    acetaminophen (TYLENOL) solution 650 mg  650 mg Per NG tube Q4H PRN    sodium chloride (NS) flush 5-40 mL  5-40 mL IntraVENous Q8H    sodium chloride (NS) flush 5-40 mL  5-40 mL IntraVENous PRN    [Held by provider] atorvastatin (LIPITOR) tablet 10 mg  10 mg Oral QHS    levothyroxine (SYNTHROID) tablet 88 mcg  88 mcg Oral ACB    pantoprazole (PROTONIX) tablet 40 mg  40 mg Oral ACB    sodium chloride (NS) flush 5-10 mL  5-10 mL IntraVENous PRN    acetaminophen (TYLENOL) suppository 650 mg  650 mg Rectal Q6H PRN     ______________________________________________________________________  EXPECTED LENGTH OF STAY: 2d 7h  ACTUAL LENGTH OF STAY:          [address] Chung Holman MD

## 2022-11-06 NOTE — PROGRESS NOTES
Problem: Pressure Injury - Risk of  Goal: *Prevention of pressure injury  Description: Document Jose Enrique Scale and appropriate interventions in the flowsheet. Outcome: Progressing Towards Goal  Note: Pressure Injury Interventions:  Sensory Interventions: Turn and reposition approx. every two hours (pillows and wedges if needed), Minimize linen layers, Float heels    Moisture Interventions: Minimize layers, Apply protective barrier, creams and emollients    Activity Interventions: Increase time out of bed, PT/OT evaluation    Mobility Interventions: Turn and reposition approx. every two hours(pillow and wedges), PT/OT evaluation    Nutrition Interventions: Offer support with meals,snacks and hydration, Document food/fluid/supplement intake    Friction and Shear Interventions: Lift sheet, Apply protective barrier, creams and emollients, Minimize layers, Transferring/repositioning devices                Problem: Patient Education: Go to Patient Education Activity  Goal: Patient/Family Education  Outcome: Progressing Towards Goal     Problem: Patient Education: Go to Patient Education Activity  Goal: Patient/Family Education  Outcome: Progressing Towards Goal     Problem: Patient Education: Go to Patient Education Activity  Goal: Patient/Family Education  Outcome: Progressing Towards Goal     Problem: Patient Education: Go to Patient Education Activity  Goal: Patient/Family Education  Outcome: Progressing Towards Goal     Problem: Falls - Risk of  Goal: *Absence of Falls  Description: Document Chyna Fall Risk and appropriate interventions in the flowsheet.   Outcome: Progressing Towards Goal  Note: Fall Risk Interventions:  Mobility Interventions: Bed/chair exit alarm, Patient to call before getting OOB, Strengthening exercises (ROM-active/passive)    Mentation Interventions: Bed/chair exit alarm, Family/sitter at bedside, Reorient patient    Medication Interventions: Patient to call before getting OOB, Teach patient to arise slowly    Elimination Interventions:  Toileting schedule/hourly rounds, Bed/chair exit alarm    History of Falls Interventions: Bed/chair exit alarm, Door open when patient unattended         Problem: Patient Education: Go to Patient Education Activity  Goal: Patient/Family Education  Outcome: Progressing Towards Goal     Problem: Discharge Planning  Goal: *Discharge to safe environment  Outcome: Progressing Towards Goal  Goal: *Knowledge of medication management  Outcome: Progressing Towards Goal  Goal: *Knowledge of discharge instructions  Outcome: Progressing Towards Goal     Problem: Patient Education: Go to Patient Education Activity  Goal: Patient/Family Education  Outcome: Progressing Towards Goal     Problem: Nutrition Deficit  Goal: *Optimize nutritional status  Outcome: Progressing Towards Goal     Problem: Patient Education: Go to Patient Education Activity  Goal: Patient/Family Education  Outcome: Progressing Towards Goal

## 2022-11-06 NOTE — PROGRESS NOTES
Problem: Pressure Injury - Risk of  Goal: *Prevention of pressure injury  Description: Document Jose Enrique Scale and appropriate interventions in the flowsheet. Outcome: Progressing Towards Goal  Note: Pressure Injury Interventions:  Sensory Interventions: Keep linens dry and wrinkle-free    Moisture Interventions: Absorbent underpads    Activity Interventions: Increase time out of bed    Mobility Interventions: HOB 30 degrees or less    Nutrition Interventions: Document food/fluid/supplement intake    Friction and Shear Interventions: HOB 30 degrees or less                Problem: Falls - Risk of  Goal: *Absence of Falls  Description: Document Chyna Fall Risk and appropriate interventions in the flowsheet.   Outcome: Progressing Towards Goal  Note: Fall Risk Interventions:  Mobility Interventions: Bed/chair exit alarm    Mentation Interventions: Update white board    Medication Interventions: Evaluate medications/consider consulting pharmacy    Elimination Interventions: Bed/chair exit alarm    History of Falls Interventions: Consult care management for discharge planning

## 2022-11-07 ENCOUNTER — APPOINTMENT (OUTPATIENT)
Dept: GENERAL RADIOLOGY | Age: 57
DRG: 070 | End: 2022-11-07
Attending: HOSPITALIST
Payer: COMMERCIAL

## 2022-11-07 LAB
ANION GAP SERPL CALC-SCNC: 4 MMOL/L (ref 5–15)
BASOPHILS # BLD: 0 K/UL (ref 0–0.1)
BASOPHILS NFR BLD: 0 % (ref 0–1)
BUN SERPL-MCNC: 23 MG/DL (ref 6–20)
BUN/CREAT SERPL: 44 (ref 12–20)
CALCIUM SERPL-MCNC: 9.1 MG/DL (ref 8.5–10.1)
CHLORIDE SERPL-SCNC: 105 MMOL/L (ref 97–108)
CO2 SERPL-SCNC: 31 MMOL/L (ref 21–32)
COMMENT, HOLDF: NORMAL
CREAT SERPL-MCNC: 0.52 MG/DL (ref 0.55–1.02)
DIFFERENTIAL METHOD BLD: ABNORMAL
EOSINOPHIL # BLD: 0.2 K/UL (ref 0–0.4)
EOSINOPHIL NFR BLD: 1 % (ref 0–7)
ERYTHROCYTE [DISTWIDTH] IN BLOOD BY AUTOMATED COUNT: 14 % (ref 11.5–14.5)
FLUAV RNA SPEC QL NAA+PROBE: NOT DETECTED
FLUBV RNA SPEC QL NAA+PROBE: NOT DETECTED
GLUCOSE SERPL-MCNC: 102 MG/DL (ref 65–100)
HCT VFR BLD AUTO: 32.7 % (ref 35–47)
HGB BLD-MCNC: 10.8 G/DL (ref 11.5–16)
IMM GRANULOCYTES # BLD AUTO: 0 K/UL (ref 0–0.04)
IMM GRANULOCYTES NFR BLD AUTO: 0 % (ref 0–0.5)
LACTATE SERPL-SCNC: 1 MMOL/L (ref 0.4–2)
LYMPHOCYTES # BLD: 2.4 K/UL (ref 0.8–3.5)
LYMPHOCYTES NFR BLD: 23 % (ref 12–49)
MCH RBC QN AUTO: 29.3 PG (ref 26–34)
MCHC RBC AUTO-ENTMCNC: 33 G/DL (ref 30–36.5)
MCV RBC AUTO: 88.6 FL (ref 80–99)
MONOCYTES # BLD: 0.7 K/UL (ref 0–1)
MONOCYTES NFR BLD: 7 % (ref 5–13)
NEUTS SEG # BLD: 7.2 K/UL (ref 1.8–8)
NEUTS SEG NFR BLD: 69 % (ref 32–75)
NRBC # BLD: 0 K/UL (ref 0–0.01)
NRBC BLD-RTO: 0 PER 100 WBC
PLATELET # BLD AUTO: 329 K/UL (ref 150–400)
PMV BLD AUTO: 11.2 FL (ref 8.9–12.9)
POTASSIUM SERPL-SCNC: 4.3 MMOL/L (ref 3.5–5.1)
RBC # BLD AUTO: 3.69 M/UL (ref 3.8–5.2)
SAMPLES BEING HELD,HOLD: NORMAL
SARS-COV-2, COV2: NOT DETECTED
SODIUM SERPL-SCNC: 140 MMOL/L (ref 136–145)
WBC # BLD AUTO: 10.5 K/UL (ref 3.6–11)

## 2022-11-07 PROCEDURE — 36415 COLL VENOUS BLD VENIPUNCTURE: CPT

## 2022-11-07 PROCEDURE — 80048 BASIC METABOLIC PNL TOTAL CA: CPT

## 2022-11-07 PROCEDURE — 81001 URINALYSIS AUTO W/SCOPE: CPT

## 2022-11-07 PROCEDURE — 83605 ASSAY OF LACTIC ACID: CPT

## 2022-11-07 PROCEDURE — 87636 SARSCOV2 & INF A&B AMP PRB: CPT

## 2022-11-07 PROCEDURE — 65660000001 HC RM ICU INTERMED STEPDOWN

## 2022-11-07 PROCEDURE — 85025 COMPLETE CBC W/AUTO DIFF WBC: CPT

## 2022-11-07 PROCEDURE — 71045 X-RAY EXAM CHEST 1 VIEW: CPT

## 2022-11-07 PROCEDURE — 74011000250 HC RX REV CODE- 250: Performed by: FAMILY MEDICINE

## 2022-11-07 PROCEDURE — 87150 DNA/RNA AMPLIFIED PROBE: CPT

## 2022-11-07 PROCEDURE — 87040 BLOOD CULTURE FOR BACTERIA: CPT

## 2022-11-07 RX ADMIN — SODIUM CHLORIDE, PRESERVATIVE FREE 10 ML: 5 INJECTION INTRAVENOUS at 13:10

## 2022-11-07 RX ADMIN — SODIUM CHLORIDE, PRESERVATIVE FREE 10 ML: 5 INJECTION INTRAVENOUS at 22:13

## 2022-11-07 RX ADMIN — SODIUM CHLORIDE, PRESERVATIVE FREE 10 ML: 5 INJECTION INTRAVENOUS at 05:57

## 2022-11-07 NOTE — PROGRESS NOTES
Patient unable to have PEG placement today due to pending COVID test. Spoke with pt's mother Sara Petit and updated on current plan of care; she verbalized understanding.

## 2022-11-07 NOTE — PROGRESS NOTES
Left message to call back.  Please inform her of the message below.   Problem: Pressure Injury - Risk of  Goal: *Prevention of pressure injury  Description: Document Jose Enrique Scale and appropriate interventions in the flowsheet. Outcome: Progressing Towards Goal  Note: Pressure Injury Interventions:  Sensory Interventions: Turn and reposition approx. every two hours (pillows and wedges if needed)    Moisture Interventions: Minimize layers    Activity Interventions: Chair cushion    Mobility Interventions: Turn and reposition approx. every two hours(pillow and wedges)    Nutrition Interventions: Offer support with meals,snacks and hydration    Friction and Shear Interventions: Lift sheet, Apply protective barrier, creams and emollients                Problem: Patient Education: Go to Patient Education Activity  Goal: Patient/Family Education  Outcome: Progressing Towards Goal     Problem: Patient Education: Go to Patient Education Activity  Goal: Patient/Family Education  Outcome: Progressing Towards Goal     Problem: Patient Education: Go to Patient Education Activity  Goal: Patient/Family Education  Outcome: Progressing Towards Goal     Problem: Patient Education: Go to Patient Education Activity  Goal: Patient/Family Education  Outcome: Progressing Towards Goal     Problem: Falls - Risk of  Goal: *Absence of Falls  Description: Document Samantha Embs Fall Risk and appropriate interventions in the flowsheet.   Outcome: Progressing Towards Goal  Note: Fall Risk Interventions:  Mobility Interventions: Bed/chair exit alarm, PT Consult for mobility concerns    Mentation Interventions: Bed/chair exit alarm    Medication Interventions: Patient to call before getting OOB    Elimination Interventions: Patient to call for help with toileting needs    History of Falls Interventions: Bed/chair exit alarm         Problem: Patient Education: Go to Patient Education Activity  Goal: Patient/Family Education  Outcome: Progressing Towards Goal     Problem: Discharge Planning  Goal: *Discharge to safe environment  Outcome: Progressing Towards Goal  Goal: *Knowledge of medication management  Outcome: Progressing Towards Goal  Goal: *Knowledge of discharge instructions  Outcome: Progressing Towards Goal     Problem: Patient Education: Go to Patient Education Activity  Goal: Patient/Family Education  Outcome: Progressing Towards Goal     Problem: Nutrition Deficit  Goal: *Optimize nutritional status  Outcome: Progressing Towards Goal     Problem: Patient Education: Go to Patient Education Activity  Goal: Patient/Family Education  Outcome: Progressing Towards Goal

## 2022-11-07 NOTE — PROGRESS NOTES
Transition of Care Plan  RUR- Med 17%  DISPOSITION: SNF/LTC ( [facility] have accepted; [facility] referrals still pending)  F/U with PCP/Specialist    Transport: [facility]    Emergency contact: Mother, Irene Hidden [phone] NARDA ARCHIBALD are 3 sons, but mother has been deemed spokesperson)    CM barriers to discharge: Accepting SNF, auth    CM met with patient's mother, Jeffery Ramos at bedside to discuss additional SNF choices. She is in agreement to have [facility] SNF referral sent as patient's age and complexity are barriers to placement. [facility] referrals sent in [facility]. Patient's mother has requested a letter from physician regarding patien'ts dx and lack of ability to make decisions. Discussed with Dr. Shira Huntley, she plans to write letter for patient. Plan for patient to have PEG placed. 4:00pm: [facility] [employer] have accepted patient. Plan to provide these choices to patient's daughter tomorrow. [place] Last) RENE [email or web address].

## 2022-11-07 NOTE — PROGRESS NOTES
[facility] Adult  Hospitalist Group                                                                                          Hospitalist Progress Note  Giovanna Mitchell MD  Answering service: [phone] from in house phone        Date of Service:  2022  NAME:  Brittnee Burton  :  1965  MRN:  [id number]      Admission Summary:     Brittnee Burton is a 59 y.o. female with past medical history of anoxic brain injury, anxiety, depression, hypertension, hyperlipidemia, hypothyroidism presented to the emergency department via EMS from [address] [place] [address] with reported altered mental status (AMS). Patient is aphasic/nonverbal and not answering any questions. Majority of history was obtained per review of chart records. Per reports patient recently was hospitalized at [facility] from 2022-10/20/2022 with diagnosis of status epilepticus, acute metabolic encephalopathy, delirium, psychosis, generalized anxiety disorder, debility, Takotsubo cardiomyopathy, NSTEMI, bradycardia, SIRS, tachycardia, fever, leukocytosis, and CVA. Patient had work-up including MRI and also EEG. There was concern the patient may have some anoxic brain injury. She has been followed by psychiatry and neurology services with persistent encephalopathy and intermittent severe aggression. Patient has had aphasia and altered mental status not following commands per staff report since last week. Symptoms remain constant, severe, without specific exacerbating relieving factors. About emergency department today, initial recorded vital signs temperature 98.8 °F, /65, heart rate 103, respirate 30, O2 saturation 93% on room air. Abnormal labs included WC 12.5, platelets 025, BUN 23. CT head without IV contrast showed no acute intracranial abnormalities. ED request admission to the hospitalist service.      No acute event overnight, less rigidity, stable and transferred out of ICU on 10/29    Interval history / Subjective:     Patient seen and examined earlier this morning by me for follow-up of delayed hypoxic leukoencephalopathy. No acute changes overnight. 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 Differential include acute or toxic encephalopathy.  - ANCA negative. Glutamic acid decarb negative <5.0. ADIN not sent  - CSF culture no growth, normal protein, HSV negative, myelin basic protein pending.     -Neurology on board appreciate help  -Continue thiamine  -PT OT and speech  -Continue tube feeds for now  -Guarded prognosis  -GI consulted for possible PEG today  -Hold Lovenox on Sunday for possible procedure on Monday  -N.p.o. after midnight on Sunday 11/7-PEG tube placement  N.p.o. midnight       Suspected anoxic brain injury/delayed hypoxic leukoencephalopathy  Suspected Seizure   Hx of CVA- CT head on 10/25/2022 no acute intracranial abnormality on noncontrast head CT  -mental status is declining, lethargic, open her eyes to touch, aphasic, doesn't follow command or moves her extremities   -continue nuero check and supportive care  -complex medical problem with hx of previous prolonged hospitalization  -SpO2 93-98% on RA  -high risk for decompensation   11/6-[facility] rejected transfer. Continue current management.       Hx of Takotsubo cardiomyopathy  -has peripheral edema  -last Echo was on 10/12 EF 55-60%   -BP low normal, if it improves will consider her home metoprolol and lasix   -monitor I/O and daily weight      HTN  -BP normal, monitor BP  -home clonidine, on hold     Hyperlipidemia   -statin on hold due to elevated CK     Hypothyroidism   -continue synthroid      Generalized anxiety disorder  -home trazodone and lexapro on hold     Severe disability with immobility   -continue supportive care      Obesity   -BMI 32.52 kg/m2  -need weight loss program    SNF/rehab    Overall poor prognosis , d/w mother at bedside after PEG plan  for SNF     Code status: Full code  Prophylaxis: lovenox   Care Plan discussed with: Nurse  Anticipated Disposition: SNF versus other     Hospital Problems  Date Reviewed: 10/5/2022            Codes Class Noted POA    Altered mental status ICD-10-CM: R41.82  ICD-9-CM: 780.97  10/27/2022 Unknown        Diarrhea ICD-10-CM: R19.7  ICD-9-CM: 787.91  10/25/2022 Unknown        AMS (altered mental status) ICD-10-CM: R41.82  ICD-9-CM: 780.97  10/25/2022 Unknown       Vital Signs:    Last 24hrs VS reviewed since prior progress note. Most recent are:  Visit Vitals  /62 (BP 1 Location: Right arm)   Pulse 96   Temp 99.4 °F (37.4 °C)   Resp 18   Ht 5' 6\" (1.676 m)   Wt 90 kg (198 lb 6.6 oz)   SpO2 95%   BMI 32.02 kg/m²         Intake/Output Summary (Last 24 hours) at 11/7/2022 1423  Last data filed at 11/7/2022 0602  Gross per 24 hour   Intake 750 ml   Output 990 ml   Net -240 ml          Physical Examination:     I had a face to face encounter with this patient and independently examined them on 11/7/2022 as outlined below:          Constitutional:   aphasic, no acute distres   ENT: NGT in place, oral mucosa moist, oropharynx benign. Resp:  CTA bilaterally. No wheezing/rhonchi/rales. No accessory muscle use. CV:  Regular rhythm, normal rate, no murmurs, gallops, rubs    GI:  Soft, non distended, non tender.  normoactive bowel sounds, no hepatosplenomegaly Musculoskeletal: Bilateral pretibial, pedal and hand edema    Neurologic: Lethargic, open here eyes to touch, aphasic, does not follow command or moves her extremities             Data Review:    Review and/or order of clinical lab test  Review and/or order of tests in the radiology section of CPT  Review and/or order of tests in the medicine section of CPT      Labs:     No results for input(s): WBC, HGB, HCT, PLT, HGBEXT, HCTEXT, PLTEXT, HGBEXT, HCTEXT, PLTEXT in the last 72 hours. No results for input(s): NA, K, CL, CO2, BUN, CREA, GLU, CA, MG, PHOS, URICA in the last 72 hours. No results for input(s): ALT, AP, TBIL, TBILI, TP, ALB, GLOB, GGT, AML, LPSE in the last 72 hours. No lab exists for component: SGOT, GPT, AMYP, HLPSE    No results for input(s): INR, PTP, APTT, INREXT, INREXT in the last 72 hours. No results for input(s): FE, TIBC, PSAT, FERR in the last 72 hours. Lab Results   Component Value Date/Time    Folate 9.9 10/26/2022 11:47 AM        No results for input(s): PH, PCO2, PO2 in the last 72 hours. No results for input(s): CPK, CKNDX, TROIQ in the last 72 hours.     No lab exists for component: CPKMB    Lab Results   Component Value Date/Time    Cholesterol, total 145 10/26/2022 06:46 AM    HDL Cholesterol 30 10/26/2022 06:46 AM    LDL, calculated 91.4 10/26/2022 06:46 AM    Triglyceride 118 10/26/2022 06:46 AM    CHOL/HDL Ratio 4.8 10/26/2022 06:46 AM     Lab Results   Component Value Date/Time    Glucose (POC) 110 10/05/2022 04:55 PM    Glucose (POC) 87 10/05/2022 11:02 AM    Glucose (POC) 78 10/05/2022 05:00 AM    Glucose (POC) 94 10/05/2022 04:57 AM    Glucose (POC) 124 (H) 10/04/2022 11:03 PM     Lab Results   Component Value Date/Time    Color YELLOW/STRAW 10/25/2022 04:54 PM    Appearance CLEAR 10/25/2022 04:54 PM    Specific gravity >1.030 (H) 10/25/2022 04:54 PM    Specific gravity 1.011 09/27/2022 12:51 PM    pH (UA) 5.5 10/25/2022 04:54 PM    Protein TRACE (A) 10/25/2022 04:54 PM    Glucose Negative 10/25/2022 04:54 PM    Ketone TRACE (A) 10/25/2022 04:54 PM    Bilirubin Negative 10/25/2022 04:54 PM    Urobilinogen 1.0 10/25/2022 04:54 PM    Nitrites Negative 10/25/2022 04:54 PM    Leukocyte Esterase Negative 10/25/2022 04:54 PM    Epithelial cells FEW 10/25/2022 04:54 PM    Bacteria Negative 10/25/2022 04:54 PM    WBC 0-4 10/25/2022 04:54 PM    RBC 0-5 10/25/2022 04:54 PM         Medications Reviewed:     Current Facility-Administered Medications   Medication Dose Route Frequency    senna-docusate (PERICOLACE) 8.6-50 mg per tablet 2 Tablet  2 Tablet Oral DAILY    [Held by provider] metoprolol tartrate (LOPRESSOR) tablet 12.5 mg  12.5 mg Oral Q12H    polyethylene glycol (MIRALAX) packet 17 g  17 g Oral DAILY PRN    thiamine HCL (B-1) tablet 100 mg  100 mg Oral DAILY    therapeutic multivitamin (THERAGRAN) tablet 1 Tablet  1 Tablet Oral DAILY    acetaminophen (TYLENOL) solution 650 mg  650 mg Per NG tube Q4H PRN    sodium chloride (NS) flush 5-40 mL  5-40 mL IntraVENous Q8H    sodium chloride (NS) flush 5-40 mL  5-40 mL IntraVENous PRN    [Held by provider] atorvastatin (LIPITOR) tablet 10 mg  10 mg Oral QHS    levothyroxine (SYNTHROID) tablet 88 mcg  88 mcg Oral ACB    pantoprazole (PROTONIX) tablet 40 mg  40 mg Oral ACB    sodium chloride (NS) flush 5-10 mL  5-10 mL IntraVENous PRN    acetaminophen (TYLENOL) suppository 650 mg  650 mg Rectal Q6H PRN     ______________________________________________________________________  EXPECTED LENGTH OF STAY: 2d 7h  ACTUAL LENGTH OF STAY:          Ashanti Rothman MD

## 2022-11-07 NOTE — PROGRESS NOTES
Bedside and Verbal shift change report given to Kalyn Rosa RN (oncoming nurse) by Anjum Knight RN (offgoing nurse). Report included the following information SBAR, Kardex, ED Summary, Procedure Summary, Accordion, Recent Results, Cardiac Rhythm ST, Alarm Parameters , Quality Measures, and Dual Neuro Assessment.

## 2022-11-08 ENCOUNTER — ANESTHESIA (OUTPATIENT)
Dept: ENDOSCOPY | Age: 57
DRG: 070 | End: 2022-11-08
Payer: COMMERCIAL

## 2022-11-08 ENCOUNTER — ANESTHESIA EVENT (OUTPATIENT)
Dept: ENDOSCOPY | Age: 57
DRG: 070 | End: 2022-11-08
Payer: COMMERCIAL

## 2022-11-08 LAB
ACC. NO. FROM MICRO ORDER, ACCP: ABNORMAL
ACINETOBACTER CALCOACETICUS-BAUMANII COMPLEX, ACBCX: NOT DETECTED
ANION GAP SERPL CALC-SCNC: 6 MMOL/L (ref 5–15)
APPEARANCE UR: CLEAR
BACTERIA URNS QL MICRO: ABNORMAL /HPF
BACTEROIDES FRAGILIS, BFRA: NOT DETECTED
BASOPHILS # BLD: 0.1 K/UL (ref 0–0.1)
BASOPHILS NFR BLD: 1 % (ref 0–1)
BILIRUB UR QL: NEGATIVE
BIOFIRE COMMENT, BCIDPF: ABNORMAL
BUN SERPL-MCNC: 23 MG/DL (ref 6–20)
BUN/CREAT SERPL: 38 (ref 12–20)
C GLABRATA DNA VAG QL NAA+PROBE: NOT DETECTED
CALCIUM SERPL-MCNC: 9.3 MG/DL (ref 8.5–10.1)
CANDIDA ALBICANS: NOT DETECTED
CANDIDA AURIS, CAAU: NOT DETECTED
CANDIDA KRUSEI, CKRP: NOT DETECTED
CANDIDA PARAPSILOSIS, CPAUP: NOT DETECTED
CANDIDA TROPICALIS, CTROP: NOT DETECTED
CAOX CRY URNS QL MICRO: ABNORMAL
CHLORIDE SERPL-SCNC: 103 MMOL/L (ref 97–108)
CO2 SERPL-SCNC: 28 MMOL/L (ref 21–32)
COLOR UR: ABNORMAL
CREAT SERPL-MCNC: 0.61 MG/DL (ref 0.55–1.02)
CRYPTO NEOFORMANS/GATTII, CRYNEG: NOT DETECTED
DIFFERENTIAL METHOD BLD: NORMAL
ENTEROBACTER CLOACAE COMPLEX, ECCP: NOT DETECTED
ENTEROBACTERALES SP. , ENBLS: NOT DETECTED
ENTEROCOCCUS FAECALIS, ENFA: NOT DETECTED
ENTEROCOCCUS FAECIUM, ENFAM: NOT DETECTED
EOSINOPHIL # BLD: 0.3 K/UL (ref 0–0.4)
EOSINOPHIL NFR BLD: 3 % (ref 0–7)
EPITH CASTS URNS QL MICRO: ABNORMAL /LPF
ERYTHROCYTE [DISTWIDTH] IN BLOOD BY AUTOMATED COUNT: 14.1 % (ref 11.5–14.5)
ESCHERICHIA COLI: NOT DETECTED
GLUCOSE SERPL-MCNC: 106 MG/DL (ref 65–100)
GLUCOSE UR STRIP.AUTO-MCNC: NEGATIVE MG/DL
HAEMOPHILUS INFLUENZAE, HMI: NOT DETECTED
HCT VFR BLD AUTO: 35.8 % (ref 35–47)
HGB BLD-MCNC: 11.7 G/DL (ref 11.5–16)
HGB UR QL STRIP: NEGATIVE
IMM GRANULOCYTES # BLD AUTO: 0 K/UL (ref 0–0.04)
IMM GRANULOCYTES NFR BLD AUTO: 0 % (ref 0–0.5)
KETONES UR QL STRIP.AUTO: NEGATIVE MG/DL
KLEBSIELLA AEROGENES, KLAE: NOT DETECTED
KLEBSIELLA OXYTOCA: NOT DETECTED
KLEBSIELLA PNEUMONIAE GROUP, KPPG: NOT DETECTED
LEUKOCYTE ESTERASE UR QL STRIP.AUTO: NEGATIVE
LISTERIA MONOCYTOGENES, LMONP: NOT DETECTED
LYMPHOCYTES # BLD: 1.9 K/UL (ref 0.8–3.5)
LYMPHOCYTES NFR BLD: 21 % (ref 12–49)
MBP CSF-MCNC: 61.7 NG/ML (ref 0–3.7)
MCH RBC QN AUTO: 29.7 PG (ref 26–34)
MCHC RBC AUTO-ENTMCNC: 32.7 G/DL (ref 30–36.5)
MCV RBC AUTO: 90.9 FL (ref 80–99)
MECA/C (METHICILLIN RESISTANT GENE), MECACP: DETECTED
MONOCYTES # BLD: 0.6 K/UL (ref 0–1)
MONOCYTES NFR BLD: 7 % (ref 5–13)
NEISSERIA MENINGITIDIS, NMNI: NOT DETECTED
NEUTS SEG # BLD: 6.3 K/UL (ref 1.8–8)
NEUTS SEG NFR BLD: 68 % (ref 32–75)
NITRITE UR QL STRIP.AUTO: NEGATIVE
NRBC # BLD: 0 K/UL (ref 0–0.01)
NRBC BLD-RTO: 0 PER 100 WBC
PH UR STRIP: 5.5 [PH] (ref 5–8)
PLATELET # BLD AUTO: 330 K/UL (ref 150–400)
PMV BLD AUTO: 10.8 FL (ref 8.9–12.9)
POTASSIUM SERPL-SCNC: 4.7 MMOL/L (ref 3.5–5.1)
PROT UR STRIP-MCNC: NEGATIVE MG/DL
PROTEUS, PRP: NOT DETECTED
PSEUDOMONAS AERUGINOSA: NOT DETECTED
RBC # BLD AUTO: 3.94 M/UL (ref 3.8–5.2)
RBC #/AREA URNS HPF: ABNORMAL /HPF (ref 0–5)
RESISTANT GENE SPACE, REGENE: ABNORMAL
SALMONELLA, SALMO: NOT DETECTED
SERRATIA MARCESCENS: NOT DETECTED
SODIUM SERPL-SCNC: 137 MMOL/L (ref 136–145)
SP GR UR REFRACTOMETRY: 1.03 (ref 1–1.03)
STAPH EPIDERMIDIS, STEP: DETECTED
STAPH LUGDUNENSIS, STALUG: NOT DETECTED
STAPHYLOCOCCUS AUREUS: NOT DETECTED
STAPHYLOCOCCUS, STAPP: DETECTED
STENO MALTOPHILIA, STMA: NOT DETECTED
STREPTOCOCCUS , STPSP: NOT DETECTED
STREPTOCOCCUS AGALACTIAE (GROUP B): NOT DETECTED
STREPTOCOCCUS PNEUMONIAE , SPNP: NOT DETECTED
STREPTOCOCCUS PYOGENES (GROUP A), SPYOP: NOT DETECTED
UA: UC IF INDICATED,UAUC: ABNORMAL
UROBILINOGEN UR QL STRIP.AUTO: 1 EU/DL (ref 0.2–1)
WBC # BLD AUTO: 9.2 K/UL (ref 3.6–11)
WBC URNS QL MICRO: ABNORMAL /HPF (ref 0–4)

## 2022-11-08 PROCEDURE — 77030005122 HC CATH GASTMY PEG BSC -B: Performed by: INTERNAL MEDICINE

## 2022-11-08 PROCEDURE — 85025 COMPLETE CBC W/AUTO DIFF WBC: CPT

## 2022-11-08 PROCEDURE — 65660000001 HC RM ICU INTERMED STEPDOWN

## 2022-11-08 PROCEDURE — 99233 SBSQ HOSP IP/OBS HIGH 50: CPT | Performed by: NURSE PRACTITIONER

## 2022-11-08 PROCEDURE — 76040000019: Performed by: INTERNAL MEDICINE

## 2022-11-08 PROCEDURE — 0DH68UZ INSERTION OF FEEDING DEVICE INTO STOMACH, VIA NATURAL OR ARTIFICIAL OPENING ENDOSCOPIC: ICD-10-PCS | Performed by: INTERNAL MEDICINE

## 2022-11-08 PROCEDURE — 74011250636 HC RX REV CODE- 250/636: Performed by: NURSE ANESTHETIST, CERTIFIED REGISTERED

## 2022-11-08 PROCEDURE — 74011250637 HC RX REV CODE- 250/637: Performed by: NURSE PRACTITIONER

## 2022-11-08 PROCEDURE — 74011000250 HC RX REV CODE- 250: Performed by: FAMILY MEDICINE

## 2022-11-08 PROCEDURE — 74011250637 HC RX REV CODE- 250/637: Performed by: PHYSICIAN ASSISTANT

## 2022-11-08 PROCEDURE — 3E0G76Z INTRODUCTION OF NUTRITIONAL SUBSTANCE INTO UPPER GI, VIA NATURAL OR ARTIFICIAL OPENING: ICD-10-PCS | Performed by: HOSPITALIST

## 2022-11-08 PROCEDURE — 80048 BASIC METABOLIC PNL TOTAL CA: CPT

## 2022-11-08 PROCEDURE — 76060000031 HC ANESTHESIA FIRST 0.5 HR: Performed by: INTERNAL MEDICINE

## 2022-11-08 PROCEDURE — 36415 COLL VENOUS BLD VENIPUNCTURE: CPT

## 2022-11-08 PROCEDURE — 2709999900 HC NON-CHARGEABLE SUPPLY: Performed by: INTERNAL MEDICINE

## 2022-11-08 RX ORDER — CEFAZOLIN SODIUM 1 G/3ML
INJECTION, POWDER, FOR SOLUTION INTRAMUSCULAR; INTRAVENOUS
Status: COMPLETED
Start: 2022-11-08 | End: 2022-11-08

## 2022-11-08 RX ORDER — NALOXONE HYDROCHLORIDE 0.4 MG/ML
0.4 INJECTION, SOLUTION INTRAMUSCULAR; INTRAVENOUS; SUBCUTANEOUS
Status: CANCELLED | OUTPATIENT
Start: 2022-11-08 | End: 2022-11-08

## 2022-11-08 RX ORDER — SODIUM CHLORIDE, SODIUM LACTATE, POTASSIUM CHLORIDE, CALCIUM CHLORIDE 600; 310; 30; 20 MG/100ML; MG/100ML; MG/100ML; MG/100ML
INJECTION, SOLUTION INTRAVENOUS
Status: DISCONTINUED | OUTPATIENT
Start: 2022-11-08 | End: 2022-11-08 | Stop reason: HOSPADM

## 2022-11-08 RX ORDER — ATROPINE SULFATE 0.1 MG/ML
0.5 INJECTION INTRAVENOUS
Status: CANCELLED | OUTPATIENT
Start: 2022-11-08 | End: 2022-11-09

## 2022-11-08 RX ORDER — PROPOFOL 10 MG/ML
INJECTION, EMULSION INTRAVENOUS AS NEEDED
Status: DISCONTINUED | OUTPATIENT
Start: 2022-11-08 | End: 2022-11-08 | Stop reason: HOSPADM

## 2022-11-08 RX ORDER — SODIUM CHLORIDE 9 MG/ML
50 INJECTION, SOLUTION INTRAVENOUS CONTINUOUS
Status: CANCELLED | OUTPATIENT
Start: 2022-11-08 | End: 2022-11-08

## 2022-11-08 RX ORDER — DEXTROMETHORPHAN/PSEUDOEPHED 2.5-7.5/.8
1.2 DROPS ORAL
Status: CANCELLED | OUTPATIENT
Start: 2022-11-08

## 2022-11-08 RX ORDER — SODIUM CHLORIDE 0.9 % (FLUSH) 0.9 %
5-40 SYRINGE (ML) INJECTION EVERY 8 HOURS
Status: CANCELLED | OUTPATIENT
Start: 2022-11-08

## 2022-11-08 RX ORDER — SODIUM CHLORIDE 0.9 % (FLUSH) 0.9 %
5-40 SYRINGE (ML) INJECTION AS NEEDED
Status: CANCELLED | OUTPATIENT
Start: 2022-11-08

## 2022-11-08 RX ORDER — FLUMAZENIL 0.1 MG/ML
0.2 INJECTION INTRAVENOUS
Status: CANCELLED | OUTPATIENT
Start: 2022-11-08 | End: 2022-11-08

## 2022-11-08 RX ORDER — EPINEPHRINE 0.1 MG/ML
1 INJECTION INTRACARDIAC; INTRAVENOUS
Status: CANCELLED | OUTPATIENT
Start: 2022-11-08 | End: 2022-11-09

## 2022-11-08 RX ORDER — CEFAZOLIN SODIUM 1 G/3ML
INJECTION, POWDER, FOR SOLUTION INTRAMUSCULAR; INTRAVENOUS AS NEEDED
Status: DISCONTINUED | OUTPATIENT
Start: 2022-11-08 | End: 2022-11-08 | Stop reason: HOSPADM

## 2022-11-08 RX ADMIN — PANTOPRAZOLE SODIUM 40 MG: 40 TABLET, DELAYED RELEASE ORAL at 06:37

## 2022-11-08 RX ADMIN — THERA TABS 1 TABLET: TAB at 11:04

## 2022-11-08 RX ADMIN — SODIUM CHLORIDE, POTASSIUM CHLORIDE, SODIUM LACTATE AND CALCIUM CHLORIDE: 600; 310; 30; 20 INJECTION, SOLUTION INTRAVENOUS at 15:20

## 2022-11-08 RX ADMIN — SENNOSIDES AND DOCUSATE SODIUM 2 TABLET: 50; 8.6 TABLET ORAL at 11:04

## 2022-11-08 RX ADMIN — PROPOFOL 50 MG: 10 INJECTION, EMULSION INTRAVENOUS at 16:00

## 2022-11-08 RX ADMIN — SODIUM CHLORIDE, PRESERVATIVE FREE 10 ML: 5 INJECTION INTRAVENOUS at 23:25

## 2022-11-08 RX ADMIN — SODIUM CHLORIDE, PRESERVATIVE FREE 10 ML: 5 INJECTION INTRAVENOUS at 06:37

## 2022-11-08 RX ADMIN — PROPOFOL 50 MG: 10 INJECTION, EMULSION INTRAVENOUS at 16:08

## 2022-11-08 RX ADMIN — CEFAZOLIN 1 G: 330 INJECTION, POWDER, FOR SOLUTION INTRAMUSCULAR; INTRAVENOUS at 15:47

## 2022-11-08 RX ADMIN — Medication 100 MG: at 11:04

## 2022-11-08 RX ADMIN — PROPOFOL 100 MG: 10 INJECTION, EMULSION INTRAVENOUS at 15:53

## 2022-11-08 RX ADMIN — PROPOFOL 50 MG: 10 INJECTION, EMULSION INTRAVENOUS at 15:56

## 2022-11-08 RX ADMIN — LEVOTHYROXINE SODIUM 88 MCG: 0.09 TABLET ORAL at 06:37

## 2022-11-08 NOTE — PROGRESS NOTES
Neurology Progress Note     NAME: Moy Hendrickson   :  1965   MRN:  [id number]   DATE:  2022    Assessment:     Active Problems:    Diarrhea (10/25/2022)      AMS (altered mental status) (10/25/2022)      Altered mental status (10/27/2022)  Patient is a 59year-old female with history significant for hypothyroid, depression, obesity, and lap imelda who had recent prolonged hospitalization after respiratory arrest with witnessed seizure requiring intubation thought due to receiving a Fentanyl patch 100 mcg that was found on the patient who was found to have Takotsubos cardiomyopathy EF 25-30%. Lynn Leger was taken off 22 and several EEGs since that time have been negative for seizures. Last MRI 10/2/22 showed small diffusion restriction in R frontal lobe and she was started on ASA and statin for stroke. She was transferred to [address] [place] [address] and was eventually more awake and talking but remained confused. She was admitted to [facility] [place] on 10/25 from rehab for worsening encephalopathy. She was noted to be febrile and tachycardic. She was started on trazodone and Lexapro at rehab and received a dose each of Risperidal and Seroquel. Per her mother, since that time her mental status has continued to decline. She was also found to be rigid, hyperreflexic with clonus and there was concern for serotonin syndrome. Her SSRI and trazodone were discontinued. Patient has since had MRI findings below and unrevealing LP.     Exam today: Eyes open to noxious, no spontaneous movements, rigidity improved  MRI w/ contrast (22) shows diffuse bilateral T2 signal white matter hyperintensities   LP (22) OP 11, fluid clear, Myelin Basic Protein CSF 61.7  My exam today: opens OU to noxious, grimaces to movement and pain (noxious stim exam deferred)  Plan:   1.) Delayed Hypoxic Leukoencephalopathy:  - Etiology likely given clinical presentation and MRI findings and significantly high myelin basic protein  - CSF culture no growth, normal protein, HSV negative, MBP 61.7  - Supportive care. PEG placement today. - SNF placement   - Ordered Arun menjivar      Neurology has no further recommendations but we are available to answer any questions. We are available for family support as needed. I spoke with the mother and father at length about my discussions with 3 academic institutions. One declined acceptance, another I left a message with a specialist and another the case was discussed with Neurocritical care attendings. See previous progress note from 11/4/22.     Subjective:   Patient is comatose      Objective:   Chart reviewed since last seen    Current Facility-Administered Medications   Medication Dose Route Frequency    senna-docusate (PERICOLACE) 8.6-50 mg per tablet 2 Tablet  2 Tablet Oral DAILY    [Held by provider] metoprolol tartrate (LOPRESSOR) tablet 12.5 mg  12.5 mg Oral Q12H    polyethylene glycol (MIRALAX) packet 17 g  17 g Oral DAILY PRN    thiamine HCL (B-1) tablet 100 mg  100 mg Oral DAILY    therapeutic multivitamin (THERAGRAN) tablet 1 Tablet  1 Tablet Oral DAILY    acetaminophen (TYLENOL) solution 650 mg  650 mg Per NG tube Q4H PRN    sodium chloride (NS) flush 5-40 mL  5-40 mL IntraVENous Q8H    sodium chloride (NS) flush 5-40 mL  5-40 mL IntraVENous PRN    [Held by provider] atorvastatin (LIPITOR) tablet 10 mg  10 mg Oral QHS    levothyroxine (SYNTHROID) tablet 88 mcg  88 mcg Oral ACB    pantoprazole (PROTONIX) tablet 40 mg  40 mg Oral ACB    sodium chloride (NS) flush 5-10 mL  5-10 mL IntraVENous PRN    acetaminophen (TYLENOL) suppository 650 mg  650 mg Rectal Q6H PRN       Visit Vitals  /64 (BP 1 Location: Right upper arm, BP Patient Position: At rest)   Pulse (!) 107   Temp 99.3 °F (37.4 °C)   Resp (!) 31   Ht 5' 6\" (1.676 m)   Wt 90 kg (198 lb 6.6 oz)   SpO2 96% BMI 32.02 kg/m²     Temp (24hrs), Av °F (37.2 °C), Min:98.2 °F (36.8 °C), Max:100.2 °F (37.9 °C)      No intake/output data recorded.  1901 -  0700  In: 700   Out: 1340 [Urine:1340]      Physical Exam:  General: Well developed well nourished patient, obese  Cardiac: Regular rate and rhythm with no murmurs. Extremities: 2+ Radial pulses, no cyanosis or edema, pale    Neurological Exam:  Mental Status: Eyes open to noxious   Cranial Nerves:   PERRL 4mm ,no nystagmus, no ptosis. Conjugate, gaze. Does not participate with EOMs. Eyes look to left. Motor:  Deferred   Reflexes:   No clonus, Toes downgoing. Sensory:   FAVIOLA   Gait:  FAVIOLA   Cerebellar:  FAVIOLA         Lab Review   Recent Results (from the past 24 hour(s))   CBC WITH AUTOMATED DIFF    Collection Time: 22  3:24 PM   Result Value Ref Range    WBC 10.5 3.6 - 11.0 K/uL    RBC 3.69 (L) 3.80 - 5.20 M/uL    HGB 10.8 (L) 11.5 - 16.0 g/dL    HCT 32.7 (L) 35.0 - 47.0 %    MCV 88.6 80.0 - 99.0 FL    MCH 29.3 26.0 - 34.0 PG    MCHC 33.0 30.0 - 36.5 g/dL    RDW 14.0 11.5 - 14.5 %    PLATELET 705 419 - 089 K/uL    MPV 11.2 8.9 - 12.9 FL    NRBC 0.0 0  WBC    ABSOLUTE NRBC 0.00 0.00 - 0.01 K/uL    NEUTROPHILS 69 32 - 75 %    LYMPHOCYTES 23 12 - 49 %    MONOCYTES 7 5 - 13 %    EOSINOPHILS 1 0 - 7 %    BASOPHILS 0 0 - 1 %    IMMATURE GRANULOCYTES 0 0.0 - 0.5 %    ABS. NEUTROPHILS 7.2 1.8 - 8.0 K/UL    ABS. LYMPHOCYTES 2.4 0.8 - 3.5 K/UL    ABS. MONOCYTES 0.7 0.0 - 1.0 K/UL    ABS. EOSINOPHILS 0.2 0.0 - 0.4 K/UL    ABS. BASOPHILS 0.0 0.0 - 0.1 K/UL    ABS. IMM.  GRANS. 0.0 0.00 - 0.04 K/UL    DF AUTOMATED     METABOLIC PANEL, BASIC    Collection Time: 22  3:24 PM   Result Value Ref Range    Sodium 140 136 - 145 mmol/L    Potassium 4.3 3.5 - 5.1 mmol/L    Chloride 105 97 - 108 mmol/L    CO2 31 21 - 32 mmol/L    Anion gap 4 (L) 5 - 15 mmol/L    Glucose 102 (H) 65 - 100 mg/dL    BUN 23 (H) 6 - 20 MG/DL    Creatinine 0.52 (L) 0.55 - 1.02 MG/DL BUN/Creatinine ratio 44 (H) 12 - 20      eGFR >60 >60 ml/min/1.73m2    Calcium 9.1 8.5 - 10.1 MG/DL   LACTIC ACID    Collection Time: 11/07/22  3:24 PM   Result Value Ref Range    Lactic acid 1.0 0.4 - 2.0 MMOL/L   SAMPLES BEING HELD    Collection Time: 11/07/22  3:24 PM   Result Value Ref Range    SAMPLES BEING HELD 1RED,1PST     COMMENT        Add-on orders for these samples will be processed based on acceptable specimen integrity and analyte stability, which may vary by analyte.    CULTURE, BLOOD    Collection Time: 11/07/22  3:37 PM    Specimen: Blood   Result Value Ref Range    Special Requests: NO SPECIAL REQUESTS      Culture result: NO GROWTH AFTER 16 HOURS     COVID-19 WITH INFLUENZA A/B    Collection Time: 11/07/22  3:44 PM   Result Value Ref Range    SARS-CoV-2 by PCR Not detected NOTD      Influenza A by PCR Not detected NOTD      Influenza B by PCR Not detected NOTD     CULTURE, BLOOD    Collection Time: 11/07/22  4:51 PM    Specimen: Blood   Result Value Ref Range    Special Requests: NO SPECIAL REQUESTS      Culture result: NO GROWTH AFTER 14 HOURS     URINALYSIS W/ REFLEX CULTURE    Collection Time: 11/07/22 10:20 PM    Specimen: Urine   Result Value Ref Range    Color DARK YELLOW      Appearance CLEAR CLEAR      Specific gravity 1.030 1.003 - 1.030      pH (UA) 5.5 5.0 - 8.0      Protein Negative NEG mg/dL    Glucose Negative NEG mg/dL    Ketone Negative NEG mg/dL    Bilirubin Negative NEG      Blood Negative NEG      Urobilinogen 1.0 0.2 - 1.0 EU/dL    Nitrites Negative NEG      Leukocyte Esterase Negative NEG      WBC 0-4 0 - 4 /hpf    RBC 0-5 0 - 5 /hpf    Epithelial cells MODERATE (A) FEW /lpf    Bacteria 1+ (A) NEG /hpf    UA:UC IF INDICATED CULTURE NOT INDICATED BY UA RESULT CNI      CA Oxalate crystals 1+ (A) NEG   CBC WITH AUTOMATED DIFF    Collection Time: 11/08/22  2:40 AM   Result Value Ref Range    WBC 9.2 3.6 - 11.0 K/uL    RBC 3.94 3.80 - 5.20 M/uL    HGB 11.7 11.5 - 16.0 g/dL    HCT 35.8 35.0 - 47.0 %    MCV 90.9 80.0 - 99.0 FL    MCH 29.7 26.0 - 34.0 PG    MCHC 32.7 30.0 - 36.5 g/dL    RDW 14.1 11.5 - 14.5 %    PLATELET 404 014 - 192 K/uL    MPV 10.8 8.9 - 12.9 FL    NRBC 0.0 0  WBC    ABSOLUTE NRBC 0.00 0.00 - 0.01 K/uL    NEUTROPHILS 68 32 - 75 %    LYMPHOCYTES 21 12 - 49 %    MONOCYTES 7 5 - 13 %    EOSINOPHILS 3 0 - 7 %    BASOPHILS 1 0 - 1 %    IMMATURE GRANULOCYTES 0 0.0 - 0.5 %    ABS. NEUTROPHILS 6.3 1.8 - 8.0 K/UL    ABS. LYMPHOCYTES 1.9 0.8 - 3.5 K/UL    ABS. MONOCYTES 0.6 0.0 - 1.0 K/UL    ABS. EOSINOPHILS 0.3 0.0 - 0.4 K/UL    ABS. BASOPHILS 0.1 0.0 - 0.1 K/UL    ABS. IMM. GRANS. 0.0 0.00 - 0.04 K/UL    DF AUTOMATED     METABOLIC PANEL, BASIC    Collection Time: 11/08/22  2:40 AM   Result Value Ref Range    Sodium 137 136 - 145 mmol/L    Potassium 4.7 3.5 - 5.1 mmol/L    Chloride 103 97 - 108 mmol/L    CO2 28 21 - 32 mmol/L    Anion gap 6 5 - 15 mmol/L    Glucose 106 (H) 65 - 100 mg/dL    BUN 23 (H) 6 - 20 MG/DL    Creatinine 0.61 0.55 - 1.02 MG/DL    BUN/Creatinine ratio 38 (H) 12 - 20      eGFR >60 >60 ml/min/1.73m2    Calcium 9.3 8.5 - 10.1 MG/DL         Additional comments:  I have reviewed the patient's new clinical lab test results. There are no new head images to review. MRI Results (most recent):  Results from [place] encounter on 10/25/22    MRI BRAIN W WO CONT      FINAL REPORT:    INDICATION:   encephalopathy, f/u MRI compare to previous 10/2/22    EXAMINATION:  MRI BRAIN WO CONTRAST    COMPARISON:  MRI October 2, 2022, CT October 25, 2022    TECHNIQUE:  Multiplanar multisequence acquisition without contrast of the brain. FINDINGS:    Ventricles:  Midline, no hydrocephalus. Brain Parenchyma/Brainstem:  Interval development of extensive, diffuse FLAIR/T2  hyperintense signal and associated diffusion restriction throughout the  supratentorial white matter. Involvement of the splenium of corpus callosum. There is cortical sparing.  The deep gray matter, brainstem and cerebellum are  also unaffected. Intracranial Hemorrhage:  None. Basal Cisterns:  Normal.  Flow Voids:  Normal.  Additional Comments:  N/A. Impression  Interval development of diffusely abnormal supratentorial white matter, with  extensive FLAIR/T2 hyperintense signal and diffusion restriction as described  above. No gray matter, brainstem or cerebellar involvement. No associated  enhancement, hemorrhage, or mass effect/hydrocephalus. Differential diagnosis  favors an acute toxic/metabolic process, with other etiologies including an  infectious/inflammatory encephalitis (viral) less likely. Sparing of the gray  matter structures makes an anoxic injury less likely. Care Plan discussed with:  Patient    Family x   RN    Care Manager x   Consultant/Specialist:  SEN Will    The patient was discussed with Dr. Princess Maurice.    I spent greater than 50% of a 35-minute visit counseling/coordination of care with the patient's mother and father discussing, reviewing and showing MRI results and discussing my conversations with other institutions.

## 2022-11-08 NOTE — PROGRESS NOTES
Physical therapy:    Pt getting PEG placed this afternoon. Will defer and continue to follow. Thank you.     Kirit Alcantar, PT, DPT

## 2022-11-08 NOTE — PROGRESS NOTES
I called [facility] transfer [place], [facility] (talked directly to Dr. Jess mahan) and [address] [place] [address] (spoke with Dr. Rod Nuñez) to discuss the patient's case and if she was a candidate for transfer to an [facility] for further management and evaluation. At this point, the patient was deemed not a candidate for transfer to these facilities and denied transfer. I spent a total of 50 minutes communicating with different care providers at the above institutions regarding the patient's case.

## 2022-11-08 NOTE — BSMART NOTE
BSMART Liaison Team Note      MSW attempted to meet with Pt. Pt was received resting in bed with mother at bedside. She was asleep, unable to be woken up and has not been verbally responsive during hospitalization. MSW spoke with Pt's mother and provided emotional support. During visit PT woke up and opened eyes but could not follow commands or communicate with her mother or MSW. She was free from any signs or symptoms of pain or distress. Pt's mother is asking about plans for discharge and was directed to speak with medical team, RN and CM on unit.

## 2022-11-08 NOTE — PROGRESS NOTES
Occupational Therapy  11/08/22    Chart reviewed. Attempted to see for OT weekly re-assessment however currently CHRISTOPHER for PEG placement. Will follow up as able/appropriate.      Thank you,   Car Gomes, OTD, OTR/L

## 2022-11-08 NOTE — PROGRESS NOTES
SLP following patient, note plans for PEG planned shortly, patient will leave floor for this soon and cannot do PO trials in the meantime. Will follow up at a later date.        Kathryn Villarreal M.S., [address] [facility]  Speech-Language Pathologist

## 2022-11-08 NOTE — PROGRESS NOTES
Transition of Care Plan  RU- Med 18%  DISPOSITION: SNF/LTC ( Saray Samuels of [employer], [facility] and [place] have accepted; [place] referrals still pending)  F/U with PCP/Specialist    Transport: [facility]    Emergency contact: Mother, Jj Murphy [phone] NARDA ARCHIBALD are 3 sons, but mother has been deemed spokesperson)    CM barriers to discharge: SNF choices (out of accepting facilities) and auth    Accepting SNFs: [address] [place] [address], [facility] and [place]     plans to provide list of accepting facilities to patients' mother this morning. PEG placement is pending. RENE KearneySSAJI.

## 2022-11-08 NOTE — ANESTHESIA PREPROCEDURE EVALUATION
Relevant Problems   NEUROLOGY   (+) Acute CVA (cerebrovascular accident) ([facility]   (+) Depression   (+) KOBY (generalized anxiety disorder)   (+) Generalized anxiety disorder   (+) Status epilepticus ([facility]      CARDIOVASCULAR   (+) NSTEMI (non-ST elevated myocardial infarction) (HCC)      RENAL FAILURE   (+) CHRIS (acute kidney injury) ([facility]      ENDOCRINE   (+) Acquired hypothyroidism   (+) Class 2 severe obesity due to excess calories with serious comorbidity and body mass index (BMI) of 36.0 to 36.9 in adult (HCC)   (+) Hypothyroidism   (+) Obese      HEMATOLOGY   (+) Anemia       Anesthetic History   No history of anesthetic complications            Review of Systems / Medical History  Patient summary reviewed, nursing notes reviewed and pertinent labs reviewed    Pulmonary  Within defined limits                 Neuro/Psych     seizures  CVA       Cardiovascular          CHF    CAD         GI/Hepatic/Renal  Within defined limits              Endo/Other      Hypothyroidism  Obesity     Other Findings              Physical Exam    Airway  Mallampati: II  TM Distance: > 6 cm  Neck ROM: normal range of motion   Mouth opening: Normal     Cardiovascular  Regular rate and rhythm,  S1 and S2 normal,  no murmur, click, rub, or gallop             Dental  No notable dental hx       Pulmonary  Breath sounds clear to auscultation               Abdominal  GI exam deferred       Other Findings            Anesthetic Plan    ASA: 3  Anesthesia type: MAC            Anesthetic plan and risks discussed with: Parent / Sofia Travis

## 2022-11-08 NOTE — PROCEDURES
[facility].  [address] [facility] [address] [facility], [address]   [phone]                    Percutaneous Endoscopic Gastroduodenoscopy Procedure Note      Funmi Carballo  1965  [id number]      Date of Procedure: 11/8/2022    Preoperative diagnosis: Dysphagia    Postoperative diagnosis: Peg placement    Procedure: Procedure(s):  PERCUTANEOUS ENDOSCOPIC GASTROSTOMY TUBE INSERTION  ESOPHAGOGASTRODUODENOSCOPY (EGD)    Indication: Dysphagia    :  Dr. Carrol Enamorado MD    Assistant(s): Endoscopy Technician-1: Emili Beasley  Endoscopy RN-1: Jyoti Thompson RN    Anesthesia/Sedation:  MAC anesthesia Propofol      Procedure Details:    After infomed consent was obtained for the procedure, with all risks and benefits of procedure explained the patient was taken to the endoscopy suite and placed in the left lateral decubitus position. Following sequential administration of sedation as per above, the endoscope was inserted into the mouth and advanced under direct vision to the second portion of the duodenum. The esophagus appeared normal.  There were no significant diagnostic abnormalities of the body, fundus, antrum, cardia and iscisura of the stomach. The first and second portion of the duodenum appeared normal.      A site was selected on the anterior abdominal wall with adequate illumination through the gastric wall and where one finger easily indented into the anterior abdominal wall. Lidocaine analgesia was utilized (1%). The exploring needle easily indented the stomach and penetrated it. The needle-trocar device was then inserted into the skin after an incision. Once the needle trocar device entered the stomach the trocar was grasped by the snare. The needle was removed and a wire was placed thorough the trocar. The wire was grasped by the snare and removed at the mouth.   A 20 Fr [employer] tube was positioned over the wire and pulled through the anterior abdominal wall and the tube was pulled snug. A brief repeat endoscopy verified proper placement of the tube and no apparent complications. Complications:   None; patient tolerated the procedure well. EBL:  None.            Impression:   Peg inserted with bumper at  4.5 cm    Recommendations:  Feeding, formula, and rate per Nutrition    Obdulia Ly MD  11/8/2022  4:14 PM

## 2022-11-08 NOTE — PROGRESS NOTES
TRANSFER - IN REPORT:    Verbal report received from [address] [place] [address] (name) on [facility]  being received from  (unit) for ordered procedure      Report consisted of patients Situation, Background, Assessment and   Recommendations(SBAR). Information from the following report(s) SBAR, Kardex, Intake/Output, MAR, Recent Results, and Cardiac Rhythm ST  was reviewed with the receiving nurse. Opportunity for questions and clarification was provided. Assessment completed upon patients arrival to unit and care assumed. TRANSFER - OUT REPORT:    Verbal report given to Kristi CHAVEZ (name) on [facility]  being transferred to  (unit) for routine post - op       Report consisted of patients Situation, Background, Assessment and   Recommendations(SBAR). Information from the following report(s) SBAR, Kardex, and Procedure Summary was reviewed with the receiving nurse. Lines:   Peripheral IV 11/07/22 Anterior; Left Forearm (Active)   Site Assessment Clean, dry, & intact 11/08/22 0800   Phlebitis Assessment 0 11/08/22 0800   Infiltration Assessment 0 11/08/22 0800   Dressing Status Clean, dry, & intact 11/08/22 0800   Dressing Type Tape;Transparent 11/08/22 0800   Hub Color/Line Status Flushed;Pink 11/08/22 0800   Action Taken Open ports on tubing capped 11/08/22 0800   Alcohol Cap Used Yes 11/08/22 0800        Opportunity for questions and clarification was provided.       Patient transported with:   [employer] Equipment

## 2022-11-08 NOTE — PROGRESS NOTES
[facility]  Hospitalist Group                                                                                          Hospitalist Progress Note  Siri Aguero MD  Answering service: [phone] from in house phone        Date of Service:  2022  NAME:  Ren Piper  :  1965  MRN:  [id number]      Admission Summary:     Ren Piper is a 59 y.o. female with past medical history of anoxic brain injury, anxiety, depression, hypertension, hyperlipidemia, hypothyroidism presented to the emergency department via EMS from [address] [place] [address] with reported altered mental status (AMS). Patient is aphasic/nonverbal and not answering any questions. Majority of history was obtained per review of chart records. Per reports patient recently was hospitalized at [place] [address] from 2022-10/20/2022 with diagnosis of status epilepticus, acute metabolic encephalopathy, delirium, psychosis, generalized anxiety disorder, debility, Takotsubo cardiomyopathy, NSTEMI, bradycardia, SIRS, tachycardia, fever, leukocytosis, and CVA. Patient had work-up including MRI and also EEG. There was concern the patient may have some anoxic brain injury. She has been followed by psychiatry and neurology services with persistent encephalopathy and intermittent severe aggression. Patient has had aphasia and altered mental status not following commands per staff report since last week. Symptoms remain constant, severe, without specific exacerbating relieving factors. About emergency department today, initial recorded vital signs temperature 98.8 °F, /65, heart rate 103, respirate 30, O2 saturation 93% on room air. Abnormal labs included WC 12.5, platelets 277, BUN 23. CT head without IV contrast showed no acute intracranial abnormalities. ED request admission to the hospitalist service.      No acute event overnight, less rigidity, stable and transferred out of ICU on 10/29    Interval history / Subjective:     Patient seen and examined earlier this morning by me for follow-up of delayed hypoxic leukoencephalopathy. No acute changes overnight. 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 Differential include acute or toxic encephalopathy.  - ANCA negative. Glutamic acid decarb negative <5.0. ADIN not sent  - CSF culture no growth, normal protein, HSV negative, myelin basic protein pending.     -Neurology on board appreciate help  -Continue thiamine  -PT OT and speech  -Continue tube feeds for now  -Guarded prognosis  -GI consulted for possible PEG today  -Hold Lovenox on Sunday for possible procedure     11/8-PEG tube placement  N.p.o. midnight       Suspected anoxic brain injury/delayed hypoxic leukoencephalopathy  Suspected Seizure   Hx of CVA- CT head on 10/25/2022 no acute intracranial abnormality on noncontrast head CT  -mental status is declining, lethargic, open her eyes to touch, aphasic, doesn't follow command or moves her extremities   -continue nuero check and supportive care  -complex medical problem with hx of previous prolonged hospitalization  -SpO2 93-98% on RA  -high risk for decompensation   11/6-[place] rejected transfer. Continue current management.       Hx of Takotsubo cardiomyopathy  -has peripheral edema  -last Echo was on 10/12 EF 55-60%   -BP low normal, if it improves will consider her home metoprolol and lasix   -monitor I/O and daily weight      HTN  -BP normal, monitor BP  -home clonidine, on hold     Hyperlipidemia   -statin on hold due to elevated CK     Hypothyroidism   -continue synthroid      Generalized anxiety disorder  -home trazodone and lexapro on hold     Severe disability with immobility   -continue supportive care      Obesity   -BMI 32.52 kg/m2  -need weight loss program    SNF/rehab  Overall poor prognosis , d/w mother at bedside after PEG plan  for SNF 11/7     Code status: Full code  Prophylaxis: lovenox   Care Plan discussed with: Nurse  Anticipated Disposition: SNF versus other     Hospital Problems  Date Reviewed: 10/5/2022            Codes Class Noted POA    Altered mental status ICD-10-CM: R41.82  ICD-9-CM: 780.97  10/27/2022 Unknown        Diarrhea ICD-10-CM: R19.7  ICD-9-CM: 787.91  10/25/2022 Unknown        AMS (altered mental status) ICD-10-CM: R41.82  ICD-9-CM: 780.97  10/25/2022 Unknown       Vital Signs:    Last 24hrs VS reviewed since prior progress note. Most recent are:  Visit Vitals  /68 (BP 1 Location: Right upper arm, BP Patient Position: At rest)   Pulse 95   Temp 99 °F (37.2 °C)   Resp 29   Ht 5' 6\" (1.676 m)   Wt 90 kg (198 lb 6.6 oz)   SpO2 95%   BMI 32.02 kg/m²         Intake/Output Summary (Last 24 hours) at 11/8/2022 0916  Last data filed at 11/7/2022 1600  Gross per 24 hour   Intake 200 ml   Output 350 ml   Net -150 ml          Physical Examination:     I had a face to face encounter with this patient and independently examined them on 11/8/2022 as outlined below:          Constitutional:   aphasic, no acute distres   ENT: NGT in place, oral mucosa moist, oropharynx benign. Resp:  CTA bilaterally. No wheezing/rhonchi/rales. No accessory muscle use.     CV:  Regular rhythm, normal rate, no murmurs, gallops, rubs    GI: Soft, non distended, non tender. normoactive bowel sounds, no hepatosplenomegaly     Musculoskeletal: Bilateral pretibial, pedal and hand edema    Neurologic: Lethargic, open here eyes to touch, aphasic, does not follow command or moves her extremities             Data Review:    Review and/or order of clinical lab test  Review and/or order of tests in the radiology section of CPT  Review and/or order of tests in the medicine section of [facility]      Labs:     Recent Labs     11/08/22  0240 11/07/22  1524   WBC 9.2 10.5   HGB 11.7 10.8*   HCT 35.8 32.7*    329       Recent Labs     11/08/22  0240 11/07/22  1524    140   K 4.7 4.3    105   CO2 28 31   BUN 23* 23*   CREA 0.61 0.52*   * 102*   CA 9.3 9.1       No results for input(s): ALT, AP, TBIL, TBILI, TP, ALB, GLOB, GGT, AML, LPSE in the last 72 hours. No lab exists for component: SGOT, GPT, AMYP, HLPSE    No results for input(s): INR, PTP, APTT, INREXT, INREXT in the last 72 hours. No results for input(s): FE, TIBC, PSAT, FERR in the last 72 hours. Lab Results   Component Value Date/Time    Folate 9.9 10/26/2022 11:47 AM        No results for input(s): PH, PCO2, PO2 in the last 72 hours. No results for input(s): CPK, CKNDX, TROIQ in the last 72 hours.     No lab exists for component: CPKMB    Lab Results   Component Value Date/Time    Cholesterol, total 145 10/26/2022 06:46 AM    HDL Cholesterol 30 10/26/2022 06:46 AM    LDL, calculated 91.4 10/26/2022 06:46 AM    Triglyceride 118 10/26/2022 06:46 AM    CHOL/HDL Ratio 4.8 10/26/2022 06:46 AM     Lab Results   Component Value Date/Time    Glucose (POC) 110 10/05/2022 04:55 PM    Glucose (POC) 87 10/05/2022 11:02 AM    Glucose (POC) 78 10/05/2022 05:00 AM    Glucose (POC) 94 10/05/2022 04:57 AM    Glucose (POC) 124 (H) 10/04/2022 11:03 PM     Lab Results   Component Value Date/Time    Color DARK YELLOW 11/07/2022 10:20 PM    Appearance CLEAR 11/07/2022 10:20 PM    Specific gravity 1.030 11/07/2022 10:20 PM    Specific gravity >1.030 (H) 10/25/2022 04:54 PM    pH (UA) 5.5 11/07/2022 10:20 PM    Protein Negative 11/07/2022 10:20 PM    Glucose Negative 11/07/2022 10:20 PM    Ketone Negative 11/07/2022 10:20 PM    Bilirubin Negative 11/07/2022 10:20 PM    Urobilinogen 1.0 11/07/2022 10:20 PM    Nitrites Negative 11/07/2022 10:20 PM    Leukocyte Esterase Negative 11/07/2022 10:20 PM    Epithelial cells MODERATE (A) 11/07/2022 10:20 PM    Bacteria 1+ (A) 11/07/2022 10:20 PM    WBC 0-4 11/07/2022 10:20 PM    RBC 0-5 11/07/2022 10:20 PM         Medications Reviewed:     Current Facility-Administered Medications   Medication Dose Route Frequency    senna-docusate (PERICOLACE) 8.6-50 mg per tablet 2 Tablet  2 Tablet Oral DAILY    [Held by provider] metoprolol tartrate (LOPRESSOR) tablet 12.5 mg  12.5 mg Oral Q12H    polyethylene glycol (MIRALAX) packet 17 g  17 g Oral DAILY PRN    thiamine HCL (B-1) tablet 100 mg  100 mg Oral DAILY    therapeutic multivitamin (THERAGRAN) tablet 1 Tablet  1 Tablet Oral DAILY    acetaminophen (TYLENOL) solution 650 mg  650 mg Per NG tube Q4H PRN    sodium chloride (NS) flush 5-40 mL  5-40 mL IntraVENous Q8H    sodium chloride (NS) flush 5-40 mL  5-40 mL IntraVENous PRN    [Held by provider] atorvastatin (LIPITOR) tablet 10 mg  10 mg Oral QHS    levothyroxine (SYNTHROID) tablet 88 mcg  88 mcg Oral ACB    pantoprazole (PROTONIX) tablet 40 mg  40 mg Oral ACB    sodium chloride (NS) flush 5-10 mL  5-10 mL IntraVENous PRN    acetaminophen (TYLENOL) suppository 650 mg  650 mg Rectal Q6H PRN     ______________________________________________________________________  EXPECTED LENGTH OF STAY: 2d 7h  ACTUAL LENGTH OF STAY:          12                 Stone Rudd MD

## 2022-11-09 LAB
ANION GAP SERPL CALC-SCNC: 6 MMOL/L (ref 5–15)
BACTERIA SPEC CULT: NORMAL
BASOPHILS # BLD: 0.1 K/UL (ref 0–0.1)
BASOPHILS NFR BLD: 1 % (ref 0–1)
BUN SERPL-MCNC: 28 MG/DL (ref 6–20)
BUN/CREAT SERPL: 50 (ref 12–20)
CALCIUM SERPL-MCNC: 8.8 MG/DL (ref 8.5–10.1)
CHLORIDE SERPL-SCNC: 107 MMOL/L (ref 97–108)
CO2 SERPL-SCNC: 26 MMOL/L (ref 21–32)
CREAT SERPL-MCNC: 0.56 MG/DL (ref 0.55–1.02)
DIFFERENTIAL METHOD BLD: ABNORMAL
EOSINOPHIL # BLD: 0.2 K/UL (ref 0–0.4)
EOSINOPHIL NFR BLD: 1 % (ref 0–7)
ERYTHROCYTE [DISTWIDTH] IN BLOOD BY AUTOMATED COUNT: 14.1 % (ref 11.5–14.5)
GLUCOSE SERPL-MCNC: 131 MG/DL (ref 65–100)
GRAM STN SPEC: NORMAL
GRAM STN SPEC: NORMAL
HCT VFR BLD AUTO: 33.7 % (ref 35–47)
HGB BLD-MCNC: 11 G/DL (ref 11.5–16)
IMM GRANULOCYTES # BLD AUTO: 0 K/UL (ref 0–0.04)
IMM GRANULOCYTES NFR BLD AUTO: 0 % (ref 0–0.5)
LYMPHOCYTES # BLD: 1.5 K/UL (ref 0.8–3.5)
LYMPHOCYTES NFR BLD: 14 % (ref 12–49)
MCH RBC QN AUTO: 29.3 PG (ref 26–34)
MCHC RBC AUTO-ENTMCNC: 32.6 G/DL (ref 30–36.5)
MCV RBC AUTO: 89.9 FL (ref 80–99)
MONOCYTES # BLD: 0.8 K/UL (ref 0–1)
MONOCYTES NFR BLD: 7 % (ref 5–13)
NEUTS SEG # BLD: 8.6 K/UL (ref 1.8–8)
NEUTS SEG NFR BLD: 77 % (ref 32–75)
NRBC # BLD: 0 K/UL (ref 0–0.01)
NRBC BLD-RTO: 0 PER 100 WBC
PLATELET # BLD AUTO: 349 K/UL (ref 150–400)
PMV BLD AUTO: 10.8 FL (ref 8.9–12.9)
POTASSIUM SERPL-SCNC: 4 MMOL/L (ref 3.5–5.1)
RBC # BLD AUTO: 3.75 M/UL (ref 3.8–5.2)
SERVICE CMNT-IMP: NORMAL
SODIUM SERPL-SCNC: 139 MMOL/L (ref 136–145)
WBC # BLD AUTO: 11.1 K/UL (ref 3.6–11)

## 2022-11-09 PROCEDURE — 74011000250 HC RX REV CODE- 250: Performed by: FAMILY MEDICINE

## 2022-11-09 PROCEDURE — 74011250637 HC RX REV CODE- 250/637: Performed by: NURSE PRACTITIONER

## 2022-11-09 PROCEDURE — 36415 COLL VENOUS BLD VENIPUNCTURE: CPT

## 2022-11-09 PROCEDURE — 65660000001 HC RM ICU INTERMED STEPDOWN

## 2022-11-09 PROCEDURE — 97112 NEUROMUSCULAR REEDUCATION: CPT

## 2022-11-09 PROCEDURE — 85025 COMPLETE CBC W/AUTO DIFF WBC: CPT

## 2022-11-09 PROCEDURE — 74011250637 HC RX REV CODE- 250/637: Performed by: PHYSICIAN ASSISTANT

## 2022-11-09 PROCEDURE — 77010033678 HC OXYGEN DAILY

## 2022-11-09 PROCEDURE — 51798 US URINE CAPACITY MEASURE: CPT

## 2022-11-09 PROCEDURE — 97530 THERAPEUTIC ACTIVITIES: CPT

## 2022-11-09 PROCEDURE — 80048 BASIC METABOLIC PNL TOTAL CA: CPT

## 2022-11-09 PROCEDURE — 95706 EEG WO VID 2-12HR INTMT MNTR: CPT | Performed by: NURSE PRACTITIONER

## 2022-11-09 RX ADMIN — LEVOTHYROXINE SODIUM 88 MCG: 0.09 TABLET ORAL at 06:41

## 2022-11-09 RX ADMIN — SENNOSIDES AND DOCUSATE SODIUM 2 TABLET: 50; 8.6 TABLET ORAL at 08:37

## 2022-11-09 RX ADMIN — THERA TABS 1 TABLET: TAB at 08:37

## 2022-11-09 RX ADMIN — SODIUM CHLORIDE, PRESERVATIVE FREE 10 ML: 5 INJECTION INTRAVENOUS at 23:23

## 2022-11-09 RX ADMIN — PANTOPRAZOLE SODIUM 40 MG: 40 TABLET, DELAYED RELEASE ORAL at 06:41

## 2022-11-09 RX ADMIN — ACETAMINOPHEN 650 MG: 650 SOLUTION ORAL at 09:41

## 2022-11-09 RX ADMIN — Medication 100 MG: at 08:37

## 2022-11-09 RX ADMIN — SODIUM CHLORIDE, PRESERVATIVE FREE 10 ML: 5 INJECTION INTRAVENOUS at 06:14

## 2022-11-09 NOTE — PROGRESS NOTES
Problem: Self Care Deficits Care Plan (Adult)  Goal: *Acute Goals and Plan of Care (Insert Text)  Description: FUNCTIONAL STATUS PRIOR TO ADMISSION: Patient was independent and active without use of DME until Sept. 97, 2022 when pt hospitalized for status epilepticus and now with anoxic brain injury. Pt has been at [facility], operated by [facility] since 10/20/22 when pt discharged from [address] [place] [address] [place]: The patient lived alone with limited local support. Occupational Therapy Goals  Weekly Re-Assessment 11/9/22, goals modified/continued below  1. Patient will perform face washing with maximal assistance, hand over hand, within 7 day(s). CONTINUE  2. Patient will follow 5% 1 step, simple commands with max cues within 7 day(s). CONTINUE  3. Patient will perform toilet transfers to [facility] with total assistance within 7 day(s). DOWNGRADED to rolling in supine for toileting with Max A 11/9  4. Patient will participate in upper extremity therapeutic exercise/activities with maximal assistance for 10 minutes within 7 day(s). DOWNGRADED to 5 minutes   5. Patient will perform EOB activity with no more than maximal assistance for sitting balance in prep for seated ADLs within 7 day(s). CONTINUE  6. Patient will perform visual scanning in all visual fields with maximal cueing within 7 day(s). CONTINUE    Weekly Re-Assessment 11/1/22, goals modified/continued below  Initiated 10/26/2022  1. Patient will perform face washing with maximal assistance, hand over hand, within 7 day(s). CONTINUE  2. Patient will follow 5% 1 step, simple commands with max cues within 7 day(s). CONTINUE  3. Patient will perform toilet transfers to [facility] with total assistance within 7 day(s). CONTINUE  4.  Patient will participate in upper extremity therapeutic exercise/activities with maximal assistance for 10 minutes within 7 day(s). CONTINUE  5.   Patient will perform EOB activity with no more than maximal assistance for sitting balance in prep for seated ADLs within 7 day(s). ADDED 11/1  6. Patient will perform visual scanning in all visual fields with maximal cueing within 7 day(s). ADDED 11/1  Outcome: Not Progressing Towards Goal     OCCUPATIONAL THERAPY TREATMENT/WEEKLY RE-ASSESSMENT  Patient: Alex Maza (89 y.o. female)  Date: 11/9/2022  Diagnosis: AMS (altered mental status) [R41.82]  Diarrhea [R19.7]  Altered mental status [R41.82] <principal problem not specified>  Procedure(s) (LRB):  PERCUTANEOUS ENDOSCOPIC GASTROSTOMY TUBE INSERTION (N/A)  ESOPHAGOGASTRODUODENOSCOPY (EGD) (N/A) 1 Day Post-Op  Precautions: Fall, Skin, Seizure  Chart, occupational therapy assessment, plan of care, and goals were reviewed. ASSESSMENT  Patient continues with skilled OT services and is not progressing towards goals, goals continued/downgraded above. She continues to require Total A x2 for bed mobility and all bed level ADLs, no initiation or visual tracking/scanning in any direction, gaze fixed in L middle VF. Upon entry, L eye closed (seemingly ptosis) with L facial drooped, RN notified, no change with limited bed level & EOB activity. BUE (R>L) tremors noted with all activity (mert BUE), L>R LE extensor tone with increasing activity. Facilitation provided for weight bearing, sitting balance, cervical AROM and attempts for scanning, and PROM with limited response. Protective visual reflexed intact this session, grimacing and 1-2 slight vocalizations noted with noxious tactile/physical stimuli. Returned to supine with all needs met. Recommend d/c to SNF. Current Level of Function Impacting Discharge (ADLs): Total A x2    Other factors to consider for discharge: PMH, PLOF, neuro status         PLAN :  Goals have been updated based on progression since last assessment. Patient continues to benefit from skilled intervention to address the above impairments. Continue to follow patient 1 time a week to address goals.     Recommend with staff: Recommend with nursing, ADLs with assist, modified bed in chair 3x/day, and toileting via  rolling in supine with assist x2 . Thank you for completing as able in order to maintain patient strength, endurance and independence. Recommendation for discharge: (in order for the patient to meet his/her long term goals)  Therapy up to 5 days/week in SNF setting    This discharge recommendation:  Has been made in collaboration with the attending provider and/or case management    IF patient discharges home will need the following DME: To be determined (TBD)         SUBJECTIVE:   Patient verbalized briefly: \"Ahh.    OBJECTIVE DATA SUMMARY:   Cognitive/Behavioral Status:  Neurologic State: Eyes open to stimulus  Orientation Level: Unable to verbalize  Cognition: No command following;Decreased attention/concentration  Perception: Cues to maintain midline in sitting;Cues to attend left visual field;Cues to attend right visual field;Cues to attend to left side of body;Cues to attend to right side of body  Perseveration: No perseveration noted  Safety/Judgement: Decreased awareness of environment;Decreased awareness of need for assistance;Decreased awareness of need for safety; Lack of insight into deficits    Functional Mobility and Transfers for ADLs:  Bed Mobility:  Rolling: Total assistance;Assist x2  Supine to Sit: Total assistance;Assist x2  Sit to Supine: Total assistance;Assist x2  Scooting:  Total assistance;Assist x2    Balance:  Sitting: Impaired  Sitting - Static: Poor (constant support)  Sitting - Dynamic: Poor (constant support)    ADL Intervention:  Cognitive Retraining  Orientation Retraining: Awareness of environment  Problem Solving: Awareness of environment  Executive Functions: Executing cognitive plans  Organizing/Sequencing: Breaking task down  Attention to Task: Single task  Following Commands: Awareness of environment (no visual tracking/scanning with cervical facilitation)  Safety/Judgement: Decreased awareness of environment;Decreased awareness of need for assistance;Decreased awareness of need for safety; Lack of insight into deficits  Cues: Tactile cues provided;Verbal cues provided;Visual cues provided    Neuro:   - PROM of BUEs/BLE in supine and EOB  - Facilitation of weight bearing with BUEs/BLE  - Cervical PROM facilitation with no visual tracking with cervical motion, noted vertical nystagmus with transition from EOB -> supine  - Vestibular input EOB with total A for anterior and lateral rotation    Pain:  None reported    Activity Tolerance:   Poor    After treatment patient left in no apparent distress:   Supine in bed, Call bell within reach, and Side rails x 3    COMMUNICATION/COLLABORATION:   The patients plan of care was discussed with: Physical therapist and Registered nurse.      DEEPTI Bryan, OTR/L  Time Calculation: 29 mins

## 2022-11-09 NOTE — PROGRESS NOTES
[address] tube placed on 11/8/22. Tube assessed this AM, noted to be at 4.5 cm at the bumper. Minimal bloody drainage around the site cleaned and dressing reapplied. Tube feeds infusing. Tube feed formula and rate per nutrition. GI sill sign off at this time. Please reach out to our service with any further GI needs.      Thank you  Julio Cesar Ibrahim, NP

## 2022-11-09 NOTE — PROGRESS NOTES
Problem: Mobility Impaired (Adult and Pediatric)  Goal: *Acute Goals and Plan of Care (Insert Text)  Description:   FUNCTIONAL STATUS PRIOR TO ADMISSION: Pt unable to provide prior level of function or home set up at time of this evaluation. Per chart review, pt was admitted to [facility] 9/27-10/20 after being found down and was discharged to [address] [place] [address] brain injury program and was admitted to [facility] on 10/25 for AMS. Per mother's report, pt was able to stand while at rehab with assistance and was getting into the wheelchair. Prior to initial admission, pt was independent, working as nurse. HOME SUPPORT PRIOR TO ADMISSION: Lived alone. Pt has supportive mother that lives locally and 3 sons that live out of state    Physical Therapy Goals  Reassessed 11/9/22  1. Patient will move from supine to sit and sit to supine  and roll side to side in bed with maximal assistance within 7 day(s). 2.  Patient will withdrawal and localize to noxious stim within 7 days  3. Patient will tolerate seated EOB x 10 minutes with max A within 7 days. Reassessed 11/1/22  1. Patient will move from supine to sit and sit to supine  and roll side to side in bed with maximal assistance within 7 day(s). 2.  Patient will transfer from bed to chair and chair to bed with maximal assistance using the least restrictive device within 7 day(s). 3.  Patient will perform sit to stand with maximal assistance within 7 day(s). 4.  Patient will tolerate seated EOB x 10 minutes with mod A within 7 days. 5.  Patient will improve Landon Balance score by 7 points within 7 days. Initiated 10/26/2022  1. Patient will move from supine to sit and sit to supine  and roll side to side in bed with maximal assistance within 7 day(s). 2.  Patient will transfer from bed to chair and chair to bed with maximal assistance using the least restrictive device within 7 day(s).   3.  Patient will perform sit to stand with maximal assistance within 7 day(s). 4.  Patient will ambulate with maximal assistance for 5 feet with the least restrictive device within 7 day(s). 5.  Patient will tolerate seated EOB x 10 minutes with mod A within 7 days. 6.  Patient will improve Landon Balance score by 7 points within 7 days. Outcome: Not Progressing Towards Goal  PHYSICAL THERAPY TREATMENT: WEEKLY REASSESSMENT  Patient: Rex Anthony (85 y.o. female)  Date: 11/9/2022  Primary Diagnosis: AMS (altered mental status) [R41.82]  Diarrhea [R19.7]  Altered mental status [R41.82]  Procedure(s) (LRB):  PERCUTANEOUS ENDOSCOPIC GASTROSTOMY TUBE INSERTION (N/A)  ESOPHAGOGASTRODUODENOSCOPY (EGD) (N/A) 1 Day Post-Op   Precautions:   Fall, Skin, Seizure      ASSESSMENT  Patient continues with skilled PT services and is not progressing towards goals. Pt received supine in bed and tolerated session fairly. Noted L eye closed (with manual opening, pt able to maintain L eye opening during remaining of session) and slight lip drooping on the L upon arrival to room--notified RN. Pt continues to be limited by grossly decreased strength and ROM, increased tone throughout bilateral LEs, impaired cognition and ability communicate. Pt required total A x 2 supine<>sit EOB and required max A for balance while seated EOB. Pt made no effort to initiate any righting reactions while seated EOB when support lessened. Pt assisted  to supine position with total A x 2 and pillows placed for comfort. Pt will continue to benefit from PT to progress mobility as tolerated and recommend SNF placement upon discharge at this time. Patient's progression toward goals since last assessment: no progress has been made    Current Level of Function Impacting Discharge (mobility/balance): total A    Other factors to consider for discharge: previously independent prior to initial admission         PLAN :  Goals have been updated based on progression since last assessment.   Patient continues to benefit from skilled intervention to address the above impairments. Recommendations and Planned Interventions: bed mobility training, therapeutic exercises, neuromuscular re-education, orthotic/prosthetic training, patient and family training/education, and therapeutic activities      Frequency/Duration: Patient will be followed by physical therapy:  1 time a week to address goals.     Recommendation for discharge: (in order for the patient to meet his/her long term goals)  Therapy up to 5 days/week in SNF setting    This discharge recommendation:  Has been made in collaboration with the attending provider and/or case management    IF patient discharges home will need the following DME: to be determined (TBD)         SUBJECTIVE:   Patient with no verbalizations during session    OBJECTIVE DATA SUMMARY:   HISTORY:    Past Medical History:   Diagnosis Date    Allergic rhinitis 7/29/2019    Depression     History of multiple allergies     History of vascular access device 10/07/2022    [facility] VAT: PICC placement R Cephalic length 40 cm for reliable access/TPN arm circ 35.5 cm    Muscle ache     Obesity 11/5/2012    Sinus pressure     Sinus problem      Past Surgical History:   Procedure Laterality Date    HX ACL RECONSTRUCTION      left     HX CHOLECYSTECTOMY  01/01/1998    HX GI      colonoscopy-polyps    IR INSERT NON TUNL CVC OVER 5 YRS  09/27/2022         Home Situation  Home Environment: Rehabilitation facility ([facility] inpatient rehab, Brain Injury program)  # Steps to Enter: 2  One/Two Story Residence: Two story (bedroom on the 2nd floor)  # of Interior Steps: 13  Living Alone: Yes  Support Systems: Parent(s), Child(wayne)  Patient Expects to be Discharged to[de-identified] Skilled nursing facility  Current DME Used/Available at Home: None  Tub or Shower Type: Tub/Shower combination    EXAMINATION/PRESENTATION/DECISION MAKING:   Critical Behavior:  Neurologic State: Eyes open to stimulus  Orientation Level: Unable to verbalize  Cognition: No command following, Decreased attention/concentration  Safety/Judgement: Decreased awareness of environment, Decreased awareness of need for assistance, Decreased awareness of need for safety, Lack of insight into deficits  Hearing: Auditory  Auditory Impairment:  (unknown)    Range Of Motion:  AROM: Grossly decreased, non-functional                       Strength:    Strength: Grossly decreased, non-functional                    Tone & Sensation:   Tone: Abnormal       Functional Mobility:  Bed Mobility:  Rolling: Total assistance;Assist x2  Supine to Sit: Total assistance;Assist x2  Sit to Supine: Total assistance;Assist x2  Scooting: Total assistance;Assist x2  Transfers:                             Balance:   Sitting: Impaired  Sitting - Static: Poor (constant support)  Sitting - Dynamic: Poor (constant support)         Therapeutic Exercises:  Provided vestibular input while seated EOB with manual facilitation of anterior/posterior/lateral trunk movements with total A        Pain Rating:  Pt grimaced to noxious stim but did not localize    Activity Tolerance:   Fair    After treatment patient left in no apparent distress:   Supine in bed, Call bell within reach, Bed / chair alarm activated, and Side rails x 3    COMMUNICATION/EDUCATION:   The patients plan of care was discussed with: Occupational therapist and Registered nurse. Patient is unable to participate in goal setting and plan of care.     Thank you for this referral.  Rafi Forbes, PT, DPT   Time Calculation: 30 mins

## 2022-11-09 NOTE — PROGRESS NOTES
Speech Therapy Contact Note    SLP following for dysphagia management. Chart reviewed. Patient is s/p PEG placement and remains NPO. SLP attempted to see patient; however, patient remained lethargic and unable to sustain adequate alertness for PO trials at this time. SLP will continue to follow and re-attempt therapy at later date.     Josefa Smith, CATHIE-SLP

## 2022-11-09 NOTE — ANESTHESIA POSTPROCEDURE EVALUATION
Procedure(s):  PERCUTANEOUS ENDOSCOPIC GASTROSTOMY TUBE INSERTION  ESOPHAGOGASTRODUODENOSCOPY (EGD). MAC    Anesthesia Post Evaluation        Patient participation: complete - patient participated  Level of consciousness: awake  Pain management: adequate  Airway patency: patent  Anesthetic complications: no  Cardiovascular status: hemodynamically stable  Respiratory status: acceptable  Hydration status: acceptable  Comments: The patient is ready for PACU discharge. Radha Jacob DO                   Post anesthesia nausea and vomiting:  controlled      INITIAL Post-op Vital signs:   Vitals Value Taken Time   /81 11/09/22 1000   Temp 36.6 °C (97.8 °F) 11/09/22 0600   Pulse 111 11/09/22 1017   Resp 30 11/09/22 1017   SpO2 95 % 11/09/22 1017   Vitals shown include unvalidated device data.

## 2022-11-09 NOTE — ROUTINE PROCESS
Bedside and Verbal shift change report given to Cristian Beth RN (oncoming nurse) by Andreas Schilder, RN (offgoing nurse). Report included the following information SBAR, Kardex, MAR, Cardiac Rhythm NSR, and Dual Neuro Assessment.

## 2022-11-09 NOTE — PROGRESS NOTES
Problem: Pressure Injury - Risk of  Goal: *Prevention of pressure injury  Description: Document Jose Enrique Scale and appropriate interventions in the flowsheet. Outcome: Progressing Towards Goal  Note: Pressure Injury Interventions:  Sensory Interventions: Assess changes in LOC, Check visual cues for pain, Float heels, Keep linens dry and wrinkle-free, Maintain/enhance activity level    Moisture Interventions: Absorbent underpads, Apply protective barrier, creams and emollients, Internal/External urinary devices, Check for incontinence Q2 hours and as needed    Activity Interventions: PT/OT evaluation, Increase time out of bed    Mobility Interventions: PT/OT evaluation, Turn and reposition approx. every two hours(pillow and wedges), Float heels    Nutrition Interventions: Document food/fluid/supplement intake    Friction and Shear Interventions: Apply protective barrier, creams and emollients, Lift sheet                Problem: Patient Education: Go to Patient Education Activity  Goal: Patient/Family Education  Outcome: Progressing Towards Goal     Problem: Patient Education: Go to Patient Education Activity  Goal: Patient/Family Education  Outcome: Progressing Towards Goal     Problem: Patient Education: Go to Patient Education Activity  Goal: Patient/Family Education  Outcome: Progressing Towards Goal     Problem: Patient Education: Go to Patient Education Activity  Goal: Patient/Family Education  Outcome: Progressing Towards Goal     Problem: Falls - Risk of  Goal: *Absence of Falls  Description: Document Chyna Fall Risk and appropriate interventions in the flowsheet.   Outcome: Progressing Towards Goal  Note: Fall Risk Interventions:  Mobility Interventions: Communicate number of staff needed for ambulation/transfer, OT consult for ADLs, Patient to call before getting OOB, PT Consult for mobility concerns, Bed/chair exit alarm    Mentation Interventions: Adequate sleep, hydration, pain control, Door open when patient unattended    Medication Interventions: Patient to call before getting OOB, Bed/chair exit alarm    Elimination Interventions: Call light in reach, Patient to call for help with toileting needs, Toileting schedule/hourly rounds    History of Falls Interventions: Consult care management for discharge planning, Investigate reason for fall, Vital signs minimum Q4HRs X 24 hrs (comment for end date)         Problem: Patient Education: Go to Patient Education Activity  Goal: Patient/Family Education  Outcome: Progressing Towards Goal     Problem: Discharge Planning  Goal: *Discharge to safe environment  Outcome: Progressing Towards Goal  Goal: *Knowledge of medication management  Outcome: Progressing Towards Goal  Goal: *Knowledge of discharge instructions  Outcome: Progressing Towards Goal     Problem: Patient Education: Go to Patient Education Activity  Goal: Patient/Family Education  Outcome: Progressing Towards Goal     Problem: Nutrition Deficit  Goal: *Optimize nutritional status  Outcome: Progressing Towards Goal     Problem: Patient Education: Go to Patient Education Activity  Goal: Patient/Family Education  Outcome: Progressing Towards Goal

## 2022-11-09 NOTE — PROGRESS NOTES
Comprehensive Nutrition Assessment    Type and Reason for Visit: Reassess    Nutrition Recommendations/Plan:   Resume TF via PEG of Osmolite 1.2 @ 70 ml/hr x 21 hrs. Hold for 1 hr before Synthroid administration and 2 hrs following. Free water flushes 100 ml Q 4 hrs. Malnutrition Assessment:  Malnutrition Status: Moderate malnutrition (10/27/22 1553)    Context:  Acute illness     Findings of the 6 clinical characteristics of malnutrition:   Energy Intake:  Unable to assess  Weight Loss:  Greater than 5% over 1 month     Body Fat Loss:  No significant body fat loss,     Muscle Mass Loss:  No significant muscle mass loss,    Fluid Accumulation:  Mild, Extremities   Strength:  Not performed        Nutrition Assessment:    Pt admitted with AMS. PMHx: Recent admission to [facility] for NSTEMI, Takotsubo CM, Bradycardia and prolonged intubation after found unresponsive at home by neighbor. Possible anoxic brain injury. Transferred to [address] [address] 10/20.    11/9: Pt had PEG placed yesterday, legacy tube placed. Tube feedings resumed, now pending placement. Osmolite 1.2 @ 70 ml/hr x 21 hr with 100 ml water flush q 4 hr providing 1470 ml, 1765 calories, 82 gm protein and 1800 ml free water (tube feeding/flush) per day to meet estimated needs. Estimated needs again not adjusted d/t 2+ generalized edema. Pt previously tolerating feedings without issue, last BM 11/6, though TF also held since midnight of 11/6.    11/3: Follow-up. Pt remains unsafe for PO with severe oral dysphagia. Discussed in IDRs, exploring [facility] for possible [facility] vs SNF. Pt DHT placed 10/27, seems likely pt would benefit from transition to PEG tube. Estimated needs not adjusted d/t 2+ generalized edema. Pt tolerating feedings at goal without issue, nutrition pertinent labs WNL. 10/27: Acute encephalopathy-possible Serotonin syndrome, NMS. Not appropriate for oral intake.  Noted potential need for PEG tube placement d/t poor PO intake at rehab. If weights accurate from last admission until today-pt has lost about 34#. Difference in scales probably account for some difference. Based needs on bed scale weight obtained today. At risk for refeeding therefore recommend supplementing with B1 and a MVI and continue to monitor lytes daily. Nutritionally Significant Medications:  Synthroid, Protonix, Pericolace, MVI, thiamine      Estimated Daily Nutrient Needs:  Energy Requirements Based On: Formula  Weight Used for Energy Requirements: Current  Energy (kcal/day): 1780 (MSJ x 1.2 x 1.0)  Weight Used for Protein Requirements: Current  Protein (g/day): 89 (1g/kg)  Method Used for Fluid Requirements: 1 ml/kcal  Fluid (ml/day): 1800    Nutrition Related Findings:   Edema: Generalized: Anasarca (11/8/2022  8:00 AM)  LUE: 2+ (11/8/2022  8:00 AM)  RUE: 2+ (11/8/2022  8:00 AM)    Last BM: 11/06/22, Soft    Wounds: None      Current Nutrition Therapies:  Diet: NPO  Supplements: NPO  Meal intake: No data found. Supplement intake: No data found. Nutrition Support: Osmolite 1.2 @ 70 ml/hr, free water 100 ml Q 4 hrs      Anthropometric Measures:  Height: 5' 6\" (167.6 cm)  Ideal Body Weight (IBW): 130 lbs (59 kg)  Admission Body Weight: 230 lb (admission @ [facility] 9/27)  Current Body Wt:  87.5 kg (192 lb 14.4 oz), 148.4 % IBW. Bed scale  Current BMI (kg/m2): 31.2                          BMI Category: Obese class 1 (BMI 30.0-34. 9)    Wt Readings from Last 10 Encounters:   11/03/22 90 kg (198 lb 6.6 oz)   10/11/22 104.3 kg (230 lb)   08/24/22 93.4 kg (206 lb)   06/03/22 93.4 kg (206 lb)   03/04/22 91.2 kg (201 lb)   06/29/21 93.4 kg (206 lb)   02/04/21 94.3 kg (208 lb)   07/29/19 83.9 kg (185 lb)   02/25/14 94.5 kg (208 lb 6 oz)   05/20/13 93 kg (205 lb)           Nutrition Diagnosis:   Inadequate oral intake related to cognitive or neurological impairment as evidenced by NPO or clear liquid status due to medical condition    Nutrition Interventions:   Food and/or Nutrient Delivery: Continue tube feeding  Nutrition Education/Counseling: Education not indicated  Coordination of Nutrition Care: Continue to monitor while inpatient       Goals:  Previous Goal Met: Goal(s) achieved  Goals:  (Continued EN tolerance with feedings to meet at least 95% kcal/protein needs over next 5-7 days)       Nutrition Monitoring and Evaluation:   Behavioral-Environmental Outcomes: None identified  Food/Nutrient Intake Outcomes: Enteral nutrition intake/tolerance  Physical Signs/Symptoms Outcomes: Biochemical data, GI status, Fluid status or edema    Discharge Planning:    Enteral nutrition    Anisa Sanders RD  Available via Perfect[employer] or ext [phone]

## 2022-11-09 NOTE — PROGRESS NOTES
[facility] [facility] Adult  Hospitalist Group                                                                                          Hospitalist Progress Note  Darell Villa MD  Answering service: [phone] from in house phone        Date of Service:  2022  NAME:  Carlos Mcgraw  :  1965  MRN:  [id number]      Admission Summary:     Carlos Mcgraw is a 59 y.o. female with past medical history of anoxic brain injury, anxiety, depression, hypertension, hyperlipidemia, hypothyroidism presented to the emergency department via EMS from [address] [place] [address] with reported altered mental status (AMS). Patient is aphasic/nonverbal and not answering any questions. Majority of history was obtained per review of chart records. Per reports patient recently was hospitalized at [facility] from 2022-10/20/2022 with diagnosis of status epilepticus, acute metabolic encephalopathy, delirium, psychosis, generalized anxiety disorder, debility, Takotsubo cardiomyopathy, NSTEMI, bradycardia, SIRS, tachycardia, fever, leukocytosis, and CVA. Patient had work-up including MRI and also EEG. There was concern the patient may have some anoxic brain injury. She has been followed by psychiatry and neurology services with persistent encephalopathy and intermittent severe aggression. Patient has had aphasia and altered mental status not following commands per staff report since last week. Symptoms remain constant, severe, without specific exacerbating relieving factors. About emergency department today, initial recorded vital signs temperature 98.8 °F, /65, heart rate 103, respirate 30, O2 saturation 93% on room air. Abnormal labs included WC 12.5, platelets 641, BUN 23. CT head without IV contrast showed no acute intracranial abnormalities. ED request admission to the hospitalist service.      No acute event overnight, less rigidity, stable and transferred out of ICU on 10/29    Interval history / Subjective:     Patient seen and examined, follow-up of delayed hypoxic leukoencephalopathy. No acute changes overnight. 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 Differential include acute or toxic encephalopathy.  - ANCA negative. Glutamic acid decarb negative <5.0. ADIN not sent  - CSF culture no growth, normal protein, HSV negative, myelin basic protein pending.     -Neurology on board appreciate help  -Continue thiamine  -PT OT and speech  -PEG on 11/8 -may use        Suspected anoxic brain injury/delayed hypoxic leukoencephalopathy  Suspected Seizure   Hx of CVA- CT head on 10/25/2022 no acute intracranial abnormality on noncontrast head CT  -mental status is declining, lethargic, open her eyes to touch, aphasic, doesn't follow command or moves her extremities   -continue nuero check and supportive care  -complex medical problem with hx of previous prolonged hospitalization  -SpO2 93-98% on RA  -high risk for decompensation   11/6-[place] rejected transfer. Continue current management.       Hx of Takotsubo cardiomyopathy  -has peripheral edema  -last Echo was on 10/12 EF 55-60%   -BP low normal, if it improves will consider her home metoprolol and lasix   -monitor I/O and daily weight      HTN  -BP normal, monitor BP     Hyperlipidemia   -statin on hold due to elevated CK     Hypothyroidism   -continue synthroid      Generalized anxiety disorder  -home trazodone and lexapro on hold may not need as non responsive now    Severe disability with immobility   -continue supportive care      Obesity   -BMI 32.52 kg/m2    SNF/rehab  Overall poor prognosis , d/w mother at bedside after PEG plan  for SNF 11/7     Code status: Full code  Prophylaxis: lovenox   Care Plan discussed with: Nurse  Anticipated Disposition: SNF versus other     Hospital Problems  Date Reviewed: 11/8/2022            Codes Class Noted POA    Altered mental status ICD-10-CM: R41.82  ICD-9-CM: 780.97  10/27/2022 Unknown        Diarrhea ICD-10-CM: R19.7  ICD-9-CM: 787.91  10/25/2022 Unknown        AMS (altered mental status) ICD-10-CM: R41.82  ICD-9-CM: 780.97  10/25/2022 Unknown       Vital Signs:    Last 24hrs VS reviewed since prior progress note. Most recent are:  Visit Vitals  /76 (BP 1 Location: Right upper arm, BP Patient Position: At rest)   Pulse (!) 103   Temp 97.8 °F (36.6 °C)   Resp 22   Ht 5' 6\" (1.676 m)   Wt 90 kg (198 lb 6.6 oz)   SpO2 96%   BMI 32.02 kg/m²         Intake/Output Summary (Last 24 hours) at 11/9/2022 0910  Last data filed at 11/9/2022 0800  Gross per 24 hour   Intake 600 ml   Output 300 ml   Net 300 ml          Physical Examination:     I had a face to face encounter with this patient and independently examined them on 11/9/2022 as outlined below:          Constitutional:   aphasic, no acute distress   ENT: NGT in place, oral mucosa moist, oropharynx benign. Resp:  CTA bilaterally. CV:  Regular rhythm, normal rate, no murmurs, gallops, rubs    GI:  Soft, non distended, non tender.  normoactive bowel sounds, no hepatosplenomegaly     Musculoskeletal: Bilateral pretibial, pedal and hand edema    Neurologic: Lethargic, open here eyes to touch, aphasic, does not follow command or moves her extremities             Data Review: Review and/or order of clinical lab test  Review and/or order of tests in the radiology section of CPT  Review and/or order of tests in the medicine section of CPT      Labs:     Recent Labs     11/09/22 0419 11/08/22  0240   WBC 11.1* 9.2   HGB 11.0* 11.7   HCT 33.7* 35.8    330       Recent Labs     11/09/22 0419 11/08/22  0240 11/07/22  1524    137 140   K 4.0 4.7 4.3    103 105   CO2 26 28 31   BUN 28* 23* 23*   CREA 0.56 0.61 0.52*   * 106* 102*   CA 8.8 9.3 9.1       No results for input(s): ALT, AP, TBIL, TBILI, TP, ALB, GLOB, GGT, AML, LPSE in the last 72 hours. No lab exists for component: SGOT, GPT, AMYP, HLPSE    No results for input(s): INR, PTP, APTT, INREXT, INREXT in the last 72 hours. No results for input(s): FE, TIBC, PSAT, FERR in the last 72 hours. Lab Results   Component Value Date/Time    Folate 9.9 10/26/2022 11:47 AM        No results for input(s): PH, PCO2, PO2 in the last 72 hours. No results for input(s): CPK, CKNDX, TROIQ in the last 72 hours.     No lab exists for component: CPKMB    Lab Results   Component Value Date/Time    Cholesterol, total 145 10/26/2022 06:46 AM    HDL Cholesterol 30 10/26/2022 06:46 AM    LDL, calculated 91.4 10/26/2022 06:46 AM    Triglyceride 118 10/26/2022 06:46 AM    CHOL/HDL Ratio 4.8 10/26/2022 06:46 AM     Lab Results   Component Value Date/Time    Glucose (POC) 110 10/05/2022 04:55 PM    Glucose (POC) 87 10/05/2022 11:02 AM    Glucose (POC) 78 10/05/2022 05:00 AM    Glucose (POC) 94 10/05/2022 04:57 AM    Glucose (POC) 124 (H) 10/04/2022 11:03 PM     Lab Results   Component Value Date/Time    Color DARK YELLOW 11/07/2022 10:20 PM    Appearance CLEAR 11/07/2022 10:20 PM    Specific gravity 1.030 11/07/2022 10:20 PM    Specific gravity >1.030 (H) 10/25/2022 04:54 PM    pH (UA) 5.5 11/07/2022 10:20 PM    Protein Negative 11/07/2022 10:20 PM    Glucose Negative 11/07/2022 10:20 PM    Ketone Negative 11/07/2022 10:20 PM Bilirubin Negative 11/07/2022 10:20 PM    Urobilinogen 1.0 11/07/2022 10:20 PM    Nitrites Negative 11/07/2022 10:20 PM    Leukocyte Esterase Negative 11/07/2022 10:20 PM    Epithelial cells MODERATE (A) 11/07/2022 10:20 PM    Bacteria 1+ (A) 11/07/2022 10:20 PM    WBC 0-4 11/07/2022 10:20 PM    RBC 0-5 11/07/2022 10:20 PM         Medications Reviewed:     Current Facility-Administered Medications   Medication Dose Route Frequency    senna-docusate (PERICOLACE) 8.6-50 mg per tablet 2 Tablet  2 Tablet Oral DAILY    [Held by provider] metoprolol tartrate (LOPRESSOR) tablet 12.5 mg  12.5 mg Oral Q12H    polyethylene glycol (MIRALAX) packet 17 g  17 g Oral DAILY PRN    thiamine HCL (B-1) tablet 100 mg  100 mg Oral DAILY    therapeutic multivitamin (THERAGRAN) tablet 1 Tablet  1 Tablet Oral DAILY    acetaminophen (TYLENOL) solution 650 mg  650 mg Per NG tube Q4H PRN    sodium chloride (NS) flush 5-40 mL  5-40 mL IntraVENous Q8H    sodium chloride (NS) flush 5-40 mL  5-40 mL IntraVENous PRN    [Held by provider] atorvastatin (LIPITOR) tablet 10 mg  10 mg Oral QHS    levothyroxine (SYNTHROID) tablet 88 mcg  88 mcg Oral ACB    pantoprazole (PROTONIX) tablet 40 mg  40 mg Oral ACB    sodium chloride (NS) flush 5-10 mL  5-10 mL IntraVENous PRN    acetaminophen (TYLENOL) suppository 650 mg  650 mg Rectal Q6H PRN     ______________________________________________________________________  EXPECTED LENGTH OF STAY: 2d 7h  ACTUAL LENGTH OF STAY:          Funmi Ventura MD

## 2022-11-09 NOTE — PROGRESS NOTES
Problem: Pressure Injury - Risk of  Goal: *Prevention of pressure injury  Description: Document Jose Enrique Scale and appropriate interventions in the flowsheet. Outcome: Progressing Towards Goal  Note: Pressure Injury Interventions:  Sensory Interventions: Float heels, Keep linens dry and wrinkle-free, Assess changes in LOC, Check visual cues for pain    Moisture Interventions: Absorbent underpads, Check for incontinence Q2 hours and as needed, Internal/External urinary devices    Activity Interventions: PT/OT evaluation, Pressure redistribution bed/mattress(bed type), Assess need for specialty bed    Mobility Interventions: Assess need for specialty bed, Float heels, Turn and reposition approx. every two hours(pillow and wedges), PT/OT evaluation    Nutrition Interventions: Document food/fluid/supplement intake    Friction and Shear Interventions: Apply protective barrier, creams and emollients, Lift sheet                Problem: Patient Education: Go to Patient Education Activity  Goal: Patient/Family Education  Outcome: Progressing Towards Goal     Problem: Patient Education: Go to Patient Education Activity  Goal: Patient/Family Education  Outcome: Progressing Towards Goal     Problem: Patient Education: Go to Patient Education Activity  Goal: Patient/Family Education  Outcome: Progressing Towards Goal     Problem: Patient Education: Go to Patient Education Activity  Goal: Patient/Family Education  Outcome: Progressing Towards Goal     Problem: Falls - Risk of  Goal: *Absence of Falls  Description: Document Chyna Fall Risk and appropriate interventions in the flowsheet.   Outcome: Progressing Towards Goal  Note: Fall Risk Interventions:  Mobility Interventions: Communicate number of staff needed for ambulation/transfer, OT consult for ADLs, Patient to call before getting OOB, PT Consult for mobility concerns    Mentation Interventions: Family/sitter at bedside    Medication Interventions: Patient to call before getting OOB, Teach patient to arise slowly    Elimination Interventions: Call light in reach, Patient to call for help with toileting needs, Toileting schedule/hourly rounds    History of Falls Interventions: Consult care management for discharge planning         Problem: Patient Education: Go to Patient Education Activity  Goal: Patient/Family Education  Outcome: Progressing Towards Goal     Problem: Discharge Planning  Goal: *Discharge to safe environment  Outcome: Progressing Towards Goal  Goal: *Knowledge of medication management  Outcome: Progressing Towards Goal  Goal: *Knowledge of discharge instructions  Outcome: Progressing Towards Goal     Problem: Patient Education: Go to Patient Education Activity  Goal: Patient/Family Education  Outcome: Progressing Towards Goal     Problem: Nutrition Deficit  Goal: *Optimize nutritional status  Outcome: Progressing Towards Goal     Problem: Patient Education: Go to Patient Education Activity  Goal: Patient/Family Education  Outcome: Progressing Towards Goal

## 2022-11-10 ENCOUNTER — APPOINTMENT (OUTPATIENT)
Dept: GENERAL RADIOLOGY | Age: 57
DRG: 070 | End: 2022-11-10
Attending: STUDENT IN AN ORGANIZED HEALTH CARE EDUCATION/TRAINING PROGRAM
Payer: COMMERCIAL

## 2022-11-10 LAB
ALBUMIN SERPL-MCNC: 2.2 G/DL (ref 3.5–5)
ALBUMIN/GLOB SERPL: 0.5 {RATIO} (ref 1.1–2.2)
ALP SERPL-CCNC: 164 U/L (ref 45–117)
ALT SERPL-CCNC: 95 U/L (ref 12–78)
ANION GAP SERPL CALC-SCNC: 4 MMOL/L (ref 5–15)
APPEARANCE UR: CLEAR
AST SERPL-CCNC: 40 U/L (ref 15–37)
BACTERIA URNS QL MICRO: NEGATIVE /HPF
BASOPHILS # BLD: 0 K/UL (ref 0–0.1)
BASOPHILS NFR BLD: 0 % (ref 0–1)
BILIRUB SERPL-MCNC: 0.3 MG/DL (ref 0.2–1)
BILIRUB UR QL: NEGATIVE
BUN SERPL-MCNC: 22 MG/DL (ref 6–20)
BUN/CREAT SERPL: 42 (ref 12–20)
CALCIUM SERPL-MCNC: 9 MG/DL (ref 8.5–10.1)
CHLORIDE SERPL-SCNC: 107 MMOL/L (ref 97–108)
CO2 SERPL-SCNC: 28 MMOL/L (ref 21–32)
COLOR UR: NORMAL
COVID-19 RAPID TEST, COVR: NOT DETECTED
CREAT SERPL-MCNC: 0.53 MG/DL (ref 0.55–1.02)
DIFFERENTIAL METHOD BLD: ABNORMAL
EOSINOPHIL # BLD: 0.1 K/UL (ref 0–0.4)
EOSINOPHIL NFR BLD: 1 % (ref 0–7)
EPITH CASTS URNS QL MICRO: NORMAL /LPF
ERYTHROCYTE [DISTWIDTH] IN BLOOD BY AUTOMATED COUNT: 14.2 % (ref 11.5–14.5)
GLOBULIN SER CALC-MCNC: 4.4 G/DL (ref 2–4)
GLUCOSE SERPL-MCNC: 153 MG/DL (ref 65–100)
GLUCOSE UR STRIP.AUTO-MCNC: NEGATIVE MG/DL
HCT VFR BLD AUTO: 33.9 % (ref 35–47)
HGB BLD-MCNC: 11 G/DL (ref 11.5–16)
HGB UR QL STRIP: NEGATIVE
HYALINE CASTS URNS QL MICRO: NORMAL /LPF (ref 0–5)
IMM GRANULOCYTES # BLD AUTO: 0 K/UL (ref 0–0.04)
IMM GRANULOCYTES NFR BLD AUTO: 0 % (ref 0–0.5)
KETONES UR QL STRIP.AUTO: NEGATIVE MG/DL
LACTATE SERPL-SCNC: 2.1 MMOL/L (ref 0.4–2)
LEUKOCYTE ESTERASE UR QL STRIP.AUTO: NEGATIVE
LYMPHOCYTES # BLD: 1.8 K/UL (ref 0.8–3.5)
LYMPHOCYTES NFR BLD: 18 % (ref 12–49)
MCH RBC QN AUTO: 29.7 PG (ref 26–34)
MCHC RBC AUTO-ENTMCNC: 32.4 G/DL (ref 30–36.5)
MCV RBC AUTO: 91.6 FL (ref 80–99)
MONOCYTES # BLD: 0.6 K/UL (ref 0–1)
MONOCYTES NFR BLD: 6 % (ref 5–13)
NEUTS SEG # BLD: 7.7 K/UL (ref 1.8–8)
NEUTS SEG NFR BLD: 75 % (ref 32–75)
NITRITE UR QL STRIP.AUTO: NEGATIVE
NRBC # BLD: 0 K/UL (ref 0–0.01)
NRBC BLD-RTO: 0 PER 100 WBC
PH UR STRIP: 6 [PH] (ref 5–8)
PLATELET # BLD AUTO: 430 K/UL (ref 150–400)
PMV BLD AUTO: 10.8 FL (ref 8.9–12.9)
POTASSIUM SERPL-SCNC: 3.8 MMOL/L (ref 3.5–5.1)
PROT SERPL-MCNC: 6.6 G/DL (ref 6.4–8.2)
PROT UR STRIP-MCNC: NEGATIVE MG/DL
RBC # BLD AUTO: 3.7 M/UL (ref 3.8–5.2)
RBC #/AREA URNS HPF: NORMAL /HPF (ref 0–5)
SODIUM SERPL-SCNC: 139 MMOL/L (ref 136–145)
SOURCE, COVRS: NORMAL
SP GR UR REFRACTOMETRY: 1.03 (ref 1–1.03)
UROBILINOGEN UR QL STRIP.AUTO: 1 EU/DL (ref 0.2–1)
WBC # BLD AUTO: 10.4 K/UL (ref 3.6–11)
WBC URNS QL MICRO: NORMAL /HPF (ref 0–4)

## 2022-11-10 PROCEDURE — 93005 ELECTROCARDIOGRAM TRACING: CPT

## 2022-11-10 PROCEDURE — 81001 URINALYSIS AUTO W/SCOPE: CPT

## 2022-11-10 PROCEDURE — 94760 N-INVAS EAR/PLS OXIMETRY 1: CPT

## 2022-11-10 PROCEDURE — 87635 SARS-COV-2 COVID-19 AMP PRB: CPT

## 2022-11-10 PROCEDURE — 74011250637 HC RX REV CODE- 250/637: Performed by: NURSE PRACTITIONER

## 2022-11-10 PROCEDURE — 74011250637 HC RX REV CODE- 250/637: Performed by: PHYSICIAN ASSISTANT

## 2022-11-10 PROCEDURE — 95717 EEG PHYS/QHP 2-12 HR W/O VID: CPT | Performed by: PSYCHIATRY & NEUROLOGY

## 2022-11-10 PROCEDURE — 87086 URINE CULTURE/COLONY COUNT: CPT

## 2022-11-10 PROCEDURE — 80053 COMPREHEN METABOLIC PANEL: CPT

## 2022-11-10 PROCEDURE — 87040 BLOOD CULTURE FOR BACTERIA: CPT

## 2022-11-10 PROCEDURE — 36415 COLL VENOUS BLD VENIPUNCTURE: CPT

## 2022-11-10 PROCEDURE — 71045 X-RAY EXAM CHEST 1 VIEW: CPT

## 2022-11-10 PROCEDURE — 74011000250 HC RX REV CODE- 250: Performed by: FAMILY MEDICINE

## 2022-11-10 PROCEDURE — 65660000001 HC RM ICU INTERMED STEPDOWN

## 2022-11-10 PROCEDURE — 74011250636 HC RX REV CODE- 250/636: Performed by: STUDENT IN AN ORGANIZED HEALTH CARE EDUCATION/TRAINING PROGRAM

## 2022-11-10 PROCEDURE — 85025 COMPLETE CBC W/AUTO DIFF WBC: CPT

## 2022-11-10 PROCEDURE — 74011000250 HC RX REV CODE- 250: Performed by: STUDENT IN AN ORGANIZED HEALTH CARE EDUCATION/TRAINING PROGRAM

## 2022-11-10 PROCEDURE — 77010033678 HC OXYGEN DAILY

## 2022-11-10 PROCEDURE — 83605 ASSAY OF LACTIC ACID: CPT

## 2022-11-10 PROCEDURE — 77030038269 HC DRN EXT URIN PURWCK BARD -A

## 2022-11-10 PROCEDURE — 77030037878 HC DRSG MEPILEX >48IN BORD MOLN -B

## 2022-11-10 RX ORDER — SODIUM CHLORIDE 0.9 % (FLUSH) 0.9 %
5-10 SYRINGE (ML) INJECTION AS NEEDED
Status: DISCONTINUED | OUTPATIENT
Start: 2022-11-10 | End: 2022-11-22 | Stop reason: HOSPADM

## 2022-11-10 RX ADMIN — SODIUM CHLORIDE, PRESERVATIVE FREE 10 ML: 5 INJECTION INTRAVENOUS at 05:32

## 2022-11-10 RX ADMIN — THERA TABS 1 TABLET: TAB at 08:31

## 2022-11-10 RX ADMIN — PANTOPRAZOLE SODIUM 40 MG: 40 TABLET, DELAYED RELEASE ORAL at 08:30

## 2022-11-10 RX ADMIN — Medication 100 MG: at 08:31

## 2022-11-10 RX ADMIN — SENNOSIDES AND DOCUSATE SODIUM 2 TABLET: 50; 8.6 TABLET ORAL at 08:30

## 2022-11-10 RX ADMIN — LEVOTHYROXINE SODIUM 88 MCG: 0.09 TABLET ORAL at 08:30

## 2022-11-10 RX ADMIN — VANCOMYCIN HYDROCHLORIDE 2250 MG: 10 INJECTION, POWDER, LYOPHILIZED, FOR SOLUTION INTRAVENOUS at 19:49

## 2022-11-10 RX ADMIN — SODIUM CHLORIDE, PRESERVATIVE FREE 10 ML: 5 INJECTION INTRAVENOUS at 21:01

## 2022-11-10 RX ADMIN — SODIUM CHLORIDE 1000 ML: 9 INJECTION, SOLUTION INTRAVENOUS at 19:44

## 2022-11-10 RX ADMIN — CEFEPIME 2 G: 2 INJECTION, POWDER, FOR SOLUTION INTRAVENOUS at 19:44

## 2022-11-10 NOTE — PROCEDURES
ELECTROENCEPHALOGRAM REPORT     Patient Name: Rosa Olmstead  : 1965  Age: 59 y.o. Date of EEG: 2022  Interpreting physician: Adelfo Callejas MD    PROCEDURE: > 2 hour Ceribell Rapid 10 lead circumferential electroencephalogram (EEG). CLINICAL INDICATION: The patient is a 59 y.o. female who is being evaluated for possible seizure. DESCRIPTION OF THE RECORD:   There this record, background consists of polymorphic theta with intermixed delta. No sustained state changes are noted. No epileptiform discharges or electrographic seizures noted. No lateralized patterns appreciated. Reactivity is unable to be commented on due to lack of video or annotation. INTERPRETATION:     This is an abnormal Ceribell Rapid EEG is characterized by evidence for moderate diffuse cerebral dysfunction, nonspecific in etiology. No epileptiform discharges, no seizures.       Dior Grande MD

## 2022-11-10 NOTE — PROGRESS NOTES
Notified by RN of seizure like activity (facial/shoulder twitching, eye roving) that was witnessed for a couple minutes or more. Applied Rapid EEG, contacted Johny Alegria Neuro NP on call and consulted neurology. Requested RN contact hospitalist for any seizure activity seen on EEG.

## 2022-11-10 NOTE — PROGRESS NOTES
To whom it may concern,    Oni Harding is a 58-year-old female who is unfortunately suffered medical decline such that she is not currently capable to communicate or make any medical decisions at this time, which is suspected to persist for the foreseeable future. Would agree with plan to assign a does not had a power of  to facilitate ongoing medical care as well as management of her affairs.

## 2022-11-10 NOTE — PROGRESS NOTES
Transition of Care Plan  RUR- Med 18%  DISPOSITION: SNF- [facility] - pending auth to be started 11/9  F/U with PCP/Specialist    Transport: [facility]    Emergency contact: Mother, Neel Graves [phone] NARDA ARCHIBALD are 3 sons, but mother has been deemed spokesperson)    CM barriers to discharge: SNF choices (out of accepting facilities) and [facility] started insurance auth 11/9, it is still pending at this time. CM spoke with patient's mother, Chio Soliman, regarding Medicaid application. [place]es does believe patient will qualify for Medicaid as she does not have any assests and no longer has income. Referral sent to [address] [place] [address] for Medicaid screening. [facility]) RICHARD Tirado.

## 2022-11-10 NOTE — PROGRESS NOTES
Brief Neurology Progress Note:    Neurology was reconsulted for possible seizure overnight. Description is from the chart described as some degree of \"twitching\" and roving eye movements. As always, roving eye movements are typically in encephalopathic/metabolic picture more so than anything based in seizure. Would not be unexpected for patient to have nonpurposeful movements given her history. Otherwise at this juncture, supportive care is the main avenue of treatment, attempts at transfer/referral for nontraditional therapy have been futile.

## 2022-11-10 NOTE — PROGRESS NOTES
Neurocritical Care Brief Progress Note:    Notified by Hospitalist that patient was having facial/shoulder twitching and eye roving. Hopitalist had placed Ceribell which showed no seizure burden. On exam patient opens eyes to noxious stimuli, no command following, forward gaze, no roving eye movements on my exam, pupils small reactive bilaterally. Tone definitely more loose than when I examined in the ICU on 10/27. Spontaneous movements noted in bilateral feet. Mild muscle fasciculations noted intermittently on left shoulder.     Kae Champion NP  Neurocritical Care Nurse Practitioner  [phone]

## 2022-11-10 NOTE — PROGRESS NOTES
Problem: Pressure Injury - Risk of  Goal: *Prevention of pressure injury  Description: Document Jose Enrique Scale and appropriate interventions in the flowsheet. Outcome: Progressing Towards Goal  Note: Pressure Injury Interventions:  Sensory Interventions: Avoid rigorous massage over bony prominences, Float heels, Keep linens dry and wrinkle-free, Minimize linen layers    Moisture Interventions: Internal/External urinary devices, Limit adult briefs, Minimize layers, Maintain skin hydration (lotion/cream)    Activity Interventions: Pressure redistribution bed/mattress(bed type)    Mobility Interventions: Float heels, Pressure redistribution bed/mattress (bed type), Turn and reposition approx.  every two hours(pillow and wedges)    Nutrition Interventions: Discuss nutritional consult with provider    Friction and Shear Interventions: Lift sheet, Minimize layers, Lift team/patient mobility team                Problem: Patient Education: Go to Patient Education Activity  Goal: Patient/Family Education  Outcome: Progressing Towards Goal     Problem: Discharge Planning  Goal: *Discharge to safe environment  Outcome: Progressing Towards Goal  Goal: *Knowledge of medication management  Outcome: Progressing Towards Goal  Goal: *Knowledge of discharge instructions  Outcome: Progressing Towards Goal     Problem: Nutrition Deficit  Goal: *Optimize nutritional status  Outcome: Progressing Towards Goal

## 2022-11-11 ENCOUNTER — APPOINTMENT (OUTPATIENT)
Dept: VASCULAR SURGERY | Age: 57
DRG: 070 | End: 2022-11-11
Attending: STUDENT IN AN ORGANIZED HEALTH CARE EDUCATION/TRAINING PROGRAM
Payer: COMMERCIAL

## 2022-11-11 LAB
ANION GAP SERPL CALC-SCNC: 5 MMOL/L (ref 5–15)
B PERT DNA SPEC QL NAA+PROBE: NOT DETECTED
BACTERIA SPEC CULT: NORMAL
BASOPHILS # BLD: 0 K/UL (ref 0–0.1)
BASOPHILS NFR BLD: 0 % (ref 0–1)
BORDETELLA PARAPERTUSSIS PCR, BORPAR: NOT DETECTED
BUN SERPL-MCNC: 20 MG/DL (ref 6–20)
BUN/CREAT SERPL: 48 (ref 12–20)
C PNEUM DNA SPEC QL NAA+PROBE: NOT DETECTED
CALCIUM SERPL-MCNC: 8.2 MG/DL (ref 8.5–10.1)
CHLORIDE SERPL-SCNC: 110 MMOL/L (ref 97–108)
CO2 SERPL-SCNC: 27 MMOL/L (ref 21–32)
CREAT SERPL-MCNC: 0.42 MG/DL (ref 0.55–1.02)
D DIMER PPP FEU-MCNC: 15.7 MG/L FEU (ref 0–0.65)
DIFFERENTIAL METHOD BLD: ABNORMAL
EOSINOPHIL # BLD: 0.2 K/UL (ref 0–0.4)
EOSINOPHIL NFR BLD: 2 % (ref 0–7)
ERYTHROCYTE [DISTWIDTH] IN BLOOD BY AUTOMATED COUNT: 14.3 % (ref 11.5–14.5)
FLUAV SUBTYP SPEC NAA+PROBE: NOT DETECTED
FLUBV RNA SPEC QL NAA+PROBE: NOT DETECTED
GLUCOSE SERPL-MCNC: 148 MG/DL (ref 65–100)
HADV DNA SPEC QL NAA+PROBE: NOT DETECTED
HCOV 229E RNA SPEC QL NAA+PROBE: NOT DETECTED
HCOV HKU1 RNA SPEC QL NAA+PROBE: NOT DETECTED
HCOV NL63 RNA SPEC QL NAA+PROBE: NOT DETECTED
HCOV OC43 RNA SPEC QL NAA+PROBE: NOT DETECTED
HCT VFR BLD AUTO: 31.7 % (ref 35–47)
HGB BLD-MCNC: 10.2 G/DL (ref 11.5–16)
HMPV RNA SPEC QL NAA+PROBE: NOT DETECTED
HPIV1 RNA SPEC QL NAA+PROBE: NOT DETECTED
HPIV2 RNA SPEC QL NAA+PROBE: NOT DETECTED
HPIV3 RNA SPEC QL NAA+PROBE: NOT DETECTED
HPIV4 RNA SPEC QL NAA+PROBE: NOT DETECTED
IMM GRANULOCYTES # BLD AUTO: 0 K/UL (ref 0–0.04)
IMM GRANULOCYTES NFR BLD AUTO: 0 % (ref 0–0.5)
LACTATE SERPL-SCNC: 1.5 MMOL/L (ref 0.4–2)
LYMPHOCYTES # BLD: 1.8 K/UL (ref 0.8–3.5)
LYMPHOCYTES NFR BLD: 19 % (ref 12–49)
M PNEUMO DNA SPEC QL NAA+PROBE: NOT DETECTED
MCH RBC QN AUTO: 29.7 PG (ref 26–34)
MCHC RBC AUTO-ENTMCNC: 32.2 G/DL (ref 30–36.5)
MCV RBC AUTO: 92.2 FL (ref 80–99)
MONOCYTES # BLD: 0.7 K/UL (ref 0–1)
MONOCYTES NFR BLD: 7 % (ref 5–13)
NEUTS SEG # BLD: 6.9 K/UL (ref 1.8–8)
NEUTS SEG NFR BLD: 72 % (ref 32–75)
NRBC # BLD: 0 K/UL (ref 0–0.01)
NRBC BLD-RTO: 0 PER 100 WBC
PLATELET # BLD AUTO: 420 K/UL (ref 150–400)
PMV BLD AUTO: 10.7 FL (ref 8.9–12.9)
POTASSIUM SERPL-SCNC: 3.9 MMOL/L (ref 3.5–5.1)
PROCALCITONIN SERPL-MCNC: 0.13 NG/ML
RBC # BLD AUTO: 3.44 M/UL (ref 3.8–5.2)
RSV RNA SPEC QL NAA+PROBE: NOT DETECTED
RV+EV RNA SPEC QL NAA+PROBE: NOT DETECTED
SARS-COV-2 PCR, COVPCR: NOT DETECTED
SERVICE CMNT-IMP: NORMAL
SERVICE CMNT-IMP: NORMAL
SODIUM SERPL-SCNC: 142 MMOL/L (ref 136–145)
WBC # BLD AUTO: 9.6 K/UL (ref 3.6–11)

## 2022-11-11 PROCEDURE — 80048 BASIC METABOLIC PNL TOTAL CA: CPT

## 2022-11-11 PROCEDURE — 36415 COLL VENOUS BLD VENIPUNCTURE: CPT

## 2022-11-11 PROCEDURE — 65660000001 HC RM ICU INTERMED STEPDOWN

## 2022-11-11 PROCEDURE — 74011250637 HC RX REV CODE- 250/637: Performed by: PHYSICIAN ASSISTANT

## 2022-11-11 PROCEDURE — 84145 PROCALCITONIN (PCT): CPT

## 2022-11-11 PROCEDURE — 93970 EXTREMITY STUDY: CPT

## 2022-11-11 PROCEDURE — 74011250636 HC RX REV CODE- 250/636: Performed by: STUDENT IN AN ORGANIZED HEALTH CARE EDUCATION/TRAINING PROGRAM

## 2022-11-11 PROCEDURE — 85379 FIBRIN DEGRADATION QUANT: CPT

## 2022-11-11 PROCEDURE — 74011250637 HC RX REV CODE- 250/637: Performed by: NURSE PRACTITIONER

## 2022-11-11 PROCEDURE — 83605 ASSAY OF LACTIC ACID: CPT

## 2022-11-11 PROCEDURE — 74011000258 HC RX REV CODE- 258: Performed by: STUDENT IN AN ORGANIZED HEALTH CARE EDUCATION/TRAINING PROGRAM

## 2022-11-11 PROCEDURE — 85025 COMPLETE CBC W/AUTO DIFF WBC: CPT

## 2022-11-11 PROCEDURE — 0202U NFCT DS 22 TRGT SARS-COV-2: CPT

## 2022-11-11 PROCEDURE — 74011000250 HC RX REV CODE- 250: Performed by: FAMILY MEDICINE

## 2022-11-11 RX ORDER — FUROSEMIDE 10 MG/ML
40 INJECTION INTRAMUSCULAR; INTRAVENOUS ONCE
Status: COMPLETED | OUTPATIENT
Start: 2022-11-11 | End: 2022-11-11

## 2022-11-11 RX ADMIN — ACETAMINOPHEN 650 MG: 650 SOLUTION ORAL at 13:33

## 2022-11-11 RX ADMIN — CEFEPIME 2 G: 2 INJECTION, POWDER, FOR SOLUTION INTRAVENOUS at 23:39

## 2022-11-11 RX ADMIN — FUROSEMIDE 40 MG: 10 INJECTION, SOLUTION INTRAMUSCULAR; INTRAVENOUS at 20:52

## 2022-11-11 RX ADMIN — THERA TABS 1 TABLET: TAB at 08:33

## 2022-11-11 RX ADMIN — SODIUM CHLORIDE, PRESERVATIVE FREE 10 ML: 5 INJECTION INTRAVENOUS at 05:54

## 2022-11-11 RX ADMIN — CEFEPIME 2 G: 2 INJECTION, POWDER, FOR SOLUTION INTRAVENOUS at 13:33

## 2022-11-11 RX ADMIN — SODIUM CHLORIDE, PRESERVATIVE FREE 10 ML: 5 INJECTION INTRAVENOUS at 22:00

## 2022-11-11 RX ADMIN — VANCOMYCIN HYDROCHLORIDE 1250 MG: 1.25 INJECTION, POWDER, LYOPHILIZED, FOR SOLUTION INTRAVENOUS at 20:52

## 2022-11-11 RX ADMIN — LEVOTHYROXINE SODIUM 88 MCG: 0.09 TABLET ORAL at 05:54

## 2022-11-11 RX ADMIN — SENNOSIDES AND DOCUSATE SODIUM 2 TABLET: 50; 8.6 TABLET ORAL at 08:33

## 2022-11-11 RX ADMIN — PANTOPRAZOLE SODIUM 40 MG: 40 TABLET, DELAYED RELEASE ORAL at 05:54

## 2022-11-11 RX ADMIN — CEFEPIME 2 G: 2 INJECTION, POWDER, FOR SOLUTION INTRAVENOUS at 04:39

## 2022-11-11 RX ADMIN — VANCOMYCIN HYDROCHLORIDE 1250 MG: 1.25 INJECTION, POWDER, LYOPHILIZED, FOR SOLUTION INTRAVENOUS at 08:34

## 2022-11-11 RX ADMIN — Medication 100 MG: at 08:33

## 2022-11-11 NOTE — PROGRESS NOTES
[facility]  Hospitalist Group                                                                                          Hospitalist Progress Note  Lyly Etienne MD  Answering service: [phone] or [phone] from in house phone        Date of Service:  2022  NAME:  Jojo Lake  :  1965  MRN:  [id number]      Admission Summary:     Jojo Lake is a 59 y.o. female with past medical history of anoxic brain injury, anxiety, depression, hypertension, hyperlipidemia, hypothyroidism presented to the emergency department via EMS from [address] [place] [address] with reported altered mental status (AMS). Patient is aphasic/nonverbal and not answering any questions. Majority of history was obtained per review of chart records. Per reports patient recently was hospitalized at [facility] from 2022-10/20/2022 with diagnosis of status epilepticus, acute metabolic encephalopathy, delirium, psychosis, generalized anxiety disorder, debility, Takotsubo cardiomyopathy, NSTEMI, bradycardia, SIRS, tachycardia, fever, leukocytosis, and CVA. Patient had work-up including MRI and also EEG. There was concern the patient may have some anoxic brain injury. She has been followed by psychiatry and neurology services with persistent encephalopathy and intermittent severe aggression. Patient has had aphasia and altered mental status not following commands per staff report since last week. Symptoms remain constant, severe, without specific exacerbating relieving factors. About emergency department today, initial recorded vital signs temperature 98.8 °F, /65, heart rate 103, respirate 30, O2 saturation 93% on room air. Abnormal labs included WC 12.5, platelets 560, BUN 23. CT head without IV contrast showed no acute intracranial abnormalities. ED request admission to the hospitalist service.      No acute event overnight, less rigidity, stable and transferred out of ICU on 10/29    Interval history / Subjective:     Examined patient on morning rounds. Sleeping comfortably. Afebrile today. 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 Differential include acute or toxic encephalopathy.  - ANCA negative. Glutamic acid decarb negative <5.0. ADIN not sent  - CSF culture no growth, normal protein, HSV negative, myelin basic protein pending.   -Continue thiamine  -PT OT and speech  - Concern for seizure like activity overnight 11/10, ceribell rapid EEG negative for seizure. Likely roving eye movements 2/2 metabolic encephalopathy and supportive care recommended   -PEG on 11/8 -may use   - Does not seem to be making any progress.  Will continue to discuss with family and neurology regarding patient's long term prognosis      Suspected anoxic brain injury/delayed hypoxic leukoencephalopathy  Suspected Seizure   Hx of CVA- CT head on 10/25/2022 no acute intracranial abnormality on noncontrast head CT  -mental status is declining, lethargic, open her eyes to touch, aphasic, doesn't follow command or moves her extremities   -continue nuero check and supportive care  -complex medical problem with hx of previous prolonged hospitalization  -SpO2 93-98% on RA  -high risk for decompensation   - Unable to make medical decision, agree with plan for family to pursue mPOA as next of kin   11/6-[facility] rejected transfer. Continue current management. Bilateral UE edema  - Noted to have 2+ bilateral UE edema  Plan:   - Elevated arms  - Diuresis with IV lasix   - Duplex to eval for clot     Hx of Takotsubo cardiomyopathy  -has peripheral edema  -last Echo was on 10/12 EF 55-60%   -BP low normal, if it improves will consider her home metoprolol and lasix   -monitor I/O and daily weight      HTN  -BP normal, monitor BP     Hyperlipidemia   -statin on hold due to elevated CK     Hypothyroidism   -continue synthroid      Generalized anxiety disorder  -home trazodone and lexapro on hold may not need as non responsive now    Severe disability with immobility   -continue supportive care      Obesity   -BMI 32.52 kg/m2      Code status: Full code  Prophylaxis: lovenox   Care Plan discussed with: Nurse  Anticipated Disposition: SNF versus other once medically ready, delayed due to new fevers      Hospital Problems  Date Reviewed: 11/8/2022            Codes Class Noted POA    Altered mental status ICD-10-CM: R41.82  ICD-9-CM: 780.97  10/27/2022 Unknown        Diarrhea ICD-10-CM: R19.7  ICD-9-CM: 787.91  10/25/2022 Unknown        AMS (altered mental status) ICD-10-CM: R41.82  ICD-9-CM: 780.97  10/25/2022 Unknown       Vital Signs:    Last 24hrs VS reviewed since prior progress note.  Most recent are:  Visit Vitals  /66   Pulse 91   Temp 98.7 °F (37.1 °C)   Resp 26   Ht 5' 6\" (1.676 m)   Wt 90 kg (198 lb 6.6 oz)   SpO2 98%   BMI 32.02 kg/m²         Intake/Output Summary (Last 24 hours) at 11/11/2022 0754  Last data filed at 11/11/2022 0439  Gross per 24 hour   Intake 900 ml   Output 300 ml   Net 600 ml        Physical Examination:     I had a face to face encounter with this patient and independently examined them on 11/11/2022 as outlined below:          Constitutional:   aphasic, sleeping, arouses to pain (sternal rub)   ENT: NGT in place, oral mucosa moist, oropharynx benign. Resp:  CTA bilaterally. CV:  Regular rhythm, normal rate, no murmurs, gallops, rubs    GI:  Soft, non distended, non tender. normoactive bowel sounds, no hepatosplenomegaly     Musculoskeletal: Bilateral pretibial, pedal and hand edema    Neurologic: Lethargic, open here eyes to painful touch, aphasic, does not follow command or moves her extremities             Data Review:    Review and/or order of clinical lab test  Review and/or order of tests in the radiology section of CPT  Review and/or order of tests in the medicine section of CPT      Labs:     Recent Labs     11/11/22  0145 11/10/22  1926   WBC 9.6 10.4   HGB 10.2* 11.0*   HCT 31.7* 33.9*   * 430*     Recent Labs     11/11/22  0145 11/10/22  1926 11/09/22  0419    139 139   K 3.9 3.8 4.0   * 107 107   CO2 27 28 26   BUN 20 22* 28*   CREA 0.42* 0.53* 0.56   * 153* 131*   CA 8.2* 9.0 8.8     Recent Labs     11/10/22  1926   ALT 95*   *   TBILI 0.3   TP 6.6   ALB 2.2*   GLOB 4.4*       No results for input(s): INR, PTP, APTT, INREXT, INREXT in the last 72 hours. No results for input(s): FE, TIBC, PSAT, FERR in the last 72 hours. Lab Results   Component Value Date/Time    Folate 9.9 10/26/2022 11:47 AM        No results for input(s): PH, PCO2, PO2 in the last 72 hours. No results for input(s): CPK, CKNDX, TROIQ in the last 72 hours.     No lab exists for component: CPKMB    Lab Results   Component Value Date/Time    Cholesterol, total 145 10/26/2022 06:46 AM    HDL Cholesterol 30 10/26/2022 06:46 AM    LDL, calculated 91.4 10/26/2022 06:46 AM    Triglyceride 118 10/26/2022 06:46 AM    CHOL/HDL Ratio 4.8 10/26/2022 06:46 AM     Lab Results   Component Value Date/Time    Glucose (POC) 110 10/05/2022 04:55 PM    Glucose (POC) 87 10/05/2022 11:02 AM    Glucose (POC) 78 10/05/2022 05:00 AM    Glucose (POC) 94 10/05/2022 04:57 AM    Glucose (POC) 124 (H) 10/04/2022 11:03 PM     Lab Results   Component Value Date/Time    Color DARK YELLOW 11/10/2022 03:14 PM    Appearance CLEAR 11/10/2022 03:14 PM    Specific gravity 1.028 11/10/2022 03:14 PM    Specific gravity >1.030 (H) 10/25/2022 04:54 PM    pH (UA) 6.0 11/10/2022 03:14 PM    Protein Negative 11/10/2022 03:14 PM    Glucose Negative 11/10/2022 03:14 PM    Ketone Negative 11/10/2022 03:14 PM    Bilirubin Negative 11/10/2022 03:14 PM    Urobilinogen 1.0 11/10/2022 03:14 PM    Nitrites Negative 11/10/2022 03:14 PM    Leukocyte Esterase Negative 11/10/2022 03:14 PM    Epithelial cells FEW 11/10/2022 03:14 PM    Bacteria Negative 11/10/2022 03:14 PM    WBC 0-4 11/10/2022 03:14 PM    RBC 0-5 11/10/2022 03:14 PM         Medications Reviewed:     Current Facility-Administered Medications   Medication Dose Route Frequency    sodium chloride (NS) flush 5-10 mL  5-10 mL IntraVENous PRN    cefepime (MAXIPIME) 2 g in 0.9% sodium chloride (MBP/ADV) 100 mL MBP  2 g IntraVENous Q8H    Vancomycin - pharmacy to dose   Other Rx Dosing/Monitoring    vancomycin (VANCOCIN) 1,250 mg in 0.9% sodium chloride 250 mL ([id number])  1,250 mg IntraVENous Q12H    senna-docusate (PERICOLACE) 8.6-50 mg per tablet 2 Tablet  2 Tablet Oral DAILY    [Held by provider] metoprolol tartrate (LOPRESSOR) tablet 12.5 mg  12.5 mg Oral Q12H    polyethylene glycol (MIRALAX) packet 17 g  17 g Oral DAILY PRN    thiamine HCL (B-1) tablet 100 mg  100 mg Oral DAILY    therapeutic multivitamin (THERAGRAN) tablet 1 Tablet  1 Tablet Oral DAILY    acetaminophen (TYLENOL) solution 650 mg  650 mg Per NG tube Q4H PRN    sodium chloride (NS) flush 5-40 mL  5-40 mL IntraVENous Q8H    sodium chloride (NS) flush 5-40 mL  5-40 mL IntraVENous PRN    [Held by provider] atorvastatin (LIPITOR) tablet 10 mg  10 mg Oral QHS    levothyroxine (SYNTHROID) tablet 88 mcg  88 mcg Oral ACB    pantoprazole (PROTONIX) tablet 40 mg  40 mg Oral ACB    sodium chloride (NS) flush 5-10 mL  5-10 mL IntraVENous PRN    acetaminophen (TYLENOL) suppository 650 mg  650 mg Rectal Q6H PRN     ______________________________________________________________________  EXPECTED LENGTH OF STAY: 2d 7h  ACTUAL LENGTH OF STAY:          13                 Vera Brown MD

## 2022-11-11 NOTE — PROGRESS NOTES
Physician Progress Note      Howard Rai  CSN #:                  [id number]  :                       1965  ADMIT DATE:       10/25/2022 4:14 PM  DISCH DATE:  RESPONDING  PROVIDER #:        Millie Lou MD          QUERY TEXT:    Patient admitted with suspected Serotonin Syndrome. Pt with greater than 5 percent weight loss over the past 1 month. If possible, please document in progress notes and discharge summary if you are evaluating and /or treating any of the following: The medical record reflects the following:  Risk Factors: anoxic brain injury, previous CVA  Clinical Indicators: sent from snf with AMS, aphasic, not following commands. Recent adm for encephalopathy/ psychosis. Noted to have some mild dehydration, and aphasia. MS has continued to worse. RD has documented pt having >5% weight loss in the past month, meeting criteria for moderate malnutrition in the setting of inadequate oral intake related to cognitive or neurological impairment as evidenced by NPO or clear liquid status due to medical condition. Treatment: PEG placed, pt receiving Osmolite tube feedings at 70 cc/hr, monitoring of I/O. RD following. ASPEN Criteria:  https://aspenjournals. onlinelibrary. boykin. com/doi/full/10.1177/0207917683403019    Thank you,  Gallo Almanzar RN  Clinical Documentation  [phone], or via Perfect [employer]  Options provided:  -- Protein calorie malnutrition moderate  -- Protein calorie malnutrition unspecified  -- Other - I will add my own diagnosis  -- Disagree - Not applicable / Not valid  -- Disagree - Clinically unable to determine / Unknown  -- Refer to Clinical Documentation Reviewer    PROVIDER RESPONSE TEXT:    This patient has unspecified protein calorie malnutrition.     Query created by: Luann Hopper on 11/10/2022 1:39 PM      Electronically signed by:  Millie Lou MD 11/10/2022 7:43 PM

## 2022-11-11 NOTE — PROGRESS NOTES
Transition of Care Plan  RUR- High 20%  DISPOSITION: SNF- [facility] have accepted and are in network for tier 2 of patient's insurance  F/U with PCP/Specialist    Transport: [facility]    Emergency contact: Mother, Arlette Barrera [phone] NARDA ARCHIBALD are 3 sons, but mother has been deemed spokesperson)    CM barriers to discharge: SNF choice in patient's insurance network    [facility] started insurance auth 11/9, it is still pending at this time. Patient having new fevers, may not be medically stable for discharge today. If patient is stable over the weekend, CM to reach out to [facility] #[phone] to check if [place] has been received/bed is available. Needs BLS/[facility] transport for discharge. 1:50pm: Patient's mother has requested name/information of Dr. Deven Juan, regarding a letter needed to obtain POA for patient. CM explained that it is not normal process for hospital to get involved in POA in any aspect. CM discussed with Dr. Deven Juan, who was in agreement for email info to be provided to patient's mother. Neurology had already agreed to write letter regarding patient's dx, but patient's mother said this letter did nto include all needed aspects requested from . 3:00pm: CM received call from [facility] is not in network with [facility]. CM obtained list of SNFs within patient's network and provided to patient's mother. Only SNF in tier 1 network is . Tier 2 consists of 15 options for SNF. Of these SNFs that has accepted in network is [address] [place] [address] and [facility]. CM spoke with Halina Warren at [address] [facility] [phone] to check if they will re-consider patient for SNF given they are only tier 1 network option. RICHARD Segura.

## 2022-11-11 NOTE — PROGRESS NOTES
Message sent to NP. Mrs Valenzuela Angely. Lactic was draw due to concern for sepsis. Lactic came back at 2.1. pt already recieved bolus and started on cefapime and vanco. will await any new orders.  thank you Syd Caban

## 2022-11-11 NOTE — PROGRESS NOTES
Spoke with pt mother, Carol Rogers, at bedside. Mother expressed she needed a sighed doctor's letter in order to get an indefinite legal guardianship that included an official diagnosis and an actual signature.

## 2022-11-11 NOTE — PROGRESS NOTES
Speech Pathology Note    Chart reviewed and spoke with RN cleared to see patient. Patient sleeping deeply and would not arouse despite sternal rub, repeatedly calling patient's name. Will defer at this time and attempt to follow up as able.     Tiffany Wilson, SLP

## 2022-11-11 NOTE — PROGRESS NOTES
Bedside shift change report given to Josi Kuhn (oncoming nurse) by Elia Reeves (offgoing nurse). Report included the following information SBAR, MAR, Cardiac Rhythm (NSR, ST), and Dual Neuro Assessment.

## 2022-11-11 NOTE — PROGRESS NOTES
[facility]  Hospitalist Group                                                                                          Hospitalist Progress Note  Loida Waller MD  Answering service: [phone] OR [phone] from in house phone        Date of Service:  11/10/2022  NAME:  Gisela Garrett  :  1965  MRN:  [id number]      Admission Summary:     Gisela Garrett is a 59 y.o. female with past medical history of anoxic brain injury, anxiety, depression, hypertension, hyperlipidemia, hypothyroidism presented to the emergency department via EMS from [address] [place] [address] with reported altered mental status (AMS). Patient is aphasic/nonverbal and not answering any questions. Majority of history was obtained per review of chart records. Per reports patient recently was hospitalized at [facility] from 2022-10/20/2022 with diagnosis of status epilepticus, acute metabolic encephalopathy, delirium, psychosis, generalized anxiety disorder, debility, Takotsubo cardiomyopathy, NSTEMI, bradycardia, SIRS, tachycardia, fever, leukocytosis, and CVA. Patient had work-up including MRI and also EEG. There was concern the patient may have some anoxic brain injury. She has been followed by psychiatry and neurology services with persistent encephalopathy and intermittent severe aggression. Patient has had aphasia and altered mental status not following commands per staff report since last week. Symptoms remain constant, severe, without specific exacerbating relieving factors. About emergency department today, initial recorded vital signs temperature 98.8 °F, /65, heart rate 103, respirate 30, O2 saturation 93% on room air. Abnormal labs included WC 12.5, platelets 812, BUN 23. CT head without IV contrast showed no acute intracranial abnormalities. ED request admission to the hospitalist service.      No acute event overnight, less rigidity, stable and transferred out of ICU on 10/29    Interval history / Subjective:     Examined patient on morning rounds. Patient's parents at bedside. Pt noted to have elevated external temperature, confirmed by rectal temperature. Sepsis work-up initiated. Unable to obtain ROS from patient given patient condition. 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 Differential include acute or toxic encephalopathy.  - ANCA negative. Glutamic acid decarb negative <5.0. ADIN not sent  - CSF culture no growth, normal protein, HSV negative, myelin basic protein pending.   -Continue thiamine  -PT OT and speech  - Concern for seizure like activity overnight 11/10, ceribell rapid EEG negative for seizure.  Likely roving eye movements 2/2 metabolic encephalopathy and supportive care recommended   -PEG on 11/8 -may use        Suspected anoxic brain injury/delayed hypoxic leukoencephalopathy  Suspected Seizure   Hx of CVA- CT head on 10/25/2022 no acute intracranial abnormality on noncontrast head CT  -mental status is declining, lethargic, open her eyes to touch, aphasic, doesn't follow command or moves her extremities   -continue nuero check and supportive care  -complex medical problem with hx of previous prolonged hospitalization  -SpO2 93-98% on RA  -high risk for decompensation   - Unable to make medical decision, agree with plan for family to pursue [facility]POA as next of kin   11/6-[place] rejected transfer. Continue current management. Hx of Takotsubo cardiomyopathy  -has peripheral edema  -last Echo was on 10/12 EF 55-60%   -BP low normal, if it improves will consider her home metoprolol and lasix   -monitor I/O and daily weight      HTN  -BP normal, monitor BP     Hyperlipidemia   -statin on hold due to elevated CK     Hypothyroidism   -continue synthroid      Generalized anxiety disorder  -home trazodone and lexapro on hold may not need as non responsive now    Severe disability with immobility   -continue supportive care      Obesity   -BMI 32.52 kg/m2      Code status: Full code  Prophylaxis: lovenox   Care Plan discussed with: Nurse  Anticipated Disposition: SNF versus other once medically ready, delayed due to new fevers      Hospital Problems  Date Reviewed: 11/8/2022            Codes Class Noted POA    Altered mental status ICD-10-CM: R41.82  ICD-9-CM: 780.97  10/27/2022 Unknown        Diarrhea ICD-10-CM: R19.7  ICD-9-CM: 787.91  10/25/2022 Unknown        AMS (altered mental status) ICD-10-CM: R41.82  ICD-9-CM: 780.97  10/25/2022 Unknown       Vital Signs:    Last 24hrs VS reviewed since prior progress note.  Most recent are:  Visit Vitals  /72 (BP 1 Location: Right upper arm, BP Patient Position: At rest)   Pulse (!) 107   Temp (!) 100.6 °F (38.1 °C)   Resp 28   Ht 5' 6\" (1.676 m)   Wt 90 kg (198 lb 6.6 oz)   SpO2 92%   BMI 32.02 kg/m²         Intake/Output Summary (Last 24 hours) at 11/10/2022 1902  Last data filed at 11/10/2022 1200  Gross per 24 hour   Intake 200 ml   Output --   Net 200 ml        Physical Examination:     I had a face to face encounter with this patient and independently examined them on 11/10/2022 as outlined below:          Constitutional:   aphasic, no acute distress   ENT: NGT in place, oral mucosa moist, oropharynx benign. Resp:  CTA bilaterally. CV:  Regular rhythm, normal rate, no murmurs, gallops, rubs    GI:  Soft, non distended, non tender. normoactive bowel sounds, no hepatosplenomegaly     Musculoskeletal: Bilateral pretibial, pedal and hand edema    Neurologic: Lethargic, open here eyes to touch, aphasic, does not follow command or moves her extremities             Data Review:    Review and/or order of clinical lab test  Review and/or order of tests in the radiology section of CPT  Review and/or order of tests in the medicine section of CPT      Labs:     Recent Labs     11/09/22 0419 11/08/22 0240   WBC 11.1* 9.2   HGB 11.0* 11.7   HCT 33.7* 35.8    330     Recent Labs     11/09/22 0419 11/08/22 0240    137   K 4.0 4.7    103   CO2 26 28   BUN 28* 23*   CREA 0.56 0.61   * 106*   CA 8.8 9.3     No results for input(s): ALT, AP, TBIL, TBILI, TP, ALB, GLOB, GGT, AML, LPSE in the last 72 hours. No lab exists for component: SGOT, GPT, AMYP, HLPSE    No results for input(s): INR, PTP, APTT, INREXT, INREXT in the last 72 hours. No results for input(s): FE, TIBC, PSAT, FERR in the last 72 hours. Lab Results   Component Value Date/Time    Folate 9.9 10/26/2022 11:47 AM        No results for input(s): PH, PCO2, PO2 in the last 72 hours. No results for input(s): CPK, CKNDX, TROIQ in the last 72 hours.     No lab exists for component: CPKMB    Lab Results   Component Value Date/Time    Cholesterol, total 145 10/26/2022 06:46 AM    HDL Cholesterol 30 10/26/2022 06:46 AM    LDL, calculated 91.4 10/26/2022 06:46 AM    Triglyceride 118 10/26/2022 06:46 AM    CHOL/HDL Ratio 4.8 10/26/2022 06:46 AM     Lab Results   Component Value Date/Time    Glucose (POC) 110 10/05/2022 04:55 PM    Glucose (POC) 87 10/05/2022 11:02 AM    Glucose (POC) 78 10/05/2022 05:00 AM    Glucose (POC) 94 10/05/2022 04:57 AM    Glucose (POC) 124 (H) 10/04/2022 11:03 PM     Lab Results   Component Value Date/Time    Color DARK YELLOW 11/10/2022 03:14 PM    Appearance CLEAR 11/10/2022 03:14 PM    Specific gravity 1.028 11/10/2022 03:14 PM    Specific gravity >1.030 (H) 10/25/2022 04:54 PM    pH (UA) 6.0 11/10/2022 03:14 PM    Protein Negative 11/10/2022 03:14 PM    Glucose Negative 11/10/2022 03:14 PM    Ketone Negative 11/10/2022 03:14 PM    Bilirubin Negative 11/10/2022 03:14 PM    Urobilinogen 1.0 11/10/2022 03:14 PM    Nitrites Negative 11/10/2022 03:14 PM    Leukocyte Esterase Negative 11/10/2022 03:14 PM    Epithelial cells FEW 11/10/2022 03:14 PM    Bacteria Negative 11/10/2022 03:14 PM    WBC 0-4 11/10/2022 03:14 PM    RBC 0-5 11/10/2022 03:14 PM         Medications Reviewed:     Current Facility-Administered Medications   Medication Dose Route Frequency    senna-docusate (PERICOLACE) 8.6-50 mg per tablet 2 Tablet  2 Tablet Oral DAILY    [Held by provider] metoprolol tartrate (LOPRESSOR) tablet 12.5 mg  12.5 mg Oral Q12H    polyethylene glycol (MIRALAX) packet 17 g  17 g Oral DAILY PRN    thiamine HCL (B-1) tablet 100 mg  100 mg Oral DAILY    therapeutic multivitamin (THERAGRAN) tablet 1 Tablet  1 Tablet Oral DAILY    acetaminophen (TYLENOL) solution 650 mg  650 mg Per NG tube Q4H PRN    sodium chloride (NS) flush 5-40 mL  5-40 mL IntraVENous Q8H    sodium chloride (NS) flush 5-40 mL  5-40 mL IntraVENous PRN    [Held by provider] atorvastatin (LIPITOR) tablet 10 mg  10 mg Oral QHS    levothyroxine (SYNTHROID) tablet 88 mcg  88 mcg Oral ACB    pantoprazole (PROTONIX) tablet 40 mg  40 mg Oral ACB    sodium chloride (NS) flush 5-10 mL  5-10 mL IntraVENous PRN    acetaminophen (TYLENOL) suppository 650 mg  650 mg Rectal Q6H PRN     ______________________________________________________________________  EXPECTED LENGTH OF STAY: 2d 7h  ACTUAL LENGTH OF STAY:          15                 Vera Wright MD

## 2022-11-11 NOTE — PROGRESS NOTES
Pharmacist Note - Vancomycin Dosing    Consult provided for this 59 y.o. female for indication of sepsis. Antibiotic regimen(s): Vanc + Cefepime  Patient on vancomycin PTA? NO     Recent Labs     22  0419 22  0240   WBC 11.1* 9.2   CREA 0.56 0.61   BUN 28* 23*     Frequency of BMP: daily x 3  Height: 167.6 cm  Weight: 90 kg  Est CrCl: 97.8 ml/min; UO: -- ml/kg/hr  Temp (24hrs), Av.2 °F (37.9 °C), Min:99.4 °F (37.4 °C), Max:100.6 °F (38.1 °C)    Cultures:   blood #1: CoNS in  [employer] ID panel - staph epidermidis (Methicillin resistant gene detected)   blood #2 - NGTD  11/10 blood - pending  11/10 urine - pending    MRSA Swab ordered (if applicable)? YES    The plan below is expected to result in a target range of AUC/GARTH 400-600    Therapy will be initiated with a loading dose of 2250 mg IV x 1 to be followed by a maintenance dose of 1250 mg IV every 12 hours. Pharmacy to follow patient daily and order levels / make dose adjustments as appropriate. *Vancomycin has been dosed used Bayesian kinetics software to target an AUC/GARTH of 400-600, which provides adequate exposure for an assumed infection due to MRSA with an GARTH of 1 or less while reducing the risk of nephrotoxicity as seen with traditional trough based dosing goals.

## 2022-11-11 NOTE — PROGRESS NOTES
Problem: Falls - Risk of  Goal: *Absence of Falls  Description: Document Jasiel Anne Fall Risk and appropriate interventions in the flowsheet.   Outcome: Progressing Towards Goal  Note: Fall Risk Interventions:  Mobility Interventions: Bed/chair exit alarm, Strengthening exercises (ROM-active/passive), PT Consult for assist device competence, PT Consult for mobility concerns, Communicate number of staff needed for ambulation/transfer    Mentation Interventions: Adequate sleep, hydration, pain control, Bed/chair exit alarm, Familiar objects from home, Door open when patient unattended, More frequent rounding, Increase mobility    Medication Interventions: Bed/chair exit alarm, Patient to call before getting OOB, Teach patient to arise slowly    Elimination Interventions: Bed/chair exit alarm, Call light in reach, Patient to call for help with toileting needs, Toileting schedule/hourly rounds    History of Falls Interventions: Bed/chair exit alarm, Door open when patient unattended         Problem: Patient Education: Go to Patient Education Activity  Goal: Patient/Family Education  Outcome: Progressing Towards Goal

## 2022-11-11 NOTE — PROGRESS NOTES
Bedside and Verbal shift change report given to Durga Al (oncoming nurse) by Jazmyn Go (offgoing nurse). Report included the following information SBAR, Kardex, Intake/Output, Recent Results, Cardiac Rhythm sr/st, and Quality Measures.

## 2022-11-12 ENCOUNTER — APPOINTMENT (OUTPATIENT)
Dept: CT IMAGING | Age: 57
DRG: 070 | End: 2022-11-12
Attending: STUDENT IN AN ORGANIZED HEALTH CARE EDUCATION/TRAINING PROGRAM
Payer: COMMERCIAL

## 2022-11-12 LAB
ANION GAP SERPL CALC-SCNC: 4 MMOL/L (ref 5–15)
ATRIAL RATE: 95 BPM
BASOPHILS # BLD: 0.1 K/UL (ref 0–0.1)
BASOPHILS NFR BLD: 1 % (ref 0–1)
BUN SERPL-MCNC: 19 MG/DL (ref 6–20)
BUN/CREAT SERPL: 44 (ref 12–20)
CALCIUM SERPL-MCNC: 8.7 MG/DL (ref 8.5–10.1)
CALCULATED P AXIS, ECG09: 21 DEGREES
CALCULATED R AXIS, ECG10: 4 DEGREES
CALCULATED T AXIS, ECG11: 8 DEGREES
CHLORIDE SERPL-SCNC: 104 MMOL/L (ref 97–108)
CO2 SERPL-SCNC: 29 MMOL/L (ref 21–32)
CREAT SERPL-MCNC: 0.43 MG/DL (ref 0.55–1.02)
DIAGNOSIS, 93000: NORMAL
DIFFERENTIAL METHOD BLD: ABNORMAL
EOSINOPHIL # BLD: 0.3 K/UL (ref 0–0.4)
EOSINOPHIL NFR BLD: 3 % (ref 0–7)
ERYTHROCYTE [DISTWIDTH] IN BLOOD BY AUTOMATED COUNT: 14.1 % (ref 11.5–14.5)
GLUCOSE SERPL-MCNC: 123 MG/DL (ref 65–100)
HCT VFR BLD AUTO: 34.2 % (ref 35–47)
HGB BLD-MCNC: 11 G/DL (ref 11.5–16)
IMM GRANULOCYTES # BLD AUTO: 0 K/UL (ref 0–0.04)
IMM GRANULOCYTES NFR BLD AUTO: 0 % (ref 0–0.5)
LYMPHOCYTES # BLD: 1.7 K/UL (ref 0.8–3.5)
LYMPHOCYTES NFR BLD: 18 % (ref 12–49)
MCH RBC QN AUTO: 28.8 PG (ref 26–34)
MCHC RBC AUTO-ENTMCNC: 32.2 G/DL (ref 30–36.5)
MCV RBC AUTO: 89.5 FL (ref 80–99)
MONOCYTES # BLD: 0.8 K/UL (ref 0–1)
MONOCYTES NFR BLD: 8 % (ref 5–13)
NEUTS SEG # BLD: 6.8 K/UL (ref 1.8–8)
NEUTS SEG NFR BLD: 70 % (ref 32–75)
NRBC # BLD: 0 K/UL (ref 0–0.01)
NRBC BLD-RTO: 0 PER 100 WBC
P-R INTERVAL, ECG05: 136 MS
PLATELET # BLD AUTO: 417 K/UL (ref 150–400)
PMV BLD AUTO: 11.2 FL (ref 8.9–12.9)
POTASSIUM SERPL-SCNC: 4.2 MMOL/L (ref 3.5–5.1)
Q-T INTERVAL, ECG07: 342 MS
QRS DURATION, ECG06: 74 MS
QTC CALCULATION (BEZET), ECG08: 429 MS
RBC # BLD AUTO: 3.82 M/UL (ref 3.8–5.2)
SODIUM SERPL-SCNC: 137 MMOL/L (ref 136–145)
VANCOMYCIN SERPL-MCNC: 13.6 UG/ML
VENTRICULAR RATE, ECG03: 95 BPM
WBC # BLD AUTO: 9.6 K/UL (ref 3.6–11)

## 2022-11-12 PROCEDURE — 74011000258 HC RX REV CODE- 258: Performed by: STUDENT IN AN ORGANIZED HEALTH CARE EDUCATION/TRAINING PROGRAM

## 2022-11-12 PROCEDURE — 74011250636 HC RX REV CODE- 250/636: Performed by: STUDENT IN AN ORGANIZED HEALTH CARE EDUCATION/TRAINING PROGRAM

## 2022-11-12 PROCEDURE — 65660000001 HC RM ICU INTERMED STEPDOWN

## 2022-11-12 PROCEDURE — 71275 CT ANGIOGRAPHY CHEST: CPT

## 2022-11-12 PROCEDURE — 74011250637 HC RX REV CODE- 250/637: Performed by: PHYSICIAN ASSISTANT

## 2022-11-12 PROCEDURE — 74011250637 HC RX REV CODE- 250/637: Performed by: NURSE PRACTITIONER

## 2022-11-12 PROCEDURE — 80202 ASSAY OF VANCOMYCIN: CPT

## 2022-11-12 PROCEDURE — 85025 COMPLETE CBC W/AUTO DIFF WBC: CPT

## 2022-11-12 PROCEDURE — 74011000250 HC RX REV CODE- 250: Performed by: FAMILY MEDICINE

## 2022-11-12 PROCEDURE — 80048 BASIC METABOLIC PNL TOTAL CA: CPT

## 2022-11-12 PROCEDURE — 74011000636 HC RX REV CODE- 636: Performed by: STUDENT IN AN ORGANIZED HEALTH CARE EDUCATION/TRAINING PROGRAM

## 2022-11-12 PROCEDURE — 36415 COLL VENOUS BLD VENIPUNCTURE: CPT

## 2022-11-12 RX ORDER — FUROSEMIDE 10 MG/ML
20 INJECTION INTRAMUSCULAR; INTRAVENOUS DAILY
Status: DISCONTINUED | OUTPATIENT
Start: 2022-11-12 | End: 2022-11-21

## 2022-11-12 RX ADMIN — SODIUM CHLORIDE, PRESERVATIVE FREE 10 ML: 5 INJECTION INTRAVENOUS at 21:43

## 2022-11-12 RX ADMIN — IOPAMIDOL 80 ML: 755 INJECTION, SOLUTION INTRAVENOUS at 18:35

## 2022-11-12 RX ADMIN — SENNOSIDES AND DOCUSATE SODIUM 2 TABLET: 50; 8.6 TABLET ORAL at 09:07

## 2022-11-12 RX ADMIN — CEFEPIME 2 G: 2 INJECTION, POWDER, FOR SOLUTION INTRAVENOUS at 11:44

## 2022-11-12 RX ADMIN — THERA TABS 1 TABLET: TAB at 09:06

## 2022-11-12 RX ADMIN — ACETAMINOPHEN 650 MG: 650 SOLUTION ORAL at 21:30

## 2022-11-12 RX ADMIN — PANTOPRAZOLE SODIUM 40 MG: 40 TABLET, DELAYED RELEASE ORAL at 06:33

## 2022-11-12 RX ADMIN — ACETAMINOPHEN 650 MG: 650 SOLUTION ORAL at 09:10

## 2022-11-12 RX ADMIN — CEFEPIME 2 G: 2 INJECTION, POWDER, FOR SOLUTION INTRAVENOUS at 04:42

## 2022-11-12 RX ADMIN — SODIUM CHLORIDE, PRESERVATIVE FREE 10 ML: 5 INJECTION INTRAVENOUS at 06:33

## 2022-11-12 RX ADMIN — LEVOTHYROXINE SODIUM 88 MCG: 0.09 TABLET ORAL at 06:33

## 2022-11-12 RX ADMIN — Medication 100 MG: at 09:07

## 2022-11-12 RX ADMIN — CEFEPIME 2 G: 2 INJECTION, POWDER, FOR SOLUTION INTRAVENOUS at 21:29

## 2022-11-12 RX ADMIN — SODIUM CHLORIDE, PRESERVATIVE FREE 10 ML: 5 INJECTION INTRAVENOUS at 14:03

## 2022-11-12 RX ADMIN — FUROSEMIDE 20 MG: 10 INJECTION, SOLUTION INTRAMUSCULAR; INTRAVENOUS at 16:07

## 2022-11-12 NOTE — PROGRESS NOTES
Problem: Pressure Injury - Risk of  Goal: *Prevention of pressure injury  Description: Document Jose Enrique Scale and appropriate interventions in the flowsheet. Outcome: Progressing Towards Goal  Note: Pressure Injury Interventions:  Sensory Interventions: Assess changes in LOC, Discuss PT/OT consult with provider    Moisture Interventions: Absorbent underpads, Check for incontinence Q2 hours and as needed    Activity Interventions: Pressure redistribution bed/mattress(bed type), PT/OT evaluation    Mobility Interventions: HOB 30 degrees or less, Pressure redistribution bed/mattress (bed type), PT/OT evaluation    Nutrition Interventions: Document food/fluid/supplement intake    Friction and Shear Interventions: HOB 30 degrees or less                Problem: Patient Education: Go to Patient Education Activity  Goal: Patient/Family Education  Outcome: Progressing Towards Goal     Problem: Falls - Risk of  Goal: *Absence of Falls  Description: Document Chyna Fall Risk and appropriate interventions in the flowsheet.   Outcome: Progressing Towards Goal  Note: Fall Risk Interventions:  Mobility Interventions: Bed/chair exit alarm, Patient to call before getting OOB    Mentation Interventions: Bed/chair exit alarm, Door open when patient unattended    Medication Interventions: Bed/chair exit alarm, Patient to call before getting OOB    Elimination Interventions: Bed/chair exit alarm, Call light in reach, Patient to call for help with toileting needs    History of Falls Interventions: Bed/chair exit alarm, Door open when patient unattended         Problem: Patient Education: Go to Patient Education Activity  Goal: Patient/Family Education  Outcome: Progressing Towards Goal     Problem: Discharge Planning  Goal: *Discharge to safe environment  Outcome: Progressing Towards Goal  Goal: *Knowledge of medication management  Outcome: Progressing Towards Goal  Goal: *Knowledge of discharge instructions  Outcome: Progressing Towards Goal     Problem: Patient Education: Go to Patient Education Activity  Goal: Patient/Family Education  Outcome: Progressing Towards Goal

## 2022-11-12 NOTE — PROGRESS NOTES
[facility]  Hospitalist Group                                                                                          Hospitalist Progress Note  Maggy Gatica MD  Answering service: [phone] OR [phone] from in house phone        Date of Service:  2022  NAME:  Moshe Resendiz  :  1965  MRN:  [id number]      Admission Summary:     Moshe Resendiz is a 59 y.o. female with past medical history of anoxic brain injury, anxiety, depression, hypertension, hyperlipidemia, hypothyroidism [facility] department [facility] with reported altered mental status (AMS). Patient is aphasic/nonverbal and not answering any questions. Majority of history was obtained per review of chart records. Per reports patient recently was hospitalized at [facility] from 2022-10/20/2022 with diagnosis of status epilepticus, acute metabolic encephalopathy, delirium, psychosis, generalized anxiety disorder, debility, Takotsubo cardiomyopathy, NSTEMI, bradycardia, SIRS, tachycardia, fever, leukocytosis, and CVA. Patient had work-up including MRI and also EEG. There was concern the patient may have some anoxic brain injury. She has been followed by psychiatry and neurology services with persistent encephalopathy and intermittent severe aggression. Patient has had aphasia and altered mental status not following commands per staff report since last week. Symptoms remain constant, severe, without specific exacerbating relieving factors. About emergency department today, initial recorded vital signs temperature 98.8 °F, /65, heart rate 103, respirate 30, O2 saturation 93% on room air. Abnormal labs included WC 12.5, platelets 044, BUN 23. CT head without IV contrast showed no acute intracranial abnormalities. ED request admission to the hospitalist service.      No acute event overnight, less rigidity, stable and transferred out of ICU on 10/29    Interval history / Subjective:     Patient continues to be encephalopathic, does not respond to commands, does respond to painful stimuli. Patient's mother at bedside. Discussed goals of care, patient's mother states that she was told that 80-90% of patients with similar cases to hers recover within a year. 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 While at [facility], developed changes that were concerning for serotonin syndrome and she was admitted to [facility]. During this admission, patient's mental status has deteriorated. -MRI with interval development of diffuse abnormal supratentorial white matter with extensive FLAIR/T2 hyperintense signal and diffusion restriction. Differential include acute or toxic encephalopathy.  - ANCA negative. Glutamic acid decarb negative <5.0. ADIN not sent  - CSF culture no growth, normal protein, HSV negative, myelin basic protein pending.   -Continue thiamine  - Unable to make medical decision, agree with plan for family to pursue mPOA as next of kin   11/6-[place] rejected transfer. Continue current management.    -PT OT and speech, PEG on 11/8 -may use   - Concern for seizure like activity overnight 11/10, yue rapid EEG negative for seizure. Likely roving eye movements 2/2 metabolic encephalopathy and supportive care recommended   - Per patient's mother, she was told that patient is expected to make a neurologic recovery within a year. On chart review, do not see this prognosis  mentioned. Will reengage Neurology after the weekend and after infectious work-up has been completed. Bilateral UE edema  - Noted to have 2+ bilateral UE edema  Plan:   - Elevated arms  - Diuresis with IV lasix again today  - Duplex to eval for clot negative for DVT, does have superficial thrombus bilaterally     Hx of Takotsubo cardiomyopathy  -has peripheral edema  -last Echo was on 10/12 EF 55-60%   -BP low normal, if it improves will consider her home metoprolol and lasix   -monitor I/O and daily weight      HTN  -BP normal, monitor BP     Hyperlipidemia   -statin on hold due to elevated CK     Hypothyroidism   -continue synthroid      Generalized anxiety disorder  -home trazodone and lexapro on hold may not need as non responsive now    Severe disability with immobility   -continue supportive care      Obesity   -BMI 32.52 kg/m2      Code status: Full code  Prophylaxis: lovenox   Care Plan discussed with: Nurse, family  Anticipated Disposition: pending clarification of goals of care      Hospital Problems  Date Reviewed: 11/8/2022            Codes Class Noted POA    Altered mental status ICD-10-CM: R41.82  ICD-9-CM: 780.97  10/27/2022 Unknown        Diarrhea ICD-10-CM: R19.7  ICD-9-CM: 787.91  10/25/2022 Unknown        AMS (altered mental status) ICD-10-CM: R41.82  ICD-9-CM: 780.97  10/25/2022 Unknown       Vital Signs:    Last 24hrs VS reviewed since prior progress note.  Most recent are:  Visit Vitals  BP (!) 100/56 (BP 1 Location: Right upper arm, BP Patient Position: At rest)   Pulse 98   Temp 98.3 °F (36.8 °C)   Resp 28   Ht 5' 6\" (1.676 m)   Wt 90 kg (198 lb 6.6 oz)   SpO2 99%   BMI 32.02 kg/m²         Intake/Output Summary (Last 24 hours) at 11/12/2022 0741  Last data filed at 11/12/2022 0400  Gross per 24 hour   Intake 550 ml   Output 600 ml   Net -50 ml        Physical Examination:     I had a face to face encounter with this patient and independently examined them on 11/12/2022 as outlined below:          Constitutional:  Aphasic, sleeping, arouses to pain (sternal rub)   ENT: Oral mucosa moist, oropharynx benign. Resp:  CTA bilaterally. CV:  Regular rhythm, normal rate, no murmurs, gallops, rubs    GI:  Soft, non distended, non tender. normoactive bowel sounds, no hepatosplenomegaly     Musculoskeletal: Bilateral pretibial, pedal and hand edema    Neurologic: Lethargic, open here eyes to painful touch, aphasic, does not follow command or moves her extremities             Data Review:    Review and/or order of clinical lab test  Review and/or order of tests in the radiology section of CPT  Review and/or order of tests in the medicine section of CPT      Labs:     Recent Labs     11/12/22 0435 11/11/22 0145   WBC 9.6 9.6   HGB 11.0* 10.2*   HCT 34.2* 31.7*   * 420*     Recent Labs     11/12/22  0435 11/11/22  0145 11/10/22  1926    142 139   K 4.2 3.9 3.8    110* 107   CO2 29 27 28   BUN 19 20 22*   CREA 0.43* 0.42* 0.53*   * 148* 153*   CA 8.7 8.2* 9.0     Recent Labs     11/10/22  1926   ALT 95*   *   TBILI 0.3   TP 6.6   ALB 2.2*   GLOB 4.4*       No results for input(s): INR, PTP, APTT, INREXT, INREXT in the last 72 hours. No results for input(s): FE, TIBC, PSAT, FERR in the last 72 hours. Lab Results   Component Value Date/Time    Folate 9.9 10/26/2022 11:47 AM        No results for input(s): PH, PCO2, PO2 in the last 72 hours. No results for input(s): CPK, CKNDX, TROIQ in the last 72 hours.     No lab exists for component: CPKMB    Lab Results   Component Value Date/Time    Cholesterol, total 145 10/26/2022 06:46 AM    HDL Cholesterol 30 10/26/2022 06:46 AM    LDL, calculated 91.4 10/26/2022 06:46 AM    Triglyceride 118 10/26/2022 06:46 AM    CHOL/HDL Ratio 4.8 10/26/2022 06:46 AM     Lab Results   Component Value Date/Time    Glucose (POC) 110 10/05/2022 04:55 PM    Glucose (POC) 87 10/05/2022 11:02 AM    Glucose (POC) 78 10/05/2022 05:00 AM    Glucose (POC) 94 10/05/2022 04:57 AM    Glucose (POC) 124 (H) 10/04/2022 11:03 PM     Lab Results   Component Value Date/Time    Color DARK YELLOW 11/10/2022 03:14 PM    Appearance CLEAR 11/10/2022 03:14 PM    Specific gravity 1.028 11/10/2022 03:14 PM    Specific gravity >1.030 (H) 10/25/2022 04:54 PM    pH (UA) 6.0 11/10/2022 03:14 PM    Protein Negative 11/10/2022 03:14 PM    Glucose Negative 11/10/2022 03:14 PM    Ketone Negative 11/10/2022 03:14 PM    Bilirubin Negative 11/10/2022 03:14 PM    Urobilinogen 1.0 11/10/2022 03:14 PM    Nitrites Negative 11/10/2022 03:14 PM    Leukocyte Esterase Negative 11/10/2022 03:14 PM    Epithelial cells FEW 11/10/2022 03:14 PM    Bacteria Negative 11/10/2022 03:14 PM    WBC 0-4 11/10/2022 03:14 PM    RBC 0-5 11/10/2022 03:14 PM         Medications Reviewed:     Current Facility-Administered Medications   Medication Dose Route Frequency    sodium chloride (NS) flush 5-10 mL  5-10 mL IntraVENous PRN    cefepime (MAXIPIME) 2 g in 0.9% sodium chloride (MBP/ADV) 100 mL MBP  2 g IntraVENous Q8H    Vancomycin - pharmacy to dose   Other Rx Dosing/Monitoring    vancomycin (VANCOCIN) 1,250 mg in 0.9% sodium chloride 250 mL ([id number])  1,250 mg IntraVENous Q12H    senna-docusate (PERICOLACE) 8.6-50 mg per tablet 2 Tablet  2 Tablet Oral DAILY    [Held by provider] metoprolol tartrate (LOPRESSOR) tablet 12.5 mg  12.5 mg Oral Q12H    polyethylene glycol (MIRALAX) packet 17 g  17 g Oral DAILY PRN    thiamine HCL (B-1) tablet 100 mg  100 mg Oral DAILY    therapeutic multivitamin (THERAGRAN) tablet 1 Tablet  1 Tablet Oral DAILY acetaminophen (TYLENOL) solution 650 mg  650 mg Per NG tube Q4H PRN    sodium chloride (NS) flush 5-40 mL  5-40 mL IntraVENous Q8H    sodium chloride (NS) flush 5-40 mL  5-40 mL IntraVENous PRN    [Held by provider] atorvastatin (LIPITOR) tablet 10 mg  10 mg Oral QHS    levothyroxine (SYNTHROID) tablet 88 mcg  88 mcg Oral ACB    pantoprazole (PROTONIX) tablet 40 mg  40 mg Oral ACB    sodium chloride (NS) flush 5-10 mL  5-10 mL IntraVENous PRN    acetaminophen (TYLENOL) suppository 650 mg  650 mg Rectal Q6H PRN     ______________________________________________________________________  EXPECTED LENGTH OF STAY: 2d 7h  ACTUAL LENGTH OF STAY:          12                 Vera Malhotra MD

## 2022-11-12 NOTE — PROGRESS NOTES
Problem: Pressure Injury - Risk of  Goal: *Prevention of pressure injury  Description: Document Jose Enrique Scale and appropriate interventions in the flowsheet.   Outcome: Progressing Towards Goal  Note: Pressure Injury Interventions:  Sensory Interventions: Monitor skin under medical devices    Moisture Interventions: Absorbent underpads, Check for incontinence Q2 hours and as needed, Minimize layers    Activity Interventions: Pressure redistribution bed/mattress(bed type), PT/OT evaluation    Mobility Interventions: Pressure redistribution bed/mattress (bed type)    Nutrition Interventions: Document food/fluid/supplement intake    Friction and Shear Interventions: Apply protective barrier, creams and emollients

## 2022-11-13 ENCOUNTER — APPOINTMENT (OUTPATIENT)
Dept: NON INVASIVE DIAGNOSTICS | Age: 57
DRG: 070 | End: 2022-11-13
Attending: STUDENT IN AN ORGANIZED HEALTH CARE EDUCATION/TRAINING PROGRAM
Payer: COMMERCIAL

## 2022-11-13 ENCOUNTER — APPOINTMENT (OUTPATIENT)
Dept: VASCULAR SURGERY | Age: 57
DRG: 070 | End: 2022-11-13
Attending: STUDENT IN AN ORGANIZED HEALTH CARE EDUCATION/TRAINING PROGRAM
Payer: COMMERCIAL

## 2022-11-13 LAB
ANION GAP SERPL CALC-SCNC: 8 MMOL/L (ref 5–15)
APTT PPP: 26.7 SEC (ref 22.1–31)
APTT PPP: 28.4 SEC (ref 22.1–31)
BACTERIA SPEC CULT: ABNORMAL
BACTERIA SPEC CULT: NORMAL
BASOPHILS # BLD: 0.1 K/UL (ref 0–0.1)
BASOPHILS NFR BLD: 0 % (ref 0–1)
BUN SERPL-MCNC: 21 MG/DL (ref 6–20)
BUN/CREAT SERPL: 37 (ref 12–20)
CALCIUM SERPL-MCNC: 9.1 MG/DL (ref 8.5–10.1)
CHLORIDE SERPL-SCNC: 102 MMOL/L (ref 97–108)
CO2 SERPL-SCNC: 28 MMOL/L (ref 21–32)
CREAT SERPL-MCNC: 0.57 MG/DL (ref 0.55–1.02)
DIFFERENTIAL METHOD BLD: ABNORMAL
EOSINOPHIL # BLD: 0.2 K/UL (ref 0–0.4)
EOSINOPHIL NFR BLD: 2 % (ref 0–7)
ERYTHROCYTE [DISTWIDTH] IN BLOOD BY AUTOMATED COUNT: 14 % (ref 11.5–14.5)
GLUCOSE SERPL-MCNC: 145 MG/DL (ref 65–100)
HCT VFR BLD AUTO: 36.2 % (ref 35–47)
HGB BLD-MCNC: 11.7 G/DL (ref 11.5–16)
IMM GRANULOCYTES # BLD AUTO: 0 K/UL (ref 0–0.04)
IMM GRANULOCYTES NFR BLD AUTO: 0 % (ref 0–0.5)
LYMPHOCYTES # BLD: 2 K/UL (ref 0.8–3.5)
LYMPHOCYTES NFR BLD: 18 % (ref 12–49)
MCH RBC QN AUTO: 29 PG (ref 26–34)
MCHC RBC AUTO-ENTMCNC: 32.3 G/DL (ref 30–36.5)
MCV RBC AUTO: 89.8 FL (ref 80–99)
MONOCYTES # BLD: 1 K/UL (ref 0–1)
MONOCYTES NFR BLD: 9 % (ref 5–13)
NEUTS SEG # BLD: 8 K/UL (ref 1.8–8)
NEUTS SEG NFR BLD: 71 % (ref 32–75)
NRBC # BLD: 0 K/UL (ref 0–0.01)
NRBC BLD-RTO: 0 PER 100 WBC
PLATELET # BLD AUTO: 492 K/UL (ref 150–400)
PMV BLD AUTO: 11.1 FL (ref 8.9–12.9)
POTASSIUM SERPL-SCNC: 3.9 MMOL/L (ref 3.5–5.1)
RBC # BLD AUTO: 4.03 M/UL (ref 3.8–5.2)
SERVICE CMNT-IMP: ABNORMAL
SERVICE CMNT-IMP: NORMAL
SODIUM SERPL-SCNC: 138 MMOL/L (ref 136–145)
THERAPEUTIC RANGE,PTTT: NORMAL SECS (ref 58–77)
THERAPEUTIC RANGE,PTTT: NORMAL SECS (ref 58–77)
TROPONIN-HIGH SENSITIVITY: 166 NG/L (ref 0–51)
TROPONIN-HIGH SENSITIVITY: 215 NG/L (ref 0–51)
WBC # BLD AUTO: 11.2 K/UL (ref 3.6–11)

## 2022-11-13 PROCEDURE — 74011000250 HC RX REV CODE- 250: Performed by: FAMILY MEDICINE

## 2022-11-13 PROCEDURE — 93970 EXTREMITY STUDY: CPT

## 2022-11-13 PROCEDURE — 74011000258 HC RX REV CODE- 258: Performed by: STUDENT IN AN ORGANIZED HEALTH CARE EDUCATION/TRAINING PROGRAM

## 2022-11-13 PROCEDURE — 74011250636 HC RX REV CODE- 250/636: Performed by: STUDENT IN AN ORGANIZED HEALTH CARE EDUCATION/TRAINING PROGRAM

## 2022-11-13 PROCEDURE — 36415 COLL VENOUS BLD VENIPUNCTURE: CPT

## 2022-11-13 PROCEDURE — 84484 ASSAY OF TROPONIN QUANT: CPT

## 2022-11-13 PROCEDURE — 85025 COMPLETE CBC W/AUTO DIFF WBC: CPT

## 2022-11-13 PROCEDURE — 74011250637 HC RX REV CODE- 250/637: Performed by: NURSE PRACTITIONER

## 2022-11-13 PROCEDURE — 80048 BASIC METABOLIC PNL TOTAL CA: CPT

## 2022-11-13 PROCEDURE — 85730 THROMBOPLASTIN TIME PARTIAL: CPT

## 2022-11-13 PROCEDURE — 65660000001 HC RM ICU INTERMED STEPDOWN

## 2022-11-13 PROCEDURE — 74011250637 HC RX REV CODE- 250/637: Performed by: PHYSICIAN ASSISTANT

## 2022-11-13 RX ORDER — HEPARIN SODIUM 1000 [USP'U]/ML
80 INJECTION, SOLUTION INTRAVENOUS; SUBCUTANEOUS ONCE
Status: COMPLETED | OUTPATIENT
Start: 2022-11-13 | End: 2022-11-13

## 2022-11-13 RX ORDER — HEPARIN SODIUM 10000 [USP'U]/100ML
16-36 INJECTION, SOLUTION INTRAVENOUS
Status: DISCONTINUED | OUTPATIENT
Start: 2022-11-13 | End: 2022-11-15

## 2022-11-13 RX ADMIN — HEPARIN SODIUM 16 UNITS/KG/HR: 10000 INJECTION, SOLUTION INTRAVENOUS at 09:27

## 2022-11-13 RX ADMIN — Medication 100 MG: at 09:11

## 2022-11-13 RX ADMIN — SODIUM CHLORIDE, PRESERVATIVE FREE 10 ML: 5 INJECTION INTRAVENOUS at 06:00

## 2022-11-13 RX ADMIN — SODIUM CHLORIDE, PRESERVATIVE FREE 10 ML: 5 INJECTION INTRAVENOUS at 14:00

## 2022-11-13 RX ADMIN — LEVOTHYROXINE SODIUM 88 MCG: 0.09 TABLET ORAL at 09:12

## 2022-11-13 RX ADMIN — SODIUM CHLORIDE, PRESERVATIVE FREE 10 ML: 5 INJECTION INTRAVENOUS at 22:00

## 2022-11-13 RX ADMIN — FUROSEMIDE 20 MG: 10 INJECTION, SOLUTION INTRAMUSCULAR; INTRAVENOUS at 09:11

## 2022-11-13 RX ADMIN — PANTOPRAZOLE SODIUM 40 MG: 40 TABLET, DELAYED RELEASE ORAL at 09:12

## 2022-11-13 RX ADMIN — CEFEPIME 2 G: 2 INJECTION, POWDER, FOR SOLUTION INTRAVENOUS at 12:25

## 2022-11-13 RX ADMIN — THERA TABS 1 TABLET: TAB at 09:12

## 2022-11-13 RX ADMIN — HEPARIN SODIUM 7200 UNITS: 1000 INJECTION INTRAVENOUS; SUBCUTANEOUS at 18:23

## 2022-11-13 RX ADMIN — SENNOSIDES AND DOCUSATE SODIUM 2 TABLET: 50; 8.6 TABLET ORAL at 09:11

## 2022-11-13 RX ADMIN — HEPARIN SODIUM 7200 UNITS: 1000 INJECTION INTRAVENOUS; SUBCUTANEOUS at 09:10

## 2022-11-13 RX ADMIN — HEPARIN SODIUM 20 UNITS/KG/HR: 10000 INJECTION, SOLUTION INTRAVENOUS at 18:24

## 2022-11-13 RX ADMIN — CEFEPIME 2 G: 2 INJECTION, POWDER, FOR SOLUTION INTRAVENOUS at 03:37

## 2022-11-13 NOTE — CONSULTS
[address]    Name:  Kailyn Bowser  MR#:  [id number]  :  1965  ACCOUNT #:  [de-identified]  DATE OF SERVICE:  2022      REASON FOR CONSULTATION:  Pulmonary embolus. HISTORY OF PRESENT ILLNESS:  The patient is a 20-year-old female who has been hospitalized for a number of weeks. At baseline, she has significant mental compromise, presumably related to anoxic brain injury. She is poorly responsive and is fed by a PEG tube. She had fever and tachycardia late yesterday, which prompted a CTA of the chest that showed a moderate-to-large acute pulmonary embolus with mild right heart strain. She is currently on 2 liters of oxygen with good O2 saturations. She is unable to give any meaningful history. PAST MEDICAL HISTORY:  Anoxic brain injury with associated poor functional status. CURRENT MEDICATIONS:  1. Heparin. 2.  Lasix. 3.  Cefepime. 4.  Levothyroxine. 5.  Protonix. ALLERGIES:  DROPERIDOL. SOCIAL HISTORY:  Unobtainable. FAMILY HISTORY:  Unobtainable. REVIEW OF SYSTEMS:  Unobtainable. PHYSICAL EXAMINATION:  GENERAL:  The patient is a middle-aged female, who is awake but poorly responsive. LUNGS:  Bilateral breath sounds. HEART:  Regular rate and rhythm. EXTREMITIES:  Unremarkable. NEUROLOGIC:  She is unresponsive. IMPRESSION:  The patient has sustained a pulmonary embolus. She is being appropriately treated with intravenous heparin. We will obtain a troponin and an echocardiogram to assess for right ventricular strain. At present, I do not anticipate the need for pulmonary embolectomy. We will follow her with you.       Teresa Brunner MD      [id number]  D:  2022 9:04  T:  2022 9:54  JOB #:  [id number]

## 2022-11-13 NOTE — PROGRESS NOTES
Problem: Patient Education: Go to Patient Education Activity  Goal: Patient/Family Education  Outcome: Progressing Towards Goal   Problem: Pressure Injury - Risk of  Goal: *Prevention of pressure injury  Description: Document Jose Enrique Scale and appropriate interventions in the flowsheet.   Outcome: Progressing Towards Goal            Note: Pressure Injury Interventions:  Sensory Interventions: Assess changes in LOC    Moisture Interventions: Absorbent underpads    Activity Interventions: Pressure redistribution bed/mattress(bed type)    Mobility Interventions: HOB 30 degrees or less    Nutrition Interventions: Discuss nutritional consult with provider    Friction and Shear Interventions: HOB 30 degrees or less

## 2022-11-13 NOTE — PROGRESS NOTES
Problem: Pressure Injury - Risk of  Goal: *Prevention of pressure injury  Description: Document Jose Enrique Scale and appropriate interventions in the flowsheet. Outcome: Progressing Towards Goal  Note: Pressure Injury Interventions:  Sensory Interventions: Discuss PT/OT consult with provider    Moisture Interventions: Absorbent underpads, Check for incontinence Q2 hours and as needed    Activity Interventions: Pressure redistribution bed/mattress(bed type), PT/OT evaluation    Mobility Interventions: Pressure redistribution bed/mattress (bed type), PT/OT evaluation, HOB 30 degrees or less    Nutrition Interventions: Document food/fluid/supplement intake    Friction and Shear Interventions: HOB 30 degrees or less                Problem: Patient Education: Go to Patient Education Activity  Goal: Patient/Family Education  Outcome: Progressing Towards Goal     Problem: Patient Education: Go to Patient Education Activity  Goal: Patient/Family Education  Outcome: Progressing Towards Goal     Problem: Falls - Risk of  Goal: *Absence of Falls  Description: Document Chyna Fall Risk and appropriate interventions in the flowsheet.   Outcome: Progressing Towards Goal  Note: Fall Risk Interventions:  Mobility Interventions: Bed/chair exit alarm, Patient to call before getting OOB    Mentation Interventions: Bed/chair exit alarm, Door open when patient unattended    Medication Interventions: Bed/chair exit alarm, Patient to call before getting OOB    Elimination Interventions: Bed/chair exit alarm, Call light in reach, Patient to call for help with toileting needs    History of Falls Interventions: Bed/chair exit alarm, Door open when patient unattended         Problem: Discharge Planning  Goal: *Discharge to safe environment  Outcome: Progressing Towards Goal  Goal: *Knowledge of medication management  Outcome: Progressing Towards Goal  Goal: *Knowledge of discharge instructions  Outcome: Progressing Towards Goal     Problem: Patient Education: Go to Patient Education Activity  Goal: Patient/Family Education  Outcome: Progressing Towards Goal

## 2022-11-13 NOTE — PROGRESS NOTES
Vascular:    Patient with PE on CTA done yesterday. She is nonverbal at baseline. Sats OK on 2L O2. On IV heparin. Ordered a troponon as well as an echo to assess for RV strain. At present would continue anticoagulation. Do not anticipate need for pulmonary embolectomy based on current clinical picture.

## 2022-11-13 NOTE — PROGRESS NOTES
Bedside shift change report given to [facility] (oncoming nurse) by Masood Clarek RN (offgoing nurse). Report included the following information SBAR, Kardex, ED Summary, OR Summary, Procedure Summary, Intake/Output, MAR, Cardiac Rhythm ST, Alarm Parameters , and Dual Neuro Assessment.

## 2022-11-13 NOTE — PROGRESS NOTES
[facility] Adult  Hospitalist Group                                                                                          Hospitalist Progress Note  Rama Dominique MD  Answering service: [phone] OR [phone] from in house phone        Date of Service:  2022  NAME:  Caitlin Roman  :  1965  MRN:  [id number]      Admission Summary:     Caitlin Roman is a 59 y.o. female with past medical history of anoxic brain injury, anxiety, depression, hypertension, hyperlipidemia, hypothyroidism presented to the emergency department via EMS from [address] [place] [address] with reported altered mental status (AMS). Patient is aphasic/nonverbal and not answering any questions. Majority of history was obtained per review of chart records. Per reports patient recently was hospitalized at [facility] from 2022-10/20/2022 with diagnosis of status epilepticus, acute metabolic encephalopathy, delirium, psychosis, generalized anxiety disorder, debility, Takotsubo cardiomyopathy, NSTEMI, bradycardia, SIRS, tachycardia, fever, leukocytosis, and CVA. Patient had work-up including MRI and also EEG. There was concern the patient may have some anoxic brain injury. She has been followed by psychiatry and neurology services with persistent encephalopathy and intermittent severe aggression. Patient has had aphasia and altered mental status not following commands per staff report since last week. Symptoms remain constant, severe, without specific exacerbating relieving factors. About emergency department today, initial recorded vital signs temperature 98.8 °F, /65, heart rate 103, respirate 30, O2 saturation 93% on room air. Abnormal labs included WC 12.5, platelets 654, BUN 23. CT head without IV contrast showed no acute intracranial abnormalities. ED request admission to the hospitalist service.      No acute event overnight, less rigidity, stable and transferred out of ICU on 10/29    Interval history / Subjective:     Patient at baseline neurologic function, does  not respond to anything but painful stimuli. Family at bedside. Explained PE diagnosis and plan from here. All questions answered. Assessment & Plan:     Fever, likely 2/2 Pulmonary Embolism   Moderate to Large Pulmonary Embolism   - Elevated temperatures 11/10, and intermittently prior   - Infectious work-up negative, CTA obtained which demosntrated moderate to large pulmonary embolism with mild RHS   - Consult vascular  - Obtain ECHO, initial troponin elevated, will repeat   - Stop antibiotics   - Heparin gtt   - Will chart review in attempts to determine when PE may have developed   - Resp status stable on 2L NC    Acute metabolic encephalopathy due to suspected serotonin syndrome  Suspected delayed hypoxic leukoencephalopathy POA  Severe oral dysphagia due to above  Suspected anoxic brain injury  Suspected Seizure   - Per patient's mother, patient was admitted to [facility] with a long hospitalization after being found unresponsive in her home on 9/26/22, and then discharged to [facility] on 10/20. While at [facility], developed changes that were concerning for serotonin syndrome and she was admitted to [facility]. During this admission, patient's mental status has deteriorated. -MRI with interval development of diffuse abnormal supratentorial white matter with extensive FLAIR/T2 hyperintense signal and diffusion restriction. Differential include acute or toxic encephalopathy.  - ANCA negative. Glutamic acid decarb negative <5.0. ADIN not sent  - CSF culture no growth, normal protein, HSV negative, myelin basic protein pending.   -Continue thiamine  - Unable to make medical decision, agree with plan for family to pursue mPOA as next of kin   11/6-[place] rejected transfer. Continue current management.    -PT OT and speech, PEG on 11/8 -may use   - Concern for seizure like activity overnight 11/10, ceribell rapid EEG negative for seizure. Likely roving eye movements 2/2 metabolic encephalopathy and supportive care recommended   - Per patient's mother, she was told that patient is expected to make a neurologic recovery within a year. On chart review, do not see this prognosis  mentioned. Will reengage Neurology for prognosis     Bilateral UE edema  - Noted to have 2+ bilateral UE edema  Plan:   - Duplex to eval for clot negative for DVT, does have superficial thrombus bilaterally   - Elevated arms  - Continue IV lasix     Hx of Takotsubo cardiomyopathy  -has peripheral edema  -last Echo was on 10/12 EF 55-60%   -BP low normal, if it improves will consider her home metoprolol and lasix   -monitor I/O and daily weight   - Repeat ECHO pending 11/13     HTN  -BP normal, monitor BP     Hyperlipidemia   -statin on hold due to elevated CK     Hypothyroidism   -continue synthroid      Generalized anxiety disorder  -home trazodone and lexapro on hold may not need as non responsive now    Severe disability with immobility   -continue supportive care      Obesity   -BMI 32.52 kg/m2      Code status: Full code  Prophylaxis: Heparin gtt   Care Plan discussed with: Nurse, family  Anticipated Disposition: pending clarification of goals of care      Hospital Problems  Date Reviewed: 11/8/2022            Codes Class Noted POA    Altered mental status ICD-10-CM: R41.82  ICD-9-CM: 780.97  10/27/2022 Unknown        Diarrhea ICD-10-CM: R19.7  ICD-9-CM: 787.91  10/25/2022 Unknown        AMS (altered mental status) ICD-10-CM: R41.82  ICD-9-CM: 780.97  10/25/2022 Unknown       Vital Signs:    Last 24hrs VS reviewed since prior progress note.  Most recent are:  Visit Vitals  /65 (BP 1 Location: Right arm, BP Patient Position: At rest)   Pulse (!) 113   Temp 99.9 °F (37.7 °C)   Resp 28   Ht 5' 6\" (1.676 m)   Wt 90 kg (198 lb 6.6 oz)   SpO2 98%   BMI 32.02 kg/m²         Intake/Output Summary (Last 24 hours) at 11/13/2022 7204  Last data filed at 11/12/2022 2000  Gross per 24 hour   Intake 250 ml   Output 600 ml   Net -350 ml        Physical Examination:     I had a face to face encounter with this patient and independently examined them on 11/13/2022 as outlined below:          Constitutional:  Aphasic, sleeping, arouses to pain (sternal rub)   ENT: Oral mucosa moist, oropharynx benign. Resp:  CTA bilaterally. CV:  Regular rhythm, normal rate, no murmurs, gallops, rubs    GI:  Soft, non distended, non tender. normoactive bowel sounds, no hepatosplenomegaly     Musculoskeletal: Bilateral pretibial, pedal and hand edema    Neurologic: Lethargic, open here eyes to painful touch, aphasic, does not follow command or moves her extremities             Data Review:    Review and/or order of clinical lab test  Review and/or order of tests in the radiology section of CPT  Review and/or order of tests in the medicine section of CPT      Labs:     Recent Labs     11/13/22 0430 11/12/22 0435   WBC 11.2* 9.6   HGB 11.7 11.0*   HCT 36.2 34.2*   * 417*     Recent Labs     11/13/22 0430 11/12/22 0435 11/11/22  0145    137 142   K 3.9 4.2 3.9    104 110*   CO2 28 29 27   BUN 21* 19 20   CREA 0.57 0.43* 0.42*   * 123* 148*   CA 9.1 8.7 8.2*     Recent Labs     11/10/22  1926   ALT 95*   *   TBILI 0.3   TP 6.6   ALB 2.2*   GLOB 4.4*       No results for input(s): INR, PTP, APTT, INREXT, INREXT in the last 72 hours. No results for input(s): FE, TIBC, PSAT, FERR in the last 72 hours. Lab Results   Component Value Date/Time    Folate 9.9 10/26/2022 11:47 AM        No results for input(s): PH, PCO2, PO2 in the last 72 hours. No results for input(s): CPK, CKNDX, TROIQ in the last 72 hours.     No lab exists for component: CPKMB    Lab Results   Component Value Date/Time    Cholesterol, total 145 10/26/2022 06:46 AM    HDL Cholesterol 30 10/26/2022 06:46 AM    LDL, calculated 91.4 10/26/2022 06:46 AM    Triglyceride 118 10/26/2022 06:46 AM    CHOL/HDL Ratio 4.8 10/26/2022 06:46 AM     Lab Results   Component Value Date/Time    Glucose (POC) 110 10/05/2022 04:55 PM    Glucose (POC) 87 10/05/2022 11:02 AM    Glucose (POC) 78 10/05/2022 05:00 AM    Glucose (POC) 94 10/05/2022 04:57 AM    Glucose (POC) 124 (H) 10/04/2022 11:03 PM     Lab Results   Component Value Date/Time    Color DARK YELLOW 11/10/2022 03:14 PM    Appearance CLEAR 11/10/2022 03:14 PM    Specific gravity 1.028 11/10/2022 03:14 PM    Specific gravity >1.030 (H) 10/25/2022 04:54 PM    pH (UA) 6.0 11/10/2022 03:14 PM    Protein Negative 11/10/2022 03:14 PM    Glucose Negative 11/10/2022 03:14 PM    Ketone Negative 11/10/2022 03:14 PM    Bilirubin Negative 11/10/2022 03:14 PM    Urobilinogen 1.0 11/10/2022 03:14 PM    Nitrites Negative 11/10/2022 03:14 PM    Leukocyte Esterase Negative 11/10/2022 03:14 PM    Epithelial cells FEW 11/10/2022 03:14 PM    Bacteria Negative 11/10/2022 03:14 PM    WBC 0-4 11/10/2022 03:14 PM    RBC 0-5 11/10/2022 03:14 PM         Medications Reviewed:     Current Facility-Administered Medications   Medication Dose Route Frequency    heparin (porcine) 1,000 unit/mL injection 7,200 Units  80 Units/kg IntraVENous ONCE    heparin 25,000 units in D5W 250 ml infusion  18-36 Units/kg/hr IntraVENous TITRATE    furosemide (LASIX) injection 20 mg  20 mg IntraVENous DAILY    sodium chloride (NS) flush 5-10 mL  5-10 mL IntraVENous PRN    cefepime (MAXIPIME) 2 g in 0.9% sodium chloride (MBP/ADV) 100 mL MBP  2 g IntraVENous Q8H    senna-docusate (PERICOLACE) 8.6-50 mg per tablet 2 Tablet  2 Tablet Oral DAILY    [Held by provider] metoprolol tartrate (LOPRESSOR) tablet 12.5 mg  12.5 mg Oral Q12H    polyethylene glycol (MIRALAX) packet 17 g  17 g Oral DAILY PRN    thiamine HCL (B-1) tablet 100 mg  100 mg Oral DAILY    therapeutic multivitamin (THERAGRAN) tablet 1 Tablet  1 Tablet Oral DAILY    acetaminophen (TYLENOL) solution 650 mg  650 mg Per NG tube Q4H PRN    sodium chloride (NS) flush 5-40 mL  5-40 mL IntraVENous Q8H    sodium chloride (NS) flush 5-40 mL  5-40 mL IntraVENous PRN    [Held by provider] atorvastatin (LIPITOR) tablet 10 mg  10 mg Oral QHS    levothyroxine (SYNTHROID) tablet 88 mcg  88 mcg Oral ACB    pantoprazole (PROTONIX) tablet 40 mg  40 mg Oral ACB    sodium chloride (NS) flush 5-10 mL  5-10 mL IntraVENous PRN    acetaminophen (TYLENOL) suppository 650 mg  650 mg Rectal Q6H PRN     ______________________________________________________________________  EXPECTED LENGTH OF STAY: 2d 7h  ACTUAL LENGTH OF STAY:          16                 Vear Bush MD

## 2022-11-14 ENCOUNTER — APPOINTMENT (OUTPATIENT)
Dept: NON INVASIVE DIAGNOSTICS | Age: 57
DRG: 070 | End: 2022-11-14
Attending: STUDENT IN AN ORGANIZED HEALTH CARE EDUCATION/TRAINING PROGRAM
Payer: COMMERCIAL

## 2022-11-14 LAB
ANION GAP SERPL CALC-SCNC: 5 MMOL/L (ref 5–15)
APTT PPP: 102.9 SEC (ref 22.1–31)
APTT PPP: 25.3 SEC (ref 22.1–31)
APTT PPP: 34.7 SEC (ref 22.1–31)
APTT PPP: 61 SEC (ref 22.1–31)
BASOPHILS # BLD: 0.1 K/UL (ref 0–0.1)
BASOPHILS NFR BLD: 1 % (ref 0–1)
BUN SERPL-MCNC: 24 MG/DL (ref 6–20)
BUN/CREAT SERPL: 48 (ref 12–20)
CALCIUM SERPL-MCNC: 8.6 MG/DL (ref 8.5–10.1)
CHLORIDE SERPL-SCNC: 103 MMOL/L (ref 97–108)
CO2 SERPL-SCNC: 29 MMOL/L (ref 21–32)
CREAT SERPL-MCNC: 0.5 MG/DL (ref 0.55–1.02)
DIFFERENTIAL METHOD BLD: ABNORMAL
ECHO AV AREA PEAK VELOCITY: 2.9 CM2
ECHO AV AREA/BSA PEAK VELOCITY: 1.5 CM2/M2
ECHO AV PEAK GRADIENT: 5 MMHG
ECHO AV PEAK VELOCITY: 1.2 M/S
ECHO AV VELOCITY RATIO: 0.83
ECHO LA DIAMETER INDEX: 1.71 CM/M2
ECHO LA DIAMETER: 3.4 CM
ECHO LV FRACTIONAL SHORTENING: 39 % (ref 28–44)
ECHO LV INTERNAL DIMENSION DIASTOLE INDEX: 2.06 CM/M2
ECHO LV INTERNAL DIMENSION DIASTOLIC: 4.1 CM (ref 3.9–5.3)
ECHO LV INTERNAL DIMENSION SYSTOLIC INDEX: 1.26 CM/M2
ECHO LV INTERNAL DIMENSION SYSTOLIC: 2.5 CM
ECHO LV IVSD: 0.9 CM (ref 0.6–0.9)
ECHO LV MASS 2D: 114.1 G (ref 67–162)
ECHO LV MASS INDEX 2D: 57.4 G/M2 (ref 43–95)
ECHO LV POSTERIOR WALL DIASTOLIC: 0.9 CM (ref 0.6–0.9)
ECHO LV RELATIVE WALL THICKNESS RATIO: 0.44
ECHO LVOT AREA: 3.5 CM2
ECHO LVOT DIAM: 2.1 CM
ECHO LVOT PEAK GRADIENT: 4 MMHG
ECHO LVOT PEAK VELOCITY: 1 M/S
ECHO MV A VELOCITY: 0.74 M/S
ECHO MV E VELOCITY: 0.42 M/S
ECHO MV E/A RATIO: 0.57
ECHO RV INTERNAL DIMENSION: 4.8 CM
EOSINOPHIL # BLD: 0.3 K/UL (ref 0–0.4)
EOSINOPHIL NFR BLD: 2 % (ref 0–7)
ERYTHROCYTE [DISTWIDTH] IN BLOOD BY AUTOMATED COUNT: 14.2 % (ref 11.5–14.5)
GLUCOSE SERPL-MCNC: 124 MG/DL (ref 65–100)
HCT VFR BLD AUTO: 32.9 % (ref 35–47)
HGB BLD-MCNC: 10.8 G/DL (ref 11.5–16)
IMM GRANULOCYTES # BLD AUTO: 0.1 K/UL (ref 0–0.04)
IMM GRANULOCYTES NFR BLD AUTO: 1 % (ref 0–0.5)
LYMPHOCYTES # BLD: 2.8 K/UL (ref 0.8–3.5)
LYMPHOCYTES NFR BLD: 24 % (ref 12–49)
MCH RBC QN AUTO: 29.6 PG (ref 26–34)
MCHC RBC AUTO-ENTMCNC: 32.8 G/DL (ref 30–36.5)
MCV RBC AUTO: 90.1 FL (ref 80–99)
MONOCYTES # BLD: 1.1 K/UL (ref 0–1)
MONOCYTES NFR BLD: 9 % (ref 5–13)
NEUTS SEG # BLD: 7.6 K/UL (ref 1.8–8)
NEUTS SEG NFR BLD: 63 % (ref 32–75)
NRBC # BLD: 0 K/UL (ref 0–0.01)
NRBC BLD-RTO: 0 PER 100 WBC
PLATELET # BLD AUTO: 518 K/UL (ref 150–400)
PMV BLD AUTO: 11 FL (ref 8.9–12.9)
POTASSIUM SERPL-SCNC: 4.3 MMOL/L (ref 3.5–5.1)
RBC # BLD AUTO: 3.65 M/UL (ref 3.8–5.2)
SODIUM SERPL-SCNC: 137 MMOL/L (ref 136–145)
THERAPEUTIC RANGE,PTTT: ABNORMAL SECS (ref 58–77)
THERAPEUTIC RANGE,PTTT: NORMAL SECS (ref 58–77)
WBC # BLD AUTO: 12 K/UL (ref 3.6–11)

## 2022-11-14 PROCEDURE — 36415 COLL VENOUS BLD VENIPUNCTURE: CPT

## 2022-11-14 PROCEDURE — 65660000001 HC RM ICU INTERMED STEPDOWN

## 2022-11-14 PROCEDURE — 77030038269 HC DRN EXT URIN PURWCK BARD -A

## 2022-11-14 PROCEDURE — 76937 US GUIDE VASCULAR ACCESS: CPT

## 2022-11-14 PROCEDURE — 77030020365 HC SOL INJ SOD CL 0.9% 50ML

## 2022-11-14 PROCEDURE — 74011250637 HC RX REV CODE- 250/637: Performed by: NURSE PRACTITIONER

## 2022-11-14 PROCEDURE — 80048 BASIC METABOLIC PNL TOTAL CA: CPT

## 2022-11-14 PROCEDURE — 85730 THROMBOPLASTIN TIME PARTIAL: CPT

## 2022-11-14 PROCEDURE — 93306 TTE W/DOPPLER COMPLETE: CPT

## 2022-11-14 PROCEDURE — 74011000250 HC RX REV CODE- 250: Performed by: STUDENT IN AN ORGANIZED HEALTH CARE EDUCATION/TRAINING PROGRAM

## 2022-11-14 PROCEDURE — 74011000250 HC RX REV CODE- 250: Performed by: FAMILY MEDICINE

## 2022-11-14 PROCEDURE — C1751 CATH, INF, PER/CENT/MIDLINE: HCPCS

## 2022-11-14 PROCEDURE — 74011250637 HC RX REV CODE- 250/637: Performed by: PHYSICIAN ASSISTANT

## 2022-11-14 PROCEDURE — 85025 COMPLETE CBC W/AUTO DIFF WBC: CPT

## 2022-11-14 PROCEDURE — 74011250636 HC RX REV CODE- 250/636: Performed by: STUDENT IN AN ORGANIZED HEALTH CARE EDUCATION/TRAINING PROGRAM

## 2022-11-14 RX ORDER — HEPARIN SODIUM 1000 [USP'U]/ML
80 INJECTION, SOLUTION INTRAVENOUS; SUBCUTANEOUS ONCE
Status: COMPLETED | OUTPATIENT
Start: 2022-11-14 | End: 2022-11-14

## 2022-11-14 RX ORDER — HEPARIN SODIUM 1000 [USP'U]/ML
80 INJECTION, SOLUTION INTRAVENOUS; SUBCUTANEOUS ONCE
Status: DISCONTINUED | OUTPATIENT
Start: 2022-11-14 | End: 2022-11-14 | Stop reason: CLARIF

## 2022-11-14 RX ADMIN — THERA TABS 1 TABLET: TAB at 11:00

## 2022-11-14 RX ADMIN — PERFLUTREN 1.5 ML: 6.52 INJECTION, SUSPENSION INTRAVENOUS at 10:00

## 2022-11-14 RX ADMIN — HEPARIN SODIUM 24 UNITS/KG/HR: 10000 INJECTION, SOLUTION INTRAVENOUS at 14:22

## 2022-11-14 RX ADMIN — FUROSEMIDE 20 MG: 10 INJECTION, SOLUTION INTRAMUSCULAR; INTRAVENOUS at 10:58

## 2022-11-14 RX ADMIN — HEPARIN SODIUM 24 UNITS/KG/HR: 10000 INJECTION, SOLUTION INTRAVENOUS at 08:27

## 2022-11-14 RX ADMIN — HEPARIN SODIUM 20 UNITS/KG/HR: 10000 INJECTION, SOLUTION INTRAVENOUS at 00:33

## 2022-11-14 RX ADMIN — LEVOTHYROXINE SODIUM 88 MCG: 0.09 TABLET ORAL at 07:06

## 2022-11-14 RX ADMIN — SODIUM CHLORIDE, PRESERVATIVE FREE 10 ML: 5 INJECTION INTRAVENOUS at 07:06

## 2022-11-14 RX ADMIN — PANTOPRAZOLE SODIUM 40 MG: 40 TABLET, DELAYED RELEASE ORAL at 07:06

## 2022-11-14 RX ADMIN — Medication 100 MG: at 11:00

## 2022-11-14 RX ADMIN — HEPARIN SODIUM 7200 UNITS: 1000 INJECTION INTRAVENOUS; SUBCUTANEOUS at 10:59

## 2022-11-14 RX ADMIN — SODIUM CHLORIDE, PRESERVATIVE FREE 10 ML: 5 INJECTION INTRAVENOUS at 16:13

## 2022-11-14 NOTE — PROGRESS NOTES
Received order for midline catheter placement. Reviewing chart to ensure midline catheter is appropriate for patient. Midline Insertion and Progress Note    PRE-PROCEDURE VERIFICATION  Correct Procedure: yes  Correct Site:  yes  Temperature: Temp: 98.6 °F (37 °C), Temperature Source: Temp Source: Oral  Recent Labs     11/14/22  0025   BUN 24*   CREA 0.50*   *   WBC 12.0*     Allergies: Droperidol    PROCEDURE DETAIL  A single lumen midline IV catheter was started for desire for reliable access. The following documentation is in addition to the Midline properties in the lines/airways flowsheet :  Xylocaine 1% used intradermally  Lot #: [id number]  Catheter Total Length: 13cm + 1cm external [Total external length 14cm]  External Catheter Length: 1 (cm)  Circumference: 34 (cm)  Vein Selection for Midline :left cephalic  Complication Related to Insertion: none    Line is okay to use.

## 2022-11-14 NOTE — PROGRESS NOTES
Bedside shift change report given to Radha RN (oncoming nurse) by Janes Chaparro RN (offgoing nurse). Report included the following information SBAR, Kardex, ED Summary, OR Summary, Procedure Summary, Intake/Output, MAR, Cardiac Rhythm ST, Alarm Parameters , and Dual Neuro Assessment.

## 2022-11-14 NOTE — PROGRESS NOTES
Problem: Pressure Injury - Risk of  Goal: *Prevention of pressure injury  Description: Document Jose Enrique Scale and appropriate interventions in the flowsheet. Outcome: Progressing Towards Goal  Note: Pressure Injury Interventions:  Sensory Interventions: Discuss PT/OT consult with provider    Moisture Interventions: Absorbent underpads    Activity Interventions: Pressure redistribution bed/mattress(bed type)    Mobility Interventions: Pressure redistribution bed/mattress (bed type)    Nutrition Interventions: Document food/fluid/supplement intake    Friction and Shear Interventions: HOB 30 degrees or less                Problem: Patient Education: Go to Patient Education Activity  Goal: Patient/Family Education  Outcome: Progressing Towards Goal     Problem: Patient Education: Go to Patient Education Activity  Goal: Patient/Family Education  Outcome: Progressing Towards Goal     Problem: Patient Education: Go to Patient Education Activity  Goal: Patient/Family Education  Outcome: Progressing Towards Goal     Problem: Patient Education: Go to Patient Education Activity  Goal: Patient/Family Education  Outcome: Progressing Towards Goal     Problem: Falls - Risk of  Goal: *Absence of Falls  Description: Document Chyna Fall Risk and appropriate interventions in the flowsheet.   Outcome: Progressing Towards Goal  Note: Fall Risk Interventions:  Mobility Interventions: Bed/chair exit alarm    Mentation Interventions: Bed/chair exit alarm    Medication Interventions: Bed/chair exit alarm    Elimination Interventions: Bed/chair exit alarm    History of Falls Interventions: Bed/chair exit alarm         Problem: Patient Education: Go to Patient Education Activity  Goal: Patient/Family Education  Outcome: Progressing Towards Goal     Problem: Discharge Planning  Goal: *Discharge to safe environment  Outcome: Progressing Towards Goal  Goal: *Knowledge of medication management  Outcome: Progressing Towards Goal  Goal: *Knowledge of discharge instructions  Outcome: Progressing Towards Goal

## 2022-11-14 NOTE — PROGRESS NOTES
Transition of Care Plan  R- Med 19%  DISPOSITION: SNF- [facility] have accepted and are in network for tier 2 of patient's insurance  F/U with PCP/Specialist    Transport: [facility]    Emergency contact: Mother, Bello Dejesus [phone] NARDA ARCHIBALD are 3 sons, but mother has been deemed spokesperson)    CM barriers to discharge: SNF choice in patient's insurance network    CM informed on Friday 11/11 that patient's insurance is not in network with [facility]. Patient's insurance has only 1 Tier 1 SNF provider: [facility]. [facility] declined patient for SNF. [facility] [employer] are included in Tier 2 of patient's insurance coverage and have accepted patient. Patient's case discussed in IDRs. Per Dr. Giorgi Soriano, patient not medically stable for discharge. Neurology to discuss patient's prognosis with patient's mother today. Pending goals of care, CM plan to follow up with patient's mother to discuss which SNF she would like for patient to discharge to. Spencer Meckel) Lindajo Screen, M.S.GELY.

## 2022-11-14 NOTE — PROGRESS NOTES
11/14/22 1054   Vital Signs   Temp 97.6 °F (36.4 °C)   Temp Source Oral   Pulse (Heart Rate) (!) 110   Resp Rate 25   O2 Sat (%) 100 %   Level of Consciousness 1   /77   MAP (Calculated) 93   MEWS Score 4     PT has a MEWS of 4 due to elevated HR and RR. Pt is afebrile,  in sinus tach and has a cough. She is resting quietly in bed with no signs of pain.

## 2022-11-14 NOTE — PROGRESS NOTES
Problem: Pressure Injury - Risk of  Goal: *Prevention of pressure injury  Description: Document Jose Enrique Scale and appropriate interventions in the flowsheet. Outcome: Progressing Towards Goal  Note: Pressure Injury Interventions:  Sensory Interventions: Assess changes in LOC, Float heels, Check visual cues for pain, Minimize linen layers, Maintain/enhance activity level, Keep linens dry and wrinkle-free, Pad between skin to skin, Pressure redistribution bed/mattress (bed type)    Moisture Interventions: Absorbent underpads, Internal/External urinary devices, Assess need for specialty bed, Check for incontinence Q2 hours and as needed, Maintain skin hydration (lotion/cream), Minimize layers, Offer toileting Q_hr    Activity Interventions: Pressure redistribution bed/mattress(bed type), PT/OT evaluation    Mobility Interventions: Float heels, Pressure redistribution bed/mattress (bed type), Turn and reposition approx.  every two hours(pillow and wedges)    Nutrition Interventions: Document food/fluid/supplement intake    Friction and Shear Interventions: Lift sheet, Lift team/patient mobility team, Minimize layers, Transferring/repositioning devices, Foam dressings/transparent film/skin sealants                Problem: Nutrition Deficit  Goal: *Optimize nutritional status  Outcome: Progressing Towards Goal

## 2022-11-14 NOTE — PROGRESS NOTES
[facility]  Hospitalist Group                                                                                          Hospitalist Progress Note  Rosemary Soliman MD  Answering service: [phone] OR [phone] from in house phone        Date of Service:  2022  NAME:  Moy Hendrickson  :  1965  MRN:  [id number]      Admission Summary:     Moy Hendrickson is a 59 y.o. female with past medical history of anoxic brain injury, anxiety, depression, hypertension, hyperlipidemia, hypothyroidism presented to the emergency department via EMS from [address] [place] [address] with reported altered mental status (AMS). Patient is aphasic/nonverbal and not answering any questions. Majority of history was obtained per review of chart records. Per reports patient recently was hospitalized at [facility] from 2022-10/20/2022 with diagnosis of status epilepticus, acute metabolic encephalopathy, delirium, psychosis, generalized anxiety disorder, debility, Takotsubo cardiomyopathy, NSTEMI, bradycardia, SIRS, tachycardia, fever, leukocytosis, and CVA. Patient had work-up including MRI and also EEG. There was concern the patient may have some anoxic brain injury. She has been followed by psychiatry and neurology services with persistent encephalopathy and intermittent severe aggression. Patient has had aphasia and altered mental status not following commands per staff report since last week. Symptoms remain constant, severe, without specific exacerbating relieving factors. About emergency department today, initial recorded vital signs temperature 98.8 °F, /65, heart rate 103, respirate 30, O2 saturation 93% on room air. Abnormal labs included WC 12.5, platelets 859, BUN 23. CT head without IV contrast showed no acute intracranial abnormalities. ED request admission to the hospitalist service.      No acute event overnight, less rigidity, stable and transferred out of ICU on 10/29    Interval history / Subjective:     Patient at baseline neurologic function, does  not respond to anything but painful stimuli. Appears to be resting comfortably. Assessment & Plan:     Fever, likely 2/2 Pulmonary Embolism   Moderate to Large Pulmonary Embolism   - Elevated temperatures 11/10, and intermittently prior   - Infectious work-up negative, s/p cefepime/vanc. CTA obtained which demosntrated moderate to large pulmonary embolism with mild RHS   - LE duplex with age-indeterminant DVT in left peroneal veins   - Consulted vascular, no surgical intervention at this time  - Troponin elevated and downtrended on subsequent check   - Obtain ECHO  - Heparin gtt   - Will chart review in attempts to determine when PE may have developed   - Resp status stable on 2L NC    Acute metabolic encephalopathy due to suspected serotonin syndrome  Suspected delayed hypoxic leukoencephalopathy POA  Severe oral dysphagia due to above  Suspected anoxic brain injury  Suspected Seizure   - Per patient's mother, patient was admitted to [facility] with a long hospitalization after being found unresponsive in her home on 9/26/22, and then discharged to [facility] on 10/20. While at [facility], developed changes that were concerning for serotonin syndrome and she was admitted to [facility] [place]. During this admission, patient's mental status has deteriorated. -MRI with interval development of diffuse abnormal supratentorial white matter with extensive FLAIR/T2 hyperintense signal and diffusion restriction. Differential include acute or toxic encephalopathy.  - ANCA negative. Glutamic acid decarb negative <5.0. ADIN not sent. CSF culture no growth, normal protein, HSV negative, myelin basic protein pending.   -Continue thiamine  - Unable to make medical decision, agree with plan for family to pursue mPOA as next of kin   11/6-[place] rejected transfer. Continue current management.    -PT OT and speech, PEG on 11/8 -may use   - Concern for seizure like activity overnight 11/10, yue rapid EEG negative for seizure. Likely roving eye movements 2/2 metabolic encephalopathy and supportive care recommended   - Discuss prognosis with specialists today     Bilateral UE edema  - Noted to have 2+ bilateral UE edema  Plan:   - Duplex UEs to eval for clot negative for DVT, does have superficial thrombus bilaterally   - Elevated arms  - Continue IV lasix     Hx of Takotsubo cardiomyopathy  -has peripheral edema  -last Echo was on 10/12 EF 55-60%   -BP low normal, if it improves will consider her home metoprolol and lasix   -monitor I/O and daily weight   - Repeat ECHO pending 11/13     HTN  -BP normal, monitor BP     Hyperlipidemia   -statin on hold due to elevated CK     Hypothyroidism   -continue synthroid      Generalized anxiety disorder  -home trazodone and lexapro on hold may not need as non responsive now    Severe disability with immobility   -continue supportive care      Obesity   -BMI 32.52 kg/m2      Code status: Full code  Prophylaxis: Heparin gtt   Care Plan discussed with: Nurse, family  Anticipated Disposition: pending clarification of goals of care      Hospital Problems  Date Reviewed: 11/8/2022            Codes Class Noted POA    Altered mental status ICD-10-CM: R41.82  ICD-9-CM: 780.97  10/27/2022 Unknown        Diarrhea ICD-10-CM: R19.7  ICD-9-CM: 787.91  10/25/2022 Unknown        AMS (altered mental status) ICD-10-CM: R41.82  ICD-9-CM: 780.97  10/25/2022 Unknown       Vital Signs:    Last 24hrs VS reviewed since prior progress note.  Most recent are:  Visit Vitals  /67 (BP 1 Location: Right upper arm, BP Patient Position: At rest)   Pulse (!) 105   Temp 98.6 °F (37 °C)   Resp (!) 33   Ht 5' 6\" (1.676 m)   Wt 90 kg (198 lb 6.6 oz)   SpO2 99%   BMI 32.02 kg/m²         Intake/Output Summary (Last 24 hours) at 11/14/2022 0854  Last data filed at 11/14/2022 0000  Gross per 24 hour   Intake 200 ml   Output --   Net 200 ml Physical Examination:     I had a face to face encounter with this patient and independently examined them on 11/14/2022 as outlined below:          Constitutional:  Aphasic, sleeping, arouses to pain (sternal rub)   ENT: Oral mucosa moist, oropharynx benign. Resp:  CTA bilaterally. CV:  Regular rhythm, normal rate, no murmurs, gallops, rubs    GI:  Soft, non distended, non tender. normoactive bowel sounds, no hepatosplenomegaly     Musculoskeletal: Bilateral pretibial, pedal and hand edema    Neurologic: Lethargic, open here eyes to painful touch, aphasic, does not follow command or moves her extremities             Data Review:    Review and/or order of clinical lab test  Review and/or order of tests in the radiology section of CPT  Review and/or order of tests in the medicine section of CPT      Labs:     Recent Labs     11/14/22 0025 11/13/22 0430   WBC 12.0* 11.2*   HGB 10.8* 11.7   HCT 32.9* 36.2   * 492*     Recent Labs     11/14/22 0025 11/13/22 0430 11/12/22 0435    138 137   K 4.3 3.9 4.2    102 104   CO2 29 28 29   BUN 24* 21* 19   CREA 0.50* 0.57 0.43*   * 145* 123*   CA 8.6 9.1 8.7     No results for input(s): ALT, AP, TBIL, TBILI, TP, ALB, GLOB, GGT, AML, LPSE in the last 72 hours. No lab exists for component: SGOT, GPT, AMYP, HLPSE      Recent Labs     11/14/22  0630 11/14/22 0025 11/13/22  1609   APTT 25.3 61.0* 28.4      No results for input(s): FE, TIBC, PSAT, FERR in the last 72 hours. Lab Results   Component Value Date/Time    Folate 9.9 10/26/2022 11:47 AM        No results for input(s): PH, PCO2, PO2 in the last 72 hours. No results for input(s): CPK, CKNDX, TROIQ in the last 72 hours.     No lab exists for component: CPKMB    Lab Results   Component Value Date/Time    Cholesterol, total 145 10/26/2022 06:46 AM    HDL Cholesterol 30 10/26/2022 06:46 AM    LDL, calculated 91.4 10/26/2022 06:46 AM    Triglyceride 118 10/26/2022 06:46 AM    CHOL/HDL Ratio 4.8 10/26/2022 06:46 AM     Lab Results   Component Value Date/Time    Glucose (POC) 110 10/05/2022 04:55 PM    Glucose (POC) 87 10/05/2022 11:02 AM    Glucose (POC) 78 10/05/2022 05:00 AM    Glucose (POC) 94 10/05/2022 04:57 AM    Glucose (POC) 124 (H) 10/04/2022 11:03 PM     Lab Results   Component Value Date/Time    Color DARK YELLOW 11/10/2022 03:14 PM    Appearance CLEAR 11/10/2022 03:14 PM    Specific gravity 1.028 11/10/2022 03:14 PM    Specific gravity >1.030 (H) 10/25/2022 04:54 PM    pH (UA) 6.0 11/10/2022 03:14 PM    Protein Negative 11/10/2022 03:14 PM    Glucose Negative 11/10/2022 03:14 PM    Ketone Negative 11/10/2022 03:14 PM    Bilirubin Negative 11/10/2022 03:14 PM    Urobilinogen 1.0 11/10/2022 03:14 PM    Nitrites Negative 11/10/2022 03:14 PM    Leukocyte Esterase Negative 11/10/2022 03:14 PM    Epithelial cells FEW 11/10/2022 03:14 PM    Bacteria Negative 11/10/2022 03:14 PM    WBC 0-4 11/10/2022 03:14 PM    RBC 0-5 11/10/2022 03:14 PM         Medications Reviewed:     Current Facility-Administered Medications   Medication Dose Route Frequency    heparin (porcine) 1,000 unit/mL injection 7,200 Units  80 Units/kg IntraVENous ONCE    heparin 25,000 units in D5W 250 ml infusion  16-36 Units/kg/hr IntraVENous TITRATE    furosemide (LASIX) injection 20 mg  20 mg IntraVENous DAILY    sodium chloride (NS) flush 5-10 mL  5-10 mL IntraVENous PRN    senna-docusate (PERICOLACE) 8.6-50 mg per tablet 2 Tablet  2 Tablet Oral DAILY    [Held by provider] metoprolol tartrate (LOPRESSOR) tablet 12.5 mg  12.5 mg Oral Q12H    polyethylene glycol (MIRALAX) packet 17 g  17 g Oral DAILY PRN    thiamine HCL (B-1) tablet 100 mg  100 mg Oral DAILY    therapeutic multivitamin (THERAGRAN) tablet 1 Tablet  1 Tablet Oral DAILY    acetaminophen (TYLENOL) solution 650 mg  650 mg Per NG tube Q4H PRN    sodium chloride (NS) flush 5-40 mL  5-40 mL IntraVENous Q8H    sodium chloride (NS) flush 5-40 mL  5-40 mL IntraVENous PRN    [Held by provider] atorvastatin (LIPITOR) tablet 10 mg  10 mg Oral QHS    levothyroxine (SYNTHROID) tablet 88 mcg  88 mcg Oral ACB    pantoprazole (PROTONIX) tablet 40 mg  40 mg Oral ACB    sodium chloride (NS) flush 5-10 mL  5-10 mL IntraVENous PRN    acetaminophen (TYLENOL) suppository 650 mg  650 mg Rectal Q6H PRN     ______________________________________________________________________  EXPECTED LENGTH OF STAY: 2d 7h  ACTUAL LENGTH OF STAY:          25                 Vera Neumann MD

## 2022-11-15 ENCOUNTER — APPOINTMENT (OUTPATIENT)
Dept: MRI IMAGING | Age: 57
DRG: 070 | End: 2022-11-15
Attending: STUDENT IN AN ORGANIZED HEALTH CARE EDUCATION/TRAINING PROGRAM
Payer: COMMERCIAL

## 2022-11-15 LAB
ANION GAP SERPL CALC-SCNC: 6 MMOL/L (ref 5–15)
APTT PPP: 43.7 SEC (ref 22.1–31)
BACTERIA SPEC CULT: NORMAL
BASOPHILS # BLD: 0.1 K/UL (ref 0–0.1)
BASOPHILS NFR BLD: 1 % (ref 0–1)
BUN SERPL-MCNC: 22 MG/DL (ref 6–20)
BUN/CREAT SERPL: 47 (ref 12–20)
CALCIUM SERPL-MCNC: 8.6 MG/DL (ref 8.5–10.1)
CHLORIDE SERPL-SCNC: 100 MMOL/L (ref 97–108)
CO2 SERPL-SCNC: 30 MMOL/L (ref 21–32)
CREAT SERPL-MCNC: 0.47 MG/DL (ref 0.55–1.02)
DIFFERENTIAL METHOD BLD: ABNORMAL
EOSINOPHIL # BLD: 0.2 K/UL (ref 0–0.4)
EOSINOPHIL NFR BLD: 2 % (ref 0–7)
ERYTHROCYTE [DISTWIDTH] IN BLOOD BY AUTOMATED COUNT: 14.3 % (ref 11.5–14.5)
GLUCOSE SERPL-MCNC: 136 MG/DL (ref 65–100)
HCT VFR BLD AUTO: 32.6 % (ref 35–47)
HGB BLD-MCNC: 10.5 G/DL (ref 11.5–16)
IMM GRANULOCYTES # BLD AUTO: 0.1 K/UL (ref 0–0.04)
IMM GRANULOCYTES NFR BLD AUTO: 1 % (ref 0–0.5)
LYMPHOCYTES # BLD: 2.1 K/UL (ref 0.8–3.5)
LYMPHOCYTES NFR BLD: 18 % (ref 12–49)
MCH RBC QN AUTO: 29.1 PG (ref 26–34)
MCHC RBC AUTO-ENTMCNC: 32.2 G/DL (ref 30–36.5)
MCV RBC AUTO: 90.3 FL (ref 80–99)
MONOCYTES # BLD: 0.9 K/UL (ref 0–1)
MONOCYTES NFR BLD: 8 % (ref 5–13)
NEUTS SEG # BLD: 8.2 K/UL (ref 1.8–8)
NEUTS SEG NFR BLD: 70 % (ref 32–75)
NRBC # BLD: 0 K/UL (ref 0–0.01)
NRBC BLD-RTO: 0 PER 100 WBC
PLATELET # BLD AUTO: 532 K/UL (ref 150–400)
PMV BLD AUTO: 11.6 FL (ref 8.9–12.9)
POTASSIUM SERPL-SCNC: 4.3 MMOL/L (ref 3.5–5.1)
RBC # BLD AUTO: 3.61 M/UL (ref 3.8–5.2)
SERVICE CMNT-IMP: NORMAL
SODIUM SERPL-SCNC: 136 MMOL/L (ref 136–145)
THERAPEUTIC RANGE,PTTT: ABNORMAL SECS (ref 58–77)
UFH PPP CHRO-ACNC: 1.15 IU/ML
WBC # BLD AUTO: 11.5 K/UL (ref 3.6–11)

## 2022-11-15 PROCEDURE — 74011250636 HC RX REV CODE- 250/636: Performed by: STUDENT IN AN ORGANIZED HEALTH CARE EDUCATION/TRAINING PROGRAM

## 2022-11-15 PROCEDURE — 74011000250 HC RX REV CODE- 250: Performed by: FAMILY MEDICINE

## 2022-11-15 PROCEDURE — 80048 BASIC METABOLIC PNL TOTAL CA: CPT

## 2022-11-15 PROCEDURE — 70551 MRI BRAIN STEM W/O DYE: CPT

## 2022-11-15 PROCEDURE — 85025 COMPLETE CBC W/AUTO DIFF WBC: CPT

## 2022-11-15 PROCEDURE — 74011250637 HC RX REV CODE- 250/637: Performed by: NURSE PRACTITIONER

## 2022-11-15 PROCEDURE — 36415 COLL VENOUS BLD VENIPUNCTURE: CPT

## 2022-11-15 PROCEDURE — 65660000001 HC RM ICU INTERMED STEPDOWN

## 2022-11-15 PROCEDURE — 99231 SBSQ HOSP IP/OBS SF/LOW 25: CPT | Performed by: PSYCHIATRY & NEUROLOGY

## 2022-11-15 PROCEDURE — 85730 THROMBOPLASTIN TIME PARTIAL: CPT

## 2022-11-15 PROCEDURE — 77010033678 HC OXYGEN DAILY

## 2022-11-15 PROCEDURE — 85520 HEPARIN ASSAY: CPT

## 2022-11-15 PROCEDURE — 74011250637 HC RX REV CODE- 250/637: Performed by: PHYSICIAN ASSISTANT

## 2022-11-15 RX ORDER — ENOXAPARIN SODIUM 100 MG/ML
1 INJECTION SUBCUTANEOUS EVERY 12 HOURS
Status: DISCONTINUED | OUTPATIENT
Start: 2022-11-15 | End: 2022-11-21

## 2022-11-15 RX ORDER — HEPARIN SODIUM 10000 [USP'U]/100ML
18-36 INJECTION, SOLUTION INTRAVENOUS
Status: DISCONTINUED | OUTPATIENT
Start: 2022-11-15 | End: 2022-11-15

## 2022-11-15 RX ORDER — HEPARIN SODIUM 1000 [USP'U]/ML
40 INJECTION, SOLUTION INTRAVENOUS; SUBCUTANEOUS ONCE
Status: COMPLETED | OUTPATIENT
Start: 2022-11-15 | End: 2022-11-15

## 2022-11-15 RX ADMIN — PANTOPRAZOLE SODIUM 40 MG: 40 TABLET, DELAYED RELEASE ORAL at 07:14

## 2022-11-15 RX ADMIN — ENOXAPARIN SODIUM 90 MG: 100 INJECTION SUBCUTANEOUS at 18:17

## 2022-11-15 RX ADMIN — Medication 100 MG: at 09:48

## 2022-11-15 RX ADMIN — THERA TABS 1 TABLET: TAB at 09:48

## 2022-11-15 RX ADMIN — HEPARIN SODIUM 23 UNITS/KG/HR: 10000 INJECTION, SOLUTION INTRAVENOUS at 13:03

## 2022-11-15 RX ADMIN — SENNOSIDES AND DOCUSATE SODIUM 2 TABLET: 50; 8.6 TABLET ORAL at 09:48

## 2022-11-15 RX ADMIN — LEVOTHYROXINE SODIUM 88 MCG: 0.09 TABLET ORAL at 07:14

## 2022-11-15 RX ADMIN — SODIUM CHLORIDE, PRESERVATIVE FREE 10 ML: 5 INJECTION INTRAVENOUS at 07:15

## 2022-11-15 RX ADMIN — HEPARIN SODIUM 3600 UNITS: 1000 INJECTION INTRAVENOUS; SUBCUTANEOUS at 09:51

## 2022-11-15 RX ADMIN — FUROSEMIDE 20 MG: 10 INJECTION, SOLUTION INTRAMUSCULAR; INTRAVENOUS at 09:48

## 2022-11-15 RX ADMIN — SODIUM CHLORIDE, PRESERVATIVE FREE 10 ML: 5 INJECTION INTRAVENOUS at 01:37

## 2022-11-15 RX ADMIN — SODIUM CHLORIDE, PRESERVATIVE FREE 10 ML: 5 INJECTION INTRAVENOUS at 18:18

## 2022-11-15 RX ADMIN — HEPARIN SODIUM 23 UNITS/KG/HR: 10000 INJECTION, SOLUTION INTRAVENOUS at 09:53

## 2022-11-15 NOTE — PROGRESS NOTES
Patient chart accessed by this RN to review lab orders and assist primary nurse, Nicole Calderón RN with blood draw.

## 2022-11-15 NOTE — PROGRESS NOTES
[facility] Adult  Hospitalist Group                                                                                          Hospitalist Progress Note  Wade Jiang MD  Answering service: [phone] OR [phone] from in house phone        Date of Service:  11/15/2022  NAME:  Halina Doyle  :  1965  MRN:  [id number]      Admission Summary:     Halina Doyle is a 59 y.o. female with past medical history of anoxic brain injury, anxiety, depression, hypertension, hyperlipidemia, hypothyroidism presented to the emergency department via EMS from [address] [place] [address] with reported altered mental status (AMS). Patient is aphasic/nonverbal and not answering any questions. Majority of history was obtained per review of chart records. Per reports patient recently was hospitalized at [place] from 2022-10/20/2022 with diagnosis of status epilepticus, acute metabolic encephalopathy, delirium, psychosis, generalized anxiety disorder, debility, Takotsubo cardiomyopathy, NSTEMI, bradycardia, SIRS, tachycardia, fever, leukocytosis, and CVA. Patient had work-up including MRI and also EEG. There was concern the patient may have some anoxic brain injury. She has been followed by psychiatry and neurology services with persistent encephalopathy and intermittent severe aggression. Patient has had aphasia and altered mental status not following commands per staff report since last week. Symptoms remain constant, severe, without specific exacerbating relieving factors. About emergency department today, initial recorded vital signs temperature 98.8 °F, /65, heart rate 103, respirate 30, O2 saturation 93% on room air. Abnormal labs included WC 12.5, platelets 709, BUN 23. CT head without IV contrast showed no acute intracranial abnormalities. ED request admission to the hospitalist service.      No acute event overnight, less rigidity, stable and transferred out of ICU on 10/29    Interval history / Subjective:     Patient at baseline neurologic function, does not respond to anything but painful stimuli. Appears to be resting comfortably. Patient's mother at bedside who states patient was awake this morning, with open eyes. Intermittent cough. Assessment & Plan:     Fever, likely 2/2 Pulmonary Embolism   Moderate to Large Pulmonary Embolism   - Elevated temperatures 11/10, and intermittently prior   - Infectious work-up negative, s/p cefepime/vanc. CTA obtained which demosntrated moderate to large pulmonary embolism with mild RHS   - LE duplex with age-indeterminant DVT in left peroneal veins   - Consulted vascular, no surgical intervention at this time  - ECHO with moderately dilated RV, moderately reduced systolic function, and lateral wall hypokinesis   - Heparin gtt, will transition to subcutaneous lovenox at therapeutic dosing this evening   - Resp status stable on 2L NC, intermittently coughing likely secondary to clot burden     Acute metabolic encephalopathy due to suspected serotonin syndrome  Suspected delayed hypoxic leukoencephalopathy POA  Severe oral dysphagia due to above  Suspected anoxic brain injury  Suspected Seizure   - Per patient's mother, patient was admitted to [facility] with a long hospitalization after being found unresponsive in her home on 9/26/22, and then discharged to [facility] on 10/20. While at [facility], developed changes that were concerning for serotonin syndrome and she was admitted to [facility] [place]. During this admission, patient's mental status has deteriorated. -MRI with interval development of diffuse abnormal supratentorial white matter with extensive FLAIR/T2 hyperintense signal and diffusion restriction. Differential include acute or toxic encephalopathy.  - ANCA negative. Glutamic acid decarb negative <5.0. ADIN not sent.  CSF culture no growth, normal protein, HSV negative, myelin basic protein pending.   -Continue thiamine  - Unable to make medical decision, agree with plan for family to pursue mPOA as next of kin   11/6-[place] rejected transfer. Continue current management. -PT OT and speech, PEG on 11/8 -may use   - Concern for seizure like activity overnight 11/10, ceribell rapid EEG negative for seizure.  Likely roving eye movements 2/2 metabolic encephalopathy and supportive care recommended   - Case discussed with neurology, given worsened mental status, will obtain repeat MRI and then reengage neurology for further prognostic information   - Family is inquiring about getting a second opinion from [facility]    Bilateral UE edema, stable   - Noted to have 2+ bilateral UE edema  Plan:   - Duplex UEs to eval for clot negative for DVT, does have superficial thrombus bilaterally   - Elevated arms  - Continue IV lasix     Hx of Takotsubo cardiomyopathy  -has peripheral edema  -last Echo was on 10/12 EF 55-60%   -BP low normal, if it improves will consider her home metoprolol and lasix   -monitor I/O and daily weight   - Repeat ECHO 11/14: EF 60-65%     HTN  -BP normal, monitor BP     Hyperlipidemia   -statin on hold due to elevated CK     Hypothyroidism   -continue synthroid      Generalized anxiety disorder  -home trazodone and lexapro on hold may not need as non responsive now    Severe disability with immobility   -continue supportive care      Obesity   -BMI 32.52 kg/m2      Code status: Full code  Prophylaxis: Heparin gtt, transition to lovenox therapeutic this evening    Care Plan discussed with: Nurse, family  Anticipated Disposition: pending clarification of goals of care      Hospital Problems  Date Reviewed: 11/8/2022            Codes Class Noted POA    Altered mental status ICD-10-CM: R41.82  ICD-9-CM: 780.97  10/27/2022 Unknown        Diarrhea ICD-10-CM: R19.7  ICD-9-CM: 787.91  10/25/2022 Unknown        AMS (altered mental status) ICD-10-CM: R41.82  ICD-9-CM: 780.97  10/25/2022 Unknown       Vital Signs:    Last 24hrs VS reviewed since prior progress note. Most recent are:  Visit Vitals  /81 (BP 1 Location: Right upper arm, BP Patient Position: Lying;Semi fowlers)   Pulse (!) 111   Temp 99.3 °F (37.4 °C)   Resp (!) 36   Ht 5' 6\" (1.676 m)   Wt 90.5 kg (199 lb 8.3 oz)   SpO2 100%   BMI 32.20 kg/m²         Intake/Output Summary (Last 24 hours) at 11/15/2022 1504  Last data filed at 11/15/2022 0800  Gross per 24 hour   Intake 250 ml   Output 500 ml   Net -250 ml        Physical Examination:     I had a face to face encounter with this patient and independently examined them on 11/15/2022 as outlined below:          Constitutional:  Aphasic, sleeping, arouses to pain (sternal rub)   ENT: Oral mucosa moist, oropharynx benign. Resp:  CTA bilaterally. CV:  Regular rhythm, normal rate, no murmurs, gallops, rubs    GI:  Soft, non distended, non tender. normoactive bowel sounds, no hepatosplenomegaly     Musculoskeletal: Bilateral pretibial, pedal and hand edema    Neurologic: Lethargic, open here eyes to painful touch, aphasic, does not follow command or moves her extremities             Data Review:    Review and/or order of clinical lab test  Review and/or order of tests in the radiology section of CPT  Review and/or order of tests in the medicine section of CPT      Labs:     Recent Labs     11/15/22  0550 11/14/22  0025   WBC 11.5* 12.0*   HGB 10.5* 10.8*   HCT 32.6* 32.9*   * 518*     Recent Labs     11/15/22  0550 11/14/22  0025 11/13/22  0430    137 138   K 4.3 4.3 3.9    103 102   CO2 30 29 28   BUN 22* 24* 21*   CREA 0.47* 0.50* 0.57   * 124* 145*   CA 8.6 8.6 9.1     No results for input(s): ALT, AP, TBIL, TBILI, TP, ALB, GLOB, GGT, AML, LPSE in the last 72 hours. No lab exists for component: SGOT, GPT, AMYP, HLPSE      Recent Labs     11/15/22  0550 11/14/22  1858 11/14/22  1430   APTT 43.7* 34.7* 102.9*      No results for input(s): FE, TIBC, PSAT, FERR in the last 72 hours.    Lab Results Component Value Date/Time    Folate 9.9 10/26/2022 11:47 AM        No results for input(s): PH, PCO2, PO2 in the last 72 hours. No results for input(s): CPK, CKNDX, TROIQ in the last 72 hours.     No lab exists for component: CPKMB    Lab Results   Component Value Date/Time    Cholesterol, total 145 10/26/2022 06:46 AM    HDL Cholesterol 30 10/26/2022 06:46 AM    LDL, calculated 91.4 10/26/2022 06:46 AM    Triglyceride 118 10/26/2022 06:46 AM    CHOL/HDL Ratio 4.8 10/26/2022 06:46 AM     Lab Results   Component Value Date/Time    Glucose (POC) 110 10/05/2022 04:55 PM    Glucose (POC) 87 10/05/2022 11:02 AM    Glucose (POC) 78 10/05/2022 05:00 AM    Glucose (POC) 94 10/05/2022 04:57 AM    Glucose (POC) 124 (H) 10/04/2022 11:03 PM     Lab Results   Component Value Date/Time    Color DARK YELLOW 11/10/2022 03:14 PM    Appearance CLEAR 11/10/2022 03:14 PM    Specific gravity 1.028 11/10/2022 03:14 PM    Specific gravity >1.030 (H) 10/25/2022 04:54 PM    pH (UA) 6.0 11/10/2022 03:14 PM    Protein Negative 11/10/2022 03:14 PM    Glucose Negative 11/10/2022 03:14 PM    Ketone Negative 11/10/2022 03:14 PM    Bilirubin Negative 11/10/2022 03:14 PM    Urobilinogen 1.0 11/10/2022 03:14 PM    Nitrites Negative 11/10/2022 03:14 PM    Leukocyte Esterase Negative 11/10/2022 03:14 PM    Epithelial cells FEW 11/10/2022 03:14 PM    Bacteria Negative 11/10/2022 03:14 PM    WBC 0-4 11/10/2022 03:14 PM    RBC 0-5 11/10/2022 03:14 PM         Medications Reviewed:     Current Facility-Administered Medications   Medication Dose Route Frequency    heparin 25,000 units in D5W 250 ml infusion  18-36 Units/kg/hr IntraVENous TITRATE    furosemide (LASIX) injection 20 mg  20 mg IntraVENous DAILY    sodium chloride (NS) flush 5-10 mL  5-10 mL IntraVENous PRN    senna-docusate (PERICOLACE) 8.6-50 mg per tablet 2 Tablet  2 Tablet Oral DAILY    [Held by provider] metoprolol tartrate (LOPRESSOR) tablet 12.5 mg  12.5 mg Oral Q12H    polyethylene glycol (MIRALAX) packet 17 g  17 g Oral DAILY PRN    thiamine HCL (B-1) tablet 100 mg  100 mg Oral DAILY    therapeutic multivitamin (THERAGRAN) tablet 1 Tablet  1 Tablet Oral DAILY    acetaminophen (TYLENOL) solution 650 mg  650 mg Per NG tube Q4H PRN    sodium chloride (NS) flush 5-40 mL  5-40 mL IntraVENous Q8H    sodium chloride (NS) flush 5-40 mL  5-40 mL IntraVENous PRN    [Held by provider] atorvastatin (LIPITOR) tablet 10 mg  10 mg Oral QHS    levothyroxine (SYNTHROID) tablet 88 mcg  88 mcg Oral ACB    pantoprazole (PROTONIX) tablet 40 mg  40 mg Oral ACB    sodium chloride (NS) flush 5-10 mL  5-10 mL IntraVENous PRN    acetaminophen (TYLENOL) suppository 650 mg  650 mg Rectal Q6H PRN     ______________________________________________________________________  EXPECTED LENGTH OF STAY: 2d 7h  ACTUAL LENGTH OF STAY:          23                 Vera Adames MD

## 2022-11-15 NOTE — PROGRESS NOTES
Problem: Pressure Injury - Risk of  Goal: *Prevention of pressure injury  Description: Document Jose Enrique Scale and appropriate interventions in the flowsheet. Outcome: Progressing Towards Goal  Note: Pressure Injury Interventions:  Sensory Interventions: Assess changes in LOC    Moisture Interventions: Absorbent underpads, Apply protective barrier, creams and emollients    Activity Interventions: Pressure redistribution bed/mattress(bed type), PT/OT evaluation    Mobility Interventions: Float heels, Turn and reposition approx. every two hours(pillow and wedges), HOB 30 degrees or less, Assess need for specialty bed, Suspension boots    Nutrition Interventions: Document food/fluid/supplement intake    Friction and Shear Interventions: Apply protective barrier, creams and emollients, HOB 30 degrees or less, Lift sheet                Problem: Patient Education: Go to Patient Education Activity  Goal: Patient/Family Education  Outcome: Progressing Towards Goal     Problem: Patient Education: Go to Patient Education Activity  Goal: Patient/Family Education  Outcome: Progressing Towards Goal     Problem: Patient Education: Go to Patient Education Activity  Goal: Patient/Family Education  Outcome: Progressing Towards Goal     Problem: Patient Education: Go to Patient Education Activity  Goal: Patient/Family Education  Outcome: Progressing Towards Goal     Problem: Falls - Risk of  Goal: *Absence of Falls  Description: Document Chyna Fall Risk and appropriate interventions in the flowsheet. Outcome: Progressing Towards Goal  Note: Fall Risk Interventions:  Mobility Interventions: Communicate number of staff needed for ambulation/transfer, PT Consult for mobility concerns    Mentation Interventions: Adequate sleep, hydration, pain control, Reorient patient, Family/sitter at bedside    Medication Interventions: Evaluate medications/consider consulting pharmacy    Elimination Interventions:  Toileting schedule/hourly rounds, Call light in reach    History of Falls Interventions: Door open when patient unattended         Problem: Patient Education: Go to Patient Education Activity  Goal: Patient/Family Education  Outcome: Progressing Towards Goal     Problem: Discharge Planning  Goal: *Discharge to safe environment  Outcome: Progressing Towards Goal  Goal: *Knowledge of medication management  Outcome: Progressing Towards Goal  Goal: *Knowledge of discharge instructions  Outcome: Progressing Towards Goal     Problem: Patient Education: Go to Patient Education Activity  Goal: Patient/Family Education  Outcome: Progressing Towards Goal     Problem: Nutrition Deficit  Goal: *Optimize nutritional status  Outcome: Progressing Towards Goal     Problem: Patient Education: Go to Patient Education Activity  Goal: Patient/Family Education  Outcome: Progressing Towards Goal

## 2022-11-15 NOTE — PROGRESS NOTES
Neurology Progress Note     NAME: Lb Palafox   :  1965   MRN:  [id number]   DATE:  11/15/2022    Assessment:     Active Problems:    Diarrhea (10/25/2022)      AMS (altered mental status) (10/25/2022)      Altered mental status (10/27/2022)      Pt is a 61yo RH female admitted 10/26/22 with altered mental status, muscle rigidity, hyperreflexia, upgoing toes, clonus, horizontal roving eye movements, low grade fevers, labile BPs on admission, and diaphoresis. Initial felt to have serotonin syndrome with recent h/o prolonged hospitalization after respiratory arrest, presumed secondary to sedating meds including fentanyl, with seizure witnessed by EMS, but also found to have Takotsubo CM, who had declining mental status and function since receiving risperdal, then seroquel, only one dose each prior to discharge on 10/20/22 from [facility], then trazodone and Lexapro at rehab. She was treated with benzos with improvement in symptoms, but remained non-verbal, with minimal if any purposeful movements, and not follow commands. Repeat MRI brain 22 revealed changes c/w delayed post-hypoxic leukoencephalopathy. LP 22 to fully exclude other etiologies - CSF pro 40, glu 62, 1090 RBCs, 1 WBC, Bact cx neg, HSV neg. 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 ANCA panel and KOBY-65 Ab neg. Since my last visit with her on 22, she has had fevers and was found to have PEs and DVT. She was more difficult to arouse on 22, so repeat MRI brain ordered, done 22 with stable white matter changes, no acute strokes seen. Transfer request to [address] [place] [address]  were both declined. Exam is unchanged, appears alert with spontaneous eye opening, no tracking, no verbalization, does not interact with examiner, no commands, no purposeful movements. Discussed prognosis with pt's mother.  She may or may not recover function in the next year and degree of recovery is not known, but will most likely have significant deficits. Pt never made her wishes known regarding prolonging her life. Her mother is struggling with making decisions regarding going forward. Plan:   D/w Dr. Dariel Art, she is going to try to obtain another opinion by sending records to [facility] neurology department at the request of the family. Please call if needed. Subjective:    Pt is seen with her mother and friend Keanu Arizmendi at the bedside.      Objective:   Chart reviewed since last seen    Current Facility-Administered Medications   Medication Dose Route Frequency    enoxaparin (LOVENOX) injection 90 mg  1 mg/kg SubCUTAneous Q12H    furosemide (LASIX) injection 20 mg  20 mg IntraVENous DAILY    sodium chloride (NS) flush 5-10 mL  5-10 mL IntraVENous PRN    senna-docusate (PERICOLACE) 8.6-50 mg per tablet 2 Tablet  2 Tablet Oral DAILY    [Held by provider] metoprolol tartrate (LOPRESSOR) tablet 12.5 mg  12.5 mg Oral Q12H    polyethylene glycol (MIRALAX) packet 17 g  17 g Oral DAILY PRN    thiamine HCL (B-1) tablet 100 mg  100 mg Oral DAILY    therapeutic multivitamin (THERAGRAN) tablet 1 Tablet  1 Tablet Oral DAILY    acetaminophen (TYLENOL) solution 650 mg  650 mg Per NG tube Q4H PRN    sodium chloride (NS) flush 5-40 mL  5-40 mL IntraVENous Q8H    sodium chloride (NS) flush 5-40 mL  5-40 mL IntraVENous PRN    [Held by provider] atorvastatin (LIPITOR) tablet 10 mg  10 mg Oral QHS    levothyroxine (SYNTHROID) tablet 88 mcg  88 mcg Oral ACB    pantoprazole (PROTONIX) tablet 40 mg  40 mg Oral ACB    sodium chloride (NS) flush 5-10 mL  5-10 mL IntraVENous PRN    acetaminophen (TYLENOL) suppository 650 mg  650 mg Rectal Q6H PRN       Visit Vitals  BP (!) 145/72 (BP 1 Location: Right upper arm, BP Patient Position: Lying;Semi fowlers)   Pulse (!) 110   Temp 98.5 °F (36.9 °C)   Resp (!) 38   Ht 5' 6\" (1.676 m)   Wt 199 lb 8.3 oz (90.5 kg)   SpO2 96%   BMI 32.20 kg/m²     Temp (24hrs), Av °F (37.2 °C), Min:98.5 °F (36.9 °C), Max:99.3 °F (37.4 °C)      11/15 0701 - 11/15 1900  In: 350   Out: -   1901 - 11/15 07  In: 420   Out: 2200 [Urine:2200]      Physical Exam:  General: Well developed well nourished patient in no apparent distress. Cardiac: Regular rhythm. Tachycardic. Extremities: 2+ Radial pulses, no cyanosis or edema    Neurological Exam:  Mental Status: Alert, eyes open, no tracking, no commands   Cranial Nerves:   PERRL, no obvious facial asym, no ptosis   Motor:  No movements seen   Reflexes:      Sensory:   Unable to assess due to patient factors   Gait:  Unable to assess due to patient factors   Cerebellar:  Unable to assess due to patient factors         Lab Review   Recent Results (from the past 24 hour(s))   PTT    Collection Time: 22  6:58 PM   Result Value Ref Range    aPTT 34.7 (H) 22.1 - 31.0 sec    aPTT, therapeutic range     58.0 - 77.0 SECS   PTT    Collection Time: 11/15/22  5:50 AM   Result Value Ref Range    aPTT 43.7 (H) 22.1 - 31.0 sec    aPTT, therapeutic range     58.0 - 77.0 SECS   CBC WITH AUTOMATED DIFF    Collection Time: 11/15/22  5:50 AM   Result Value Ref Range    WBC 11.5 (H) 3.6 - 11.0 K/uL    RBC 3.61 (L) 3.80 - 5.20 M/uL    HGB 10.5 (L) 11.5 - 16.0 g/dL    HCT 32.6 (L) 35.0 - 47.0 %    MCV 90.3 80.0 - 99.0 FL    MCH 29.1 26.0 - 34.0 PG    MCHC 32.2 30.0 - 36.5 g/dL    RDW 14.3 11.5 - 14.5 %    PLATELET 449 (H) 771 - 400 K/uL    MPV 11.6 8.9 - 12.9 FL    NRBC 0.0 0  WBC    ABSOLUTE NRBC 0.00 0.00 - 0.01 K/uL    NEUTROPHILS 70 32 - 75 %    LYMPHOCYTES 18 12 - 49 %    MONOCYTES 8 5 - 13 %    EOSINOPHILS 2 0 - 7 %    BASOPHILS 1 0 - 1 %    IMMATURE GRANULOCYTES 1 (H) 0.0 - 0.5 %    ABS. NEUTROPHILS 8.2 (H) 1.8 - 8.0 K/UL    ABS. LYMPHOCYTES 2.1 0.8 - 3.5 K/UL    ABS. MONOCYTES 0.9 0.0 - 1.0 K/UL    ABS. EOSINOPHILS 0.2 0.0 - 0.4 K/UL    ABS. BASOPHILS 0.1 0.0 - 0.1 K/UL    ABS. IMM.  GRANS. 0.1 (H) 0.00 - 0.04 K/UL    DF AUTOMATED     METABOLIC PANEL, BASIC    Collection Time: 11/15/22  5:50 AM   Result Value Ref Range    Sodium 136 136 - 145 mmol/L    Potassium 4.3 3.5 - 5.1 mmol/L    Chloride 100 97 - 108 mmol/L    CO2 30 21 - 32 mmol/L    Anion gap 6 5 - 15 mmol/L    Glucose 136 (H) 65 - 100 mg/dL    BUN 22 (H) 6 - 20 MG/DL    Creatinine 0.47 (L) 0.55 - 1.02 MG/DL    BUN/Creatinine ratio 47 (H) 12 - 20      eGFR >60 >60 ml/min/1.73m2    Calcium 8.6 8.5 - 10.1 MG/DL   UNFRACTIONATED AND LMW HEPARIN    Collection Time: 11/15/22  2:06 PM   Result Value Ref Range    Heparin Xa,Unfrac. and LMW 1.15 IU/mL       Imaging Review   MRI Results (most recent):  Results from Hospital Encounter encounter on 10/25/22    MRI BRAIN WO CONT (Preliminary)  This result has not been signed. Information might be incomplete. Narrative  *PRELIMINARY REPORT*    No significant change in nonspecific extensive white matter signal and  restricted diffusion throughout the cerebrum since 11/2/2022. No evidence of  brainstem or cerebellar pathology. No hydrocephalus or mass effect. Preliminary report was provided by Dr. Akua Masterson, the on-call radiologist, at [phone]  hours    Final report to follow. *END PRELIMINARY REPORT*    CT Results (most recent):  Results from Hospital Encounter encounter on 10/25/22    CTA CHEST W OR W WO CONT    Narrative  Clinical history: Dyspnea  INDICATION:   Dyspnea  COMPARISON: 9/27/2022      CONTRAST: 100 ml Isovue 370  TECHNIQUE: CT of the chest with  IV contrast , Isovue-370 is performed. Axial  images from the thoracic inlet to the level of the upper abdomen are obtained. Manual post-processing of the images and coronal reformatting is also performed. CT dose reduction was achieved through use of a standardized protocol tailored  for this examination and automatic exposure control for dose modulation. Multiplanar reformatted imaging was performed. Sagittal and coronal reformatting.   3-D Postprocessing of imaging was performed. 3-D MIP reconstructed images were obtained in the coronal plane. FINDINGS:  Moderate to large acute pulmonary emboli in, there is associated mild right  ventricular strain. There is cardiomegaly. There is small pleural effusion. None. There is no aortic aneurysm or dissection. Hepatic steatosis and cholecystectomy. There is a left adrenal adenoma. Small  hiatal hernia. There is no pleural or pericardial effusion. There is no  mediastinal, axillary or hilar lymphadenopathy. The aorta is normal in course  and caliber. The proximal pulmonary arteries are without evidence of filling  defects. No lytic or blastic lesions are identified. The remainder of the upper  abdomen visualized is unremarkable. Impression  Moderate to large acute pulmonary embolism with mild right ventricular strain. No evidence of pericardial effusion. Cardiomegaly is demonstrated. There is no aortic aneurysm or dissection. The findings were called to Dr. Nik Gao on 11/12/2022 at 6:50 PM by Dr. Demetris Villalobos. [phone] Incidental findings are as described above. Care Plan discussed with:  Patient    Family x   RN    Care Manager    Consultant/Specialist:       Signed: Belgica Yee MD

## 2022-11-16 ENCOUNTER — PATIENT OUTREACH (OUTPATIENT)
Dept: OTHER | Age: 57
End: 2022-11-16

## 2022-11-16 LAB
ANION GAP SERPL CALC-SCNC: 7 MMOL/L (ref 5–15)
BASOPHILS # BLD: 0.1 K/UL (ref 0–0.1)
BASOPHILS NFR BLD: 1 % (ref 0–1)
BUN SERPL-MCNC: 19 MG/DL (ref 6–20)
BUN/CREAT SERPL: 40 (ref 12–20)
CALCIUM SERPL-MCNC: 9.4 MG/DL (ref 8.5–10.1)
CHLORIDE SERPL-SCNC: 101 MMOL/L (ref 97–108)
CO2 SERPL-SCNC: 29 MMOL/L (ref 21–32)
CREAT SERPL-MCNC: 0.48 MG/DL (ref 0.55–1.02)
DIFFERENTIAL METHOD BLD: ABNORMAL
EOSINOPHIL # BLD: 0.1 K/UL (ref 0–0.4)
EOSINOPHIL NFR BLD: 1 % (ref 0–7)
ERYTHROCYTE [DISTWIDTH] IN BLOOD BY AUTOMATED COUNT: 14.1 % (ref 11.5–14.5)
GLUCOSE SERPL-MCNC: 123 MG/DL (ref 65–100)
HCT VFR BLD AUTO: 34.2 % (ref 35–47)
HGB BLD-MCNC: 10.9 G/DL (ref 11.5–16)
IMM GRANULOCYTES # BLD AUTO: 0.1 K/UL (ref 0–0.04)
IMM GRANULOCYTES NFR BLD AUTO: 1 % (ref 0–0.5)
LYMPHOCYTES # BLD: 2 K/UL (ref 0.8–3.5)
LYMPHOCYTES NFR BLD: 18 % (ref 12–49)
MCH RBC QN AUTO: 28.8 PG (ref 26–34)
MCHC RBC AUTO-ENTMCNC: 31.9 G/DL (ref 30–36.5)
MCV RBC AUTO: 90.5 FL (ref 80–99)
MONOCYTES # BLD: 0.9 K/UL (ref 0–1)
MONOCYTES NFR BLD: 8 % (ref 5–13)
NEUTS SEG # BLD: 7.8 K/UL (ref 1.8–8)
NEUTS SEG NFR BLD: 71 % (ref 32–75)
NRBC # BLD: 0 K/UL (ref 0–0.01)
NRBC BLD-RTO: 0 PER 100 WBC
PLATELET # BLD AUTO: 572 K/UL (ref 150–400)
PMV BLD AUTO: 11.1 FL (ref 8.9–12.9)
POTASSIUM SERPL-SCNC: 4.2 MMOL/L (ref 3.5–5.1)
RBC # BLD AUTO: 3.78 M/UL (ref 3.8–5.2)
SODIUM SERPL-SCNC: 137 MMOL/L (ref 136–145)
UFH PPP CHRO-ACNC: 0.28 IU/ML
WBC # BLD AUTO: 10.9 K/UL (ref 3.6–11)

## 2022-11-16 PROCEDURE — 74011250637 HC RX REV CODE- 250/637: Performed by: NURSE PRACTITIONER

## 2022-11-16 PROCEDURE — 36415 COLL VENOUS BLD VENIPUNCTURE: CPT

## 2022-11-16 PROCEDURE — 85025 COMPLETE CBC W/AUTO DIFF WBC: CPT

## 2022-11-16 PROCEDURE — 85520 HEPARIN ASSAY: CPT

## 2022-11-16 PROCEDURE — 74011250637 HC RX REV CODE- 250/637: Performed by: PHYSICIAN ASSISTANT

## 2022-11-16 PROCEDURE — 74011250636 HC RX REV CODE- 250/636: Performed by: STUDENT IN AN ORGANIZED HEALTH CARE EDUCATION/TRAINING PROGRAM

## 2022-11-16 PROCEDURE — 65660000001 HC RM ICU INTERMED STEPDOWN

## 2022-11-16 PROCEDURE — 74011000250 HC RX REV CODE- 250: Performed by: FAMILY MEDICINE

## 2022-11-16 PROCEDURE — 80048 BASIC METABOLIC PNL TOTAL CA: CPT

## 2022-11-16 PROCEDURE — 97110 THERAPEUTIC EXERCISES: CPT

## 2022-11-16 PROCEDURE — 97535 SELF CARE MNGMENT TRAINING: CPT

## 2022-11-16 RX ADMIN — LEVOTHYROXINE SODIUM 88 MCG: 0.09 TABLET ORAL at 07:05

## 2022-11-16 RX ADMIN — PANTOPRAZOLE SODIUM 40 MG: 40 TABLET, DELAYED RELEASE ORAL at 07:05

## 2022-11-16 RX ADMIN — SODIUM CHLORIDE, PRESERVATIVE FREE 10 ML: 5 INJECTION INTRAVENOUS at 00:00

## 2022-11-16 RX ADMIN — FUROSEMIDE 20 MG: 10 INJECTION, SOLUTION INTRAMUSCULAR; INTRAVENOUS at 09:06

## 2022-11-16 RX ADMIN — ENOXAPARIN SODIUM 90 MG: 100 INJECTION SUBCUTANEOUS at 17:43

## 2022-11-16 RX ADMIN — SODIUM CHLORIDE, PRESERVATIVE FREE 10 ML: 5 INJECTION INTRAVENOUS at 21:59

## 2022-11-16 RX ADMIN — SODIUM CHLORIDE, PRESERVATIVE FREE 10 ML: 5 INJECTION INTRAVENOUS at 05:22

## 2022-11-16 RX ADMIN — Medication 100 MG: at 09:06

## 2022-11-16 RX ADMIN — THERA TABS 1 TABLET: TAB at 09:06

## 2022-11-16 RX ADMIN — SENNOSIDES AND DOCUSATE SODIUM 2 TABLET: 50; 8.6 TABLET ORAL at 09:06

## 2022-11-16 RX ADMIN — ENOXAPARIN SODIUM 90 MG: 100 INJECTION SUBCUTANEOUS at 05:23

## 2022-11-16 NOTE — PROGRESS NOTES
Comprehensive Nutrition Assessment    Type and Reason for Visit: Reassess    Nutrition Recommendations/Plan:   Resume TF via PEG of Osmolite 1.2 @ 70 ml/hr x 21 hrs. Hold for 1 hr before Synthroid administration and 2 hrs following. Free water flushes 100 ml Q 4 hrs. Malnutrition Assessment:  Malnutrition Status: Moderate malnutrition (10/27/22 1553)    Context:  Acute illness     Findings of the 6 clinical characteristics of malnutrition:   Energy Intake:  Unable to assess  Weight Loss:  Greater than 5% over 1 month     Body Fat Loss:  No significant body fat loss,     Muscle Mass Loss:  No significant muscle mass loss,    Fluid Accumulation:  Mild, Extremities   Strength:  Not performed        Nutrition Assessment:    Pt admitted with AMS. PMHx: Recent admission to [facility] for NSTEMI, Takotsubo CM, Bradycardia and prolonged intubation after found unresponsive at home by neighbor. Possible anoxic brain injury. Transferred to [address] [place] [address] 10/20.    11/16: Follow-up. Pt with fever/tachycardia end of last week- found likely to be 2/2 pulmonary embolism; improving. Now pending auth for SNF. No reported issues or concerns with tube feeding, continues to infuse as recommended, nothing additional to add at this time. Labs reviewed. 11/9: Pt had PEG placed yesterday, legacy tube placed. Tube feedings resumed, now pending placement. Osmolite 1.2 @ 70 ml/hr x 21 hr with 100 ml water flush q 4 hr providing 1470 ml, 1765 calories, 82 gm protein and 1800 ml free water (tube feeding/flush) per day to meet estimated needs. Estimated needs again not adjusted d/t 2+ generalized edema. Pt previously tolerating feedings without issue, last BM 11/6, though TF also held since midnight of 11/6.    11/3: Follow-up. Pt remains unsafe for PO with severe oral dysphagia. Discussed in IDRs, exploring options for possible [facility] hospital [facility] vs SNF.  Pt DHT placed 10/27, seems likely pt would benefit from transition to PEG tube. Estimated needs not adjusted d/t 2+ generalized edema. Pt tolerating feedings at goal without issue, nutrition pertinent labs WNL. 10/27: Acute encephalopathy-possible Serotonin syndrome, NMS. Not appropriate for oral intake. Noted potential need for PEG tube placement d/t poor PO intake at rehab. If weights accurate from last admission until today-pt has lost about 34#. Difference in scales probably account for some difference. Based needs on bed scale weight obtained today. At risk for refeeding therefore recommend supplementing with B1 and a MVI and continue to monitor lytes daily. Nutritionally Significant Medications:  Synthroid, Protonix, Pericolace, MVI, thiamine      Estimated Daily Nutrient Needs:  Energy Requirements Based On: Formula  Weight Used for Energy Requirements: Current  Energy (kcal/day): 1780 (MSJ x 1.2 x 1.0)  Weight Used for Protein Requirements: Current  Protein (g/day): 89 (1g/kg)  Method Used for Fluid Requirements: 1 ml/kcal  Fluid (ml/day): 1800    Nutrition Related Findings:   Edema: Generalized: 2+; Pitting (11/16/2022  8:00 AM)  LLE: 2+; Pitting (11/15/2022  8:00 PM)  LUE: 2+; Pitting (11/15/2022  8:00 PM)  RLE: 2+; Pitting (11/15/2022  8:00 PM)  RUE: 2+; Pitting (11/15/2022  8:00 PM)    Last BM: 11/16/22, Loose    Wounds: None      Current Nutrition Therapies:  Diet: NPO  Supplements: NPO  Meal intake: No data found. Supplement intake: No data found. Nutrition Support: Osmolite 1.2 @ 70 ml/hr, free water 100 ml Q 4 hrs      Anthropometric Measures:  Height: 5' 6\" (167.6 cm)  Ideal Body Weight (IBW): 130 lbs (59 kg)  Admission Body Weight: 230 lb (admission @ [facility] 9/[facility])  Current Body Wt:  87.5 kg (192 lb 14.4 oz), 148.4 % IBW. Bed scale  Current BMI (kg/m2): 31.2                          BMI Category: Obese class 1 (BMI 30.0-34. 9)    Wt Readings from Last 10 Encounters:   11/15/22 90.5 kg (199 lb 8.3 oz)   10/11/22 104.3 kg (230 lb)   08/24/22 93.4 kg (206 lb) 06/03/22 93.4 kg (206 lb)   03/04/22 91.2 kg (201 lb)   06/29/21 93.4 kg (206 lb)   02/04/21 94.3 kg (208 lb)   07/29/19 83.9 kg (185 lb)   02/25/14 94.5 kg (208 lb 6 oz)   05/20/13 93 kg (205 lb)           Nutrition Diagnosis:   Inadequate oral intake related to cognitive or neurological impairment as evidenced by NPO or clear liquid status due to medical condition    Nutrition Interventions:   Food and/or Nutrient Delivery: Continue tube feeding  Nutrition Education/Counseling: Education not indicated  Coordination of Nutrition Care: Continue to monitor while inpatient       Goals:  Previous Goal Met: Goal(s) achieved  Goals:  (Continued EN tolerance with feedings to meet at least 95% kcal/protein needs over next 5-7 days)       Nutrition Monitoring and Evaluation:   Behavioral-Environmental Outcomes: None identified  Food/Nutrient Intake Outcomes: Enteral nutrition intake/tolerance  Physical Signs/Symptoms Outcomes: Biochemical data, GI status, Fluid status or edema    Discharge Planning:    Enteral nutrition    Robert Glass RD  Available via [employer] or ext [phone]

## 2022-11-16 NOTE — PROGRESS NOTES
Problem: Mobility Impaired (Adult and Pediatric)  Goal: *Acute Goals and Plan of Care (Insert Text)  Description:   FUNCTIONAL STATUS PRIOR TO ADMISSION: Pt unable to provide prior level of function or home set up at time of this evaluation. Per chart review, pt was admitted to [facility] 9/27-10/20 after being found down and was discharged to [address] [place] [address] brain injury program and was admitted to [facility] on 10/25 for AMS. Per mother's report, pt was able to stand while at rehab with assistance and was getting into the wheelchair. Prior to initial admission, pt was independent, working as nurse. HOME SUPPORT PRIOR TO ADMISSION: Lived alone. Pt has supportive mother that lives locally and 3 sons that live out of state    Physical Therapy Goals  Reassessed 11/9/22  1. Patient will move from supine to sit and sit to supine  and roll side to side in bed with maximal assistance within 7 day(s). 2.  Patient will withdrawal and localize to noxious stim within 7 days  3. Patient will tolerate seated EOB x 10 minutes with max A within 7 days. Reassessed 11/1/22  1. Patient will move from supine to sit and sit to supine  and roll side to side in bed with maximal assistance within 7 day(s). 2.  Patient will transfer from bed to chair and chair to bed with maximal assistance using the least restrictive device within 7 day(s). 3.  Patient will perform sit to stand with maximal assistance within 7 day(s). 4.  Patient will tolerate seated EOB x 10 minutes with mod A within 7 days. 5.  Patient will improve Landon Balance score by 7 points within 7 days. Initiated 10/26/2022  1. Patient will move from supine to sit and sit to supine  and roll side to side in bed with maximal assistance within 7 day(s). 2.  Patient will transfer from bed to chair and chair to bed with maximal assistance using the least restrictive device within 7 day(s).   3.  Patient will perform sit to stand with maximal assistance within 7 day(s). 4.  Patient will ambulate with maximal assistance for 5 feet with the least restrictive device within 7 day(s). 5.  Patient will tolerate seated EOB x 10 minutes with mod A within 7 days. 6.  Patient will improve Landon Balance score by 7 points within 7 days. Outcome: Not Progressing Towards Goal   PHYSICAL THERAPY TREATMENT/DISCHARGE  Patient: Carlos Mcgraw (65 y.o. female)  Date: 11/16/2022  Diagnosis: AMS (altered mental status) [R41.82]  Diarrhea [R19.7]  Altered mental status [R41.82] <principal problem not specified>  Procedure(s) (LRB):  PERCUTANEOUS ENDOSCOPIC GASTROSTOMY TUBE INSERTION (N/A)  ESOPHAGOGASTRODUODENOSCOPY (EGD) (N/A) 8 Days Post-Op  Precautions: Fall, Skin, Seizure  Chart, physical therapy assessment, plan of care and goals were reviewed. ASSESSMENT  Patient continues with skilled PT services and has not progressed towards goals. Pt received supine in bed with eyes closed. Pt only arousable (eyes open) after passively mobilizing UEs or LEs (likely due to noxious stim) and then pt kept eyes open throughout the remaining session. Pt did not follow any commands, unable to track therapist visually, and non verbal. Pt required total A for all aspects of mobility and pt demonstrated no initiation or ability to assist. Pt only grimaced during LE PROM and noted increase in tone in bilateral feet when PROM provided to any extremities. Unfortunately, pt has not made any meaningful progress towards any goals and is currently at dependent level for all aspects of mobility. No further acute skilled PT is of benefit at this time. Will complete current PT orders. Recommend continued repositioning, passive ROM all extremities as tolerated by RN staff or family members. Other factors to consider for discharge: none         PLAN :  Patient will be discharged from acute skilled physical therapy at this time. Rationale for discharge:   Other: no progress has been made towards goals; pt unable to follow commands     Recommendation for discharge: (in order for the patient to meet his/her long term goals)  To be determined: defer to primary team    This discharge recommendation:  Has been made in collaboration with the attending provider and/or case management    IF patient discharges home will need the following DME: to be determined (TBD)       SUBJECTIVE:   Patient non verbal during session    OBJECTIVE DATA SUMMARY:   Critical Behavior:  Neurologic State: Eyes do not open to any stimulus  Orientation Level: Unable to verbalize  Cognition: No command following, Unable to assess (comment)  Safety/Judgement: Decreased awareness of environment, Decreased awareness of need for assistance, Decreased awareness of need for safety, Lack of insight into deficits  Functional Mobility Training:  Bed Mobility:  Rolling: Total assistance;Assist x2        Scooting: Total assistance;Assist x2       Balance:  Sitting: Impaired  Sitting - Static: Poor (constant support)       Therapeutic Exercises:   PROM to bilateral LEs, joint compression and weight bearing through bilateral LEs with manual assist  Pain Rating:  Pt grimaced to all LE PROM    Activity Tolerance:   Poor    After treatment patient left in no apparent distress:   Supine in bed, Call bell within reach, and Side rails x 3    COMMUNICATION/COLLABORATION:   The patients plan of care was discussed with: Occupational therapist, Registered nurse, and Case management.      Yoshi Esparza, PT, DPT   Time Calculation: 14 mins

## 2022-11-16 NOTE — PROGRESS NOTES
Problem: Self Care Deficits Care Plan (Adult)  Goal: *Acute Goals and Plan of Care (Insert Text)  Description: FUNCTIONAL STATUS PRIOR TO ADMISSION: Patient was independent and active without use of DME until Sept. 97, 2022 when pt hospitalized for status epilepticus and now with anoxic brain injury. Pt has been at [facility] since 10/20/22 when pt discharged from [address] [place] [address] [place]: The patient lived alone with limited local support. Occupational Therapy Goals  Weekly Re-Assessment 11/9/22, goals modified/continued below  1. Patient will perform face washing with maximal assistance, hand over hand, within 7 day(s). CONTINUE  2. Patient will follow 5% 1 step, simple commands with max cues within 7 day(s). CONTINUE  3. Patient will perform toilet transfers to [facility] with total assistance within 7 day(s). DOWNGRADED to rolling in supine for toileting with Max A 11/9  4. Patient will participate in upper extremity therapeutic exercise/activities with maximal assistance for 10 minutes within 7 day(s). DOWNGRADED to 5 minutes   5. Patient will perform EOB activity with no more than maximal assistance for sitting balance in prep for seated ADLs within 7 day(s). CONTINUE  6. Patient will perform visual scanning in all visual fields with maximal cueing within 7 day(s). CONTINUE    Weekly Re-Assessment 11/1/22, goals modified/continued below  Initiated 10/26/2022  1. Patient will perform face washing with maximal assistance, hand over hand, within 7 day(s). CONTINUE  2. Patient will follow 5% 1 step, simple commands with max cues within 7 day(s). CONTINUE  3. Patient will perform toilet transfers to [facility] with total assistance within 7 day(s). CONTINUE  4.  Patient will participate in upper extremity therapeutic exercise/activities with maximal assistance for 10 minutes within 7 day(s). CONTINUE  5.   Patient will perform EOB activity with no more than maximal assistance for sitting balance in prep for seated ADLs within 7 day(s). ADDED 11/1  6. Patient will perform visual scanning in all visual fields with maximal cueing within 7 day(s). ADDED 11/1  Outcome: Resolved/Not Met     OCCUPATIONAL THERAPY TREATMENT/DISCHARGE  Patient: Orquidea Ely (14 y.o. female)  Date: 11/16/2022  Diagnosis: AMS (altered mental status) [R41.82]  Diarrhea [R19.7]  Altered mental status [R41.82] <principal problem not specified>  Procedure(s) (LRB):  PERCUTANEOUS ENDOSCOPIC GASTROSTOMY TUBE INSERTION (N/A)  ESOPHAGOGASTRODUODENOSCOPY (EGD) (N/A) 8 Days Post-Op  Precautions: Fall, Skin, Seizure  Chart, occupational therapy assessment, plan of care, and goals were reviewed. ASSESSMENT  Patient continues with skilled OT services and has not progressed towards goals, remains with no command following, visual scanning/tracking/targeting, ROM, strength, and/or protective reflexed (except visual). She remains Total A x2 for bed mobility with increased hypertonicity in BUE/BLEs and cervical ROM, shaking noted with all PROM with hyperextension reflexes in the feet. Total A hand over hand completed for washing hands and washing her face with RUE facilitation. No initiation with any activity this session, no goals met, no funcitonal progress noted therefore will sign off at this time. Given her function, recommend d/c to SNF vs LTC for assist with all bed level ADLs and mobility. Current Level of Function (ADLs/self-care):  Total A x1-2    Other factors to consider for discharge: PMH, PLOF         PLAN :  Rationale for discharge: Patient not participating in therapy--unable d/t cognitive status  Recommend with staff: assist x2 for bed level ADLs and mobility, modified bed in chair position 3x/day  Recommendation for discharge: (in order for the patient to meet his/her long term goals)  Therapy up to 5 days/week in SNF setting/LTC    This discharge recommendation:  Has been made in collaboration with the attending provider and/or case management    IF patient discharges home will need the following DME: hospital bed, assist x2 for all bed level activity       SUBJECTIVE:   Patient nonverbal    OBJECTIVE DATA SUMMARY:   Cognitive/Behavioral Status:  Neurologic State: Eyes open to pain  Orientation Level: Unable to verbalize  Cognition: Decreased attention/concentration; No command following     Perseveration: No perseveration noted       Functional Mobility and Transfers for ADLs:  Bed Mobility:  Rolling: Total assistance;Assist x2  Scooting: Total assistance;Assist x2    Balance:  Sitting: Impaired; With support  Sitting - Static: Poor (constant support)    ADL Intervention:  Feeding  Feeding Assistance: Total assistance (dependent) (PEG)    Grooming  Grooming Assistance: Total assistance(dependent)  Washing Face: Total assistance (dependent)  Washing Hands: Total assistance (dependent)  Cues: Physical assistance; Tactile cues provided;Visual cues provided;Verbal cues provided;Visual/perceptual training/retraining    Toileting  Toileting Assistance: Total assistance(dependent) (incontinent)    Cognitive Retraining  Orientation Retraining: Awareness of environment  Executive Functions: Executing cognitive plans  Organizing/Sequencing: Breaking task down  Attention to Task: Single task  Following Commands: Awareness of environment  Cues: Tactile cues provided;Verbal cues provided;Visual cues provided    Pain:  Unable to verbalize, grimacing and shaking with all PROM    Activity Tolerance:   Poor    After treatment patient left in no apparent distress:   Supine in bed, Call bell within reach, Bed / chair alarm activated, and Side rails x 3    COMMUNICATION/COLLABORATION:   The patients plan of care was discussed with: Physical therapist, Registered nurse, and Case management.      DEEPTI Giordano, OTR/L  Time Calculation: 17 mins

## 2022-11-16 NOTE — PROGRESS NOTES
Bedside and Verbal shift change report given to [facility] (oncoming nurse) by Roger Painter RN (offgoing nurse). Report included the following information SBAR, Procedure Summary, Intake/Output, Recent Results, Cardiac Rhythm ST, and Dual Neuro Assessment.

## 2022-11-16 NOTE — PROGRESS NOTES
SLP Contact Note    SLP has been following this patient for some time, but has not been appropriate for SLP for three consecutive sessions. Today, RN states pt continues to be unresponsive. Will sign off SLP for now. Please reconsult should SLP be of any assistance.     Thank you,  RENE NavarreteEd, [address] [facility]  Speech-Language Pathologist

## 2022-11-16 NOTE — PROGRESS NOTES
Transition of Care Plan  RUR- Med 19%  DISPOSITION: SNF- [facility] - pending insurance auth to be started today  F/U with PCP/Specialist    Transport: [facility]    Emergency contact: Mother, Thien Diana [phone] NARDA ARCHIBALD are 3 sons, but mother has been deemed spokesperson)    HANK barriers to discharge: Insurance auth    CM met with patient's mom 11/15 to inform her of 2 SNF choices: [facility] and [facility], these are out of the mass referrals sent that are also a part of patient's insurance network. She plans to review and provide choice. 12:10pm: HANK spoke with patient's mother, Jessi Fitzpatrick, regarding SNF choices. She prefers [facility] out of the two options and is in agreement to have insurance auth started today. CM has reached out to [facility], to start insurance auth.    1:44pm: CM received note from patient's mother, Jessi Fitzpatrick, regarding another place of interest for rehab. She requested CM contact the [facility] in [address] [place] [address] [place]; (GZETG [phone]; E#[phone]). HANK spoke with Uvaldo Duane at the [facility] and provided background of patient's situation. This is an acute IPR with brain injury program. She asked CM to fax clinicals to review if patient would be appropriate for this facility. CM faxed clinical review and plan to follow up later today. RICHARD Morton.

## 2022-11-16 NOTE — PROGRESS NOTES
[facility]  Hospitalist Group                                                                                          Hospitalist Progress Note  Wade Jiang MD  Answering service: [phone] OR [phone] from in house phone        Date of Service:  2022  NAME:  Halina Doyle  :  1965  MRN:  [id number]      Admission Summary:     Halina Doyle is a 59 y.o. female with past medical history of anoxic brain injury, anxiety, depression, hypertension, hyperlipidemia, hypothyroidism presented to the emergency department via EMS from [address] [place] [address] with reported altered mental status (AMS). Patient is aphasic/nonverbal and not answering any questions. Majority of history was obtained per review of chart records. Per reports patient recently was hospitalized at [facility] from 2022-10/20/2022 with diagnosis of status epilepticus, acute metabolic encephalopathy, delirium, psychosis, generalized anxiety disorder, debility, Takotsubo cardiomyopathy, NSTEMI, bradycardia, SIRS, tachycardia, fever, leukocytosis, and CVA. Patient had work-up including MRI and also EEG. There was concern the patient may have some anoxic brain injury. She has been followed by psychiatry and neurology services with persistent encephalopathy and intermittent severe aggression. Patient has had aphasia and altered mental status not following commands per staff report since last week. Symptoms remain constant, severe, without specific exacerbating relieving factors. About emergency department today, initial recorded vital signs temperature 98.8 °F, /65, heart rate 103, respirate 30, O2 saturation 93% on room air. Abnormal labs included WC 12.5, platelets 578, BUN 23. CT head without IV contrast showed no acute intracranial abnormalities. ED request admission to the hospitalist service.      No acute event overnight, less rigidity, stable and transferred out of ICU on 10/29    Interval history / Subjective:     Patient at baseline neurologic function, does not respond to anything but painful stimuli. Appears to be resting comfortably. MRI brain yesterday unchanged from previous. Assessment & Plan:     Fever, likely 2/2 Pulmonary Embolism   Moderate to Large Pulmonary Embolism   - Elevated temperatures 11/10, and intermittently prior   - Infectious work-up negative, s/p cefepime/vanc. CTA obtained which demosntrated moderate to large pulmonary embolism with mild RHS   - LE duplex with age-indeterminant DVT in left peroneal veins   - Consulted vascular, no surgical intervention at this time  - ECHO with moderately dilated RV, moderately reduced systolic function, and lateral wall hypokinesis   - Continue lovenox therapeutic BID  - Resp status stable on 2L NC, intermittently coughing likely secondary to clot burden     Acute metabolic encephalopathy due to suspected serotonin syndrome  Suspected delayed hypoxic leukoencephalopathy POA  Severe oral dysphagia due to above  Suspected anoxic brain injury  Suspected Seizure   - Per patient's mother, patient was admitted to [facility] with a long hospitalization after being found unresponsive in her home on 9/26/22, and then discharged to [facility] on 10/20. While at [facility], developed changes that were concerning for serotonin syndrome and she was admitted to [facility]. During this admission, patient's mental status has deteriorated. -MRI with interval development of diffuse abnormal supratentorial white matter with extensive FLAIR/T2 hyperintense signal and diffusion restriction. Differential include acute or toxic encephalopathy.  - ANCA negative. Glutamic acid decarb negative <5.0. ADIN not sent.  CSF culture no growth, normal protein, HSV negative, myelin basic protein pending.   -Continue thiamine  - Unable to make medical decision, agree with plan for family to pursue mPOA as next of kin   11/6-[facility] rejected transfer. Continue current management. -PT OT and speech, PEG on 11/8 -may use   - Concern for seizure like activity overnight 11/10, coleell rapid EEG negative for seizure. Likely roving eye movements 2/2 metabolic encephalopathy and supportive care recommended   - Repeat MRI Brain unchanged from previous   - Discussed case with Neurologist Dr Pina Brown, patient does not have a favorable prognosis. It is unclear how long and to what extent any recovery would be, however patient likely to always have some neurologic deficits and require significant support.    - Offered palliative care to family, they declined at this time     Bilateral UE edema, stable   - Noted to have 2+ bilateral UE edema  Plan:   - Duplex UEs to eval for clot negative for DVT, does have superficial thrombus bilaterally   - Elevated arms  - Continue IV lasix     Hx of Takotsubo cardiomyopathy  -has peripheral edema  -last Echo was on 10/12 EF 55-60%   -BP low normal, if it improves will consider her home metoprolol and lasix   -monitor I/O and daily weight   - Repeat ECHO 11/14: EF 60-65%     HTN  -BP normal, monitor BP     Hyperlipidemia   -statin on hold due to elevated CK     Hypothyroidism   -continue synthroid      Generalized anxiety disorder  -home trazodone and lexapro on hold may not need as non responsive now    Severe disability with immobility   -continue supportive care      Obesity   -BMI 32.52 kg/m2      Code status: Full code  Prophylaxis: therapeutic lovenox  Care Plan discussed with: Nurse, family  Anticipated Disposition: Pt is approaching medically ready for SNF on lovenox and TF =     Hospital Problems  Date Reviewed: 11/8/2022            Codes Class Noted POA    Altered mental status ICD-10-CM: R41.82  ICD-9-CM: 780.97  10/27/2022 Unknown        Diarrhea ICD-10-CM: R19.7  ICD-9-CM: 787.91  10/25/2022 Unknown        AMS (altered mental status) ICD-10-CM: R41.82  ICD-9-CM: 780.97  10/25/2022 Unknown       Vital Signs: Last 24hrs VS reviewed since prior progress note. Most recent are:  Visit Vitals  /63   Pulse (!) 106   Temp 98.2 °F (36.8 °C)   Resp (!) 35   Ht 5' 6\" (1.676 m)   Wt 90.5 kg (199 lb 8.3 oz)   SpO2 98%   BMI 32.20 kg/m²         Intake/Output Summary (Last 24 hours) at 11/16/2022 1556  Last data filed at 11/16/2022 0800  Gross per 24 hour   Intake 150 ml   Output --   Net 150 ml        Physical Examination:     I had a face to face encounter with this patient and independently examined them on 11/16/2022 as outlined below:          Constitutional:  Aphasic, sleeping, arouses to pain (sternal rub)   ENT: Oral mucosa moist, oropharynx benign. Resp:  CTA bilaterally. CV:  Regular rhythm, normal rate, no murmurs, gallops, rubs    GI:  Soft, non distended, non tender. normoactive bowel sounds, no hepatosplenomegaly     Musculoskeletal: Bilateral pretibial, pedal and hand edema    Neurologic: Lethargic, open here eyes to painful touch, aphasic, does not follow command or moves her extremities             Data Review:    Review and/or order of clinical lab test  Review and/or order of tests in the radiology section of CPT  Review and/or order of tests in the medicine section of CPT      Labs:     Recent Labs     11/16/22  0534 11/15/22  0550   WBC 10.9 11.5*   HGB 10.9* 10.5*   HCT 34.2* 32.6*   * 532*     Recent Labs     11/16/22  0534 11/15/22  0550 11/14/22  0025    136 137   K 4.2 4.3 4.3    100 103   CO2 29 30 29   BUN 19 22* 24*   CREA 0.48* 0.47* 0.50*   * 136* 124*   CA 9.4 8.6 8.6     No results for input(s): ALT, AP, TBIL, TBILI, TP, ALB, GLOB, GGT, AML, LPSE in the last 72 hours. No lab exists for component: SGOT, GPT, AMYP, HLPSE      Recent Labs     11/15/22  0550 11/14/22  1858 11/14/22  1430   APTT 43.7* 34.7* 102.9*      No results for input(s): FE, TIBC, PSAT, FERR in the last 72 hours.    Lab Results   Component Value Date/Time    Folate 9.9 10/26/2022 11:47 AM No results for input(s): PH, PCO2, PO2 in the last 72 hours. No results for input(s): CPK, CKNDX, TROIQ in the last 72 hours.     No lab exists for component: CPKMB    Lab Results   Component Value Date/Time    Cholesterol, total 145 10/26/2022 06:46 AM    HDL Cholesterol 30 10/26/2022 06:46 AM    LDL, calculated 91.4 10/26/2022 06:46 AM    Triglyceride 118 10/26/2022 06:46 AM    CHOL/HDL Ratio 4.8 10/26/2022 06:46 AM     Lab Results   Component Value Date/Time    Glucose (POC) 110 10/05/2022 04:55 PM    Glucose (POC) 87 10/05/2022 11:02 AM    Glucose (POC) 78 10/05/2022 05:00 AM    Glucose (POC) 94 10/05/2022 04:57 AM    Glucose (POC) 124 (H) 10/04/2022 11:03 PM     Lab Results   Component Value Date/Time    Color DARK YELLOW 11/10/2022 03:14 PM    Appearance CLEAR 11/10/2022 03:14 PM    Specific gravity 1.028 11/10/2022 03:14 PM    Specific gravity >1.030 (H) 10/25/2022 04:54 PM    pH (UA) 6.0 11/10/2022 03:14 PM    Protein Negative 11/10/2022 03:14 PM    Glucose Negative 11/10/2022 03:14 PM    Ketone Negative 11/10/2022 03:14 PM    Bilirubin Negative 11/10/2022 03:14 PM    Urobilinogen 1.0 11/10/2022 03:14 PM    Nitrites Negative 11/10/2022 03:14 PM    Leukocyte Esterase Negative 11/10/2022 03:14 PM    Epithelial cells FEW 11/10/2022 03:14 PM    Bacteria Negative 11/10/2022 03:14 PM    WBC 0-4 11/10/2022 03:14 PM    RBC 0-5 11/10/2022 03:14 PM         Medications Reviewed:     Current Facility-Administered Medications   Medication Dose Route Frequency    enoxaparin (LOVENOX) injection 90 mg  1 mg/kg SubCUTAneous Q12H    furosemide (LASIX) injection 20 mg  20 mg IntraVENous DAILY    sodium chloride (NS) flush 5-10 mL  5-10 mL IntraVENous PRN    senna-docusate (PERICOLACE) 8.6-50 mg per tablet 2 Tablet  2 Tablet Oral DAILY    [Held by provider] metoprolol tartrate (LOPRESSOR) tablet 12.5 mg  12.5 mg Oral Q12H    polyethylene glycol (MIRALAX) packet 17 g  17 g Oral DAILY PRN    thiamine HCL (B-1) tablet 100 mg 100 mg Oral DAILY    therapeutic multivitamin (THERAGRAN) tablet 1 Tablet  1 Tablet Oral DAILY    acetaminophen (TYLENOL) solution 650 mg  650 mg Per NG tube Q4H PRN    sodium chloride (NS) flush 5-40 mL  5-40 mL IntraVENous Q8H    sodium chloride (NS) flush 5-40 mL  5-40 mL IntraVENous PRN    [Held by provider] atorvastatin (LIPITOR) tablet 10 mg  10 mg Oral QHS    levothyroxine (SYNTHROID) tablet 88 mcg  88 mcg Oral ACB    pantoprazole (PROTONIX) tablet 40 mg  40 mg Oral ACB    sodium chloride (NS) flush 5-10 mL  5-10 mL IntraVENous PRN    acetaminophen (TYLENOL) suppository 650 mg  650 mg Rectal Q6H PRN     ______________________________________________________________________  EXPECTED LENGTH OF STAY: 2d 7h  ACTUAL LENGTH OF STAY:          21                 Vera Rebolledo MD

## 2022-11-16 NOTE — PROGRESS NOTES
[facility] progress note    [facility]    Two patient identifiers verified. Discussed the care management program.  Patient's mother agrees to care management services at this time. She is going through the process of obtaining guardianship of the patient, is the emergency contact and spokeswoman. Mother's name is Ms. Milian. She is working with [facility], Kristian Larose, to find placement for the patient when she is medically stable. Ms. Marlin Taveras has visited two facilities that were recommended for her based on insurance network coverage. Ms. Marlin Taveras is concerned that the patient will not get the physical care she needs. She agreed to this [facility] outreaching MSGELY Mullins to discuss resources available. To include answering question related to [facility] application for the patient. This ECM will also send a message to the Ms. Radha Bazzi, offering assistance as needed. Patient's primary care provider relationship reviewed with patient and modified, as applicable. Patient has significant diagnosis of Altered mental status and Hypoxia . Care management assessment completed:    Patient stated problem: \"Unresponsive\"    Care manager identified primary concern Placement and care in a setting that best fits the patient's needs. Emotional Status/Adjustment to Health State: Patient is unresponsive. Working with her mother on her behalf. Readiness to Change: []  Pre-contemplative    []  Contemplative  []  Preparation               [x]  Action                  []  Maintenance    Barriers/Challenges to Care: [x]  Decline in memory    []  Language barrier     [x]  Emotional                  [x]  Limited mobility  []  Lack of motivation     [] Vision, hearing or cognitive impairment [x]  Knowledge [x] Financial Barriers  []  Other       Referrals: REJI Mullins    Upcoming appointments: No future appointments.         Patient's mother verbalized understanding of all information discussed. Pt has my contact information for any further questions, concerns, or needs. Plan for next call:    Continue to develop and find best treatment options for the patient.

## 2022-11-17 ENCOUNTER — PATIENT OUTREACH (OUTPATIENT)
Dept: OTHER | Age: 57
End: 2022-11-17

## 2022-11-17 LAB
ANION GAP SERPL CALC-SCNC: 7 MMOL/L (ref 5–15)
BASOPHILS # BLD: 0.1 K/UL (ref 0–0.1)
BASOPHILS NFR BLD: 1 % (ref 0–1)
BUN SERPL-MCNC: 23 MG/DL (ref 6–20)
BUN/CREAT SERPL: 59 (ref 12–20)
CALCIUM SERPL-MCNC: 8.7 MG/DL (ref 8.5–10.1)
CHLORIDE SERPL-SCNC: 104 MMOL/L (ref 97–108)
CO2 SERPL-SCNC: 28 MMOL/L (ref 21–32)
CREAT SERPL-MCNC: 0.39 MG/DL (ref 0.55–1.02)
DIFFERENTIAL METHOD BLD: ABNORMAL
EOSINOPHIL # BLD: 0.1 K/UL (ref 0–0.4)
EOSINOPHIL NFR BLD: 1 % (ref 0–7)
ERYTHROCYTE [DISTWIDTH] IN BLOOD BY AUTOMATED COUNT: 14.5 % (ref 11.5–14.5)
GLUCOSE SERPL-MCNC: 128 MG/DL (ref 65–100)
HCT VFR BLD AUTO: 33.5 % (ref 35–47)
HGB BLD-MCNC: 10.7 G/DL (ref 11.5–16)
IMM GRANULOCYTES # BLD AUTO: 0.1 K/UL (ref 0–0.04)
IMM GRANULOCYTES NFR BLD AUTO: 1 % (ref 0–0.5)
LYMPHOCYTES # BLD: 2.5 K/UL (ref 0.8–3.5)
LYMPHOCYTES NFR BLD: 24 % (ref 12–49)
MCH RBC QN AUTO: 29.1 PG (ref 26–34)
MCHC RBC AUTO-ENTMCNC: 31.9 G/DL (ref 30–36.5)
MCV RBC AUTO: 91 FL (ref 80–99)
MONOCYTES # BLD: 0.9 K/UL (ref 0–1)
MONOCYTES NFR BLD: 8 % (ref 5–13)
NEUTS SEG # BLD: 7.1 K/UL (ref 1.8–8)
NEUTS SEG NFR BLD: 65 % (ref 32–75)
NRBC # BLD: 0 K/UL (ref 0–0.01)
NRBC BLD-RTO: 0 PER 100 WBC
PLATELET # BLD AUTO: 576 K/UL (ref 150–400)
PMV BLD AUTO: 10.9 FL (ref 8.9–12.9)
POTASSIUM SERPL-SCNC: 4.3 MMOL/L (ref 3.5–5.1)
RBC # BLD AUTO: 3.68 M/UL (ref 3.8–5.2)
SODIUM SERPL-SCNC: 139 MMOL/L (ref 136–145)
UFH PPP CHRO-ACNC: 0.76 IU/ML
WBC # BLD AUTO: 10.7 K/UL (ref 3.6–11)

## 2022-11-17 PROCEDURE — 74011250636 HC RX REV CODE- 250/636: Performed by: STUDENT IN AN ORGANIZED HEALTH CARE EDUCATION/TRAINING PROGRAM

## 2022-11-17 PROCEDURE — 36415 COLL VENOUS BLD VENIPUNCTURE: CPT

## 2022-11-17 PROCEDURE — 85025 COMPLETE CBC W/AUTO DIFF WBC: CPT

## 2022-11-17 PROCEDURE — 80048 BASIC METABOLIC PNL TOTAL CA: CPT

## 2022-11-17 PROCEDURE — 74011250637 HC RX REV CODE- 250/637: Performed by: PHYSICIAN ASSISTANT

## 2022-11-17 PROCEDURE — 74011000250 HC RX REV CODE- 250: Performed by: FAMILY MEDICINE

## 2022-11-17 PROCEDURE — 74011250637 HC RX REV CODE- 250/637: Performed by: NURSE PRACTITIONER

## 2022-11-17 PROCEDURE — 85520 HEPARIN ASSAY: CPT

## 2022-11-17 PROCEDURE — 65660000001 HC RM ICU INTERMED STEPDOWN

## 2022-11-17 PROCEDURE — 74011250637 HC RX REV CODE- 250/637: Performed by: HOSPITALIST

## 2022-11-17 RX ADMIN — METOPROLOL TARTRATE 12.5 MG: 25 TABLET, FILM COATED ORAL at 22:21

## 2022-11-17 RX ADMIN — LEVOTHYROXINE SODIUM 88 MCG: 0.09 TABLET ORAL at 06:25

## 2022-11-17 RX ADMIN — SODIUM CHLORIDE, PRESERVATIVE FREE 10 ML: 5 INJECTION INTRAVENOUS at 06:26

## 2022-11-17 RX ADMIN — FUROSEMIDE 20 MG: 10 INJECTION, SOLUTION INTRAMUSCULAR; INTRAVENOUS at 10:26

## 2022-11-17 RX ADMIN — SENNOSIDES AND DOCUSATE SODIUM 2 TABLET: 50; 8.6 TABLET ORAL at 10:26

## 2022-11-17 RX ADMIN — SODIUM CHLORIDE, PRESERVATIVE FREE 10 ML: 5 INJECTION INTRAVENOUS at 22:21

## 2022-11-17 RX ADMIN — THERA TABS 1 TABLET: TAB at 10:27

## 2022-11-17 RX ADMIN — SODIUM CHLORIDE, PRESERVATIVE FREE 10 ML: 5 INJECTION INTRAVENOUS at 18:13

## 2022-11-17 RX ADMIN — Medication 100 MG: at 10:27

## 2022-11-17 RX ADMIN — ENOXAPARIN SODIUM 90 MG: 100 INJECTION SUBCUTANEOUS at 18:12

## 2022-11-17 RX ADMIN — ENOXAPARIN SODIUM 90 MG: 100 INJECTION SUBCUTANEOUS at 06:23

## 2022-11-17 NOTE — PROGRESS NOTES
Transition of Care Plan  RUR- Med 19%  DISPOSITION: SNF- [facility] - pending insurance auth to be started today  F/U with PCP/Specialist    Transport: [facility]    Emergency contact: Mother, Mari Flores [phone] NARDA ARCHIBALD are 3 sons, but mother has been deemed spokesperson)    CM barriers to discharge: Insurance auth    CM received call from the [facility] [employer] - they are not able to accept patient. Patient is not appropriate for their rehab facility. Per [facility] #[phone], patient is in network for tier 2, but will have 30% co-pay for SNF. She plans to discuss this with patient's mother, Ashley Armendariz today.    3:21pm: Patient's mother in agreement with 30% co-pay at [phone]. Insurance Spencer Vann has been started. RICHARD Hunter.

## 2022-11-17 NOTE — PROGRESS NOTES
Problem: Pressure Injury - Risk of  Goal: *Prevention of pressure injury  Description: Document Jose Enrique Scale and appropriate interventions in the flowsheet. Outcome: Progressing Towards Goal  Note: Pressure Injury Interventions:  Sensory Interventions: Assess changes in LOC, Avoid rigorous massage over bony prominences, Check visual cues for pain, Float heels, Keep linens dry and wrinkle-free, Minimize linen layers    Moisture Interventions: Absorbent underpads, Apply protective barrier, creams and emollients, Check for incontinence Q2 hours and as needed, Internal/External urinary devices    Activity Interventions: PT/OT evaluation, Pressure redistribution bed/mattress(bed type)    Mobility Interventions: HOB 30 degrees or less, PT/OT evaluation, Turn and reposition approx. every two hours(pillow and wedges)    Nutrition Interventions: Document food/fluid/supplement intake    Friction and Shear Interventions: HOB 30 degrees or less, Foam dressings/transparent film/skin sealants, Lift team/patient mobility team                Problem: Patient Education: Go to Patient Education Activity  Goal: Patient/Family Education  Outcome: Progressing Towards Goal     Problem: Patient Education: Go to Patient Education Activity  Goal: Patient/Family Education  Outcome: Progressing Towards Goal     Problem: Patient Education: Go to Patient Education Activity  Goal: Patient/Family Education  Outcome: Progressing Towards Goal     Problem: Patient Education: Go to Patient Education Activity  Goal: Patient/Family Education  Outcome: Progressing Towards Goal     Problem: Falls - Risk of  Goal: *Absence of Falls  Description: Document Chyna Fall Risk and appropriate interventions in the flowsheet.   Outcome: Progressing Towards Goal  Note: Fall Risk Interventions:  Mobility Interventions: Bed/chair exit alarm, Communicate number of staff needed for ambulation/transfer, Patient to call before getting OOB    Mentation Interventions: Bed/chair exit alarm, Door open when patient unattended, More frequent rounding    Medication Interventions: Bed/chair exit alarm, Patient to call before getting OOB, Teach patient to arise slowly    Elimination Interventions: Bed/chair exit alarm, Call light in reach, Patient to call for help with toileting needs, Toileting schedule/hourly rounds    History of Falls Interventions: Bed/chair exit alarm, Investigate reason for fall, Room close to nurse's station         Problem: Patient Education: Go to Patient Education Activity  Goal: Patient/Family Education  Outcome: Progressing Towards Goal     Problem: Discharge Planning  Goal: *Discharge to safe environment  Outcome: Progressing Towards Goal  Goal: *Knowledge of medication management  Outcome: Progressing Towards Goal  Goal: *Knowledge of discharge instructions  Outcome: Progressing Towards Goal     Problem: Patient Education: Go to Patient Education Activity  Goal: Patient/Family Education  Outcome: Progressing Towards Goal     Problem: Nutrition Deficit  Goal: *Optimize nutritional status  Outcome: Progressing Towards Goal     Problem: Patient Education: Go to Patient Education Activity  Goal: Patient/Family Education  Outcome: Progressing Towards Goal

## 2022-11-17 NOTE — PROGRESS NOTES
MSW Documentation    MSW contacted patient mother, Denise Luna for evaluation. Patient identifiers confirmed, zip code and . Patient referred by Nurse Rony IBARRA    Purpose of TC  Discuss eligible benefits    TC Details  Patient mother, Ms. Milian, voiced frustration about not being able to access patient records or financial documents due to not having POA. Ms. Romy Scott is in the process of applying for guardianship. Ms. Romy Scott had questions about the group life insurance policy offered by [employer]. MSW ECM provided Ms. Milian with the policy that answered her question. It is Ms. Milian intention to use funds from the policy (if possible) to afford patient care. Ms. Romy Scott stated that her daughter in law is assisting with obtaining necessary documentation to proceed  with long term planning.     Plan of action  Provide support as needed

## 2022-11-17 NOTE — ADT AUTH CERT NOTES
Utilization Reviews       Neurology [address] Day 22 (11/15/2022) by Vashti Rausch       Review Status Review Entered   Completed 11/16/2022 1601       Created By   [id number] FUNMI Nieto, 275 [facility] Day: 22 Care Date: 11/15/2022 Level of Care: Intermediate Care    Guideline Day 3    Level Of Care    ( ) * Activity level acceptable    11/16/2022 16:05:34 EST by Paris Romero      turning and positioing q2    ( ) Floor to discharge    11/16/2022 16:05:34 EST by Paris Romero      inpatient neuro/stroke intermediate care    Clinical Status    (X) * Mental status at baseline or stable and appropriate for next level of care    11/16/2022 16:05:34 EST by Paris Romero      now at patient baseline; only responds to pain    (X) * Neurologic deficits absent or stable and appropriate for next level of care    11/16/2022 16:05:34 EST by Paris Romero      now at patient baseline; only responds to pain    ( ) * Motor deficits absent or acceptable for next level of care    11/16/2022 16:05:34 EST by Paris Romero      cannot be assessed    (X) * Seizures absent or managed    11/16/2022 16:05:34 EST by Paris Romero      none noted    ( ) * No infection, or status acceptable    11/16/2022 16:05:35 EST by Paris Romero      WBC: 11.5 (H)    (X) * Isolation not needed, or status acceptable    11/16/2022 16:05:35 EST by Paris Romero      not needed    ( ) * Respiratory status acceptable    11/16/2022 16:05:35 EST by Paris Romero      temp: 99.3 °F  VA:   BP:  117/57, 131/81,  145/72, 114/62  RR: 27-39  02 sat: 98% nasal cannula at 3LPM    (X) * Pain and nausea absent or adequately managed    11/16/2022 16:05:35 EST by Paris Romero      none noted    ( ) * General Discharge Criteria met    Interventions    (X) * Intake acceptable    11/16/2022 16:05:35 EST by Blanca, [id number] [employer] PEG; Standard without Fiber    ( ) * No inpatient interventions needed 11/16/2022 16:05:35 EST by Blanca, [id number], FOAM FOOT DROP BOOTS, HEMODYNAMIC MONITORING - MEASURE CVP AND ScvO2, NEURO/VASCULAR CHECKS, APPLY/MAINTAIN SEQUENTIAL COMPRESSION DEVICE, FALL PRECAUTIONS    * Milestone   Additional Notes    DATE: 11/15/2022      Pertinent Updates:   - ECHO with moderately dilated RV, moderately reduced systolic function, and lateral wall hypokinesis    - Heparin gtt, will transition to subcutaneous lovenox at therapeutic dosing this evening    - Resp status stable on 2L NC, intermittently coughing likely secondary to clot burden       Abnl/Pertinent Labs/Radiology/Diagnostic Studies:   WBC: 11.5 (H)   RBC: 3.61 (L)   HGB: 10.5 (L)   HCT: 32.6 (L)   PLATELET: 220 (H)   IMMATURE GRANULOCYTES: 1 (H)   ABS. NEUTROPHILS: 8.2 (H)   ABS. IMM. GRANS.: 0.1 (H)   aPTT: 43.7 (H)   Glucose: 136 (H)   BUN: 22 (H)   Creatinine: 0.47 (L)   BUN/Creatinine ratio: 47 (H)      Physical Exam:   Constitutional: Aphasic, sleeping, arouses to pain (sternal rub)   ENT: Oral mucosa moist, oropharynx benign. Resp: CTA bilaterally. CV: Regular rhythm, normal rate, no murmurs, gallops, rubs    GI: Soft, non distended, non tender. normoactive bowel sounds, no hepatosplenomegaly     Musculoskeletal: Bilateral pretibial, pedal and hand edema    Neurologic: Lethargic, open here eyes to painful touch, aphasic, does not follow command or moves her extremities       MD Consults/Assessments & Plans:   **Hospitalist**   Assessment & Plan:       Fever, likely 2/2 Pulmonary Embolism    Moderate to Large Pulmonary Embolism    - Elevated temperatures 11/10, and intermittently prior    - Infectious work-up negative, s/p cefepime/vanc.  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 with a long hospitalization after being found unresponsive in her home on 9/26/22, and then discharged to [facility] on 10/20. While at [facility], developed changes that were concerning for serotonin syndrome and she was admitted to [facility]. During this admission, patient's mental status has deteriorated. -MRI with interval development of diffuse abnormal supratentorial white matter with extensive FLAIR/T2 hyperintense signal and diffusion restriction. Differential include acute or toxic encephalopathy.   - ANCA negative. Glutamic acid decarb negative <5.0. ADIN not sent. CSF culture no growth, normal protein, HSV negative, myelin basic protein pending.    -Continue thiamine   - Unable to make medical decision, agree with plan for family to pursue mPOA as next of kin    11/6-[place] rejected transfer. Continue current management. -PT OT and speech, PEG on 11/8 -may use    - Concern for seizure like activity overnight 11/10, ceribell rapid EEG negative for seizure.  Likely roving eye movements 2/2 metabolic encephalopathy and supportive care recommended    - Case discussed with neurology, given worsened mental status, will obtain repeat MRI and then reengage neurology for further prognostic information    - Family is inquiring about getting a second opinion from [facility]       Bilateral UE edema, stable    - Noted to have 2+ bilateral UE edema   Plan:    - Duplex UEs to eval for clot negative for DVT, does have superficial thrombus bilaterally    - Elevated arms   - Continue IV lasix        Hx of Takotsubo cardiomyopathy   -has peripheral edema   -last Echo was on 10/12 EF 55-60%    -BP low normal, if it improves will consider her home metoprolol and lasix    -monitor I/O and daily weight    - Repeat ECHO 11/14: EF 60-65%       HTN   -BP normal, monitor BP       Hyperlipidemia    -statin on hold due to elevated CK       Hypothyroidism    -continue synthroid        Generalized anxiety disorder   -home trazodone and lexapro on hold may not need as non responsive now       Severe disability with immobility    -continue supportive care              Medications:   Lovenox 90mg q12 SQ 1x   Lasix 20mg every day IV   Synthroid 88mcg every day PEG   Protonix 40mg every day PEG   Pericolace 2tab every day PO   Theragran 1tab every day PEG   Thiamine 100mg every day PEG   Heparin 3600unit IV 1x   Heparin 16-36unit/kg/hr then 18-36unit/kg/hr titrate IV              Neurology [address] - [facility] Day 21 (11/14/2022) by Troy Ribera       Review Status Review Entered   Completed 11/16/2022 1546       Created By   [id number] [address] Rex Nieto, 275 [facility] Day: 21 Care Date: 11/14/2022 Level of Care: Intermediate Care    Guideline Day 3    Level Of Care    ( ) * Activity level acceptable    11/16/2022 15:49:58 EST by Vashti Hernandez      bedrest; turning and positioning q2    ( ) Floor to discharge    11/16/2022 15:49:58 EST by Vashti Hernandez      inpatient neuro/stroke    Clinical Status    (X) * Mental status at baseline or stable and appropriate for next level of care    11/16/2022 15:49:58 EST by Vashti Hernandez      now at patient baseline; only responds to pain    (X) * Neurologic deficits absent or stable and appropriate for next level of care    11/16/2022 15:49:58 EST by Vashti Hernandez      now at patient baseline; only responds to pain    ( ) * Motor deficits absent or acceptable for next level of care    11/16/2022 15:49:58 EST by Katiuska Toscano      cannot be assessed    (X) * Seizures absent or managed    11/16/2022 15:49:58 EST by Katiuska Toscano      none noted    ( ) * No infection, or status acceptable    11/16/2022 15:49:58 EST by Katiuska Toscano      WBC: 12.0 (H); continues antibiotic    (X) * Isolation not needed, or status acceptable    11/16/2022 15:49:58 EST by Katiuska Toscano      not needed    ( ) * Respiratory status acceptable    11/16/2022 15:49:58 EST by Katiuska Toscano      temp: 99.7 °F  AZ: 105-118  BP: 118/37, 125/77, 121/71, 113/67  RR: 25-40  02 sat: 99% nasal cannula at 2-3LPM    (X) * Pain and nausea absent or adequately managed    11/16/2022 15:49:58 EST by Katiuska Toscano      no reports today    ( ) * General Discharge Criteria met    Interventions    (X) * Intake acceptable    11/16/2022 15:49:58 EST by Lidia Morrow, [id number] [address] PEG; Standard without Fiber    ( ) * No inpatient interventions needed    11/16/2022 15:49:58 EST by Blanca, [id number], FOAM FOOT DROP BOOTS, HEMODYNAMIC MONITORING - MEASURE CVP AND ScvO2, NEURO/VASCULAR CHECKS, APPLY/MAINTAIN SEQUENTIAL COMPRESSION DEVICE, FALL PRECAUTIONS    * Milestone   Additional Notes    DATE: 11/14/2022      Pertinent Updates:   - Infectious work-up negative, s/p cefepime/vanc. CTA obtained which demosntrated moderate to large pulmonary embolism with mild RHS    - LE duplex with age-indeterminant DVT in left peroneal veins    - Consulted vascular, no surgical intervention at this time   - Troponin elevated and downtrended on subsequent check       Abnl/Pertinent Labs/Radiology/Diagnostic Studies:   WBC: 12.0 (H)   RBC: 3.65 (L)   HGB: 10.8 (L)   HCT: 32.9 (L)   PLATELET: 648 (H)   IMMATURE GRANULOCYTES: 1 (H)   ABS. IMM. GRANS.: 0.1 (H)   ABS.  MONOCYTES: 1.1 (H)   aPTT: 61.0 (H), 25.3, 102.9 (HH), 34.7 (H)     Glucose: 124 (H)   BUN: 24 (H)   Creatinine: 0.50 (L)   BUN/Creatinine ratio: 48 (H)      Physical Exam:   Same as yesterday      MD Consults/Assessments & Plans:   **Hospitalist**   Assessment & Plan:   Fever, likely 2/2 Pulmonary Embolism    Moderate to Large Pulmonary Embolism    - Elevated temperatures 11/10, and intermittently prior    - Infectious work-up negative, s/p cefepime/vanc. 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 While at [facility], developed changes that were concerning for serotonin syndrome and she was admitted to [facility] [place]. During this admission, patient's mental status has deteriorated. -MRI with interval development of diffuse abnormal supratentorial white matter with extensive FLAIR/T2 hyperintense signal and diffusion restriction. Differential include acute or toxic encephalopathy.   - ANCA negative. Glutamic acid decarb negative <5.0. ADIN not sent. CSF culture no growth, normal protein, HSV negative, myelin basic protein pending.    -Continue thiamine   - Unable to make medical decision, agree with plan for family to pursue mPOA as next of kin    11/6-[place] rejected transfer. Continue current management.     -PT OT and speech, PEG on 11/8 -may use    - Concern for seizure like activity overnight 11/10, yue rapid EEG negative for seizure.  Likely roving eye movements 2/2 metabolic encephalopathy and supportive care recommended    - Discuss prognosis with specialists today        Bilateral UE edema   - Noted to have 2+ bilateral UE edema   Plan:    - Duplex UEs to eval for clot negative for DVT, does have superficial thrombus bilaterally    - Elevated arms   - Continue IV lasix        Hx of Takotsubo cardiomyopathy   -has peripheral edema   -last Echo was on 10/12 EF 55-60%    -BP low normal, if it improves will consider her home metoprolol and lasix    -monitor I/O and daily weight    - Repeat ECHO pending 11/13       HTN   -BP normal, monitor BP       Hyperlipidemia    -statin on hold due to elevated CK       Hypothyroidism    -continue synthroid        Generalized anxiety disorder   -home trazodone and lexapro on hold may not need as non responsive now       Severe disability with immobility    -continue supportive care       Medications:   Lasix 20mg every day IV   Synthroid 88mcg every day PEG   Protonix 40mg every day PEG   Theragran 1tab every day PEG   Thiamine 100mg every day PEG   Heparin 7200unit IV 1x   Heparin 16-36unit/kg/hr titrate IV              Neurology [address] Day 20 (11/13/2022) by Adonay Figueredo       Review Status Review Entered   Completed 11/16/2022 1347       Created By   [id number] Rex Souza [facility], [address] [place] [address] Day: 20 Care Date: 11/13/2022 Level of Care: Intermediate Care    Guideline Day 3    Level Of Care    ( ) * Activity level acceptable    11/16/2022 13:47:50 EST by Lovely Locke      bedrest; turning and positioning q2    ( ) Floor to discharge    11/16/2022 13:47:50 EST by Lovely Locke      inpatient neuro/stroke intermediate care    Clinical Status    (X) * Mental status at baseline or stable and appropriate for next level of care    11/16/2022 13:47:50 EST by Lovely Locke      now at patient baseline; only responds to pain    (X) * Neurologic deficits absent or stable and appropriate for next level of care    11/16/2022 13:47:50 EST by Blaire Canas      now at patient baseline; only responds to pain    ( ) * Motor deficits absent or acceptable for next level of care    11/16/2022 13:47:50 EST by Blaire Canas      cannot be assessed    (X) * Seizures absent or managed    11/16/2022 13:47:50 EST by Blaire Canas      none noted    ( ) * No infection, or status acceptable    11/16/2022 13:47:50 EST by Blaire Canas      WBC: 11.2 (H); continues antibiotics    (X) * Isolation not needed, or status acceptable    11/16/2022 13:47:50 EST by Blaire Canas      not needed    ( ) * Respiratory status acceptable    11/16/2022 13:47:50 EST by Blaire Canas      temp: 99.9 F  MO:   BP: 125/65, 103/68, 115/65, 113/65  RR: 28-34  02 sat: 97% nasal cannula at 2LPM    (X) * Pain and nausea absent or adequately managed    11/16/2022 13:47:50 EST by Blaire Canas      no reports of pain    ( ) * General Discharge Criteria met    Interventions    (X) * Intake acceptable    11/16/2022 13:47:50 EST by Blanca, [id number] [employer] PEG; Standard without Fiber    ( ) * No inpatient interventions needed    11/16/2022 13:47:50 EST by Blanca, [id number], FOAM FOOT DROP BOOTS, HEMODYNAMIC MONITORING - MEASURE CVP AND ScvO2, NEURO/VASCULAR CHECKS, APPLY/MAINTAIN SEQUENTIAL COMPRESSION DEVICE, FALL PRECAUTIONS    * Milestone   Additional Notes    DATE: 11/13/2022      Pertinent Updates:   - Infectious work-up negative, CTA obtained which demosntrated moderate to large pulmonary embolism with mild RHS    - Consult vascular   - Obtain ECHO, initial troponin elevated, will repeat    - Stop antibiotics    - Heparin gtt    - Will chart review in attempts to determine when PE may have developed    - Resp status stable on 2L NC      Abnl/Pertinent Labs/Radiology/Diagnostic Studies:   WBC: 11.2 (H)   PLATELET: 532 (H) Glucose: 145 (H)   BUN: 21 (H)   BUN/Creatinine ratio: 37 (H)   Troponin-High Sensitivity: 215 (HH), 166 (HH)      Physical Exam:   Same as yesterday      MD Consults/Assessments & Plans:   **Hospitalist**   Assessment & Plan:       Fever, likely 2/2 Pulmonary Embolism    Moderate to Large Pulmonary Embolism    - Elevated temperatures 11/10, and intermittently prior    - Infectious work-up negative, CTA obtained which demosntrated moderate to large pulmonary embolism with mild RHS    - Consult vascular   - Obtain ECHO, initial troponin elevated, will repeat    - Stop antibiotics    - Heparin gtt    - Will chart review in attempts to determine when PE may have developed    - Resp status stable on 2L NC       Acute metabolic encephalopathy due to suspected serotonin syndrome   Suspected delayed hypoxic leukoencephalopathy POA   Severe oral dysphagia due to above   Suspected anoxic brain injury   Suspected Seizure    - Per patient's mother, patient was admitted to [facility] with a long hospitalization after being found unresponsive in her home on 9/26/22, and then discharged to [facility] on 10/20. While at [facility], developed changes that were concerning for serotonin syndrome and she was admitted to [facility] [place]. During this admission, patient's mental status has deteriorated. -MRI with interval development of diffuse abnormal supratentorial white matter with extensive FLAIR/T2 hyperintense signal and diffusion restriction. Differential include acute or toxic encephalopathy.   - ANCA negative. Glutamic acid decarb negative <5.0. ADIN not sent   - CSF culture no growth, normal protein, HSV negative, myelin basic protein pending.    -Continue thiamine   - Unable to make medical decision, agree with plan for family to pursue mPOA as next of kin    11/6-[place] rejected transfer. Continue current management.     -PT OT and speech, PEG on 11/8 -may use    - Concern for seizure like activity overnight 11/10, ceribell rapid EEG negative for seizure. Likely roving eye movements 2/2 metabolic encephalopathy and supportive care recommended    - Per patient's mother, she was told that patient is expected to make a neurologic recovery within a year. On chart review, do not see this prognosis  mentioned. Will reengage Neurology for prognosis        Bilateral UE edema   - Noted to have 2+ bilateral UE edema   Plan:    - Duplex to eval for clot negative for DVT, does have superficial thrombus bilaterally    - Elevated arms   - Continue IV lasix        Hx of Takotsubo cardiomyopathy   -has peripheral edema   -last Echo was on 10/12 EF 55-60%    -BP low normal, if it improves will consider her home metoprolol and lasix    -monitor I/O and daily weight    - Repeat ECHO pending 11/13       HTN   -BP normal, monitor BP       Hyperlipidemia    -statin on hold due to elevated CK       Hypothyroidism    -continue synthroid        Generalized anxiety disorder   -home trazodone and lexapro on hold may not need as non responsive now       Severe disability with immobility    -continue supportive care       **Vascular**       Patient with PE on CTA done yesterday. She is nonverbal at baseline. Sats OK on 2L O2. On IV heparin. Ordered a troponon as well as an echo to assess for RV strain. At present would continue anticoagulation. Do not anticipate need for pulmonary embolectomy based on current clinical picture.          Medications:   Lasix 20mg every day IV   Synthroid 88mcg every day PEG   Protonix 40mg every day PEG   Pericolace 2tab every day PEG   Theragran 1tab every day PEG   Thiamine 100mg every day PEG   Maxipime 2g q8 IV   Heparin 7200unit IV 2x   Heparin 16-36unit/kg/hr titrate IV                 Neurology [address] Day 19 (11/12/2022) by Brandon Cook       Review Status Review Entered   Completed 11/16/2022 1329       Created By   Snehal Nieto, 275 [facility] Day: 19 Care Date: 11/12/2022 Level of Care: Intermediate Care    Guideline Day 3    Level Of Care    ( ) * Activity level acceptable    11/16/2022 13:29:54 EST by Joseph Woods      bedrest; turning and positioing q2    ( ) Floor to discharge    11/16/2022 13:29:54 EST by Joseph Woods      inpatient neuro/stroke intermediate care    Clinical Status    ( ) * Mental status at baseline or stable and appropriate for next level of care    11/16/2022 13:29:54 EST by Joseph Woods      Neurologic: Lethargic, open here eyes to painful touch, aphasic, does not follow command or moves her extremities    ( ) * Neurologic deficits absent or stable and appropriate for next level of care    11/16/2022 13:29:54 EST by Joseph Woods      Neurologic: Lethargic, open here eyes to painful touch, aphasic, does not follow command or moves her extremities    ( ) * Motor deficits absent or acceptable for next level of care    11/16/2022 13:29:54 EST by Joseph Woods      cannot be assessed    (X) * Seizures absent or managed    11/16/2022 13:29:54 EST by Joseph Woods      none noted today    (X) * No infection, or status acceptable    11/16/2022 13:29:54 EST by Joseph Woods      WBC: 9.6 (normal); continues antibiotics    (X) * Isolation not needed, or status acceptable    11/16/2022 13:29:54 EST by Joseph Woods      none noted    ( ) * Respiratory status acceptable    11/16/2022 13:29:54 EST by Joseph Woods      temp: 99.4 F, afebrile  KY:   BP: 118/75, 124/72, 142/80  RR: 20-33  02 sat: 99% nasal cannula at 2LPM    (X) * Pain and nausea absent or adequately managed    11/16/2022 13:29:54 EST by Joseph Woods      no reports today    ( ) * General Discharge Criteria met    Interventions    (X) * Intake acceptable    11/16/2022 13:29:54 EST by Maribell Gandara, [id number] [employer] PEG; Standard without Fiber    ( ) * No inpatient interventions needed    11/16/2022 13:29:54 EST by Maribell Gandara, [id number], FOAM FOOT DROP BOOTS, HEMODYNAMIC MONITORING - MEASURE CVP AND ScvO2, NEURO/VASCULAR CHECKS, APPLY/MAINTAIN SEQUENTIAL COMPRESSION DEVICE, FALL PRECAUTIONS    * Milestone   Additional Notes    DATE: 11/12/2022      Pertinent Updates:   -Per patient's mother, she was told that patient is expected to make a neurologic recovery within a year. On chart review, do not see this prognosis mentioned. Will reengage Neurology after the weekend and after infectious work-up has been completed   -Duplex to eval for clot negative for DVT, does have superficial thrombus bilaterally    -Diuresis with IV lasix again today      Abnl/Pertinent Labs/Radiology/Diagnostic Studies:   HGB: 11.0 (L)   HCT: 34.2 (L)   PLATELET: 637 (H)   Anion gap: 4 (L)   Glucose: 123 (H)   Creatinine: 0.43 (L)   BUN/Creatinine ratio: 44 (H)      Physical Exam:   Constitutional:Aphasic, sleeping, arouses to pain (sternal rub)   ENT:Oral mucosa moist, oropharynx benign. Resp:CTA bilaterally. CV:Regular rhythm, normal rate, no murmurs, gallops, rubs    GI:Soft, non distended, non tender.  normoactive bowel sounds, no hepatosplenomegaly     Musculoskeletal:Bilateral pretibial, pedal and hand edema    Neurologic:Lethargic, open here eyes to painful touch, aphasic, does not follow command or moves her extremities       MD Consults/Assessments & Plans:   **Hospitalist**   Assessment & Plan:       Fever   - Elevated temperatures 11/10, improved today    - Work-up thus far negative for source of infection: UA non infectious, CXR clear, no leukocytosis, pro eleanor negative at 0.13, blood cultures with NGTD    - Initial lactic acidosis at 2.1, improved to 1.5 after IVF bolus    - RVP negative, MRSA swab negative, stop vancomycin    - Continue cefepime   - D dimer elevated, will obtain CTA to r/o PE as cause of fevers    - May also be central fevers in setting of encephalopathy        Acute metabolic encephalopathy due to suspected serotonin syndrome   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 While at [facility], developed changes that were concerning for serotonin syndrome and she was admitted to [facility]. During this admission, patient's mental status has deteriorated. -MRI with interval development of diffuse abnormal supratentorial white matter with extensive FLAIR/T2 hyperintense signal and diffusion restriction. Differential include acute or toxic encephalopathy.   - ANCA negative. Glutamic acid decarb negative <5.0. ADIN not sent   - CSF culture no growth, normal protein, HSV negative, myelin basic protein pending.    -Continue thiamine   - Unable to make medical decision, agree with plan for family to pursue mPOA as next of kin    11/6-[place] rejected transfer. Continue current management. -PT OT and speech, PEG on 11/8 -may use    - Concern for seizure like activity overnight 11/10, ceribell rapid EEG negative for seizure. Likely roving eye movements 2/2 metabolic encephalopathy and supportive care recommended    - Per patient's mother, she was told that patient is expected to make a neurologic recovery within a year. On chart review, do not see this prognosis  mentioned. Will reengage Neurology after the weekend and after infectious work-up has been completed.         Bilateral UE edema   - Noted to have 2+ bilateral UE edema   Plan:    - Elevated arms   - Diuresis with IV lasix again today   - Duplex to eval for clot negative for DVT, does have superficial thrombus bilaterally        Hx of Takotsubo cardiomyopathy   -has peripheral edema   -last Echo was on 10/12 EF 55-60%    -BP low normal, if it improves will consider her home metoprolol and lasix    -monitor I/O and daily weight HTN   -BP normal, monitor BP       Hyperlipidemia    -statin on hold due to elevated CK       Hypothyroidism    -continue synthroid        Generalized anxiety disorder   -home trazodone and lexapro on hold may not need as non responsive now       Severe disability with immobility    -continue supportive care         Medications:   Tylenol 650mg q4 prn PEG 2x   Lasix 20mg every day IV   Synthroid 88mcg every day PEG   Protonix 40mg every day PEG   Pericolace 2tab every day PEG   Theragran 1tab every day PEG   Thiamine 100mg every day PEG   Maxipime 2g q8 IV              Neurology 8[address] - [facility] Day 18 (11/11/2022) by Bhavik Lynch       Review Status Review Entered   Completed 11/16/2022 1318       Created By   [id number] [facility], [address] [place] [address] Day: 18 Care Date: 11/11/2022 Level of Care: Intermediate Care    Guideline Day 3    Level Of Care    ( ) * Activity level acceptable    11/16/2022 13:18:23 EST by Sharon Chowdary      bed rest; turning and positioning q2    ( ) Floor to discharge    11/16/2022 13:18:23 EST by Sharon Chowdary      inpatient neuro/stroke intermediate care    Clinical Status    ( ) * Mental status at baseline or stable and appropriate for next level of care    11/16/2022 13:18:23 EST by Sharon Chowdary      Neurologic:Lethargic, open here eyes to painful touch, aphasic, does not follow command or moves her extremities    ( ) * Neurologic deficits absent or stable and appropriate for next level of care    11/16/2022 13:18:23 EST by Sharon Chowdary      Neurologic:Lethargic, open here eyes to painful touch, aphasic, does not follow command or moves her extremities    ( ) * Motor deficits absent or acceptable for next level of care    11/16/2022 13:18:23 EST by Sharon Chowdary      cannot be assessed    (X) * Seizures absent or managed    11/16/2022 13:18:23 EST by Sharon Chowdary      no reports for today    (X) * No infection, or status acceptable    11/16/2022 13:18:23 EST by Mavis Jenkins      WBC: 9.6 (normal)    (X) * Isolation not needed, or status acceptable    11/16/2022 13:18:23 EST by Mavis Jenkins      none noted    ( ) * Respiratory status acceptable    11/16/2022 13:18:23 EST by Mavis Jenkins      temp: 98.4 F, afebrile  ME:   BP: 129/66, 115/65, 115/70  RR: 26-33  02 sat: 98% nasal cannula at 2LPM    (X) * Pain and nausea absent or adequately managed    11/16/2022 13:18:23 EST by Mavis Jenkins      no reports today    ( ) * General Discharge Criteria met    Interventions    (X) * Intake acceptable    11/16/2022 13:18:23 EST by [id number], [id number] [employer] PEG; Standard without Fiber    ( ) * No inpatient interventions needed    11/16/2022 13:18:23 EST by CEPA Safe [address]christine, [id number], FOAM FOOT DROP BOOTS, HEMODYNAMIC MONITORING - MEASURE CVP AND ScvO2, NEURO/VASCULAR CHECKS, APPLY/MAINTAIN SEQUENTIAL COMPRESSION DEVICE, FALL PRECAUTIONS    * Milestone   Additional Notes    DATE: 11/11/2022      Pertinent Updates:   Elevated temperatures 11/10, improved today    - Work-up thus far negative for source of infection: UA non infectious, CXR clear, no leukocytosis, pro eleanor negative at 0.13, blood cultures with NGTD    - Initial lactic acidosis at 2.1, improved to 1.5 after IVF bolus    - Continue cefepime and vancomycin    - Obtain RVP    - D-dimer pending, consider CTA for evaluation of ?possible PE for source of fever if infectious work-up continues to be negative         Abnl/Pertinent Labs/Radiology/Diagnostic Studies:   RBC: 3.44 (L)   HGB: 10.2 (L)   HCT: 31.7 (L)   PLATELET: 051 (H)   Chloride: 110 (H)   Glucose: 148 (H)   Creatinine: 0.42 (L)   BUN/Creatinine ratio: 48 (H)   Calcium: 8.2 (L)   D-dimer: 15.70 (H)         Physical Exam:   Constitutional:  aphasic, sleeping, arouses to pain (sternal rub)   ENT: NGT in place, oral mucosa moist, oropharynx benign. Resp: CTA bilaterally.     CV: Regular rhythm, normal rate, no murmurs, gallops, rubs    GI: Soft, non distended, non tender. normoactive bowel sounds, no hepatosplenomegaly     Musculoskeletal: Bilateral pretibial, pedal and hand edema    Neurologic: Lethargic, open here eyes to painful touch, aphasic, does not follow command or moves her extremities                                MD Consults/Assessments & Plans:   **Hospitalist**   Assessment & Plan:   Fever   - Elevated temperatures 11/10, improved today    - Work-up thus far negative for source of infection: UA non infectious, CXR clear, no leukocytosis, pro eleanor negative at 0.13, blood cultures with NGTD    - Initial lactic acidosis at 2.1, improved to 1.5 after IVF bolus    - Continue cefepime and vancomycin    - Obtain RVP    - D-dimer pending, consider CTA for evaluation of ?possible PE for source of fever if infectious work-up continues to be negative         Acute metabolic encephalopathy due to suspected serotonin syndrome   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 Differential include acute or toxic encephalopathy.   - ANCA negative. Glutamic acid decarb negative <5.0. ADIN not sent   - CSF culture no growth, normal protein, HSV negative, myelin basic protein pending.    -Continue thiamine   -PT OT and speech   - Concern for seizure like activity overnight 11/10, coleell rapid EEG negative for seizure.  Likely roving eye movements 2/2 metabolic encephalopathy and supportive care recommended    -PEG on 11/8 -may use    - Does not seem to be making any progress. Will continue to discuss with family and neurology regarding patient's long term prognosis        Suspected anoxic brain injury/delayed hypoxic leukoencephalopathy   Suspected Seizure    Hx of CVA- CT head on 10/25/2022 no acute intracranial abnormality on noncontrast head CT   -mental status is declining, lethargic, open her eyes to touch, aphasic, doesn't follow command or moves her extremities    -continue nuero check and supportive care   -complex medical problem with hx of previous prolonged hospitalization   -SpO2 93-98% on RA   -high risk for decompensation    - Unable to make medical decision, agree with plan for family to pursue mPOA as next of kin    11/6-[place] rejected transfer. Continue current management. Bilateral UE edema   - Noted to have 2+ bilateral UE edema   Plan:    - Elevated arms   - Diuresis with IV lasix    - Duplex to eval for clot        Hx of Takotsubo cardiomyopathy   -has peripheral edema   -last Echo was on 10/12 EF 55-60%    -BP low normal, if it improves will consider her home metoprolol and lasix    -monitor I/O and daily weight        HTN   -BP normal, monitor BP       Hyperlipidemia    -statin on hold due to elevated CK       Hypothyroidism    -continue synthroid        Generalized anxiety disorder   -home trazodone and lexapro on hold may not need as non responsive now       Severe disability with immobility    -continue supportive care          Medications:   Tylenol 650mg q4 prn PEG 1x   Synthroid 88mcg every day PEG   Protonix 40mg every day PEG   Pericolace 2tab every day PEG   Theragran 1tab every day PEG   Thiamine 100mg every day PEG   Maxipime 2g q8 IV   Furosemide 40mg IV 1x   Vancocin 1250mg q12 IV      Speech Pathology Note   Chart reviewed and spoke with RN cleared to see patient. Patient sleeping deeply and would not arouse despite sternal rub, repeatedly calling patient's name. Will defer at this time and attempt to follow up as able. Neurology [address] Day 17 (11/10/2022) by Kennedy Hernandez       Review Status Review Entered   Completed 11/11/2022 1027       Created By   Kennedy Hernandez      Criteria Review      Care Day: 17 Care Date: 11/10/2022 Level of Care: Intermediate Care    Guideline Day 3    Level Of Care    ( ) * Activity level acceptable    11/11/2022 10:27:24 EST by Kennedy Hernandez      bed rest    (X) Floor to discharge    11/11/2022 10:27:24 EST by Kennedy Hernandez      11/10 Neuro/Stroke    Clinical Status    ( ) * Mental status at baseline or stable and appropriate for next level of care    11/11/2022 10:27:24 EST by de Odetta Kocher      Eyes open spontaneously; Unable to verbalize    ( ) * Neurologic deficits absent or stable and appropriate for next level of care    11/11/2022 10:27:24 EST by Sy Gonzalez, open here eyes to touch, aphasic    ( ) * Motor deficits absent or acceptable for next level of care    11/11/2022 10:27:24 EST by Kennedy Hernandez      does not follow command or moves her extremities    ( ) * Seizures absent or managed    11/11/2022 10:27:24 EST by Kennedy Hernandez      facial/shoulder twitching, eye roving    (X) * No infection, or status acceptable    11/11/2022 10:27:24 EST by de Odetta Kocher      n/a    ( ) * Isolation not needed, or status acceptable    11/11/2022 10:27:24 EST by Kennedy Hernandez      fall precautions    ( ) * Respiratory status acceptable    11/11/2022 10:27:24 EST by de Odetta Kocher      RR 23-31    (X) * Pain and nausea absent or adequately managed    11/11/2022 10:27:24 EST by Kennedy Hernandez      absent    ( ) * General Discharge Criteria met    Interventions    ( ) * Intake acceptable    11/11/2022 10:27:24 EST by de Odetta Kocher      ADULT TUBE FEEDING PEG; Standard without Fiber    ( ) * No inpatient interventions needed    11/11/2022 10:27:24 EST by Kennedy Hernandez      pt on 2l nc, on peg tube, continue supportive care, neuro checks    * Milestone   Additional Notes    DATE: 11/10 Neuro/Stroke       Pertinent Updates:   Notified by RN of seizure like activity (facial/shoulder twitching, eye roving) that was witnessed for a couple minutes or more      Vitals:   100.5 °F (38.1 °C) 110 158/83 24 95 % 2L NC   100.6 100.5 100.6   HR       Abnl/Pertinent Labs/Radiology/Diagnostic Studies:   RBC: 3.70 (L)   HGB: 11.0 (L)   HCT: 33.9 (L)   PLATELET: 343 (H)   Anion gap: 4 (L)   Glucose: 153 (H)   BUN: 22 (H)   Creatinine: 0.53 (L)   BUN/Creatinine ratio: 42 (H)   Albumin: 2.2 (L)   Globulin: 4.4 (H)   A-G Ratio: 0.5 (L)   ALT: 95 (H)   AST: 40 (H)   Alk. phosphatase: 164 (H)   Lactic acid: 2.1 (HH)      CXR:   Shallow volumes but clear lungs. Physical Exam:   No changes noted      MD Consults/Assessments & Plans:   Per attending:   Fever   - Rectal temp 100.8, with subsequent elevated temperatures    - Initiate infectious work-up with COVID testing, CXR, urine culture, blood cultures   - Initiate cefepime and vancomycin    - Defer IVF 30cc/h given history of HFimpEF, give 1000cc bolus    - Concern for seizure like activity overnight 11/10, yue rapid EEG negative for seizure. Likely roving eye movements 2/2 metabolic encephalopathy and supportive care recommended    Anticipated Disposition: SNF versus other once medically ready, delayed due to new fevers       Medications: All PO meds per ngt   iv maxipime 2g x1   po synthroid 88mcg qd   po protonix 40mg qd   po pericolace 8.6-50mg/tab 2 tab qd   po mvi 1tab qd   po thiamine 100mg qd   sodium chloride 0.9 % bolus infusion 1,000 mL x1   iv vancocin 2250mg x1      CM:   CM barriers to discharge: SNF choices (out of accepting facilities) and [place] started insurance auth 11/9, it is still pending at this time. CM spoke with patient's mother, Pattie Jules, regarding Medicaid application.  Elizabeth Maddenarturo does believe patient will qualify for Medicaid as she does not have any assests and no longer has income. Referral sent to [address] [place] [address] for Medicaid screening. Neurology [address] - Care Day 16 (11/9/2022) by Arcelia Heart       Review Status Review Entered   Completed 11/11/2022 0953       Created By   Arcelia Heart      Criteria Review      Care Day: 16 Care Date: 11/9/2022 Level of Care: Intermediate Care    Guideline Day 3    Level Of Care    ( ) * Activity level acceptable    11/11/2022 09:52:45 EST by Arcelia Heart      bed rest    (X) Floor to discharge    11/11/2022 09:53:44 EST by Arcelia Heart      11/9 Neuro/Stroke    11/11/2022 09:52:45 EST by titi Sanchez      ADULT TUBE FEEDING PEG; Standard without Fiber    Clinical Status    ( ) * Mental status at baseline or stable and appropriate for next level of care    11/11/2022 09:52:45 EST by titi Sanchez      Eyes open to stimulus; Unable to verbalize    ( ) * Neurologic deficits absent or stable and appropriate for next level of care    11/11/2022 09:52:45 EST by Nic Saavedra, open here eyes to touch, aphasic    ( ) * Motor deficits absent or acceptable for next level of care    11/11/2022 09:52:45 EST by Arcelia Heart      does not follow command or moves her extremities    (X) * Seizures absent or managed    11/11/2022 09:52:45 EST by Arcelia Heart      absent    (X) * No infection, or status acceptable    11/11/2022 09:52:45 EST by titi Sanchez      n/a    ( ) * Isolation not needed, or status acceptable    11/11/2022 09:52:45 EST by Arcelia Heart      fall precautions    ( ) * Respiratory status acceptable    11/11/2022 09:52:45 EST by Arcelia Heart      RR 22-30    (X) * Pain and nausea absent or adequately managed    11/11/2022 09:52:45 EST by Arcelia Sly      absent    ( ) * General Discharge Criteria met    Interventions    ( ) * Intake acceptable    11/11/2022 09:52:45 EST by titi Moreira, [address] [place] [address] FEEDING PEG; Standard without Fiber    ( ) * No inpatient interventions needed    11/11/2022 09:52:45 EST by Yoni Ruano      pt on 2l nc, on peg tube, continue supportive care, neuro checks    * Milestone   Additional Notes    DATE: 11/9 Neuro/Stroke       Pertinent Updates:    PEG tube  is placed     Ready for SNF now need auth and mom need to choose a place    D/w CM       Vitals:   99.4 °F (37.4 °C) 100 143/80  30 96 % 2L nc   HR       Abnl/Pertinent Labs/Radiology/Diagnostic Studies:   WBC: 11.1 (H)   RBC: 3.75 (L)   HGB: 11.0 (L)   HCT: 33.7 (L)   NEUTROPHILS: 77 (H)   ABS. NEUTROPHILS: 8.6 (H)   Glucose: 131 (H)   BUN: 28 (H)   BUN/Creatinine ratio: 50 (H)      Physical Exam:   Constitutional:  aphasic, no acute distress      MD Consults/Assessments & Plans:   Hospitalist:   Acute metabolic encephalopathy due to suspected serotonin syndrome   Continue thiamine   -PT OT and speech   -PEG on 11/8 -may use    Suspected anoxic brain injury/delayed hypoxic leukoencephalopathy   Suspected Seizure   Hypothyroidism    -continue synthroid     Generalized anxiety disorder   -home trazodone and lexapro on hold may not need as non responsive now    Severe disability with immobility    -continue supportive care       Gastroenterology:   [id number] PEG tube placed on 11/8/22. Tube assessed this AM, noted to be at 4.5 cm at the bumper. Minimal bloody drainage around the site cleaned and dressing reapplied. Tube feeds infusing. Tube feed formula and rate per nutrition. GI sill sign off at this time. Please reach out to our service with any further GI needs. Medications: All PO meds per ngt   po tylenol 650mg q4 prn x1   po synthroid 88mcg qd   po protonix 40mg qd   po pericolace 8.6-50mg/tab 2 tab qd   po mvi 1tab qd   po thiamine 100mg qd      CM:   PEG has been placed 11/8. CM spoke with patient's mother who is touring [facility] and [place] this morning.  She plans to provide choice later today. HANK expressed importance of choice and insurance auth.    2:37pm: HANK met with patient's mother regarding SNF choice, she would like to proceed with [facility]. CM informed [facility] liaison to start insurance auth today. SLP:   SLP following for dysphagia management. Chart reviewed. Patient is s/p PEG placement and remains NPO. SLP attempted to see patient; however, patient remained lethargic and unable to sustain adequate alertness for PO trials at this time. SLP will continue to follow and re-attempt therapy at later date. PT:   Pt received supine in bed and tolerated session fairly. Noted L eye closed (with manual opening, pt able to maintain L eye opening during remaining of session) and slight lip drooping on the L upon arrival to room--notified RN. Pt continues to be limited by grossly decreased strength and ROM, increased tone throughout bilateral LEs, impaired cognition and ability communicate. Pt required total A x 2 supine<>sit EOB and required max A for balance while seated EOB. Pt made no effort to initiate any righting reactions while seated EOB when support lessened. Pt assisted  to supine position with total A x 2 and pillows placed for comfort. Pt will continue to benefit from PT to progress mobility as tolerated and recommend SNF placement upon discharge at this time. OT:   She continues to require Total A x2 for bed mobility and all bed level ADLs, no initiation or visual tracking/scanning in any direction, gaze fixed in L middle VF. Upon entry, L eye closed (seemingly ptosis) with L facial drooped, RN notified, no change with limited bed level & EOB activity. BUE (R>L) tremors noted with all activity (mert BUE), L>R LE extensor tone with increasing activity. Facilitation provided for weight bearing, sitting balance, cervical AROM and attempts for scanning, and PROM with limited response.  Protective visual reflexed intact this session, grimacing and 1-2 slight vocalizations noted with noxious tactile/physical stimuli. Returned to supine with all needs met. Recommend d/c to SNF. Neurology [address] Day 15 (11/8/2022) by Leena Card       Review Status Review Entered   Completed 11/10/2022 1559       Created By   Leena Lambert      Criteria Review      Care Day: 15 Care Date: 11/8/2022 Level of Care: Intermediate Care    Guideline Day 3    Level Of Care    ( ) * Activity level acceptable    11/10/2022 15:59:41 EST by Leena Card      bed rest    (X) Floor to discharge    11/10/2022 15:59:41 EST by Leena Card      11/8 Neuro/Stroke    Clinical Status    ( ) * Mental status at baseline or stable and appropriate for next level of care    11/10/2022 15:59:41 EST by Leena Card      Eyes open to noxious    ( ) * Neurologic deficits absent or stable and appropriate for next level of care    11/10/2022 15:59:41 EST by titi Linder      PERRL 4mm ,no nystagmus, no ptosis. Conjugate, gaze. Does not participate with EOMs. Eyes look to left.     ( ) * Motor deficits absent or acceptable for next level of care    11/10/2022 15:59:41 EST by Leena Card      Deferred    (X) * Seizures absent or managed    11/10/2022 15:59:41 EST by Leena Card      absent    (X) * No infection, or status acceptable    11/10/2022 15:59:41 EST by de Brendavanesa Linder      n/a    ( ) * Isolation not needed, or status acceptable    11/10/2022 15:59:41 EST by Leena Card      fall precautions    ( ) * Respiratory status acceptable    11/10/2022 15:59:41 EST by de Brenda Stain      RR 19-35    (X) * Pain and nausea absent or adequately managed    11/10/2022 15:59:41 EST by Leena Card      absent    ( ) * General Discharge Criteria met    Interventions    ( ) * Intake acceptable    11/10/2022 15:59:41 EST by Leena Card      DIET NPO    ( ) * No inpatient interventions needed    11/10/2022 15:59:41 EST by de Brenda Linder      on 2L nc, PEG tube placed, PT OT and speech ,continue neuro check and supportive care    * Milestone   Additional Notes    DATE: 11/8 Neuro/Stroke      Pertinent Updates:   Labs reviewed mild fever up for labs   Procedure: Procedure(s):   PERCUTANEOUS ENDOSCOPIC GASTROSTOMY TUBE INSERTION   ESOPHAGOGASTRODUODENOSCOPY (EGD)      Vitals:   100.2 103 98/51 27 99 % 2L NC   HR    98/51 107/58      Abnl/Pertinent Labs/Radiology/Diagnostic Studies:   Glucose: 106 (H)   BUN: 23 (H)   BUN/Creatinine ratio: 38 (H)      Physical Exam:   No Changes noted      MD Consults/Assessments & Plans:   Hospitalist:   Acute metabolic encephalopathy due to suspected serotonin syndrome   Severe oral dysphagia due to above   Continue thiamine   -PT OT and speech   -Continue tube feeds for now   -Guarded prognosis   -continue nuero check and supportive care      Neurology:   Etiology likely given clinical presentation and MRI findings and significantly high myelin basic protein   - CSF culture no growth, normal protein, HSV negative, MBP 61.7   - Supportive care. PEG placement today.    - SNF placement    - Ordered Prafo boots      Medications:   po synthroid 88mcg qd   po protonix 40mg qd   po pericolace 8.6-50mg/tab 2 tab qd   po mvi 1tab qd   po thiamine 100mg qd      CM:   CM barriers to discharge: SN[facility] choices (out of accepting facilities) [facility]            Neurology [address] Day 14 (11/7/2022) by Soha Richmond       Review Status Review Entered   Completed 11/9/2022 1118       Created By   [address] [place] [address] Day: 14 Care Date: 11/7/2022 Level of Care: Intermediate Care    Guideline Day 3    Level Of Care    ( ) * Activity level acceptable    11/9/2022 11:18:20 EST by Soha Richmond      bed rest    (X) Floor to discharge    11/9/2022 11:18:20 EST by titi Andres      11/7 Neuro/Stroke    Clinical Status    ( ) * Mental status at baseline or stable and appropriate for next level of care 11/9/2022 11:18:20 EST by Neel Milan      Lethargic;Eyes open to stimulus    ( ) * Neurologic deficits absent or stable and appropriate for next level of care    11/9/2022 11:18:20 EST by Neel Milan      aphasic    ( ) * Motor deficits absent or acceptable for next level of care    11/9/2022 11:18:20 EST by Neel Milan      does not follow command or moves her extremities    (X) * Seizures absent or managed    11/9/2022 11:18:20 EST by Neel Milan      absent    (X) * No infection, or status acceptable    11/9/2022 11:18:20 EST by titi Cheemai Pipe      n/a    ( ) * Isolation not needed, or status acceptable    11/9/2022 11:18:20 EST by Neel Milan      fall precautions    ( ) * Respiratory status acceptable    11/9/2022 11:18:20 EST by Neel Milan      RR 18-32    (X) * Pain and nausea absent or adequately managed    11/9/2022 11:18:20 EST by Neel Milan      absent    ( ) * General Discharge Criteria met    Interventions    ( ) * Intake acceptable    11/9/2022 11:18:20 EST by de Chantel Pipchandler      Diet npo    ( ) * No inpatient interventions needed    11/9/2022 11:18:20 EST by Neel Milan      Continue tube feeds for now  -Guarded prognosis  -GI consulted for possible PEG today  Overall poor prognosis    * Milestone   Additional Notes    DATE: 11/7 Neuro/Stroke      Pertinent Updates:   Pt has low grade fever       Vitals:   100.9 105 127/69  32 94 % 2L nc   HR    RR 18-32      Abnl/Pertinent Labs/Radiology/Diagnostic Studies:   RBC: 3.69 (L)   HGB: 10.8 (L)   HCT: 32.7 (L)   Anion gap: 4 (L)   Glucose: 102 (H)   BUN: 23 (H)   Creatinine: 0.52 (L)   BUN/Creatinine ratio: 44 (H)      Physical Exam:   No changes noted      MD Consults/Assessments & Plans:   Per Attending:   -Continue thiamine   -PT OT and speech   -Continue tube feeds for now   -Guarded prognosis   -GI consulted for possible PEG today   Overall poor prognosis , d/w mother at bedside after PEG plan  for SNF      Medications: All meds were held today (NPO)      CM:   CM barriers to discharge: Accepting SNF, bina    CM met with patient's mother, Nazia Echavarria at bedside to discuss additional SNF choices. She is in agreement to have mass SNF referral sent as patient's age and complexity are barriers to placement. Mass referrals sent in [facility]. Patient's mother has requested a letter from physician regarding patien'ts dx and lack of ability to make decisions. Discussed with Dr. Dorene Arce, she plans to write letter for patient. Plan for patient to have PEG placed. 4:00pm: [facility] [employer] have accepted patient. Plan to provide these choices to patient's daughter tomorrow. Neurology [address] - Care Day 13 (11/6/2022) by Kellie De La Torre       Review Status Review Entered   Completed 11/9/2022 1059       Created By   Kellie De La Torre      Criteria Review      Care Day: 13 Care Date: 11/6/2022 Level of Care: Intermediate Care    Guideline Day 3    Level Of Care    ( ) * Activity level acceptable    11/9/2022 10:59:13 EST by Kellie De La Torre      bed rest    (X) Floor to discharge    11/9/2022 10:59:13 EST by Kellie De La Torre      11/6 Neuro/Stroke    Clinical Status    ( ) * Mental status at baseline or stable and appropriate for next level of care    11/9/2022 10:59:13 EST by Kellie De La Torre      Unresponsive;Eyes open to voice; Eyes open spontaneously    ( ) * Neurologic deficits absent or stable and appropriate for next level of care    11/9/2022 10:59:13 EST by Kellie De La Torre      aphasic    ( ) * Motor deficits absent or acceptable for next level of care    11/9/2022 10:59:13 EST by Kellie De La Torre      does not follow command or moves her extremities    (X) * Seizures absent or managed    11/9/2022 10:59:13 EST by Kellie De La Torre      absent    (X) * No infection, or status acceptable    11/9/2022 10:59:13 EST by Shae Jameson Nicola Bradley    ( ) * Isolation not needed, or status acceptable    11/9/2022 10:59:13 EST by Emilie Jean      fall precautions    ( ) * Respiratory status acceptable    11/9/2022 10:59:13 EST by Eliecer Chen      RR 25    (X) * Pain and nausea absent or adequately managed    11/9/2022 10:59:13 EST by Emilie Jean      absent    ( ) * General Discharge Criteria met    Interventions    ( ) * Intake acceptable    11/9/2022 10:59:13 EST by titi Arzola      ADULT TUBE FEEDING Nasogastric    ( ) * No inpatient interventions needed    11/9/2022 10:59:13 EST by titi Arzola      PEG tube placement for tomorrow  continue neuro check and supportive care    * Milestone   Additional Notes    DATE: 11/6 Neuro/Stroke      Pertinent Updates:   Patient continues minimally responsive and nonverbal.      Vitals:   100.1   HR 98   RR 25   107/69   96% 2L nc      Abnl/Pertinent Labs/Radiology/Diagnostic Studies:   No labs      Physical Exam:   Neurologic: Lethargic, open here eyes to touch, aphasic, does not follow command or moves her extremities       MD Consults/Assessments & Plans:    Hospitalist:   PEG tube placement for tomorrow   N.p.o. midnight   Suspected anoxic brain injury/delayed hypoxic leukoencephalopathy   continue neuro check and supportive care    [place] rejected transfer. Continue current management. SNF/rehab      GI note:    Orders placed for planned PEG tomorrow.       Medications:   po tylenol 650mg q4 prn x1   po synthroid 88mcg qd   po protonix 40mg qd   po pericolace 8.6-50mg/tab 2 tab qd   po mvi 1tab qd   po thiamine 100mg qd        Neurology [address] Day 12 (11/5/2022) by Emilie Esparzai       Review Status Review Entered   Completed 11/9/2022 1041       Created By   Emilie Jean      Criteria Review      Care Day: 12 Care Date: 11/5/2022 Level of Care: Intermediate Care    Guideline Day 3    Level Of Care    ( ) * Activity level acceptable    11/9/2022 10:41:17 EST by titi Patel      bed rest    (X) Floor to discharge    11/9/2022 10:41:17 EST by Jj Nash      11/5 Neuro/Stroke    Clinical Status    ( ) * Mental status at baseline or stable and appropriate for next level of care    11/9/2022 10:41:17 EST by Jj Nash      Eyes open to voice    ( ) * Neurologic deficits absent or stable and appropriate for next level of care    11/9/2022 10:41:17 EST by Jj Nash      aphasic    ( ) * Motor deficits absent or acceptable for next level of care    11/9/2022 10:41:17 EST by Jj Nash      does not follow command or moves her extremities    (X) * Seizures absent or managed    11/9/2022 10:41:17 EST by Jj Nash      absent    (X) * No infection, or status acceptable    11/9/2022 10:41:17 EST by titi Patel      n/a    ( ) * Isolation not needed, or status acceptable    11/9/2022 10:41:17 EST by Jj Nash      fall precautions    ( ) * Respiratory status acceptable    11/9/2022 10:41:17 EST by Jj Nash      RR 37 29    (X) * Pain and nausea absent or adequately managed    11/9/2022 10:41:17 EST by Jj Nash      absent    ( ) * General Discharge Criteria met    Interventions    ( ) * Intake acceptable    11/9/2022 10:41:17 EST by titi Patel      ADULT TUBE FEEDING Nasogastric    ( ) * No inpatient interventions needed    11/9/2022 10:41:17 EST by titi Patel      possible PEG tube on Monday, n/v checks, cont v/s, scds, strict i&o    * Milestone   Additional Notes    DATE: 11/5 Neuro/Stroke       Vitals:   99.1    104   RR 37 29   120/77 120/56   95% 2L nc      Abnl/Pertinent Labs/Radiology/Diagnostic Studies:   No labs      Physical Exam:   No changes noted      MD Consults/Assessments & Plans:   Hospitalist:   Acute metabolic encephalopathy due to suspected serotonin syndrome   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 Differential include acute or toxic encephalopathy.   -Neurology on board appreciate help   -Continue thiamine   -PT OT and speech   -Continue tube feeds for now   -Guarded prognosis   -GI consulted for possible PEG tube on Monday   -Hold Lovenox on Sunday for possible procedure on Monday   -N.p.o. after midnight on Sunday   Prophylaxis: lovenox       Medications:   po synthroid 88mcg qd   po protonix 40mg qd   po pericolace 8.6-50mg/tab 2 tab qd   po mvi 1tab qd   po thiamine 100mg qd   sq lovenox 40mg q24           Neurology [address] - [facility] Day 11 (11/4/2022) by Jessie Ott       Review Status Review Entered   Completed 11/9/2022 1024       Created By   LARISA Mauro 310 Day: 11 Care Date: 11/4/2022 Level of Care: Intermediate Care    Guideline Day 3    Level Of Care    ( ) * Activity level acceptable    11/9/2022 10:24:23 EST by Jessie Ott      bed rest    (X) Floor to discharge    11/9/2022 10:24:23 EST by titi Cortes      11/4 Neuro/Stroke    Clinical Status    ( ) * Mental status at baseline or stable and appropriate for next level of care    11/9/2022 10:24:23 EST by titi Cortes      Eyes open to stimulus; Unable to verbalize    ( ) * Neurologic deficits absent or stable and appropriate for next level of care    11/9/2022 10:24:23 EST by titi Cortes      PERRL 4mm ,no nystagmus, no ptosis. Conjugate, gaze. Does not participate with EOMs.     ( ) * Motor deficits absent or acceptable for next level of care    11/9/2022 10:24:23 EST by titi Mcclelland WD 2/5    (X) * Seizures absent or managed    11/9/2022 10:24:23 EST by Jessie Ott      absent    (X) * No infection, or status acceptable    11/9/2022 10:24:23 EST by titi Cortes      n/a    ( ) * Isolation not needed, or status acceptable    11/9/2022 10:24:23 EST by Isabella Tracey      fall precautions    ( ) * Respiratory status acceptable    11/9/2022 10:24:23 EST by titi Jenkins      RR 26    (X) * Pain and nausea absent or adequately managed    11/9/2022 10:24:23 EST by Isabella Tracey      absent    ( ) * General Discharge Criteria met    Interventions    ( ) * Intake acceptable    11/9/2022 10:24:23 EST by titi Jenkins      ADULT TUBE FEEDING Nasogastric; Standard without Fiber    ( ) * No inpatient interventions needed    11/9/2022 10:24:23 EST by Isabella Tracey      continue nuero check and supportive care  For placement now patient will need a PEG tube  iv valium 2mg q8 dcd    * Milestone   Additional Notes    DATE: 11/4 Neuro/Stroke      Pertinent Updates:   GI has been consulted for PEG evaluation prior to SNF placement. Currently tolerating NGT with TF at goal.         Vitals:   99.4     111   RR 26   107/62 114/55   97% 2L nc      Physical Exam:   General appearance:  comatose  female   Skin: Extremities and face reveal no rashes, pallor   HEENT: Sclerae anicteric. Cardiovascular: Tachycardiac No murmurs, gallops, or rubs. Respiratory: Comfortable breathing with no accessory muscle use. Clear breath sounds with no wheezes, rales, or rhonchi. Neurological: comatose      MD Consults/Assessments & Plans:   Per Attending:   Alec George at admission was thought to be due to serotonin syndrome   As per neurology diagnosis is--Delayed Hypoxic Leukoencephalopathy:   Initially serotonin syndrome was considered because   -continue nuero check and supportive care   For placement now patient will need a PEG tube discussed with family   Will need to go to a SNF/rehab      Neurology:   Delayed Hypoxic Leukoencephalopathy   Discontinued Valium      Gastroenterology:    She in unresponsive to questions and commands and PEG is a reasonable option for nutritional support.    Continue tube feedings   Stop tube feeds Sunday at midnight   EGD/PEG on Monday   Hold Lovenox after Sunday AM dose. Medications:   po synthroid 88mcg qd   po protonix 40mg qd   po pericolace 8.6-50mg/tab 2 tab qd   po mvi 1tab qd   po thiamine 100mg qd   sq lovenox 40mg q24      SLP:   A swallowing treatment was conducted today given pt's NPO status due to severe oral dysphagia and absent/poor acceptance of PO. Per pt's mother's request, oral care was completed prior to PO trials. Pt with coughing and increased work of breathing after oral care was completed. Pt with some facial movements in response to tactile cuing of ice chip on pt's lip. Despite this, pt with absent bolus acceptance and recognition of ice chip despite maximal verbal and tactile cuing. PO trials were discontinued at that time. Given absent bolus acceptance and recognition, recommend pt continue NPO with alternate means of nutrition, hydration, and medication. SLP will continue to follow for swallowing treatment. A language treatment was also conducted today given severe expressive and receptive language deficits. Pt with 0% accuracy on receptive language tasks of basic yes/no questions (\"is your name Srikanth Tovar? \") and simple commands (\"raise your hand\") despite maximal multi-modality cuing. Pt was unable to participate in automatic expressive language task of melodic intonation with a familiar song (happy birthday, row row row your boat) with maximal multi-modality cuing. Pt continues to present with severe expressive and receptive language deficits on this date with no intentional eye movements or tracking observed during session. At this time, pt is unable to communicate medical wants, needs, and opinions. SLP will continue to follow for language treatment.         PLAN:   Recommendations and Planned Interventions:   --Continue NPO with alternate means of nutrition, hydration, and medication    --OK to offer a few ice chips after oral care if actively accepting   --SLP will continue to follow for language and dysphagia intervention      CM:   [facility] is not able to accept. Will need further SNF choices given patient's complexity of care, may need mass referral. Plan to discuss with patient's mother. GI consulted for PEG placement. Patient here through the weekend. Neurology [address] Day 10 (11/3/2022) by Femi Mullins       Review Status Review Entered   Completed 11/4/2022 1141       Created By   Femi Mullins      Criteria Review      Care Day: 10 Care Date: 11/3/2022 Level of Care: Intermediate Care    Guideline Day 3    Level Of Care    ( ) * Activity level acceptable    11/4/2022 11:39:19 EDT by Femi Mullins      bed rest    (X) Floor to discharge    11/4/2022 11:39:19 EDT by Femi Mullins      11/3 Neuro/stroke    Clinical Status    ( ) * Mental status at baseline or stable and appropriate for next level of care    11/4/2022 11:39:19 EDT by Femi Mullins      Eyes open to stimulus    ( ) * Neurologic deficits absent or stable and appropriate for next level of care    11/4/2022 11:39:19 EDT by Femi Mullins      OS>OD 4mm, 3mm but equally react to 1 mm no nystagmus, no ptosis. Conjugate, gaze. Does not participate with EOMs. ( ) * Motor deficits absent or acceptable for next level of care    11/4/2022 11:39:20 EDT by Wilfredo Colindres WD 2/5 x4    (X) * Seizures absent or managed    11/4/2022 11:39:20 EDT by Femi Mullins      Muscle rigidity greatly improved.     (X) * No infection, or status acceptable    11/4/2022 11:39:20 EDT by Femi Mullins      wbc 9.1    ( ) * Isolation not needed, or status acceptable    11/4/2022 11:39:20 EDT by titi Goodman Jeffery PRECAUTIONS    ( ) * Respiratory status acceptable    11/4/2022 11:39:20 EDT by Femi Mullins      RR 17-36    (X) * Pain and nausea absent or adequately managed    11/4/2022 11:39:20 EDT by Asiya Guo Tiara Williamson      absent    ( ) * General Discharge Criteria met    Interventions    (X) * Intake acceptable    11/4/2022 11:39:20 EDT by Marblela De Paz      ADULT TUBE FEEDING Nasogastric; Standard without Fiber    ( ) * No inpatient interventions needed    11/4/2022 11:39:20 EDT by Marbella De Paz      iv valium 2mg q4 changed to q8  n/v checks, cont v/s, scds, strict i&o    * Milestone   Additional Notes    DATE: 11/3 Neuro/stroke      Pertinent Updates:   Muscle rigidity greatly improved.        Vitals:   99.2 °F (37.3 °C) 107 142/69 32 94 % 2L nc   HR       Abnl/Pertinent Labs/Radiology/Diagnostic Studies:   Glucose: 136 (H)   Creatinine: 0.49 (L)   BUN/Creatinine ratio: 39 (H)   Protein, total: 6.2 (L)   Albumin: 2.4 (L)   A-G Ratio: 0.6 (L)      Physical Exam:   Constitutional: Conscious and alert, aphasic, no acute distres   Musculoskeletal: Bilateral pretibial, pedal and hand edema    Neurologic: Lethargic, open here eyes to touch, aphasic, does not follow command or moves her extremities       MD Consults/Assessments & Plans:   Per Attending:   AMS possible due to serotonin syndrome    weaning down iv diazepam possible catatonia seen by psychiatry and switch to Ativan instead   -continue prn tylenol   -continue neuro check and supportive care    HTN   -BP normal, monitor BP   -home clonidine, on hold       Hypothyroidism    -continue synthroid     Generalized anxiety disorder   -home trazodone and lexapro on hold     Severe disability with immobility    -continue supportive care     Obesity    -need weight loss program    Sal catheter in place,   NGT in place, on tube feeding     Full code: High risk for decompensation, palliative care team on board consulted GI for PEG tube    Code status: Full code   Prophylaxis: SCD   Anticipated Disposition: SNF versus other      Neurology:    Diarrhea (10/25/2022)      AMS (altered mental status) (10/25/2022)      Altered mental status (10/27/2022)    ADIN not sent   - CSF culture no growth, normal protein, HSV negative, myelin basic protein pending   - Wean Valium to 2 mg IV q8h with plan to discontinue tomorrow as rigidity improved. - Supportive care. PEG consult placed. - SNF placement       Medications:   sq lovenox 40mg q24   po synthroid 88mcg qd   po protonix 40mg qd   po pericolace 8.6-50mg/tab 2 tab qd   po mvi 1tab qd   po thiamine 100mg qd      CM:   Transition of Care Plan: CM met with patient's mother, Jessica Alex, to provide her with [facility] information. In addition, CM let her know patient would qualify for SNF with PEG but it would depend on facility choice to provide therapy to patient. She is open to having referrals sent to [facility] and [address] [place] [address],[address] of [facility]. Referrals have been sent in [facility]. SLP:   Based on the objective data described below, the patient presents with severe oral dysphagia characterized by absent recognition and acceptance of ice chip despite max tactile and verbal cues. Unable to assess pharyngeal swallow due to severity of oral deficits. Note results of brain MRI, therefore language evaluation completed. Patient presents with severe expressive and receptive language deficits with no attempts at verbalizations, no meaningful communication attempts, no command following, and no responses to y/n questions. Patient is unable to make basic wants and needs known at this time. Patient will benefit from skilled intervention to address the above impairments. Patient's rehabilitation potential is considered to be Guarded   PLAN :   --Recommend continued NPO. Ok to offer a few pieces of ice only if patient is actively accepting.    --will follow for dysphagia treatment as well as language treatment to address functional communication       PT:    Demonstrating slight decline in participation this date.  Unable to track with eyes even with manual facilitation of cervical rotation to left and to R. Minimal inconsistent responses to noxious stimuli with grimacing noted x 1 and some mild HR elevation with stimulation. Performed PROM to B UEs and B LEs and assisted patient with rolling for bowel movement clean up with no participation or command following noted. Appeared to fatigue with session and eyes open initially but kept eyes closed majority of session and unsafe to progress to edge of bed this date. Did decrease frequency to 2 times per week for now given limited progress. Acute PT will continue to follow and continues to recommend     Current Level of Function Impacting Discharge (mobility/balance): total assist    Other factors to consider for discharge: previously independent      OT:   Patient received with eyes open however no tracking even with facilitation of cervical AROM in modified bed in chair position. Found to be soiled, requiring Total A for bed mobility, perineal hygiene, and brief management, falling asleep with bed level activity with limited arousal. Increased time for eyes opening with vestibular input of transition from supine->chair position, no visual/tactile/verbal/sensory response. Noted to have increased BUE edema this session, pillow prop support provided. Given her new working diagnosis per neurology (Delayed Hypoxic Leukoencephalopathy), continue to recommend d/c to brain injury rehab program, however POC decreased d/t limited activity tolerance at this time. Current Level of Function Impacting Discharge (ADLs):  Total A x1-2 for ADLs and bed mobility    Other factors to consider for discharge: fall risk, PMH, PLOF, neurologic status            Neurology [address] Day 9 (11/2/2022) by Tiffanie Sanches RN       Review Status Review Entered   Completed 11/3/2022 1422       Created By   Tiffanie Sanches RN      Criteria Review      Care Day: 9 Care Date: 11/2/2022 Level of Care:    Guideline Day 3    Level Of Care    ( ) * Activity level acceptable    Clinical Status    ( ) * Mental status at baseline or stable and appropriate for next level of care    11/3/2022 14:22:07 EDT by Mat Vega and alert, aphasic    ( ) * Neurologic deficits absent or stable and appropriate for next level of care    11/3/2022 14:22:07 EDT by Janene Troy, open here eyes to touch, aphasic, does not follow command or moves her extremities    ( ) * Motor deficits absent or acceptable for next level of care    11/3/2022 14:22:07 EDT by Moses Powell possible due to serotonin syndrome    (X) * Seizures absent or managed    ( ) * No infection, or status acceptable    11/3/2022 14:22:07 EDT by Alejandro Alexandra      Patient is going to have LP today    (X) * Isolation not needed, or status acceptable    ( ) * Respiratory status acceptable    11/3/2022 14:22:07 EDT by Alejandro Alexandra      CTA bilaterally. No wheezing/rhonchi/rales. No accessory muscle use. (X) * Pain and nausea absent or adequately managed    ( ) * General Discharge Criteria met    Interventions    (X) * Intake acceptable    11/3/2022 14:22:07 EDT by Alejandro Alexandra      NGT in place, on tube feeding    ( ) * No inpatient interventions needed    11/3/2022 14:22:07 EDT by Alejandro Alexandra      Meds: valium 2mg IV q 4, Synthroid 88mcg po d, Protonix 40mg po d, MVI po d, B 1 100mg pod, Valium 5mg IV q 4h    VS: 98.5 °F   110-115    129/80  34  Nasal cannula 2 l/min    * Milestone   Additional Notes   11/2/22-   HOSPITALIST:   Interval history / Subjective:    Patient is lethargic, wakeful to touch, aphasic, mental status wax and wane. Her mother and brother in the room, [place] [facility]     Patient is going to have LP today, follow up on preliminary report and to initiate [place] [facility]     Assessment & Plan:    AMS possible due to serotonin syndrome    -trazodone and lexapro has stopped   -Reported receiving a dose of Risperdal then Seroquel   -fever and rigidity improved,  BP normal,    -IVF has discontinued because of risk for fluid overload,    -BP normal, midodrine discontinued   -weaning down iv diazepam,     -continue prn tylenol   -continue neuro check and supportive care    -LP on 11/1   -PT/OT   -Neurologist on board    Acute encephalopathy    Aphasic since after patient was found unresponsive on 9/27   Hx of prolong hospitalization at [facility] after she was found unresponsive, respiratory arrest at her home on  9/26/22, suspected due to sedating medication, seizure, managed, stable and discharge to [facility] arm on 10/20/22   Suspected anoxic brain injury   Suspected Seizure    Hx of CVA   -CT head on 10/25/2022 no acute intracranial abnormality on noncontrast head CT   -mental status is declining, lethargic, open her eyes to touch, aphasic, doesn't follow command or moves her extremities    -continue nuero check and supportive care   -complex medical problem with hx of previous prolonged hospitalization   -SpO2 93-98% on RA   -high risk for decompensation     Hx of Takotsubo cardiomyopathy   -has peripheral edema   -last Echo was on 10/12 EF 55-60%    -BP low normal, if it improves will consider her home metoprolol and lasix    -monitor I/O and daily weight     HTN   -BP normal, monitor BP   -home clonidine, on hold    Hyperlipidemia    -statin on hold due to elevated CK    Hypothyroidism    -continue synthroid     Generalized anxiety disorder   -home trazodone and lexapro on hold     Severe disability with immobility    -continue supportive care     Obesity    -BMI 32.52 kg/m2   -need weight loss program    Sal catheter in place,   NGT in place, on tube feeding     Full code: High risk for decompensation, palliative care team on board    Family requesting transfer to [facility] after LP and preliminary result        Constitutional: Conscious and alert, aphasic, no acute distres   ENT: NGT in place, oral mucosa moist, oropharynx benign. Resp: CTA bilaterally. No wheezing/rhonchi/rales. No accessory muscle use. CV: Regular rhythm, normal rate, no murmurs, gallops, rubs    GI: Soft, non distended, non tender. normoactive bowel sounds, no hepatosplenomegaly     Musculoskeletal: Bilateral pretibial, pedal and hand edema    Neurologic: Lethargic, open here eyes to touch, aphasic, does not follow command or moves her extremities          NEUROLOGY:   Exam today: no spontaneous movements, rigidity improved   MRI w/ contrast (22) shows diffuse bilateral T2 signal white matter hyperintensities    Plan:   1.) Delayed Hypoxic Leukoencephalopathy:   - Etiology likely given clinical presentation and MRI findings    - ANCA panel pending. Glutamic acid decarb negative <5.0. Send ADIN. - LP done at bedside with OP 11 and clear CSF. Send for protein, glucose, culture, cell count, HSV, and myelin basic protein. - Wean Valium to 2 mg IV q4h. If patient starts to have more rigidity, then will start Baclofen. - Supportive care. Patient will need PEG tube and SNF placement. NEUROLOGY PROCEDURE NOTE:   LUMBAR PUNCTURE PROCEDURE NOTE       NAME:     Megan Perkins    :         1965    MRN:         [id number]    DATE:      2022       After the procedure was explained, risks reviewed including bleeding, infection, post-LP headache, and nerve root damage, and questions answered, consent signed and placed on chart. Pt was placed in the left lateral decubitus position. The patient was prepped and draped in the usual sterile fashion. 2 cc of 1% lidocaine was used for local anesthesia. The L3-L4 interspace was identified and the spinal needle inserted with immediate return of clear spinal fluid. Opening pressure was 11 mmHg. Approximately 16cc of CSF was collected. The spinal needle was removed. The pt was left in the supine position in stable condition. There was minimal if any blood loss.  There were no immediate complications at the time of the procedure. The patient tolerated the procedure well. Tests sent: CSF protein, glucose, gram stain/culture, cell count, myelin basic protein, HSV      Labs:   RBC: 3.74 (L)   HGB: 11.2 (L)   HCT: 33.2 (L)   Glucose: 103 (H)   BUN/Creatinine ratio: 30 (H)   Protein, total: 5.8 (L)   Albumin: 2.4 (L)   A-G Ratio: 0.7 (L)   AST: 14 (L)      MRI:   Interval development of diffusely abnormal supratentorial white matter, with   extensive FLAIR/T2 hyperintense signal and diffusion restriction as described   above. No gray matter, brainstem or cerebellar involvement. No associated   enhancement, hemorrhage, or mass effect/hydrocephalus. Differential diagnosis   favors an acute toxic/metabolic process, with other etiologies including an   infectious/inflammatory encephalitis (viral) less likely. Sparing of the gray   matter structures makes an anoxic injury less likely. BUE Duplex:   · No evidence of deep vein thrombosis in either upper extremity. · There is evidence of superficial right cephalic vein thrombus extending from the A/C fossa to the upper arm before becoming patent in the deltoid area. · Superficial thrombus noted within the left cephalic upper arm vein at the A/C fossa and then becomes patent. · The superficial thrombus suggests a more acute than chronic process.                Neurology [address] Day 4 (10/28/2022) by Vanessa Hayes RN       Review Status Review Entered   Completed 11/3/2022 1422       Created By   Mine Narayanan      Criteria Review      Care Day: 4 Care Date: 10/28/2022 Level of Care:    Guideline Day 3    Level Of Care    ( ) * Activity level acceptable    Clinical Status    ( ) * Mental status at baseline or stable and appropriate for next level of care    11/3/2022 14:22:07 EDT by Stefan Goodell and alert    ( ) * Neurologic deficits absent or stable and appropriate for next level of care    11/3/2022 14:22:07 EDT by [facility] Lethargic, open here eyes to touch, aphasic, does not follow command or moves her extremities    ( ) * Motor deficits absent or acceptable for next level of care    (X) * Seizures absent or managed    10/28/2022 15:12:23 EDT by Dorene Youssef      absent    ( ) * No infection, or status acceptable    (X) * Isolation not needed, or status acceptable    10/28/2022 15:12:23 EDT by Dorene Youssef      not needed    ( ) * Respiratory status acceptable    (X) * Pain and nausea absent or adequately managed    10/28/2022 15:12:23 EDT by Dorene Youssef      absent    ( ) * General Discharge Criteria met    Interventions    ( ) * Intake acceptable    ( ) * No inpatient interventions needed    * Milestone   Additional Notes      10/28/22   [facility] ICU       Continues to be febrile overnight. Still with increased rigidity and intermittent shivering. Received 1L bolus IVF for hypotension of 80/54. On 2.5mg valium for rigidity (suspected serotonin syndrome.)       BP (!) 95/50 (BP 1 Location: Right upper arm, BP Patient Position: At rest)   Pulse 84   Temp 100.1 °F (37.8 °C)   Resp 25   Ht 5' 6\" (1.676 m)   Wt 89.3 kg (196 lb 13.9 oz)   SpO2 100%   BMI 31.78 kg/m²    O2 Flow Rate (L/min): 1 l/min O2 Device: None (Room air)          Physical Exam:   GENERAL: Calm,  NAD,  ill-appearing female    SKIN: Warm, dry, color appropriate for ethnicity. Edematous ext x4 . Neurologic Exam:   Mental Status:             Opened eyes to voice, no command following, no tracking       Language:                   Nonverbal.        Cranial Nerves:           Pupils 5 mm which reduced to 3 mm with light, equal, round and reactive to light. No notable facial droop. No roving eye movements noted on exams. + corneal.                                                                               Motor:                                                              Increased tone x 4 extremities. No spontaneous or involuntary movements noted       Sensation:                   Withdraws from noxious stimuli in RUE and BL LE. Grimaces to noxious stimuli in LUE but no withdrawal        Reflexes:                     Negative lower extremity clonus. Up-going L toes and down-going R toes       Neurology Active Problems:     Diarrhea (10/25/2022)         AMS (altered mental status) (10/25/2022)         Altered mental status (10/27/2022)       Plan:       CT head 10/25 shows no acute intracranial abnormality. MRI on 10/02/22 shows 2 small foci of diffusion restriction in the right frontal lobe which may represent small foci of acute ischemia and  were unchanged from previous MRI on 09/28/22   EEG's rapid and continuous at [facility] were negative for seizure. Routine EEG 10/27/22 negative for electrographic seizure or epileptiform discharges   SSRI's on-hold. Q1hr neuro checks   Supportive care    Continue with valium 2.5mg Q4hr (caution with hypotension)      IM ICU Assessment/ Comprehensive Plan of Care:       NEUROLOGIC   Toxic metabolic encephalopathy, rule out for Serotonin Syndrome, NMS                -Avoid sedatives, antipsychotic meds, PRN benzos for rigidity only               -Check EEG-NEG for seizure                -CT Head 1/25: No acute processes               -Poison control contacted, Low concern for SS, unlikely NMS.                -Neurology following               -Diazepam 2.5mg IV Q4hr       PULMONARY   No acute concerns               -Supplemental oxygen for sats > 92%               -HOB > 30 degrees       CARDIOVASCULAR   No acute concerns   Telemetry   Repeat EKG, Last Qtc 440   Goal MAP>65       RENAL   No acute concerns, preserved RF               Goal K>=4, Mg>=2, Phos >3   Trend BMP, Mg, P   Replace lytes per protocol   LR@ 75 ml/hr       GASTROINTESTINAL   No acute concerns   Nutrition: NPO   TF via NGT   IP consult ot nutritionist   Bowel regimen   Miralax PRN   GI Px   PPI daily       HEMATOLOGIC/ONC   No acute concerns   VTE Px    Goal Hb>=7   Trend CBC,PTT/INR   Heparin 5,000 units Q8hr AC/AP                SCDs       INFECTIOUS   No acute concerns               Trend CBC, fevers                  ENDOCRINE    No acute concerns   Hx Hypothyroidism   SSI    Goal glucose 140-180   Continue synthroid       F - Feeding:  NPO, TF   A - Analgesia: NA   S - Sedation: GOAL RASS 0   T - DVT Prophylaxis: SCD's or Sequential Compression Device , heparin   H - Head of Bed: > 30 Degrees   U - Ulcer Prophylaxis: PPI   G - Glycemic Control: Y   S - Spontaneous Breathing Trial: NA   B - Bowel Regimen: Miralax PRN   I - Indwelling Catheter:               T/L/D   Tubes: Nasogastric Tube   Lines: Peripheral IV   Drains: Sal Catheter   D - De-escalation of Antibiotics:  NA      Physical Therapy 10/28/22       Chart reviewed, conferred with RN. Entered room, patient not following commands, not tracking. Not appropriate for PT at this time. Will defer and follow up as able/appropriate. Thank you for your consideration,      Occupational Therapy    10/28/22       Chart reviewed, conferred with RN & PT who report patient not following commands, not visually tracking. Not appropriate for OT re-evaluation at this time. Will defer and follow up as able/appropriate. SLP ASSESSMENT:   Patient with no recognition of PO items with no response when ice chip rubbed on lips and no response to melted water running down her chin. No meaningful responses, no command following and no interactions. Patient no longer appropriate for PO diet and would not recommend anything by mouth at this time. Mother present and educated on this as she reported wanting to try to feed her when the tray came yesterday and being told to hold. Reviewed aspiration risks as they relate to current mental status. Mother provided additional history regarding swallowing function PTA.   Reports patient was intubated for about 2 1/2 weeks for heart issues and her intake was minimal after extubation before she went to [facility]. However, reports no difficulty swallowing. Reports further gradual decrease in intake at [facility] and that she would not open her mouth to accept any food or drink, however, if it was put in her mouth she would chew it up and swallow it. Reports she was being syringe fed due to her inability to open her mouth to accept food. Mother verbalized understanding of current risks of aspiration and importance of NPO with NG for sole nutrition at this time. PLAN:   Recommendations and Planned Interventions:   --recommend NPO with NG tube for sole nutritional support   --will follow for re-assessment of swallow function as mental status improves    Patient continues to benefit from skilled intervention to address the above impairments. Continue treatment per established plan of care. Discharge Recommendations: To Be Determined      Meds:       Tylenol 650 mg po q6 prn x1   Valium 2.5 mg q4 iv    Heparin 5000 units sc q8    Lr ivf at 75 ml/hr    Synthroif 88 mcg po daily    Lopressor 25 mg po q12   Protonix 40 mg po daily    Theragran 1 tab po daily    B1 100 mg po daily    Lr iv bolus x1   Potassium chloride 40 meq po x1      Orders:       Telemetry    I&O    Fall precautions    Neuro checks q4   Scd      Labs:       10/28/22 03:03   WBC: 11.6 (H)   HGB: 11.1 (L)   HCT: 34.1 (L)   ABS.  NEUTROPHILS: 8.1 (H)   Potassium: 3.4 (L)   Glucose: 102 (H)   BUN/Creatinine ratio: 25 (H)   Protein, total: 6.1 (L)   Albumin: 2.6 (L)   A-G Ratio: 0.7 (L)                    Neurology [address] [facility] Day 3 (10/27/2022) by Melisa Manjarrez RN       Review Status Review Entered   Completed 10/27/2022 1414       Created By   Iveth Steven      Criteria Review      Care Day: 3 Care Date: 10/27/2022 Level of Care:    Guideline Day 3    Level Of Care    ( ) * Activity level acceptable    Clinical Status    ( ) * Mental status at baseline or stable and appropriate for next level of care    ( ) * Neurologic deficits absent or stable and appropriate for next level of care    ( ) * Motor deficits absent or acceptable for next level of care    (X) * Seizures absent or managed    10/27/2022 14:14:40 EDT by Marveen Duty      absent    ( ) * No infection, or status acceptable    (X) * Isolation not needed, or status acceptable    10/27/2022 14:14:40 EDT by Marveen Duty      not needed    ( ) * Respiratory status acceptable    (X) * Pain and nausea absent or adequately managed    10/27/2022 14:14:40 EDT by Marveen Duty      absent    ( ) * General Discharge Criteria met    Interventions    (X) * Intake acceptable    10/27/2022 14:14:40 EDT by Marveen Duty      acceptable    ( ) * No inpatient interventions needed    * Milestone   Additional Notes                                      PA REC IP by Marveen Duty       Review Status Review Entered   In Primary 10/27/2022 0855       Created By   6Scan      Criteria Review     We recommend that the following pt's hospitalization under OBSERVATION [104] status is upgraded to INPATIENT If you agree, please place a new ADMIT order in [address] [facility] as recommended.       Name: Tramaine Dougherty   : 1965   [facility]# : [id number]  Insurance: Mi [employer]     Clinical summary patient with recent admission to the hospital with seizure-like activity, presents with altered mental status  Vitals tachycardic  Labs and Imaging baseline  MCG criteria applies YES  Comments patient with altered mental status, unclear etiology for encephalopathy, different seizure versus stroke versus other acute also concern for NMS versus serotonin syndrome, work-up in progress, Poison control on board, on IV fluids, needing medical optimization, high risk for decompensation      This chart was reviewed at 6:17 AM 10/27/2022    Sandy Muller MD   Physician [address]   Cell [phone]   Additional Notes   10/27/22 LOC IP ICU       Low grade fever overnight. Still with rigidity and some shivering, ST low 100's, normotensive for the most part. /70 (BP 1 Location: Left upper arm, BP Patient Position: At rest)   Pulse 96   Temp 99.5 °F (37.5 °C)   Resp 25   Ht 5' 6\" (1.676 m)   Wt (P) 89.1 kg (196 lb 6.9 oz)   SpO2 95%   BMI (P) 31.70 kg/m²    O2 Flow Rate (L/min): (P) 3 l/min O2 Device: (P) Nasal cannula    Vitals:      Physical Exam:   GENERAL: Calm,  NAD, febrile 99.9 (temp montilla). On Ceribell rapid EEG, no seizure burden noted at present. SKIN: Warm, dry, color appropriate for ethnicity. .       Neurologic Exam:   Mental Status:             Opened eyes to voice, no command following. Language:                   Nonverbal.        Cranial Nerves:           Pupils 3 mm, equal, round and reactive to light. No notable facial droop. Roving eye movements. No blink to threat. Motor:                                                              Rigid tone x 4 extremities. No spontaneous or involuntary movements noted on my exam. Shivering. Sensation:                   Withdraws from noxious stimuli in RUE and BL LE. Grimaces to noxious stimuli in LUE. Reflexes:                     Negative lower extremity clonus. Negative Babinskis          IM Assessment:   Active Problems:     Diarrhea (10/25/2022)         AMS (altered mental status) (10/25/2022)           Plan:       1.) Altered mental status/Encephalopathy of unclear etiology. Concern for serotonin syndrome. -CT head 10/25 shows no acute intracranial abnormality.     -MRI on 10/02/22 shows 2 small foci of diffusion restriction in the right frontal lobe which may represent small foci of acute ischemia and  were unchanged from previous MRI on 09/28/22               -EEG's rapid and continuous at [facility] were negative for seizure. -Repeat EEG negative for seizure burden, awaiting official read by Neurology               -Hold SSRI's.                -Poison Control following   -CK 74, ALT 26, AST 19, Alk phos 57, Ca 9.1, Mg2.3, phos 4.3, ammonia 26, WBC 9.9               -Frequent neuro checks per unit protocol. SLP Contact Note       Chart reviewed in preparation for treatment and attempted to see patient. Patient getting hooked up to EEG at SLP arrival. Therefore will defer and follow up for treatment as able and patient appropriate to participate. Physical therapy:       Chart reviewed and spoke with RN. Pt being set up for continuous EEG x 24 hours. Will defer and continue to follow. OT 10/27/22       Chart reviewed. Attempted to see for OT treatment however patient currently on continuous EEG      PROCEDURE:            ROUTINE INPATIENT EEG      DATE OF SERVICE:  10/27/2022 INTERPRETATION: Normal awake EEG       CLINICAL CORRELATION: A normal EEG does not definitively exclude a diagnosis of epilepsy if clinical suspicion is high consider more prolonged monitoring. Meds:       Lipitor 10 mg po bedtime    Cogentin 3mg iv q12   Heparin 5000 units sc q8    Synthroid 88mcg po daily    Lopressor 25 mg po q12   LR IVF at 75 ml/hr    Protonix 40 mg po daily       Labs:       10/27/22 00:26   Lactic acid: 1.3   CK: 74      10/27/22 05:09      RBC: 3.71 (L)   HGB: 11.1 (L)   HCT: 34.2 (L)      Glucose: 104 (H)   Creatinine: 0.50 (L)   BUN/Creatinine ratio: 24 (H)   Protein, total: 6.2 (L)   Albumin: 2.6 (L)   A-G Ratio: 0.7 (L)      Xr kub     IMPRESSION   NG tube in place. Nonspecific bowel gas pattern. Decompressed   stomach.          Orders:    Telemetry    I&O    Ng tube placement     Fall precautions   Neuro checks q4   Scd   Nutrition services consult               Neurology [address] Day 2 (10/26/2022) by Orlin Fagan RN Review Status Review Entered   Completed 10/26/2022 1333       Created By   Timoteo Doll      Criteria Review      Care Day: 2 Care Date: 10/26/2022 Level of Care:    Guideline Day 2    Level Of Care    (X) Floor    10/26/2022 13:33:21 EDT by Timoteo Doll      neuro/stroke    Clinical Status    (X) * No ICU or intermediate care needs    10/26/2022 13:33:21 EDT by Timoteo Expose      none noted    Interventions    (X) Inpatient interventions continue    10/26/2022 13:33:21 EDT by Timoteo Doll      orders: neurololgy consult, fall porcautions, neuro checks q4, telemetry , I&O , scd  meds: ivf at 75 ml/hr    (X) Transition to oral routes    10/26/2022 13:33:21 EDT by Timoteo Doll      tylenol 650 mg po q6 prn x1    * Milestone   Additional Notes   Care day 2   10/26/22   LOC Neuro/stroke       Patient made no verbalizations during session      BP (!) 162/82   Pulse (!) 126   Temp 99.4 °F (37.4 °C)   Resp 28   Ht 5' 6\" (1.676 m)   Wt 104.3 kg (230 lb)   SpO2 92%   BMI 37.12 kg/m²      O2 Device: None (Room air)       PHYSICAL EXAM:    General: Patient in no acute respiratory distress. Head: Normocephalic, without obvious abnormality, atraumatic   Eyes: Conjunctivae/corneas clear. Pupils 2 mm reactive bilateral.   E/N/M/T: Nares normal. Septum midline.  No nasal drainage or sinus tenderness   Tongue midline/ non-edematous   Clear oropharynx   Neck: Normal appearance and movements, symmetrical, trachea midline   No palpable adenopathy   No thyroid enlargement, tenderness or nodules   No carotid bruit    No JVD   Trachea midline   Lungs:   Symmetrical chest expansion and respiratory effort   Clear to auscultation bilaterally   Chest wall: No tenderness or deformity   Heart: Regular rate and rhythm    Normal S1 and S2; no murmur, click, rub or gallop   Abdomen:   Soft, no tenderness   No rebound, guarding, or rigidity   Non-distended    Bowel sounds normal   No masses or hepatosplenomegaly   No hernias present Back: No costovertebral angle tenderness   No step-off deformity   Extremities: Flexion contracture of left upper extremity. No acute trauma status no palpable acute bony or soft tissue deformity. No cyanosis or edema       Vascular/   Pulses: 2+ radial/ 1+ DP bilateral pulses   Integument/   Skin: No rashes or ulcers   Warm and dry   Musculo-       skeletal: Gait not tested   No calf tenderness   Neuro: GCS 9 (E4 V1 M4). Moves all extremities x 4. Aphasic. No facial droop. Sensation grossly intact as withdraws to tactile stimuli. Psych: Awake, but nonverbal and not answering questions. Geniturinary: Vascular Therapies external urinary collection device in place          IM     Given the patient's current clinical presentation, there is a high level of concern for decompensation if discharged from the emergency department. Complex decision making was performed, which includes reviewing the patient's available past medical records, laboratory results, and imaging studies. Active Problems:       AMS (altered mental status)   -Acute metabolic encephalopathy, likely with some mild dehydration. Prior  past consideration for anoxic brain injury. no other etiology identified         -Place on neurochecks and fall precautions-consult neurologist           2. Aphasia        -Consult speech therapy        -N.p.o. until passes either nursing dysphagia screen or speech therapy evaluation        -Placed on IV fluids while n.p.o.       3.  Tachycardia        -Continue telemetry monitoring        -Order add-on test for high-sensitivity troponin with noted abnormal EKG      4.  Leukocytosis       -WBC  12.5; repeat WBC in a.m.       5.  Dehydration        -BUN 23. Order IV fluids for hydration        -Repeat renal panel       6.    Generalized weakness         -Consult physical and Occupational Therapy            7.   Essential hypertension         -Monitor blood pressure closely and provide antihypertensive medications as needed       8. Hyperlipidemia         -Check lipid panel; continue statin therapy       9. Anxiety         -Resume home medications once able to tolerate oral intake and pass dysphagia screens       DIET: DIET NPO    ISOLATION PRECAUTIONS: There are currently no Active Isolations   CODE STATUS: Full Code    DVT PROPHYLAXIS: SCDs   FUNCTIONAL STATUS PRIOR TO HOSPITALIZATION: Completely disabled. Cannot carry on any self-care. Totally confined to bed or chair. Ambulatory status/function: Ambulates with assistance:  Unable to ambulate       PT ASSESSMENT   Based on the objective data described below, the patient presents with no command following, no verbalizations, only grimacing and withdrawing to noxious stim to all 4 extremities, grossly decreased strength and ROM, increased tone vs resistance LLE, decreased functional mobility, decreased tolerance to activity s/p admission to [facility] from [facility] brain injury program on 10/25 for AMS. Pt opened eyes briefly during session, but not making direct eye contact with therapist. Pt with ? Extensor tone vs resistance LLE and unable to passively flex L knee. Unable to appreciate any clonus in either ankle. Pt required total A x 2 rolling L and R for pericare and linen changing. Pt returned to supine position and placed in upright positioning with lights on, blinds open to improve arousal and alertness. Pt will continue to benefit from PT to progress mobility as tolerated and reach highest of level of independence. Recommend pt return to rehab with a brain injury focus. .       Current Level of Function Impacting Discharge (mobility/balance): total A x 2       Functional Outcome Measure: The patient scored Total: 0/56 on the Landon Balance Assessment which is indicative of high fall risk.        Other factors to consider for discharge: previously independent, lived alone       Patient will benefit from skilled therapy intervention to address the above noted impairments. PLAN :   Recommendations and Planned Interventions: bed mobility training, transfer training, gait training, therapeutic exercises, neuromuscular re-education, patient and family training/education, and therapeutic activities         Frequency/Duration: Patient will be followed by physical therapy:  5 times a week to address goals. Recommendation for discharge: (in order for the patient to meet his/her long term goals)   Therapy 3 hours per day 5-7 days per week, brain injury program       This discharge recommendation:   Has been made in collaboration with the attending provider and/or case management       IF patient discharges home will need the following DME: to be determined (TBD)        OT ASSESSMENT   Based on the objective data described below, the patient presents with eyes open, no command following, high tone, facial grimacing indicating pain with touch and mobility to RUE and BLEs and requiring total A for all ADLs and functional mobility following admission from [facility] for AMS. Pt hospitalized at [facility] from 9/27 - 10/20/22 found down by her neighbor and pt noted with status epilepticus. Pt now with anoxic brain injury and at [facility]. Per chart review pt with inconsistent command following, verbalizations and active participation. Based on above recommend return to [facility] brain injury rehab. Current Level of Function Impacting Discharge (ADLs/self-care): total A       Functional Outcome Measure: The patient scored Total: 0/100 on the Barthel Index outcome measure. Other factors to consider for discharge: see above       Patient will benefit from skilled therapy intervention to address the above noted impairments.          PLAN :   Recommendations and Planned Interventions: self care training, functional mobility training, therapeutic exercise, balance training, visual/perceptual training, therapeutic activities, cognitive retraining, endurance activities, neuromuscular re-education, patient education, home safety training, and family training/education       Frequency/Duration: Patient will be followed by occupational therapy 5 times a week to address goals. Recommendation for discharge: (in order for the patient to meet his/her long term goals)   Therapy 3 hours per day 5-7 days per week       This discharge recommendation:   Has been made in collaboration with the attending provider and/or case management       IF patient discharges home will need the following DME: TBD         SLP ASSESSMENT :   Based on the objective data described below, the patient presents with moderate oral dysphagia and presumed functional pharyngeal stage of the swallow. Per pt's chart, pt with previous FEES at [facility] that recommended regular diet and thin liquids. Pt's mother stated a decrease in PO intake occurred at [address] [place] [address] where she reports pt was being fed via syringe. Pt's mother reports ROGER began discussions about alternate means of nutrition. Pt with intermittent poor bolus acceptance of thin liquids and poor awareness of bolus, repeatedly biting the straw. Once straw was repositioned in the pt's mouth between the lips in front of the teeth, pt with good bolus acceptance of thin liquids with no overt difficulty or s/s of aspiration appreciated at bedside. Pt with poor bolus acceptance and recognition of puree via spoon. Pt did not open mouth for spoon and sucked small amounts of puree off the spoon. Despite limited PO trials of puree, no overt difficulty or s/s aspiration noted. Based on these findings, recommend pureed diet with thin liquids when pt is accepting. Given better acceptance of thin liquids, could consider liquid nutrition supplements (pt likes chocolate Ensures). Suspect bolus recognition and acceptance is patient's largest barrier to PO at this time.  May consider alternate means of nutrition/hydration/medication if patient is unable to maintain adequate oral intake. SLP will continue to follow for diet tolerance. Patient will benefit from skilled intervention to address the above impairments. Patient's rehabilitation potential is considered to be Fair       PLAN :   Recommendations and Planned Interventions:   --Pureed diet with thin liquids   --General aspiration precautions   --Good oral care       Frequency/Duration: Patient will be followed by speech-language pathology 3 times a week to address goals. Discharge Recommendations:  To Be Determined      Meds and orders: See above       Labs:       10/26/22 06:46   INR: 1.1   Prothrombin time: 11.6 (H)      Chloride: 110 (H)   Glucose: 115 (H)   BUN/Creatinine ratio: 25 (H)   Albumin: 3.1 (L)   A-G Ratio: 0.8 (L)

## 2022-11-18 ENCOUNTER — APPOINTMENT (OUTPATIENT)
Dept: GENERAL RADIOLOGY | Age: 57
DRG: 070 | End: 2022-11-18
Attending: HOSPITALIST
Payer: COMMERCIAL

## 2022-11-18 LAB
BASOPHILS # BLD: 0 K/UL (ref 0–0.1)
BASOPHILS NFR BLD: 0 % (ref 0–1)
CK SERPL-CCNC: 52 U/L (ref 26–192)
COMMENT, HOLDF: NORMAL
DIFFERENTIAL METHOD BLD: ABNORMAL
EOSINOPHIL # BLD: 0.1 K/UL (ref 0–0.4)
EOSINOPHIL NFR BLD: 1 % (ref 0–7)
ERYTHROCYTE [DISTWIDTH] IN BLOOD BY AUTOMATED COUNT: 14.6 % (ref 11.5–14.5)
HCT VFR BLD AUTO: 33.9 % (ref 35–47)
HGB BLD-MCNC: 11 G/DL (ref 11.5–16)
IMM GRANULOCYTES # BLD AUTO: 0.1 K/UL (ref 0–0.04)
IMM GRANULOCYTES NFR BLD AUTO: 1 % (ref 0–0.5)
LACTATE SERPL-SCNC: 1.4 MMOL/L (ref 0.4–2)
LYMPHOCYTES # BLD: 2.6 K/UL (ref 0.8–3.5)
LYMPHOCYTES NFR BLD: 22 % (ref 12–49)
MCH RBC QN AUTO: 28.7 PG (ref 26–34)
MCHC RBC AUTO-ENTMCNC: 32.4 G/DL (ref 30–36.5)
MCV RBC AUTO: 88.5 FL (ref 80–99)
MONOCYTES # BLD: 0.9 K/UL (ref 0–1)
MONOCYTES NFR BLD: 8 % (ref 5–13)
NEUTS SEG # BLD: 7.9 K/UL (ref 1.8–8)
NEUTS SEG NFR BLD: 68 % (ref 32–75)
NRBC # BLD: 0 K/UL (ref 0–0.01)
NRBC BLD-RTO: 0 PER 100 WBC
PLATELET # BLD AUTO: 622 K/UL (ref 150–400)
PMV BLD AUTO: 11 FL (ref 8.9–12.9)
PROCALCITONIN SERPL-MCNC: 0.1 NG/ML
RBC # BLD AUTO: 3.83 M/UL (ref 3.8–5.2)
RBC MORPH BLD: ABNORMAL
RBC MORPH BLD: ABNORMAL
SAMPLES BEING HELD,HOLD: NORMAL
UFH PPP CHRO-ACNC: 0.32 IU/ML
UFH PPP CHRO-ACNC: 0.77 IU/ML
WBC # BLD AUTO: 11.6 K/UL (ref 3.6–11)

## 2022-11-18 PROCEDURE — 85520 HEPARIN ASSAY: CPT

## 2022-11-18 PROCEDURE — 36415 COLL VENOUS BLD VENIPUNCTURE: CPT

## 2022-11-18 PROCEDURE — 84145 PROCALCITONIN (PCT): CPT

## 2022-11-18 PROCEDURE — 74011250637 HC RX REV CODE- 250/637: Performed by: HOSPITALIST

## 2022-11-18 PROCEDURE — 74011000250 HC RX REV CODE- 250: Performed by: FAMILY MEDICINE

## 2022-11-18 PROCEDURE — 74011250637 HC RX REV CODE- 250/637: Performed by: PHYSICIAN ASSISTANT

## 2022-11-18 PROCEDURE — 82550 ASSAY OF CK (CPK): CPT

## 2022-11-18 PROCEDURE — 74011250636 HC RX REV CODE- 250/636: Performed by: STUDENT IN AN ORGANIZED HEALTH CARE EDUCATION/TRAINING PROGRAM

## 2022-11-18 PROCEDURE — 83605 ASSAY OF LACTIC ACID: CPT

## 2022-11-18 PROCEDURE — 74011250637 HC RX REV CODE- 250/637: Performed by: NURSE PRACTITIONER

## 2022-11-18 PROCEDURE — 85025 COMPLETE CBC W/AUTO DIFF WBC: CPT

## 2022-11-18 PROCEDURE — 71045 X-RAY EXAM CHEST 1 VIEW: CPT

## 2022-11-18 PROCEDURE — 65660000001 HC RM ICU INTERMED STEPDOWN

## 2022-11-18 RX ADMIN — ACETAMINOPHEN 650 MG: 650 SOLUTION ORAL at 22:36

## 2022-11-18 RX ADMIN — METOPROLOL TARTRATE 12.5 MG: 25 TABLET, FILM COATED ORAL at 09:58

## 2022-11-18 RX ADMIN — Medication 100 MG: at 09:57

## 2022-11-18 RX ADMIN — METOPROLOL TARTRATE 12.5 MG: 25 TABLET, FILM COATED ORAL at 22:36

## 2022-11-18 RX ADMIN — SODIUM CHLORIDE, PRESERVATIVE FREE 10 ML: 5 INJECTION INTRAVENOUS at 14:09

## 2022-11-18 RX ADMIN — SENNOSIDES AND DOCUSATE SODIUM 2 TABLET: 50; 8.6 TABLET ORAL at 09:57

## 2022-11-18 RX ADMIN — SODIUM CHLORIDE, PRESERVATIVE FREE 10 ML: 5 INJECTION INTRAVENOUS at 06:56

## 2022-11-18 RX ADMIN — THERA TABS 1 TABLET: TAB at 09:58

## 2022-11-18 RX ADMIN — PANTOPRAZOLE SODIUM 40 MG: 40 TABLET, DELAYED RELEASE ORAL at 09:59

## 2022-11-18 RX ADMIN — ATORVASTATIN CALCIUM 10 MG: 40 TABLET, FILM COATED ORAL at 22:36

## 2022-11-18 RX ADMIN — ENOXAPARIN SODIUM 90 MG: 100 INJECTION SUBCUTANEOUS at 18:21

## 2022-11-18 RX ADMIN — SODIUM CHLORIDE, PRESERVATIVE FREE 10 ML: 5 INJECTION INTRAVENOUS at 22:41

## 2022-11-18 RX ADMIN — LEVOTHYROXINE SODIUM 88 MCG: 0.09 TABLET ORAL at 06:56

## 2022-11-18 RX ADMIN — FUROSEMIDE 20 MG: 10 INJECTION, SOLUTION INTRAMUSCULAR; INTRAVENOUS at 09:59

## 2022-11-18 RX ADMIN — ENOXAPARIN SODIUM 90 MG: 100 INJECTION SUBCUTANEOUS at 06:56

## 2022-11-18 NOTE — PROGRESS NOTES
Bedside and Verbal shift change report given to Rosalinda Saavedra RN (oncoming nurse) by ADELAIDA Dewey (offgoing nurse). Report included the following information SBAR, Kardex, ED Summary, MAR, and Recent Results.

## 2022-11-18 NOTE — PROGRESS NOTES
[address] [facility]  Hospitalist Group                                                                                          Hospitalist Progress Note  Carrillo Sims MD  Answering service: [phone] OR 36 from in house phone        Date of Service:  2022  NAME:  Sophia Hamilton  :  1965  MRN:  [id number]      Admission Summary:     Sophia Hamilton is a 59 y.o. female with past medical history of anoxic brain injury, anxiety, depression, hypertension, hyperlipidemia, hypothyroidism presented to the emergency department via EMS from [facility] with reported altered mental status (AMS). Patient is aphasic/nonverbal and not answering any questions. Majority of history was obtained per review of chart records. Per reports [facility] with diagnosis of status epilepticus, acute metabolic encephalopathy, delirium, psychosis, generalized anxiety disorder, debility, Takotsubo cardiomyopathy, NSTEMI, bradycardia, SIRS, tachycardia, fever, leukocytosis, and CVA. Patient had work-up including MRI and also EEG. There was concern the patient may have some anoxic brain injury. She has been followed by psychiatry and neurology services with persistent encephalopathy and intermittent severe aggression. Patient has had aphasia and altered mental status not following commands per staff report since last week. Symptoms remain constant, severe, without specific exacerbating relieving factors. About emergency department today, initial recorded vital signs temperature 98.8 °F, /65, heart rate 103, respirate 30, O2 saturation 93% on room air. Abnormal labs included WC 12.5, platelets 442, BUN 23. CT head without IV contrast showed no acute intracranial abnormalities. ED request admission to the hospitalist service.      No acute event overnight, less rigidity, stable and transferred out of ICU on 10/29    Interval history / Subjective:   Patient awake, resting comfortably. Not interactive. She grimaces when her extremities are moved for exam.  A friend visiting at the bedside. Assessment & Plan:     Fever, likely 2/2 Pulmonary Embolism   Moderate to Large Pulmonary Embolism   - Elevated temperatures 11/10, and intermittently prior   - Infectious work-up negative, s/p cefepime/vanc. CTA demosntrated moderate to large pulmonary embolism with mild RHS   - LE duplex with age-indeterminant DVT in left peroneal veins   - Consulted vascular, no surgical intervention at this time  - ECHO with moderately dilated RV, moderately reduced systolic function, and lateral wall hypokinesis   - Continue lovenox therapeutic BID  - Resp status stable on 2L NC. 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 While at [facility], developed changes that were concerning for serotonin syndrome and she was admitted to [facility]. During this admission, patient's mental status has deteriorated. -MRI with interval development of diffuse abnormal supratentorial white matter with extensive FLAIR/T2 hyperintense signal and diffusion restriction. Differential include acute or toxic encephalopathy.  - ANCA negative. Glutamic acid decarb negative <5.0. ADIN not sent. CSF culture no growth, normal protein, HSV negative, myelin basic protein pending.   -Continue thiamine  - Unable to make medical decision, agree with plan for family to pursue mPOA as next of kin   11/6-[place] rejected transfer. Continue current management.    -PEG placed on 11/8   - Concern for seizure like activity overnight 11/10, yue rapid EEG negative for seizure. Likely roving eye movements 2/2 metabolic encephalopathy and supportive care recommended   - Repeat MRI Brain unchanged from previous   - Discussed case with Neurologist Dr Sara Alex, patient does not have a favorable prognosis. It is unclear how long and to what extent any recovery would be, however patient likely to always have some neurologic deficits and require significant support. - Offered palliative care to family, they declined at this time     Bilateral UE edema, stable   - Noted to have 2+ bilateral UE edema  - Duplex UEs to eval for clot negative for DVT, does have superficial thrombus bilaterally   - Elevated arms  - Continue IV lasix     Hx of Takotsubo cardiomyopathy  -has peripheral edema  -last Echo was on 10/12 EF 55-60%   -BP stable, trending high, resume metoprolol. Continue Lasix  -monitor I/O and daily weight   - Repeat ECHO 11/14: EF 60-65%     HTN  -Resume metoprolol that was held as BP is trending up and she is mildly tachycardic. Hyperlipidemia   -statin on hold due to elevated CK. Recheck CK, last checked 10/31 and was 64.   If CK normal would resume statins     Hypothyroidism   -continue synthroid      Generalized anxiety disorder  -home trazodone and lexapro on hold may not need as non responsive now    Severe disability with immobility   -continue supportive care      Obesity   -BMI 32.52 kg/m2      Code status: Full code  Prophylaxis: therapeutic lovenox  Care Plan discussed with: Nurse, family  Anticipated Disposition: Pt is approaching medically ready for SNF on lovenox and TF =     Hospital Problems  Date Reviewed: 11/8/2022            Codes Class Noted POA    Altered mental status ICD-10-CM: R41.82  ICD-9-CM: 780.97  10/27/2022 Unknown        Diarrhea ICD-10-CM: R19.7  ICD-9-CM: 787.91  10/25/2022 Unknown        AMS (altered mental status) ICD-10-CM: R41.82  ICD-9-CM: 780.97  10/25/2022 Unknown       Vital Signs:    Last 24hrs VS reviewed since prior progress note. Most recent are:  Visit Vitals  /78 (BP 1 Location: Right upper arm, BP Patient Position: At rest)   Pulse (!) 106   Temp 99.2 °F (37.3 °C)   Resp (!) 39   Ht 5' 6\" (1.676 m)   Wt 90.5 kg (199 lb 8.3 oz)   SpO2 100%   BMI 32.20 kg/m²         Intake/Output Summary (Last 24 hours) at 11/17/2022 1954  Last data filed at 11/17/2022 1600  Gross per 24 hour   Intake 1735 ml   Output 1500 ml   Net 235 ml          Physical Examination:     I had a face to face encounter with this patient and independently examined them on 11/17/2022 as outlined below:          Constitutional: Awake, resting with eyes open, not responsive or interactive. Not in distress. ENT: Oral mucosa moist, oropharynx benign. Resp: On oxygen via nasal cannula, symmetrical air entry bilaterally without added sounds. CV: Tachycardic, irregularly irregular. GI:  Soft, non distended, non tender. normoactive bowel sounds, no hepatosplenomegaly   PEG tube present in the epigastric area. Musculoskeletal: Bilateral pretibial, pedal and hand edema    Neurologic: Awake. Nonverbal.  Extremities spastic more pronounced on the right lower extremity. Otherwise neuro exam is limited due to patient's status            Data Review:    Review and/or order of clinical lab test  Review and/or order of tests in the radiology section of CPT  Review and/or order of tests in the medicine section of CPT      Labs:     Recent Labs     11/17/22 0027 11/16/22  0534   WBC 10.7 10.9   HGB 10.7* 10.9*   HCT 33.5* 34.2*   * 572*       Recent Labs     11/17/22 0027 11/16/22  0534 11/15/22  0550    137 136   K 4.3 4.2 4.3    101 100   CO2 28 29 30   BUN 23* 19 22*   CREA 0.39* 0.48* 0.47*   * 123* 136*   CA 8.7 9.4 8.6       No results for input(s): ALT, AP, TBIL, TBILI, TP, ALB, GLOB, GGT, AML, LPSE in the last 72 hours.     No lab exists for component: SGOT, GPT, AMYP, HLPSE      Recent Labs     11/15/22  0550   APTT 43.7*        No results for input(s): FE, TIBC, PSAT, FERR in the last 72 hours. Lab Results   Component Value Date/Time    Folate 9.9 10/26/2022 11:47 AM        No results for input(s): PH, PCO2, PO2 in the last 72 hours. No results for input(s): CPK, CKNDX, TROIQ in the last 72 hours.     No lab exists for component: CPKMB    Lab Results   Component Value Date/Time    Cholesterol, total 145 10/26/2022 06:46 AM    HDL Cholesterol 30 10/26/2022 06:46 AM    LDL, calculated 91.4 10/26/2022 06:46 AM    Triglyceride 118 10/26/2022 06:46 AM    CHOL/HDL Ratio 4.8 10/26/2022 06:46 AM     Lab Results   Component Value Date/Time    Glucose (POC) 110 10/05/2022 04:55 PM    Glucose (POC) 87 10/05/2022 11:02 AM    Glucose (POC) 78 10/05/2022 05:00 AM    Glucose (POC) 94 10/05/2022 04:57 AM    Glucose (POC) 124 (H) 10/04/2022 11:03 PM     Lab Results   Component Value Date/Time    Color DARK YELLOW 11/10/2022 03:14 PM    Appearance CLEAR 11/10/2022 03:14 PM    Specific gravity 1.028 11/10/2022 03:14 PM    Specific gravity >1.030 (H) 10/25/2022 04:54 PM    pH (UA) 6.0 11/10/2022 03:14 PM    Protein Negative 11/10/2022 03:14 PM    Glucose Negative 11/10/2022 03:14 PM    Ketone Negative 11/10/2022 03:14 PM    Bilirubin Negative 11/10/2022 03:14 PM    Urobilinogen 1.0 11/10/2022 03:14 PM    Nitrites Negative 11/10/2022 03:14 PM    Leukocyte Esterase Negative 11/10/2022 03:14 PM    Epithelial cells FEW 11/10/2022 03:14 PM    Bacteria Negative 11/10/2022 03:14 PM    WBC 0-4 11/10/2022 03:14 PM    RBC 0-5 11/10/2022 03:14 PM         Medications Reviewed:     Current Facility-Administered Medications   Medication Dose Route Frequency    enoxaparin (LOVENOX) injection 90 mg  1 mg/kg SubCUTAneous Q12H    furosemide (LASIX) injection 20 mg  20 mg IntraVENous DAILY    sodium chloride (NS) flush 5-10 mL  5-10 mL IntraVENous PRN    senna-docusate (PERICOLACE) 8.6-50 mg per tablet 2 Tablet  2 Tablet Oral DAILY    [Held by provider] metoprolol tartrate (LOPRESSOR) tablet 12.5 mg  12.5 mg Oral Q12H    polyethylene glycol (MIRALAX) packet 17 g  17 g Oral DAILY PRN    thiamine HCL (B-1) tablet 100 mg  100 mg Oral DAILY    therapeutic multivitamin (THERAGRAN) tablet 1 Tablet  1 Tablet Oral DAILY    acetaminophen (TYLENOL) solution 650 mg  650 mg Per NG tube Q4H PRN    sodium chloride (NS) flush 5-40 mL  5-40 mL IntraVENous Q8H    sodium chloride (NS) flush 5-40 mL  5-40 mL IntraVENous PRN    [Held by provider] atorvastatin (LIPITOR) tablet 10 mg  10 mg Oral QHS    levothyroxine (SYNTHROID) tablet 88 mcg  88 mcg Oral ACB    pantoprazole (PROTONIX) tablet 40 mg  40 mg Oral ACB    sodium chloride (NS) flush 5-10 mL  5-10 mL IntraVENous PRN    acetaminophen (TYLENOL) suppository 650 mg  650 mg Rectal Q6H PRN     ______________________________________________________________________  EXPECTED LENGTH OF STAY: 2d 7h  ACTUAL LENGTH OF STAY:          21                 Ky Jules MD

## 2022-11-18 NOTE — PROGRESS NOTES
[facility] [facility]  Hospitalist Group                                                                                          Hospitalist Progress Note  Gordy Wellington MD  Answering service: [phone] from in house phone        Date of Service:  2022  NAME:  Augie Mccain  :  1965  MRN:  [id number]      Admission Summary:     Augie Mccain is a 59 y.o. female with past medical history of anoxic brain injury, anxiety, depression, hypertension, hyperlipidemia, hypothyroidism presented to the emergency department via EMS from [address] [place] [address] with reported altered mental status (AMS). Patient is aphasic/nonverbal and not answering any questions. Majority of history was obtained per review of chart records. Per reports patient recently was hospitalized at [facility] from 2022-10/20/2022 with diagnosis of status epilepticus, acute metabolic encephalopathy, delirium, psychosis, generalized anxiety disorder, debility, Takotsubo cardiomyopathy, NSTEMI, bradycardia, SIRS, tachycardia, fever, leukocytosis, and CVA. Patient had work-up including MRI and also EEG. There was concern the patient may have some anoxic brain injury. She has been followed by psychiatry and neurology services with persistent encephalopathy and intermittent severe aggression. Patient has had aphasia and altered mental status not following commands per staff report since last week. Symptoms remain constant, severe, without specific exacerbating relieving factors. About emergency department today, initial recorded vital signs temperature 98.8 °F, /65, heart rate 103, respirate 30, O2 saturation 93% on room air. Abnormal labs included WC 12.5, platelets 741, BUN 23. CT head without IV contrast showed no acute intracranial abnormalities. ED request admission to the hospitalist service.      No acute event overnight, less rigidity, stable and transferred out of ICU on 10/29    Interval history / Subjective:   Her mother at the bedside. Patient grimaces to pain in the right wrist NOACs interactive. Noted intermittent low-grade fevers. Patient not in distress. Assessment & Plan:     Fever. Fevers felt to be due to PE, due to the recurrence although low-grade would like to initiate basic sepsis work-up. --Obtain CXR, UA, lactic acid, procalcitonin  --If fever increases or patient becomes clinically unstable, hypotensive will have low threshold to initiate broad-spectrum antibiotics. Moderate to Large Pulmonary Embolism   - Elevated temperatures 11/10, and intermittently prior   - Infectious work-up negative, s/p cefepime/vanc. CTA demosntrated moderate to large pulmonary embolism with mild RHS   - LE duplex with age-indeterminant DVT in left peroneal veins   - Consulted vascular, no surgical intervention at this time  - ECHO with moderately dilated RV, moderately reduced systolic function, and lateral wall hypokinesis   - Continue lovenox therapeutic BID  - Resp status stable on 2L NC. 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 While at [facility], developed changes that were concerning for serotonin syndrome and she was admitted to [facility] [place]. During this admission, patient's mental status has deteriorated. -MRI with interval development of diffuse abnormal supratentorial white matter with extensive FLAIR/T2 hyperintense signal and diffusion restriction. Differential include acute or toxic encephalopathy.  - ANCA negative. Glutamic acid decarb negative <5.0. ADIN not sent.  CSF culture no growth, normal protein, HSV negative, myelin basic protein pending.   -Continue thiamine  - Unable to make medical decision, agree with plan for family to pursue mPOA as next of kin   11/6-[place] rejected transfer. Continue current management. -PEG placed on 11/8   - Concern for seizure like activity overnight 11/10, coleell rapid EEG negative for seizure. Likely roving eye movements 2/2 metabolic encephalopathy and supportive care recommended   - Repeat MRI Brain unchanged from previous   - Discussed case with Neurologist Dr Lazaro Forrest, patient does not have a favorable prognosis. It is unclear how long and to what extent any recovery would be, however patient likely to always have some neurologic deficits and require significant support. - Offered palliative care to family, they declined at this time     Bilateral UE edema, stable   - Noted to have 2+ bilateral UE edema  - Duplex UEs to eval for clot negative for DVT, does have superficial thrombus bilaterally   - Elevated arms  - Continue IV lasix     Hx of Takotsubo cardiomyopathy  -has peripheral edema  -last Echo was on 10/12 EF 55-60%   -BP stable, trending high, resume metoprolol. Continue Lasix  -monitor I/O and daily weight   - Repeat ECHO 11/14: EF 60-65%     HTN  -Resume metoprolol that was held as BP is trending up and she is mildly tachycardic. Hyperlipidemia   -statin on hold due to elevated CK. Recheck CK, last checked 10/31 and was 64.   If CK normal would resume statins     Hypothyroidism   -continue synthroid      Generalized anxiety disorder  -home trazodone and lexapro on hold may not need as non responsive now    Severe disability with immobility   -continue supportive care      Obesity   -BMI 32.52 kg/m2    Diet: N.p.o.  Continue tube feeding      Code status: Full code  Prophylaxis: therapeutic lovenox  Care Plan discussed with: Nurse, family  Anticipated Disposition: Pt is approaching medically ready for SNF on lovenox and TF =     Hospital Problems  Date Reviewed: 11/8/2022 Codes Class Noted POA    Altered mental status ICD-10-CM: R41.82  ICD-9-CM: 780.97  10/27/2022 Unknown        Diarrhea ICD-10-CM: R19.7  ICD-9-CM: 787.91  10/25/2022 Unknown        AMS (altered mental status) ICD-10-CM: R41.82  ICD-9-CM: 780.97  10/25/2022 Unknown       Vital Signs:    Last 24hrs VS reviewed since prior progress note. Most recent are:  Visit Vitals  /70 (BP 1 Location: Right upper arm, BP Patient Position: At rest)   Pulse (!) 110   Temp 100 °F (37.8 °C)   Resp (!) 33   Ht 5' 6\" (1.676 m)   Wt 90.5 kg (199 lb 8.3 oz)   SpO2 100%   BMI 32.20 kg/m²         Intake/Output Summary (Last 24 hours) at 11/18/2022 1716  Last data filed at 11/18/2022 1200  Gross per 24 hour   Intake 300 ml   Output 1000 ml   Net -700 ml          Physical Examination:     I had a face to face encounter with this patient and independently examined them on 11/18/2022 as outlined below:          Constitutional: Awake with eyes open, not responsive or interactive, grimaces to pain. ENT: Oral mucosa moist, oropharynx benign. Resp: On oxygen via nasal cannula, symmetrical air entry bilaterally without added sounds. CV: Tachycardic, irregularly irregular. GI:  Soft, non distended, non tender. normoactive bowel sounds, no hepatosplenomegaly   PEG tube present in the epigastric area. Musculoskeletal: Bilateral pretibial, pedal and hand edema    Neurologic: Awake. Nonverbal.  Extremities spastic more pronounced on the right lower extremity.   Otherwise neuro exam is limited due to patient's status            Data Review:    Review and/or order of clinical lab test  Review and/or order of tests in the radiology section of CPT  Review and/or order of tests in the medicine section of CPT      Labs:     Recent Labs     11/17/22 0027 11/16/22  0534   WBC 10.7 10.9   HGB 10.7* 10.9*   HCT 33.5* 34.2*   * 572*       Recent Labs     11/17/22 0027 11/16/22 0534    137   K 4.3 4.2    101   CO2 28 29 BUN 23* 19   CREA 0.39* 0.48*   * 123*   CA 8.7 9.4       No results for input(s): ALT, AP, TBIL, TBILI, TP, ALB, GLOB, GGT, AML, LPSE in the last 72 hours. No lab exists for component: SGOT, GPT, AMYP, HLPSE      No results for input(s): INR, PTP, APTT, INREXT, INREXT in the last 72 hours. No results for input(s): FE, TIBC, PSAT, FERR in the last 72 hours. Lab Results   Component Value Date/Time    Folate 9.9 10/26/2022 11:47 AM        No results for input(s): PH, PCO2, PO2 in the last 72 hours.   Recent Labs     11/18/22  0130   CPK 52       Lab Results   Component Value Date/Time    Cholesterol, total 145 10/26/2022 06:46 AM    HDL Cholesterol 30 10/26/2022 06:46 AM    LDL, calculated 91.4 10/26/2022 06:46 AM    Triglyceride 118 10/26/2022 06:46 AM    CHOL/HDL Ratio 4.8 10/26/2022 06:46 AM     Lab Results   Component Value Date/Time    Glucose (POC) 110 10/05/2022 04:55 PM    Glucose (POC) 87 10/05/2022 11:02 AM    Glucose (POC) 78 10/05/2022 05:00 AM    Glucose (POC) 94 10/05/2022 04:57 AM    Glucose (POC) 124 (H) 10/04/2022 11:03 PM     Lab Results   Component Value Date/Time    Color DARK YELLOW 11/10/2022 03:14 PM    Appearance CLEAR 11/10/2022 03:14 PM    Specific gravity 1.028 11/10/2022 03:14 PM    Specific gravity >1.030 (H) 10/25/2022 04:54 PM    pH (UA) 6.0 11/10/2022 03:14 PM    Protein Negative 11/10/2022 03:14 PM    Glucose Negative 11/10/2022 03:14 PM    Ketone Negative 11/10/2022 03:14 PM    Bilirubin Negative 11/10/2022 03:14 PM    Urobilinogen 1.0 11/10/2022 03:14 PM    Nitrites Negative 11/10/2022 03:14 PM    Leukocyte Esterase Negative 11/10/2022 03:14 PM    Epithelial cells FEW 11/10/2022 03:14 PM    Bacteria Negative 11/10/2022 03:14 PM    WBC 0-4 11/10/2022 03:14 PM    RBC 0-5 11/10/2022 03:14 PM         Medications Reviewed:     Current Facility-Administered Medications   Medication Dose Route Frequency    enoxaparin (LOVENOX) injection 90 mg  1 mg/kg SubCUTAneous Q12H furosemide (LASIX) injection 20 mg  20 mg IntraVENous DAILY    sodium chloride (NS) flush 5-10 mL  5-10 mL IntraVENous PRN    senna-docusate (PERICOLACE) 8.6-50 mg per tablet 2 Tablet  2 Tablet Oral DAILY    metoprolol tartrate (LOPRESSOR) tablet 12.5 mg  12.5 mg Oral Q12H    polyethylene glycol (MIRALAX) packet 17 g  17 g Oral DAILY PRN    thiamine HCL (B-1) tablet 100 mg  100 mg Oral DAILY    therapeutic multivitamin (THERAGRAN) tablet 1 Tablet  1 Tablet Oral DAILY    acetaminophen (TYLENOL) solution 650 mg  650 mg Per NG tube Q4H PRN    sodium chloride (NS) flush 5-40 mL  5-40 mL IntraVENous Q8H    sodium chloride (NS) flush 5-40 mL  5-40 mL IntraVENous PRN    atorvastatin (LIPITOR) tablet 10 mg  10 mg Oral QHS    levothyroxine (SYNTHROID) tablet 88 mcg  88 mcg Oral ACB    pantoprazole (PROTONIX) tablet 40 mg  40 mg Oral ACB    sodium chloride (NS) flush 5-10 mL  5-10 mL IntraVENous PRN    acetaminophen (TYLENOL) suppository 650 mg  650 mg Rectal Q6H PRN     ______________________________________________________________________  EXPECTED LENGTH OF STAY: 2d 7h  ACTUAL LENGTH OF STAY:          22                 Rox Wu MD

## 2022-11-18 NOTE — ADT AUTH CERT NOTES
Neurology [address] Day 24 (11/17/2022) by Abilio Viramontes       Review Status Review Entered   Completed 11/18/2022 0836       Created By   [address] Nieto, 275 [facility] Day: 24 Care Date: 11/17/2022 Level of Care: Intermediate Care    Guideline Day 3    Level Of Care    ( ) * Activity level acceptable    11/18/2022 08:36:50 EST by Brooks Osler      turning and positioning q2    ( ) Floor to discharge    11/18/2022 08:36:50 EST by Brooks Osler      inpatient intermediate neuro/stroke    Clinical Status    (X) * Mental status at baseline or stable and appropriate for next level of care    11/18/2022 08:36:50 EST by Brooks Osler      awake, resting comfortably. Not interactive. She grimaces when her extremities are moved for exam.    (X) * Neurologic deficits absent or stable and appropriate for next level of care    11/18/2022 08:36:50 EST by Brooks Osler      awake, resting comfortably. Not interactive.   She grimaces when her extremities are moved for exam.    ( ) * Motor deficits absent or acceptable for next level of care    11/18/2022 08:36:50 EST by Brooks Osler      cannot be assessed    (X) * Seizures absent or managed    11/18/2022 08:36:50 EST by Brooks Osler      none noted today    (X) * No infection, or status acceptable    11/18/2022 08:36:50 EST by Brooks Osler      WBC: 10.7 (normal)    (X) * Isolation not needed, or status acceptable    11/18/2022 08:36:50 EST by Brooks Osler      not needed    ( ) * Respiratory status acceptable    11/18/2022 08:36:50 EST by Hyde Osler      temp: 99.8 °F  VA:   BP: 121/72, 123/69, 125/67  RR: 29-39  02 sat: 93% nasal cannula at 3LPM    (X) * Pain and nausea absent or adequately managed    11/18/2022 08:36:50 EST by Brooks Osler      no reports of pain    ( ) * General Discharge Criteria met    Interventions    (X) * Intake acceptable    11/18/2022 08:36:50 EST by [id number] Nieto, [id number] [facility] FEEDING PEG; Standard without Fiber    ( ) * No inpatient interventions needed    11/18/2022 08:36:50 EST by Bullet [employer]christ[employer], [id number], FOAM FOOT DROP BOOTS, HEMODYNAMIC MONITORING - MEASURE CVP AND ScvO2, NEURO/VASCULAR CHECKS, APPLY/MAINTAIN SEQUENTIAL COMPRESSION DEVICE, FALL PRECAUTIONS    * Milestone   Additional Notes    DATE: 11/17/2022      Pertinent Updates:   -BP stable, trending high, resume metoprolol. Continue Lasix   -mildly tachycardic.   -Recheck CK, last checked 10/31 and was 64. If CK normal would resume statins      Abnl/Pertinent Labs/Radiology/Diagnostic Studies:   RBC: 3.68 (L)   HGB: 10.7 (L)   HCT: 33.5 (L)   PLATELET: 733 (H)   IMMATURE GRANULOCYTES: 1 (H)   ABS. IMM. GRANS.: 0.1 (H)   Glucose: 128 (H)   BUN: 23 (H)   Creatinine: 0.39 (L)   BUN/Creatinine ratio: 59 (H)      Physical Exam:   ENT:Oral mucosa moist, oropharynx benign. Resp:On oxygen via nasal cannula, symmetrical air entry bilaterally without added sounds. CV:Tachycardic, irregularly irregular. GI:Soft, non distended, non tender. normoactive bowel sounds, no hepatosplenomegaly    PEG tube present in the epigastric area. Musculoskeletal:Bilateral pretibial, pedal and hand edema    Neurologic:Awake. Nonverbal.  Extremities spastic more pronounced on the right lower extremity. Otherwise neuro exam is limited due to patient's status      MD Consults/Assessments & Plans:   [facility]ILDREN - [facility]**   Assessment & Plan:   Fever, likely 2/2 Pulmonary Embolism    Moderate to Large Pulmonary Embolism    - Elevated temperatures 11/10, and intermittently prior    - Infectious work-up negative, s/p cefepime/vanc.  CTA demosntrated moderate to large pulmonary embolism with mild RHS    - LE duplex with age-indeterminant DVT in left peroneal veins    - Consulted vascular, no surgical intervention at this time   - ECHO with moderately dilated RV, moderately reduced systolic function, and lateral wall hypokinesis    - Continue lovenox therapeutic BID   - Resp status stable on 2L NC. 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 While at [facility], developed changes that were concerning for serotonin syndrome and she was admitted to [facility]. During this admission, patient's mental status has deteriorated. -MRI with interval development of diffuse abnormal supratentorial white matter with extensive FLAIR/T2 hyperintense signal and diffusion restriction. Differential include acute or toxic encephalopathy.   - ANCA negative. Glutamic acid decarb negative <5.0. ADIN not sent. CSF culture no growth, normal protein, HSV negative, myelin basic protein pending.    -Continue thiamine   - Unable to make medical decision, agree with plan for family to pursue mPOA as next of kin    11/6-[facility] rejected transfer. Continue current management. -PEG placed on 11/8    - Concern for seizure like activity overnight 11/10, coleell rapid EEG negative for seizure. Likely roving eye movements 2/2 metabolic encephalopathy and supportive care recommended    - Repeat MRI Brain unchanged from previous    - Discussed case with Neurologist Dr Josse Nguyen, patient does not have a favorable prognosis. It is unclear how long and to what extent any recovery would be, however patient likely to always have some neurologic deficits and require significant support.     - Offered palliative care to family, they declined at this time        Bilateral UE edema, stable    - Noted to have 2+ bilateral UE edema   - Duplex UEs to eval for clot negative for DVT, does have superficial thrombus bilaterally    - Elevated arms   - Continue IV lasix Hx of Takotsubo cardiomyopathy   -has peripheral edema   -last Echo was on 10/12 EF 55-60%    -BP stable, trending high, resume metoprolol. Continue Lasix   -monitor I/O and daily weight    - Repeat ECHO 11/14: EF 60-65%       HTN   -Resume metoprolol that was held as BP is trending up and she is mildly tachycardic. Hyperlipidemia    -statin on hold due to elevated CK. Recheck CK, last checked 10/31 and was 64.   If CK normal would resume statins       Hypothyroidism   -continue synthroid        Generalized anxiety disorder   -home trazodone and lexapro on hold may not need as non responsive now       Severe disability with immobility    -continue supportive care       Medications:   Lovenox 90mg q12 SQ   Lasix 20mg every day IV   Synthroid 88mcg every day PEG   Lopressor 12.5mg q12 PO 1x   Pericolace 2tab every day PEG   Theragran 1tab every day PEG   Thiamine 100mg every day PEG                 Neurology [address] Day 23 (11/16/2022) by Vashti Rausch       Review Status Review Entered   Completed 11/17/2022 1318       Created By   Snehal Nieto, 275 [facility] Day: 23 Care Date: 11/16/2022 Level of Care: Intermediate Care    Guideline Day 3    Level Of Care    ( ) * Activity level acceptable    11/17/2022 13:18:44 EST by Paris Romero      turning and positioning q2    ( ) Floor to discharge    11/17/2022 13:18:44 EST by Paris Romero      inpatient neuro/stroke intermediate care    Clinical Status    (X) * Mental status at baseline or stable and appropriate for next level of care    11/17/2022 13:18:44 EST by Paris Romero      now at patient baseline; only responds to pain    (X) * Neurologic deficits absent or stable and appropriate for next level of care    11/17/2022 13:18:44 EST by Paris Romero      now at patient baseline; only responds to pain    ( ) * Motor deficits absent or acceptable for next level of care    11/17/2022 13:18:44 EST by Snehal Nieto, Elpidio      cannot be assessed    (X) * Seizures absent or managed    11/17/2022 13:18:44 EST by Brooks Osler      none noted today    (X) * No infection, or status acceptable    11/17/2022 13:18:44 EST by Brooks Osler      WBC: 10.9 (normal)    (X) * Isolation not needed, or status acceptable    11/17/2022 13:18:44 EST by Brooks Osler      not needed    ( ) * Respiratory status acceptable    11/17/2022 13:18:44 EST by Brooks Osler      temp: 98.9 °F, afebrile  PA:   BP: 115/54,  142/67 , 140/76, 137/61  RR: 20-36  02 sat: 98% nasal cannula at 3LPM    (X) * Pain and nausea absent or adequately managed    11/17/2022 13:18:44 EST by Brooks Osler      no reports    ( ) * General Discharge Criteria met    Interventions    (X) * Intake acceptable    11/17/2022 13:18:44 EST by [id number], [id number] [facility] PEG; Standard without Fiber    ( ) * No inpatient interventions needed    11/17/2022 13:18:44 EST by [place], [id number], FOAM FOOT DROP BOOTS, HEMODYNAMIC MONITORING - MEASURE CVP AND ScvO2, NEURO/VASCULAR CHECKS, APPLY/MAINTAIN SEQUENTIAL COMPRESSION DEVICE, FALL PRECAUTIONS    * Milestone   Additional Notes    DATE: 11/16/2022      Pertinent Updates:   MRI brain yesterday unchanged from previous. Continue lovenox therapeutic BID        Abnl/Pertinent Labs/Radiology/Diagnostic Studies:   RBC: 3.78 (L)   HGB: 10.9 (L)   HCT: 34.2 (L)   PLATELET: 944 (H)   IMMATURE GRANULOCYTES: 1 (H)   ABS. IMM. GRANS.: 0.1 (H)   Glucose: 123 (H)   Creatinine: 0.48 (L)   BUN/Creatinine ratio: 40 (H)      Physical Exam:   Same as yesterday      MD Consults/Assessments & Plans:   **Hospitalist**   Assessment & Plan:   Fever, likely 2/2 Pulmonary Embolism    Moderate to Large Pulmonary Embolism    - Elevated temperatures 11/10, and intermittently prior    - Infectious work-up negative, s/p cefepime/vanc.  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 While at [facility], developed changes that were concerning for serotonin syndrome and she was admitted to [facility] [place]. During this admission, patient's mental status has deteriorated. -MRI with interval development of diffuse abnormal supratentorial white matter with extensive FLAIR/T2 hyperintense signal and diffusion restriction. Differential include acute or toxic encephalopathy.   - ANCA negative. Glutamic acid decarb negative <5.0. ADIN not sent. CSF culture no growth, normal protein, HSV negative, myelin basic protein pending.    -Continue thiamine   - Unable to make medical decision, agree with plan for family to pursue mPOA as next of kin    11/6-[facility] rejected transfer. Continue current management. -PT OT and speech, PEG on 11/8 -may use    - Concern for seizure like activity overnight 11/10, ceribell rapid EEG negative for seizure. Likely roving eye movements 2/2 metabolic encephalopathy and supportive care recommended    - Repeat MRI Brain unchanged from previous    - Discussed case with Neurologist Dr Alice Ramsey, patient does not have a favorable prognosis.  It is unclear how long and to what extent any recovery would be, however patient likely to always have some neurologic deficits and require significant support. - Offered palliative care to family, they declined at this time        Bilateral UE edema, stable    - Noted to have 2+ bilateral UE edema   Plan:    - Duplex UEs to eval for clot negative for DVT, does have superficial thrombus bilaterally    - Elevated arms   - Continue IV lasix        Hx of Takotsubo cardiomyopathy   -has peripheral edema   -last Echo was on 10/12 EF 55-60%    -BP low normal, if it improves will consider her home metoprolol and lasix    -monitor I/O and daily weight    - Repeat ECHO 11/14: EF 60-65%       HTN   -BP normal, monitor BP       Hyperlipidemia    -statin on hold due to elevated CK       Hypothyroidism    -continue synthroid        Generalized anxiety disorder   -home trazodone and lexapro on hold may not need as non responsive now       Severe disability with immobility    -continue supportive care          Medications:   Lovenox 90mg q12 SQ   Lasix 20mg every day IV   Synthroid 88mcg every day PEG   Protonix 40mg every day PEG   Pericolace 2tab every day PEG   Theragran 1tab every day PEG   Thiamine 100mg every day PEG         PT/OT/SLP/CM Assessments or Notes:   **Speech language pathology**   SLP has been following this patient for some time, but has not been appropriate for SLP for three consecutive sessions. Today, RN states pt continues to be unresponsive. Will sign off SLP for now. Please reconsult should SLP be of any assistance. **Physical therapy**   ASSESSMENT   Patient continues with skilled PT services and has not progressed towards goals. Pt received supine in bed with eyes closed. Pt only arousable (eyes open) after passively mobilizing UEs or LEs (likely due to noxious stim) and then pt kept eyes open throughout the remaining session.  Pt did not follow any commands, unable to track therapist visually, and non verbal. Pt required total A for all aspects of mobility and pt demonstrated no initiation or ability to assist. Pt only grimaced during LE PROM and noted increase in tone in bilateral feet when PROM provided to any extremities. Unfortunately, pt has not made any meaningful progress towards any goals and is currently at dependent level for all aspects of mobility. No further acute skilled PT is of benefit at this time. Will complete current PT orders. Recommend continued repositioning, passive ROM all extremities as tolerated by RN staff or family members. PLAN :   Patient will be discharged from acute skilled physical therapy at this time         **Occupational therapy**   ASSESSMENT   Patient continues with skilled OT services and has not progressed towards goals, remains with no command following, visual scanning/tracking/targeting, ROM, strength, and/or protective reflexed (except visual). She remains Total A x2 for bed mobility with increased hypertonicity in BUE/BLEs and cervical ROM, shaking noted with all PROM with hyperextension reflexes in the feet. Total A hand over hand completed for washing hands and washing her face with RUE facilitation. No initiation with any activity this session, no goals met, no funcitonal progress noted therefore will sign off at this time. Given her function, recommend d/c to SNF vs LTC for assist with all bed level ADLs and mobility.             PLAN :   Rationale for discharge: Patient not participating in therapy--unable d/t cognitive status

## 2022-11-18 NOTE — PROGRESS NOTES
Problem: Pressure Injury - Risk of  Goal: *Prevention of pressure injury  Description: Document Jose Enrique Scale and appropriate interventions in the flowsheet. Outcome: Progressing Towards Goal  Note: Pressure Injury Interventions:  Sensory Interventions: Discuss PT/OT consult with provider, Float heels, Use 30-degree side-lying position, Turn and reposition approx. every two hours (pillows and wedges if needed), Minimize linen layers, Assess need for specialty bed    Moisture Interventions: Check for incontinence Q2 hours and as needed, Offer toileting Q_hr, Internal/External urinary devices    Activity Interventions: PT/OT evaluation, Assess need for specialty bed    Mobility Interventions: Float heels, HOB 30 degrees or less, PT/OT evaluation, Turn and reposition approx. every two hours(pillow and wedges)    Nutrition Interventions: Document food/fluid/supplement intake, Discuss nutritional consult with provider, Offer support with meals,snacks and hydration    Friction and Shear Interventions: HOB 30 degrees or less, Lift team/patient mobility team, Transferring/repositioning devices, Minimize layers, Apply protective barrier, creams and emollients                Problem: Falls - Risk of  Goal: *Absence of Falls  Description: Document Chyna Fall Risk and appropriate interventions in the flowsheet.   Outcome: Progressing Towards Goal  Note: Fall Risk Interventions:  Mobility Interventions: Bed/chair exit alarm, Patient to call before getting OOB, PT Consult for mobility concerns, Utilize gait belt for transfers/ambulation    Mentation Interventions: Bed/chair exit alarm, Room close to nurse's station    Medication Interventions: Bed/chair exit alarm, Assess postural VS orthostatic hypotension, Patient to call before getting OOB    Elimination Interventions: Bed/chair exit alarm, Call light in reach, Toileting schedule/hourly rounds    History of Falls Interventions: Bed/chair exit alarm, Investigate reason for fall, Room close to nurse's station, Vital signs minimum Q4HRs X 24 hrs (comment for end date)

## 2022-11-18 NOTE — PROGRESS NOTES
Transition of Care Plan  RUR- Med 19%  DISPOSITION: SNF- [facility] - pending bed availability   F/U with PCP/Specialist    Transport: [facility]    Emergency contact: Mother, Brandan Given [phone] NARDA ARCHIBALD are 3 sons, but mother has been deemed spokesperson)    CM barriers to discharge: Bed availability    Patient has received insurance auth for SNF at [facility]. No bed available for patient today. CM to check bed availability at [facility] over the weekend. Shawn Valle on call #[phone]. [email or web address].

## 2022-11-19 LAB
APPEARANCE UR: CLEAR
B PERT DNA SPEC QL NAA+PROBE: NOT DETECTED
BACTERIA URNS QL MICRO: NEGATIVE /HPF
BILIRUB UR QL: NEGATIVE
BORDETELLA PARAPERTUSSIS PCR, BORPAR: NOT DETECTED
C PNEUM DNA SPEC QL NAA+PROBE: NOT DETECTED
COLOR UR: ABNORMAL
EPITH CASTS URNS QL MICRO: ABNORMAL /LPF
FLUAV H1 2009 PAND RNA SPEC QL NAA+PROBE: NOT DETECTED
FLUAV H1 RNA SPEC QL NAA+PROBE: NOT DETECTED
FLUAV H3 RNA SPEC QL NAA+PROBE: NOT DETECTED
FLUAV SUBTYP SPEC NAA+PROBE: NOT DETECTED
FLUBV RNA SPEC QL NAA+PROBE: NOT DETECTED
GLUCOSE UR STRIP.AUTO-MCNC: NEGATIVE MG/DL
HADV DNA SPEC QL NAA+PROBE: NOT DETECTED
HCOV 229E RNA SPEC QL NAA+PROBE: NOT DETECTED
HCOV HKU1 RNA SPEC QL NAA+PROBE: NOT DETECTED
HCOV NL63 RNA SPEC QL NAA+PROBE: NOT DETECTED
HCOV OC43 RNA SPEC QL NAA+PROBE: NOT DETECTED
HGB UR QL STRIP: NEGATIVE
HMPV RNA SPEC QL NAA+PROBE: NOT DETECTED
HPIV1 RNA SPEC QL NAA+PROBE: NOT DETECTED
HPIV2 RNA SPEC QL NAA+PROBE: NOT DETECTED
HPIV3 RNA SPEC QL NAA+PROBE: NOT DETECTED
HPIV4 RNA SPEC QL NAA+PROBE: NOT DETECTED
KETONES UR QL STRIP.AUTO: NEGATIVE MG/DL
LACTATE SERPL-SCNC: 1.8 MMOL/L (ref 0.4–2)
LEUKOCYTE ESTERASE UR QL STRIP.AUTO: NEGATIVE
M PNEUMO DNA SPEC QL NAA+PROBE: NOT DETECTED
NITRITE UR QL STRIP.AUTO: NEGATIVE
PH UR STRIP: 5.5 [PH] (ref 5–8)
PROT UR STRIP-MCNC: ABNORMAL MG/DL
RBC #/AREA URNS HPF: ABNORMAL /HPF (ref 0–5)
RSV RNA SPEC QL NAA+PROBE: NOT DETECTED
RV+EV RNA SPEC QL NAA+PROBE: NOT DETECTED
SARS-COV-2 PCR, COVPCR: NOT DETECTED
SP GR UR REFRACTOMETRY: 1.02 (ref 1–1.03)
UA: UC IF INDICATED,UAUC: ABNORMAL
UFH PPP CHRO-ACNC: 0.4 IU/ML
UFH PPP CHRO-ACNC: 0.51 IU/ML
UFH PPP CHRO-ACNC: 1.16 IU/ML
UROBILINOGEN UR QL STRIP.AUTO: 1 EU/DL (ref 0.2–1)
WBC URNS QL MICRO: ABNORMAL /HPF (ref 0–4)

## 2022-11-19 PROCEDURE — 85520 HEPARIN ASSAY: CPT

## 2022-11-19 PROCEDURE — 74011000250 HC RX REV CODE- 250: Performed by: FAMILY MEDICINE

## 2022-11-19 PROCEDURE — 74011250636 HC RX REV CODE- 250/636: Performed by: STUDENT IN AN ORGANIZED HEALTH CARE EDUCATION/TRAINING PROGRAM

## 2022-11-19 PROCEDURE — 81001 URINALYSIS AUTO W/SCOPE: CPT

## 2022-11-19 PROCEDURE — 0202U NFCT DS 22 TRGT SARS-COV-2: CPT

## 2022-11-19 PROCEDURE — 74011250637 HC RX REV CODE- 250/637: Performed by: PHYSICIAN ASSISTANT

## 2022-11-19 PROCEDURE — 77030018798 HC PMP KT ENTRL FED COVD -A

## 2022-11-19 PROCEDURE — 74011250637 HC RX REV CODE- 250/637: Performed by: HOSPITALIST

## 2022-11-19 PROCEDURE — 36415 COLL VENOUS BLD VENIPUNCTURE: CPT

## 2022-11-19 PROCEDURE — 87040 BLOOD CULTURE FOR BACTERIA: CPT

## 2022-11-19 PROCEDURE — 65660000001 HC RM ICU INTERMED STEPDOWN

## 2022-11-19 PROCEDURE — 83605 ASSAY OF LACTIC ACID: CPT

## 2022-11-19 PROCEDURE — 74011250636 HC RX REV CODE- 250/636: Performed by: HOSPITALIST

## 2022-11-19 PROCEDURE — 74011250637 HC RX REV CODE- 250/637: Performed by: NURSE PRACTITIONER

## 2022-11-19 RX ORDER — SODIUM CHLORIDE 9 MG/ML
100 INJECTION, SOLUTION INTRAVENOUS CONTINUOUS
Status: DISCONTINUED | OUTPATIENT
Start: 2022-11-19 | End: 2022-11-19

## 2022-11-19 RX ADMIN — ATORVASTATIN CALCIUM 10 MG: 40 TABLET, FILM COATED ORAL at 22:15

## 2022-11-19 RX ADMIN — METOPROLOL TARTRATE 12.5 MG: 25 TABLET, FILM COATED ORAL at 11:25

## 2022-11-19 RX ADMIN — LEVOTHYROXINE SODIUM 88 MCG: 0.09 TABLET ORAL at 06:40

## 2022-11-19 RX ADMIN — THERA TABS 1 TABLET: TAB at 11:25

## 2022-11-19 RX ADMIN — SODIUM CHLORIDE, PRESERVATIVE FREE 10 ML: 5 INJECTION INTRAVENOUS at 06:41

## 2022-11-19 RX ADMIN — SODIUM CHLORIDE 100 ML/HR: 9 INJECTION, SOLUTION INTRAVENOUS at 11:27

## 2022-11-19 RX ADMIN — Medication 100 MG: at 11:25

## 2022-11-19 RX ADMIN — PANTOPRAZOLE SODIUM 40 MG: 40 TABLET, DELAYED RELEASE ORAL at 06:40

## 2022-11-19 RX ADMIN — SODIUM CHLORIDE, PRESERVATIVE FREE 10 ML: 5 INJECTION INTRAVENOUS at 22:18

## 2022-11-19 RX ADMIN — ENOXAPARIN SODIUM 90 MG: 100 INJECTION SUBCUTANEOUS at 06:40

## 2022-11-19 RX ADMIN — ENOXAPARIN SODIUM 90 MG: 100 INJECTION SUBCUTANEOUS at 18:09

## 2022-11-19 NOTE — PROGRESS NOTES
[address] [facility] Adult  Hospitalist Group                                                                                          Hospitalist Progress Note  Denita Ren MD  Answering service: [phone] OR [phone] from in house phone        Date of Service:  2022  NAME:  Sebas Muhammad  :  1965  MRN:  [id number]      Admission Summary:     Sebas Muhammad is a 59 y.o. female with past medical history of anoxic brain injury, anxiety, depression, hypertension, hyperlipidemia, hypothyroidism presented to the emergency department via EMS from [address] [place] [address] with reported altered mental status (AMS). Patient is aphasic/nonverbal and not answering any questions. Majority of history was obtained per review of chart records. Per reports patient recently was hospitalized at [facility] from 2022-10/20/2022 with diagnosis of status epilepticus, acute metabolic encephalopathy, delirium, psychosis, generalized anxiety disorder, debility, Takotsubo cardiomyopathy, NSTEMI, bradycardia, SIRS, tachycardia, fever, leukocytosis, and CVA. Patient had work-up including MRI and also EEG. There was concern the patient may have some anoxic brain injury. She has been followed by psychiatry and neurology services with persistent encephalopathy and intermittent severe aggression. Patient has had aphasia and altered mental status not following commands per staff report since last week. Symptoms remain constant, severe, without specific exacerbating relieving factors. About emergency department today, initial recorded vital signs temperature 98.8 °F, /65, heart rate 103, respirate 30, O2 saturation 93% on room air. Abnormal labs included WC 12.5, platelets 131, BUN 23. CT head without IV contrast showed no acute intracranial abnormalities. ED request admission to the hospitalist service.      No acute event overnight, less rigidity, stable and transferred out of ICU on 10/29    Interval history / Subjective:   Patient was resting quietly with eyes closed. She will arouse with tactile stimulus, grimaces during exam otherwise not interactive which is unchanged. Noted low-grade fevers and mild tachycardia. Work-up initiated. Her vitals during my exam were affected 100% on room air,  and respiratory 23  Assessment & Plan:     Recurrent low-grade fevers. Fevers initially felt to be due to PE, due to the recurrence although low-grade would like to initiate basic sepsis work-up. --CXR, UA, lactic acid, procalcitonin on 11/18 were negative  --Obtain blood culture, UA, respiratory PCR  --Hold Lasix. IV fluids. --Monitor, if fever spikes or patient becomes symptomatic may consider broad-spectrum empiric antibiotics. Moderate to Large Pulmonary Embolism   - Elevated temperatures 11/10, and intermittently prior   - Infectious work-up negative, s/p cefepime/vanc. CTA demosntrated moderate to large pulmonary embolism with mild RHS   - LE duplex with age-indeterminant DVT in left peroneal veins   - Consulted vascular, no surgical intervention at this time  - ECHO with moderately dilated RV, moderately reduced systolic function, and lateral wall hypokinesis   - Continue lovenox therapeutic BID  -On room air. 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 While at [facility], developed changes that were concerning for serotonin syndrome and she was admitted to [facility] [place]. During this admission, patient's mental status has deteriorated.    -MRI with interval development of diffuse abnormal supratentorial white matter with extensive FLAIR/T2 hyperintense signal and diffusion restriction. Differential include acute or toxic encephalopathy.  - ANCA negative. Glutamic acid decarb negative <5.0. ADIN not sent. CSF culture no growth, normal protein, HSV negative, myelin basic protein pending.   -Continue thiamine  - Unable to make medical decision, agree with plan for family to pursue mPOA as next of kin   11/6-[facility] rejected transfer. Continue current management. -PEG placed on 11/8   - Concern for seizure like activity overnight 11/10, ceribell rapid EEG negative for seizure. Likely roving eye movements 2/2 metabolic encephalopathy and supportive care recommended   - Repeat MRI Brain unchanged from previous   - Discussed case with Neurologist Dr Fernando Calhoun, patient does not have a favorable prognosis. It is unclear how long and to what extent any recovery would be, however patient likely to always have some neurologic deficits and require significant support. - Offered palliative care to family, they declined at this time     Bilateral UE edema, stable   - Noted to have 2+ bilateral UE edema  - Duplex UEs to eval for clot negative for DVT, does have superficial thrombus bilaterally   - Elevated arms  - Continue IV lasix     Hx of Takotsubo cardiomyopathy  -has peripheral edema  -last Echo was on 10/12 EF 55-60%   -BP stable, trending high, resume metoprolol. Continue Lasix  -monitor I/O and daily weight   - Repeat ECHO 11/14: EF 60-65%     HTN  -Resume metoprolol that was held as BP is trending up and she is mildly tachycardic. Hyperlipidemia   -statin on hold due to elevated CK. Recheck CK, last checked 10/31 and was 64.   If CK normal would resume statins     Hypothyroidism   -continue synthroid      Generalized anxiety disorder  -home trazodone and lexapro on hold may not need as non responsive now    Severe disability with immobility   -continue supportive care      Obesity   -BMI 32.52 kg/m2    Diet: N.p.o.  Continue tube feeding      Code status: Full code  Prophylaxis: therapeutic lovenox  Care Plan discussed with: Nurse, family  Anticipated Disposition: Pt is approaching medically ready for SNF on lovenox and TF =     Hospital Problems  Date Reviewed: 11/8/2022            Codes Class Noted POA    Altered mental status ICD-10-CM: R41.82  ICD-9-CM: 780.97  10/27/2022 Unknown        Diarrhea ICD-10-CM: R19.7  ICD-9-CM: 787.91  10/25/2022 Unknown        AMS (altered mental status) ICD-10-CM: R41.82  ICD-9-CM: 780.97  10/25/2022 Unknown       Vital Signs:    Last 24hrs VS reviewed since prior progress note. Most recent are:  Visit Vitals  /72   Pulse 100   Temp 98.6 °F (37 °C)   Resp 19   Ht 5' 6\" (1.676 m)   Wt 90.5 kg (199 lb 8.3 oz)   SpO2 97%   BMI 32.20 kg/m²         Intake/Output Summary (Last 24 hours) at 11/19/2022 1233  Last data filed at 11/19/2022 0400  Gross per 24 hour   Intake 300 ml   Output --   Net 300 ml          Physical Examination:     I had a face to face encounter with this patient and independently examined them on 11/19/2022 as outlined below:  Vital signs: , SPO2 100% on room air, RR 23        Constitutional: Was resting eyes closed, awake and during exam.   ENT: Oral mucosa moist, oropharynx benign. Resp: On oxygen via nasal cannula, symmetrical air entry bilaterally without added sounds. CV: Tachycardic, irregularly irregular. GI:  Soft, non distended, non tender. normoactive bowel sounds, no hepatosplenomegaly   PEG tube present in the epigastric area. Musculoskeletal: Bilateral pretibial, pedal and hand edema    Neurologic: Awake. Nonverbal.  Extremities spastic more pronounced on the right lower extremity.   Otherwise neuro exam is limited due to patient's status            Data Review:    Review and/or order of clinical lab test  Review and/or order of tests in the radiology section of CPT  Review and/or order of tests in the medicine section of CPT      Labs:     Recent Labs     11/18/22  8806 11/17/22 0027   WBC 11.6* 10.7   HGB 11.0* 10.7*   HCT 33.9* 33.5*   * 576*       Recent Labs     11/17/22 0027      K 4.3      CO2 28   BUN 23*   CREA 0.39*   *   CA 8.7       No results for input(s): ALT, AP, TBIL, TBILI, TP, ALB, GLOB, GGT, AML, LPSE in the last 72 hours. No lab exists for component: SGOT, GPT, AMYP, HLPSE      No results for input(s): INR, PTP, APTT, INREXT, INREXT in the last 72 hours. No results for input(s): FE, TIBC, PSAT, FERR in the last 72 hours. Lab Results   Component Value Date/Time    Folate 9.9 10/26/2022 11:47 AM        No results for input(s): PH, PCO2, PO2 in the last 72 hours.   Recent Labs     11/18/22  0130   CPK 52         Lab Results   Component Value Date/Time    Cholesterol, total 145 10/26/2022 06:46 AM    HDL Cholesterol 30 10/26/2022 06:46 AM    LDL, calculated 91.4 10/26/2022 06:46 AM    Triglyceride 118 10/26/2022 06:46 AM    CHOL/HDL Ratio 4.8 10/26/2022 06:46 AM     Lab Results   Component Value Date/Time    Glucose (POC) 110 10/05/2022 04:55 PM    Glucose (POC) 87 10/05/2022 11:02 AM    Glucose (POC) 78 10/05/2022 05:00 AM    Glucose (POC) 94 10/05/2022 04:57 AM    Glucose (POC) 124 (H) 10/04/2022 11:03 PM     Lab Results   Component Value Date/Time    Color DARK YELLOW 11/10/2022 03:14 PM    Appearance CLEAR 11/10/2022 03:14 PM    Specific gravity 1.028 11/10/2022 03:14 PM    Specific gravity >1.030 (H) 10/25/2022 04:54 PM    pH (UA) 6.0 11/10/2022 03:14 PM    Protein Negative 11/10/2022 03:14 PM    Glucose Negative 11/10/2022 03:14 PM    Ketone Negative 11/10/2022 03:14 PM    Bilirubin Negative 11/10/2022 03:14 PM    Urobilinogen 1.0 11/10/2022 03:14 PM    Nitrites Negative 11/10/2022 03:14 PM    Leukocyte Esterase Negative 11/10/2022 03:14 PM    Epithelial cells FEW 11/10/2022 03:14 PM    Bacteria Negative 11/10/2022 03:14 PM    WBC 0-4 11/10/2022 03:14 PM    RBC 0-5 11/10/2022 03:14 PM         Medications Reviewed:     Current Facility-Administered Medications   Medication Dose Route Frequency    0.9% sodium chloride infusion  100 mL/hr IntraVENous CONTINUOUS    enoxaparin (LOVENOX) injection 90 mg  1 mg/kg SubCUTAneous Q12H    [Held by provider] furosemide (LASIX) injection 20 mg  20 mg IntraVENous DAILY    sodium chloride (NS) flush 5-10 mL  5-10 mL IntraVENous PRN    senna-docusate (PERICOLACE) 8.6-50 mg per tablet 2 Tablet  2 Tablet Oral DAILY    metoprolol tartrate (LOPRESSOR) tablet 12.5 mg  12.5 mg Oral Q12H    polyethylene glycol (MIRALAX) packet 17 g  17 g Oral DAILY PRN    thiamine HCL (B-1) tablet 100 mg  100 mg Oral DAILY    therapeutic multivitamin (THERAGRAN) tablet 1 Tablet  1 Tablet Oral DAILY    acetaminophen (TYLENOL) solution 650 mg  650 mg Per NG tube Q4H PRN    sodium chloride (NS) flush 5-40 mL  5-40 mL IntraVENous Q8H    sodium chloride (NS) flush 5-40 mL  5-40 mL IntraVENous PRN    atorvastatin (LIPITOR) tablet 10 mg  10 mg Oral QHS    levothyroxine (SYNTHROID) tablet 88 mcg  88 mcg Oral ACB    pantoprazole (PROTONIX) tablet 40 mg  40 mg Oral ACB    sodium chloride (NS) flush 5-10 mL  5-10 mL IntraVENous PRN    acetaminophen (TYLENOL) suppository 650 mg  650 mg Rectal Q6H PRN     ______________________________________________________________________  EXPECTED LENGTH OF STAY: 2d 7h  ACTUAL LENGTH OF STAY:          23                 Max Richmond MD

## 2022-11-19 NOTE — PROGRESS NOTES
Per RN, rectal and oral temperatures are normal.  Those above 100 spikes of temperature or axillary.

## 2022-11-19 NOTE — PROGRESS NOTES
Cm contacted the liaison at [facility] , #[phone] asking about bed availability. [facility] does not have a bed available today but informed cm to check tomorrow morning.

## 2022-11-20 LAB
ANION GAP SERPL CALC-SCNC: 4 MMOL/L (ref 5–15)
BASOPHILS # BLD: 0 K/UL (ref 0–0.1)
BASOPHILS NFR BLD: 0 % (ref 0–1)
BUN SERPL-MCNC: 29 MG/DL (ref 6–20)
BUN/CREAT SERPL: 83 (ref 12–20)
CALCIUM SERPL-MCNC: 8.4 MG/DL (ref 8.5–10.1)
CHLORIDE SERPL-SCNC: 108 MMOL/L (ref 97–108)
CO2 SERPL-SCNC: 30 MMOL/L (ref 21–32)
CREAT SERPL-MCNC: 0.35 MG/DL (ref 0.55–1.02)
DIFFERENTIAL METHOD BLD: ABNORMAL
EOSINOPHIL # BLD: 0.1 K/UL (ref 0–0.4)
EOSINOPHIL NFR BLD: 1 % (ref 0–7)
ERYTHROCYTE [DISTWIDTH] IN BLOOD BY AUTOMATED COUNT: 14.6 % (ref 11.5–14.5)
GLUCOSE SERPL-MCNC: 112 MG/DL (ref 65–100)
HCT VFR BLD AUTO: 31.3 % (ref 35–47)
HGB BLD-MCNC: 10.1 G/DL (ref 11.5–16)
IMM GRANULOCYTES # BLD AUTO: 0 K/UL (ref 0–0.04)
IMM GRANULOCYTES NFR BLD AUTO: 0 % (ref 0–0.5)
LYMPHOCYTES # BLD: 2.4 K/UL (ref 0.8–3.5)
LYMPHOCYTES NFR BLD: 23 % (ref 12–49)
MCH RBC QN AUTO: 28.9 PG (ref 26–34)
MCHC RBC AUTO-ENTMCNC: 32.3 G/DL (ref 30–36.5)
MCV RBC AUTO: 89.7 FL (ref 80–99)
MONOCYTES # BLD: 0.7 K/UL (ref 0–1)
MONOCYTES NFR BLD: 7 % (ref 5–13)
NEUTS SEG # BLD: 7.1 K/UL (ref 1.8–8)
NEUTS SEG NFR BLD: 69 % (ref 32–75)
NRBC # BLD: 0 K/UL (ref 0–0.01)
NRBC BLD-RTO: 0 PER 100 WBC
PLATELET # BLD AUTO: 323 K/UL (ref 150–400)
PMV BLD AUTO: 11.4 FL (ref 8.9–12.9)
POTASSIUM SERPL-SCNC: 4.4 MMOL/L (ref 3.5–5.1)
RBC # BLD AUTO: 3.49 M/UL (ref 3.8–5.2)
SODIUM SERPL-SCNC: 142 MMOL/L (ref 136–145)
UFH PPP CHRO-ACNC: 0.95 IU/ML
WBC # BLD AUTO: 10.3 K/UL (ref 3.6–11)

## 2022-11-20 PROCEDURE — 36415 COLL VENOUS BLD VENIPUNCTURE: CPT

## 2022-11-20 PROCEDURE — 65660000001 HC RM ICU INTERMED STEPDOWN

## 2022-11-20 PROCEDURE — 80048 BASIC METABOLIC PNL TOTAL CA: CPT

## 2022-11-20 PROCEDURE — 74011000250 HC RX REV CODE- 250: Performed by: FAMILY MEDICINE

## 2022-11-20 PROCEDURE — 74011250636 HC RX REV CODE- 250/636: Performed by: STUDENT IN AN ORGANIZED HEALTH CARE EDUCATION/TRAINING PROGRAM

## 2022-11-20 PROCEDURE — 77030038269 HC DRN EXT URIN PURWCK BARD -A

## 2022-11-20 PROCEDURE — 74011250637 HC RX REV CODE- 250/637: Performed by: HOSPITALIST

## 2022-11-20 PROCEDURE — 77030018798 HC PMP KT ENTRL FED COVD -A

## 2022-11-20 PROCEDURE — 74011250637 HC RX REV CODE- 250/637: Performed by: NURSE PRACTITIONER

## 2022-11-20 PROCEDURE — 85025 COMPLETE CBC W/AUTO DIFF WBC: CPT

## 2022-11-20 PROCEDURE — 74011250637 HC RX REV CODE- 250/637: Performed by: PHYSICIAN ASSISTANT

## 2022-11-20 PROCEDURE — 85520 HEPARIN ASSAY: CPT

## 2022-11-20 RX ORDER — TIZANIDINE 4 MG/1
2 TABLET ORAL
Status: DISCONTINUED | OUTPATIENT
Start: 2022-11-20 | End: 2022-11-20

## 2022-11-20 RX ADMIN — ENOXAPARIN SODIUM 90 MG: 100 INJECTION SUBCUTANEOUS at 20:02

## 2022-11-20 RX ADMIN — PANTOPRAZOLE SODIUM 40 MG: 40 TABLET, DELAYED RELEASE ORAL at 07:31

## 2022-11-20 RX ADMIN — THERA TABS 1 TABLET: TAB at 10:04

## 2022-11-20 RX ADMIN — METOPROLOL TARTRATE 12.5 MG: 25 TABLET, FILM COATED ORAL at 10:04

## 2022-11-20 RX ADMIN — SODIUM CHLORIDE, PRESERVATIVE FREE 10 ML: 5 INJECTION INTRAVENOUS at 22:20

## 2022-11-20 RX ADMIN — SODIUM CHLORIDE, PRESERVATIVE FREE 10 ML: 5 INJECTION INTRAVENOUS at 07:32

## 2022-11-20 RX ADMIN — LEVOTHYROXINE SODIUM 88 MCG: 0.09 TABLET ORAL at 07:31

## 2022-11-20 RX ADMIN — ATORVASTATIN CALCIUM 10 MG: 40 TABLET, FILM COATED ORAL at 22:15

## 2022-11-20 RX ADMIN — SODIUM CHLORIDE, PRESERVATIVE FREE 10 ML: 5 INJECTION INTRAVENOUS at 20:02

## 2022-11-20 RX ADMIN — Medication 100 MG: at 10:04

## 2022-11-20 RX ADMIN — ENOXAPARIN SODIUM 90 MG: 100 INJECTION SUBCUTANEOUS at 07:31

## 2022-11-20 RX ADMIN — METOPROLOL TARTRATE 12.5 MG: 25 TABLET, FILM COATED ORAL at 22:15

## 2022-11-20 NOTE — PROGRESS NOTES
[address] [facility] Adult  Hospitalist Group                                                                                          Hospitalist Progress Note  Brayden Cardoso MD  Answering service: [phone] from in house phone        Date of Service:  2022  NAME:  Caitlin Roman  :  1965  MRN:  [id number]      Admission Summary:     Caitlin Roman is a 59 y.o. female with past medical history of anoxic brain injury, anxiety, depression, hypertension, hyperlipidemia, hypothyroidism presented to the emergency department via EMS from [address] [place] [address] with reported altered mental status (AMS). Patient is aphasic/nonverbal and not answering any questions. Majority of history was obtained per review of chart records. Per reports patient recently was hospitalized at [facility] from 2022-10/20/2022 with diagnosis of status epilepticus, acute metabolic encephalopathy, delirium, psychosis, generalized anxiety disorder, debility, Takotsubo cardiomyopathy, NSTEMI, bradycardia, SIRS, tachycardia, fever, leukocytosis, and CVA. Patient had work-up including MRI and also EEG. There was concern the patient may have some anoxic brain injury. She has been followed by psychiatry and neurology services with persistent encephalopathy and intermittent severe aggression. Patient has had aphasia and altered mental status not following commands per staff report since last week. Symptoms remain constant, severe, without specific exacerbating relieving factors. About emergency department today, initial recorded vital signs temperature 98.8 °F, /65, heart rate 103, respirate 30, O2 saturation 93% on room air. Abnormal labs included WC 12.5, platelets 621, BUN 23. CT head without IV contrast showed no acute intracranial abnormalities. ED request admission to the hospitalist service.      No acute event overnight, less rigidity, stable and transferred out of ICU on 10/29    Interval history / Subjective:      Patient's neurology can clinical status unchanged. Awake but not interactive. Grimaces to pain. Clinically stable. Mother and father at the bedside. Mother noted stiffening of the muscles and neck. Patient has generalized spasticity from the encephalopathy, we discussed risk-benefit and agreed to try low-dose muscle relaxant. Assessment & Plan:     Recurrent low-grade fevers. Fevers initially felt to be due to PE, due to the recurrence although low-grade would like to initiate basic sepsis work-up. --CXR, UA, lactic acid, procalcitonin on 11/18 were negative  --Viral PCR including COVID-negative. UA negative. Chest x-ray negative. Blood culture negative. Patient's low-grade fevers were axillary however she is not afebrile by rectal or oral temperature. Discussed with nursing to only check oral or rectal temperature. Moderate to Large Pulmonary Embolism   - Elevated temperatures 11/10, and intermittently prior   - Infectious work-up negative, s/p cefepime/vanc. CTA demosntrated moderate to large pulmonary embolism with mild RHS   - LE duplex with age-indeterminant DVT in left peroneal veins   - Consulted vascular, no surgical intervention at this time  - ECHO with moderately dilated RV, moderately reduced systolic function, and lateral wall hypokinesis   - Continue lovenox therapeutic BID  -On room air. 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 on 10/20. While at [facility], developed changes that were concerning for serotonin syndrome and she was admitted to [facility] [place].  During this admission, patient's mental status has deteriorated. -MRI with interval development of diffuse abnormal supratentorial white matter with extensive FLAIR/T2 hyperintense signal and diffusion restriction. Differential include acute or toxic encephalopathy.  - ANCA negative. Glutamic acid decarb negative <5.0. ADIN not sent. CSF culture no growth, normal protein, HSV negative, myelin basic protein pending.   -Continue thiamine  - Unable to make medical decision, agree with plan for family to pursue mPOA as next of kin   11/6-[facility] rejected transfer. Continue current management. -PEG placed on 11/8   - Concern for seizure like activity overnight 11/10, ceribell rapid EEG negative for seizure. Likely roving eye movements 2/2 metabolic encephalopathy and supportive care recommended   - Repeat MRI Brain unchanged from previous   - Discussed case with Neurologist Dr Sudheer Medrano, patient does not have a favorable prognosis. It is unclear how long and to what extent any recovery would be, however patient likely to always have some neurologic deficits and require significant support. - Offered palliative care to family, they declined at this time     General spasticity due to the encephalopathy as well as lack of mobility  Will try low-dose baclofen 2.5 mg 3 times daily as needed, discussed risk/side effects of muscle relaxants including sedation with her parents. She will need structured PT/OT    Bilateral UE edema, stable   - Noted to have 2+ bilateral UE edema  - Duplex UEs to eval for clot negative for DVT, does have superficial thrombus bilaterally   - Elevated arms  - Continue IV lasix     Hx of Takotsubo cardiomyopathy  -has peripheral edema  -last Echo was on 10/12 EF 55-60%   -BP stable, trending high, resume metoprolol. Continue Lasix  -monitor I/O and daily weight   - Repeat ECHO 11/14: EF 60-65%     HTN  -Resume metoprolol that was held as BP is trending up and she is mildly tachycardic. Hyperlipidemia   -CK is normalized, continue statins. Hypothyroidism   -continue synthroid      Generalized anxiety disorder  -home trazodone and lexapro on hold may not need as non responsive now    Severe disability with immobility   -continue supportive care      Obesity   -BMI 32.52 kg/m2    Diet: N.p.o.  Continue tube feeding          Code status: Full code  Prophylaxis: therapeutic lovenox  Care Plan discussed with: Nurse, family  Anticipated Disposition: Medically ready for discharge. Hospital Problems  Date Reviewed: 11/8/2022            Codes Class Noted POA    Altered mental status ICD-10-CM: R41.82  ICD-9-CM: 780.97  10/27/2022 Unknown        Diarrhea ICD-10-CM: R19.7  ICD-9-CM: 787.91  10/25/2022 Unknown        AMS (altered mental status) ICD-10-CM: R41.82  ICD-9-CM: 780.97  10/25/2022 Unknown       Vital Signs:    Last 24hrs VS reviewed since prior progress note. Most recent are:  Visit Vitals  /68   Pulse (!) 103   Temp 98.9 °F (37.2 °C)   Resp 21   Ht 5' 6\" (1.676 m)   Wt 90.5 kg (199 lb 8.3 oz)   SpO2 93%   BMI 32.20 kg/m²         Intake/Output Summary (Last 24 hours) at 11/20/2022 1504  Last data filed at 11/20/2022 0800  Gross per 24 hour   Intake 1556 ml   Output 200 ml   Net 1356 ml          Physical Examination:     I had a face to face encounter with this patient and independently examined them on 11/20/2022 as outlined below:  Vital signs: , SPO2 100% on room air, RR 23        Constitutional: Awake with eyes open, not interactive. ENT: Oral mucosa moist, oropharynx benign. Resp: On oxygen via nasal cannula, symmetrical air entry bilaterally without added sounds. CV: Tachycardic, irregularly irregular. GI:  Soft, non distended, non tender. normoactive bowel sounds, no hepatosplenomegaly   PEG tube present in the epigastric area. Musculoskeletal: Bilateral pretibial, pedal and hand edema  Generalized increased muscle tone and spasticity    Neurologic: Awake.   Nonverbal.  Extremities spastic more pronounced on the right lower extremity. Otherwise neuro exam is limited due to patient's status            Data Review:    Review and/or order of clinical lab test  Review and/or order of tests in the radiology section of CPT  Review and/or order of tests in the medicine section of CPT      Labs:     Recent Labs     11/20/22  0939 11/18/22  1729   WBC 10.3 11.6*   HGB 10.1* 11.0*   HCT 31.3* 33.9*    622*       Recent Labs     11/20/22  0939      K 4.4      CO2 30   BUN 29*   CREA 0.35*   *   CA 8.4*       No results for input(s): ALT, AP, TBIL, TBILI, TP, ALB, GLOB, GGT, AML, LPSE in the last 72 hours. No lab exists for component: SGOT, GPT, AMYP, HLPSE      No results for input(s): INR, PTP, APTT, INREXT, INREXT in the last 72 hours. No results for input(s): FE, TIBC, PSAT, FERR in the last 72 hours. Lab Results   Component Value Date/Time    Folate 9.9 10/26/2022 11:47 AM        No results for input(s): PH, PCO2, PO2 in the last 72 hours.   Recent Labs     11/18/22  0130   CPK 52         Lab Results   Component Value Date/Time    Cholesterol, total 145 10/26/2022 06:46 AM    HDL Cholesterol 30 10/26/2022 06:46 AM    LDL, calculated 91.4 10/26/2022 06:46 AM    Triglyceride 118 10/26/2022 06:46 AM    CHOL/HDL Ratio 4.8 10/26/2022 06:46 AM     Lab Results   Component Value Date/Time    Glucose (POC) 110 10/05/2022 04:55 PM    Glucose (POC) 87 10/05/2022 11:02 AM    Glucose (POC) 78 10/05/2022 05:00 AM    Glucose (POC) 94 10/05/2022 04:57 AM    Glucose (POC) 124 (H) 10/04/2022 11:03 PM     Lab Results   Component Value Date/Time    Color DARK YELLOW 11/19/2022 11:08 AM    Appearance CLEAR 11/19/2022 11:08 AM    Specific gravity 1.025 11/19/2022 11:08 AM    Specific gravity 1.028 11/10/2022 03:14 PM    pH (UA) 5.5 11/19/2022 11:08 AM    Protein TRACE (A) 11/19/2022 11:08 AM    Glucose Negative 11/19/2022 11:08 AM    Ketone Negative 11/19/2022 11:08 AM    Bilirubin Negative 11/19/2022 11:08 AM Urobilinogen 1.0 11/19/2022 11:08 AM    Nitrites Negative 11/19/2022 11:08 AM    Leukocyte Esterase Negative 11/19/2022 11:08 AM    Epithelial cells FEW 11/19/2022 11:08 AM    Bacteria Negative 11/19/2022 11:08 AM    WBC 0-4 11/19/2022 11:08 AM    RBC 0-5 11/19/2022 11:08 AM         Medications Reviewed:     Current Facility-Administered Medications   Medication Dose Route Frequency    baclofen (LIORESAL) 5 mg/mL oral suspension 2.5 mg  2.5 mg Oral Q8H PRN    enoxaparin (LOVENOX) injection 90 mg  1 mg/kg SubCUTAneous Q12H    [Held by provider] furosemide (LASIX) injection 20 mg  20 mg IntraVENous DAILY    sodium chloride (NS) flush 5-10 mL  5-10 mL IntraVENous PRN    senna-docusate (PERICOLACE) 8.6-50 mg per tablet 2 Tablet  2 Tablet Oral DAILY    metoprolol tartrate (LOPRESSOR) tablet 12.5 mg  12.5 mg Oral Q12H    polyethylene glycol (MIRALAX) packet 17 g  17 g Oral DAILY PRN    thiamine HCL (B-1) tablet 100 mg  100 mg Oral DAILY    therapeutic multivitamin (THERAGRAN) tablet 1 Tablet  1 Tablet Oral DAILY    acetaminophen (TYLENOL) solution 650 mg  650 mg Per NG tube Q4H PRN    sodium chloride (NS) flush 5-40 mL  5-40 mL IntraVENous Q8H    sodium chloride (NS) flush 5-40 mL  5-40 mL IntraVENous PRN    atorvastatin (LIPITOR) tablet 10 mg  10 mg Oral QHS    levothyroxine (SYNTHROID) tablet 88 mcg  88 mcg Oral ACB    pantoprazole (PROTONIX) tablet 40 mg  40 mg Oral ACB    sodium chloride (NS) flush 5-10 mL  5-10 mL IntraVENous PRN    acetaminophen (TYLENOL) suppository 650 mg  650 mg Rectal Q6H PRN     ______________________________________________________________________  EXPECTED LENGTH OF STAY: 2d 7h  ACTUAL LENGTH OF STAY:          24                 Jacquie De Luna MD

## 2022-11-20 NOTE — PROGRESS NOTES
Problem: Pressure Injury - Risk of  Goal: *Prevention of pressure injury  Description: Document Jose Enrique Scale and appropriate interventions in the flowsheet. Outcome: Progressing Towards Goal  Note: Pressure Injury Interventions:  Sensory Interventions: Assess changes in LOC, Check visual cues for pain, Float heels, Keep linens dry and wrinkle-free, Suspension boots, Turn and reposition approx. every two hours (pillows and wedges if needed)    Moisture Interventions: Absorbent underpads    Activity Interventions: Pressure redistribution bed/mattress(bed type)    Mobility Interventions: PT/OT evaluation, Turn and reposition approx. every two hours(pillow and wedges)    Nutrition Interventions: Document food/fluid/supplement intake    Friction and Shear Interventions: Apply protective barrier, creams and emollients, Foam dressings/transparent film/skin sealants, Lift sheet, Transferring/repositioning devices                Problem: Patient Education: Go to Patient Education Activity  Goal: Patient/Family Education  Outcome: Progressing Towards Goal     Problem: Patient Education: Go to Patient Education Activity  Goal: Patient/Family Education  Outcome: Progressing Towards Goal     Problem: Patient Education: Go to Patient Education Activity  Goal: Patient/Family Education  Outcome: Progressing Towards Goal     Problem: Patient Education: Go to Patient Education Activity  Goal: Patient/Family Education  Outcome: Progressing Towards Goal     Problem: Falls - Risk of  Goal: *Absence of Falls  Description: Document Chyna Fall Risk and appropriate interventions in the flowsheet.   Outcome: Progressing Towards Goal  Note: Fall Risk Interventions:  Mobility Interventions: (P) OT consult for ADLs, PT Consult for mobility concerns, Communicate number of staff needed for ambulation/transfer    Mentation Interventions: (P) Adequate sleep, hydration, pain control, Family/sitter at bedside, Increase mobility, Toileting rounds    Medication Interventions: (P) Evaluate medications/consider consulting pharmacy    Elimination Interventions: (P) Call light in reach, Patient to call for help with toileting needs, Toileting schedule/hourly rounds    History of Falls Interventions: (P) Consult care management for discharge planning, Investigate reason for fall         Problem: Patient Education: Go to Patient Education Activity  Goal: Patient/Family Education  Outcome: Progressing Towards Goal     Problem: Discharge Planning  Goal: *Discharge to safe environment  Outcome: Progressing Towards Goal  Goal: *Knowledge of medication management  Outcome: Progressing Towards Goal  Goal: *Knowledge of discharge instructions  Outcome: Progressing Towards Goal     Problem: Patient Education: Go to Patient Education Activity  Goal: Patient/Family Education  Outcome: Progressing Towards Goal     Problem: Nutrition Deficit  Goal: *Optimize nutritional status  Outcome: Progressing Towards Goal     Problem: Patient Education: Go to Patient Education Activity  Goal: Patient/Family Education  Outcome: Progressing Towards Goal

## 2022-11-20 NOTE — PROGRESS NOTES
Problem: Pressure Injury - Risk of  Goal: *Prevention of pressure injury  Description: Document Jose Enrique Scale and appropriate interventions in the flowsheet. Outcome: Progressing Towards Goal  Note: Pressure Injury Interventions:  Sensory Interventions: Assess changes in LOC, Float heels, Keep linens dry and wrinkle-free, Check visual cues for pain, Turn and reposition approx. every two hours (pillows and wedges if needed)    Moisture Interventions: Absorbent underpads, Apply protective barrier, creams and emollients, Check for incontinence Q2 hours and as needed    Activity Interventions: Pressure redistribution bed/mattress(bed type)    Mobility Interventions: Turn and reposition approx. every two hours(pillow and wedges), Suspension boots, Float heels, Pressure redistribution bed/mattress (bed type)    Nutrition Interventions: Document food/fluid/supplement intake    Friction and Shear Interventions: Apply protective barrier, creams and emollients, Lift sheet, Foam dressings/transparent film/skin sealants, Transferring/repositioning devices                Problem: Patient Education: Go to Patient Education Activity  Goal: Patient/Family Education  Outcome: Progressing Towards Goal     Problem: Patient Education: Go to Patient Education Activity  Goal: Patient/Family Education  Outcome: Progressing Towards Goal     Problem: Patient Education: Go to Patient Education Activity  Goal: Patient/Family Education  Outcome: Progressing Towards Goal     Problem: Patient Education: Go to Patient Education Activity  Goal: Patient/Family Education  Outcome: Progressing Towards Goal     Problem: Falls - Risk of  Goal: *Absence of Falls  Description: Document Chyna Fall Risk and appropriate interventions in the flowsheet.   Outcome: Progressing Towards Goal  Note: Fall Risk Interventions:  Mobility Interventions: Communicate number of staff needed for ambulation/transfer, OT consult for ADLs, Patient to call before getting OOB, PT Consult for mobility concerns    Mentation Interventions: Adequate sleep, hydration, pain control, Increase mobility, Toileting rounds    Medication Interventions: Evaluate medications/consider consulting pharmacy    Elimination Interventions: Call light in reach, Patient to call for help with toileting needs, Toileting schedule/hourly rounds    History of Falls Interventions: Consult care management for discharge planning, Investigate reason for fall, Door open when patient unattended, Vital signs minimum Q4HRs X 24 hrs (comment for end date)         Problem: Patient Education: Go to Patient Education Activity  Goal: Patient/Family Education  Outcome: Progressing Towards Goal     Problem: Discharge Planning  Goal: *Discharge to safe environment  Outcome: Progressing Towards Goal  Goal: *Knowledge of medication management  Outcome: Progressing Towards Goal  Goal: *Knowledge of discharge instructions  Outcome: Progressing Towards Goal     Problem: Patient Education: Go to Patient Education Activity  Goal: Patient/Family Education  Outcome: Progressing Towards Goal     Problem: Nutrition Deficit  Goal: *Optimize nutritional status  Outcome: Progressing Towards Goal     Problem: Patient Education: Go to Patient Education Activity  Goal: Patient/Family Education  Outcome: Progressing Towards Goal

## 2022-11-20 NOTE — PROGRESS NOTES
Problem: Pressure Injury - Risk of  Goal: *Prevention of pressure injury  Description: Document Jose Enrique Scale and appropriate interventions in the flowsheet. Outcome: Progressing Towards Goal  Note: Pressure Injury Interventions:  Sensory Interventions: Assess changes in LOC, Assess need for specialty bed, Avoid rigorous massage over bony prominences, Keep linens dry and wrinkle-free, Minimize linen layers    Moisture Interventions: Absorbent underpads, Apply protective barrier, creams and emollients, Assess need for specialty bed, Check for incontinence Q2 hours and as needed    Activity Interventions: Pressure redistribution bed/mattress(bed type)    Mobility Interventions: PT/OT evaluation, Turn and reposition approx. every two hours(pillow and wedges)    Nutrition Interventions: Document food/fluid/supplement intake    Friction and Shear Interventions: Apply protective barrier, creams and emollients, HOB 30 degrees or less, Lift sheet       Problem: Falls - Risk of  Goal: *Absence of Falls  Description: Document Chyna Fall Risk and appropriate interventions in the flowsheet.   Outcome: Progressing Towards Goal  Note: Fall Risk Interventions:  Mobility Interventions: Strengthening exercises (ROM-active/passive), PT Consult for mobility concerns    Mentation Interventions: Adequate sleep, hydration, pain control, Bed/chair exit alarm, More frequent rounding    Medication Interventions: Evaluate medications/consider consulting pharmacy, Bed/chair exit alarm    Elimination Interventions: Bed/chair exit alarm, Toileting schedule/hourly rounds    History of Falls Interventions: Door open when patient unattended         Problem: Nutrition Deficit  Goal: *Optimize nutritional status  Outcome: Progressing Towards Goal

## 2022-11-21 PROCEDURE — 97112 NEUROMUSCULAR REEDUCATION: CPT

## 2022-11-21 PROCEDURE — 74011250637 HC RX REV CODE- 250/637: Performed by: PHYSICIAN ASSISTANT

## 2022-11-21 PROCEDURE — 97530 THERAPEUTIC ACTIVITIES: CPT

## 2022-11-21 PROCEDURE — 2709999900 HC NON-CHARGEABLE SUPPLY

## 2022-11-21 PROCEDURE — 74011250637 HC RX REV CODE- 250/637: Performed by: HOSPITALIST

## 2022-11-21 PROCEDURE — 74011250636 HC RX REV CODE- 250/636: Performed by: STUDENT IN AN ORGANIZED HEALTH CARE EDUCATION/TRAINING PROGRAM

## 2022-11-21 PROCEDURE — 74011000250 HC RX REV CODE- 250: Performed by: FAMILY MEDICINE

## 2022-11-21 PROCEDURE — 74011250637 HC RX REV CODE- 250/637: Performed by: NURSE PRACTITIONER

## 2022-11-21 PROCEDURE — 65660000001 HC RM ICU INTERMED STEPDOWN

## 2022-11-21 PROCEDURE — 97168 OT RE-EVAL EST PLAN CARE: CPT

## 2022-11-21 RX ORDER — FUROSEMIDE 20 MG/1
20 TABLET ORAL DAILY
Status: DISCONTINUED | OUTPATIENT
Start: 2022-11-21 | End: 2022-11-22 | Stop reason: HOSPADM

## 2022-11-21 RX ADMIN — ENOXAPARIN SODIUM 90 MG: 100 INJECTION SUBCUTANEOUS at 06:56

## 2022-11-21 RX ADMIN — Medication 100 MG: at 08:58

## 2022-11-21 RX ADMIN — THERA TABS 1 TABLET: TAB at 08:58

## 2022-11-21 RX ADMIN — SODIUM CHLORIDE, PRESERVATIVE FREE 10 ML: 5 INJECTION INTRAVENOUS at 07:02

## 2022-11-21 RX ADMIN — FUROSEMIDE 20 MG: 20 TABLET ORAL at 10:55

## 2022-11-21 RX ADMIN — PANTOPRAZOLE SODIUM 40 MG: 40 TABLET, DELAYED RELEASE ORAL at 06:56

## 2022-11-21 RX ADMIN — LEVOTHYROXINE SODIUM 88 MCG: 0.09 TABLET ORAL at 06:56

## 2022-11-21 RX ADMIN — SODIUM CHLORIDE, PRESERVATIVE FREE 10 ML: 5 INJECTION INTRAVENOUS at 17:26

## 2022-11-21 RX ADMIN — METOPROLOL TARTRATE 12.5 MG: 25 TABLET, FILM COATED ORAL at 21:56

## 2022-11-21 RX ADMIN — ATORVASTATIN CALCIUM 10 MG: 40 TABLET, FILM COATED ORAL at 21:57

## 2022-11-21 RX ADMIN — APIXABAN 5 MG: 5 TABLET, FILM COATED ORAL at 17:26

## 2022-11-21 RX ADMIN — METOPROLOL TARTRATE 12.5 MG: 25 TABLET, FILM COATED ORAL at 08:58

## 2022-11-21 RX ADMIN — SODIUM CHLORIDE, PRESERVATIVE FREE 10 ML: 5 INJECTION INTRAVENOUS at 21:57

## 2022-11-21 NOTE — PROGRESS NOTES
Transition of Care Plan  RUR- Med 19%  DISPOSITION: SNF- [facility] - pending insurance auth  F/U with PCP/Specialist    Transport: [facility]    Emergency contact: Mother, Ousmane Rad [phone] NARDA ARCHIBALD are 3 sons, but mother has been deemed spokesperson)    CM barriers to discharge: Bed availability    No bed was available over the weekend for patient at [facility]. CM spoke with [address] [place] [address] Street, new insurance Tran was submitted this morning, as it  . CM will follow and arrange discharge once auth is received. Patient is medically stable. Psych to see to determine capacity as patient's mother is seeking guardianship.     12:34pm: Patient's insurance has requested therapy notes for inusrance auth. PT/OT had signed off, new therapy consults placed. Avani Baez M.S.GELY.

## 2022-11-21 NOTE — PROGRESS NOTES
Problem: Self Care Deficits Care Plan (Adult)  Goal: *Acute Goals and Plan of Care (Insert Text)  Description: FUNCTIONAL STATUS PRIOR TO ADMISSION: Patient was independent and active without use of DME until Sept. 97, 2022 when pt hospitalized for status epilepticus and now with anoxic brain injury. Pt has been at [facility] since 10/20/22 when pt discharged from [address] [place] [address] [place]: The patient lived alone with limited local support. Occupational Therapy Goals  Re-Evaluation 11/21/22 for SNF placement   1. Patient will perform 1 simple grooming task with maximal assistance (hand over hand) within 7 day(s). 2.  Patient will follow 5% 1 step, simple commands with max cues within 7 day(s). 3.  Patient will participate in upper extremity therapeutic exercise/activities with maximal assistance for 10 minutes within 7 day(s). Weekly Re-Assessment 11/9/22, goals modified/continued below  1. Patient will perform face washing with maximal assistance, hand over hand, within 7 day(s). CONTINUE  2. Patient will follow 5% 1 step, simple commands with max cues within 7 day(s). CONTINUE  3. Patient will perform toilet transfers to [facility] with total assistance within 7 day(s). DOWNGRADED to rolling in supine for toileting with Max A 11/9  4. Patient will participate in upper extremity therapeutic exercise/activities with maximal assistance for 10 minutes within 7 day(s). DOWNGRADED to 5 minutes   5. Patient will perform EOB activity with no more than maximal assistance for sitting balance in prep for seated ADLs within 7 day(s). CONTINUE  6. Patient will perform visual scanning in all visual fields with maximal cueing within 7 day(s). CONTINUE    Weekly Re-Assessment 11/1/22, goals modified/continued below  Initiated 10/26/2022  1. Patient will perform face washing with maximal assistance, hand over hand, within 7 day(s). CONTINUE  2. Patient will follow 5% 1 step, simple commands with max cues within 7 day(s). CONTINUE  3. Patient will perform toilet transfers to [facility] with total assistance within 7 day(s). CONTINUE  4.  Patient will participate in upper extremity therapeutic exercise/activities with maximal assistance for 10 minutes within 7 day(s). CONTINUE  5. Patient will perform EOB activity with no more than maximal assistance for sitting balance in prep for seated ADLs within 7 day(s). ADDED 11/1  6. Patient will perform visual scanning in all visual fields with maximal cueing within 7 day(s). ADDED 11/1    Outcome: Not Met     OCCUPATIONAL THERAPY RE-EVALUATION  Patient: Moy Hendrickson (48 y.o. female)  Date: 11/21/2022  Diagnosis: AMS (altered mental status) [R41.82]  Diarrhea [R19.7]  Altered mental status [R41.82] <principal problem not specified>  Procedure(s) (LRB):  PERCUTANEOUS ENDOSCOPIC GASTROSTOMY TUBE INSERTION (N/A)  ESOPHAGOGASTRODUODENOSCOPY (EGD) (N/A) 13 Days Post-Op  Precautions: Fall, Skin, Seizure  Chart, occupational therapy assessment, plan of care, and goals were reviewed. ASSESSMENT  Based on the objective data described below, patient presents with no functional command following, global hypertonicity (L>R), and absence of initiation for bed level ADLs and mobility. She continues to require Total A x1-2 for all bed mobility, Total A for grooming with no initiation noted, facilitation required for all functional activity. Global hypertonicity noted, PROM provided to all extremities and cervical ROM d/t L cervical rotation and gaze preference, no tracking noted in R VFs, only slight tracking of family member in LLQ, inconsistent. Decreased tremoring noted from prior sessions (started on muscle relaxer), however increased as activity progressed. Anticipate slow gains as able, recommend d/c to SNF vs LTC. Current Level of Function Impacting Discharge (ADLs): Total A x1-2 for ADLs and bed mobility    Other factors to consider for discharge: PMH, PLOF, assist of 2, prognosis?          PLAN :  Recommendations and Planned Interventions: self care training, functional mobility training, therapeutic exercise, balance training, visual/perceptual training, therapeutic activities, cognitive retraining, endurance activities, neuromuscular re-education, patient education, home safety training, and family training/education    Frequency/Duration: Patient will be followed by occupational therapy 1 time a week to address goals. Recommend with staff: Recommend with nursing, ADLs with assist, modified bed in chair 3x/day, and toileting via bedpan with 2 assist. Thank you for completing as able in order to maintain patient strength, endurance and independence. Recommendation for discharge: (in order for the patient to meet his/her long term goals)  Therapy up to 5 days/week in SNF setting/LTC    This discharge recommendation:  Has been made in collaboration with the attending provider and/or case management    Equipment recommendations for successful discharge (if) home: To be determined (TBD)         SUBJECTIVE:   Patient nonverbal    OBJECTIVE DATA SUMMARY:   Hospital course since last seen and reason for reevaluation: s/p re-evaluation for placement    Cognitive/Behavioral Status:  Neurologic State: Eyes open to stimulus; Eyes open to pain;Eyes open spontaneously  Orientation Level: Unable to verbalize  Cognition: Decreased attention/concentration; No command following  Perception: Cues to attend left visual field;Cues to attend right visual field;Cues to attend to left side of body;Cues to attend to right side of body;Cues to maintain midline in sitting  Perseveration: No perseveration noted  Safety/Judgement: Decreased awareness of environment;Decreased awareness of need for assistance;Decreased awareness of need for safety    Skin: Appears intact    Edema: None    Hearing:   Auditory  Auditory Impairment:  (unknown)    Vision/Perceptual:    Tracking:  (L midline and inferior gaze preference, no tracking or scanning R)                                Range of Motion:  AROM: Grossly decreased, non-functional  PROM: Grossly decreased, non-functional                      Strength:  Strength: Grossly decreased, non-functional                Coordination:  Coordination: Grossly decreased, non-functional  Fine Motor Skills-Upper: Left Impaired;Right Impaired    Gross Motor Skills-Upper: Left Impaired;Right Impaired    Tone & Sensation:  Tone: Abnormal (L>R hypertonicity, global)  Sensation:  (unable to assess)                        Functional Mobility and Transfers for ADLs:  Bed Mobility:  Rolling: Total assistance (Total A x1 to the L, Total A x2 to the R)  Scooting: Total assistance;Assist x2    Transfers:     Functional Transfers  Toilet Transfer : Total assistance (A x2 for bedpan)       Balance:  Sitting: Impaired; With support  Sitting - Static: Poor (constant support)    ADL Assessment:  Feeding: Total assistance (PEG)    Oral Facial Hygiene/Grooming: Total assistance    Bathing: Total assistance         Upper Body Dressing: Total assistance    Lower Body Dressing: Total assistance    Toileting: Total assistance                ADL Intervention and task modifications:  Feeding  Feeding Assistance: Total assistance (dependent) (PEG)    Grooming  Grooming Assistance: Total assistance(dependent)  Position Performed:  (semi fowlers)  Washing Face: Total assistance (dependent)      Toileting  Toileting Assistance:  Total assistance(dependent) (incontinent, brief)    Cognitive Retraining  Safety/Judgement: Decreased awareness of environment;Decreased awareness of need for assistance;Decreased awareness of need for safety    Therapeutic Exercises:   PROM provided to BUE/BLE with decreased tremoring prior to last treatment session     Functional Measure:    Barthel Index:  Bathin  Bladder: 0  Bowels: 0  Groomin  Dressin  Feedin  Mobility: 0  Stairs: 0  Toilet Use: 0  Transfer (Bed to Chair and Back): 0  Total: 0/100      The Barthel ADL Index: Guidelines  1. The index should be used as a record of what a patient does, not as a record of what a patient could do. 2. The main aim is to establish degree of independence from any help, physical or verbal, however minor and for whatever reason. 3. The need for supervision renders the patient not independent. 4. A patient's performance should be established using the best available evidence. Asking the patient, friends/relatives and nurses are the usual sources, but direct observation and common sense are also important. However direct testing is not needed. 5. Usually the patient's performance over the preceding 24-48 hours is important, but occasionally longer periods will be relevant. 6. Middle categories imply that the patient supplies over 50 per cent of the effort. 7. Use of aids to be independent is allowed. Score Interpretation (from [address] [address])    Independent   60-79 Minimally independent   40-59 Partially dependent   20-39 Very dependent   <20 Totally dependent     -Michael Laurent., Barthel, D.W. (1965). Functional evaluation: the Barthel Index. [address] [facility] ([address] (1997). The Barthel activities of daily living index: self-reporting versus actual performance in the old (> or = 75 years). Journal of [address] [place] [address] 45(7), [facility], MARY LOU, Delvin [place]., Martita Wells. (1999). Measuring the change in disability after inpatient rehabilitation; comparison of the responsiveness of the Barthel Index and Functional Center Measure. Journal of Neurology, Neurosurgery, and Psychiatry, 66(4), 252-300. Jimmy Camacho, N.DAISY.HANDY, VALENCIA Moss, & Nicole Davis MMalorieA. (2004) Assessment of post-stroke quality of life in cost-effectiveness studies: The usefulness of the Barthel Index and the EuroQoL-5D.  Quality of Life Research, 13, 427-43         Pain:  Unable to verbalize, grimacing with all PROM and bed mobility    Activity Tolerance:   Poor    After treatment patient left in no apparent distress:   Supine in bed, Call bell within reach, Caregiver / family present, and Side rails x 3    COMMUNICATION/COLLABORATION:   The patients plan of care was discussed with: Registered nurse.      DEEPTI Garcia, OTR/L  Time Calculation: 22 mins

## 2022-11-21 NOTE — PROGRESS NOTES
Bedside and Verbal shift change report given to [address] [place] [address] (oncoming nurse) by Maya Bourgeois RN (offgoing nurse). Report included the following information SBAR, Kardex, MAR, Cardiac Rhythm Sinus tachy, Alarm Parameters , Quality Measures, and Dual Neuro Assessment.

## 2022-11-21 NOTE — ROUTINE PROCESS
Bedside and Verbal shift change report given to Fabiana Arredondo RN (oncoming nurse) by ΛΑΡΝΑΚABIODUN RN (offgoing nurse). Report included the following information SBAR, Kardex, MAR, Cardiac Rhythm NSR-ST, and Dual Neuro Assessment.

## 2022-11-21 NOTE — PROGRESS NOTES
[facility]                                                                                          Hospitalist Progress Note  Brayden Cardoso MD  Answering service: [phone] from in house phone        Date of Service:  2022  NAME:  Caitlin Roman  :  1965  MRN:  [id number]      Admission Summary:     Caitlin Roman is a 59 y.o. female with past medical history of anoxic brain injury, anxiety, depression, hypertension, hyperlipidemia, hypothyroidism presented to the emergency department via EMS from [address] [place] [address] with reported altered mental status (AMS). Patient is aphasic/nonverbal and not answering any questions. Majority of history was obtained per review of chart records. Per reports patient recently was hospitalized at [facility] from 2022-10/20/2022 with diagnosis of status epilepticus, acute metabolic encephalopathy, delirium, psychosis, generalized anxiety disorder, debility, Takotsubo cardiomyopathy, NSTEMI, bradycardia, SIRS, tachycardia, fever, leukocytosis, and CVA. Patient had work-up including MRI and also EEG. There was concern the patient may have some anoxic brain injury. She has been followed by psychiatry and neurology services with persistent encephalopathy and intermittent severe aggression. Patient has had aphasia and altered mental status not following commands per staff report since last week. Symptoms remain constant, severe, without specific exacerbating relieving factors. About emergency department today, initial recorded vital signs temperature 98.8 °F, /65, heart rate 103, respirate 30, O2 saturation 93% on room air. Abnormal labs included WC 12.5, platelets 178, BUN 23. CT head without IV contrast showed no acute intracranial abnormalities. ED request admission to the hospitalist service.      No acute event overnight, less rigidity, stable and transferred out of ICU on 10/29    Interval history / Subjective:      Patient's neurology can clinical status unchanged. Awake but not interactive. Her mother at the bedside, noted muscle spasm is a little better from yesterday after starting low-dose baclofen  Her mother side she is working with a  for guardianship and she requested for physician certification that patient lacks capacity to make medical decisions. Patient has adult children but they have reportedly given up decision-making to her mother. I have asked psychiatry to assess for medical decision capacity for guardianship  Assessment & Plan:     Recurrent low-grade fevers. Fevers initially felt to be due to PE, due to the recurrence although low-grade would like to initiate basic sepsis work-up. --CXR, UA, lactic acid, procalcitonin on 11/18 were negative  --Viral PCR including COVID-negative. UA negative. Chest x-ray negative. Blood culture negative. Patient's low-grade fevers were axillary however she is not afebrile by rectal or oral temperature. Discussed with nursing to only check oral or rectal temperature. Moderate to Large Pulmonary Embolism   - Elevated temperatures 11/10, and intermittently prior   - Infectious work-up negative, s/p cefepime/vanc. CTA demosntrated moderate to large pulmonary embolism with mild RHS   - LE duplex with age-indeterminant DVT in left peroneal veins   - Consulted vascular, no surgical intervention at this time  - ECHO with moderately dilated RV, moderately reduced systolic function, and lateral wall hypokinesis   - Continue lovenox therapeutic BID  -On room air.     Acute metabolic encephalopathy due to suspected serotonin syndrome  Suspected delayed hypoxic leukoencephalopathy POA  Severe oral dysphagia due to above  Suspected anoxic brain injury  Suspected Seizure   - Patient was admitted to [facility] with a long hospitalization after being found unresponsive in her home on 9/26/22, and [facility] While at [facility], developed changes that were concerning for serotonin syndrome and she was admitted to [facility]. During this admission, patient's mental status has deteriorated. -MRI with interval development of diffuse abnormal supratentorial white matter with extensive FLAIR/T2 hyperintense signal and diffusion restriction. Differential include acute or toxic encephalopathy.  - ANCA negative. Glutamic acid decarb negative <5.0. ADIN not sent. CSF culture no growth, normal protein, HSV negative, myelin basic protein pending.   -Continue thiamine  - Unable to make medical decision, agree with plan for family to pursue mPOA as next of kin   11/6-[place] [facility] rejected transfer. Continue current management. -PEG placed on 11/8   - Concern for seizure like activity overnight 11/10, ceribell rapid EEG negative for seizure. Likely roving eye movements 2/2 metabolic encephalopathy and supportive care recommended   - Repeat MRI Brain unchanged from previous   - Discussed case with Neurologist Dr Jayde Costello, patient does not have a favorable prognosis. It is unclear how long and to what extent any recovery would be, however patient likely to always have some neurologic deficits and require significant support. - Offered palliative care to family, they declined at this time     General spasticity due to the encephalopathy as well as lack of mobility  Will try low-dose baclofen 2.5 mg 3 times daily as needed, discussed risk/side effects of muscle relaxants including sedation with her parents. She will need structured PT/OT    Bilateral UE edema, stable   - Noted to have 2+ bilateral UE edema  - Duplex UEs to eval for clot negative for DVT, does have superficial thrombus bilaterally   - Elevated arms  - Continue IV lasix     Hx of Takotsubo cardiomyopathy  -has peripheral edema  -last Echo was on 10/12 EF 55-60%   -BP stable, trending high, resume metoprolol.   Continue Lasix  -monitor I/O and daily weight   - Repeat ECHO 11/14: EF 60-65%     HTN  -Resume metoprolol that was held as BP is trending up and she is mildly tachycardic. Hyperlipidemia   -CK is normalized, continue statins. Hypothyroidism   -continue synthroid      Generalized anxiety disorder  -home trazodone and lexapro on hold may not need as non responsive now    Severe disability with immobility   -continue supportive care      Obesity   -BMI 32.52 kg/m2    Diet: N.p.o.  Continue tube feeding      Patient with encephalopathy due to metabolic/anoxic encephalopathy brain injury. She is only awake but nonverbal/noncommunicative, does not follow commands. At her present state patient lacks medical decision-making capacity. Her mother is pursuing guardianship. Psychiatry consulted for medical decision making capacity for guardianship. Code status: Full code  Prophylaxis: therapeutic lovenox  Care Plan discussed with: Nurse, family  Anticipated Disposition: Medically ready for discharge. Hospital Problems  Date Reviewed: 11/8/2022            Codes Class Noted POA    Altered mental status ICD-10-CM: R41.82  ICD-9-CM: 780.97  10/27/2022 Unknown        Diarrhea ICD-10-CM: R19.7  ICD-9-CM: 787.91  10/25/2022 Unknown        AMS (altered mental status) ICD-10-CM: R41.82  ICD-9-CM: 780.97  10/25/2022 Unknown       Vital Signs:    Last 24hrs VS reviewed since prior progress note.  Most recent are:  Visit Vitals  /68   Pulse 100   Temp 98.4 °F (36.9 °C)   Resp 23   Ht 5' 6\" (1.676 m)   Wt 90.5 kg (199 lb 8.3 oz)   SpO2 94%   BMI 32.20 kg/m²         Intake/Output Summary (Last 24 hours) at 11/21/2022 1747  Last data filed at 11/21/2022 0800  Gross per 24 hour   Intake 300 ml   Output --   Net 300 ml          Physical Examination:     I had a face to face encounter with this patient and independently examined them on 11/21/2022 as outlined below:  Vital signs: , SPO2 100% on room air, RR 23        Constitutional: Awake with eyes open, nonverbal, noncommunicative, does not follow commands. ENT: Oral mucosa moist, oropharynx benign. Resp: On oxygen via nasal cannula, symmetrical air entry bilaterally without added sounds. CV: Tachycardic, irregularly irregular. GI:  Soft, non distended, non tender. normoactive bowel sounds, no hepatosplenomegaly   PEG tube present in the epigastric area. Musculoskeletal: Bilateral pretibial, pedal and hand edema  Generalized increased muscle tone and spasticity    Neurologic: Awake. Nonverbal.  Nonverbal.  Does not follow commands. Extremities spastic more pronounced on the right lower extremity. Otherwise neuro exam is limited due to patient's status            Data Review:    Review and/or order of clinical lab test  Review and/or order of tests in the radiology section of CPT  Review and/or order of tests in the medicine section of CPT      Labs:     Recent Labs     11/20/22  0939   WBC 10.3   HGB 10.1*   HCT 31.3*          Recent Labs     11/20/22  0939      K 4.4      CO2 30   BUN 29*   CREA 0.35*   *   CA 8.4*       No results for input(s): ALT, AP, TBIL, TBILI, TP, ALB, GLOB, GGT, AML, LPSE in the last 72 hours. No lab exists for component: SGOT, GPT, AMYP, HLPSE      No results for input(s): INR, PTP, APTT, INREXT, INREXT in the last 72 hours. No results for input(s): FE, TIBC, PSAT, FERR in the last 72 hours. Lab Results   Component Value Date/Time    Folate 9.9 10/26/2022 11:47 AM        No results for input(s): PH, PCO2, PO2 in the last 72 hours. No results for input(s): CPK, CKNDX, TROIQ in the last 72 hours.     No lab exists for component: CPKMB      Lab Results   Component Value Date/Time    Cholesterol, total 145 10/26/2022 06:46 AM    HDL Cholesterol 30 10/26/2022 06:46 AM    LDL, calculated 91.4 10/26/2022 06:46 AM    Triglyceride 118 10/26/2022 06:46 AM    CHOL/HDL Ratio 4.8 10/26/2022 06:46 AM     Lab Results   Component Value Date/Time    Glucose (POC) 110 10/05/2022 04:55 PM    Glucose (POC) 87 10/05/2022 11:02 AM    Glucose (POC) 78 10/05/2022 05:00 AM    Glucose (POC) 94 10/05/2022 04:57 AM    Glucose (POC) 124 (H) 10/04/2022 11:03 PM     Lab Results   Component Value Date/Time    Color DARK YELLOW 11/19/2022 11:08 AM    Appearance CLEAR 11/19/2022 11:08 AM    Specific gravity 1.025 11/19/2022 11:08 AM    Specific gravity 1.028 11/10/2022 03:14 PM    pH (UA) 5.5 11/19/2022 11:08 AM    Protein TRACE (A) 11/19/2022 11:08 AM    Glucose Negative 11/19/2022 11:08 AM    Ketone Negative 11/19/2022 11:08 AM    Bilirubin Negative 11/19/2022 11:08 AM    Urobilinogen 1.0 11/19/2022 11:08 AM    Nitrites Negative 11/19/2022 11:08 AM    Leukocyte Esterase Negative 11/19/2022 11:08 AM    Epithelial cells FEW 11/19/2022 11:08 AM    Bacteria Negative 11/19/2022 11:08 AM    WBC 0-4 11/19/2022 11:08 AM    RBC 0-5 11/19/2022 11:08 AM         Medications Reviewed:     Current Facility-Administered Medications   Medication Dose Route Frequency    furosemide (LASIX) tablet 20 mg  20 mg Per G Tube DAILY    apixaban (ELIQUIS) tablet 5 mg  5 mg Oral Q12H    baclofen (LIORESAL) 5 mg/mL oral suspension 2.5 mg  2.5 mg Oral Q8H PRN    sodium chloride (NS) flush 5-10 mL  5-10 mL IntraVENous PRN    senna-docusate (PERICOLACE) 8.6-50 mg per tablet 2 Tablet  2 Tablet Oral DAILY    metoprolol tartrate (LOPRESSOR) tablet 12.5 mg  12.5 mg Oral Q12H    polyethylene glycol (MIRALAX) packet 17 g  17 g Oral DAILY PRN    thiamine HCL (B-1) tablet 100 mg  100 mg Oral DAILY    therapeutic multivitamin (THERAGRAN) tablet 1 Tablet  1 Tablet Oral DAILY    acetaminophen (TYLENOL) solution 650 mg  650 mg Per NG tube Q4H PRN    sodium chloride (NS) flush 5-40 mL  5-40 mL IntraVENous Q8H    sodium chloride (NS) flush 5-40 mL  5-40 mL IntraVENous PRN    atorvastatin (LIPITOR) tablet 10 mg  10 mg Oral QHS    levothyroxine (SYNTHROID) tablet 88 mcg  88 mcg Oral ACB    pantoprazole (PROTONIX) tablet 40 mg  40 mg Oral ACB    sodium chloride (NS) flush 5-10 mL  5-10 mL IntraVENous PRN    acetaminophen (TYLENOL) suppository 650 mg  650 mg Rectal Q6H PRN     ______________________________________________________________________  EXPECTED LENGTH OF STAY: 2d 7h  ACTUAL LENGTH OF STAY:          25                 Rox Wu MD

## 2022-11-21 NOTE — PROGRESS NOTES
Problem: Pressure Injury - Risk of  Goal: *Prevention of pressure injury  Description: Document Jose Enrique Scale and appropriate interventions in the flowsheet. Outcome: Progressing Towards Goal  Note: Pressure Injury Interventions:  Sensory Interventions: Assess changes in LOC, Minimize linen layers, Float heels    Moisture Interventions: Absorbent underpads, Apply protective barrier, creams and emollients, Check for incontinence Q2 hours and as needed, Internal/External urinary devices, Minimize layers, Moisture barrier    Activity Interventions: Pressure redistribution bed/mattress(bed type)    Mobility Interventions: Turn and reposition approx. every two hours(pillow and wedges), Pressure redistribution bed/mattress (bed type)    Nutrition Interventions: Document food/fluid/supplement intake    Friction and Shear Interventions: Apply protective barrier, creams and emollients, Lift sheet, Minimize layers                Problem: Patient Education: Go to Patient Education Activity  Goal: Patient/Family Education  Outcome: Progressing Towards Goal     Problem: Patient Education: Go to Patient Education Activity  Goal: Patient/Family Education  Outcome: Progressing Towards Goal     Problem: Patient Education: Go to Patient Education Activity  Goal: Patient/Family Education  Outcome: Progressing Towards Goal     Problem: Patient Education: Go to Patient Education Activity  Goal: Patient/Family Education  Outcome: Progressing Towards Goal     Problem: Falls - Risk of  Goal: *Absence of Falls  Description: Document Chyna Fall Risk and appropriate interventions in the flowsheet.   Outcome: Progressing Towards Goal  Note: Fall Risk Interventions:  Mobility Interventions: Communicate number of staff needed for ambulation/transfer    Mentation Interventions: Adequate sleep, hydration, pain control, Reorient patient    Medication Interventions: Evaluate medications/consider consulting pharmacy    Elimination Interventions: Toileting schedule/hourly rounds    History of Falls Interventions: Consult care management for discharge planning         Problem: Patient Education: Go to Patient Education Activity  Goal: Patient/Family Education  Outcome: Progressing Towards Goal     Problem: Discharge Planning  Goal: *Discharge to safe environment  Outcome: Progressing Towards Goal  Goal: *Knowledge of medication management  Outcome: Progressing Towards Goal  Goal: *Knowledge of discharge instructions  Outcome: Progressing Towards Goal     Problem: Patient Education: Go to Patient Education Activity  Goal: Patient/Family Education  Outcome: Progressing Towards Goal     Problem: Nutrition Deficit  Goal: *Optimize nutritional status  Outcome: Progressing Towards Goal     Problem: Patient Education: Go to Patient Education Activity  Goal: Patient/Family Education  Outcome: Progressing Towards Goal

## 2022-11-22 VITALS
HEIGHT: 66 IN | SYSTOLIC BLOOD PRESSURE: 114 MMHG | DIASTOLIC BLOOD PRESSURE: 80 MMHG | WEIGHT: 199.52 LBS | HEART RATE: 100 BPM | TEMPERATURE: 97.6 F | BODY MASS INDEX: 32.06 KG/M2 | RESPIRATION RATE: 24 BRPM | OXYGEN SATURATION: 93 %

## 2022-11-22 LAB
ALBUMIN SERPL-MCNC: 2.5 G/DL (ref 3.5–5)
ALBUMIN/GLOB SERPL: 0.7 {RATIO} (ref 1.1–2.2)
ALP SERPL-CCNC: 118 U/L (ref 45–117)
ALT SERPL-CCNC: 72 U/L (ref 12–78)
ANION GAP SERPL CALC-SCNC: 8 MMOL/L (ref 5–15)
AST SERPL-CCNC: 37 U/L (ref 15–37)
BASOPHILS # BLD: 0.1 K/UL (ref 0–0.1)
BASOPHILS NFR BLD: 1 % (ref 0–1)
BILIRUB SERPL-MCNC: 0.3 MG/DL (ref 0.2–1)
BUN SERPL-MCNC: 24 MG/DL (ref 6–20)
BUN/CREAT SERPL: 62 (ref 12–20)
CALCIUM SERPL-MCNC: 9.2 MG/DL (ref 8.5–10.1)
CHLORIDE SERPL-SCNC: 107 MMOL/L (ref 97–108)
CK SERPL-CCNC: 39 U/L (ref 26–192)
CO2 SERPL-SCNC: 27 MMOL/L (ref 21–32)
CREAT SERPL-MCNC: 0.39 MG/DL (ref 0.55–1.02)
DIFFERENTIAL METHOD BLD: ABNORMAL
EOSINOPHIL # BLD: 0.1 K/UL (ref 0–0.4)
EOSINOPHIL NFR BLD: 1 % (ref 0–7)
ERYTHROCYTE [DISTWIDTH] IN BLOOD BY AUTOMATED COUNT: 14.7 % (ref 11.5–14.5)
GLOBULIN SER CALC-MCNC: 3.4 G/DL (ref 2–4)
GLUCOSE SERPL-MCNC: 111 MG/DL (ref 65–100)
HCT VFR BLD AUTO: 33.6 % (ref 35–47)
HGB BLD-MCNC: 10.7 G/DL (ref 11.5–16)
IMM GRANULOCYTES # BLD AUTO: 0 K/UL (ref 0–0.04)
IMM GRANULOCYTES NFR BLD AUTO: 0 % (ref 0–0.5)
LYMPHOCYTES # BLD: 2.1 K/UL (ref 0.8–3.5)
LYMPHOCYTES NFR BLD: 22 % (ref 12–49)
MCH RBC QN AUTO: 28.7 PG (ref 26–34)
MCHC RBC AUTO-ENTMCNC: 31.8 G/DL (ref 30–36.5)
MCV RBC AUTO: 90.1 FL (ref 80–99)
MONOCYTES # BLD: 0.8 K/UL (ref 0–1)
MONOCYTES NFR BLD: 8 % (ref 5–13)
NEUTS SEG # BLD: 6.5 K/UL (ref 1.8–8)
NEUTS SEG NFR BLD: 68 % (ref 32–75)
NRBC # BLD: 0 K/UL (ref 0–0.01)
NRBC BLD-RTO: 0 PER 100 WBC
PLATELET # BLD AUTO: 535 K/UL (ref 150–400)
PMV BLD AUTO: 10.8 FL (ref 8.9–12.9)
POTASSIUM SERPL-SCNC: 4.4 MMOL/L (ref 3.5–5.1)
PROT SERPL-MCNC: 5.9 G/DL (ref 6.4–8.2)
RBC # BLD AUTO: 3.73 M/UL (ref 3.8–5.2)
SODIUM SERPL-SCNC: 142 MMOL/L (ref 136–145)
WBC # BLD AUTO: 9.7 K/UL (ref 3.6–11)

## 2022-11-22 PROCEDURE — 74011250637 HC RX REV CODE- 250/637: Performed by: HOSPITALIST

## 2022-11-22 PROCEDURE — 97164 PT RE-EVAL EST PLAN CARE: CPT

## 2022-11-22 PROCEDURE — 97530 THERAPEUTIC ACTIVITIES: CPT

## 2022-11-22 PROCEDURE — 74011250637 HC RX REV CODE- 250/637: Performed by: NURSE PRACTITIONER

## 2022-11-22 PROCEDURE — 77030037877 HC DRSG MEPILEX >48IN BORD MOLN -A

## 2022-11-22 PROCEDURE — 82550 ASSAY OF CK (CPK): CPT

## 2022-11-22 PROCEDURE — 74011250637 HC RX REV CODE- 250/637: Performed by: PHYSICIAN ASSISTANT

## 2022-11-22 PROCEDURE — 80053 COMPREHEN METABOLIC PANEL: CPT

## 2022-11-22 PROCEDURE — 85025 COMPLETE CBC W/AUTO DIFF WBC: CPT

## 2022-11-22 PROCEDURE — 36415 COLL VENOUS BLD VENIPUNCTURE: CPT

## 2022-11-22 RX ORDER — LANOLIN ALCOHOL/MO/W.PET/CERES
100 CREAM (GRAM) TOPICAL DAILY
Qty: 30 TABLET | Refills: 0 | Status: SHIPPED | OUTPATIENT
Start: 2022-11-23 | End: 2022-12-23

## 2022-11-22 RX ORDER — BALSAM PERU/CASTOR OIL
OINTMENT (GRAM) TOPICAL 2 TIMES DAILY
Status: DISCONTINUED | OUTPATIENT
Start: 2022-11-22 | End: 2022-11-22 | Stop reason: HOSPADM

## 2022-11-22 RX ORDER — METOPROLOL TARTRATE 25 MG/1
12.5 TABLET, FILM COATED ORAL EVERY 12 HOURS
Qty: 30 TABLET | Refills: 0 | Status: SHIPPED | OUTPATIENT
Start: 2022-11-22 | End: 2022-12-22

## 2022-11-22 RX ORDER — POLYETHYLENE GLYCOL 3350 17 G/17G
17 POWDER, FOR SOLUTION ORAL
Qty: 30 PACKET | Refills: 0 | Status: SHIPPED | OUTPATIENT
Start: 2022-11-22 | End: 2022-12-22

## 2022-11-22 RX ORDER — THERA TABS 400 MCG
1 TAB ORAL DAILY
Qty: 30 TABLET | Refills: 0 | Status: SHIPPED | OUTPATIENT
Start: 2022-11-23 | End: 2022-12-23

## 2022-11-22 RX ORDER — PANTOPRAZOLE SODIUM 40 MG/1
40 TABLET, DELAYED RELEASE ORAL
Qty: 30 TABLET | Refills: 0 | Status: SHIPPED | OUTPATIENT
Start: 2022-11-22 | End: 2022-12-22

## 2022-11-22 RX ORDER — AMOXICILLIN 250 MG
1 CAPSULE ORAL 2 TIMES DAILY
Qty: 60 TABLET | Refills: 0 | Status: SHIPPED | OUTPATIENT
Start: 2022-11-22 | End: 2022-12-22

## 2022-11-22 RX ADMIN — Medication 100 MG: at 10:01

## 2022-11-22 RX ADMIN — METOPROLOL TARTRATE 12.5 MG: 25 TABLET, FILM COATED ORAL at 10:00

## 2022-11-22 RX ADMIN — APIXABAN 5 MG: 5 TABLET, FILM COATED ORAL at 10:00

## 2022-11-22 RX ADMIN — FUROSEMIDE 20 MG: 20 TABLET ORAL at 10:00

## 2022-11-22 RX ADMIN — THERA TABS 1 TABLET: TAB at 10:01

## 2022-11-22 RX ADMIN — PANTOPRAZOLE SODIUM 40 MG: 40 TABLET, DELAYED RELEASE ORAL at 07:17

## 2022-11-22 RX ADMIN — Medication: at 12:06

## 2022-11-22 RX ADMIN — LEVOTHYROXINE SODIUM 88 MCG: 0.09 TABLET ORAL at 07:17

## 2022-11-22 NOTE — PROGRESS NOTES
Bedside and Verbal shift change report given to *** (oncoming nurse) by Eliel Acosta RN (offgoing nurse). Report included the following information SBAR, Kardex, ED Summary, Intake/Output, Recent Results, Cardiac Rhythm ST, Alarm Parameters , and Dual Neuro Assessment.

## 2022-11-22 NOTE — WOUND CARE
WOCN Note:     New consult for sacrum. Chart shows:  Admitted on 10/25/22 from [facility]. Admitted for AMS & PE  History of anoxic brain injury. Assessment:   Non-verbal; father at bedside. Turn team in room to dependently turn onto left side for wound assessment. Removed briefs. Surface: [place] gel mattress    Bilateral heels intact and without erythema. Heels offloaded with boots. Buttocks and sacrum with primary etiology of MASD as evidenced by dry desquamation and fully blanching pink/red partial thickness tissue loss in irregular pattern. Sacral foam applied. RN to apply Venelex when it comes up from pharmacy. Wound Recommendations:    Buttocks: venelex twice daily and cover with sacral foam dressing; change foam as needed. Reduce brief usage. PI Prevention:  Turn/reposition approximately every 2 hours  Offload heels with boots at all times while in bed. Sacral Foam dressing: lift to assess regularly; change as needed. Discontinue if incontinence is frequently soiling dressing. Low Air Loss mattress ordered today. Use only flat sheet and one incontinence pad. Please call [employer] @ [phone] for bed to be picked up at discharge.      Transition of Care: Plan to follow weekly and as needed while admitted to hospital.      SUZE Morel, RN, [facility] [place]  Certified Wound, Ostomy, Continence Nurse  office [phone]  Available via [facility]

## 2022-11-22 NOTE — PROGRESS NOTES
Problem: Pressure Injury - Risk of  Goal: *Prevention of pressure injury  Description: Document Jose Enrique Scale and appropriate interventions in the flowsheet. Outcome: Progressing Towards Goal  Note: Pressure Injury Interventions:  Sensory Interventions: Assess changes in LOC    Moisture Interventions: Absorbent underpads    Activity Interventions: PT/OT evaluation    Mobility Interventions: HOB 30 degrees or less    Nutrition Interventions: Document food/fluid/supplement intake    Friction and Shear Interventions: Apply protective barrier, creams and emollients                Problem: Patient Education: Go to Patient Education Activity  Goal: Patient/Family Education  Outcome: Progressing Towards Goal     Problem: Patient Education: Go to Patient Education Activity  Goal: Patient/Family Education  Outcome: Progressing Towards Goal     Problem: Patient Education: Go to Patient Education Activity  Goal: Patient/Family Education  Outcome: Progressing Towards Goal     Problem: Patient Education: Go to Patient Education Activity  Goal: Patient/Family Education  Outcome: Progressing Towards Goal     Problem: Falls - Risk of  Goal: *Absence of Falls  Description: Document Chyna Fall Risk and appropriate interventions in the flowsheet.   Outcome: Progressing Towards Goal  Note: Fall Risk Interventions:  Mobility Interventions: Bed/chair exit alarm    Mentation Interventions: Bed/chair exit alarm    Medication Interventions: Bed/chair exit alarm    Elimination Interventions: Bed/chair exit alarm    History of Falls Interventions: Bed/chair exit alarm         Problem: Patient Education: Go to Patient Education Activity  Goal: Patient/Family Education  Outcome: Progressing Towards Goal     Problem: Discharge Planning  Goal: *Discharge to safe environment  Outcome: Progressing Towards Goal  Goal: *Knowledge of medication management  Outcome: Progressing Towards Goal  Goal: *Knowledge of discharge instructions  Outcome: Progressing Towards Goal     Problem: Patient Education: Go to Patient Education Activity  Goal: Patient/Family Education  Outcome: Progressing Towards Goal     Problem: Nutrition Deficit  Goal: *Optimize nutritional status  Outcome: Progressing Towards Goal     Problem: Patient Education: Go to Patient Education Activity  Goal: Patient/Family Education  Outcome: Progressing Towards Goal

## 2022-11-22 NOTE — DISCHARGE SUMMARY
Physician Discharge Summary     Patient ID:    Tramaine Dougherty  [id number]  59 y.o.  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 Severe oral dysphagia due to above  Suspected anoxic brain injury  Suspected Seizure   - Patient was admitted to [facility] with a long hospitalization after being found unresponsive in her home on 9/26/22, and then discharged to [facility] on 10/20. While at [facility], developed changes that were concerning for serotonin syndrome and she was admitted to [facility]. During this admission, patient's mental status has deteriorated. -MRI with interval development of diffuse abnormal supratentorial white matter with extensive FLAIR/T2 hyperintense signal and diffusion restriction. Differential include acute or toxic encephalopathy.  - ANCA negative. Glutamic acid decarb negative <5.0. ADIN not sent. CSF culture no growth, normal protein, HSV negative, myelin basic protein pending.   -Transfer request to [facility] [address] for a second expedient rejected. - Concern for seizure like activity overnight 11/10, yue rapid EEG negative for seizure. Likely roving eye movements 2/2 metabolic encephalopathy and supportive care recommended   - Repeat MRI Brain unchanged from previous   Neurologist were consulted and followed patient with the following recommendations:    \"Pt is a 61yo RH female admitted 10/26/22 with altered mental status, muscle rigidity, hyperreflexia, upgoing toes, clonus, horizontal roving eye movements, low grade fevers, labile BPs on admission, and diaphoresis.  Initial felt to have serotonin syndrome with recent h/o prolonged hospitalization after respiratory arrest, presumed secondary to sedating meds including fentanyl, with seizure witnessed by EMS, but also found to have Takotsubo CM, who had declining mental status and function since receiving risperdal, then seroquel, only one dose each prior to discharge on 10/20/22 from [facility], then trazodone and Lexapro at rehab. She was treated with benzos with improvement in symptoms, but remained non-verbal, with minimal if any purposeful movements, and not follow commands. Repeat MRI brain 11/2/22 revealed changes c/w delayed post-hypoxic leukoencephalopathy. LP 11/2/22 to fully exclude other etiologies - CSF pro 40, glu 62, 1090 RBCs, 1 WBC, Bact cx neg, HSV neg. 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 ANCA panel and KOBY-65 Ab neg. Since my last visit with her on 11/2/22, she has had fevers and was found to have PEs and DVT. She was more difficult to arouse on 11/14/22, so repeat MRI brain ordered, done 11/12/22 with stable white matter changes, no acute strokes seen. Transfer request to [address] [place] [address]  were both declined. Exam is unchanged, appears alert with spontaneous eye opening, no tracking, no verbalization, does not interact with examiner, no commands, no purposeful movements. Discussed prognosis with pt's mother. She may or may not recover function in the next year and degree of recovery is not known, but will most likely have significant deficits. Pt never made her wishes known regarding prolonging her life. Her mother is struggling with making decisions regarding going forward. \"      Acute pulm embolism, DVT. CT angiogram of the chest on 11/12/2022 showed a moderately large PE. Lower extremity Doppler subsequently showed an age-indeterminate DVT in the left peroneal veins. Patient was anticoagulated initially with heparin followed by subcutaneous tissue 11/2022 she is switched to apixaban. She has remained hemodynamically stable. Vascular surgery were consulted, no vascular intervention indicated.  ECHO with with pretreatment ejection fraction moderately dilated RV, moderately reduced systolic function, and lateral wall hypokinesis. We will need to continue anticoagulation for up to 6 months. Patient if patient continues to be bedbound however consideration may be given to continue use of prophylactic anticoagulation. General spasticity due to the encephalopathy as well as lack of mobility  Baclofen as needed, continue PT and OT. Recurrent low-grade fevers. Fevers initially felt to be due to PE, due to the recurrence, repeated septic work-up including chest x-ray, urinalysis, lactic acid, procalcitonin, viral PCR for COVID and blood culture will negative. However it was later observed that patient's low-grade fevers were axillary, her temp is normal when checked  rectal or oral.  Discussed with nursing to only check oral or rectal temperature. Bilateral UE edema, improved. -  Duplex UEs to eval for clot negative for DVT, does have superficial thrombus bilaterally   - Elevated arms  - Continue Lasix     History of Takotsubo cardiomyopathy, resolved. EF improved from  25 - 30% in 9/27/2022 to EF of 60 - 65% 11/14/22  -Continue low-dose Lasix. Hypertension, mild sinus tachycardia: Continue with the low-dose beta-blocker. Hyperlipidemia, CK normalized: Continue statins    Hypothyroidism:continue synthroid. Check TSH in 4 weeks     Generalized anxiety disorder  -Prior to admission medications of trazodone and lexapro were held since admission and patient unlikely to need them as non responsive also there was concern for a drug drug interaction.      Severe disability with immobility   -continue supportive care      Obesity   -BMI 32.52 kg/m2     Diet: N.p.o.  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 Buttocks: venelex twice daily and cover with sacral foam dressing; change foam as needed. Reduce brief usage. PI Prevention:  Turn/reposition approximately every 2 hours  Offload heels with boots at all times while in bed. Sacral Foam dressing: lift to assess regularly; change as needed. Discontinue if incontinence is frequently soiling dressing. Low Air Loss mattress ordered today. Use only flat sheet and one incontinence pad. Please call [employer] @ [phone] for bed to be picked up at discharge. DIET: NPO. Continue tube feeding via PEG      ACTIVITY:   Activity as tolerated and PT/OT Eval and Treat    WOUND CARE:   Buttocks and sacrum with primary etiology of MASD as evidenced by dry desquamation and fully blanching pink/red partial thickness tissue loss in irregular pattern. Buttocks: venelex twice daily and cover with sacral foam dressing; change foam as needed. Reduce brief usage. PI Prevention:  Turn/reposition approximately every 2 hours  Offload heels with boots at all times while in bed. Sacral Foam dressing: lift to assess regularly; change as needed. Discontinue if incontinence is frequently soiling dressing. Low Air Loss mattress ordered today. Use only flat sheet and one incontinence pad. Please call [employer] @ [phone] for bed to be picked up at discharge. Patient does not have medical POA/guardian. Her mother is seeking for guardianship. Patient's alert only with spontaneous eye opening however she is nonverbal, does not track, does not interact, she does not follow commands, she does not display any purposeful movement. It is my opinion that patient lacks capacity to consent or make her own medical decision-making at her present state.         Chronic Diagnoses:    Problem List as of 11/22/2022 Date Reviewed: 11/8/2022            Codes Class Noted - Resolved    Altered mental status ICD-10-CM: R41.82  ICD-9-CM: 780.97  10/27/2022 - Present        Diarrhea ICD-10-CM: R19.7  ICD-9-CM: 787.91  10/25/2022 - Present        AMS (altered mental status) ICD-10-CM: R41.82  ICD-9-CM: 780.97  10/25/2022 - Present        Anemia ICD-10-CM: D64.9  ICD-9-CM: 285.9  10/5/2022 - Present        UTI (urinary tract infection) ICD-10-CM: N39.0  ICD-9-CM: 599.0  10/5/2022 - Present        Hypokalemia ICD-10-CM: E87.6  ICD-9-CM: 276.8  10/5/2022 - Present        Obese ICD-10-CM: E66.9  ICD-9-CM: 278.00  10/5/2022 - Present        Acute CVA (cerebrovascular accident) ([facility] ICD-10-CM: I63.9  ICD-9-CM: 434.91  9/29/2022 - Present        NSTEMI (non-ST elevated myocardial infarction) ([facility] ICD-10-CM: I21.4  ICD-9-CM: 410.70  9/28/2022 - Present        Takotsubo cardiomyopathy ICD-10-CM: I51.81  ICD-9-CM: 429.83  9/28/2022 - Present        Dyslipidemia ICD-10-CM: E78.5  ICD-9-CM: 272.4  9/28/2022 - Present        Hypothyroidism ICD-10-CM: E03.9  ICD-9-CM: 244.9  9/28/2022 - Present        KOBY (generalized anxiety disorder) ICD-10-CM: F41.1  ICD-9-CM: 300.02  9/28/2022 - Present        Status epilepticus ([facility] ICD-10-CM: G40.901  ICD-9-CM: 345.3  9/27/2022 - Present        CHRIS (acute kidney injury) ([facility] ICD-10-CM: N17.9  ICD-9-CM: 584.9  9/27/2022 - Present        Lactic acidosis ICD-10-CM: E87.20  ICD-9-CM: 276.2  9/27/2022 - Present        Acute respiratory failure with hypoxia (HCC) ICD-10-CM: J96.01  ICD-9-CM: 518.81  9/27/2022 - Present        Shock, cardiogenic ([address] ICD-10-CM: R57.0  ICD-9-CM: 785.51  9/27/2022 - Present        Acute metabolic encephalopathy KQS-71-XK: G93.41  ICD-9-CM: 348.31  9/27/2022 - Present        Acquired hypothyroidism ICD-10-CM: E03.9  ICD-9-CM: 244.9  6/29/2021 - Present        Mixed hyperlipidemia ICD-10-CM: E78.2  ICD-9-CM: 272.2  6/29/2021 - Present        Impaired fasting glucose ICD-10-CM: R73.01  ICD-9-CM: 790.21  6/29/2021 - Present        Insomnia ICD-10-CM: G47.00  ICD-9-CM: 780.52  6/29/2021 - Present Lower extremity edema ICD-10-CM: R60.0  ICD-9-CM: 782.3  6/29/2021 - Present        Generalized anxiety disorder ICD-10-CM: F41.1  ICD-9-CM: 300.02  6/29/2021 - Present        Tachycardia ICD-10-CM: R00.0  ICD-9-CM: 785.0  6/29/2021 - Present        PND (post-nasal drip) ICD-10-CM: R09.82  ICD-9-CM: 784.91  11/17/2020 - Present        Chronic maxillary sinusitis ICD-10-CM: J32.0  ICD-9-CM: 473.0  11/17/2020 - Present        Seasonal allergic rhinitis due to pollen ICD-10-CM: J30.1  ICD-9-CM: 477.0  7/29/2019 - Present        Class 2 severe obesity due to excess calories with serious comorbidity and body mass index (BMI) of 36.0 to 36.9 in adult [facility]) ICD-10-CM: E66.01, Z68.36  ICD-9-CM: 278.01, V85.36  11/5/2012 - Present        Depression ICD-10-CM: F32. A  ICD-9-CM: 049  3/29/2012 - Present        RESOLVED: Goals of care, counseling/discussion ICD-10-CM: Z71.89  ICD-9-CM: V65.49  10/3/2022 - 10/5/2022        RESOLVED: Palliative care encounter ICD-10-CM: Z51.5  ICD-9-CM: V66.7  10/3/2022 - 10/5/2022        RESOLVED: Hyperkalemia ICD-10-CM: E87.5  ICD-9-CM: 276.7  9/27/2022 - 9/28/2022           Discharge Medications:   Current Discharge Medication List        START taking these medications    Details   apixaban (ELIQUIS) 5 mg tablet Take 1 Tablet by mouth every twelve (12) hours for 30 days. Qty: 60 Tablet, Refills: 0  Start date: 11/22/2022, End date: 12/22/2022      baclofen (LIORESAL) 5 mg/mL susp 5 mg/mL oral suspension Take 0.5 mL by mouth every eight (8) hours as needed for PRN Reason (Other) (spasticity) for up to 10 days. Qty: 1 Each, Refills: 0  Start date: 11/22/2022, End date: 12/2/2022      polyethylene glycol (MIRALAX) 17 gram packet Take 1 Packet by mouth daily as needed for Constipation for up to 30 days. Qty: 30 Packet, Refills: 0  Start date: 11/22/2022, End date: 12/22/2022      therapeutic multivitamin [facility]) tablet Take 1 Tablet by mouth daily for 30 days.   Qty: 30 Tablet, Refills: 0  Start date: 11/23/2022, End date: 12/23/2022      thiamine HCL (B-1) 100 mg tablet Take 1 Tablet by mouth daily for 30 days. Qty: 30 Tablet, Refills: 0  Start date: 11/23/2022, End date: 12/23/2022           CONTINUE these medications which have CHANGED    Details   metoprolol tartrate (LOPRESSOR) 25 mg tablet Take 0.5 Tablets by mouth every twelve (12) hours for 30 days. Qty: 30 Tablet, Refills: 0  Start date: 11/22/2022, End date: 12/22/2022      pantoprazole (PROTONIX) 40 mg tablet Take 1 Tablet by mouth Daily (before breakfast) for 30 days. Qty: 30 Tablet, Refills: 0  Start date: 11/22/2022, End date: 12/22/2022      senna-docusate (PERICOLACE) 8.6-50 mg per tablet Take 1 Tablet by mouth two (2) times a day for 30 days. Qty: 60 Tablet, Refills: 0  Start date: 11/22/2022, End date: 12/22/2022           CONTINUE these medications which have NOT CHANGED    Details   levothyroxine (SYNTHROID) 88 mcg tablet Take 88 mcg by mouth Daily (before breakfast). furosemide (LASIX) 20 mg tablet Take 1 Tablet by mouth daily as needed (swelling). Qty: 90 Tablet, Refills: 1    Comments: Pt understands that insurance won't cover. She plans to use GoodRX. atorvastatin (LIPITOR) 10 mg tablet TAKE ONE TABLET BY MOUTH ONCE NIGHTLY  Qty: 90 Tablet, Refills: 0      acetaminophen (TYLENOL) 325 mg tablet Take  by mouth every four (4) hours as needed.            STOP taking these medications       aspirin 81 mg chewable tablet Comments:   Reason for Stopping:         cloNIDine (CATAPRES) 0.2 mg/24 hr ptwk Comments:   Reason for Stopping:         traZODone (DESYREL) 50 mg tablet Comments:   Reason for Stopping:         escitalopram oxalate (LEXAPRO) 20 mg tablet Comments:   Reason for Stopping:         OTHER,NON-FORMULARY, Comments:   Reason for Stopping:         loratadine-pseudoephedrine (Claritin-D 12 Hour) 5-120 mg per tablet Comments:   Reason for Stopping:         tretinoin (RETIN-A) 0.05 % topical cream Comments: Reason for Stopping:         MULTIVITAMIN PO Comments:   Reason for Stopping: Follow up Care: Follow-up Information       Follow up With Specialties Details Why Contact [place] [address]  [phone]          1. Suze Whitman MD in 1-2 weeks. Please call to set up an appointment shortly after discharge. Disposition:  Discharge to a skilled nursing facility at [facility]. PATIENT CONDITION AT DISCHARGE:   Functional status:  Poor       Cognition: Encephalopathy. Nonverbal.  None interactive. Does not follow commands    Catheters/lines (plus indication) : PEG tube      Advanced Directive: Full code    Discharge Exam:  GENERAL:  Patient is awake with spontaneous eye opening. Not in distress. HEENT:  Normocephalic, atraumatic, non icteric sclerae, non pallor conjuctivae, EOMs intact, PERRLA. NECK: Stiff due to generalized muscle spasm  LUNGS:   Vesicular breath sounds bilaterally, no added sounds. HEART:  Mild tachycardia, S1 and S2 well heard,RRR,  no murmur, click, rub or gallop. ABDOMEB:   Soft, non-tender. Normoactive bowel sounds. No masses,  No organomegaly. PEG in place in the epigastric area. No erythema, discharge. EXTREMETIES: Generalized spasm. NEUROLOGY: Patient awake with spontaneous eye opening. She is nonverbal, noninteractive. No purposeful movement. Does not follow commands.   Grimaces during exam.    CONSULTATIONS: IP CONSULT TO NEUROLOGY  IP CONSULT TO PSYCHIATRY  IP CONSULT TO GASTROENTEROLOGY  IP CONSULT TO NEUROLOGY  IP CONSULT TO PSYCHIATRY  IP CONSULT TO VASCULAR SURGERY    Significant Diagnostic Studies:   Recent Labs     11/22/22  0050 11/20/22  0939   WBC 9.7 10.3   HGB 10.7* 10.1*   HCT 33.6* 31.3*   * 323     Recent Labs     11/22/22  0050 11/20/22  0939    142   K 4.4 4.4    108   CO2 27 30   BUN 24* 29*   CREA 0.39* 0.35*   * 112*   CA 9.2 8.4*     Recent Labs     11/22/22  0050   *   TP 5.9*   ALB 2.5*   GLOB 3.4     No results for input(s): INR, PTP, APTT, INREXT in the last 72 hours. No results for input(s): FE, TIBC, PSAT, FERR in the last 72 hours. No results for input(s): PH, PCO2, PO2 in the last 72 hours. Recent Labs     11/22/22 0050   CPK 39     No components found for: [place]      Greater than 30 minutes were spent with the patient on counseling and coordination of care      Signed:   Franko Talavera MD  11/22/2022  12:30 PM

## 2022-11-22 NOTE — PROGRESS NOTES
Transition of Care Plan  RUR- Med 19%  DISPOSITION: SNF- [facility]  F/U with PCP/Specialist    Transport: [facility] 3pm    Emergency contact: MotherSita Res [phone] NARDA ARCHIBALD are 3 sons, but mother has been deemed spokesperson)    CM barriers to discharge: None    Patient has received insurance authorization for SNF at [facility], and they have a bed available today. Dr. Adina Dale informed. CM spoke with patient's mother, Sita Box, and she is in agreement with discharge today. Call report #[phone]; Room #129B. Transition of Care Plan to SNF/Rehab    SNF/Rehab Transition:  Patient has been accepted to [facility] and meets criteria for admission. Patient will transported by [facility] and expected to leave at 1500. Communication to Patient/Family:  Met with patient and mother, Sita Box (identified care giver) and they are agreeable to the transition plan. Communication to SNF/Rehab:  Bedside RN, Kristen Fisher, has been notified to update the transition plan to the facility and call report (phone number Call report #[phone]; Room #129B.). Discharge information has been updated on the AVS.     Discharge instructions to be fax'd to facility at [facility] # ). Nursing Please include all hard scripts for controlled substances, med rec and dc summary, and AVS in packet.      Reviewed and confirmed with facility, [facility], can manage the patient care needs for the following:     Mary Gannon with (X) only those applicable:    Medication:  [x]  Medications will be available at the facility  []  IV Antibiotics   []  Controlled Substance - hard copy to be sent with patient   [x]  Weekly Labs   Documents:  [x] Hard RX  [x] MAR  [x] Kardex  [x] AVS  [x]Transfer Summary  [x]Discharge   Equipment:  []  CPAP/BiPAP  []  Wound Vacuum  []  Sal or Urinary Device  []  PICC/Central Line  []  Nebulizer  []  Ventilator   Treatment:  []Isolation (for MRSA, VRE, etc.)  []Surgical Drain Management  []Tracheostomy Care  []Dressing Changes  []Dialysis with transportation and chair time   [x]PEG Care  []Oxygen  []Daily Weights for Heart Failure   Dietary:  []Any diet limitations  [x]Tube Feedings   []Total Parenteral Management (TPN)   Eligible for Medicaid Long Term Services and Supports  Yes:  [] Eligible for medical assistance or will become eligible within 180 days and UAI completed. [] Provider/Patient and/or support system has requested screening. [] UAI copy provided to patient or responsible party,  [] UAI unavailable at discharge will send once processed to SNF provider. [] UAI unavailable at discharged mailed to patient  No:   [x] Private pay and is not financially eligible for Medicaid within the next 180 days. [] Reside out-of-state. [] A residents of a state owned/operated facility that is licensed  by [facility] or [facility]  [] Enrollment in [facility] hospice services  []  Medical [address]  [] Patient /Family declines to have screening completed or provide financial information for screening     Financial Resources:  Medicaid    [] Initiated and application pending   [] Full coverage     Advanced Care Plan:  []Surrogate Decision Maker of Care  []POA  [x]Communicated Code Status FULL    Other     RENE YinS.W.

## 2022-11-22 NOTE — PROGRESS NOTES
Patient seen and examined. She is accepted to SNF and will be transferred this afternoon. She is alert with spontaneous eye opening but still remained non verbal, non interactive. Discussed with her father at the bedside. VSS.SPo2 94-95% on room air.

## 2022-11-22 NOTE — DISCHARGE INSTRUCTIONS
Discharge Instructions       PATIENT ID: Megan Perkins  MRN: [id number]   YOB: 1965    DATE OF ADMISSION: 10/25/2022    DATE OF DISCHARGE: 11/22/2022      DISCHARGING PROVIDER: Yosef Hudson MD    To contact this individual call [phone] and ask the  to page. If unavailable ask to be transferred the Adult Hospitalist Department. 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 Severe oral dysphagia due to above  Suspected anoxic brain injury  Suspected Seizure   - Patient was admitted to [facility] with a long hospitalization after being found unresponsive in her home on 9/26/22, and then discharged to [facility] on 10/20. While at [facility], developed changes that were concerning for serotonin syndrome and she was admitted to [facility] [place]. During this admission, patient's mental status has deteriorated. -MRI with interval development of diffuse abnormal supratentorial white matter with extensive FLAIR/T2 hyperintense signal and diffusion restriction. Differential include acute or toxic encephalopathy.  - ANCA negative. Glutamic acid decarb negative <5.0. ADIN not sent. CSF culture no growth, normal protein, HSV negative, myelin basic protein pending.   -Transfer request to [facility] for a second expedient rejected. - Concern for seizure like activity overnight 11/10, yue rapid EEG negative for seizure.  Likely roving eye movements 2/2 metabolic encephalopathy and supportive care recommended   - Repeat MRI Brain unchanged from previous   Neurologist were consulted and followed patient with the following recommendations:    \"Pt is a 61yo RH female admitted 10/26/22 with altered mental status, muscle rigidity, hyperreflexia, upgoing toes, clonus, horizontal roving eye movements, low grade fevers, labile BPs on admission, and diaphoresis. Initial felt to have serotonin syndrome with recent h/o prolonged hospitalization after respiratory arrest, presumed secondary to sedating meds including fentanyl, with seizure witnessed by EMS, but also found to have Takotsubo CM, who had declining mental status and function since receiving risperdal, then seroquel, only one dose each prior to discharge on 10/20/22 from [facility], then trazodone and Lexapro at rehab. She was treated with benzos with improvement in symptoms, but remained non-verbal, with minimal if any purposeful movements, and not follow commands. Repeat MRI brain 11/2/22 revealed changes c/w delayed post-hypoxic leukoencephalopathy. LP 11/2/22 to fully exclude other etiologies - CSF pro 40, glu 62, 1090 RBCs, 1 WBC, Bact cx neg, HSV neg. 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 ANCA panel and KOBY-65 Ab neg. Since my last visit with her on 11/2/22, she has had fevers and was found to have PEs and DVT. She was more difficult to arouse on 11/14/22, so repeat MRI brain ordered, done 11/12/22 with stable white matter changes, no acute strokes seen. Transfer request to [address] [place] [address]  were both declined. Exam is unchanged, appears alert with spontaneous eye opening, no tracking, no verbalization, does not interact with examiner, no commands, no purposeful movements. Discussed prognosis with pt's mother. She may or may not recover function in the next year and degree of recovery is not known, but will most likely have significant deficits. Pt never made her wishes known regarding prolonging her life. Her mother is struggling with making decisions regarding going forward. \"      Acute pulm embolism, DVT. CT angiogram of the chest on 11/12/2022 showed a moderately large PE. Lower extremity Doppler subsequently showed an age-indeterminate DVT in the left peroneal veins. Patient was anticoagulated initially with heparin followed by subcutaneous tissue 11/2022 she is switched to apixaban.   She has remained hemodynamically stable. Vascular surgery were consulted, no vascular intervention indicated. ECHO with with pretreatment ejection fraction moderately dilated RV, moderately reduced systolic function, and lateral wall hypokinesis. We will need to continue anticoagulation for up to 6 months. Patient if patient continues to be bedbound however consideration may be given to continue use of prophylactic anticoagulation. General spasticity due to the encephalopathy as well as lack of mobility  Baclofen as needed, continue PT and OT. Recurrent low-grade fevers. Fevers initially felt to be due to PE, due to the recurrence, repeated septic work-up including chest x-ray, urinalysis, lactic acid, procalcitonin, viral PCR for COVID and blood culture will negative. However it was later observed that patient's low-grade fevers were axillary, her temp is normal when checked  rectal or oral.  Discussed with nursing to only check oral or rectal temperature. Bilateral UE edema, improved. -  Duplex UEs to eval for clot negative for DVT, does have superficial thrombus bilaterally   - Elevated arms  - Continue Lasix     History of Takotsubo cardiomyopathy, resolved. EF improved from  25 - 30% in 9/27/2022 to EF of 60 - 65% 11/14/22  -Continue low-dose Lasix. Hypertension, mild sinus tachycardia: Continue with the low-dose beta-blocker. Hyperlipidemia, CK normalized: Continue statins    Hypothyroidism:continue synthroid. Check TSH in 4 weeks     Generalized anxiety disorder  -Prior to admission medications of trazodone and lexapro were held since admission and patient unlikely to need them as non responsive also there was concern for a drug drug interaction.      Severe disability with immobility   -continue supportive care      Obesity   -BMI 32.52 kg/m2     Diet: N.p.o.  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 Buttocks: venelex twice daily and cover with sacral foam dressing; change foam as needed. Reduce brief usage. PI Prevention:  Turn/reposition approximately every 2 hours  Offload heels with boots at all times while in bed. Sacral Foam dressing: lift to assess regularly; change as needed. Discontinue if incontinence is frequently soiling dressing. Low Air Loss mattress ordered today. Use only flat sheet and one incontinence pad. Please call [employer] @ [phone] for bed to be picked up at discharge. DIET: NPO. Continue tube feeding via PEG      ACTIVITY:   Activity as tolerated and PT/OT Eval and Treat    WOUND CARE:   Buttocks and sacrum with primary etiology of MASD as evidenced by dry desquamation and fully blanching pink/red partial thickness tissue loss in irregular pattern. Buttocks: venelex twice daily and cover with sacral foam dressing; change foam as needed. Reduce brief usage. PI Prevention:  Turn/reposition approximately every 2 hours  Offload heels with boots at all times while in bed. Sacral Foam dressing: lift to assess regularly; change as needed. Discontinue if incontinence is frequently soiling dressing. Low Air Loss mattress ordered today. Use only flat sheet and one incontinence pad. Please call [employer] @ [phone] for bed to be picked up at discharge.          SERVICES and EQUIPMENT needed:  Disposition: Patient is being discharged to [address] [place] [address]. x   PHYSICAL THERAPY x   OCCUPATIONAL THERAPY x   HOSPITAL TO HOME VISIT     DISPATCH HEALTH        PENDING TEST RESULTS:   At the time of discharge the following test results are still pending:   None              DISCHARGE MEDICATIONS: My Medications        START taking these medications        Instructions Each Dose to Equal Morning Noon Evening Bedtime   apixaban 5 mg tablet  Commonly known as: ELIQUIS    Your last dose was: Your next dose is: Take 1 Tablet by mouth every twelve (12) hours for 30 days. 5 mg                 baclofen 5 mg/mL Susp 5 mg/mL oral suspension  Commonly known as: LIORESAL    Your last dose was: Your next dose is: Take 0.5 mL by mouth every eight (8) hours as needed for PRN Reason (Other) (spasticity) for up to 10 days. 2.5 mg                 polyethylene glycol 17 gram packet  Commonly known as: MIRALAX    Your last dose was: Your next dose is: Take 1 Packet by mouth daily as needed for Constipation for up to 30 days. 17 g                 therapeutic multivitamin tablet  Commonly known as: Hany [place]  Start taking on: November 23, 2022    Your last dose was: Your next dose is: Take 1 Tablet by mouth daily for 30 days. 1 Tablet                 thiamine  mg tablet  Commonly known as: B-1  Start taking on: November 23, 2022    Your last dose was: Your next dose is: Take 1 Tablet by mouth daily for 30 days. 100 mg                        CHANGE how you take these medications        Instructions Each Dose to Equal Morning Noon Evening Bedtime   atorvastatin 10 mg tablet  Commonly known as: LIPITOR  What changed: Another medication with the same name was removed. Continue taking this medication, and follow the directions you see here. Your last dose was: Your next dose is:         TAKE ONE TABLET BY MOUTH ONCE NIGHTLY                  levothyroxine 88 mcg tablet  Commonly known as: SYNTHROID  What changed: Another medication with the same name was removed. Continue taking this medication, and follow the directions you see here. Your last dose was: Your next dose is: Take 88 mcg by mouth Daily (before breakfast).    88 mcg metoprolol tartrate 25 mg tablet  Commonly known as: LOPRESSOR  What changed: how much to take    Your last dose was: Your next dose is: Take 0.5 Tablets by mouth every twelve (12) hours for 30 days. 12.5 mg                        CONTINUE taking these medications        Instructions Each Dose to Equal Morning Noon Evening Bedtime   acetaminophen 325 mg tablet  Commonly known as: TYLENOL    Your last dose was: Your next dose is: Take  by mouth every four (4) hours as needed. furosemide 20 mg tablet  Commonly known as: LASIX    Your last dose was: Your next dose is: Take 1 Tablet by mouth daily as needed (swelling). 20 mg                 pantoprazole 40 mg tablet  Commonly known as: PROTONIX    Your last dose was: Your next dose is: Take 1 Tablet by mouth Daily (before breakfast) for 30 days. 40 mg                 senna-docusate 8.6-50 mg per tablet  Commonly known as: PERICOLACE    Your last dose was: Your next dose is: Take 1 Tablet by mouth two (2) times a day for 30 days.    1 Tablet                        STOP taking these medications      aspirin 81 mg chewable tablet        Claritin-D 12 Hour 5-120 mg per tablet  Generic drug: loratadine-pseudoephedrine        cloNIDine 0.2 mg/24 hr Ptwk  Commonly known as: CATAPRES        escitalopram oxalate 20 mg tablet  Commonly known as: LEXAPRO        MULTIVITAMIN PO        OTHER(NON-FORMULARY)        traZODone 50 mg tablet  Commonly known as: DESYREL        tretinoin 0.05 % topical cream  Commonly known as: RETIN-A                  Where to Get Your Medications        These medications were sent to [facility]/pharmacy #[id number], [place] - [address] [address] AT [facility]  [id number] [place] [address] RD.[facility] [address]      Hours: 24-hours Phone: [phone]   apixaban 5 mg tablet  baclofen 5 mg/mL Susp 5 mg/mL oral suspension  metoprolol tartrate 25 mg tablet  pantoprazole 40 mg tablet  polyethylene glycol 17 gram packet  senna-docusate 8.6-50 mg per tablet  therapeutic multivitamin tablet  thiamine  mg tablet             It is important that you take the medication exactly as they are prescribed. Keep your medication in the bottles provided by the pharmacist and keep a list of the medication names, dosages, and times to be taken in your wallet. Do not take other medications without consulting your doctor. Signed:    Lula Desai MD  11/22/2022  12:07 PM

## 2022-11-22 NOTE — PROGRESS NOTES
Problem: Mobility Impaired (Adult and Pediatric)  Goal: *Acute Goals and Plan of Care (Insert Text)  Description:   FUNCTIONAL STATUS PRIOR TO ADMISSION: Pt unable to provide prior level of function or home set up at time of this evaluation. Per chart review, pt was admitted to [facility] 9/27-10/20 after being found down and was discharged to [address] [place] [address] brain injury program and was admitted to [facility] on 10/25 for AMS. Per mother's report, pt was able to stand while at rehab with assistance and was getting into the wheelchair. Prior to initial admission, pt was independent, working as nurse. HOME SUPPORT PRIOR TO ADMISSION: Lived alone. Pt has supportive mother that lives locally and 3 sons that live out of state    Physical Therapy Goals  Reassessed 11/22 and remain appropriate    Reassessed 11/9/22  1. Patient will move from supine to sit and sit to supine  and roll side to side in bed with maximal assistance within 7 day(s). 2.  Patient will withdrawal and localize to noxious stim within 7 days  3. Patient will tolerate seated EOB x 10 minutes with max A within 7 days. Reassessed 11/1/22  1. Patient will move from supine to sit and sit to supine  and roll side to side in bed with maximal assistance within 7 day(s). 2.  Patient will transfer from bed to chair and chair to bed with maximal assistance using the least restrictive device within 7 day(s). 3.  Patient will perform sit to stand with maximal assistance within 7 day(s). 4.  Patient will tolerate seated EOB x 10 minutes with mod A within 7 days. 5.  Patient will improve Landon Balance score by 7 points within 7 days. Initiated 10/26/2022  1. Patient will move from supine to sit and sit to supine  and roll side to side in bed with maximal assistance within 7 day(s). 2.  Patient will transfer from bed to chair and chair to bed with maximal assistance using the least restrictive device within 7 day(s).   3.  Patient will perform sit to stand with maximal assistance within 7 day(s). 4.  Patient will ambulate with maximal assistance for 5 feet with the least restrictive device within 7 day(s). 5.  Patient will tolerate seated EOB x 10 minutes with mod A within 7 days. 6.  Patient will improve Landon Balance score by 7 points within 7 days. Outcome: Not Progressing Towards Goal   PHYSICAL THERAPY REEVALUATION  Patient: Lavern Swift (8 y.o. female)  Date: 11/22/2022  Primary Diagnosis: AMS (altered mental status) [R41.82]  Diarrhea [R19.7]  Altered mental status [R41.82]  Procedure(s) (LRB):  PERCUTANEOUS ENDOSCOPIC GASTROSTOMY TUBE INSERTION (N/A)  ESOPHAGOGASTRODUODENOSCOPY (EGD) (N/A) 14 Days Post-Op   Precautions:   Fall, Skin, Seizure      ASSESSMENT  Based on the objective data described below, the patient presents with grossly decreased ROM and strength in all extremities, no verbalizations, no command following, no initiation of movement in any extremities or head. Pt withdrew to noxious stim to all 4 extremities, but did not localize. Pt with blink reflex intact when threat presented. Pt also blinks to auditory stimulation, but does not turn head to side of sound when presented. Pt with increased tone throughout bilateral LEs (L>R) and noted tremulous movements in RUE when mobilizing RLE. Anticipate slow gains as able and can tolerate, recommend d/c to SNF vs LTC. Current Level of Function Impacting Discharge (mobility/balance): total A    Functional Outcome Measure: The patient scored 0/100 on the barthel outcome measure which is indicative of totally dependent in ADLs. Other factors to consider for discharge:      Patient will benefit from skilled therapy intervention to address the above noted impairments.        PLAN :  Recommendations and Planned Interventions: bed mobility training, transfer training, therapeutic exercises, neuromuscular re-education, edema management/control, patient and family training/education, and therapeutic activities      Frequency/Duration: Patient will be followed by physical therapy:  1 time a week to address goals. Recommendation for discharge: (in order for the patient to meet his/her long term goals)  Therapy up to 5 days/week in SNF setting vs LTC    This discharge recommendation:  Has been made in collaboration with the attending provider and/or case management    Equipment recommendations for successful discharge (if) home: none         SUBJECTIVE:   Patient only grimaced to PROM.  No verbalizations made    OBJECTIVE DATA SUMMARY:   HISTORY:    Past Medical History:   Diagnosis Date    Allergic rhinitis 7/29/2019    Depression     History of multiple allergies     History of vascular access device 10/07/2022    [facility] VAT: PICC placement R Cephalic length 40 cm for reliable access/TPN arm circ 35.5 cm    Muscle ache     Obesity 11/5/2012    Sinus pressure     Sinus problem      Past Surgical History:   Procedure Laterality Date    HX ACL RECONSTRUCTION      left     HX CHOLECYSTECTOMY  01/01/1998    HX GI      colonoscopy-polyps    IR INSERT NON TUNL CVC OVER 5 YRS  09/27/2022     Hospital course since last seen and reason for reevaluation: new orders    Personal factors and/or comorbidities impacting plan of care:     Home Situation  Home Environment: Rehabilitation facility ([facility] inpatient rehab, Brain Injury program)  # Steps to Enter: 2  One/Two Story Residence: Two story (bedroom on the 2nd floor)  # of Interior Steps: 13  Living Alone: Yes  Support Systems: Parent(s), Child(wayne)  Patient Expects to be Discharged to[de-identified] Skilled nursing facility  Current DME Used/Available at Home: None  Tub or Shower Type: Tub/Shower combination    EXAMINATION/PRESENTATION/DECISION MAKING:   Critical Behavior:  Neurologic State: Eyes open to stimulus  Orientation Level: Unable to verbalize  Cognition: Decreased attention/concentration, Impaired decision making, No command following  Safety/Judgement: Decreased awareness of environment, Decreased awareness of need for assistance, Decreased awareness of need for safety  Hearing: Auditory  Auditory Impairment:  (unknown)  Skin:    Edema:   Range Of Motion:  AROM: Grossly decreased, non-functional           PROM: Grossly decreased, non-functional           Strength:    Strength: Grossly decreased, non-functional                    Tone & Sensation:   Tone: Abnormal (L>R tonicity)              Sensation:  (unable to assess; pt withdraws to noxious stim to all 4 extremities, but does not localize)               Coordination:  Coordination: Grossly decreased, non-functional  Vision:      Functional Mobility:  Bed Mobility:  Rolling: Total assistance        Scooting: Total assistance  Transfers:                             Balance:   Sitting: Impaired; With support  Sitting - Static: Poor (constant support)  Ambulation/Gait Training:                    Therapeutic Exercises:   PROM to bilateral LEs, joint compression in hook lying position        Pain Rating:  Pt unable to localize to noxious stim    Activity Tolerance:   Fair    After treatment patient left in no apparent distress:   Supine in bed, Call bell within reach, Bed / chair alarm activated, and Side rails x 3    COMMUNICATION/EDUCATION:   The patients plan of care was discussed with: Occupational therapist, Registered nurse, and Case management. Patient is unable to participate in goal setting and plan of care.     Thank you for this referral.  Ollie Jaramillo, PT, DPT   Time Calculation: 15 mins

## 2022-11-22 NOTE — PROGRESS NOTES
Problem: Pressure Injury - Risk of  Goal: *Prevention of pressure injury  Description: Document Jose Enrique Scale and appropriate interventions in the flowsheet. Outcome: Progressing Towards Goal  Note: Pressure Injury Interventions:  Sensory Interventions: Assess changes in LOC    Moisture Interventions: Absorbent underpads    Activity Interventions: PT/OT evaluation    Mobility Interventions: HOB 30 degrees or less    Nutrition Interventions: Document food/fluid/supplement intake    Friction and Shear Interventions: Apply protective barrier, creams and emollients                Problem: Patient Education: Go to Patient Education Activity  Goal: Patient/Family Education  Outcome: Progressing Towards Goal     Problem: Patient Education: Go to Patient Education Activity  Goal: Patient/Family Education  Outcome: Progressing Towards Goal     Problem: Patient Education: Go to Patient Education Activity  Goal: Patient/Family Education  Outcome: Progressing Towards Goal     Problem: Patient Education: Go to Patient Education Activity  Goal: Patient/Family Education  Outcome: Progressing Towards Goal     Problem: Falls - Risk of  Goal: *Absence of Falls  Description: Document Chyna Fall Risk and appropriate interventions in the flowsheet.   Outcome: Progressing Towards Goal  Note: Fall Risk Interventions:  Mobility Interventions: Bed/chair exit alarm    Mentation Interventions: Bed/chair exit alarm    Medication Interventions: Bed/chair exit alarm    Elimination Interventions: Bed/chair exit alarm    History of Falls Interventions: Bed/chair exit alarm         Problem: Patient Education: Go to Patient Education Activity  Goal: Patient/Family Education  Outcome: Progressing Towards Goal     Problem: Discharge Planning  Goal: *Discharge to safe environment  Outcome: Progressing Towards Goal  Goal: *Knowledge of medication management  Outcome: Progressing Towards Goal  Goal: *Knowledge of discharge instructions  Outcome: Progressing Towards Goal

## 2022-11-22 NOTE — PROGRESS NOTES
TRANSFER - OUT REPORT:    Verbal report given to RN(name) on Moshe Resendiz  being transferred to [facility]) for routine progression of care       Report consisted of patients Situation, Background, Assessment and   Recommendations(SBAR). Information from the following report(s) SBAR, Kardex, Intake/Output, and MAR was reviewed with the receiving nurse. Lines:   Peripheral IV Anterior;Proximal;Right Forearm (Active)   Site Assessment Clean, dry, & intact 11/22/22 0800   Phlebitis Assessment 0 11/22/22 0800   Infiltration Assessment 0 11/22/22 0800   Dressing Status Clean, dry, & intact 11/22/22 0800   Dressing Type Transparent 11/22/22 0800   Hub Color/Line Status Capped 11/22/22 0800   Action Taken Open ports on tubing capped 11/22/22 0800   Alcohol Cap Used Yes 11/22/22 0800        Opportunity for questions and clarification was provided.       Patient transported with:   dough

## 2022-11-23 NOTE — PROGRESS NOTES
11/23/22 1148: Notified that Lizeth needs information on diet/PEG feeds.  PEG feed instructions faxed to [phone]    Kulwant Alas RN, CRM

## 2022-11-24 LAB
BACTERIA SPEC CULT: NORMAL
SERVICE CMNT-IMP: NORMAL

## 2022-11-28 ENCOUNTER — PATIENT OUTREACH (OUTPATIENT)
Dept: OTHER | Age: 57
End: 2022-11-28

## 2022-11-28 NOTE — PROGRESS NOTES
Telephonic outreach to the patient's mother, Ms. Milian. Verified two patient identifiers. Discussed patient's current status and progress made since her transfer to [facility] on 11/22. Patient's mother states that she is with her daughter every day, she does not have much physical support from the patient's sons. They live in different areas of the country, but have made phone calls to the patient. Ms. Darrel Perkins is working with her  to expedite the power of  process, but is coming into road blocks getting the clinical information to proceed. She was able to take some information regarding the patient's clinical status to her  who is processing it all. She states that her  told her that this process should not be a long process. In regards to patient's care and progress. Ms. Darrel Perkins states that she is disappointed in the fact that her mother is not being attended to as she had hoped. She states that she has been trying to assist with cleaning her up and getting her dressed. She is very pleased with the therapist that is working with her, feeling like she is making some progress. She has recently been prescribed a muscle relaxer, to loosen up the muscle to enhance her therapy sessions. Per Ms. Milian, the patient has shown improvement when it comes to responding. She believes that she was watching her roommates television, when she turned the patient's on, the patient turned her head to watch. She has had visits from coworkers and friends, she responds with one blink for no and two blinks for yes responses. Ms. Darrel Perkins is going to hire an elder  to ensure that she is doing right by the patient. Offered continued support and encouraged Ms. Milian to outreach with questions or concerns. Will continue to follow.

## 2022-12-03 NOTE — PROCEDURES
EEG Session Report  Patient Name: Shane Schneider  Medical ID: [id number]  YOB: 1965  Age: 59  Session Duration:  Oct 27, 2022 1:30 AM -  Oct 27, 2022 3:29 AM  Recording Total Time: 01:58:57  Ordering Physician: Rudy Cota NP  Primary Indication: Ischemic Stroke, Prior Seizure, Toxic/Metabolic  Location: ICU/Floor    Description of procedure: This EEG was obtained using a 10 lead, 8 channel system positioned circumferentially without any parasagittal coverage (rapid EEG). Computer selected EEG is reviewed as well as background features and all clinically significant events. Clarity algorithm utilized and implemented to provide analysis of underlying activity and seizure detection used to facilitate reading. Description of recording: Background activity consists of mixed frequencies with the predominant faster frequencies in the frontal areas along with muscle artifact. Intermittently slower theta frequencies were seen. Impressions: Mild generalized encephalopathic process, nonspecific in type. This may be related to an underlying structural brain injury and or toxic/metabolic abnormality. No epileptiform activity was seen.   No electrographic seizure was recorded    Report prepared by: N/A  Report generated on: Dec 3, 2022 11:01 AM [facility]-

## 2022-12-14 ENCOUNTER — PATIENT OUTREACH (OUTPATIENT)
Dept: OTHER | Age: 57
End: 2022-12-14

## 2022-12-14 NOTE — PROGRESS NOTES
Telephonic outreach to the patient's mother, Pop Magaña. Verified two patient identifiers. Ms. Leana Mckeon indicated that the patient is still improving and is not responding verbally in short sentences/comments. She is recognizing friends and family and responding to pain/discomfort. Family is hopeful that she will continue to show improving and recover further. Ms. Leana Mckeon is concerned that the insurance will not continue to cover the patient after 1/6, her care has been denied as of that date. An appeal is being processed. Ms. Leana Mckeon indicated that she is still in the process of obtaining the power of . She reported that the  that was working on the case quit and she is now working with a different . She is gathering necessary paperwork and records as she is able to obtain to move forward with the process. Will continue to outreach and offer care management assistance as needed.

## 2022-12-29 ENCOUNTER — PATIENT OUTREACH (OUTPATIENT)
Dept: OTHER | Age: 57
End: 2022-12-29

## 2022-12-29 NOTE — PROGRESS NOTES
Telephonic outreach to the patient's mother, Melanie Perry. Verified two patient identifiers. Ms. Good Arce reports that the patient continues to improve, some days making more progress than others. She reports that the patient is able to say some works and put say basic short sentences, including telling her mother, I love you too, just this morning. She is able to respond with words, blinking, and nodding. She is working with therapy and mom reports being pleased with the progress therapy is making. They are working with her multiple times per day. The progress is slow, but noted. Patient's mother feels that though she is improving, it will still take some time before she is able to move around with out being a full assist. The family is still looking into the possible of sending her to a facility in [place] that works with brain trauma. They are also considering sending her back to [address] [place] [address] for additional therapy. Mother voiced frustration regarding getting the POA completed. She has the paperwork and was informed by her  to take it to [facility] to have the patient sign the papers. Per the patient's mother, the physician and [facility] has deemed the patient competent and able to sign, however  is not feeling comfortable with having the patient sign without talking to the  first. Mom reports that the  is now on vacation. This AC attempted to contact the  at [facility] at [phone]. Unable to leave a message as the VM is full. Will continue attempts to outreach  to assist the patient's mother with moving forward.

## 2023-01-10 ENCOUNTER — PATIENT OUTREACH (OUTPATIENT)
Dept: OTHER | Age: 58
End: 2023-01-10

## 2023-01-10 NOTE — PROGRESS NOTES
Telephonic outreach to patient's mother, Ritchie Lam Verified two patient identifiers. Patient's mother reports that she now has POA for her daughter. She also informed this ECM that the insurance has denied further stay at [facility], where she has been staying for skilled nursing. She states that the patient is responded to questions and repeating some statements to family and friends. She had been improved with her therapies, however she is no longer receiving therapies because the insurance has stopped covering her stay. This ECM spoke with MSGELY Forrester regarding options for the patients mother in terms of Medicaid applications. Ms. Rodriguez Louise agreed to reach out to MsMalorie Maicol to discuss options and assist regarding Medicaid applications. Ms. Stephenson Given reports that she has not been able to see the patient as often as she would like, as the patient has been diagnosed with COVID and has a fever. Will continue to follow and offer support as needed.

## 2023-01-10 NOTE — PROGRESS NOTES
[facility] contacted patient mother, Ms. Milian for follow-up  Patient identifiers confirmed, zip code and . Purpose of TC  Ms. Maribel Nance had questions about applying for Medicaid. TC details  Ms. Maribel Nance stated that insurance will not pay for additional days to patient to stay at current rehab facility. It has been suggested that all of Ms. Kowalski's assets be dissolved in order for patient to qualify for Medicaid. MSW MORENO inquired whether Ms. Milian was open to speaking to an elder law . Ms. Maribel Nance was agreeable. An elder law  could advise Ms. Milian on funding long term care with patient existing assets. Plan of action  MSGELY [facility] will email Ms. Milian a listing of elder law attorneys in her area.

## 2023-01-10 NOTE — PATIENT INSTRUCTIONS
Ms. Governor Nathalie  Thank you for taking the time to chat with me this afternoon. Below is a list of elder law attorneys that can help you navigate the complexities of Medicaid and assets. They are all located in [facility], per your request.     Kell Forrester.es. net/[email or web address]-law/    [address] [place] [address].titi Howell/[employer]  [email or web address]. com/[email or web address]/estate-planning-and-elder-law--[facility]. cfm

## 2023-01-18 ENCOUNTER — PATIENT OUTREACH (OUTPATIENT)
Dept: OTHER | Age: 58
End: 2023-01-18

## 2023-01-18 NOTE — PROGRESS NOTES
Incoming call from the patient's mother/POA, Pa Forbes. Verified two patient identifiers. She called to inform this ECM that the patient's therapies were restarted and they are being covered under Ida Milian if very grateful for all of the assistance and guidance in getting the best care for the patient. The room at [facility] will still be out of pocket, but the therapies will be covered by the insurance. Patient was previously encouraged to contact an elder law  and was provided names for possible options by Ms. Read. She states that one of the provided names no longer does elder care law and the other one was not approved by Ms. Milian's . She is looking for other possible options for financial assistance. Will continue outreach and contact with Ms. Milian on behalf of the patient.

## 2023-01-24 ENCOUNTER — TRANSCRIBE ORDER (OUTPATIENT)
Dept: SCHEDULING | Age: 58
End: 2023-01-24
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
## 2023-01-26 ENCOUNTER — HOSPITAL ENCOUNTER (OUTPATIENT)
Dept: CT IMAGING | Age: 58
End: 2023-01-26
Attending: FAMILY MEDICINE
Payer: COMMERCIAL

## 2023-01-26 ENCOUNTER — TRANSCRIBE ORDER (OUTPATIENT)
Dept: GENERAL RADIOLOGY | Age: 58
End: 2023-01-26
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
## 2023-01-26 PROCEDURE — 73200 CT UPPER EXTREMITY W/O DYE: CPT

## 2023-01-26 PROCEDURE — 70450 CT HEAD/BRAIN W/O DYE: CPT

## 2023-01-26 PROCEDURE — 73700 CT LOWER EXTREMITY W/O DYE: CPT

## 2023-02-01 ENCOUNTER — PATIENT OUTREACH (OUTPATIENT)
Dept: OTHER | Age: 58
End: 2023-02-01

## 2023-02-01 NOTE — PROGRESS NOTES
Incoming call from patients POA/mother, Ju Lawrence. Attempted to call the patient back to address concerns. Left a discreet VM with a request for a return call. Will continue to follow and assist as needed.

## 2023-02-07 ENCOUNTER — PATIENT OUTREACH (OUTPATIENT)
Dept: OTHER | Age: 58
End: 2023-02-07

## 2023-02-07 NOTE — PROGRESS NOTES
Telephonic outreach to the patient's mother, after multiple missed contact attempts between Mrs. Milian and this [facility]. Verified two patient identifiers. Mrs. Milian is tearful and upset throughout the conversation. She is currently frustrated and feels that every time she tries to help her daughter, there is an obstacle. She is attempting to work with Sridhar Torres regarding disability and she is being told that she will need a [place] proxy. The patient, however cannot sign the proxy, so her mother will need guardianship. Mrs. Milian cannot afford the cost of obtaining guardianship. She is requesting assistance with obtaining a  to assist her with her concerns. She voiced that she is very concerned about her daughter's care at the facility that she is at. The insurance is no longer covering room and board, they are only covering physical therapy. She has developed bed sores and has not seen a wound doctor. She has had numerous falls and even though she has a feeding tube, she has been given her medications by mouth. Mrs. Huynh Trudy is looking into moving the patient to another facility. She mentions [facility].   This ACM reached out to [facility] to assist with options for the patient. She will reach out with guidance or information that can assist Mrs. Milian. Will continue to outreach and assist with care management needs.

## 2023-02-08 ENCOUNTER — PATIENT OUTREACH (OUTPATIENT)
Dept: OTHER | Age: 58
End: 2023-02-08

## 2023-02-08 NOTE — PROGRESS NOTES
REJI MAYER contacted Ms. Milian, patient mother. Patient identifiers confirmed, zip code and . Purpose of call  Follow up       TC details  Ms. Kamila Coyle stated that she is having challenges navigating legal issues on her daughter's behalf. Patient stated that patient's home is in foreclosure because [address] refuses to speak with her despite POA. Ms. Kamila Coyle stated that [facility] also declined to speak with her regarding LTD benefits because she did not have guardianship. Patient is not receiving income at this time. There is also an issue involving patient late  estate. Ms. Kamila Coyle has retained an . According to Ms. Milian, a different  has to be retained to represent patient. The same  would be a conflict of interest.     Ms. Kamila Coyle stated that she is struggling trying to figure out which bills to pay. According to Ms. Milian, several thousand dollars has been spend thus far on legal fees alone. Ms. Kamila Coyle stated that she was open to receiving information for a different  to represent her daughter. Outcome  Below email was sent to Ms. Milian per her request.      Thank you for taking the time to chat with me this afternoon. Below is the resource that we discussed. Mago Jeffers  [email or web address]. [email or web address]  According to her bio, this  has a nursing background.   She may be a good fit

## 2023-02-09 ENCOUNTER — TRANSCRIBE ORDER (OUTPATIENT)
Dept: SCHEDULING | Age: 58
End: 2023-02-09

## 2023-02-09 DIAGNOSIS — R13.10 PROBLEMS WITH SWALLOWING AND MASTICATION: Primary | ICD-10-CM

## 2023-02-17 ENCOUNTER — TELEPHONE (OUTPATIENT)
Dept: NEUROLOGY | Age: 58
End: 2023-02-17

## 2023-02-17 NOTE — Clinical Note
2/22/2023 1:01 PM    Ms. Clark Friends  [address] [place] [id number]              Sincerely,      Dwight Irene MD

## 2023-02-17 NOTE — LETTER
2/22/2023 1:02 PM    Ms. Currie Loges  [address] [id number]      Ms. Ronak Nash is currently under my care at the [facility]. 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 Due to her spasticity however, she is having difficulty fully participating in therapy. Our goal is through continued therapy, she will continue to make progress and regain more function.     Sincerely,      Ayla Uriostegui MD

## 2023-02-17 NOTE — TELEPHONE ENCOUNTER
The  from [address] called and stated the request we made for xray results must be requested to medical records.     [phone]

## 2023-02-20 ENCOUNTER — HOSPITAL ENCOUNTER (OUTPATIENT)
Dept: GENERAL RADIOLOGY | Age: 58
Discharge: HOME | End: 2023-02-20
Attending: INTERNAL MEDICINE
Payer: COMMERCIAL

## 2023-02-20 DIAGNOSIS — R13.10 PROBLEMS WITH SWALLOWING AND MASTICATION: ICD-10-CM

## 2023-02-20 PROCEDURE — 92611 MOTION FLUOROSCOPY/SWALLOW: CPT

## 2023-02-20 PROCEDURE — 74230 X-RAY XM SWLNG FUNCJ C+: CPT

## 2023-02-20 NOTE — PROGRESS NOTES
Harshal Henderson, [address] [facility] STUDY  Patient: Fiona Flores (88 y.o. female)  Date: 2/20/2023  Referring Provider:      SUBJECTIVE:   The patient was at [facility] in Sept, '22 with broken heart syndrome. She was found to have an GENA. She received a PEG while there as she was not responsive enough to take po. She has been at [facility] but is no longer getting speech therapy services as Medicare has declined further payment. It is being appealed. At one point she was being given purees by speech at [facility]. Her mother endorses that she gives her ice chips all day with no change in her respiratory status. Mother accompanies the patient today and is very devoted to her daughter. OBJECTIVE:   Past Medical History:   Past Medical History:   Diagnosis Date    Allergic rhinitis 7/29/2019    Depression     History of multiple allergies     History of vascular access device 10/07/2022    [facility] VAT: PICC placement R Cephalic length 40 cm for reliable access/TPN arm circ 35.5 cm    Muscle ache     Obesity 11/5/2012    Sinus pressure     Sinus problem      Past Surgical History:   Procedure Laterality Date    HX ACL RECONSTRUCTION      left     HX CHOLECYSTECTOMY  01/01/1998    HX GI      colonoscopy-polyps    IR INSERT NON TUNL CVC OVER 5 YRS  09/27/2022     Current Dietary Status:  NPO with PEG feedings. Radiologist: Dr. Brittany Nice: Fluoro;Lateral  Patient Position: upright in bed    Trial 1:   Consistency Presented: Thin liquid;Puree   How Presented: Self-fed/presented;Straw;SLP-fed/presented;Spoon       Bolus Acceptance: No impairment   Bolus Formation/Control: Impaired: Premature spillage   Propulsion: No impairment   Oral Residue: None   Initiation of Swallow: Triggered at vallecula   Timing: Pooling 1-5 sec   Penetration: Trace;During swallow;From initial swallow; To laryngeal vestibule Aspiration/Timing: No evidence of aspiration   Pharyngeal Clearance: No residue                       Decreased Tongue Base Retraction?: No  Laryngeal Elevation: WFL (within functional limits)  Aspiration/Penetration Score: 2 (Penetration/No residue-Contrast enters the airway penetrates, remains above the folds/cords, and is cleared)  Pharyngeal Symmetry: Not assessed  Pharyngeal-Esophageal Segment: No impairment       Oral Phase Severity: Mild  Pharyngeal Phase Severity: Mild    ASSESSMENT :  Based on the objective data described above, the patient presents with functional swallow of thins and very small amount of pureed bolus. She accepted the straw and extracted liquid well. Premature spillage to the valleculae is noted. There is intermittent trace laryngeal penetration into the laryngeal vestibule but it clears with the force of the swallow. There is no pharyngeal residue. No aspiration of single and successive sips. With a larger pureed bolus, she held it and spit it out. Did not trial solids since it was unclear if she would masticate it. Mild oropharyngeal dysphagia with premature spillage of thins and mild swallow delay. PLAN/RECOMMENDATIONS :  Recommend pureed diet. Please sit as fully upright as possible. At least make sure her head is fully upright. In therapy her SLP can advance her diet depending on her oral motor skills/mastication. Discussed oral care with the mother and a toothbrush needs to be used to clean her teeth. COMMUNICATION/EDUCATION:   The above findings and recommendations were discussed with: the patient and her mother who verbalized understanding.     Thank you for this referral.  MARCIA Armstrong  Time Calculation: 45 mins

## 2023-02-22 ENCOUNTER — OFFICE VISIT (OUTPATIENT)
Dept: NEUROLOGY | Age: 58
End: 2023-02-22
Payer: COMMERCIAL

## 2023-02-22 VITALS
BODY MASS INDEX: 27.97 KG/M2 | HEIGHT: 66 IN | SYSTOLIC BLOOD PRESSURE: 138 MMHG | RESPIRATION RATE: 16 BRPM | TEMPERATURE: 97.3 F | HEART RATE: 94 BPM | WEIGHT: 174 LBS | DIASTOLIC BLOOD PRESSURE: 78 MMHG

## 2023-02-22 DIAGNOSIS — G82.50 SPASTIC QUADRIPARESIS (HCC): Primary | ICD-10-CM

## 2023-02-22 DIAGNOSIS — R47.01 APHASIA: ICD-10-CM

## 2023-02-22 PROCEDURE — 99215 OFFICE O/P EST HI 40 MIN: CPT | Performed by: PSYCHIATRY & NEUROLOGY

## 2023-02-22 RX ORDER — METOPROLOL TARTRATE 25 MG/1
25 TABLET, FILM COATED ORAL 2 TIMES DAILY
COMMUNITY
Start: 2023-01-04

## 2023-02-22 RX ORDER — BACLOFEN 10 MG/1
10 TABLET ORAL 3 TIMES DAILY
Qty: 30 TABLET | Refills: 3 | Status: SHIPPED | OUTPATIENT
Start: 2023-02-22

## 2023-02-22 RX ORDER — TRAMADOL HYDROCHLORIDE 50 MG/1
TABLET ORAL
COMMUNITY
Start: 2023-02-06

## 2023-02-22 NOTE — PROGRESS NOTES
[facility] and [address] at [facility] at [address] [facility] [place], [address] [facility] [address] [facility], [address] [place] [address]   [phone]              Chief Complaint   Patient presents with    Hospital Follow Up     AMS and neurologic changes      Current Outpatient Medications   Medication Sig Dispense Refill    metoprolol tartrate (LOPRESSOR) 25 mg tablet 25 mg two (2) times a day. levothyroxine (SYNTHROID) 88 mcg tablet Take 88 mcg by mouth Daily (before breakfast). furosemide (LASIX) 20 mg tablet Take 1 Tablet by mouth daily as needed (swelling). 90 Tablet 1    atorvastatin (LIPITOR) 10 mg tablet TAKE ONE TABLET BY MOUTH ONCE NIGHTLY 90 Tablet 0    acetaminophen (TYLENOL) 325 mg tablet Take  by mouth every four (4) hours as needed. traMADoL (ULTRAM) 50 mg tablet  (Patient not taking: Reported on 2/22/2023)        Allergies   Allergen Reactions    Droperidol Itching     Social History     Tobacco Use    Smoking status: Never    Smokeless tobacco: Never   Vaping Use    Vaping Use: Never used   Substance Use Topics    Alcohol use: Not Currently     Alcohol/week: 1.0 standard drink     Types: 1 Glasses of wine per week    Drug use: No     57-year-old lady returns today for follow-up. She came into the [facility] emergency department unresponsive. She was hypotensive in the 70s. She was in respiratory arrest.  She eventually was found to have had a fentanyl 100 mcg patch on board  My last examination of her was September 29, 2022 and that demonstrated her to be on propofol and would open her eyes briefly when that medication was held for examination.   She had symmetric withdrawal.  MRI of the brain demonstrated a small acute infarct in the right frontal lobe tiny and I reviewed that film today    The next neurology note is a consultation by Dr. Silke Rowley where [facility]. She was said to be aphasic and not following commands. She was said to have had Takotsubo cardiomyopathy and had been declining in her mental status and function but had been receiving Risperdal and Seroquel and trazodone and Lexapro. She was febrile, tachycardic with severe muscle rigidity hyperreflexia diaphoresis. Her suspicion was serotonin syndrome and trazodone and SSRIs were discontinued    Dr. Kaleb Huntley follow-up visit October 31, 2022 where at that time she was lethargic but would open her eyes and not interact. She would not follow commands. She had increased tone in the right upper and lower extremity versus the left. Working diagnosis continued to be serotonin syndrome. Valium was increased. Subsequent consultation by Dr. Kaleb Huntley dated November 10, 2022 where she was said to have suffered medical decline such that she could not communicate or make any medical decisions. She had some twitching and roving eye movements and it was noted that it would not be unexpected for her to have nonpurposeful movements given her history. Supportive care was recommended. Dr. Delphine Hilario note November 15, 2022 where her examination found her to be alert eyes open no tracking and no commands. No cranial nerve deficits. She had no movement seen. She was said to have had DVT and PEs. Transfer request to [facility] and do core decline. Discussion was had with the patient's mother that she may not recover and if she does recover she will have significant deficits. End-of-life decisions were being discussed. The discharge summary her discharge diagnosis was that of acute metabolic encephalopathy due to suspected serotonin syndrome and delayed hypoxic leukoencephalopathy    CT of the head performed January 26, 2023 is read out as slight interval decrease in the diffuse bilateral symmetric white matter hypodensity which may represent toxic encephalopathy.     MRI of the brain from 11/15/2022 with stable bilateral symmetric hemispheric subcortical white matter abnormality in keeping with toxic versus metabolic leukoencephalopathy    She is here with her mother today. Her mother provides majority of history. Patient has been in [facility] and 6 weeks ago just out of the blue started to speak. She has been now able to participate in therapies. She just passed her swallowing screen a few days ago. They wish to work on her aphasia. She is having insurance issues as [facility] apparently is refusing to pay stating that she does not need additional services. Physical therapist and occupational therapist sent the note from this morning noting that she is making steady progress with therapy and she remains motivated to challenge herself and to improve her level of function and quality of life. She follows cues and she can participate during therapy sessions. Her left upper extremity works better than her right. She basically through this note is showing that she has markedly increased tone that may be restricting some of her ability to participate fully. She has been complaining of diplopia. She saw [place] [facility] and they wanted to see neuro-ophthalmology. Their exam was limited. Examination  Visit Vitals  /78   Pulse 94   Temp 97.3 °F (36.3 °C) (Oral)   Resp 16   Ht 5' 6\" (1.676 m)   Wt 78.9 kg (174 lb)   BMI 28.08 kg/m²     She is awake and alert. She is conversant. She does demonstrate aphasia but she is able to follow commands. She cannot name. She says she lives outside of [place] and it is February and she believes it is probably middle of February. She has full versions. No nystagmus. When I hold up 1 finger and asked her how many she sees she says 6 but I do not know if that is aphasic or not. She has markedly increased tone right upper and lower versus left. She has some mild finger movements of the right hand. The right hand is swollen.   She is unable to raise that right upper extremity. She is able to raise the left upper extremity a couple of inches off of her chair. She cannot raise the right lower extremity. The left lower extremity she does raise about 3 inches. Reflexes brisk right versus left. Impression/Plan  61-year-old lady with significant neurologic devastation as above  Discussed that it can take up to a year to get back which she is going to get back  I actually was impressed when I walked into the room and she was able to speak  I am encouraged by the physical therapy notes  She does have markedly increased tone so we will try low-dose baclofen 10 mg 3 times daily to see if decreasing that spasticity can help  Follow-up in about 3 months    Giancarlo Calvillo MD    45 minutes spent today in record review, film review as well as face-to-face interaction with the patient and her mother and documentation    This note was created using voice recognition software. Despite editing, there may be syntax errors.

## 2023-02-22 NOTE — PROGRESS NOTES
Chief Complaint   Patient presents with    Hospital Follow Up     AMS and neurologic changes - patient currently living in assisted living -[facility]  Patient accompanied by her mother.              Visit Vitals  /78   Pulse 94   Temp 97.3 °F (36.3 °C) (Oral)   Resp 16   Ht 5' 6\" (1.676 m)   Wt 78.9 kg (174 lb)   BMI 28.08 kg/m²

## 2023-02-22 NOTE — PATIENT INSTRUCTIONS
[employer] [facility] Test Result Communication    Test results are available in [address] [employer] is the patient portal into our electronic health record. This feature allows patients to see diagnostic test results, immunizations, allergies, past medical and surgical history, current medications, and send messages directly to providers. Our team members at the  can provide additional information and assist with registration. The [employer] support team can be reached at [phone]. In some cases, a provider might need time to explain the results in detail during a follow-up appointment. This might include additional information or context that will help patients understand the reason for next steps in the plan of care recommended by their provider. If a patient chooses to receive diagnostic testing at an imaging center outside of the [facility], it is the patient's responsibility to bring the imaging report and disc to their [facility] follow-up appointment. If the test results reveal anything that is particularly noteworthy, we will contact you to discuss the matter and, if necessary, schedule a follow-up appointment at an earlier date. If you have not received your test results by [employer] or other communication within 7 days, please contact our office. An inquiry can be sent to your provider using [employer]. Alternatively, appointments can be scheduled via telephone to review results. If a follow-up appointment to review results has not been scheduled, [address] office can be reached at [phone]. For appointments at our [facility] or [facility] office, please call [phone].        [facility]   Statement to Patients  April 1, 2014      In an effort to ensure the large volume of patient prescription refills is processed in the most efficient and expeditious manner, we are asking our patients to assist us by calling your Pharmacy for all prescription refills, this will include also your  Mail Order Pharmacy. The pharmacy will contact our office electronically to continue the refill process. Please do not wait until the last minute to call your pharmacy. We need at least 48 hours (2days) to fill prescriptions. We also encourage you to call your pharmacy before going to  your prescription to make sure it is ready. With regard to controlled substance prescription refill requests (narcotic refills) that need to be picked up at our office, we ask your cooperation by providing us with at least 72 hours (3days) notice that you will need a refill. We will not refill narcotic prescription refill requests after 4:00pm on any weekday, Monday through Thursday, or after 2:00pm on Fridays, or on the weekends. We encourage everyone to explore another way of getting your prescription refill request processed using [employer], our patient web portal through our electronic medical record system. [employer] is an efficient and effective way to communicate your medication request directly to the office and  downloadable as an osmin on your smart phone . [employer] also features a review functionality that allows you to view your medication list as well as leave messages for your physician. Are you ready to get connected? If so please review the attatched instructions or speak to any of our staff to get you set up right away! Thank you so much for your cooperation. Should you have any questions please contact our Practice Administrator.

## 2023-02-23 ENCOUNTER — TELEPHONE (OUTPATIENT)
Dept: NEUROLOGY | Age: 58
End: 2023-02-23

## 2023-02-23 NOTE — TELEPHONE ENCOUNTER
Letter stating pt should remain in therapy has been faxed to [facility] Devonte Eyadchandler [phone] per pt's POA request Yaw Gannon. 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      Change from previous past medical, family or social history:	[x] No	[] Yes:    REVIEW OF SYSTEMS  All review of systems negative, except for those marked:  General:		[] Abnormal:  Pulmonary:		[] Abnormal:  Cardiac:		[] Abnormal:  Gastrointestinal:	            [] Abnormal:  ENT:			[] Abnormal:  Renal/Urologic:		[] Abnormal:  Musculoskeletal		[] Abnormal:  Endocrine:		[] Abnormal:  Hematologic:		[] Abnormal:  Neurologic:		[] Abnormal:  Skin:			[] Abnormal:  Allergy/Immune		[] Abnormal:  Psychiatric:		[] Abnormal:      Allergies    No Known Allergies    Intolerances      acyclovir  Oral Liquid - Peds 80 milliGRAM(s) Oral every 8 hours  allopurinol  Oral Liquid - Peds 25 milliGRAM(s) Oral three times a day after meals  cefepime  IV Intermittent - Peds 440 milliGRAM(s) IV Intermittent every 8 hours  ciprofloxacin 0.125 mG/mL - heparin Lock 100 Units/mL - Peds 1 milliLiter(s) Catheter <User Schedule>  dexamethasone   Concentrate - Pediatric (Chemo) 1.3 milliGRAM(s) Oral two times a day  dexamethasone   IVPB - Pediatric (Chemo) 1.28 milliGRAM(s) IV Intermittent every 12 hours PRN  dexamethasone   IVPB - Pediatric (Chemo) 1.28 milliGRAM(s) IV Intermittent every 12 hours  dextrose 5% + sodium chloride 0.45%. - Pediatric 1000 milliLiter(s) IV Continuous <Continuous>  enoxaparin SubCutaneous Injection - Peds 10 milliGRAM(s) SubCutaneous every 12 hours  famotidine IV Intermittent - Peds 2.2 milliGRAM(s) IV Intermittent every 12 hours  hydrOXYzine IV Intermittent - Peds. 4.5 milliGRAM(s) IV Intermittent every 6 hours PRN  lactobacillus Oral Powder (CULTURELLE KIDS) - Peds 0.5 Packet(s) Oral daily  lidocaine  4% Topical Cream - Peds 1 Application(s) Topical once  lidocaine 1% Local Injection - Peds 3 milliLiter(s) Local Injection once  LORazepam IV Intermittent - Peds 0.2 milliGRAM(s) IV Intermittent every 6 hours PRN  methotrexate PF IntraThecal 8 milliGRAM(s) IntraThecal once  micafungin IV Intermittent - Peds 35 milliGRAM(s) IV Intermittent every 24 hours  ondansetron IV Intermittent - Peds 1.3 milliGRAM(s) IV Intermittent every 8 hours  pentamidine IV Intermittent - Peds 35 milliGRAM(s) IV Intermittent every 2 weeks  vancomycin  Oral Liquid - Peds 90 milliGRAM(s) Oral every 48 hours  vancomycin 2 mG/mL - heparin  Lock 100 Units/mL - Peds 1 milliLiter(s) Catheter <User Schedule>  vancomycin IV Intermittent - Peds 180 milliGRAM(s) IV Intermittent every 6 hours  vinCRIStine IVPB - Pediatric 0.6 milliGRAM(s) IV Intermittent every 7 days      DIET:  Pediatric Regular    Vital Signs Last 24 Hrs  T(C): 36.2 (09 Apr 2019 09:55), Max: 36.6 (08 Apr 2019 13:23)  T(F): 97.1 (09 Apr 2019 09:55), Max: 97.8 (08 Apr 2019 13:23)  HR: 130 (09 Apr 2019 09:55) (83 - 130)  BP: 97/64 (09 Apr 2019 09:55) (95/67 - 107/66)  BP(mean): 71 (09 Apr 2019 05:51) (71 - 73)  RR: 32 (09 Apr 2019 09:55) (24 - 32)  SpO2: 100% (09 Apr 2019 09:55) (97% - 100%)  Daily     Daily Weight in Gm: 8140 (09 Apr 2019 05:51)  I&O's Summary    08 Apr 2019 07:01  -  09 Apr 2019 07:00  --------------------------------------------------------  IN: 1367 mL / OUT: 1362 mL / NET: 5 mL    09 Apr 2019 07:01  -  09 Apr 2019 10:54  --------------------------------------------------------  IN: 140 mL / OUT: 59 mL / NET: 81 mL      Pain Score (0-10):		Lansky/Karnofsky Score:     PATIENT CARE ACCESS  [] Peripheral IV  [] Central Venous Line	[] R	[] L	[] IJ	[] Fem	[] SC			[] Placed:  [] PICC:				[] Broviac		[] Mediport  [] Urinary Catheter, Date Placed:  [] Necessity of urinary, arterial, and venous catheters discussed    PHYSICAL EXAM  All physical exam findings normal, except those marked:  Constitutional:	Normal: well appearing, in no apparent distress  .		[] Abnormal:  Eyes		Normal: no conjunctival injection, symmetric gaze  .		[] Abnormal:  ENT:		Normal: mucus membranes moist, no mouth sores or mucosal bleeding, normal .  .		dentition, symmetric facies.  .		[] Abnormal:               Mucositis NCI grading scale                [] Grade 0: None                [] Grade 1: (mild) Painless ulcers, erythema, or mild soreness in the absence of lesions                [] Grade 2: (moderate) Painful erythema, oedema, or ulcers but eating or swallowing possible                [] Grade 3: (severe) Painful erythema, odema or ulcers requiring IV hydration                [] Grade 4: (life-threatening) Severe ulceration or requiring parenteral or enteral nutritional support   Neck		Normal: no thyromegaly or masses appreciated  .		[] Abnormal:  Cardiovascular	Normal: regular rate, normal S1, S2, no murmurs, rubs or gallops  .		[] Abnormal:  Respiratory	Normal: clear to auscultation bilaterally, no wheezing  .		[] Abnormal:  Abdominal	Normal: normoactive bowel sounds, soft, NT, no hepatosplenomegaly, no   .		masses  .		[] Abnormal:  		Normal normal genitalia, testes descended  .		[] Abnormal: [x] not done  Lymphatic	Normal: no adenopathy appreciated  .		[] Abnormal:  Extremities	Normal: FROM x4, no cyanosis or edema, symmetric pulses  .		[] Abnormal:  Skin		Normal: normal appearance, no rash, nodules, vesicles, ulcers or erythema  .		[] Abnormal:  Neurologic	Normal: no focal deficits, gait normal and normal motor exam.  .		[] Abnormal:  Psychiatric	Normal: affect appropriate  		[] Abnormal:  Musculoskeletal		Normal: full range of motion and no deformities appreciated, no masses   .			and normal strength in all extremities.  .			[] Abnormal:    Lab Results:  CBC  CBC Full  -  ( 08 Apr 2019 22:15 )  WBC Count : 0.19 K/uL  RBC Count : 4.13 M/uL  Hemoglobin : 12.6 g/dL  Hematocrit : 38.0 %  Platelet Count - Automated : 31 K/uL  Mean Cell Volume : 92.0 fL  Mean Cell Hemoglobin : 30.5 pg  Mean Cell Hemoglobin Concentration : 33.2 %  Auto Neutrophil # : 0.10 K/uL  Auto Lymphocyte # : 0.06 K/uL  Auto Monocyte # : 0.03 K/uL  Auto Eosinophil # : 0.00 K/uL  Auto Basophil # : 0.00 K/uL  Auto Neutrophil % : 52.6 %  Auto Lymphocyte % : 31.6 %  Auto Monocyte % : 15.8 %  Auto Eosinophil % : 0.0 %  Auto Basophil % : 0.0 %    .		Differential:	[x] Automated		[] Manual  Chemistry  04-08    140  |  110<H>  |  14  ----------------------------<  70  3.7   |  17<L>  |  < 0.20<L>    Ca    9.1      08 Apr 2019 22:15  Phos  3.9     04-08  Mg     1.7     04-08    TPro  5.1<L>  /  Alb  3.4  /  TBili  0.6  /  DBili  x   /  AST  38<H>  /  ALT  39<H>  /  AlkPhos  126  04-08    LIVER FUNCTIONS - ( 08 Apr 2019 22:15 )  Alb: 3.4 g/dL / Pro: 5.1 g/dL / ALK PHOS: 126 u/L / ALT: 39 u/L / AST: 38 u/L / GGT: x                 MICROBIOLOGY/CULTURES:    RADIOLOGY RESULTS:    Toxicities (with grade)  1.  2.  3.  4. 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      Change from previous past medical, family or social history:	[x] No	[] Yes:    REVIEW OF SYSTEMS  All review of systems negative, except for those marked:  General:		[] Abnormal:  Pulmonary:		[] Abnormal:  Cardiac:		[] Abnormal:  Gastrointestinal:	            [] Abnormal:  ENT:			[] Abnormal:  Renal/Urologic:		[] Abnormal:  Musculoskeletal		[] Abnormal:  Endocrine:		[] Abnormal:  Hematologic:		[] Abnormal:  Neurologic:		[] Abnormal:  Skin:			[] Abnormal:  Allergy/Immune		[] Abnormal:  Psychiatric:		[] Abnormal:      Allergies    No Known Allergies    Intolerances      acyclovir  Oral Liquid - Peds 80 milliGRAM(s) Oral every 8 hours  allopurinol  Oral Liquid - Peds 25 milliGRAM(s) Oral three times a day after meals  cefepime  IV Intermittent - Peds 440 milliGRAM(s) IV Intermittent every 8 hours  ciprofloxacin 0.125 mG/mL - heparin Lock 100 Units/mL - Peds 1 milliLiter(s) Catheter <User Schedule>  dexamethasone   Concentrate - Pediatric (Chemo) 1.3 milliGRAM(s) Oral two times a day  dexamethasone   IVPB - Pediatric (Chemo) 1.28 milliGRAM(s) IV Intermittent every 12 hours PRN  dexamethasone   IVPB - Pediatric (Chemo) 1.28 milliGRAM(s) IV Intermittent every 12 hours  dextrose 5% + sodium chloride 0.45%. - Pediatric 1000 milliLiter(s) IV Continuous <Continuous>  enoxaparin SubCutaneous Injection - Peds 10 milliGRAM(s) SubCutaneous every 12 hours  famotidine IV Intermittent - Peds 2.2 milliGRAM(s) IV Intermittent every 12 hours  hydrOXYzine IV Intermittent - Peds. 4.5 milliGRAM(s) IV Intermittent every 6 hours PRN  lactobacillus Oral Powder (CULTURELLE KIDS) - Peds 0.5 Packet(s) Oral daily  lidocaine  4% Topical Cream - Peds 1 Application(s) Topical once  lidocaine 1% Local Injection - Peds 3 milliLiter(s) Local Injection once  LORazepam IV Intermittent - Peds 0.2 milliGRAM(s) IV Intermittent every 6 hours PRN  methotrexate PF IntraThecal 8 milliGRAM(s) IntraThecal once  micafungin IV Intermittent - Peds 35 milliGRAM(s) IV Intermittent every 24 hours  ondansetron IV Intermittent - Peds 1.3 milliGRAM(s) IV Intermittent every 8 hours  pentamidine IV Intermittent - Peds 35 milliGRAM(s) IV Intermittent every 2 weeks  vancomycin  Oral Liquid - Peds 90 milliGRAM(s) Oral every 48 hours  vancomycin 2 mG/mL - heparin  Lock 100 Units/mL - Peds 1 milliLiter(s) Catheter <User Schedule>  vancomycin IV Intermittent - Peds 180 milliGRAM(s) IV Intermittent every 6 hours  vinCRIStine IVPB - Pediatric 0.6 milliGRAM(s) IV Intermittent every 7 days      DIET:  Pediatric Regular    Vital Signs Last 24 Hrs  T(C): 36.2 (09 Apr 2019 09:55), Max: 36.6 (08 Apr 2019 13:23)  T(F): 97.1 (09 Apr 2019 09:55), Max: 97.8 (08 Apr 2019 13:23)  HR: 130 (09 Apr 2019 09:55) (83 - 130)  BP: 97/64 (09 Apr 2019 09:55) (95/67 - 107/66)  BP(mean): 71 (09 Apr 2019 05:51) (71 - 73)  RR: 32 (09 Apr 2019 09:55) (24 - 32)  SpO2: 100% (09 Apr 2019 09:55) (97% - 100%)  Daily     Daily Weight in Gm: 8140 (09 Apr 2019 05:51)  I&O's Summary    08 Apr 2019 07:01  -  09 Apr 2019 07:00  --------------------------------------------------------  IN: 1367 mL / OUT: 1362 mL / NET: 5 mL    09 Apr 2019 07:01  -  09 Apr 2019 10:54  --------------------------------------------------------  IN: 140 mL / OUT: 59 mL / NET: 81 mL      Pain Score (0-10):	0	Lansky/Karnofsky Score: 90    PATIENT CARE ACCESS  [] Peripheral IV  [] Central Venous Line	[] R	[] L	[] IJ	[] Fem	[] SC			[] Placed:  [] PICC:				[] Broviac		[x] Mediport  [] Urinary Catheter, Date Placed:  [x] Necessity of urinary, arterial, and venous catheters discussed    PHYSICAL EXAM  All physical exam findings normal, except those marked:  Constitutional:	Normal: well appearing, in no apparent distress  .		[] Abnormal:  Eyes		Normal: no conjunctival injection, symmetric gaze  .		[] Abnormal:  ENT:		Normal: mucus membranes moist, no mouth sores or mucosal bleeding, normal .  .		dentition, symmetric facies.  .		[x] Abnormal: NG tube               Mucositis NCI grading scale                [x] Grade 0: None                [] Grade 1: (mild) Painless ulcers, erythema, or mild soreness in the absence of lesions                [] Grade 2: (moderate) Painful erythema, oedema, or ulcers but eating or swallowing possible                [] Grade 3: (severe) Painful erythema, odema or ulcers requiring IV hydration                [] Grade 4: (life-threatening) Severe ulceration or requiring parenteral or enteral nutritional support   Neck		Normal: no thyromegaly or masses appreciated  .		[] Abnormal:  Cardiovascular	Normal: regular rate, normal S1, S2, no murmurs, rubs or gallops  .		[] Abnormal:  Respiratory	Normal: clear to auscultation bilaterally, no wheezing  .		[] Abnormal:  Abdominal	Normal: normoactive bowel sounds, soft, NT, no hepatosplenomegaly, no   .		masses  .		[x] Abnormal: abdominal distension   		Normal normal genitalia, testes descended  .		[] Abnormal: [x] not done  Lymphatic	Normal: no adenopathy appreciated  .		[] Abnormal:  Extremities	Normal: FROM x4, no cyanosis or edema, symmetric pulses  .		[] Abnormal:  Skin		Normal: normal appearance, no rash, nodules, vesicles, ulcers or erythema  .		[] Abnormal:  Neurologic	Normal: no focal deficits, gait normal and normal motor exam.  .		[] Abnormal:  Psychiatric	Normal: affect appropriate  		[] Abnormal:  Musculoskeletal		Normal: full range of motion and no deformities appreciated, no masses   .			and normal strength in all extremities.  .			[] Abnormal:    Lab Results:  CBC  CBC Full  -  ( 08 Apr 2019 22:15 )  WBC Count : 0.19 K/uL  RBC Count : 4.13 M/uL  Hemoglobin : 12.6 g/dL  Hematocrit : 38.0 %  Platelet Count - Automated : 31 K/uL  Mean Cell Volume : 92.0 fL  Mean Cell Hemoglobin : 30.5 pg  Mean Cell Hemoglobin Concentration : 33.2 %  Auto Neutrophil # : 0.10 K/uL  Auto Lymphocyte # : 0.06 K/uL  Auto Monocyte # : 0.03 K/uL  Auto Eosinophil # : 0.00 K/uL  Auto Basophil # : 0.00 K/uL  Auto Neutrophil % : 52.6 %  Auto Lymphocyte % : 31.6 %  Auto Monocyte % : 15.8 %  Auto Eosinophil % : 0.0 %  Auto Basophil % : 0.0 %    .		Differential:	[x] Automated		[] Manual  Chemistry  04-08    140  |  110<H>  |  14  ----------------------------<  70  3.7   |  17<L>  |  < 0.20<L>    Ca    9.1      08 Apr 2019 22:15  Phos  3.9     04-08  Mg     1.7     04-08    TPro  5.1<L>  /  Alb  3.4  /  TBili  0.6  /  DBili  x   /  AST  38<H>  /  ALT  39<H>  /  AlkPhos  126  04-08    LIVER FUNCTIONS - ( 08 Apr 2019 22:15 )  Alb: 3.4 g/dL / Pro: 5.1 g/dL / ALK PHOS: 126 u/L / ALT: 39 u/L / AST: 38 u/L / GGT: x                 MICROBIOLOGY/CULTURES:    RADIOLOGY RESULTS:    Toxicities (with grade)  1.  2.  3.  4.

## 2023-02-23 NOTE — TELEPHONE ENCOUNTER
Patient mother calling about a letter that  was suppose to send to [facility]. Center to support her insurance appeal with [employer]. Please call to discuss.

## 2023-03-01 ENCOUNTER — TELEPHONE (OUTPATIENT)
Dept: NEUROLOGY | Age: 58
End: 2023-03-01

## 2023-03-01 NOTE — TELEPHONE ENCOUNTER
Requesting letter for insurance appeal of therapy be resent, says it never came through.  Please resend so pt can appeal insurance     Fax to [phone] brendan shahid

## 2023-03-15 ENCOUNTER — PATIENT OUTREACH (OUTPATIENT)
Dept: OTHER | Age: 58
End: 2023-03-15

## 2023-03-15 NOTE — PROGRESS NOTES
Telephonic outreach to the patient's mother and Veronika Mar. Verified two patient identifiers. Per Ms. Milian, the patient continues to improve mentally and is quite alert at this time. She is receiving therapies three times a day to improve her physical abilities. She is moving slowly with her physical improvements, however she has shown significant mental improvements. Mom is attempting to get her papers signed to obtain LTD. She is working with the [facility] doctor and [employer] [address]. She states that the patient was scheduled to be terminated from employment and benefit coverage as of March 27th, however she received a call from  that this may be extended for 3-6 months. Patient's insurance is no longer covering her therapies, so the patient's mother is responsible for the bill which will be due in the next few weeks. Discussed financial resources and monies that are available in the patient's [employer] accounts. Ms. Young Adams has access to the money, however she does not want to utilize that unless she absolutely has to. Will follow the patient's progress and assist as needed.

## 2023-03-26 ENCOUNTER — HOSPITAL ENCOUNTER (EMERGENCY)
Age: 58
Discharge: OTHER INSTITUTION | End: 2023-03-27
Attending: STUDENT IN AN ORGANIZED HEALTH CARE EDUCATION/TRAINING PROGRAM
Payer: COMMERCIAL

## 2023-03-26 ENCOUNTER — APPOINTMENT (OUTPATIENT)
Dept: GENERAL RADIOLOGY | Age: 58
End: 2023-03-26
Attending: STUDENT IN AN ORGANIZED HEALTH CARE EDUCATION/TRAINING PROGRAM
Payer: COMMERCIAL

## 2023-03-26 ENCOUNTER — APPOINTMENT (OUTPATIENT)
Dept: CT IMAGING | Age: 58
End: 2023-03-26
Attending: STUDENT IN AN ORGANIZED HEALTH CARE EDUCATION/TRAINING PROGRAM
Payer: COMMERCIAL

## 2023-03-26 DIAGNOSIS — M54.50 ACUTE MIDLINE LOW BACK PAIN WITHOUT SCIATICA: Primary | ICD-10-CM

## 2023-03-26 LAB
ALBUMIN SERPL-MCNC: 3.6 G/DL (ref 3.5–5)
ALBUMIN/GLOB SERPL: 1 (ref 1.1–2.2)
ALP SERPL-CCNC: 95 U/L (ref 45–117)
ALT SERPL-CCNC: 60 U/L (ref 12–78)
ANION GAP SERPL CALC-SCNC: 5 MMOL/L (ref 5–15)
APPEARANCE UR: ABNORMAL
AST SERPL-CCNC: 37 U/L (ref 15–37)
BASOPHILS # BLD: 0.1 K/UL (ref 0–0.1)
BASOPHILS NFR BLD: 1 % (ref 0–1)
BILIRUB SERPL-MCNC: 0.7 MG/DL (ref 0.2–1)
BILIRUB UR QL: NEGATIVE
BUN SERPL-MCNC: 13 MG/DL (ref 6–20)
BUN/CREAT SERPL: 22 (ref 12–20)
CALCIUM SERPL-MCNC: 9.5 MG/DL (ref 8.5–10.1)
CHLORIDE SERPL-SCNC: 107 MMOL/L (ref 97–108)
CO2 SERPL-SCNC: 28 MMOL/L (ref 21–32)
COLOR UR: ABNORMAL
COMMENT, HOLDF: NORMAL
COMMENT, HOLDF: NORMAL
CREAT SERPL-MCNC: 0.6 MG/DL (ref 0.55–1.02)
DIFFERENTIAL METHOD BLD: NORMAL
EOSINOPHIL # BLD: 0.1 K/UL (ref 0–0.4)
EOSINOPHIL NFR BLD: 2 % (ref 0–7)
ERYTHROCYTE [DISTWIDTH] IN BLOOD BY AUTOMATED COUNT: 13.1 % (ref 11.5–14.5)
GLOBULIN SER CALC-MCNC: 3.7 G/DL (ref 2–4)
GLUCOSE SERPL-MCNC: 87 MG/DL (ref 65–100)
GLUCOSE UR STRIP.AUTO-MCNC: NEGATIVE MG/DL
HCT VFR BLD AUTO: 36.8 % (ref 35–47)
HGB BLD-MCNC: 12.2 G/DL (ref 11.5–16)
HGB UR QL STRIP: NEGATIVE
IMM GRANULOCYTES # BLD AUTO: 0 K/UL (ref 0–0.04)
IMM GRANULOCYTES NFR BLD AUTO: 0 % (ref 0–0.5)
KETONES UR QL STRIP.AUTO: 15 MG/DL
LEUKOCYTE ESTERASE UR QL STRIP.AUTO: NEGATIVE
LYMPHOCYTES # BLD: 2.7 K/UL (ref 0.8–3.5)
LYMPHOCYTES NFR BLD: 32 % (ref 12–49)
MCH RBC QN AUTO: 29.4 PG (ref 26–34)
MCHC RBC AUTO-ENTMCNC: 33.2 G/DL (ref 30–36.5)
MCV RBC AUTO: 88.7 FL (ref 80–99)
MONOCYTES # BLD: 0.7 K/UL (ref 0–1)
MONOCYTES NFR BLD: 8 % (ref 5–13)
NEUTS SEG # BLD: 4.9 K/UL (ref 1.8–8)
NEUTS SEG NFR BLD: 57 % (ref 32–75)
NITRITE UR QL STRIP.AUTO: NEGATIVE
NRBC # BLD: 0 K/UL (ref 0–0.01)
NRBC BLD-RTO: 0 PER 100 WBC
PH UR STRIP: 5 (ref 5–8)
PLATELET # BLD AUTO: 312 K/UL (ref 150–400)
PMV BLD AUTO: 11 FL (ref 8.9–12.9)
POTASSIUM SERPL-SCNC: 3.2 MMOL/L (ref 3.5–5.1)
PROT SERPL-MCNC: 7.3 G/DL (ref 6.4–8.2)
PROT UR STRIP-MCNC: NEGATIVE MG/DL
RBC # BLD AUTO: 4.15 M/UL (ref 3.8–5.2)
SAMPLES BEING HELD,HOLD: NORMAL
SAMPLES BEING HELD,HOLD: NORMAL
SODIUM SERPL-SCNC: 140 MMOL/L (ref 136–145)
SP GR UR REFRACTOMETRY: 1.03 (ref 1–1.03)
TROPONIN I SERPL HS-MCNC: 8 NG/L (ref 0–37)
UROBILINOGEN UR QL STRIP.AUTO: 1 EU/DL (ref 0.2–1)
WBC # BLD AUTO: 8.5 K/UL (ref 3.6–11)

## 2023-03-26 PROCEDURE — 96374 THER/PROPH/DIAG INJ IV PUSH: CPT

## 2023-03-26 PROCEDURE — 74011250636 HC RX REV CODE- 250/636: Performed by: STUDENT IN AN ORGANIZED HEALTH CARE EDUCATION/TRAINING PROGRAM

## 2023-03-26 PROCEDURE — 36415 COLL VENOUS BLD VENIPUNCTURE: CPT

## 2023-03-26 PROCEDURE — 85025 COMPLETE CBC W/AUTO DIFF WBC: CPT

## 2023-03-26 PROCEDURE — 99285 EMERGENCY DEPT VISIT HI MDM: CPT

## 2023-03-26 PROCEDURE — 72131 CT LUMBAR SPINE W/O DYE: CPT

## 2023-03-26 PROCEDURE — 80053 COMPREHEN METABOLIC PANEL: CPT

## 2023-03-26 PROCEDURE — 74011250637 HC RX REV CODE- 250/637: Performed by: STUDENT IN AN ORGANIZED HEALTH CARE EDUCATION/TRAINING PROGRAM

## 2023-03-26 PROCEDURE — 71045 X-RAY EXAM CHEST 1 VIEW: CPT

## 2023-03-26 PROCEDURE — 84484 ASSAY OF TROPONIN QUANT: CPT

## 2023-03-26 PROCEDURE — 96376 TX/PRO/DX INJ SAME DRUG ADON: CPT

## 2023-03-26 PROCEDURE — 96375 TX/PRO/DX INJ NEW DRUG ADDON: CPT

## 2023-03-26 PROCEDURE — 93005 ELECTROCARDIOGRAM TRACING: CPT

## 2023-03-26 PROCEDURE — 96361 HYDRATE IV INFUSION ADD-ON: CPT

## 2023-03-26 PROCEDURE — 81003 URINALYSIS AUTO W/O SCOPE: CPT

## 2023-03-26 RX ORDER — KETOROLAC TROMETHAMINE 30 MG/ML
15 INJECTION, SOLUTION INTRAMUSCULAR; INTRAVENOUS ONCE
Status: COMPLETED | OUTPATIENT
Start: 2023-03-26 | End: 2023-03-26

## 2023-03-26 RX ORDER — POTASSIUM CHLORIDE 750 MG/1
40 TABLET, FILM COATED, EXTENDED RELEASE ORAL
Status: COMPLETED | OUTPATIENT
Start: 2023-03-26 | End: 2023-03-26

## 2023-03-26 RX ORDER — ONDANSETRON 2 MG/ML
4 INJECTION INTRAMUSCULAR; INTRAVENOUS ONCE
Status: COMPLETED | OUTPATIENT
Start: 2023-03-26 | End: 2023-03-26

## 2023-03-26 RX ADMIN — POTASSIUM CHLORIDE 40 MEQ: 750 TABLET, FILM COATED, EXTENDED RELEASE ORAL at 23:34

## 2023-03-26 RX ADMIN — KETOROLAC TROMETHAMINE 15 MG: 30 INJECTION, SOLUTION INTRAMUSCULAR at 22:45

## 2023-03-26 RX ADMIN — ONDANSETRON 4 MG: 2 INJECTION INTRAMUSCULAR; INTRAVENOUS at 22:45

## 2023-03-26 RX ADMIN — SODIUM CHLORIDE 500 ML: 9 INJECTION, SOLUTION INTRAVENOUS at 22:45

## 2023-03-27 ENCOUNTER — APPOINTMENT (OUTPATIENT)
Dept: GENERAL RADIOLOGY | Age: 58
End: 2023-03-27
Attending: STUDENT IN AN ORGANIZED HEALTH CARE EDUCATION/TRAINING PROGRAM
Payer: COMMERCIAL

## 2023-03-27 VITALS
BODY MASS INDEX: 26.51 KG/M2 | RESPIRATION RATE: 18 BRPM | SYSTOLIC BLOOD PRESSURE: 156 MMHG | WEIGHT: 168.87 LBS | HEART RATE: 89 BPM | OXYGEN SATURATION: 97 % | DIASTOLIC BLOOD PRESSURE: 81 MMHG | TEMPERATURE: 97.7 F | HEIGHT: 67 IN

## 2023-03-27 LAB
ATRIAL RATE: 95 BPM
CALCULATED P AXIS, ECG09: 44 DEGREES
CALCULATED R AXIS, ECG10: 52 DEGREES
CALCULATED T AXIS, ECG11: 37 DEGREES
DIAGNOSIS, 93000: NORMAL
P-R INTERVAL, ECG05: 130 MS
Q-T INTERVAL, ECG07: 328 MS
QRS DURATION, ECG06: 74 MS
QTC CALCULATION (BEZET), ECG08: 412 MS
VENTRICULAR RATE, ECG03: 95 BPM

## 2023-03-27 PROCEDURE — 74011250636 HC RX REV CODE- 250/636: Performed by: STUDENT IN AN ORGANIZED HEALTH CARE EDUCATION/TRAINING PROGRAM

## 2023-03-27 PROCEDURE — 74018 RADEX ABDOMEN 1 VIEW: CPT

## 2023-03-27 PROCEDURE — 96376 TX/PRO/DX INJ SAME DRUG ADON: CPT

## 2023-03-27 RX ORDER — KETOROLAC TROMETHAMINE 30 MG/ML
15 INJECTION, SOLUTION INTRAMUSCULAR; INTRAVENOUS ONCE
Status: COMPLETED | OUTPATIENT
Start: 2023-03-27 | End: 2023-03-27

## 2023-03-27 RX ADMIN — KETOROLAC TROMETHAMINE 15 MG: 30 INJECTION, SOLUTION INTRAMUSCULAR at 02:59

## 2023-03-27 NOTE — ED TRIAGE NOTES
Patient arrives from [facility] for low back pain due to lying on a metal satnam from a defective air mattress at the facility. Endorses nausea and vomiting starting today. Denies chest pain, no abdominal pain, no sob, no fevers, no chills, no diarrhea. No numbness or tingling in extremities; able to control bowel and bladder.

## 2023-03-27 NOTE — ED PROVIDER NOTES
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 Per EMS patient had been lying on malfunctioning stretcher with metal satnam sticking into her back which caused pain. She received tramadol at the nursing home and reportedly had some sort of allergic reaction that has since resolved. Patient unable to give me details on this. Patient additionally reports nausea and vomiting today. No fevers, chills, chest pain, difficulty breathing, abdominal pain, urinary symptoms, or bowel symptoms. The history is provided by the patient and the EMS personnel. Back Pain   Pertinent negatives include no chest pain.       Past Medical History:   Diagnosis Date    Allergic rhinitis 07/29/2019    Cerebral artery occlusion with cerebral infarction ([address] 8/27/2022    Depression     Dysphagia 9/27/2022    History of multiple allergies     History of vascular access device 10/07/2022    [facility] VAT: PICC placement R Cephalic length 40 cm for reliable access/TPN arm circ 35.5 cm    Memory disorder 9/27/2022    Movement disorder 9/27/2022    Muscle ache     Obesity 11/05/2012    Sinus pressure     Sinus problem        Past Surgical History:   Procedure Laterality Date    HX ACL RECONSTRUCTION      left     HX CHOLECYSTECTOMY  01/01/1998    HX GI      colonoscopy-polyps    IR INSERT NON TUNL CVC OVER 5 YRS  09/27/2022         Family History:   Problem Relation Age of Onset    Diabetes Paternal Grandfather     Cerebral aneurysm Paternal Grandfather     Cerebral aneurysm Paternal Aunt        Social History     Socioeconomic History    Marital status:      Spouse name: Not on file    Number of children: Not on file    Years of education: Not on file    Highest education level: Not on file   Occupational History    Not on file Tobacco Use    Smoking status: Never    Smokeless tobacco: Never   Vaping Use    Vaping Use: Never used   Substance and Sexual Activity    Alcohol use: Not Currently     Alcohol/week: 1.0 standard drink     Types: 1 Glasses of wine per week    Drug use: No    Sexual activity: Not Currently     Partners: Male     Birth control/protection: None   Other Topics Concern    Not on file   Social History Narrative    ** Merged History Encounter **          Social Determinants of Health     Financial Resource Strain: Low Risk     Difficulty of Paying Living Expenses: Not hard at all   Food Insecurity: No Food Insecurity    Worried About Running Out of Food in the Last Year: Never true    Ran Out of Food in the Last Year: Never true   Transportation Needs: Not on file   Physical Activity: Not on file   Stress: Not on file   Social Connections: Not on file   Intimate Partner Violence: Not on file   Housing Stability: Not on file         ALLERGIES: Droperidol    Review of Systems   Respiratory:  Negative for shortness of breath. Cardiovascular:  Negative for chest pain. Musculoskeletal:  Positive for back pain. Vitals:    03/26/23 2131   BP: (!) 132/99   Pulse: (!) 103   Resp: 12   Temp: 99.6 °F (37.6 °C)   SpO2: 95%   Weight: 76.6 kg (168 lb 14 oz)   Height: 5' 7\" (1.702 m)            Physical Exam  Constitutional:       General: She is not in acute distress. Appearance: She is well-developed. HENT:      Head: Normocephalic and atraumatic. Mouth/Throat:      Mouth: Mucous membranes are dry. Eyes:      General: No scleral icterus. Pupils: Pupils are equal, round, and reactive to light. Neck:      Trachea: No tracheal deviation. Cardiovascular:      Rate and Rhythm: Normal rate and regular rhythm. Heart sounds: No murmur heard. No friction rub. No gallop. Pulmonary:      Effort: Pulmonary effort is normal. No respiratory distress. Breath sounds: Normal breath sounds.  No wheezing or rales.   Abdominal:      General: Bowel sounds are normal. There is no distension. Palpations: Abdomen is soft. Tenderness: There is no abdominal tenderness. Musculoskeletal:         General: No deformity. Cervical back: Neck supple. Comments: Midline L-spine ttp, no stepoffs   Skin:     General: Skin is warm and dry. Neurological:      Mental Status: She is alert. Mental status is at baseline. Sensory: No sensory deficit. Motor: No weakness. Psychiatric:         Behavior: Behavior normal.        Medical Decision Making  58-year-old female presenting to the ED from nursing home with back pain, nausea, vomiting. Differential includes musculoskeletal pain, L-spine fracture, UTI, electrolyte abnormality, dehydration, pneumonia. Checking basic labs, chest x-ray, L-spine CT. Treating with fluids, Toradol, Zofran, and will reevaluate. Dispo pending labs, imaging, response to treatment. Amount and/or Complexity of Data Reviewed  Labs: ordered. Radiology: ordered. ECG/medicine tests: ordered and independent interpretation performed. Details: no fracture seen on C-spine CT    Risk  OTC drugs. Prescription drug management. ED Course as of 03/26/23 2147   Polly Carlisle Mar 26, 2023   2147 ED EKG interpretation:9:47 PM  Rhythm: normal sinus rhythm with occasional PVCs; Rate (approx.): 95; Axis: normal; QRS interval: normal ; ST/T wave: normal; Other findings: normal.    [JW]      ED Course User Index  [JW] Syed Willson MD       Procedures    DISCHARGE NOTE:  12:12 AM  The patient has been re-evaluated and feeling much better and are stable for discharge. All available radiology and laboratory results have been reviewed with patient and/or available family.   Patient and/or family verbally conveyed their understanding and agreement of the patient's signs, symptoms, diagnosis, treatment and prognosis and additionally agree to follow-up as recommended in the discharge instructions or to return to the Emergency Department should their condition change or worsen prior to their follow-up appointment. All questions have been answered and patient and/or available family express understanding. LABORATORY RESULTS:  Recent Results (from the past 24 hour(s))   CBC WITH AUTOMATED DIFF    Collection Time: 03/26/23  9:51 PM   Result Value Ref Range    WBC 8.5 3.6 - 11.0 K/uL    RBC 4.15 3.80 - 5.20 M/uL    HGB 12.2 11.5 - 16.0 g/dL    HCT 36.8 35.0 - 47.0 %    MCV 88.7 80.0 - 99.0 FL    MCH 29.4 26.0 - 34.0 PG    MCHC 33.2 30.0 - 36.5 g/dL    RDW 13.1 11.5 - 14.5 %    PLATELET 875 886 - 956 K/uL    MPV 11.0 8.9 - 12.9 FL    NRBC 0.0 0  WBC    ABSOLUTE NRBC 0.00 0.00 - 0.01 K/uL    NEUTROPHILS 57 32 - 75 %    LYMPHOCYTES 32 12 - 49 %    MONOCYTES 8 5 - 13 %    EOSINOPHILS 2 0 - 7 %    BASOPHILS 1 0 - 1 %    IMMATURE GRANULOCYTES 0 0.0 - 0.5 %    ABS. NEUTROPHILS 4.9 1.8 - 8.0 K/UL    ABS. LYMPHOCYTES 2.7 0.8 - 3.5 K/UL    ABS. MONOCYTES 0.7 0.0 - 1.0 K/UL    ABS. EOSINOPHILS 0.1 0.0 - 0.4 K/UL    ABS. BASOPHILS 0.1 0.0 - 0.1 K/UL    ABS. IMM. GRANS. 0.0 0.00 - 0.04 K/UL    DF AUTOMATED     METABOLIC PANEL, COMPREHENSIVE    Collection Time: 03/26/23  9:51 PM   Result Value Ref Range    Sodium 140 136 - 145 mmol/L    Potassium 3.2 (L) 3.5 - 5.1 mmol/L    Chloride 107 97 - 108 mmol/L    CO2 28 21 - 32 mmol/L    Anion gap 5 5 - 15 mmol/L    Glucose 87 65 - 100 mg/dL    BUN 13 6 - 20 MG/DL    Creatinine 0.60 0.55 - 1.02 MG/DL    BUN/Creatinine ratio 22 (H) 12 - 20      eGFR >60 >60 ml/min/1.73m2    Calcium 9.5 8.5 - 10.1 MG/DL    Bilirubin, total 0.7 0.2 - 1.0 MG/DL    ALT (SGPT) 60 12 - 78 U/L    AST (SGOT) 37 15 - 37 U/L    Alk.  phosphatase 95 45 - 117 U/L    Protein, total 7.3 6.4 - 8.2 g/dL    Albumin 3.6 3.5 - 5.0 g/dL    Globulin 3.7 2.0 - 4.0 g/dL    A-G Ratio 1.0 (L) 1.1 - 2.2     TROPONIN-HIGH SENSITIVITY    Collection Time: 03/26/23  9:51 PM   Result Value Ref Range    Troponin-High Sensitivity 8 0 - 37 ng/L   SAMPLES BEING HELD    Collection Time: 03/26/23  9:51 PM   Result Value Ref Range    SAMPLES BEING HELD 1RED, BLUE     COMMENT        Add-on orders for these samples will be processed based on acceptable specimen integrity and analyte stability, which may vary by analyte. URINALYSIS W/ RFLX MICROSCOPIC    Collection Time: 03/26/23 11:14 PM   Result Value Ref Range    Color YELLOW/STRAW      Appearance CLOUDY (A) CLEAR      Specific gravity 1.028 1.003 - 1.030      pH (UA) 5.0 5.0 - 8.0      Protein Negative NEG mg/dL    Glucose Negative NEG mg/dL    Ketone 15 (A) NEG mg/dL    Bilirubin Negative NEG      Blood Negative NEG      Urobilinogen 1.0 0.2 - 1.0 EU/dL    Nitrites Negative NEG      Leukocyte Esterase Negative NEG     SAMPLES BEING HELD    Collection Time: 03/26/23 11:14 PM   Result Value Ref Range    SAMPLES BEING HELD UHOLD     COMMENT        Add-on orders for these samples will be processed based on acceptable specimen integrity and analyte stability, which may vary by analyte. IMAGING RESULTS:  CT SPINE LUMB WO CONT    Result Date: 3/26/2023  No acute fracture or subluxation. Multilevel lumbar degenerative changes, as described above. CT myelography or MR can be performed for further evaluation, as indicated. XR CHEST PORT    Result Date: 3/26/2023  No acute process. MEDICATIONS GIVEN:  Medications   ketorolac (TORADOL) injection 15 mg (15 mg IntraVENous Given 3/26/23 2245)   sodium chloride 0.9 % bolus infusion 500 mL (0 mL IntraVENous IV Completed 3/27/23 0009)   ondansetron (ZOFRAN) injection 4 mg (4 mg IntraVENous Given 3/26/23 2245)   potassium chloride SR (KLOR-CON 10) tablet 40 mEq (40 mEq Oral Given 3/26/23 2334)       IMPRESSION:  1.  Acute midline low back pain without sciatica        PLAN:  Follow-up Information       Follow up With Specialties Details Why Héctor Malik MD Internal Medicine Physician In 3 days  [address]      [facility] EMERGENCY DEP Emergency Medicine  As needed, If symptoms worsen [place]   [phone]          Current Discharge Medication List        START taking these medications    Details   lidocaine HCL 4 % ptmd 1 Patch by Apply Externally route daily.   Qty: 20 Each, Refills: 0  Start date: 3/27/2023             Signed By: Morales Milner MD     March 27, 2023

## 2023-04-05 ENCOUNTER — PATIENT OUTREACH (OUTPATIENT)
Dept: OTHER | Age: 58
End: 2023-04-05

## 2023-04-05 NOTE — PROGRESS NOTES
[facility] follow up call. Covering for Mehnaz Michelle, RN Associate Care Manager    Spoke with patient's mom, Dexter Liz, who has POA. States she is doing better. She was told by her provider that she is way ahead of what they expected. Continues to receive inpatient rehab at [facility]. BC not paying inpatient portion but paying rehab services. Therapy is three times a day, taking feeding tube out soon. States her daughter is a miracle. Concerns:  Having difficulty with [employer] paying for her inpatient portion of the bill. Denied three times, she has paid $36,000. Mom is going to send a copy of the bill to forward to Librado Brunner for [employer]. Insurance to continue through May. Mom is looking for a chiropractor for patient to see. They like Dr. Breanne Sanchez, checked portal for Dr. Breanne Sanchez - let her know this provider is listed as a tier 2. She was appreciative for me checking.

## 2023-05-01 ENCOUNTER — PATIENT OUTREACH (OUTPATIENT)
Dept: OTHER | Age: 58
End: 2023-05-01

## 2023-05-01 NOTE — PROGRESS NOTES
Telephonic outreach to the patient's mother and Lisa Garcia. Verified two patient identifiers. Ms. Ana Cristina Velasco voiced frustration with the financial side of the patient's stay. She has contacted the insurance regarding her appeal for the patients stay in the skilled nursing facility. She sent the appeal by certified mail and it was marked received on April 29, the insurance denies receiving it. Mom states that she is now in debt for $60,000. Patient continues to improve in the therapy. The insurance is covering her therapy, but not her room and board. The patient is now able to transfer from her wheelchair to a chair with assistance, no denise needed. She is able to transfer with a board and 2 person assist. She is more alert and aware of her surroundings. Patient's mother attempted to get her into [facility] skilled nursing, however she was denied. No additional needs are noted at this time. Will continue to follow and offer assistance as needed.

## 2023-05-02 ENCOUNTER — OFFICE VISIT (OUTPATIENT)
Dept: ORTHOPEDIC SURGERY | Age: 58
End: 2023-05-02

## 2023-05-02 VITALS — BODY MASS INDEX: 28 KG/M2 | WEIGHT: 164 LBS | HEIGHT: 64 IN

## 2023-05-02 DIAGNOSIS — G89.29 CHRONIC BILATERAL LOW BACK PAIN WITHOUT SCIATICA: Primary | ICD-10-CM

## 2023-05-02 DIAGNOSIS — M54.50 CHRONIC BILATERAL LOW BACK PAIN WITHOUT SCIATICA: Primary | ICD-10-CM

## 2023-05-15 ENCOUNTER — CARE COORDINATION (OUTPATIENT)
Dept: OTHER | Facility: CLINIC | Age: 58
End: 2023-05-15

## 2023-05-15 NOTE — CARE COORDINATION
Ambulatory Care Coordination Note  5/15/2023    Patient Current Location:  Home: [address] [place] [address] [address] [address]     ACM contacted the parent by telephone. Verified name and  with parent as identifiers. Provided introduction to self, and explanation of the ACM role. Challenges to be reviewed by the provider   Additional needs identified to be addressed with provider: No  none               Method of communication with provider: none. ACM: Radha Rivera RN    Spoke with the patient's mother and power of , Duran Ramirez. She informed this ACM that she learned that the facility where the patient is being treated, [facility], has not filed any claims since the first week that the patient has been there, which was back in February. She was originally told that the claims were denied. The only claim that was denied was the first claim, and they are working on the appeal for that claim. Ms. Romario Funk if very upset, as she has been paying out of pocket, up words of $60,000. Will follow up with her regarding any updates, Ms. Romario Funk states that she will keep the [facility] team updated.      Lab Results       None                 Goals Addressed    None         Future Appointments   Date Time Provider Rosenda Atwood   2023  2:00 PM Harmeet Burns MD NEUROWTCRSPB BS AMB

## 2023-05-19 ENCOUNTER — TELEPHONE (OUTPATIENT)
Age: 58
End: 2023-05-19

## 2023-05-23 ENCOUNTER — PATIENT OUTREACH (OUTPATIENT)
Dept: OTHER | Age: 58
End: 2023-05-23

## 2023-05-23 ENCOUNTER — CARE COORDINATION (OUTPATIENT)
Dept: OTHER | Facility: CLINIC | Age: 58
End: 2023-05-23

## 2023-05-23 NOTE — CARE COORDINATION
Ambulatory Care Coordination Note  2023    Patient Current Location:  [address] [place] [address]    ACM contacted the parent by telephone. Verified name and  with parent as identifiers. Provided introduction to self, and explanation of the ACM role. Challenges to be reviewed by the provider   Additional needs identified to be addressed with provider: No  none               Method of communication with provider: none. ACM: Pb Alvarenga, RN    Spoke with the patient's mother, [place], and soon to be guardian, Cynthia Solis. Ms. Divya Hsieh reports that the patient's stay from March through May at [facility] has been approved, however January-February is still being denied. The case is being appealed. The patient had her PEG tube removed on . She has been accepted at [facility] for rehabilitation. The insurance company however, has not approved the stay at [facility]. It is will be peer reviewed and hopefully approved so that the patient can continue to get the appropriate therapies. Will continue to follow and outreach.      Lab Results       None            Care Coordination Interventions    Referral from Primary Care Provider: No  Suggested Interventions and Community Resources          Goals Addressed    None         Future Appointments   Date Time Provider Rosenda Atwood   2023  2:00 PM Margaret Yu MD NEUROWTCRSPB BS AMB

## 2023-05-30 ENCOUNTER — PATIENT OUTREACH (OUTPATIENT)
Dept: OTHER | Age: 58
End: 2023-05-30

## 2023-06-08 ENCOUNTER — CARE COORDINATION (OUTPATIENT)
Dept: OTHER | Facility: CLINIC | Age: 58
End: 2023-06-08

## 2023-06-08 NOTE — CARE COORDINATION
ACM contacted the guardian to follow up on progress, discuss new issues or concerns, and reinforce/provide patient education. Summary Note: spoke with patient's mother, Ander Miller. She states that the patient continues to improve with her rehabilitation. She states that she was able to assist the patiet with transferring from her bed to her chair, one person assist transfer. She has been able to walk with her walker. Ms. Liset Boucher continues to try to get financial concerns figured out as well as appropriate placement for the patient. The insurance company has still not authorized the first 2 months of her stay at [facility]. She tried to get the patient into [facility] for continued rehabilitation, however it was denied after a peer to peer review. Patient's mother will continue to work to find additional options. This ACM contacted TR Rodríguez to assist as needed. Will continue to work with Mrs. Orlando to identify additional resources. Reinforced/Provided Education:  Discussed red flags and appropriate site of care based on symptoms and resources available to guardian including: Specialist.   Provided the following associate/dependent related resources:     Importance and benefits of: Follow up with PCP and specialist, medication adherence, self monitoring and reporting of symptoms. Plan:  Plan for follow-up call in 10-14 days based on severity of symptoms and risk factors. Plan for next call:  Discuss continued progress and possible options for rehabilitation. Guardian  verbalized understanding and is agreeable to follow up call.

## 2023-06-17 ENCOUNTER — HOSPITAL ENCOUNTER (INPATIENT)
Facility: HOSPITAL | Age: 58
LOS: 10 days | Discharge: NURSING, REHAB OR LONG-TERM CARE | DRG: 689 | End: 2023-06-27
Attending: STUDENT IN AN ORGANIZED HEALTH CARE EDUCATION/TRAINING PROGRAM | Admitting: STUDENT IN AN ORGANIZED HEALTH CARE EDUCATION/TRAINING PROGRAM
Payer: COMMERCIAL

## 2023-06-17 DIAGNOSIS — G93.41 METABOLIC ENCEPHALOPATHY: ICD-10-CM

## 2023-06-17 DIAGNOSIS — I63.9 ACUTE CVA (CEREBROVASCULAR ACCIDENT) (HCC): Primary | ICD-10-CM

## 2023-06-17 DIAGNOSIS — R41.0 DISORIENTATION: ICD-10-CM

## 2023-06-17 DIAGNOSIS — N30.01 ACUTE CYSTITIS WITH HEMATURIA: ICD-10-CM

## 2023-06-17 LAB
ALBUMIN SERPL-MCNC: 3.6 G/DL (ref 3.5–5)
ALBUMIN/GLOB SERPL: 1.1 (ref 1.1–2.2)
ALP SERPL-CCNC: 95 U/L (ref 45–117)
ALT SERPL-CCNC: 21 U/L (ref 12–78)
ANION GAP SERPL CALC-SCNC: 5 MMOL/L (ref 5–15)
APPEARANCE UR: ABNORMAL
AST SERPL-CCNC: 19 U/L (ref 15–37)
BACTERIA URNS QL MICRO: ABNORMAL /HPF
BILIRUB SERPL-MCNC: 0.5 MG/DL (ref 0.2–1)
BILIRUB UR QL: NEGATIVE
BUN SERPL-MCNC: 15 MG/DL (ref 6–20)
BUN/CREAT SERPL: 19 (ref 12–20)
CALCIUM SERPL-MCNC: 9.4 MG/DL (ref 8.5–10.1)
CHLORIDE SERPL-SCNC: 111 MMOL/L (ref 97–108)
CO2 SERPL-SCNC: 25 MMOL/L (ref 21–32)
COLOR UR: ABNORMAL
COMMENT:: NORMAL
CREAT SERPL-MCNC: 0.79 MG/DL (ref 0.55–1.02)
EPITH CASTS URNS QL MICRO: ABNORMAL /LPF
ERYTHROCYTE [DISTWIDTH] IN BLOOD BY AUTOMATED COUNT: 14.6 % (ref 11.5–14.5)
GLOBULIN SER CALC-MCNC: 3.3 G/DL (ref 2–4)
GLUCOSE SERPL-MCNC: 107 MG/DL (ref 65–100)
GLUCOSE UR STRIP.AUTO-MCNC: NEGATIVE MG/DL
HCT VFR BLD AUTO: 34.9 % (ref 35–47)
HGB BLD-MCNC: 11.2 G/DL (ref 11.5–16)
HGB UR QL STRIP: NEGATIVE
HYALINE CASTS URNS QL MICRO: ABNORMAL /LPF (ref 0–5)
KETONES UR QL STRIP.AUTO: NEGATIVE MG/DL
LEUKOCYTE ESTERASE UR QL STRIP.AUTO: ABNORMAL
MCH RBC QN AUTO: 29.3 PG (ref 26–34)
MCHC RBC AUTO-ENTMCNC: 32.1 G/DL (ref 30–36.5)
MCV RBC AUTO: 91.4 FL (ref 80–99)
NITRITE UR QL STRIP.AUTO: POSITIVE
NRBC # BLD: 0 K/UL (ref 0–0.01)
NRBC BLD-RTO: 0 PER 100 WBC
PH UR STRIP: 5 (ref 5–8)
PLATELET # BLD AUTO: 277 K/UL (ref 150–400)
PMV BLD AUTO: 11.7 FL (ref 8.9–12.9)
POTASSIUM SERPL-SCNC: 3.4 MMOL/L (ref 3.5–5.1)
PROT SERPL-MCNC: 6.9 G/DL (ref 6.4–8.2)
PROT UR STRIP-MCNC: NEGATIVE MG/DL
RBC # BLD AUTO: 3.82 M/UL (ref 3.8–5.2)
RBC #/AREA URNS HPF: ABNORMAL /HPF (ref 0–5)
SODIUM SERPL-SCNC: 141 MMOL/L (ref 136–145)
SP GR UR REFRACTOMETRY: 1.01 (ref 1–1.03)
SPECIMEN HOLD: NORMAL
SPECIMEN HOLD: NORMAL
UROBILINOGEN UR QL STRIP.AUTO: 0.2 EU/DL (ref 0.2–1)
WBC # BLD AUTO: 8.1 K/UL (ref 3.6–11)
WBC URNS QL MICRO: ABNORMAL /HPF (ref 0–4)

## 2023-06-17 PROCEDURE — 2580000003 HC RX 258: Performed by: STUDENT IN AN ORGANIZED HEALTH CARE EDUCATION/TRAINING PROGRAM

## 2023-06-17 PROCEDURE — 87186 SC STD MICRODIL/AGAR DIL: CPT

## 2023-06-17 PROCEDURE — 1100000000 HC RM PRIVATE

## 2023-06-17 PROCEDURE — 81001 URINALYSIS AUTO W/SCOPE: CPT

## 2023-06-17 PROCEDURE — 2060000000 HC ICU INTERMEDIATE R&B

## 2023-06-17 PROCEDURE — 93005 ELECTROCARDIOGRAM TRACING: CPT | Performed by: STUDENT IN AN ORGANIZED HEALTH CARE EDUCATION/TRAINING PROGRAM

## 2023-06-17 PROCEDURE — 80053 COMPREHEN METABOLIC PANEL: CPT

## 2023-06-17 PROCEDURE — 36415 COLL VENOUS BLD VENIPUNCTURE: CPT

## 2023-06-17 PROCEDURE — 87086 URINE CULTURE/COLONY COUNT: CPT

## 2023-06-17 PROCEDURE — 6360000002 HC RX W HCPCS: Performed by: STUDENT IN AN ORGANIZED HEALTH CARE EDUCATION/TRAINING PROGRAM

## 2023-06-17 PROCEDURE — 99285 EMERGENCY DEPT VISIT HI MDM: CPT

## 2023-06-17 PROCEDURE — 87077 CULTURE AEROBIC IDENTIFY: CPT

## 2023-06-17 PROCEDURE — 96365 THER/PROPH/DIAG IV INF INIT: CPT

## 2023-06-17 PROCEDURE — 85027 COMPLETE CBC AUTOMATED: CPT

## 2023-06-17 RX ORDER — ACETAMINOPHEN 325 MG/1
650 TABLET ORAL EVERY 6 HOURS PRN
Status: DISCONTINUED | OUTPATIENT
Start: 2023-06-17 | End: 2023-06-27 | Stop reason: HOSPADM

## 2023-06-17 RX ORDER — ATORVASTATIN CALCIUM 10 MG/1
10 TABLET, FILM COATED ORAL NIGHTLY
Status: DISCONTINUED | OUTPATIENT
Start: 2023-06-17 | End: 2023-06-21

## 2023-06-17 RX ORDER — BACLOFEN 10 MG/1
10 TABLET ORAL 3 TIMES DAILY
Status: DISCONTINUED | OUTPATIENT
Start: 2023-06-17 | End: 2023-06-27 | Stop reason: HOSPADM

## 2023-06-17 RX ORDER — 0.9 % SODIUM CHLORIDE 0.9 %
1000 INTRAVENOUS SOLUTION INTRAVENOUS ONCE
Status: COMPLETED | OUTPATIENT
Start: 2023-06-17 | End: 2023-06-18

## 2023-06-17 RX ORDER — SODIUM CHLORIDE 0.9 % (FLUSH) 0.9 %
5-40 SYRINGE (ML) INJECTION EVERY 12 HOURS SCHEDULED
Status: DISCONTINUED | OUTPATIENT
Start: 2023-06-17 | End: 2023-06-27 | Stop reason: HOSPADM

## 2023-06-17 RX ORDER — SODIUM CHLORIDE 9 MG/ML
INJECTION, SOLUTION INTRAVENOUS CONTINUOUS
Status: DISCONTINUED | OUTPATIENT
Start: 2023-06-17 | End: 2023-06-18

## 2023-06-17 RX ORDER — SODIUM CHLORIDE 0.9 % (FLUSH) 0.9 %
5-40 SYRINGE (ML) INJECTION PRN
Status: DISCONTINUED | OUTPATIENT
Start: 2023-06-17 | End: 2023-06-27 | Stop reason: HOSPADM

## 2023-06-17 RX ORDER — SODIUM CHLORIDE 9 MG/ML
INJECTION, SOLUTION INTRAVENOUS PRN
Status: DISCONTINUED | OUTPATIENT
Start: 2023-06-17 | End: 2023-06-27 | Stop reason: HOSPADM

## 2023-06-17 RX ORDER — POLYETHYLENE GLYCOL 3350 17 G/17G
17 POWDER, FOR SOLUTION ORAL DAILY PRN
Status: DISCONTINUED | OUTPATIENT
Start: 2023-06-17 | End: 2023-06-27 | Stop reason: HOSPADM

## 2023-06-17 RX ORDER — LEVOTHYROXINE SODIUM 88 UG/1
88 TABLET ORAL
Status: DISCONTINUED | OUTPATIENT
Start: 2023-06-18 | End: 2023-06-27 | Stop reason: HOSPADM

## 2023-06-17 RX ORDER — LANOLIN ALCOHOL/MO/W.PET/CERES
6 CREAM (GRAM) TOPICAL NIGHTLY PRN
Status: DISCONTINUED | OUTPATIENT
Start: 2023-06-18 | End: 2023-06-18

## 2023-06-17 RX ORDER — ONDANSETRON 2 MG/ML
4 INJECTION INTRAMUSCULAR; INTRAVENOUS EVERY 6 HOURS PRN
Status: DISCONTINUED | OUTPATIENT
Start: 2023-06-17 | End: 2023-06-27 | Stop reason: HOSPADM

## 2023-06-17 RX ORDER — ONDANSETRON 4 MG/1
4 TABLET, ORALLY DISINTEGRATING ORAL EVERY 8 HOURS PRN
Status: DISCONTINUED | OUTPATIENT
Start: 2023-06-17 | End: 2023-06-27 | Stop reason: HOSPADM

## 2023-06-17 RX ORDER — ACETAMINOPHEN 650 MG/1
650 SUPPOSITORY RECTAL EVERY 6 HOURS PRN
Status: DISCONTINUED | OUTPATIENT
Start: 2023-06-17 | End: 2023-06-27 | Stop reason: HOSPADM

## 2023-06-17 RX ADMIN — WATER 1000 MG: 1 INJECTION INTRAMUSCULAR; INTRAVENOUS; SUBCUTANEOUS at 22:09

## 2023-06-18 ENCOUNTER — APPOINTMENT (OUTPATIENT)
Facility: HOSPITAL | Age: 58
DRG: 689 | End: 2023-06-18
Payer: COMMERCIAL

## 2023-06-18 LAB
COMMENT:: NORMAL
EKG ATRIAL RATE: 54 BPM
EKG DIAGNOSIS: NORMAL
EKG P AXIS: 9 DEGREES
EKG P-R INTERVAL: 142 MS
EKG Q-T INTERVAL: 432 MS
EKG QRS DURATION: 78 MS
EKG QTC CALCULATION (BAZETT): 409 MS
EKG R AXIS: 26 DEGREES
EKG T AXIS: 21 DEGREES
EKG VENTRICULAR RATE: 54 BPM
FOLATE SERPL-MCNC: 12.1 NG/ML (ref 5–21)
GLUCOSE BLD STRIP.AUTO-MCNC: 104 MG/DL (ref 65–117)
MAGNESIUM SERPL-MCNC: 2.2 MG/DL (ref 1.6–2.4)
SERVICE CMNT-IMP: NORMAL
SPECIMEN HOLD: NORMAL
T4 FREE SERPL-MCNC: 1.1 NG/DL (ref 0.8–1.5)
TSH SERPL DL<=0.05 MIU/L-ACNC: 0.35 UIU/ML (ref 0.36–3.74)
VIT B12 SERPL-MCNC: 315 PG/ML (ref 193–986)

## 2023-06-18 PROCEDURE — 70450 CT HEAD/BRAIN W/O DYE: CPT

## 2023-06-18 PROCEDURE — 82607 VITAMIN B-12: CPT

## 2023-06-18 PROCEDURE — 6370000000 HC RX 637 (ALT 250 FOR IP): Performed by: STUDENT IN AN ORGANIZED HEALTH CARE EDUCATION/TRAINING PROGRAM

## 2023-06-18 PROCEDURE — 82746 ASSAY OF FOLIC ACID SERUM: CPT

## 2023-06-18 PROCEDURE — 84443 ASSAY THYROID STIM HORMONE: CPT

## 2023-06-18 PROCEDURE — 36415 COLL VENOUS BLD VENIPUNCTURE: CPT

## 2023-06-18 PROCEDURE — 2060000000 HC ICU INTERMEDIATE R&B

## 2023-06-18 PROCEDURE — 6360000002 HC RX W HCPCS: Performed by: STUDENT IN AN ORGANIZED HEALTH CARE EDUCATION/TRAINING PROGRAM

## 2023-06-18 PROCEDURE — 2580000003 HC RX 258: Performed by: STUDENT IN AN ORGANIZED HEALTH CARE EDUCATION/TRAINING PROGRAM

## 2023-06-18 PROCEDURE — 83735 ASSAY OF MAGNESIUM: CPT

## 2023-06-18 PROCEDURE — 99222 1ST HOSP IP/OBS MODERATE 55: CPT | Performed by: PSYCHIATRY & NEUROLOGY

## 2023-06-18 PROCEDURE — 84439 ASSAY OF FREE THYROXINE: CPT

## 2023-06-18 PROCEDURE — 2580000003 HC RX 258

## 2023-06-18 PROCEDURE — 82962 GLUCOSE BLOOD TEST: CPT

## 2023-06-18 RX ORDER — LANOLIN ALCOHOL/MO/W.PET/CERES
3 CREAM (GRAM) TOPICAL NIGHTLY PRN
Status: DISCONTINUED | OUTPATIENT
Start: 2023-06-18 | End: 2023-06-21

## 2023-06-18 RX ORDER — LORAZEPAM 2 MG/ML
2 INJECTION INTRAMUSCULAR
Status: COMPLETED | OUTPATIENT
Start: 2023-06-18 | End: 2023-06-18

## 2023-06-18 RX ORDER — POTASSIUM CHLORIDE 750 MG/1
40 TABLET, FILM COATED, EXTENDED RELEASE ORAL ONCE
Status: COMPLETED | OUTPATIENT
Start: 2023-06-18 | End: 2023-06-18

## 2023-06-18 RX ORDER — SODIUM CHLORIDE, SODIUM LACTATE, POTASSIUM CHLORIDE, CALCIUM CHLORIDE 600; 310; 30; 20 MG/100ML; MG/100ML; MG/100ML; MG/100ML
INJECTION, SOLUTION INTRAVENOUS CONTINUOUS
Status: DISPENSED | OUTPATIENT
Start: 2023-06-18 | End: 2023-06-19

## 2023-06-18 RX ADMIN — ATORVASTATIN CALCIUM 10 MG: 10 TABLET, FILM COATED ORAL at 02:09

## 2023-06-18 RX ADMIN — SODIUM CHLORIDE 1000 ML: 9 INJECTION, SOLUTION INTRAVENOUS at 01:30

## 2023-06-18 RX ADMIN — Medication 10 ML: at 02:09

## 2023-06-18 RX ADMIN — WATER 1000 MG: 1 INJECTION INTRAMUSCULAR; INTRAVENOUS; SUBCUTANEOUS at 22:49

## 2023-06-18 RX ADMIN — LEVOTHYROXINE SODIUM 88 MCG: 0.09 TABLET ORAL at 08:01

## 2023-06-18 RX ADMIN — SODIUM CHLORIDE, PRESERVATIVE FREE 10 ML: 5 INJECTION INTRAVENOUS at 22:53

## 2023-06-18 RX ADMIN — BACLOFEN 10 MG: 10 TABLET ORAL at 02:08

## 2023-06-18 RX ADMIN — ACETAMINOPHEN 650 MG: 325 TABLET ORAL at 12:48

## 2023-06-18 RX ADMIN — POTASSIUM CHLORIDE 40 MEQ: 750 TABLET, FILM COATED, EXTENDED RELEASE ORAL at 08:59

## 2023-06-18 RX ADMIN — ATORVASTATIN CALCIUM 10 MG: 10 TABLET, FILM COATED ORAL at 22:49

## 2023-06-18 RX ADMIN — SODIUM CHLORIDE, POTASSIUM CHLORIDE, SODIUM LACTATE AND CALCIUM CHLORIDE: 600; 310; 30; 20 INJECTION, SOLUTION INTRAVENOUS at 23:13

## 2023-06-18 RX ADMIN — Medication 10 ML: at 22:53

## 2023-06-18 RX ADMIN — LORAZEPAM 2 MG: 2 INJECTION INTRAMUSCULAR; INTRAVENOUS at 08:59

## 2023-06-18 RX ADMIN — BACLOFEN 10 MG: 10 TABLET ORAL at 12:50

## 2023-06-18 RX ADMIN — BACLOFEN 10 MG: 10 TABLET ORAL at 08:01

## 2023-06-18 RX ADMIN — SODIUM CHLORIDE: 9 INJECTION, SOLUTION INTRAVENOUS at 02:55

## 2023-06-18 RX ADMIN — METOPROLOL TARTRATE 25 MG: 25 TABLET, FILM COATED ORAL at 08:01

## 2023-06-18 ASSESSMENT — PAIN SCALES - GENERAL
PAINLEVEL_OUTOF10: 8
PAINLEVEL_OUTOF10: 0

## 2023-06-18 ASSESSMENT — PAIN DESCRIPTION - LOCATION: LOCATION: HIP

## 2023-06-19 ENCOUNTER — APPOINTMENT (OUTPATIENT)
Facility: HOSPITAL | Age: 58
DRG: 689 | End: 2023-06-19
Payer: COMMERCIAL

## 2023-06-19 LAB
AMMONIA PLAS-SCNC: 20 UMOL/L
ANION GAP SERPL CALC-SCNC: 5 MMOL/L (ref 5–15)
ARTERIAL PATENCY WRIST A: POSITIVE
BASE EXCESS BLD CALC-SCNC: 1.4 MMOL/L
BDY SITE: ABNORMAL
BUN SERPL-MCNC: 10 MG/DL (ref 6–20)
BUN/CREAT SERPL: 21 (ref 12–20)
CALCIUM SERPL-MCNC: 9.3 MG/DL (ref 8.5–10.1)
CHLORIDE SERPL-SCNC: 115 MMOL/L (ref 97–108)
CO2 SERPL-SCNC: 27 MMOL/L (ref 21–32)
CREAT SERPL-MCNC: 0.48 MG/DL (ref 0.55–1.02)
ERYTHROCYTE [DISTWIDTH] IN BLOOD BY AUTOMATED COUNT: 14.6 % (ref 11.5–14.5)
GAS FLOW.O2 O2 DELIVERY SYS: ABNORMAL
GLUCOSE SERPL-MCNC: 97 MG/DL (ref 65–100)
HCO3 BLD-SCNC: 26.4 MMOL/L (ref 22–26)
HCT VFR BLD AUTO: 31.8 % (ref 35–47)
HGB BLD-MCNC: 10.3 G/DL (ref 11.5–16)
MCH RBC QN AUTO: 29.9 PG (ref 26–34)
MCHC RBC AUTO-ENTMCNC: 32.4 G/DL (ref 30–36.5)
MCV RBC AUTO: 92.4 FL (ref 80–99)
NRBC # BLD: 0 K/UL (ref 0–0.01)
NRBC BLD-RTO: 0 PER 100 WBC
NT PRO BNP: 276 PG/ML
O2/TOTAL GAS SETTING VFR VENT: 2 %
PCO2 BLD: 42.4 MMHG (ref 35–45)
PH BLD: 7.4 (ref 7.35–7.45)
PLATELET # BLD AUTO: 260 K/UL (ref 150–400)
PMV BLD AUTO: 11.3 FL (ref 8.9–12.9)
PO2 BLD: 88 MMHG (ref 80–100)
POTASSIUM SERPL-SCNC: 3.8 MMOL/L (ref 3.5–5.1)
RBC # BLD AUTO: 3.44 M/UL (ref 3.8–5.2)
SAO2 % BLD: 96.7 % (ref 92–97)
SODIUM SERPL-SCNC: 147 MMOL/L (ref 136–145)
SPECIMEN TYPE: ABNORMAL
TROPONIN I SERPL HS-MCNC: <3 NG/L (ref 0–37)
WBC # BLD AUTO: 6.2 K/UL (ref 3.6–11)

## 2023-06-19 PROCEDURE — 2700000000 HC OXYGEN THERAPY PER DAY

## 2023-06-19 PROCEDURE — 6360000002 HC RX W HCPCS: Performed by: STUDENT IN AN ORGANIZED HEALTH CARE EDUCATION/TRAINING PROGRAM

## 2023-06-19 PROCEDURE — 85027 COMPLETE CBC AUTOMATED: CPT

## 2023-06-19 PROCEDURE — 2580000003 HC RX 258: Performed by: STUDENT IN AN ORGANIZED HEALTH CARE EDUCATION/TRAINING PROGRAM

## 2023-06-19 PROCEDURE — 2060000000 HC ICU INTERMEDIATE R&B

## 2023-06-19 PROCEDURE — 97161 PT EVAL LOW COMPLEX 20 MIN: CPT

## 2023-06-19 PROCEDURE — 97166 OT EVAL MOD COMPLEX 45 MIN: CPT

## 2023-06-19 PROCEDURE — 82140 ASSAY OF AMMONIA: CPT

## 2023-06-19 PROCEDURE — 84484 ASSAY OF TROPONIN QUANT: CPT

## 2023-06-19 PROCEDURE — 80048 BASIC METABOLIC PNL TOTAL CA: CPT

## 2023-06-19 PROCEDURE — 97530 THERAPEUTIC ACTIVITIES: CPT

## 2023-06-19 PROCEDURE — 83880 ASSAY OF NATRIURETIC PEPTIDE: CPT

## 2023-06-19 PROCEDURE — 6370000000 HC RX 637 (ALT 250 FOR IP): Performed by: STUDENT IN AN ORGANIZED HEALTH CARE EDUCATION/TRAINING PROGRAM

## 2023-06-19 PROCEDURE — 6370000000 HC RX 637 (ALT 250 FOR IP)

## 2023-06-19 PROCEDURE — 36600 WITHDRAWAL OF ARTERIAL BLOOD: CPT

## 2023-06-19 PROCEDURE — 82803 BLOOD GASES ANY COMBINATION: CPT

## 2023-06-19 PROCEDURE — 36415 COLL VENOUS BLD VENIPUNCTURE: CPT

## 2023-06-19 PROCEDURE — 70551 MRI BRAIN STEM W/O DYE: CPT

## 2023-06-19 RX ORDER — LORAZEPAM 2 MG/ML
2 INJECTION INTRAMUSCULAR
Status: COMPLETED | OUTPATIENT
Start: 2023-06-19 | End: 2023-06-19

## 2023-06-19 RX ORDER — HYDROXYZINE HYDROCHLORIDE 10 MG/1
10 TABLET, FILM COATED ORAL ONCE
Status: COMPLETED | OUTPATIENT
Start: 2023-06-19 | End: 2023-06-19

## 2023-06-19 RX ORDER — HALOPERIDOL 5 MG/ML
5 INJECTION INTRAMUSCULAR EVERY 6 HOURS PRN
Status: DISCONTINUED | OUTPATIENT
Start: 2023-06-19 | End: 2023-06-19

## 2023-06-19 RX ADMIN — Medication 10 ML: at 10:05

## 2023-06-19 RX ADMIN — ACETAMINOPHEN 650 MG: 325 TABLET ORAL at 06:18

## 2023-06-19 RX ADMIN — LORAZEPAM 2 MG: 2 INJECTION INTRAMUSCULAR; INTRAVENOUS at 10:32

## 2023-06-19 RX ADMIN — BACLOFEN 10 MG: 10 TABLET ORAL at 20:56

## 2023-06-19 RX ADMIN — ATORVASTATIN CALCIUM 10 MG: 10 TABLET, FILM COATED ORAL at 20:57

## 2023-06-19 RX ADMIN — Medication 10 ML: at 21:23

## 2023-06-19 RX ADMIN — ZIPRASIDONE MESYLATE 10 MG: 20 INJECTION, POWDER, LYOPHILIZED, FOR SOLUTION INTRAMUSCULAR at 17:51

## 2023-06-19 RX ADMIN — BACLOFEN 10 MG: 10 TABLET ORAL at 10:00

## 2023-06-19 RX ADMIN — WATER 1000 MG: 1 INJECTION INTRAMUSCULAR; INTRAVENOUS; SUBCUTANEOUS at 21:12

## 2023-06-19 RX ADMIN — HYDROXYZINE HYDROCHLORIDE 10 MG: 10 TABLET ORAL at 06:38

## 2023-06-19 RX ADMIN — METOPROLOL TARTRATE 25 MG: 25 TABLET, FILM COATED ORAL at 21:23

## 2023-06-19 RX ADMIN — HALOPERIDOL LACTATE 5 MG: 5 INJECTION, SOLUTION INTRAMUSCULAR at 14:16

## 2023-06-19 RX ADMIN — LEVOTHYROXINE SODIUM 88 MCG: 0.09 TABLET ORAL at 06:19

## 2023-06-19 RX ADMIN — BACLOFEN 10 MG: 10 TABLET ORAL at 14:16

## 2023-06-19 ASSESSMENT — PAIN SCALES - GENERAL
PAINLEVEL_OUTOF10: 3
PAINLEVEL_OUTOF10: 7

## 2023-06-19 ASSESSMENT — PAIN DESCRIPTION - LOCATION: LOCATION: HEAD

## 2023-06-20 ENCOUNTER — APPOINTMENT (OUTPATIENT)
Facility: HOSPITAL | Age: 58
DRG: 689 | End: 2023-06-20
Payer: COMMERCIAL

## 2023-06-20 LAB
ANION GAP SERPL CALC-SCNC: 9 MMOL/L (ref 5–15)
BACTERIA SPEC CULT: ABNORMAL
BUN SERPL-MCNC: 8 MG/DL (ref 6–20)
BUN/CREAT SERPL: 17 (ref 12–20)
CALCIUM SERPL-MCNC: 9.5 MG/DL (ref 8.5–10.1)
CC UR VC: ABNORMAL
CHLORIDE SERPL-SCNC: 110 MMOL/L (ref 97–108)
CO2 SERPL-SCNC: 23 MMOL/L (ref 21–32)
CREAT SERPL-MCNC: 0.46 MG/DL (ref 0.55–1.02)
EKG ATRIAL RATE: 103 BPM
EKG ATRIAL RATE: 97 BPM
EKG DIAGNOSIS: NORMAL
EKG DIAGNOSIS: NORMAL
EKG P AXIS: 14 DEGREES
EKG P AXIS: 25 DEGREES
EKG P-R INTERVAL: 134 MS
EKG P-R INTERVAL: 136 MS
EKG Q-T INTERVAL: 340 MS
EKG Q-T INTERVAL: 352 MS
EKG QRS DURATION: 72 MS
EKG QRS DURATION: 76 MS
EKG QTC CALCULATION (BAZETT): 445 MS
EKG QTC CALCULATION (BAZETT): 447 MS
EKG R AXIS: 12 DEGREES
EKG R AXIS: 9 DEGREES
EKG T AXIS: 0 DEGREES
EKG T AXIS: 21 DEGREES
EKG VENTRICULAR RATE: 103 BPM
EKG VENTRICULAR RATE: 97 BPM
ERYTHROCYTE [DISTWIDTH] IN BLOOD BY AUTOMATED COUNT: 13.9 % (ref 11.5–14.5)
GLUCOSE SERPL-MCNC: 107 MG/DL (ref 65–100)
HCT VFR BLD AUTO: 36.8 % (ref 35–47)
HGB BLD-MCNC: 12.1 G/DL (ref 11.5–16)
MCH RBC QN AUTO: 29.9 PG (ref 26–34)
MCHC RBC AUTO-ENTMCNC: 32.9 G/DL (ref 30–36.5)
MCV RBC AUTO: 90.9 FL (ref 80–99)
NRBC # BLD: 0 K/UL (ref 0–0.01)
NRBC BLD-RTO: 0 PER 100 WBC
PLATELET # BLD AUTO: 301 K/UL (ref 150–400)
PMV BLD AUTO: 11.4 FL (ref 8.9–12.9)
POTASSIUM SERPL-SCNC: 3.8 MMOL/L (ref 3.5–5.1)
RBC # BLD AUTO: 4.05 M/UL (ref 3.8–5.2)
SERVICE CMNT-IMP: ABNORMAL
SODIUM SERPL-SCNC: 142 MMOL/L (ref 136–145)
WBC # BLD AUTO: 8.9 K/UL (ref 3.6–11)

## 2023-06-20 PROCEDURE — 2580000003 HC RX 258: Performed by: NURSE PRACTITIONER

## 2023-06-20 PROCEDURE — 6370000000 HC RX 637 (ALT 250 FOR IP): Performed by: STUDENT IN AN ORGANIZED HEALTH CARE EDUCATION/TRAINING PROGRAM

## 2023-06-20 PROCEDURE — 2580000003 HC RX 258: Performed by: STUDENT IN AN ORGANIZED HEALTH CARE EDUCATION/TRAINING PROGRAM

## 2023-06-20 PROCEDURE — 85027 COMPLETE CBC AUTOMATED: CPT

## 2023-06-20 PROCEDURE — 2060000000 HC ICU INTERMEDIATE R&B

## 2023-06-20 PROCEDURE — 80048 BASIC METABOLIC PNL TOTAL CA: CPT

## 2023-06-20 PROCEDURE — 6360000002 HC RX W HCPCS: Performed by: STUDENT IN AN ORGANIZED HEALTH CARE EDUCATION/TRAINING PROGRAM

## 2023-06-20 PROCEDURE — A9579 GAD-BASE MR CONTRAST NOS,1ML: HCPCS

## 2023-06-20 PROCEDURE — 6360000004 HC RX CONTRAST MEDICATION

## 2023-06-20 PROCEDURE — 70553 MRI BRAIN STEM W/O & W/DYE: CPT

## 2023-06-20 PROCEDURE — 36415 COLL VENOUS BLD VENIPUNCTURE: CPT

## 2023-06-20 PROCEDURE — 2500000003 HC RX 250 WO HCPCS: Performed by: STUDENT IN AN ORGANIZED HEALTH CARE EDUCATION/TRAINING PROGRAM

## 2023-06-20 RX ORDER — QUETIAPINE FUMARATE 25 MG/1
25 TABLET, FILM COATED ORAL NIGHTLY
Status: DISCONTINUED | OUTPATIENT
Start: 2023-06-20 | End: 2023-06-21

## 2023-06-20 RX ORDER — SODIUM CHLORIDE 0.9 % (FLUSH) 0.9 %
10 SYRINGE (ML) INJECTION ONCE
Status: COMPLETED | OUTPATIENT
Start: 2023-06-20 | End: 2023-06-20

## 2023-06-20 RX ORDER — DIAZEPAM 5 MG/ML
5 INJECTION, SOLUTION INTRAMUSCULAR; INTRAVENOUS
Status: COMPLETED | OUTPATIENT
Start: 2023-06-20 | End: 2023-06-20

## 2023-06-20 RX ADMIN — GADOTERIDOL 15 ML: 279.3 INJECTION, SOLUTION INTRAVENOUS at 18:33

## 2023-06-20 RX ADMIN — METOPROLOL TARTRATE 25 MG: 25 TABLET, FILM COATED ORAL at 09:47

## 2023-06-20 RX ADMIN — WATER 1000 MG: 1 INJECTION INTRAMUSCULAR; INTRAVENOUS; SUBCUTANEOUS at 21:50

## 2023-06-20 RX ADMIN — BACLOFEN 10 MG: 10 TABLET ORAL at 09:23

## 2023-06-20 RX ADMIN — DIAZEPAM 5 MG: 5 INJECTION, SOLUTION INTRAMUSCULAR; INTRAVENOUS at 17:22

## 2023-06-20 RX ADMIN — OLANZAPINE 7.5 MG: 10 INJECTION, POWDER, FOR SOLUTION INTRAMUSCULAR at 03:22

## 2023-06-20 RX ADMIN — BACLOFEN 10 MG: 10 TABLET ORAL at 13:57

## 2023-06-20 RX ADMIN — Medication 10 ML: at 22:00

## 2023-06-20 RX ADMIN — SODIUM CHLORIDE, PRESERVATIVE FREE 10 ML: 5 INJECTION INTRAVENOUS at 19:14

## 2023-06-20 RX ADMIN — LEVOTHYROXINE SODIUM 88 MCG: 0.09 TABLET ORAL at 06:16

## 2023-06-20 RX ADMIN — Medication 10 ML: at 09:53

## 2023-06-21 ENCOUNTER — CARE COORDINATION (OUTPATIENT)
Dept: OTHER | Facility: CLINIC | Age: 58
End: 2023-06-21

## 2023-06-21 ENCOUNTER — APPOINTMENT (OUTPATIENT)
Facility: HOSPITAL | Age: 58
DRG: 689 | End: 2023-06-21
Payer: COMMERCIAL

## 2023-06-21 LAB
ANION GAP SERPL CALC-SCNC: 6 MMOL/L (ref 5–15)
BUN SERPL-MCNC: 14 MG/DL (ref 6–20)
BUN/CREAT SERPL: 25 (ref 12–20)
CALCIUM SERPL-MCNC: 9.5 MG/DL (ref 8.5–10.1)
CHLORIDE SERPL-SCNC: 112 MMOL/L (ref 97–108)
CO2 SERPL-SCNC: 25 MMOL/L (ref 21–32)
CREAT SERPL-MCNC: 0.56 MG/DL (ref 0.55–1.02)
ERYTHROCYTE [DISTWIDTH] IN BLOOD BY AUTOMATED COUNT: 14.4 % (ref 11.5–14.5)
GLUCOSE SERPL-MCNC: 104 MG/DL (ref 65–100)
HCT VFR BLD AUTO: 35.1 % (ref 35–47)
HGB BLD-MCNC: 11.4 G/DL (ref 11.5–16)
MCH RBC QN AUTO: 29.6 PG (ref 26–34)
MCHC RBC AUTO-ENTMCNC: 32.5 G/DL (ref 30–36.5)
MCV RBC AUTO: 91.2 FL (ref 80–99)
NRBC # BLD: 0 K/UL (ref 0–0.01)
NRBC BLD-RTO: 0 PER 100 WBC
PLATELET # BLD AUTO: 292 K/UL (ref 150–400)
PMV BLD AUTO: 11.3 FL (ref 8.9–12.9)
POTASSIUM SERPL-SCNC: 3.9 MMOL/L (ref 3.5–5.1)
RBC # BLD AUTO: 3.85 M/UL (ref 3.8–5.2)
SODIUM SERPL-SCNC: 143 MMOL/L (ref 136–145)
WBC # BLD AUTO: 7 K/UL (ref 3.6–11)

## 2023-06-21 PROCEDURE — 6360000004 HC RX CONTRAST MEDICATION: Performed by: RADIOLOGY

## 2023-06-21 PROCEDURE — 2580000003 HC RX 258: Performed by: STUDENT IN AN ORGANIZED HEALTH CARE EDUCATION/TRAINING PROGRAM

## 2023-06-21 PROCEDURE — 70498 CT ANGIOGRAPHY NECK: CPT

## 2023-06-21 PROCEDURE — 36415 COLL VENOUS BLD VENIPUNCTURE: CPT

## 2023-06-21 PROCEDURE — 99232 SBSQ HOSP IP/OBS MODERATE 35: CPT | Performed by: PSYCHIATRY & NEUROLOGY

## 2023-06-21 PROCEDURE — 2060000000 HC ICU INTERMEDIATE R&B

## 2023-06-21 PROCEDURE — 2500000003 HC RX 250 WO HCPCS: Performed by: NURSE PRACTITIONER

## 2023-06-21 PROCEDURE — 6370000000 HC RX 637 (ALT 250 FOR IP): Performed by: NURSE PRACTITIONER

## 2023-06-21 PROCEDURE — 6370000000 HC RX 637 (ALT 250 FOR IP): Performed by: STUDENT IN AN ORGANIZED HEALTH CARE EDUCATION/TRAINING PROGRAM

## 2023-06-21 PROCEDURE — 6360000002 HC RX W HCPCS: Performed by: STUDENT IN AN ORGANIZED HEALTH CARE EDUCATION/TRAINING PROGRAM

## 2023-06-21 PROCEDURE — 2580000003 HC RX 258: Performed by: NURSE PRACTITIONER

## 2023-06-21 PROCEDURE — 85027 COMPLETE CBC AUTOMATED: CPT

## 2023-06-21 PROCEDURE — 80048 BASIC METABOLIC PNL TOTAL CA: CPT

## 2023-06-21 PROCEDURE — 4A03X5D MEASUREMENT OF ARTERIAL FLOW, INTRACRANIAL, EXTERNAL APPROACH: ICD-10-PCS | Performed by: RADIOLOGY

## 2023-06-21 RX ORDER — OLANZAPINE 5 MG/1
5 TABLET ORAL EVERY 6 HOURS PRN
Status: DISCONTINUED | OUTPATIENT
Start: 2023-06-21 | End: 2023-06-27 | Stop reason: HOSPADM

## 2023-06-21 RX ORDER — LANOLIN ALCOHOL/MO/W.PET/CERES
6 CREAM (GRAM) TOPICAL NIGHTLY PRN
Status: DISCONTINUED | OUTPATIENT
Start: 2023-06-21 | End: 2023-06-27 | Stop reason: HOSPADM

## 2023-06-21 RX ORDER — ASPIRIN 81 MG/1
81 TABLET, CHEWABLE ORAL DAILY
Status: DISCONTINUED | OUTPATIENT
Start: 2023-06-21 | End: 2023-06-23

## 2023-06-21 RX ORDER — OLANZAPINE 5 MG/1
5 TABLET, ORALLY DISINTEGRATING ORAL NIGHTLY
Status: DISCONTINUED | OUTPATIENT
Start: 2023-06-21 | End: 2023-06-23

## 2023-06-21 RX ORDER — DIAZEPAM 5 MG/ML
5 INJECTION, SOLUTION INTRAMUSCULAR; INTRAVENOUS ONCE
Status: COMPLETED | OUTPATIENT
Start: 2023-06-21 | End: 2023-06-21

## 2023-06-21 RX ORDER — ATORVASTATIN CALCIUM 40 MG/1
40 TABLET, FILM COATED ORAL NIGHTLY
Status: DISCONTINUED | OUTPATIENT
Start: 2023-06-21 | End: 2023-06-27 | Stop reason: HOSPADM

## 2023-06-21 RX ORDER — DIAZEPAM 5 MG/ML
2.5 INJECTION, SOLUTION INTRAMUSCULAR; INTRAVENOUS ONCE
Status: COMPLETED | OUTPATIENT
Start: 2023-06-21 | End: 2023-06-21

## 2023-06-21 RX ADMIN — BACLOFEN 10 MG: 10 TABLET ORAL at 14:53

## 2023-06-21 RX ADMIN — WATER 1000 MG: 1 INJECTION INTRAMUSCULAR; INTRAVENOUS; SUBCUTANEOUS at 21:30

## 2023-06-21 RX ADMIN — Medication 6 MG: at 21:29

## 2023-06-21 RX ADMIN — WATER 10 MG: 1 INJECTION INTRAMUSCULAR; INTRAVENOUS; SUBCUTANEOUS at 09:15

## 2023-06-21 RX ADMIN — IOPAMIDOL 100 ML: 755 INJECTION, SOLUTION INTRAVENOUS at 15:16

## 2023-06-21 RX ADMIN — WATER 7.5 MG: 1 INJECTION INTRAMUSCULAR; INTRAVENOUS; SUBCUTANEOUS at 07:12

## 2023-06-21 RX ADMIN — BACLOFEN 10 MG: 10 TABLET ORAL at 21:29

## 2023-06-21 RX ADMIN — OLANZAPINE 5 MG: 5 TABLET, ORALLY DISINTEGRATING ORAL at 21:29

## 2023-06-21 RX ADMIN — LEVOTHYROXINE SODIUM 88 MCG: 0.09 TABLET ORAL at 06:38

## 2023-06-21 RX ADMIN — ZIPRASIDONE MESYLATE 10 MG: 20 INJECTION, POWDER, LYOPHILIZED, FOR SOLUTION INTRAMUSCULAR at 09:27

## 2023-06-21 RX ADMIN — Medication 10 ML: at 21:30

## 2023-06-21 RX ADMIN — ASPIRIN 81 MG CHEWABLE TABLET 81 MG: 81 TABLET CHEWABLE at 14:53

## 2023-06-21 RX ADMIN — DIAZEPAM 2.5 MG: 5 INJECTION, SOLUTION INTRAMUSCULAR; INTRAVENOUS at 12:40

## 2023-06-21 RX ADMIN — DIAZEPAM 5 MG: 5 INJECTION, SOLUTION INTRAMUSCULAR; INTRAVENOUS at 14:56

## 2023-06-21 RX ADMIN — ATORVASTATIN CALCIUM 40 MG: 40 TABLET, FILM COATED ORAL at 14:53

## 2023-06-21 NOTE — CARE COORDINATION
Ambulatory Care Coordination Note  2023    Patient Current Location:  [facility]    ACM contacted the parent by telephone. Verified name and  with parent as identifiers. Provided introduction to self, and explanation of the ACM role. Challenges to be reviewed by the provider   Additional needs identified to be addressed with provider: No  none               Method of communication with provider: chart routing. ACM: Petra Kohli RN    ACM contacted the parent to follow up on progress, discuss new issues or concerns, and reinforce/provide patient education. Summary Note: Spoke with patient's parent/guardian, Alex Steiner. Mom informed this ACM that the patient is back in the hospital at [facility]. She was there with the patient. Per mom, an MRI was completed late last week, which showed that the patient was having mini strokes. It is unknown how long this has been occurring. Mom states that the patient was screaming out for her in the middle of the night when she was at [facility] and she was unable to be managed with Valium. Patient was admitted to [facility] on . Mom states that she does have some awareness and memory. She was able to recite the Lord's Prayer with the . Mom does not know that the plan is moving forward. She is paying for the bed to be on hold at [facility], in case the patient would return. Will follow up with mom and assist moving forward. Reinforced/Provided Education:  Discussed red flags and appropriate site of care based on symptoms and resources available to parent including: Specialist.       Importance and benefits of: Follow up with PCP and specialist, medication adherence, self monitoring and reporting of symptoms. Plan:  Plan for follow-up call in 5-7 days based on severity of symptoms and risk factors. Plan for next call:  discuss plan moving forward and assist as needed.      Parent verbalized understanding and is

## 2023-06-22 ENCOUNTER — APPOINTMENT (OUTPATIENT)
Facility: HOSPITAL | Age: 58
DRG: 689 | End: 2023-06-22
Attending: STUDENT IN AN ORGANIZED HEALTH CARE EDUCATION/TRAINING PROGRAM
Payer: COMMERCIAL

## 2023-06-22 LAB
ANION GAP SERPL CALC-SCNC: 8 MMOL/L (ref 5–15)
BUN SERPL-MCNC: 13 MG/DL (ref 6–20)
BUN/CREAT SERPL: 21 (ref 12–20)
CALCIUM SERPL-MCNC: 9.8 MG/DL (ref 8.5–10.1)
CHLORIDE SERPL-SCNC: 111 MMOL/L (ref 97–108)
CHOLEST SERPL-MCNC: 181 MG/DL
CO2 SERPL-SCNC: 24 MMOL/L (ref 21–32)
COMMENT:: NORMAL
CREAT SERPL-MCNC: 0.61 MG/DL (ref 0.55–1.02)
ERYTHROCYTE [DISTWIDTH] IN BLOOD BY AUTOMATED COUNT: 14.2 % (ref 11.5–14.5)
EST. AVERAGE GLUCOSE BLD GHB EST-MCNC: 97 MG/DL
GLUCOSE SERPL-MCNC: 114 MG/DL (ref 65–100)
HBA1C MFR BLD: 5 % (ref 4–5.6)
HCT VFR BLD AUTO: 37.1 % (ref 35–47)
HDLC SERPL-MCNC: 48 MG/DL
HDLC SERPL: 3.8 (ref 0–5)
HGB BLD-MCNC: 11.9 G/DL (ref 11.5–16)
LDLC SERPL CALC-MCNC: 108.6 MG/DL (ref 0–100)
MCH RBC QN AUTO: 29.6 PG (ref 26–34)
MCHC RBC AUTO-ENTMCNC: 32.1 G/DL (ref 30–36.5)
MCV RBC AUTO: 92.3 FL (ref 80–99)
NRBC # BLD: 0 K/UL (ref 0–0.01)
NRBC BLD-RTO: 0 PER 100 WBC
PLATELET # BLD AUTO: 341 K/UL (ref 150–400)
PMV BLD AUTO: 11.2 FL (ref 8.9–12.9)
POTASSIUM SERPL-SCNC: 4.2 MMOL/L (ref 3.5–5.1)
RBC # BLD AUTO: 4.02 M/UL (ref 3.8–5.2)
SODIUM SERPL-SCNC: 143 MMOL/L (ref 136–145)
SPECIMEN HOLD: NORMAL
TRIGL SERPL-MCNC: 122 MG/DL
VLDLC SERPL CALC-MCNC: 24.4 MG/DL
WBC # BLD AUTO: 8.4 K/UL (ref 3.6–11)

## 2023-06-22 PROCEDURE — 2060000000 HC ICU INTERMEDIATE R&B

## 2023-06-22 PROCEDURE — 80048 BASIC METABOLIC PNL TOTAL CA: CPT

## 2023-06-22 PROCEDURE — 36415 COLL VENOUS BLD VENIPUNCTURE: CPT

## 2023-06-22 PROCEDURE — 2580000003 HC RX 258: Performed by: STUDENT IN AN ORGANIZED HEALTH CARE EDUCATION/TRAINING PROGRAM

## 2023-06-22 PROCEDURE — 6370000000 HC RX 637 (ALT 250 FOR IP): Performed by: STUDENT IN AN ORGANIZED HEALTH CARE EDUCATION/TRAINING PROGRAM

## 2023-06-22 PROCEDURE — 85027 COMPLETE CBC AUTOMATED: CPT

## 2023-06-22 PROCEDURE — 6360000002 HC RX W HCPCS: Performed by: STUDENT IN AN ORGANIZED HEALTH CARE EDUCATION/TRAINING PROGRAM

## 2023-06-22 PROCEDURE — 6370000000 HC RX 637 (ALT 250 FOR IP): Performed by: NURSE PRACTITIONER

## 2023-06-22 PROCEDURE — 80061 LIPID PANEL: CPT

## 2023-06-22 PROCEDURE — 83036 HEMOGLOBIN GLYCOSYLATED A1C: CPT

## 2023-06-22 PROCEDURE — 93321 DOPPLER ECHO F-UP/LMTD STD: CPT

## 2023-06-22 RX ADMIN — Medication 10 ML: at 21:43

## 2023-06-22 RX ADMIN — ASPIRIN 81 MG CHEWABLE TABLET 81 MG: 81 TABLET CHEWABLE at 08:24

## 2023-06-22 RX ADMIN — OLANZAPINE 5 MG: 5 TABLET, FILM COATED ORAL at 23:18

## 2023-06-22 RX ADMIN — Medication 6 MG: at 23:18

## 2023-06-22 RX ADMIN — LEVOTHYROXINE SODIUM 88 MCG: 0.09 TABLET ORAL at 08:27

## 2023-06-22 RX ADMIN — OLANZAPINE 5 MG: 5 TABLET, FILM COATED ORAL at 03:31

## 2023-06-22 RX ADMIN — OLANZAPINE 5 MG: 5 TABLET, ORALLY DISINTEGRATING ORAL at 21:41

## 2023-06-22 RX ADMIN — ATORVASTATIN CALCIUM 40 MG: 40 TABLET, FILM COATED ORAL at 21:41

## 2023-06-22 RX ADMIN — WATER 1000 MG: 1 INJECTION INTRAMUSCULAR; INTRAVENOUS; SUBCUTANEOUS at 21:41

## 2023-06-22 RX ADMIN — BACLOFEN 10 MG: 10 TABLET ORAL at 15:19

## 2023-06-22 RX ADMIN — BACLOFEN 10 MG: 10 TABLET ORAL at 21:41

## 2023-06-22 RX ADMIN — OLANZAPINE 5 MG: 5 TABLET, FILM COATED ORAL at 17:13

## 2023-06-22 RX ADMIN — METOPROLOL TARTRATE 25 MG: 25 TABLET, FILM COATED ORAL at 21:46

## 2023-06-22 RX ADMIN — Medication 10 ML: at 08:27

## 2023-06-22 RX ADMIN — BACLOFEN 10 MG: 10 TABLET ORAL at 08:24

## 2023-06-22 ASSESSMENT — PAIN SCALES - GENERAL: PAINLEVEL_OUTOF10: 0

## 2023-06-23 ENCOUNTER — APPOINTMENT (OUTPATIENT)
Facility: HOSPITAL | Age: 58
DRG: 689 | End: 2023-06-23
Attending: STUDENT IN AN ORGANIZED HEALTH CARE EDUCATION/TRAINING PROGRAM
Payer: COMMERCIAL

## 2023-06-23 LAB
ANION GAP SERPL CALC-SCNC: 8 MMOL/L (ref 5–15)
BUN SERPL-MCNC: 12 MG/DL (ref 6–20)
BUN/CREAT SERPL: 24 (ref 12–20)
CALCIUM SERPL-MCNC: 9.6 MG/DL (ref 8.5–10.1)
CHLORIDE SERPL-SCNC: 110 MMOL/L (ref 97–108)
CO2 SERPL-SCNC: 27 MMOL/L (ref 21–32)
CREAT SERPL-MCNC: 0.51 MG/DL (ref 0.55–1.02)
ECHO AO ROOT DIAM: 2.9 CM
ECHO AO ROOT INDEX: 1.62 CM/M2
ECHO AV PEAK GRADIENT: 5 MMHG
ECHO AV PEAK VELOCITY: 1.1 M/S
ECHO BSA: 1.82 M2
ECHO BSA: 1.82 M2
ECHO LA DIAMETER INDEX: 2.07 CM/M2
ECHO LA DIAMETER: 3.7 CM
ECHO LA TO AORTIC ROOT RATIO: 1.28
ECHO LV FRACTIONAL SHORTENING: 51 % (ref 28–44)
ECHO LV INTERNAL DIMENSION DIASTOLE INDEX: 2.51 CM/M2
ECHO LV INTERNAL DIMENSION DIASTOLIC: 4.5 CM (ref 3.9–5.3)
ECHO LV INTERNAL DIMENSION SYSTOLIC INDEX: 1.23 CM/M2
ECHO LV INTERNAL DIMENSION SYSTOLIC: 2.2 CM
ECHO LV IVSD: 0.9 CM (ref 0.6–0.9)
ECHO LV MASS 2D: 123.1 G (ref 67–162)
ECHO LV MASS INDEX 2D: 68.8 G/M2 (ref 43–95)
ECHO LV POSTERIOR WALL DIASTOLIC: 0.8 CM (ref 0.6–0.9)
ECHO LV RELATIVE WALL THICKNESS RATIO: 0.36
ERYTHROCYTE [DISTWIDTH] IN BLOOD BY AUTOMATED COUNT: 14.2 % (ref 11.5–14.5)
GLUCOSE SERPL-MCNC: 103 MG/DL (ref 65–100)
HCT VFR BLD AUTO: 39.8 % (ref 35–47)
HGB BLD-MCNC: 12.7 G/DL (ref 11.5–16)
MCH RBC QN AUTO: 29.5 PG (ref 26–34)
MCHC RBC AUTO-ENTMCNC: 31.9 G/DL (ref 30–36.5)
MCV RBC AUTO: 92.3 FL (ref 80–99)
NRBC # BLD: 0 K/UL (ref 0–0.01)
NRBC BLD-RTO: 0 PER 100 WBC
PLATELET # BLD AUTO: 333 K/UL (ref 150–400)
PMV BLD AUTO: 11.4 FL (ref 8.9–12.9)
POTASSIUM SERPL-SCNC: 4.3 MMOL/L (ref 3.5–5.1)
RBC # BLD AUTO: 4.31 M/UL (ref 3.8–5.2)
SODIUM SERPL-SCNC: 145 MMOL/L (ref 136–145)
WBC # BLD AUTO: 7.8 K/UL (ref 3.6–11)

## 2023-06-23 PROCEDURE — 85027 COMPLETE CBC AUTOMATED: CPT

## 2023-06-23 PROCEDURE — 6370000000 HC RX 637 (ALT 250 FOR IP): Performed by: HOSPITALIST

## 2023-06-23 PROCEDURE — 6370000000 HC RX 637 (ALT 250 FOR IP): Performed by: STUDENT IN AN ORGANIZED HEALTH CARE EDUCATION/TRAINING PROGRAM

## 2023-06-23 PROCEDURE — 2060000000 HC ICU INTERMEDIATE R&B

## 2023-06-23 PROCEDURE — 36415 COLL VENOUS BLD VENIPUNCTURE: CPT

## 2023-06-23 PROCEDURE — 93246 EXT ECG>7D<15D RECORDING: CPT

## 2023-06-23 PROCEDURE — 80048 BASIC METABOLIC PNL TOTAL CA: CPT

## 2023-06-23 PROCEDURE — 6370000000 HC RX 637 (ALT 250 FOR IP): Performed by: NURSE PRACTITIONER

## 2023-06-23 RX ORDER — OLANZAPINE 5 MG/1
5 TABLET, ORALLY DISINTEGRATING ORAL DAILY
Status: DISCONTINUED | OUTPATIENT
Start: 2023-06-23 | End: 2023-06-27 | Stop reason: HOSPADM

## 2023-06-23 RX ORDER — OLANZAPINE 5 MG/1
7.5 TABLET, ORALLY DISINTEGRATING ORAL NIGHTLY
Status: DISCONTINUED | OUTPATIENT
Start: 2023-06-23 | End: 2023-06-27 | Stop reason: HOSPADM

## 2023-06-23 RX ADMIN — BACLOFEN 10 MG: 10 TABLET ORAL at 21:20

## 2023-06-23 RX ADMIN — BACLOFEN 10 MG: 10 TABLET ORAL at 11:12

## 2023-06-23 RX ADMIN — ASPIRIN 81 MG CHEWABLE TABLET 81 MG: 81 TABLET CHEWABLE at 11:12

## 2023-06-23 RX ADMIN — APIXABAN 5 MG: 5 TABLET, FILM COATED ORAL at 21:20

## 2023-06-23 RX ADMIN — OLANZAPINE 5 MG: 5 TABLET, ORALLY DISINTEGRATING ORAL at 11:13

## 2023-06-23 RX ADMIN — OLANZAPINE 7.5 MG: 5 TABLET, ORALLY DISINTEGRATING ORAL at 21:20

## 2023-06-23 RX ADMIN — BACLOFEN 10 MG: 10 TABLET ORAL at 15:51

## 2023-06-23 RX ADMIN — OLANZAPINE 5 MG: 5 TABLET, FILM COATED ORAL at 15:52

## 2023-06-23 RX ADMIN — METOPROLOL TARTRATE 25 MG: 25 TABLET, FILM COATED ORAL at 11:19

## 2023-06-23 RX ADMIN — METOPROLOL TARTRATE 25 MG: 25 TABLET, FILM COATED ORAL at 21:20

## 2023-06-23 RX ADMIN — OLANZAPINE 5 MG: 5 TABLET, FILM COATED ORAL at 05:05

## 2023-06-23 RX ADMIN — LEVOTHYROXINE SODIUM 88 MCG: 0.09 TABLET ORAL at 05:05

## 2023-06-23 RX ADMIN — ATORVASTATIN CALCIUM 40 MG: 40 TABLET, FILM COATED ORAL at 21:20

## 2023-06-23 RX ADMIN — Medication 6 MG: at 21:20

## 2023-06-23 ASSESSMENT — PAIN SCALES - GENERAL
PAINLEVEL_OUTOF10: 4
PAINLEVEL_OUTOF10: 6
PAINLEVEL_OUTOF10: 0

## 2023-06-23 ASSESSMENT — PAIN DESCRIPTION - LOCATION
LOCATION: SHOULDER
LOCATION: SHOULDER

## 2023-06-23 ASSESSMENT — PAIN DESCRIPTION - ORIENTATION
ORIENTATION: LEFT
ORIENTATION: LEFT

## 2023-06-24 LAB
ANION GAP SERPL CALC-SCNC: 9 MMOL/L (ref 5–15)
BUN SERPL-MCNC: 14 MG/DL (ref 6–20)
BUN/CREAT SERPL: 30 (ref 12–20)
CALCIUM SERPL-MCNC: 9.4 MG/DL (ref 8.5–10.1)
CHLORIDE SERPL-SCNC: 111 MMOL/L (ref 97–108)
CO2 SERPL-SCNC: 21 MMOL/L (ref 21–32)
CREAT SERPL-MCNC: 0.47 MG/DL (ref 0.55–1.02)
GLUCOSE SERPL-MCNC: 102 MG/DL (ref 65–100)
POTASSIUM SERPL-SCNC: 4.8 MMOL/L (ref 3.5–5.1)
SODIUM SERPL-SCNC: 141 MMOL/L (ref 136–145)

## 2023-06-24 PROCEDURE — 80048 BASIC METABOLIC PNL TOTAL CA: CPT

## 2023-06-24 PROCEDURE — 6370000000 HC RX 637 (ALT 250 FOR IP): Performed by: NURSE PRACTITIONER

## 2023-06-24 PROCEDURE — 2060000000 HC ICU INTERMEDIATE R&B

## 2023-06-24 PROCEDURE — 6370000000 HC RX 637 (ALT 250 FOR IP): Performed by: HOSPITALIST

## 2023-06-24 PROCEDURE — 36415 COLL VENOUS BLD VENIPUNCTURE: CPT

## 2023-06-24 PROCEDURE — 6370000000 HC RX 637 (ALT 250 FOR IP): Performed by: STUDENT IN AN ORGANIZED HEALTH CARE EDUCATION/TRAINING PROGRAM

## 2023-06-24 RX ADMIN — APIXABAN 5 MG: 5 TABLET, FILM COATED ORAL at 20:30

## 2023-06-24 RX ADMIN — ATORVASTATIN CALCIUM 40 MG: 40 TABLET, FILM COATED ORAL at 20:30

## 2023-06-24 RX ADMIN — METOPROLOL TARTRATE 25 MG: 25 TABLET, FILM COATED ORAL at 20:30

## 2023-06-24 RX ADMIN — OLANZAPINE 7.5 MG: 5 TABLET, ORALLY DISINTEGRATING ORAL at 20:28

## 2023-06-24 RX ADMIN — OLANZAPINE 5 MG: 5 TABLET, FILM COATED ORAL at 12:06

## 2023-06-24 RX ADMIN — Medication 6 MG: at 20:30

## 2023-06-24 RX ADMIN — METOPROLOL TARTRATE 25 MG: 25 TABLET, FILM COATED ORAL at 12:04

## 2023-06-24 RX ADMIN — OLANZAPINE 5 MG: 5 TABLET, FILM COATED ORAL at 12:05

## 2023-06-24 RX ADMIN — APIXABAN 5 MG: 5 TABLET, FILM COATED ORAL at 12:04

## 2023-06-24 RX ADMIN — OLANZAPINE 5 MG: 5 TABLET, FILM COATED ORAL at 18:27

## 2023-06-24 RX ADMIN — BACLOFEN 10 MG: 10 TABLET ORAL at 12:04

## 2023-06-24 RX ADMIN — LEVOTHYROXINE SODIUM 88 MCG: 0.09 TABLET ORAL at 06:18

## 2023-06-24 RX ADMIN — BACLOFEN 10 MG: 10 TABLET ORAL at 14:45

## 2023-06-24 RX ADMIN — BACLOFEN 10 MG: 10 TABLET ORAL at 20:30

## 2023-06-24 ASSESSMENT — PAIN SCALES - GENERAL
PAINLEVEL_OUTOF10: 0
PAINLEVEL_OUTOF10: 6
PAINLEVEL_OUTOF10: 0
PAINLEVEL_OUTOF10: 5

## 2023-06-24 ASSESSMENT — PAIN DESCRIPTION - LOCATION
LOCATION: BACK
LOCATION: BACK

## 2023-06-24 ASSESSMENT — PAIN DESCRIPTION - DESCRIPTORS: DESCRIPTORS: DISCOMFORT

## 2023-06-25 PROCEDURE — 6370000000 HC RX 637 (ALT 250 FOR IP): Performed by: STUDENT IN AN ORGANIZED HEALTH CARE EDUCATION/TRAINING PROGRAM

## 2023-06-25 PROCEDURE — 6370000000 HC RX 637 (ALT 250 FOR IP): Performed by: HOSPITALIST

## 2023-06-25 PROCEDURE — 2060000000 HC ICU INTERMEDIATE R&B

## 2023-06-25 PROCEDURE — 6370000000 HC RX 637 (ALT 250 FOR IP): Performed by: NURSE PRACTITIONER

## 2023-06-25 RX ADMIN — METOPROLOL TARTRATE 25 MG: 25 TABLET, FILM COATED ORAL at 22:14

## 2023-06-25 RX ADMIN — BACLOFEN 10 MG: 10 TABLET ORAL at 08:36

## 2023-06-25 RX ADMIN — APIXABAN 5 MG: 5 TABLET, FILM COATED ORAL at 08:36

## 2023-06-25 RX ADMIN — BACLOFEN 10 MG: 10 TABLET ORAL at 22:15

## 2023-06-25 RX ADMIN — OLANZAPINE 7.5 MG: 5 TABLET, ORALLY DISINTEGRATING ORAL at 22:13

## 2023-06-25 RX ADMIN — METOPROLOL TARTRATE 25 MG: 25 TABLET, FILM COATED ORAL at 08:36

## 2023-06-25 RX ADMIN — Medication 6 MG: at 22:15

## 2023-06-25 RX ADMIN — OLANZAPINE 5 MG: 5 TABLET, ORALLY DISINTEGRATING ORAL at 08:42

## 2023-06-25 RX ADMIN — LEVOTHYROXINE SODIUM 88 MCG: 0.09 TABLET ORAL at 07:26

## 2023-06-25 RX ADMIN — BACLOFEN 10 MG: 10 TABLET ORAL at 16:16

## 2023-06-25 RX ADMIN — APIXABAN 5 MG: 5 TABLET, FILM COATED ORAL at 22:14

## 2023-06-25 RX ADMIN — ATORVASTATIN CALCIUM 40 MG: 40 TABLET, FILM COATED ORAL at 22:14

## 2023-06-25 ASSESSMENT — PAIN SCALES - GENERAL: PAINLEVEL_OUTOF10: 0

## 2023-06-26 ENCOUNTER — APPOINTMENT (OUTPATIENT)
Facility: HOSPITAL | Age: 58
DRG: 689 | End: 2023-06-26
Attending: HOSPITALIST
Payer: COMMERCIAL

## 2023-06-26 LAB
ECHO AO ROOT DIAM: 3.2 CM
ECHO AO ROOT INDEX: 1.77 CM/M2
ECHO AV PEAK GRADIENT: 6 MMHG
ECHO AV PEAK VELOCITY: 1.2 M/S
ECHO BSA: 1.85 M2
ECHO LA DIAMETER INDEX: 1.77 CM/M2
ECHO LA DIAMETER: 3.2 CM
ECHO LA TO AORTIC ROOT RATIO: 1
ECHO LV FRACTIONAL SHORTENING: 42 % (ref 28–44)
ECHO LV INTERNAL DIMENSION DIASTOLE INDEX: 2.1 CM/M2
ECHO LV INTERNAL DIMENSION DIASTOLIC: 3.8 CM (ref 3.9–5.3)
ECHO LV INTERNAL DIMENSION SYSTOLIC INDEX: 1.22 CM/M2
ECHO LV INTERNAL DIMENSION SYSTOLIC: 2.2 CM
ECHO LV IVSD: 1 CM (ref 0.6–0.9)
ECHO LV MASS 2D: 93.4 G (ref 67–162)
ECHO LV MASS INDEX 2D: 51.6 G/M2 (ref 43–95)
ECHO LV POSTERIOR WALL DIASTOLIC: 0.7 CM (ref 0.6–0.9)
ECHO LV RELATIVE WALL THICKNESS RATIO: 0.37
ECHO TV REGURGITANT MAX VELOCITY: 2.24 M/S
ECHO TV REGURGITANT PEAK GRADIENT: 20 MMHG

## 2023-06-26 PROCEDURE — 2060000000 HC ICU INTERMEDIATE R&B

## 2023-06-26 PROCEDURE — 93308 TTE F-UP OR LMTD: CPT

## 2023-06-26 PROCEDURE — 6370000000 HC RX 637 (ALT 250 FOR IP): Performed by: STUDENT IN AN ORGANIZED HEALTH CARE EDUCATION/TRAINING PROGRAM

## 2023-06-26 PROCEDURE — 6370000000 HC RX 637 (ALT 250 FOR IP): Performed by: HOSPITALIST

## 2023-06-26 PROCEDURE — 97530 THERAPEUTIC ACTIVITIES: CPT | Performed by: OCCUPATIONAL THERAPIST

## 2023-06-26 PROCEDURE — 6370000000 HC RX 637 (ALT 250 FOR IP): Performed by: NURSE PRACTITIONER

## 2023-06-26 RX ADMIN — BACLOFEN 10 MG: 10 TABLET ORAL at 13:04

## 2023-06-26 RX ADMIN — OLANZAPINE 7.5 MG: 5 TABLET, ORALLY DISINTEGRATING ORAL at 22:04

## 2023-06-26 RX ADMIN — OLANZAPINE 5 MG: 5 TABLET, FILM COATED ORAL at 07:01

## 2023-06-26 RX ADMIN — OLANZAPINE 5 MG: 5 TABLET, ORALLY DISINTEGRATING ORAL at 08:34

## 2023-06-26 RX ADMIN — APIXABAN 5 MG: 5 TABLET, FILM COATED ORAL at 08:34

## 2023-06-26 RX ADMIN — METOPROLOL TARTRATE 25 MG: 25 TABLET, FILM COATED ORAL at 08:34

## 2023-06-26 RX ADMIN — BACLOFEN 10 MG: 10 TABLET ORAL at 08:34

## 2023-06-26 RX ADMIN — LEVOTHYROXINE SODIUM 88 MCG: 0.09 TABLET ORAL at 06:33

## 2023-06-26 RX ADMIN — OLANZAPINE 5 MG: 5 TABLET, FILM COATED ORAL at 13:04

## 2023-06-26 RX ADMIN — ATORVASTATIN CALCIUM 40 MG: 40 TABLET, FILM COATED ORAL at 22:04

## 2023-06-26 RX ADMIN — METOPROLOL TARTRATE 25 MG: 25 TABLET, FILM COATED ORAL at 22:04

## 2023-06-26 RX ADMIN — BACLOFEN 10 MG: 10 TABLET ORAL at 22:04

## 2023-06-26 RX ADMIN — APIXABAN 5 MG: 5 TABLET, FILM COATED ORAL at 21:00

## 2023-06-26 ASSESSMENT — PAIN SCALES - GENERAL
PAINLEVEL_OUTOF10: 0

## 2023-06-27 VITALS
WEIGHT: 151.3 LBS | TEMPERATURE: 98.6 F | HEART RATE: 107 BPM | RESPIRATION RATE: 18 BRPM | OXYGEN SATURATION: 97 % | DIASTOLIC BLOOD PRESSURE: 53 MMHG | SYSTOLIC BLOOD PRESSURE: 91 MMHG | BODY MASS INDEX: 25.83 KG/M2 | HEIGHT: 64 IN

## 2023-06-27 PROCEDURE — 6370000000 HC RX 637 (ALT 250 FOR IP): Performed by: NURSE PRACTITIONER

## 2023-06-27 PROCEDURE — 6370000000 HC RX 637 (ALT 250 FOR IP): Performed by: HOSPITALIST

## 2023-06-27 PROCEDURE — 6370000000 HC RX 637 (ALT 250 FOR IP): Performed by: STUDENT IN AN ORGANIZED HEALTH CARE EDUCATION/TRAINING PROGRAM

## 2023-06-27 RX ORDER — ATORVASTATIN CALCIUM 40 MG/1
40 TABLET, FILM COATED ORAL NIGHTLY
Qty: 30 TABLET | Refills: 1 | Status: SHIPPED | OUTPATIENT
Start: 2023-06-27

## 2023-06-27 RX ORDER — OLANZAPINE 5 MG/1
5 TABLET ORAL EVERY 6 HOURS PRN
Qty: 30 TABLET | Refills: 0 | Status: SHIPPED | OUTPATIENT
Start: 2023-06-27

## 2023-06-27 RX ORDER — OLANZAPINE 15 MG/1
7.5 TABLET, ORALLY DISINTEGRATING ORAL NIGHTLY
Qty: 30 TABLET | Refills: 0 | Status: SHIPPED | OUTPATIENT
Start: 2023-06-27

## 2023-06-27 RX ORDER — OLANZAPINE 5 MG/1
5 TABLET, ORALLY DISINTEGRATING ORAL DAILY
Qty: 30 TABLET | Refills: 0 | Status: SHIPPED | OUTPATIENT
Start: 2023-06-27

## 2023-06-27 RX ADMIN — LEVOTHYROXINE SODIUM 88 MCG: 0.09 TABLET ORAL at 06:20

## 2023-06-27 RX ADMIN — BACLOFEN 10 MG: 10 TABLET ORAL at 13:01

## 2023-06-27 RX ADMIN — METOPROLOL TARTRATE 25 MG: 25 TABLET, FILM COATED ORAL at 08:26

## 2023-06-27 RX ADMIN — BACLOFEN 10 MG: 10 TABLET ORAL at 08:26

## 2023-06-27 RX ADMIN — OLANZAPINE 5 MG: 5 TABLET, ORALLY DISINTEGRATING ORAL at 13:01

## 2023-06-27 RX ADMIN — OLANZAPINE 5 MG: 5 TABLET, FILM COATED ORAL at 16:04

## 2023-06-27 RX ADMIN — OLANZAPINE 5 MG: 5 TABLET, FILM COATED ORAL at 06:20

## 2023-06-27 RX ADMIN — APIXABAN 5 MG: 5 TABLET, FILM COATED ORAL at 08:26

## 2023-06-27 ASSESSMENT — PAIN SCALES - GENERAL: PAINLEVEL_OUTOF10: 0

## 2023-06-28 ENCOUNTER — CARE COORDINATION (OUTPATIENT)
Dept: OTHER | Facility: CLINIC | Age: 58
End: 2023-06-28

## 2023-06-29 ENCOUNTER — TELEPHONE (OUTPATIENT)
Age: 58
End: 2023-06-29

## 2023-06-29 ENCOUNTER — CARE COORDINATION (OUTPATIENT)
Dept: OTHER | Facility: CLINIC | Age: 58
End: 2023-06-29

## 2023-07-01 ENCOUNTER — HOSPITAL ENCOUNTER (EMERGENCY)
Facility: HOSPITAL | Age: 58
Discharge: HOME | End: 2023-07-01
Attending: EMERGENCY MEDICINE
Payer: COMMERCIAL

## 2023-07-01 ENCOUNTER — APPOINTMENT (OUTPATIENT)
Facility: HOSPITAL | Age: 58
End: 2023-07-01
Payer: COMMERCIAL

## 2023-07-01 VITALS
RESPIRATION RATE: 16 BRPM | WEIGHT: 175.1 LBS | OXYGEN SATURATION: 99 % | TEMPERATURE: 98.6 F | SYSTOLIC BLOOD PRESSURE: 94 MMHG | DIASTOLIC BLOOD PRESSURE: 38 MMHG | HEART RATE: 94 BPM | BODY MASS INDEX: 30.26 KG/M2
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
## 2023-07-01 DIAGNOSIS — S80.02XA CONTUSION OF LEFT KNEE, INITIAL ENCOUNTER: ICD-10-CM

## 2023-07-01 DIAGNOSIS — S60.212A CONTUSION OF LEFT WRIST, INITIAL ENCOUNTER: ICD-10-CM

## 2023-07-01 LAB
ANION GAP SERPL CALC-SCNC: 4 MMOL/L (ref 5–15)
BUN SERPL-MCNC: 16 MG/DL (ref 6–20)
BUN/CREAT SERPL: 24 (ref 12–20)
CALCIUM SERPL-MCNC: 9.1 MG/DL (ref 8.5–10.1)
CHLORIDE SERPL-SCNC: 111 MMOL/L (ref 97–108)
CO2 SERPL-SCNC: 26 MMOL/L (ref 21–32)
COMMENT:: NORMAL
CREAT SERPL-MCNC: 0.66 MG/DL (ref 0.55–1.02)
EKG ATRIAL RATE: 91 BPM
EKG DIAGNOSIS: NORMAL
EKG P AXIS: 14 DEGREES
EKG P-R INTERVAL: 128 MS
EKG Q-T INTERVAL: 384 MS
EKG QRS DURATION: 74 MS
EKG QTC CALCULATION (BAZETT): 472 MS
EKG R AXIS: 0 DEGREES
EKG T AXIS: 11 DEGREES
EKG VENTRICULAR RATE: 91 BPM
ERYTHROCYTE [DISTWIDTH] IN BLOOD BY AUTOMATED COUNT: 13.7 % (ref 11.5–14.5)
GLUCOSE BLD STRIP.AUTO-MCNC: 118 MG/DL (ref 65–117)
GLUCOSE SERPL-MCNC: 120 MG/DL (ref 65–100)
HCT VFR BLD AUTO: 33.9 % (ref 35–47)
HGB BLD-MCNC: 10.8 G/DL (ref 11.5–16)
MCH RBC QN AUTO: 29.3 PG (ref 26–34)
MCHC RBC AUTO-ENTMCNC: 31.9 G/DL (ref 30–36.5)
MCV RBC AUTO: 92.1 FL (ref 80–99)
NRBC # BLD: 0 K/UL (ref 0–0.01)
NRBC BLD-RTO: 0 PER 100 WBC
PLATELET # BLD AUTO: 347 K/UL (ref 150–400)
PMV BLD AUTO: 10.9 FL (ref 8.9–12.9)
POTASSIUM SERPL-SCNC: 3.6 MMOL/L (ref 3.5–5.1)
RBC # BLD AUTO: 3.68 M/UL (ref 3.8–5.2)
SERVICE CMNT-IMP: ABNORMAL
SODIUM SERPL-SCNC: 141 MMOL/L (ref 136–145)
SPECIMEN HOLD: NORMAL
WBC # BLD AUTO: 7.7 K/UL (ref 3.6–11)

## 2023-07-01 PROCEDURE — 99285 EMERGENCY DEPT VISIT HI MDM: CPT

## 2023-07-01 PROCEDURE — 73562 X-RAY EXAM OF KNEE 3: CPT

## 2023-07-01 PROCEDURE — 2580000003 HC RX 258: Performed by: EMERGENCY MEDICINE

## 2023-07-01 PROCEDURE — 72125 CT NECK SPINE W/O DYE: CPT

## 2023-07-01 PROCEDURE — 85027 COMPLETE CBC AUTOMATED: CPT

## 2023-07-01 PROCEDURE — 70450 CT HEAD/BRAIN W/O DYE: CPT

## 2023-07-01 PROCEDURE — 73090 X-RAY EXAM OF FOREARM: CPT

## 2023-07-01 PROCEDURE — 82962 GLUCOSE BLOOD TEST: CPT

## 2023-07-01 PROCEDURE — 80048 BASIC METABOLIC PNL TOTAL CA: CPT

## 2023-07-01 PROCEDURE — 93005 ELECTROCARDIOGRAM TRACING: CPT | Performed by: EMERGENCY MEDICINE

## 2023-07-01 PROCEDURE — 36415 COLL VENOUS BLD VENIPUNCTURE: CPT

## 2023-07-01 RX ORDER — SENNA AND DOCUSATE SODIUM 50; 8.6 MG/1; MG/1
1 TABLET, FILM COATED ORAL
COMMUNITY

## 2023-07-01 RX ORDER — POLYETHYLENE GLYCOL 3350 17 G/17G
17 POWDER, FOR SOLUTION ORAL DAILY PRN
COMMUNITY

## 2023-07-01 RX ORDER — 0.9 % SODIUM CHLORIDE 0.9 %
1000 INTRAVENOUS SOLUTION INTRAVENOUS ONCE
Status: COMPLETED | OUTPATIENT
Start: 2023-07-01 | End: 2023-07-01

## 2023-07-01 RX ADMIN — SODIUM CHLORIDE 1000 ML: 9 INJECTION, SOLUTION INTRAVENOUS at 09:39

## 2023-07-01 ASSESSMENT — PAIN - FUNCTIONAL ASSESSMENT: PAIN_FUNCTIONAL_ASSESSMENT: NONE - DENIES PAIN

## 2023-07-05 ENCOUNTER — TELEPHONE (OUTPATIENT)
Age: 58
End: 2023-07-05

## 2023-07-05 NOTE — TELEPHONE ENCOUNTER
Patients mother would like to speak with the doctor for 5 minutes before the appointment. She wanted to give him heads up.     Please contact

## 2023-07-10 ENCOUNTER — CARE COORDINATION (OUTPATIENT)
Dept: OTHER | Facility: CLINIC | Age: 58
End: 2023-07-10

## 2023-07-10 LAB — ECHO BSA: 1.82 M2

## 2023-07-10 NOTE — CARE COORDINATION
Ambulatory Care Coordination Note  7/10/2023    Patient Current Location:  [place]     ACM contacted the parent by telephone. Verified name and  with parent as identifiers. Provided introduction to self, and explanation of the ACM role. Challenges to be reviewed by the provider   Additional needs identified to be addressed with provider: No  none               Method of communication with provider: none. ACM: Demario Mcnulty RN    Telephonic outreach to the patient's mother, Jamaal Coppola. The patient has not improved since the last conversation with her mother. She continues to scream out for her mother through out the day and night. Mom is concerned that she will need to find a new place for the patient. She is not pleased with the care that the patient is receiving either. She did acknowledge that Dr. Fausto Joyner is trying to change her medications to assist and control her behaviors. The patient is now getting minimal therapy,her mom reports that they came in and stretched the patient's legs earlier today. This ACM will follow up with ACM , to discuss next steps or recommendations for the patient. Will follow up with Ms. Orlando to discuss options and continued status of the patient.      Lab Results       None            Care Coordination Interventions    Referral from Primary Care Provider: No  Suggested Interventions and Community Resources          Goals Addressed    None         Future Appointments   Date Time Provider [address]   2023 11:00 AM Mina Bennett MD NEUROWTCRSPB MARCK AMB

## 2023-07-11 ENCOUNTER — CARE COORDINATION (OUTPATIENT)
Dept: OTHER | Facility: CLINIC | Age: 58
End: 2023-07-11

## 2023-07-11 NOTE — CARE COORDINATION
MSW Documentation    MSW contacted parent for follow up. Patient identifiers confirmed, zip code and . Patient referred by Nurse Apolonia Villa. Care Coordination Episodes    Type: Amb Care Management  Episode: CCM  Noted: 5/15/2023  Comments: Associate Care Management     Purpose of TC  Discuss LTC options    TC Details  Mrs. Orlando stated that patient continues to holler consistently when she is not near. The hollering has posed issues at there current rehab placement as it disturbs other residents. Mrs. Orlando stated that patient has been prescribed medications to address the hollering but it has also increased aggressive behavior. Mrs. Loretta Turpin is concerned that she will be asked to moved patient as a result. Mrs. Orlando stated that patient has a scheduled appointment with Dr. Rosalva Knight on 23 to address medications. Mrs. Orlando stated that patient will receive $7k/month for the next 2 years from [employer]. Afterwards, she will need to apply for Social Security Disability. Mrs. Loretta Turpin is interested in alternative nursing facilities in the event patient is asked to leave the current rehab. Plan of action  MSW ACM will provide Mrs. Orlando a listing of nursing homes within the $7k/ month budget. MSW provided contact information for future needs.  Plan for follow-up call in 3-5 days      Goals Addressed                      This Visit's Progress      Patient guardian will be aware of long term care options (pt-stated)         Patient guardian will be aware of long term care options by 7/15/23    Barriers: financial  Plan for overcoming my barriers: Determine if there are funds available to help afford long term care  Confidence: 8/10  Anticipated Goal Completion Date: 8/15/23

## 2023-07-12 ENCOUNTER — CARE COORDINATION (OUTPATIENT)
Dept: OTHER | Facility: CLINIC | Age: 58
End: 2023-07-12

## 2023-07-12 NOTE — CARE COORDINATION
7/12/23 Care Coordination  Note    Care Coordination  (CCSS), Lexus Flores, received referral from [facility], TR Emery requesting assistance with pricing the provided nursing homes    . CCSS contacted Provider office, Kemal Arguello  via phone. Left a vm for Anastasia Sidhu. .      Summary Note:   CCSS contacted [facility]. Talked to [place]. They accept insurance. No beds available. What is the daily or monthly cost of the nursing home/long term care? Semi private - long term rate 355.00 per day. Private room - 370.00 per day  Medicaid pays all but the liability portion  Is there an entrance fee? No  Private pay - Must pay 30 days plus security deposit    2 sister sites. [address] and [address] [facility] [address] contacted [facility]. Talked to [employer]. They accept insurance. Currently, they are only accepting private pay patients. Check back daily. What is the daily or monthly cost of the nursing home/long term care? 9,960.00 monthly. Need to know how many months the patient can pay that fee. Is there an entrance fee? No.  Just ask that the 1st month be paid upfront. Will be billed monthly thereafter. Rates are very competitive. Plan:  E-mailed info above to [place].    CCSS Plan for follow-up call in 1-2 days

## 2023-07-13 ENCOUNTER — TELEPHONE (OUTPATIENT)
Age: 58
End: 2023-07-13

## 2023-07-13 ENCOUNTER — CARE COORDINATION (OUTPATIENT)
Dept: OTHER | Facility: CLINIC | Age: 58
End: 2023-07-13

## 2023-07-13 NOTE — TELEPHONE ENCOUNTER
Pt's mom called stating Sven Malone is going to have a meeting soon about pt and mom is fearful they are going to d/c pt d/t her being disruptive. Pt was hospitalized for about 10 days for UTI started getting to the point of screaming often. Pt was started on olanzapine 5 mg TID alprazolam 1 mg TID which mom states either makes her a zombie or hallucinates and becomes disruptive. Mom feels pt still has UTI which may be part of the issue. Pt has appt with Dr. Nona Pallas on 7/19/23. Notified since this is new complaint, Dr. Nona Pallas is not able to make any recommendation about it.

## 2023-07-13 NOTE — TELEPHONE ENCOUNTER
Patient mother requesting a call to discuss her daughter current medical condition. She is currently in a [employer] and she has some question for Dr. Serafin Halsted. The doctors at the facility will be having a family meeting tomorrow 7/14/23 at 9:00 a.m.

## 2023-07-14 ENCOUNTER — CARE COORDINATION (OUTPATIENT)
Dept: OTHER | Facility: CLINIC | Age: 58
End: 2023-07-14

## 2023-07-14 NOTE — CARE COORDINATION
MSW Documentation    MSW ACM contacted patient guardian (patient mother) for follow up. Patient identifiers confirmed, zip code and . Patient referred by Nurse [facility], [facility] [place]. Purpose of TC  Discuss LTC planning. TC Details  MSW ACM informed patient guardian for follow-up. Mrs. Orlando stated that Stu Ibarra is now willing to [facility] with\" patient because she is \"doped up. \"   Mrs. Niko Abel stated that patient is on several medications that whereas she is unable to participate in physical therapy. Patient guardian stated that she will be patient until next week when patient is assessed by neurologist.   Mrs. Niko Abel is still interested in alternative placement options. MSW ACM contacted Nettie Hilario of [employer] regarding their services. Ms. Obdulia Saenz stated that her company would likely be able to locate appropriate placement options for once the yelling is managed. Mrs. Orlando stated that she is interested in talking to Ms. Agrawal. MSW ACM emailed Mrs. Stevie Agrawal's contact information. Plan of action  Follow up with Mrs. Orlando in a few weeks. MSW provided contact information for future needs.  Plan for follow-up call in 10-14 days      Goals         Patient guardian will be aware of long term care options (pt-stated)       Patient guardian will be aware of long term care options by 7/15/23    Barriers: financial  Plan for overcoming my barriers: Determine if there are funds available to help afford long term care  Confidence: 8/10  Anticipated Goal Completion Date: 8/15/23

## 2023-07-17 ENCOUNTER — APPOINTMENT (OUTPATIENT)
Facility: HOSPITAL | Age: 58
End: 2023-07-17
Payer: COMMERCIAL

## 2023-07-17 ENCOUNTER — HOSPITAL ENCOUNTER (INPATIENT)
Facility: HOSPITAL | Age: 58
LOS: 4 days | Discharge: NURSING, REHAB OR LONG-TERM CARE | End: 2023-07-21
Attending: EMERGENCY MEDICINE | Admitting: INTERNAL MEDICINE
Payer: COMMERCIAL

## 2023-07-17 DIAGNOSIS — E27.8 ADRENAL NODULE (HCC): ICD-10-CM

## 2023-07-17 DIAGNOSIS — G93.41 METABOLIC ENCEPHALOPATHY: ICD-10-CM

## 2023-07-17 DIAGNOSIS — K59.00 CONSTIPATION, UNSPECIFIED CONSTIPATION TYPE: ICD-10-CM

## 2023-07-17 DIAGNOSIS — J18.9 PNEUMONIA OF LEFT LOWER LOBE DUE TO INFECTIOUS ORGANISM: Primary | ICD-10-CM

## 2023-07-17 PROBLEM — R41.0 ACUTE DELIRIUM: Status: ACTIVE | Noted: 2023-07-17

## 2023-07-17 LAB
ALBUMIN SERPL-MCNC: 2.9 G/DL (ref 3.5–5)
ALBUMIN/GLOB SERPL: 0.8 (ref 1.1–2.2)
ALP SERPL-CCNC: 148 U/L (ref 45–117)
ALT SERPL-CCNC: 22 U/L (ref 12–78)
ANION GAP SERPL CALC-SCNC: 7 MMOL/L (ref 5–15)
APPEARANCE UR: CLEAR
AST SERPL-CCNC: 13 U/L (ref 15–37)
BACTERIA URNS QL MICRO: NEGATIVE /HPF
BILIRUB SERPL-MCNC: 0.4 MG/DL (ref 0.2–1)
BILIRUB UR QL: NEGATIVE
BUN SERPL-MCNC: 15 MG/DL (ref 6–20)
BUN/CREAT SERPL: 25 (ref 12–20)
CALCIUM SERPL-MCNC: 9.2 MG/DL (ref 8.5–10.1)
CHLORIDE SERPL-SCNC: 108 MMOL/L (ref 97–108)
CO2 SERPL-SCNC: 25 MMOL/L (ref 21–32)
COLOR UR: ABNORMAL
COMMENT:: NORMAL
CREAT SERPL-MCNC: 0.61 MG/DL (ref 0.55–1.02)
EPITH CASTS URNS QL MICRO: ABNORMAL /LPF
ERYTHROCYTE [DISTWIDTH] IN BLOOD BY AUTOMATED COUNT: 13.6 % (ref 11.5–14.5)
GLOBULIN SER CALC-MCNC: 3.8 G/DL (ref 2–4)
GLUCOSE SERPL-MCNC: 106 MG/DL (ref 65–100)
GLUCOSE UR STRIP.AUTO-MCNC: NEGATIVE MG/DL
HCT VFR BLD AUTO: 35.9 % (ref 35–47)
HGB BLD-MCNC: 11.7 G/DL (ref 11.5–16)
HGB UR QL STRIP: NEGATIVE
HYALINE CASTS URNS QL MICRO: ABNORMAL /LPF (ref 0–5)
KETONES UR QL STRIP.AUTO: NEGATIVE MG/DL
LACTATE SERPL-SCNC: 1.1 MMOL/L (ref 0.4–2)
LEUKOCYTE ESTERASE UR QL STRIP.AUTO: ABNORMAL
MCH RBC QN AUTO: 29.6 PG (ref 26–34)
MCHC RBC AUTO-ENTMCNC: 32.6 G/DL (ref 30–36.5)
MCV RBC AUTO: 90.9 FL (ref 80–99)
NITRITE UR QL STRIP.AUTO: NEGATIVE
NRBC # BLD: 0 K/UL (ref 0–0.01)
NRBC BLD-RTO: 0 PER 100 WBC
PH UR STRIP: 5.5 (ref 5–8)
PLATELET # BLD AUTO: 262 K/UL (ref 150–400)
PMV BLD AUTO: 10.4 FL (ref 8.9–12.9)
POTASSIUM SERPL-SCNC: 3.8 MMOL/L (ref 3.5–5.1)
PROT SERPL-MCNC: 6.7 G/DL (ref 6.4–8.2)
PROT UR STRIP-MCNC: NEGATIVE MG/DL
RBC # BLD AUTO: 3.95 M/UL (ref 3.8–5.2)
RBC #/AREA URNS HPF: ABNORMAL /HPF (ref 0–5)
SARS-COV-2 RDRP RESP QL NAA+PROBE: NOT DETECTED
SODIUM SERPL-SCNC: 140 MMOL/L (ref 136–145)
SOURCE: NORMAL
SP GR UR REFRACTOMETRY: 1 (ref 1–1.03)
SPECIMEN HOLD: NORMAL
URINE CULTURE IF INDICATED: ABNORMAL
UROBILINOGEN UR QL STRIP.AUTO: 0.2 EU/DL (ref 0.2–1)
WBC # BLD AUTO: 8.4 K/UL (ref 3.6–11)
WBC URNS QL MICRO: ABNORMAL /HPF (ref 0–4)

## 2023-07-17 PROCEDURE — 96375 TX/PRO/DX INJ NEW DRUG ADDON: CPT

## 2023-07-17 PROCEDURE — 84145 PROCALCITONIN (PCT): CPT

## 2023-07-17 PROCEDURE — 6360000002 HC RX W HCPCS: Performed by: EMERGENCY MEDICINE

## 2023-07-17 PROCEDURE — 51798 US URINE CAPACITY MEASURE: CPT

## 2023-07-17 PROCEDURE — 99285 EMERGENCY DEPT VISIT HI MDM: CPT

## 2023-07-17 PROCEDURE — 51702 INSERT TEMP BLADDER CATH: CPT

## 2023-07-17 PROCEDURE — 1100000000 HC RM PRIVATE

## 2023-07-17 PROCEDURE — 2580000003 HC RX 258: Performed by: EMERGENCY MEDICINE

## 2023-07-17 PROCEDURE — 87635 SARS-COV-2 COVID-19 AMP PRB: CPT

## 2023-07-17 PROCEDURE — 2580000003 HC RX 258: Performed by: INTERNAL MEDICINE

## 2023-07-17 PROCEDURE — 87040 BLOOD CULTURE FOR BACTERIA: CPT

## 2023-07-17 PROCEDURE — 6360000004 HC RX CONTRAST MEDICATION: Performed by: INTERNAL MEDICINE

## 2023-07-17 PROCEDURE — 81001 URINALYSIS AUTO W/SCOPE: CPT

## 2023-07-17 PROCEDURE — 83605 ASSAY OF LACTIC ACID: CPT

## 2023-07-17 PROCEDURE — 71045 X-RAY EXAM CHEST 1 VIEW: CPT

## 2023-07-17 PROCEDURE — 36415 COLL VENOUS BLD VENIPUNCTURE: CPT

## 2023-07-17 PROCEDURE — 80053 COMPREHEN METABOLIC PANEL: CPT

## 2023-07-17 PROCEDURE — 74177 CT ABD & PELVIS W/CONTRAST: CPT

## 2023-07-17 PROCEDURE — 96372 THER/PROPH/DIAG INJ SC/IM: CPT

## 2023-07-17 PROCEDURE — 2500000003 HC RX 250 WO HCPCS: Performed by: EMERGENCY MEDICINE

## 2023-07-17 PROCEDURE — 85027 COMPLETE CBC AUTOMATED: CPT

## 2023-07-17 PROCEDURE — 96374 THER/PROPH/DIAG INJ IV PUSH: CPT

## 2023-07-17 RX ORDER — 0.9 % SODIUM CHLORIDE 0.9 %
1000 INTRAVENOUS SOLUTION INTRAVENOUS ONCE
Status: COMPLETED | OUTPATIENT
Start: 2023-07-17 | End: 2023-07-17

## 2023-07-17 RX ORDER — LORAZEPAM 2 MG/ML
2 INJECTION INTRAMUSCULAR ONCE
Status: COMPLETED | OUTPATIENT
Start: 2023-07-17 | End: 2023-07-17

## 2023-07-17 RX ORDER — SODIUM CHLORIDE, SODIUM LACTATE, POTASSIUM CHLORIDE, CALCIUM CHLORIDE 600; 310; 30; 20 MG/100ML; MG/100ML; MG/100ML; MG/100ML
INJECTION, SOLUTION INTRAVENOUS CONTINUOUS
Status: DISCONTINUED | OUTPATIENT
Start: 2023-07-17 | End: 2023-07-21 | Stop reason: HOSPADM

## 2023-07-17 RX ORDER — KETOROLAC TROMETHAMINE 30 MG/ML
15 INJECTION, SOLUTION INTRAMUSCULAR; INTRAVENOUS
Status: COMPLETED | OUTPATIENT
Start: 2023-07-17 | End: 2023-07-17

## 2023-07-17 RX ADMIN — SODIUM CHLORIDE 1000 ML: 9 INJECTION, SOLUTION INTRAVENOUS at 16:04

## 2023-07-17 RX ADMIN — SODIUM CHLORIDE, POTASSIUM CHLORIDE, SODIUM LACTATE AND CALCIUM CHLORIDE: 600; 310; 30; 20 INJECTION, SOLUTION INTRAVENOUS at 22:12

## 2023-07-17 RX ADMIN — WATER 10 MG: 1 INJECTION INTRAMUSCULAR; INTRAVENOUS; SUBCUTANEOUS at 15:10

## 2023-07-17 RX ADMIN — LORAZEPAM 2 MG: 2 INJECTION INTRAMUSCULAR; INTRAVENOUS at 16:49

## 2023-07-17 RX ADMIN — KETOROLAC TROMETHAMINE 15 MG: 30 INJECTION, SOLUTION INTRAMUSCULAR; INTRAVENOUS at 15:57

## 2023-07-17 RX ADMIN — SODIUM CHLORIDE 1000 ML: 9 INJECTION, SOLUTION INTRAVENOUS at 18:35

## 2023-07-17 RX ADMIN — IOPAMIDOL 100 ML: 755 INJECTION, SOLUTION INTRAVENOUS at 17:21

## 2023-07-17 ASSESSMENT — PAIN DESCRIPTION - LOCATION: LOCATION: ABDOMEN

## 2023-07-17 ASSESSMENT — ENCOUNTER SYMPTOMS
SORE THROAT: 0
COUGH: 0
VOMITING: 0

## 2023-07-17 ASSESSMENT — PAIN SCALES - GENERAL: PAINLEVEL_OUTOF10: 10

## 2023-07-17 NOTE — ED TRIAGE NOTES
Pt transported from [facility] via [facility]. B/P 100/58 w/ EMS. History of stroke and anoxic brain injury. Pt presents with stomach pain in ULQ. Denies N/V. Pt is agitated, screaming, unable to orient. Pt hypotensive in route.

## 2023-07-17 NOTE — ED PROVIDER NOTES
[facility] EMERGENCY [facility]      Pt Name: Siva Schaeffer  MRN: [id number]  [address] [facility] [place] 1965  Date of evaluation: 7/17/2023  Provider: Deanna Tan MD    [address]       Chief Complaint   Patient presents with    Altered Mental Status         HISTORY OF PRESENT ILLNESS   (Location/Symptom, Timing/Onset, Context/Setting, Quality, Duration, Modifying Factors, Severity)  Note limiting factors. 49-year-old female presents complaint [facility] with concerns for yelling and abdominal pain. Patient was discharged from the hospital on June 27 after a 10-day hospitalization. She was found to have UTI and was thought to be encephalopathic. She had behavioral outburst that were difficulty and under control even with recommendations from psychiatry. Ultimately she was treated with antibiotics and discharged back to [facility] where she has been off and on for the past year. Daughter who is at the bedside states she is also been febrile and she reports dramatic increase in the amount of outbursts and screaming over the past 12 hours. Patient also complained of generalized abdominal pain. She has a history of anoxic brain injury, Takotsubo's cardiomyopathy. Review of External Medical Records:     Nursing Notes were reviewed. REVIEW OF SYSTEMS    (2-9 systems for level 4, 10 or more for level 5)     Review of Systems   Constitutional:  Negative for fatigue. HENT:  Negative for sore throat. Eyes:  Negative for visual disturbance. Respiratory:  Negative for cough. Cardiovascular:  Negative for palpitations. Gastrointestinal:  Negative for vomiting. Genitourinary:  Negative for difficulty urinating. Musculoskeletal:  Negative for myalgias. Skin:  Negative for rash. Neurological:  Negative for weakness. Except as noted above the remainder of the review of systems was reviewed and negative.        PAST MEDICAL HISTORY     Past Medical History:

## 2023-07-18 ENCOUNTER — TELEPHONE (OUTPATIENT)
Age: 58
End: 2023-07-18

## 2023-07-18 ENCOUNTER — APPOINTMENT (OUTPATIENT)
Facility: HOSPITAL | Age: 58
End: 2023-07-18
Payer: COMMERCIAL

## 2023-07-18 PROBLEM — E27.8 ADRENAL NODULE (HCC): Status: ACTIVE | Noted: 2023-07-18

## 2023-07-18 PROBLEM — J18.9 PNEUMONIA OF LEFT LOWER LOBE DUE TO INFECTIOUS ORGANISM: Status: ACTIVE | Noted: 2023-07-18

## 2023-07-18 PROBLEM — E27.9 ADRENAL NODULE (HCC): Status: ACTIVE | Noted: 2023-07-18

## 2023-07-18 LAB
ALBUMIN SERPL-MCNC: 3 G/DL (ref 3.5–5)
ALBUMIN/GLOB SERPL: 0.8 (ref 1.1–2.2)
ALP SERPL-CCNC: 151 U/L (ref 45–117)
ALT SERPL-CCNC: 21 U/L (ref 12–78)
AMMONIA PLAS-SCNC: 28 UMOL/L
AMPHET UR QL SCN: NEGATIVE
ANION GAP SERPL CALC-SCNC: 6 MMOL/L (ref 5–15)
APAP SERPL-MCNC: <2 UG/ML (ref 10–30)
AST SERPL-CCNC: 15 U/L (ref 15–37)
BARBITURATES UR QL SCN: NEGATIVE
BASOPHILS # BLD: 0.1 K/UL (ref 0–0.1)
BASOPHILS NFR BLD: 1 % (ref 0–1)
BENZODIAZ UR QL: POSITIVE
BILIRUB SERPL-MCNC: 0.4 MG/DL (ref 0.2–1)
BUN SERPL-MCNC: 13 MG/DL (ref 6–20)
BUN/CREAT SERPL: 25 (ref 12–20)
CALCIUM SERPL-MCNC: 9.1 MG/DL (ref 8.5–10.1)
CANNABINOIDS UR QL SCN: NEGATIVE
CHLORIDE SERPL-SCNC: 112 MMOL/L (ref 97–108)
CO2 SERPL-SCNC: 23 MMOL/L (ref 21–32)
COCAINE UR QL SCN: NEGATIVE
CREAT SERPL-MCNC: 0.51 MG/DL (ref 0.55–1.02)
DIFFERENTIAL METHOD BLD: ABNORMAL
EOSINOPHIL # BLD: 0.3 K/UL (ref 0–0.4)
EOSINOPHIL NFR BLD: 4 % (ref 0–7)
ERYTHROCYTE [DISTWIDTH] IN BLOOD BY AUTOMATED COUNT: 13.3 % (ref 11.5–14.5)
ETHANOL SERPL-MCNC: <10 MG/DL (ref 0–0.08)
FOLATE SERPL-MCNC: 6.2 NG/ML (ref 5–21)
GLOBULIN SER CALC-MCNC: 3.8 G/DL (ref 2–4)
GLUCOSE SERPL-MCNC: 97 MG/DL (ref 65–100)
HCT VFR BLD AUTO: 35.4 % (ref 35–47)
HGB BLD-MCNC: 11 G/DL (ref 11.5–16)
IMM GRANULOCYTES # BLD AUTO: 0 K/UL (ref 0–0.04)
IMM GRANULOCYTES NFR BLD AUTO: 0 % (ref 0–0.5)
LYMPHOCYTES # BLD: 1.6 K/UL (ref 0.8–3.5)
LYMPHOCYTES NFR BLD: 19 % (ref 12–49)
Lab: ABNORMAL
MAGNESIUM SERPL-MCNC: 2 MG/DL (ref 1.6–2.4)
MCH RBC QN AUTO: 29.1 PG (ref 26–34)
MCHC RBC AUTO-ENTMCNC: 31.1 G/DL (ref 30–36.5)
MCV RBC AUTO: 93.7 FL (ref 80–99)
METHADONE UR QL: NEGATIVE
MONOCYTES # BLD: 0.7 K/UL (ref 0–1)
MONOCYTES NFR BLD: 8 % (ref 5–13)
NEUTS SEG # BLD: 5.6 K/UL (ref 1.8–8)
NEUTS SEG NFR BLD: 68 % (ref 32–75)
NRBC # BLD: 0 K/UL (ref 0–0.01)
NRBC BLD-RTO: 0 PER 100 WBC
NT PRO BNP: 52 PG/ML
OPIATES UR QL: NEGATIVE
PCP UR QL: NEGATIVE
PHOSPHATE SERPL-MCNC: 3.1 MG/DL (ref 2.6–4.7)
PLATELET # BLD AUTO: 252 K/UL (ref 150–400)
PMV BLD AUTO: 10.9 FL (ref 8.9–12.9)
POTASSIUM SERPL-SCNC: 3.4 MMOL/L (ref 3.5–5.1)
PROCALCITONIN SERPL-MCNC: <0.05 NG/ML
PROT SERPL-MCNC: 6.8 G/DL (ref 6.4–8.2)
RBC # BLD AUTO: 3.78 M/UL (ref 3.8–5.2)
SODIUM SERPL-SCNC: 141 MMOL/L (ref 136–145)
TROPONIN I SERPL HS-MCNC: <3 NG/L (ref 0–37)
TROPONIN I SERPL HS-MCNC: <3 NG/L (ref 0–37)
TSH SERPL DL<=0.05 MIU/L-ACNC: 1.68 UIU/ML (ref 0.36–3.74)
VIT B12 SERPL-MCNC: 311 PG/ML (ref 193–986)
WBC # BLD AUTO: 8.3 K/UL (ref 3.6–11)

## 2023-07-18 PROCEDURE — 82607 VITAMIN B-12: CPT

## 2023-07-18 PROCEDURE — 6370000000 HC RX 637 (ALT 250 FOR IP): Performed by: HOSPITALIST

## 2023-07-18 PROCEDURE — 84443 ASSAY THYROID STIM HORMONE: CPT

## 2023-07-18 PROCEDURE — 6370000000 HC RX 637 (ALT 250 FOR IP): Performed by: INTERNAL MEDICINE

## 2023-07-18 PROCEDURE — 80307 DRUG TEST PRSMV CHEM ANLYZR: CPT

## 2023-07-18 PROCEDURE — 99222 1ST HOSP IP/OBS MODERATE 55: CPT | Performed by: INTERNAL MEDICINE

## 2023-07-18 PROCEDURE — 82746 ASSAY OF FOLIC ACID SERUM: CPT

## 2023-07-18 PROCEDURE — 99223 1ST HOSP IP/OBS HIGH 75: CPT | Performed by: PSYCHIATRY & NEUROLOGY

## 2023-07-18 PROCEDURE — 80053 COMPREHEN METABOLIC PANEL: CPT

## 2023-07-18 PROCEDURE — 83735 ASSAY OF MAGNESIUM: CPT

## 2023-07-18 PROCEDURE — 36415 COLL VENOUS BLD VENIPUNCTURE: CPT

## 2023-07-18 PROCEDURE — 84100 ASSAY OF PHOSPHORUS: CPT

## 2023-07-18 PROCEDURE — 82140 ASSAY OF AMMONIA: CPT

## 2023-07-18 PROCEDURE — 84484 ASSAY OF TROPONIN QUANT: CPT

## 2023-07-18 PROCEDURE — 2060000000 HC ICU INTERMEDIATE R&B

## 2023-07-18 PROCEDURE — 83880 ASSAY OF NATRIURETIC PEPTIDE: CPT

## 2023-07-18 PROCEDURE — 70551 MRI BRAIN STEM W/O DYE: CPT

## 2023-07-18 PROCEDURE — 6360000002 HC RX W HCPCS: Performed by: INTERNAL MEDICINE

## 2023-07-18 PROCEDURE — 80143 DRUG ASSAY ACETAMINOPHEN: CPT

## 2023-07-18 PROCEDURE — 6370000000 HC RX 637 (ALT 250 FOR IP)

## 2023-07-18 PROCEDURE — 82077 ASSAY SPEC XCP UR&BREATH IA: CPT

## 2023-07-18 PROCEDURE — APPNB15 APP NON BILLABLE TIME 0-15 MINS: Performed by: PHYSICIAN ASSISTANT

## 2023-07-18 PROCEDURE — 85025 COMPLETE CBC W/AUTO DIFF WBC: CPT

## 2023-07-18 PROCEDURE — 6360000002 HC RX W HCPCS: Performed by: HOSPITALIST

## 2023-07-18 PROCEDURE — 2580000003 HC RX 258: Performed by: INTERNAL MEDICINE

## 2023-07-18 PROCEDURE — 2500000003 HC RX 250 WO HCPCS: Performed by: INTERNAL MEDICINE

## 2023-07-18 RX ORDER — BISACODYL 10 MG
10 SUPPOSITORY, RECTAL RECTAL DAILY PRN
Status: DISCONTINUED | OUTPATIENT
Start: 2023-07-18 | End: 2023-07-21 | Stop reason: HOSPADM

## 2023-07-18 RX ORDER — ACETAMINOPHEN 650 MG/1
650 SUPPOSITORY RECTAL EVERY 6 HOURS PRN
Status: DISCONTINUED | OUTPATIENT
Start: 2023-07-18 | End: 2023-07-21 | Stop reason: HOSPADM

## 2023-07-18 RX ORDER — ONDANSETRON 2 MG/ML
4 INJECTION INTRAMUSCULAR; INTRAVENOUS EVERY 6 HOURS PRN
Status: DISCONTINUED | OUTPATIENT
Start: 2023-07-18 | End: 2023-07-21 | Stop reason: HOSPADM

## 2023-07-18 RX ORDER — DIVALPROEX SODIUM 250 MG/1
250 TABLET, DELAYED RELEASE ORAL
COMMUNITY

## 2023-07-18 RX ORDER — POLYETHYLENE GLYCOL 3350 17 G/17G
17 POWDER, FOR SOLUTION ORAL DAILY PRN
Status: DISCONTINUED | OUTPATIENT
Start: 2023-07-18 | End: 2023-07-21 | Stop reason: HOSPADM

## 2023-07-18 RX ORDER — LORAZEPAM 2 MG/ML
1 INJECTION INTRAMUSCULAR ONCE
Status: COMPLETED | OUTPATIENT
Start: 2023-07-18 | End: 2023-07-18

## 2023-07-18 RX ORDER — ONDANSETRON 4 MG/1
4 TABLET, ORALLY DISINTEGRATING ORAL EVERY 8 HOURS PRN
Status: DISCONTINUED | OUTPATIENT
Start: 2023-07-18 | End: 2023-07-21 | Stop reason: HOSPADM

## 2023-07-18 RX ORDER — BACLOFEN 10 MG/1
10 TABLET ORAL 3 TIMES DAILY
Status: DISCONTINUED | OUTPATIENT
Start: 2023-07-18 | End: 2023-07-21 | Stop reason: HOSPADM

## 2023-07-18 RX ORDER — LIDOCAINE 4 G/G
1 PATCH TOPICAL DAILY
Status: DISCONTINUED | OUTPATIENT
Start: 2023-07-18 | End: 2023-07-21 | Stop reason: HOSPADM

## 2023-07-18 RX ORDER — ATORVASTATIN CALCIUM 40 MG/1
40 TABLET, FILM COATED ORAL NIGHTLY
Status: DISCONTINUED | OUTPATIENT
Start: 2023-07-18 | End: 2023-07-21 | Stop reason: HOSPADM

## 2023-07-18 RX ORDER — SODIUM CHLORIDE 0.9 % (FLUSH) 0.9 %
5-40 SYRINGE (ML) INJECTION PRN
Status: DISCONTINUED | OUTPATIENT
Start: 2023-07-18 | End: 2023-07-21 | Stop reason: HOSPADM

## 2023-07-18 RX ORDER — ALPRAZOLAM 1 MG/1
1 TABLET ORAL EVERY 8 HOURS PRN
COMMUNITY

## 2023-07-18 RX ORDER — FLUTICASONE PROPIONATE 50 MCG
1 SPRAY, SUSPENSION (ML) NASAL DAILY
Status: DISCONTINUED | OUTPATIENT
Start: 2023-07-18 | End: 2023-07-21 | Stop reason: HOSPADM

## 2023-07-18 RX ORDER — SODIUM CHLORIDE 0.9 % (FLUSH) 0.9 %
5-40 SYRINGE (ML) INJECTION EVERY 12 HOURS SCHEDULED
Status: DISCONTINUED | OUTPATIENT
Start: 2023-07-18 | End: 2023-07-21 | Stop reason: HOSPADM

## 2023-07-18 RX ORDER — SODIUM CHLORIDE 9 MG/ML
INJECTION, SOLUTION INTRAVENOUS PRN
Status: DISCONTINUED | OUTPATIENT
Start: 2023-07-18 | End: 2023-07-21 | Stop reason: HOSPADM

## 2023-07-18 RX ORDER — LEVOTHYROXINE SODIUM 88 UG/1
88 TABLET ORAL
Status: DISCONTINUED | OUTPATIENT
Start: 2023-07-18 | End: 2023-07-21 | Stop reason: HOSPADM

## 2023-07-18 RX ORDER — LUBIPROSTONE 8 UG/1
8 CAPSULE ORAL 2 TIMES DAILY WITH MEALS
Status: DISCONTINUED | OUTPATIENT
Start: 2023-07-18 | End: 2023-07-21 | Stop reason: HOSPADM

## 2023-07-18 RX ORDER — DIVALPROEX SODIUM 250 MG/1
250 TABLET, DELAYED RELEASE ORAL
Status: DISCONTINUED | OUTPATIENT
Start: 2023-07-18 | End: 2023-07-21 | Stop reason: HOSPADM

## 2023-07-18 RX ORDER — OLANZAPINE 5 MG/1
5 TABLET, ORALLY DISINTEGRATING ORAL DAILY
Status: DISCONTINUED | OUTPATIENT
Start: 2023-07-18 | End: 2023-07-19

## 2023-07-18 RX ORDER — ZOLPIDEM TARTRATE 5 MG/1
5 TABLET ORAL
Status: ON HOLD | COMMUNITY
End: 2023-07-19 | Stop reason: HOSPADM

## 2023-07-18 RX ORDER — OLANZAPINE 15 MG/1
15 TABLET ORAL 3 TIMES DAILY
Status: ON HOLD | COMMUNITY
End: 2023-07-19 | Stop reason: HOSPADM

## 2023-07-18 RX ORDER — BISACODYL 10 MG
10 SUPPOSITORY, RECTAL RECTAL DAILY
Status: COMPLETED | OUTPATIENT
Start: 2023-07-18 | End: 2023-07-18

## 2023-07-18 RX ORDER — OLANZAPINE 5 MG/1
7.5 TABLET, ORALLY DISINTEGRATING ORAL NIGHTLY
Status: DISCONTINUED | OUTPATIENT
Start: 2023-07-18 | End: 2023-07-19

## 2023-07-18 RX ORDER — OLANZAPINE 5 MG/1
5 TABLET ORAL EVERY 6 HOURS PRN
Status: DISCONTINUED | OUTPATIENT
Start: 2023-07-18 | End: 2023-07-19 | Stop reason: ALTCHOICE

## 2023-07-18 RX ORDER — ACETAMINOPHEN 325 MG/1
650 TABLET ORAL EVERY 6 HOURS PRN
Status: DISCONTINUED | OUTPATIENT
Start: 2023-07-18 | End: 2023-07-21 | Stop reason: HOSPADM

## 2023-07-18 RX ADMIN — BISACODYL 10 MG: 10 SUPPOSITORY RECTAL at 20:46

## 2023-07-18 RX ADMIN — BACLOFEN 10 MG: 10 TABLET ORAL at 08:21

## 2023-07-18 RX ADMIN — SODIUM CHLORIDE, PRESERVATIVE FREE 10 ML: 5 INJECTION INTRAVENOUS at 08:22

## 2023-07-18 RX ADMIN — OLANZAPINE 5 MG: 5 TABLET, ORALLY DISINTEGRATING ORAL at 08:21

## 2023-07-18 RX ADMIN — LEVOTHYROXINE SODIUM 88 MCG: 0.09 TABLET ORAL at 08:21

## 2023-07-18 RX ADMIN — SODIUM CHLORIDE, PRESERVATIVE FREE 10 ML: 5 INJECTION INTRAVENOUS at 20:33

## 2023-07-18 RX ADMIN — APIXABAN 5 MG: 5 TABLET, FILM COATED ORAL at 08:21

## 2023-07-18 RX ADMIN — LORAZEPAM 1 MG: 2 INJECTION INTRAMUSCULAR; INTRAVENOUS at 18:26

## 2023-07-18 RX ADMIN — ATORVASTATIN CALCIUM 40 MG: 40 TABLET, FILM COATED ORAL at 20:28

## 2023-07-18 RX ADMIN — OLANZAPINE 5 MG: 5 TABLET, FILM COATED ORAL at 20:29

## 2023-07-18 RX ADMIN — BACLOFEN 10 MG: 10 TABLET ORAL at 20:29

## 2023-07-18 RX ADMIN — LUBIPROSTONE 8 MCG: 8 CAPSULE, GELATIN COATED ORAL at 18:00

## 2023-07-18 RX ADMIN — BACLOFEN 10 MG: 10 TABLET ORAL at 15:30

## 2023-07-18 RX ADMIN — WATER 1000 MG: 1 INJECTION INTRAMUSCULAR; INTRAVENOUS; SUBCUTANEOUS at 03:39

## 2023-07-18 RX ADMIN — ACETAMINOPHEN 650 MG: 325 TABLET ORAL at 20:29

## 2023-07-18 RX ADMIN — BISACODYL 10 MG: 10 SUPPOSITORY RECTAL at 19:05

## 2023-07-18 RX ADMIN — APIXABAN 5 MG: 5 TABLET, FILM COATED ORAL at 20:29

## 2023-07-18 RX ADMIN — WATER 10 MG: 1 INJECTION INTRAMUSCULAR; INTRAVENOUS; SUBCUTANEOUS at 05:14

## 2023-07-18 RX ADMIN — DOXYCYCLINE 100 MG: 100 INJECTION, POWDER, LYOPHILIZED, FOR SOLUTION INTRAVENOUS at 15:30

## 2023-07-18 RX ADMIN — POLYETHYLENE GLYCOL 3350 17 G: 17 POWDER, FOR SOLUTION ORAL at 22:03

## 2023-07-18 RX ADMIN — METOPROLOL TARTRATE 25 MG: 25 TABLET, FILM COATED ORAL at 20:28

## 2023-07-18 RX ADMIN — DIVALPROEX SODIUM 250 MG: 250 TABLET, DELAYED RELEASE ORAL at 20:28

## 2023-07-18 RX ADMIN — DOXYCYCLINE 100 MG: 100 INJECTION, POWDER, LYOPHILIZED, FOR SOLUTION INTRAVENOUS at 03:40

## 2023-07-18 RX ADMIN — SODIUM CHLORIDE, POTASSIUM CHLORIDE, SODIUM LACTATE AND CALCIUM CHLORIDE: 600; 310; 30; 20 INJECTION, SOLUTION INTRAVENOUS at 04:29

## 2023-07-18 RX ADMIN — FLUTICASONE PROPIONATE 1 SPRAY: 50 SPRAY, METERED NASAL at 19:30

## 2023-07-18 ASSESSMENT — PAIN SCALES - GENERAL
PAINLEVEL_OUTOF10: 3
PAINLEVEL_OUTOF10: 0
PAINLEVEL_OUTOF10: 0

## 2023-07-18 ASSESSMENT — PAIN DESCRIPTION - DESCRIPTORS: DESCRIPTORS: ACHING

## 2023-07-18 ASSESSMENT — PAIN - FUNCTIONAL ASSESSMENT: PAIN_FUNCTIONAL_ASSESSMENT: ACTIVITIES ARE NOT PREVENTED

## 2023-07-18 ASSESSMENT — PAIN SCALES - WONG BAKER
WONGBAKER_NUMERICALRESPONSE: 0
WONGBAKER_NUMERICALRESPONSE: 0

## 2023-07-18 ASSESSMENT — PAIN DESCRIPTION - LOCATION: LOCATION: GENERALIZED

## 2023-07-18 NOTE — CONSULTS
Consult dictated. 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 She has had an extended hospital stay and had poor neurological exam with a very slow recovery over the past 6 months. Per her parents, she has been making some progress, was starting to walk with the walker, was able to have conversations but has confusion, restlessness, hallucinations from time to time. She is currently admitted for the same. Suspected to have bacterial pneumonia which I have suspect has decompensated her and she currently is in acute state of delirium. Treatment essentially is supportive. She is on now Zyprexa and lorazepam.  Recommend repeat MRI as her recent MRI in June showed multifocal scattered infarctions in both cerebral hemispheres. Last echocardiogram shows an EF of 60 to 65%. Follow clinically.   Oxana Stevens MD

## 2023-07-18 NOTE — ED NOTES
TRANSFER - OUT REPORT:    Verbal report given to Earlene Toribio RN on Sharmila Slice  being transferred to [facility] for routine progression of patient care       Report consisted of patient's Situation, Background, Assessment and   Recommendations(SBAR). Information from the following report(s) Nurse Handoff Report, ED Encounter Summary, ED SBAR, Adult Overview, Intake/Output, Recent Results, Med Rec Status, Quality Measures, and Neuro Assessment was reviewed with the receiving nurse. [place] Assessment:                           Lines:   Peripheral IV 07/17/23 Posterior;Right Hand (Active)        Opportunity for questions and clarification was provided.       Patient transported with:  Monitor and Registered Nurse             Courtney Patel RN  07/18/23 9665

## 2023-07-18 NOTE — CONSULTS
[address], [address] [place] [address]  [phone]                     GI CONSULTATION NOTE      NAME:  Loraine Guadalupe   :   1965   MRN:   [id number]     Consult Date: 2023     Chief Complaint: fecal impaction     History of Present Illness:  Patient is a 59 y.o. female with PMH of CVA, depression, dysphagia, who is seen in consultation at the request of Dr. Curry Joe  for the above mentioned problem. Ms. Yolanda Negron  presented at the emergency room from the nursing home with change in mental status. The patient was having an outburst which included yelling out periodically and restless and agitated. The patient had a similar presentation and was admitted to this hospital from 2023 to 2023. The CT scan of the abdomen and pelvis shows large stool burden concerning for fecal impaction. Patient is poor historian- information provided by her mother and chart review. Per family she has had significant constipation for 4 weeks. She has gone up to 8 days without a BM. She is taking Sennakot BID and Miralx daily when she does not refuse medicine. She currently denies abdominal pain. Per family she has been yelling out that her stomach hurts.        PMH:  Past Medical History:   Diagnosis Date    Allergic rhinitis 2019    Cerebral artery occlusion with cerebral infarction ([address]) 2022    Depression     Dysphagia 2022    History of multiple allergies     History of vascular access device 10/07/2022    [facility] VAT: PICC placement R Cephalic length 40 cm for reliable access/TPN arm circ 35.5 cm    Memory disorder 2022    Movement disorder 2022    Muscle ache     Obesity 2012    Sinus pressure     Sinus problem        PSH:  Past Surgical History:   Procedure Laterality Date    ANTERIOR CRUCIATE LIGAMENT REPAIR      left     CHOLECYSTECTOMY  1998    GI      colonoscopy-polyps    IR NONTUNNELED VASCULAR CATHETER  2022    IR

## 2023-07-18 NOTE — CONSULTS
[address] [facility]    Name:  Melanie Jauregui  MR#:  [id number]  :  1965  ACCOUNT #:  [de-identified]  DATE OF SERVICE:  2023      REQUESTING PHYSICIAN:  Maritza Mcclelland MD    REASON FOR EVALUATION:  Altered mental status. HISTORY OF PRESENT ILLNESS:  The patient is a 59-year-old female with history of DVT, who is currently on anticoagulation, dyslipidemia, hypertension, who was hospitalized back in September with cardiorespiratory arrest which was found to be related to Takotsubo's cardiomyopathy. She was unresponsive/comatose for an extended period of time and made slow recovery over the past several months. Previous imaging has shown significant areas of leukoencephalopathy and most recent imaging in  showed diffuse global cerebral atrophy and there were areas of infarction seen scattered distribution in both cerebral hemispheres. At baseline she has now started to ambulate with the help of a walker at rehab. She will have some simple conversations and has been able to pee herself. Intermittently, she will start to have confusion, restlessness, and hallucinations. She has been maintained on Seroquel and Zyprexa. She is now admitted with worsening of these symptoms. Was suspected to have acute bacterial pneumonia and was started on antibiotics. She is a poor historian and unable to obtain a detailed review of systems. History was obtained with the help of her parents who were present at the bedside. PAST MEDICAL HISTORY:  As mentioned above. ALLERGIES:  DROPERIDOL. HOME MEDICATIONS:  1. Lipitor. 2.  Zyprexa. 3.  Eliquis. 4.  Baclofen. 5.  Lopressor. 6.  Lasix. 7.  MiraLax. SOCIAL HISTORY:  No alcohol use or smoking. Living with her parents and  at a rehab. PHYSICAL EXAMINATION:  HEART:  Regular rate and rhythm. CHEST:  Clear. ABDOMEN:  Soft, nontender. Positive bowel sounds. EXTREMITIES:  No edema.   NEUROLOGIC:  The patient is lying in bed and appears to be asleep. When she is aroused, she is able to answer some simple questions but not consistently. When asked how she was doing, she says [place]. \"  When asked where she was, she tries to say the name the hospital, but says \"[facility]. \"  She recognizes simple objects such as a pin, but when asked what we do with it, she struggled and starts to talk nonsensical and irrelevant to the topic. Similarly, when she was asked if she is at home or hospital, she says she is at hospital, but could be a better place or she does not know where she is. She is able to follow simple commands. She is able to raise both arms against gravity with subtle drift noted in the right arm. She did not move her legs to command, but withdraws both legs quickly to Babinski testing and screams out loudly. She appears to be complaining of diffuse body pain. DTRs are 2/2. Toes were equivocal.  Sensory exam was unreliable. Gait exam was deferred. LABORATORY DATA:  Chemistries with sodium 141, potassium 3.4, chloride 112, BUN is 13, creatinine is 0.51. LFTs are unremarkable. Lipid Panel: Total cholesterol is 181, LDL is 108, HDL is 48. CBC is showing hemoglobin of 11.8, hematocrit 35.4, otherwise unremarkable. MRI scan of the brain findings as discussed above. There is a diffuse global atrophy along with some areas of acute infarction seen in a scattered distribution in the white matter on both sides on the most recent scan in 06/2023. Significant changes suggestive of leukoencephalopathy were seen in earlier scans this year. Echocardiogram recently showed an ejection fraction of 60-65%. Agitated saline study was not performed due to lack of AV and left atrial size was not mentioned in the report.     ASSESSMENT AND PLAN:  A 67-year-old female with history of hypoxic leukoencephalopathy since 09/2022 when she was admitted with cardiorespiratory arrest, suspected to be related to Takotsubo's

## 2023-07-18 NOTE — TELEPHONE ENCOUNTER
Fernanda Culp is requesting a call back to advise that patient is in the hospital and would like to discuss her medication. Did not specify what medications. Please contact.

## 2023-07-18 NOTE — ED NOTES
Patient continues to scream and shout, she calms when talking to the patient and then begins screaming again.       Biran Dorantes RN  07/17/23 4989

## 2023-07-18 NOTE — TELEPHONE ENCOUNTER
Returned call, she stated \"I don't know what I want to ask\", pt is currently in [facility]. She states pt has been given a lot of different medications and now just screams frequently.     She states she will talk with physician doing neuro consult and will call office back

## 2023-07-19 ENCOUNTER — APPOINTMENT (OUTPATIENT)
Facility: HOSPITAL | Age: 58
End: 2023-07-19
Payer: COMMERCIAL

## 2023-07-19 PROBLEM — R41.0 ACUTE DELIRIUM: Status: RESOLVED | Noted: 2023-07-17 | Resolved: 2023-07-19

## 2023-07-19 PROBLEM — J18.9 PNEUMONIA OF LEFT LOWER LOBE DUE TO INFECTIOUS ORGANISM: Status: RESOLVED | Noted: 2023-07-18 | Resolved: 2023-07-19

## 2023-07-19 LAB
ANION GAP SERPL CALC-SCNC: 7 MMOL/L (ref 5–15)
BUN SERPL-MCNC: 10 MG/DL (ref 6–20)
BUN/CREAT SERPL: 19 (ref 12–20)
CALCIUM SERPL-MCNC: 8.6 MG/DL (ref 8.5–10.1)
CHLORIDE SERPL-SCNC: 113 MMOL/L (ref 97–108)
CO2 SERPL-SCNC: 24 MMOL/L (ref 21–32)
CREAT SERPL-MCNC: 0.52 MG/DL (ref 0.55–1.02)
ERYTHROCYTE [DISTWIDTH] IN BLOOD BY AUTOMATED COUNT: 13.5 % (ref 11.5–14.5)
GLUCOSE SERPL-MCNC: 93 MG/DL (ref 65–100)
HCT VFR BLD AUTO: 30.1 % (ref 35–47)
HGB BLD-MCNC: 9.6 G/DL (ref 11.5–16)
MCH RBC QN AUTO: 29.9 PG (ref 26–34)
MCHC RBC AUTO-ENTMCNC: 31.9 G/DL (ref 30–36.5)
MCV RBC AUTO: 93.8 FL (ref 80–99)
NRBC # BLD: 0 K/UL (ref 0–0.01)
NRBC BLD-RTO: 0 PER 100 WBC
PLATELET # BLD AUTO: 232 K/UL (ref 150–400)
PMV BLD AUTO: 11.1 FL (ref 8.9–12.9)
POTASSIUM SERPL-SCNC: 3.7 MMOL/L (ref 3.5–5.1)
PROCALCITONIN SERPL-MCNC: <0.05 NG/ML
RBC # BLD AUTO: 3.21 M/UL (ref 3.8–5.2)
SODIUM SERPL-SCNC: 144 MMOL/L (ref 136–145)
WBC # BLD AUTO: 7.7 K/UL (ref 3.6–11)

## 2023-07-19 PROCEDURE — 6370000000 HC RX 637 (ALT 250 FOR IP): Performed by: INTERNAL MEDICINE

## 2023-07-19 PROCEDURE — 99232 SBSQ HOSP IP/OBS MODERATE 35: CPT | Performed by: PSYCHIATRY & NEUROLOGY

## 2023-07-19 PROCEDURE — 80048 BASIC METABOLIC PNL TOTAL CA: CPT

## 2023-07-19 PROCEDURE — 2580000003 HC RX 258: Performed by: INTERNAL MEDICINE

## 2023-07-19 PROCEDURE — 85027 COMPLETE CBC AUTOMATED: CPT

## 2023-07-19 PROCEDURE — 36415 COLL VENOUS BLD VENIPUNCTURE: CPT

## 2023-07-19 PROCEDURE — 6360000002 HC RX W HCPCS: Performed by: INTERNAL MEDICINE

## 2023-07-19 PROCEDURE — 6370000000 HC RX 637 (ALT 250 FOR IP)

## 2023-07-19 PROCEDURE — 2060000000 HC ICU INTERMEDIATE R&B

## 2023-07-19 PROCEDURE — 6370000000 HC RX 637 (ALT 250 FOR IP): Performed by: HOSPITALIST

## 2023-07-19 PROCEDURE — 2500000003 HC RX 250 WO HCPCS: Performed by: INTERNAL MEDICINE

## 2023-07-19 PROCEDURE — 70450 CT HEAD/BRAIN W/O DYE: CPT

## 2023-07-19 PROCEDURE — 71250 CT THORAX DX C-: CPT

## 2023-07-19 PROCEDURE — 84145 PROCALCITONIN (PCT): CPT

## 2023-07-19 RX ORDER — LUBIPROSTONE 8 UG/1
8 CAPSULE ORAL 2 TIMES DAILY WITH MEALS
Qty: 30 CAPSULE | Refills: 3 | Status: SHIPPED | OUTPATIENT
Start: 2023-07-19

## 2023-07-19 RX ADMIN — APIXABAN 5 MG: 5 TABLET, FILM COATED ORAL at 20:54

## 2023-07-19 RX ADMIN — ACETAMINOPHEN 650 MG: 325 TABLET ORAL at 20:55

## 2023-07-19 RX ADMIN — DIVALPROEX SODIUM 250 MG: 250 TABLET, DELAYED RELEASE ORAL at 20:54

## 2023-07-19 RX ADMIN — OLANZAPINE 5 MG: 5 TABLET, FILM COATED ORAL at 06:53

## 2023-07-19 RX ADMIN — SODIUM CHLORIDE, PRESERVATIVE FREE 10 ML: 5 INJECTION INTRAVENOUS at 09:58

## 2023-07-19 RX ADMIN — BACLOFEN 10 MG: 10 TABLET ORAL at 20:54

## 2023-07-19 RX ADMIN — WATER 1000 MG: 1 INJECTION INTRAMUSCULAR; INTRAVENOUS; SUBCUTANEOUS at 03:07

## 2023-07-19 RX ADMIN — METOPROLOL TARTRATE 25 MG: 25 TABLET, FILM COATED ORAL at 20:54

## 2023-07-19 RX ADMIN — FLUTICASONE PROPIONATE 1 SPRAY: 50 SPRAY, METERED NASAL at 10:58

## 2023-07-19 RX ADMIN — BACLOFEN 10 MG: 10 TABLET ORAL at 10:52

## 2023-07-19 RX ADMIN — BACLOFEN 10 MG: 10 TABLET ORAL at 14:18

## 2023-07-19 RX ADMIN — LEVOTHYROXINE SODIUM 88 MCG: 0.09 TABLET ORAL at 06:51

## 2023-07-19 RX ADMIN — DOXYCYCLINE 100 MG: 100 INJECTION, POWDER, LYOPHILIZED, FOR SOLUTION INTRAVENOUS at 03:12

## 2023-07-19 RX ADMIN — LUBIPROSTONE 8 MCG: 8 CAPSULE, GELATIN COATED ORAL at 10:58

## 2023-07-19 RX ADMIN — SODIUM CHLORIDE, PRESERVATIVE FREE 10 ML: 5 INJECTION INTRAVENOUS at 20:56

## 2023-07-19 RX ADMIN — ATORVASTATIN CALCIUM 40 MG: 40 TABLET, FILM COATED ORAL at 20:54

## 2023-07-19 RX ADMIN — APIXABAN 5 MG: 5 TABLET, FILM COATED ORAL at 10:52

## 2023-07-19 RX ADMIN — METOPROLOL TARTRATE 25 MG: 25 TABLET, FILM COATED ORAL at 10:51

## 2023-07-19 RX ADMIN — LUBIPROSTONE 8 MCG: 8 CAPSULE, GELATIN COATED ORAL at 18:57

## 2023-07-19 ASSESSMENT — PAIN SCALES - GENERAL
PAINLEVEL_OUTOF10: 0
PAINLEVEL_OUTOF10: 0
PAINLEVEL_OUTOF10: 3
PAINLEVEL_OUTOF10: 0

## 2023-07-19 ASSESSMENT — PAIN DESCRIPTION - LOCATION: LOCATION: GENERALIZED

## 2023-07-19 ASSESSMENT — PAIN SCALES - WONG BAKER
WONGBAKER_NUMERICALRESPONSE: 0

## 2023-07-19 ASSESSMENT — PAIN DESCRIPTION - DESCRIPTORS: DESCRIPTORS: ACHING

## 2023-07-19 ASSESSMENT — PAIN - FUNCTIONAL ASSESSMENT: PAIN_FUNCTIONAL_ASSESSMENT: ACTIVITIES ARE NOT PREVENTED

## 2023-07-19 NOTE — CARE COORDINATION
07/19/23 0842   Readmission Assessment   Number of Days since last admission? 8-30 days   Previous Disposition SNF   Who is being Noelle Stone   (Chart review)   What was the patient's/caregiver's perception as to why they think they needed to return back to the hospital? Other (Comment)  (Complex medical needs)   Did you see a specialist, such as Cardiac, Pulmonary, Orthopedic Physician, etc. after you left the hospital? Yes   Who advised the patient to return to the hospital? Skilled Unit   Does the patient report anything that got in the way of taking their medications? No   In our efforts to provide the best possible care to you and others like you, can you think of anything that we could have done to help you after you left the hospital the first time, so that you might not have needed to return so soon?  Other (Comment)  (None identified - complex chronic medical needs)

## 2023-07-19 NOTE — PLAN OF CARE
Problem: Discharge Planning  Goal: Discharge to home or other facility with appropriate resources  Outcome: Progressing     Problem: Neurosensory - Adult  Goal: Achieves stable or improved neurological status  Outcome: Progressing  Goal: Absence of seizures  Outcome: Progressing  Goal: Remains free of injury related to seizures activity  Outcome: Progressing  Goal: Achieves maximal functionality and self care  Outcome: Progressing     Problem: Musculoskeletal - Adult  Goal: Return mobility to safest level of function  Outcome: Progressing  Goal: Maintain proper alignment of affected body part  Outcome: Progressing  Goal: Return ADL status to a safe level of function  Outcome: Progressing     Problem: Gastrointestinal - Adult  Goal: Minimal or absence of nausea and vomiting  Outcome: Progressing  Goal: Maintains or returns to baseline bowel function  Outcome: Progressing  Goal: Maintains adequate nutritional intake  Outcome: Progressing  Goal: Establish and maintain optimal ostomy function  Outcome: Progressing     Problem: Safety - Adult  Goal: Free from fall injury  Outcome: Progressing     Problem: Chronic Conditions and Co-morbidities  Goal: Patient's chronic conditions and co-morbidity symptoms are monitored and maintained or improved  Outcome: Progressing     Problem: Skin/Tissue Integrity  Goal: Absence of new skin breakdown  Description: 1. Monitor for areas of redness and/or skin breakdown  2. Assess vascular access sites hourly  3. Every 4-6 hours minimum:  Change oxygen saturation probe site  4. Every 4-6 hours:  If on nasal continuous positive airway pressure, respiratory therapy assess nares and determine need for appliance change or resting period.   Outcome: Progressing

## 2023-07-19 NOTE — DISCHARGE INSTRUCTIONS
Discharge Instructions       PATIENT ID: Aubree Pascual  MRN: [id number]   YOB: 1965    DATE OF ADMISSION: 7/17/2023   DATE OF DISCHARGE: 7/19/2023    PRIMARY CARE PROVIDER: Ramos Lay     ATTENDING PHYSICIAN: Geoff Ward MD   DISCHARGING PROVIDER: Geoff Ward MD    To contact this individual call [phone] and ask the  to page. If unavailable ask to be transferred the Adult Hospitalist Department. DISCHARGE DIAGNOSES Acute delirium    CONSULTATIONS: [unfilled]    PROCEDURES/SURGERIES: * No surgery found *    PENDING TEST RESULTS:   At the time of discharge the following test results are still pending:     FOLLOW UP APPOINTMENTS:   [unfilled]     ADDITIONAL CARE RECOMMENDATIONS:     DIET: cardiac diet    ACTIVITY: activity as tolerated    WOUND CARE:     EQUIPMENT needed:       DISCHARGE MEDICATIONS:   See Medication Reconciliation Form    It is important that you take the medication exactly as they are prescribed. Keep your medication in the bottles provided by the pharmacist and keep a list of the medication names, dosages, and times to be taken in your wallet. Do not take other medications without consulting your doctor. NOTIFY YOUR PHYSICIAN FOR ANY OF THE FOLLOWING:   Fever over 101 degrees for 24 hours. Chest pain, shortness of breath, fever, chills, nausea, vomiting, diarrhea, change in mentation, falling, weakness, bleeding. Severe pain or pain not relieved by medications. Or, any other signs or symptoms that you may have questions about.       DISPOSITION:   x Home With:   OT  PT  HH  RN       SNF/Inpatient Rehab/LTAC    Independent/assisted living    Hospice    Other:     CDMP Checked:   Yes x     PROBLEM LIST Updated:  Yes x       Signed:   Geoff Ward MD  7/19/2023  12:36 PM

## 2023-07-19 NOTE — CARE COORDINATION
Transition of Care Plan:    RUR: 23% High   Prior Level of Functioning: Total care   Disposition: Return to [facility]   If SNF or IPR: Date FOC offered: FOC was offered at previous admission, Blake Negro started: Wednesday 07/19 for return to SNF   Follow up appointments: AVS  DME needed: None   Transportation at discharge: [facility] w[facility] transport. Private pay   IM/IMM Medicare/ letter given: n/a  Caregiver Contact: Roberta Do   Discharge Caregiver contacted prior to discharge? Yes, present at time of dc/transport planning   Care Conference needed? No  Barriers to discharge:  Remove rocha and pt void, psych consult     Xavier Vivien [phone] Liaison for [place], including Parag, confirmed [address] started 07/19. SNF able to see clinicals through [address] [place] [address]. CM met with pt's father and mother at bedside to share delayed dc for psych consult. Attending had already provided update. Parents happy with this consult. They asked this CM for recommendations for alternate placement ideas, as they have worried about SNF ability to meet pt needs and pt disrupting other residents with her screaming. CM shared information about [facility] [employer] and parents were interested in referral to have this placement service help them with exploring alternate placement options. CM called Wilmer Noss [phone] to make a referral for pt/family. Alix Marinelli to call pt's mother Roberta Long at [phone]. Alix Marinelli and family will work together independently of this CM, as pt will likely dc tomorrow. Pt's mother requested bedside RN and CM ask attending to talk to her about pt's medication management for her behaviors. Mother is concerned her screaming out for the mother will cause SNF to require her to leave that placement. Attending consulted psych. DC is now pending psych consult. Transport with hospital ot Home has been cancelled for today and is on Will Call for tomorrow.        Transport set up with [facility] at request of pt's mother. Pt's mother to pay $72.46 by check at time of . Transport scheduled for 16:30 today. [facility] [id number]. Pending pt voiding. Bedside RN to alert CM if transport time needs to be changed - if pt hasn't voided. Facesheet with report call # will be placed on hard chart when available. 07/19/23 6420   Service Assessment   Patient Orientation Unable to Assess   Cognition   (TBI)   History Provided By Medical Record   Primary Caregiver Other (Comment)  (Rehab facility and parents)   Accompanied By/Relationship n/a   Support Systems Parent   Patient's Healthcare Decision Maker is: Named in [facility]   PCP Verified by CM Yes   Last Visit to PCP Within last 3 months   Prior Functional Level Assistance with the following:;Bathing;Dressing; Toileting;Feeding;Cooking;Housework; Shopping;Mobility   Current Functional Level Assistance with the following:;Bathing;Dressing; Toileting;Feeding;Cooking;Housework; Shopping;Mobility   Can patient return to prior living arrangement Yes   Ability to make needs known: Unable   Family able to assist with home care needs: Yes   Would you like for me to discuss the discharge plan with any other family members/significant others, and if so, who?  Yes  (Pt's mother Gerri Adjutant [phone])   Financial Resources Other (Comment)  ([employer])   Community Resources Institutional Placement  (Rehab SNF)   Social/Functional History   Lives With Other (comment)  (SNF rehab)   Transfer Assistance Needs assistance   Active  No   Discharge Planning   Type of Residence Other (Comment)  (SNF)   Potential Assistance Purchasing Medications No   Patient expects to be discharged to: [address] [facility], [facility]

## 2023-07-19 NOTE — DISCHARGE SUMMARY
Discharge Summary       PATIENT ID: Newell Bumpers  MRN: [id number]   YOB: 1965    DATE OF ADMISSION: 7/17/2023  2:30 PM    DATE OF DISCHARGE: 7/19/23   PRIMARY CARE PROVIDER: Jada Mendoza MD     ATTENDING PHYSICIAN: Nikole Biswas  DISCHARGING PROVIDER: Nikole Biswas MD    To contact this individual call [phone] and ask the  to page. If unavailable ask to be transferred the Adult Hospitalist Department. CONSULTATIONS: IP CONSULT TO GI  IP CONSULT TO ORTHOPEDIC SURGERY  IP CONSULT TO ONCOLOGY  IP CONSULT TO NEUROLOGY    PROCEDURES/SURGERIES: * No surgery found *    [address] COURSE: Acute delirium    This 23-year-old woman with past medical history significant for venous thromboembolism, on anticoagulation, dyslipidemia, hypothyroidism, hypertension presented at the emergency room from the nursing home with change in mental status. The patient was having an outburst which included yelling out periodically and restless and agitated. The patient had a similar presentation and was admitted to this hospital from 06/17/2023 to 06/27/2023. During that hospitalization, the patient was found to have acute stroke confirmed with MRI of the brain. The patient was also treated for pneumonia. The patient's change in mental status were attributed to the urinary tract infection and pneumonia as well as the acute stroke. The patient was discharged back to the nursing home. She was doing relatively well at the nursing home until the day of her presentation at the emergency room when the patient developed change in mental status. When the patient arrived at the emergency room, the patient was hypotensive and this responded to fluid therapy. The CT scan of the abdomen and pelvis shows large stool burden concerning for fecal impaction. A chest x-ray shows evidence of pneumonia.   The patient was started on antibiotics and was referred to the hospitalist service for LIPITOR  Take 1 tablet by mouth nightly     baclofen 10 MG tablet  Commonly known as: LIORESAL     diclofenac sodium 1 % Gel  Commonly known as: VOLTAREN     divalproex 250 MG DR tablet  Commonly known as: DEPAKOTE     levothyroxine 88 MCG tablet  Commonly known as: SYNTHROID     lidocaine 4 % external patch     metoprolol tartrate 25 MG tablet  Commonly known as: LOPRESSOR     polyethylene glycol 17 g Pack packet  Commonly known as: MIRALAX     sennosides-docusate sodium 8.6-50 MG tablet  Commonly known as: SENOKOT-S            STOP taking these medications      furosemide 20 MG tablet  Commonly known as: LASIX     OLANZapine 15 MG tablet  Commonly known as: ZYPREXA     OLANZapine 5 MG tablet  Commonly known as: ZYPREXA     OLANZapine zydis 15 MG disintegrating tablet  Commonly known as: ZYPREXA     OLANZapine zydis 5 MG disintegrating tablet  Commonly known as: ZYPREXA     zolpidem 5 MG tablet  Commonly known as: AMBIEN               Where to Get Your Medications        These medications were sent to [facility]/pharmacy #[id number]- [place], [address] [place] [address] [phone] Cathi Connolly [phone]  [address] [place], [place] [address]Suite [facility]      Phone: [phone]   lubiprostone 8 MCG Caps capsule           NOTIFY YOUR PHYSICIAN FOR ANY OF THE FOLLOWING:   Fever over 101 degrees for 24 hours. Chest pain, shortness of breath, fever, chills, nausea, vomiting, diarrhea, change in mentation, falling, weakness, bleeding. Severe pain or pain not relieved by medications. Or, any other signs or symptoms that you may have questions about.     DISPOSITION:    Home With:   OT  PT  HH  RN      x Long term SNF/Inpatient Rehab    Independent/assisted living    Hospice    Other:       PATIENT CONDITION AT DISCHARGE:     Functional status    Poor     Deconditioned    x Independent      Cognition    x Lucid     Forgetful     Dementia      Catheters/lines (plus indication)    Vargas     PICC     PEG    x None      Code status    x Full

## 2023-07-20 LAB
ANION GAP SERPL CALC-SCNC: 3 MMOL/L (ref 5–15)
BUN SERPL-MCNC: 15 MG/DL (ref 6–20)
BUN/CREAT SERPL: 29 (ref 12–20)
CALCIUM SERPL-MCNC: 8.9 MG/DL (ref 8.5–10.1)
CHLORIDE SERPL-SCNC: 111 MMOL/L (ref 97–108)
CO2 SERPL-SCNC: 26 MMOL/L (ref 21–32)
CREAT SERPL-MCNC: 0.51 MG/DL (ref 0.55–1.02)
ERYTHROCYTE [DISTWIDTH] IN BLOOD BY AUTOMATED COUNT: 13.6 % (ref 11.5–14.5)
GLUCOSE SERPL-MCNC: 102 MG/DL (ref 65–100)
HCT VFR BLD AUTO: 32.6 % (ref 35–47)
HGB BLD-MCNC: 10.4 G/DL (ref 11.5–16)
MCH RBC QN AUTO: 29.7 PG (ref 26–34)
MCHC RBC AUTO-ENTMCNC: 31.9 G/DL (ref 30–36.5)
MCV RBC AUTO: 93.1 FL (ref 80–99)
NRBC # BLD: 0 K/UL (ref 0–0.01)
NRBC BLD-RTO: 0 PER 100 WBC
PLATELET # BLD AUTO: 248 K/UL (ref 150–400)
PMV BLD AUTO: 11.2 FL (ref 8.9–12.9)
POTASSIUM SERPL-SCNC: 3.6 MMOL/L (ref 3.5–5.1)
RBC # BLD AUTO: 3.5 M/UL (ref 3.8–5.2)
SODIUM SERPL-SCNC: 140 MMOL/L (ref 136–145)
WBC # BLD AUTO: 7.5 K/UL (ref 3.6–11)

## 2023-07-20 PROCEDURE — 6370000000 HC RX 637 (ALT 250 FOR IP): Performed by: INTERNAL MEDICINE

## 2023-07-20 PROCEDURE — 6370000000 HC RX 637 (ALT 250 FOR IP): Performed by: HOSPITALIST

## 2023-07-20 PROCEDURE — 6370000000 HC RX 637 (ALT 250 FOR IP): Performed by: STUDENT IN AN ORGANIZED HEALTH CARE EDUCATION/TRAINING PROGRAM

## 2023-07-20 PROCEDURE — 85027 COMPLETE CBC AUTOMATED: CPT

## 2023-07-20 PROCEDURE — 2580000003 HC RX 258: Performed by: STUDENT IN AN ORGANIZED HEALTH CARE EDUCATION/TRAINING PROGRAM

## 2023-07-20 PROCEDURE — 80048 BASIC METABOLIC PNL TOTAL CA: CPT

## 2023-07-20 PROCEDURE — 2580000003 HC RX 258: Performed by: INTERNAL MEDICINE

## 2023-07-20 PROCEDURE — 2060000000 HC ICU INTERMEDIATE R&B

## 2023-07-20 PROCEDURE — 2500000003 HC RX 250 WO HCPCS: Performed by: STUDENT IN AN ORGANIZED HEALTH CARE EDUCATION/TRAINING PROGRAM

## 2023-07-20 PROCEDURE — 6370000000 HC RX 637 (ALT 250 FOR IP)

## 2023-07-20 PROCEDURE — 36415 COLL VENOUS BLD VENIPUNCTURE: CPT

## 2023-07-20 RX ORDER — OLANZAPINE 5 MG/1
2.5 TABLET ORAL EVERY 6 HOURS PRN
Status: DISCONTINUED | OUTPATIENT
Start: 2023-07-20 | End: 2023-07-20

## 2023-07-20 RX ORDER — TRAZODONE HYDROCHLORIDE 50 MG/1
50 TABLET ORAL NIGHTLY
Status: DISCONTINUED | OUTPATIENT
Start: 2023-07-20 | End: 2023-07-21 | Stop reason: HOSPADM

## 2023-07-20 RX ADMIN — APIXABAN 5 MG: 5 TABLET, FILM COATED ORAL at 09:18

## 2023-07-20 RX ADMIN — DIVALPROEX SODIUM 250 MG: 250 TABLET, DELAYED RELEASE ORAL at 21:18

## 2023-07-20 RX ADMIN — SODIUM CHLORIDE, PRESERVATIVE FREE 10 ML: 5 INJECTION INTRAVENOUS at 21:19

## 2023-07-20 RX ADMIN — WATER 2.5 MG: 1 INJECTION INTRAMUSCULAR; INTRAVENOUS; SUBCUTANEOUS at 19:07

## 2023-07-20 RX ADMIN — BACLOFEN 10 MG: 10 TABLET ORAL at 14:45

## 2023-07-20 RX ADMIN — ACETAMINOPHEN 650 MG: 325 TABLET ORAL at 21:18

## 2023-07-20 RX ADMIN — SODIUM CHLORIDE, PRESERVATIVE FREE 10 ML: 5 INJECTION INTRAVENOUS at 09:19

## 2023-07-20 RX ADMIN — LEVOTHYROXINE SODIUM 88 MCG: 0.09 TABLET ORAL at 06:43

## 2023-07-20 RX ADMIN — ATORVASTATIN CALCIUM 40 MG: 40 TABLET, FILM COATED ORAL at 21:18

## 2023-07-20 RX ADMIN — FLUTICASONE PROPIONATE 1 SPRAY: 50 SPRAY, METERED NASAL at 09:19

## 2023-07-20 RX ADMIN — APIXABAN 5 MG: 5 TABLET, FILM COATED ORAL at 21:18

## 2023-07-20 RX ADMIN — TRAZODONE HYDROCHLORIDE 50 MG: 50 TABLET ORAL at 21:17

## 2023-07-20 RX ADMIN — SODIUM CHLORIDE, POTASSIUM CHLORIDE, SODIUM LACTATE AND CALCIUM CHLORIDE: 600; 310; 30; 20 INJECTION, SOLUTION INTRAVENOUS at 21:14

## 2023-07-20 RX ADMIN — BACLOFEN 10 MG: 10 TABLET ORAL at 09:19

## 2023-07-20 RX ADMIN — BACLOFEN 10 MG: 10 TABLET ORAL at 21:18

## 2023-07-20 RX ADMIN — LUBIPROSTONE 8 MCG: 8 CAPSULE, GELATIN COATED ORAL at 17:40

## 2023-07-20 RX ADMIN — LUBIPROSTONE 8 MCG: 8 CAPSULE, GELATIN COATED ORAL at 09:19

## 2023-07-20 ASSESSMENT — PAIN - FUNCTIONAL ASSESSMENT: PAIN_FUNCTIONAL_ASSESSMENT: ACTIVITIES ARE NOT PREVENTED

## 2023-07-20 ASSESSMENT — PAIN SCALES - GENERAL
PAINLEVEL_OUTOF10: 0
PAINLEVEL_OUTOF10: 0

## 2023-07-20 ASSESSMENT — PAIN DESCRIPTION - LOCATION: LOCATION: GENERALIZED

## 2023-07-20 ASSESSMENT — PAIN SCALES - WONG BAKER
WONGBAKER_NUMERICALRESPONSE: 0
WONGBAKER_NUMERICALRESPONSE: 0

## 2023-07-20 NOTE — CARE COORDINATION
Transition of Care Plan:    RUR: 22% High   Prior Level of Functioning: Total care   Disposition: Return to SN[facility]   If SNF or IPR: Date FOC offered: FOC was offered at previous admission, Kim Andres started: Wednesday 07/19 for return to SNF   Follow up appointments: AVS  DME needed: None   Transportation at discharge: [facility]. Private pay   IM/IMM Medicare/ letter given: n/a  Caregiver Contact: Rhett Vargas   Discharge Caregiver contacted prior to discharge? Yes, present at time of dc/transport planning   Care Conference needed? No  Barriers to discharge:  Remove rocha and pt void, psych consult     12:07 Pt discussed in IMCU rounds. DENNIS of tomorrow reported by attending due to hypotensive episodes. CM updated [facility] [phone] of updated DENNIS of tomorrow. Wheelchair transport to [facility] has been moved to tomorrow on will call. CM updated pt's mother that transport rescheduled to tomorrow on will call. Sheeba [phone] Liaison for [facility], including [facility], confirmed Micheline Medina started 07/19. SNF able to see clinicals through [address] [place], [place] [address]way 15. Sheeba - liaison for [facility], met with this CM in office to discuss auth and pt's return to [facility]. Expedited Micheline Medina is in process to skill pt to return to [facility] at request of SNF. Pt is private pay and is officially out on [facility]. Pysch consult completed and psych note in. Psych reported pt's mother is agreeable to medication recommendations.             07/19/23 0836   Service Assessment   Patient Orientation Unable to Assess   Cognition   ([facility])   History Provided By Medical Record   Primary Caregiver Other (Comment)  (Rehab facility and parents)   Accompanied By/Relationship n/a   Support Systems Parent   Patient's Healthcare Decision Maker is: Named in [address]    PCP Verified by CM Yes   Last Visit to PCP Within last 3 months   Prior Functional Level Assistance with the

## 2023-07-20 NOTE — ROUTINE PROCESS
Bedside and Verbal shift change report given to Stephani Levy RN\ (oncoming nurse) by Chastity Merino RN (offgoing nurse). Report included the following information SBAR, Kardex, MAR, Cardiac Rhythm NSR, and Dual Neuro Assessment. stated

## 2023-07-20 NOTE — CONSULTS
PSYCHIATRY CONSULT NOTE    REASON FOR CONSULT:psychosis      HISTORY OF PRESENTING COMPLAINT:  Emmy Mcdonough is a 59 y.o. White (non-) female who is currently admitted to the medical floor at [facility]. Patient is admitted from a nursing home for altered mental status. She would intermittently exhibit periods of agitation outburst, yelling and agitation. On assessment today, she is alert and oriented to self only. Place, date and situation is unknown. She is a poor historian. She is calm and cooperative initially, states her mood is good and denies any depression or anxiety. She states her sleep is good and denies any appetite issues. She denies SI/HI/AVH. Per the night nurse, patient had a restful night and slept most of the night. He states patient was also fed and ate most of her dinner when he came in and fed her. While talking to the nurse, patient begins to yell and requesting for her mother. Nurse denies any psychosis other than periods of yelling. PAST PSYCHIATRIC HISTORY and SUBSTANCE ABUSE HISTORY:  JONATHAN    PAST MEDICAL HISTORY:    Please see H&P for details. Past Medical History:   Diagnosis Date    Allergic rhinitis 07/29/2019    Cerebral artery occlusion with cerebral infarction ([address]) 8/27/2022    Depression     Dysphagia 9/27/2022    History of multiple allergies     History of vascular access device 10/07/2022    [facility] VAT: PICC placement R Cephalic length 40 cm for reliable access/TPN arm circ 35.5 cm    Memory disorder 9/27/2022    Movement disorder 9/27/2022    Muscle ache     Obesity 11/05/2012    Sinus pressure     Sinus problem      Prior to Admission medications    Medication Sig Start Date End Date Taking?  Authorizing Provider   lubiprostone (AMITIZA) 8 MCG CAPS capsule Take 1 capsule by mouth 2 times daily (with meals) 7/19/23  Yes Pamela Nelson MD   divalproex (DEPAKOTE) 250 MG DR tablet Take 1 tablet by mouth nightly   Yes Historical Provider, MD   diclofenac

## 2023-07-20 NOTE — BSMART NOTE
BSMART Liaison Team Note     LOS:  3     Patient goal(s) for today: communicate needs to staff, use coping skills, take prescribed medications  BSMART Liaison team focus/goals: assess MH needs, provide education and support. Progress note: Liaison met with Pt face to face on medical unit with mom at bedside. Pt was alert, oriented and initially calm. However, Pt became anxious yelling out. Mom reported this behaviors has been problematic and cause for Caro Livers to discuss discharging Pt and cause for CM to tell Pt's mother there is no options for placement at this time. Pt initially denied any anxiety, depressions or concerns. When prompted by her mother, Pt verbalized anxiety and worry about running out of money, not living up to her parents expectations and not being able to return home and live independently. Liaison discussed anxiety coping strategies and Pt endorsed using positive affirmations weekly. Liaison educated about positive affirmations and encouraged daily use; Pt agreed. Liaison provided list of positive affirmations to try while hospitalized. Mother agreed to read affirmation to Pt and Pt will repeat them. Mother reported Pt has lost her home d/t foreclosure and finances are limited. Pt's mother reports Pt frequently mismanaged her money with excessive spending the Pt's mother is her guardian now. Pt's mother was tearful and expressed concerns and feeling of hopelessness regarding LTC plan for where Pt will live and who will care for her. Pt became agitated yelling out while receiving incontinence care. Liaison attempted to comfort and reassure. Pt made eye contact with Liaison and clearly stated \"get out. \" Liaison will follow weekly for support.      Barriers to Discharge: medical  Guns in the home: No      Outpatient provider(s):  none reported  Insurance info/prescription coverage:  MAGALIE  S Pablo     Diagnosis: delirium r/t a medical condition      Plan:  BSMART Liaison to follow weekly for support. PMHNP assessed Pt 7/20/23 and determined \"There is no indication for inpatient psychiatry at this time. \" Refer to most recent psychiatric consult note for further assessment and recommendation. .   Follow up Psych Consult placed? Yes . Psychiatrist updated?  No        Participating treatment team members: Rodolfo Bowie, BETINA

## 2023-07-21 VITALS
OXYGEN SATURATION: 99 % | RESPIRATION RATE: 18 BRPM | BODY MASS INDEX: 29.3 KG/M2 | SYSTOLIC BLOOD PRESSURE: 127 MMHG | WEIGHT: 165.4 LBS | DIASTOLIC BLOOD PRESSURE: 48 MMHG | TEMPERATURE: 98.1 F | HEART RATE: 96 BPM

## 2023-07-21 LAB
ANION GAP SERPL CALC-SCNC: 5 MMOL/L (ref 5–15)
BUN SERPL-MCNC: 12 MG/DL (ref 6–20)
BUN/CREAT SERPL: 26 (ref 12–20)
CALCIUM SERPL-MCNC: 9.3 MG/DL (ref 8.5–10.1)
CHLORIDE SERPL-SCNC: 112 MMOL/L (ref 97–108)
CO2 SERPL-SCNC: 26 MMOL/L (ref 21–32)
CREAT SERPL-MCNC: 0.47 MG/DL (ref 0.55–1.02)
ERYTHROCYTE [DISTWIDTH] IN BLOOD BY AUTOMATED COUNT: 13.2 % (ref 11.5–14.5)
GLUCOSE SERPL-MCNC: 100 MG/DL (ref 65–100)
HCT VFR BLD AUTO: 33.4 % (ref 35–47)
HGB BLD-MCNC: 10.7 G/DL (ref 11.5–16)
MCH RBC QN AUTO: 29.7 PG (ref 26–34)
MCHC RBC AUTO-ENTMCNC: 32 G/DL (ref 30–36.5)
MCV RBC AUTO: 92.8 FL (ref 80–99)
NRBC # BLD: 0 K/UL (ref 0–0.01)
NRBC BLD-RTO: 0 PER 100 WBC
PLATELET # BLD AUTO: 258 K/UL (ref 150–400)
PMV BLD AUTO: 10.8 FL (ref 8.9–12.9)
POTASSIUM SERPL-SCNC: 3.6 MMOL/L (ref 3.5–5.1)
RBC # BLD AUTO: 3.6 M/UL (ref 3.8–5.2)
SODIUM SERPL-SCNC: 143 MMOL/L (ref 136–145)
WBC # BLD AUTO: 6.7 K/UL (ref 3.6–11)

## 2023-07-21 PROCEDURE — 85027 COMPLETE CBC AUTOMATED: CPT

## 2023-07-21 PROCEDURE — 36415 COLL VENOUS BLD VENIPUNCTURE: CPT

## 2023-07-21 PROCEDURE — 6370000000 HC RX 637 (ALT 250 FOR IP): Performed by: INTERNAL MEDICINE

## 2023-07-21 PROCEDURE — 2580000003 HC RX 258: Performed by: STUDENT IN AN ORGANIZED HEALTH CARE EDUCATION/TRAINING PROGRAM

## 2023-07-21 PROCEDURE — 94760 N-INVAS EAR/PLS OXIMETRY 1: CPT

## 2023-07-21 PROCEDURE — 6370000000 HC RX 637 (ALT 250 FOR IP)

## 2023-07-21 PROCEDURE — 80048 BASIC METABOLIC PNL TOTAL CA: CPT

## 2023-07-21 RX ORDER — TRAZODONE HYDROCHLORIDE 50 MG/1
50 TABLET ORAL NIGHTLY
DISCHARGE
Start: 2023-07-21

## 2023-07-21 RX ORDER — OLANZAPINE 2.5 MG/1
2.5 TABLET ORAL EVERY 6 HOURS PRN
Qty: 30 TABLET | Refills: 0 | DISCHARGE
Start: 2023-07-21

## 2023-07-21 RX ADMIN — BACLOFEN 10 MG: 10 TABLET ORAL at 09:34

## 2023-07-21 RX ADMIN — FLUTICASONE PROPIONATE 1 SPRAY: 50 SPRAY, METERED NASAL at 09:35

## 2023-07-21 RX ADMIN — ACETAMINOPHEN 650 MG: 325 TABLET ORAL at 13:02

## 2023-07-21 RX ADMIN — POLYETHYLENE GLYCOL 3350 17 G: 17 POWDER, FOR SOLUTION ORAL at 09:35

## 2023-07-21 RX ADMIN — APIXABAN 5 MG: 5 TABLET, FILM COATED ORAL at 09:35

## 2023-07-21 RX ADMIN — SODIUM CHLORIDE, POTASSIUM CHLORIDE, SODIUM LACTATE AND CALCIUM CHLORIDE: 600; 310; 30; 20 INJECTION, SOLUTION INTRAVENOUS at 07:40

## 2023-07-21 RX ADMIN — LUBIPROSTONE 8 MCG: 8 CAPSULE, GELATIN COATED ORAL at 12:24

## 2023-07-21 RX ADMIN — LEVOTHYROXINE SODIUM 88 MCG: 0.09 TABLET ORAL at 06:19

## 2023-07-21 ASSESSMENT — PAIN DESCRIPTION - ORIENTATION: ORIENTATION: MID

## 2023-07-21 ASSESSMENT — PAIN DESCRIPTION - LOCATION: LOCATION: BACK

## 2023-07-21 ASSESSMENT — PAIN DESCRIPTION - DESCRIPTORS
DESCRIPTORS: DISCOMFORT
DESCRIPTORS: ACHING

## 2023-07-21 ASSESSMENT — PAIN DESCRIPTION - ONSET: ONSET: GRADUAL

## 2023-07-21 ASSESSMENT — PAIN DESCRIPTION - FREQUENCY: FREQUENCY: CONTINUOUS

## 2023-07-21 ASSESSMENT — PAIN SCALES - WONG BAKER: WONGBAKER_NUMERICALRESPONSE: 8

## 2023-07-21 ASSESSMENT — PAIN SCALES - GENERAL: PAINLEVEL_OUTOF10: 10

## 2023-07-21 ASSESSMENT — PAIN DESCRIPTION - PAIN TYPE: TYPE: CHRONIC PAIN

## 2023-07-21 NOTE — DISCHARGE SUMMARY
Discharge Summary       PATIENT ID: Mayelin Roberts  MRN: [id number]   YOB: 1965    DATE OF ADMISSION: 7/17/2023  2:30 PM    DATE OF DISCHARGE: 7/21/23   PRIMARY CARE PROVIDER: Jordyn Joyce MD     ATTENDING PHYSICIAN: Yoshi Rogers MD  DISCHARGING PROVIDER: Yoshi Rogers MD    To contact this individual call [phone] and ask the  to page. If unavailable ask to be transferred the Adult Hospitalist Department. CONSULTATIONS: IP CONSULT TO GI  IP CONSULT TO ORTHOPEDIC SURGERY  IP CONSULT TO ONCOLOGY  IP CONSULT TO NEUROLOGY  IP CONSULT TO PSYCHIATRY    PROCEDURES/SURGERIES: * No surgery found *    ADMITTING DIAGNOSES & HOSPITAL COURSE:   This 58-year-old woman with past medical history significant for venous thromboembolism, on anticoagulation, dyslipidemia, hypothyroidism, hypertension presented at the emergency room from the nursing home with change in mental status. The patient was having an outburst which included yelling out periodically and restless and agitated. The patient had a similar presentation and was admitted to this hospital from 06/17/2023 to 06/27/2023. During that hospitalization, the patient was found to have acute stroke confirmed with MRI of the brain. The patient was also treated for pneumonia. The patient's change in mental status were attributed to the urinary tract infection and pneumonia as well as the acute stroke. The patient was discharged back to the nursing home. She was doing relatively well at the nursing home until the day of her presentation at the emergency room when the patient developed change in mental status. When the patient arrived at the emergency room, the patient was hypotensive and this responded to fluid therapy.   The CT scan of the abdomen and pelvis shows large stool burden concerning for fecal impaction. A chest x-ray shows evidence of pneumonia. The patient was started on antibiotics and was referred to the hospitalist service for admission. DISCHARGE DIAGNOSES / PLAN:      Metabolic encephalopathy  Anoxic brain injury  Episodes of agitation and outbursts, likely due to previous anoxic brain injury. Hospital workup negative for acute conditions  Could be from combination of psych meds that she was on   Improved after being taken off of her medications  Psych has seen and recommend PRN zyprexa  Home meds, but decreased dose of zyprexa     Suspected PNA, ruled out      Hypotension. Stop metoprolol  Recommend evaluation at the facility and restarting as indicated     History of venous thromboembolism  We will continue Eliquis for anticoagulation     Dyslipidemia    We will continue Lipitor. Right hip lesion. Orthopedic consult will be requested --recommend outpatient follow-up PT OT no acute need for surgical intervention      Hypothyroidism. We will continue with Synthroid and check TSH level. Fecal impaction. Started on Amitiza continue supportive care      Left adrenal nodule. This is concerning for metastasis of unknown primary. Outpatient follow-up once out of rehab Recommend repeat non-contrast CT A/P in 6 months to evaluate for stability of adrenal nodule. This can be done with patient's PCP. seen by oncology       PENDING TEST RESULTS:   At the time of discharge the following test results are still pending: none    FOLLOW UP APPOINTMENTS:    [unfilled]     ADDITIONAL CARE RECOMMENDATIONS: follow up blood pressure and restart metoprolol as indicated. Left adrenal nodule. This is concerning for metastasis of unknown primary. Outpatient follow-up once out of rehab Recommend repeat non-contrast CT A/P in 6 months to evaluate for stability of adrenal nodule. This can be done with patient's PCP.    seen by oncology    DIET: regular diet    ACTIVITY:

## 2023-07-21 NOTE — CARE COORDINATION
Transition of Care Plan:    RUR: 21% High   Prior Level of Functioning: Total care   Disposition: Return to [facility]   If SNF or IPR: Date FOC offered: FOC was offered at previous admission, Gray Mello started: Wednesday 07/19 for return to SNF   Follow up appointments: AVS  DME needed: None   Transportation at discharge: [facility] transport. Private pay   IM/IMM Medicare/ letter given: n/a  Caregiver Contact: Carlos Momin   Discharge Caregiver contacted prior to discharge? Yes, present at time of dc/transport planning   Care Conference needed? No  Barriers to discharge:     Pt discussed in IMCU rounds. Pt medically ready for dc today. [facility] set [facility] with [facility] at request of pt's mother. Pt's mother to pay $72.46 by check at time of . Wheelchair transport scheduled for 13:00 today. [facility] [phone]. Facesheet with report call # [phone] is on hard chart. RN and mother updated. Sheeba [phone] Liaison [facility], including [facility], confirmed José Miguel Garcia was received 07/20.                07/19/23 1255   Service Assessment   Patient Orientation Unable to Assess   Cognition   ([facility])   History Provided By Medical Record   Primary Caregiver Other (Comment)  (Rehab facility and parents)   Accompanied By/Relationship n/a   Support Systems Parent   Patient's Healthcare Decision Maker is: Named in [address]    PCP Verified by CM Yes   Last Visit to PCP Within last 3 months   Prior Functional Level Assistance with the following:;Bathing;Dressing; Toileting;Feeding;Cooking;Housework; Shopping;Mobility   Current Functional Level Assistance with the following:;Bathing;Dressing; Toileting;Feeding;Cooking;Housework; Shopping;Mobility   Can patient return to prior living arrangement Yes   Ability to make needs known: Unable   Family able to assist with home care needs: Yes   Would you like for me to discuss the discharge plan with any other family members/significant others, and if so, who?  Yes  (Pt's mother Sara Overton [phone])   Financial Resources Other (Comment)  ([facility])   Community Resources Institutional Placement  (Rehab SNF)   Social/Functional History   Lives With Other (comment)  (SNF rehab)   Transfer Assistance Needs assistance   Active  No   Discharge Planning   Type of Residence Other (Comment)  (SNF)   Potential Assistance Purchasing Medications No   Patient expects to be discharged to: [address] [place] [address],[address], [facility]

## 2023-07-22 LAB
BACTERIA SPEC CULT: NORMAL
BACTERIA SPEC CULT: NORMAL
SERVICE CMNT-IMP: NORMAL
SERVICE CMNT-IMP: NORMAL

## 2023-07-24 ENCOUNTER — CARE COORDINATION (OUTPATIENT)
Dept: OTHER | Facility: CLINIC | Age: 58
End: 2023-07-24

## 2023-07-24 NOTE — CARE COORDINATION
ACM contacted the patient's guardian to follow up on progress, discuss new issues or concerns, and reinforce/provide patient education. Summary Note: Spoke with the patient's mother/guardian [employer] She informed this ACM that the patient is back at [facility] and is doing much better now, after she was taken off of many of her medications. It was determined that the patient may have been yelling and acting out due to side effects of the Zyprexa. She is no longer taking it and is much happier. She did therapy this morning and is able to move around again. Ms. Carlo Beard was pleased. Discussed possible option of a home that was appropriate and available to her of that would cost her $5,000 per month. [facility] will be considered once the insurance stops paying for the nursing home or she is no longer benefiting from therapies. Reinforced/Provided Education:  Discussed red flags and appropriate site of care based on symptoms and resources available to patient including: PCP. Importance and benefits of: Follow up with PCP and specialist, medication adherence, self monitoring and reporting of symptoms. Plan:  Plan for follow-up call in 10-14 days based on severity of symptoms and risk factors. Plan for next call: symptom management-review current symptoms and treatments    Guardian  verbalized understanding and is agreeable to follow up call.

## 2023-08-03 ENCOUNTER — TELEPHONE (OUTPATIENT)
Age: 58
End: 2023-08-03

## 2023-08-03 NOTE — TELEPHONE ENCOUNTER
Patient's mother would like for the Dr. David Anaya or his nurse to call her. Ms. Malathi Duong is concerned about the medications that Brian Young is receiving from [facility]. She would like Dr. David Anaya to speak with the facility. Ms. Malathi Duong will have a list of Nicolette's medications faxed to the office.

## 2023-08-04 NOTE — TELEPHONE ENCOUNTER
S/w Ms. Orlando. Discussed notes being rec'd from [facility] and that Dr. Cruz Suarez was able to review them. Discussed per Dr. Cruz Suarez that this seems to be something that would be better handled by psychiatry. She states the psychiatrists told her that the screaming episodes were caused by a neurologic issue, not psychiatric. She is feeling as though she is just stuck in the middle and does not know where to turn. She states the UTI really caused a set back as pt was talking/more coherent until the UTI, then everything went down hill after starting alprazolam.  She states pt has now been off for one week but still screams \"I want my mom, I need my mom\" even with mom standing right in front of her. She is worried that [facility] is on the verge of dismissing pt and she has no idea where pt will go as she is \"78 years old and can't lift or care for her myself. I spend 4-5 hours a day with her for the past nine months and it really has worn me out\"    She wanted to see if Dr. Cruz Suarez would review the medications again and see if he feels they are appropriate for pt to take given her condition.

## 2023-08-04 NOTE — TELEPHONE ENCOUNTER
Returned call. Pt's mom was trying to get pt an appt to see Dr. Jaycee Conway. Discussed specific concerns as I notified her that he does not have availability for the next few months. She states pt had UTI last month (see tele encounter 7/13/23) and since then pt has screaming episodes. Usually pt would calm down once mother arrived as she sits with pt for about 4 hrs per day. At one point they rx'd alprazolam and since pt will scream even with mother there. She states psych evaluated pt and placed her on another medication (does not recall name) and when another physician came in he stated \"she should not take take, that will really mess her up\". She will have med list faxed to office. Recommended she have facility fax over physician notes as well. [place] [phone]. Requested med list, labs, recent psych consult to be faxed to us. Stated time frame would be about 30-40 min.

## 2023-08-07 ENCOUNTER — CARE COORDINATION (OUTPATIENT)
Dept: OTHER | Facility: CLINIC | Age: 58
End: 2023-08-07

## 2023-08-18 ENCOUNTER — APPOINTMENT (OUTPATIENT)
Facility: HOSPITAL | Age: 58
End: 2023-08-18
Payer: COMMERCIAL

## 2023-08-18 ENCOUNTER — HOSPITAL ENCOUNTER (EMERGENCY)
Facility: HOSPITAL | Age: 58
Discharge: HOME | End: 2023-08-18
Attending: EMERGENCY MEDICINE
Payer: COMMERCIAL

## 2023-08-18 VITALS
OXYGEN SATURATION: 93 % | SYSTOLIC BLOOD PRESSURE: 104 MMHG | TEMPERATURE: 98 F | RESPIRATION RATE: 19 BRPM | HEART RATE: 65 BPM | DIASTOLIC BLOOD PRESSURE: 62 MMHG
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
## 2023-08-18 PROCEDURE — 99284 EMERGENCY DEPT VISIT MOD MDM: CPT

## 2023-08-18 PROCEDURE — 70450 CT HEAD/BRAIN W/O DYE: CPT

## 2023-08-18 ASSESSMENT — ENCOUNTER SYMPTOMS: BACK PAIN: 0

## 2023-08-18 NOTE — ED NOTES
The patient left the Emergency Department with [facility], alert and oriented to her baseline which is [id number] in no acute distress. The patient was encouraged to call or return to the ED for worsening issues or problems and was encouraged to schedule a follow up appointment for continuing care. The patient verbalized understanding of discharge instructions and prescriptions, all questions were answered. The patient has no further concerns at this time.          Rhonda Johnson RN  08/18/23 7690

## 2023-08-18 NOTE — ED NOTES
The patient left the Emergency Department with [facility] ,Aox1 and in no acute distress. The patient was encouraged to call or return to the ED for worsening issues or problems and was encouraged to schedule a follow up appointment for continuing care. The patient verbalized understanding of discharge instructions and prescriptions, all questions were answered. The patient has no further concerns at this time. [facility] notified about patients return to facility.         Abdi Becerra RN  08/18/23 0230       Abdi Becerra RN  08/18/23 6259

## 2023-08-18 NOTE — ED PROVIDER NOTES
[facility] EMERGENCY DEP  EMERGENCY DEPARTMENT ENCOUNTER      Pt Name: Anabella Sage  MRN: [id number]  [address] [facility] [place] 1965  Date of evaluation: 8/18/2023  Provider: Abimbola Soto MD    CHIEF COMPLAINT       Chief Complaint   Patient presents with    Fall    Back Pain         HISTORY OF PRESENT ILLNESS   (Location/Symptom, Timing/Onset, Context/Setting, Quality, Duration, Modifying Factors, Severity)  Note limiting factors. Patient is a 27-year-old who comes into the emergency department after a fall. On arrival she initially complained of back pain that has since resolved. Currently the patient has no complaints and is resting comfortably. The history is provided by the patient. Review of External Medical Records:     Nursing Notes were reviewed. REVIEW OF SYSTEMS    (2-9 systems for level 4, 10 or more for level 5)     Review of Systems   Musculoskeletal:  Negative for arthralgias, back pain and neck pain. Neurological:  Negative for headaches. Psychiatric/Behavioral:  Negative for confusion. All other systems reviewed and are negative. Except as noted above the remainder of the review of systems was reviewed and negative.        PAST MEDICAL HISTORY     Past Medical History:   Diagnosis Date    Allergic rhinitis 07/29/2019    Cerebral artery occlusion with cerebral infarction ([address]) 8/27/2022    Depression     Dysphagia 9/27/2022    History of multiple allergies     History of vascular access device 10/07/2022    [facility] VAT: PICC placement R Cephalic length 40 cm for reliable access/TPN arm circ 35.5 cm    Memory disorder 9/27/2022    Movement disorder 9/27/2022    Muscle ache     Obesity 11/05/2012    Sinus pressure     Sinus problem          SURGICAL HISTORY       Past Surgical History:   Procedure Laterality Date    ANTERIOR CRUCIATE LIGAMENT REPAIR      left     CHOLECYSTECTOMY  01/01/1998    GI      colonoscopy-polyps    IR NONTUNNELED VASCULAR CATHETER  9/27/2022    IR NONTUNNELED signed)  Emergency Attending Physician / Physician Assistant / Nurse Practitioner              Leydi Corbin MD  08/18/23 0178

## 2023-08-18 NOTE — ED TRIAGE NOTES
Pt arrives via [facility] from [place] after an unwitnessed fall. Unknown if pt had head strike or LOC. Pt is on eliquis. Pt is at [facility] mental status per EMS. VSS in route.  BP: 116/24, HR: 105, 97% on RA

## 2023-08-18 NOTE — ED NOTES
HUMBERTO called and ETA for 0300. Elizabeth notified about patient returning.       Alicia Rinaldi RN  08/18/23 1170

## 2023-08-19 ENCOUNTER — APPOINTMENT (OUTPATIENT)
Facility: HOSPITAL | Age: 58
End: 2023-08-19
Payer: COMMERCIAL

## 2023-08-19 ENCOUNTER — HOSPITAL ENCOUNTER (EMERGENCY)
Facility: HOSPITAL | Age: 58
Discharge: NURSING, REHAB OR LONG-TERM CARE | End: 2023-08-19
Attending: STUDENT IN AN ORGANIZED HEALTH CARE EDUCATION/TRAINING PROGRAM
Payer: COMMERCIAL

## 2023-08-19 VITALS
DIASTOLIC BLOOD PRESSURE: 81 MMHG | HEART RATE: 85 BPM | SYSTOLIC BLOOD PRESSURE: 120 MMHG | OXYGEN SATURATION: 99 % | TEMPERATURE: 97.6 F | RESPIRATION RATE: 16 BRPM

## 2023-08-19 DIAGNOSIS — M79.671 RIGHT FOOT PAIN: ICD-10-CM

## 2023-08-19 DIAGNOSIS — M25.551 CHRONIC PAIN OF BOTH HIPS: ICD-10-CM
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
## 2023-08-19 DIAGNOSIS — G89.29 CHRONIC PAIN OF BOTH HIPS: ICD-10-CM

## 2023-08-19 DIAGNOSIS — M25.552 CHRONIC PAIN OF BOTH HIPS: ICD-10-CM

## 2023-08-19 DIAGNOSIS — M25.571 ACUTE RIGHT ANKLE PAIN: ICD-10-CM

## 2023-08-19 PROCEDURE — 72170 X-RAY EXAM OF PELVIS: CPT

## 2023-08-19 PROCEDURE — 73610 X-RAY EXAM OF ANKLE: CPT

## 2023-08-19 PROCEDURE — 73630 X-RAY EXAM OF FOOT: CPT

## 2023-08-19 PROCEDURE — 99283 EMERGENCY DEPT VISIT LOW MDM: CPT

## 2023-08-19 ASSESSMENT — PAIN DESCRIPTION - ORIENTATION: ORIENTATION: RIGHT

## 2023-08-19 ASSESSMENT — PAIN SCALES - GENERAL: PAINLEVEL_OUTOF10: 4

## 2023-08-19 ASSESSMENT — PAIN - FUNCTIONAL ASSESSMENT: PAIN_FUNCTIONAL_ASSESSMENT: 0-10

## 2023-08-19 ASSESSMENT — PAIN DESCRIPTION - LOCATION: LOCATION: LEG

## 2023-08-19 NOTE — DISCHARGE INSTRUCTIONS
You presents to the ED after a fall at a wheelchair. He was seen yesterday and you a CT scan of the head was done but he had no other complaints at that time. However today returned with concern of right ankle pain right foot pain and some bilateral hip pain. In discussion with your family you of chronic hip pain but x-rays were obtained of these areas of concern with no fractures identified. Recommend Tylenol 1000 mg every 6 hours. Follow-up with your PCP if pain continues for repeat x-rays and further evaluation.

## 2023-08-19 NOTE — ED NOTES
Discharge paperwork reviewed with pt. Dr. Sara Burch spoke with pt daughter via phone & updated daughter on plan to discharge.  Pt taken back to [facility] via [facility] transport/     Jacky Huntley RN  08/19/23 [id number]

## 2023-08-19 NOTE — ED TRIAGE NOTES
Pt arrives to ED from [facility] via [address] with c/o R upper leg pain after getting leg stuck in lock of wheelchair. Hx of anoxic brain injury and CVA.

## 2023-08-19 NOTE — ED PROVIDER NOTES
EMERGENCY DEPARTMENT PHYSICIAN NOTE     Patient: Mary Beth Peres     Time of Service: 8/19/2023  2:58 PM     Chief complaint:   Chief Complaint   Patient presents with    Fall    Leg Injury        HISTORY:  Patient is a 59 y.o. female who presents to the emergency department with complaints of right foot and right ankle pain after a fall yesterday. Patient also reporting hip pain. Family at bedside. Patient has history of TBI/brain injury. Difficult history. Per chart review patient was seen yesterday for the fall as well and had a CT scan of her head due to blood thinner use that was unremarkable for bleeds. Patient awake alert in no acute distress. Vital signs stable. Discussed patient with her mother who was concerned about no calls about the visit yesterday and the transport home by ambulance. No obvious deformity mild tenderness on exam.  We will get plain films. Past Medical History:   Diagnosis Date    Allergic rhinitis 07/29/2019    Cerebral artery occlusion with cerebral infarction ([address]) 8/27/2022    Depression     Dysphagia 9/27/2022    History of multiple allergies     History of vascular access device 10/07/2022    [facility] VAT: PICC placement R Cephalic length 40 cm for reliable access/TPN arm circ 35.5 cm    Memory disorder 9/27/2022    Movement disorder 9/27/2022    Muscle ache     Obesity 11/05/2012    Sinus pressure     Sinus problem         Past Surgical History:   Procedure Laterality Date    ANTERIOR CRUCIATE LIGAMENT REPAIR      left     CHOLECYSTECTOMY  01/01/1998    GI      colonoscopy-polyps    IR NONTUNNELED VASCULAR CATHETER  9/27/2022    IR NONTUNNELED VASCULAR CATHETER 9/27/2022 [facility] RAD ANGIO IR    IR NONTUNNELED VASCULAR CATHETER  09/27/2022        Family History   Problem Relation Age of Onset    Diabetes Paternal Grandfather         Social History     Socioeconomic History    Marital status:       Spouse name: None    Number of children: None    Years of education:

## 2023-08-22 ENCOUNTER — CARE COORDINATION (OUTPATIENT)
Dept: OTHER | Facility: CLINIC | Age: 58
End: 2023-08-22

## 2023-08-22 SDOH — ECONOMIC STABILITY: HOUSING INSECURITY
IN THE LAST 12 MONTHS, WAS THERE A TIME WHEN YOU DID NOT HAVE A STEADY PLACE TO SLEEP OR SLEPT IN A SHELTER (INCLUDING NOW)?: NO

## 2023-08-22 SDOH — ECONOMIC STABILITY: TRANSPORTATION INSECURITY
IN THE PAST 12 MONTHS, HAS LACK OF TRANSPORTATION KEPT YOU FROM MEETINGS, WORK, OR FROM GETTING THINGS NEEDED FOR DAILY LIVING?: NO

## 2023-08-22 SDOH — ECONOMIC STABILITY: TRANSPORTATION INSECURITY
IN THE PAST 12 MONTHS, HAS THE LACK OF TRANSPORTATION KEPT YOU FROM MEDICAL APPOINTMENTS OR FROM GETTING MEDICATIONS?: NO

## 2023-08-22 SDOH — ECONOMIC STABILITY: INCOME INSECURITY: IN THE LAST 12 MONTHS, WAS THERE A TIME WHEN YOU WERE NOT ABLE TO PAY THE MORTGAGE OR RENT ON TIME?: NO

## 2023-08-22 ASSESSMENT — SOCIAL DETERMINANTS OF HEALTH (SDOH)
IN A TYPICAL WEEK, HOW MANY TIMES DO YOU TALK ON THE PHONE WITH FAMILY, FRIENDS, OR NEIGHBORS?: MORE THAN THREE TIMES A WEEK
WITHIN THE LAST YEAR, HAVE TO BEEN RAPED OR FORCED TO HAVE ANY KIND OF SEXUAL ACTIVITY BY YOUR PARTNER OR EX-PARTNER?: NO
HOW OFTEN DO YOU GET TOGETHER WITH FRIENDS OR RELATIVES?: NEVER
WITHIN THE LAST YEAR, HAVE YOU BEEN AFRAID OF YOUR PARTNER OR EX-PARTNER?: NO
DO YOU BELONG TO ANY CLUBS OR ORGANIZATIONS SUCH AS CHURCH GROUPS UNIONS, FRATERNAL OR ATHLETIC GROUPS, OR SCHOOL GROUPS?: NO
WITHIN THE LAST YEAR, HAVE YOU BEEN HUMILIATED OR EMOTIONALLY ABUSED IN OTHER WAYS BY YOUR PARTNER OR EX-PARTNER?: NO
HOW OFTEN DO YOU ATTENT MEETINGS OF THE CLUB OR ORGANIZATION YOU BELONG TO?: NEVER
WITHIN THE LAST YEAR, HAVE YOU BEEN KICKED, HIT, SLAPPED, OR OTHERWISE PHYSICALLY HURT BY YOUR PARTNER OR EX-PARTNER?: NO

## 2023-08-22 ASSESSMENT — LIFESTYLE VARIABLES: HOW MANY STANDARD DRINKS CONTAINING ALCOHOL DO YOU HAVE ON A TYPICAL DAY: PATIENT DOES NOT DRINK

## 2023-08-22 NOTE — CARE COORDINATION
Ambulatory Care Coordination Note  2023    Patient Current Location:  Home: [address] [place] [address]    ACM contacted the parent by telephone. Verified name and  with parent as identifiers. Provided introduction to self, and explanation of the ACM role. Challenges to be reviewed by the provider   Additional needs identified to be addressed with provider: No  none               Method of communication with provider: none. ACM: Liz Carranza, RN    Spoke with the patient's mother and guardian. She states that she tried to leave town for a few days and was called back because the patient was throwing herself out of bed. She did go to the ED, no fractures or serious injury noted. Per the patient's guardian, she is black and blue from bruising. The patient's mother is looking into possible brain injury treatment centers in the area. The patient's mother is working with the patient's providers and discussing the possible use of medical marijuana to help control some of her behaviors, such as screaming out at night and throwing herself on the floor. Lab Results       None            Care Coordination Interventions    Referral from Primary Care Provider: No  Suggested Interventions and Community Resources          Goals Addressed    None         No future appointments.

## 2023-08-28 ENCOUNTER — CARE COORDINATION (OUTPATIENT)
Dept: OTHER | Facility: CLINIC | Age: 58
End: 2023-08-28

## 2023-09-06 ENCOUNTER — CARE COORDINATION (OUTPATIENT)
Dept: OTHER | Facility: CLINIC | Age: 58
End: 2023-09-06

## 2023-09-06 NOTE — CARE COORDINATION
Ambulatory Care Coordination Note  2023    Patient Current Location:  Home: [address] [place] [address] [address] [place] [address] Road [facility]    ACM contacted the parent and guardian  by telephone. Verified name and  with parent as identifiers. Provided introduction to self, and explanation of the ACM role. ACM: Virgilio Roe RN    Challenges to be reviewed by the provider   Additional needs identified to be addressed with provider: No  none               Method of communication with provider: none. Spoke with the patient's mother who was sitting next to the patient. She reports that she is back at [facility] and is doing well in therapy. This ACM could hear the patient in the background, talking very clearly. Per her mother/guardian she is more alert and is feeling like she has taken a step forward. She is now using her COBRA for insurance coverage. They are assessing her every 2 weeks to determine whether she meets criteria to be covered by insurance. Patient's mother reports no current needs. Will outreach again to assess continued needs for care management services. No future appointments.

## 2023-09-27 ENCOUNTER — CARE COORDINATION (OUTPATIENT)
Dept: OTHER | Facility: CLINIC | Age: 58
End: 2023-09-27

## 2023-09-27 NOTE — CARE COORDINATION
Patient has graduated from the Complex Case Management  program on 9/27/23. Patient/family has the ability to self-manage at this time. Care management goals have been completed. No further Ambulatory Care Manager follow up scheduled. Goals Addressed                      This Visit's Progress       Patient Stated      COMPLETED: Patient guardian will be aware of long term care options (pt-stated)         Patient guardian will be aware of long term care options by 7/15/23    Barriers: financial  Plan for overcoming my barriers: Determine if there are funds available to help afford long term care  Confidence: 8/10  Anticipated Goal Completion Date: 8/15/23              Patient's mother has Ambulatory Care Manager's contact information for any further questions, concerns, or needs. Patients upcoming visits:  No future appointments.

## 2023-10-04 ENCOUNTER — TELEPHONE (OUTPATIENT)
Age: 58
End: 2023-10-04

## 2023-11-27 ENCOUNTER — OFFICE VISIT (OUTPATIENT)
Age: 58
End: 2023-11-27
Payer: COMMERCIAL

## 2023-11-27 VITALS
HEART RATE: 65 BPM | OXYGEN SATURATION: 97 % | RESPIRATION RATE: 18 BRPM | HEIGHT: 64 IN | WEIGHT: 158 LBS | BODY MASS INDEX: 26.98 KG/M2 | DIASTOLIC BLOOD PRESSURE: 78 MMHG | SYSTOLIC BLOOD PRESSURE: 120 MMHG

## 2023-11-27 DIAGNOSIS — J31.0 CHRONIC RHINITIS: Primary | ICD-10-CM

## 2023-11-27 DIAGNOSIS — J30.9 ALLERGIC RHINITIS, UNSPECIFIED SEASONALITY, UNSPECIFIED TRIGGER: ICD-10-CM

## 2023-11-27 DIAGNOSIS — R09.89 CHRONIC SNIFFLING: ICD-10-CM

## 2023-11-27 DIAGNOSIS — R09.82 PND (POST-NASAL DRIP): ICD-10-CM

## 2023-11-27 PROCEDURE — 99203 OFFICE O/P NEW LOW 30 MIN: CPT | Performed by: OTOLARYNGOLOGY

## 2023-11-27 RX ORDER — HYDROCORTISONE ACETATE 25 MG/1
SUPPOSITORY RECTAL
COMMUNITY
Start: 2023-11-22

## 2023-11-27 ASSESSMENT — ENCOUNTER SYMPTOMS
STRIDOR: 0
APNEA: 0
SHORTNESS OF BREATH: 0
SINUS PRESSURE: 0
SINUS PAIN: 0
WHEEZING: 0
TROUBLE SWALLOWING: 0
BACK PAIN: 0
SORE THROAT: 0
CHOKING: 0
NAUSEA: 0
ABDOMINAL PAIN: 0
EYE ITCHING: 0
VOMITING: 0
COUGH: 0
VOICE CHANGE: 0
PHOTOPHOBIA: 0
EYE DISCHARGE: 0

## 2023-11-27 NOTE — PROGRESS NOTES
with any of our nose and sinus interventions. Orders Placed This Encounter    hydrocortisone (ANUSOL-HC) 25 MG suppository         No follow-ups on file. Chauncey Langford MD, [address] [facility] All[facility]    [address] [facility] [address]  [facility], [address] [place] [address] [address] [phone]

## 2024-01-05 RX ORDER — LUBIPROSTONE 8 UG/1
CAPSULE ORAL
Qty: 60 CAPSULE | Refills: 0 | OUTPATIENT
Start: 2024-01-05

## 2024-01-05 RX ORDER — ZIPRASIDONE HYDROCHLORIDE 20 MG/1
CAPSULE ORAL
Qty: 60 CAPSULE | Refills: 0 | OUTPATIENT
Start: 2024-01-05

## 2024-01-05 RX ORDER — APIXABAN 5 MG/1
TABLET, FILM COATED ORAL
Qty: 60 TABLET | Refills: 0 | OUTPATIENT
Start: 2024-01-05

## 2024-01-05 RX ORDER — BACLOFEN 10 MG/1
TABLET ORAL
Qty: 90 TABLET | Refills: 0 | OUTPATIENT
Start: 2024-01-05

## 2024-01-05 RX ORDER — LEVOTHYROXINE SODIUM 88 UG/1
TABLET ORAL
Qty: 30 TABLET | Refills: 0 | OUTPATIENT
Start: 2024-01-05

## 2024-01-05 RX ORDER — LORATADINE 10 MG/1
TABLET ORAL
Qty: 30 TABLET | Refills: 0 | OUTPATIENT
Start: 2024-01-05

## 2024-01-06 ENCOUNTER — HOSPITAL ENCOUNTER (EMERGENCY)
Facility: HOSPITAL | Age: 59
Discharge: HOME | End: 2024-01-06
Attending: EMERGENCY MEDICINE
Payer: COMMERCIAL

## 2024-01-06 VITALS
DIASTOLIC BLOOD PRESSURE: 69 MMHG | TEMPERATURE: 99.1 F | WEIGHT: 153 LBS | RESPIRATION RATE: 16 BRPM | OXYGEN SATURATION: 97 % | SYSTOLIC BLOOD PRESSURE: 101 MMHG | HEIGHT: 64 IN | BODY MASS INDEX: 26.12 KG/M2 | HEART RATE: 87 BPM

## 2024-01-06 DIAGNOSIS — R04.0 EPISTAXIS: Primary | ICD-10-CM

## 2024-01-06 PROCEDURE — 99283 EMERGENCY DEPT VISIT LOW MDM: CPT

## 2024-01-06 PROCEDURE — 6370000000 HC RX 637 (ALT 250 FOR IP): Performed by: EMERGENCY MEDICINE

## 2024-01-06 PROCEDURE — C9046 COCAINE HCL NASAL SOLUTION: HCPCS | Performed by: EMERGENCY MEDICINE

## 2024-01-06 RX ORDER — ACETAMINOPHEN 325 MG/1
650 TABLET ORAL
Status: DISCONTINUED | OUTPATIENT
Start: 2024-01-06 | End: 2024-01-06

## 2024-01-06 RX ORDER — OXYMETAZOLINE HYDROCHLORIDE 0.05 G/100ML
2 SPRAY NASAL
Status: COMPLETED | OUTPATIENT
Start: 2024-01-06 | End: 2024-01-06

## 2024-01-06 RX ORDER — COCAINE HYDROCHLORIDE 40 MG/ML
40 SOLUTION NASAL
Status: COMPLETED | OUTPATIENT
Start: 2024-01-06 | End: 2024-01-06

## 2024-01-06 RX ADMIN — COCAINE HYDROCHLORIDE NASAL 40 MG: 40 SOLUTION TOPICAL at 18:20

## 2024-01-06 RX ADMIN — Medication 2 SPRAY: at 17:12

## 2024-01-06 ASSESSMENT — LIFESTYLE VARIABLES
HOW MANY STANDARD DRINKS CONTAINING ALCOHOL DO YOU HAVE ON A TYPICAL DAY: PATIENT DOES NOT DRINK
HOW OFTEN DO YOU HAVE A DRINK CONTAINING ALCOHOL: NEVER

## 2024-01-06 NOTE — ED PROVIDER NOTES
[facility] EMERGENCY DEPT  EMERGENCY DEPARTMENT HISTORY AND PHYSICAL EXAM      Date: 1/6/2024  Patient Name: Nicolette Germain  MRN: [id number]  Birthdate 1965  Date of evaluation: 1/6/2024  Provider: Jeffery Lewis MD   Note Started: 5:08 PM EST 1/6/24    HISTORY OF PRESENT ILLNESS     Chief Complaint   Patient presents with    Epistaxis     Pt lives at the [facility] across the street. Pt mother brought pt in due to having nose bleeds x two days. Pt denies injury. Pt states apixaban. Pt has hx of anoxic brain injury.        History Provided By: Patient    HPI: Nicolette Germain is a 58 y.o. female with stroke within the past 2 years who presents with bloody nose for the past 2 days.  Is just been a slow ooze.  Is been treated with direct pressure without complete relief of her symptoms.  Patient lives at [facility] across the [place] and is being cared for by her mother.  She denies any injury but she is on apixaban.  Her most recent history was anoxic brain injury    PAST MEDICAL HISTORY   Past Medical History:  Past Medical History:   Diagnosis Date    Allergic rhinitis 07/29/2019    Cerebral artery occlusion with cerebral infarction (HCC) 8/27/2022    Depression     Dysphagia 9/27/2022    Fractures     History of multiple allergies     History of vascular access device 10/07/2022    [facility] VAT: PICC placement R Cephalic length 40 cm for reliable access/TPN arm circ 35.5 cm    Hypercholesteremia     Hypertension     Memory disorder 9/27/2022    Movement disorder 9/27/2022    Muscle ache     Obesity 11/05/2012    Sinus pressure     Sinus problem     Stroke (HCC)     Thyroid disease        Past Surgical History:  Past Surgical History:   Procedure Laterality Date    ANTERIOR CRUCIATE LIGAMENT REPAIR      left     CHOLECYSTECTOMY  01/01/1998    GI      colonoscopy-polyps    IR NONTUNNELED VASCULAR CATHETER  09/27/2022    IR NONTUNNELED VASCULAR CATHETER 9/27/2022 [facility] RAD ANGIO IR    IR NONTUNNELED VASCULAR CATHETER  09/27/2022     Marital Status:    Intimate Partner Violence: Not At Risk (8/22/2023)    Humiliation, Afraid, Rape, and Kick questionnaire     Fear of Current or Ex-Partner: No     Emotionally Abused: No     Physically Abused: No     Sexually Abused: No   Depression: Not at risk (2/22/2023)    PHQ-2     PHQ-2 Score: 0   Housing Stability: Unknown (8/22/2023)    Housing Stability Vital Sign     Unable to Pay for Housing in the Last Year: No     Number of Places Lived in the Last Year: Not on file     Unstable Housing in the Last Year: No   Interpersonal Safety: Not At Risk (8/22/2023)    Humiliation, Afraid, Rape, and Kick questionnaire     Fear of Current or Ex-Partner: No     Emotionally Abused: No     Physically Abused: No     Sexually Abused: No   Utilities: Not on file     PHYSICAL EXAM   Physical Exam  Constitutional:       Appearance: Normal appearance.   HENT:      Head: Normocephalic and atraumatic.      Right Ear: External ear normal.      Left Ear: External ear normal.      Nose: Nose normal.      Comments: Blood coming from left nare     Mouth/Throat:      Mouth: Mucous membranes are moist.   Eyes:      Extraocular Movements: Extraocular movements intact.      Conjunctiva/sclera: Conjunctivae normal.      Pupils: Pupils are equal, round, and reactive to light.   Cardiovascular:      Rate and Rhythm: Normal rate and regular rhythm.      Pulses: Normal pulses.      Heart sounds: Normal heart sounds.   Pulmonary:      Effort: Pulmonary effort is normal.      Breath sounds: Normal breath sounds.   Abdominal:      General: Abdomen is flat. Bowel sounds are normal.      Palpations: Abdomen is soft.   Musculoskeletal:         General: No swelling, tenderness or signs of injury. Normal range of motion.      Cervical back: Normal range of motion and neck supple.   Skin:     General: Skin is warm and dry.      Capillary Refill: Capillary refill takes less than 2 seconds.   Neurological:      General: No focal deficit

## 2024-02-10 ENCOUNTER — HOSPITAL ENCOUNTER (INPATIENT)
Facility: HOSPITAL | Age: 59
LOS: 3 days | Discharge: HOME | DRG: 065 | End: 2024-02-13
Attending: EMERGENCY MEDICINE | Admitting: GENERAL ACUTE CARE HOSPITAL
Payer: COMMERCIAL

## 2024-02-10 ENCOUNTER — APPOINTMENT (OUTPATIENT)
Facility: HOSPITAL | Age: 59
DRG: 065 | End: 2024-02-10
Payer: COMMERCIAL

## 2024-02-10 DIAGNOSIS — N30.00 ACUTE CYSTITIS WITHOUT HEMATURIA: ICD-10-CM

## 2024-02-10 DIAGNOSIS — R29.810 FACIAL DROOP: Primary | ICD-10-CM

## 2024-02-10 PROBLEM — I63.9 ACUTE CEREBROVASCULAR ACCIDENT (CVA) DUE TO ISCHEMIA (HCC): Status: ACTIVE | Noted: 2024-02-10

## 2024-02-10 LAB
ALBUMIN SERPL-MCNC: 3.4 G/DL (ref 3.5–5)
ALBUMIN/GLOB SERPL: 0.9 (ref 1.1–2.2)
ALP SERPL-CCNC: 62 U/L (ref 45–117)
ALT SERPL-CCNC: 20 U/L (ref 12–78)
ANION GAP BLD CALC-SCNC: 9 (ref 10–20)
ANION GAP SERPL CALC-SCNC: 3 MMOL/L (ref 5–15)
APPEARANCE UR: CLEAR
AST SERPL-CCNC: 11 U/L (ref 15–37)
BACTERIA URNS QL MICRO: ABNORMAL /HPF
BASE EXCESS BLD CALC-SCNC: 3.9 MMOL/L
BASOPHILS # BLD: 0.1 K/UL (ref 0–0.1)
BASOPHILS NFR BLD: 1 % (ref 0–1)
BILIRUB SERPL-MCNC: 0.4 MG/DL (ref 0.2–1)
BILIRUB UR QL: NEGATIVE
BUN SERPL-MCNC: 14 MG/DL (ref 6–20)
BUN/CREAT SERPL: 16 (ref 12–20)
CA-I BLD-MCNC: 1.29 MMOL/L (ref 1.12–1.32)
CALCIUM SERPL-MCNC: 9.3 MG/DL (ref 8.5–10.1)
CHLORIDE BLD-SCNC: 104 MMOL/L (ref 100–108)
CHLORIDE SERPL-SCNC: 109 MMOL/L (ref 97–108)
CO2 BLD-SCNC: 30 MMOL/L (ref 19–24)
CO2 SERPL-SCNC: 31 MMOL/L (ref 21–32)
COLOR UR: ABNORMAL
CREAT SERPL-MCNC: 0.87 MG/DL (ref 0.55–1.02)
CREAT UR-MCNC: 0.8 MG/DL (ref 0.6–1.3)
DIFFERENTIAL METHOD BLD: NORMAL
EOSINOPHIL # BLD: 0.1 K/UL (ref 0–0.4)
EOSINOPHIL NFR BLD: 1 % (ref 0–7)
EPITH CASTS URNS QL MICRO: ABNORMAL /LPF
ERYTHROCYTE [DISTWIDTH] IN BLOOD BY AUTOMATED COUNT: 13.2 % (ref 11.5–14.5)
GLOBULIN SER CALC-MCNC: 3.7 G/DL (ref 2–4)
GLUCOSE BLD STRIP.AUTO-MCNC: 72 MG/DL (ref 74–106)
GLUCOSE BLD STRIP.AUTO-MCNC: 99 MG/DL (ref 65–117)
GLUCOSE SERPL-MCNC: 80 MG/DL (ref 65–100)
GLUCOSE UR STRIP.AUTO-MCNC: NEGATIVE MG/DL
HCO3 BLDA-SCNC: 31 MMOL/L
HCT VFR BLD AUTO: 37.6 % (ref 35–47)
HGB BLD-MCNC: 12 G/DL (ref 11.5–16)
HGB UR QL STRIP: NEGATIVE
HYALINE CASTS URNS QL MICRO: ABNORMAL /LPF (ref 0–2)
IMM GRANULOCYTES # BLD AUTO: 0 K/UL (ref 0–0.04)
IMM GRANULOCYTES NFR BLD AUTO: 0 % (ref 0–0.5)
KETONES UR QL STRIP.AUTO: NEGATIVE MG/DL
LACTATE BLD-SCNC: 1.2 MMOL/L (ref 0.4–2)
LEUKOCYTE ESTERASE UR QL STRIP.AUTO: ABNORMAL
LYMPHOCYTES # BLD: 2.4 K/UL (ref 0.8–3.5)
LYMPHOCYTES NFR BLD: 38 % (ref 12–49)
MCH RBC QN AUTO: 29.6 PG (ref 26–34)
MCHC RBC AUTO-ENTMCNC: 31.9 G/DL (ref 30–36.5)
MCV RBC AUTO: 92.8 FL (ref 80–99)
MONOCYTES # BLD: 0.6 K/UL (ref 0–1)
MONOCYTES NFR BLD: 9 % (ref 5–13)
NEUTS SEG # BLD: 3.1 K/UL (ref 1.8–8)
NEUTS SEG NFR BLD: 51 % (ref 32–75)
NITRITE UR QL STRIP.AUTO: NEGATIVE
NRBC # BLD: 0 K/UL (ref 0–0.01)
NRBC BLD-RTO: 0 PER 100 WBC
PCO2 BLDV: 53.7 MMHG (ref 41–51)
PH BLDV: 7.37 (ref 7.32–7.42)
PH UR STRIP: 7.5 (ref 5–8)
PLATELET # BLD AUTO: 216 K/UL (ref 150–400)
PMV BLD AUTO: 11.3 FL (ref 8.9–12.9)
PO2 BLDV: <27 MMHG (ref 25–40)
POTASSIUM BLD-SCNC: 4.1 MMOL/L (ref 3.5–5.5)
POTASSIUM SERPL-SCNC: 3.9 MMOL/L (ref 3.5–5.1)
PROCALCITONIN SERPL-MCNC: <0.05 NG/ML
PROT SERPL-MCNC: 7.1 G/DL (ref 6.4–8.2)
PROT UR STRIP-MCNC: NEGATIVE MG/DL
RBC # BLD AUTO: 4.05 M/UL (ref 3.8–5.2)
RBC #/AREA URNS HPF: ABNORMAL /HPF (ref 0–5)
SERVICE CMNT-IMP: NORMAL
SODIUM BLD-SCNC: 143 MMOL/L (ref 136–145)
SODIUM SERPL-SCNC: 143 MMOL/L (ref 136–145)
SP GR UR REFRACTOMETRY: 1.01
SPECIMEN SITE: ABNORMAL
TROPONIN I SERPL HS-MCNC: <4 NG/L (ref 0–51)
URINE CULTURE IF INDICATED: ABNORMAL
UROBILINOGEN UR QL STRIP.AUTO: 0.2 EU/DL (ref 0.2–1)
WBC # BLD AUTO: 6.2 K/UL (ref 3.6–11)
WBC URNS QL MICRO: ABNORMAL /HPF (ref 0–4)

## 2024-02-10 PROCEDURE — 82947 ASSAY GLUCOSE BLOOD QUANT: CPT

## 2024-02-10 PROCEDURE — 80053 COMPREHEN METABOLIC PANEL: CPT

## 2024-02-10 PROCEDURE — 85025 COMPLETE CBC W/AUTO DIFF WBC: CPT

## 2024-02-10 PROCEDURE — 84132 ASSAY OF SERUM POTASSIUM: CPT

## 2024-02-10 PROCEDURE — 6370000000 HC RX 637 (ALT 250 FOR IP): Performed by: GENERAL ACUTE CARE HOSPITAL

## 2024-02-10 PROCEDURE — 84484 ASSAY OF TROPONIN QUANT: CPT

## 2024-02-10 PROCEDURE — 81001 URINALYSIS AUTO W/SCOPE: CPT

## 2024-02-10 PROCEDURE — 36415 COLL VENOUS BLD VENIPUNCTURE: CPT

## 2024-02-10 PROCEDURE — 99285 EMERGENCY DEPT VISIT HI MDM: CPT

## 2024-02-10 PROCEDURE — 6370000000 HC RX 637 (ALT 250 FOR IP)

## 2024-02-10 PROCEDURE — 82330 ASSAY OF CALCIUM: CPT

## 2024-02-10 PROCEDURE — 84145 PROCALCITONIN (PCT): CPT

## 2024-02-10 PROCEDURE — 93005 ELECTROCARDIOGRAM TRACING: CPT | Performed by: EMERGENCY MEDICINE

## 2024-02-10 PROCEDURE — 2580000003 HC RX 258: Performed by: EMERGENCY MEDICINE

## 2024-02-10 PROCEDURE — 6360000002 HC RX W HCPCS: Performed by: EMERGENCY MEDICINE

## 2024-02-10 PROCEDURE — 1100000000 HC RM PRIVATE

## 2024-02-10 PROCEDURE — 70450 CT HEAD/BRAIN W/O DYE: CPT

## 2024-02-10 PROCEDURE — 87040 BLOOD CULTURE FOR BACTERIA: CPT

## 2024-02-10 PROCEDURE — 96374 THER/PROPH/DIAG INJ IV PUSH: CPT

## 2024-02-10 PROCEDURE — 2580000003 HC RX 258

## 2024-02-10 PROCEDURE — 71045 X-RAY EXAM CHEST 1 VIEW: CPT

## 2024-02-10 PROCEDURE — 84295 ASSAY OF SERUM SODIUM: CPT

## 2024-02-10 PROCEDURE — 82803 BLOOD GASES ANY COMBINATION: CPT

## 2024-02-10 PROCEDURE — 82962 GLUCOSE BLOOD TEST: CPT

## 2024-02-10 PROCEDURE — 6370000000 HC RX 637 (ALT 250 FOR IP): Performed by: EMERGENCY MEDICINE

## 2024-02-10 PROCEDURE — 74176 CT ABD & PELVIS W/O CONTRAST: CPT

## 2024-02-10 RX ORDER — DIAZEPAM 5 MG/1
2.5 TABLET ORAL EVERY 6 HOURS PRN
Status: DISCONTINUED | OUTPATIENT
Start: 2024-02-10 | End: 2024-02-13 | Stop reason: HOSPADM

## 2024-02-10 RX ORDER — CETIRIZINE HYDROCHLORIDE 10 MG/1
10 TABLET ORAL DAILY
Status: DISCONTINUED | OUTPATIENT
Start: 2024-02-10 | End: 2024-02-13 | Stop reason: HOSPADM

## 2024-02-10 RX ORDER — DIVALPROEX SODIUM 250 MG/1
250 TABLET, DELAYED RELEASE ORAL
Status: DISCONTINUED | OUTPATIENT
Start: 2024-02-10 | End: 2024-02-13 | Stop reason: HOSPADM

## 2024-02-10 RX ORDER — ZIPRASIDONE HYDROCHLORIDE 20 MG/1
20 CAPSULE ORAL 2 TIMES DAILY WITH MEALS
Status: DISCONTINUED | OUTPATIENT
Start: 2024-02-10 | End: 2024-02-13 | Stop reason: HOSPADM

## 2024-02-10 RX ORDER — LUBIPROSTONE 8 UG/1
8 CAPSULE ORAL 2 TIMES DAILY WITH MEALS
Status: DISCONTINUED | OUTPATIENT
Start: 2024-02-10 | End: 2024-02-13 | Stop reason: HOSPADM

## 2024-02-10 RX ORDER — HYDROXYZINE HYDROCHLORIDE 25 MG/1
50 TABLET, FILM COATED ORAL EVERY 12 HOURS
Status: DISCONTINUED | OUTPATIENT
Start: 2024-02-10 | End: 2024-02-13 | Stop reason: HOSPADM

## 2024-02-10 RX ORDER — HYDROXYZINE 50 MG/1
50 TABLET, FILM COATED ORAL 2 TIMES DAILY PRN
COMMUNITY

## 2024-02-10 RX ORDER — ACETAMINOPHEN 325 MG/1
650 TABLET ORAL EVERY 4 HOURS PRN
Status: DISCONTINUED | OUTPATIENT
Start: 2024-02-10 | End: 2024-02-13 | Stop reason: HOSPADM

## 2024-02-10 RX ORDER — ONDANSETRON 4 MG/1
4 TABLET, ORALLY DISINTEGRATING ORAL EVERY 8 HOURS PRN
Status: DISCONTINUED | OUTPATIENT
Start: 2024-02-10 | End: 2024-02-13 | Stop reason: HOSPADM

## 2024-02-10 RX ORDER — LORAZEPAM 1 MG/1
1 TABLET ORAL DAILY
COMMUNITY

## 2024-02-10 RX ORDER — SODIUM CHLORIDE 9 MG/ML
INJECTION, SOLUTION INTRAVENOUS CONTINUOUS
Status: DISCONTINUED | OUTPATIENT
Start: 2024-02-10 | End: 2024-02-13 | Stop reason: HOSPADM

## 2024-02-10 RX ORDER — ONDANSETRON 2 MG/ML
4 INJECTION INTRAMUSCULAR; INTRAVENOUS EVERY 6 HOURS PRN
Status: DISCONTINUED | OUTPATIENT
Start: 2024-02-10 | End: 2024-02-13 | Stop reason: HOSPADM

## 2024-02-10 RX ORDER — SODIUM CHLORIDE 0.9 % (FLUSH) 0.9 %
5-40 SYRINGE (ML) INJECTION EVERY 12 HOURS SCHEDULED
Status: DISCONTINUED | OUTPATIENT
Start: 2024-02-10 | End: 2024-02-13 | Stop reason: HOSPADM

## 2024-02-10 RX ORDER — CASTOR OIL AND BALSAM, PERU 788; 87 MG/G; MG/G
OINTMENT TOPICAL 2 TIMES DAILY
Status: DISCONTINUED | OUTPATIENT
Start: 2024-02-10 | End: 2024-02-13 | Stop reason: HOSPADM

## 2024-02-10 RX ORDER — LABETALOL HYDROCHLORIDE 5 MG/ML
10 INJECTION INTRAVENOUS EVERY 10 MIN PRN
Status: DISCONTINUED | OUTPATIENT
Start: 2024-02-10 | End: 2024-02-13 | Stop reason: HOSPADM

## 2024-02-10 RX ORDER — PHENAZOPYRIDINE HYDROCHLORIDE 100 MG/1
200 TABLET, FILM COATED ORAL
Status: COMPLETED | OUTPATIENT
Start: 2024-02-10 | End: 2024-02-10

## 2024-02-10 RX ORDER — DIAZEPAM 2 MG/1
2 TABLET ORAL DAILY
Status: ON HOLD | COMMUNITY
End: 2024-02-13 | Stop reason: HOSPADM

## 2024-02-10 RX ORDER — ASPIRIN 81 MG/1
81 TABLET, CHEWABLE ORAL DAILY
Status: DISCONTINUED | OUTPATIENT
Start: 2024-02-10 | End: 2024-02-13 | Stop reason: HOSPADM

## 2024-02-10 RX ORDER — ENOXAPARIN SODIUM 100 MG/ML
40 INJECTION SUBCUTANEOUS DAILY
Status: DISCONTINUED | OUTPATIENT
Start: 2024-02-11 | End: 2024-02-10

## 2024-02-10 RX ORDER — RISPERIDONE 4 MG/1
4 TABLET ORAL 2 TIMES DAILY
COMMUNITY

## 2024-02-10 RX ORDER — OLANZAPINE 5 MG/1
2.5 TABLET ORAL EVERY 6 HOURS PRN
Status: DISCONTINUED | OUTPATIENT
Start: 2024-02-10 | End: 2024-02-13 | Stop reason: HOSPADM

## 2024-02-10 RX ORDER — SODIUM CHLORIDE 0.9 % (FLUSH) 0.9 %
5-40 SYRINGE (ML) INJECTION PRN
Status: DISCONTINUED | OUTPATIENT
Start: 2024-02-10 | End: 2024-02-13 | Stop reason: HOSPADM

## 2024-02-10 RX ORDER — GLUCAGON 1 MG/ML
1 KIT INJECTION PRN
Status: DISCONTINUED | OUTPATIENT
Start: 2024-02-10 | End: 2024-02-13 | Stop reason: HOSPADM

## 2024-02-10 RX ORDER — FLUTICASONE PROPIONATE 50 MCG
1 SPRAY, SUSPENSION (ML) NASAL DAILY
COMMUNITY

## 2024-02-10 RX ORDER — SODIUM CHLORIDE 9 MG/ML
INJECTION, SOLUTION INTRAVENOUS PRN
Status: DISCONTINUED | OUTPATIENT
Start: 2024-02-10 | End: 2024-02-13 | Stop reason: HOSPADM

## 2024-02-10 RX ORDER — ATORVASTATIN CALCIUM 40 MG/1
80 TABLET, FILM COATED ORAL NIGHTLY
Status: DISCONTINUED | OUTPATIENT
Start: 2024-02-10 | End: 2024-02-13 | Stop reason: HOSPADM

## 2024-02-10 RX ORDER — DEXTROSE MONOHYDRATE 100 MG/ML
INJECTION, SOLUTION INTRAVENOUS CONTINUOUS PRN
Status: DISCONTINUED | OUTPATIENT
Start: 2024-02-10 | End: 2024-02-13 | Stop reason: HOSPADM

## 2024-02-10 RX ORDER — BACLOFEN 10 MG/1
10 TABLET ORAL 3 TIMES DAILY
Status: DISCONTINUED | OUTPATIENT
Start: 2024-02-10 | End: 2024-02-13 | Stop reason: HOSPADM

## 2024-02-10 RX ORDER — ASPIRIN 300 MG/1
300 SUPPOSITORY RECTAL DAILY
Status: DISCONTINUED | OUTPATIENT
Start: 2024-02-10 | End: 2024-02-13 | Stop reason: HOSPADM

## 2024-02-10 RX ORDER — 0.9 % SODIUM CHLORIDE 0.9 %
1000 INTRAVENOUS SOLUTION INTRAVENOUS ONCE
Status: COMPLETED | OUTPATIENT
Start: 2024-02-10 | End: 2024-02-10

## 2024-02-10 RX ORDER — POLYETHYLENE GLYCOL 3350 17 G/17G
17 POWDER, FOR SOLUTION ORAL DAILY PRN
Status: DISCONTINUED | OUTPATIENT
Start: 2024-02-10 | End: 2024-02-13 | Stop reason: HOSPADM

## 2024-02-10 RX ORDER — LEVOTHYROXINE SODIUM 88 UG/1
88 TABLET ORAL
Status: DISCONTINUED | OUTPATIENT
Start: 2024-02-11 | End: 2024-02-13 | Stop reason: HOSPADM

## 2024-02-10 RX ADMIN — ZIPRASIDONE HYDROCHLORIDE 20 MG: 20 CAPSULE ORAL at 17:36

## 2024-02-10 RX ADMIN — APIXABAN 5 MG: 5 TABLET, FILM COATED ORAL at 20:16

## 2024-02-10 RX ADMIN — PHENAZOPYRIDINE 200 MG: 100 TABLET ORAL at 12:05

## 2024-02-10 RX ADMIN — SODIUM CHLORIDE: 9 INJECTION, SOLUTION INTRAVENOUS at 22:46

## 2024-02-10 RX ADMIN — Medication: at 20:21

## 2024-02-10 RX ADMIN — BACLOFEN 10 MG: 10 TABLET ORAL at 17:35

## 2024-02-10 RX ADMIN — ASPIRIN 81 MG: 81 TABLET, CHEWABLE ORAL at 15:45

## 2024-02-10 RX ADMIN — HYDROXYZINE HYDROCHLORIDE 50 MG: 25 TABLET ORAL at 17:36

## 2024-02-10 RX ADMIN — SODIUM CHLORIDE, PRESERVATIVE FREE 10 ML: 5 INJECTION INTRAVENOUS at 20:19

## 2024-02-10 RX ADMIN — DIAZEPAM 2.5 MG: 5 TABLET ORAL at 20:24

## 2024-02-10 RX ADMIN — BACLOFEN 10 MG: 10 TABLET ORAL at 20:16

## 2024-02-10 RX ADMIN — SODIUM CHLORIDE 1000 ML: 9 INJECTION, SOLUTION INTRAVENOUS at 12:06

## 2024-02-10 RX ADMIN — SODIUM CHLORIDE: 9 INJECTION, SOLUTION INTRAVENOUS at 15:48

## 2024-02-10 RX ADMIN — DIVALPROEX SODIUM 250 MG: 250 TABLET, DELAYED RELEASE ORAL at 20:18

## 2024-02-10 RX ADMIN — SODIUM CHLORIDE 1000 MG: 900 INJECTION INTRAVENOUS at 13:48

## 2024-02-10 RX ADMIN — CETIRIZINE HYDROCHLORIDE 10 MG: 10 TABLET, FILM COATED ORAL at 17:36

## 2024-02-10 RX ADMIN — DIAZEPAM 2.5 MG: 5 TABLET ORAL at 12:06

## 2024-02-10 RX ADMIN — ATORVASTATIN CALCIUM 80 MG: 40 TABLET, FILM COATED ORAL at 20:15

## 2024-02-10 ASSESSMENT — PAIN SCALES - GENERAL
PAINLEVEL_OUTOF10: 0
PAINLEVEL_OUTOF10: 8
PAINLEVEL_OUTOF10: 6
PAINLEVEL_OUTOF10: 0

## 2024-02-10 NOTE — PROGRESS NOTES
End of Shift Note    Bedside shift change report given to AJ Wilkerson (oncoming nurse) by Latasha Murray RN (offgoing nurse).  Report included the following information Nurse Handoff Report, Adult Overview, MAR, and Recent Results      Shift worked:  7a-7p   Shift summary and any significant changes:     New admission to unit. Admission database completed with patient's mother.       Concerns for physician to address:  None   Zone phone for oncoming shift:   [phone]     Patient Information  Nicolette Germain  58 y.o.  2/10/2024 10:37 AM by Rafaela San MD. Nicolette Germain was admitted from Assisted Living    Problem List  Patient Active Problem List    Diagnosis Date Noted    Acute cerebrovascular accident (CVA) due to ischemia ([facility]) 02/10/2024    Adrenal nodule ([facility]) 07/18/2023    Metabolic encephalopathy 06/17/2023    Altered mental status 10/27/2022    Diarrhea 10/25/2022    AMS (altered mental status) 10/25/2022    Anemia 10/05/2022    Hypokalemia 10/05/2022    Obese 10/05/2022    Acute CVA (cerebrovascular accident) ([facility]) 09/29/2022    NSTEMI (non-ST elevated myocardial infarction) ([facility]) 09/28/2022    Dyslipidemia 09/28/2022    Hypothyroidism 09/28/2022    REAGAN (generalized anxiety disorder) 09/28/2022    Takotsubo cardiomyopathy 09/28/2022    Status epilepticus ([facility]) 09/27/2022    NBA (acute kidney injury) ([facility]) 09/27/2022    Lactic acidosis 09/27/2022    Acute respiratory failure with hypoxia ([facility]) 09/27/2022    Acute metabolic encephalopathy 09/27/2022    Shock, cardiogenic ([facility]) 09/27/2022    Generalized anxiety disorder 06/29/2021    Acquired hypothyroidism 06/29/2021    Tachycardia 06/29/2021    Insomnia 06/29/2021    Impaired fasting glucose 06/29/2021    Lower extremity edema 06/29/2021    Mixed hyperlipidemia 06/29/2021    PND (post-nasal drip) 11/17/2020    Chronic maxillary sinusitis 11/17/2020    Seasonal allergic rhinitis due to pollen 07/29/2019    Class 2 severe obesity due to excess calories with  serious comorbidity and body mass index (BMI) of 36.0 to 36.9 in adult ([facility]) 11/05/2012    Depression 03/29/2012     Past Medical History:   Diagnosis Date    Allergic rhinitis 07/29/2019    Cerebral artery occlusion with cerebral infarction ([facility]) 8/27/2022    Depression     Dysphagia 9/27/2022    Fractures     History of multiple allergies     History of vascular access device 10/07/2022    [facility] VAT: PICC placement R Cephalic length 40 cm for reliable access/TPN arm circ 35.5 cm    Hypercholesteremia     Hypertension     Memory disorder 9/27/2022    Movement disorder 9/27/2022    Muscle ache     Obesity 11/05/2012    Sinus pressure     Sinus problem     Stroke ([facility])     Thyroid disease        Core Measures:  CVA: Yes Yes  CHF:No No  PNA:NoNo    Activity:     Number times ambulated in hallways past shift: 0  Number of times OOB to chair past shift: 0    Cardiac:   Cardiac Monitoring: Yes           Access:   Current line(s): PIV  Central Line? No     Genitourinary:   Urinary status: voiding  and external catheter  Urinary Catheter? No    Respiratory:   O2 Device: None (Room air)  Chronic home O2 use?: no  Incentive spirometer at bedside: no       GI:     Current diet:  ADULT DIET; Regular  DIET ONE TIME MESSAGE;  Passing flatus: yes  Tolerating current diet: no       Pain Management:   Patient states pain is manageable on current regimen: yes    Skin:  Frank Scale Score: 16  Interventions: turn team, float heels, increase time out of bed, and internal/external urinary devices    Patient Safety:  Fall Score: Chavez Total Score: 60  Interventions: bed/chair alarm, assistive devices (walker, cane, etc.), gripper socks, pt to call before getting OOB, and stay with me (per policy)     @Rollbelt  @dexterity to release roll belt  Yes/No ( must document dexterity  here by stating Yes or No here, otherwise this is a restraint and must follow restraint documentation policy.)    DVT prophylaxis:  DVT prophylaxis Med- yes  DVT

## 2024-02-10 NOTE — ED PROVIDER NOTES
[facility]ospit[facility] EMERGENCY DEPT  EMERGENCY DEPARTMENT ENCOUNTER       Pt Name: Nicolette Germain  MRN: [id number]  Birthdate 1965  Date of evaluation: 2/10/2024  Provider: Berto Bryant DO   PCP: Yisel Dodd APRN - PATTI  Note Started: 1:56 PM EST 2/10/24     CHIEF COMPLAINT       Chief Complaint   Patient presents with    Urinary Tract Infection     For 4 days and thought she did have a uti, because she screams when time to void. Last time she was admitted for delirium and it was a uti then \"her blood pressure has been fluctuating\"         HISTORY OF PRESENT ILLNESS: 1 or more elements      History From: patient, History limited by: none     Nicolette Germain is a 58 y.o. female presents to the ED by POV for painful urination.        Please See MDM for Additional Details of the HPI/PMH  Nursing Notes were all reviewed and agreed with or any disagreements were addressed in the HPI.     REVIEW OF SYSTEMS        Positives and Pertinent negatives as per HPI.    PAST HISTORY     Past Medical History:  Past Medical History:   Diagnosis Date    Allergic rhinitis 07/29/2019    Cerebral artery occlusion with cerebral infarction (HCC) 8/27/2022    Depression     Dysphagia 9/27/2022    Fractures     History of multiple allergies     History of vascular access device 10/07/2022    [facility] VAT: PICC placement R Cephalic length 40 cm for reliable access/TPN arm circ 35.5 cm    Hypercholesteremia     Hypertension     Memory disorder 9/27/2022    Movement disorder 9/27/2022    Muscle ache     Obesity 11/05/2012    Sinus pressure     Sinus problem     Stroke (HCC)     Thyroid disease        Past Surgical History:  Past Surgical History:   Procedure Laterality Date    ANTERIOR CRUCIATE LIGAMENT REPAIR      left     CHOLECYSTECTOMY  01/01/1998    GI      colonoscopy-polyps    IR NONTUNNELED VASCULAR CATHETER  09/27/2022    IR NONTUNNELED VASCULAR CATHETER 9/27/2022 [facility] RAD ANGIO IR    IR NONTUNNELED VASCULAR CATHETER  09/27/2022    KNEE  and oriented to person, place, and time.      Cranial Nerves: No cranial nerve deficit.   Psychiatric:         Mood and Affect: Mood normal.         Behavior: Behavior normal.          DIAGNOSTIC RESULTS   LABS:     Recent Results (from the past 24 hour(s))   CBC with Auto Differential    Collection Time: 02/10/24 11:24 AM   Result Value Ref Range    WBC 6.2 3.6 - 11.0 K/uL    RBC 4.05 3.80 - 5.20 M/uL    Hemoglobin 12.0 11.5 - 16.0 g/dL    Hematocrit 37.6 35.0 - 47.0 %    MCV 92.8 80.0 - 99.0 FL    MCH 29.6 26.0 - 34.0 PG    MCHC 31.9 30.0 - 36.5 g/dL    RDW 13.2 11.5 - 14.5 %    Platelets 216 150 - 400 K/uL    MPV 11.3 8.9 - 12.9 FL    Nucleated RBCs 0.0 0  WBC    nRBC 0.00 0.00 - 0.01 K/uL    Neutrophils % 51 32 - 75 %    Lymphocytes % 38 12 - 49 %    Monocytes % 9 5 - 13 %    Eosinophils % 1 0 - 7 %    Basophils % 1 0 - 1 %    Immature Granulocytes 0 0.0 - 0.5 %    Neutrophils Absolute 3.1 1.8 - 8.0 K/UL    Lymphocytes Absolute 2.4 0.8 - 3.5 K/UL    Monocytes Absolute 0.6 0.0 - 1.0 K/UL    Eosinophils Absolute 0.1 0.0 - 0.4 K/UL    Basophils Absolute 0.1 0.0 - 0.1 K/UL    Absolute Immature Granulocyte 0.0 0.00 - 0.04 K/UL    Differential Type AUTOMATED     Comprehensive Metabolic Panel    Collection Time: 02/10/24 11:24 AM   Result Value Ref Range    Sodium 143 136 - 145 mmol/L    Potassium 3.9 3.5 - 5.1 mmol/L    Chloride 109 (H) 97 - 108 mmol/L    CO2 31 21 - 32 mmol/L    Anion Gap 3 (L) 5 - 15 mmol/L    Glucose 80 65 - 100 mg/dL    BUN 14 6 - 20 MG/DL    Creatinine 0.87 0.55 - 1.02 MG/DL    Bun/Cre Ratio 16 12 - 20      Est, Glom Filt Rate >60 >60 ml/min/1.73m2    Calcium 9.3 8.5 - 10.1 MG/DL    Total Bilirubin 0.4 0.2 - 1.0 MG/DL    ALT 20 12 - 78 U/L    AST 11 (L) 15 - 37 U/L    Alk Phosphatase 62 45 - 117 U/L    Total Protein 7.1 6.4 - 8.2 g/dL    Albumin 3.4 (L) 3.5 - 5.0 g/dL    Globulin 3.7 2.0 - 4.0 g/dL    Albumin/Globulin Ratio 0.9 (L) 1.1 - 2.2     Procalcitonin    Collection Time: 02/10/24

## 2024-02-10 NOTE — H&P
did not meet criteria to reflex to culture  Urine clear dark rao  Received 1 dose Rocephin in ER, will defer further abx given afebrile, WBC nl, relatively clean UA  Pyridium given--note urine may become orange with this medication  Resume PTA baclofen  Incontinent of large amount of urine with PureWick external catheter during my exam, no urinary discomfort or screaming noted  Pain seems resolved; if recurrent could consider urology consult?    Screaming behavior PTA  Remote hx persistent encephalopathy, status epilepticus, delirium, psychosis  Repetitive nose sniffing believed to be a tic, chronic per outpt ENT  Initially mother informed pt was only screaming when urinating, and this was a new behavior  Then mentioned she had been screaming before at both [facility] and [place] facility  Mentions that the screaming was all the time and bothering everybody  This seems suggestive more of a chronic, behavioral origin for screaming moreso than acute dysuria?  Pt is pleasant and cooperative during my visit but suspect there may be sundowning or intermittent agitation  Resume PTA divalproex, hydroxyzine, ziprasidone  Pt scheduled for outpt appointment to establish with neuropsych in June 2024  Seizure precautions  Consider psychiatry if behavior changes while inpt    Hypothyroidism  Check TSH with reflex T4/T3  Resume levothyroxine    Hx DVT/PE on Eliquis--resume Eliquis    Takotsubo cardiomyopathy--10/22 EF 25-30%, recovered by 7/23 to 60-65%  Not in overt fluid overload at this time    HLD--cont statin    Medical Decision Making:   I personally reviewed labs: cbc, cmp, trop, ua  I personally reviewed imaging:ct head, ct abd/pelvis  I personally reviewed EKG: sinus rhythm without st elevations  Toxic drug monitoring: nephrotoxicity  Discussed case with: ED provider. After discussion I am in agreement that acuity of patient's medical condition necessitates hospital stay.  Patient, Mother, Nurse, Attending MD    Code

## 2024-02-10 NOTE — PROGRESS NOTES
-Please complete MRI History and Safety Screening Form.    - Patient cannot be scanned until this form is completed and reviewed in MRI to ensure patient is SAFE and eligible for MRI.  - CALL MRI when this has been successfully completed at [phone].

## 2024-02-10 NOTE — PLAN OF CARE
Problem: Discharge Planning  Goal: Discharge to home or other facility with appropriate resources  Outcome: Progressing     Problem: Confusion  Goal: Confusion, delirium, dementia, or psychosis is improved or at baseline  Description: INTERVENTIONS:  1. Assess for possible contributors to thought disturbance, including medications, impaired vision or hearing, underlying metabolic abnormalities, dehydration, psychiatric diagnoses, and notify attending LIP  2. [place] high risk fall precautions, as indicated  3. Provide frequent short contacts to provide reality reorientation, refocusing and direction  4. Decrease environmental stimuli, including noise as appropriate  5. Monitor and intervene to maintain adequate nutrition, hydration, elimination, sleep and activity  6. If unable to ensure safety without constant attention obtain sitter and review sitter guidelines with assigned personnel  7. Initiate Psychosocial CNS and Spiritual Care consult, as indicated  Outcome: Progressing     Problem: Safety - Adult  Goal: Free from fall injury  Outcome: Progressing     Problem: Skin/Tissue Integrity  Goal: Absence of new skin breakdown  Description: 1.  Monitor for areas of redness and/or skin breakdown  2.  Assess vascular access sites hourly  3.  Every 4-6 hours minimum:  Change oxygen saturation probe site  4.  Every 4-6 hours:  If on nasal continuous positive airway pressure, respiratory therapy assess nares and determine need for appliance change or resting period.  Outcome: Progressing

## 2024-02-11 ENCOUNTER — APPOINTMENT (OUTPATIENT)
Facility: HOSPITAL | Age: 59
DRG: 065 | End: 2024-02-11
Payer: COMMERCIAL

## 2024-02-11 LAB
ANION GAP SERPL CALC-SCNC: 2 MMOL/L (ref 5–15)
BASOPHILS # BLD: 0.1 K/UL (ref 0–0.1)
BASOPHILS NFR BLD: 1 % (ref 0–1)
BUN SERPL-MCNC: 14 MG/DL (ref 6–20)
BUN/CREAT SERPL: 20 (ref 12–20)
CALCIUM SERPL-MCNC: 8.6 MG/DL (ref 8.5–10.1)
CHLORIDE SERPL-SCNC: 114 MMOL/L (ref 97–108)
CHOLEST SERPL-MCNC: 110 MG/DL
CO2 SERPL-SCNC: 26 MMOL/L (ref 21–32)
CREAT SERPL-MCNC: 0.69 MG/DL (ref 0.55–1.02)
DIFFERENTIAL METHOD BLD: ABNORMAL
EKG ATRIAL RATE: 80 BPM
EKG DIAGNOSIS: NORMAL
EKG P AXIS: 32 DEGREES
EKG P-R INTERVAL: 140 MS
EKG Q-T INTERVAL: 384 MS
EKG QRS DURATION: 66 MS
EKG QTC CALCULATION (BAZETT): 442 MS
EKG R AXIS: 31 DEGREES
EKG T AXIS: 80 DEGREES
EKG VENTRICULAR RATE: 80 BPM
EOSINOPHIL # BLD: 0.1 K/UL (ref 0–0.4)
EOSINOPHIL NFR BLD: 2 % (ref 0–7)
ERYTHROCYTE [DISTWIDTH] IN BLOOD BY AUTOMATED COUNT: 13.1 % (ref 11.5–14.5)
EST. AVERAGE GLUCOSE BLD GHB EST-MCNC: 100 MG/DL
GLUCOSE BLD STRIP.AUTO-MCNC: 123 MG/DL (ref 65–117)
GLUCOSE BLD STRIP.AUTO-MCNC: 195 MG/DL (ref 65–117)
GLUCOSE BLD STRIP.AUTO-MCNC: 80 MG/DL (ref 65–117)
GLUCOSE SERPL-MCNC: 89 MG/DL (ref 65–100)
HBA1C MFR BLD: 5.1 % (ref 4–5.6)
HCT VFR BLD AUTO: 33.3 % (ref 35–47)
HDLC SERPL-MCNC: 46 MG/DL
HDLC SERPL: 2.4 (ref 0–5)
HGB BLD-MCNC: 10.6 G/DL (ref 11.5–16)
IMM GRANULOCYTES # BLD AUTO: 0 K/UL (ref 0–0.04)
IMM GRANULOCYTES NFR BLD AUTO: 0 % (ref 0–0.5)
LDLC SERPL CALC-MCNC: 53.2 MG/DL (ref 0–100)
LYMPHOCYTES # BLD: 2.8 K/UL (ref 0.8–3.5)
LYMPHOCYTES NFR BLD: 52 % (ref 12–49)
MCH RBC QN AUTO: 30.3 PG (ref 26–34)
MCHC RBC AUTO-ENTMCNC: 31.8 G/DL (ref 30–36.5)
MCV RBC AUTO: 95.1 FL (ref 80–99)
MONOCYTES # BLD: 0.4 K/UL (ref 0–1)
MONOCYTES NFR BLD: 8 % (ref 5–13)
NEUTS SEG # BLD: 2 K/UL (ref 1.8–8)
NEUTS SEG NFR BLD: 37 % (ref 32–75)
NRBC # BLD: 0 K/UL (ref 0–0.01)
NRBC BLD-RTO: 0 PER 100 WBC
PLATELET # BLD AUTO: 179 K/UL (ref 150–400)
PMV BLD AUTO: 11.7 FL (ref 8.9–12.9)
POTASSIUM SERPL-SCNC: 3.8 MMOL/L (ref 3.5–5.1)
RBC # BLD AUTO: 3.5 M/UL (ref 3.8–5.2)
SERVICE CMNT-IMP: ABNORMAL
SERVICE CMNT-IMP: ABNORMAL
SERVICE CMNT-IMP: NORMAL
SODIUM SERPL-SCNC: 142 MMOL/L (ref 136–145)
TRIGL SERPL-MCNC: 54 MG/DL
VLDLC SERPL CALC-MCNC: 10.8 MG/DL
WBC # BLD AUTO: 5.4 K/UL (ref 3.6–11)

## 2024-02-11 PROCEDURE — 2580000003 HC RX 258: Performed by: EMERGENCY MEDICINE

## 2024-02-11 PROCEDURE — 1100000000 HC RM PRIVATE

## 2024-02-11 PROCEDURE — 97162 PT EVAL MOD COMPLEX 30 MIN: CPT

## 2024-02-11 PROCEDURE — 97530 THERAPEUTIC ACTIVITIES: CPT

## 2024-02-11 PROCEDURE — 85025 COMPLETE CBC W/AUTO DIFF WBC: CPT

## 2024-02-11 PROCEDURE — 80048 BASIC METABOLIC PNL TOTAL CA: CPT

## 2024-02-11 PROCEDURE — 97535 SELF CARE MNGMENT TRAINING: CPT

## 2024-02-11 PROCEDURE — 82962 GLUCOSE BLOOD TEST: CPT

## 2024-02-11 PROCEDURE — 99221 1ST HOSP IP/OBS SF/LOW 40: CPT | Performed by: PSYCHIATRY & NEUROLOGY

## 2024-02-11 PROCEDURE — 80061 LIPID PANEL: CPT

## 2024-02-11 PROCEDURE — 2580000003 HC RX 258

## 2024-02-11 PROCEDURE — 97165 OT EVAL LOW COMPLEX 30 MIN: CPT

## 2024-02-11 PROCEDURE — 83036 HEMOGLOBIN GLYCOSYLATED A1C: CPT

## 2024-02-11 PROCEDURE — 70551 MRI BRAIN STEM W/O DYE: CPT

## 2024-02-11 PROCEDURE — 6370000000 HC RX 637 (ALT 250 FOR IP)

## 2024-02-11 PROCEDURE — 36415 COLL VENOUS BLD VENIPUNCTURE: CPT

## 2024-02-11 RX ADMIN — Medication: at 09:12

## 2024-02-11 RX ADMIN — APIXABAN 5 MG: 5 TABLET, FILM COATED ORAL at 09:12

## 2024-02-11 RX ADMIN — ZIPRASIDONE HYDROCHLORIDE 20 MG: 20 CAPSULE ORAL at 09:12

## 2024-02-11 RX ADMIN — APIXABAN 5 MG: 5 TABLET, FILM COATED ORAL at 20:20

## 2024-02-11 RX ADMIN — ZIPRASIDONE HYDROCHLORIDE 20 MG: 20 CAPSULE ORAL at 16:47

## 2024-02-11 RX ADMIN — BACLOFEN 10 MG: 10 TABLET ORAL at 20:19

## 2024-02-11 RX ADMIN — SODIUM CHLORIDE: 9 INJECTION, SOLUTION INTRAVENOUS at 05:36

## 2024-02-11 RX ADMIN — DIVALPROEX SODIUM 250 MG: 250 TABLET, DELAYED RELEASE ORAL at 20:18

## 2024-02-11 RX ADMIN — BACLOFEN 10 MG: 10 TABLET ORAL at 14:36

## 2024-02-11 RX ADMIN — SODIUM CHLORIDE, PRESERVATIVE FREE 10 ML: 5 INJECTION INTRAVENOUS at 20:20

## 2024-02-11 RX ADMIN — ASPIRIN 81 MG: 81 TABLET, CHEWABLE ORAL at 09:12

## 2024-02-11 RX ADMIN — Medication: at 20:22

## 2024-02-11 RX ADMIN — LEVOTHYROXINE SODIUM 88 MCG: 0.09 TABLET ORAL at 06:30

## 2024-02-11 RX ADMIN — BACLOFEN 10 MG: 10 TABLET ORAL at 09:12

## 2024-02-11 RX ADMIN — ATORVASTATIN CALCIUM 80 MG: 40 TABLET, FILM COATED ORAL at 20:17

## 2024-02-11 RX ADMIN — SODIUM CHLORIDE: 9 INJECTION, SOLUTION INTRAVENOUS at 21:42

## 2024-02-11 RX ADMIN — CETIRIZINE HYDROCHLORIDE 10 MG: 10 TABLET, FILM COATED ORAL at 09:12

## 2024-02-11 ASSESSMENT — PAIN SCALES - GENERAL
PAINLEVEL_OUTOF10: 0
PAINLEVEL_OUTOF10: 3

## 2024-02-11 NOTE — PROGRESS NOTES
Hospitalist Progress Note    NAME:   Nicolette Germain   : 1965   MRN: [id number]     Date/Time: 2024 12:40 PM  Patient PCP: Yisel Dodd APRN - NP    Estimated discharge date:  Barriers:       Assessment / Plan:    New right sided facial droop, worsening aphasia  New CVA vs recrudescence of prior stroke?  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  Hx anoxic brain injury  CT head (-)  MRI brain, not done yet   Neuro consulted expected no new stroke, but recommended follow-up with MRI result  PT/OT/ST eval/tx  NPO until swallow eval              When safe for diet, pt requires assist and supervision with pacing herself, would benefit from soft/bite-sized diet  ASA and high dose statin  Permissive HTN x24h ( <220/110) then goal BP < 140/90  Keep BP greater than 90/50 (MAP>65)  Received 1L NS, continue with maintenance IV fluids  Monitor blood glucose q6h x 24h  Treat with hypoglycemia protocol as needed  If (+) CVA will need Echo  Resume Eliquis     Hypotension  Hx hypertension  Responsive to IV hydration  Initially 84/65, now 115/52  Not on any BP meds PTA, discontinued at facility d/t hypotension  Suspect dehydration contributory, received 1L NS now IV maintenance fluids  Tele monitoring     Diarrhea--multiple episodes, incontinent, malodorous--POA  Preceded by constipation, may be 2/2 overzealous bowel regimen  Hold PTA Amitiza  Stool for C diff, enteric PCR, fecal leukocytes  CT abd/pelvis no abnormality     Suprapubic pain  Presumed dysuria  CT abd/pelvis no abnormality  UA 1+ bacteria, trace LE's, did not meet criteria to reflex to culture  Urine clear dark rao  Received 1 dose Rocephin in ER, will defer further abx given afebrile, WBC nl, relatively clean UA  Pyridium given--note urine may become orange with this medication  Resume PTA baclofen  Incontinent of large amount of urine with PureWick external catheter during my exam, no urinary discomfort or

## 2024-02-11 NOTE — PROGRESS NOTES
End of Shift Note    Bedside shift change report given to AJ Drake (oncoming nurse) by Rhea Schmitt RN (offgoing nurse).  Report included the following information Nurse Handoff Report, Adult Overview, MAR, and Recent Results      Shift worked:  7p - 7a   Shift summary and any significant changes:    MRI screening sheet completed.       Concerns for physician to address:  None   Zone phone for oncoming shift:   [phone]     Patient Information  Nicolette Germain  58 y.o.  2/10/2024 10:37 AM by Rafaela San MD. Nicolette Germain was admitted from Assisted Living    Problem List  Patient Active Problem List    Diagnosis Date Noted    Acute cerebrovascular accident (CVA) due to ischemia ([facility]) 02/10/2024    Adrenal nodule (HCC) 07/18/2023    Metabolic encephalopathy 06/17/2023    Altered mental status 10/27/2022    Diarrhea 10/25/2022    AMS (altered mental status) 10/25/2022    Anemia 10/05/2022    Hypokalemia 10/05/2022    Obese 10/05/2022    Acute CVA (cerebrovascular accident) ([facility]) 09/29/2022    NSTEMI (non-ST elevated myocardial infarction) ([facility]) 09/28/2022    Dyslipidemia 09/28/2022    Hypothyroidism 09/28/2022    REAGAN (generalized anxiety disorder) 09/28/2022    Takotsubo cardiomyopathy 09/28/2022    Status epilepticus ([facility]) 09/27/2022    NBA (acute kidney injury) ([facility]) 09/27/2022    Lactic acidosis 09/27/2022    Acute respiratory failure with hypoxia ([facility]) 09/27/2022    Acute metabolic encephalopathy 09/27/2022    Shock, cardiogenic ([facility]) 09/27/2022    Generalized anxiety disorder 06/29/2021    Acquired hypothyroidism 06/29/2021    Tachycardia 06/29/2021    Insomnia 06/29/2021    Impaired fasting glucose 06/29/2021    Lower extremity edema 06/29/2021    Mixed hyperlipidemia 06/29/2021    PND (post-nasal drip) 11/17/2020    Chronic maxillary sinusitis 11/17/2020    Seasonal allergic rhinitis due to pollen 07/29/2019    Class 2 severe obesity due to excess calories with serious comorbidity and body mass index  (BMI) of 36.0 to 36.9 in adult ([facility]) 11/05/2012    Depression 03/29/2012     Past Medical History:   Diagnosis Date    Allergic rhinitis 07/29/2019    Cerebral artery occlusion with cerebral infarction ([facility]) 8/27/2022    Depression     Dysphagia 9/27/2022    Fractures     History of multiple allergies     History of vascular access device 10/07/2022    [facility] VAT: PICC placement R Cephalic length 40 cm for reliable access/TPN arm circ 35.5 cm    Hypercholesteremia     Hypertension     Memory disorder 9/27/2022    Movement disorder 9/27/2022    Muscle ache     Obesity 11/05/2012    Sinus pressure     Sinus problem     Stroke ([facility])     Thyroid disease        Core Measures:  CVA: Yes Yes  CHF:No No  PNA:NoNo    Activity:     Number times ambulated in hallways past shift: 0  Number of times OOB to chair past shift: 0    Cardiac:   Cardiac Monitoring: Yes           Access:   Current line(s): PIV  Central Line? No     Genitourinary:   Urinary status: voiding  and external catheter  Urinary Catheter? No    Respiratory:   O2 Device: None (Room air)  Chronic home O2 use?: no  Incentive spirometer at bedside: no       GI:  Last BM (including prior to admit): 02/09/24  Current diet:  ADULT DIET; Regular  DIET ONE TIME MESSAGE;  Passing flatus: yes  Tolerating current diet: no       Pain Management:   Patient states pain is manageable on current regimen: yes    Skin:  Frank Scale Score: 16  Interventions: turn team, float heels, increase time out of bed, and internal/external urinary devices    Patient Safety:  Fall Score: [place]avez Total Score: 60  Interventions: bed/chair alarm, assistive devices (walker, cane, etc.), gripper socks, pt to call before getting OOB, and stay with me (per policy)     @Rollbelt  @dexterity to release roll belt  Yes/No ( must document dexterity  here by stating Yes or No here, otherwise this is a restraint and must follow restraint documentation policy.)    DVT prophylaxis:  DVT prophylaxis Med-

## 2024-02-11 NOTE — PROGRESS NOTES
End of Shift Note    Bedside shift change report given to AJ Drake (oncoming nurse) by Rhea Schmitt RN (offgoing nurse).  Report included the following information Nurse Handoff Report, Adult Overview, MAR, and Recent Results      Shift worked:  7p - 7a   Shift summary and any significant changes:    No acute changes     Concerns for physician to address:  None   Zone phone for oncoming shift:   [phone]     Patient Information  Nicolette Germain  58 y.o.  2/10/2024 10:37 AM by Rafaela San MD. Nicolette Germain was admitted from Assisted Living    Problem List  Patient Active Problem List    Diagnosis Date Noted    Acute cerebrovascular accident (CVA) due to ischemia ([facility]) 02/10/2024    Adrenal nodule ([facility]) 07/18/2023    Metabolic encephalopathy 06/17/2023    Altered mental status 10/27/2022    Diarrhea 10/25/2022    AMS (altered mental status) 10/25/2022    Anemia 10/05/2022    Hypokalemia 10/05/2022    Obese 10/05/2022    Acute CVA (cerebrovascular accident) ([facility]) 09/29/2022    NSTEMI (non-ST elevated myocardial infarction) ([facility]) 09/28/2022    Dyslipidemia 09/28/2022    Hypothyroidism 09/28/2022    REAGAN (generalized anxiety disorder) 09/28/2022    Takotsubo cardiomyopathy 09/28/2022    Status epilepticus ([facility]) 09/27/2022    NBA (acute kidney injury) ([facility]) 09/27/2022    Lactic acidosis 09/27/2022    Acute respiratory failure with hypoxia ([facility]) 09/27/2022    Acute metabolic encephalopathy 09/27/2022    Shock, cardiogenic ([facility]) 09/27/2022    Generalized anxiety disorder 06/29/2021    Acquired hypothyroidism 06/29/2021    Tachycardia 06/29/2021    Insomnia 06/29/2021    Impaired fasting glucose 06/29/2021    Lower extremity edema 06/29/2021    Mixed hyperlipidemia 06/29/2021    PND (post-nasal drip) 11/17/2020    Chronic maxillary sinusitis 11/17/2020    Seasonal allergic rhinitis due to pollen 07/29/2019    Class 2 severe obesity due to excess calories with serious comorbidity and body mass index (BMI) of 36.0  SCD or CLINT- no     Wounds: (If Applicable)  Wounds- yes  Location gluteal fissure, abrasion to right buttock    Active Consults:  IP CONSULT TO HOSPITALIST  IP CONSULT TO NEUROLOGY    Length of Stay:  Expected LOS: 2  Actual LOS: 1  Discharge Plan: no acute changes      Rhea Schmitt RN

## 2024-02-11 NOTE — PLAN OF CARE
shortened;Right shortened  Gait Abnormalities: Ataxic;Decreased step clearance                                                                                                                                                                                                                                                  Intervention/Education specific to: \"Stroke diagnoses\"    Patient was not educated on CVA workup at this time as patient not appropriate for education. Patient oriented to self only.     Goldstein Balance Test:    Goldstein Balance Scale  1. Sitting to Standing: Needs minimal aid to stand or stabilize  2. Standing Unsupported: Unable to stand 30 seconds unsupported  3. Sitting with Back Unsupported but Feet Supported on Floor or on a Stool: Able to sit safely and securely for 2 minutes  4. Standing to Sitting: Needs assist to sit  5.  Transfers: Needs one person to assist  6. Standing Unsupported with Eyes Closed: Needs help to keep from falling  7. Standing Unsupported with Feet Together: Needs help to attain position and unable to hold for 15 seconds  8. Reach Forward with Outstretched Arm While Standing: Loses balance while trying/requires external support  9.  Object from Floor from a Standing Position: Unable to try/needs assist to keep from losing balance or falling  10. Turning to Look Behind Over Left and Right Shoulders While Standing: Needs assist to keep from losing balance or falling  11. Turn 360 Degrees: Needs assistance while turning  12. Place Alternate Foot on Step or Stool While Standing Unsupported: Needs assistance to keep from falling/unable to try  13. Standing Unsupported One Foot in Front: Loses balance while stepping or standing  14. Standing on One Leg: Unable to try needs assist to prevent fall  Goldstein Balance Score: 6         56=Maximum possible score;   0-20=High fall risk  21-40=Moderate fall risk   41-56=Low fall risk                                                                                                                                                                                                                                      Pain Rating:  Denies any pain    Activity Tolerance:   Fair     After treatment:   Patient left in no apparent distress sitting up in chair, Call bell within reach, Bed/ chair alarm activated, and Caregiver / family present    COMMUNICATION/EDUCATION:   The patient's plan of care was discussed with: occupational therapist and registered nurse    Patient Education  Education Given To: Patient  Education Provided: Role of Therapy;Plan of Care;Transfer Training  Education Method: Verbal  Barriers to Learning: Cognition  Education Outcome: Continued education needed    Thank you for this referral.  Eleanor Perez, PT         Physical Therapy Evaluation Charge Determination   History Examination Presentation Decision-Making   MEDIUM  Complexity : 1-2 comorbidities / personal factors will impact the outcome/ POC  MEDIUM Complexity : 3 Standardized tests and measures addressin body structure, function, activity limitation and / or participation in recreation  MEDIUM Complexity : Evolving with changing characteristics  Goldstein Balance Test  HIGH    Based on the above components, the patient evaluation is determined to be of the following complexity level: Medium

## 2024-02-11 NOTE — PLAN OF CARE
assistance;Assist X1  Bed to Chair: Minimum assistance;Assist X1  Toilet Transfer: Minimum assistance;Assist X1;Adaptive equipment;Additional time          Functional Mobility: Minimal assistance;Moderate assistance          Balance:   Standing: Impaired  Balance  Sitting: Impaired  Sitting - Static: Good (unsupported)  Sitting - Dynamic: Fair (occasional)  Standing: Impaired  Standing - Static: Fair;Constant support  Standing - Dynamic: Fair;Constant support      ADL Assessment:          Feeding: Minimal assistance       Grooming: Minimal assistance       UE Bathing: Moderate assistance            LE Bathing: Moderate assistance       UE Dressing: Moderate assistance;Maximum assistance       LE Dressing: Moderate assistance;Maximum assistance       Toileting: Moderate assistance            Functional Mobility: Minimal assistance;Moderate assistance            ADL Intervention and task modifications:    Pt able to demonstrate ability to grasp water bottle with pincer like grasp to bring bottle of water/straw to mouth with RUE; set-up to simulate using fork with RUE. totalA to don socks and shoes.                                                                                                                                                                                                                                   Barthel Index:    Barthel Index Scale  Feeding: Needs help, i.e. for cutting  Bathing: Cannot perform activity  Grooming: Cannot perform activity  Dressing: Cannot perform activity  Bowel Control: Occasional accidents or needs help with device  Bladder Control: Occasional accidents or needs help with device  Toilet Transfers: Cannot perform activity  Chair/Bed Trannsfers: Minimum assistance or supervision required  Ambulation: Cannot perform activity  Stairs: Cannot perform activity  Total Barthel Index Score: 25       The Barthel ADL Index: Guidelines  1. The index should be used as a record of what a                                                                        Pain Ratin/10   Pain Intervention(s):   pain is at a level acceptable to the patient    Activity Tolerance:   Fair     After treatment:   Patient left in no apparent distress sitting up in chair, Call bell within reach, Bed/ chair alarm activated, and Caregiver / family present    COMMUNICATION/EDUCATION:   The patient's plan of care was discussed with: physical therapist and registered nurse    Patient Education  Education Given To: Patient;Family  Education Provided: Role of Therapy;Transfer Training;Equipment;Plan of Care;Fall Prevention Strategies;Precautions;Orientation;ADL Adaptive Strategies;Family Education  Education Method: Demonstration;Verbal  Barriers to Learning: Cognition  Education Outcome: Verbalized understanding;Continued education needed    Thank you for this referral.  EDDIE GERMAN, OT  Minutes: 28    Occupational Therapy Evaluation Charge Determination   History Examination Decision-Making   MEDIUM Complexity : Expanded review of history including physical, cognitive and psychocial history  HIGH Complexity: 5 Performance deficits relating to physical, cognitive, or psychosocial skills that result in activity limitations and/or participation restrictions  HIGH Complexity: Patient presents with comorbidities that affect occupational performance.  Significant modifications of tasks or assistance (eg. physical or verbal) with assessment (s) is necessary to enable pt to complete evaluation   Based on the above components, the patient evaluation is determined to be of the following complexity level: High

## 2024-02-11 NOTE — CONSULTS
[facility]  [address] [place] [address]    Neurology Consultation    Date: February 11, 2024    Nicolette Germain  [id number]  1965  [address] [place] [address] [address]  [place] [address]    Primary Care Physician:  Yisel Dodd APRN - NP  [unfilled]  [phone]   [phone]    Referring Physician:  Rafaela San MD    Impression: This is an unfortunate woman with a cardiorespiratory arrest year and half ago with residual spastic hemiparesis and other significant cognitive and language problems.  We were asked to see her because reportedly the family reported worsening right-sided facial weakness which the mother currently denies.  The mother tells me it was the nurse who reported the right facial weakness.  This is likely just part and parcel of her residual right hemiparesis.    Plan and Recommendations: Apparently an MRI of the brain has been ordered.  Frankly I suspect that will not show anything acute.  Her main problem is her anxiety and she needs to see psychiatry.  The mother will try to arrange that through [facility].  I will sign off but please call if you have any questions regarding the results of the MRI of the brain.    Barrier to Discharge from Neurologic Perspective: None.    Outpatient Neurology Follow Up: As previously arranged.  She apparently sees Dr. Lopez on a yearly basis.      Sterling Linares., M.D.      Diplomate, American Board of Neurology and Psychiatry  Diplomate, American Board of Electrodiagnostic Medicine  Subspecialty Certification, Headache Medicine, [facility]  ______________________________________________________________________    Reason for Consultation: Nicolette Germain is a 58 y.o. y/o female who we are asked to see in consultation for right facial weakness.    History of Present Illness: The patient had a severe cardiorespiratory arrest with multifocal and severe generalized CNS involvement gradually recovering to some extent over the  last year and a half.  She had most prominently spastic right hemiparesis.  We were asked to see her because of right facial weakness.  The mother now tells me that was brought up by the nurse and it was not brought up by the family as initially indicated in the notes.  The patient is still seems to deny any new neurologic symptomatology.  The main problem seems to be her anxiety.  Mother is also rightfully concerned about her polypharmacy.    Past medical History:  Past Medical History:   Diagnosis Date    Allergic rhinitis 07/29/2019    Cerebral artery occlusion with cerebral infarction (HCC) 8/27/2022    Depression     Dysphagia 9/27/2022    Fractures     History of multiple allergies     History of vascular access device 10/07/2022    [facility] VAT: PICC placement R Cephalic length 40 cm for reliable access/TPN arm circ 35.5 cm    Hypercholesteremia     Hypertension     Memory disorder 9/27/2022    Movement disorder 9/27/2022    Muscle ache     Obesity 11/05/2012    Sinus pressure     Sinus problem     Stroke (HCC)     Thyroid disease        Past Surgical History:  Past Surgical History:   Procedure Laterality Date    ANTERIOR CRUCIATE LIGAMENT REPAIR      left     CHOLECYSTECTOMY  01/01/1998    GI      colonoscopy-polyps    IR NONTUNNELED VASCULAR CATHETER  09/27/2022    IR NONTUNNELED VASCULAR CATHETER 9/27/2022 [facility] RAD ANGIO IR    IR NONTUNNELED VASCULAR CATHETER  09/27/2022    KNEE ARTHROSCOPY         Medications:    Current Facility-Administered Medications:     diazePAM (VALIUM) tablet 2.5 mg, 2.5 mg, Oral, Q6H PRN, Berto Bryant DO, 2.5 mg at 02/10/24 2024    acetaminophen (TYLENOL) tablet 650 mg, 650 mg, Oral, Q4H PRN, Berto Bryant DO    0.9 % sodium chloride infusion, , IntraVENous, Continuous, Berto Bryant DO, Last Rate: 150 mL/hr at 02/11/24 0536, New Bag at 02/11/24 0536    sodium chloride flush 0.9 % injection 5-40 mL, 5-40 mL, IntraVENous, 2 times per day, Esequiel Villafana APRN, 10 mL at

## 2024-02-11 NOTE — PROGRESS NOTES
End of Shift Note    Bedside shift change report given to AJ Musa (oncoming nurse) by Vidal Almanzar RN (offgoing nurse).  Report included the following information Nurse Handoff Report, Adult Overview, MAR, and Recent Results      Shift worked:  7-7   Shift summary and any significant changes:    MRI complete, no loose stools this shift.     Concerns for physician to address:  None   Zone phone for oncoming shift:   [phone]     Patient Information  Nicolette Germain  58 y.o.  2/10/2024 10:37 AM by Rafaela San MD. Nicolette Germain was admitted from Assisted Living    Problem List  Patient Active Problem List    Diagnosis Date Noted    Acute cerebrovascular accident (CVA) due to ischemia ([facility]) 02/10/2024    Adrenal nodule ([facility]) 07/18/2023    Metabolic encephalopathy 06/17/2023    Altered mental status 10/27/2022    Diarrhea 10/25/2022    AMS (altered mental status) 10/25/2022    Anemia 10/05/2022    Hypokalemia 10/05/2022    Obese 10/05/2022    Acute CVA (cerebrovascular accident) ([facility]) 09/29/2022    NSTEMI (non-ST elevated myocardial infarction) ([facility]) 09/28/2022    Dyslipidemia 09/28/2022    Hypothyroidism 09/28/2022    REAGAN (generalized anxiety disorder) 09/28/2022    Takotsubo cardiomyopathy 09/28/2022    Status epilepticus ([facility]) 09/27/2022    NBA (acute kidney injury) ([facility]) 09/27/2022    Lactic acidosis 09/27/2022    Acute respiratory failure with hypoxia ([facility]) 09/27/2022    Acute metabolic encephalopathy 09/27/2022    Shock, cardiogenic ([facility]) 09/27/2022    Generalized anxiety disorder 06/29/2021    Acquired hypothyroidism 06/29/2021    Tachycardia 06/29/2021    Insomnia 06/29/2021    Impaired fasting glucose 06/29/2021    Lower extremity edema 06/29/2021    Mixed hyperlipidemia 06/29/2021    PND (post-nasal drip) 11/17/2020    Chronic maxillary sinusitis 11/17/2020    Seasonal allergic rhinitis due to pollen 07/29/2019    Class 2 severe obesity due to excess calories with serious comorbidity and body mass

## 2024-02-11 NOTE — PROGRESS NOTES
Orders received, chart reviewed and patient evaluated by physical therapy. Pending progression with skilled acute physical therapy, recommend:  Therapy 2 days/week in the home    Recommend with nursing OOB to chair 3x/day and walking daily with x1 CGA and rolling walker and gait belt . Thank you for completing as able in order to maintain patient strength, endurance and independence.     Full evaluation to follow.      Eleanor Peerz, PT, DPT

## 2024-02-12 LAB
ANION GAP SERPL CALC-SCNC: 2 MMOL/L (ref 5–15)
BASOPHILS # BLD: 0.1 K/UL (ref 0–0.1)
BASOPHILS NFR BLD: 1 % (ref 0–1)
BUN SERPL-MCNC: 16 MG/DL (ref 6–20)
BUN/CREAT SERPL: 24 (ref 12–20)
CALCIUM SERPL-MCNC: 8.2 MG/DL (ref 8.5–10.1)
CHLORIDE SERPL-SCNC: 116 MMOL/L (ref 97–108)
CO2 SERPL-SCNC: 27 MMOL/L (ref 21–32)
CREAT SERPL-MCNC: 0.67 MG/DL (ref 0.55–1.02)
DIFFERENTIAL METHOD BLD: ABNORMAL
EOSINOPHIL # BLD: 0.2 K/UL (ref 0–0.4)
EOSINOPHIL NFR BLD: 3 % (ref 0–7)
ERYTHROCYTE [DISTWIDTH] IN BLOOD BY AUTOMATED COUNT: 13.2 % (ref 11.5–14.5)
GLUCOSE BLD STRIP.AUTO-MCNC: 107 MG/DL (ref 65–117)
GLUCOSE BLD STRIP.AUTO-MCNC: 112 MG/DL (ref 65–117)
GLUCOSE SERPL-MCNC: 94 MG/DL (ref 65–100)
HCT VFR BLD AUTO: 33 % (ref 35–47)
HGB BLD-MCNC: 10.6 G/DL (ref 11.5–16)
IMM GRANULOCYTES # BLD AUTO: 0 K/UL (ref 0–0.04)
IMM GRANULOCYTES NFR BLD AUTO: 0 % (ref 0–0.5)
LYMPHOCYTES # BLD: 2.6 K/UL (ref 0.8–3.5)
LYMPHOCYTES NFR BLD: 50 % (ref 12–49)
MCH RBC QN AUTO: 30.9 PG (ref 26–34)
MCHC RBC AUTO-ENTMCNC: 32.1 G/DL (ref 30–36.5)
MCV RBC AUTO: 96.2 FL (ref 80–99)
MONOCYTES # BLD: 0.5 K/UL (ref 0–1)
MONOCYTES NFR BLD: 9 % (ref 5–13)
NEUTS SEG # BLD: 1.9 K/UL (ref 1.8–8)
NEUTS SEG NFR BLD: 37 % (ref 32–75)
NRBC # BLD: 0 K/UL (ref 0–0.01)
NRBC BLD-RTO: 0 PER 100 WBC
PLATELET # BLD AUTO: 155 K/UL (ref 150–400)
PMV BLD AUTO: 11.7 FL (ref 8.9–12.9)
POTASSIUM SERPL-SCNC: 4 MMOL/L (ref 3.5–5.1)
RBC # BLD AUTO: 3.43 M/UL (ref 3.8–5.2)
SERVICE CMNT-IMP: NORMAL
SERVICE CMNT-IMP: NORMAL
SODIUM SERPL-SCNC: 145 MMOL/L (ref 136–145)
WBC # BLD AUTO: 5.2 K/UL (ref 3.6–11)

## 2024-02-12 PROCEDURE — 2580000003 HC RX 258

## 2024-02-12 PROCEDURE — 1100000000 HC RM PRIVATE

## 2024-02-12 PROCEDURE — 2580000003 HC RX 258: Performed by: NURSE PRACTITIONER

## 2024-02-12 PROCEDURE — 80048 BASIC METABOLIC PNL TOTAL CA: CPT

## 2024-02-12 PROCEDURE — 82962 GLUCOSE BLOOD TEST: CPT

## 2024-02-12 PROCEDURE — 85025 COMPLETE CBC W/AUTO DIFF WBC: CPT

## 2024-02-12 PROCEDURE — 6370000000 HC RX 637 (ALT 250 FOR IP): Performed by: PSYCHIATRY & NEUROLOGY

## 2024-02-12 PROCEDURE — 2580000003 HC RX 258: Performed by: EMERGENCY MEDICINE

## 2024-02-12 PROCEDURE — 6370000000 HC RX 637 (ALT 250 FOR IP)

## 2024-02-12 PROCEDURE — 6370000000 HC RX 637 (ALT 250 FOR IP): Performed by: STUDENT IN AN ORGANIZED HEALTH CARE EDUCATION/TRAINING PROGRAM

## 2024-02-12 PROCEDURE — 36415 COLL VENOUS BLD VENIPUNCTURE: CPT

## 2024-02-12 RX ORDER — BUSPIRONE HYDROCHLORIDE 5 MG/1
5 TABLET ORAL 2 TIMES DAILY
Status: DISCONTINUED | OUTPATIENT
Start: 2024-02-12 | End: 2024-02-13 | Stop reason: HOSPADM

## 2024-02-12 RX ORDER — 0.9 % SODIUM CHLORIDE 0.9 %
500 INTRAVENOUS SOLUTION INTRAVENOUS ONCE
Status: COMPLETED | OUTPATIENT
Start: 2024-02-13 | End: 2024-02-13

## 2024-02-12 RX ORDER — LIDOCAINE 4 G/G
1 PATCH TOPICAL DAILY
Status: DISCONTINUED | OUTPATIENT
Start: 2024-02-12 | End: 2024-02-13 | Stop reason: HOSPADM

## 2024-02-12 RX ADMIN — SODIUM CHLORIDE, PRESERVATIVE FREE 10 ML: 5 INJECTION INTRAVENOUS at 09:56

## 2024-02-12 RX ADMIN — APIXABAN 5 MG: 5 TABLET, FILM COATED ORAL at 09:56

## 2024-02-12 RX ADMIN — SODIUM CHLORIDE: 9 INJECTION, SOLUTION INTRAVENOUS at 10:56

## 2024-02-12 RX ADMIN — BACLOFEN 10 MG: 10 TABLET ORAL at 21:12

## 2024-02-12 RX ADMIN — BACLOFEN 10 MG: 10 TABLET ORAL at 09:56

## 2024-02-12 RX ADMIN — ZIPRASIDONE HYDROCHLORIDE 20 MG: 20 CAPSULE ORAL at 17:24

## 2024-02-12 RX ADMIN — Medication: at 09:58

## 2024-02-12 RX ADMIN — DIVALPROEX SODIUM 250 MG: 250 TABLET, DELAYED RELEASE ORAL at 21:13

## 2024-02-12 RX ADMIN — APIXABAN 5 MG: 5 TABLET, FILM COATED ORAL at 21:12

## 2024-02-12 RX ADMIN — SODIUM CHLORIDE: 9 INJECTION, SOLUTION INTRAVENOUS at 03:27

## 2024-02-12 RX ADMIN — CETIRIZINE HYDROCHLORIDE 10 MG: 10 TABLET, FILM COATED ORAL at 09:56

## 2024-02-12 RX ADMIN — LEVOTHYROXINE SODIUM 88 MCG: 0.09 TABLET ORAL at 06:04

## 2024-02-12 RX ADMIN — HYDROXYZINE HYDROCHLORIDE 50 MG: 25 TABLET ORAL at 17:24

## 2024-02-12 RX ADMIN — SODIUM CHLORIDE: 9 INJECTION, SOLUTION INTRAVENOUS at 17:25

## 2024-02-12 RX ADMIN — SODIUM CHLORIDE, PRESERVATIVE FREE 10 ML: 5 INJECTION INTRAVENOUS at 21:43

## 2024-02-12 RX ADMIN — BACLOFEN 10 MG: 10 TABLET ORAL at 13:59

## 2024-02-12 RX ADMIN — BUSPIRONE HYDROCHLORIDE 5 MG: 5 TABLET ORAL at 13:59

## 2024-02-12 RX ADMIN — ASPIRIN 81 MG: 81 TABLET, CHEWABLE ORAL at 09:56

## 2024-02-12 RX ADMIN — ATORVASTATIN CALCIUM 80 MG: 40 TABLET, FILM COATED ORAL at 21:12

## 2024-02-12 RX ADMIN — ZIPRASIDONE HYDROCHLORIDE 20 MG: 20 CAPSULE ORAL at 09:56

## 2024-02-12 RX ADMIN — SODIUM CHLORIDE 500 ML: 9 INJECTION, SOLUTION INTRAVENOUS at 23:58

## 2024-02-12 RX ADMIN — BUSPIRONE HYDROCHLORIDE 5 MG: 5 TABLET ORAL at 21:13

## 2024-02-12 RX ADMIN — Medication: at 21:13

## 2024-02-12 ASSESSMENT — PAIN DESCRIPTION - DESCRIPTORS: DESCRIPTORS: OTHER (COMMENT)

## 2024-02-12 ASSESSMENT — PAIN SCALES - GENERAL: PAINLEVEL_OUTOF10: 0

## 2024-02-12 NOTE — PROGRESS NOTES
End of Shift Note    Bedside shift change report given to AJ Ashley (oncoming nurse) by Rhea Schmitt RN (offgoing nurse).  Report included the following information Nurse Handoff Report, Adult Overview, MAR, and Recent Results      Shift worked:  7p - 7a   Shift summary and any significant changes:    No acute changes.  Patient turned during the night.       Concerns for physician to address:  None   Zone phone for oncoming shift:   [phone]     Patient Information  Nicolette Germain  58 y.o.  2/10/2024 10:37 AM by Rafaela San MD. Nicolette Germain was admitted from Assisted Living    Problem List  Patient Active Problem List    Diagnosis Date Noted    Acute cerebrovascular accident (CVA) due to ischemia ([facility]) 02/10/2024    Adrenal nodule ([facility]) 07/18/2023    Metabolic encephalopathy 06/17/2023    Altered mental status 10/27/2022    Diarrhea 10/25/2022    AMS (altered mental status) 10/25/2022    Anemia 10/05/2022    Hypokalemia 10/05/2022    Obese 10/05/2022    Acute CVA (cerebrovascular accident) ([facility]) 09/29/2022    NSTEMI (non-ST elevated myocardial infarction) ([facility]) 09/28/2022    Dyslipidemia 09/28/2022    Hypothyroidism 09/28/2022    REAGAN (generalized anxiety disorder) 09/28/2022    Takotsubo cardiomyopathy 09/28/2022    Status epilepticus ([facility]) 09/27/2022    NBA (acute kidney injury) ([facility]) 09/27/2022    Lactic acidosis 09/27/2022    Acute respiratory failure with hypoxia ([facility]) 09/27/2022    Acute metabolic encephalopathy 09/27/2022    Shock, cardiogenic ([facility]) 09/27/2022    Generalized anxiety disorder 06/29/2021    Acquired hypothyroidism 06/29/2021    Tachycardia 06/29/2021    Insomnia 06/29/2021    Impaired fasting glucose 06/29/2021    Lower extremity edema 06/29/2021    Mixed hyperlipidemia 06/29/2021    PND (post-nasal drip) 11/17/2020    Chronic maxillary sinusitis 11/17/2020    Seasonal allergic rhinitis due to pollen 07/29/2019    Class 2 severe obesity due to excess calories with serious comorbidity  and body mass index (BMI) of 36.0 to 36.9 in adult ([facility]) 11/05/2012    Depression 03/29/2012     Past Medical History:   Diagnosis Date    Allergic rhinitis 07/29/2019    Cerebral artery occlusion with cerebral infarction ([facility]) 8/27/2022    Depression     Dysphagia 9/27/2022    Fractures     History of multiple allergies     History of vascular access device 10/07/2022    [facility] VAT: PICC placement R Cephalic length 40 cm for reliable access/TPN arm circ 35.5 cm    Hypercholesteremia     Hypertension     Memory disorder 9/27/2022    Movement disorder 9/27/2022    Muscle ache     Obesity 11/05/2012    Sinus pressure     Sinus problem     Stroke ([facility])     Thyroid disease        Core Measures:  CVA: Yes Yes  CHF:No No  PNA:NoNo    Activity:     Number times ambulated in hallways past shift: 0  Number of times OOB to chair past shift: 0    Cardiac:   Cardiac Monitoring: Yes           Access:   Current line(s): PIV  Central Line? No     Genitourinary:   Urinary status: voiding  and external catheter  Urinary Catheter? No    Respiratory:   O2 Device: None (Room air)  Chronic home O2 use?: no  Incentive spirometer at bedside: no       GI:  Last BM (including prior to admit): 02/09/24  Current diet:  ADULT DIET; Regular  DIET ONE TIME MESSAGE;  Passing flatus: yes  Tolerating current diet: no       Pain Management:   Patient states pain is manageable on current regimen: yes    Skin:  Frank Scale Score: 13  Interventions: turn team, float heels, increase time out of bed, and internal/external urinary devices    Patient Safety:  Fall Score: Chavez Total Score: 60  Interventions: bed/chair alarm, assistive devices (walker, cane, etc.), gripper socks, pt to call before getting OOB, and stay with me (per policy)     @Rollbelt  @dexterity to release roll belt  Yes/No ( must document dexterity  here by stating Yes or No here, otherwise this is a restraint and must follow restraint documentation policy.)    DVT prophylaxis:  DVT

## 2024-02-12 NOTE — PROGRESS NOTES
Neurology    February 12, 2024    I was asked to comment on the patient's MRI of the brain which has been read as showing an acute stroke.  On my review, while there is a linear area of increased signal in the diffusion weighted imaging, there is no corresponding abnormality on the ADC map and on the T2 imaging there is an area of bright signal in exactly that spot.  This is T2 shine through and is not reflective of an acute infarction.  She does not need further workup for an acute stroke.  The case was discussed with the attending physician, Dr. Gomez.  I did have some discussion with the patient and her mother.  They still have concerns about her polypharmacy which is not unreasonable.  They will speak with the prescribing physician regarding the Zyprexa and Geodon as to whether or not she needs both.  We will go ahead and start her on a low-dose of BuSpar for her anxiety.  Can be adjusted as needed over the coming weeks.  They will also speak with the folks at [facility] when they are there for their other appointment as to whether or not they can get an appointment with [facility] psychiatry.    Sterling Linares., M.D.    Diplomate, American Board of Neurology and Psychiatry  Diplomate, American Board of Electrodiagnostic Medicine  Subspecialty Certification, Headache Medicine, [facility]

## 2024-02-12 NOTE — PLAN OF CARE
Problem: Discharge Planning  Goal: Discharge to home or other facility with appropriate resources  2/12/2024 0852 by Gabi Dallas RN  Outcome: Progressing  2/11/2024 2233 by Rhea Schmitt RN  Outcome: Progressing     Problem: Confusion  Goal: Confusion, delirium, dementia, or psychosis is improved or at baseline  Description: INTERVENTIONS:  1. Assess for possible contributors to thought disturbance, including medications, impaired vision or hearing, underlying metabolic abnormalities, dehydration, psychiatric diagnoses, and notify attending LIP  2. [place] high risk fall precautions, as indicated  3. Provide frequent short contacts to provide reality reorientation, refocusing and direction  4. Decrease environmental stimuli, including noise as appropriate  5. Monitor and intervene to maintain adequate nutrition, hydration, elimination, sleep and activity  6. If unable to ensure safety without constant attention obtain sitter and review sitter guidelines with assigned personnel  7. Initiate Psychosocial CNS and Spiritual Care consult, as indicated  2/12/2024 0852 by Gabi Dallas RN  Outcome: Progressing  2/11/2024 2233 by Rhea Schmitt RN  Outcome: Progressing     Problem: Safety - Adult  Goal: Free from fall injury  2/12/2024 0852 by Gabi Dallas RN  Outcome: Progressing  2/11/2024 2233 by Rhea Schmitt RN  Outcome: Progressing     Problem: Skin/Tissue Integrity  Goal: Absence of new skin breakdown  Description: 1.  Monitor for areas of redness and/or skin breakdown  2.  Assess vascular access sites hourly  3.  Every 4-6 hours minimum:  Change oxygen saturation probe site  4.  Every 4-6 hours:  If on nasal continuous positive airway pressure, respiratory therapy assess nares and determine need for appliance change or resting period.  2/12/2024 0852 by Gabi Dallas RN  Outcome: Progressing  2/11/2024 2233 by Rhea Schmitt RN  Outcome: Progressing     Problem: Chronic Conditions and

## 2024-02-12 NOTE — CARE COORDINATION
Care Management Initial Assessment       RUR: 18% (moderate RUR)   Readmission? No  1st IM letter given? N/a - Commercial   1st  letter given: N/a    CM noted pt confused, CM contacted pt's mother/POA via phone. CM introduced self and role and completed initial assessment. CM verified demographic and clinical information.     Pt resides at [facility] in a 1 level apartment, [address]. Pt's mother reports pt being dependent in all ADLs. Mother reports, WC, hospital bed, RW, and grab bars at [facility].  Pt mother denies O2/CPAP at home. Pt's mother states they are supported by [facility] staff. Patient is not an active . Pt's mother concerned [facility] will not accept pt back d/t her \"hollering in the night\".    CM explained [place]'s bedside evaluation process. Explained that CM has called HARSHA Yanira for bedside eval and left message. CM will continue to make attempts at contacting [facility].     PT/OT rec HH, however per pt's mother, they are unable to arrange that d/t her insurance. Pt's mother has already made an appointment for patient on 02/14/24 for outpatient PT/OT. CM has requesting outpatient script from provider via [employer].     1445 - Left additional message for Yanira @ [place].    1453 - Received call back from Yanira @ [place]. Yanira does not see a need for a bedside eval at this time. They are planning for pt to return and are agreeable to outpatient PT/OT plan, pending neuro clearance and Echo. CM will update facility in the AM re: d/c status. DON is in contact with pt's family.      02/12/24 0183   Service Assessment   Patient Orientation Other (see comment)  (Pt disoriented)   Support Systems Parent;Family Members   Patient's Healthcare Decision Maker is: Named in Scanned ACP Document   PCP Verified by CM Yes   Last Visit to PCP Within last 3 months   Prior Functional Level Assistance with the following:;Bathing;Dressing;Toileting;Feeding;Mobility;Shopping   Current Functional Level Assistance with the  following:;Dressing;Bathing;Toileting;Feeding;Mobility;Shopping   Can patient return to prior living arrangement No   Ability to make needs known: Fair   Family able to assist with home care needs: No   Would you like for me to discuss the discharge plan with any other family members/significant others, and if so, who? Yes  (Discuss with mother)   Social/Functional History   Lives With Alone   Type of Home Assisted living  ([facility])   Home Layout One level   Home Access Elevator;Level entry   Bathroom Accessibility Wheelchair accessible;Walker accessible   Home Equipment Hospital bed;Walker, rolling;Wheelchair-manual;Grab bars   Receives Help From Family;Personal care attendant   ADL Assistance Needs assistance   Toileting Needs assistance   Ambulation Assistance Needs assistance   Transfer Assistance Needs assistance   Active  No   Discharge Planning   Type of Residence Assisted living  ([facility])   Living Arrangements Other (Comment)  ([facility] staff)   Potential Assistance Needed Outpatient PT/OT   DME Ordered? No   Potential Assistance Purchasing Medications No   Type of Home Care Services None   Patient expects to be discharged to: Assisted living   Services At/After Discharge   Transition of Care Consult (CM Consult) Assisted Living   Services At/After Discharge PT;OT;Outpatient    Resource Information Provided? No   Mode of Transport at Discharge Other (see comment)  (Mother to transport)   Confirm Follow Up Transport Family   Condition of Participation: Discharge Planning   The Plan for Transition of Care is related to the following treatment goals: Return to [facility] with outpatient PT/OT to continue working toward strength and mobility goals     Advance Care Planning     General Advance Care Planning (ACP) Conversation    Date of Conversation: 2/12/2024  Conducted with: Patient with Decision Making Capacity    Healthcare Decision Maker:    Primary Decision Maker: Isidro Orlando - Parent -

## 2024-02-12 NOTE — PROGRESS NOTES
Speech Pathology Contact Note:    Orders received and appreciated for SLP evaluation. Per discussion w/ RN and patient/family, no concerns for speech/communication changes or swallowing difficulty.  Pt currently tolerating regular diet w/ thin liquids without concern.Per neurology note on MRI reading, \"T2 shine through and is not reflective of an acute infarction.\" No further acute SLP services warranted at this time. SLP will sign off. Please re-consult if concerns arise in the future. Thanks!     Radha Corbett, [facility]-SLP

## 2024-02-12 NOTE — CARE COORDINATION
[id number] - Attempted to call HARSHA Mayen @ [facility] [phone] to request bedside eval. CM left message.     Dot Garcia, MSW  Care Management

## 2024-02-12 NOTE — PROGRESS NOTES
Hospitalist Progress Note    NAME:   Nicolette Germain   : 1965   MRN: [id number]     Date/Time: 2024 4:06 PM  Patient PCP: Yisel Dodd APRN - PATTI    Estimated discharge date:   Barriers:       Assessment / Plan:    New right sided facial droop, worsening aphasia  New CVA vs recrudescence of prior stroke?  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  Hx anoxic brain injury  CT head (-)  MRI brain, not done yet   Neuro consulted expected no new stroke, but recommended follow-up with MRI result  PT/OT/ST eval/tx  NPO until swallow eval              When safe for diet, pt requires assist and supervision with pacing herself, would benefit from soft/bite-sized diet  ASA and high dose statin  Permissive HTN x24h ( <220/110) then goal BP < 140/90  Keep BP greater than 90/50 (MAP>65)  Received 1L NS, continue with maintenance IV fluids  Monitor blood glucose q6h x 24h  Treat with hypoglycemia protocol as needed  Resume Eliquis  MRI showed no stroke, no echo needed     Hypotension  Hx hypertension  Responsive to IV hydration  Initially 84/65, now 115/52  Not on any BP meds PTA, discontinued at facility d/t hypotension  Suspect dehydration contributory, received 1L NS now IV maintenance fluids  Tele monitoring     Diarrhea--multiple episodes, incontinent, malodorous--POA  Preceded by constipation, may be 2/2 overzealous bowel regimen  Hold PTA Amitiza  Stool for C diff, enteric PCR, fecal leukocytes  CT abd/pelvis no abnormality     Suprapubic pain  Presumed dysuria  CT abd/pelvis no abnormality  UA 1+ bacteria, trace LE's, did not meet criteria to reflex to culture  Urine clear dark rao  Received 1 dose Rocephin in ER, will defer further abx given afebrile, WBC nl, relatively clean UA  Pyridium given--note urine may become orange with this medication  Resume PTA baclofen  Incontinent of large amount of urine with PureWick external catheter during my exam, no urinary

## 2024-02-12 NOTE — PROGRESS NOTES
End of Shift Note     Bedside shift change report given to KIRSTY Henao (oncoming nurse) by Gabi Dallas RN (offgoing nurse).  Report included the following information Nurse Handoff Report, Adult Overview, MAR, and Recent Results        Shift worked: days   Shift summary and any significant changes:     Neuro came to bedside  Speech consulted with patient         Concerns for physician to address:  None   Zone phone for oncoming shift:   [phone]      Patient Information  Nicolette Germain  58 y.o.  2/10/2024 10:37 AM by Rafaela San MD. Nicolette Germain was admitted from Assisted Living     Problem List       Patient Active Problem List     Diagnosis Date Noted    Acute cerebrovascular accident (CVA) due to ischemia ([facility]) 02/10/2024    Adrenal nodule ([facility]) 07/18/2023    Metabolic encephalopathy 06/17/2023    Altered mental status 10/27/2022    Diarrhea 10/25/2022    AMS (altered mental status) 10/25/2022    Anemia 10/05/2022    Hypokalemia 10/05/2022    Obese 10/05/2022    Acute CVA (cerebrovascular accident) ([facility]) 09/29/2022    NSTEMI (non-ST elevated myocardial infarction) ([facility]) 09/28/2022    Dyslipidemia 09/28/2022    Hypothyroidism 09/28/2022    REAGAN (generalized anxiety disorder) 09/28/2022    Takotsubo cardiomyopathy 09/28/2022    Status epilepticus ([facility]) 09/27/2022    NBA (acute kidney injury) ([facility]) 09/27/2022    Lactic acidosis 09/27/2022    Acute respiratory failure with hypoxia ([facility]) 09/27/2022    Acute metabolic encephalopathy 09/27/2022    Shock, cardiogenic ([facility]) 09/27/2022    Generalized anxiety disorder 06/29/2021    Acquired hypothyroidism 06/29/2021    Tachycardia 06/29/2021    Insomnia 06/29/2021    Impaired fasting glucose 06/29/2021    Lower extremity edema 06/29/2021    Mixed hyperlipidemia 06/29/2021    PND (post-nasal drip) 11/17/2020    Chronic maxillary sinusitis 11/17/2020    Seasonal allergic rhinitis due to pollen 07/29/2019    Class 2 severe obesity due to excess calories with serious

## 2024-02-13 VITALS
OXYGEN SATURATION: 98 % | BODY MASS INDEX: 28.34 KG/M2 | SYSTOLIC BLOOD PRESSURE: 124 MMHG | RESPIRATION RATE: 18 BRPM | TEMPERATURE: 99.1 F | HEIGHT: 64 IN | HEART RATE: 73 BPM | DIASTOLIC BLOOD PRESSURE: 68 MMHG | WEIGHT: 166 LBS

## 2024-02-13 LAB
ANION GAP SERPL CALC-SCNC: 3 MMOL/L (ref 5–15)
BASOPHILS # BLD: 0.1 K/UL (ref 0–0.1)
BASOPHILS NFR BLD: 1 % (ref 0–1)
BUN SERPL-MCNC: 14 MG/DL (ref 6–20)
BUN/CREAT SERPL: 18 (ref 12–20)
CALCIUM SERPL-MCNC: 8.3 MG/DL (ref 8.5–10.1)
CHLORIDE SERPL-SCNC: 118 MMOL/L (ref 97–108)
CO2 SERPL-SCNC: 26 MMOL/L (ref 21–32)
CREAT SERPL-MCNC: 0.77 MG/DL (ref 0.55–1.02)
DIFFERENTIAL METHOD BLD: ABNORMAL
EOSINOPHIL # BLD: 0.2 K/UL (ref 0–0.4)
EOSINOPHIL NFR BLD: 4 % (ref 0–7)
ERYTHROCYTE [DISTWIDTH] IN BLOOD BY AUTOMATED COUNT: 13.2 % (ref 11.5–14.5)
GLUCOSE SERPL-MCNC: 116 MG/DL (ref 65–100)
HCT VFR BLD AUTO: 32.8 % (ref 35–47)
HGB BLD-MCNC: 10.3 G/DL (ref 11.5–16)
IMM GRANULOCYTES # BLD AUTO: 0 K/UL (ref 0–0.04)
IMM GRANULOCYTES NFR BLD AUTO: 0 % (ref 0–0.5)
LYMPHOCYTES # BLD: 2.7 K/UL (ref 0.8–3.5)
LYMPHOCYTES NFR BLD: 49 % (ref 12–49)
MCH RBC QN AUTO: 30.3 PG (ref 26–34)
MCHC RBC AUTO-ENTMCNC: 31.4 G/DL (ref 30–36.5)
MCV RBC AUTO: 96.5 FL (ref 80–99)
MONOCYTES # BLD: 0.5 K/UL (ref 0–1)
MONOCYTES NFR BLD: 9 % (ref 5–13)
NEUTS SEG # BLD: 2.1 K/UL (ref 1.8–8)
NEUTS SEG NFR BLD: 37 % (ref 32–75)
NRBC # BLD: 0 K/UL (ref 0–0.01)
NRBC BLD-RTO: 0 PER 100 WBC
PLATELET # BLD AUTO: 158 K/UL (ref 150–400)
PMV BLD AUTO: 11.7 FL (ref 8.9–12.9)
POTASSIUM SERPL-SCNC: 3.7 MMOL/L (ref 3.5–5.1)
RBC # BLD AUTO: 3.4 M/UL (ref 3.8–5.2)
SODIUM SERPL-SCNC: 147 MMOL/L (ref 136–145)
WBC # BLD AUTO: 5.6 K/UL (ref 3.6–11)

## 2024-02-13 PROCEDURE — 80048 BASIC METABOLIC PNL TOTAL CA: CPT

## 2024-02-13 PROCEDURE — 97530 THERAPEUTIC ACTIVITIES: CPT

## 2024-02-13 PROCEDURE — 6370000000 HC RX 637 (ALT 250 FOR IP): Performed by: PSYCHIATRY & NEUROLOGY

## 2024-02-13 PROCEDURE — 6370000000 HC RX 637 (ALT 250 FOR IP): Performed by: STUDENT IN AN ORGANIZED HEALTH CARE EDUCATION/TRAINING PROGRAM

## 2024-02-13 PROCEDURE — 2580000003 HC RX 258: Performed by: EMERGENCY MEDICINE

## 2024-02-13 PROCEDURE — 85025 COMPLETE CBC W/AUTO DIFF WBC: CPT

## 2024-02-13 PROCEDURE — 6370000000 HC RX 637 (ALT 250 FOR IP)

## 2024-02-13 PROCEDURE — 36415 COLL VENOUS BLD VENIPUNCTURE: CPT

## 2024-02-13 RX ORDER — OLANZAPINE 2.5 MG/1
2.5 TABLET, FILM COATED ORAL EVERY 6 HOURS PRN
Qty: 30 TABLET | Refills: 0 | Status: SHIPPED | OUTPATIENT
Start: 2024-02-13

## 2024-02-13 RX ORDER — BUSPIRONE HYDROCHLORIDE 5 MG/1
5 TABLET ORAL 2 TIMES DAILY
Qty: 60 TABLET | Refills: 0 | Status: SHIPPED | OUTPATIENT
Start: 2024-02-13 | End: 2024-03-14

## 2024-02-13 RX ADMIN — BACLOFEN 10 MG: 10 TABLET ORAL at 09:57

## 2024-02-13 RX ADMIN — Medication: at 09:58

## 2024-02-13 RX ADMIN — CETIRIZINE HYDROCHLORIDE 10 MG: 10 TABLET, FILM COATED ORAL at 09:57

## 2024-02-13 RX ADMIN — BUSPIRONE HYDROCHLORIDE 5 MG: 5 TABLET ORAL at 09:57

## 2024-02-13 RX ADMIN — ZIPRASIDONE HYDROCHLORIDE 20 MG: 20 CAPSULE ORAL at 09:57

## 2024-02-13 RX ADMIN — LEVOTHYROXINE SODIUM 88 MCG: 0.09 TABLET ORAL at 06:07

## 2024-02-13 RX ADMIN — APIXABAN 5 MG: 5 TABLET, FILM COATED ORAL at 09:57

## 2024-02-13 RX ADMIN — SODIUM CHLORIDE: 9 INJECTION, SOLUTION INTRAVENOUS at 01:07

## 2024-02-13 RX ADMIN — ASPIRIN 81 MG: 81 TABLET, CHEWABLE ORAL at 09:57

## 2024-02-13 ASSESSMENT — PAIN SCALES - GENERAL: PAINLEVEL_OUTOF10: 0

## 2024-02-13 NOTE — PLAN OF CARE
Problem: Discharge Planning  Goal: Discharge to home or other facility with appropriate resources  2/13/2024 0850 by Gabi Dallas RN  Outcome: Progressing  2/12/2024 2246 by Earlene Kirk LPN  Outcome: Progressing     Problem: Confusion  Goal: Confusion, delirium, dementia, or psychosis is improved or at baseline  Description: INTERVENTIONS:  1. Assess for possible contributors to thought disturbance, including medications, impaired vision or hearing, underlying metabolic abnormalities, dehydration, psychiatric diagnoses, and notify attending LIP  2. [place] high risk fall precautions, as indicated  3. Provide frequent short contacts to provide reality reorientation, refocusing and direction  4. Decrease environmental stimuli, including noise as appropriate  5. Monitor and intervene to maintain adequate nutrition, hydration, elimination, sleep and activity  6. If unable to ensure safety without constant attention obtain sitter and review sitter guidelines with assigned personnel  7. Initiate Psychosocial CNS and Spiritual Care consult, as indicated  2/13/2024 0850 by Gabi Dallas RN  Outcome: Progressing  2/12/2024 2246 by Earlene Kirk LPN  Outcome: Progressing     Problem: Safety - Adult  Goal: Free from fall injury  2/13/2024 0850 by Gabi Dallas RN  Outcome: Progressing  2/12/2024 2246 by Earlene Kirk LPN  Outcome: Progressing     Problem: Skin/Tissue Integrity  Goal: Absence of new skin breakdown  Description: 1.  Monitor for areas of redness and/or skin breakdown  2.  Assess vascular access sites hourly  3.  Every 4-6 hours minimum:  Change oxygen saturation probe site  4.  Every 4-6 hours:  If on nasal continuous positive airway pressure, respiratory therapy assess nares and determine need for appliance change or resting period.  2/13/2024 0850 by Gabi Dalals RN  Outcome: Progressing  2/12/2024 2246 by Earlene Kirk LPN  Outcome: Progressing     Problem: Chronic Conditions and

## 2024-02-13 NOTE — PROGRESS NOTES
Nursing contacted Nocturnist/cross cover provider and notified patient bp earlier was 84/53 and sleepy, after bp rechecked was reported at 97/62 and asymptomatic. Nurse is now reported bp 86/58, asymptomatic. Has ivf running at 150ml/hr. No other concerns reported. VSS. Ordered NS 500ml bolus x1 now. Patient denies any further complaints or concerns. No acute distress reported. Nursing to notify Hospitalist for further/continued concerns. Will remain available overnight for further concerns if nursing/patient needs. Will defer further evaluation/management to the day shift primary care team.    Non-billable note.

## 2024-02-13 NOTE — PLAN OF CARE
Problem: Discharge Planning  Goal: Discharge to home or other facility with appropriate resources  2/13/2024 1106 by Gabi Dallas RN  Outcome: Completed  2/13/2024 0850 by Gabi Dallas RN  Outcome: Progressing  2/12/2024 2246 by Earlene Kirk LPN  Outcome: Progressing     Problem: Confusion  Goal: Confusion, delirium, dementia, or psychosis is improved or at baseline  Description: INTERVENTIONS:  1. Assess for possible contributors to thought disturbance, including medications, impaired vision or hearing, underlying metabolic abnormalities, dehydration, psychiatric diagnoses, and notify attending LIP  2. [place] high risk fall precautions, as indicated  3. Provide frequent short contacts to provide reality reorientation, refocusing and direction  4. Decrease environmental stimuli, including noise as appropriate  5. Monitor and intervene to maintain adequate nutrition, hydration, elimination, sleep and activity  6. If unable to ensure safety without constant attention obtain sitter and review sitter guidelines with assigned personnel  7. Initiate Psychosocial CNS and Spiritual Care consult, as indicated  2/13/2024 1106 by Gabi Dallas RN  Outcome: Completed  2/13/2024 0850 by Gabi Dallas RN  Outcome: Progressing  2/12/2024 2246 by Earlene Kirk LPN  Outcome: Progressing     Problem: Safety - Adult  Goal: Free from fall injury  2/13/2024 1106 by Gabi Dallas RN  Outcome: Completed  2/13/2024 0850 by Gabi Dallas RN  Outcome: Progressing  2/12/2024 2246 by Earlene Kirk LPN  Outcome: Progressing     Problem: Skin/Tissue Integrity  Goal: Absence of new skin breakdown  Description: 1.  Monitor for areas of redness and/or skin breakdown  2.  Assess vascular access sites hourly  3.  Every 4-6 hours minimum:  Change oxygen saturation probe site  4.  Every 4-6 hours:  If on nasal continuous positive airway pressure, respiratory therapy assess nares and determine need for appliance change or

## 2024-02-13 NOTE — DISCHARGE SUMMARY
Hospitalist Discharge Summary     Patient ID:  Nicolette Germain  [id number]  58 y.o.  1965  2/10/2024    PCP on record: Yisel Dodd APRN - NP    Admit date: 2/10/2024  Discharge date and time: 2/13/2024    DISCHARGE DIAGNOSIS:    ***    CONSULTATIONS:  IP CONSULT TO HOSPITALIST  IP CONSULT TO NEUROLOGY    Excerpted HPI from H&P of Rafaela San MD:  ***    ______________________________________________________________________  DISCHARGE SUMMARY/HOSPITAL COURSE:  for full details see H&P, daily progress notes, labs, consult notes.             _______________________________________________________________________  Patient seen and examined by me on discharge day.  Pertinent Findings:  Gen:    Not in distress  Chest: Clear lungs  CVS:   Regular rhythm.  No edema  Abd:  Soft, not distended, not tender  Neuro:  Alert,   _______________________________________________________________________  DISCHARGE MEDICATIONS:      Medication List        START taking these medications      busPIRone 5 MG tablet  Commonly known as: BUSPAR  Take 1 tablet by mouth 2 times daily     UNABLE TO FIND  PT/OT 3 times a week    Dx: Anoxic brain injury            CONTINUE taking these medications      acetaminophen 325 MG tablet  Commonly known as: TYLENOL     apixaban 5 MG Tabs tablet  Commonly known as: ELIQUIS  Take 1 tablet by mouth 2 times daily     atorvastatin 40 MG tablet  Commonly known as: LIPITOR  Take 1 tablet by mouth nightly     baclofen 10 MG tablet  Commonly known as: LIORESAL     Claritin 10 MG capsule  Generic drug: loratadine     diclofenac sodium 1 % Gel  Commonly known as: VOLTAREN     divalproex 250 MG DR tablet  Commonly known as: DEPAKOTE     fluticasone 50 MCG/ACT nasal spray  Commonly known as: FLONASE     hydrOXYzine HCl 50 MG tablet  Commonly known as: ATARAX     levothyroxine 88 MCG tablet  Commonly known as: SYNTHROID     lidocaine 4 % external patch     LORazepam 1 MG

## 2024-02-13 NOTE — CARE COORDINATION
Call report to [facility] @ [phone]. Clear from CM.     Transition of Care Plan:    RUR: 18% (moderate RUR)  Prior Level of Functioning: Needing assistance  Disposition: Return to [facility] with OPPT  If SNF or IPR: Date FOC offered:   Date FOC received:   Accepting facility:   Date authorization started with reference number:   Date authorization received and expires:   Follow up appointments: defer to [facility]  DME needed: defer to [facility]  Transportation at discharge: Mother to transport  IM/IMM Medicare/ letter given: N/a - Commercial  Is patient a [place] and connected with [facility]? N/a   If yes, was  transfer form completed and VA notified? N/a  Caregiver Contact: Isidro Orlando; Mother; [phone]  Discharge Caregiver contacted prior to discharge? CM to contact  Care Conference needed? Not at this time  Barriers to discharge: None    0854 - Planning to return to [facility] with outpatient PT/OT script. Mother to transport. CM following.     1031 - Per IDRs, pt clear for d/c. CM contacted [facility] to alert them of d/c, left message with HARSHA Mayen @ [facility] [phone]. SMAARTER tool completed and placed on door frame.     1102 - Clear from [facility] to return, they are requesting RN to RN report. CM made unit RN aware. Clear from CM.        02/13/24 1033   Services At/After Discharge   Transition of Care Consult (CM Consult) Assisted Living   Services At/After Discharge PT;OT;Outpatient    Resource Information Provided? No   Mode of Transport at Discharge Other (see comment)  (Mother to transport)   Confirm Follow Up Transport Family   Condition of Participation: Discharge Planning   The Plan for Transition of Care is related to the following treatment goals: Return to [facility] with outpatient PT/OT to continue working toward strength and mobility goals         BETINA Garcia  Care Management

## 2024-02-13 NOTE — PLAN OF CARE
Problem: Discharge Planning  Goal: Discharge to home or other facility with appropriate resources  2/12/2024 2246 by Earlene Kirk LPN  Outcome: Progressing  2/12/2024 0852 by Gabi Dallas RN  Outcome: Progressing     Problem: Confusion  Goal: Confusion, delirium, dementia, or psychosis is improved or at baseline  Description: INTERVENTIONS:  1. Assess for possible contributors to thought disturbance, including medications, impaired vision or hearing, underlying metabolic abnormalities, dehydration, psychiatric diagnoses, and notify attending LIP  2. [place] high risk fall precautions, as indicated  3. Provide frequent short contacts to provide reality reorientation, refocusing and direction  4. Decrease environmental stimuli, including noise as appropriate  5. Monitor and intervene to maintain adequate nutrition, hydration, elimination, sleep and activity  6. If unable to ensure safety without constant attention obtain sitter and review sitter guidelines with assigned personnel  7. Initiate Psychosocial CNS and Spiritual Care consult, as indicated  2/12/2024 2246 by Earlene Kirk LPN  Outcome: Progressing  2/12/2024 0852 by Gabi Dallas RN  Outcome: Progressing     Problem: Safety - Adult  Goal: Free from fall injury  2/12/2024 2246 by Earlene Kirk LPN  Outcome: Progressing  2/12/2024 0852 by Gabi Dallas RN  Outcome: Progressing     Problem: Skin/Tissue Integrity  Goal: Absence of new skin breakdown  Description: 1.  Monitor for areas of redness and/or skin breakdown  2.  Assess vascular access sites hourly  3.  Every 4-6 hours minimum:  Change oxygen saturation probe site  4.  Every 4-6 hours:  If on nasal continuous positive airway pressure, respiratory therapy assess nares and determine need for appliance change or resting period.  2/12/2024 2246 by Earlene Kirk LPN  Outcome: Progressing  2/12/2024 0852 by Gabi Dallas RN  Outcome: Progressing     Problem: Chronic Conditions and  Co-morbidities  Goal: Patient's chronic conditions and co-morbidity symptoms are monitored and maintained or improved  2/12/2024 2246 by Earlene Kirk LPN  Outcome: Progressing  2/12/2024 0852 by Gabi Dallas RN  Outcome: Progressing     Problem: Neurosensory - Adult  Goal: Achieves stable or improved neurological status  2/12/2024 2246 by Earlene Kirk LPN  Outcome: Progressing  2/12/2024 0852 by Gabi Dallas RN  Outcome: Progressing  Goal: Absence of seizures  2/12/2024 2246 by Earlene Kirk LPN  Outcome: Progressing  2/12/2024 0852 by Gabi Dallas RN  Outcome: Progressing  Goal: Remains free of injury related to seizures activity  2/12/2024 2246 by Earlene Kirk LPN  Outcome: Progressing  2/12/2024 0852 by Gabi Dallas RN  Outcome: Progressing  Goal: Achieves maximal functionality and self care  2/12/2024 2246 by Earlene Kirk LPN  Outcome: Progressing  2/12/2024 0852 by Gabi Dallas RN  Outcome: Progressing     Problem: Skin/Tissue Integrity - Adult  Goal: Skin integrity remains intact  2/12/2024 2246 by Earlene Kirk LPN  Outcome: Progressing  2/12/2024 0852 by Gabi Dallas RN  Outcome: Progressing  Goal: Incisions, wounds, or drain sites healing without S/S of infection  2/12/2024 2246 by Earlene Kirk LPN  Outcome: Progressing  2/12/2024 0852 by Gabi Dallas RN  Outcome: Progressing  Goal: Oral mucous membranes remain intact  2/12/2024 2246 by Earlene Kirk LPN  Outcome: Progressing  2/12/2024 0852 by Gabi Dallas RN  Outcome: Progressing     Problem: Musculoskeletal - Adult  Goal: Return mobility to safest level of function  2/12/2024 2246 by Earlene Kirk LPN  Outcome: Progressing  2/12/2024 0852 by Gabi Dallas RN  Outcome: Progressing  Goal: Maintain proper alignment of affected body part  2/12/2024 2246 by Earlene Kirk LPN  Outcome: Progressing  2/12/2024 0852 by Dallas, Gabi, RN  Outcome: Progressing  Goal: Return ADL status to a safe level of

## 2024-02-13 NOTE — PROGRESS NOTES
Problem: Physical Therapy - Adult  Goal: By Discharge: Performs mobility at highest level of function for planned discharge setting.  See evaluation for individualized goals.  Description: FUNCTIONAL STATUS PRIOR TO ADMISSION: Patient was independent and active without use of DME.    HOME SUPPORT PRIOR TO ADMISSION: The patient lived with family.    Physical Therapy Goals  Initiated 2/10/2024  1.  Patient will move from supine to sit and sit to supine in bed with independence within 7 day(s).    2.  Patient will perform sit to stand with modified independence within 7 day(s).  3.  Patient will transfer from bed to chair and chair to bed with modified independence using the least restrictive device within 7 day(s).  4.  Patient will ambulate with modified independence for 200 feet with the least restrictive device within 7 day(s).        Outcome: Not Progressing   PHYSICAL THERAPY TREATMENT     Patient: aCrli Alvarez (79 y.o. female)  Date: 2/13/2024  Diagnosis: Syncope and collapse [R55]  Fall, initial encounter [W19.XXXA]  Traumatic rhabdomyolysis, initial encounter ([facility]) [T79.6XXA] Syncope and collapse      Precautions: Fall Risk, Bed Alarm         ASSESSMENT:  Patient continues to benefit from skilled PT service. Unable to progress mobility training this date due to elevated BP (133/101 semi-fowlers, 158/128 EOB). Pt moved supine<->sit with CGA and increased time. Remains anxious, but cooperative and willing to participate.     RN updated on pt BP after session. Pt would benefit from follow up PT at [facility].         PLAN:  Patient continues to benefit from skilled intervention to address the above impairments.  Continue treatment per established plan of care.     Recommendation for discharge: (in order for the patient to meet his/her long term goals): 24/7 assist at [facility] and follow up HHPT     Other factors to consider for discharge: impaired cognition, high risk for falls, not safe to be alone, and concern for

## 2024-02-13 NOTE — PLAN OF CARE
Problem: Occupational Therapy - Adult  Goal: By Discharge: Performs self-care activities at highest level of function for planned discharge setting.  See evaluation for individualized goals.  Description: FUNCTIONAL STATUS PRIOR TO ADMISSION:  Patient was ambulatory using RW short distances with CGA-SBA. Mainly using wheelchair since moving into [facility]. Recent fall. Mother comes over every day for 4-5 hours. Assistance needed for bathing, dressing, toileting. Can self-feed with set-up using RUE. Oriented x1-2.  Receives Help From: Family, Personal care attendant, ADL Assistance: Needs assistance, Bath: Moderate assistance, Dressing: Maximum assistance, Grooming: Minimal assistance, Feeding: Minimal assistance, Toileting: Needs assistance,  , Ambulation Assistance: Needs assistance, Transfer Assistance: Needs assistance,       Occupational Therapy Goals:  Initiated 2/11/2024  1.  Patient will perform self-feeding seated in chair using RUE with Set-up within 7 day(s).  2.  Patient will perform grooming seated with Set-up within 7 day(s).  3.  Patient will perform UB bathing supported sitting with Minimal Assist within 7 day(s).  4.  Patient will perform toilet transfers with Contact Guard Assist  within 7 day(s).  5.  Patient will perform all aspects of toileting with Moderate Assist within 7 day(s).  6.  Patient will participate in upper extremity therapeutic exercise/activities with Minimal Assist for 5 minutes within 7 day(s).      Outcome: Progressing   OCCUPATIONAL THERAPY TREATMENT  Patient: Nicolette Germain (58 y.o. female)  Date: 2/13/2024  Primary Diagnosis: Facial droop [R29.810]  Acute cystitis without hematuria [N30.00]  Acute cerebrovascular accident (CVA) due to ischemia (HCC) [I63.9]       Precautions: Fall Risk, Bed Alarm                Chart, occupational therapy assessment, plan of care, and goals were reviewed.    ASSESSMENT  Patient continues to benefit from skilled OT services and is slowly

## 2024-02-13 NOTE — PROGRESS NOTES
End of Shift Note     Bedside shift change report given to AJ Ashley (oncoming nurse) by KIRSTY Henao (offgoing nurse).  Report included the following information Nurse Handoff Report, Adult Overview, MAR, and Recent Results        Shift worked: Nights   Shift summary and any significant changes:     NP was notified about pt B/P doing the night and a 500ml Bolus x1 was giving for pt B/P 86/58. Labs done ,rechecked B/P is 113/65   Pt Turned q2hr      Concerns for physician to address:  see above notes (B/P)   Zone phone for oncoming shift:   [phone]      Patient Information  Nicolette Germain  58 y.o.  2/10/2024 10:37 AM by Rafaela San MD. Nicolette MARK Germain was admitted from Assisted Living     Problem List       Patient Active Problem List     Diagnosis Date Noted    Acute cerebrovascular accident (CVA) due to ischemia (HCC) 02/10/2024    Adrenal nodule (HCC) 07/18/2023    Metabolic encephalopathy 06/17/2023    Altered mental status 10/27/2022    Diarrhea 10/25/2022    AMS (altered mental status) 10/25/2022    Anemia 10/05/2022    Hypokalemia 10/05/2022    Obese 10/05/2022    Acute CVA (cerebrovascular accident) ([facility]) 09/29/2022    NSTEMI (non-ST elevated myocardial infarction) ([facility]) 09/28/2022    Dyslipidemia 09/28/2022    Hypothyroidism 09/28/2022    REAGAN (generalized anxiety disorder) 09/28/2022    Takotsubo cardiomyopathy 09/28/2022    Status epilepticus (HCC) 09/27/2022    NBA (acute kidney injury) ([facility]) 09/27/2022    Lactic acidosis 09/27/2022    Acute respiratory failure with hypoxia ([facility]) 09/27/2022    Acute metabolic encephalopathy 09/27/2022    Shock, cardiogenic ([facility]) 09/27/2022    Generalized anxiety disorder 06/29/2021    Acquired hypothyroidism 06/29/2021    Tachycardia 06/29/2021    Insomnia 06/29/2021    Impaired fasting glucose 06/29/2021    Lower extremity edema 06/29/2021    Mixed hyperlipidemia 06/29/2021    PND (post-nasal drip) 11/17/2020    Chronic maxillary sinusitis 11/17/2020    Seasonal

## 2024-02-14 ENCOUNTER — CARE COORDINATION (OUTPATIENT)
Dept: OTHER | Facility: CLINIC | Age: 59
End: 2024-02-14

## 2024-02-14 NOTE — CARE COORDINATION
ACM outreach, spoke with patient's mom, who has guardianship.  She is currently with Nicolette, enrolling her in therapy.  She agrees to call me back at a later time today.

## 2024-02-15 ENCOUNTER — CARE COORDINATION (OUTPATIENT)
Dept: OTHER | Facility: CLINIC | Age: 59
End: 2024-02-15

## 2024-02-15 NOTE — CARE COORDINATION
Care Transitions Initial Follow Up Call    Call within 2 business days of discharge: Yes    Patient Current Location:  [place]    Care Transition Nurse contacted the patient by telephone to perform post hospital discharge assessment. Verified name and  with patient as identifiers. Provided introduction to self, and explanation of the Care Transition Nurse role.     Patient: Nicolette Germain Patient : 1965   MRN: [id number]  Reason for Admission: ruled out stroke  Discharge Date: 24 RARS: Readmission Risk Score: 17.3      Last Discharge Facility       Date Complaint Diagnosis Description Type Department Provider    2/10/24 Urinary Tract Infection Facial droop ... ED to Hosp-Admission (Discharged) (ADMITTED) [facility] Monica Gomez MD; Berto Bryant...            Was this an external facility discharge? No Discharge Facility: [facility]    Challenges to be reviewed by the provider   Additional needs identified to be addressed with provider: No  none               Method of communication with provider: none.    Spoke with patient's mom, Isidro.  [facility] patient is currently at [facility] in [place].  She started PT yesterday and will have OT as well.  [facility] she has had a visit with the Physician Assistant today.  Mom's concern is that Nicolette is having outbursts, yelling.  She feels better about the medications the hospital put her on at discharge.  Stated the hospitalist was concerned about all of the antipsychotics she was on.  After reviewing this with the PA today, there were clarifications. Mom was advised that Nicolette was not taking all of them that were listed.  Completed Med Rec, mom will check on three medications that she is unsure about whether Nicolette is still taking and let me know.  No current needs.    Care Transition Nurse reviewed discharge instructions, medical action plan, and red flags with patient who verbalized understanding. The patient was given an opportunity to ask

## 2024-02-22 ENCOUNTER — CARE COORDINATION (OUTPATIENT)
Dept: OTHER | Facility: CLINIC | Age: 59
End: 2024-02-22

## 2024-02-22 NOTE — CARE COORDINATION
Care Transitions Follow Up Call    Patient Current Location:  [place]    Care Transition Nurse contacted the patient by telephone to follow up after admission on 2/10/24.  Verified name and  with parent as identifiers.    Patient: Nicolette Germain  Patient : 1965   MRN: [id number]  Reason for Admission: facial droop  Discharge Date: 24 RARS: Readmission Risk Score: 17.3      Needs to be reviewed by the provider   Additional needs identified to be addressed with provider: No  none             Method of communication with provider: none.    Spoke with Cindypattiegeovanna, patient's mom. She states that she, her  and Nicolette all have COVID.  Unsure where they got it from.  Was advised that Nicolette received a 30 day notice that she will have to leave [facility].  Jordyn Georges is a facilitator whom found [facility] for Nicolette has been contacted to assist with finding another place.  Mom feels that Nicolette would do better in a small setting.    Currently paying $9,000/month for [employer].  Patient gets $7,700/month through [employer] which she will receive until , per mom.    [place], [phone]  [facility], [phone]  [facility]    Addressed changes since last contact:   will assist with researching facilities that are small and more appropriate for Nicolette.  Discussed follow-up appointments. If no appointment was previously scheduled, appointment scheduling offered: No.   Is follow up appointment scheduled within 7 days of discharge? Patient completed 7 day follow up with facility ([facility]), once she was discharged.     Follow Up  Future Appointments   Date Time Provider Department Center   2024  1:45 PM Janice Lopez MD NEUROWTCRSPB BS AMB   2024  8:40 AM Yuridia Blackwell PSYD NCWTCNEU BS AMB       Care Transition Nurse reviewed medical action plan with parent and discussed any barriers to care and/or understanding of plan of care after discharge.

## 2024-02-26 ENCOUNTER — CARE COORDINATION (OUTPATIENT)
Dept: OTHER | Facility: CLINIC | Age: 59
End: 2024-02-26

## 2024-02-26 NOTE — CARE COORDINATION
ACM contacted the parent to follow up on progress, discuss new issues or concerns, and reinforce/provide patient education.     Summary Note: Spoke with patient's mom, Isidro.  States Will will be moving to [facility] in [place] on Thursday.  Mom and patient are happy with the move.  Staff have reassured them that patient will get excellent care.  She will also begin PT as an outpatient at [facility] tomorrow, 2/27.    Reinforced/Provided Education:  Discussed red flags and appropriate site of care based on symptoms and resources available to parent including: Specialist.   Importance and benefits of: Follow up with PCP and specialist, medication adherence, self monitoring and reporting of symptoms.      Plan:  Plan for follow-up call in 10-14 days based on severity of symptoms and risk factors.  Plan for next call:  facility? Care?    Parent verbalized understanding and is agreeable to follow up call.

## 2024-03-11 ENCOUNTER — CARE COORDINATION (OUTPATIENT)
Dept: OTHER | Facility: CLINIC | Age: 59
End: 2024-03-11

## 2024-03-11 NOTE — CARE COORDINATION
Patient has graduated from the Transitions of Care Coordination  program on 3/11/24.  Patient/family has the ability to self-manage at this time.  Care management goals have been completed. No further Ambulatory Care Manager follow up scheduled.    Spoke with MomIsidro.  Nicolette was in the car with her, traveling to an appointment.  Mom states everything is going well.  Patient is happy at the new facility, [facility], in [place].  Patient has been approved for social security disability and will not be eligible for medicare for another year. They plan to contact [facility] to extend care for another year.  No needs at this time and mom states she has my number and will reach out should a need arise.     Goals Addressed                   This Visit's Progress     COMPLETED: Conditions and Symptoms        I will schedule office visits, as directed by my provider.  I will keep my appointment or reschedule if I have to cancel.  I will notify my provider of any barriers to my plan of care.  I will notify my provider of any symptoms that indicate a worsening of my condition.    Barriers: none  Plan for overcoming my barriers: N/A  Confidence: 10/10  Anticipated Goal Completion Date: 3/15/24                Patient has Ambulatory Care Manager's contact information for any further questions, concerns, or needs.  Patients upcoming visits:    Future Appointments   Date Time Provider Department Center   4/1/2024  1:45 PM Janice Lopez MD NEUROWTCRSPB BS RODRI   6/24/2024  8:40 AM Yuridia Blackwell PSYD NCWTCNEU BS AMB

## 2024-04-01 ENCOUNTER — OFFICE VISIT (OUTPATIENT)
Age: 59
End: 2024-04-01
Payer: COMMERCIAL

## 2024-04-01 VITALS
RESPIRATION RATE: 16 BRPM | DIASTOLIC BLOOD PRESSURE: 69 MMHG | HEIGHT: 64 IN | WEIGHT: 162 LBS | SYSTOLIC BLOOD PRESSURE: 118 MMHG | BODY MASS INDEX: 27.66 KG/M2 | HEART RATE: 77 BPM | OXYGEN SATURATION: 96 %

## 2024-04-01 DIAGNOSIS — G93.1 ANOXIC BRAIN DAMAGE (HCC): Primary | ICD-10-CM

## 2024-04-01 PROBLEM — G40.901 STATUS EPILEPTICUS (HCC): Status: RESOLVED | Noted: 2022-09-27 | Resolved: 2024-04-01

## 2024-04-01 PROCEDURE — 99214 OFFICE O/P EST MOD 30 MIN: CPT | Performed by: PSYCHIATRY & NEUROLOGY

## 2024-04-01 RX ORDER — RISPERIDONE 1 MG/1
1 TABLET ORAL 2 TIMES DAILY
COMMUNITY

## 2024-04-01 RX ORDER — BUSPIRONE HYDROCHLORIDE 5 MG/1
5 TABLET ORAL 3 TIMES DAILY PRN
COMMUNITY

## 2024-04-01 NOTE — PROGRESS NOTES
Chief Complaint   Patient presents with    Other     Anxiety and she is screaming all the time. Seems to have a lot of anxiety.        Vitals:    04/01/24 1333   BP: 118/69   Pulse: 77   Resp: 16   SpO2: 96%

## 2024-04-01 NOTE — PROGRESS NOTES
[facility] at [facility] Neu[facility] Clinics at [address] [place] [address] [address] [facility] [address] [place] [address] [phone]              Chief Complaint   Patient presents with    Other     Anxiety and she is screaming all the time. Seems to have a lot of anxiety.        Current Outpatient Medications   Medication Sig Dispense Refill    risperiDONE (RISPERDAL) 1 MG tablet Take 1 tablet by mouth 2 times daily      busPIRone (BUSPAR) 5 MG tablet Take 1 tablet by mouth 3 times daily as needed (Anxiety)      UNABLE TO FIND PT/OT 3 times a week    Dx: Anoxic brain injury 1 Act 0    OLANZapine (ZYPREXA) 2.5 MG tablet Take 1 tablet by mouth every 6 hours as needed (agitation) 30 tablet 0    fluticasone (FLONASE) 50 MCG/ACT nasal spray 1 spray by Each Nostril route daily      hydrOXYzine HCl (ATARAX) 50 MG tablet Take 1 tablet by mouth 2 times daily as needed for Anxiety      LORazepam (ATIVAN) 1 MG tablet Take 1 tablet by mouth daily. Take 1 tablet by mouth once daily at 2pm or anxiety.      loratadine (CLARITIN) 10 MG capsule Take 1 capsule by mouth daily      Carboxymethylcellulose Sodium (REFRESH TEARS OP) Apply to eye      lubiprostone (AMITIZA) 8 MCG CAPS capsule Take 1 capsule by mouth 2 times daily (with meals) 30 capsule 3    divalproex (DEPAKOTE) 250 MG DR tablet Take 1 tablet by mouth nightly      diclofenac sodium (VOLTAREN) 1 % GEL Apply topically 3 times daily as needed for Pain      polyethylene glycol (MIRALAX) 17 g PACK packet Take 17 g by mouth daily      sennosides-docusate sodium (SENOKOT-S) 8.6-50 MG tablet Take 2 tablets by mouth in the morning and at bedtime      atorvastatin (LIPITOR) 40 MG tablet Take 1 tablet by mouth nightly 30 tablet 1    apixaban (ELIQUIS) 5 MG TABS tablet Take 1 tablet by mouth 2 times daily 60 tablet 1    acetaminophen (TYLENOL) 325 MG tablet Take by mouth

## 2024-05-13 ENCOUNTER — HOSPITAL ENCOUNTER (EMERGENCY)
Facility: HOSPITAL | Age: 59
Discharge: HOME | End: 2024-05-13
Attending: STUDENT IN AN ORGANIZED HEALTH CARE EDUCATION/TRAINING PROGRAM
Payer: COMMERCIAL

## 2024-05-13 VITALS
BODY MASS INDEX: 26.05 KG/M2 | WEIGHT: 152.56 LBS | SYSTOLIC BLOOD PRESSURE: 116 MMHG | OXYGEN SATURATION: 97 % | HEIGHT: 64 IN | TEMPERATURE: 98.4 F | RESPIRATION RATE: 17 BRPM | DIASTOLIC BLOOD PRESSURE: 60 MMHG | HEART RATE: 83 BPM

## 2024-05-13 DIAGNOSIS — S06.9XAS CHRONIC BRAIN INJURY (HCC): ICD-10-CM

## 2024-05-13 DIAGNOSIS — F41.9 ANXIETY: Primary | ICD-10-CM

## 2024-05-13 LAB
ALBUMIN SERPL-MCNC: 3.8 G/DL (ref 3.5–5)
ALBUMIN/GLOB SERPL: 1 (ref 1.1–2.2)
ALP SERPL-CCNC: 63 U/L (ref 45–117)
ALT SERPL-CCNC: 26 U/L (ref 12–78)
ANION GAP SERPL CALC-SCNC: 5 MMOL/L (ref 5–15)
APPEARANCE UR: CLEAR
AST SERPL-CCNC: 20 U/L (ref 15–37)
BACTERIA URNS QL MICRO: NEGATIVE /HPF
BASOPHILS # BLD: 0 K/UL (ref 0–0.1)
BASOPHILS NFR BLD: 1 % (ref 0–1)
BILIRUB SERPL-MCNC: 0.5 MG/DL (ref 0.2–1)
BILIRUB UR QL: NEGATIVE
BUN SERPL-MCNC: 16 MG/DL (ref 6–20)
BUN/CREAT SERPL: 20 (ref 12–20)
CALCIUM SERPL-MCNC: 9.8 MG/DL (ref 8.5–10.1)
CHLORIDE SERPL-SCNC: 109 MMOL/L (ref 97–108)
CO2 SERPL-SCNC: 29 MMOL/L (ref 21–32)
COLOR UR: ABNORMAL
COMMENT:: NORMAL
CREAT SERPL-MCNC: 0.8 MG/DL (ref 0.55–1.02)
DIFFERENTIAL METHOD BLD: ABNORMAL
EOSINOPHIL # BLD: 0.1 K/UL (ref 0–0.4)
EOSINOPHIL NFR BLD: 1 % (ref 0–7)
EPITH CASTS URNS QL MICRO: ABNORMAL /LPF
ERYTHROCYTE [DISTWIDTH] IN BLOOD BY AUTOMATED COUNT: 15.3 % (ref 11.5–14.5)
GLOBULIN SER CALC-MCNC: 3.7 G/DL (ref 2–4)
GLUCOSE SERPL-MCNC: 103 MG/DL (ref 65–100)
GLUCOSE UR STRIP.AUTO-MCNC: NEGATIVE MG/DL
HCT VFR BLD AUTO: 38.6 % (ref 35–47)
HGB BLD-MCNC: 12.8 G/DL (ref 11.5–16)
HGB UR QL STRIP: NEGATIVE
IMM GRANULOCYTES # BLD AUTO: 0 K/UL (ref 0–0.04)
IMM GRANULOCYTES NFR BLD AUTO: 0 % (ref 0–0.5)
KETONES UR QL STRIP.AUTO: 15 MG/DL
LEUKOCYTE ESTERASE UR QL STRIP.AUTO: NEGATIVE
LYMPHOCYTES # BLD: 2.5 K/UL (ref 0.8–3.5)
LYMPHOCYTES NFR BLD: 43 % (ref 12–49)
MCH RBC QN AUTO: 28.8 PG (ref 26–34)
MCHC RBC AUTO-ENTMCNC: 33.2 G/DL (ref 30–36.5)
MCV RBC AUTO: 86.9 FL (ref 80–99)
MONOCYTES # BLD: 0.5 K/UL (ref 0–1)
MONOCYTES NFR BLD: 9 % (ref 5–13)
NEUTS SEG # BLD: 2.8 K/UL (ref 1.8–8)
NEUTS SEG NFR BLD: 46 % (ref 32–75)
NITRITE UR QL STRIP.AUTO: NEGATIVE
NRBC # BLD: 0 K/UL (ref 0–0.01)
NRBC BLD-RTO: 0 PER 100 WBC
PH UR STRIP: 5.5 (ref 5–8)
PLATELET # BLD AUTO: 223 K/UL (ref 150–400)
PMV BLD AUTO: 11.3 FL (ref 8.9–12.9)
POTASSIUM SERPL-SCNC: 3.8 MMOL/L (ref 3.5–5.1)
PROT SERPL-MCNC: 7.5 G/DL (ref 6.4–8.2)
PROT UR STRIP-MCNC: ABNORMAL MG/DL
RBC # BLD AUTO: 4.44 M/UL (ref 3.8–5.2)
RBC #/AREA URNS HPF: ABNORMAL /HPF (ref 0–5)
SODIUM SERPL-SCNC: 143 MMOL/L (ref 136–145)
SP GR UR REFRACTOMETRY: >1.03 (ref 1–1.03)
SPECIMEN HOLD: NORMAL
URINE CULTURE IF INDICATED: ABNORMAL
UROBILINOGEN UR QL STRIP.AUTO: 0.2 EU/DL (ref 0.2–1)
WBC # BLD AUTO: 5.9 K/UL (ref 3.6–11)
WBC URNS QL MICRO: ABNORMAL /HPF (ref 0–4)

## 2024-05-13 PROCEDURE — 85025 COMPLETE CBC W/AUTO DIFF WBC: CPT

## 2024-05-13 PROCEDURE — 90791 PSYCH DIAGNOSTIC EVALUATION: CPT

## 2024-05-13 PROCEDURE — 99284 EMERGENCY DEPT VISIT MOD MDM: CPT

## 2024-05-13 PROCEDURE — 80053 COMPREHEN METABOLIC PANEL: CPT

## 2024-05-13 PROCEDURE — 81001 URINALYSIS AUTO W/SCOPE: CPT

## 2024-05-13 PROCEDURE — 36415 COLL VENOUS BLD VENIPUNCTURE: CPT

## 2024-05-13 ASSESSMENT — PAIN SCALES - GENERAL: PAINLEVEL_OUTOF10: 0

## 2024-05-13 NOTE — DISCHARGE INSTRUCTIONS
You were seen by the emergency department physicians as well as the mental health professionals.  You were cleared for discharge back to her facility.  Return to the emergency department if there is any concern for safety of yourself or others

## 2024-05-13 NOTE — ED NOTES
60 yo F with h/o depression, anxiety, CVA, AMS reports to ED via EMS from [facility] with increased anxiety over past day/aggressiveness/being \"fidgety.\"  Denies cp, sob, or any complaints, as much as can be elicited due to behavior.        1:31 PM  I have evaluated the patient as the Provider in Rapid Medical Evaluation (RME). I have reviewed her vital signs and the triage nurse assessment. I have talked with the patient and any available family and advised that I am the provider in triage and have ordered the appropriate study to initiate their work up based on the clinical presentation during my assessment. I have advised that the patient will be accommodated in the Main ED as soon as possible. I have also requested to contact the triage nurse or myself immediately if the patient experiences any changes in their condition during this brief waiting period.  PEDRO Murphy, Rambo HU PA-C  05/13/24 6508

## 2024-05-13 NOTE — ED PROVIDER NOTES
CATHETER 9/27/2022 [facility] IR    IR NONTUNNELED VASCULAR CATHETER  09/27/2022    KNEE ARTHROSCOPY         CURRENT MEDICATIONS       Discharge Medication List as of 5/13/2024  7:12 PM        CONTINUE these medications which have NOT CHANGED    Details   !! risperiDONE (RISPERDAL) 1 MG tablet Take 1 tablet by mouth 2 times dailyHistorical Med      busPIRone (BUSPAR) 5 MG tablet Take 1 tablet by mouth 3 times daily as needed (Anxiety)Historical Med      UNABLE TO FIND PT/OT 3 times a week    Dx: Anoxic brain injury, Disp-1 Act, R-0Print      OLANZapine (ZYPREXA) 2.5 MG tablet Take 1 tablet by mouth every 6 hours as needed (agitation), Disp-30 tablet, R-0Normal      fluticasone (FLONASE) 50 MCG/ACT nasal spray 1 spray by Each Nostril route dailyHistorical Med      hydrOXYzine HCl (ATARAX) 50 MG tablet Take 1 tablet by mouth 2 times daily as needed for AnxietyHistorical Med      LORazepam (ATIVAN) 1 MG tablet Take 1 tablet by mouth daily. Take 1 tablet by mouth once daily at 2pm or anxiety.Historical Med      !! risperiDONE (RISPERDAL) 4 MG tablet Take 1 tablet by mouth 2 times dailyHistorical Med      loratadine (CLARITIN) 10 MG capsule Take 1 capsule by mouth dailyHistorical Med      ziprasidone (GEODON) 20 MG capsule Take 1 capsule by mouth 2 times daily (with meals)Historical Med      Carboxymethylcellulose Sodium (REFRESH TEARS OP) Apply to eyeHistorical Med      lubiprostone (AMITIZA) 8 MCG CAPS capsule Take 1 capsule by mouth 2 times daily (with meals), Disp-30 capsule, R-3Normal      divalproex (DEPAKOTE) 250 MG DR tablet Take 1 tablet by mouth nightlyHistorical Med      diclofenac sodium (VOLTAREN) 1 % GEL Apply topically 3 times daily as needed for Pain, Topical, 3 TIMES DAILY PRN, Historical Med      polyethylene glycol (MIRALAX) 17 g PACK packet Take 17 g by mouth dailyHistorical Med      sennosides-docusate sodium (SENOKOT-S) 8.6-50 MG tablet Take 2 tablets by mouth in the morning and at

## 2024-05-13 NOTE — ED TRIAGE NOTES
Pt arrived to ER via EMS from [facility] with CC increased anxiety over the past day. Pt became aggressive towards staff, so they called EMS. pPt fidgety on stretcher during triage and speaking repetitively. Denies pain.

## 2024-05-13 NOTE — BSMART NOTE
BSMART assessment completed, and suicide risk level noted to be no risk. Primary Nurse Dexter and Physician Dr. Frank notified. Concerns not observed. Patient does not need a 1:1 sitter while in the ED.         
Psychiatrist and/or  is none.    Lethality Assessment:    The potential for suicide noted by the following: not noted.  The potential for homicide is not noted.  The patient has not been a perpetrator of sexual or physical abuse.  There are not pending charges.  The patient is not felt to be at risk for self harm or harm to others.  The attending nurse was advised that security has not been notified.    Section III - Psychosocial  The patient's overall mood and attitude is anxious.  Feelings of helplessness and hopelessness are not observed.  Generalized anxiety is observed by self report.  Panic is not observed. Phobias are not observed.  Obsessive compulsive tendencies are not observed.      Section IV - Mental Status Exam  The patient's appearance shows no evidence of impairment.  The patient's behavior is agitated. The patient is disoriented.  The patient's speech is pressured and is impoverished.  The patient's mood is anxious and is irritable.  The range of affect is constricted.  The patient's thought content is JONATHAN.  The thought process is JONATHAN.  The patient's perception shows no evidence of impairment. The patient's memory is impaired.  The patient's appetite shows no evidence of impairment.  The patient reports disruptive sleep. The patient shows no insight.  The patient's judgement is cognitively impaired.      Section V - Substance Abuse  The patient is not using substances.      Section VI - Living Arrangements  The patient is .  The patient lives at an assisted living. The patient has 1 adult children.  The patient does plan to return home upon discharge.  The patient does not have legal issues pending. The patient's source of income comes from disability.  Hindu and cultural practices have not been voiced at this time.    The patient's greatest support comes from family and her custodial and this person will be involved with the treatment.    It is unknown if the patient has been in an

## 2024-05-14 ENCOUNTER — CARE COORDINATION (OUTPATIENT)
Dept: OTHER | Facility: CLINIC | Age: 59
End: 2024-05-14

## 2024-05-15 NOTE — CARE COORDINATION
Ambulatory Care Coordination Note  5/15/2024    Patient Current Location:  [place]    ACM contacted the  guardian/care giver  by telephone. Verified name and  with parent as identifiers. Provided introduction to self, and explanation of the ACM role.     ACM: GEOVANI VARGHESE RN    Challenges to be reviewed by the provider   Additional needs identified to be addressed with provider: No  none               Method of communication with provider: none.    Telephonic outreach to the patients mother and guardian, Ana Orlando. She states that she is in [place] and the patient had anxiety due to her not being there. She is happy living in her current assisted living environment, but still has frequent bouts of anxiety that are hard to control.   Mom will be in [place] until next week and is concerned that the patient will continue to experience the anxiety while she is away. Mom has been given a referral for medication management, for the patient. Mom is also interested in receiving additional resources for therapy for anxiety and TBI treatment.   Will outreach again next week after Mrs. Orlando returns, to provide additional resources and referrals.     Ambulatory Care Coordination Assessment    Care Coordination Protocol  Referral from Primary Care Provider: No  Week 1 - Initial Assessment     Do you have all of your prescriptions and are they filled?: Yes           Current Housing: Assisted Living  Who do you live with?: Alone  Are you an active caregiver in your home?: No                 Suggested Interventions and Community Resources                  Future Appointments   Date Time Provider Department Center   2024  8:40 AM Yuridia Blackwell [facility]YD [facility]

## 2024-05-20 ENCOUNTER — TELEPHONE (OUTPATIENT)
Age: 59
End: 2024-05-20

## 2024-05-20 NOTE — TELEPHONE ENCOUNTER
Patient mother requesting a call to discuss the NP Appt her child has scheduled.    She stated currently at  a facility and they are having issues with her behavior.

## 2024-05-22 ENCOUNTER — OFFICE VISIT (OUTPATIENT)
Age: 59
End: 2024-05-22

## 2024-05-22 DIAGNOSIS — R41.844 FRONTAL LOBE AND EXECUTIVE FUNCTION DEFICIT: ICD-10-CM

## 2024-05-22 DIAGNOSIS — F41.9 SEVERE ANXIETY: ICD-10-CM

## 2024-05-22 DIAGNOSIS — G93.1 ANOXIC BRAIN DAMAGE (HCC): Primary | ICD-10-CM

## 2024-05-22 DIAGNOSIS — R47.01 APHASIA: ICD-10-CM

## 2024-05-22 NOTE — PROGRESS NOTES
UE apraxia  R/L Orientation: Intact to self and to other  Dress: within normal limits   Weight: Overweight  Appearance/Hygiene: within normal limits   Gait: within normal limits   Assistive Devices: Glasses, walker  Mood Anxious  Affect: Anxious  Comprehension: Moderately impaired   Thought Process: impaired-illogical   Expressive Language: within normal limits   Receptive Language: within normal limits   Motor:  No cognitive or motor perseveration  ETOH: Denied current  Tobacco: Denied current  Illicit: Denied  SI/HI: Denied  Psychosis: No evidence  Insight: Poor   Judgment: Poor   Other Psych:      Past Medical History:   Diagnosis Date   • Allergic rhinitis 07/29/2019   • Cerebral artery occlusion with cerebral infarction (HCC) 8/27/2022   • Depression    • Dysphagia 9/27/2022   • Fractures    • History of multiple allergies    • History of vascular access device 10/07/2022    [facility] VAT: PICC placement R Cephalic length 40 cm for reliable access/TPN arm circ 35.5 cm   • Hypercholesteremia    • Hypertension    • Memory disorder 9/27/2022   • Movement disorder 9/27/2022   • Muscle ache    • Obesity 11/05/2012   • Sinus pressure    • Sinus problem    • Stroke (HCC)    • Thyroid disease        Past Surgical History:   Procedure Laterality Date   • ANTERIOR CRUCIATE LIGAMENT REPAIR      left    • CHOLECYSTECTOMY  01/01/1998   • GI      colonoscopy-polyps   • IR NONTUNNELED VASCULAR CATHETER  09/27/2022    IR NONTUNNELED VASCULAR CATHETER 9/27/2022 [facility] RAD ANGIO IR   • IR NONTUNNELED VASCULAR CATHETER  09/27/2022   • KNEE ARTHROSCOPY         Allergies   Allergen Reactions   • Droperidol Itching   • Tramadol        Family History   Problem Relation Age of Onset   • Diabetes Paternal Grandfather        Social History     Tobacco Use   • Smoking status: Never   • Smokeless tobacco: Never   Vaping Use   • Vaping Use: Never used   Substance Use Topics   • Alcohol use: Not Currently     Alcohol/week: 1.0 standard drink of

## 2024-05-23 NOTE — TELEPHONE ENCOUNTER
Patients mother would like to know if her daughters appointment can be scheduled for in the morning of 05/29/24? If she cannot she will keep the time she has.

## 2024-05-29 ENCOUNTER — PROCEDURE VISIT (OUTPATIENT)
Age: 59
End: 2024-05-29
Payer: COMMERCIAL

## 2024-05-29 DIAGNOSIS — S06.9X9S MAJOR NEUROCOGNITIVE DISORDER AS LATE EFFECT OF TRAUMATIC BRAIN INJURY WITH BEHAVIORAL DISTURBANCE (HCC): ICD-10-CM

## 2024-05-29 DIAGNOSIS — G93.1 ANOXIC BRAIN DAMAGE (HCC): Primary | ICD-10-CM

## 2024-05-29 DIAGNOSIS — F41.9 SEVERE ANXIETY: ICD-10-CM

## 2024-05-29 DIAGNOSIS — R47.01 APHASIA: ICD-10-CM

## 2024-05-29 DIAGNOSIS — F02.818 MAJOR NEUROCOGNITIVE DISORDER AS LATE EFFECT OF TRAUMATIC BRAIN INJURY WITH BEHAVIORAL DISTURBANCE (HCC): ICD-10-CM

## 2024-05-29 PROCEDURE — 96139 PSYCL/NRPSYC TST TECH EA: CPT | Performed by: CLINICAL NEUROPSYCHOLOGIST

## 2024-05-29 PROCEDURE — 96132 NRPSYC TST EVAL PHYS/QHP 1ST: CPT | Performed by: CLINICAL NEUROPSYCHOLOGIST

## 2024-05-29 PROCEDURE — 96133 NRPSYC TST EVAL PHYS/QHP EA: CPT | Performed by: CLINICAL NEUROPSYCHOLOGIST

## 2024-05-29 PROCEDURE — 96138 PSYCL/NRPSYC TECH 1ST: CPT | Performed by: CLINICAL NEUROPSYCHOLOGIST

## 2024-06-04 ENCOUNTER — CARE COORDINATION (OUTPATIENT)
Dept: OTHER | Facility: CLINIC | Age: 59
End: 2024-06-04

## 2024-06-04 NOTE — CARE COORDINATION
Ambulatory Care Coordination Note  2024    Patient Current Location:  [place]     ACM contacted the parent by telephone. Verified name and  with parent as identifiers. Provided introduction to self, and explanation of the ACM role.     Challenges to be reviewed by the provider   Additional needs identified to be addressed with provider: No  none               Method of communication with provider: none.    ACM: GEOVANI VARGHESE RN    Telephonic outreach to the patient's mother and guardian, Ana Orlando. Mom reports that she is frustrated that the patient is not receiving therapy in her current environment. She is currently at the [facility]. Mom states that the patient is deteriorating because she just lays in her bed. She has been given a couple of names of therapy agencies that will come to the facility to perform therapies on the patient. The agencies provide are: [facility], and [facility]. Unfortunately, neither one of these agencies is in network and would require a waiver.   She recently saw a neuropsychologist, Dr. Yuridia Blackwell, PSD. Per mom, he feels that the patient would benefit from movement and physical therapy. Her PCP is Dr. Chidi Becerra.   Ms. Orlando agrees to working with the ACM team CC/SS to facilitate the waiver in hopes of assisting the patient in this regard. This ACM will send a referral to CC/SS to assist.     Lab Results       None            Care Coordination Interventions    Referral from Primary Care Provider: No  Suggested Interventions and Community Resources          Goals Addressed    None         Future Appointments   Date Time Provider Department Center   2024  9:40 AM Yuridia Blackwlel [facility]

## 2024-06-05 ENCOUNTER — TELEPHONE (OUTPATIENT)
Age: 59
End: 2024-06-05

## 2024-06-05 ENCOUNTER — CARE COORDINATION (OUTPATIENT)
Dept: OTHER | Facility: CLINIC | Age: 59
End: 2024-06-05

## 2024-06-05 NOTE — TELEPHONE ENCOUNTER
Returned call and left a message for Briana to call back.  Asked her to see if call center is able to transfer her when she calls back.

## 2024-06-05 NOTE — TELEPHONE ENCOUNTER
Briana called back and we discussed the waiver form that she faxed over for the patient to receive OT & PT at her facility.  I will call her back if we have any questions when completing the form.  We will fax it back to her and scan it into the patient's media as well.

## 2024-06-05 NOTE — CARE COORDINATION
Care Coordination  Note    Care Coordination  (CCSS) received referral from [facility] requesting assistance with Assistance with benefits related needs (network exception process, prior authorization, ect.) yes - Waiver for [facility] and/or [facility]  (PT/OT)  .       CCSS outreached Dr. Blackwell Office to get waiver initiated. Left Message for Itzel (assistant) to return call.     Waiver faxed to Dr. Blackwell Office     Your fax has been successfully sent to Dr. Blackwell Office  at [phone].  ------------------------------------------------------------  2024 8:52:23 AM Transmission Record   Sent to [id number] with remote ID \"\"   Result: (0/339;0/0) Success   Page record: 1 - 3   Elapsed time: 05:45 on channel 32    CCSS contacted the parent via phone, for referral listed above. Verified name and  with parent as identifiers.      Summary Note:   Spoke with patient's mother Isidro who confirmed patient needs Speech, OT & PT. CCSS also confirmed waiver is only needed for [facility] which will come to the facility to provide therapy needed. CCSS informed mother that waiver was faxed to Dr. Blackwell office today for completion and I would keep her updated on the status of waiver. Ana was very appreciative for the assistance. [facility] contact # if she has any questions.         [facility] returned Itzel's call from Dr. Blackwell office. Itzel confirmed waiver was received and she gave it to Dr. Blackwell to complete. CCSS explained waiver is only need for \"[facility]\" for PT/OT/Speech and once completed it can be faxed to [facility] fax #[phone] with clinicals. Itzel was understanding of this and also stated she will scan completed waiver with clinicals in the chart as well once completed.      Resources/Services Provided:      Waiver Assistance      Plan:  Chart routed to [facility] for review.   CCSS Plan for follow-up call in 5-7 days

## 2024-06-06 ENCOUNTER — CARE COORDINATION (OUTPATIENT)
Dept: OTHER | Facility: CLINIC | Age: 59
End: 2024-06-06

## 2024-06-06 NOTE — TELEPHONE ENCOUNTER
Briana requesting a call to discuss the waiver that was faxed and the information incorrect in section three.

## 2024-06-06 NOTE — CARE COORDINATION
Care Coordination  Note    Care Coordination  (CCSS) received waiver from Dr. Blackwell Office but info in section 3 on waiver is incorrect.       CCSS contacted Dr. Blackwell Office -Left Message w/ Cindy for Itzel to return call.

## 2024-06-07 ENCOUNTER — CARE COORDINATION (OUTPATIENT)
Dept: OTHER | Facility: CLINIC | Age: 59
End: 2024-06-07

## 2024-06-07 NOTE — CARE COORDINATION
Care Coordination  Note    Care Coordination  (CCSS) contacted Itzel at  Dr. Blackwell office to discuss incomplete waiver. Per Itzel- she will get waiver corrected and faxed back.        Waiver received from Dr. Blackwell Office      Waiver submitted to [facility] for review

## 2024-06-11 ENCOUNTER — CARE COORDINATION (OUTPATIENT)
Dept: OTHER | Facility: CLINIC | Age: 59
End: 2024-06-11

## 2024-06-11 NOTE — CARE COORDINATION
Care Coordination  Note    Care Coordination  (CCSS) checked status of waiver with [facility].     Waiver Approved for [facility]        Ref# [id number]   Dates: 24- 25      CCSS contacted the guardian via phone, for referral listed above. Verified name and  with parent as identifiers.      Summary Note:   Spoke with Ana (mother) - informed her waiver for [facility] has been approved. CCSS provided the following info:        Ref# [id number]   Dates: 24- 25    Also explained determination/ approval letter will be mailed out by [facility]. Ana was very appreciative for the assistance.     No further outreach scheduled with CCSS, CCSS will sign off care team at this time. Patient will be further outreached by the ACM on the care team.             Resources/Services Provided:    Waiver Assistance      Plan:  Chart routed to ACM for review.   CCSS No further follow-up call indicated

## 2024-06-13 ENCOUNTER — CARE COORDINATION (OUTPATIENT)
Dept: OTHER | Facility: CLINIC | Age: 59
End: 2024-06-13

## 2024-06-13 NOTE — CARE COORDINATION
Ambulatory Care Coordination Note     6/13/2024 1:33 PM     parent outreach attempt by this ACM today to perform care management follow up . ACM was unable to reach the parent by telephone today; left voice message requesting a return phone call to this ACM.     ACM: GEOVANI VARGHESE RN     Care Summary Note: Attempted to contact the patient's mother/guardian to follow up on progress and approved therapy.     PCP/Specialist follow up:   Future Appointments         Provider Specialty Dept Phone    7/31/2024 9:40 AM Yuridia Blackwell PSYD Neurology [phone]            Follow Up:   Plan for next ACM outreach in approximately 1 week to complete:  - education .

## 2024-07-10 ENCOUNTER — CARE COORDINATION (OUTPATIENT)
Dept: OTHER | Facility: CLINIC | Age: 59
End: 2024-07-10

## 2024-07-10 NOTE — CARE COORDINATION
Ambulatory Care Coordination Note     7/10/2024 1:10 PM     Patient Current Location:  [facility] contacted the parent by telephone. Verified name and  with parent/guardian as identifiers.         ACM: GEOVANI VARGHESE RN     Challenges to be reviewed by the provider   Additional needs identified to be addressed with provider No  none               Method of communication with provider: none.    Care Summary Note: Telephonic outreach to Mrs. Orlando. She reports that the patient remains without therapy, even though a waiver was submitted and approved for [facility] to come to the facility to perform PT/OT.   The patient's mother, read the approval letter. She is working directly with the nurse at the patients facility to set up the visit. She states that [facility] is telling her that they have been unable to come to contract agreement with [facility].   This ACM provided Mrs. Orlando the number for [facility], and the number for [facility] in [place]. She plans to outreach both parties to see what needs to be done to set up the visits. This ACM offered to assist, however Mrs. Orlando states that she would notify this ACM if she needs additional assistance.     Offered patient enrollment in the Remote Patient Monitoring (RPM) program for in-home monitoring: Patient is not eligible for RPM program because: insurance coverage.     Assessments Completed:   No changes since last call    Medications Reviewed:   Patient denies any changes with medications and reports taking all medications as prescribed.    Advance Care Planning:   Not reviewed during this call     Care Planning:    Goals Addressed    None          PCP/Specialist follow up:   Future Appointments         Provider Specialty Dept Phone    2024 9:40 AM Yuridia Blackwell PSYD Neurology [phone]            Follow Up:   Plan for next ACM outreach in approximately 3 weeks to complete:  - follow up regarding therapy  .   Caregiver is agreeable to this

## 2024-07-31 ENCOUNTER — CARE COORDINATION (OUTPATIENT)
Dept: OTHER | Facility: CLINIC | Age: 59
End: 2024-07-31

## 2024-07-31 ENCOUNTER — OFFICE VISIT (OUTPATIENT)
Age: 59
End: 2024-07-31

## 2024-07-31 DIAGNOSIS — S06.9X9S MAJOR NEUROCOGNITIVE DISORDER AS LATE EFFECT OF TRAUMATIC BRAIN INJURY WITH BEHAVIORAL DISTURBANCE (HCC): Primary | ICD-10-CM

## 2024-07-31 DIAGNOSIS — R41.844 FRONTAL LOBE AND EXECUTIVE FUNCTION DEFICIT: ICD-10-CM

## 2024-07-31 DIAGNOSIS — F02.818 MAJOR NEUROCOGNITIVE DISORDER AS LATE EFFECT OF TRAUMATIC BRAIN INJURY WITH BEHAVIORAL DISTURBANCE (HCC): Primary | ICD-10-CM

## 2024-07-31 DIAGNOSIS — F41.9 SEVERE ANXIETY: ICD-10-CM

## 2024-07-31 DIAGNOSIS — R47.01 APHASIA: ICD-10-CM

## 2024-07-31 DIAGNOSIS — G93.1 ANOXIC BRAIN DAMAGE (HCC): ICD-10-CM

## 2024-07-31 NOTE — CARE COORDINATION
Ambulatory Care Coordination Note     2024 1:54 PM     Patient Current Location:  [place]     Family, guardian  contacted this ACM by telephone. Verified name and  with  guardian  as identifiers.         ACM: GEOVANI VARGHESE RN     Challenges to be reviewed by the provider   Additional needs identified to be addressed with provider No  none               Method of communication with provider: none.    Care Summary Note: Per patient's mother, Britney Orlando, the patient has been evaluated by home care. She states that Briana is going to be working with the patient for physical therapy. Mrs. Orlando plans to meet with the patient's neurologist to see if speech and occupational therapy can be added as well.       Offered patient enrollment in the Remote Patient Monitoring (RPM) program for in-home monitoring: Patient is not eligible for RPM program because: insurance coverage.     Assessments Completed:   No changes since last call    Medications Reviewed:   Patient denies any changes with medications and reports taking all medications as prescribed.    Advance Care Planning:   Not reviewed during this call     Care Planning:   Not completed during this call    PCP/Specialist follow up:       Follow Up:   No further Ambulatory Care Management follow-up scheduled at this time.  Caregiver has Ambulatory Care Manager's contact information for any further questions, concerns or needs.

## 2024-07-31 NOTE — PROGRESS NOTES
A neuropsychological evaluation was completed with the patient on 5/29/2024     Prior to seeing the patient for today's visit, I reviewed pertinent records, including the previously completed report, the records in Epic, and any updated visits from other providers since the patient's last visit.     During today's appointment I administered the following measures: NMSE, Fluency.  Exam impaired.  Anxious, vocal mannerisms,          I provided feedback services related to the previously completed report. Attendees included: Patient. Education was provided regarding my diagnostic impressions, and we discussed treatment plan/options. Attendees were provided with the opportunity to ask questions, which were answered to the best of my ability.     We discussed, in detail, the following:    his patient generated an abnormal range neurocognitive profile with respect to neurocognitive functioning.  In this regard, the examination was limited by marked anxiety and cognitive deficits, but there is clear evidence of global/bilateral involvement here.  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  Particular concern is whether or not she is declining over time.  Clouding the clinical picture here is for marked anxiety and depression.  Those psychiatric concerns have not caused these neurocognitive deficits do persist, but certainly exacerbate them.  Strongly advised review of her current medication management specifically for anxiety but also depression as well as consideration for treatment for her marked attention deficit related concerns.  Continue speech and occupational therapy services.  She needs supervision for all ADLs.  This includes day-to-day household safety supervision, nutritional/meal preparation and supervision, medication management supervision, supervision of financial dealings, and supervision of ADLs.  The

## 2024-08-02 DIAGNOSIS — S06.9X9S MAJOR NEUROCOGNITIVE DISORDER AS LATE EFFECT OF TRAUMATIC BRAIN INJURY WITH BEHAVIORAL DISTURBANCE (HCC): Primary | ICD-10-CM

## 2024-08-02 DIAGNOSIS — R47.01 APHASIA: ICD-10-CM

## 2024-08-02 DIAGNOSIS — G93.1 ANOXIC BRAIN DAMAGE (HCC): ICD-10-CM

## 2024-08-02 DIAGNOSIS — F41.9 SEVERE ANXIETY: ICD-10-CM

## 2024-08-02 DIAGNOSIS — R41.844 FRONTAL LOBE AND EXECUTIVE FUNCTION DEFICIT: ICD-10-CM

## 2024-08-02 DIAGNOSIS — F02.818 MAJOR NEUROCOGNITIVE DISORDER AS LATE EFFECT OF TRAUMATIC BRAIN INJURY WITH BEHAVIORAL DISTURBANCE (HCC): Primary | ICD-10-CM

## 2024-08-14 ENCOUNTER — TELEPHONE (OUTPATIENT)
Age: 59
End: 2024-08-14

## 2024-08-14 NOTE — TELEPHONE ENCOUNTER
HIPAA Verified (if caller is someone other than patient):        Message: (as many details from patient/caller as possible)  Requesting a referral for Speech Therapy        Refill Information: Neurodiagnostic Testing: New patient referrals   Pharmacy:       Test Type: N/A   Reason for referral:     Medication name:  Reason for Test:   Has pt been seen by neuro in hospital or our offices within the last 3 yrs?         Prescribing provider:    Ordering Provider:    Referring provider or self referral?     Last Office Visit (must be within last 12 months):     Referral Placed/Sent?:   Referral placed in Epic or scanned in to media?            Level 1 Calls - attempted to reach practice?      Reason Call Marked High Priority (if applicable):

## 2024-08-21 ENCOUNTER — CARE COORDINATION (OUTPATIENT)
Dept: OTHER | Facility: CLINIC | Age: 59
End: 2024-08-21
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
## 2024-09-06 ENCOUNTER — PATIENT MESSAGE (OUTPATIENT)
Age: 59
End: 2024-09-06

## 2024-09-06 DIAGNOSIS — G93.1 ANOXIC BRAIN DAMAGE (HCC): ICD-10-CM

## 2024-09-06 DIAGNOSIS — R47.01 APHASIA: ICD-10-CM

## 2024-09-06 DIAGNOSIS — S06.9X9S MAJOR NEUROCOGNITIVE DISORDER AS LATE EFFECT OF TRAUMATIC BRAIN INJURY WITH BEHAVIORAL DISTURBANCE (HCC): Primary | ICD-10-CM

## 2024-09-06 DIAGNOSIS — F02.818 MAJOR NEUROCOGNITIVE DISORDER AS LATE EFFECT OF TRAUMATIC BRAIN INJURY WITH BEHAVIORAL DISTURBANCE (HCC): Primary | ICD-10-CM

## 2024-09-19 ENCOUNTER — CARE COORDINATION (OUTPATIENT)
Dept: OTHER | Facility: CLINIC | Age: 59
End: 2024-09-19

## 2024-10-16 ENCOUNTER — CARE COORDINATION (OUTPATIENT)
Dept: OTHER | Facility: CLINIC | Age: 59
End: 2024-10-16

## 2024-10-16 NOTE — CARE COORDINATION
Ambulatory Care Coordination Note     10/16/2024 1:53 PM     Patient Current Location:  [facility] contacted the caregiver by telephone. Verified name and  with caregiver as identifiers.         ACM: GEOVANI VARGHESE RN     Challenges to be reviewed by the provider   Additional needs identified to be addressed with provider No  none               Method of communication with provider: none.    Has the patient been seen in the ED since your last call? no    Care Summary Note: Telephonic outreach to the patient's mother/caregiver, Ana. Spoke with Ana, who was traveling home from visiting the patient. She states that the patient's short term memory loss seems to be improving. She has a new caregiver staying with her a few days a week to give her mom a break. She remembers this caregiver and is pleased with her care. Ana is excited that she remembered the care giver. She is working with [facility] to obtain additional services, expecting a call later today.   This ACM will follow up with mom in 2-3 weeks to assess ongoing needs.     Offered patient enrollment in the Remote Patient Monitoring (RPM) program for in-home monitoring: Patient is not eligible for RPM program because: insurance coverage.     Assessments Completed:   No changes since last call    Medications Reviewed:   Patient denies any changes with medications and reports taking all medications as prescribed.    Advance Care Planning:   Reviewed and current     Care Planning:   Not completed during this call    PCP/Specialist follow up:       Follow Up:   Plan for next ACM outreach in approximately 2 weeks to complete:  - goal progression.   Caregiver is agreeable to this plan.

## 2024-10-18 ENCOUNTER — CARE COORDINATION (OUTPATIENT)
Dept: OTHER | Facility: CLINIC | Age: 59
End: 2024-10-18

## 2024-10-18 NOTE — CARE COORDINATION
Ambulatory Care Coordination Note     10/18/2024 3:13 PM     Parent outreach attempt by this ACM today to perform care management follow up . ACM was unable to reach the parent by telephone today; left voice message requesting a return phone call to this ACM.     ACM: Gisele Clinton RN     Care Summary Note: ACM received a referral from [facility] requesting patient outreach to discuss \"additional services\" patient is requesting.    PCP/Specialist follow up:       Follow Up:   Plan for next ACM outreach in approximately  next business day  to complete:  Follow up on [facility] referral with assistance with services .

## 2024-10-18 NOTE — CARE COORDINATION
Ambulatory Care Coordination Note     10/18/2024 3:40 PM     Patient Current Location:  Home: [address] [place] [address]   [facility] [id number]     Parent contacted the ACM by telephone. Verified name and  with parent as identifiers.         ACM: Gisele Clinton RN     Challenges to be reviewed by the provider   Additional needs identified to be addressed with provider Yes  OT-requesting services  SLP-requesting services         Method of communication with provider: phone.    Has the patient been seen in the ED since your last call? no    Care Summary Note: Patient's mother states that she is trying to get OT and ST to see the patient.  She states patient is receiving PT at this time and OT and ST has been ordered but no one has come to see her.  She is in an assisted living facility and sh is having  difficult time arranging services for her.  Much supportive listening provided discuss ACM will reach out to service providers and determine barrier to her receiving OT and ST services.      Assessments Completed:   No changes since last call    Medications Reviewed:   Not completed during this call: not discussed    Advance Care Planning:   Not reviewed during this call     Care Planning:   Not completed during this call    PCP/Specialist follow up:       Follow Up:   Plan for next ACM outreach in approximately 1 week to complete:  - Discuss therapy services available .   Caregiver is agreeable to this plan.

## 2024-10-25 ENCOUNTER — CARE COORDINATION (OUTPATIENT)
Dept: OTHER | Facility: CLINIC | Age: 59
End: 2024-10-25

## 2024-10-31 ENCOUNTER — TELEPHONE (OUTPATIENT)
Age: 59
End: 2024-10-31

## 2024-10-31 NOTE — TELEPHONE ENCOUNTER
HIPAA Verified (if caller is someone other than patient): Yes      Reason for Call:     Is this a medication reaction?   no    If yes, what symptoms is the patient experiencing and duration of symptoms?       If related to a fall, did patient hit their head or lose consciousness?       If related to a change in medical condition:     When did the change occur?       What are those changes?        Other notes or clinical questions:   Patient mother, Isidro, calling to say patient sees Dr. Lopez, however they are wanting someone in [place] now because it's easier for them to travel. She states they would like a provider that can do botox, and they were interested in seeing Noris Ortega. Please give them a call if this is an option at . Thank you.       Message: (any additional details from patient/caller not covered above)          Level 1 Calls - attempted to reach practice?      Reason Call Marked High Priority (if applicable):

## 2024-11-07 ENCOUNTER — CARE COORDINATION (OUTPATIENT)
Dept: OTHER | Facility: CLINIC | Age: 59
End: 2024-11-07

## 2024-11-07 NOTE — CARE COORDINATION
Ambulatory Care Coordination Note     2024 1:32 PM     Patient Current Location:  [facility] contacted the caregiver by telephone. Verified name and  with caregiver as identifiers.         ACM: GEOVANI VARGHESE RN     Challenges to be reviewed by the provider   Additional needs identified to be addressed with provider No  none               Method of communication with provider: none.    Utilization: Patient has not had any utilization since our last call.     Care Summary Note: Telephonic outreach to the patient's mother, Ana Orlando. Mrs. Orlando is currently in [place] and the patient remains in [facility]. The patient is currently receiving PT and has been evaluated and approved for OT as well.[facility] is currently working with the patient, however mom does not feel that it has been successful. If mom or caregiver is not present when PT comes to work with the patient, she refuses. Mom would like to have a clear scheduled time that the PT works with the patient, so she can be there. Mrs. Orlando requested that this ACM assist her in identifying some possible in network options for outpatient PT/OT.   The patient was also receiving Botox treatments in her thigh, to relax the muscles and ligaments in her knee, so that she can bend the knee. The provider that performed the Botox is no longer available. Mrs. Orlando called the office and was told that she would receive a call to schedule the next available provider with the NP that gives the Botox injections, (mom could not recall the name).   Will continue to outreach and follow, provide resources requested.     Offered patient enrollment in the Remote Patient Monitoring (RPM) program for in-home monitoring: Patient is not eligible for RPM program because: insurance coverage.     Assessments Completed:   No changes since last call    Medications Reviewed:   Completed during a previous call     Advance Care Planning:   Not reviewed during this

## 2024-11-13 NOTE — TELEPHONE ENCOUNTER
Nelson Wei, DO  You19 hours ago (4:15 PM)       ok     You  Nelson Wei, 19 hours ago (3:42 PM)     DR  Seen by [place] 4/1/24 . Pt would receive Botox for spasticity. Thuy said ok to transfer to our office since closer. Ok to schedule in procedure slot for evaluation? Thanks.         Called Isidro on PHI form, advised calling to schedule Botox Appt with . pt will come in to discuss Botox injection first. Scheduled for 12/16/24 at 4 pm to discuss Botox injections. Gave Isidro our fax so she can fax over records from [facility] on pt. Isidro verbalized understanding and thanked me for the call.

## 2024-11-18 ENCOUNTER — CARE COORDINATION (OUTPATIENT)
Dept: OTHER | Facility: CLINIC | Age: 59
End: 2024-11-18

## 2024-11-18 NOTE — CARE COORDINATION
Ambulatory Care Coordination Note     2024 1:55 PM     Patient Current Location:  [facility] [place]     ACM contacted the parent by telephone. Verified name and  with parent as identifiers.         ACM: GEOVANI VARGHESE RN     Challenges to be reviewed by the provider   Additional needs identified to be addressed with provider No  none               Method of communication with provider: none.    Utilization: Patient has not had any utilization since our last call.     Care Summary Note: Telephonic outreach to the patient's mother, Ana Orlando. This ACM contacted patient's mother to inform her of providers for PT/OT. This ACM sent the provider list via email provided by Mrs. Orlando. [email or web address]. Will continue to follow and assist as needed in regards to resources.     Offered patient enrollment in the Remote Patient Monitoring (RPM) program for in-home monitoring: Patient is not eligible for RPM program because: insurance coverage.     Assessments Completed:   No changes since last call    Medications Reviewed:   Completed during a previous call     Advance Care Planning:   Not reviewed during this call     Care Planning:   Not completed during this call    PCP/Specialist follow up:   Future Appointments         Provider Specialty Dept Phone    2024 4:00 PM Nelson Wei,  Neurology [phone]            Follow Up:   Plan for next ACM outreach in approximately 2 weeks to complete:  - goal progression.   Caregiver is agreeable to this plan.

## 2024-11-29 NOTE — H&P
[address] [place] [address]  HISTORY AND PHYSICAL    Name:  Yvon New  MR#:  [id number]  :  1965  ACCOUNT #:  [de-identified]  ADMIT DATE:  2023      The patient was seen, evaluated, and admitted by me on 2023. PRIMARY CARE PHYSICIAN:  Gwendolyn Mejia MD    SOURCE OF INFORMATION:  Review of ED and old electronic medical record. CHIEF COMPLAINT:  Change in mental status. HISTORY OF PRESENT ILLNESS:  This 53-year-old woman with past medical history significant for venous thromboembolism, on anticoagulation, dyslipidemia, hypothyroidism, hypertension presented at the emergency room from the nursing home with change in mental status. The patient was having an outburst which included yelling out periodically and restless and agitated. The patient had a similar presentation and was admitted to this hospital from 2023 to 2023. During that hospitalization, the patient was found to have acute stroke confirmed with MRI of the brain. The patient was also treated for pneumonia. The patient's change in mental status were attributed to the urinary tract infection and pneumonia as well as the acute stroke. The patient was discharged back to the nursing home. She was doing relatively well at the nursing home until the day of her presentation at the emergency room when the patient developed change in mental status. When the patient arrived at the emergency room, the patient was hypotensive and this responded to fluid therapy. The CT scan of the abdomen and pelvis shows large stool burden concerning for fecal impaction. A chest x-ray shows evidence of pneumonia. The patient was started on antibiotics and was referred to the hospitalist service for admission. PAST MEDICAL HISTORY:  Venous thromboembolism; on Eliquis for anticoagulation, dyslipidemia, hypothyroidism, and hypertension. ALLERGIES:  THE PATIENT IS ALLERGIC TO DROPERIDOL. MEDICATIONS:  1.   Lipitor 40 mg abdomen and pelvis with contrast shows large stool burden concerning for fecal impaction. There is a new large amount of heterotrophic ossification around the right hip. There is an enlarging 1.5 cm nodule of the left adrenal gland. This could represent a metastasis of an unknown primary. LABORATORY DATA:  Hematology; WBC 8.4, hemoglobin 11.7, hematocrit 35.9, and platelets 829. Chemistry; sodium 140, potassium 3.8, chloride 108, CO2 is 25, glucose 106, BUN 15, creatinine 0.61, calcium 9.2, total bilirubin 0.4, ALT 22, AST 13, alkaline phosphatase 148, total protein 6.7, albumin level 2.9, globulin 3.8, and lactic acid level 1.1. Urinalysis is significant for negative nitrites, trace leukocyte esterase, negative bacteria, and negative blood. ASSESSMENT:  1. Acute delirium. 2.  Bacterial pneumonia. 3.  Obesity. 4.  Venous thromboembolism. 5.  Dyslipidemia. 6.  Right hip lesion. 7.  Hypothyroidism. 8.  Hypotension. 9.  Fecal impaction. 10.  Left adrenal nodule. PLAN:  1. Acute delirium. We will admit the patient for further evaluation and treatment. The patient is on Eliquis for anticoagulation for venous thromboembolism. We will obtain CT scan of the head to rule out bleed. We will also obtain MRI of the brain to evaluate the patient for recurrent stroke. We will check acetaminophen level. We will check ammonia level. We will check salicylate level. We will check B12 level. This is most likely due to metabolic event. We will identify and treat underlying etiological factors including suspected bacterial pneumonia and volume depletion. 2.  Suspected bacterial pneumonia. We will start the patient on Rocephin and doxycycline. We will obtain CT scan of the chest for further evaluation of the pneumonia. 3.  Obesity. The patient presented with BMI of 30.26. The patient would benefit from lifestyle modification including diet and exercise. 4.  Venous thromboembolism.   We will Universal Safety Interventions

## 2024-12-10 ENCOUNTER — CARE COORDINATION (OUTPATIENT)
Dept: OTHER | Facility: CLINIC | Age: 59
End: 2024-12-10

## 2024-12-10 NOTE — CARE COORDINATION
Ambulatory Care Coordination Note     12/10/2024 2:49 PM     Patient Current Location:  [place]     AC contacted the parent by telephone. Verified name and  with parent as identifiers.         ACM: GEOVANI VARGHESE RN     Challenges to be reviewed by the provider   Additional needs identified to be addressed with provider No  none               Method of communication with provider: none.    Utilization: Patient has not had any utilization since our last call.     Care Summary Note: Telephonic outreach to the patient's mother, Shaunna Orlando. The patient 's mother states that the patient was doing well and she was currently out of town. The patient's mother asked about who to contact regarding insurance changes. This ACM provided the information to the patient's mother, who then agreed to contact HR to ask questions. She reports that she never enrolled the patient into the new insurance.   Will follow up to see if the patient had success outreaching HR and how this ACM could assist further.     Offered patient enrollment in the Remote Patient Monitoring (RPM) program for in-home monitoring: Patient is not eligible for RPM program because: insurance coverage.     Assessments Completed:   No changes since last call    Medications Reviewed:   Patient denies any changes with medications and reports taking all medications as prescribed.    Advance Care Planning:   Not reviewed during this call     Care Planning:   Not completed during this call    PCP/Specialist follow up:   Future Appointments         Provider Specialty Dept Phone    2024 4:00 PM Nelson Wei,  Neurology [phone]            Follow Up:   Plan for next ACM outreach in approximately 3 weeks to complete:  - goal progression  - education .   Caregiver is agreeable to this plan.

## 2024-12-16 ENCOUNTER — OFFICE VISIT (OUTPATIENT)
Age: 59
End: 2024-12-16
Payer: COMMERCIAL

## 2024-12-16 ENCOUNTER — TELEPHONE (OUTPATIENT)
Age: 59
End: 2024-12-16

## 2024-12-16 VITALS
WEIGHT: 139 LBS | HEIGHT: 64 IN | BODY MASS INDEX: 23.73 KG/M2 | RESPIRATION RATE: 15 BRPM | HEART RATE: 81 BPM | SYSTOLIC BLOOD PRESSURE: 122 MMHG | DIASTOLIC BLOOD PRESSURE: 72 MMHG | OXYGEN SATURATION: 98 %

## 2024-12-16 DIAGNOSIS — G82.20 BILATERAL PARAPARESIS (HCC): ICD-10-CM

## 2024-12-16 DIAGNOSIS — G81.10 SPASTIC HEMIPARESIS AFFECTING DOMINANT SIDE (HCC): Primary | ICD-10-CM

## 2024-12-16 DIAGNOSIS — Z86.73 HISTORY OF STROKE: ICD-10-CM

## 2024-12-16 PROCEDURE — 99215 OFFICE O/P EST HI 40 MIN: CPT | Performed by: PSYCHIATRY & NEUROLOGY

## 2024-12-16 RX ORDER — ONDANSETRON 4 MG/1
4 TABLET, ORALLY DISINTEGRATING ORAL EVERY 8 HOURS PRN
COMMUNITY
Start: 2024-10-14

## 2024-12-16 RX ORDER — LANOLIN ALCOHOL/MO/W.PET/CERES
1000 CREAM (GRAM) TOPICAL WEEKLY
COMMUNITY
Start: 2024-12-02

## 2024-12-16 RX ORDER — EVENING PRIMROSE OIL 500 MG
CAPSULE ORAL DAILY
COMMUNITY
Start: 2024-03-08

## 2024-12-16 RX ORDER — ECHINACEA PURPUREA EXTRACT 125 MG
TABLET ORAL
COMMUNITY

## 2024-12-16 ASSESSMENT — PATIENT HEALTH QUESTIONNAIRE - PHQ9
SUM OF ALL RESPONSES TO PHQ QUESTIONS 1-9: 0
2. FEELING DOWN, DEPRESSED OR HOPELESS: NOT AT ALL
1. LITTLE INTEREST OR PLEASURE IN DOING THINGS: NOT AT ALL
SUM OF ALL RESPONSES TO PHQ QUESTIONS 1-9: 0
SUM OF ALL RESPONSES TO PHQ QUESTIONS 1-9: 0
SUM OF ALL RESPONSES TO PHQ9 QUESTIONS 1 & 2: 0
SUM OF ALL RESPONSES TO PHQ QUESTIONS 1-9: 0

## 2024-12-16 NOTE — TELEPHONE ENCOUNTER
is asking for Urgent approval on this pt. She is already approved under another dr at [facility]. He is trying to get her Botox done on 12/30/24 if possible. Can we try to urgently get this done for pt? Thank  you!

## 2024-12-16 NOTE — PROGRESS NOTES
1. Have you been to the ER, urgent care clinic since your last visit?  Hospitalized since your last visit?  No.    2. Have you seen or consulted any other health care providers outside of the [facility] since your last visit?  Include any pap smears or colon screening.   No.      Chief Complaint   Patient presents with    Procedure     Coming in to Discuss Botox Injections- right leg injections         
visit by reviewing medical records, obtaining a history, performing examination, counseling and educating the patient and family members on diagnosis, ordering medications and tests, documenting in the clinical medical record, and coordinating the care for the patient.  The patient had the ability to ask questions and all questions were answered.             The patient (or guardian, if applicable) and other individuals in attendance with the patient were advised that Artificial Intelligence will be utilized during this visit to record, process the conversation to generate a clinical note, and support improvement of the AI technology. The patient (or guardian, if applicable) and other individuals in attendance at the appointment consented to the use of AI, including the recording.        Signed By:  Nelson Wei DO FAAN    December 16, 2024

## 2024-12-17 NOTE — TELEPHONE ENCOUNTER
Called Isidro on PHI form, advised that pt will be able to get her Botox on 12/30/24 at noon with . advised her insurance does not need a PA at this time but it will change with beginning of calendar year. Verified appt day and time again. Isidro verbalized understanding and thanked me for the call.

## 2024-12-17 NOTE — TELEPHONE ENCOUNTER
Message  Received: Today  Nida Mcfadden Dawn M, KIRSTY; Henry, April  Cc: Lacey Rivera, AJ; Kwaku, Jeni, RN  Caller: Unspecified (Yesterday,  4:57 PM)  Jaime Aguilar,    Because this patient has our [employer] insurance, if he completes her Botox within this calendar year before our insurance changes, it does not require authorization.  Hope this helps, let us know if you have any additional questions.    Thanks!        Noted. Thanks. Discussed with RN clinical and we have the Botox here in office to give pt. Will call and get pt scheduled!

## 2024-12-18 ENCOUNTER — CARE COORDINATION (OUTPATIENT)
Dept: OTHER | Facility: CLINIC | Age: 59
End: 2024-12-18

## 2024-12-18 NOTE — CARE COORDINATION
Ambulatory Care Coordination Note     2024 4:10 PM     Patient Current Location:  [facility] contacted the parent by telephone. Verified name and  with parent as identifiers.         ACM: GEOVANI VARGHESE RN     Challenges to be reviewed by the provider   Additional needs identified to be addressed with provider No  none               Method of communication with provider: none.    Utilization: Patient has not had any utilization since our last call.     Care Summary Note: Telephonic outreach to the patient's mother to inform her that the patients insurance coverage will roll over automatically to [facility]. The patient's mother has not received insurance cards or information in regards to this. Mom reports that she stopped therapies and considered stopping payment on Cobra. Encouraged the patient's mother to continue payment and continue therapies. Will continue to follow and assist as needed.         PCP/Specialist follow up:   Future Appointments         Provider Specialty Dept Phone    2024 12:00 PM Nelson Wei DO Neurology [phone]    3/19/2025 8:00 AM Noris Ortega, MYRANDA - NP Neurology [phone]            Follow Up:   Plan for next ACM outreach in approximately 3 weeks to complete:  - goal progression.   Caregiver is agreeable to this plan.

## 2024-12-28 NOTE — PROGRESS NOTES
Carboxymethylcellulose Sodium (REFRESH TEARS OP) Apply to eye as needed    ProviderElías MD   lubiprostone (AMITIZA) 8 MCG CAPS capsule Take 1 capsule by mouth 2 times daily (with meals) 7/19/23   Maryellen Kent MD   divalproex (DEPAKOTE) 250 MG DR tablet Take 1 tablet by mouth nightly    ProviderElías MD   diclofenac sodium (VOLTAREN) 1 % GEL Apply topically 3 times daily as needed for Pain    ProviderElías MD   sennosides-docusate sodium (SENOKOT-S) 8.6-50 MG tablet Take 2 tablets by mouth in the morning and at bedtime    ProviderElías MD   atorvastatin (LIPITOR) 40 MG tablet Take 1 tablet by mouth nightly 6/27/23   Darren Cormier MD   apixaban (ELIQUIS) 5 MG TABS tablet Take 1 tablet by mouth 2 times daily 6/27/23   Darren Cormier MD   acetaminophen (TYLENOL) 325 MG tablet Take by mouth every 4 hours as needed    Automatic Reconciliation, Ar   baclofen (LIORESAL) 10 MG tablet Take 1 tablet by mouth 3 times daily 2/22/23   Automatic Reconciliation, Ar   levothyroxine (SYNTHROID) 88 MCG tablet Take 1 tablet by mouth every morning (before breakfast)    Automatic Reconciliation, Ar   lidocaine 4 % external patch Apply 1 patch topically daily 3/27/23   Automatic Reconciliation, Ar       Review of Systems:    General, constitutional: negative  Eyes, vision: negative  Ears, nose, throat: negative  Cardiovascular, heart: negative  Respiratory: negative  Gastrointestinal: negative  Genitourinary: negative  Musculoskeletal: negative  Skin and integumentary: negative  Psychiatric: negative  Endocrine: negative  Neurological: negative, except for HPI  Hematologic/lymphatic: negative  Allergy/immunology: negative    Objective:     There were no vitals taken for this visit.    Physical Exam:      General:  appears well nourished in no acute distress  Neck: no carotid bruits  Lungs: clear to auscultation  Heart:  no murmurs, regular rate  Lower extremity: peripheral pulses palpable. Mild

## 2024-12-30 ENCOUNTER — OFFICE VISIT (OUTPATIENT)
Age: 59
End: 2024-12-30

## 2024-12-30 DIAGNOSIS — G81.10 SPASTIC HEMIPARESIS AFFECTING DOMINANT SIDE (HCC): ICD-10-CM

## 2024-12-30 DIAGNOSIS — G93.1 ANOXIC BRAIN DAMAGE (HCC): ICD-10-CM

## 2024-12-30 DIAGNOSIS — Z86.73 HISTORY OF STROKE: ICD-10-CM

## 2024-12-30 DIAGNOSIS — G82.20 BILATERAL PARAPARESIS (HCC): Primary | ICD-10-CM

## 2025-01-09 ENCOUNTER — CARE COORDINATION (OUTPATIENT)
Dept: OTHER | Facility: CLINIC | Age: 60
End: 2025-01-09

## 2025-01-09 NOTE — CARE COORDINATION
Ambulatory Care Coordination Note     1/9/2025 1:02 PM     Parent/guardian outreach attempt by this AC today to perform care management follow up . [facility] was unable to reach the parent by telephone today;   left voice message requesting a return phone call to this AC.     ACM: GEOVANI VARGHESE, RN     Care Summary Note: Telephonic outreach attempt to reach the patient's mother/guardian. Left a discreet VM for Shaunna Orlando. Mrs. Orlando sent this AC a text message, stating that the patient is doing well. She will go on Medicare starting in March.  Will continue outreach attempts.     PCP/Specialist follow up:   Future Appointments         Provider Specialty Dept Phone    3/19/2025 8:00 AM Noris Ortega, MYRANDA - NP Neurology [phone]            Follow Up:   Plan for next AC outreach in approximately 2 weeks to complete:  - goal progression.

## 2025-02-04 ENCOUNTER — CARE COORDINATION (OUTPATIENT)
Dept: OTHER | Facility: CLINIC | Age: 60
End: 2025-02-04

## 2025-02-04 NOTE — CARE COORDINATION
Ambulatory Care Coordination Note     2/4/2025 3:03 PM     Patient has graduated from the Complex Case Management  program on 2/4/2025. The patient has a change in benefits, will be starting on Medicare.   Patient/family has the ability to self-manage at this time.  Care management goals have been completed. No further Ambulatory Care Manager follow up scheduled.     Goals Addressed    None         Patient has Ambulatory Care Manager's contact information for any further questions, concerns, or needs.  Patients upcoming visits:    Future Appointments   Date Time Provider Department Center   3/19/2025  8:00 AM Norsi Ortega, MYRANDA - NP [id number]

## 2025-03-13 ENCOUNTER — OFFICE VISIT (OUTPATIENT)
Age: 60
End: 2025-03-13

## 2025-03-13 VITALS
DIASTOLIC BLOOD PRESSURE: 62 MMHG | RESPIRATION RATE: 18 BRPM | OXYGEN SATURATION: 98 % | SYSTOLIC BLOOD PRESSURE: 110 MMHG | HEART RATE: 71 BPM

## 2025-03-13 DIAGNOSIS — F02.818 MAJOR NEUROCOGNITIVE DISORDER AS LATE EFFECT OF TRAUMATIC BRAIN INJURY WITH BEHAVIORAL DISTURBANCE (HCC): ICD-10-CM

## 2025-03-13 DIAGNOSIS — S06.9X9S MAJOR NEUROCOGNITIVE DISORDER AS LATE EFFECT OF TRAUMATIC BRAIN INJURY WITH BEHAVIORAL DISTURBANCE (HCC): ICD-10-CM

## 2025-03-13 DIAGNOSIS — Z86.73 HISTORY OF STROKE: ICD-10-CM

## 2025-03-13 DIAGNOSIS — G81.10 SPASTIC HEMIPARESIS AFFECTING DOMINANT SIDE (HCC): ICD-10-CM

## 2025-03-13 DIAGNOSIS — G82.20 BILATERAL PARAPARESIS (HCC): Primary | ICD-10-CM

## 2025-03-13 ASSESSMENT — PATIENT HEALTH QUESTIONNAIRE - PHQ9
SUM OF ALL RESPONSES TO PHQ QUESTIONS 1-9: 0
SUM OF ALL RESPONSES TO PHQ QUESTIONS 1-9: 0
1. LITTLE INTEREST OR PLEASURE IN DOING THINGS: NOT AT ALL
2. FEELING DOWN, DEPRESSED OR HOPELESS: NOT AT ALL
SUM OF ALL RESPONSES TO PHQ QUESTIONS 1-9: 0
SUM OF ALL RESPONSES TO PHQ QUESTIONS 1-9: 0

## 2025-03-13 NOTE — PROGRESS NOTES
[facility]  [address]  [address]  [place] [address]  Tel: [phone]  Fax: [phone]      Date:  25     Name:  TERESA HERNANDEZ  :  1965  MRN:  [id number]     PCP:  Riri, Pcp    Chief Complaint   Patient presents with    Neurologic Problem     TBI        HISTORY OF PRESENT ILLNESS:  History of Present Illness  The patient is a 60-year-old female here today for a regular follow-up. She was last seen by Dr. Damon in 2024, at which time she was receiving Botox for lower extremity spasticity. She has a history of an anoxic brain injury. She is accompanied by her parents.    The patient's mother reports that there has been no improvement in her leg condition despite the administration of Botox. She experiences pain in both legs, with the right leg being more severely affected. She also reports pain in both feet and the right leg. She has been using a brace twice daily for an hour each session, as recommended by her therapist, but no significant progress has been observed. Her mobility has declined to the point where she is unable to use a walker, a task she was able to perform in 2024. Prior to the onset of her current condition, she was able to walk 100 feet with a walker under supervision. Currently, she can only take a few steps with assistance. She requires support to stand, often holding onto walls or pulling herself up. She has not experienced any falls. She requires assistance with dressing, hygiene, and transfers, but is able to feed herself. She is currently receiving physical therapy at home facility for 15 minutes twice a week, but her insurance coverage ended last week. Her mother is considering inpatient therapy at [facility] and has requested a referral from Dr. Nelson Wei. She has not received Botox treatment for her arm due to excessive tightness and noted to be too tight to achieve relief. She is right-handed but has developed

## 2025-03-13 NOTE — PROGRESS NOTES
Chief Complaint   Patient presents with    Neurologic Problem     TBI      1. Have you been to the ER, urgent care clinic since your last visit?  Hospitalized since your last visit? No     2. Have you seen or consulted any other health care providers outside of the [facility] since your last visit?  Include any pap smears or colon screening.  No

## 2025-06-12 ENCOUNTER — APPOINTMENT (OUTPATIENT)
Facility: HOSPITAL | Age: 60
End: 2025-06-12
Payer: COMMERCIAL

## 2025-06-12 ENCOUNTER — HOSPITAL ENCOUNTER (EMERGENCY)
Facility: HOSPITAL | Age: 60
Discharge: HOME | End: 2025-06-12
Attending: STUDENT IN AN ORGANIZED HEALTH CARE EDUCATION/TRAINING PROGRAM
Payer: COMMERCIAL

## 2025-06-12 VITALS
HEART RATE: 91 BPM | DIASTOLIC BLOOD PRESSURE: 56 MMHG | RESPIRATION RATE: 16 BRPM | SYSTOLIC BLOOD PRESSURE: 103 MMHG | OXYGEN SATURATION: 96 % | TEMPERATURE: 98.6 F
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
## 2025-06-12 PROCEDURE — 99284 EMERGENCY DEPT VISIT MOD MDM: CPT

## 2025-06-12 PROCEDURE — 70450 CT HEAD/BRAIN W/O DYE: CPT

## 2025-06-12 PROCEDURE — 73560 X-RAY EXAM OF KNEE 1 OR 2: CPT

## 2025-06-12 NOTE — ED TRIAGE NOTES
Pt arrives via EMS from [facility] and rehab.  Pt states GLF  Hit head, on blood thinners,     Aox2-    But was able to state events leading to GLF

## 2025-06-21 NOTE — ED PROVIDER NOTES
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 History provided by mother, who states patient is at baseline mental status. Following the fall, she reports a slight headache and right knee pain, with the latter representing an exacerbation of her chronic right knee pain. She denies chest pain, shortness of breath, and any previous syncopal event.    PAST MEDICAL HISTORY  - CVA in 2022  - Anoxic brain injury  - Dysphagia  - Memory disorder secondary to CVA    PHYSICAL EXAM  Vitals: Interpreted as normal for this patient.  General: NAD. Well-kept female adult.  Eyes: Appear normal with no scleral icterus.  HENT: Atraumatic. Moist mucous membranes, no pharyngeal erythema, edema or lesions.  Neck: Atraumatic, supple. No C-spine tenderness.  Cardiac: Regular rate, regular rhythm, no significant murmurs appreciated.  Respiratory: No respiratory distress, clear lungs bilaterally with no abnormal breath sounds.  Abdomen: Soft. Nontender. Nondistended. No rebound. No guarding. No tenderness over McBurney's point, no tenderness over the liver or spleen, no Hernandez's sign, no pulsatile abdominal mass.  : No CVAT.  MS: Tenderness to the anterior aspect of the right knee. Extremities atraumatic. No edema, no calf tenderness to palpation.  Skin: No exanthems, no cyanosis, no diaphoresis.  Back exam: Atraumatic.  Neurologic: Exam at baseline. No altered mental status, speech is fluent. Gait is within normal limits. No cerebellar deficits. No motor deficits. No sensory deficits.  Psychological: Cooperative and participatory with examination.    SUMMARY  60-year-old female presented after ground-level fall with head and right knee impact, on anticoagulation. Reported slight headache and

## 2025-07-10 ENCOUNTER — APPOINTMENT (OUTPATIENT)
Facility: HOSPITAL | Age: 60
End: 2025-07-10
Payer: COMMERCIAL

## 2025-07-10 ENCOUNTER — HOSPITAL ENCOUNTER (EMERGENCY)
Facility: HOSPITAL | Age: 60
Discharge: HOME | End: 2025-07-10
Attending: EMERGENCY MEDICINE
Payer: COMMERCIAL

## 2025-07-10 VITALS
RESPIRATION RATE: 18 BRPM | HEART RATE: 81 BPM | SYSTOLIC BLOOD PRESSURE: 128 MMHG | TEMPERATURE: 98.7 F | OXYGEN SATURATION: 99 % | DIASTOLIC BLOOD PRESSURE: 54 MMHG

## 2025-07-10 DIAGNOSIS — S52.124A CLOSED NONDISPLACED FRACTURE OF HEAD OF RIGHT RADIUS, INITIAL ENCOUNTER: Primary | ICD-10-CM

## 2025-07-10 LAB
ALBUMIN SERPL-MCNC: 3.8 G/DL (ref 3.5–5)
ALBUMIN/GLOB SERPL: 1 (ref 1.1–2.2)
ALP SERPL-CCNC: 64 U/L (ref 45–117)
ALT SERPL-CCNC: 18 U/L (ref 12–78)
ANION GAP SERPL CALC-SCNC: 4 MMOL/L (ref 2–12)
APPEARANCE UR: CLEAR
AST SERPL-CCNC: 12 U/L (ref 15–37)
BACTERIA URNS QL MICRO: NEGATIVE /HPF
BASOPHILS # BLD: 0.02 K/UL (ref 0–0.1)
BASOPHILS NFR BLD: 0.4 % (ref 0–1)
BILIRUB SERPL-MCNC: 0.4 MG/DL (ref 0.2–1)
BILIRUB UR QL: NEGATIVE
BUN SERPL-MCNC: 13 MG/DL (ref 6–20)
BUN/CREAT SERPL: 19 (ref 12–20)
CALCIUM SERPL-MCNC: 9.5 MG/DL (ref 8.5–10.1)
CHLORIDE SERPL-SCNC: 105 MMOL/L (ref 97–108)
CO2 SERPL-SCNC: 29 MMOL/L (ref 21–32)
COLOR UR: NORMAL
COMMENT:: NORMAL
CREAT SERPL-MCNC: 0.67 MG/DL (ref 0.55–1.02)
DIFFERENTIAL METHOD BLD: ABNORMAL
EOSINOPHIL # BLD: 0.14 K/UL (ref 0–0.4)
EOSINOPHIL NFR BLD: 2.5 % (ref 0–7)
EPITH CASTS URNS QL MICRO: NORMAL /LPF
ERYTHROCYTE [DISTWIDTH] IN BLOOD BY AUTOMATED COUNT: 14.6 % (ref 11.5–14.5)
GLOBULIN SER CALC-MCNC: 3.7 G/DL (ref 2–4)
GLUCOSE SERPL-MCNC: 84 MG/DL (ref 65–100)
GLUCOSE UR STRIP.AUTO-MCNC: NEGATIVE MG/DL
HCT VFR BLD AUTO: 38 % (ref 35–47)
HGB BLD-MCNC: 12.3 G/DL (ref 11.5–16)
HGB UR QL STRIP: NEGATIVE
HYALINE CASTS URNS QL MICRO: NORMAL /LPF (ref 0–5)
IMM GRANULOCYTES # BLD AUTO: 0.01 K/UL (ref 0–0.04)
IMM GRANULOCYTES NFR BLD AUTO: 0.2 % (ref 0–0.5)
KETONES UR QL STRIP.AUTO: NEGATIVE MG/DL
LACTATE SERPL-SCNC: 0.6 MMOL/L (ref 0.4–2)
LEUKOCYTE ESTERASE UR QL STRIP.AUTO: NEGATIVE
LYMPHOCYTES # BLD: 2.82 K/UL (ref 0.8–3.5)
LYMPHOCYTES NFR BLD: 50.6 % (ref 12–49)
MAGNESIUM SERPL-MCNC: 2.1 MG/DL (ref 1.6–2.4)
MCH RBC QN AUTO: 29.9 PG (ref 26–34)
MCHC RBC AUTO-ENTMCNC: 32.4 G/DL (ref 30–36.5)
MCV RBC AUTO: 92.5 FL (ref 80–99)
MONOCYTES # BLD: 0.52 K/UL (ref 0–1)
MONOCYTES NFR BLD: 9.3 % (ref 5–13)
NEUTS SEG # BLD: 2.06 K/UL (ref 1.8–8)
NEUTS SEG NFR BLD: 37 % (ref 32–75)
NITRITE UR QL STRIP.AUTO: NEGATIVE
NRBC # BLD: 0 K/UL (ref 0–0.01)
NRBC BLD-RTO: 0 PER 100 WBC
PH UR STRIP: 5 (ref 5–8)
PHOSPHATE SERPL-MCNC: 3.9 MG/DL (ref 2.6–4.7)
PLATELET # BLD AUTO: 232 K/UL (ref 150–400)
PMV BLD AUTO: 11.1 FL (ref 8.9–12.9)
POTASSIUM SERPL-SCNC: 3.8 MMOL/L (ref 3.5–5.1)
PROT SERPL-MCNC: 7.5 G/DL (ref 6.4–8.2)
PROT UR STRIP-MCNC: NEGATIVE MG/DL
RBC # BLD AUTO: 4.11 M/UL (ref 3.8–5.2)
RBC #/AREA URNS HPF: NORMAL /HPF (ref 0–5)
SODIUM SERPL-SCNC: 138 MMOL/L (ref 136–145)
SP GR UR REFRACTOMETRY: 1.01 (ref 1–1.03)
SPECIMEN HOLD: NORMAL
TROPONIN I SERPL HS-MCNC: <4 NG/L (ref 0–51)
UROBILINOGEN UR QL STRIP.AUTO: 0.2 EU/DL (ref 0.2–1)
WBC # BLD AUTO: 5.6 K/UL (ref 3.6–11)
WBC URNS QL MICRO: NORMAL /HPF (ref 0–4)

## 2025-07-10 PROCEDURE — 99285 EMERGENCY DEPT VISIT HI MDM: CPT

## 2025-07-10 PROCEDURE — 87086 URINE CULTURE/COLONY COUNT: CPT

## 2025-07-10 PROCEDURE — 29105 APPLICATION LONG ARM SPLINT: CPT

## 2025-07-10 PROCEDURE — 93005 ELECTROCARDIOGRAM TRACING: CPT | Performed by: EMERGENCY MEDICINE

## 2025-07-10 PROCEDURE — 73080 X-RAY EXAM OF ELBOW: CPT

## 2025-07-10 PROCEDURE — 84484 ASSAY OF TROPONIN QUANT: CPT

## 2025-07-10 PROCEDURE — 85025 COMPLETE CBC W/AUTO DIFF WBC: CPT

## 2025-07-10 PROCEDURE — 71045 X-RAY EXAM CHEST 1 VIEW: CPT

## 2025-07-10 PROCEDURE — 73090 X-RAY EXAM OF FOREARM: CPT

## 2025-07-10 PROCEDURE — 70450 CT HEAD/BRAIN W/O DYE: CPT

## 2025-07-10 PROCEDURE — 83735 ASSAY OF MAGNESIUM: CPT

## 2025-07-10 PROCEDURE — 73060 X-RAY EXAM OF HUMERUS: CPT

## 2025-07-10 PROCEDURE — 80053 COMPREHEN METABOLIC PANEL: CPT

## 2025-07-10 PROCEDURE — 87040 BLOOD CULTURE FOR BACTERIA: CPT

## 2025-07-10 PROCEDURE — 2580000003 HC RX 258: Performed by: EMERGENCY MEDICINE

## 2025-07-10 PROCEDURE — 83605 ASSAY OF LACTIC ACID: CPT

## 2025-07-10 PROCEDURE — 81001 URINALYSIS AUTO W/SCOPE: CPT

## 2025-07-10 PROCEDURE — 84100 ASSAY OF PHOSPHORUS: CPT

## 2025-07-10 RX ORDER — 0.9 % SODIUM CHLORIDE 0.9 %
1000 INTRAVENOUS SOLUTION INTRAVENOUS
Status: COMPLETED | OUTPATIENT
Start: 2025-07-10 | End: 2025-07-10

## 2025-07-10 RX ADMIN — SODIUM CHLORIDE 1000 ML: 0.9 INJECTION, SOLUTION INTRAVENOUS at 16:09

## 2025-07-10 RX ADMIN — SODIUM CHLORIDE 1000 ML: 0.9 INJECTION, SOLUTION INTRAVENOUS at 12:50

## 2025-07-10 ASSESSMENT — PAIN DESCRIPTION - DESCRIPTORS: DESCRIPTORS: DISCOMFORT

## 2025-07-10 ASSESSMENT — PAIN DESCRIPTION - LOCATION: LOCATION: ARM

## 2025-07-10 ASSESSMENT — PAIN DESCRIPTION - FREQUENCY: FREQUENCY: CONTINUOUS

## 2025-07-10 ASSESSMENT — PAIN SCALES - GENERAL
PAINLEVEL_OUTOF10: 5
PAINLEVEL_OUTOF10: 0

## 2025-07-10 ASSESSMENT — PAIN DESCRIPTION - ONSET: ONSET: ON-GOING

## 2025-07-10 ASSESSMENT — PAIN DESCRIPTION - PAIN TYPE: TYPE: ACUTE PAIN

## 2025-07-10 ASSESSMENT — PAIN - FUNCTIONAL ASSESSMENT
PAIN_FUNCTIONAL_ASSESSMENT: 0-10
PAIN_FUNCTIONAL_ASSESSMENT: NONE - DENIES PAIN
PAIN_FUNCTIONAL_ASSESSMENT: PREVENTS OR INTERFERES SOME ACTIVE ACTIVITIES AND ADLS

## 2025-07-10 ASSESSMENT — PAIN DESCRIPTION - ORIENTATION: ORIENTATION: RIGHT

## 2025-07-10 NOTE — ED PROVIDER NOTES
[facility] EMERGENCY DEPARTMENT  EMERGENCY DEPARTMENT ENCOUNTER        CHIEF COMPLAINT       Chief Complaint   Patient presents with    Arm Pain         HISTORY OF PRESENT ILLNESS      60y F here with R arm pain and increased sleepiness. Had a minor fall while getting into bed about a week ago. Today staff noticed swelling around the elbow. No known new injuries or trauma. Also noted to be more sleepy than normal today which is how she gets often when she has UTI. No fever. No vomiting. No diarrhea. No rash.     Review of External Medical Records:     Nursing Notes were reviewed.    REVIEW OF SYSTEMS         Review of Systems   Constitutional: (-) weight loss.   HEENT: (-) stiff neck   Eyes: (-) discharge.   Respiratory: (-) cough.    Cardiovascular: (-) syncope.   Gastrointestinal: (-) blood in stool.   Genitourinary: (-) hematuria.  Musculoskeletal: (-) myalgias.   Neurological: (-) seizure.   Skin: (-) petechiae  Lymph/Immunologic: (-) enlarged lymph nodes  All other systems reviewed and are negative.         PAST MEDICAL HISTORY     Past Medical History:   Diagnosis Date    Allergic rhinitis 07/29/2019    Cerebral artery occlusion with cerebral infarction (HCC) 8/27/2022    Depression     Dysphagia 9/27/2022    Fractures     History of multiple allergies     History of vascular access device 10/07/2022    [facility] VAT: PICC placement R Cephalic length 40 cm for reliable access/TPN arm circ 35.5 cm    Hypercholesteremia     Hypertension     Memory disorder 9/27/2022    Movement disorder 9/27/2022    Muscle ache     Obesity 11/05/2012    Sinus pressure     Sinus problem     Stroke (HCC)     Thyroid disease          SURGICAL HISTORY       Past Surgical History:   Procedure Laterality Date    ANTERIOR CRUCIATE LIGAMENT REPAIR      left     CHOLECYSTECTOMY  01/01/1998    GI      colonoscopy-polyps    IR NONTUNNELED VASCULAR CATHETER > 5 YEARS  09/27/2022    IR NONTUNNELED VASCULAR CATHETER 9/27/2022 [facility] RAD ANGIO  IR    IR NONTUNNELED VASCULAR CATHETER > 5 YEARS  09/27/2022    KNEE ARTHROSCOPY           ALLERGIES     Droperidol and Tramadol    FAMILY HISTORY       Family History   Problem Relation Age of Onset    Diabetes Paternal Grandfather           SOCIAL HISTORY       Social History     Socioeconomic History    Marital status:    Tobacco Use    Smoking status: Never    Smokeless tobacco: Never   Vaping Use    Vaping status: Never Used   Substance and Sexual Activity    Alcohol use: Not Currently     Alcohol/week: 1.0 standard drink of alcohol    Drug use: No   Social History Narrative    ** Merged History Encounter **          Social Drivers of Health     Financial Resource Strain: Low Risk  (8/24/2022)    Overall Financial Resource Strain (CARDIA)     Difficulty of Paying Living Expenses: Not hard at all   Food Insecurity: No Food Insecurity (2/10/2024)    Hunger Vital Sign     Worried About Running Out of Food in the Last Year: Never true     Ran Out of Food in the Last Year: Never true   Transportation Needs: No Transportation Needs (2/10/2024)    PRAPARE - Transportation     Lack of Transportation (Medical): No     Lack of Transportation (Non-Medical): No   Social Connections: Unknown (8/22/2023)    Social Connection and Isolation Panel [NHANES]     Frequency of Communication with Friends and Family: More than three times a week     Frequency of Social Gatherings with Friends and Family: Never     Active Member of Clubs or Organizations: No     Attends Club or Organization Meetings: Never     Marital Status:    Intimate Partner Violence: Not At Risk (8/22/2023)    Humiliation, Afraid, Rape, and Kick questionnaire     Fear of Current or Ex-Partner: No     Emotionally Abused: No     Physically Abused: No     Sexually Abused: No   Housing Stability: Low Risk  (2/10/2024)    Housing Stability Vital Sign     Unable to Pay for Housing in the Last Year: No     Number of Places Lived in the Last Year: 2

## 2025-07-10 NOTE — ED TRIAGE NOTES
Pt brought in by ambulance with a cc of R arm swelling. Swollen for the past week, swelling worse today. Endorses pain in R arm and chronic R knee pain. Pt A/O x2 to person and situation. Pt is on eliquis   From assisted living ([facility])

## 2025-07-11 LAB
BACTERIA SPEC CULT: NORMAL
EKG ATRIAL RATE: 71 BPM
EKG DIAGNOSIS: NORMAL
EKG P AXIS: 30 DEGREES
EKG P-R INTERVAL: 144 MS
EKG Q-T INTERVAL: 396 MS
EKG QRS DURATION: 64 MS
EKG QTC CALCULATION (BAZETT): 430 MS
EKG R AXIS: 40 DEGREES
EKG T AXIS: 42 DEGREES
EKG VENTRICULAR RATE: 71 BPM
SERVICE CMNT-IMP: NORMAL

## 2025-07-11 PROCEDURE — 93010 ELECTROCARDIOGRAM REPORT: CPT | Performed by: INTERNAL MEDICINE

## 2025-07-15 LAB
BACTERIA SPEC CULT: NORMAL
BACTERIA SPEC CULT: NORMAL
SERVICE CMNT-IMP: NORMAL
SERVICE CMNT-IMP: NORMAL

## (undated) DEVICE — KIT GASTMY PERC PEG PULL 20FR -- ENDOVIVE BX/2

## (undated) DEVICE — TUBING HYDR IRR --